# Patient Record
Sex: FEMALE | Race: WHITE | URBAN - METROPOLITAN AREA
[De-identification: names, ages, dates, MRNs, and addresses within clinical notes are randomized per-mention and may not be internally consistent; named-entity substitution may affect disease eponyms.]

---

## 2018-05-11 ENCOUNTER — EMERGENCY (EMERGENCY)
Facility: HOSPITAL | Age: 83
LOS: 1 days | Discharge: HOME | End: 2018-05-11
Admitting: EMERGENCY MEDICINE
Payer: MEDICARE

## 2018-05-11 PROCEDURE — 90471 IMMUNIZATION ADMIN: CPT

## 2018-05-11 PROCEDURE — 99282 EMERGENCY DEPT VISIT SF MDM: CPT

## 2018-05-11 PROCEDURE — 99283 EMERGENCY DEPT VISIT LOW MDM: CPT | Mod: 25

## 2018-10-23 ENCOUNTER — APPOINTMENT (OUTPATIENT)
Dept: RADIOLOGY | Facility: HOSPITAL | Age: 83
End: 2018-10-23

## 2018-10-23 ENCOUNTER — OUTPATIENT (OUTPATIENT)
Dept: OUTPATIENT SERVICES | Facility: HOSPITAL | Age: 83
LOS: 1 days | End: 2018-10-23
Payer: MEDICARE

## 2018-10-23 DIAGNOSIS — Z00.8 ENCOUNTER FOR OTHER GENERAL EXAMINATION: ICD-10-CM

## 2018-10-23 PROCEDURE — 71046 X-RAY EXAM CHEST 2 VIEWS: CPT | Mod: 26

## 2018-10-23 PROCEDURE — 71046 X-RAY EXAM CHEST 2 VIEWS: CPT

## 2019-10-19 ENCOUNTER — EMERGENCY (EMERGENCY)
Facility: HOSPITAL | Age: 84
LOS: 0 days | Discharge: HOME | End: 2019-10-19
Attending: EMERGENCY MEDICINE
Payer: MEDICARE

## 2019-10-19 VITALS
HEART RATE: 71 BPM | DIASTOLIC BLOOD PRESSURE: 62 MMHG | OXYGEN SATURATION: 99 % | SYSTOLIC BLOOD PRESSURE: 140 MMHG | TEMPERATURE: 98 F | RESPIRATION RATE: 17 BRPM

## 2019-10-19 VITALS — HEIGHT: 62 IN | WEIGHT: 134.92 LBS

## 2019-10-19 DIAGNOSIS — Z98.49 CATARACT EXTRACTION STATUS, UNSPECIFIED EYE: ICD-10-CM

## 2019-10-19 DIAGNOSIS — M06.9 RHEUMATOID ARTHRITIS, UNSPECIFIED: ICD-10-CM

## 2019-10-19 DIAGNOSIS — R22.2 LOCALIZED SWELLING, MASS AND LUMP, TRUNK: ICD-10-CM

## 2019-10-19 DIAGNOSIS — Z98.890 OTHER SPECIFIED POSTPROCEDURAL STATES: ICD-10-CM

## 2019-10-19 DIAGNOSIS — M54.9 DORSALGIA, UNSPECIFIED: ICD-10-CM

## 2019-10-19 DIAGNOSIS — E10.9 TYPE 1 DIABETES MELLITUS WITHOUT COMPLICATIONS: ICD-10-CM

## 2019-10-19 DIAGNOSIS — F32.9 MAJOR DEPRESSIVE DISORDER, SINGLE EPISODE, UNSPECIFIED: ICD-10-CM

## 2019-10-19 DIAGNOSIS — R63.4 ABNORMAL WEIGHT LOSS: ICD-10-CM

## 2019-10-19 DIAGNOSIS — I10 ESSENTIAL (PRIMARY) HYPERTENSION: ICD-10-CM

## 2019-10-19 DIAGNOSIS — D64.9 ANEMIA, UNSPECIFIED: ICD-10-CM

## 2019-10-19 LAB
ALBUMIN SERPL ELPH-MCNC: 3.3 G/DL — SIGNIFICANT CHANGE UP (ref 3.3–5)
ALP SERPL-CCNC: 93 U/L — SIGNIFICANT CHANGE UP (ref 40–120)
ALT FLD-CCNC: 18 U/L — SIGNIFICANT CHANGE UP (ref 12–78)
ANION GAP SERPL CALC-SCNC: 6 MMOL/L — SIGNIFICANT CHANGE UP (ref 5–17)
APTT BLD: 30.8 SEC — SIGNIFICANT CHANGE UP (ref 27.5–36.3)
AST SERPL-CCNC: 25 U/L — SIGNIFICANT CHANGE UP (ref 15–37)
BASOPHILS # BLD AUTO: 0.04 K/UL — SIGNIFICANT CHANGE UP (ref 0–0.2)
BASOPHILS NFR BLD AUTO: 0.6 % — SIGNIFICANT CHANGE UP (ref 0–2)
BILIRUB SERPL-MCNC: 0.4 MG/DL — SIGNIFICANT CHANGE UP (ref 0.2–1.2)
BUN SERPL-MCNC: 19 MG/DL — SIGNIFICANT CHANGE UP (ref 7–23)
CALCIUM SERPL-MCNC: 9.3 MG/DL — SIGNIFICANT CHANGE UP (ref 8.5–10.1)
CHLORIDE SERPL-SCNC: 108 MMOL/L — SIGNIFICANT CHANGE UP (ref 96–108)
CO2 SERPL-SCNC: 26 MMOL/L — SIGNIFICANT CHANGE UP (ref 22–31)
CREAT SERPL-MCNC: 1.04 MG/DL — SIGNIFICANT CHANGE UP (ref 0.5–1.3)
EOSINOPHIL # BLD AUTO: 0.25 K/UL — SIGNIFICANT CHANGE UP (ref 0–0.5)
EOSINOPHIL NFR BLD AUTO: 3.6 % — SIGNIFICANT CHANGE UP (ref 0–6)
GLUCOSE SERPL-MCNC: 172 MG/DL — HIGH (ref 70–99)
HCT VFR BLD CALC: 38.2 % — SIGNIFICANT CHANGE UP (ref 34.5–45)
HGB BLD-MCNC: 12.4 G/DL — SIGNIFICANT CHANGE UP (ref 11.5–15.5)
IMM GRANULOCYTES NFR BLD AUTO: 0.1 % — SIGNIFICANT CHANGE UP (ref 0–1.5)
INR BLD: 1.02 RATIO — SIGNIFICANT CHANGE UP (ref 0.88–1.16)
LYMPHOCYTES # BLD AUTO: 2.2 K/UL — SIGNIFICANT CHANGE UP (ref 1–3.3)
LYMPHOCYTES # BLD AUTO: 31.6 % — SIGNIFICANT CHANGE UP (ref 13–44)
MCHC RBC-ENTMCNC: 31.7 PG — SIGNIFICANT CHANGE UP (ref 27–34)
MCHC RBC-ENTMCNC: 32.5 GM/DL — SIGNIFICANT CHANGE UP (ref 32–36)
MCV RBC AUTO: 97.7 FL — SIGNIFICANT CHANGE UP (ref 80–100)
MONOCYTES # BLD AUTO: 0.91 K/UL — HIGH (ref 0–0.9)
MONOCYTES NFR BLD AUTO: 13.1 % — SIGNIFICANT CHANGE UP (ref 2–14)
NEUTROPHILS # BLD AUTO: 3.55 K/UL — SIGNIFICANT CHANGE UP (ref 1.8–7.4)
NEUTROPHILS NFR BLD AUTO: 51 % — SIGNIFICANT CHANGE UP (ref 43–77)
PLATELET # BLD AUTO: 165 K/UL — SIGNIFICANT CHANGE UP (ref 150–400)
POTASSIUM SERPL-MCNC: 4.2 MMOL/L — SIGNIFICANT CHANGE UP (ref 3.5–5.3)
POTASSIUM SERPL-SCNC: 4.2 MMOL/L — SIGNIFICANT CHANGE UP (ref 3.5–5.3)
PROT SERPL-MCNC: 7.4 GM/DL — SIGNIFICANT CHANGE UP (ref 6–8.3)
PROTHROM AB SERPL-ACNC: 11.3 SEC — SIGNIFICANT CHANGE UP (ref 10–12.9)
RBC # BLD: 3.91 M/UL — SIGNIFICANT CHANGE UP (ref 3.8–5.2)
RBC # FLD: 15.6 % — HIGH (ref 10.3–14.5)
SODIUM SERPL-SCNC: 140 MMOL/L — SIGNIFICANT CHANGE UP (ref 135–145)
WBC # BLD: 6.96 K/UL — SIGNIFICANT CHANGE UP (ref 3.8–10.5)
WBC # FLD AUTO: 6.96 K/UL — SIGNIFICANT CHANGE UP (ref 3.8–10.5)

## 2019-10-19 PROCEDURE — 74177 CT ABD & PELVIS W/CONTRAST: CPT | Mod: 26

## 2019-10-19 PROCEDURE — 74177 CT ABD & PELVIS W/CONTRAST: CPT

## 2019-10-19 PROCEDURE — 85730 THROMBOPLASTIN TIME PARTIAL: CPT

## 2019-10-19 PROCEDURE — 36415 COLL VENOUS BLD VENIPUNCTURE: CPT

## 2019-10-19 PROCEDURE — 80053 COMPREHEN METABOLIC PANEL: CPT

## 2019-10-19 PROCEDURE — 86900 BLOOD TYPING SEROLOGIC ABO: CPT

## 2019-10-19 PROCEDURE — 99284 EMERGENCY DEPT VISIT MOD MDM: CPT

## 2019-10-19 PROCEDURE — 71260 CT THORAX DX C+: CPT

## 2019-10-19 PROCEDURE — 85610 PROTHROMBIN TIME: CPT

## 2019-10-19 PROCEDURE — 71260 CT THORAX DX C+: CPT | Mod: 26

## 2019-10-19 PROCEDURE — 86901 BLOOD TYPING SEROLOGIC RH(D): CPT

## 2019-10-19 PROCEDURE — 86850 RBC ANTIBODY SCREEN: CPT

## 2019-10-19 PROCEDURE — 99284 EMERGENCY DEPT VISIT MOD MDM: CPT | Mod: 25

## 2019-10-19 PROCEDURE — 85025 COMPLETE CBC W/AUTO DIFF WBC: CPT

## 2019-10-19 RX ORDER — SODIUM CHLORIDE 9 MG/ML
1000 INJECTION INTRAMUSCULAR; INTRAVENOUS; SUBCUTANEOUS ONCE
Refills: 0 | Status: COMPLETED | OUTPATIENT
Start: 2019-10-19 | End: 2019-10-19

## 2019-10-19 RX ADMIN — SODIUM CHLORIDE 2000 MILLILITER(S): 9 INJECTION INTRAMUSCULAR; INTRAVENOUS; SUBCUTANEOUS at 12:35

## 2019-10-19 NOTE — ED ADULT TRIAGE NOTE - CHIEF COMPLAINT QUOTE
pt ambulatory to ED states she has been having increasing swelling and pain to lower back/buttock since May worsening in size. denies fever chills or discharge.

## 2019-10-19 NOTE — ED STATDOCS - MUSCULOSKELETAL, MLM
range of motion is not limited and there is no muscle tenderness. +3cm firm subcutaneous mass on right lower back +2cm firm subcutaneous mass on right flank area

## 2019-10-19 NOTE — ED STATDOCS - PATIENT PORTAL LINK FT
You can access the FollowMyHealth Patient Portal offered by [facility] by registering at the following website: [email or web address]. By joining [employer]’s FollowMyHealth portal, you will also be able to view your health information using other applications (apps) compatible with our system.

## 2019-10-19 NOTE — ED STATDOCS - PROGRESS NOTE DETAILS
Patient is an 85 y/o female with PMHx of anemia, RA, depression, DM type I, HTN who presented to ED c/o lower back pain and swelling. back pain has been ongoing x 5 months with approx 10lbs weight loss. Denies fever, chills, any other pain at this time. Will get labs, CT. ~ASHLI Watkins CT results and labs reviewed with patient and Dr. Ahumada in detail. Will dc home, outpatient biopsy of back lesion with Dr. Huizar. ~ASHLI Watkins Patient re-examined and re-evaluated. Patient feels much better at this time. ED evaluation, Diagnosis and management discussed with the patient and family in detail. Workup results discussed with ED attending YENNI Bethea to dc home.  Close surgery follow up encouraged.  Strict ED return instructions discussed in detail and patient given the opportunity to ask any questions about their discharge diagnosis and instructions. Patient verbalized understanding. ~ ASHLI Watkins

## 2019-10-19 NOTE — ED STATDOCS - SKIN, MLM
skin normal color for race, warm, dry and intact. +3cm firm subcutaneous mass on right lower back +2cm firm subcutaneous mass on right flank area

## 2019-10-19 NOTE — ED STATDOCS - CARE PROVIDER_API CALL
Jah Huizar (DO)  Surgery; Surgical Critical Care  [address], [address] [place] [address]  Phone: [phone]  Fax: [phone]  Follow Up Time:

## 2019-10-19 NOTE — ED STATDOCS - CLINICAL SUMMARY MEDICAL DECISION MAKING FREE TEXT BOX
Labs unremarkable.  CT as above.  Okay for d/c home with PCP, surgery follow up for possible biopsy.  Discussed with patient and family.

## 2019-10-19 NOTE — ED STATDOCS - PHYSICAL EXAMINATION
GEN: AOX3, NAD. HEENT: Throat clear. Airway is patent. EYES: PERRLA. EOMI. Head: NC/AT. NECK: Supple, No JVD. FROM. C-spine non-tender. CV:S1S2, RRR, LUNGS: CTA b/l, no w/r/r. CHEST: Equal chest expansion and rise. No deformity. ABD: Soft, NT/ND, no rebound, no guarding. No CVAT. BACK: +3cm cystic mildly tender mass is noted in lower back area. No erythema. No drainage. EXT: No e/c/c. 2+ distal pulses. SKIN: No rashes. NEURO: No focal deficits. CN II-XII intact. FROM. 5/5 motor and sensory. ASHLI Watkins

## 2019-10-19 NOTE — ED STATDOCS - OBJECTIVE STATEMENT
83 y/o female with PMHx of anemia, RA, depression, DM type I, HTN, s/p right ankle fx presents to the ED c/o back pain x5 months. +~10 lbs weight loss in the past 2 months. Seen by PMBALTAZAR Noble in June 2019 for lump on her back which he said was most likely a lipoma. Pt states the bump has not increased in size but reports she has pain around the bump when walking. Denies fever, chills, any other pain at this time. Was in [place] visiting family for the past 2 months. PMD: Uri Noble.

## 2020-11-13 ENCOUNTER — TRANSCRIPTION ENCOUNTER (OUTPATIENT)
Age: 85
End: 2020-11-13

## 2020-11-13 ENCOUNTER — INPATIENT (INPATIENT)
Facility: HOSPITAL | Age: 85
LOS: 3 days | Discharge: NURSING, REHAB OR LONG-TERM CARE | DRG: 481 | End: 2020-11-17
Attending: INTERNAL MEDICINE | Admitting: HOSPITALIST
Payer: MEDICARE

## 2020-11-13 VITALS
WEIGHT: 145.06 LBS | TEMPERATURE: 98 F | SYSTOLIC BLOOD PRESSURE: 169 MMHG | OXYGEN SATURATION: 99 % | HEIGHT: 62 IN | RESPIRATION RATE: 16 BRPM | HEART RATE: 77 BPM | DIASTOLIC BLOOD PRESSURE: 85 MMHG

## 2020-11-13 DIAGNOSIS — S72.001A FRACTURE OF UNSPECIFIED PART OF NECK OF RIGHT FEMUR, INITIAL ENCOUNTER FOR CLOSED FRACTURE: ICD-10-CM

## 2020-11-13 LAB
ALBUMIN SERPL ELPH-MCNC: 3.1 G/DL — LOW (ref 3.3–5)
ALP SERPL-CCNC: 80 U/L — SIGNIFICANT CHANGE UP (ref 40–120)
ALT FLD-CCNC: 23 U/L — SIGNIFICANT CHANGE UP (ref 10–45)
ANION GAP SERPL CALC-SCNC: 9 MMOL/L — SIGNIFICANT CHANGE UP (ref 5–17)
AST SERPL-CCNC: 24 U/L — SIGNIFICANT CHANGE UP (ref 10–40)
BASOPHILS # BLD AUTO: 0.04 K/UL — SIGNIFICANT CHANGE UP (ref 0–0.2)
BASOPHILS NFR BLD AUTO: 0.3 % — SIGNIFICANT CHANGE UP (ref 0–2)
BILIRUB SERPL-MCNC: 0.3 MG/DL — SIGNIFICANT CHANGE UP (ref 0.2–1.2)
BLD GP AB SCN SERPL QL: SIGNIFICANT CHANGE UP
BUN SERPL-MCNC: 24 MG/DL — HIGH (ref 7–23)
CALCIUM SERPL-MCNC: 9.6 MG/DL — SIGNIFICANT CHANGE UP (ref 8.4–10.5)
CHLORIDE SERPL-SCNC: 106 MMOL/L — SIGNIFICANT CHANGE UP (ref 96–108)
CO2 SERPL-SCNC: 25 MMOL/L — SIGNIFICANT CHANGE UP (ref 22–31)
CREAT SERPL-MCNC: 0.93 MG/DL — SIGNIFICANT CHANGE UP (ref 0.5–1.3)
EOSINOPHIL # BLD AUTO: 0.24 K/UL — SIGNIFICANT CHANGE UP (ref 0–0.5)
EOSINOPHIL NFR BLD AUTO: 1.9 % — SIGNIFICANT CHANGE UP (ref 0–6)
GLUCOSE BLDC GLUCOMTR-MCNC: 138 MG/DL — HIGH (ref 70–99)
GLUCOSE BLDC GLUCOMTR-MCNC: 166 MG/DL — HIGH (ref 70–99)
GLUCOSE SERPL-MCNC: 104 MG/DL — HIGH (ref 70–99)
HCT VFR BLD CALC: 34.5 % — SIGNIFICANT CHANGE UP (ref 34.5–45)
HGB BLD-MCNC: 11.3 G/DL — LOW (ref 11.5–15.5)
IMM GRANULOCYTES NFR BLD AUTO: 0.6 % — SIGNIFICANT CHANGE UP (ref 0–1.5)
LYMPHOCYTES # BLD AUTO: 1.74 K/UL — SIGNIFICANT CHANGE UP (ref 1–3.3)
LYMPHOCYTES # BLD AUTO: 13.9 % — SIGNIFICANT CHANGE UP (ref 13–44)
MCHC RBC-ENTMCNC: 32.8 GM/DL — SIGNIFICANT CHANGE UP (ref 32–36)
MCHC RBC-ENTMCNC: 33.5 PG — SIGNIFICANT CHANGE UP (ref 27–34)
MCV RBC AUTO: 102.4 FL — HIGH (ref 80–100)
MONOCYTES # BLD AUTO: 0.9 K/UL — SIGNIFICANT CHANGE UP (ref 0–0.9)
MONOCYTES NFR BLD AUTO: 7.2 % — SIGNIFICANT CHANGE UP (ref 2–14)
NEUTROPHILS # BLD AUTO: 9.53 K/UL — HIGH (ref 1.8–7.4)
NEUTROPHILS NFR BLD AUTO: 76.1 % — SIGNIFICANT CHANGE UP (ref 43–77)
NRBC # BLD: 0 /100 WBCS — SIGNIFICANT CHANGE UP (ref 0–0)
PLATELET # BLD AUTO: 207 K/UL — SIGNIFICANT CHANGE UP (ref 150–400)
POTASSIUM SERPL-MCNC: 4.4 MMOL/L — SIGNIFICANT CHANGE UP (ref 3.5–5.3)
POTASSIUM SERPL-SCNC: 4.4 MMOL/L — SIGNIFICANT CHANGE UP (ref 3.5–5.3)
PROT SERPL-MCNC: 7.2 G/DL — SIGNIFICANT CHANGE UP (ref 6–8.3)
RAPID RVP RESULT: DETECTED
RBC # BLD: 3.37 M/UL — LOW (ref 3.8–5.2)
RBC # FLD: 15.2 % — HIGH (ref 10.3–14.5)
SARS-COV-2 RNA SPEC QL NAA+PROBE: SIGNIFICANT CHANGE UP
SODIUM SERPL-SCNC: 140 MMOL/L — SIGNIFICANT CHANGE UP (ref 135–145)
WBC # BLD: 12.53 K/UL — HIGH (ref 3.8–10.5)
WBC # FLD AUTO: 12.53 K/UL — HIGH (ref 3.8–10.5)

## 2020-11-13 PROCEDURE — 93010 ELECTROCARDIOGRAM REPORT: CPT

## 2020-11-13 PROCEDURE — 99285 EMERGENCY DEPT VISIT HI MDM: CPT | Mod: CS

## 2020-11-13 PROCEDURE — 73562 X-RAY EXAM OF KNEE 3: CPT | Mod: 26,RT

## 2020-11-13 PROCEDURE — 71045 X-RAY EXAM CHEST 1 VIEW: CPT | Mod: 26

## 2020-11-13 PROCEDURE — 73502 X-RAY EXAM HIP UNI 2-3 VIEWS: CPT | Mod: 26,RT

## 2020-11-13 PROCEDURE — 99223 1ST HOSP IP/OBS HIGH 75: CPT

## 2020-11-13 RX ORDER — METHOTREXATE 2.5 MG/1
0 TABLET ORAL
Qty: 0 | Refills: 0 | DISCHARGE

## 2020-11-13 RX ORDER — MORPHINE SULFATE 50 MG/1
4 CAPSULE, EXTENDED RELEASE ORAL EVERY 6 HOURS
Refills: 0 | Status: DISCONTINUED | OUTPATIENT
Start: 2020-11-13 | End: 2020-11-14

## 2020-11-13 RX ORDER — INSULIN GLARGINE 100 [IU]/ML
0 INJECTION, SOLUTION SUBCUTANEOUS
Qty: 0 | Refills: 0 | DISCHARGE

## 2020-11-13 RX ORDER — AMLODIPINE BESYLATE 2.5 MG/1
5 TABLET ORAL DAILY
Refills: 0 | Status: DISCONTINUED | OUTPATIENT
Start: 2020-11-13 | End: 2020-11-14

## 2020-11-13 RX ORDER — ACETAMINOPHEN 500 MG
650 TABLET ORAL ONCE
Refills: 0 | Status: COMPLETED | OUTPATIENT
Start: 2020-11-13 | End: 2020-11-13

## 2020-11-13 RX ORDER — SODIUM CHLORIDE 9 MG/ML
1000 INJECTION, SOLUTION INTRAVENOUS
Refills: 0 | Status: DISCONTINUED | OUTPATIENT
Start: 2020-11-13 | End: 2020-11-14

## 2020-11-13 RX ORDER — HEPARIN SODIUM 5000 [USP'U]/ML
5000 INJECTION INTRAVENOUS; SUBCUTANEOUS THREE TIMES A DAY
Refills: 0 | Status: COMPLETED | OUTPATIENT
Start: 2020-11-13 | End: 2020-11-13

## 2020-11-13 RX ORDER — FOLIC ACID 0.8 MG
1 TABLET ORAL DAILY
Refills: 0 | Status: DISCONTINUED | OUTPATIENT
Start: 2020-11-13 | End: 2020-11-14

## 2020-11-13 RX ORDER — HEPARIN SODIUM 5000 [USP'U]/ML
5000 INJECTION INTRAVENOUS; SUBCUTANEOUS THREE TIMES A DAY
Refills: 0 | Status: DISCONTINUED | OUTPATIENT
Start: 2020-11-13 | End: 2020-11-13

## 2020-11-13 RX ORDER — BENAZEPRIL HYDROCHLORIDE 40 MG/1
0 TABLET ORAL
Qty: 0 | Refills: 0 | DISCHARGE

## 2020-11-13 RX ORDER — AMLODIPINE BESYLATE 2.5 MG/1
0 TABLET ORAL
Qty: 0 | Refills: 0 | DISCHARGE

## 2020-11-13 RX ORDER — INSULIN LISPRO 100/ML
VIAL (ML) SUBCUTANEOUS
Refills: 0 | Status: DISCONTINUED | OUTPATIENT
Start: 2020-11-13 | End: 2020-11-14

## 2020-11-13 RX ORDER — DEXTROSE 50 % IN WATER 50 %
25 SYRINGE (ML) INTRAVENOUS ONCE
Refills: 0 | Status: DISCONTINUED | OUTPATIENT
Start: 2020-11-13 | End: 2020-11-14

## 2020-11-13 RX ORDER — FOLIC ACID 0.8 MG
0 TABLET ORAL
Qty: 0 | Refills: 0 | DISCHARGE

## 2020-11-13 RX ORDER — DEXTROSE 50 % IN WATER 50 %
12.5 SYRINGE (ML) INTRAVENOUS ONCE
Refills: 0 | Status: DISCONTINUED | OUTPATIENT
Start: 2020-11-13 | End: 2020-11-14

## 2020-11-13 RX ORDER — LISINOPRIL 2.5 MG/1
20 TABLET ORAL DAILY
Refills: 0 | Status: DISCONTINUED | OUTPATIENT
Start: 2020-11-13 | End: 2020-11-14

## 2020-11-13 RX ORDER — NICOTINE POLACRILEX 2 MG
1 GUM BUCCAL DAILY
Refills: 0 | Status: DISCONTINUED | OUTPATIENT
Start: 2020-11-13 | End: 2020-11-14

## 2020-11-13 RX ORDER — CHLORHEXIDINE GLUCONATE 213 G/1000ML
1 SOLUTION TOPICAL ONCE
Refills: 0 | Status: DISCONTINUED | OUTPATIENT
Start: 2020-11-13 | End: 2020-11-14

## 2020-11-13 RX ORDER — INFLUENZA VIRUS VACCINE 15; 15; 15; 15 UG/.5ML; UG/.5ML; UG/.5ML; UG/.5ML
0.5 SUSPENSION INTRAMUSCULAR ONCE
Refills: 0 | Status: DISCONTINUED | OUTPATIENT
Start: 2020-11-13 | End: 2020-11-17

## 2020-11-13 RX ORDER — OXYCODONE AND ACETAMINOPHEN 5; 325 MG/1; MG/1
1 TABLET ORAL EVERY 4 HOURS
Refills: 0 | Status: DISCONTINUED | OUTPATIENT
Start: 2020-11-13 | End: 2020-11-14

## 2020-11-13 RX ORDER — GLUCAGON INJECTION, SOLUTION 0.5 MG/.1ML
1 INJECTION, SOLUTION SUBCUTANEOUS ONCE
Refills: 0 | Status: DISCONTINUED | OUTPATIENT
Start: 2020-11-13 | End: 2020-11-14

## 2020-11-13 RX ORDER — DEXTROSE 50 % IN WATER 50 %
15 SYRINGE (ML) INTRAVENOUS ONCE
Refills: 0 | Status: DISCONTINUED | OUTPATIENT
Start: 2020-11-13 | End: 2020-11-14

## 2020-11-13 RX ORDER — INSULIN GLARGINE 100 [IU]/ML
10 INJECTION, SOLUTION SUBCUTANEOUS AT BEDTIME
Refills: 0 | Status: DISCONTINUED | OUTPATIENT
Start: 2020-11-13 | End: 2020-11-14

## 2020-11-13 RX ORDER — METFORMIN HYDROCHLORIDE 850 MG/1
0 TABLET ORAL
Qty: 0 | Refills: 0 | DISCHARGE

## 2020-11-13 RX ORDER — CIPROFLOXACIN LACTATE 400MG/40ML
0 VIAL (ML) INTRAVENOUS
Qty: 0 | Refills: 0 | DISCHARGE

## 2020-11-13 RX ORDER — SODIUM CHLORIDE 9 MG/ML
1000 INJECTION INTRAMUSCULAR; INTRAVENOUS; SUBCUTANEOUS
Refills: 0 | Status: DISCONTINUED | OUTPATIENT
Start: 2020-11-13 | End: 2020-11-14

## 2020-11-13 RX ORDER — ACETAMINOPHEN 500 MG
650 TABLET ORAL EVERY 4 HOURS
Refills: 0 | Status: DISCONTINUED | OUTPATIENT
Start: 2020-11-13 | End: 2020-11-14

## 2020-11-13 RX ADMIN — Medication 650 MILLIGRAM(S): at 18:18

## 2020-11-13 RX ADMIN — Medication 650 MILLIGRAM(S): at 19:32

## 2020-11-13 RX ADMIN — HEPARIN SODIUM 5000 UNIT(S): 5000 INJECTION INTRAVENOUS; SUBCUTANEOUS at 22:50

## 2020-11-13 RX ADMIN — MORPHINE SULFATE 4 MILLIGRAM(S): 50 CAPSULE, EXTENDED RELEASE ORAL at 23:09

## 2020-11-13 RX ADMIN — MORPHINE SULFATE 4 MILLIGRAM(S): 50 CAPSULE, EXTENDED RELEASE ORAL at 22:39

## 2020-11-13 NOTE — CONSULT NOTE ADULT - SUBJECTIVE AND OBJECTIVE BOX
HPI:  86yo female w. PMH HTN, DMII, RA, Anemia, Depression presents to ED w. c/o Right Hip pain s/p stepping wrong on her foot and her hand slipped off of the banister causing her to fall, landed on right knee. Denies Head strike/LOC. No Head,, Neck or back pain. Denies cp/sob/palpitations. Found to have Right hip intertrochanteric fracture, Ortho consulted in ED, admit w. plan for surgical intervention.    (13 Nov 2020 18:32)    Called by Dr Burns ortho to see patient .  Patient was sitting on stretcher in the ER.  She appears comfortable but does complain of 10/10 pain with   movement .   Patient gives hx of fall as described in HPI .    Vital Signs Last 24 Hrs  T(C): 36.6 (13 Nov 2020 16:41), Max: 36.6 (13 Nov 2020 16:41)  T(F): 97.8 (13 Nov 2020 16:41), Max: 97.8 (13 Nov 2020 16:41)  HR: 82 (13 Nov 2020 19:58) (77 - 82)  BP: 167/68 (13 Nov 2020 19:58) (167/68 - 169/85)  BP(mean): 101 (13 Nov 2020 19:58) (101 - 101)  RR: 16 (13 Nov 2020 19:58) (16 - 16)  SpO2: 98% (13 Nov 2020 19:58) (98% - 99%)    PAST MEDICAL & SURGICAL HISTORY:  Type 2 diabetes mellitus  Depression  rheumatoid  Anemia  Hypertension  S/P cataract surgery  fracture ankle right  plate . screws    Home Medications:  benazepril 20 mg oral tablet: 1 tab(s) orally once a day (13 Nov 2020 18:34)  folic acid 1 mg oral tablet: 1 tab(s) orally once a day (13 Nov 2020 18:34)  Lantus 100 units/mL subcutaneous solution: 25 -30 unit(s) subcutaneous once a day (at bedtime) (13 Nov 2020 18:32)  metFORMIN 850 mg oral tablet: 2 tab(s) orally once a day (in the evening) (13 Nov 2020 18:33)  Norvasc 5 mg oral tablet: 1 tab(s) orally once a day (13 Nov 2020 18:35)  Paxil 10 mg oral tablet: 1 tab(s) orally once a day (13 Nov 2020 18:32)    PE:  Alert and oriented X3   Lungs:  CTA   Cor:   RR   Abd:   soft, protuberant +BS   Ext:   Right leg externally rotated, thigh soft, no ecchymosis noted at hip area, small abrasion at knee from fall covered with telfa and tegaderm.  Foot warm, + pulses , good capillary refill                           11.3   12.53 )-----------( 207      ( 13 Nov 2020 17:30 )             34.5   11-13    140  |  106  |  24<H>  ----------------------------<  104<H>  4.4   |  25  |  0.93    Ca    9.6      13 Nov 2020 17:30    TPro  7.2  /  Alb  3.1<L>  /  TBili  0.3  /  DBili  x   /  AST  24  /  ALT  23  /  AlkPhos  80  11-13

## 2020-11-13 NOTE — H&P ADULT - NSHPSOCIALHISTORY_GEN_ALL_CORE
ETOH: Denies any use.  Tobacco: 20 Cigarettes per day-->recently down to 10 (in s/o developed L heel ulcer)   Illicit Drug Use: Denies ETOH: Denies any use.  Tobacco: 20 Cigarettes per day-->recently down to 10 (in s/o developed L heel ulcer)   Illicit Drug Use: Denies    Family history: Mother and father both , history of HTN

## 2020-11-13 NOTE — ED PROVIDER NOTE - SKIN WOUND TYPE
right knee/ABRASION(S) right knee skin tear; left heel nickel sized ulceration (no sign of infection).

## 2020-11-13 NOTE — ED PROVIDER NOTE - ATTENDING CONTRIBUTION TO CARE
Barbie with ASHLI Montalvo. 84 yo female, PMH RA, anemia, diabetes, htn, comes to the ED co right hip pain sp slip and fall.  As per patient she was walking down the steps, her right hand slipped on the banister while on the last step causing her to fall landing on her right knee. Denies hitting her head or LOC. Denies any c pain, sob, neck or back pain, abd pain, or any other complaints.  Right hip. groin pain with minimal abduction  LE.   Check labs, xray knee, hip/pelvis, declines pain control,  re-eval  Right hip intertrochanteric fracture; discussed with ortho dr schilling will take to OR tomorrow morning.    I performed a face to face bedside interview with patient regarding history of present illness, review of symptoms and past medical history. I completed an independent physical exam.  I have discussed the patient's plan of care with Physician Assistant (PA). I agree with note as stated above, having amended the EMR as needed to reflect my findings.   This includes History of Present Illness, HIV, Past Medical/Surgical/Family/Social History, Allergies and Home Medications, Review of Systems, Physical Exam, and any Progress Notes during the time I functioned as the attending physician for this patient.

## 2020-11-13 NOTE — H&P ADULT - NSICDXPASTMEDICALHX_GEN_ALL_CORE_FT
PAST MEDICAL HISTORY:  Anemia     Arthritis, rheumatoid     Depression     Hypertension     Type 2 diabetes mellitus

## 2020-11-13 NOTE — ED PROVIDER NOTE - CLINICAL SUMMARY MEDICAL DECISION MAKING FREE TEXT BOX
86 yo female, pmh RA, anemia, diabetes, htn, comes to the ED co right hip pain sp slip and fall.  As per patient she was walking down the steps, her right hand slipped on the banister while on the last step causing her to fall landing on her right knee. Denies hitting her head or LOC. Denies any c pain, sob, neck or back pain, abd pain, or any other complaints.  Right hip. groin pain with minimal abduction  LE.   Check labs, xray knee, hip/pelvis, declines pain control,  re-eval 84 yo female, pmh RA, anemia, diabetes, htn, comes to the ED co right hip pain sp slip and fall.  As per patient she was walking down the steps, her right hand slipped on the banister while on the last step causing her to fall landing on her right knee. Denies hitting her head or LOC. Denies any c pain, sob, neck or back pain, abd pain, or any other complaints.  Right hip. groin pain with minimal abduction  LE.   Check labs, xray knee, hip/pelvis, declines pain control,  re-eval    Right hip intertrochanteric fracture, sw ortho dr schilling will take to OR tomorrow morning. 84 yo female, PMH RA, anemia, diabetes, htn, comes to the ED co right hip pain sp slip and fall.  As per patient she was walking down the steps, her right hand slipped on the banister while on the last step causing her to fall landing on her right knee. Denies hitting her head or LOC. Denies any c pain, sob, neck or back pain, abd pain, or any other complaints.  Right hip. groin pain with minimal abduction  LE.   Check labs, xray knee, hip/pelvis, declines pain control,  re-eval  Right hip intertrochanteric fracture; discussed with ortho dr schilling will take to OR tomorrow morning.

## 2020-11-13 NOTE — H&P ADULT - HISTORY OF PRESENT ILLNESS
84yo female w. PMH HTN, DMII, RA, Anemia, Depression presents to ED w. c/o Right Hip pain s/p stepping wrong on her foot and her hand slipped off of the banister causing her to fall, landed on right knee. Denies Head strike/LOC. No Head,, Neck or back pain. Denies cp/sob/palpitations. Found to have Right hip intertrochanteric fracture, Ortho consulted in ED, admit w. plan for surgical intervention.

## 2020-11-13 NOTE — ED PROVIDER NOTE - OBJECTIVE STATEMENT
84 yo female, pmh RA, anemia, diabetes, htn, comes to the ED co right hip pain sp slip and fall.  As per patient she was walking down the steps, her right hand slipped on the banister while on the last step causing her to fall landing on her right knee. Denies hitting her head or LOC. Denies any c pain, sob, neck or back pain, abd pain, or any other complaints. 86 yo female, PMH RA, anemia, diabetes, htn, comes to the ED co right hip pain sp slip and fall.  As per patient she was walking down the steps, her right hand slipped on the banister while on the last step causing her to fall landing on her right knee. Denies hitting her head or LOC. Denies any c pain, sob, neck or back pain, abd pain, or any other complaints.

## 2020-11-13 NOTE — CONSULT NOTE ADULT - ASSESSMENT
85 year old female presents with Right Hip intertrochanteric fracture hx of RA , anemia, DM11, htn, depression .  Patient is scheduled for ORIF right hip IM nail in am.      Plan:  Type and screen            hold hepain p midnight            ice pack to right hip            pain management            Procedure discussed with patient - Dr Burns to see patient in am            OR scheduled for 8 AM

## 2020-11-13 NOTE — ED ADULT NURSE REASSESSMENT NOTE - NS ED NURSE REASSESS COMMENT FT1
Report received from Nilda OCONNELL. Patient alert and resting comfortably. Will continue to monitor and treat per orders.

## 2020-11-13 NOTE — H&P ADULT - NSHPLABSRESULTS_GEN_ALL_CORE
11.13.20 XR Hip/Pelvis- Reported by Ortho + R Hip fx (Pending radiology read)  11.13.20 CXR & R Knee- Radiology read pending 11.13.20 XR Hip/Pelvis- Reported by Ortho + R Hip fx (Pending radiology read)  11.13.20 CXR & R Knee- Radiology read pending        .                            11.3   12.53 )-----------( 207      ( 13 Nov 2020 17:30 )             34.5       11-13    140  |  106  |  24  ----------------------------<  104  4.4   |  25  |  0.93    Ca    9.6      13 Nov 2020 17:30    TPro  7.2  /  Alb  3.1  /  TBili  0.3  /  DBili  x   /  AST  24  /  ALT  23  /  AlkPhos  80  11-13

## 2020-11-13 NOTE — H&P ADULT - NSHPREVIEWOFSYSTEMS_GEN_ALL_CORE
REVIEW OF SYSTEMS:  CONSTITUTIONAL: No fever, weight loss, or fatigue  RESPIRATORY: No cough, wheezing, chills or hemoptysis; No shortness of breath  CARDIOVASCULAR: No chest pain, palpitations, dizziness, or leg swelling  GASTROINTESTINAL: No abdominal pain, No nausea, vomiting, or hematemesis; No diarrhea or constipation  GENITOURINARY: No dysuria, frequency, hematuria, or incontinence  NEUROLOGICAL: No headaches, memory loss, loss of strength, numbness, or tremors  SKIN: No itching, burning, rashes. +L Heel Ulcer. RLE skin tear.   MUSCULOSKELETAL: See HPI.       All other ROS reviewed and negative except as otherwise stated

## 2020-11-13 NOTE — H&P ADULT - NSHPPHYSICALEXAM_GEN_ALL_CORE
PHYSICAL EXAM:  GENERAL: NAD, well-groomed, well-developed  HEAD:  Atraumatic, Normocephalic  EYES: conjunctiva and sclera clear  ENMT: Moist mucous membranes  NECK: Supple  CHEST/LUNG: Clear to auscultation bilaterally anteriorly   HEART: Regular rate and rhythm; S1/S2  ABDOMEN: Soft, Nontender, Nondistended; Bowel sounds present  VASCULAR: Normal pulses  EXTREMITIES:  2+ Peripheral Pulses, No clubbing, cyanosis, or edema. RLE +external rotation, no shortening, SILT, wwp, +DP pulse. Skin tear, no active bleeding.   SKIN: RLE Skin tear. L Heel Ulcer- wound appear clean, some serous drainage (no purulence), no surrounding infection/cellulitis   PSYCH: Normal mood and affect  NERVOUS SYSTEM:  A/O x3, Good concentration; No focal deficits.

## 2020-11-13 NOTE — H&P ADULT - ASSESSMENT
86yo female w. PMH HTN, DMII, RA, Anemia, Depression presents to ED w. c/o Right Hip pain s/p stepping wrong on her foot and her hand slipped off of the banister causing her to fall, landed on right knee. Denies Head strike/LOC. No Head,, Neck or back pain. Denies cp/sob/palpitations. Found to have Right hip intertrochanteric fracture, Ortho consulted in ED, admit w. plan for surgical intervention.     *Right hip intertrochanteric fracture  ED Consulted Ortho  NPO p MN- Pending OR twm, surgical intervention  Bedrest  Pain Management- Note pt. endorsed sensitive to Narcotics-causes AMS easily- limit as possible (Has tolerated Oxy 5mg in past)  Note pt. endorses pcn allergy- Itchy/Hives, also cannot tolerate Cephalosporins (Per Daughter, believes tried Keflex in past w. reaction)    *HTN  Cont home Amlodipine, Benazepril    *DMII  Takes home Lantus 25-30 Units QHS, will dose 10 Units in s/o NPO  Hold home Metformin 850mg 2tabs Qhs  FS/Low dose ISS  f/up HgbA1C in AM    *Depression  Cont home Paxil    *RA  Home Methatrexate 2.5mg x 6tabs QWk Wed (Held since weekly dosing)    *L Heel Ulcer  Continue local wound care    Dispo: as above, pending tentative OR tomorrow.   RADHA Velez discussed case & plan w. . 86yo female w. PMH HTN, DMII, RA, Anemia, Depression presents to ED w. c/o Right Hip pain s/p stepping wrong on her foot and her hand slipped off of the banister causing her to fall, landed on right knee. Denies Head strike/LOC. No Head,, Neck or back pain. Denies cp/sob/palpitations. Found to have Right hip intertrochanteric fracture, Ortho consulted in ED, admit w. plan for surgical intervention.     *Right hip intertrochanteric fracture  ED Consulted Ortho  NPO p MN- Pending OR twm, surgical intervention  Bedrest  Pain Management- Note pt. endorsed sensitive to Narcotics-causes AMS easily- limit as possible (Has tolerated Oxy 5mg in past)  Note pt. endorses pcn allergy- Itchy/Hives, also cannot tolerate Cephalosporins (Per Daughter, believes tried Keflex in past w. reaction)    *HTN  Cont home Amlodipine, Benazepril    *DMII  Takes home Lantus 25-30 Units QHS, will dose 10 Units in s/o NPO  Hold home Metformin 850mg 2tabs Qhs  FS/Low dose ISS  f/up HgbA1C in AM    *Depression  Cont home Paxil    *RA  Home Methatrexate 2.5mg x 6tabs QWk Wed (Held since weekly dosing)    *L Heel Ulcer  Continue local wound care    *DVT ppx  Venodyes  Hold AC Pre-op    Dispo: as above, pending tentative OR tomorrow.   RADHA Velez discussed case & plan w. . 84yo female w. PMH HTN, DMII, RA, Anemia, Depression presents to ED w. c/o Right Hip pain s/p stepping wrong on her foot and her hand slipped off of the banister causing her to fall, landed on right knee. Denies Head strike/LOC. No Head,, Neck or back pain. Denies cp/sob/palpitations. Found to have Right hip intertrochanteric fracture, Ortho consulted in ED, admit w. plan for surgical intervention.     *Acute Right hip intertrochanteric fracture  ED Consulted Ortho  NPO p MN- Pending OR twm, surgical intervention  Bedrest  Pain Management- Note pt. endorsed sensitive to Narcotics-causes AMS easily- limit as possible (Has tolerated Oxy 5mg in past)  Note pt. endorses pcn allergy- Itchy/Hives, also cannot tolerate Cephalosporins (Per Daughter, believes tried Keflex in past w. reaction)    *HTN  Cont home Amlodipine, Benazepril    *DMII  Takes home Lantus 25-30 Units QHS, will dose 10 Units in s/o NPO  Hold home Metformin 850mg 2tabs Qhs  FS/Low dose ISS  f/up HgbA1C in AM    *Depression  Cont home Paxil    *RA  Home Methatrexate 2.5mg x 6tabs QWk Wed (Held since weekly dosing)    *Left Heel Ulcer  Continue local wound care    *DVT ppx  Venodyes  Hold AC Pre-op    IMPROVE VTE Individual Risk Assessment          RISK                                                          Points  [  ] Previous VTE                                                3  [  ] Thrombophilia                                             2  [  ] Lower limb paralysis                                   2        (unable to hold up >15 seconds)    [  ] Current Cancer                                             2         (within 6 months)  [  ] Immobilization > 24 hrs                              1  [  ] ICU/CCU stay > 24 hours                             1  [ x ] Age > 60                                                        1    IMPROVE VTE Score: 1        Dispo: as above, pending tentative OR tomorrow.   RADHA Velez discussed case & plan w. .

## 2020-11-13 NOTE — H&P ADULT - ATTENDING COMMENTS
85F with acute right hip pain after falling and landing on her right knee, persistent, worse with movement of leg, improved with pain medication, now "dull" in character.    CXR reviewed by me: no acute disease  Right hip xray with notable fracture - reviewed by me    Acute right hip fracture: OR tomorrow, Dr. Burns aware, planned for 830AM surgery.     #Preoperative Assessment    Revised Cardiac Risk Assessment   [  ]History of ischemic heart disease   [  ]History of congestive heart failure   [  ]History of cerebrovascular disease (stroke or transient ischemic attack)   [  ]History of diabetes requiring preoperative insulin use   [  ]Chronic kidney disease (creatinine > 2 mg/dL)   [  ]Undergoing suprainguinal vascular, intraperitoneal, or intrathoracic surgery     0 predictors = 0.4%, 1 predictor = 1.0%, 2 predictors = 2.4%, > 3 predictors = 5.4%    * Overall this patient has a   0.4 % risk of cardiac death, nonfatal myocardial infarction, and nonfatal cardiac arrest perioperatively.     Patient does not have any known history of severe valvular disease and is not in decompensated CHF. No recent MI. Baseline functional capacity is > 4 METS. Patient is currently hemodynamically stable. Patient is medically optimized at this time despite the known inherent risks of surgery.     Case d/w patient's daughter at bedside. 85F with acute right hip pain after falling and landing on her right knee, persistent, worse with movement of leg, improved with pain medication, now "dull" in character.    CXR reviewed by me: no acute disease  Right hip xray with notable fracture - reviewed by me  EKG: NSR 88. 1st degree AVB, no acute ST changes, reviewed by me personally    Acute right hip fracture: OR tomorrow, Dr. Burns aware, planned for 830AM surgery.     #Preoperative Assessment    Revised Cardiac Risk Assessment   [  ]History of ischemic heart disease   [  ]History of congestive heart failure   [  ]History of cerebrovascular disease (stroke or transient ischemic attack)   [  ]History of diabetes requiring preoperative insulin use   [  ]Chronic kidney disease (creatinine > 2 mg/dL)   [  ]Undergoing suprainguinal vascular, intraperitoneal, or intrathoracic surgery     0 predictors = 0.4%, 1 predictor = 1.0%, 2 predictors = 2.4%, > 3 predictors = 5.4%    * Overall this patient has a   0.4 % risk of cardiac death, nonfatal myocardial infarction, and nonfatal cardiac arrest perioperatively.     Patient does not have any known history of severe valvular disease and is not in decompensated CHF. No recent MI. Baseline functional capacity is > 4 METS. Patient is currently hemodynamically stable. Patient is medically optimized at this time despite the known inherent risks of surgery.     Case d/w patient's daughter at bedside.

## 2020-11-13 NOTE — ED PROVIDER NOTE - LOWER EXTREMITY EXAM, RIGHT
pain in right groin/ hip with minimal abduction LE. pain in right groin/ hip with minimal abduction or flexion LE.

## 2020-11-14 LAB
A1C WITH ESTIMATED AVERAGE GLUCOSE RESULT: 6.2 % — HIGH (ref 4–5.6)
ALBUMIN SERPL ELPH-MCNC: 2.8 G/DL — LOW (ref 3.3–5)
ALP SERPL-CCNC: 73 U/L — SIGNIFICANT CHANGE UP (ref 40–120)
ALT FLD-CCNC: 20 U/L — SIGNIFICANT CHANGE UP (ref 10–45)
ANION GAP SERPL CALC-SCNC: 8 MMOL/L — SIGNIFICANT CHANGE UP (ref 5–17)
APTT BLD: 33.8 SEC — SIGNIFICANT CHANGE UP (ref 27.5–35.5)
AST SERPL-CCNC: 20 U/L — SIGNIFICANT CHANGE UP (ref 10–40)
BILIRUB SERPL-MCNC: 0.3 MG/DL — SIGNIFICANT CHANGE UP (ref 0.2–1.2)
BUN SERPL-MCNC: 20 MG/DL — SIGNIFICANT CHANGE UP (ref 7–23)
CALCIUM SERPL-MCNC: 9.1 MG/DL — SIGNIFICANT CHANGE UP (ref 8.4–10.5)
CHLORIDE SERPL-SCNC: 106 MMOL/L — SIGNIFICANT CHANGE UP (ref 96–108)
CO2 SERPL-SCNC: 26 MMOL/L — SIGNIFICANT CHANGE UP (ref 22–31)
CREAT SERPL-MCNC: 1.02 MG/DL — SIGNIFICANT CHANGE UP (ref 0.5–1.3)
ESTIMATED AVERAGE GLUCOSE: 131 MG/DL — HIGH (ref 68–114)
GLUCOSE BLDC GLUCOMTR-MCNC: 121 MG/DL — HIGH (ref 70–99)
GLUCOSE BLDC GLUCOMTR-MCNC: 154 MG/DL — HIGH (ref 70–99)
GLUCOSE BLDC GLUCOMTR-MCNC: 283 MG/DL — HIGH (ref 70–99)
GLUCOSE BLDC GLUCOMTR-MCNC: 350 MG/DL — HIGH (ref 70–99)
GLUCOSE SERPL-MCNC: 129 MG/DL — HIGH (ref 70–99)
HCT VFR BLD CALC: 32.2 % — LOW (ref 34.5–45)
HGB BLD-MCNC: 10.5 G/DL — LOW (ref 11.5–15.5)
INR BLD: 1.11 RATIO — SIGNIFICANT CHANGE UP (ref 0.88–1.16)
MCHC RBC-ENTMCNC: 32.6 GM/DL — SIGNIFICANT CHANGE UP (ref 32–36)
MCHC RBC-ENTMCNC: 33.1 PG — SIGNIFICANT CHANGE UP (ref 27–34)
MCV RBC AUTO: 101.6 FL — HIGH (ref 80–100)
NRBC # BLD: 0 /100 WBCS — SIGNIFICANT CHANGE UP (ref 0–0)
PLATELET # BLD AUTO: 192 K/UL — SIGNIFICANT CHANGE UP (ref 150–400)
POTASSIUM SERPL-MCNC: 4.8 MMOL/L — SIGNIFICANT CHANGE UP (ref 3.5–5.3)
POTASSIUM SERPL-SCNC: 4.8 MMOL/L — SIGNIFICANT CHANGE UP (ref 3.5–5.3)
PROT SERPL-MCNC: 6.5 G/DL — SIGNIFICANT CHANGE UP (ref 6–8.3)
PROTHROM AB SERPL-ACNC: 13.4 SEC — SIGNIFICANT CHANGE UP (ref 10.6–13.6)
RBC # BLD: 3.17 M/UL — LOW (ref 3.8–5.2)
RBC # FLD: 15.2 % — HIGH (ref 10.3–14.5)
RV+EV RNA SPEC QL NAA+PROBE: DETECTED
SARS-COV-2 IGG SERPL QL IA: NEGATIVE — SIGNIFICANT CHANGE UP
SARS-COV-2 IGM SERPL IA-ACNC: 0.09 INDEX — SIGNIFICANT CHANGE UP
SODIUM SERPL-SCNC: 140 MMOL/L — SIGNIFICANT CHANGE UP (ref 135–145)
WBC # BLD: 7.65 K/UL — SIGNIFICANT CHANGE UP (ref 3.8–10.5)
WBC # FLD AUTO: 7.65 K/UL — SIGNIFICANT CHANGE UP (ref 3.8–10.5)

## 2020-11-14 PROCEDURE — 93010 ELECTROCARDIOGRAM REPORT: CPT

## 2020-11-14 PROCEDURE — 27245 TREAT THIGH FRACTURE: CPT | Mod: RT

## 2020-11-14 PROCEDURE — 27245 TREAT THIGH FRACTURE: CPT | Mod: AS,RT

## 2020-11-14 RX ORDER — DEXTROSE 50 % IN WATER 50 %
12.5 SYRINGE (ML) INTRAVENOUS ONCE
Refills: 0 | Status: DISCONTINUED | OUTPATIENT
Start: 2020-11-14 | End: 2020-11-17

## 2020-11-14 RX ORDER — SODIUM CHLORIDE 9 MG/ML
1000 INJECTION, SOLUTION INTRAVENOUS
Refills: 0 | Status: DISCONTINUED | OUTPATIENT
Start: 2020-11-14 | End: 2020-11-17

## 2020-11-14 RX ORDER — INSULIN LISPRO 100/ML
VIAL (ML) SUBCUTANEOUS
Refills: 0 | Status: DISCONTINUED | OUTPATIENT
Start: 2020-11-14 | End: 2020-11-17

## 2020-11-14 RX ORDER — INSULIN GLARGINE 100 [IU]/ML
25 INJECTION, SOLUTION SUBCUTANEOUS AT BEDTIME
Refills: 0 | Status: DISCONTINUED | OUTPATIENT
Start: 2020-11-14 | End: 2020-11-17

## 2020-11-14 RX ORDER — ACETAMINOPHEN 500 MG
975 TABLET ORAL EVERY 8 HOURS
Refills: 0 | Status: DISCONTINUED | OUTPATIENT
Start: 2020-11-15 | End: 2020-11-17

## 2020-11-14 RX ORDER — DEXTROSE 50 % IN WATER 50 %
25 SYRINGE (ML) INTRAVENOUS ONCE
Refills: 0 | Status: DISCONTINUED | OUTPATIENT
Start: 2020-11-14 | End: 2020-11-17

## 2020-11-14 RX ORDER — LISINOPRIL 2.5 MG/1
20 TABLET ORAL DAILY
Refills: 0 | Status: DISCONTINUED | OUTPATIENT
Start: 2020-11-15 | End: 2020-11-17

## 2020-11-14 RX ORDER — NICOTINE POLACRILEX 2 MG
1 GUM BUCCAL DAILY
Refills: 0 | Status: DISCONTINUED | OUTPATIENT
Start: 2020-11-14 | End: 2020-11-17

## 2020-11-14 RX ORDER — POLYETHYLENE GLYCOL 3350 17 G/17G
17 POWDER, FOR SOLUTION ORAL DAILY
Refills: 0 | Status: DISCONTINUED | OUTPATIENT
Start: 2020-11-14 | End: 2020-11-17

## 2020-11-14 RX ORDER — HYDROMORPHONE HYDROCHLORIDE 2 MG/ML
0.25 INJECTION INTRAMUSCULAR; INTRAVENOUS; SUBCUTANEOUS
Refills: 0 | Status: DISCONTINUED | OUTPATIENT
Start: 2020-11-14 | End: 2020-11-14

## 2020-11-14 RX ORDER — TRAMADOL HYDROCHLORIDE 50 MG/1
50 TABLET ORAL EVERY 4 HOURS
Refills: 0 | Status: DISCONTINUED | OUTPATIENT
Start: 2020-11-14 | End: 2020-11-17

## 2020-11-14 RX ORDER — AMLODIPINE BESYLATE 2.5 MG/1
5 TABLET ORAL DAILY
Refills: 0 | Status: DISCONTINUED | OUTPATIENT
Start: 2020-11-15 | End: 2020-11-17

## 2020-11-14 RX ORDER — ACETAMINOPHEN 500 MG
1000 TABLET ORAL ONCE
Refills: 0 | Status: COMPLETED | OUTPATIENT
Start: 2020-11-14 | End: 2020-11-14

## 2020-11-14 RX ORDER — HYDROMORPHONE HYDROCHLORIDE 2 MG/ML
0.5 INJECTION INTRAMUSCULAR; INTRAVENOUS; SUBCUTANEOUS
Refills: 0 | Status: DISCONTINUED | OUTPATIENT
Start: 2020-11-14 | End: 2020-11-14

## 2020-11-14 RX ORDER — SODIUM CHLORIDE 9 MG/ML
1000 INJECTION, SOLUTION INTRAVENOUS
Refills: 0 | Status: DISCONTINUED | OUTPATIENT
Start: 2020-11-14 | End: 2020-11-16

## 2020-11-14 RX ORDER — ENOXAPARIN SODIUM 100 MG/ML
40 INJECTION SUBCUTANEOUS DAILY
Refills: 0 | Status: DISCONTINUED | OUTPATIENT
Start: 2020-11-15 | End: 2020-11-17

## 2020-11-14 RX ORDER — SENNA PLUS 8.6 MG/1
1 TABLET ORAL AT BEDTIME
Refills: 0 | Status: DISCONTINUED | OUTPATIENT
Start: 2020-11-15 | End: 2020-11-17

## 2020-11-14 RX ORDER — MORPHINE SULFATE 50 MG/1
4 CAPSULE, EXTENDED RELEASE ORAL
Refills: 0 | Status: DISCONTINUED | OUTPATIENT
Start: 2020-11-14 | End: 2020-11-17

## 2020-11-14 RX ORDER — SODIUM CHLORIDE 9 MG/ML
1000 INJECTION, SOLUTION INTRAVENOUS
Refills: 0 | Status: DISCONTINUED | OUTPATIENT
Start: 2020-11-14 | End: 2020-11-14

## 2020-11-14 RX ORDER — ONDANSETRON 8 MG/1
4 TABLET, FILM COATED ORAL ONCE
Refills: 0 | Status: DISCONTINUED | OUTPATIENT
Start: 2020-11-14 | End: 2020-11-14

## 2020-11-14 RX ORDER — DEXTROSE 50 % IN WATER 50 %
15 SYRINGE (ML) INTRAVENOUS ONCE
Refills: 0 | Status: DISCONTINUED | OUTPATIENT
Start: 2020-11-14 | End: 2020-11-17

## 2020-11-14 RX ORDER — GLUCAGON INJECTION, SOLUTION 0.5 MG/.1ML
1 INJECTION, SOLUTION SUBCUTANEOUS ONCE
Refills: 0 | Status: DISCONTINUED | OUTPATIENT
Start: 2020-11-14 | End: 2020-11-17

## 2020-11-14 RX ORDER — FOLIC ACID 0.8 MG
1 TABLET ORAL DAILY
Refills: 0 | Status: DISCONTINUED | OUTPATIENT
Start: 2020-11-14 | End: 2020-11-17

## 2020-11-14 RX ORDER — ONDANSETRON 8 MG/1
4 TABLET, FILM COATED ORAL EVERY 6 HOURS
Refills: 0 | Status: DISCONTINUED | OUTPATIENT
Start: 2020-11-14 | End: 2020-11-17

## 2020-11-14 RX ADMIN — Medication 1 PATCH: at 20:50

## 2020-11-14 RX ADMIN — HYDROMORPHONE HYDROCHLORIDE 0.25 MILLIGRAM(S): 2 INJECTION INTRAMUSCULAR; INTRAVENOUS; SUBCUTANEOUS at 11:52

## 2020-11-14 RX ADMIN — Medication 1 PATCH: at 14:08

## 2020-11-14 RX ADMIN — Medication 4: at 17:25

## 2020-11-14 RX ADMIN — Medication 10 MILLIGRAM(S): at 14:09

## 2020-11-14 RX ADMIN — INSULIN GLARGINE 25 UNIT(S): 100 INJECTION, SOLUTION SUBCUTANEOUS at 21:09

## 2020-11-14 RX ADMIN — Medication 1 MILLIGRAM(S): at 14:08

## 2020-11-14 RX ADMIN — HYDROMORPHONE HYDROCHLORIDE 0.25 MILLIGRAM(S): 2 INJECTION INTRAMUSCULAR; INTRAVENOUS; SUBCUTANEOUS at 11:42

## 2020-11-14 RX ADMIN — SODIUM CHLORIDE 60 MILLILITER(S): 9 INJECTION INTRAMUSCULAR; INTRAVENOUS; SUBCUTANEOUS at 00:00

## 2020-11-14 RX ADMIN — Medication 100 MILLIGRAM(S): at 17:17

## 2020-11-14 RX ADMIN — Medication 1000 MILLIGRAM(S): at 17:32

## 2020-11-14 RX ADMIN — AMLODIPINE BESYLATE 5 MILLIGRAM(S): 2.5 TABLET ORAL at 06:12

## 2020-11-14 RX ADMIN — Medication 400 MILLIGRAM(S): at 17:17

## 2020-11-14 RX ADMIN — LISINOPRIL 20 MILLIGRAM(S): 2.5 TABLET ORAL at 06:11

## 2020-11-14 NOTE — PRE-ANESTHESIA EVALUATION ADULT - NSANTHADDINFOFT_GEN_ALL_CORE
Discussed GA with LMA in detail with patient and all questions sought and answered. Pt. agrees to all plans and wishes to proceed with surgical care.    Medical evaluation/optimization and clearance noted and appreciated.

## 2020-11-14 NOTE — CHART NOTE - NSCHARTNOTEFT_GEN_A_CORE
84yo female w. PMH HTN, DMII, RA, Anemia, Depression presents to ED w. c/o Right Hip pain.  She was found to have Right hip intertrochanteric fracture, Ortho consulted in ED, admit w. plan for surgical intervention.     *Acute Right hip intertrochanteric fracture  -pt went to OR today for ORIF  -Ortho f/u  -pain mgmt    *HTN  Cont home Amlodipine, Benazepril    *DMII  home Lantus 25-30 Units qhs, hold home Metformin 850mg 2tabs Qhs    *Depression  Cont home Paxil    *RA  Home Methatrexate 2.5mg x 6tabs QWk Wed (Held since weekly dosing)    *Left Heel Ulcer  Continue local wound care    Spoke with Dr. Noble, PMD, continue current plan of care.  Pt. may need to go to [facility].  Anticipate D/C in next 48 hours. 86yo female w. PMH HTN, DMII, RA, Anemia, Depression presents to ED w. c/o Right Hip pain.  She was found to have Right hip intertrochanteric fracture, Ortho consulted in ED, admit w. plan for surgical intervention.     *Acute Right hip intertrochanteric fracture  -pt went to OR today for ORIF  -Ortho f/u  -pain mgmt    *+RVP/Rhinovirus  -supportive care    *HTN  Cont home Amlodipine, Benazepril    *DMII  home Lantus 25-30 Units qhs, hold home Metformin 850mg 2tabs Qhs    *Depression  Cont home Paxil    *RA  Home Methatrexate 2.5mg x 6tabs QWk Wed (Held since weekly dosing)    *Left Heel Ulcer  Continue local wound care    Spoke with Dr. Noble, PMD, continue current plan of care.  Pt. may need to go to [facility].  Anticipate D/C in next 48 hours.

## 2020-11-14 NOTE — PRE-ANESTHESIA EVALUATION ADULT - NSANTHOSAYNRD_GEN_A_CORE
No. DAY screening performed.  STOP BANG Legend: 0-2 = LOW Risk; 3-4 = INTERMEDIATE Risk; 5-8 = HIGH Risk

## 2020-11-14 NOTE — BRIEF OPERATIVE NOTE - NSICDXBRIEFPROCEDURE_GEN_ALL_CORE_FT
PROCEDURES:  Open reduction and internal fixation (ORIF) of hip with intramedullary filiberto 14-Nov-2020 11:22:26 [facility] Herlinad Luevano

## 2020-11-15 DIAGNOSIS — S72.001A FRACTURE OF UNSPECIFIED PART OF NECK OF RIGHT FEMUR, INITIAL ENCOUNTER FOR CLOSED FRACTURE: ICD-10-CM

## 2020-11-15 LAB
A1C WITH ESTIMATED AVERAGE GLUCOSE RESULT: 6.2 % — HIGH (ref 4–5.6)
ANION GAP SERPL CALC-SCNC: 8 MMOL/L — SIGNIFICANT CHANGE UP (ref 5–17)
BUN SERPL-MCNC: 24 MG/DL — HIGH (ref 7–23)
CALCIUM SERPL-MCNC: 8.9 MG/DL — SIGNIFICANT CHANGE UP (ref 8.4–10.5)
CHLORIDE SERPL-SCNC: 107 MMOL/L — SIGNIFICANT CHANGE UP (ref 96–108)
CO2 SERPL-SCNC: 25 MMOL/L — SIGNIFICANT CHANGE UP (ref 22–31)
CREAT SERPL-MCNC: 0.99 MG/DL — SIGNIFICANT CHANGE UP (ref 0.5–1.3)
ESTIMATED AVERAGE GLUCOSE: 131 MG/DL — HIGH (ref 68–114)
GLUCOSE BLDC GLUCOMTR-MCNC: 114 MG/DL — HIGH (ref 70–99)
GLUCOSE BLDC GLUCOMTR-MCNC: 141 MG/DL — HIGH (ref 70–99)
GLUCOSE BLDC GLUCOMTR-MCNC: 146 MG/DL — HIGH (ref 70–99)
GLUCOSE BLDC GLUCOMTR-MCNC: 213 MG/DL — HIGH (ref 70–99)
GLUCOSE SERPL-MCNC: 131 MG/DL — HIGH (ref 70–99)
HCT VFR BLD CALC: 26.5 % — LOW (ref 34.5–45)
HGB BLD-MCNC: 8.4 G/DL — LOW (ref 11.5–15.5)
MCHC RBC-ENTMCNC: 31.7 GM/DL — LOW (ref 32–36)
MCHC RBC-ENTMCNC: 32.1 PG — SIGNIFICANT CHANGE UP (ref 27–34)
MCV RBC AUTO: 101.1 FL — HIGH (ref 80–100)
NRBC # BLD: 0 /100 WBCS — SIGNIFICANT CHANGE UP (ref 0–0)
PLATELET # BLD AUTO: 175 K/UL — SIGNIFICANT CHANGE UP (ref 150–400)
POTASSIUM SERPL-MCNC: 4.6 MMOL/L — SIGNIFICANT CHANGE UP (ref 3.5–5.3)
POTASSIUM SERPL-SCNC: 4.6 MMOL/L — SIGNIFICANT CHANGE UP (ref 3.5–5.3)
RBC # BLD: 2.62 M/UL — LOW (ref 3.8–5.2)
RBC # FLD: 15.2 % — HIGH (ref 10.3–14.5)
SODIUM SERPL-SCNC: 140 MMOL/L — SIGNIFICANT CHANGE UP (ref 135–145)
WBC # BLD: 11.9 K/UL — HIGH (ref 3.8–10.5)
WBC # FLD AUTO: 11.9 K/UL — HIGH (ref 3.8–10.5)

## 2020-11-15 RX ADMIN — LISINOPRIL 20 MILLIGRAM(S): 2.5 TABLET ORAL at 06:15

## 2020-11-15 RX ADMIN — Medication 1 PATCH: at 13:47

## 2020-11-15 RX ADMIN — SODIUM CHLORIDE 100 MILLILITER(S): 9 INJECTION, SOLUTION INTRAVENOUS at 03:00

## 2020-11-15 RX ADMIN — Medication 975 MILLIGRAM(S): at 06:15

## 2020-11-15 RX ADMIN — INSULIN GLARGINE 25 UNIT(S): 100 INJECTION, SOLUTION SUBCUTANEOUS at 21:25

## 2020-11-15 RX ADMIN — Medication 1 PATCH: at 13:48

## 2020-11-15 RX ADMIN — Medication 975 MILLIGRAM(S): at 22:20

## 2020-11-15 RX ADMIN — SODIUM CHLORIDE 100 MILLILITER(S): 9 INJECTION, SOLUTION INTRAVENOUS at 21:22

## 2020-11-15 RX ADMIN — Medication 975 MILLIGRAM(S): at 06:50

## 2020-11-15 RX ADMIN — Medication 10 MILLIGRAM(S): at 11:20

## 2020-11-15 RX ADMIN — Medication 975 MILLIGRAM(S): at 14:50

## 2020-11-15 RX ADMIN — SENNA PLUS 1 TABLET(S): 8.6 TABLET ORAL at 21:22

## 2020-11-15 RX ADMIN — Medication 1 PATCH: at 19:21

## 2020-11-15 RX ADMIN — Medication 975 MILLIGRAM(S): at 21:22

## 2020-11-15 RX ADMIN — AMLODIPINE BESYLATE 5 MILLIGRAM(S): 2.5 TABLET ORAL at 06:15

## 2020-11-15 RX ADMIN — Medication 1 MILLIGRAM(S): at 11:19

## 2020-11-15 RX ADMIN — Medication 975 MILLIGRAM(S): at 13:54

## 2020-11-15 RX ADMIN — Medication 100 MILLIGRAM(S): at 00:31

## 2020-11-15 RX ADMIN — Medication 1 PATCH: at 08:00

## 2020-11-15 RX ADMIN — ENOXAPARIN SODIUM 40 MILLIGRAM(S): 100 INJECTION SUBCUTANEOUS at 13:48

## 2020-11-15 NOTE — PHYSICAL THERAPY INITIAL EVALUATION ADULT - PERTINENT HX OF CURRENT PROBLEM, REHAB EVAL
patient to ED with right hip pain s/p fall at home, found to have right intertrochanteric fracture s/p ORIF 11/14

## 2020-11-15 NOTE — CHART NOTE - NSCHARTNOTEFT_GEN_A_CORE
86yo female w. PMH HTN, DMII, RA, Anemia, Depression presents to ED w. c/o Right Hip pain.  She was found to have Right hip intertrochanteric fracture, Ortho consulted in ED, admit w. plan for surgical intervention.     *Acute Right hip intertrochanteric fracture  -s/p ORIF; POD #1  -Ortho f/u  -pain mgmt  -PT eval:  rec MICHELLE    *+RVP/Rhinovirus  -supportive care    *HTN  Cont home Amlodipine, Benazepril    *DMII  home Lantus 25-30 Units qhs, hold home Metformin     *Depression  Cont home Paxil    *RA  Home Methatrexate 2.5mg x 6tabs qweekly on Wed     *Left Heel Ulcer  Continue local wound care    Spoke with Dr. Noble, PMD, continue current plan of care.  Anticipate D/C in next 24>48 hours.

## 2020-11-15 NOTE — PHYSICAL THERAPY INITIAL EVALUATION ADULT - ADDITIONAL COMMENTS
pt reports she lives in a private home with her daughter and her family, 1 step to enter with rail, normally pt resides on 2nd level (full flight with rail) but plans on staying on the main level in the future, she reports independence with mobility and ADLs with occasional use of RW when going out-she owns a RW and SAC

## 2020-11-15 NOTE — PROGRESS NOTE ADULT - PROBLEM SELECTOR PLAN 1
HGB downtrending, no signs of active bleeding, will continue to trend  PT WBAT  OOB  Lovenox  Pain controlled

## 2020-11-16 ENCOUNTER — TRANSCRIPTION ENCOUNTER (OUTPATIENT)
Age: 85
End: 2020-11-16

## 2020-11-16 LAB
GLUCOSE BLDC GLUCOMTR-MCNC: 106 MG/DL — HIGH (ref 70–99)
GLUCOSE BLDC GLUCOMTR-MCNC: 140 MG/DL — HIGH (ref 70–99)
GLUCOSE BLDC GLUCOMTR-MCNC: 151 MG/DL — HIGH (ref 70–99)
GLUCOSE BLDC GLUCOMTR-MCNC: 168 MG/DL — HIGH (ref 70–99)
HCT VFR BLD CALC: 25.6 % — LOW (ref 34.5–45)
HGB BLD-MCNC: 8.4 G/DL — LOW (ref 11.5–15.5)
MCHC RBC-ENTMCNC: 32.8 GM/DL — SIGNIFICANT CHANGE UP (ref 32–36)
MCHC RBC-ENTMCNC: 33.3 PG — SIGNIFICANT CHANGE UP (ref 27–34)
MCV RBC AUTO: 101.6 FL — HIGH (ref 80–100)
NRBC # BLD: 0 /100 WBCS — SIGNIFICANT CHANGE UP (ref 0–0)
PLATELET # BLD AUTO: 179 K/UL — SIGNIFICANT CHANGE UP (ref 150–400)
RBC # BLD: 2.52 M/UL — LOW (ref 3.8–5.2)
RBC # FLD: 15.2 % — HIGH (ref 10.3–14.5)
WBC # BLD: 9.36 K/UL — SIGNIFICANT CHANGE UP (ref 3.8–10.5)
WBC # FLD AUTO: 9.36 K/UL — SIGNIFICANT CHANGE UP (ref 3.8–10.5)

## 2020-11-16 PROCEDURE — 99222 1ST HOSP IP/OBS MODERATE 55: CPT

## 2020-11-16 PROCEDURE — 73630 X-RAY EXAM OF FOOT: CPT | Mod: 26,LT

## 2020-11-16 PROCEDURE — 73610 X-RAY EXAM OF ANKLE: CPT | Mod: 26,LT

## 2020-11-16 PROCEDURE — 93010 ELECTROCARDIOGRAM REPORT: CPT

## 2020-11-16 RX ORDER — AMLODIPINE BESYLATE 2.5 MG/1
1 TABLET ORAL
Qty: 0 | Refills: 0 | DISCHARGE

## 2020-11-16 RX ORDER — ENOXAPARIN SODIUM 100 MG/ML
40 INJECTION SUBCUTANEOUS
Qty: 0 | Refills: 0 | DISCHARGE
Start: 2020-11-16

## 2020-11-16 RX ORDER — POLYETHYLENE GLYCOL 3350 17 G/17G
17 POWDER, FOR SOLUTION ORAL
Qty: 0 | Refills: 0 | DISCHARGE
Start: 2020-11-16

## 2020-11-16 RX ORDER — AMLODIPINE BESYLATE 2.5 MG/1
1 TABLET ORAL
Qty: 0 | Refills: 0 | DISCHARGE
Start: 2020-11-16

## 2020-11-16 RX ORDER — SENNA PLUS 8.6 MG/1
1 TABLET ORAL
Qty: 0 | Refills: 0 | DISCHARGE
Start: 2020-11-16

## 2020-11-16 RX ORDER — ACETAMINOPHEN 500 MG
3 TABLET ORAL
Qty: 0 | Refills: 0 | DISCHARGE
Start: 2020-11-16

## 2020-11-16 RX ORDER — NICOTINE POLACRILEX 2 MG
1 GUM BUCCAL
Qty: 0 | Refills: 0 | DISCHARGE
Start: 2020-11-16

## 2020-11-16 RX ORDER — TRAMADOL HYDROCHLORIDE 50 MG/1
1 TABLET ORAL
Qty: 0 | Refills: 0 | DISCHARGE
Start: 2020-11-16

## 2020-11-16 RX ADMIN — Medication 1 PATCH: at 08:52

## 2020-11-16 RX ADMIN — Medication 975 MILLIGRAM(S): at 13:25

## 2020-11-16 RX ADMIN — Medication 975 MILLIGRAM(S): at 22:16

## 2020-11-16 RX ADMIN — LISINOPRIL 20 MILLIGRAM(S): 2.5 TABLET ORAL at 05:59

## 2020-11-16 RX ADMIN — TRAMADOL HYDROCHLORIDE 50 MILLIGRAM(S): 50 TABLET ORAL at 11:30

## 2020-11-16 RX ADMIN — INSULIN GLARGINE 25 UNIT(S): 100 INJECTION, SOLUTION SUBCUTANEOUS at 22:15

## 2020-11-16 RX ADMIN — TRAMADOL HYDROCHLORIDE 50 MILLIGRAM(S): 50 TABLET ORAL at 06:55

## 2020-11-16 RX ADMIN — Medication 1 MILLIGRAM(S): at 12:19

## 2020-11-16 RX ADMIN — SENNA PLUS 1 TABLET(S): 8.6 TABLET ORAL at 22:17

## 2020-11-16 RX ADMIN — TRAMADOL HYDROCHLORIDE 50 MILLIGRAM(S): 50 TABLET ORAL at 10:44

## 2020-11-16 RX ADMIN — Medication 975 MILLIGRAM(S): at 05:55

## 2020-11-16 RX ADMIN — Medication 975 MILLIGRAM(S): at 14:00

## 2020-11-16 RX ADMIN — TRAMADOL HYDROCHLORIDE 50 MILLIGRAM(S): 50 TABLET ORAL at 06:03

## 2020-11-16 RX ADMIN — ENOXAPARIN SODIUM 40 MILLIGRAM(S): 100 INJECTION SUBCUTANEOUS at 11:25

## 2020-11-16 RX ADMIN — Medication 975 MILLIGRAM(S): at 06:55

## 2020-11-16 RX ADMIN — Medication 1 PATCH: at 20:13

## 2020-11-16 RX ADMIN — Medication 10 MILLIGRAM(S): at 11:25

## 2020-11-16 RX ADMIN — Medication 975 MILLIGRAM(S): at 23:10

## 2020-11-16 RX ADMIN — Medication 1: at 16:42

## 2020-11-16 RX ADMIN — Medication 1 PATCH: at 11:25

## 2020-11-16 RX ADMIN — AMLODIPINE BESYLATE 5 MILLIGRAM(S): 2.5 TABLET ORAL at 05:59

## 2020-11-16 RX ADMIN — Medication 1 PATCH: at 12:19

## 2020-11-16 RX ADMIN — Medication 1: at 12:18

## 2020-11-16 NOTE — CONSULT NOTE ADULT - ASSESSMENT
A:   Left heel ulceration    P:  Patient evaluated, chart reviewed.   -Xrays reviewed  -DSD applied to left foot.   -Ulceration is stable at this time.   -Recommend local daily wound care with Mupirocin and dsd.   -Recommend offloading left heel   -Patient to follow up with her current podiatrist upon discharge  -Patient is podiatrically stable  -Thank you for consultation.

## 2020-11-16 NOTE — CHART NOTE - NSCHARTNOTEFT_GEN_A_CORE
84yo female w. PMH HTN, DMII, RA, Anemia, Depression presents to ED w. c/o Right Hip pain.  She was found to have Right hip intertrochanteric fracture, Ortho consulted in ED, admit w. plan for surgical intervention.     #Acute Right hip intertrochanteric fracture  - s/p ORIF on 11/14; POD #2  - Ortho f/u  - pain mgmt  - PT eval:  rec MICHELLE vs acute rehab  - OT and PM&R eval    #+RVP/Rhinovirus  - supportive care    #HTN  - Cont home amlodipine, lisinopril    #DMII  - home Lantus 25-30 Units qhs, hold home Metformin     #Depression  - Cont home Paxil    #RA  - Plan for home dose methotrexate 2.5mg x 6tabs qweekly on Wed     #Left Heel Ulcer  - Continue local wound care- will obtain podiatry consult as per Dr. Noble     #DVT ppx  - lovenox

## 2020-11-16 NOTE — DIETITIAN INITIAL EVALUATION ADULT. - OTHER INFO
86yo female w. PMH HTN, DMII, RA, Anemia, Depression presents to ED w. c/o Right Hip pain s/p stepping wrong on her foot and her hand slipped off of the banister causing her to fall, landed on right knee. Patient s/p ORIF  of hip with intramedullary filiberto. patient reports tolerating diet consuming 75% of meal, does not self monitor blood sugars hx of DM x 20 years m not receptive to diet education. Last Bm (11/13) Gradual weight loss noted as per patient , ? time  frame .

## 2020-11-16 NOTE — DISCHARGE NOTE PROVIDER - NSDCCPCAREPLAN_GEN_ALL_CORE_FT
PRINCIPAL DISCHARGE DIAGNOSIS  Diagnosis: Hip fracture, right  Assessment and Plan of Treatment: - You came to the hospital because you fell and broke your rght hip. You had surgery on 11/11/2020.   - You were accepted to acute rehab program.  - Follow up with medical team at acute rehab and participate in all recommended therapies.

## 2020-11-16 NOTE — OCCUPATIONAL THERAPY INITIAL EVALUATION ADULT - TRANSFER TRAINING, PT EVAL
Patient will complete toilet transfer with modified independence and adaptive equipment in two weeks.

## 2020-11-16 NOTE — DISCHARGE NOTE PROVIDER - CARE PROVIDER_API CALL
Uri Noble  INTERNAL MEDICINE  [address] [facility], [address]  [place], [place] [id number]  Phone: [phone]  Fax: [phone]  Follow Up Time:     Nelson Burns  ORTHOPAEDIC SPORTS MEDICINE  [address], [address]  [place] [address]  Phone: [phone]  Fax: [phone]  Follow Up Time:

## 2020-11-16 NOTE — CONSULT NOTE ADULT - SUBJECTIVE AND OBJECTIVE BOX
CC: PMR consult for hip fracture      HPI:  86yo female w. PMH HTN, DMII, RA, Anemia, Depression presents to ED w. c/o Right Hip pain s/p stepping wrong on her foot and her hand slipped off of the banister causing her to fall, landed on right knee. Denies LOC. Found to have Right hip intertrochanteric fracture.  Seen by ortho and underwent right hip ORIF on 11/14/20 by Dr. Lamb  Tolerated procedure. WBAT  On lovenox for DVT prophylaxis  PMR consult requested for further recommendations      REVIEW OF SYSTEMS  Constitutional - No fever, No weight loss, No fatigue  HEENT - No eye pain,   Respiratory - No cough,   Cardiovascular - No chest pain, No palpitations  Gastrointestinal - No abdominal pain,   Genitourinary - No dysuria,   Neurological - No headaches,   Skin - No itching, No rashes, No lesions   Endocrine - + DM-2  Musculoskeletal - No joint pain,   Psychiatric - No depression, No anxiety    PAST MEDICAL & SURGICAL HISTORY  Type 2 diabetes mellitus  Depression  Arthritis, rheumatoid  Anemia  Hypertension  S/P cataract surgery  fracture ankle right        SOCIAL HISTORY  Smoking - Denied  EtOH - Denied     pt reports she lives in a private home with her daughter and her family, 1 step to enter with rail, normally pt resides on 2nd level (full flight with rail) but plans on staying on the main level in the future, she reports independence with mobility and ADLs with occasional use of RW when going out-she owns a RW and SAC      FAMILY HISTORY   nc     VITALS  T(C): 36.7 (11-16-20 @ 05:53), Max: 36.7 (11-16-20 @ 05:53)  HR: 76 (11-16-20 @ 05:53) (73 - 82)  BP: 157/68 (11-16-20 @ 00:02) (138/52 - 157/68)  RR: 18 (11-16-20 @ 05:53) (16 - 18)  SpO2: 98% (11-16-20 @ 05:53) (98% - 98%)  Wt(kg): --      Gen: NAD  Heart: S1S2  Lungs: Clear  Abd: soft  Ext:       Function:    Transfer Skill: Bed to Chair   · Level of [place]	moderate assist (50% patients effort); maximum assist (25% patients effort)  · Physical Assist/Nonphysical Assist	1 person assist; verbal cues  · Weight-Bearing Restrictions	weight-bearing as tolerated  · Assistive Device	rolling walker    Transfer: Sit to Stand:     · Level of [place]	moderate assist (50% patients effort); maximum assist (25% patients effort)  · Physical Assist/Nonphysical Assist	1 person assist; verbal cues            MEDICATIONS  (STANDING):  acetaminophen   Tablet .. 975 milliGRAM(s) Oral every 8 hours  amLODIPine   Tablet 5 milliGRAM(s) Oral daily  dextrose 40% Gel 15 Gram(s) Oral once  dextrose 5%. 1000 milliLiter(s) (50 mL/Hr) IV Continuous <Continuous>  dextrose 5%. 1000 milliLiter(s) (100 mL/Hr) IV Continuous <Continuous>  dextrose 50% Injectable 25 Gram(s) IV Push once  dextrose 50% Injectable 12.5 Gram(s) IV Push once  dextrose 50% Injectable 25 Gram(s) IV Push once  enoxaparin Injectable 40 milliGRAM(s) SubCutaneous daily  folic acid 1 milliGRAM(s) Oral daily  glucagon  Injectable 1 milliGRAM(s) IntraMuscular once  influenza   Vaccine 0.5 milliLiter(s) IntraMuscular once  insulin glargine Injectable (LANTUS) 25 Unit(s) SubCutaneous at bedtime  insulin lispro (ADMELOG) corrective regimen sliding scale   SubCutaneous three times a day before meals  lisinopril 20 milliGRAM(s) Oral daily  nicotine  14 mG/24 Hr(s) Transdermal Patch - Peds 1 Patch Transdermal daily  PARoxetine 10 milliGRAM(s) Oral daily  senna 1 Tablet(s) Oral at bedtime    MEDICATIONS  (PRN):  morphine  - Injectable 4 milliGRAM(s) IV Push every 3 hours PRN Severe Pain (7 - 10)  ondansetron Injectable 4 milliGRAM(s) IV Push every 6 hours PRN Nausea and/or Vomiting  polyethylene glycol 3350 17 Gram(s) Oral daily PRN Constipation  traMADol 50 milliGRAM(s) Oral every 4 hours PRN Moderate Pain (4 - 6)                            8.4    9.36  )-----------( 179      ( 16 Nov 2020 07:15 )             25.6     11-15    140  |  107  |  24<H>  ----------------------------<  131<H>  4.6   |  25  |  0.99    Ca    8.9      15 Nov 2020 07:00      < from: Xray Hip w/ Pelvis 2 or 3 Views, Right (11.13.20 @ 17:25) >  EXAM:  KNEE AP LAT&OBL-RIGHT    EXAM:  XR HIP WITH PELV 2-3V RT    EXAM:  XR CHEST PORTABLE URGENT 1V      PROCEDURE DATE:  11/13/2020        INTERPRETATION:  Clinical Information: Trauma with multifocal pain    Technique: One view chest. 1 view pelvis. 2 views right hip. 3 views right knee.    Comparison: None    Findings/  Impression:    Chest: Heart unremarkable. Lungs are clear    Musculoskeletal: There is an acute mildly displaced intertrochanteric right hip fracture. No dislocation. Thereis atherosclerosis.    < end of copied text >        A/P:      85 year old female with history as history as stated above admitted with fall, and found to have right hip IT fracture  ORIF    CC: PMR consult for hip fracture      HPI:  84yo female w. PMH HTN, DMII, RA, Anemia, Depression presents to ED w. c/o Right Hip pain s/p stepping wrong on her foot and her hand slipped off of the banister causing her to fall, landed on right knee. Denies LOC. Found to have Right hip intertrochanteric fracture.  Seen by ortho and underwent right hip ORIF on 11/14/20 by Dr. Burns  Tolerated procedure. WBAT  On lovenox for DVT prophylaxis  PMR consult requested for further recommendations      REVIEW OF SYSTEMS  Constitutional - No fever, No weight loss, No fatigue  HEENT - No eye pain,   Respiratory - No cough,   Cardiovascular - No chest pain, No palpitations  Gastrointestinal - No abdominal pain,   Genitourinary - No dysuria,   Neurological - No headaches,   Skin - No itching, No rashes, No lesions   Endocrine - + DM-2  Musculoskeletal - No joint pain,   Psychiatric - No depression, No anxiety    PAST MEDICAL & SURGICAL HISTORY  Type 2 diabetes mellitus  Depression  Arthritis, rheumatoid  Anemia  Hypertension  S/P cataract surgery  fracture ankle right  ? Episode of palpitations - 10 years for which she was evaluated in ED and tole to stop drinking coffee  Right foot drop - ? etiology - does not want to wear AFO   ? Spinal stenosis or spondylolisthesis- had epidural injection for back pain in August 2020        SOCIAL HISTORY  Smoking - + 5-6 cigarettes/day - has attempted to quit in past   EtOH - Denied   per PT note  pt reports she lives in a private home with her daughter and her family, 1 step to enter with rail, normally pt resides on 2nd level (full flight with rail) but plans on staying on the main level in the future, she reports independence with mobility and ADLs with occasional use of RW when going out-she owns a RW and SAC  Patient reports 3-4 steps without rails    FAMILY HISTORY   nc       O/E    Vital Signs Last 24 Hrs  T(C): 36.7 (16 Nov 2020 05:53), Max: 36.7 (16 Nov 2020 05:53)  T(F): 98.1 (16 Nov 2020 05:53), Max: 98.1 (16 Nov 2020 05:53)  HR: 76 (16 Nov 2020 05:53) (73 - 76)  BP: 157/68 (16 Nov 2020 00:02) (157/68 - 157/68)  BP(mean): --  RR: 18 (16 Nov 2020 05:53) (17 - 18)  SpO2: 98% (16 Nov 2020 05:53) (98% - 98%)      Gen: NAD  Heart: S1S2  Lungs: Clear  Abd: soft  Ext: no edema or calf tenderness  Motor UE 5/5  LLE proximal 4/5 ADF trace, PF 3/5  RLE HF, KE 2/5, ADF trace, PF 3/5  Sensory intact to light touch   Skin: right hip incision with aquacell dressing         Function:    Transfer Skill: Bed to Chair   · Level of [place]	moderate assist (50% patients effort); maximum assist (25% patients effort)  · Physical Assist/Nonphysical Assist	1 person assist; verbal cues  · Weight-Bearing Restrictions	weight-bearing as tolerated  · Assistive Device	rolling walker    Transfer: Sit to Stand:     · Level of [place]	moderate assist (50% patients effort); maximum assist (25% patients effort)  · Physical Assist/Nonphysical Assist	1 person assist; verbal cues            MEDICATIONS  (STANDING):  acetaminophen   Tablet .. 975 milliGRAM(s) Oral every 8 hours  amLODIPine   Tablet 5 milliGRAM(s) Oral daily  dextrose 40% Gel 15 Gram(s) Oral once  dextrose 5%. 1000 milliLiter(s) (50 mL/Hr) IV Continuous <Continuous>  dextrose 5%. 1000 milliLiter(s) (100 mL/Hr) IV Continuous <Continuous>  dextrose 50% Injectable 25 Gram(s) IV Push once  dextrose 50% Injectable 12.5 Gram(s) IV Push once  dextrose 50% Injectable 25 Gram(s) IV Push once  enoxaparin Injectable 40 milliGRAM(s) SubCutaneous daily  folic acid 1 milliGRAM(s) Oral daily  glucagon  Injectable 1 milliGRAM(s) IntraMuscular once  influenza   Vaccine 0.5 milliLiter(s) IntraMuscular once  insulin glargine Injectable (LANTUS) 25 Unit(s) SubCutaneous at bedtime  insulin lispro (ADMELOG) corrective regimen sliding scale   SubCutaneous three times a day before meals  lisinopril 20 milliGRAM(s) Oral daily  nicotine  14 mG/24 Hr(s) Transdermal Patch - Peds 1 Patch Transdermal daily  PARoxetine 10 milliGRAM(s) Oral daily  senna 1 Tablet(s) Oral at bedtime    MEDICATIONS  (PRN):  morphine  - Injectable 4 milliGRAM(s) IV Push every 3 hours PRN Severe Pain (7 - 10)  ondansetron Injectable 4 milliGRAM(s) IV Push every 6 hours PRN Nausea and/or Vomiting  polyethylene glycol 3350 17 Gram(s) Oral daily PRN Constipation  traMADol 50 milliGRAM(s) Oral every 4 hours PRN Moderate Pain (4 - 6)                            8.4    9.36  )-----------( 179      ( 16 Nov 2020 07:15 )             25.6     11-15    140  |  107  |  24<H>  ----------------------------<  131<H>  4.6   |  25  |  0.99    Ca    8.9      15 Nov 2020 07:00      < from: Xray Hip w/ Pelvis 2 or 3 Views, Right (11.13.20 @ 17:25) >  EXAM:  KNEE AP LAT&OBL-RIGHT    EXAM:  XR HIP WITH PELV 2-3V RT    EXAM:  XR CHEST PORTABLE URGENT 1V      PROCEDURE DATE:  11/13/2020        INTERPRETATION:  Clinical Information: Trauma with multifocal pain    Technique: One view chest. 1 view pelvis. 2 views right hip. 3 views right knee.    Comparison: None    Findings/  Impression:    Chest: Heart unremarkable. Lungs are clear    Musculoskeletal: There is an acute mildly displaced intertrochanteric right hip fracture. No dislocation. Thereis atherosclerosis.    < end of copied text >        A/P:      85 year old female with history as history as stated above admitted with fall, and found to have right hip IT fracture  ORIF with IM filiberto 11/14  WBAT  Bilateral foot drop     Recommend acute inpatient rehab : PT/OT 3 hrs/day 5 days/week to improve independence with ADLs , transfers and gait  Patient needs physician oversight for management of medical comorbidities, 24 hr nursing  May be transferred to rehab when medically stable and bed available  d/w Primary team-   Patient noted to be tachycardic during my exam- denies symptoms- d/w team   Thank you

## 2020-11-16 NOTE — DISCHARGE NOTE PROVIDER - NSDCMRMEDTOKEN_GEN_ALL_CORE_FT
acetaminophen 325 mg oral tablet: 3 tab(s) orally every 8 hours  amLODIPine 5 mg oral tablet: 1 tab(s) orally once a day  benazepril 20 mg oral tablet: 1 tab(s) orally once a day  enoxaparin: 40 milligram(s) subcutaneous once a day  folic acid 1 mg oral tablet: 1 tab(s) orally once a day  Lantus 100 units/mL subcutaneous solution: 25 -30 unit(s) subcutaneous once a day (at bedtime)  metFORMIN 850 mg oral tablet: 2 tab(s) orally once a day (in the evening)  nicotine 14 mg/24 hr transdermal film, extended release: 1 patch transdermal once a day  Paxil 10 mg oral tablet: 1 tab(s) orally once a day  polyethylene glycol 3350 oral powder for reconstitution: 17 gram(s) orally once a day, As needed, Constipation  senna oral tablet: 1 tab(s) orally once a day (at bedtime)  traMADol 50 mg oral tablet: 1 tab(s) orally every 4 hours, As needed, Moderate Pain (4 - 6)

## 2020-11-16 NOTE — OCCUPATIONAL THERAPY INITIAL EVALUATION ADULT - PLANNED THERAPY INTERVENTIONS, OT EVAL
IADL retraining/bed mobility training/prosthetic fitting/training/ROM/strengthening/transfer training/ADL retraining/parent/caregiver training... ADL retraining/bed mobility training/transfer training

## 2020-11-16 NOTE — DISCHARGE NOTE PROVIDER - HOSPITAL COURSE
Hospital Course  85y Female with PMH of  HTN, DMII, RA, Anemia, Depression presents to ED c/o right hip pain s/p stepping wrong on her foot and her hand slipped off of the banister causing her to fall, landed on right knee. Denies LOC. Found to have right hip intertrochanteric fracture. Underwent right hip ORIF on 11/14/20 by Dr. Burns. Tolerated procedure. WBAT On lovenox for DVT prophylaxis. Accepted to acute inpatient rehab     Discharging Provider:   Contact Info: [phone] - Please call with any questions or concerns.    Outpatient Provider: Dr. Noble       Hospital Course  85y Female with PMH of  HTN, DMII, RA, Anemia, Depression presents to ED c/o right hip pain s/p stepping wrong on her foot and her hand slipped off of the banister causing her to fall, landed on right knee. Denies LOC. Found to have right hip intertrochanteric fracture. Underwent right hip ORIF on 11/14/20 by Dr. Burns. Tolerated procedure. WBAT On lovenox for DVT prophylaxis. Accepted to acute inpatient rehab     Discharging Provider: Tomasz Parrish   Contact Info: [phone] - Please call with any questions or concerns.    Outpatient Provider: Dr. Noble

## 2020-11-16 NOTE — CONSULT NOTE ADULT - SUBJECTIVE AND OBJECTIVE BOX
86y/o DM female seen at bedside for evaluation of left heel ulceration. Patient is s/p Right hip surgery. Patient states the ulceration might be present for approximately a week. She states her daughter who is a physician has been applying Mupirocin and gave her clindamycin and doxycycline. She states the ulceration has improved significantly. She believes the ulceration started from heel fissure. Patient states she has a podiatrist but has not seen him since February.     CBC Full  -  ( 16 Nov 2020 07:15 )  WBC Count : 9.36 K/uL  RBC Count : 2.52 M/uL  Hemoglobin : 8.4 g/dL  Hematocrit : 25.6 %  Platelet Count - Automated : 179 K/uL  Mean Cell Volume : 101.6 fl  Mean Cell Hemoglobin : 33.3 pg  Mean Cell Hemoglobin Concentration : 32.8 gm/dL  Auto Neutrophil # : x  Auto Lymphocyte # : x  Auto Monocyte # : x  Auto Eosinophil # : x  Auto Basophil # : x  Auto Neutrophil % : x  Auto Lymphocyte % : x  Auto Monocyte % : x  Auto Eosinophil % : x  Auto Basophil % : x    11-15    140  |  107  |  24<H>  ----------------------------<  131<H>  4.6   |  25  |  0.99    Ca    8.9      15 Nov 2020 07:00      O:   Ulceration noted on plantar posterior heel. Ulceration measures approximately 0.3x0.3cm. No erythema. No edema. No drainage. No malodor noted. Ulceration is superficial

## 2020-11-16 NOTE — OCCUPATIONAL THERAPY INITIAL EVALUATION ADULT - ADDITIONAL COMMENTS
Patient lives in private house with daughter. 3 stairs outside of home, no rail. Bedroom on 2nd floor, 1st floor setup possible.  1st floor bathroom set up possible, tub shower with curtain.  2 falls in past year

## 2020-11-16 NOTE — OCCUPATIONAL THERAPY INITIAL EVALUATION ADULT - MANUAL MUSCLE TESTING RESULTS, REHAB EVAL
grossly assessed due to/UB WFL, lower body not assessed due to diagnosis grossly assessed due to/UE 3+/5, LE not assessed due to diagnosis

## 2020-11-16 NOTE — OCCUPATIONAL THERAPY INITIAL EVALUATION ADULT - ADL RETRAINING, OT EVAL
Patient will demonstrate lower extremity dressing with modified independence with adaptive equipment in two weeks.

## 2020-11-16 NOTE — OCCUPATIONAL THERAPY INITIAL EVALUATION ADULT - PERTINENT HX OF CURRENT PROBLEM, REHAB EVAL
Patient fell on 11/13/2020 onto right hip and knee Patient s/p mechanical fall 11/13/2020 onto right hip and knee, workup positive for right IT fracture s/p ORIF 11/14

## 2020-11-16 NOTE — OCCUPATIONAL THERAPY INITIAL EVALUATION ADULT - PHYSICAL ASSIST/NONPHYSICAL ASSIST:DRESS LOWER BODY, OT EVAL
1 person assist/nonverbal cues (demo/gestures)/verbal cues/set-up required 1 person assist/nonverbal cues (demo/gestures)/verbal cues

## 2020-11-17 ENCOUNTER — INPATIENT (INPATIENT)
Facility: HOSPITAL | Age: 85
LOS: 10 days | Discharge: HOME HEALTH | DRG: 950 | End: 2020-11-28
Attending: SPECIALIST | Admitting: SPECIALIST
Payer: MEDICARE

## 2020-11-17 VITALS
WEIGHT: 160.06 LBS | SYSTOLIC BLOOD PRESSURE: 131 MMHG | HEART RATE: 96 BPM | TEMPERATURE: 98 F | HEIGHT: 63 IN | RESPIRATION RATE: 16 BRPM | DIASTOLIC BLOOD PRESSURE: 60 MMHG | OXYGEN SATURATION: 97 %

## 2020-11-17 VITALS — HEART RATE: 71 BPM | SYSTOLIC BLOOD PRESSURE: 140 MMHG | DIASTOLIC BLOOD PRESSURE: 71 MMHG | OXYGEN SATURATION: 98 %

## 2020-11-17 DIAGNOSIS — S72.146A NONDISPLACED INTERTROCHANTERIC FRACTURE OF UNSPECIFIED FEMUR, INITIAL ENCOUNTER FOR CLOSED FRACTURE: ICD-10-CM

## 2020-11-17 LAB
ANION GAP SERPL CALC-SCNC: 8 MMOL/L — SIGNIFICANT CHANGE UP (ref 5–17)
BUN SERPL-MCNC: 24 MG/DL — HIGH (ref 7–23)
CALCIUM SERPL-MCNC: 8.9 MG/DL — SIGNIFICANT CHANGE UP (ref 8.4–10.5)
CHLORIDE SERPL-SCNC: 106 MMOL/L — SIGNIFICANT CHANGE UP (ref 96–108)
CO2 SERPL-SCNC: 26 MMOL/L — SIGNIFICANT CHANGE UP (ref 22–31)
CREAT SERPL-MCNC: 0.88 MG/DL — SIGNIFICANT CHANGE UP (ref 0.5–1.3)
GLUCOSE BLDC GLUCOMTR-MCNC: 122 MG/DL — HIGH (ref 70–99)
GLUCOSE BLDC GLUCOMTR-MCNC: 123 MG/DL — HIGH (ref 70–99)
GLUCOSE BLDC GLUCOMTR-MCNC: 130 MG/DL — HIGH (ref 70–99)
GLUCOSE BLDC GLUCOMTR-MCNC: 167 MG/DL — HIGH (ref 70–99)
GLUCOSE SERPL-MCNC: 121 MG/DL — HIGH (ref 70–99)
HCT VFR BLD CALC: 25.5 % — LOW (ref 34.5–45)
HGB BLD-MCNC: 8.5 G/DL — LOW (ref 11.5–15.5)
MCHC RBC-ENTMCNC: 33.3 GM/DL — SIGNIFICANT CHANGE UP (ref 32–36)
MCHC RBC-ENTMCNC: 33.9 PG — SIGNIFICANT CHANGE UP (ref 27–34)
MCV RBC AUTO: 101.6 FL — HIGH (ref 80–100)
NRBC # BLD: 0 /100 WBCS — SIGNIFICANT CHANGE UP (ref 0–0)
PLATELET # BLD AUTO: 186 K/UL — SIGNIFICANT CHANGE UP (ref 150–400)
POTASSIUM SERPL-MCNC: 4 MMOL/L — SIGNIFICANT CHANGE UP (ref 3.5–5.3)
POTASSIUM SERPL-SCNC: 4 MMOL/L — SIGNIFICANT CHANGE UP (ref 3.5–5.3)
RBC # BLD: 2.51 M/UL — LOW (ref 3.8–5.2)
RBC # FLD: 15.1 % — HIGH (ref 10.3–14.5)
SODIUM SERPL-SCNC: 140 MMOL/L — SIGNIFICANT CHANGE UP (ref 135–145)
WBC # BLD: 8.97 K/UL — SIGNIFICANT CHANGE UP (ref 3.8–10.5)
WBC # FLD AUTO: 8.97 K/UL — SIGNIFICANT CHANGE UP (ref 3.8–10.5)

## 2020-11-17 PROCEDURE — 97162 PT EVAL MOD COMPLEX 30 MIN: CPT

## 2020-11-17 PROCEDURE — C1776: CPT

## 2020-11-17 PROCEDURE — 86769 SARS-COV-2 COVID-19 ANTIBODY: CPT

## 2020-11-17 PROCEDURE — C1713: CPT

## 2020-11-17 PROCEDURE — 80048 BASIC METABOLIC PNL TOTAL CA: CPT

## 2020-11-17 PROCEDURE — 93005 ELECTROCARDIOGRAM TRACING: CPT

## 2020-11-17 PROCEDURE — C1889: CPT

## 2020-11-17 PROCEDURE — 36000 PLACE NEEDLE IN VEIN: CPT

## 2020-11-17 PROCEDURE — 76000 FLUOROSCOPY <1 HR PHYS/QHP: CPT

## 2020-11-17 PROCEDURE — 97165 OT EVAL LOW COMPLEX 30 MIN: CPT

## 2020-11-17 PROCEDURE — 86900 BLOOD TYPING SEROLOGIC ABO: CPT

## 2020-11-17 PROCEDURE — 73610 X-RAY EXAM OF ANKLE: CPT

## 2020-11-17 PROCEDURE — 86901 BLOOD TYPING SEROLOGIC RH(D): CPT

## 2020-11-17 PROCEDURE — 36415 COLL VENOUS BLD VENIPUNCTURE: CPT

## 2020-11-17 PROCEDURE — 97116 GAIT TRAINING THERAPY: CPT

## 2020-11-17 PROCEDURE — 99285 EMERGENCY DEPT VISIT HI MDM: CPT | Mod: 25

## 2020-11-17 PROCEDURE — 0225U NFCT DS DNA&RNA 21 SARSCOV2: CPT

## 2020-11-17 PROCEDURE — 97530 THERAPEUTIC ACTIVITIES: CPT

## 2020-11-17 PROCEDURE — 85610 PROTHROMBIN TIME: CPT

## 2020-11-17 PROCEDURE — 85027 COMPLETE CBC AUTOMATED: CPT

## 2020-11-17 PROCEDURE — 97110 THERAPEUTIC EXERCISES: CPT

## 2020-11-17 PROCEDURE — 97535 SELF CARE MNGMENT TRAINING: CPT

## 2020-11-17 PROCEDURE — 73502 X-RAY EXAM HIP UNI 2-3 VIEWS: CPT

## 2020-11-17 PROCEDURE — 83036 HEMOGLOBIN GLYCOSYLATED A1C: CPT

## 2020-11-17 PROCEDURE — 80053 COMPREHEN METABOLIC PANEL: CPT

## 2020-11-17 PROCEDURE — 85730 THROMBOPLASTIN TIME PARTIAL: CPT

## 2020-11-17 PROCEDURE — 86850 RBC ANTIBODY SCREEN: CPT

## 2020-11-17 PROCEDURE — 82962 GLUCOSE BLOOD TEST: CPT

## 2020-11-17 PROCEDURE — 85025 COMPLETE CBC W/AUTO DIFF WBC: CPT

## 2020-11-17 PROCEDURE — 73630 X-RAY EXAM OF FOOT: CPT

## 2020-11-17 PROCEDURE — 87635 SARS-COV-2 COVID-19 AMP PRB: CPT

## 2020-11-17 PROCEDURE — 73562 X-RAY EXAM OF KNEE 3: CPT

## 2020-11-17 PROCEDURE — 71045 X-RAY EXAM CHEST 1 VIEW: CPT

## 2020-11-17 PROCEDURE — 99222 1ST HOSP IP/OBS MODERATE 55: CPT | Mod: GC,AI

## 2020-11-17 RX ORDER — MUPIROCIN 20 MG/G
1 OINTMENT TOPICAL ONCE
Refills: 0 | Status: DISCONTINUED | OUTPATIENT
Start: 2020-11-17 | End: 2020-11-28

## 2020-11-17 RX ORDER — INSULIN GLARGINE 100 [IU]/ML
25 INJECTION, SOLUTION SUBCUTANEOUS AT BEDTIME
Refills: 0 | Status: DISCONTINUED | OUTPATIENT
Start: 2020-11-17 | End: 2020-11-28

## 2020-11-17 RX ORDER — INSULIN LISPRO 100/ML
VIAL (ML) SUBCUTANEOUS
Refills: 0 | Status: DISCONTINUED | OUTPATIENT
Start: 2020-11-17 | End: 2020-11-23

## 2020-11-17 RX ORDER — SODIUM CHLORIDE 9 MG/ML
1000 INJECTION, SOLUTION INTRAVENOUS
Refills: 0 | Status: DISCONTINUED | OUTPATIENT
Start: 2020-11-17 | End: 2020-11-28

## 2020-11-17 RX ORDER — FOLIC ACID 0.8 MG
1 TABLET ORAL DAILY
Refills: 0 | Status: DISCONTINUED | OUTPATIENT
Start: 2020-11-18 | End: 2020-11-28

## 2020-11-17 RX ORDER — NICOTINE POLACRILEX 2 MG
1 GUM BUCCAL DAILY
Refills: 0 | Status: DISCONTINUED | OUTPATIENT
Start: 2020-11-18 | End: 2020-11-28

## 2020-11-17 RX ORDER — POLYETHYLENE GLYCOL 3350 17 G/17G
17 POWDER, FOR SOLUTION ORAL
Refills: 0 | Status: DISCONTINUED | OUTPATIENT
Start: 2020-11-17 | End: 2020-11-18

## 2020-11-17 RX ORDER — MUPIROCIN 20 MG/G
1 OINTMENT TOPICAL
Qty: 0 | Refills: 0 | DISCHARGE
Start: 2020-11-17

## 2020-11-17 RX ORDER — AMLODIPINE BESYLATE 2.5 MG/1
5 TABLET ORAL DAILY
Refills: 0 | Status: DISCONTINUED | OUTPATIENT
Start: 2020-11-18 | End: 2020-11-28

## 2020-11-17 RX ORDER — ACETAMINOPHEN 500 MG
975 TABLET ORAL EVERY 8 HOURS
Refills: 0 | Status: DISCONTINUED | OUTPATIENT
Start: 2020-11-17 | End: 2020-11-23

## 2020-11-17 RX ORDER — MUPIROCIN 20 MG/G
1 OINTMENT TOPICAL ONCE
Refills: 0 | Status: DISCONTINUED | OUTPATIENT
Start: 2020-11-17 | End: 2020-11-17

## 2020-11-17 RX ORDER — DEXTROSE 50 % IN WATER 50 %
12.5 SYRINGE (ML) INTRAVENOUS ONCE
Refills: 0 | Status: DISCONTINUED | OUTPATIENT
Start: 2020-11-17 | End: 2020-11-28

## 2020-11-17 RX ORDER — DEXTROSE 50 % IN WATER 50 %
15 SYRINGE (ML) INTRAVENOUS ONCE
Refills: 0 | Status: DISCONTINUED | OUTPATIENT
Start: 2020-11-17 | End: 2020-11-28

## 2020-11-17 RX ORDER — SENNA PLUS 8.6 MG/1
2 TABLET ORAL AT BEDTIME
Refills: 0 | Status: DISCONTINUED | OUTPATIENT
Start: 2020-11-17 | End: 2020-11-18

## 2020-11-17 RX ORDER — ENOXAPARIN SODIUM 100 MG/ML
40 INJECTION SUBCUTANEOUS DAILY
Refills: 0 | Status: DISCONTINUED | OUTPATIENT
Start: 2020-11-18 | End: 2020-11-28

## 2020-11-17 RX ORDER — GLUCAGON INJECTION, SOLUTION 0.5 MG/.1ML
1 INJECTION, SOLUTION SUBCUTANEOUS ONCE
Refills: 0 | Status: DISCONTINUED | OUTPATIENT
Start: 2020-11-17 | End: 2020-11-28

## 2020-11-17 RX ORDER — DEXTROSE 50 % IN WATER 50 %
25 SYRINGE (ML) INTRAVENOUS ONCE
Refills: 0 | Status: DISCONTINUED | OUTPATIENT
Start: 2020-11-17 | End: 2020-11-28

## 2020-11-17 RX ORDER — TRAMADOL HYDROCHLORIDE 50 MG/1
50 TABLET ORAL EVERY 4 HOURS
Refills: 0 | Status: DISCONTINUED | OUTPATIENT
Start: 2020-11-17 | End: 2020-11-24

## 2020-11-17 RX ORDER — LISINOPRIL 2.5 MG/1
20 TABLET ORAL DAILY
Refills: 0 | Status: DISCONTINUED | OUTPATIENT
Start: 2020-11-18 | End: 2020-11-28

## 2020-11-17 RX ADMIN — Medication 975 MILLIGRAM(S): at 14:30

## 2020-11-17 RX ADMIN — AMLODIPINE BESYLATE 5 MILLIGRAM(S): 2.5 TABLET ORAL at 06:44

## 2020-11-17 RX ADMIN — Medication 1 PATCH: at 11:27

## 2020-11-17 RX ADMIN — Medication 975 MILLIGRAM(S): at 13:31

## 2020-11-17 RX ADMIN — TRAMADOL HYDROCHLORIDE 50 MILLIGRAM(S): 50 TABLET ORAL at 00:19

## 2020-11-17 RX ADMIN — ENOXAPARIN SODIUM 40 MILLIGRAM(S): 100 INJECTION SUBCUTANEOUS at 11:24

## 2020-11-17 RX ADMIN — Medication 10 MILLIGRAM(S): at 11:24

## 2020-11-17 RX ADMIN — Medication 975 MILLIGRAM(S): at 21:59

## 2020-11-17 RX ADMIN — Medication 975 MILLIGRAM(S): at 06:43

## 2020-11-17 RX ADMIN — Medication 1 PATCH: at 11:24

## 2020-11-17 RX ADMIN — Medication 975 MILLIGRAM(S): at 08:01

## 2020-11-17 RX ADMIN — Medication 1 MILLIGRAM(S): at 11:24

## 2020-11-17 RX ADMIN — Medication 1 PATCH: at 06:45

## 2020-11-17 RX ADMIN — INSULIN GLARGINE 25 UNIT(S): 100 INJECTION, SOLUTION SUBCUTANEOUS at 21:31

## 2020-11-17 RX ADMIN — Medication 1: at 17:22

## 2020-11-17 RX ADMIN — LISINOPRIL 20 MILLIGRAM(S): 2.5 TABLET ORAL at 06:43

## 2020-11-17 RX ADMIN — Medication 975 MILLIGRAM(S): at 21:31

## 2020-11-17 RX ADMIN — TRAMADOL HYDROCHLORIDE 50 MILLIGRAM(S): 50 TABLET ORAL at 01:00

## 2020-11-17 NOTE — H&P ADULT - REASON FOR ADMISSION
Impairments: Right-sided intertrochanteric fracture s/p ORIF with IM filiberto placement  Impairment Codes: 08.11 Unilateral Hip Fracture

## 2020-11-17 NOTE — H&P ADULT - NSHPREVIEWOFSYSTEMS_GEN_ALL_CORE
REVIEW OF SYSTEMS  Constitutional: No fever, No Chills, No fatigue  HEENT: No eye pain, No visual disturbances, No difficulty hearing, No Dysphagia   Pulm: No cough,  No shortness of breath  Cardio: No chest pain, No palpitations  GI:  No abdominal pain, No nausea, No vomiting, No diarrhea, No constipation, No incontinence, Last Bowel Movement on   : No dysuria, No frequency, No hematuria, No incontinence  Neuro: No headaches, No memory loss, No loss of strength, No numbness, No tremors  Skin: No itching, No rashes, No lesions   Endo: No temperature intolerance  MSK: No joint pain, No joint swelling, No muscle pain, No Neck or back pain  Psych:  No depression, No anxiety REVIEW OF SYSTEMS  Constitutional: No fever, No Chills, No fatigue  HEENT: No eye pain, No visual disturbances, No difficulty hearing, No Dysphagia   Pulm: No cough,  No shortness of breath  Cardio: No chest pain, No palpitations  GI:  No abdominal pain, No nausea, No vomiting, No diarrhea, No constipation, No incontinence ? recent BM . Reports good appetite  : No dysuria,   Neuro: No headaches, No memory loss, No loss of strength, No numbness, No tremors  Skin: No itching  Endo: No temperature intolerance  MSK: No joint pain, No joint swelling, No muscle pain, No Neck or back pain  Psych:  No depression, No anxiety

## 2020-11-17 NOTE — H&P ADULT - HISTORY OF PRESENT ILLNESS
JULIEN CASTELLANO is a 84yo F with PMHx of HTN, T2DM, RA, anemia, and depression, who presented to [facility] on 11/13/2020 with right-sided hip pain s/p fall from bannister and landing on right knee. Patient was seen and examined in the ED; found to have right-sided intertrochanteric fracture on xray. Orthopaedic surgery was consulted and patient underwent ORIF with IM filiberto on 11/14. Pain well controlled during admission, but course was c/b podiatry consult for left heel ulceration, which was present previously. Podiatry recommended local daily wound care with Mupirocin and DSD; outpatient follow up with her own podiatrist following discharge. Xray left foot and ankle performed to r/o osteomyelitis.    Patient was evaluated by PM&R and therapy for functional deficits and gait/ ADL impairments and recommended acute rehabilitation. Patient was medically optimized for discharge to [facility] on 11/17/2020.   JULIEN CASTELLANO is a 84yo F with PMHx of HTN, T2DM, RA, anemia, and depression, who presented to [facility] on 11/13/2020 with right-sided hip pain s/p fall from bannister and landing on right knee. Patient was seen and examined in the ED; found to have right-sided intertrochanteric fracture on xray. Orthopaedic surgery was consulted and patient underwent ORIF with IM filiberto on 11/14. Pain well controlled during admission, but course was c/b podiatry consult for left heel ulceration, which was present previously. Podiatry recommended local daily wound care with Mupirocin and DSD; outpatient follow up with her own podiatrist following discharge. Xray left foot and ankle performed to r/o osteomyelitis.    Patient was evaluated by PM&R and therapy for functional deficits and gait/ ADL impairments and recommended acute rehabilitation. Patient was medically optimized for discharge to [facility] on 11/17/2020.    Patient was seen and examined on admission. Patient awake and alert; denied fever, chills, nausea, vomiting, or abdominal pain. Endorsed decreased appetite over past year with ~30lbs weight loss. Also endorsed TTP over left heel interfering with sleep. Feels nervous about starting therapy, but otherwise, no other complaints.

## 2020-11-17 NOTE — H&P ADULT - NSHPSOCIALHISTORY_GEN_ALL_CORE
SOCIAL HISTORY  Smoking - had smoked 20 cigarettes/day, now down to 10  EtOH - denied  Lives in private home with daughter with 1STE. Typically resides on second level, so would navigate 1 flight of stairs. Occasional use of RW; has both RW and SAC at home.       FUNCTIONAL HISTORY  Previous Functional Status:  Independent in ambulation, ADL's, transfers prior to hospitalization    Current Functional Status:  Bed mobility:   Transfers: moderate assist with bed to chair, sit to stand, and stand to sit.  Gait / ambulation: maximum assist with RW  ADL's: maximum assist with lower body dressing, supervision with upper body dressing, moderate assist with toileting

## 2020-11-17 NOTE — PROGRESS NOTE ADULT - SUBJECTIVE AND OBJECTIVE BOX
JULIEN CASTELLANO  85y  Female      patient is feeling better and denies pain      REVIEW OF SYSTEMS:    Cardiac HTN  Pulmonary no cough/SOB  GI no n/v/d/pain   no dysuria/hematuria  Neuro no syncope/headache/numbness  Skin left heel wound  Musculoskeletal - right hip fracture    FAMILY HISTORY:    T(C): 36.9 (11-17-20 @ 15:05), Max: 36.9 (11-17-20 @ 00:00)  HR: 96 (11-17-20 @ 15:05) (61 - 96)  BP: 131/60 (11-17-20 @ 15:05) (131/60 - 152/58)  RR: 16 (11-17-20 @ 15:05) (16 - 18)  SpO2: 97% (11-17-20 @ 15:05) (92% - 98%)  Wt(kg): --Vital Signs Last 24 Hrs  T(C): 36.9 (17 Nov 2020 15:05), Max: 36.9 (17 Nov 2020 00:00)  T(F): 98.5 (17 Nov 2020 15:05), Max: 98.5 (17 Nov 2020 00:00)  HR: 96 (17 Nov 2020 15:05) (61 - 96)  BP: 131/60 (17 Nov 2020 15:05) (131/60 - 152/58)  BP(mean): --  RR: 16 (17 Nov 2020 15:05) (16 - 18)  SpO2: 97% (17 Nov 2020 15:05) (92% - 98%)  penicillins (Urticaria; Pruritus)      PHYSICAL EXAM:    General NAD  Neck no JVD/adenopathy/thyromegaly  Heart RRR  Lungs clear  Abdomen non tender non distended normal bowel sounds no HSM/CVAT  Extremities s/p right hip fracture s/p surgery  left heel wound stage 1  Neurologic alert and oriented x 3 no acute focal changes  Skin stage 1 left heel wound no erythema      Consultant(s) Notes Reviewed:  [x ] YES  [ ] NO  Care Discussed with Consultants/Other Providers [ x] YES  [ ] NO    LABS:  CBC Full  -  ( 17 Nov 2020 07:13 )  WBC Count : 8.97 K/uL  RBC Count : 2.51 M/uL  Hemoglobin : 8.5 g/dL  Hematocrit : 25.5 %  Platelet Count - Automated : 186 K/uL  Mean Cell Volume : 101.6 fl  Mean Cell Hemoglobin : 33.9 pg  Mean Cell Hemoglobin Concentration : 33.3 gm/dL  Auto Neutrophil # : x  Auto Lymphocyte # : x  Auto Monocyte # : x  Auto Eosinophil # : x  Auto Basophil # : x  Auto Neutrophil % : x  Auto Lymphocyte % : x  Auto Monocyte % : x  Auto Eosinophil % : x  Auto Basophil % : x                          8.5    8.97  )-----------( 186      ( 17 Nov 2020 07:13 )             25.5     11-17    140  |  106  |  24<H>  ----------------------------<  121<H>  4.0   |  26  |  0.88    Ca    8.9      17 Nov 2020 07:13            CAPILLARY BLOOD GLUCOSE      POCT Blood Glucose.: 167 mg/dL (17 Nov 2020 16:44)          RADIOLOGY & ADDITIONAL TESTS:    Imaging Personally Reviewed:  [ ] YES  [ ] NO  acetaminophen   Tablet .. 975 milliGRAM(s) Oral every 8 hours  amLODIPine   Tablet 5 milliGRAM(s) Oral daily  dextrose 40% Gel 15 Gram(s) Oral once  dextrose 5%. 1000 milliLiter(s) IV Continuous <Continuous>  dextrose 5%. 1000 milliLiter(s) IV Continuous <Continuous>  dextrose 50% Injectable 25 Gram(s) IV Push once  dextrose 50% Injectable 12.5 Gram(s) IV Push once  dextrose 50% Injectable 25 Gram(s) IV Push once  enoxaparin Injectable 40 milliGRAM(s) SubCutaneous daily  folic acid 1 milliGRAM(s) Oral daily  glucagon  Injectable 1 milliGRAM(s) IntraMuscular once  insulin glargine Injectable (LANTUS) 25 Unit(s) SubCutaneous at bedtime  insulin lispro (ADMELOG) corrective regimen sliding scale   SubCutaneous Before meals and at bedtime  lisinopril 20 milliGRAM(s) Oral daily  mupirocin 2% Ointment 1 Application(s) Topical once  nicotine -  14 mG/24Hr(s) Patch 1 Patch Transdermal daily  PARoxetine 10 milliGRAM(s) Oral daily  polyethylene glycol 3350 17 Gram(s) Oral two times a day PRN  senna 2 Tablet(s) Oral at bedtime PRN  traMADol 50 milliGRAM(s) Oral every 4 hours PRN

## 2020-11-17 NOTE — PROGRESS NOTE ADULT - ASSESSMENT
Right Hip intertrochanteric fracture - patient s/p right hip surgery. continue rehab/PT as per PMR  Type 2 DM - patient is clinically stable. recommend continue insulin regimen and blood glucose monitoring  HTN - BP is stable. will continue present medications  Anemia - H/H is stable. patient is hemodynamically stable  Stage 1Left heel wound - recommend podiatrist consult. patient is clinically stable

## 2020-11-17 NOTE — H&P ADULT - ASSESSMENT
JULIEN CASTELLANO is a 84yo F with PMHx of HTN, T2DM, RA, anemia, and depression, who presented to Health system on 11/13/2020 with right-sided hip pain s/p fall, found to have intertrochanteric fracture of right hip, now s/p ORIF and IM nailing. Admitted for multidisciplinary rehab program.    #Comprehensive Multidisciplinary Rehab Program:  - Gait, ADL, Functional impairments  - CMS Impairment group:   - PT/OT/ SLP 3 hours a day 5 days a week  - NP consult for support and assessment     ***    #Mood / Cognition:  - Neuropsych evaluation     #Sleep:  - Maintain quiet hours and low stim environment  - Monitor sleep logs  - Melatonin PRN    #Pain:  - Tylenol PRN  - avoid sedating meds that may affect cognitive recovery    #GI/Bowel:  - Senna 2 tabs daily  - GI ppx:     #/Bladder:  - Monitor PVR if no void in 8h; SC for >400 cc  - Toileting schedule q4h    #Diet / Dysphagia:    - Diet:   - ongoing SLP assessment  - Nutrition to follow    #Skin/ Pressure Injury Prevention:  - assessment on admission   - Incisions:  - Turn Q2hrs in bed while awake, OOB to Chair, PT/OT/SLP     DVT prophylaxis:  -   - SCDs  - Last doppler on ___    #Precautions/ Restrictions  - Falls, Cardiac, Seizures, Spine, Hip  - Ortho:      Weight bearing status:     ROM restrictions:   - Lungs: Aspiration, Incentive Spirometer    - COVID PCR:     --------------------------------------------  Outpatient Follow up:    --------------------------------------------      MEDICAL PROGNOSIS: GOOD            REHAB POTENTIAL: GOOD             ESTIMATED DISPOSITION: HOME WITH HOME CARE            ELOS: 10-14 Days   EXPECTED THERAPY:     P.T. 1hr/day       O.T. 1hr/day      S.L.P. 1hr/day     P&O Unnecessary     EXP FREQUENCY: 5 days per 7 day period     PRESCREEN COMPARISION:   I have reviewed the prescreen information and I have found no relevant changes between the preadmission screening and my post admission evaluation     RATIONALE FOR INPATIENT ADMISSION - Patient demonstrates the following: (check all that apply)  [X] Medically appropriate for rehabilitation admission  [X] Has attainable rehab goals with an appropriate initial discharge plan  [X] Has rehabilitation potential (expected to make a significant improvement within a reasonable period of time)   [X] Requires close medical management by a rehab physician, rehab nursing care, Hospitalist and comprehensive interdisciplinary team (including PT, OT, & or SLP, Prosthetics and Orthotics) JULIEN CASTELLANO is a 84yo F with PMHx of HTN, T2DM, RA, anemia, and depression, who presented to [facility] on 11/13/2020 with right-sided hip pain s/p fall, found to have intertrochanteric fracture of right hip, now s/p ORIF and IM nailing. Admitted for multidisciplinary rehab program.    #Comprehensive Multidisciplinary Rehab Program:  - Gait, ADL, Functional impairments  - CMS Impairment group:   - PT/OT/ SLP 3 hours a day 5 days a week  - NP consult for support and assessment     #Right intertrochanteric hip fracture s/p ORIF and IM nailing  - pain regimen, as below  - WBAT    #T2DM  - c/w FS and ISS  - hgb A1C on admission    #HTN  -     #Mood / Cognition:  - Neuropsych evaluation     #Sleep:  - Maintain quiet hours and low stim environment  - Monitor sleep logs  - Melatonin PRN    #Pain:  - Tylenol PRN  - avoid sedating meds that may affect cognitive recovery    #GI/Bowel:  - Senna 2 tabs daily  - GI ppx:     #/Bladder:  - Monitor PVR if no void in 8h; SC for >400 cc  - Toileting schedule q4h    #Diet / Dysphagia:    - Diet:   - ongoing SLP assessment  - Nutrition to follow    #Skin/ Pressure Injury Prevention:  - assessment on admission   - Incisions:  - Turn Q2hrs in bed while awake, OOB to Chair, PT/OT/SLP     DVT prophylaxis:  -   - SCDs  - Last doppler on ___    #Precautions/ Restrictions  - Falls, Cardiac, Seizures, Spine, Hip  - Ortho:      Weight bearing status:     ROM restrictions:   - Lungs: Aspiration, Incentive Spirometer    - COVID PCR:     --------------------------------------------  Outpatient Follow up:    --------------------------------------------      MEDICAL PROGNOSIS: GOOD            REHAB POTENTIAL: GOOD             ESTIMATED DISPOSITION: HOME WITH HOME CARE            ELOS: 10-14 Days   EXPECTED THERAPY:     P.T. 1hr/day       O.T. 1hr/day      S.L.P. 1hr/day     P&O Unnecessary     EXP FREQUENCY: 5 days per 7 day period     PRESCREEN COMPARISION:   I have reviewed the prescreen information and I have found no relevant changes between the preadmission screening and my post admission evaluation     RATIONALE FOR INPATIENT ADMISSION - Patient demonstrates the following: (check all that apply)  [X] Medically appropriate for rehabilitation admission  [X] Has attainable rehab goals with an appropriate initial discharge plan  [X] Has rehabilitation potential (expected to make a significant improvement within a reasonable period of time)   [X] Requires close medical management by a rehab physician, rehab nursing care, Hospitalist and comprehensive interdisciplinary team (including PT, OT, & or SLP, Prosthetics and Orthotics) JULIEN CASTELLANO is a 86yo F with PMHx of HTN, T2DM, RA, anemia, and depression, who presented to [facility] on 11/13/2020 with right-sided hip pain s/p fall, found to have intertrochanteric fracture of right hip, now s/p ORIF and IM nailing. Admitted for multidisciplinary rehab program.    #Comprehensive Multidisciplinary Rehab Program:  - Gait, ADL, Functional impairments  - CMS Impairment group:   - PT/OT/ SLP 3 hours a day 5 days a week  - NP consult for support and assessment     #Right intertrochanteric hip fracture s/p ORIF and IM nailing  - pain regimen, as below  - Lovenox for AC  - WBAT    #T2DM  - c/w FS and ISS  - hgb A1C on admission    #HTN  - c/w amlodipine 5mg and lisinopril 20mg daily    #Depression  - c/w Paroxetine 10mg daily    #Tobacco use  - c/w Nicotine patch    #Sleep:  - Maintain quiet hours and low stim environment  - Melatonin PRN    #Pain:  - Tylenol PRN for mild pain and Tramadol 50mg q4h PRN for moderate pain  - avoid sedating meds that may affect cognitive recovery    #GI/Bowel:  - Senna 2 tabs daily and Miralax BID PRN  - GI ppx: none    #/Bladder:  - Monitor PVR if no void in 8h; SC for >400 cc  - Toileting schedule q4h    #Diet / Dysphagia:    - Diet: regular, consistent carb  - Nutrition to follow    #Skin/ Pressure Injury Prevention:  - assessment on admission   - Incisions: right hip surgical incision  - Turn Q2hrs in bed while awake, OOB to Chair, PT/OT/SLP     DVT prophylaxis:  - Lovenox 40mg daily  - SCDs    #Precautions/ Restrictions  - Falls  - Ortho:      Weight bearing status: WBAT     ROM restrictions: None  - Lungs: Aspiration, Incentive Spirometer    - COVID PCR: neg on 11/13    --------------------------------------------  Outpatient Follow up:  Dr. Burns (Orthopaedic Surgery)  --------------------------------------------      MEDICAL PROGNOSIS: GOOD            REHAB POTENTIAL: GOOD             ESTIMATED DISPOSITION: HOME WITH HOME CARE            ELOS: 10-14 Days   EXPECTED THERAPY:     P.T. 1hr/day       O.T. 1hr/day      S.L.P. 1hr/day     P&O Unnecessary     EXP FREQUENCY: 5 days per 7 day period     PRESCREEN COMPARISION:   I have reviewed the prescreen information and I have found no relevant changes between the preadmission screening and my post admission evaluation     RATIONALE FOR INPATIENT ADMISSION - Patient demonstrates the following: (check all that apply)  [X] Medically appropriate for rehabilitation admission  [X] Has attainable rehab goals with an appropriate initial discharge plan  [X] Has rehabilitation potential (expected to make a significant improvement within a reasonable period of time)   [X] Requires close medical management by a rehab physician, rehab nursing care, Hospitalist and comprehensive interdisciplinary team (including PT, OT, & or SLP, Prosthetics and Orthotics) JULIEN CASTELLANO is a 86yo F with PMHx of HTN, T2DM, RA, anemia, and depression, who presented to [facility] on 11/13/2020 with right-sided hip pain s/p fall, found to have intertrochanteric fracture of right hip, now s/p ORIF and IM nailing. Admitted for multidisciplinary rehab program.    #Comprehensive Multidisciplinary Rehab Program:  - Gait, ADL, Functional impairments   - PT/OT/ SLP 3 hours a day 5 days a week    #Right intertrochanteric hip fracture s/p ORIF and IM nailing  - pain regimen, as below  - Lovenox for AC  - WBAT    #T2DM  - c/w Lantus 25mg qhs  - c/w FS and ISS  - hgb A1C on admission    #HTN  - c/w amlodipine 5mg and lisinopril 20mg daily    #Depression  - c/w Paroxetine 10mg daily    #Tobacco use  - c/w Nicotine patch    #Sleep:  - Maintain quiet hours and low stim environment  - Melatonin PRN    #Pain:  - Standing Tylenol 975mg q8h and Tramadol 50mg q4h PRN for moderate pain  - avoid sedating meds that may affect cognitive recovery    #GI/Bowel:  - Senna 2 tabs daily and Miralax BID PRN  - GI ppx: none    #/Bladder:  - Monitor PVR if no void in 8h; SC for >400 cc  - Toileting schedule q4h    #Diet / Dysphagia:    - Diet: regular, consistent carb  - Nutrition to follow    #Skin/ Pressure Injury Prevention:  - assessment on admission   - Incisions: right hip surgical incision  - Turn Q2hrs in bed while awake, OOB to Chair, PT/OT/SLP     DVT prophylaxis:  - Lovenox 40mg daily  - SCDs    #Precautions/ Restrictions  - Falls  - Ortho:      Weight bearing status: WBAT     ROM restrictions: None  - Lungs: Aspiration, Incentive Spirometer    - COVID PCR: neg on 11/13    --------------------------------------------  Outpatient Follow up:  Dr. Nelson Burns (Orthopaedic Surgery)  --------------------------------------------      MEDICAL PROGNOSIS: GOOD            REHAB POTENTIAL: GOOD             ESTIMATED DISPOSITION: HOME WITH HOME CARE            ELOS: 10-14 Days   EXPECTED THERAPY:     P.T. 2hr/day       O.T. 1hr/day      P&O Unnecessary     EXP FREQUENCY: 5 days per 7 day period     PRESCREEN COMPARISION:   I have reviewed the prescreen information and I have found no relevant changes between the preadmission screening and my post admission evaluation     RATIONALE FOR INPATIENT ADMISSION - Patient demonstrates the following: (check all that apply)  [X] Medically appropriate for rehabilitation admission  [X] Has attainable rehab goals with an appropriate initial discharge plan  [X] Has rehabilitation potential (expected to make a significant improvement within a reasonable period of time)   [X] Requires close medical management by a rehab physician, rehab nursing care, Hospitalist and comprehensive interdisciplinary team (including PT, OT, & or SLP, Prosthetics and Orthotics) JULIEN CASTELLANO is a 86yo F with PMHx of HTN, T2DM, RA, anemia, and depression, who presented to [facility] on 11/13/2020 with right-sided hip pain s/p fall, found to have intertrochanteric fracture of right hip, now s/p ORIF and IM nailing. Admitted for multidisciplinary rehab program.    #Comprehensive Multidisciplinary Rehab Program:  - Gait, ADL, Functional impairments   - PT/OT: 3 hours a day 5 days a week    #Right intertrochanteric hip fracture s/p ORIF and IM nailing  - pain regimen, as below  - Lovenox for AC  - WBAT    #T2DM  - c/w Lantus 25mg qhs  - c/w FS and ISS  - hgb A1C on admission    #HTN  - c/w amlodipine 5mg and lisinopril 20mg daily    #Depression  - c/w Paroxetine 10mg daily    #Tobacco use  - c/w Nicotine patch    #Sleep: Melatonin PRN    #Pain:  - Standing Tylenol 975mg q8h and Tramadol 50mg q4h PRN for moderate pain    #GI/Bowel:  - Senna 2 tabs daily and Miralax BID PRN  - GI ppx: none    #/Bladder:  - Monitor PVR if no void in 8h; SC for >400 cc  - Toileting schedule q4h    #Diet:- Diet: regular, consistent carb  - Nutrition to follow    #Skin/ Pressure Injury Prevention:  - assessment on admission   - Incisions: right hip surgical incision  Left heel area of scab( patient reports prior ? cellulitis and was on abx), Off load heel  - Turn Q2hrs in bed while awake, OOB to Chair, PT/OT    DVT prophylaxis:  - Lovenox 40mg daily  - SCDs    #Precautions/ Restrictions  - Falls  - Ortho:      Weight bearing status: WBAT     ROM restrictions: None  - COVID PCR: neg on 11/13    --------------------------------------------  Outpatient Follow up:  Dr. Nelson Burns (Orthopaedic Surgery)  --------------------------------------------      MEDICAL PROGNOSIS: GOOD            REHAB POTENTIAL: GOOD             ESTIMATED DISPOSITION: HOME WITH HOME CARE            ELOS: 14-16 Days   EXPECTED THERAPY:     P.T. 2hr/day       O.T. 1hr/day      P&O consider claudia as patient with prior foot drop, but did not want braces    EXP FREQUENCY: 5 days per 7 day period     PRESCREEN COMPARISION:   I have reviewed the prescreen information and I have found no relevant changes between the preadmission screening and my post admission evaluation     RATIONALE FOR INPATIENT ADMISSION - Patient demonstrates the following: (check all that apply)  [X] Medically appropriate for rehabilitation admission  [X] Has attainable rehab goals with an appropriate initial discharge plan  [X] Has rehabilitation potential (expected to make a significant improvement within a reasonable period of time)   [X] Requires close medical management by a rehab physician, rehab nursing care, Hospitalist and comprehensive interdisciplinary team (including PT, OT, & or SLP, Prosthetics and Orthotics)

## 2020-11-17 NOTE — H&P ADULT - ATTENDING COMMENTS
85 year old female with history as stated above, admitted to rehab after recent ORIF ( IM filiberto) for left hip intertrochanteric fracture. Date of surgery 11/14.   WBAT   on Lovenox for DVT prophylaxis  Patient with h/o prior right foot drop ? reason -? due to DM . Patient states that she does not know the reason  - Both ankle with impaired Ankle dorsiflexion, ( R> Left).    Begin full rehab program    Hospitalist evaluation for management of medical comorbidities.     Patient on weekly methotrexate for RA- will d/w hospitalist  Monitor blood sugars.    I saw and examined the patient. I have reviewed the resident's note and agree with findings and plan and edited as needed. 85 year old female with history as stated above, admitted to rehab after recent ORIF ( IM filiberto) for left hip intertrochanteric fracture. Date of surgery 11/14.   WBAT   on Lovenox for DVT prophylaxis  Patient with h/o prior right foot drop ? reason -? due to DM . Patient states that she does not know the reason  - Both ankle with impaired Ankle dorsiflexion, ( R> Left).  Left heel - small unstageable pressure ulcer    Begin full rehab program    Hospitalist evaluation for management of medical comorbidities.     Patient on weekly methotrexate for RA- will d/w hospitalist  Monitor blood sugars.    I saw and examined the patient. I have reviewed the resident's note and agree with findings and plan and edited as needed.

## 2020-11-17 NOTE — PATIENT PROFILE ADULT - NSTOBACCOCESSATIONEDU5_GEN_A_NUR
[facility] [facility] shift report    Date: 06/23/2020  [employer] Specialist:  Mckay Farooq    Pump parameters:  RPM: 2700  Flow:  2.6  Sweep:  1  FiO2:  21     Oxygenator status:  Clots: pre & post oxy  Fibrin: none     Pressure trends:  P1: 99  P2: 71  Delta P: 28     Volume status:  Chugging noted - yes  MAP:  60-90's  MD notified (name):  Noy      Anticoagulation:  ACT/aPTT/Xa parameters: 0.2-0.25  ACT/aPTT/Xa trends this shift: 0.23, 0.28       Cannula size / status / placement:  Arterial:   Venous 1: 23FR / RIJV / 6.5 cm  Venous 2: 27FR / RFV / 12 cm  Dual lumen: no       Additional Comments:    Gave 1 unit of PRBC and Cyro.              Withdrawal symptoms include negative mood, urges to smoke, and difficulty concentrating

## 2020-11-17 NOTE — PROGRESS NOTE ADULT - ASSESSMENT
A/P POD #2    -awaiting tx to rehab  -ortho stable for rehab  -continue current management  -DVT prophylaxis  -pain controlled  -WBAT PT/OT  -multiple medical problems managed by medical team
Right Hip fracture - s/p surgery patient is clinically stable. will continue post op management as per surgeon  Left heel ulcer - will get podiatry consult  HTN - BP is stable. will continue present medications  Type 2 DM - patient is clinically stable. will continue insulin and blood glucose checks  Anemia - H/H is stable. patien tis hemodynamically stable
Right Hip fracture - s/p surgery. will continue post op management as per orthopedist. will continue pain management and PT  Type 2 DM - patient is clinically stable. will continue insulin and blood glucose monitoring  HTN - BP is stable. will continue lisinopril and norvasc  Anemia - patient is hemodynamically stable. will repeat H/H
Right hip intertrochanteric fracture - s/p surgery. patient is resting comfortably. pain meds as per surgeon  HTN - BP is stable at present. will continue present management  Type 2 DM - patient is clinically stable. will continue present management and blood glucose monitoring  Anemia - patient is hemodynamically stable. will follow CBC
s/p Open reduction and internal fixation (ORIF) of hip with intramedullary filiberto 14-Nov-2020 POD#1, pt see with surgeon Dr. Burns. she states she feels much better and pain has improved, denies cp/sob/n/v
stable post op   anemia of acute blood loss  COPD  Arthritis   DM2    Plan:  check labs in am            Lovenox for DVT prophylaxis            WBAT right leg            Acute rehab today
85F w/ right hip Fx after a slip while holding banister and landing on her right knee. Admitted to surgical unit overnight with plans for surgical interventions tomorrow morning.     PLAN:    -NPO after midnight for OR tomororw, hold heparin as well  -pain control tonight  -IVFs until cleared for PO diet after OR tomorrow  -Hx of nicotine use, has order for nicotine patch  -glycemic control with Lantus and ISS  -Plan for IM nail in AM per note review.   
85F w/ right hip Fx after a slip while holding banister and landing on her right knee. Went to OR today, POD #0 from ORIF and IM filiberto of right hip    Events last 24 hours:   -S/P ORIF and Im filiberto today    PLAN:    -will need to finish post op clindamycin  -pain control  -serial lower ext. assessments, pulse checks  -DVt prophylaxis lovenox  -glycemioc control with ISS and Lantus  -nicotine patch given smoking hx  -AM labs

## 2020-11-17 NOTE — PROGRESS NOTE ADULT - SUBJECTIVE AND OBJECTIVE BOX
JULIEN CASTELLANO  85y  Female      patient states she is feeling better and pain has decreased. patient is without cardiac pulmonary GI  neuro complaints      REVIEW OF SYSTEMS:    Cardiac HTN  Pulmonary no cough/SOB  GI no n/v/d/pain   no dysuria/hematuria  Neuro no syncope/headache/numbness  Musculoskeletal -right hip fracture  Endo DM    FAMILY HISTORY:    T(C): 36.7 (11-15-20 @ 11:21), Max: 36.9 (11-14-20 @ 17:05)  HR: 84 (11-15-20 @ 11:21) (68 - 88)  BP: 144/47 (11-15-20 @ 11:21) (133/53 - 153/52)  RR: 18 (11-15-20 @ 11:21) (12 - 18)  SpO2: 98% (11-15-20 @ 11:21) (94% - 99%)  Wt(kg): --Vital Signs Last 24 Hrs  T(C): 36.7 (15 Nov 2020 11:21), Max: 36.9 (14 Nov 2020 17:05)  T(F): 98 (15 Nov 2020 11:21), Max: 98.4 (14 Nov 2020 17:05)  HR: 84 (15 Nov 2020 11:21) (68 - 88)  BP: 144/47 (15 Nov 2020 11:21) (133/53 - 153/52)  BP(mean): 72 (15 Nov 2020 11:21) (72 - 72)  RR: 18 (15 Nov 2020 11:21) (12 - 18)  SpO2: 98% (15 Nov 2020 11:21) (94% - 99%)  penicillins (Urticaria; Pruritus)      PHYSICAL EXAM:    General resting comfortably  Neck non tender  thyroid stable  Heart RRR  Lungs clear  Abdomen non tender non distended normal bowel sounds no HSM/CVAT  Extremities s/p right hip surgery with tenderness  Neurologic no acute focal changes alert and oriented x 3  Skin normal turgor      Consultant(s) Notes Reviewed:  [x ] YES  [ ] NO  Care Discussed with Consultants/Other Providers [ x] YES  [ ] NO    LABS:  CBC Full  -  ( 15 Nov 2020 07:00 )  WBC Count : 11.90 K/uL  RBC Count : 2.62 M/uL  Hemoglobin : 8.4 g/dL  Hematocrit : 26.5 %  Platelet Count - Automated : 175 K/uL  Mean Cell Volume : 101.1 fl  Mean Cell Hemoglobin : 32.1 pg  Mean Cell Hemoglobin Concentration : 31.7 gm/dL  Auto Neutrophil # : x  Auto Lymphocyte # : x  Auto Monocyte # : x  Auto Eosinophil # : x  Auto Basophil # : x  Auto Neutrophil % : x  Auto Lymphocyte % : x  Auto Monocyte % : x  Auto Eosinophil % : x  Auto Basophil % : x                          8.4    11.90 )-----------( 175      ( 15 Nov 2020 07:00 )             26.5     11-15    140  |  107  |  24<H>  ----------------------------<  131<H>  4.6   |  25  |  0.99    Ca    8.9      15 Nov 2020 07:00    TPro  6.5  /  Alb  2.8<L>  /  TBili  0.3  /  DBili  x   /  AST  20  /  ALT  20  /  AlkPhos  73  11-14    LIVER FUNCTIONS - ( 14 Nov 2020 06:35 )  Alb: 2.8 g/dL / Pro: 6.5 g/dL / ALK PHOS: 73 U/L / ALT: 20 U/L / AST: 20 U/L / GGT: x           PT/INR - ( 14 Nov 2020 06:35 )   PT: 13.4 sec;   INR: 1.11 ratio         PTT - ( 14 Nov 2020 06:35 )  PTT:33.8 sec    CAPILLARY BLOOD GLUCOSE      POCT Blood Glucose.: 146 mg/dL (15 Nov 2020 11:24)              acetaminophen   Tablet .. 975 milliGRAM(s) Oral every 8 hours  amLODIPine   Tablet 5 milliGRAM(s) Oral daily  dextrose 40% Gel 15 Gram(s) Oral once  dextrose 5%. 1000 milliLiter(s) IV Continuous <Continuous>  dextrose 5%. 1000 milliLiter(s) IV Continuous <Continuous>  dextrose 50% Injectable 25 Gram(s) IV Push once  dextrose 50% Injectable 12.5 Gram(s) IV Push once  dextrose 50% Injectable 25 Gram(s) IV Push once  enoxaparin Injectable 40 milliGRAM(s) SubCutaneous daily  folic acid 1 milliGRAM(s) Oral daily  glucagon  Injectable 1 milliGRAM(s) IntraMuscular once  influenza   Vaccine 0.5 milliLiter(s) IntraMuscular once  insulin glargine Injectable (LANTUS) 25 Unit(s) SubCutaneous at bedtime  insulin lispro (ADMELOG) corrective regimen sliding scale   SubCutaneous three times a day before meals  lactated ringers. 1000 milliLiter(s) IV Continuous <Continuous>  lisinopril 20 milliGRAM(s) Oral daily  morphine  - Injectable 4 milliGRAM(s) IV Push every 3 hours PRN  nicotine  14 mG/24 Hr(s) Transdermal Patch - Peds 1 Patch Transdermal daily  ondansetron Injectable 4 milliGRAM(s) IV Push every 6 hours PRN  PARoxetine 10 milliGRAM(s) Oral daily  polyethylene glycol 3350 17 Gram(s) Oral daily PRN  senna 1 Tablet(s) Oral at bedtime  traMADol 50 milliGRAM(s) Oral every 4 hours PRN      HEALTH ISSUES - PROBLEM Dx:  Closed fracture of right hip, initial encounter  Closed fracture of right hip, initial encounter          
  JULIEN CASTELLANO  85y  Female      patient states she is feeling better and states pain at right hip has improved  patient does complain of a left heel ulcer      REVIEW OF SYSTEMS:    Cardiac HTN  Pulmonary no cough/SOB  GI no n/v/d/pain   no dysuria/hematuria  Neuro no syncope/headache/numbness  Endo DM  Heme anemia    FAMILY HISTORY:    T(C): 36.7 (11-16-20 @ 05:53), Max: 36.7 (11-15-20 @ 11:21)  HR: 76 (11-16-20 @ 05:53) (73 - 84)  BP: 157/68 (11-16-20 @ 00:02) (138/52 - 157/68)  RR: 18 (11-16-20 @ 05:53) (16 - 18)  SpO2: 98% (11-16-20 @ 05:53) (96% - 98%)  Wt(kg): --Vital Signs Last 24 Hrs  T(C): 36.7 (16 Nov 2020 05:53), Max: 36.7 (15 Nov 2020 11:21)  T(F): 98.1 (16 Nov 2020 05:53), Max: 98.1 (16 Nov 2020 05:53)  HR: 76 (16 Nov 2020 05:53) (73 - 84)  BP: 157/68 (16 Nov 2020 00:02) (138/52 - 157/68)  BP(mean): 72 (15 Nov 2020 11:21) (72 - 72)  RR: 18 (16 Nov 2020 05:53) (16 - 18)  SpO2: 98% (16 Nov 2020 05:53) (96% - 98%)  penicillins (Urticaria; Pruritus)      PHYSICAL EXAM:    General NAD  Neck no JVD/adenopathy/thyroidmegaly  Heart RRR  Lungs clear  Abdomen non tender non distended normal bowel sounds no HSM/CVAT  Extremities left superifical heel ulcer s/p right hip fracture/surgery  Neurologic no acute focal changes alert and oriented x 3  Skin left heel ulcer      Consultant(s) Notes Reviewed:  [x ] YES  [ ] NO  Care Discussed with Consultants/Other Providers [ x] YES  [ ] NO    LABS:  CBC Full  -  ( 16 Nov 2020 07:15 )  WBC Count : 9.36 K/uL  RBC Count : 2.52 M/uL  Hemoglobin : 8.4 g/dL  Hematocrit : 25.6 %  Platelet Count - Automated : 179 K/uL  Mean Cell Volume : 101.6 fl  Mean Cell Hemoglobin : 33.3 pg  Mean Cell Hemoglobin Concentration : 32.8 gm/dL  Auto Neutrophil # : x  Auto Lymphocyte # : x  Auto Monocyte # : x  Auto Eosinophil # : x  Auto Basophil # : x  Auto Neutrophil % : x  Auto Lymphocyte % : x  Auto Monocyte % : x  Auto Eosinophil % : x  Auto Basophil % : x                          8.4    9.36  )-----------( 179      ( 16 Nov 2020 07:15 )             25.6     11-15    140  |  107  |  24<H>  ----------------------------<  131<H>  4.6   |  25  |  0.99    Ca    8.9      15 Nov 2020 07:00            CAPILLARY BLOOD GLUCOSE      POCT Blood Glucose.: 106 mg/dL (16 Nov 2020 08:27)          acetaminophen   Tablet .. 975 milliGRAM(s) Oral every 8 hours  amLODIPine   Tablet 5 milliGRAM(s) Oral daily  dextrose 40% Gel 15 Gram(s) Oral once  dextrose 5%. 1000 milliLiter(s) IV Continuous <Continuous>  dextrose 5%. 1000 milliLiter(s) IV Continuous <Continuous>  dextrose 50% Injectable 25 Gram(s) IV Push once  dextrose 50% Injectable 12.5 Gram(s) IV Push once  dextrose 50% Injectable 25 Gram(s) IV Push once  enoxaparin Injectable 40 milliGRAM(s) SubCutaneous daily  folic acid 1 milliGRAM(s) Oral daily  glucagon  Injectable 1 milliGRAM(s) IntraMuscular once  influenza   Vaccine 0.5 milliLiter(s) IntraMuscular once  insulin glargine Injectable (LANTUS) 25 Unit(s) SubCutaneous at bedtime  insulin lispro (ADMELOG) corrective regimen sliding scale   SubCutaneous three times a day before meals  lisinopril 20 milliGRAM(s) Oral daily  morphine  - Injectable 4 milliGRAM(s) IV Push every 3 hours PRN  nicotine  14 mG/24 Hr(s) Transdermal Patch - Peds 1 Patch Transdermal daily  ondansetron Injectable 4 milliGRAM(s) IV Push every 6 hours PRN  PARoxetine 10 milliGRAM(s) Oral daily  polyethylene glycol 3350 17 Gram(s) Oral daily PRN  senna 1 Tablet(s) Oral at bedtime  traMADol 50 milliGRAM(s) Oral every 4 hours PRN                
  JULIEN CASTELLANO  85y  Female    patient s/p right hip surgery for fracture. patient is resting comfortably and currently denies pain      REVIEW OF SYSTEMS:    Cardiac HTN  Pulmonary no cough/SOB  GI no n/v/d/pain   no dysuria/hematuria  Neuro no syncope/numbness/headache  Musculoskeletal right hip fracture/DDD/RA  Endo DM  Heme anemia    FAMILY HISTORY:    T(C): 36 (11-14-20 @ 10:43), Max: 36.7 (11-14-20 @ 00:02)  HR: 66 (11-14-20 @ 11:13) (66 - 98)  BP: 154/58 (11-14-20 @ 11:13) (109/50 - 178/72)  RR: 10 (11-14-20 @ 11:13) (10 - 16)  SpO2: 97% (11-14-20 @ 11:13) (94% - 99%)  Wt(kg): --Vital Signs Last 24 Hrs  T(C): 36 (14 Nov 2020 10:43), Max: 36.7 (14 Nov 2020 00:02)  T(F): 96.8 (14 Nov 2020 10:43), Max: 98 (14 Nov 2020 00:02)  HR: 66 (14 Nov 2020 11:13) (66 - 98)  BP: 154/58 (14 Nov 2020 11:13) (109/50 - 178/72)  BP(mean): 83 (14 Nov 2020 11:13) (81 - 101)  RR: 10 (14 Nov 2020 11:13) (10 - 16)  SpO2: 97% (14 Nov 2020 11:13) (94% - 99%)  penicillins (Urticaria; Pruritus)      PHYSICAL EXAM:    General resting comfortably  Neck non tender no JVD  Heart RRR  Lungs clear  Abdomen non tender non distended normal bowel sounds no HSM/CVAT  Extremities s/p right hip surgery no edema +pulses  Neurologic alert and oriented x 3 no acute focal changes  Skin normal turgor      Consultant(s) Notes Reviewed:  [x ] YES  [ ] NO  Care Discussed with Consultants/Other Providers [ x] YES  [ ] NO    LABS:  CBC Full  -  ( 14 Nov 2020 06:35 )  WBC Count : 7.65 K/uL  RBC Count : 3.17 M/uL  Hemoglobin : 10.5 g/dL  Hematocrit : 32.2 %  Platelet Count - Automated : 192 K/uL  Mean Cell Volume : 101.6 fl  Mean Cell Hemoglobin : 33.1 pg  Mean Cell Hemoglobin Concentration : 32.6 gm/dL  Auto Neutrophil # : x  Auto Lymphocyte # : x  Auto Monocyte # : x  Auto Eosinophil # : x  Auto Basophil # : x  Auto Neutrophil % : x  Auto Lymphocyte % : x  Auto Monocyte % : x  Auto Eosinophil % : x  Auto Basophil % : x                          10.5   7.65  )-----------( 192      ( 14 Nov 2020 06:35 )             32.2     11-14    140  |  106  |  20  ----------------------------<  129<H>  4.8   |  26  |  1.02    Ca    9.1      14 Nov 2020 06:35    TPro  6.5  /  Alb  2.8<L>  /  TBili  0.3  /  DBili  x   /  AST  20  /  ALT  20  /  AlkPhos  73  11-14    LIVER FUNCTIONS - ( 14 Nov 2020 06:35 )  Alb: 2.8 g/dL / Pro: 6.5 g/dL / ALK PHOS: 73 U/L / ALT: 20 U/L / AST: 20 U/L / GGT: x           PT/INR - ( 14 Nov 2020 06:35 )   PT: 13.4 sec;   INR: 1.11 ratio         PTT - ( 14 Nov 2020 06:35 )  PTT:33.8 sec    CAPILLARY BLOOD GLUCOSE      POCT Blood Glucose.: 154 mg/dL (14 Nov 2020 10:45)          RADIOLOGY & ADDITIONAL TESTS:      < from: Xray Hip w/ Pelvis 2 or 3 Views, Right (11.13.20 @ 17:25) >    EXAM:  KNEE AP LAT&OBL-RIGHT    EXAM:  XR HIP WITH PELV 2-3V RT    EXAM:  XR CHEST PORTABLE URGENT 1V      PROCEDURE DATE:  11/13/2020        INTERPRETATION:  Clinical Information: Trauma with multifocal pain    Technique: One view chest. 1 view pelvis. 2 views right hip. 3 views right knee.    Comparison: None    Findings/  Impression:    Chest: Heart unremarkable. Lungs are clear    Musculoskeletal: There is an acute mildly displaced intertrochanteric right hip fracture. No dislocation. Thereis atherosclerosis.      INDU SHER   This document has been electronically signed. Nov 14 2020  8:15AM    < end of copied text >      `< from: Xray Chest 1 View- PORTABLE-Urgent (Xray Chest 1 View- PORTABLE-Urgent .) (11.13.20 @ 17:19) >    EXAM:  KNEE AP LAT&OBL-RIGHT    EXAM:  XR HIP WITH PELV 2-3V RT    EXAM:  XR CHEST PORTABLE URGENT 1V      PROCEDURE DATE:  11/13/2020        INTERPRETATION:  Clinical Information: Trauma with multifocal pain    Technique: One view chest. 1 view pelvis. 2 views right hip. 3 views right knee.    Comparison: None    Findings/  Impression:    Chest: Heart unremarkable. Lungs are clear    Musculoskeletal: There is an acute mildly displaced intertrochanteric right hip fracture. No dislocation. Thereis atherosclerosis.        INDU SHER   This document has been electronically signed. Nov 14 2020  8:15AM    < end of copied text >      `
POD #2 ORIF R hip    Pt sitting in chair. no new complaints no SOB no CP no calf tenderness +Voiding tolerating diet awaiting transfer to rehab here at [facility]    Vital Signs Last 24 Hrs  T(C): 36.7 (16 Nov 2020 05:53), Max: 36.7 (16 Nov 2020 05:53)  T(F): 98.1 (16 Nov 2020 05:53), Max: 98.1 (16 Nov 2020 05:53)  HR: 76 (16 Nov 2020 05:53) (73 - 76)  BP: 157/68 (16 Nov 2020 00:02) (157/68 - 157/68)  BP(mean): --  RR: 18 (16 Nov 2020 05:53) (17 - 18)  SpO2: 98% (16 Nov 2020 05:53) (98% - 98%)                          8.4    9.36  )-----------( 179      ( 16 Nov 2020 07:15 )             25.6   11-15    140  |  107  |  24<H>  ----------------------------<  131<H>  4.6   |  25  |  0.99    Ca    8.9      15 Nov 2020 07:00    I&O's Detail    15 Nov 2020 07:01  -  16 Nov 2020 07:00  --------------------------------------------------------  IN:    Lactated Ringers: 2300 mL    Oral Fluid: 1120 mL  Total IN: 3420 mL    OUT:    Voided (mL): 3550 mL  Total OUT: 3550 mL    Total NET: -130 mL      16 Nov 2020 07:01  -  16 Nov 2020 16:17  --------------------------------------------------------  IN:    Oral Fluid: 360 mL  Total IN: 360 mL    OUT:    Voided (mL): 1000 mL  Total OUT: 1000 mL    Total NET: -640 mL      
S/P right hip ORIF   POD #3    Patient was seen by me this am .    She is still c/o incisional discomfort.   She denies CP or SOB .  She is ambulating with PT .   She will be discharged to acute rehab this afternoon.      Vital Signs Last 24 Hrs  T(C): 36.9 (17 Nov 2020 15:05), Max: 36.9 (17 Nov 2020 00:00)  T(F): 98.5 (17 Nov 2020 15:05), Max: 98.5 (17 Nov 2020 00:00)  HR: 96 (17 Nov 2020 15:05) (61 - 96)  BP: 131/60 (17 Nov 2020 15:05) (131/60 - 152/58)  BP(mean): --  RR: 16 (17 Nov 2020 15:05) (16 - 18)  SpO2: 97% (17 Nov 2020 15:05) (92% - 98%)    PAST MEDICAL & SURGICAL HISTORY:  Type 2 diabetes mellitus  Depression  Arthritis, rheumatoid  Anemia  Hypertension  S/P cataract surgery  fracture ankle right  plate . screws    PE:  Alert and oriented X3  Lungs:  CTA   Cor :  S1S2  Abd:   soft , non tender +BS -BM  Ext:   Right leg dressings Aquacel dry and intact.  Thigh supple, foot warm, Good capillary refill   can not illicit EHL, c/o dropped foot not new since ankle fx .      labs:                        8.5    8.97  )-----------( 186      ( 17 Nov 2020 07:13 )             25.5   11-17    140  |  106  |  24<H>  ----------------------------<  121<H>  4.0   |  26  |  0.88    Ca    8.9      17 Nov 2020 07:13        
s/p Open reduction and internal fixation (ORIF) of hip with intramedullary filiberto 14-Nov-2020 POD#1, pt see with surgeon Dr. Burns. she states she feels much better and pain has improved, denies cp/sob/n/v    PAST MEDICAL & SURGICAL HISTORY:  Type 2 diabetes mellitus    Depression    Arthritis, rheumatoid    Anemia    Hypertension    S/P cataract surgery    fracture ankle right  plate . screws    MEDICATIONS  (STANDING):  acetaminophen   Tablet .. 975 milliGRAM(s) Oral every 8 hours  amLODIPine   Tablet 5 milliGRAM(s) Oral daily  dextrose 40% Gel 15 Gram(s) Oral once  dextrose 5%. 1000 milliLiter(s) (50 mL/Hr) IV Continuous <Continuous>  dextrose 5%. 1000 milliLiter(s) (100 mL/Hr) IV Continuous <Continuous>  dextrose 50% Injectable 25 Gram(s) IV Push once  dextrose 50% Injectable 12.5 Gram(s) IV Push once  dextrose 50% Injectable 25 Gram(s) IV Push once  enoxaparin Injectable 40 milliGRAM(s) SubCutaneous daily  folic acid 1 milliGRAM(s) Oral daily  glucagon  Injectable 1 milliGRAM(s) IntraMuscular once  influenza   Vaccine 0.5 milliLiter(s) IntraMuscular once  insulin glargine Injectable (LANTUS) 25 Unit(s) SubCutaneous at bedtime  insulin lispro (ADMELOG) corrective regimen sliding scale   SubCutaneous three times a day before meals  lactated ringers. 1000 milliLiter(s) (100 mL/Hr) IV Continuous <Continuous>  lisinopril 20 milliGRAM(s) Oral daily  nicotine  14 mG/24 Hr(s) Transdermal Patch - Peds 1 Patch Transdermal daily  PARoxetine 10 milliGRAM(s) Oral daily  senna 1 Tablet(s) Oral at bedtime    MEDICATIONS  (PRN):  morphine  - Injectable 4 milliGRAM(s) IV Push every 3 hours PRN Severe Pain (7 - 10)  ondansetron Injectable 4 milliGRAM(s) IV Push every 6 hours PRN Nausea and/or Vomiting  polyethylene glycol 3350 17 Gram(s) Oral daily PRN Constipation  traMADol 50 milliGRAM(s) Oral every 4 hours PRN Moderate Pain (4 - 6)    PE: A&o x3 nad  abd: soft, nt, nd, ng  right hip mild tender, mild swelling, no hematoma noted, dressing c/d/i   voiding
Patient is a 85y old  Female who presents with a chief complaint of Right Hip Fracture (13 Nov 2020 22:46)      BRIEF HOSPITAL COURSE:     85F w/ right hip Fx after a slip while holding banister and landing on her right knee. Admitted to surgical unit overnight with plans for surgical interventions tomorrow morning.         PAST MEDICAL & SURGICAL HISTORY:  Type 2 diabetes mellitus    Depression    Arthritis, rheumatoid    Anemia    Hypertension    S/P cataract surgery    fracture ankle right  plate . screws        Review of Systems:  CONSTITUTIONAL: No fever, chills, or fatigue  EYES: No eye pain, visual disturbances, or discharge  ENMT:  No difficulty hearing, tinnitus, vertigo; No sinus or throat pain  NECK: No pain or stiffness  RESPIRATORY: No cough, wheezing, chills or hemoptysis; No shortness of breath  CARDIOVASCULAR: No chest pain, palpitations, dizziness, or leg swelling  GASTROINTESTINAL: No abdominal or epigastric pain. No nausea, vomiting, or hematemesis; No diarrhea or constipation. No melena or hematochezia.  GENITOURINARY: No dysuria, frequency, hematuria, or incontinence  NEUROLOGICAL: No headaches, memory loss, loss of strength, numbness, or tremors  SKIN: No itching, burning, rashes, or lesions   MUSCULOSKELETAL: (+) right hip/leg pain  PSYCHIATRIC: No depression, anxiety, mood swings, or difficulty sleeping      Medications:    amLODIPine   Tablet 5 milliGRAM(s) Oral daily  lisinopril 20 milliGRAM(s) Oral daily      acetaminophen   Tablet .. 650 milliGRAM(s) Oral every 4 hours PRN  morphine  - Injectable 4 milliGRAM(s) IV Push every 6 hours PRN  oxycodone    5 mG/acetaminophen 325 mG 1 Tablet(s) Oral every 4 hours PRN  PARoxetine 10 milliGRAM(s) Oral daily      heparin   Injectable 5000 Unit(s) SubCutaneous three times a day        dextrose 40% Gel 15 Gram(s) Oral once  dextrose 50% Injectable 25 Gram(s) IV Push once  dextrose 50% Injectable 12.5 Gram(s) IV Push once  dextrose 50% Injectable 25 Gram(s) IV Push once  glucagon  Injectable 1 milliGRAM(s) IntraMuscular once  insulin glargine Injectable (LANTUS) 10 Unit(s) SubCutaneous at bedtime  insulin lispro (ADMELOG) corrective regimen sliding scale   SubCutaneous three times a day before meals    dextrose 5%. 1000 milliLiter(s) IV Continuous <Continuous>  dextrose 5%. 1000 milliLiter(s) IV Continuous <Continuous>  folic acid 1 milliGRAM(s) Oral daily  lactated ringers. 1000 milliLiter(s) IV Continuous <Continuous>  sodium chloride 0.9%. 1000 milliLiter(s) IV Continuous <Continuous>      chlorhexidine 4% Liquid 1 Application(s) Topical once    nicotine  14 mG/24 Hr(s) Transdermal Patch - Peds 1 Patch Transdermal daily          ICU Vital Signs Last 24 Hrs  T(C): 36.6 (13 Nov 2020 16:41), Max: 36.6 (13 Nov 2020 16:41)  T(F): 97.8 (13 Nov 2020 16:41), Max: 97.8 (13 Nov 2020 16:41)  HR: 82 (13 Nov 2020 19:58) (77 - 82)  BP: 167/68 (13 Nov 2020 19:58) (167/68 - 169/85)  BP(mean): 101 (13 Nov 2020 19:58) (101 - 101)  RR: 16 (13 Nov 2020 19:58) (16 - 16)  SpO2: 98% (13 Nov 2020 19:58) (98% - 99%)          I&O's Detail        LABS:                        11.3   12.53 )-----------( 207      ( 13 Nov 2020 17:30 )             34.5     11-13    140  |  106  |  24<H>  ----------------------------<  104<H>  4.4   |  25  |  0.93    Ca    9.6      13 Nov 2020 17:30    TPro  7.2  /  Alb  3.1<L>  /  TBili  0.3  /  DBili  x   /  AST  24  /  ALT  23  /  AlkPhos  80  11-13          CAPILLARY BLOOD GLUCOSE      POCT Blood Glucose.: 166 mg/dL (13 Nov 2020 20:20)        CULTURES:  Rapid RVP Result: Detected (11-13-20 @ 21:37)      Physical Examination:    General: No acute distress.  Alert, oriented, interactive, nonfocal    HEENT: Pupils equal, reactive to light.  Symmetric.    PULM: Clear to auscultation bilaterally, no significant sputum production    CVS: Regular rate and rhythm, no murmurs, rubs, or gallops    ABD: Soft, nondistended, nontender, normoactive bowel sounds, no masses    EXT: right leg/foot externally rotated. Tender to palp.     SKIN: Warm and well perfused, no rashes noted.          
Patient is a 85y old  Female who presents with a chief complaint of Right Hip Fracture (14 Nov 2020 20:03)      BRIEF HOSPITAL COURSE:     85F w/ right hip Fx after a slip while holding banister and landing on her right knee. Went to OR today, POD #0 from ORIF and IM filiberto of right hip    Events last 24 hours:   -S/P ORIF and Im filiberto today    PAST MEDICAL & SURGICAL HISTORY:  Type 2 diabetes mellitus    Depression    Arthritis, rheumatoid    Anemia    Hypertension    S/P cataract surgery    fracture ankle right  plate . screws        Review of Systems:  CONSTITUTIONAL: No fever, chills, or fatigue  EYES: No eye pain, visual disturbances, or discharge  ENMT:  No difficulty hearing, tinnitus, vertigo; No sinus or throat pain  NECK: No pain or stiffness  RESPIRATORY: No cough, wheezing, chills or hemoptysis; No shortness of breath  CARDIOVASCULAR: No chest pain, palpitations, dizziness, or leg swelling  GASTROINTESTINAL: No abdominal or epigastric pain. No nausea, vomiting, or hematemesis; No diarrhea or constipation. No melena or hematochezia.  GENITOURINARY: No dysuria, frequency, hematuria, or incontinence  NEUROLOGICAL: No headaches, memory loss, loss of strength, numbness, or tremors  SKIN: No itching, burning, rashes, or lesions   MUSCULOSKELETAL: No joint pain or swelling; No muscle, back, or extremity pain  PSYCHIATRIC: No depression, anxiety, mood swings, or difficulty sleeping      Medications:  clindamycin IVPB 900 milliGRAM(s) IV Intermittent every 8 hours    amLODIPine   Tablet 5 milliGRAM(s) Oral daily  lisinopril 20 milliGRAM(s) Oral daily      morphine  - Injectable 4 milliGRAM(s) IV Push every 3 hours PRN  ondansetron Injectable 4 milliGRAM(s) IV Push every 6 hours PRN  PARoxetine 10 milliGRAM(s) Oral daily  traMADol 50 milliGRAM(s) Oral every 4 hours PRN        polyethylene glycol 3350 17 Gram(s) Oral daily PRN      dextrose 40% Gel 15 Gram(s) Oral once  dextrose 50% Injectable 25 Gram(s) IV Push once  dextrose 50% Injectable 12.5 Gram(s) IV Push once  dextrose 50% Injectable 25 Gram(s) IV Push once  glucagon  Injectable 1 milliGRAM(s) IntraMuscular once  insulin glargine Injectable (LANTUS) 25 Unit(s) SubCutaneous at bedtime  insulin lispro (ADMELOG) corrective regimen sliding scale   SubCutaneous three times a day before meals    dextrose 5%. 1000 milliLiter(s) IV Continuous <Continuous>  dextrose 5%. 1000 milliLiter(s) IV Continuous <Continuous>  folic acid 1 milliGRAM(s) Oral daily  lactated ringers. 1000 milliLiter(s) IV Continuous <Continuous>    influenza   Vaccine 0.5 milliLiter(s) IntraMuscular once      nicotine  14 mG/24 Hr(s) Transdermal Patch - Peds 1 Patch Transdermal daily          ICU Vital Signs Last 24 Hrs  T(C): 36.9 (14 Nov 2020 17:05), Max: 36.9 (14 Nov 2020 17:05)  T(F): 98.4 (14 Nov 2020 17:05), Max: 98.4 (14 Nov 2020 17:05)  HR: 88 (14 Nov 2020 17:05) (66 - 98)  BP: 151/52 (14 Nov 2020 17:05) (109/50 - 178/72)  BP(mean): 77 (14 Nov 2020 11:50) (77 - 96)  RR: 14 (14 Nov 2020 17:05) (10 - 16)  SpO2: 97% (14 Nov 2020 17:05) (94% - 98%)          I&O's Detail    13 Nov 2020 07:01  -  14 Nov 2020 07:00  --------------------------------------------------------  IN:  Total IN: 0 mL    OUT:    Voided (mL): 1200 mL  Total OUT: 1200 mL    Total NET: -1200 mL      14 Nov 2020 07:01  -  14 Nov 2020 20:27  --------------------------------------------------------  IN:    IV PiggyBack: 100 mL    IV PiggyBack: 50 mL    Lactated Ringers: 75 mL    Lactated Ringers: 300 mL    Oral Fluid: 600 mL  Total IN: 1125 mL    OUT:    Voided (mL): 50 mL  Total OUT: 50 mL    Total NET: 1075 mL            LABS:                        10.5   7.65  )-----------( 192      ( 14 Nov 2020 06:35 )             32.2     11-14    140  |  106  |  20  ----------------------------<  129<H>  4.8   |  26  |  1.02    Ca    9.1      14 Nov 2020 06:35    TPro  6.5  /  Alb  2.8<L>  /  TBili  0.3  /  DBili  x   /  AST  20  /  ALT  20  /  AlkPhos  73  11-14          CAPILLARY BLOOD GLUCOSE      POCT Blood Glucose.: 350 mg/dL (14 Nov 2020 17:22)    PT/INR - ( 14 Nov 2020 06:35 )   PT: 13.4 sec;   INR: 1.11 ratio         PTT - ( 14 Nov 2020 06:35 )  PTT:33.8 sec    CULTURES:  Rapid RVP Result: Detected (11-13-20 @ 21:37)      Physical Examination:    General: No acute distress.  Alert, oriented, interactive, nonfocal    HEENT: Pupils equal, reactive to light.  Symmetric.    PULM: Clear to auscultation bilaterally, no significant sputum production    CVS: Regular rate and rhythm, no murmurs, rubs, or gallops    ABD: Soft, nondistended, nontender, normoactive bowel sounds, no masses    EXT: distal pulses palpable    SKIN: Warm and well perfused, no rashes noted.

## 2020-11-17 NOTE — H&P ADULT - NSHPLABSRESULTS_GEN_ALL_CORE
< from: Xray Knee 3 Views, Right (11.13.20 @ 17:15) >  Musculoskeletal: There is an acute mildly displaced intertrochanteric right hip fracture. No dislocation. There is atherosclerosis.  < end of copied text > 11-17    140  |  106  |  24<H>  ----------------------------<  121<H>  4.0   |  26  |  0.88    Ca    8.9      17 Nov 2020 07:13                             8.5    8.97  )-----------( 186      ( 17 Nov 2020 07:13 )             25.5                CAPILLARY BLOOD GLUCOSE  POCT Blood Glucose.: 130 mg/dL (17 Nov 2020 12:29)  POCT Blood Glucose.: 122 mg/dL (17 Nov 2020 08:04)  POCT Blood Glucose.: 140 mg/dL (16 Nov 2020 22:11)  POCT Blood Glucose.: 168 mg/dL (16 Nov 2020 16:22)      < from: Xray Knee 3 Views, Right (11.13.20 @ 17:15) >  Musculoskeletal: There is an acute mildly displaced intertrochanteric right hip fracture. No dislocation. There is atherosclerosis.  < end of copied text >

## 2020-11-17 NOTE — CHART NOTE - NSCHARTNOTEFT_GEN_A_CORE
84yo female w. PMH HTN, DMII, RA, Anemia, Depression presents to ED w. c/o Right Hip pain.  She was found to have Right hip intertrochanteric fracture, now s/p ORIF 11/14    Pt states feels like she is improving, denies overnight events or current complaints including chest pain, shortness of breath, dizziness, nausea, vomiting, diarrhea, fever or chills. Accepted to Acute rehab.     Surgical site CDI.    Plan to send ASAP pending bed availability.

## 2020-11-17 NOTE — H&P ADULT - NSHPPHYSICALEXAM_GEN_ALL_CORE
Constitutional - NAD, Comfortable  HEENT - NCAT, EOMI  Neck - Supple, No limited ROM  Chest - good chest expansion, good respiratory effort, CTAB   Cardio - warm and well perfused, RRR, no murmur  Abdomen -  Soft, NTND  Extremities - No peripheral edema, No calf tenderness   Neurologic Exam:                    Cognitive -             Orientation: Awake, Alert, AAO to self, place, date, year, situation            Attention:  Days of week, recall 3 objects without cuing            Memory: Recent/ remote memory intact            Thought: process, content appropriate     Speech - Fluent, Comprehensible, No dysarthria, No aphasia      Cranial Nerves - No facial asymmetry, Tongue midline, EOMI, Shoulder shrug intact     Motor -                      LEFT    UE - ShAB 5/5, EF 5/5, EE 5/5, WE 5/5,  WNL                    RIGHT UE - ShAB 5/5, EF 5/5, EE 5/5, WE 5/5,  WNL                    LEFT    LE - HF 5/5, KE 5/5, DF 5/5, PF 5/5                    RIGHT LE - HF 5/5, KE 5/5, DF 5/5, PF 5/5        Sensory - Intact to LT bilateral     Reflexes - DTR _ / 4 , neg Saab's b/l, neg Babinski's b/l     Coordination - FTN / HTS intact     OculoVestibular -  No nystagmus  Psychiatric - Mood stable, Affect WNL  Skin on admission: Constitutional - NAD, Comfortable  HEENT - NCAT, EOMI  Neck - Supple, No limited ROM  Chest - good chest expansion, good respiratory effort, CTAB   Cardio - warm and well perfused, RRR, no murmur  Abdomen -  Soft, NTND  Extremities - No peripheral edema, No calf tenderness   Neurologic Exam:                    Cognitive -             Orientation: Awake, Alert, AAO to self, place, date, year, situation     Speech - Fluent, Comprehensible, No dysarthria, No aphasia      Cranial Nerves - No facial asymmetry, Tongue midline, EOMI, Shoulder shrug intact     Motor -                      LEFT    UE - ShAB 5/5, EF 5/5, EE 5/5, WE 5/5,  WNL                    RIGHT UE - ShAB 5/5, EF 5/5, EE 5/5, WE 5/5,  WNL                    LEFT    LE - HF 5/5, KE 5/5, DF 1/5, PF 3/5                    RIGHT LE - HF 5/5, KE 2/5, DF 1/5, PF 3/5        Sensory - Intact to LT bilateral  Psychiatric - Mood stable, Affect WNL  Skin on admission: right hip incision with aquacell dressing Constitutional - NAD, Comfortable  HEENT - NCAT, EOMI  Neck - Supple, No limited ROM  Chest - good chest expansion, good respiratory effort, CTAB   Cardio - warm and well perfused, RRR, no murmur  Abdomen -  Soft, NTND  Extremities - No peripheral edema, No calf tenderness   Neurologic Exam:                    Cognitive -             Orientation: Awake, Alert, AAO to self, place, date, year, situation     Speech - Fluent, Comprehensible, No dysarthria, No aphasia      Cranial Nerves - No facial asymmetry, Tongue midline, EOMI, Shoulder shrug intact     Motor -                      LEFT    UE - ShAB 5/5, EF 5/5, EE 5/5, WE 5/5,  WNL                    RIGHT UE - ShAB 5/5, EF 5/5, EE 5/5, WE 5/5,  WNL                    LEFT    LE - HF 4/5, KE 5/5, DF 1/5, PF 3/5                    RIGHT LE - HF 4/5, KE 2/5, DF 1/5, PF 3/5        Sensory - Intact to LT bilateral  Psychiatric - Mood stable, Affect WNL  Skin on admission: right hip incision with aquacell dressing. Scab over left lateral ankle, 1x1cm in size. Scab with surrounding erythema and TTP on left heel, 0.5x0.5cm

## 2020-11-18 LAB
ALBUMIN SERPL ELPH-MCNC: 2.6 G/DL — LOW (ref 3.3–5)
ALP SERPL-CCNC: 64 U/L — SIGNIFICANT CHANGE UP (ref 40–120)
ALT FLD-CCNC: 24 U/L — SIGNIFICANT CHANGE UP (ref 10–45)
ANION GAP SERPL CALC-SCNC: 10 MMOL/L — SIGNIFICANT CHANGE UP (ref 5–17)
AST SERPL-CCNC: 22 U/L — SIGNIFICANT CHANGE UP (ref 10–40)
BASOPHILS # BLD AUTO: 0.03 K/UL — SIGNIFICANT CHANGE UP (ref 0–0.2)
BASOPHILS NFR BLD AUTO: 0.4 % — SIGNIFICANT CHANGE UP (ref 0–2)
BILIRUB SERPL-MCNC: 0.4 MG/DL — SIGNIFICANT CHANGE UP (ref 0.2–1.2)
BUN SERPL-MCNC: 23 MG/DL — SIGNIFICANT CHANGE UP (ref 7–23)
CALCIUM SERPL-MCNC: 9 MG/DL — SIGNIFICANT CHANGE UP (ref 8.4–10.5)
CHLORIDE SERPL-SCNC: 109 MMOL/L — HIGH (ref 96–108)
CO2 SERPL-SCNC: 25 MMOL/L — SIGNIFICANT CHANGE UP (ref 22–31)
CREAT SERPL-MCNC: 0.95 MG/DL — SIGNIFICANT CHANGE UP (ref 0.5–1.3)
EOSINOPHIL # BLD AUTO: 0.46 K/UL — SIGNIFICANT CHANGE UP (ref 0–0.5)
EOSINOPHIL NFR BLD AUTO: 6.1 % — HIGH (ref 0–6)
GLUCOSE BLDC GLUCOMTR-MCNC: 116 MG/DL — HIGH (ref 70–99)
GLUCOSE BLDC GLUCOMTR-MCNC: 120 MG/DL — HIGH (ref 70–99)
GLUCOSE BLDC GLUCOMTR-MCNC: 135 MG/DL — HIGH (ref 70–99)
GLUCOSE BLDC GLUCOMTR-MCNC: 91 MG/DL — SIGNIFICANT CHANGE UP (ref 70–99)
GLUCOSE SERPL-MCNC: 68 MG/DL — LOW (ref 70–99)
HCT VFR BLD CALC: 26.8 % — LOW (ref 34.5–45)
HGB BLD-MCNC: 8.9 G/DL — LOW (ref 11.5–15.5)
IMM GRANULOCYTES NFR BLD AUTO: 0.7 % — SIGNIFICANT CHANGE UP (ref 0–1.5)
LYMPHOCYTES # BLD AUTO: 1.48 K/UL — SIGNIFICANT CHANGE UP (ref 1–3.3)
LYMPHOCYTES # BLD AUTO: 19.5 % — SIGNIFICANT CHANGE UP (ref 13–44)
MCHC RBC-ENTMCNC: 33.2 GM/DL — SIGNIFICANT CHANGE UP (ref 32–36)
MCHC RBC-ENTMCNC: 33.7 PG — SIGNIFICANT CHANGE UP (ref 27–34)
MCV RBC AUTO: 101.5 FL — HIGH (ref 80–100)
MONOCYTES # BLD AUTO: 1.15 K/UL — HIGH (ref 0–0.9)
MONOCYTES NFR BLD AUTO: 15.1 % — HIGH (ref 2–14)
NEUTROPHILS # BLD AUTO: 4.43 K/UL — SIGNIFICANT CHANGE UP (ref 1.8–7.4)
NEUTROPHILS NFR BLD AUTO: 58.2 % — SIGNIFICANT CHANGE UP (ref 43–77)
NRBC # BLD: 0 /100 WBCS — SIGNIFICANT CHANGE UP (ref 0–0)
PLATELET # BLD AUTO: 213 K/UL — SIGNIFICANT CHANGE UP (ref 150–400)
POTASSIUM SERPL-MCNC: 3.9 MMOL/L — SIGNIFICANT CHANGE UP (ref 3.5–5.3)
POTASSIUM SERPL-SCNC: 3.9 MMOL/L — SIGNIFICANT CHANGE UP (ref 3.5–5.3)
PROT SERPL-MCNC: 6.3 G/DL — SIGNIFICANT CHANGE UP (ref 6–8.3)
RBC # BLD: 2.64 M/UL — LOW (ref 3.8–5.2)
RBC # FLD: 15.5 % — HIGH (ref 10.3–14.5)
SARS-COV-2 RNA SPEC QL NAA+PROBE: SIGNIFICANT CHANGE UP
SODIUM SERPL-SCNC: 144 MMOL/L — SIGNIFICANT CHANGE UP (ref 135–145)
WBC # BLD: 7.6 K/UL — SIGNIFICANT CHANGE UP (ref 3.8–10.5)
WBC # FLD AUTO: 7.6 K/UL — SIGNIFICANT CHANGE UP (ref 3.8–10.5)

## 2020-11-18 PROCEDURE — 99232 SBSQ HOSP IP/OBS MODERATE 35: CPT

## 2020-11-18 RX ORDER — POLYETHYLENE GLYCOL 3350 17 G/17G
17 POWDER, FOR SOLUTION ORAL DAILY
Refills: 0 | Status: DISCONTINUED | OUTPATIENT
Start: 2020-11-18 | End: 2020-11-28

## 2020-11-18 RX ORDER — SODIUM CHLORIDE 0.65 %
1 AEROSOL, SPRAY (ML) NASAL
Refills: 0 | Status: DISCONTINUED | OUTPATIENT
Start: 2020-11-18 | End: 2020-11-28

## 2020-11-18 RX ORDER — SENNA PLUS 8.6 MG/1
2 TABLET ORAL AT BEDTIME
Refills: 0 | Status: DISCONTINUED | OUTPATIENT
Start: 2020-11-18 | End: 2020-11-28

## 2020-11-18 RX ADMIN — INSULIN GLARGINE 25 UNIT(S): 100 INJECTION, SOLUTION SUBCUTANEOUS at 22:14

## 2020-11-18 RX ADMIN — TRAMADOL HYDROCHLORIDE 50 MILLIGRAM(S): 50 TABLET ORAL at 14:52

## 2020-11-18 RX ADMIN — POLYETHYLENE GLYCOL 3350 17 GRAM(S): 17 POWDER, FOR SOLUTION ORAL at 12:19

## 2020-11-18 RX ADMIN — Medication 975 MILLIGRAM(S): at 22:46

## 2020-11-18 RX ADMIN — LISINOPRIL 20 MILLIGRAM(S): 2.5 TABLET ORAL at 05:41

## 2020-11-18 RX ADMIN — AMLODIPINE BESYLATE 5 MILLIGRAM(S): 2.5 TABLET ORAL at 05:41

## 2020-11-18 RX ADMIN — Medication 10 MILLIGRAM(S): at 12:17

## 2020-11-18 RX ADMIN — Medication 975 MILLIGRAM(S): at 22:14

## 2020-11-18 RX ADMIN — Medication 1 PATCH: at 12:17

## 2020-11-18 RX ADMIN — Medication 1 MILLIGRAM(S): at 12:17

## 2020-11-18 RX ADMIN — Medication 975 MILLIGRAM(S): at 05:41

## 2020-11-18 RX ADMIN — Medication 1 PATCH: at 20:00

## 2020-11-18 RX ADMIN — Medication 975 MILLIGRAM(S): at 13:06

## 2020-11-18 RX ADMIN — SENNA PLUS 2 TABLET(S): 8.6 TABLET ORAL at 22:14

## 2020-11-18 RX ADMIN — Medication 975 MILLIGRAM(S): at 06:22

## 2020-11-18 RX ADMIN — TRAMADOL HYDROCHLORIDE 50 MILLIGRAM(S): 50 TABLET ORAL at 15:30

## 2020-11-18 RX ADMIN — Medication 975 MILLIGRAM(S): at 13:36

## 2020-11-18 RX ADMIN — ENOXAPARIN SODIUM 40 MILLIGRAM(S): 100 INJECTION SUBCUTANEOUS at 12:18

## 2020-11-18 NOTE — CHART NOTE - NSCHARTNOTEFT_GEN_A_CORE
REHABILITATION DIAGNOSIS/IMPAIRMENT GROUP CODE:  Right hip fracture    COMORBIDITIES/COMPLICATING CONDITIONS IMPACTING REHABILITATION:  HEALTH ISSUES - PROBLEM Dx:  ORIF with IM nail  Bilateral foot drop right > Left       PAST MEDICAL & SURGICAL HISTORY:  Type 2 diabetes mellitus  Depression  Arthritis, rheumatoid  Anemia  Hypertension  S/P cataract surgery  fracture ankle right  plate . screws        Based upon consideration of the patient's impairments, functional status, complicating conditions and any other contributing factors and after information garnered from the assessments of all therapy disciplines involved in treating the patient and other pertinent clinicians:    INTERDISCIPLINARY REHABILITATION INTERVENTIONS:    [ x  ] Transfer Training  [ x  ] Bed Mobility  [x   ] Therapeutic Exercise  [  x ] Balance/Coordination Exercises  [ x  ] Locomotion retraining  [ x  ] Stairs  [  x ] Functional Transfer Training  [  x ] Bowel/Bladder program  [ x  ] Pain Management  [ x  ] Skin/Wound Care  [   ] Visual/Perceptual Training  [   ] Therapeutic Recreation Activities  [ x  ] Neuromuscular Re-education  [  x ] Activities of Daily Living  [   ] Speech Exercise  [   ] Swallowing Exercises  [   ] Vital Stim  [   ] Dietary Supplements  [   ] Calorie Count  [   ] Cognitive Exercises  [   ] Cognitive/Linguistic Treatment  [   ] Behavior Program  [   ] Neuropsych Therapy  [ x  ] Patient/Family Counseling  [   ] Family Training  [   ] Community Re-entry  [   ] Orthotic Evaluation  [   ] Prosthetic Eval/Training    MEDICAL PROGNOSIS:  3 hrs/day     REHAB POTENTIAL:  5 days/week     EXPECTED DAILY THERAPY:         PT: 2hrs/day       OT:1 hr/day        ST: NA       S&O:    EXPECTED INTENSITY OF PROGRAM:  3 hrs/day    EXPECTED FREQUENCY OF PROGRAM:  5 days/week    ESTIMATED LOS:  14-16days    ESTIMATED DISPOSITION:  home    INTERDISCIPLINARY FUNCTIONAL OUTCOMES/GOALS:         Gait/Mobility:modified independent        Transfers:modified independent        ADLs:modified independent        Functional Transfers:modified independent        Medication Management:modified independent        Communication:na       Cognitive:na       Dysphagia:na       Bladder:modified independent        Bowel:modified independent

## 2020-11-18 NOTE — DIETITIAN INITIAL EVALUATION ADULT. - DIET TYPE
supplement (specify)/consistent carbohydrate (evening snack)/Education Provided on Proper Nutrition, Consistent Carbohydrate DASH-TLC Diet & Need for Supplement/DASH/TLC (sodium and cholesterol restricted diet)

## 2020-11-18 NOTE — DIETITIAN INITIAL EVALUATION ADULT. - OTHER INFO
Initial Nutrition Assessment   85yr Old Female   Dx: Hip Fx  Diet - Consistent Carbohydrate, DASH-TLC Diet w/ Thin Liquids   (Recommend Initiate Phillip 1 Packet BID)  Denies Food Allergy/Intolerance  Tolerates Diet Well - No Chewing/Swallowing Complications But Occasionally Avoids Hard Food (Per Patient)  Consumed 70% of 1st Meal (as Per Documentation)  Surgical Incision on Right Hip & Unstageable Pressure Ulcer on Left Heel (as Per Nursing Flow Sheets)  No Edema Noted (as Per Nursing Flow Sheets)  No Recent Nausea/Vomiting & Some Recent Diarrhea/Constipation (as Per Patient)

## 2020-11-18 NOTE — DIETITIAN INITIAL EVALUATION ADULT. - PERTINENT LABORATORY DATA
11/17   Na-140  K-4.0  BUN-24H  Creat-0.88  Gluc- 121  Hemoglobin A1c - 6.2H(on 11/15)  POCT (over Last 2 Days) - Ranging from 106-168

## 2020-11-18 NOTE — DIETITIAN INITIAL EVALUATION ADULT. - NSPROEDALEARNPREF_GEN_A_NUR
verbal instruction/Education Provided on Need for Supplementation, Proper Nutrition & Consistent Carbohydrate DASH-TLC Diet

## 2020-11-18 NOTE — DIETITIAN INITIAL EVALUATION ADULT. - EDUCATION DIETARY MODIFICATIONS
(2) meets goals/outcomes/Education Provided on Need for Supplementation, Proper Nutrition & Consistent Carbohydrate DASH-TLC Diet/verbalization

## 2020-11-18 NOTE — PROGRESS NOTE ADULT - SUBJECTIVE AND OBJECTIVE BOX
JULIEN CASTELLANO is a 84yo F with PMHx of HTN, T2DM, RA, anemia, and depression, who presented to [facility] on 11/13/2020 with right-sided hip pain s/p fall from bannister and landing on right knee. Patient was seen and examined in the ED; found to have right-sided intertrochanteric fracture on xray. Orthopaedic surgery was consulted and patient underwent ORIF with IM filiberto on 11/14. Pain well controlled during admission, but course was c/b podiatry consult for left heel ulceration, which was present previously. Podiatry recommended local daily wound care with Mupirocin and DSD; outpatient follow up with her own podiatrist following discharge. Xray left foot and ankle performed to r/o osteomyelitis.    Patient was evaluated by PM&R and therapy for functional deficits and gait/ ADL impairments and recommended acute rehabilitation. Patient was medically optimized for discharge to [facility] on 11/17/2020.  Patient was seen and examined on admission. Patient awake and alert; denied fever, chills, nausea, vomiting, or abdominal pain. Endorsed decreased appetite over past year with ~30lbs weight loss. Also endorsed TTP over left heel interfering with sleep. Feels nervous about starting therapy, but otherwise, no other complaints.  (17 Nov 2020 11:33)      TODAY'S SUBJECTIVE & REVIEW OF SYMPTOMS:  States that she slept well and that her heel did not bother her  No BM since 4 days  Seen during therapy     [X] Constitutional WNL        [X] Cardio WNL              [X] Resp WNL  [X] GI WNL                            [X]  WNL                    [X] Heme WNL  [    PHYSICAL EXAM    Vital Signs Last 24 Hrs  T(C): 36.5 (18 Nov 2020 08:30), Max: 36.9 (17 Nov 2020 15:05)  T(F): 97.7 (18 Nov 2020 08:30), Max: 98.5 (17 Nov 2020 15:05)  HR: 90 (18 Nov 2020 08:30) (71 - 96)  BP: 160/65 (18 Nov 2020 08:30) (131/60 - 160/65)  BP(mean): --  RR: 16 (18 Nov 2020 08:30) (16 - 16)  SpO2: 98% (18 Nov 2020 08:30) (95% - 98%)    Constitutional - NAD, Comfortable  Chest - CTAB  Cardiovascular -S1S2  Abdomen - BS+, Soft  Extremities - No C/C/E, No calf tenderness   Motor UE 4/5, LE Left 4/5 except ADF 2/5, Right HF and KE limited ADF trace, PF 3/5     Psychiatric - Mood stable, Affect WNL  Skin: right hip with dressing +, swelling +, left heel with unstageable ulcer            MEDICATIONS  (STANDING):  acetaminophen   Tablet .. 975 milliGRAM(s) Oral every 8 hours  amLODIPine   Tablet 5 milliGRAM(s) Oral daily  dextrose 40% Gel 15 Gram(s) Oral once  dextrose 5%. 1000 milliLiter(s) (50 mL/Hr) IV Continuous <Continuous>  dextrose 5%. 1000 milliLiter(s) (100 mL/Hr) IV Continuous <Continuous>  dextrose 50% Injectable 25 Gram(s) IV Push once  dextrose 50% Injectable 12.5 Gram(s) IV Push once  dextrose 50% Injectable 25 Gram(s) IV Push once  enoxaparin Injectable 40 milliGRAM(s) SubCutaneous daily  folic acid 1 milliGRAM(s) Oral daily  glucagon  Injectable 1 milliGRAM(s) IntraMuscular once  insulin glargine Injectable (LANTUS) 25 Unit(s) SubCutaneous at bedtime  insulin lispro (ADMELOG) corrective regimen sliding scale   SubCutaneous Before meals and at bedtime  lisinopril 20 milliGRAM(s) Oral daily  mupirocin 2% Ointment 1 Application(s) Topical once  nicotine -  14 mG/24Hr(s) Patch 1 Patch Transdermal daily  PARoxetine 10 milliGRAM(s) Oral daily    MEDICATIONS  (PRN):  polyethylene glycol 3350 17 Gram(s) Oral two times a day PRN Constipation  senna 2 Tablet(s) Oral at bedtime PRN Constipation  traMADol 50 milliGRAM(s) Oral every 4 hours PRN Moderate Pain (4 - 6)          RECENT LABS/IMAGING                        8.9    7.60  )-----------( 213      ( 18 Nov 2020 05:30 )             26.8     11-18    144  |  109<H>  |  23  ----------------------------<  68<L>  3.9   |  25  |  0.95    Ca    9.0      18 Nov 2020 05:30    TPro  6.3  /  Alb  2.6<L>  /  TBili  0.4  /  DBili  x   /  AST  22  /  ALT  24  /  AlkPhos  64  11-18    CAPILLARY BLOOD GLUCOSE      POCT Blood Glucose.: 91 mg/dL (18 Nov 2020 08:01)  POCT Blood Glucose.: 123 mg/dL (17 Nov 2020 21:30)  POCT Blood Glucose.: 167 mg/dL (17 Nov 2020 16:44)  POCT Blood Glucose.: 130 mg/dL (17 Nov 2020 12:29)            ASSESSMENT/PLAN      84yo F with PMHx of HTN, T2DM, RA, anemia, and depression, who presented to [facility] on 11/13/2020 with right-sided hip pain s/p fall, found to have intertrochanteric fracture of right hip, now s/p ORIF and IM nailing. Admitted for multidisciplinary rehab program.    - PT/OT: 3 hours a day 5 days a week    #Right intertrochanteric hip fracture s/p ORIF and IM nailing  - pain regimen, as below  - Lovenox for AC  - WBAT  - PT/OT: total of 3 hours a day 5 days a week    #T2DM  - c/w Lantus 25mg qhs  - c/w FS and ISS  - hgb A1C on admission    #HTN:- c/w amlodipine 5mg and lisinopril 20mg daily    #Depression:- c/w Paroxetine 10mg daily    #Tobacco use:- c/w Nicotine patch    #Sleep: Melatonin PRN    #Pain:- Standing Tylenol 975mg q8h and Tramadol 50mg q4h PRN for moderate pain    #GI/Bowel: Patient with constipation change  Senna 2 tabs daily and Miralax once daily   - GI ppx: none    #/Bladder:- Toileting schedule prn    #Diet:- Diet: regular, consistent carb  - Nutrition to follow    #Skin/ Pressure Injury Prevention:  - - Incisions: right hip surgical incision  Left heel area of scab( patient reports prior ? cellulitis and was on abx), Off load heel  - Turn Q2hrs in bed while awake, OOB to Chair, PT/OT    DVT prophylaxis:  - Lovenox 40mg daily  - SCDs    #Precautions/ Restrictions  - Falls  - Ortho:      Weight bearing status: WBAT  - COVID PCR: neg on 11/13         JULIEN CASTELLANO is a 84yo F with PMHx of HTN, T2DM, RA, anemia, and depression, who presented to [facility] on 11/13/2020 with right-sided hip pain s/p fall from bannister and landing on right knee. Patient was seen and examined in the ED; found to have right-sided intertrochanteric fracture on xray. Orthopaedic surgery was consulted and patient underwent ORIF with IM filiberto on 11/14. Pain well controlled during admission, but course was c/b podiatry consult for left heel ulceration, which was present previously. Podiatry recommended local daily wound care with Mupirocin and DSD; outpatient follow up with her own podiatrist following discharge. Xray left foot and ankle performed to r/o osteomyelitis.    Patient was evaluated by PM&R and therapy for functional deficits and gait/ ADL impairments and recommended acute rehabilitation. Patient was medically optimized for discharge to [facility] on 11/17/2020.  Patient was seen and examined on admission. Patient awake and alert; denied fever, chills, nausea, vomiting, or abdominal pain. Endorsed decreased appetite over past year with ~30lbs weight loss. Also endorsed TTP over left heel interfering with sleep. Feels nervous about starting therapy, but otherwise, no other complaints.  (17 Nov 2020 11:33)      TODAY'S SUBJECTIVE & REVIEW OF SYMPTOMS:  States that she slept well and that her heel did not bother her  No BM since 4 days  Seen during therapy     [X] Constitutional WNL        [X] Cardio WNL              [X] Resp WNL  [X] GI WNL                            [X]  WNL                    [X] Heme WNL  [    PHYSICAL EXAM    Vital Signs Last 24 Hrs  T(C): 36.5 (18 Nov 2020 08:30), Max: 36.9 (17 Nov 2020 15:05)  T(F): 97.7 (18 Nov 2020 08:30), Max: 98.5 (17 Nov 2020 15:05)  HR: 90 (18 Nov 2020 08:30) (71 - 96)  BP: 160/65 (18 Nov 2020 08:30) (131/60 - 160/65)  BP(mean): --  RR: 16 (18 Nov 2020 08:30) (16 - 16)  SpO2: 98% (18 Nov 2020 08:30) (95% - 98%)    Constitutional - NAD, Comfortable  Chest - CTAB  Cardiovascular -S1S2  Abdomen - BS+, Soft  Extremities - No C/C/E, No calf tenderness   Motor UE 4/5, LE Left 4/5 except ADF 2/5, Right HF and KE limited ADF trace, PF 3/5     Psychiatric - Mood stable, Affect WNL  Skin: right hip with dressing +, swelling +, left heel with unstageable ulcer            MEDICATIONS  (STANDING):  acetaminophen   Tablet .. 975 milliGRAM(s) Oral every 8 hours  amLODIPine   Tablet 5 milliGRAM(s) Oral daily  dextrose 40% Gel 15 Gram(s) Oral once  dextrose 5%. 1000 milliLiter(s) (50 mL/Hr) IV Continuous <Continuous>  dextrose 5%. 1000 milliLiter(s) (100 mL/Hr) IV Continuous <Continuous>  dextrose 50% Injectable 25 Gram(s) IV Push once  dextrose 50% Injectable 12.5 Gram(s) IV Push once  dextrose 50% Injectable 25 Gram(s) IV Push once  enoxaparin Injectable 40 milliGRAM(s) SubCutaneous daily  folic acid 1 milliGRAM(s) Oral daily  glucagon  Injectable 1 milliGRAM(s) IntraMuscular once  insulin glargine Injectable (LANTUS) 25 Unit(s) SubCutaneous at bedtime  insulin lispro (ADMELOG) corrective regimen sliding scale   SubCutaneous Before meals and at bedtime  lisinopril 20 milliGRAM(s) Oral daily  mupirocin 2% Ointment 1 Application(s) Topical once  nicotine -  14 mG/24Hr(s) Patch 1 Patch Transdermal daily  PARoxetine 10 milliGRAM(s) Oral daily    MEDICATIONS  (PRN):  polyethylene glycol 3350 17 Gram(s) Oral two times a day PRN Constipation  senna 2 Tablet(s) Oral at bedtime PRN Constipation  traMADol 50 milliGRAM(s) Oral every 4 hours PRN Moderate Pain (4 - 6)          RECENT LABS/IMAGING                        8.9    7.60  )-----------( 213      ( 18 Nov 2020 05:30 )             26.8     11-18    144  |  109<H>  |  23  ----------------------------<  68<L>  3.9   |  25  |  0.95    Ca    9.0      18 Nov 2020 05:30    TPro  6.3  /  Alb  2.6<L>  /  TBili  0.4  /  DBili  x   /  AST  22  /  ALT  24  /  AlkPhos  64  11-18    CAPILLARY BLOOD GLUCOSE      POCT Blood Glucose.: 91 mg/dL (18 Nov 2020 08:01)  POCT Blood Glucose.: 123 mg/dL (17 Nov 2020 21:30)  POCT Blood Glucose.: 167 mg/dL (17 Nov 2020 16:44)  POCT Blood Glucose.: 130 mg/dL (17 Nov 2020 12:29)

## 2020-11-18 NOTE — PROGRESS NOTE ADULT - SUBJECTIVE AND OBJECTIVE BOX
JULIEN CASTELLANO  85y  Female      patient states she has slight right hip pain      REVIEW OF SYSTEMS:    Cardiac HTN  Pulmonary no cough/SOB  GI no n/v//d/pain   no dysuria/hematuria  Neuro no syncope/headache/numbness  Musculoskeletal right hip fracture/DJD/DDD  Endo DM      FAMILY HISTORY:    T(C): 36.5 (11-18-20 @ 08:30), Max: 36.9 (11-17-20 @ 15:05)  HR: 90 (11-18-20 @ 08:30) (71 - 96)  BP: 160/65 (11-18-20 @ 08:30) (131/60 - 160/65)  RR: 16 (11-18-20 @ 08:30) (16 - 16)  SpO2: 98% (11-18-20 @ 08:30) (95% - 98%)  Wt(kg): --Vital Signs Last 24 Hrs  T(C): 36.5 (18 Nov 2020 08:30), Max: 36.9 (17 Nov 2020 15:05)  T(F): 97.7 (18 Nov 2020 08:30), Max: 98.5 (17 Nov 2020 15:05)  HR: 90 (18 Nov 2020 08:30) (71 - 96)  BP: 160/65 (18 Nov 2020 08:30) (131/60 - 160/65)  BP(mean): --  RR: 16 (18 Nov 2020 08:30) (16 - 16)  SpO2: 98% (18 Nov 2020 08:30) (95% - 98%)  penicillins (Urticaria; Pruritus)      PHYSICAL EXAM:    General right hip pain  Neck no JVD/adenopathy/thyromegaly  Heart RRR  Lungs  clear  Abdomen non tender non distended normal bowel sounds no HSM/CVAT  Extremities s/p right hip fracture s/p surgery no edema +pulses  Neurologic alert and oriented x 3 no acute focal changes  Skin stage one wound left heel unchanged      Consultant(s) Notes Reviewed:  [x ] YES  [ ] NO  Care Discussed with Consultants/Other Providers [ x] YES  [ ] NO    LABS:  CBC Full  -  ( 18 Nov 2020 05:30 )  WBC Count : 7.60 K/uL  RBC Count : 2.64 M/uL  Hemoglobin : 8.9 g/dL  Hematocrit : 26.8 %  Platelet Count - Automated : 213 K/uL  Mean Cell Volume : 101.5 fl  Mean Cell Hemoglobin : 33.7 pg  Mean Cell Hemoglobin Concentration : 33.2 gm/dL  Auto Neutrophil # : 4.43 K/uL  Auto Lymphocyte # : 1.48 K/uL  Auto Monocyte # : 1.15 K/uL  Auto Eosinophil # : 0.46 K/uL  Auto Basophil # : 0.03 K/uL  Auto Neutrophil % : 58.2 %  Auto Lymphocyte % : 19.5 %  Auto Monocyte % : 15.1 %  Auto Eosinophil % : 6.1 %  Auto Basophil % : 0.4 %                          8.9    7.60  )-----------( 213      ( 18 Nov 2020 05:30 )             26.8     11-18    144  |  109<H>  |  23  ----------------------------<  68<L>  3.9   |  25  |  0.95    Ca    9.0      18 Nov 2020 05:30    TPro  6.3  /  Alb  2.6<L>  /  TBili  0.4  /  DBili  x   /  AST  22  /  ALT  24  /  AlkPhos  64  11-18    LIVER FUNCTIONS - ( 18 Nov 2020 05:30 )  Alb: 2.6 g/dL / Pro: 6.3 g/dL / ALK PHOS: 64 U/L / ALT: 24 U/L / AST: 22 U/L / GGT: x               CAPILLARY BLOOD GLUCOSE      POCT Blood Glucose.: 91 mg/dL (18 Nov 2020 08:01)        acetaminophen   Tablet .. 975 milliGRAM(s) Oral every 8 hours  amLODIPine   Tablet 5 milliGRAM(s) Oral daily  dextrose 40% Gel 15 Gram(s) Oral once  dextrose 5%. 1000 milliLiter(s) IV Continuous <Continuous>  dextrose 5%. 1000 milliLiter(s) IV Continuous <Continuous>  dextrose 50% Injectable 25 Gram(s) IV Push once  dextrose 50% Injectable 12.5 Gram(s) IV Push once  dextrose 50% Injectable 25 Gram(s) IV Push once  enoxaparin Injectable 40 milliGRAM(s) SubCutaneous daily  folic acid 1 milliGRAM(s) Oral daily  glucagon  Injectable 1 milliGRAM(s) IntraMuscular once  insulin glargine Injectable (LANTUS) 25 Unit(s) SubCutaneous at bedtime  insulin lispro (ADMELOG) corrective regimen sliding scale   SubCutaneous Before meals and at bedtime  lisinopril 20 milliGRAM(s) Oral daily  mupirocin 2% Ointment 1 Application(s) Topical once  nicotine -  14 mG/24Hr(s) Patch 1 Patch Transdermal daily  PARoxetine 10 milliGRAM(s) Oral daily  polyethylene glycol 3350 17 Gram(s) Oral two times a day PRN  senna 2 Tablet(s) Oral at bedtime PRN  traMADol 50 milliGRAM(s) Oral every 4 hours PRN

## 2020-11-18 NOTE — PROGRESS NOTE ADULT - ASSESSMENT
Right hip fracture - continue  meds for pain management. continue rehab as per PMR  Type 2 DM - continue blood glucose monitoring and insulin regimen. patient is clinically stable  HTN - bp is stable. will continue norvasc and lisinopril  Anemia - H/H is stable/ patient is hemodynamically stable  Stage 1 left heel wound - continue local wound care. recommend podiatry follow up

## 2020-11-18 NOTE — PROGRESS NOTE ADULT - REASON FOR ADMISSION
Impairments: Right-sided intertrochanteric fracture s/p ORIF with IM filiberto placement  Impairment Codes: 08.11 Unilateral Hip Fracture Right-sided hip intertrochanteric fracture

## 2020-11-18 NOTE — DIETITIAN INITIAL EVALUATION ADULT. - CALCULATED FROM (CAL/KG)
Add Intralesional Injection: No Consent was obtained from the patient. The risks, benefits and alternatives to therapy were discussed in detail. Specifically, the risks of infection, scarring, bleeding, prolonged wound healing, nerve injury, incomplete removal, allergy to anesthesia and recurrence were addressed. Alternatives to ED&C, such as: surgical removal and XRT were also discussed.  Prior to the procedure, the treatment site was clearly identified and confirmed by the patient. All components of Universal Protocol/PAUSE Rule completed. Additional Information: (Optional): The wound was cleaned, and a pressure dressing was applied.  The patient received detailed post-op instructions. Bill As A Line Item Or As Units: Line Item Post-Care Instructions: I reviewed with the patient in detail post-care instructions. Patient is to keep the area dry for 48 hours, and not to engage in any swimming until the area is healed. Should the patient develop any fevers, chills, bleeding, severe pain patient will contact the office immediately. Anesthesia Volume In Cc: 1 Biopsy Photograph Reviewed: Yes Size Of Lesion After Curettage: 1.1 Number Of Curettages: 3 Detail Level: Detailed Concentration (Mg/Ml Or Millions Of Plaque Forming Units/Cc): 0.01 Size Of Lesion In Cm: 0.9 Cautery Type: electrodesiccation Anesthesia Type: 1% lidocaine with epinephrine What Was Performed First?: Curettage 3839

## 2020-11-18 NOTE — DIETITIAN INITIAL EVALUATION ADULT. - ADD RECOMMEND
1) Monitor Weights, Intake, Tolerance, Skin, POCT & Labwork   2) Phillip 1 Packet BID 3) Education Provided on Need for Supplementation, Proper Nutrition & Consistent Carbohydrate DASH-TLC Diet 4) Continue Nutrition Plan of Care

## 2020-11-18 NOTE — DIETITIAN INITIAL EVALUATION ADULT. - ORAL INTAKE PTA/DIET HISTORY
On Consistent Carbohydrate DASH-TLC Diet w/ Thin Liquids   Recommend Initiate Phillip 1 Packet BID (Provides 180kcal & 28grams of Protein)   Patient Does Follow Low Sodium Diet @Home, Consumes 1-2Meals a Day  & Does Take Vitamin/Supplements @Home (Folic Acid)

## 2020-11-19 LAB
ANION GAP SERPL CALC-SCNC: 7 MMOL/L — SIGNIFICANT CHANGE UP (ref 5–17)
BUN SERPL-MCNC: 34 MG/DL — HIGH (ref 7–23)
CALCIUM SERPL-MCNC: 9 MG/DL — SIGNIFICANT CHANGE UP (ref 8.4–10.5)
CHLORIDE SERPL-SCNC: 108 MMOL/L — SIGNIFICANT CHANGE UP (ref 96–108)
CO2 SERPL-SCNC: 26 MMOL/L — SIGNIFICANT CHANGE UP (ref 22–31)
CREAT SERPL-MCNC: 0.99 MG/DL — SIGNIFICANT CHANGE UP (ref 0.5–1.3)
GLUCOSE BLDC GLUCOMTR-MCNC: 109 MG/DL — HIGH (ref 70–99)
GLUCOSE BLDC GLUCOMTR-MCNC: 124 MG/DL — HIGH (ref 70–99)
GLUCOSE BLDC GLUCOMTR-MCNC: 130 MG/DL — HIGH (ref 70–99)
GLUCOSE BLDC GLUCOMTR-MCNC: 131 MG/DL — HIGH (ref 70–99)
GLUCOSE SERPL-MCNC: 100 MG/DL — HIGH (ref 70–99)
HCT VFR BLD CALC: 27.9 % — LOW (ref 34.5–45)
HGB BLD-MCNC: 8.9 G/DL — LOW (ref 11.5–15.5)
MCHC RBC-ENTMCNC: 31.9 GM/DL — LOW (ref 32–36)
MCHC RBC-ENTMCNC: 32.5 PG — SIGNIFICANT CHANGE UP (ref 27–34)
MCV RBC AUTO: 101.8 FL — HIGH (ref 80–100)
NRBC # BLD: 0 /100 WBCS — SIGNIFICANT CHANGE UP (ref 0–0)
PLATELET # BLD AUTO: 236 K/UL — SIGNIFICANT CHANGE UP (ref 150–400)
POTASSIUM SERPL-MCNC: 4.1 MMOL/L — SIGNIFICANT CHANGE UP (ref 3.5–5.3)
POTASSIUM SERPL-SCNC: 4.1 MMOL/L — SIGNIFICANT CHANGE UP (ref 3.5–5.3)
RBC # BLD: 2.74 M/UL — LOW (ref 3.8–5.2)
RBC # FLD: 15.4 % — HIGH (ref 10.3–14.5)
SODIUM SERPL-SCNC: 141 MMOL/L — SIGNIFICANT CHANGE UP (ref 135–145)
WBC # BLD: 8.24 K/UL — SIGNIFICANT CHANGE UP (ref 3.8–10.5)
WBC # FLD AUTO: 8.24 K/UL — SIGNIFICANT CHANGE UP (ref 3.8–10.5)

## 2020-11-19 PROCEDURE — 99232 SBSQ HOSP IP/OBS MODERATE 35: CPT | Mod: GC

## 2020-11-19 RX ADMIN — Medication 975 MILLIGRAM(S): at 15:30

## 2020-11-19 RX ADMIN — Medication 1 MILLIGRAM(S): at 12:55

## 2020-11-19 RX ADMIN — Medication 1 PATCH: at 12:53

## 2020-11-19 RX ADMIN — TRAMADOL HYDROCHLORIDE 50 MILLIGRAM(S): 50 TABLET ORAL at 19:57

## 2020-11-19 RX ADMIN — Medication 975 MILLIGRAM(S): at 21:45

## 2020-11-19 RX ADMIN — TRAMADOL HYDROCHLORIDE 50 MILLIGRAM(S): 50 TABLET ORAL at 20:30

## 2020-11-19 RX ADMIN — Medication 1 PATCH: at 07:48

## 2020-11-19 RX ADMIN — LISINOPRIL 20 MILLIGRAM(S): 2.5 TABLET ORAL at 06:11

## 2020-11-19 RX ADMIN — Medication 1 PATCH: at 12:58

## 2020-11-19 RX ADMIN — Medication 975 MILLIGRAM(S): at 06:11

## 2020-11-19 RX ADMIN — Medication 975 MILLIGRAM(S): at 14:58

## 2020-11-19 RX ADMIN — Medication 975 MILLIGRAM(S): at 06:43

## 2020-11-19 RX ADMIN — AMLODIPINE BESYLATE 5 MILLIGRAM(S): 2.5 TABLET ORAL at 06:11

## 2020-11-19 RX ADMIN — TRAMADOL HYDROCHLORIDE 50 MILLIGRAM(S): 50 TABLET ORAL at 13:30

## 2020-11-19 RX ADMIN — ENOXAPARIN SODIUM 40 MILLIGRAM(S): 100 INJECTION SUBCUTANEOUS at 12:57

## 2020-11-19 RX ADMIN — TRAMADOL HYDROCHLORIDE 50 MILLIGRAM(S): 50 TABLET ORAL at 12:51

## 2020-11-19 RX ADMIN — Medication 975 MILLIGRAM(S): at 22:50

## 2020-11-19 RX ADMIN — SENNA PLUS 2 TABLET(S): 8.6 TABLET ORAL at 21:44

## 2020-11-19 RX ADMIN — Medication 10 MILLIGRAM(S): at 12:53

## 2020-11-19 RX ADMIN — INSULIN GLARGINE 25 UNIT(S): 100 INJECTION, SOLUTION SUBCUTANEOUS at 21:46

## 2020-11-19 RX ADMIN — Medication 1 PATCH: at 21:49

## 2020-11-19 NOTE — PROGRESS NOTE ADULT - SUBJECTIVE AND OBJECTIVE BOX
JULIEN CASTELLANO  85y  Female    patient complains of right hip pain. she is without cardiac pulmonary GI  neuro complaints    REVIEW OF SYSTEMS:    Cardiac HTN  Pulmonary no cough/SOB/wheeze  GI no n/v/constipation   no dysuria/hematuria  Neuro no syncope/headache/numbness  Heme anemia  Endo DM    FAMILY HISTORY:    T(C): 36.3 (11-19-20 @ 08:21), Max: 37.1 (11-18-20 @ 20:33)  HR: 92 (11-19-20 @ 08:21) (89 - 92)  BP: 148/68 (11-19-20 @ 08:21) (148/68 - 154/64)  RR: 16 (11-19-20 @ 08:21) (16 - 16)  SpO2: 93% (11-19-20 @ 08:21) (93% - 95%)  Wt(kg): --Vital Signs Last 24 Hrs  T(C): 36.3 (19 Nov 2020 08:21), Max: 37.1 (18 Nov 2020 20:33)  T(F): 97.3 (19 Nov 2020 08:21), Max: 98.8 (18 Nov 2020 20:33)  HR: 92 (19 Nov 2020 08:21) (89 - 92)  BP: 148/68 (19 Nov 2020 08:21) (148/68 - 154/64)  BP(mean): --  RR: 16 (19 Nov 2020 08:21) (16 - 16)  SpO2: 93% (19 Nov 2020 08:21) (93% - 95%)  penicillins (Urticaria; Pruritus)      PHYSICAL EXAM:    General right hip pain  Neck no JVD/adenopathy  Heart RRR  Lungs clear  Abdomen non tender non distended normal bowel sounds no HSM/CVAT  Extremities s/p right hip surgery no edema +pulses   Neurologic no acute focal changes   Skin normal turgor left heel wound stable      Consultant(s) Notes Reviewed:  [x ] YES  [ ] NO  Care Discussed with Consultants/Other Providers [ x] YES  [ ] NO    LABS:  CBC Full  -  ( 19 Nov 2020 06:15 )  WBC Count : 8.24 K/uL  RBC Count : 2.74 M/uL  Hemoglobin : 8.9 g/dL  Hematocrit : 27.9 %  Platelet Count - Automated : 236 K/uL  Mean Cell Volume : 101.8 fl  Mean Cell Hemoglobin : 32.5 pg  Mean Cell Hemoglobin Concentration : 31.9 gm/dL  Auto Neutrophil # : x  Auto Lymphocyte # : x  Auto Monocyte # : x  Auto Eosinophil # : x  Auto Basophil # : x  Auto Neutrophil % : x  Auto Lymphocyte % : x  Auto Monocyte % : x  Auto Eosinophil % : x  Auto Basophil % : x                          8.9    8.24  )-----------( 236      ( 19 Nov 2020 06:15 )             27.9     11-19    141  |  108  |  34<H>  ----------------------------<  100<H>  4.1   |  26  |  0.99    Ca    9.0      19 Nov 2020 06:15    TPro  6.3  /  Alb  2.6<L>  /  TBili  0.4  /  DBili  x   /  AST  22  /  ALT  24  /  AlkPhos  64  11-18    LIVER FUNCTIONS - ( 18 Nov 2020 05:30 )  Alb: 2.6 g/dL / Pro: 6.3 g/dL / ALK PHOS: 64 U/L / ALT: 24 U/L / AST: 22 U/L / GGT: x               CAPILLARY BLOOD GLUCOSE      POCT Blood Glucose.: 131 mg/dL (19 Nov 2020 11:24)          acetaminophen   Tablet .. 975 milliGRAM(s) Oral every 8 hours  amLODIPine   Tablet 5 milliGRAM(s) Oral daily  dextrose 40% Gel 15 Gram(s) Oral once  dextrose 5%. 1000 milliLiter(s) IV Continuous <Continuous>  dextrose 5%. 1000 milliLiter(s) IV Continuous <Continuous>  dextrose 50% Injectable 25 Gram(s) IV Push once  dextrose 50% Injectable 12.5 Gram(s) IV Push once  dextrose 50% Injectable 25 Gram(s) IV Push once  enoxaparin Injectable 40 milliGRAM(s) SubCutaneous daily  folic acid 1 milliGRAM(s) Oral daily  glucagon  Injectable 1 milliGRAM(s) IntraMuscular once  insulin glargine Injectable (LANTUS) 25 Unit(s) SubCutaneous at bedtime  insulin lispro (ADMELOG) corrective regimen sliding scale   SubCutaneous Before meals and at bedtime  lisinopril 20 milliGRAM(s) Oral daily  mupirocin 2% Ointment 1 Application(s) Topical once  nicotine -  14 mG/24Hr(s) Patch 1 Patch Transdermal daily  PARoxetine 10 milliGRAM(s) Oral daily  polyethylene glycol 3350 17 Gram(s) Oral daily  senna 2 Tablet(s) Oral at bedtime  sodium chloride 0.65% Nasal 1 Spray(s) Both Nostrils four times a day PRN  traMADol 50 milliGRAM(s) Oral every 4 hours PRN

## 2020-11-19 NOTE — PROGRESS NOTE ADULT - ASSESSMENT
JULIEN CASTELLANO is a 86yo F with PMHx of HTN, T2DM, RA, anemia, and depression, who presented to [facility] on 11/13/2020 with right-sided hip pain s/p fall, found to have intertrochanteric fracture of right hip, now s/p ORIF and IM nailing. Admitted for multidisciplinary rehab program.      #Comprehensive Multidisciplinary Rehab Program:  - Gait, ADL, Functional impairments   - PT/OT: 3 hours a day 5 days a week    #Right intertrochanteric hip fracture s/p ORIF and IM nailing  - pain regimen, as below  - Lovenox for AC  - WBAT    #T2DM  - c/w Lantus 25mg qhs  - c/w FS and ISS  - hgb A1C on admission    #HTN  - c/w amlodipine 5mg and lisinopril 20mg daily    #Depression  - c/w Paroxetine 10mg daily    #Tobacco use  - c/w Nicotine patch    #Sleep: Melatonin PRN    #Pain:  - Standing Tylenol 975mg q8h and Tramadol 50mg q4h PRN for moderate pain    #GI/Bowel:  - Senna 2 tabs daily and Miralax BID PRN  - GI ppx: none    #/Bladder:  - Monitor PVR if no void in 8h; SC for >400 cc  - Toileting schedule q4h    #Diet:- Diet: regular, consistent carb  - Nutrition to follow    #Skin/ Pressure Injury Prevention:  - assessment on admission   - Incisions: right hip surgical incision  Left heel area of scab( patient reports prior ? cellulitis and was on abx), Off load heel  - Turn Q2hrs in bed while awake, OOB to Chair, PT/OT    DVT prophylaxis:  - Lovenox 40mg daily  - SCDs    #Precautions/ Restrictions  - Falls  - Ortho:      Weight bearing status: WBAT     ROM restrictions: None  - COVID PCR: neg on 11/13 JULIEN CASTELLANO is a 86yo F with PMHx of HTN, T2DM, RA, anemia, and depression, who presented to [facility] on 11/13/2020 with right-sided hip pain s/p fall, found to have intertrochanteric fracture of right hip, now s/p ORIF and IM nailing. Admitted for multidisciplinary rehab program.      #Comprehensive Multidisciplinary Rehab Program:  - Gait, ADL, Functional impairments   - PT/OT: 3 hours a day 5 days a week    #Right intertrochanteric hip fracture s/p ORIF and IM nailing  - pain regimen, as below  - Lovenox for AC  - WBAT    #T2DM  - c/w Lantus 25mg qhs  - c/w FS and ISS  - hgb A1C on admission    #HTN  - c/w amlodipine 5mg and lisinopril 20mg daily    #Depression  - c/w Paroxetine 10mg daily    #Tobacco use  - c/w Nicotine patch    #Sleep: Melatonin PRN    #Pain:  - Standing Tylenol 975mg q8h and Tramadol 50mg q4h PRN for moderate pain    #GI/Bowel:  - Senna 2 tabs daily and Miralax BID PRN  - GI ppx: none    #/Bladder:  - Monitor PVR if no void in 8h; SC for >400 cc  - Toileting schedule q4h    #Diet:- Diet: regular, consistent carb  - Nutrition to follow    #Skin/ Pressure Injury Prevention:  - assessment on admission   - Incisions: right hip surgical incision  Left heel area of scab( patient reports prior ? cellulitis and was on abx), Off load heel  - Turn Q2hrs in bed while awake, OOB to Chair, PT/OT    DVT prophylaxis:  - Lovenox 40mg daily  - SCDs    #Precautions/ Restrictions  - Falls  - Ortho:      Weight bearing status: WBAT     ROM restrictions: None  - COVID PCR: neg on 11/13    Interdisciplinary Team Rounds: 11/19  - able to ambulate 20' min assist with RW. Currently max assist with toileting and min-mod assist with stand-pivots  - goals are for Marta for most ADLs, except transfers supervision. Marta goal for ambulation  - TDD 11/28

## 2020-11-19 NOTE — PROGRESS NOTE ADULT - SUBJECTIVE AND OBJECTIVE BOX
DAILY PROGRESS NOTE:  HPI:  JULIEN CASTELLANO is a 84yo F with PMHx of HTN, T2DM, RA, anemia, and depression, who presented to [facility] on 2020 with right-sided hip pain s/p fall from bannister and landing on right knee. Patient was seen and examined in the ED; found to have right-sided intertrochanteric fracture on xray. Orthopaedic surgery was consulted and patient underwent ORIF with IM filiberto on . Pain well controlled during admission, but course was c/b podiatry consult for left heel ulceration, which was present previously. Podiatry recommended local daily wound care with Mupirocin and DSD; outpatient follow up with her own podiatrist following discharge. Xray left foot and ankle performed to r/o osteomyelitis.    Patient was evaluated by PM&R and therapy for functional deficits and gait/ ADL impairments and recommended acute rehabilitation. Patient was medically optimized for discharge to [facility] on 2020.    Patient was seen and examined on admission. Patient awake and alert; denied fever, chills, nausea, vomiting, or abdominal pain. Endorsed decreased appetite over past year with ~30lbs weight loss. Also endorsed TTP over left heel interfering with sleep. Feels nervous about starting therapy, but otherwise, no other complaints.  (2020 11:33)      Subjective:  Patient was seen and examined at the bedside this AM. No acute overnight events.       Physical Exam:  Vital Signs Last 24 Hrs  T(C): 37.1 (2020 20:33), Max: 37.1 (2020 20:33)  T(F): 98.8 (2020 20:33), Max: 98.8 (2020 20:33)  HR: 89 (2020 20:33) (89 - 90)  BP: 154/64 (2020 20:33) (154/64 - 160/65)  RR: 16 (2020 20:33) (16 - 16)  SpO2: 95% (2020 20:33) (95% - 98%)    20 @ 07:01  -  20 @ 07:00  --------------------------------------------------------  IN: 1 mL / OUT: 2 mL / NET: -1 mL      Constitutional - NAD, Comfortable  Chest - CTAB  Cardiovascular -S1S2  Abdomen - BS+, Soft  Extremities - No C/C/E, No calf tenderness   Motor UE 4/5, LE Left 4/5 except ADF 2/5, Right HF and KE limited ADF trace, PF 3/5     Psychiatric - Mood stable, Affect WNL  Skin: right hip with dressing +, swelling +, left heel with unstageable ulcer      Recent Labs/Imagin.9    8.24  )-----------( 236      ( 2020 06:15 )             27.9         141  |  108  |  34<H>  ----------------------------<  100<H>  4.1   |  26  |  0.99    Ca    9.0      2020 06:15  TPro  6.3  /  Alb  2.6<L>  /  TBili  0.4  /  DBili  x   /  AST  22  /  ALT  24  /  AlkPhos  64      POCT Blood Glucose.: 124 mg/dL (20 @ 07:32)  POCT Blood Glucose.: 135 mg/dL (20 @ 20:49)  POCT Blood Glucose.: 116 mg/dL (20 @ 17:12)  POCT Blood Glucose.: 120 mg/dL (20 @ 11:58)      Medications:  acetaminophen   Tablet .. 975 milliGRAM(s) Oral every 8 hours  amLODIPine   Tablet 5 milliGRAM(s) Oral daily  dextrose 40% Gel 15 Gram(s) Oral once  dextrose 5%. 1000 milliLiter(s) IV Continuous <Continuous>  dextrose 5%. 1000 milliLiter(s) IV Continuous <Continuous>  dextrose 50% Injectable 25 Gram(s) IV Push once  dextrose 50% Injectable 12.5 Gram(s) IV Push once  dextrose 50% Injectable 25 Gram(s) IV Push once  enoxaparin Injectable 40 milliGRAM(s) SubCutaneous daily  folic acid 1 milliGRAM(s) Oral daily  glucagon  Injectable 1 milliGRAM(s) IntraMuscular once  insulin glargine Injectable (LANTUS) 25 Unit(s) SubCutaneous at bedtime  insulin lispro (ADMELOG) corrective regimen sliding scale   SubCutaneous Before meals and at bedtime  lisinopril 20 milliGRAM(s) Oral daily  mupirocin 2% Ointment 1 Application(s) Topical once  nicotine -  14 mG/24Hr(s) Patch 1 Patch Transdermal daily  PARoxetine 10 milliGRAM(s) Oral daily  polyethylene glycol 3350 17 Gram(s) Oral daily  senna 2 Tablet(s) Oral at bedtime  sodium chloride 0.65% Nasal 1 Spray(s) Both Nostrils four times a day PRN  traMADol 50 milliGRAM(s) Oral every 4 hours PRN DAILY PROGRESS NOTE:  HPI:  JULIEN CASTELLANO is a 86yo F with PMHx of HTN, T2DM, RA, anemia, and depression, who presented to [facility] on 2020 with right-sided hip pain s/p fall from bannister and landing on right knee. Patient was seen and examined in the ED; found to have right-sided intertrochanteric fracture on xray. Orthopaedic surgery was consulted and patient underwent ORIF with IM filiberto on . Pain well controlled during admission, but course was c/b podiatry consult for left heel ulceration, which was present previously. Podiatry recommended local daily wound care with Mupirocin and DSD; outpatient follow up with her own podiatrist following discharge. Xray left foot and ankle performed to r/o osteomyelitis.    Patient was evaluated by PM&R and therapy for functional deficits and gait/ ADL impairments and recommended acute rehabilitation. Patient was medically optimized for discharge to [facility] on 2020.    Patient was seen and examined on admission. Patient awake and alert; denied fever, chills, nausea, vomiting, or abdominal pain. Endorsed decreased appetite over past year with ~30lbs weight loss. Also endorsed TTP over left heel interfering with sleep. Feels nervous about starting therapy, but otherwise, no other complaints.  (2020 11:33)      Subjective:  Patient was seen and examined during PT this AM. No acute overnight events. Patient had BM last night. Feels more sore and pain in right hip this morning, as compared to yesterday; states she took a tramadol last night. Endorsed polyuria overnight. Left heel  to palpation. Otherwise, no other complaints. Denied fever, chills, SOB, or abdominal pain.       Physical Exam:  Vital Signs Last 24 Hrs  T(C): 37.1 (2020 20:33), Max: 37.1 (2020 20:33)  T(F): 98.8 (2020 20:33), Max: 98.8 (2020 20:33)  HR: 89 (2020 20:33) (89 - 90)  BP: 154/64 (2020 20:33) (154/64 - 160/65)  RR: 16 (2020 20:33) (16 - 16)  SpO2: 95% (2020 20:33) (95% - 98%)    20 @ 07:01  -  20 @ 07:00  --------------------------------------------------------  IN: 1 mL / OUT: 2 mL / NET: -1 mL      Constitutional - NAD, Comfortable  Chest - CTAB  Cardiovascular -S1S2  Abdomen - BS+, Soft  Extremities - No C/C/E, No calf tenderness   Motor UE 4/5, LE Left 4/5 except ADF 2/5, Right HF and KE limited ADF trace, PF 3/5     Psychiatric - Mood stable, Affect WNL  Skin: right hip with dressing +, swelling +, left heel with unstageable ulcer      Recent Labs/Imagin.9    8.24  )-----------( 236      ( 2020 06:15 )             27.9         141  |  108  |  34<H>  ----------------------------<  100<H>  4.1   |  26  |  0.99    Ca    9.0      2020 06:15  TPro  6.3  /  Alb  2.6<L>  /  TBili  0.4  /  DBili  x   /  AST  22  /  ALT  24  /  AlkPhos  64      POCT Blood Glucose.: 124 mg/dL (20 @ 07:32)  POCT Blood Glucose.: 135 mg/dL (20 @ 20:49)  POCT Blood Glucose.: 116 mg/dL (20 @ 17:12)  POCT Blood Glucose.: 120 mg/dL (20 @ 11:58)      Medications:  acetaminophen   Tablet .. 975 milliGRAM(s) Oral every 8 hours  amLODIPine   Tablet 5 milliGRAM(s) Oral daily  dextrose 40% Gel 15 Gram(s) Oral once  dextrose 5%. 1000 milliLiter(s) IV Continuous <Continuous>  dextrose 5%. 1000 milliLiter(s) IV Continuous <Continuous>  dextrose 50% Injectable 25 Gram(s) IV Push once  dextrose 50% Injectable 12.5 Gram(s) IV Push once  dextrose 50% Injectable 25 Gram(s) IV Push once  enoxaparin Injectable 40 milliGRAM(s) SubCutaneous daily  folic acid 1 milliGRAM(s) Oral daily  glucagon  Injectable 1 milliGRAM(s) IntraMuscular once  insulin glargine Injectable (LANTUS) 25 Unit(s) SubCutaneous at bedtime  insulin lispro (ADMELOG) corrective regimen sliding scale   SubCutaneous Before meals and at bedtime  lisinopril 20 milliGRAM(s) Oral daily  mupirocin 2% Ointment 1 Application(s) Topical once  nicotine -  14 mG/24Hr(s) Patch 1 Patch Transdermal daily  PARoxetine 10 milliGRAM(s) Oral daily  polyethylene glycol 3350 17 Gram(s) Oral daily  senna 2 Tablet(s) Oral at bedtime  sodium chloride 0.65% Nasal 1 Spray(s) Both Nostrils four times a day PRN  traMADol 50 milliGRAM(s) Oral every 4 hours PRN

## 2020-11-19 NOTE — PROGRESS NOTE ADULT - ASSESSMENT
HTN - BP is stable. will continue norvasc and lisinopril  Type 2 DM - patient is clinically stable. will continue insulin and monitoring of blood glucose  Right hip fracture - s/p surgery. continue rehab as per PMR  Anemia- patient is hemodynamically stable. H/H is stable  Left heel stage 1 wound  - continue local wound care. pateint is clinically stable. recommend podiatry followup

## 2020-11-19 NOTE — PROGRESS NOTE ADULT - ATTENDING COMMENTS
Chart reviewed. Patient seen at bedside + BM   Feels more sore this morning and would like to take it slow.   Denies any other acute issues  Patient seen in PT gym. Ambulating with moderate assistance- - mainly due to foot drop. ( Patient used to hike her hip to clear ground)     Team conference today: Most goals are at a modified independnet level - TDD 11/28/2020

## 2020-11-20 LAB
GLUCOSE BLDC GLUCOMTR-MCNC: 118 MG/DL — HIGH (ref 70–99)
GLUCOSE BLDC GLUCOMTR-MCNC: 153 MG/DL — HIGH (ref 70–99)

## 2020-11-20 PROCEDURE — 99232 SBSQ HOSP IP/OBS MODERATE 35: CPT | Mod: GC

## 2020-11-20 RX ADMIN — Medication 1 PATCH: at 05:50

## 2020-11-20 RX ADMIN — Medication 1 MILLIGRAM(S): at 11:56

## 2020-11-20 RX ADMIN — SENNA PLUS 2 TABLET(S): 8.6 TABLET ORAL at 22:01

## 2020-11-20 RX ADMIN — Medication 1 PATCH: at 11:56

## 2020-11-20 RX ADMIN — Medication 10 MILLIGRAM(S): at 11:58

## 2020-11-20 RX ADMIN — ENOXAPARIN SODIUM 40 MILLIGRAM(S): 100 INJECTION SUBCUTANEOUS at 11:54

## 2020-11-20 RX ADMIN — INSULIN GLARGINE 25 UNIT(S): 100 INJECTION, SOLUTION SUBCUTANEOUS at 22:01

## 2020-11-20 RX ADMIN — Medication 1 PATCH: at 15:26

## 2020-11-20 RX ADMIN — Medication 1: at 22:08

## 2020-11-20 RX ADMIN — Medication 975 MILLIGRAM(S): at 22:01

## 2020-11-20 RX ADMIN — Medication 1 PATCH: at 22:09

## 2020-11-20 RX ADMIN — LISINOPRIL 20 MILLIGRAM(S): 2.5 TABLET ORAL at 05:49

## 2020-11-20 RX ADMIN — Medication 975 MILLIGRAM(S): at 15:25

## 2020-11-20 RX ADMIN — Medication 975 MILLIGRAM(S): at 06:22

## 2020-11-20 RX ADMIN — Medication 975 MILLIGRAM(S): at 22:46

## 2020-11-20 RX ADMIN — Medication 975 MILLIGRAM(S): at 16:00

## 2020-11-20 RX ADMIN — Medication 975 MILLIGRAM(S): at 05:47

## 2020-11-20 RX ADMIN — AMLODIPINE BESYLATE 5 MILLIGRAM(S): 2.5 TABLET ORAL at 05:49

## 2020-11-20 NOTE — PROGRESS NOTE ADULT - SUBJECTIVE AND OBJECTIVE BOX
JULIEN CASTELLANO  85y  Female      patient states pain in right hip has improved        REVIEW OF SYSTEMS:    Cardiac HTN  Pulmonary no cough/SOB  GI no n/v/d/pain   no dysuria/hematuria  Neuro no syncope/headache/dizziness/numbness  Musculoskeletal right hip fracture  Endo DM    FAMILY HISTORY:    T(C): 36.7 (11-20-20 @ 07:35), Max: 36.7 (11-20-20 @ 07:35)  HR: 80 (11-20-20 @ 07:35) (80 - 86)  BP: 136/67 (11-20-20 @ 07:35) (136/67 - 154/66)  RR: 17 (11-20-20 @ 07:35) (16 - 17)  SpO2: 93% (11-20-20 @ 07:35) (93% - 94%)  Wt(kg): --Vital Signs Last 24 Hrs  T(C): 36.7 (20 Nov 2020 07:35), Max: 36.7 (20 Nov 2020 07:35)  T(F): 98 (20 Nov 2020 07:35), Max: 98 (20 Nov 2020 07:35)  HR: 80 (20 Nov 2020 07:35) (80 - 86)  BP: 136/67 (20 Nov 2020 07:35) (136/67 - 154/66)  BP(mean): --  RR: 17 (20 Nov 2020 07:35) (16 - 17)  SpO2: 93% (20 Nov 2020 07:35) (93% - 94%)  penicillins (Urticaria; Pruritus)      PHYSICAL EXAM:    General NAD  Neck no JVD/adenopathy/thyromegaly  Heart RRR  Lungs clear  Abdomen non tender non distended normal bowel sounds no HSM/CVAT  Extremities s/p right hip fracture s/p surgery  Neurologic no acute focal changes alert and oriented x 3  Skin stage 1 left heel wound stable no erythema      Consultant(s) Notes Reviewed:  [x ] YES  [ ] NO  Care Discussed with Consultants/Other Providers [ x] YES  [ ] NO    LABS:  CBC Full  -  ( 19 Nov 2020 06:15 )  WBC Count : 8.24 K/uL  RBC Count : 2.74 M/uL  Hemoglobin : 8.9 g/dL  Hematocrit : 27.9 %  Platelet Count - Automated : 236 K/uL  Mean Cell Volume : 101.8 fl  Mean Cell Hemoglobin : 32.5 pg  Mean Cell Hemoglobin Concentration : 31.9 gm/dL  Auto Neutrophil # : x  Auto Lymphocyte # : x  Auto Monocyte # : x  Auto Eosinophil # : x  Auto Basophil # : x  Auto Neutrophil % : x  Auto Lymphocyte % : x  Auto Monocyte % : x  Auto Eosinophil % : x  Auto Basophil % : x                          8.9    8.24  )-----------( 236      ( 19 Nov 2020 06:15 )             27.9     11-19    141  |  108  |  34<H>  ----------------------------<  100<H>  4.1   |  26  |  0.99    Ca    9.0      19 Nov 2020 06:15            CAPILLARY BLOOD GLUCOSE      POCT Blood Glucose.: 118 mg/dL (20 Nov 2020 07:32)        acetaminophen   Tablet .. 975 milliGRAM(s) Oral every 8 hours  amLODIPine   Tablet 5 milliGRAM(s) Oral daily  dextrose 40% Gel 15 Gram(s) Oral once  dextrose 5%. 1000 milliLiter(s) IV Continuous <Continuous>  dextrose 5%. 1000 milliLiter(s) IV Continuous <Continuous>  dextrose 50% Injectable 25 Gram(s) IV Push once  dextrose 50% Injectable 12.5 Gram(s) IV Push once  dextrose 50% Injectable 25 Gram(s) IV Push once  enoxaparin Injectable 40 milliGRAM(s) SubCutaneous daily  folic acid 1 milliGRAM(s) Oral daily  glucagon  Injectable 1 milliGRAM(s) IntraMuscular once  insulin glargine Injectable (LANTUS) 25 Unit(s) SubCutaneous at bedtime  insulin lispro (ADMELOG) corrective regimen sliding scale   SubCutaneous Before meals and at bedtime  lisinopril 20 milliGRAM(s) Oral daily  mupirocin 2% Ointment 1 Application(s) Topical once  nicotine -  14 mG/24Hr(s) Patch 1 Patch Transdermal daily  PARoxetine 10 milliGRAM(s) Oral daily  polyethylene glycol 3350 17 Gram(s) Oral daily  senna 2 Tablet(s) Oral at bedtime  sodium chloride 0.65% Nasal 1 Spray(s) Both Nostrils four times a day PRN  traMADol 50 milliGRAM(s) Oral every 4 hours PRN

## 2020-11-20 NOTE — PROGRESS NOTE ADULT - ASSESSMENT
Right hip fracture - s/p surgery. will continue present pain management/rehab  Type 2 DM - patient is clinically stable. will continue blood glucose monitoring and insulin  HTN - BP is stable. will continue medications  Left heel stage 1 wound - stable. will continue local wound care

## 2020-11-20 NOTE — PROGRESS NOTE ADULT - ASSESSMENT
JULIEN CASTELLANO is a 86yo F with PMHx of HTN, T2DM, RA, anemia, and depression, who presented to [facility] on 11/13/2020 with right-sided hip pain s/p fall, found to have intertrochanteric fracture of right hip, now s/p ORIF and IM nailing. Admitted for multidisciplinary rehab program.      #Comprehensive Multidisciplinary Rehab Program:  - Gait, ADL, Functional impairments   - PT/OT: 3 hours a day 5 days a week    #Right intertrochanteric hip fracture s/p ORIF and IM nailing  - pain regimen, as below  - Lovenox for AC  - WBAT  - will place new steristrips onto incision today    #T2DM  - c/w Lantus 25mg qhs  - c/w FS and ISS  - hgb A1C on admission    #HTN  - c/w amlodipine 5mg and lisinopril 20mg daily    #Depression  - c/w Paroxetine 10mg daily    #Tobacco use  - c/w Nicotine patch    #Sleep: Melatonin PRN    #Pain:  - Standing Tylenol 975mg q8h and Tramadol 50mg q4h PRN for moderate pain    #GI/Bowel:  - Senna 2 tabs daily and Miralax BID PRN  - GI ppx: none    #/Bladder:  - Monitor PVR if no void in 8h; SC for >400 cc  - Toileting schedule q4h    #Diet:- Diet: regular, consistent carb  - Nutrition to follow    #Skin/ Pressure Injury Prevention:  - assessment on admission   - Incisions: right hip surgical incision  Left heel area of scab( patient reports prior ? cellulitis and was on abx), Off load heel  - Turn Q2hrs in bed while awake, OOB to Chair, PT/OT    DVT prophylaxis:  - Lovenox 40mg daily  - SCDs    #Precautions/ Restrictions  - Falls  - Ortho:      Weight bearing status: WBAT     ROM restrictions: None  - COVID PCR: neg on 11/13    Interdisciplinary Team Rounds: 11/19  - able to ambulate 20' min assist with RW. Currently max assist with toileting and min-mod assist with stand-pivots  - goals are for Marta for most ADLs, except transfers supervision. Marta goal for ambulation  - TDD 11/28

## 2020-11-20 NOTE — PROGRESS NOTE ADULT - SUBJECTIVE AND OBJECTIVE BOX
DAILY PROGRESS NOTE:  HPI:  JULIEN CASTELLANO is a 84yo F with PMHx of HTN, T2DM, RA, anemia, and depression, who presented to  on 2020 with right-sided hip pain s/p fall from bannister and landing on right knee. Patient was seen and examined in the ED; found to have right-sided intertrochanteric fracture on xray. Orthopaedic surgery was consulted and patient underwent ORIF with IM filiberto on . Pain well controlled during admission, but course was c/b podiatry consult for left heel ulceration, which was present previously. Podiatry recommended local daily wound care with Mupirocin and DSD; outpatient follow up with her own podiatrist following discharge. Xray left foot and ankle performed to r/o osteomyelitis.    Patient was evaluated by PM&R and therapy for functional deficits and gait/ ADL impairments and recommended acute rehabilitation. Patient was medically optimized for discharge to [facility] on 2020.    Patient was seen and examined on admission. Patient awake and alert; denied fever, chills, nausea, vomiting, or abdominal pain. Endorsed decreased appetite over past year with ~30lbs weight loss. Also endorsed TTP over left heel interfering with sleep. Feels nervous about starting therapy, but otherwise, no other complaints.  (2020 11:33)      Subjective:  Patient was seen and examined at the bedside this AM. No acute overnight events. C/o fatigue this morning; says she feels "groggy" after taking Tramadol 50mg, but needed it 3 times yesterday. Otherwise, in good spirits, doing well. Denied fever, coughing, SOB, or abdominal pain.      Physical Exam:  Vital Signs Last 24 Hrs  T(C): 36.7 (2020 07:35), Max: 36.7 (2020 07:35)  T(F): 98 (2020 07:35), Max: 98 (2020 07:35)  HR: 80 (2020 07:35) (80 - 86)  BP: 136/67 (2020 07:35) (136/67 - 154/66)  RR: 17 (2020 07:35) (16 - 17)  SpO2: 93% (2020 07:35) (93% - 94%)      Constitutional - NAD, Comfortable  Chest - CTAB  Cardiovascular -S1S2  Abdomen - BS+, Soft  Extremities - No C/C/E, No calf tenderness   Motor UE 4/5, LE Left 4/5 except ADF 2/5, Right HF and KE limited ADF trace, PF 3/5     Psychiatric - Mood stable, Affect WNL  Skin: swelling +, left heel with unstageable ulcer. Examined right hip incision/dressing this morning; some blood oozing from incision site. Appears some of steristrips have migrated.       Recent Labs/Imagin.9    8.24  )-----------( 236      ( 2020 06:15 )             27.9         141  |  108  |  34<H>  ----------------------------<  100<H>  4.1   |  26  |  0.99    Ca    9.0      2020 06:15    POCT Blood Glucose.: 118 mg/dL (20 @ 07:32)  POCT Blood Glucose.: 130 mg/dL (20 @ 21:42)  POCT Blood Glucose.: 109 mg/dL (20 @ 16:56)      Medications:  acetaminophen   Tablet .. 975 milliGRAM(s) Oral every 8 hours  amLODIPine   Tablet 5 milliGRAM(s) Oral daily  dextrose 40% Gel 15 Gram(s) Oral once  dextrose 5%. 1000 milliLiter(s) IV Continuous <Continuous>  dextrose 5%. 1000 milliLiter(s) IV Continuous <Continuous>  dextrose 50% Injectable 25 Gram(s) IV Push once  dextrose 50% Injectable 12.5 Gram(s) IV Push once  dextrose 50% Injectable 25 Gram(s) IV Push once  enoxaparin Injectable 40 milliGRAM(s) SubCutaneous daily  folic acid 1 milliGRAM(s) Oral daily  glucagon  Injectable 1 milliGRAM(s) IntraMuscular once  insulin glargine Injectable (LANTUS) 25 Unit(s) SubCutaneous at bedtime  insulin lispro (ADMELOG) corrective regimen sliding scale   SubCutaneous Before meals and at bedtime  lisinopril 20 milliGRAM(s) Oral daily  mupirocin 2% Ointment 1 Application(s) Topical once  nicotine -  14 mG/24Hr(s) Patch 1 Patch Transdermal daily  PARoxetine 10 milliGRAM(s) Oral daily  polyethylene glycol 3350 17 Gram(s) Oral daily  senna 2 Tablet(s) Oral at bedtime  sodium chloride 0.65% Nasal 1 Spray(s) Both Nostrils four times a day PRN  traMADol 50 milliGRAM(s) Oral every 4 hours PRN DAILY PROGRESS NOTE:  HPI:  JULIEN CASTELLANO is a 84yo F with PMHx of HTN, T2DM, RA, anemia, and depression, who presented to [facility] on 2020 with right-sided hip pain s/p fall from bannister and landing on right knee. Patient was seen and examined in the ED; found to have right-sided intertrochanteric fracture on xray. Orthopaedic surgery was consulted and patient underwent ORIF with IM filiberto on . Pain well controlled during admission, but course was c/b podiatry consult for left heel ulceration, which was present previously. Podiatry recommended local daily wound care with Mupirocin and DSD; outpatient follow up with her own podiatrist following discharge. Xray left foot and ankle performed to r/o osteomyelitis.    Patient was evaluated by PM&R and therapy for functional deficits and gait/ ADL impairments and recommended acute rehabilitation. Patient was medically optimized for discharge to [facility] on 2020.    Patient was seen and examined on admission. Patient awake and alert; denied fever, chills, nausea, vomiting, or abdominal pain. Endorsed decreased appetite over past year with ~30lbs weight loss. Also endorsed TTP over left heel interfering with sleep. Feels nervous about starting therapy, but otherwise, no other complaints.  (2020 11:33)      Subjective:  Patient was seen and examined at the bedside this AM. No acute overnight events. C/o fatigue this morning; says she feels "groggy" after taking Tramadol 50mg, but needed it 3 times yesterday. Otherwise, in good spirits, doing well. Denied fever, coughing, SOB, or abdominal pain.      Physical Exam:  Vital Signs Last 24 Hrs  T(C): 36.7 (2020 07:35), Max: 36.7 (2020 07:35)  T(F): 98 (2020 07:35), Max: 98 (2020 07:35)  HR: 80 (2020 07:35) (80 - 86)  BP: 136/67 (2020 07:35) (136/67 - 154/66)  RR: 17 (2020 07:35) (16 - 17)  SpO2: 93% (2020 07:35) (93% - 94%)      Constitutional - NAD, Comfortable  Chest - CTAB  Cardiovascular -S1S2  Abdomen - BS+, Soft  Extremities - No C/C/E, No calf tenderness   Motor UE 4/5, LE Left 4/5 except ADF 2/5, Right HF and KE limited ADF trace, PF 3/5     Psychiatric - Mood stable, Affect WNL  Skin: swelling +, left heel with unstageable ulcer. Examined right hip incision/dressing this morning; some blood oozing from incision site. Appears some of steristrips have migrated.   Medial right thigh ecchymosis    Recent Labs/Imagin.9    8.24  )-----------( 236      ( 2020 06:15 )             27.9         141  |  108  |  34<H>  ----------------------------<  100<H>  4.1   |  26  |  0.99    Ca    9.0      2020 06:15    POCT Blood Glucose.: 118 mg/dL (20 @ 07:32)  POCT Blood Glucose.: 130 mg/dL (20 @ 21:42)  POCT Blood Glucose.: 109 mg/dL (20 @ 16:56)      Medications:  acetaminophen   Tablet .. 975 milliGRAM(s) Oral every 8 hours  amLODIPine   Tablet 5 milliGRAM(s) Oral daily  dextrose 40% Gel 15 Gram(s) Oral once  dextrose 5%. 1000 milliLiter(s) IV Continuous <Continuous>  dextrose 5%. 1000 milliLiter(s) IV Continuous <Continuous>  dextrose 50% Injectable 25 Gram(s) IV Push once  dextrose 50% Injectable 12.5 Gram(s) IV Push once  dextrose 50% Injectable 25 Gram(s) IV Push once  enoxaparin Injectable 40 milliGRAM(s) SubCutaneous daily  folic acid 1 milliGRAM(s) Oral daily  glucagon  Injectable 1 milliGRAM(s) IntraMuscular once  insulin glargine Injectable (LANTUS) 25 Unit(s) SubCutaneous at bedtime  insulin lispro (ADMELOG) corrective regimen sliding scale   SubCutaneous Before meals and at bedtime  lisinopril 20 milliGRAM(s) Oral daily  mupirocin 2% Ointment 1 Application(s) Topical once  nicotine -  14 mG/24Hr(s) Patch 1 Patch Transdermal daily  PARoxetine 10 milliGRAM(s) Oral daily  polyethylene glycol 3350 17 Gram(s) Oral daily  senna 2 Tablet(s) Oral at bedtime  sodium chloride 0.65% Nasal 1 Spray(s) Both Nostrils four times a day PRN  traMADol 50 milliGRAM(s) Oral every 4 hours PRN

## 2020-11-20 NOTE — PROGRESS NOTE ADULT - ATTENDING COMMENTS
Chart reviewed. Patient seen at bedside  Reports feeling groggy after taking  tramadol 50mg three times yesterday  Somewhat tearful today-  No other issues  Right hip - aquacell dressing - curling up in certain regions- drop of fresh blood upper- gauze applied     Continue rehab program    Tentative dc date discussed with patient     Labs on Monday    2. Medicine fu  noted. Premeal standing Insulin has been dc. Continue Lantus   Accuchecks changed to qam and bedtime Chart reviewed. Patient seen at bedside  Reports feeling groggy after taking  tramadol 50mg three times yesterday  Somewhat tearful today-  No other issues  Right hip - aquacell dressing - curling up in certain regions- drop of fresh blood upper- gauze applied     Continue rehab program    Tentative dc date discussed with patient 11/28    Labs on Monday    2. Medicine fu  noted. Premeal standing Insulin has been dc. Continue Lantus   Accuchecks changed to qam and bedtime    Addendum:    Right hip aquacell dressing removed and new applied. Incisions- clean- areas of ecchymosis around incision and medial thigh

## 2020-11-21 LAB
GLUCOSE BLDC GLUCOMTR-MCNC: 118 MG/DL — HIGH (ref 70–99)
GLUCOSE BLDC GLUCOMTR-MCNC: 144 MG/DL — HIGH (ref 70–99)

## 2020-11-21 PROCEDURE — 99232 SBSQ HOSP IP/OBS MODERATE 35: CPT

## 2020-11-21 RX ORDER — INFLUENZA VIRUS VACCINE 15; 15; 15; 15 UG/.5ML; UG/.5ML; UG/.5ML; UG/.5ML
0.5 SUSPENSION INTRAMUSCULAR ONCE
Refills: 0 | Status: COMPLETED | OUTPATIENT
Start: 2020-11-21 | End: 2020-11-21

## 2020-11-21 RX ADMIN — TRAMADOL HYDROCHLORIDE 50 MILLIGRAM(S): 50 TABLET ORAL at 16:30

## 2020-11-21 RX ADMIN — Medication 1 PATCH: at 18:16

## 2020-11-21 RX ADMIN — AMLODIPINE BESYLATE 5 MILLIGRAM(S): 2.5 TABLET ORAL at 05:34

## 2020-11-21 RX ADMIN — Medication 975 MILLIGRAM(S): at 06:57

## 2020-11-21 RX ADMIN — INFLUENZA VIRUS VACCINE 0.5 MILLILITER(S): 15; 15; 15; 15 SUSPENSION INTRAMUSCULAR at 15:48

## 2020-11-21 RX ADMIN — INSULIN GLARGINE 25 UNIT(S): 100 INJECTION, SOLUTION SUBCUTANEOUS at 21:46

## 2020-11-21 RX ADMIN — TRAMADOL HYDROCHLORIDE 50 MILLIGRAM(S): 50 TABLET ORAL at 15:47

## 2020-11-21 RX ADMIN — Medication 975 MILLIGRAM(S): at 22:14

## 2020-11-21 RX ADMIN — ENOXAPARIN SODIUM 40 MILLIGRAM(S): 100 INJECTION SUBCUTANEOUS at 12:20

## 2020-11-21 RX ADMIN — Medication 975 MILLIGRAM(S): at 05:34

## 2020-11-21 RX ADMIN — Medication 10 MILLIGRAM(S): at 12:19

## 2020-11-21 RX ADMIN — Medication 975 MILLIGRAM(S): at 13:07

## 2020-11-21 RX ADMIN — Medication 1 PATCH: at 12:00

## 2020-11-21 RX ADMIN — Medication 1 MILLIGRAM(S): at 12:20

## 2020-11-21 RX ADMIN — SENNA PLUS 2 TABLET(S): 8.6 TABLET ORAL at 21:46

## 2020-11-21 RX ADMIN — Medication 1 PATCH: at 05:36

## 2020-11-21 RX ADMIN — Medication 975 MILLIGRAM(S): at 21:46

## 2020-11-21 RX ADMIN — Medication 1 PATCH: at 12:24

## 2020-11-21 RX ADMIN — LISINOPRIL 20 MILLIGRAM(S): 2.5 TABLET ORAL at 05:34

## 2020-11-21 NOTE — PROGRESS NOTE ADULT - SUBJECTIVE AND OBJECTIVE BOX
Pt. seen and examined at bedside.  No overnight events.  Not walking much.        REVIEW OF SYSTEMS  Constitutional - No fever,  No fatigue  Neurological - No headaches, No loss of strength  Musculoskeletal - + RIGHT HIP joint pain, No joint swelling, + RIGHT HIP muscle pain    VITALS  T(C): 36.3 (11-21-20 @ 07:38), Max: 36.6 (11-20-20 @ 19:54)  HR: 83 (11-21-20 @ 07:38) (83 - 89)  BP: 123/56 (11-21-20 @ 07:38) (123/56 - 138/60)  RR: 16 (11-21-20 @ 07:38) (16 - 16)  SpO2: 98% (11-21-20 @ 07:38) (97% - 98%)  Wt(kg): --       MEDICATIONS   acetaminophen   Tablet .. 975 milliGRAM(s) every 8 hours  amLODIPine   Tablet 5 milliGRAM(s) daily  dextrose 40% Gel 15 Gram(s) once  dextrose 5%. 1000 milliLiter(s) <Continuous>  dextrose 5%. 1000 milliLiter(s) <Continuous>  dextrose 50% Injectable 25 Gram(s) once  dextrose 50% Injectable 12.5 Gram(s) once  dextrose 50% Injectable 25 Gram(s) once  enoxaparin Injectable 40 milliGRAM(s) daily  folic acid 1 milliGRAM(s) daily  glucagon  Injectable 1 milliGRAM(s) once  influenza   Vaccine 0.5 milliLiter(s) once  insulin glargine Injectable (LANTUS) 25 Unit(s) at bedtime  insulin lispro (ADMELOG) corrective regimen sliding scale   Before meals and at bedtime  lisinopril 20 milliGRAM(s) daily  mupirocin 2% Ointment 1 Application(s) once  nicotine -  14 mG/24Hr(s) Patch 1 Patch daily  PARoxetine 10 milliGRAM(s) daily  polyethylene glycol 3350 17 Gram(s) daily  senna 2 Tablet(s) at bedtime  sodium chloride 0.65% Nasal 1 Spray(s) four times a day PRN  traMADol 50 milliGRAM(s) every 4 hours PRN      RECENT LABS/IMAGING                      POCT Blood Glucose.: 118 mg/dL (11-21-20 @ 07:32)  POCT Blood Glucose.: 153 mg/dL (11-20-20 @ 21:23)    ---------  PHYSICAL EXAM  Constitutional - NAD, Comfortable  Pulm - Breathing comfortably, No wheezing  Abd - Soft, NTND  Extremities - No edema, No calf tenderness  RIGHT HIP INCISION: C/D/I  Neurologic Exam -                    Cognitive - Awake, Alert     Communication - Fluent     Motor - RIGHT FOOT DF WEAKNESS     Sensory - Intact to LT  Psychiatric - Mood WNL, Affect WNL    ASSESSMENT/PLAN  85y Female with functional deficits S/P RIGHT IT HIP FX ORIF.  Continue current medical management  Pain - Tylenol PRN; TRAMADOL PRN.  DVT PPX - enoxaparin Injectable 40 milliGRAM(s) daily  Active issues - NONE  Continue 3hrs a day of comprehensive rehab program.

## 2020-11-21 NOTE — PROGRESS NOTE ADULT - SUBJECTIVE AND OBJECTIVE BOX
JULIEN CASTELLANO  85y  Female    patient state she is feeling better and states right hip ain is improving        REVIEW OF SYSTEMS:    Cardiac HTN  Pulmonary no cough/SOB  GI no n/v/d/pain   no dysuria/hematuria  Neuro no syncope/headache/numbness  Heme anemia  Endo DM  Musculoskelatal right hip fracture/DDD  Skin left heel wound    FAMILY HISTORY:    T(C): 36.3 (11-21-20 @ 07:38), Max: 36.6 (11-20-20 @ 19:54)  HR: 83 (11-21-20 @ 07:38) (83 - 89)  BP: 123/56 (11-21-20 @ 07:38) (123/56 - 138/60)  RR: 16 (11-21-20 @ 07:38) (16 - 16)  SpO2: 98% (11-21-20 @ 07:38) (97% - 98%)  Wt(kg): --Vital Signs Last 24 Hrs  T(C): 36.3 (21 Nov 2020 07:38), Max: 36.6 (20 Nov 2020 19:54)  T(F): 97.4 (21 Nov 2020 07:38), Max: 97.9 (20 Nov 2020 19:54)  HR: 83 (21 Nov 2020 07:38) (83 - 89)  BP: 123/56 (21 Nov 2020 07:38) (123/56 - 138/60)  BP(mean): --  RR: 16 (21 Nov 2020 07:38) (16 - 16)  SpO2: 98% (21 Nov 2020 07:38) (97% - 98%)  penicillins (Urticaria; Pruritus)      PHYSICAL EXAM:    General NAD  Neck no thyromegaly/JVD  Heart RRR  Lungs clear  Abdomen non tender non distended normal bowel sounds no HSM/CVAT  Extremities s/p right hip fracture s/p surgery +pulses  Neurologic alert and oriented x 3 no acute focal changes  Skin normal turgor stable stage 1 left heel wound          CAPILLARY BLOOD GLUCOSE      POCT Blood Glucose.: 118 mg/dL (21 Nov 2020 07:32)            acetaminophen   Tablet .. 975 milliGRAM(s) Oral every 8 hours  amLODIPine   Tablet 5 milliGRAM(s) Oral daily  dextrose 40% Gel 15 Gram(s) Oral once  dextrose 5%. 1000 milliLiter(s) IV Continuous <Continuous>  dextrose 5%. 1000 milliLiter(s) IV Continuous <Continuous>  dextrose 50% Injectable 25 Gram(s) IV Push once  dextrose 50% Injectable 12.5 Gram(s) IV Push once  dextrose 50% Injectable 25 Gram(s) IV Push once  enoxaparin Injectable 40 milliGRAM(s) SubCutaneous daily  folic acid 1 milliGRAM(s) Oral daily  glucagon  Injectable 1 milliGRAM(s) IntraMuscular once  influenza   Vaccine 0.5 milliLiter(s) IntraMuscular once  insulin glargine Injectable (LANTUS) 25 Unit(s) SubCutaneous at bedtime  insulin lispro (ADMELOG) corrective regimen sliding scale   SubCutaneous Before meals and at bedtime  lisinopril 20 milliGRAM(s) Oral daily  mupirocin 2% Ointment 1 Application(s) Topical once  nicotine -  14 mG/24Hr(s) Patch 1 Patch Transdermal daily  PARoxetine 10 milliGRAM(s) Oral daily  polyethylene glycol 3350 17 Gram(s) Oral daily  senna 2 Tablet(s) Oral at bedtime  sodium chloride 0.65% Nasal 1 Spray(s) Both Nostrils four times a day PRN  traMADol 50 milliGRAM(s) Oral every 4 hours PRN

## 2020-11-21 NOTE — PROGRESS NOTE ADULT - ASSESSMENT
Type 2 DM - patient is clinically stable. will continue insulin and blood glucose monitoring  Anemia _ H/H is stable  HTN BP is stable. will continue present medications  Right Hip fracture - patient is improving s/p surgery. will continue pain management and rehab  Left heel stage 1 wound - stable. will continue local wound care. recommend follow up with podiatrist

## 2020-11-22 LAB
GLUCOSE BLDC GLUCOMTR-MCNC: 118 MG/DL — HIGH (ref 70–99)
GLUCOSE BLDC GLUCOMTR-MCNC: 162 MG/DL — HIGH (ref 70–99)

## 2020-11-22 PROCEDURE — 99232 SBSQ HOSP IP/OBS MODERATE 35: CPT

## 2020-11-22 RX ADMIN — Medication 975 MILLIGRAM(S): at 22:00

## 2020-11-22 RX ADMIN — INSULIN GLARGINE 25 UNIT(S): 100 INJECTION, SOLUTION SUBCUTANEOUS at 21:13

## 2020-11-22 RX ADMIN — Medication 10 MILLIGRAM(S): at 11:52

## 2020-11-22 RX ADMIN — ENOXAPARIN SODIUM 40 MILLIGRAM(S): 100 INJECTION SUBCUTANEOUS at 11:52

## 2020-11-22 RX ADMIN — Medication 975 MILLIGRAM(S): at 14:11

## 2020-11-22 RX ADMIN — Medication 1 PATCH: at 11:56

## 2020-11-22 RX ADMIN — TRAMADOL HYDROCHLORIDE 50 MILLIGRAM(S): 50 TABLET ORAL at 17:47

## 2020-11-22 RX ADMIN — Medication 975 MILLIGRAM(S): at 14:45

## 2020-11-22 RX ADMIN — LISINOPRIL 20 MILLIGRAM(S): 2.5 TABLET ORAL at 05:29

## 2020-11-22 RX ADMIN — Medication 1 PATCH: at 18:16

## 2020-11-22 RX ADMIN — AMLODIPINE BESYLATE 5 MILLIGRAM(S): 2.5 TABLET ORAL at 05:29

## 2020-11-22 RX ADMIN — Medication 1: at 21:13

## 2020-11-22 RX ADMIN — Medication 975 MILLIGRAM(S): at 06:00

## 2020-11-22 RX ADMIN — Medication 1 MILLIGRAM(S): at 11:52

## 2020-11-22 RX ADMIN — Medication 1 PATCH: at 11:51

## 2020-11-22 RX ADMIN — Medication 975 MILLIGRAM(S): at 21:12

## 2020-11-22 RX ADMIN — Medication 975 MILLIGRAM(S): at 05:28

## 2020-11-22 NOTE — PROGRESS NOTE ADULT - SUBJECTIVE AND OBJECTIVE BOX
Pt. seen and examined at bedside.  No overnight events.        REVIEW OF SYSTEMS  Constitutional - No fever,  No fatigue  Neurological - No headaches, No loss of strength  Musculoskeletal - + RIGH HIP joint pain, + RIGHT HIP muscle pain    VITALS  T(C): 36.4 (11-22-20 @ 07:14), Max: 36.8 (11-22-20 @ 05:26)  HR: 83 (11-22-20 @ 07:14) (69 - 88)  BP: 131/57 (11-22-20 @ 07:14) (131/57 - 153/65)  RR: 16 (11-22-20 @ 07:14) (16 - 16)  SpO2: 98% (11-22-20 @ 07:14) (98% - 98%)  Wt(kg): --       MEDICATIONS   acetaminophen   Tablet .. 975 milliGRAM(s) every 8 hours  amLODIPine   Tablet 5 milliGRAM(s) daily  dextrose 40% Gel 15 Gram(s) once  dextrose 5%. 1000 milliLiter(s) <Continuous>  dextrose 5%. 1000 milliLiter(s) <Continuous>  dextrose 50% Injectable 25 Gram(s) once  dextrose 50% Injectable 12.5 Gram(s) once  dextrose 50% Injectable 25 Gram(s) once  enoxaparin Injectable 40 milliGRAM(s) daily  folic acid 1 milliGRAM(s) daily  glucagon  Injectable 1 milliGRAM(s) once  insulin glargine Injectable (LANTUS) 25 Unit(s) at bedtime  insulin lispro (ADMELOG) corrective regimen sliding scale   Before meals and at bedtime  lisinopril 20 milliGRAM(s) daily  mupirocin 2% Ointment 1 Application(s) once  nicotine -  14 mG/24Hr(s) Patch 1 Patch daily  PARoxetine 10 milliGRAM(s) daily  polyethylene glycol 3350 17 Gram(s) daily  senna 2 Tablet(s) at bedtime  sodium chloride 0.65% Nasal 1 Spray(s) four times a day PRN  traMADol 50 milliGRAM(s) every 4 hours PRN      RECENT LABS/IMAGING                      POCT Blood Glucose.: 118 mg/dL (11-22-20 @ 07:18)  POCT Blood Glucose.: 144 mg/dL (11-21-20 @ 21:44)    ---------  PHYSICAL EXAM  Constitutional - NAD, Comfortable  Pulm - Breathing comfortably, No wheezing  Abd - Soft, NTND  Extremities - No edema, No calf tenderness  RIGHT HIP INCISION C/D/I  Neurologic Exam -                    Cognitive - Awake, Alert     Communication - Fluent     Motor - RIGHT FOOT DF WEAKNESS     Sensory - Intact to LT  Psychiatric - Mood WNL, Affect WNL    ASSESSMENT/PLAN  85y Female with functional deficits s/p RIGHT HIP IT FX ORIF.  Continue current medical management  Pain - Tylenol PRN; TRAMADOL  DVT PPX - enoxaparin Injectable 40 milliGRAM(s) daily  Active issues - NONE  Continue 3hrs a day of comprehensive rehab program.

## 2020-11-22 NOTE — PROGRESS NOTE ADULT - SUBJECTIVE AND OBJECTIVE BOX
JULIEN CASTELLANO  85y  Female    patient states she is feeling better and is without pain. patient states she is ambulating better but having difficulty in climbing stairs      REVIEW OF SYSTEMS:    Cardiac HTN  Pulmonary no cough/SOB  GI no n/v/d/pain   no dysuria/hematuria  Neuro no syncope/dizziness/headache  Endo DM  Heme anemia    FAMILY HISTORY:    T(C): 36.4 (11-22-20 @ 07:14), Max: 36.8 (11-22-20 @ 05:26)  HR: 83 (11-22-20 @ 07:14) (69 - 88)  BP: 131/57 (11-22-20 @ 07:14) (131/57 - 153/65)  RR: 16 (11-22-20 @ 07:14) (16 - 16)  SpO2: 98% (11-22-20 @ 07:14) (98% - 98%)  Wt(kg): --Vital Signs Last 24 Hrs  T(C): 36.4 (22 Nov 2020 07:14), Max: 36.8 (22 Nov 2020 05:26)  T(F): 97.6 (22 Nov 2020 07:14), Max: 98.3 (22 Nov 2020 05:26)  HR: 83 (22 Nov 2020 07:14) (69 - 88)  BP: 131/57 (22 Nov 2020 07:14) (131/57 - 153/65)  BP(mean): --  RR: 16 (22 Nov 2020 07:14) (16 - 16)  SpO2: 98% (22 Nov 2020 07:14) (98% - 98%)  penicillins (Urticaria; Pruritus)      PHYSICAL EXAM:    General NAD  Neck no JVD/adenopathy/thyromegaly  Heart RRR  Lungs clear  Abdomen non tender non distended normal bowel sounds no HSM/CVAT  Extremities s/p right hip surgery  Neurologic no acute focal changes alert and oriented x 3  Skin normal turgor stable level tiara; stage one wound no discharge no erythema      Consultant(s) Notes Reviewed:  [x ] YES  [ ] NO  Care Discussed with Consultants/Other Providers [ x] YES  [ ] NO    LABS:      CAPILLARY BLOOD GLUCOSE      POCT Blood Glucose.: 118 mg/dL (22 Nov 2020 07:18)    acetaminophen   Tablet .. 975 milliGRAM(s) Oral every 8 hours  amLODIPine   Tablet 5 milliGRAM(s) Oral daily  dextrose 40% Gel 15 Gram(s) Oral once  dextrose 5%. 1000 milliLiter(s) IV Continuous <Continuous>  dextrose 5%. 1000 milliLiter(s) IV Continuous <Continuous>  dextrose 50% Injectable 25 Gram(s) IV Push once  dextrose 50% Injectable 12.5 Gram(s) IV Push once  dextrose 50% Injectable 25 Gram(s) IV Push once  enoxaparin Injectable 40 milliGRAM(s) SubCutaneous daily  folic acid 1 milliGRAM(s) Oral daily  glucagon  Injectable 1 milliGRAM(s) IntraMuscular once  insulin glargine Injectable (LANTUS) 25 Unit(s) SubCutaneous at bedtime  insulin lispro (ADMELOG) corrective regimen sliding scale   SubCutaneous Before meals and at bedtime  lisinopril 20 milliGRAM(s) Oral daily  mupirocin 2% Ointment 1 Application(s) Topical once  nicotine -  14 mG/24Hr(s) Patch 1 Patch Transdermal daily  PARoxetine 10 milliGRAM(s) Oral daily  polyethylene glycol 3350 17 Gram(s) Oral daily  senna 2 Tablet(s) Oral at bedtime  sodium chloride 0.65% Nasal 1 Spray(s) Both Nostrils four times a day PRN  traMADol 50 milliGRAM(s) Oral every 4 hours PRN

## 2020-11-22 NOTE — PROGRESS NOTE ADULT - ASSESSMENT
Type 2 DM - patient is clinically stable. will continue present management  HTN - BP is stable. will continue lisinopril and norvasc  Right fracture - s/p surgery. continue pain medications and rehab  Anemia - patient is hemodynamically   Left heel stage 1 wound - stable. continue local wound care

## 2020-11-23 LAB
ANION GAP SERPL CALC-SCNC: 10 MMOL/L — SIGNIFICANT CHANGE UP (ref 5–17)
BUN SERPL-MCNC: 50 MG/DL — HIGH (ref 7–23)
CALCIUM SERPL-MCNC: 9.1 MG/DL — SIGNIFICANT CHANGE UP (ref 8.4–10.5)
CHLORIDE SERPL-SCNC: 108 MMOL/L — SIGNIFICANT CHANGE UP (ref 96–108)
CO2 SERPL-SCNC: 23 MMOL/L — SIGNIFICANT CHANGE UP (ref 22–31)
CREAT SERPL-MCNC: 0.89 MG/DL — SIGNIFICANT CHANGE UP (ref 0.5–1.3)
GLUCOSE BLDC GLUCOMTR-MCNC: 111 MG/DL — HIGH (ref 70–99)
GLUCOSE BLDC GLUCOMTR-MCNC: 139 MG/DL — HIGH (ref 70–99)
GLUCOSE SERPL-MCNC: 103 MG/DL — HIGH (ref 70–99)
HCT VFR BLD CALC: 27.2 % — LOW (ref 34.5–45)
HGB BLD-MCNC: 8.6 G/DL — LOW (ref 11.5–15.5)
MCHC RBC-ENTMCNC: 31.6 GM/DL — LOW (ref 32–36)
MCHC RBC-ENTMCNC: 32.2 PG — SIGNIFICANT CHANGE UP (ref 27–34)
MCV RBC AUTO: 101.9 FL — HIGH (ref 80–100)
NRBC # BLD: 0 /100 WBCS — SIGNIFICANT CHANGE UP (ref 0–0)
PLATELET # BLD AUTO: 319 K/UL — SIGNIFICANT CHANGE UP (ref 150–400)
POTASSIUM SERPL-MCNC: 4 MMOL/L — SIGNIFICANT CHANGE UP (ref 3.5–5.3)
POTASSIUM SERPL-SCNC: 4 MMOL/L — SIGNIFICANT CHANGE UP (ref 3.5–5.3)
RBC # BLD: 2.67 M/UL — LOW (ref 3.8–5.2)
RBC # FLD: 15.8 % — HIGH (ref 10.3–14.5)
SODIUM SERPL-SCNC: 141 MMOL/L — SIGNIFICANT CHANGE UP (ref 135–145)
WBC # BLD: 8.13 K/UL — SIGNIFICANT CHANGE UP (ref 3.8–10.5)
WBC # FLD AUTO: 8.13 K/UL — SIGNIFICANT CHANGE UP (ref 3.8–10.5)

## 2020-11-23 PROCEDURE — 99232 SBSQ HOSP IP/OBS MODERATE 35: CPT | Mod: GC

## 2020-11-23 RX ORDER — INSULIN LISPRO 100/ML
VIAL (ML) SUBCUTANEOUS
Refills: 0 | Status: DISCONTINUED | OUTPATIENT
Start: 2020-11-23 | End: 2020-11-28

## 2020-11-23 RX ORDER — GABAPENTIN 400 MG/1
100 CAPSULE ORAL AT BEDTIME
Refills: 0 | Status: DISCONTINUED | OUTPATIENT
Start: 2020-11-23 | End: 2020-11-28

## 2020-11-23 RX ORDER — ACETAMINOPHEN 500 MG
975 TABLET ORAL EVERY 8 HOURS
Refills: 0 | Status: DISCONTINUED | OUTPATIENT
Start: 2020-11-23 | End: 2020-11-28

## 2020-11-23 RX ADMIN — INSULIN GLARGINE 25 UNIT(S): 100 INJECTION, SOLUTION SUBCUTANEOUS at 21:58

## 2020-11-23 RX ADMIN — SENNA PLUS 2 TABLET(S): 8.6 TABLET ORAL at 21:58

## 2020-11-23 RX ADMIN — ENOXAPARIN SODIUM 40 MILLIGRAM(S): 100 INJECTION SUBCUTANEOUS at 11:19

## 2020-11-23 RX ADMIN — Medication 975 MILLIGRAM(S): at 05:09

## 2020-11-23 RX ADMIN — Medication 1 PATCH: at 11:23

## 2020-11-23 RX ADMIN — LISINOPRIL 20 MILLIGRAM(S): 2.5 TABLET ORAL at 05:09

## 2020-11-23 RX ADMIN — Medication 1 MILLIGRAM(S): at 11:19

## 2020-11-23 RX ADMIN — Medication 10 MILLIGRAM(S): at 11:19

## 2020-11-23 RX ADMIN — Medication 1 PATCH: at 20:01

## 2020-11-23 RX ADMIN — GABAPENTIN 100 MILLIGRAM(S): 400 CAPSULE ORAL at 21:58

## 2020-11-23 RX ADMIN — AMLODIPINE BESYLATE 5 MILLIGRAM(S): 2.5 TABLET ORAL at 05:09

## 2020-11-23 RX ADMIN — Medication 1 PATCH: at 11:19

## 2020-11-23 NOTE — PROGRESS NOTE ADULT - ATTENDING COMMENTS
Chart reviewed. Patient seen at bedside  No new issues over weekend. Reports burning pain of both  feet at night that interferes with sleep.   Has soreness right hip.   Left heel scab has fallen off and now area open with yellow slough on base. D/W wound care nurse- Start medihoney    2. Labs with elevated BUN. Stool guaiac ordered. Hct stable    3. Continue rehab program.   Hospitalist chantel noted

## 2020-11-23 NOTE — PROGRESS NOTE ADULT - ASSESSMENT
JULIEN CASTELLANO is a 86yo F with PMHx of HTN, T2DM, RA, anemia, and depression, who presented to [facility] on 11/13/2020 with right-sided hip pain s/p fall, found to have intertrochanteric fracture of right hip, now s/p ORIF and IM nailing. Admitted for multidisciplinary rehab program.      #Comprehensive Multidisciplinary Rehab Program:  - Gait, ADL, Functional impairments   - PT/OT: 3 hours a day 5 days a week    #Right intertrochanteric hip fracture s/p ORIF and IM nailing  - pain regimen, as below  - Lovenox for AC  - WBAT    #T2DM  - c/w Lantus 25mg qhs  - c/w FS and ISS  - hgb A1C on admission    #Rising BUN  - SCr WNL  - stools light brown, as per patient  - check FOBT and continue to monitor  - encourage PO hydration    #HTN  - c/w amlodipine 5mg and lisinopril 20mg daily    #Depression  - c/w Paroxetine 10mg daily    #Tobacco use  - c/w Nicotine patch    #Sleep: Melatonin PRN    #Pain:  - Tylenol 975mg q8h PRN and Tramadol 50mg q4h PRN for moderate pain  - start gabapentin 100mg qhs    #GI/Bowel:  - Senna 2 tabs daily and Miralax BID PRN  - GI ppx: none    #/Bladder:  - Monitor PVR if no void in 8h; SC for >400 cc  - Toileting schedule q4h    #Diet:- Diet: regular, consistent carb and DASH/TLC  - Nutrition to follow    #Skin/ Pressure Injury Prevention:  - assessment on admission   - Incisions: right hip surgical incision  Left heel area of scab( patient reports prior ? cellulitis and was on abx), Off load heel  - wound care nursing f/u  - Turn Q2hrs in bed while awake, OOB to Chair, PT/OT    DVT prophylaxis:  - Lovenox 40mg daily  - SCDs    #Precautions/ Restrictions  - Falls  - Ortho:      Weight bearing status: WBAT     ROM restrictions: None  - COVID PCR: neg on 11/13    Interdisciplinary Team Rounds: 11/19  - able to ambulate 20' min assist with RW. Currently max assist with toileting and min-mod assist with stand-pivots  - goals are for Marta for most ADLs, except transfers supervision. Marta goal for ambulation  - TDD 11/28

## 2020-11-23 NOTE — PROGRESS NOTE ADULT - SUBJECTIVE AND OBJECTIVE BOX
JULIEN ESTES  85y  Female      patient states she is feeling better and ambulating better. she denies pain      REVIEW OF SYSTEMS:    Cardiac HTN  Pulmonary no cough/SOB/wheeze  GI no n/v/d/pain/constipation   no dysuria/hematuria  Neuro no dizziness/headache/numbness  Heme anemia  Endo DM  Musculoskeletal right hip fracture  Skin left hel wound    FAMILY HISTORY:    T(C): 36.7 (11-23-20 @ 08:56), Max: 36.7 (11-23-20 @ 08:56)  HR: 76 (11-23-20 @ 08:56) (69 - 80)  BP: 136/56 (11-23-20 @ 08:56) (130/59 - 144/55)  RR: 15 (11-23-20 @ 08:56) (15 - 16)  SpO2: 98% (11-23-20 @ 08:56) (94% - 98%)  Wt(kg): --Vital Signs Last 24 Hrs  T(C): 36.7 (23 Nov 2020 08:56), Max: 36.7 (23 Nov 2020 08:56)  T(F): 98.1 (23 Nov 2020 08:56), Max: 98.1 (23 Nov 2020 08:56)  HR: 76 (23 Nov 2020 08:56) (69 - 80)  BP: 136/56 (23 Nov 2020 08:56) (130/59 - 144/55)  BP(mean): --  RR: 15 (23 Nov 2020 08:56) (15 - 16)  SpO2: 98% (23 Nov 2020 08:56) (94% - 98%)  penicillins (Urticaria; Pruritus)      PHYSICAL EXAM:    General NAD  Neck no JVD/adenopathy  Heart RRR  Lungs clear  Abdomen non tender non distended normal bowel sounds no HSM/CVAT  Extremities s/p right hip surgery s/p right hip fracture no edema +pulses  Neurologic alert and oriented x 3 no acute focal changes  Skin stable stage 1 left heel wound without discharge/erythema      Consultant(s) Notes Reviewed:  [x ] YES  [ ] NO  Care Discussed with Consultants/Other Providers [ x] YES  [ ] NO    LABS:  CBC Full  -  ( 23 Nov 2020 06:20 )  WBC Count : 8.13 K/uL  RBC Count : 2.67 M/uL  Hemoglobin : 8.6 g/dL  Hematocrit : 27.2 %  Platelet Count - Automated : 319 K/uL  Mean Cell Volume : 101.9 fl  Mean Cell Hemoglobin : 32.2 pg  Mean Cell Hemoglobin Concentration : 31.6 gm/dL  Auto Neutrophil # : x  Auto Lymphocyte # : x  Auto Monocyte # : x  Auto Eosinophil # : x  Auto Basophil # : x  Auto Neutrophil % : x  Auto Lymphocyte % : x  Auto Monocyte % : x  Auto Eosinophil % : x  Auto Basophil % : x                          8.6    8.13  )-----------( 319      ( 23 Nov 2020 06:20 )             27.2     11-23    141  |  108  |  50<H>  ----------------------------<  103<H>  4.0   |  23  |  0.89    Ca    9.1      23 Nov 2020 06:20            CAPILLARY BLOOD GLUCOSE      POCT Blood Glucose.: 111 mg/dL (23 Nov 2020 08:05)          acetaminophen   Tablet .. 975 milliGRAM(s) Oral every 8 hours PRN  amLODIPine   Tablet 5 milliGRAM(s) Oral daily  dextrose 40% Gel 15 Gram(s) Oral once  dextrose 5%. 1000 milliLiter(s) IV Continuous <Continuous>  dextrose 5%. 1000 milliLiter(s) IV Continuous <Continuous>  dextrose 50% Injectable 25 Gram(s) IV Push once  dextrose 50% Injectable 12.5 Gram(s) IV Push once  dextrose 50% Injectable 25 Gram(s) IV Push once  enoxaparin Injectable 40 milliGRAM(s) SubCutaneous daily  folic acid 1 milliGRAM(s) Oral daily  gabapentin 100 milliGRAM(s) Oral at bedtime  glucagon  Injectable 1 milliGRAM(s) IntraMuscular once  insulin glargine Injectable (LANTUS) 25 Unit(s) SubCutaneous at bedtime  insulin lispro (ADMELOG) corrective regimen sliding scale   SubCutaneous with breakfast  lisinopril 20 milliGRAM(s) Oral daily  mupirocin 2% Ointment 1 Application(s) Topical once  nicotine -  14 mG/24Hr(s) Patch 1 Patch Transdermal daily  PARoxetine 10 milliGRAM(s) Oral daily  polyethylene glycol 3350 17 Gram(s) Oral daily  senna 2 Tablet(s) Oral at bedtime  sodium chloride 0.65% Nasal 1 Spray(s) Both Nostrils four times a day PRN  traMADol 50 milliGRAM(s) Oral every 4 hours PRN

## 2020-11-23 NOTE — PROGRESS NOTE ADULT - SUBJECTIVE AND OBJECTIVE BOX
DAILY PROGRESS NOTE:  HPI:  JULIEN CASTELLANO is a 86yo F with PMHx of HTN, T2DM, RA, anemia, and depression, who presented to [facility] on 2020 with right-sided hip pain s/p fall from bannister and landing on right knee. Patient was seen and examined in the ED; found to have right-sided intertrochanteric fracture on xray. Orthopaedic surgery was consulted and patient underwent ORIF with IM filiberto on . Pain well controlled during admission, but course was c/b podiatry consult for left heel ulceration, which was present previously. Podiatry recommended local daily wound care with Mupirocin and DSD; outpatient follow up with her own podiatrist following discharge. Xray left foot and ankle performed to r/o osteomyelitis.    Patient was evaluated by PM&R and therapy for functional deficits and gait/ ADL impairments and recommended acute rehabilitation. Patient was medically optimized for discharge to [facility] on 2020.    Patient was seen and examined on admission. Patient awake and alert; denied fever, chills, nausea, vomiting, or abdominal pain. Endorsed decreased appetite over past year with ~30lbs weight loss. Also endorsed TTP over left heel interfering with sleep. Feels nervous about starting therapy, but otherwise, no other complaints.  (2020 11:33)      Subjective:  Patient was seen and examined at the bedside this AM. No acute overnight events. Endorsed soreness in right hip. In addition, also endorses intermittent burning pain in b/l LEs; typically would resolve when home with ambulation. Reports no other complaints. Denied fever, SOB, or abdominal pain. BMs are regular and normal in color. Encouraged PO hydration.       Physical Exam:  Vital Signs Last 24 Hrs  T(C): 36.7 (2020 08:56), Max: 36.7 (2020 08:56)  T(F): 98.1 (2020 08:56), Max: 98.1 (2020 08:56)  HR: 76 (2020 08:56) (69 - 80)  BP: 136/56 (2020 08:56) (130/59 - 144/55)  RR: 15 (2020 08:56) (15 - 16)  SpO2: 98% (2020 08:56) (94% - 98%)      Constitutional - NAD, Comfortable  Chest - CTAB  Cardiovascular -S1S2  Abdomen - BS+, Soft  Extremities - No C/C/E, No calf tenderness   Motor UE 4/5, LE Left 4/5 except ADF 2/5, Right HF and KE limited ADF trace, PF 3/5     Psychiatric - Mood stable, Affect WNL  Skin: swelling +. Left heel with unstageable ulcer; scab has fallen off and area now appears larger than prior. Still TTP  Medial right thigh ecchymosis      Recent Labs/Imagin.6    8.13  )-----------( 319      ( 2020 06:20 )             27.2         141  |  108  |  50<H>  ----------------------------<  103<H>  4.0   |  23  |  0.89    Ca    9.1      2020 06:20    POCT Blood Glucose.: 111 mg/dL (20 @ 08:05)  POCT Blood Glucose.: 162 mg/dL (20 @ 21:08)      Medications:  acetaminophen   Tablet .. 975 milliGRAM(s) Oral every 8 hours  amLODIPine   Tablet 5 milliGRAM(s) Oral daily  dextrose 40% Gel 15 Gram(s) Oral once  dextrose 5%. 1000 milliLiter(s) IV Continuous <Continuous>  dextrose 5%. 1000 milliLiter(s) IV Continuous <Continuous>  dextrose 50% Injectable 25 Gram(s) IV Push once  dextrose 50% Injectable 12.5 Gram(s) IV Push once  dextrose 50% Injectable 25 Gram(s) IV Push once  enoxaparin Injectable 40 milliGRAM(s) SubCutaneous daily  folic acid 1 milliGRAM(s) Oral daily  glucagon  Injectable 1 milliGRAM(s) IntraMuscular once  insulin glargine Injectable (LANTUS) 25 Unit(s) SubCutaneous at bedtime  insulin lispro (ADMELOG) corrective regimen sliding scale   SubCutaneous Before meals and at bedtime  lisinopril 20 milliGRAM(s) Oral daily  mupirocin 2% Ointment 1 Application(s) Topical once  nicotine -  14 mG/24Hr(s) Patch 1 Patch Transdermal daily  PARoxetine 10 milliGRAM(s) Oral daily  polyethylene glycol 3350 17 Gram(s) Oral daily  senna 2 Tablet(s) Oral at bedtime  sodium chloride 0.65% Nasal 1 Spray(s) Both Nostrils four times a day PRN  traMADol 50 milliGRAM(s) Oral every 4 hours PRN

## 2020-11-23 NOTE — PROGRESS NOTE ADULT - ASSESSMENT
HTN - BP is stable. will continue present medications  Right Hip fracture - patient is clinically improving. will continue PT/rehab as per PMR  Type 2 DM - patient clinically stable. will continue present management and blood glucose montoring  Anemia - H/H is stable. patient is hemodynamically stable

## 2020-11-24 LAB
GLUCOSE BLDC GLUCOMTR-MCNC: 107 MG/DL — HIGH (ref 70–99)
GLUCOSE BLDC GLUCOMTR-MCNC: 123 MG/DL — HIGH (ref 70–99)
SARS-COV-2 RNA SPEC QL NAA+PROBE: SIGNIFICANT CHANGE UP

## 2020-11-24 PROCEDURE — 99232 SBSQ HOSP IP/OBS MODERATE 35: CPT | Mod: GC

## 2020-11-24 RX ADMIN — Medication 1 PATCH: at 11:25

## 2020-11-24 RX ADMIN — Medication 1 PATCH: at 19:36

## 2020-11-24 RX ADMIN — GABAPENTIN 100 MILLIGRAM(S): 400 CAPSULE ORAL at 22:04

## 2020-11-24 RX ADMIN — AMLODIPINE BESYLATE 5 MILLIGRAM(S): 2.5 TABLET ORAL at 06:38

## 2020-11-24 RX ADMIN — Medication 975 MILLIGRAM(S): at 22:07

## 2020-11-24 RX ADMIN — Medication 1 MILLIGRAM(S): at 11:24

## 2020-11-24 RX ADMIN — TRAMADOL HYDROCHLORIDE 50 MILLIGRAM(S): 50 TABLET ORAL at 09:27

## 2020-11-24 RX ADMIN — Medication 975 MILLIGRAM(S): at 07:36

## 2020-11-24 RX ADMIN — Medication 1 PATCH: at 11:22

## 2020-11-24 RX ADMIN — Medication 1 SPRAY(S): at 11:23

## 2020-11-24 RX ADMIN — SENNA PLUS 2 TABLET(S): 8.6 TABLET ORAL at 22:04

## 2020-11-24 RX ADMIN — Medication 975 MILLIGRAM(S): at 11:27

## 2020-11-24 RX ADMIN — Medication 10 MILLIGRAM(S): at 11:22

## 2020-11-24 RX ADMIN — LISINOPRIL 20 MILLIGRAM(S): 2.5 TABLET ORAL at 06:38

## 2020-11-24 RX ADMIN — TRAMADOL HYDROCHLORIDE 50 MILLIGRAM(S): 50 TABLET ORAL at 08:44

## 2020-11-24 RX ADMIN — Medication 975 MILLIGRAM(S): at 12:00

## 2020-11-24 RX ADMIN — Medication 975 MILLIGRAM(S): at 23:20

## 2020-11-24 RX ADMIN — Medication 1 PATCH: at 06:52

## 2020-11-24 RX ADMIN — INSULIN GLARGINE 25 UNIT(S): 100 INJECTION, SOLUTION SUBCUTANEOUS at 22:52

## 2020-11-24 RX ADMIN — ENOXAPARIN SODIUM 40 MILLIGRAM(S): 100 INJECTION SUBCUTANEOUS at 11:24

## 2020-11-24 RX ADMIN — TRAMADOL HYDROCHLORIDE 50 MILLIGRAM(S): 50 TABLET ORAL at 07:36

## 2020-11-24 NOTE — CHART NOTE - NSCHARTNOTEFT_GEN_A_CORE
Nutrition Follow Up Note  Hospital Course   (Per Electronic Medical Record)    Source:  Patient [X]  Medical Record [X]      Diet:   Consistent Carbohydrate, DASH-TLC Diet w/ Thin Liquids  Tolerates Diet Consistency Well  No Chewing/Swallowing Difficulties  No Recent Nausea, Vomiting, Diarrhea or Constipation (as Per Patient)  Consumes 70-85% of Meals (as Per Documentation)  on Phillip 1 Packet BID (Provides 180kcal & 28grams of Protein)  Patient Takes Nutrition Supplement   Obtained Food Preferences from Patient     Enteral/Parenteral Nutrition: Not Applicable    Current Weight: 160lb on 11/17  Obtain New Weight  Obtain Weights Weekly     Pertinent Medications: MEDICATIONS  (STANDING):  amLODIPine   Tablet 5 milliGRAM(s) Oral daily  dextrose 40% Gel 15 Gram(s) Oral once  dextrose 5%. 1000 milliLiter(s) (50 mL/Hr) IV Continuous <Continuous>  dextrose 5%. 1000 milliLiter(s) (100 mL/Hr) IV Continuous <Continuous>  dextrose 50% Injectable 25 Gram(s) IV Push once  dextrose 50% Injectable 12.5 Gram(s) IV Push once  dextrose 50% Injectable 25 Gram(s) IV Push once  enoxaparin Injectable 40 milliGRAM(s) SubCutaneous daily  folic acid 1 milliGRAM(s) Oral daily  gabapentin 100 milliGRAM(s) Oral at bedtime  glucagon  Injectable 1 milliGRAM(s) IntraMuscular once  insulin glargine Injectable (LANTUS) 25 Unit(s) SubCutaneous at bedtime  insulin lispro (ADMELOG) corrective regimen sliding scale   SubCutaneous with breakfast  lisinopril 20 milliGRAM(s) Oral daily  mupirocin 2% Ointment 1 Application(s) Topical once  nicotine -  14 mG/24Hr(s) Patch 1 Patch Transdermal daily  PARoxetine 10 milliGRAM(s) Oral daily  polyethylene glycol 3350 17 Gram(s) Oral daily  senna 2 Tablet(s) Oral at bedtime    MEDICATIONS  (PRN):  acetaminophen   Tablet .. 975 milliGRAM(s) Oral every 8 hours PRN Mild Pain (1 - 3)  sodium chloride 0.65% Nasal 1 Spray(s) Both Nostrils four times a day PRN Nasal Congestion  traMADol 50 milliGRAM(s) Oral every 4 hours PRN Moderate Pain (4 - 6)    Pertinent Labs:  11-23 Na141 mmol/L Glu 103 mg/dL<H> K+ 4.0 mmol/L Cr  0.89 mg/dL BUN 50 mg/dL<H> 11-18 Alb 2.6 g/dL<L>    Skin: Unstageable Pressure Ulcer on Left Heel    Edema: +1 Edema Noted to Right Hip   (as Per Documentation)     Last Bowel Movement: on 11/23    Estimated Needs:   [X] No Change Since Previous Assessment    Previous Nutrition Diagnosis:   Increased Nutrient Needs - Calories & Protein     Nutrition Diagnosis is [X] Ongoing - Continues on Nutrition Supplement & Patient Takes Nutrition Supplement Well    New Nutrition Diagnosis: [X] Not Applicable      Interventions:   1. Recommend Continue Nutrition Plan of Care     Monitoring & Evaluation:   [X] Weights   [X] PO Intake   [X] Skin Integrity   [X] Follow Up (Per Protocol)  [X] Tolerance to Diet Prescription   [X] Other: Labs & POCT    Registered Dietitian/Nutritionist Remains Available.  Js Stewart RDN    Pager # [phone]  Phone# [phone]

## 2020-11-24 NOTE — PROGRESS NOTE ADULT - ATTENDING COMMENTS
Chart reviewed. Patient seen at bedside  In good spirits this am, but felt tired by noon   Denies any new pain  Slept better with low dose gabapentin for neuropathic pain    2. Blood sugars good    3. Continue rehab program TDD 11/28  Discussed DVT prophyalxis with ortho Dr. Steinvurzel- Lovenox or ASA based on ambulation Chart reviewed. Patient seen at bedside  In good spirits this am, but felt tired by noon   Denies any new pain  Slept better with low dose gabapentin for neuropathic pain    2. Blood sugars good  Awaiting stool guaiac-  BMP in am     3. Continue rehab program TDD 11/28  Discussed DVT prophyalxis with ortho Dr. Burns- Lovenox or ASA based on ambulation

## 2020-11-24 NOTE — PROGRESS NOTE ADULT - SUBJECTIVE AND OBJECTIVE BOX
DAILY PROGRESS NOTE:  HPI:  JULIEN CASTELLANO is a 84yo F with PMHx of HTN, T2DM, RA, anemia, and depression, who presented to [facility] on 2020 with right-sided hip pain s/p fall from bannister and landing on right knee. Patient was seen and examined in the ED; found to have right-sided intertrochanteric fracture on xray. Orthopaedic surgery was consulted and patient underwent ORIF with IM filiberto on . Pain well controlled during admission, but course was c/b podiatry consult for left heel ulceration, which was present previously. Podiatry recommended local daily wound care with Mupirocin and DSD; outpatient follow up with her own podiatrist following discharge. Xray left foot and ankle performed to r/o osteomyelitis.    Patient was evaluated by PM&R and therapy for functional deficits and gait/ ADL impairments and recommended acute rehabilitation. Patient was medically optimized for discharge to [facility] on 2020.    Patient was seen and examined on admission. Patient awake and alert; denied fever, chills, nausea, vomiting, or abdominal pain. Endorsed decreased appetite over past year with ~30lbs weight loss. Also endorsed TTP over left heel interfering with sleep. Feels nervous about starting therapy, but otherwise, no other complaints.  (2020 11:33)      Subjective:  Patient was seen and examined at the bedside this AM. No acute overnight events.       Physical Exam:  Vital Signs Last 24 Hrs  T(C): 36.7 (2020 21:39), Max: 36.7 (2020 08:56)  T(F): 98 (2020 21:39), Max: 98.1 (2020 08:56)  HR: 89 (2020 06:47) (76 - 89)  BP: 120/68 (2020 06:47) (120/68 - 145/65)  RR: 15 (2020 21:39) (15 - 15)  SpO2: 94% (2020 21:39) (94% - 98%)      Constitutional - NAD, Comfortable  Chest - CTAB  Cardiovascular -S1S2  Abdomen - BS+, Soft  Extremities - No C/C/E, No calf tenderness   Motor UE 4/5, LE Left 4/5 except ADF 2/5, Right HF and KE limited ADF trace, PF 3/5     Psychiatric - Mood stable, Affect WNL  Skin: swelling +. Left heel with unstageable ulcer; scab has fallen off and area now appears larger than prior. Still TTP  Medial right thigh ecchymosis      Recent Labs/Imagin.6    8.13  )-----------( 319      ( 2020 06:20 )             27.2         141  |  108  |  50<H>  ----------------------------<  103<H>  4.0   |  23  |  0.89    Ca    9.1      2020 06:20    POCT Blood Glucose.: 107 mg/dL (20 @ 07:29)  POCT Blood Glucose.: 139 mg/dL (20 @ 21:52)  POCT Blood Glucose.: 111 mg/dL (20 @ 08:05)      Medications:  acetaminophen   Tablet .. 975 milliGRAM(s) Oral every 8 hours PRN  amLODIPine   Tablet 5 milliGRAM(s) Oral daily  dextrose 40% Gel 15 Gram(s) Oral once  dextrose 5%. 1000 milliLiter(s) IV Continuous <Continuous>  dextrose 5%. 1000 milliLiter(s) IV Continuous <Continuous>  dextrose 50% Injectable 25 Gram(s) IV Push once  dextrose 50% Injectable 12.5 Gram(s) IV Push once  dextrose 50% Injectable 25 Gram(s) IV Push once  enoxaparin Injectable 40 milliGRAM(s) SubCutaneous daily  folic acid 1 milliGRAM(s) Oral daily  gabapentin 100 milliGRAM(s) Oral at bedtime  glucagon  Injectable 1 milliGRAM(s) IntraMuscular once  insulin glargine Injectable (LANTUS) 25 Unit(s) SubCutaneous at bedtime  insulin lispro (ADMELOG) corrective regimen sliding scale   SubCutaneous with breakfast  lisinopril 20 milliGRAM(s) Oral daily  mupirocin 2% Ointment 1 Application(s) Topical once  nicotine -  14 mG/24Hr(s) Patch 1 Patch Transdermal daily  PARoxetine 10 milliGRAM(s) Oral daily  polyethylene glycol 3350 17 Gram(s) Oral daily  senna 2 Tablet(s) Oral at bedtime  sodium chloride 0.65% Nasal 1 Spray(s) Both Nostrils four times a day PRN  traMADol 50 milliGRAM(s) Oral every 4 hours PRN DAILY PROGRESS NOTE:  HPI:  JULIEN CASTELLANO is a 84yo F with PMHx of HTN, T2DM, RA, anemia, and depression, who presented to [facility] on 2020 with right-sided hip pain s/p fall from bannister and landing on right knee. Patient was seen and examined in the ED; found to have right-sided intertrochanteric fracture on xray. Orthopaedic surgery was consulted and patient underwent ORIF with IM filiberto on . Pain well controlled during admission, but course was c/b podiatry consult for left heel ulceration, which was present previously. Podiatry recommended local daily wound care with Mupirocin and DSD; outpatient follow up with her own podiatrist following discharge. Xray left foot and ankle performed to r/o osteomyelitis.    Patient was evaluated by PM&R and therapy for functional deficits and gait/ ADL impairments and recommended acute rehabilitation. Patient was medically optimized for discharge to [facility] on 2020.    Patient was seen and examined on admission. Patient awake and alert; denied fever, chills, nausea, vomiting, or abdominal pain. Endorsed decreased appetite over past year with ~30lbs weight loss. Also endorsed TTP over left heel interfering with sleep. Feels nervous about starting therapy, but otherwise, no other complaints.  (2020 11:33)      Subjective:  Patient was seen and examined at the bedside this AM. No acute overnight events. Burning in bilateral feet improved on Gabapentin 100mg qhs. Has some soreness in right hip area, but otherwise no other complaints. Having regular BMs; was reminded to inform nursing staff if she has BM today for FOBT sample. No coughing, SOB, abdominal pain.       Physical Exam:  Vital Signs Last 24 Hrs  T(C): 36.7 (2020 21:39), Max: 36.7 (2020 08:56)  T(F): 98 (2020 21:39), Max: 98.1 (2020 08:56)  HR: 89 (2020 06:47) (76 - 89)  BP: 120/68 (2020 06:47) (120/68 - 145/65)  RR: 15 (2020 21:39) (15 - 15)  SpO2: 94% (2020 21:39) (94% - 98%)      Constitutional - NAD, Comfortable  Chest - CTAB  Cardiovascular -S1S2  Abdomen - BS+, Soft  Extremities - No C/C/E, No calf tenderness   Motor UE 4/5, LE Left 4/5 except ADF 2/5, Right HF and KE limited ADF trace, PF 3/5     Psychiatric - Mood stable, Affect WNL  Skin: swelling +. Left heel with unstageable ulcer; scab has fallen off and area now appears larger than prior. Still TTP  Medial right thigh ecchymosis      Recent Labs/Imagin.6    8.13  )-----------( 319      ( 2020 06:20 )             27.2         141  |  108  |  50<H>  ----------------------------<  103<H>  4.0   |  23  |  0.89    Ca    9.1      2020 06:20    POCT Blood Glucose.: 107 mg/dL (20 @ 07:29)  POCT Blood Glucose.: 139 mg/dL (20 @ 21:52)  POCT Blood Glucose.: 111 mg/dL (20 @ 08:05)      Medications:  acetaminophen   Tablet .. 975 milliGRAM(s) Oral every 8 hours PRN  amLODIPine   Tablet 5 milliGRAM(s) Oral daily  dextrose 40% Gel 15 Gram(s) Oral once  dextrose 5%. 1000 milliLiter(s) IV Continuous <Continuous>  dextrose 5%. 1000 milliLiter(s) IV Continuous <Continuous>  dextrose 50% Injectable 25 Gram(s) IV Push once  dextrose 50% Injectable 12.5 Gram(s) IV Push once  dextrose 50% Injectable 25 Gram(s) IV Push once  enoxaparin Injectable 40 milliGRAM(s) SubCutaneous daily  folic acid 1 milliGRAM(s) Oral daily  gabapentin 100 milliGRAM(s) Oral at bedtime  glucagon  Injectable 1 milliGRAM(s) IntraMuscular once  insulin glargine Injectable (LANTUS) 25 Unit(s) SubCutaneous at bedtime  insulin lispro (ADMELOG) corrective regimen sliding scale   SubCutaneous with breakfast  lisinopril 20 milliGRAM(s) Oral daily  mupirocin 2% Ointment 1 Application(s) Topical once  nicotine -  14 mG/24Hr(s) Patch 1 Patch Transdermal daily  PARoxetine 10 milliGRAM(s) Oral daily  polyethylene glycol 3350 17 Gram(s) Oral daily  senna 2 Tablet(s) Oral at bedtime  sodium chloride 0.65% Nasal 1 Spray(s) Both Nostrils four times a day PRN  traMADol 50 milliGRAM(s) Oral every 4 hours PRN

## 2020-11-24 NOTE — PROGRESS NOTE ADULT - SUBJECTIVE AND OBJECTIVE BOX
JULIEN CASTELLANO  85y  Female    patient states she is currently feeling betyter. she complained of fatigue earlier today. she denies pain/SOB    REVIEW OF SYSTEMS:    Cardiac HTN  Pulmonary no cough/SOB  GI no n/v/d/pain/constipation   no dysuria/heamturia  Neuro neuropathy  musculoskeletal - right hip fracture/DDD/DJD  Skin - heel wound  heme anemia  Endo DM    FAMILY HISTORY:    T(C): 36.4 (11-24-20 @ 08:38), Max: 36.7 (11-23-20 @ 21:39)  HR: 95 (11-24-20 @ 08:38) (81 - 95)  BP: 131/63 (11-24-20 @ 08:38) (120/68 - 145/65)  RR: 16 (11-24-20 @ 08:38) (15 - 16)  SpO2: 99% (11-24-20 @ 08:38) (94% - 99%)  Wt(kg): --Vital Signs Last 24 Hrs  T(C): 36.4 (24 Nov 2020 08:38), Max: 36.7 (23 Nov 2020 21:39)  T(F): 97.5 (24 Nov 2020 08:38), Max: 98 (23 Nov 2020 21:39)  HR: 95 (24 Nov 2020 08:38) (81 - 95)  BP: 131/63 (24 Nov 2020 08:38) (120/68 - 145/65)  BP(mean): --  RR: 16 (24 Nov 2020 08:38) (15 - 16)  SpO2: 99% (24 Nov 2020 08:38) (94% - 99%)  penicillins (Urticaria; Pruritus)      PHYSICAL EXAM:    General NAD  Neck no JVD/adenopathy  Heart RRR  Lungs clear  Abdomen non tender non distended normal bowel sounds no HSM/CVAT  Extremities s/p right hip fracture/surgery no edema +pulses  Neurologic no acute focal changes alert and oriented x 3  Skin left heel stage 1 wound stable and without discharge/erythema      Consultant(s) Notes Reviewed:  [x ] YES  [ ] NO  Care Discussed with Consultants/Other Providers [ x] YES  [ ] NO    LABS:  CBC Full  -  ( 23 Nov 2020 06:20 )  WBC Count : 8.13 K/uL  RBC Count : 2.67 M/uL  Hemoglobin : 8.6 g/dL  Hematocrit : 27.2 %  Platelet Count - Automated : 319 K/uL  Mean Cell Volume : 101.9 fl  Mean Cell Hemoglobin : 32.2 pg  Mean Cell Hemoglobin Concentration : 31.6 gm/dL  Auto Neutrophil # : x  Auto Lymphocyte # : x  Auto Monocyte # : x  Auto Eosinophil # : x  Auto Basophil # : x  Auto Neutrophil % : x  Auto Lymphocyte % : x  Auto Monocyte % : x  Auto Eosinophil % : x  Auto Basophil % : x                          8.6    8.13  )-----------( 319      ( 23 Nov 2020 06:20 )             27.2     11-23    141  |  108  |  50<H>  ----------------------------<  103<H>  4.0   |  23  |  0.89    Ca    9.1      23 Nov 2020 06:20            CAPILLARY BLOOD GLUCOSE      POCT Blood Glucose.: 107 mg/dL (24 Nov 2020 07:29)    A1C with Estimated Average Glucose (11.15.20 @ 07:00)   A1C with Estimated Average Glucose Result: 6.2: Method: Immunoassay   Reference Range 4.0-5.6%       acetaminophen   Tablet .. 975 milliGRAM(s) Oral every 8 hours PRN  amLODIPine   Tablet 5 milliGRAM(s) Oral daily  dextrose 40% Gel 15 Gram(s) Oral once  dextrose 5%. 1000 milliLiter(s) IV Continuous <Continuous>  dextrose 5%. 1000 milliLiter(s) IV Continuous <Continuous>  dextrose 50% Injectable 25 Gram(s) IV Push once  dextrose 50% Injectable 12.5 Gram(s) IV Push once  dextrose 50% Injectable 25 Gram(s) IV Push once  enoxaparin Injectable 40 milliGRAM(s) SubCutaneous daily  folic acid 1 milliGRAM(s) Oral daily  gabapentin 100 milliGRAM(s) Oral at bedtime  glucagon  Injectable 1 milliGRAM(s) IntraMuscular once  insulin glargine Injectable (LANTUS) 25 Unit(s) SubCutaneous at bedtime  insulin lispro (ADMELOG) corrective regimen sliding scale   SubCutaneous with breakfast  lisinopril 20 milliGRAM(s) Oral daily  mupirocin 2% Ointment 1 Application(s) Topical once  nicotine -  14 mG/24Hr(s) Patch 1 Patch Transdermal daily  PARoxetine 10 milliGRAM(s) Oral daily  polyethylene glycol 3350 17 Gram(s) Oral daily  senna 2 Tablet(s) Oral at bedtime  sodium chloride 0.65% Nasal 1 Spray(s) Both Nostrils four times a day PRN  traMADol 50 milliGRAM(s) Oral every 4 hours PRN      HEALTH ISSUES - PROBLEM Dx:

## 2020-11-24 NOTE — PROGRESS NOTE ADULT - NSTELEHEALTH_GEN_ALL_CORE
Requested Prescriptions     Pending Prescriptions Disp Refills    ergocalciferol (ERGOCALCIFEROL) 1,250 mcg (50,000 unit) capsule 12 Cap 1
No

## 2020-11-24 NOTE — PROGRESS NOTE ADULT - ASSESSMENT
Left heel stage 1 wound - improving. daughter states wound is much improved. will continue local wound care. recommend out patient podiatry follow up. patient and daughter aware and agree  Right Hip fracture - patient is clinically improving. will continue rehab as per PMR  Type 2 DM - patient is clinically stable. will continue insulin regimen and follow ing blood glucose  HTN - BP is stable. will continue present medications  Anemia - patient is hemodynamically stable. H/H is stable

## 2020-11-24 NOTE — PROGRESS NOTE ADULT - ASSESSMENT
JULIEN CASTELLANO is a 86yo F with PMHx of HTN, T2DM, RA, anemia, and depression, who presented to [facility] on 11/13/2020 with right-sided hip pain s/p fall, found to have intertrochanteric fracture of right hip, now s/p ORIF and IM nailing. Admitted for multidisciplinary rehab program.      #Comprehensive Multidisciplinary Rehab Program:  - Gait, ADL, Functional impairments   - PT/OT: 3 hours a day 5 days a week    #Right intertrochanteric hip fracture s/p ORIF and IM nailing  - pain regimen, as below  - Lovenox for AC  - WBAT    #T2DM  - c/w Lantus 25mg qhs  - c/w FS and ISS  - hgb A1C on admission    #Rising BUN  - SCr WNL  - stools light brown, as per patient  - check FOBT and continue to monitor  - encourage PO hydration    #HTN  - c/w amlodipine 5mg and lisinopril 20mg daily    #Depression  - c/w Paroxetine 10mg daily    #Tobacco use  - c/w Nicotine patch    #Sleep: Melatonin PRN    #Pain:  - Tylenol 975mg q8h PRN and Tramadol 50mg q4h PRN for moderate pain  - c/w gabapentin 100mg qhs    #GI/Bowel:  - Senna 2 tabs daily and Miralax BID PRN  - GI ppx: none    #/Bladder:  - Monitor PVR if no void in 8h; SC for >400 cc  - Toileting schedule q4h    #Diet:- Diet: regular, consistent carb and DASH/TLC  - Nutrition to follow    #Skin/ Pressure Injury Prevention:  - assessment on admission   - Incisions: right hip surgical incision  Left heel area of scab( patient reports prior ? cellulitis and was on abx), Off load heel  - wound care nursing f/u; patient started on medihoney for heel wound  - Turn Q2hrs in bed while awake, OOB to Chair, PT/OT    DVT prophylaxis:  - Lovenox 40mg daily  - SCDs    #Precautions/ Restrictions  - Falls  - Ortho:      Weight bearing status: WBAT     ROM restrictions: None  - COVID PCR: neg on 11/13    Interdisciplinary Team Rounds: 11/19  - able to ambulate 20' min assist with RW. Currently max assist with toileting and min-mod assist with stand-pivots  - goals are for Marta for most ADLs, except transfers supervision. Marta goal for ambulation  - TDD 11/28 JULIEN CASTELLANO is a 86yo F with PMHx of HTN, T2DM, RA, anemia, and depression, who presented to [facility] on 11/13/2020 with right-sided hip pain s/p fall, found to have intertrochanteric fracture of right hip, now s/p ORIF and IM nailing. Admitted for multidisciplinary rehab program.      #Comprehensive Multidisciplinary Rehab Program:  - Gait, ADL, Functional impairments   - PT/OT: 3 hours a day 5 days a week    #Right intertrochanteric hip fracture s/p ORIF and IM nailing  - pain regimen, as below  - Lovenox for AC; per ortho will require total 6 weeks. Decision regarding Lovenox vs ASA to be made closer to discharge.  - WBAT    #T2DM  - c/w Lantus 25mg qhs  - c/w FS and ISS  - hgb A1C on admission    #Rising BUN  - SCr WNL  - stools light brown, as per patient  - check FOBT and continue to monitor  - encourage PO hydration    #HTN  - c/w amlodipine 5mg and lisinopril 20mg daily    #Depression  - c/w Paroxetine 10mg daily    #Tobacco use  - c/w Nicotine patch    #Sleep: Melatonin PRN    #Pain:  - Tylenol 975mg q8h PRN and Tramadol 50mg q4h PRN for moderate pain  - c/w gabapentin 100mg qhs    #GI/Bowel:  - Senna 2 tabs daily and Miralax BID PRN  - GI ppx: none    #/Bladder:  - Monitor PVR if no void in 8h; SC for >400 cc  - Toileting schedule q4h    #Diet:- Diet: regular, consistent carb and DASH/TLC  - Nutrition to follow    #Skin/ Pressure Injury Prevention:  - assessment on admission   - Incisions: right hip surgical incision  Left heel area of scab( patient reports prior ? cellulitis and was on abx), Off load heel  - wound care nursing f/u; patient started on medihoney for heel wound  - Turn Q2hrs in bed while awake, OOB to Chair, PT/OT    DVT prophylaxis:  - Lovenox 40mg daily  - SCDs    #Precautions/ Restrictions  - Falls  - Ortho:      Weight bearing status: WBAT     ROM restrictions: None  - COVID PCR: neg on 11/13    Interdisciplinary Team Rounds: 11/19  - able to ambulate 20' min assist with RW. Currently max assist with toileting and min-mod assist with stand-pivots  - goals are for Marta for most ADLs, except transfers supervision. Marta goal for ambulation  - TDD 11/28

## 2020-11-25 LAB
ANION GAP SERPL CALC-SCNC: 9 MMOL/L — SIGNIFICANT CHANGE UP (ref 5–17)
BUN SERPL-MCNC: 51 MG/DL — HIGH (ref 7–23)
CALCIUM SERPL-MCNC: 9.3 MG/DL — SIGNIFICANT CHANGE UP (ref 8.4–10.5)
CHLORIDE SERPL-SCNC: 111 MMOL/L — HIGH (ref 96–108)
CO2 SERPL-SCNC: 24 MMOL/L — SIGNIFICANT CHANGE UP (ref 22–31)
CREAT SERPL-MCNC: 1.06 MG/DL — SIGNIFICANT CHANGE UP (ref 0.5–1.3)
GLUCOSE BLDC GLUCOMTR-MCNC: 120 MG/DL — HIGH (ref 70–99)
GLUCOSE BLDC GLUCOMTR-MCNC: 121 MG/DL — HIGH (ref 70–99)
GLUCOSE SERPL-MCNC: 103 MG/DL — HIGH (ref 70–99)
OB PNL STL: NEGATIVE — SIGNIFICANT CHANGE UP
POTASSIUM SERPL-MCNC: 4.4 MMOL/L — SIGNIFICANT CHANGE UP (ref 3.5–5.3)
POTASSIUM SERPL-SCNC: 4.4 MMOL/L — SIGNIFICANT CHANGE UP (ref 3.5–5.3)
SODIUM SERPL-SCNC: 144 MMOL/L — SIGNIFICANT CHANGE UP (ref 135–145)

## 2020-11-25 PROCEDURE — 99232 SBSQ HOSP IP/OBS MODERATE 35: CPT

## 2020-11-25 RX ADMIN — Medication 975 MILLIGRAM(S): at 09:34

## 2020-11-25 RX ADMIN — Medication 1 PATCH: at 17:14

## 2020-11-25 RX ADMIN — INSULIN GLARGINE 25 UNIT(S): 100 INJECTION, SOLUTION SUBCUTANEOUS at 21:53

## 2020-11-25 RX ADMIN — GABAPENTIN 100 MILLIGRAM(S): 400 CAPSULE ORAL at 21:53

## 2020-11-25 RX ADMIN — Medication 1 PATCH: at 12:16

## 2020-11-25 RX ADMIN — Medication 1 PATCH: at 12:19

## 2020-11-25 RX ADMIN — Medication 1 MILLIGRAM(S): at 12:16

## 2020-11-25 RX ADMIN — Medication 1 PATCH: at 08:18

## 2020-11-25 RX ADMIN — AMLODIPINE BESYLATE 5 MILLIGRAM(S): 2.5 TABLET ORAL at 06:31

## 2020-11-25 RX ADMIN — ENOXAPARIN SODIUM 40 MILLIGRAM(S): 100 INJECTION SUBCUTANEOUS at 12:16

## 2020-11-25 RX ADMIN — LISINOPRIL 20 MILLIGRAM(S): 2.5 TABLET ORAL at 06:31

## 2020-11-25 RX ADMIN — Medication 975 MILLIGRAM(S): at 08:54

## 2020-11-25 RX ADMIN — Medication 10 MILLIGRAM(S): at 12:16

## 2020-11-25 NOTE — PROGRESS NOTE ADULT - SUBJECTIVE AND OBJECTIVE BOX
JULIEN ESTES  85y  Female      patient states she is feeling better today and was able to climb stairs. patient denies pain      REVIEW OF SYSTEMS:    Cardiac HTN  Pulmonary no cough/SOB/wheeze  GI no pain/constipation/n/v/d   no dysuria/hematuria  Neuro neuropathy  Endo DM  Heme anemia  Musculoskeletal right hip fracture  Skin heel wound    FAMILY HISTORY:    T(C): 36.3 (11-25-20 @ 07:50), Max: 36.8 (11-24-20 @ 19:53)  HR: 103 (11-25-20 @ 07:50) (82 - 103)  BP: 127/59 (11-25-20 @ 07:50) (126/60 - 160/63)  RR: 16 (11-25-20 @ 07:50) (16 - 16)  SpO2: 100% (11-25-20 @ 07:50) (98% - 100%)  Wt(kg): --Vital Signs Last 24 Hrs  T(C): 36.3 (25 Nov 2020 07:50), Max: 36.8 (24 Nov 2020 19:53)  T(F): 97.3 (25 Nov 2020 07:50), Max: 98.3 (24 Nov 2020 19:53)  HR: 103 (25 Nov 2020 07:50) (82 - 103)  BP: 127/59 (25 Nov 2020 07:50) (126/60 - 160/63)  BP(mean): --  RR: 16 (25 Nov 2020 07:50) (16 - 16)  SpO2: 100% (25 Nov 2020 07:50) (98% - 100%)  penicillins (Urticaria; Pruritus)      PHYSICAL EXAM:    General NAD  Neck no JVD/adenopathy  Heart RRR  Lungs clear  Abdomen non tender non distended normal bowel sounds no HSM/CVAT  Extremities s/p right hip fracture no edema +pulses   Neurologic alert and oriented x 3 no acute focal changes  Skin left heel stage 1 wound stable      Consultant(s) Notes Reviewed:  [x ] YES  [ ] NO  Care Discussed with Consultants/Other Providers [ x] YES  [ ] NO    LABS:      11-25    144  |  111<H>  |  51<H>  ----------------------------<  103<H>  4.4   |  24  |  1.06    Ca    9.3      25 Nov 2020 05:25            CAPILLARY BLOOD GLUCOSE      POCT Blood Glucose.: 121 mg/dL (25 Nov 2020 07:41)    acetaminophen   Tablet .. 975 milliGRAM(s) Oral every 8 hours PRN  amLODIPine   Tablet 5 milliGRAM(s) Oral daily  dextrose 40% Gel 15 Gram(s) Oral once  dextrose 5%. 1000 milliLiter(s) IV Continuous <Continuous>  dextrose 5%. 1000 milliLiter(s) IV Continuous <Continuous>  dextrose 50% Injectable 25 Gram(s) IV Push once  dextrose 50% Injectable 12.5 Gram(s) IV Push once  dextrose 50% Injectable 25 Gram(s) IV Push once  enoxaparin Injectable 40 milliGRAM(s) SubCutaneous daily  folic acid 1 milliGRAM(s) Oral daily  gabapentin 100 milliGRAM(s) Oral at bedtime  glucagon  Injectable 1 milliGRAM(s) IntraMuscular once  insulin glargine Injectable (LANTUS) 25 Unit(s) SubCutaneous at bedtime  insulin lispro (ADMELOG) corrective regimen sliding scale   SubCutaneous with breakfast  lisinopril 20 milliGRAM(s) Oral daily  mupirocin 2% Ointment 1 Application(s) Topical once  nicotine -  14 mG/24Hr(s) Patch 1 Patch Transdermal daily  PARoxetine 10 milliGRAM(s) Oral daily  polyethylene glycol 3350 17 Gram(s) Oral daily  senna 2 Tablet(s) Oral at bedtime  sodium chloride 0.65% Nasal 1 Spray(s) Both Nostrils four times a day PRN

## 2020-11-25 NOTE — PROGRESS NOTE ADULT - SUBJECTIVE AND OBJECTIVE BOX
DAILY PROGRESS NOTE:  HPI:  JULIEN CASTELLANO is a 86yo F with PMHx of HTN, T2DM, RA, anemia, and depression, who presented to [facility] on 2020 with right-sided hip pain s/p fall from bannister and landing on right knee. Patient was seen and examined in the ED; found to have right-sided intertrochanteric fracture on xray. Orthopaedic surgery was consulted and patient underwent ORIF with IM filiberto on . Pain well controlled during admission, but course was c/b podiatry consult for left heel ulceration, which was present previously. Podiatry recommended local daily wound care with Mupirocin and DSD; outpatient follow up with her own podiatrist following discharge. Xray left foot and ankle performed to r/o osteomyelitis.    Patient was evaluated by PM&R and therapy for functional deficits and gait/ ADL impairments and recommended acute rehabilitation. Patient was medically optimized for discharge to [facility] on 2020.    Patient was seen and examined on admission. Patient awake and alert; denied fever, chills, nausea, vomiting, or abdominal pain. Endorsed decreased appetite over past year with ~30lbs weight loss. Also endorsed TTP over left heel interfering with sleep. Feels nervous about starting therapy, but otherwise, no other complaints.  (2020 11:33)      Subjective:  Patient was seen and examined at the bedside this AM. No acute overnight events.   Pain well controlled, But mood appears depressed      No BM for 2 day  Denies cough/dizziness/abdominal pain or nausea        Physical Exam:  Vital Signs Last 24 Hrs  T(C): 36.3 (2020 07:50), Max: 36.8 (2020 19:53)  T(F): 97.3 (2020 07:50), Max: 98.3 (2020 19:53)  HR: 103 (2020 07:50) (82 - 103)  BP: 127/59 (2020 07:50) (126/60 - 160/63)  BP(mean): --  RR: 16 (2020 07:50) (16 - 16)  SpO2: 100% (2020 07:50) (98% - 100%)        Constitutional - NAD, Comfortable  Chest - CTAB  Cardiovascular -S1S2  Abdomen - BS+, Soft  Extremities - No C/C/E, No calf tenderness   Motor UE 4/5, LE Left 4/5 except ADF 2/5, Right HF and KE limited ADF trace, PF 3/5     Psychiatric - Mood stable, Affect WNL  Skin: swelling +. Left heel with unstageable ulcer; scab has fallen off and area now appears larger than prior. Still TTP  Medial right thigh ecchymosis      Recent Labs/Imagin.6    8.13  )-----------( 319      ( 2020 06:20 )             27.2     11-    141  |  108  |  50<H>  ----------------------------<  103<H>  4.0   |  23  |  0.89    Ca    9.1      2020 06:20    CAPILLARY BLOOD GLUCOSE      POCT Blood Glucose.: 121 mg/dL (2020 07:41)  POCT Blood Glucose.: 123 mg/dL (2020 21:46)      MEDICATIONS  (STANDING):  amLODIPine   Tablet 5 milliGRAM(s) Oral daily  dextrose 40% Gel 15 Gram(s) Oral once  dextrose 5%. 1000 milliLiter(s) (50 mL/Hr) IV Continuous <Continuous>  dextrose 5%. 1000 milliLiter(s) (100 mL/Hr) IV Continuous <Continuous>  dextrose 50% Injectable 25 Gram(s) IV Push once  dextrose 50% Injectable 12.5 Gram(s) IV Push once  dextrose 50% Injectable 25 Gram(s) IV Push once  enoxaparin Injectable 40 milliGRAM(s) SubCutaneous daily  folic acid 1 milliGRAM(s) Oral daily  gabapentin 100 milliGRAM(s) Oral at bedtime  glucagon  Injectable 1 milliGRAM(s) IntraMuscular once  insulin glargine Injectable (LANTUS) 25 Unit(s) SubCutaneous at bedtime  insulin lispro (ADMELOG) corrective regimen sliding scale   SubCutaneous with breakfast  lisinopril 20 milliGRAM(s) Oral daily  mupirocin 2% Ointment 1 Application(s) Topical once  nicotine -  14 mG/24Hr(s) Patch 1 Patch Transdermal daily  PARoxetine 10 milliGRAM(s) Oral daily  polyethylene glycol 3350 17 Gram(s) Oral daily  senna 2 Tablet(s) Oral at bedtime    MEDICATIONS  (PRN):  acetaminophen   Tablet .. 975 milliGRAM(s) Oral every 8 hours PRN Mild Pain (1 - 3)  sodium chloride 0.65% Nasal 1 Spray(s) Both Nostrils four times a day PRN Nasal Congestion

## 2020-11-25 NOTE — PROGRESS NOTE ADULT - ASSESSMENT
HTN - BP is stable. will continue medications  Right hip fracture - patient is improving. will continue rehab  Type 2 DM - patient is clinically stable. will continue insulin and blood glucose monitoring  Anemia - patient is clinically stable.  Stage 1 heel wound  - wound is stable. will continue local wound care

## 2020-11-25 NOTE — PROGRESS NOTE ADULT - ASSESSMENT
JULIEN CASTELLANO is a 84yo F with PMHx of HTN, T2DM, RA, anemia, and depression, who presented to [facility] on 11/13/2020 with right-sided hip pain s/p fall, found to have intertrochanteric fracture of right hip, now s/p ORIF and IM nailing. Admitted for multidisciplinary rehab program.      #Comprehensive Multidisciplinary Rehab Program:  - Gait, ADL, Functional impairments   - PT/OT: 3 hours a day 5 days a week    #Right intertrochanteric hip fracture s/p ORIF and IM nailing  - pain regimen, as below  - Lovenox for AC; per ortho will require total 6 weeks. Decision regarding Lovenox vs ASA to be made closer to discharge.  - WBAT    #T2DM  - c/w Lantus 25mg qhs  - c/w FS and ISS  - hgb A1C on admission    #Rising BUN  - SCr WNL  - stools light brown, as per patient  - check FOBT and continue to monitor- pending sample collection   - encourage PO hydration    #HTN:- c/w amlodipine 5mg and lisinopril 20mg daily    #Depression: c/w Paroxetine 10mg daily    #Tobacco use:- c/w Nicotine patch    #Sleep: Melatonin PRN    #Pain:- Tylenol 975mg q8h PRN and Tramadol 50mg q4h PRN for moderate pain  - c/w gabapentin 100mg qhs    #GI/Bowel:  - Senna 2 tabs daily and Miralax BID PRN  - GI ppx: none    #/Bladder:toileting schedule q4h    #Diet:- Diet: regular, consistent carb and DASH/TLC  - Nutrition to follow    #Skin/ Pressure Injury Prevention:  - assessment on admission   - Incisions: right hip surgical incision  Left heel area of scab( patient reports prior ? cellulitis and was on abx), Off load heel  - wound care nursing f/u; patient started on medihoney for heel wound  - Turn Q2hrs in bed while awake, OOB to Chair, PT/OT    DVT prophylaxis:  - Lovenox 40mg daily  - SCDs    #Precautions/ Restrictions  - Falls  - Ortho: WBAT     ROM restrictions: None  - COVID PCR: neg on 11/13    Interdisciplinary Team Rounds: 11/25  - Progressing well in therapy  Notes reviewed  - TDD 11/28

## 2020-11-26 LAB
ANION GAP SERPL CALC-SCNC: 9 MMOL/L — SIGNIFICANT CHANGE UP (ref 5–17)
BUN SERPL-MCNC: 57 MG/DL — HIGH (ref 7–23)
CALCIUM SERPL-MCNC: 9.2 MG/DL — SIGNIFICANT CHANGE UP (ref 8.4–10.5)
CHLORIDE SERPL-SCNC: 106 MMOL/L — SIGNIFICANT CHANGE UP (ref 96–108)
CO2 SERPL-SCNC: 25 MMOL/L — SIGNIFICANT CHANGE UP (ref 22–31)
CREAT SERPL-MCNC: 1.07 MG/DL — SIGNIFICANT CHANGE UP (ref 0.5–1.3)
GLUCOSE BLDC GLUCOMTR-MCNC: 119 MG/DL — HIGH (ref 70–99)
GLUCOSE BLDC GLUCOMTR-MCNC: 128 MG/DL — HIGH (ref 70–99)
GLUCOSE SERPL-MCNC: 102 MG/DL — HIGH (ref 70–99)
HCT VFR BLD CALC: 29.6 % — LOW (ref 34.5–45)
HGB BLD-MCNC: 9.2 G/DL — LOW (ref 11.5–15.5)
MCHC RBC-ENTMCNC: 31.1 GM/DL — LOW (ref 32–36)
MCHC RBC-ENTMCNC: 32.2 PG — SIGNIFICANT CHANGE UP (ref 27–34)
MCV RBC AUTO: 103.5 FL — HIGH (ref 80–100)
NRBC # BLD: 0 /100 WBCS — SIGNIFICANT CHANGE UP (ref 0–0)
PLATELET # BLD AUTO: 321 K/UL — SIGNIFICANT CHANGE UP (ref 150–400)
POTASSIUM SERPL-MCNC: 4.4 MMOL/L — SIGNIFICANT CHANGE UP (ref 3.5–5.3)
POTASSIUM SERPL-SCNC: 4.4 MMOL/L — SIGNIFICANT CHANGE UP (ref 3.5–5.3)
RBC # BLD: 2.86 M/UL — LOW (ref 3.8–5.2)
RBC # FLD: 15.8 % — HIGH (ref 10.3–14.5)
SODIUM SERPL-SCNC: 140 MMOL/L — SIGNIFICANT CHANGE UP (ref 135–145)
WBC # BLD: 8.73 K/UL — SIGNIFICANT CHANGE UP (ref 3.8–10.5)
WBC # FLD AUTO: 8.73 K/UL — SIGNIFICANT CHANGE UP (ref 3.8–10.5)

## 2020-11-26 PROCEDURE — 99232 SBSQ HOSP IP/OBS MODERATE 35: CPT

## 2020-11-26 RX ORDER — TRAMADOL HYDROCHLORIDE 50 MG/1
50 TABLET ORAL EVERY 4 HOURS
Refills: 0 | Status: DISCONTINUED | OUTPATIENT
Start: 2020-11-26 | End: 2020-11-28

## 2020-11-26 RX ADMIN — ENOXAPARIN SODIUM 40 MILLIGRAM(S): 100 INJECTION SUBCUTANEOUS at 12:41

## 2020-11-26 RX ADMIN — AMLODIPINE BESYLATE 5 MILLIGRAM(S): 2.5 TABLET ORAL at 05:49

## 2020-11-26 RX ADMIN — Medication 1 PATCH: at 07:04

## 2020-11-26 RX ADMIN — Medication 1 PATCH: at 12:44

## 2020-11-26 RX ADMIN — LISINOPRIL 20 MILLIGRAM(S): 2.5 TABLET ORAL at 05:49

## 2020-11-26 RX ADMIN — Medication 975 MILLIGRAM(S): at 12:41

## 2020-11-26 RX ADMIN — Medication 975 MILLIGRAM(S): at 13:30

## 2020-11-26 RX ADMIN — INSULIN GLARGINE 25 UNIT(S): 100 INJECTION, SOLUTION SUBCUTANEOUS at 21:09

## 2020-11-26 RX ADMIN — Medication 10 MILLIGRAM(S): at 12:41

## 2020-11-26 RX ADMIN — Medication 1 MILLIGRAM(S): at 12:41

## 2020-11-26 RX ADMIN — GABAPENTIN 100 MILLIGRAM(S): 400 CAPSULE ORAL at 21:08

## 2020-11-26 RX ADMIN — Medication 1 PATCH: at 12:41

## 2020-11-26 RX ADMIN — SENNA PLUS 2 TABLET(S): 8.6 TABLET ORAL at 21:08

## 2020-11-26 NOTE — PROGRESS NOTE ADULT - SUBJECTIVE AND OBJECTIVE BOX
JULIEN CASTELLANO  85y  Female      patient states she is feeling stronger and is without pain      REVIEW OF SYSTEMS:    Cardiac HTN  Pulmonary no cough/SOB  GI no n/v/d/constipation/pain   no hematuria/dysuria  Neuro neuropathy  Heme anemia  Musculoskeletal right hip fracture/DDD  Heme anemia  Skin heel wound    FAMILY HISTORY:    T(C): 36.3 (11-26-20 @ 08:04), Max: 36.6 (11-25-20 @ 22:19)  HR: 96 (11-26-20 @ 08:04) (87 - 96)  BP: 146/63 (11-26-20 @ 08:04) (126/65 - 146/63)  RR: 16 (11-26-20 @ 08:04) (16 - 16)  SpO2: 97% (11-26-20 @ 08:04) (97% - 98%)  Wt(kg): --Vital Signs Last 24 Hrs  T(C): 36.3 (26 Nov 2020 08:04), Max: 36.6 (25 Nov 2020 22:19)  T(F): 97.4 (26 Nov 2020 08:04), Max: 97.8 (25 Nov 2020 22:19)  HR: 96 (26 Nov 2020 08:04) (87 - 96)  BP: 146/63 (26 Nov 2020 08:04) (126/65 - 146/63)  BP(mean): --  RR: 16 (26 Nov 2020 08:04) (16 - 16)  SpO2: 97% (26 Nov 2020 08:04) (97% - 98%)  penicillins (Urticaria; Pruritus)      PHYSICAL EXAM:    General NAD  Neck no JVD/adenopathy  Heart RRR  Lungs clear  Abdomen non tender non distended normal bowel sounds no HSM/CVAT  Extremities s/p right hip fracture no edema +pulses  Neurologic no acute focal changes alert and oriented x 3  Skin stable stage 1 left heel wound           LABS:  CBC Full  -  ( 26 Nov 2020 06:48 )  WBC Count : 8.73 K/uL  RBC Count : 2.86 M/uL  Hemoglobin : 9.2 g/dL  Hematocrit : 29.6 %  Platelet Count - Automated : 321 K/uL  Mean Cell Volume : 103.5 fl  Mean Cell Hemoglobin : 32.2 pg  Mean Cell Hemoglobin Concentration : 31.1 gm/dL  Auto Neutrophil # : x  Auto Lymphocyte # : x  Auto Monocyte # : x  Auto Eosinophil # : x  Auto Basophil # : x  Auto Neutrophil % : x  Auto Lymphocyte % : x  Auto Monocyte % : x  Auto Eosinophil % : x  Auto Basophil % : x                          9.2    8.73  )-----------( 321      ( 26 Nov 2020 06:48 )             29.6     11-26    140  |  106  |  57<H>  ----------------------------<  102<H>  4.4   |  25  |  1.07    Ca    9.2      26 Nov 2020 06:48            CAPILLARY BLOOD GLUCOSE      POCT Blood Glucose.: 119 mg/dL (26 Nov 2020 07:43      acetaminophen   Tablet .. 975 milliGRAM(s) Oral every 8 hours PRN  amLODIPine   Tablet 5 milliGRAM(s) Oral daily  dextrose 40% Gel 15 Gram(s) Oral once  dextrose 5%. 1000 milliLiter(s) IV Continuous <Continuous>  dextrose 5%. 1000 milliLiter(s) IV Continuous <Continuous>  dextrose 50% Injectable 25 Gram(s) IV Push once  dextrose 50% Injectable 12.5 Gram(s) IV Push once  dextrose 50% Injectable 25 Gram(s) IV Push once  enoxaparin Injectable 40 milliGRAM(s) SubCutaneous daily  folic acid 1 milliGRAM(s) Oral daily  gabapentin 100 milliGRAM(s) Oral at bedtime  glucagon  Injectable 1 milliGRAM(s) IntraMuscular once  insulin glargine Injectable (LANTUS) 25 Unit(s) SubCutaneous at bedtime  insulin lispro (ADMELOG) corrective regimen sliding scale   SubCutaneous with breakfast  lisinopril 20 milliGRAM(s) Oral daily  mupirocin 2% Ointment 1 Application(s) Topical once  nicotine -  14 mG/24Hr(s) Patch 1 Patch Transdermal daily  PARoxetine 10 milliGRAM(s) Oral daily  polyethylene glycol 3350 17 Gram(s) Oral daily  senna 2 Tablet(s) Oral at bedtime  sodium chloride 0.65% Nasal 1 Spray(s) Both Nostrils four times a day PRN      HEALTH ISSUES - PROBLEM Dx:

## 2020-11-26 NOTE — PROGRESS NOTE ADULT - ASSESSMENT
Anemia - patient is hemodynamically stable. H/H is stable  Type 2 DM - patient is clinically stable. will continue blood glucose monitoring and present medications  Stage 1 left heel wound - patient is stabe. will continue local wound care  HTN - BP is stable. will continue present medications  Right hip fracture - patient is clinically stable. will continue rehab as per PMR

## 2020-11-26 NOTE — PROGRESS NOTE ADULT - SUBJECTIVE AND OBJECTIVE BOX
DAILY PROGRESS NOTE:  HPI:  JULIEN CASTELLANO is a 84yo F with PMHx of HTN, T2DM, RA, anemia, and depression, who presented to [facility] on 2020 with right-sided hip pain s/p fall from bannister and landing on right knee. Patient was seen and examined in the ED; found to have right-sided intertrochanteric fracture on xray. Orthopaedic surgery was consulted and patient underwent ORIF with IM filiberto on . Pain well controlled during admission, but course was c/b podiatry consult for left heel ulceration, which was present previously. Podiatry recommended local daily wound care with Mupirocin and DSD; outpatient follow up with her own podiatrist following discharge. Xray left foot and ankle performed to r/o osteomyelitis.    Patient was evaluated by PM&R and therapy for functional deficits and gait/ ADL impairments and recommended acute rehabilitation. Patient was medically optimized for discharge to [facility] on 2020.    Patient was seen and examined on admission. Patient awake and alert; denied fever, chills, nausea, vomiting, or abdominal pain. Endorsed decreased appetite over past year with ~30lbs weight loss. Also endorsed TTP over left heel interfering with sleep. Feels nervous about starting therapy, but otherwise, no other complaints.  (2020 11:33)      Subjective:  Patient was seen and examined at the bedside this AM. No acute overnight events.   States that she feels well  Denies any new issues   Right hip soreness +    + BM  Denies cough/dizziness/abdominal pain or nausea        Physical Exam:  Vital Signs Last 24 Hrs    Vital Signs Last 24 Hrs  T(C): 36.3 (2020 08:04), Max: 36.6 (2020 22:19)  T(F): 97.4 (2020 08:04), Max: 97.8 (2020 22:19)  HR: 96 (2020 08:04) (87 - 96)  BP: 146/63 (2020 08:04) (126/65 - 146/63)  BP(mean): --  RR: 16 (2020 08:04) (16 - 16)  SpO2: 97% (2020 08:04) (97% - 98%)      Constitutional - NAD, Comfortable, Appears pale  Chest - CTAB  Cardiovascular -S1S2  Abdomen - BS+, Soft  Extremities - No C/C/E, No calf tenderness   Motor UE 4/5, LE Left 4/5 except ADF 2/5, Right HF and KE limited ADF trace, PF 3/5     Psychiatric - Mood stable, Affect WNL  Skin: swelling +. Left heel with unstageable ulcer; scab has fallen off and area now appears larger than prior. Still TTP  Medial right thigh ecchymosis    Function: CS/min assist for transfers      MEDICATIONS  (STANDING):  amLODIPine   Tablet 5 milliGRAM(s) Oral daily  dextrose 40% Gel 15 Gram(s) Oral once  dextrose 5%. 1000 milliLiter(s) (50 mL/Hr) IV Continuous <Continuous>  dextrose 5%. 1000 milliLiter(s) (100 mL/Hr) IV Continuous <Continuous>  dextrose 50% Injectable 25 Gram(s) IV Push once  dextrose 50% Injectable 12.5 Gram(s) IV Push once  dextrose 50% Injectable 25 Gram(s) IV Push once  enoxaparin Injectable 40 milliGRAM(s) SubCutaneous daily  folic acid 1 milliGRAM(s) Oral daily  gabapentin 100 milliGRAM(s) Oral at bedtime  glucagon  Injectable 1 milliGRAM(s) IntraMuscular once  insulin glargine Injectable (LANTUS) 25 Unit(s) SubCutaneous at bedtime  insulin lispro (ADMELOG) corrective regimen sliding scale   SubCutaneous with breakfast  lisinopril 20 milliGRAM(s) Oral daily  mupirocin 2% Ointment 1 Application(s) Topical once  nicotine -  14 mG/24Hr(s) Patch 1 Patch Transdermal daily  PARoxetine 10 milliGRAM(s) Oral daily  polyethylene glycol 3350 17 Gram(s) Oral daily  senna 2 Tablet(s) Oral at bedtime    MEDICATIONS  (PRN):  acetaminophen   Tablet .. 975 milliGRAM(s) Oral every 8 hours PRN Mild Pain (1 - 3)  sodium chloride 0.65% Nasal 1 Spray(s) Both Nostrils four times a day PRN Nasal Congestion  traMADol 50 milliGRAM(s) Oral every 4 hours PRN Moderate Pain (4 - 6)            Recent Labs/Imagin.2    8.73  )-----------( 321      ( 2020 06:48 )             29.6     -    140  |  106  |  57<H>  ----------------------------<  102<H>  4.4   |  25  |  1.07    Ca    9.2      2020 06:48       CAPILLARY BLOOD GLUCOSE      POCT Blood Glucose.: 119 mg/dL (2020 07:43)  POCT Blood Glucose.: 120 mg/dL (2020 21:51)

## 2020-11-26 NOTE — PROGRESS NOTE ADULT - ASSESSMENT
JULIEN CASTELLANO is a 84yo F with PMHx of HTN, T2DM, RA, anemia, and depression, who presented to  on 11/13/2020 with right-sided hip pain s/p fall, found to have intertrochanteric fracture of right hip, now s/p ORIF and IM nailing. Admitted for multidisciplinary rehab program.      #Comprehensive Multidisciplinary Rehab Program:  - PT/OT: 3 hours a day 5 days a week   Lovenox for AC; per ortho will require total 6 weeks. Decision regarding Lovenox vs ASA to be made closer to discharge.      T2DM  - c/w Lantus 25mg qhs  - c/w FS and ISS    Rising BUN  - SCr WNL  - stools light brown, as per patient  -awaiting stool occult  - encourage PO hydration    HTN:- c/w amlodipine 5mg and lisinopril 20mg daily    Depression: c/w Paroxetine 10mg daily    Tobacco use:- c/w Nicotine patch    Sleep: Melatonin PRN    Pain:- Tylenol 975mg q8h PRN and Tramadol 50mg q4h PRN for moderate pain  - c/w gabapentin 100mg qhs    GI/Bowel:- Senna 2 tabs daily and Miralax BID PRN  - GI ppx: none    /Bladder:toileting schedule q4h    Diet:- Diet: regular, consistent carb and DASH/TLC  - Nutrition to follow    Skin/ Pressure Injury Prevention:- Incisions: right hip surgical incision  Left heel area of scab offf load heel- improving overall  - wound care nursing f/u; patient started on medihoney for heel wound  - Turn Q2hrs in bed while awake, OOB to Chair, PT/OT    DVT prophylaxis:  - Lovenox 40mg daily  - SCDs    #Precautions/ Restrictions  - Falls  - Ortho: WBAT     ROM restrictions: None  - COVID PCR: neg on 11/13    Interdisciplinary Team Rounds: 11/25  - Progressing well in therapy  Family training in am   - TDD 11/28

## 2020-11-27 ENCOUNTER — TRANSCRIPTION ENCOUNTER (OUTPATIENT)
Age: 85
End: 2020-11-27

## 2020-11-27 LAB
ANION GAP SERPL CALC-SCNC: 9 MMOL/L — SIGNIFICANT CHANGE UP (ref 5–17)
BUN SERPL-MCNC: 56 MG/DL — HIGH (ref 7–23)
CALCIUM SERPL-MCNC: 9.5 MG/DL — SIGNIFICANT CHANGE UP (ref 8.4–10.5)
CHLORIDE SERPL-SCNC: 105 MMOL/L — SIGNIFICANT CHANGE UP (ref 96–108)
CO2 SERPL-SCNC: 25 MMOL/L — SIGNIFICANT CHANGE UP (ref 22–31)
CREAT SERPL-MCNC: 1.18 MG/DL — SIGNIFICANT CHANGE UP (ref 0.5–1.3)
GLUCOSE BLDC GLUCOMTR-MCNC: 105 MG/DL — HIGH (ref 70–99)
GLUCOSE BLDC GLUCOMTR-MCNC: 184 MG/DL — HIGH (ref 70–99)
GLUCOSE SERPL-MCNC: 220 MG/DL — HIGH (ref 70–99)
POTASSIUM SERPL-MCNC: 4.7 MMOL/L — SIGNIFICANT CHANGE UP (ref 3.5–5.3)
POTASSIUM SERPL-SCNC: 4.7 MMOL/L — SIGNIFICANT CHANGE UP (ref 3.5–5.3)
SODIUM SERPL-SCNC: 139 MMOL/L — SIGNIFICANT CHANGE UP (ref 135–145)

## 2020-11-27 PROCEDURE — 99232 SBSQ HOSP IP/OBS MODERATE 35: CPT

## 2020-11-27 RX ORDER — TRAMADOL HYDROCHLORIDE 50 MG/1
1 TABLET ORAL
Qty: 15 | Refills: 0
Start: 2020-11-27 | End: 2020-12-01

## 2020-11-27 RX ORDER — ENOXAPARIN SODIUM 100 MG/ML
40 INJECTION SUBCUTANEOUS
Qty: 1120 | Refills: 0
Start: 2020-11-27 | End: 2020-12-24

## 2020-11-27 RX ORDER — AMLODIPINE BESYLATE 2.5 MG/1
1 TABLET ORAL
Qty: 0 | Refills: 0 | DISCHARGE
Start: 2020-11-27 | End: 2020-12-26

## 2020-11-27 RX ORDER — ENOXAPARIN SODIUM 100 MG/ML
40 INJECTION SUBCUTANEOUS
Qty: 0 | Refills: 0 | DISCHARGE
Start: 2020-11-27 | End: 2020-12-24

## 2020-11-27 RX ORDER — TRAMADOL HYDROCHLORIDE 50 MG/1
1 TABLET ORAL
Qty: 18 | Refills: 0
Start: 2020-11-27 | End: 2020-11-29

## 2020-11-27 RX ORDER — GABAPENTIN 400 MG/1
1 CAPSULE ORAL
Qty: 30 | Refills: 0
Start: 2020-11-27 | End: 2020-12-26

## 2020-11-27 RX ORDER — AMLODIPINE BESYLATE 2.5 MG/1
1 TABLET ORAL
Qty: 30 | Refills: 0
Start: 2020-11-27 | End: 2020-12-26

## 2020-11-27 RX ORDER — ACETAMINOPHEN 500 MG
3 TABLET ORAL
Qty: 0 | Refills: 0 | DISCHARGE
Start: 2020-11-27

## 2020-11-27 RX ORDER — ISOPROPYL ALCOHOL, BENZOCAINE .7; .06 ML/ML; ML/ML
1 SWAB TOPICAL
Qty: 100 | Refills: 1
Start: 2020-11-27 | End: 2021-01-15

## 2020-11-27 RX ADMIN — Medication 1 PATCH: at 11:20

## 2020-11-27 RX ADMIN — Medication 1 PATCH: at 07:40

## 2020-11-27 RX ADMIN — Medication 975 MILLIGRAM(S): at 09:00

## 2020-11-27 RX ADMIN — Medication 1 PATCH: at 20:16

## 2020-11-27 RX ADMIN — AMLODIPINE BESYLATE 5 MILLIGRAM(S): 2.5 TABLET ORAL at 05:38

## 2020-11-27 RX ADMIN — SENNA PLUS 2 TABLET(S): 8.6 TABLET ORAL at 21:28

## 2020-11-27 RX ADMIN — Medication 1 MILLIGRAM(S): at 11:21

## 2020-11-27 RX ADMIN — GABAPENTIN 100 MILLIGRAM(S): 400 CAPSULE ORAL at 21:32

## 2020-11-27 RX ADMIN — LISINOPRIL 20 MILLIGRAM(S): 2.5 TABLET ORAL at 05:38

## 2020-11-27 RX ADMIN — Medication 10 MILLIGRAM(S): at 11:21

## 2020-11-27 RX ADMIN — Medication 1 PATCH: at 11:22

## 2020-11-27 RX ADMIN — INSULIN GLARGINE 25 UNIT(S): 100 INJECTION, SOLUTION SUBCUTANEOUS at 21:30

## 2020-11-27 RX ADMIN — Medication 975 MILLIGRAM(S): at 08:23

## 2020-11-27 RX ADMIN — Medication 1 PATCH: at 00:02

## 2020-11-27 RX ADMIN — ENOXAPARIN SODIUM 40 MILLIGRAM(S): 100 INJECTION SUBCUTANEOUS at 11:21

## 2020-11-27 NOTE — PROGRESS NOTE ADULT - ASSESSMENT
JULIEN CASTELLANO is a 86yo F with PMHx of HTN, T2DM, RA, anemia, and depression, who presented to [facility] on 11/13/2020 with right-sided hip pain s/p fall, found to have intertrochanteric fracture of right hip, now s/p ORIF and IM nailing. Admitted for multidisciplinary rehab program.      #Comprehensive Multidisciplinary Rehab Program:  - PT/OT: 3 hours a day 5 days a week   Lovenox for AC; per ortho will require total 6 weeks. Most likely continue Lovenox for now as patient reports stomach issues with ASA. Will d/c daughter who is a physician      T2DM:- c/w Lantus 25mg qhs  - c/w FS and ISS    Rising BUN- Appears pre-renal   - SCr WNL  - stools light brown, as per patient  Stool occult negative  - encourage PO hydration    HTN:- c/w amlodipine 5mg and lisinopril 20mg daily    Depression: c/w Paroxetine 10mg daily    Tobacco use:- c/w Nicotine patch    Sleep: Melatonin PRN    Pain:- Tylenol 975mg q8h PRN and Tramadol 50mg q4h PRN for moderate pain  - c/w gabapentin 100mg qhs    GI/Bowel:- Senna 2 tabs daily and Miralax BID PRN  - GI ppx: none    /Bladder:toileting schedule q4h    Diet:- Diet: regular, consistent carb and DASH/TLC  - Nutrition to follow    Skin/ Pressure Injury Prevention:- Incisions: right hip surgical incision  Left heel area of scab offf load heel- improving overall  - wound care nursing f/u; patient started on medihoney for heel wound  - Turn Q2hrs in bed while awake, OOB to Chair, PT/OT    DVT prophylaxis:  - Lovenox 40mg daily  - SCDs    #Precautions/ Restrictions  - Falls  - Ortho: WBAT     ROM restrictions: None  - COVID PCR: neg on 11/13    Interdisciplinary Team Rounds: 11/25  - Progressing well in therapy  Family training today  - TDD 11/28

## 2020-11-27 NOTE — PROGRESS NOTE ADULT - SUBJECTIVE AND OBJECTIVE BOX
DAILY PROGRESS NOTE:  HPI:  JULIEN CASTELLANO is a 86yo F with PMHx of HTN, T2DM, RA, anemia, and depression, who presented to [facility] on 11/13/2020 with right-sided hip pain s/p fall from bannister and landing on right knee. Patient was seen and examined in the ED; found to have right-sided intertrochanteric fracture on xray. Orthopaedic surgery was consulted and patient underwent ORIF with IM filiberto on 11/14. Pain well controlled during admission, but course was c/b podiatry consult for left heel ulceration, which was present previously. Podiatry recommended local daily wound care with Mupirocin and DSD; outpatient follow up with her own podiatrist following discharge. Xray left foot and ankle performed to r/o osteomyelitis.    Patient was evaluated by PM&R and therapy for functional deficits and gait/ ADL impairments and recommended acute rehabilitation. Patient was medically optimized for discharge to [facility] on 11/17/2020.    Patient was seen and examined on admission. Patient awake and alert; denied fever, chills, nausea, vomiting, or abdominal pain. Endorsed decreased appetite over past year with ~30lbs weight loss. Also endorsed TTP over left heel interfering with sleep. Feels nervous about starting therapy, but otherwise, no other complaints.  (17 Nov 2020 11:33)      Subjective:  Patient was seen and examined at the bedside this AM. No acute overnight events.   States that she feels well    + BM11/25  Denies cough/dizziness/abdominal pain or nausea        Physical Exam:  Vital Signs Last 24 Hrs  T(C): 36.4 (27 Nov 2020 08:37), Max: 36.7 (26 Nov 2020 19:53)  T(F): 97.5 (27 Nov 2020 08:37), Max: 98.1 (26 Nov 2020 19:53)  HR: 100 (27 Nov 2020 08:37) (85 - 100)  BP: 123/62 (27 Nov 2020 08:37) (110/67 - 130/67)  BP(mean): --  RR: 17 (27 Nov 2020 08:37) (16 - 17)  SpO2: 97% (27 Nov 2020 08:37) (96% - 97%)        Constitutional - NAD, Comfortable, Appears pale  Chest - CTAB  Cardiovascular -S1S2  Abdomen - BS+, Soft  Extremities - No C/C/E, No calf tenderness   Motor UE 4/5, LE Left 4/5 except ADF 2/5, Right HF and KE limited ADF trace, PF 3/5     Psychiatric - Mood stable, Affect WNL  Skin: swelling +. Left heel with unstageable ulcer; scab has fallen off and area now appears larger than prior. Still TTP  Medial right thigh ecchymosis    Function: CS/min assist for transfers    MEDICATIONS  (STANDING):  amLODIPine   Tablet 5 milliGRAM(s) Oral daily  dextrose 40% Gel 15 Gram(s) Oral once  dextrose 5%. 1000 milliLiter(s) (50 mL/Hr) IV Continuous <Continuous>  dextrose 5%. 1000 milliLiter(s) (100 mL/Hr) IV Continuous <Continuous>  dextrose 50% Injectable 25 Gram(s) IV Push once  dextrose 50% Injectable 12.5 Gram(s) IV Push once  dextrose 50% Injectable 25 Gram(s) IV Push once  enoxaparin Injectable 40 milliGRAM(s) SubCutaneous daily  folic acid 1 milliGRAM(s) Oral daily  gabapentin 100 milliGRAM(s) Oral at bedtime  glucagon  Injectable 1 milliGRAM(s) IntraMuscular once  insulin glargine Injectable (LANTUS) 25 Unit(s) SubCutaneous at bedtime  insulin lispro (ADMELOG) corrective regimen sliding scale   SubCutaneous with breakfast  lisinopril 20 milliGRAM(s) Oral daily  mupirocin 2% Ointment 1 Application(s) Topical once  nicotine -  14 mG/24Hr(s) Patch 1 Patch Transdermal daily  PARoxetine 10 milliGRAM(s) Oral daily  polyethylene glycol 3350 17 Gram(s) Oral daily  senna 2 Tablet(s) Oral at bedtime    MEDICATIONS  (PRN):  acetaminophen   Tablet .. 975 milliGRAM(s) Oral every 8 hours PRN Mild Pain (1 - 3)  sodium chloride 0.65% Nasal 1 Spray(s) Both Nostrils four times a day PRN Nasal Congestion  traMADol 50 milliGRAM(s) Oral every 4 hours PRN Moderate Pain (4 - 6)                    Recent Labs/Imaging:    Occult Blood, Feces (11.23.20 @ 08:10)    Occult Blood, Feces: Negative    11-27    139  |  105  |  56<H>  ----------------------------<  220<H>  4.7   |  25  |  1.18    Ca    9.5      27 Nov 2020 09:10                                         9.2    8.73  )-----------( 321      ( 26 Nov 2020 06:48 )             29.6     11-26    140  |  106  |  57<H>  ----------------------------<  102<H>  4.4   |  25  |  1.07    Ca    9.2      26 Nov 2020 06:48       CAPILLARY BLOOD GLUCOSE      POCT Blood Glucose.: 105 mg/dL (27 Nov 2020 07:42)  POCT Blood Glucose.: 128 mg/dL (26 Nov 2020 21:08)

## 2020-11-27 NOTE — DISCHARGE NOTE NURSING/CASE MANAGEMENT/SOCIAL WORK - NSDCPEEMAIL_GEN_ALL_CORE
[facility] for Tobacco Control email [email or web address]

## 2020-11-27 NOTE — DISCHARGE NOTE PROVIDER - NSDCCPCAREPLAN_GEN_ALL_CORE_FT
PRINCIPAL DISCHARGE DIAGNOSIS  Diagnosis: Hip fracture, right  Assessment and Plan of Treatment: You were admitted to [facility] after undergoing surgery for a right hip fracture. You will be sent home with Lovenox; please continue to administer this medication, as prescribed. Following discharge, please have close follow up with your PMD and Dr. Burns, the orthopaedic surgeon.

## 2020-11-27 NOTE — DISCHARGE NOTE PROVIDER - NSDCMRMEDTOKEN_GEN_ALL_CORE_FT
acetaminophen 325 mg oral tablet: 3 tab(s) orally every 8 hours  amLODIPine 5 mg oral tablet: 1 tab(s) orally once a day  benazepril 20 mg oral tablet: 1 tab(s) orally once a day  enoxaparin: 40 milligram(s) subcutaneous once a day  folic acid 1 mg oral tablet: 1 tab(s) orally once a day  Lantus 100 units/mL subcutaneous solution: 25 -30 unit(s) subcutaneous once a day (at bedtime)  metFORMIN 850 mg oral tablet: 2 tab(s) orally once a day (in the evening)  mupirocin 2% topical ointment: 1 application topically once  nicotine 14 mg/24 hr transdermal film, extended release: 1 patch transdermal once a day  Paxil 10 mg oral tablet: 1 tab(s) orally once a day  polyethylene glycol 3350 oral powder for reconstitution: 17 gram(s) orally once a day, As needed, Constipation  senna oral tablet: 1 tab(s) orally once a day (at bedtime)  traMADol 50 mg oral tablet: 1 tab(s) orally every 4 hours, As needed, Moderate Pain (4 - 6)   acetaminophen 325 mg oral tablet: 3 tab(s) orally every 8 hours, As needed, Mild Pain (1 - 3)  amLODIPine 5 mg oral tablet: 1 tab(s) orally once a day  benazepril 20 mg oral tablet: 1 tab(s) orally once a day  folic acid 1 mg oral tablet: 1 tab(s) orally once a day  Lantus 100 units/mL subcutaneous solution: 25 -30 unit(s) subcutaneous once a day (at bedtime)  metFORMIN 850 mg oral tablet: 2 tab(s) orally once a day (in the evening)  Paxil 10 mg oral tablet: 1 tab(s) orally once a day  polyethylene glycol 3350 oral powder for reconstitution: 17 gram(s) orally once a day, As needed, Constipation  senna oral tablet: 1 tab(s) orally once a day (at bedtime)   acetaminophen 325 mg oral tablet: 3 tab(s) orally every 8 hours, As needed, Mild Pain (1 - 3)  alcohol swabs : Apply topically to affected area 4 times a day   amLODIPine 5 mg oral tablet: 1 tab(s) orally once a day  benazepril 20 mg oral tablet: 1 tab(s) orally once a day  enoxaparin 40 mg/0.4 mL injectable solution: 40 milligram(s) subcutaneously once a day   folic acid 1 mg oral tablet: 1 tab(s) orally once a day  glucometer (per patient&#x27;s insurance): Test blood sugars four times a day. Dispense #1 glucometer.  lancets: 1 application subcutaneously 4 times a day   Lantus 100 units/mL subcutaneous solution: 25 -30 unit(s) subcutaneous once a day (at bedtime)  metFORMIN 850 mg oral tablet: 2 tab(s) orally once a day (in the evening)  Neurontin 100 mg oral capsule: 1 cap(s) orally once a day (at bedtime)  Paxil 10 mg oral tablet: 1 tab(s) orally once a day  polyethylene glycol 3350 oral powder for reconstitution: 17 gram(s) orally once a day, As needed, Constipation  senna oral tablet: 1 tab(s) orally once a day (at bedtime)  traMADol 50 mg oral tablet: 1 tab(s) orally every 8 hours, As Needed -Moderate Pain (4 - 6) MDD:3 times/day

## 2020-11-27 NOTE — DISCHARGE NOTE PROVIDER - CARE PROVIDER_API CALL
Nelson Burns  ORTHOPAEDIC SPORTS MEDICINE  [address], [address]  [place] [address]  Phone: [phone]  Fax: [phone]  Follow Up Time:     Uri Noble  INTERNAL MEDICINE  [address], [address]  [place], [place] [id number]  Phone: [phone]  Fax: [phone]  Follow Up Time:

## 2020-11-27 NOTE — DISCHARGE NOTE PROVIDER - HOSPITAL COURSE
JULIEN CASTELLANO is a 86yo F with PMHx of HTN, T2DM, RA, anemia, and depression, who presented to [facility] on 11/13/2020 with right-sided hip pain s/p fall from bannister and landing on right knee. Patient was seen and examined in the ED; found to have right-sided intertrochanteric fracture on xray. Orthopaedic surgery was consulted and patient underwent ORIF with IM filiberto on 11/14. Pain well controlled during admission, but course was c/b podiatry consult for left heel ulceration, which was present previously. Podiatry recommended local daily wound care with Mupirocin and DSD; outpatient follow up with her own podiatrist following discharge. Xray left foot and ankle performed to r/o osteomyelitis.    Patient was evaluated by PM&R and therapy for functional deficits and gait/ ADL impairments and recommended acute rehabilitation. Patient was medically optimized for discharge to [facility] on 11/17/2020.    Patient was seen and examined on admission. Patient awake and alert; denied fever, chills, nausea, vomiting, or abdominal pain. Endorsed decreased appetite over past year with ~30lbs weight loss. Also endorsed TTP over left heel interfering with sleep. Feels nervous about starting therapy, but otherwise, no other complaints.     Rehab Course was unremarkable. Family training on 11/26. All other medical co-morbidities were stable. Patient tolerated course of inpatient PT/OT/SLP rehab with significant functional improvements and met rehab goals prior to discharge. Patient was medically cleared on 11/28/2020 for discharged to home.    Patient will follow up with the following:   JULIEN CASTELLANO is a 84yo F with PMHx of HTN, T2DM, RA, anemia, and depression, who presented to [facility] on 11/13/2020 with right-sided hip pain s/p fall from bannister and landing on right knee. Patient was seen and examined in the ED; found to have right-sided intertrochanteric fracture on xray. Orthopaedic surgery was consulted and patient underwent ORIF with IM filiberto on 11/14. Pain well controlled during admission, but course was c/b podiatry consult for left heel ulceration, which was present previously. Podiatry recommended local daily wound care with Mupirocin and DSD; outpatient follow up with her own podiatrist following discharge. Xray left foot and ankle performed to r/o osteomyelitis.    Patient was evaluated by PM&R and therapy for functional deficits and gait/ ADL impairments and recommended acute rehabilitation. Patient was medically optimized for discharge to [facility] on 11/17/2020.    Patient was seen and examined on admission. Patient awake and alert; denied fever, chills, nausea, vomiting, or abdominal pain. Endorsed decreased appetite over past year with ~30lbs weight loss. Also endorsed TTP over left heel interfering with sleep. Feels nervous about starting therapy, but otherwise, no other complaints.     Rehab Course was unremarkable. Family training on 11/26. All other medical co-morbidities were stable. Patient tolerated course of inpatient PT/OT/SLP rehab with significant functional improvements and met rehab goals prior to discharge. Patient was medically cleared on 11/28/2020 for discharged to home. Patient to be sent home on Lovenox to complete 6-weeks of DVT prophylaxis.     Patient will follow up with the following:   JULIEN CASTELLANO is a 86yo F with PMHx of HTN, T2DM, RA, anemia, and depression, who presented to [facility] on 11/13/2020 with right-sided hip pain s/p fall from bannister and landing on right knee. Patient was seen and examined in the ED; found to have right-sided intertrochanteric fracture on xray. Orthopaedic surgery was consulted and patient underwent ORIF with IM filiberto on 11/14. Pain well controlled during admission, but course was c/b podiatry consult for left heel ulceration, which was present previously. Podiatry recommended local daily wound care with Mupirocin and DSD; outpatient follow up with her own podiatrist following discharge. Xray left foot and ankle performed to r/o osteomyelitis.    Patient was evaluated by PM&R and therapy for functional deficits and gait/ ADL impairments and recommended acute rehabilitation. Patient was medically optimized for discharge to [facility] on 11/17/2020.    Patient was seen and examined on admission. Patient awake and alert; denied fever, chills, nausea, vomiting, or abdominal pain. Endorsed decreased appetite over past year with ~30lbs weight loss. Also endorsed TTP over left heel interfering with sleep. Feels nervous about starting therapy, but otherwise, no other complaints.     Rehab Course was unremarkable. Family training on 11/26. All other medical co-morbidities were stable. Patient tolerated course of inpatient PT/OT/SLP rehab with significant functional improvements and met rehab goals prior to discharge. Patient was medically cleared on 11/28/2020 for discharged to home. Patient to be sent home on Lovenox to complete 6-weeks of DVT prophylaxis.     Patient will follow up with the following:  PMD  Dr. Nelson Burns

## 2020-11-27 NOTE — DISCHARGE NOTE NURSING/CASE MANAGEMENT/SOCIAL WORK - PATIENT PORTAL LINK FT
You can access the FollowMyHealth Patient Portal offered by [facility] by registering at the following website: [email or web address]. By joining [employer]’s FollowMyHealth portal, you will also be able to view your health information using other applications (apps) compatible with our system.

## 2020-11-27 NOTE — PROGRESS NOTE ADULT - ASSESSMENT
Type 2 DM - patient is clinically stable. will continue medicine regimen and blood glucose monitoring  Right hip fracture - patient is clinically stable. will continue rehab  Anemia - patient is hemodynamically stable. H.H is stable  Stage 1 left heel wound - wound is stable. will continue local wound care. recommend out patient podiatry follow up. patient and family are aware  HTN - BP is stable. will continue present medications

## 2020-11-27 NOTE — DISCHARGE NOTE PROVIDER - NSDCFUADDINST_GEN_ALL_CORE_FT
Please keep your recommended appointments  Please continue dressing of left heel wound as recommended with medihoney daily after cleaning with normal saline and cover with dry dressing and cling .  Call your PCP if any increased redness or drainage noted.     You will continue LOvenox 40mg subcutaneously once daily as prophylaxis to prevent a blood clot for atleast another 4 weeks.. Please do not OTC Aspirin. advil, motrin or similar pain meds unless approved by your PCP.

## 2020-11-27 NOTE — DISCHARGE NOTE PROVIDER - NSDCACTIVITY_GEN_ALL_CORE
Do not drive or operate machinery/Do not make important decisions/Stairs allowed/Showering allowed/Walking - Outdoors allowed/Walking - Indoors allowed/No heavy lifting/straining

## 2020-11-27 NOTE — PROGRESS NOTE ADULT - SUBJECTIVE AND OBJECTIVE BOX
JULIEN CASTELLANO  85y  Female      patient states she is feeling well and is without pain      REVIEW OF SYSTEMS:    Cardiac HTN  Pulmonary no SOB no cough no wheeze  GI no nausea/vomiting no abdominal pain   no dysuria no hematuria  Neuro neuropathy  Endo DM  Heme anemia  skin heel wound  Musculoskeletal hip fracture/DDD    FAMILY HISTORY:    T(C): 36.4 (11-27-20 @ 08:37), Max: 36.7 (11-26-20 @ 19:53)  HR: 100 (11-27-20 @ 08:37) (85 - 100)  BP: 123/62 (11-27-20 @ 08:37) (110/67 - 130/67)  RR: 17 (11-27-20 @ 08:37) (16 - 17)  SpO2: 97% (11-27-20 @ 08:37) (96% - 97%)  Wt(kg): --Vital Signs Last 24 Hrs  T(C): 36.4 (27 Nov 2020 08:37), Max: 36.7 (26 Nov 2020 19:53)  T(F): 97.5 (27 Nov 2020 08:37), Max: 98.1 (26 Nov 2020 19:53)  HR: 100 (27 Nov 2020 08:37) (85 - 100)  BP: 123/62 (27 Nov 2020 08:37) (110/67 - 130/67)  BP(mean): --  RR: 17 (27 Nov 2020 08:37) (16 - 17)  SpO2: 97% (27 Nov 2020 08:37) (96% - 97%)  penicillins (Urticaria; Pruritus)      PHYSICAL EXAM:    General NAD  Neck no JVD no adenopathy thyroid stable  Heart RRR  Lungs clear  Abdomen non tender non distended normal bowel sounds no HSM/CVAT  Extremities no edema +pulses s/p right hip fracture/surgery  Neurologic no acute focal changes alert and oriented x 3  Skin left heel stage 1 wound stable      Consultant(s) Notes Reviewed:  [x ] YES  [ ] NO  Care Discussed with Consultants/Other Providers [ x] YES  [ ] NO    LABS:  CBC Full  -  ( 26 Nov 2020 06:48 )  WBC Count : 8.73 K/uL  RBC Count : 2.86 M/uL  Hemoglobin : 9.2 g/dL  Hematocrit : 29.6 %  Platelet Count - Automated : 321 K/uL  Mean Cell Volume : 103.5 fl  Mean Cell Hemoglobin : 32.2 pg  Mean Cell Hemoglobin Concentration : 31.1 gm/dL  Auto Neutrophil # : x  Auto Lymphocyte # : x  Auto Monocyte # : x  Auto Eosinophil # : x  Auto Basophil # : x  Auto Neutrophil % : x  Auto Lymphocyte % : x  Auto Monocyte % : x  Auto Eosinophil % : x  Auto Basophil % : x                          9.2    8.73  )-----------( 321      ( 26 Nov 2020 06:48 )             29.6     11-27    139  |  105  |  56<H>  ----------------------------<  220<H>  4.7   |  25  |  1.18    Ca    9.5      27 Nov 2020 09:10            CAPILLARY BLOOD GLUCOSE      POCT Blood Glucose.: 105 mg/dL (27 Nov 2020 07:42)            acetaminophen   Tablet .. 975 milliGRAM(s) Oral every 8 hours PRN  amLODIPine   Tablet 5 milliGRAM(s) Oral daily  dextrose 40% Gel 15 Gram(s) Oral once  dextrose 5%. 1000 milliLiter(s) IV Continuous <Continuous>  dextrose 5%. 1000 milliLiter(s) IV Continuous <Continuous>  dextrose 50% Injectable 25 Gram(s) IV Push once  dextrose 50% Injectable 12.5 Gram(s) IV Push once  dextrose 50% Injectable 25 Gram(s) IV Push once  enoxaparin Injectable 40 milliGRAM(s) SubCutaneous daily  folic acid 1 milliGRAM(s) Oral daily  gabapentin 100 milliGRAM(s) Oral at bedtime  glucagon  Injectable 1 milliGRAM(s) IntraMuscular once  insulin glargine Injectable (LANTUS) 25 Unit(s) SubCutaneous at bedtime  insulin lispro (ADMELOG) corrective regimen sliding scale   SubCutaneous with breakfast  lisinopril 20 milliGRAM(s) Oral daily  mupirocin 2% Ointment 1 Application(s) Topical once  nicotine -  14 mG/24Hr(s) Patch 1 Patch Transdermal daily  PARoxetine 10 milliGRAM(s) Oral daily  polyethylene glycol 3350 17 Gram(s) Oral daily  senna 2 Tablet(s) Oral at bedtime  sodium chloride 0.65% Nasal 1 Spray(s) Both Nostrils four times a day PRN  traMADol 50 milliGRAM(s) Oral every 4 hours PRN

## 2020-11-27 NOTE — DISCHARGE NOTE NURSING/CASE MANAGEMENT/SOCIAL WORK - NSDCPEWEB_GEN_ALL_CORE
[employer] for Tobacco Control website --- [email or web address] --- [email or web address]

## 2020-11-28 VITALS
SYSTOLIC BLOOD PRESSURE: 118 MMHG | OXYGEN SATURATION: 97 % | HEART RATE: 87 BPM | DIASTOLIC BLOOD PRESSURE: 62 MMHG | RESPIRATION RATE: 16 BRPM | TEMPERATURE: 98 F

## 2020-11-28 LAB — GLUCOSE BLDC GLUCOMTR-MCNC: 103 MG/DL — HIGH (ref 70–99)

## 2020-11-28 PROCEDURE — 99238 HOSP IP/OBS DSCHRG MGMT 30/<: CPT

## 2020-11-28 RX ADMIN — Medication 10 MILLIGRAM(S): at 11:01

## 2020-11-28 RX ADMIN — AMLODIPINE BESYLATE 5 MILLIGRAM(S): 2.5 TABLET ORAL at 05:33

## 2020-11-28 RX ADMIN — ENOXAPARIN SODIUM 40 MILLIGRAM(S): 100 INJECTION SUBCUTANEOUS at 11:01

## 2020-11-28 RX ADMIN — Medication 1 PATCH: at 11:01

## 2020-11-28 RX ADMIN — Medication 1 PATCH: at 09:40

## 2020-11-28 RX ADMIN — LISINOPRIL 20 MILLIGRAM(S): 2.5 TABLET ORAL at 05:33

## 2020-11-28 RX ADMIN — Medication 1 MILLIGRAM(S): at 11:01

## 2020-11-28 RX ADMIN — Medication 1 PATCH: at 11:03

## 2020-11-28 NOTE — PROGRESS NOTE ADULT - SUBJECTIVE AND OBJECTIVE BOX
JULIEN CASTELLANO  85y  Female      patient is without cardiac pulmonary GI  neuro complaints. patient denies pain    REVIEW OF SYSTEMS:    Cardiac HTN  Pulmonary no cough/SOB  GI no n/v/d/pain   no dysuria/hematuria  Neuro neuropathy  Musculoskeletal right hip fracture  Heme anemia  Endo DM    FAMILY HISTORY:    T(C): 36.5 (11-28-20 @ 08:09), Max: 37 (11-27-20 @ 20:08)  HR: 87 (11-28-20 @ 08:09) (87 - 102)  BP: 118/62 (11-28-20 @ 08:09) (118/62 - 156/58)  RR: 16 (11-28-20 @ 08:09) (16 - 16)  SpO2: 97% (11-28-20 @ 08:09) (96% - 97%)  Wt(kg): --Vital Signs Last 24 Hrs  T(C): 36.5 (28 Nov 2020 08:09), Max: 37 (27 Nov 2020 20:08)  T(F): 97.7 (28 Nov 2020 08:09), Max: 98.6 (27 Nov 2020 20:08)  HR: 87 (28 Nov 2020 08:09) (87 - 102)  BP: 118/62 (28 Nov 2020 08:09) (118/62 - 156/58)  BP(mean): --  RR: 16 (28 Nov 2020 08:09) (16 - 16)  SpO2: 97% (28 Nov 2020 08:09) (96% - 97%)  penicillins (Urticaria; Pruritus)      PHYSICAL EXAM:    General NAD  Neck no JVD/adenopathy  Heart RRR  Lungs clear  Abdomen non tender non distended normal bowel sounds no HSM/CVAT  Extremities s/p right hip surgert/fracture no edema +pulses  Neurologic alert and oriented x 3 no acute focal changes  Skin left heel stage 1 wound stable no erythema no discharge       Consultant(s) Notes Reviewed:  [x ] YES  [ ] NO  Care Discussed with Consultants/Other Providers [ x] YES  [ ] NO    LABS:      11-27    139  |  105  |  56<H>  ----------------------------<  220<H>  4.7   |  25  |  1.18    Ca    9.5      27 Nov 2020 09:10            CAPILLARY BLOOD GLUCOSE      POCT Blood Glucose.: 103 mg/dL (28 Nov 2020 07:22)            acetaminophen   Tablet .. 975 milliGRAM(s) Oral every 8 hours PRN  amLODIPine   Tablet 5 milliGRAM(s) Oral daily  dextrose 40% Gel 15 Gram(s) Oral once  dextrose 5%. 1000 milliLiter(s) IV Continuous <Continuous>  dextrose 5%. 1000 milliLiter(s) IV Continuous <Continuous>  dextrose 50% Injectable 25 Gram(s) IV Push once  dextrose 50% Injectable 12.5 Gram(s) IV Push once  dextrose 50% Injectable 25 Gram(s) IV Push once  enoxaparin Injectable 40 milliGRAM(s) SubCutaneous daily  folic acid 1 milliGRAM(s) Oral daily  gabapentin 100 milliGRAM(s) Oral at bedtime  glucagon  Injectable 1 milliGRAM(s) IntraMuscular once  insulin glargine Injectable (LANTUS) 25 Unit(s) SubCutaneous at bedtime  insulin lispro (ADMELOG) corrective regimen sliding scale   SubCutaneous with breakfast  lisinopril 20 milliGRAM(s) Oral daily  mupirocin 2% Ointment 1 Application(s) Topical once  nicotine -  14 mG/24Hr(s) Patch 1 Patch Transdermal daily  PARoxetine 10 milliGRAM(s) Oral daily  polyethylene glycol 3350 17 Gram(s) Oral daily  senna 2 Tablet(s) Oral at bedtime  sodium chloride 0.65% Nasal 1 Spray(s) Both Nostrils four times a day PRN  traMADol 50 milliGRAM(s) Oral every 4 hours PRN      HEALTH ISSUES - PROBLEM Dx:

## 2020-11-28 NOTE — PROGRESS NOTE ADULT - ASSESSMENT
Type 2 DM - patient is clinically stable. will continue meds and blood glucose monitoring  Stage 1 left heel wound - wound is stable. continue local wound care. recommend out patient podiatry folow up. patient and daughter aware  Right hip fracture - patient is clinically stable. recommend continue PT as per PMR/ortho  HTN - BP is stable. continue medications  Anemia - H/H is stable. patient is hemodynamically stable

## 2020-11-28 NOTE — PROGRESS NOTE ADULT - SUBJECTIVE AND OBJECTIVE BOX
DAILY PROGRESS NOTE:  HPI:  JULIEN CASTELLANO is a 86yo F with PMHx of HTN, T2DM, RA, anemia, and depression, who presented to [facility] on 11/13/2020 with right-sided hip pain s/p fall from bannister and landing on right knee. Patient was seen and examined in the ED; found to have right-sided intertrochanteric fracture on xray. Orthopaedic surgery was consulted and patient underwent ORIF with IM filiberto on 11/14. Pain well controlled during admission, but course was c/b podiatry consult for left heel ulceration, which was present previously. Podiatry recommended local daily wound care with Mupirocin and DSD; outpatient follow up with her own podiatrist following discharge. Xray left foot and ankle performed to r/o osteomyelitis.    Patient was evaluated by PM&R and therapy for functional deficits and gait/ ADL impairments and recommended acute rehabilitation. Patient was medically optimized for discharge to [facility] on 11/17/2020.    Patient was seen and examined on admission. Patient awake and alert; denied fever, chills, nausea, vomiting, or abdominal pain. Endorsed decreased appetite over past year with ~30lbs weight loss. Also endorsed TTP over left heel interfering with sleep. Feels nervous about starting therapy, but otherwise, no other complaints.  (17 Nov 2020 11:33)      Subjective:  Patient was seen and examined at the bedside this AM. No acute overnight events.   States that she feels well    + BM11/26      Physical Exam:  Vital Signs Last 24 Hrs  T(C): 36.5 (28 Nov 2020 08:09), Max: 37 (27 Nov 2020 20:08)  T(F): 97.7 (28 Nov 2020 08:09), Max: 98.6 (27 Nov 2020 20:08)  HR: 87 (28 Nov 2020 08:09) (87 - 102)  BP: 118/62 (28 Nov 2020 08:09) (118/62 - 156/58)  BP(mean): --  RR: 16 (28 Nov 2020 08:09) (16 - 16)  SpO2: 97% (28 Nov 2020 08:09) (96% - 97%)    Constitutional - NAD, Comfortable  Chest - CTAB  Cardiovascular -S1S2  Abdomen - BS+, Soft  Extremities - No C/C/E, No calf tenderness   Motor UE 4/5, LE Left 4/5 except ADF 2/5, Right HF and KE limited ADF trace, PF 3/5     Psychiatric - Mood stable, Affect WNL  Skin: swelling +. Left heel with unstageable ulcer; - much smaller and erythema improved  Medial right thigh ecchymosis- improved    Function: supervision with ADLs, ambulation with walker       MEDICATIONS  (STANDING):  amLODIPine   Tablet 5 milliGRAM(s) Oral daily  dextrose 40% Gel 15 Gram(s) Oral once  dextrose 5%. 1000 milliLiter(s) (50 mL/Hr) IV Continuous <Continuous>  dextrose 5%. 1000 milliLiter(s) (100 mL/Hr) IV Continuous <Continuous>  dextrose 50% Injectable 25 Gram(s) IV Push once  dextrose 50% Injectable 12.5 Gram(s) IV Push once  dextrose 50% Injectable 25 Gram(s) IV Push once  enoxaparin Injectable 40 milliGRAM(s) SubCutaneous daily  folic acid 1 milliGRAM(s) Oral daily  gabapentin 100 milliGRAM(s) Oral at bedtime  glucagon  Injectable 1 milliGRAM(s) IntraMuscular once  insulin glargine Injectable (LANTUS) 25 Unit(s) SubCutaneous at bedtime  insulin lispro (ADMELOG) corrective regimen sliding scale   SubCutaneous with breakfast  lisinopril 20 milliGRAM(s) Oral daily  mupirocin 2% Ointment 1 Application(s) Topical once  nicotine -  14 mG/24Hr(s) Patch 1 Patch Transdermal daily  PARoxetine 10 milliGRAM(s) Oral daily  polyethylene glycol 3350 17 Gram(s) Oral daily  senna 2 Tablet(s) Oral at bedtime    MEDICATIONS  (PRN):  acetaminophen   Tablet .. 975 milliGRAM(s) Oral every 8 hours PRN Mild Pain (1 - 3)  sodium chloride 0.65% Nasal 1 Spray(s) Both Nostrils four times a day PRN Nasal Congestion  traMADol 50 milliGRAM(s) Oral every 4 hours PRN Moderate Pain (4 - 6)                  Recent Labs/Imaging:    Occult Blood, Feces (11.23.20 @ 08:10)    Occult Blood, Feces: Negative    11-27    139  |  105  |  56<H>  ----------------------------<  220<H>  4.7   |  25  |  1.18    Ca    9.5      27 Nov 2020 09:10                                         9.2    8.73  )-----------( 321      ( 26 Nov 2020 06:48 )             29.6     11-26    140  |  106  |  57<H>  ----------------------------<  102<H>  4.4   |  25  |  1.07    Ca    9.2      26 Nov 2020 06:48       CAPILLARY BLOOD GLUCOSE      POCT Blood Glucose.: 103 mg/dL (28 Nov 2020 07:22)  POCT Blood Glucose.: 184 mg/dL (27 Nov 2020 21:30)

## 2020-11-28 NOTE — PROGRESS NOTE ADULT - ASSESSMENT
JULIEN CASTELLANO is a 86yo F with PMHx of HTN, T2DM, RA, anemia, and depression, who presented to [facility] on 11/13/2020 with right-sided hip pain s/p fall, found to have intertrochanteric fracture of right hip, now s/p ORIF and IM nailing. Admitted for multidisciplinary rehab program.      #Comprehensive Multidisciplinary Rehab Program:  - PT/OT: 3 hours a day 5 days a week   Lovenox for AC; per ortho will require total 6 weeks. Lovenox at discharge      T2DM:- c/w Lantus 25mg qhs  - c/w FS and ISS    Rising BUN- Appears pre-renal   - SCr WNL  - stools light brown, as per patient  Stool occult negative  - encourage PO hydration    HTN:- c/w amlodipine 5mg and lisinopril 20mg daily    Depression: c/w Paroxetine 10mg daily    Tobacco use:- c/w Nicotine patch    Sleep: Melatonin PRN    Pain:- Tylenol 975mg q8h PRN and Tramadol 50mg q4h PRN for moderate pain  - c/w gabapentin 100mg qhs    GI/Bowel:- Senna 2 tabs daily and Miralax BID PRN  - GI ppx: none    /Bladder:toileting schedule q4h    Diet:- Diet: regular, consistent carb and DASH/TLC  - Nutrition to follow    Skin/ Pressure Injury Prevention:- Incisions: right hip surgical incision  Left heel area of scab offf load heel- improving overall  - wound care nursing f/u; patient started on medihoney for heel wound  - Turn Q2hrs in bed while awake, OOB to Chair, PT/OT    DVT prophylaxis:  - Lovenox 40mg daily  - SCDs    D/C home today with home care.  fu with PCP, ortho in 1-2 weeks  Dressing removed this am- incision has healed well  All meds and fu reviewed with daughter on 11/27  Smoking cessation discussed with patient

## 2020-11-28 NOTE — PROGRESS NOTE ADULT - NSHPATTENDINGPLANDISCUSS_GEN_ALL_CORE
PATIENT + RESIDENT
PATIENT + RESIDENT
RN/patient
patient
patient/PT
patient/RN
patient/daughter
daughter at bedside on 11/23
daughter including followup and medications on 11/27
daughter at bedside on 11/23

## 2020-12-10 DIAGNOSIS — S72.009D FRACTURE OF UNSPECIFIED PART OF NECK OF UNSPECIFIED FEMUR, SUBSEQUENT ENCOUNTER FOR CLOSED FRACTURE WITH ROUTINE HEALING: ICD-10-CM

## 2020-12-10 RX ORDER — ENOXAPARIN SODIUM 100 MG/ML
40 INJECTION SUBCUTANEOUS
Qty: 14 | Refills: 0 | Status: ACTIVE | COMMUNITY
Start: 2020-12-10 | End: 1900-01-01

## 2020-12-18 PROCEDURE — 85027 COMPLETE CBC AUTOMATED: CPT

## 2020-12-18 PROCEDURE — 97535 SELF CARE MNGMENT TRAINING: CPT

## 2020-12-18 PROCEDURE — 36415 COLL VENOUS BLD VENIPUNCTURE: CPT

## 2020-12-18 PROCEDURE — 97110 THERAPEUTIC EXERCISES: CPT

## 2020-12-18 PROCEDURE — U0003: CPT

## 2020-12-18 PROCEDURE — 97112 NEUROMUSCULAR REEDUCATION: CPT

## 2020-12-18 PROCEDURE — 80053 COMPREHEN METABOLIC PANEL: CPT

## 2020-12-18 PROCEDURE — 82272 OCCULT BLD FECES 1-3 TESTS: CPT

## 2020-12-18 PROCEDURE — 85025 COMPLETE CBC W/AUTO DIFF WBC: CPT

## 2020-12-18 PROCEDURE — 80048 BASIC METABOLIC PNL TOTAL CA: CPT

## 2020-12-18 PROCEDURE — 90686 IIV4 VACC NO PRSV 0.5 ML IM: CPT

## 2020-12-18 PROCEDURE — 97116 GAIT TRAINING THERAPY: CPT

## 2020-12-18 PROCEDURE — 97163 PT EVAL HIGH COMPLEX 45 MIN: CPT

## 2020-12-18 PROCEDURE — 97530 THERAPEUTIC ACTIVITIES: CPT

## 2020-12-18 PROCEDURE — 97167 OT EVAL HIGH COMPLEX 60 MIN: CPT

## 2020-12-18 PROCEDURE — 82962 GLUCOSE BLOOD TEST: CPT

## 2020-12-23 ENCOUNTER — OUTPATIENT (OUTPATIENT)
Dept: OUTPATIENT SERVICES | Facility: HOSPITAL | Age: 85
LOS: 1 days | End: 2020-12-23
Payer: MEDICARE

## 2020-12-23 ENCOUNTER — APPOINTMENT (OUTPATIENT)
Dept: CT IMAGING | Facility: CLINIC | Age: 85
End: 2020-12-23
Payer: MEDICARE

## 2020-12-23 DIAGNOSIS — Z00.8 ENCOUNTER FOR OTHER GENERAL EXAMINATION: ICD-10-CM

## 2020-12-23 PROCEDURE — 75635 CT ANGIO ABDOMINAL ARTERIES: CPT

## 2020-12-23 PROCEDURE — 75635 CT ANGIO ABDOMINAL ARTERIES: CPT | Mod: 26

## 2021-01-04 ENCOUNTER — OUTPATIENT (OUTPATIENT)
Dept: OUTPATIENT SERVICES | Facility: HOSPITAL | Age: 86
LOS: 1 days | End: 2021-01-04
Payer: MEDICARE

## 2021-01-04 DIAGNOSIS — Z20.828 CONTACT WITH AND (SUSPECTED) EXPOSURE TO OTHER VIRAL COMMUNICABLE DISEASES: ICD-10-CM

## 2021-01-04 LAB — SARS-COV-2 RNA SPEC QL NAA+PROBE: SIGNIFICANT CHANGE UP

## 2021-01-04 PROCEDURE — U0005: CPT

## 2021-01-04 PROCEDURE — U0003: CPT

## 2021-01-04 PROCEDURE — C9803: CPT

## 2021-01-05 ENCOUNTER — OUTPATIENT (OUTPATIENT)
Dept: INPATIENT UNIT | Facility: HOSPITAL | Age: 86
LOS: 1 days | Discharge: HOME HEALTH | End: 2021-01-05
Payer: MEDICARE

## 2021-01-05 VITALS
DIASTOLIC BLOOD PRESSURE: 70 MMHG | WEIGHT: 145.06 LBS | SYSTOLIC BLOOD PRESSURE: 141 MMHG | OXYGEN SATURATION: 100 % | RESPIRATION RATE: 16 BRPM | TEMPERATURE: 98 F | HEART RATE: 100 BPM | HEIGHT: 63 IN

## 2021-01-05 DIAGNOSIS — Z88.0 ALLERGY STATUS TO PENICILLIN: ICD-10-CM

## 2021-01-05 DIAGNOSIS — Z87.891 PERSONAL HISTORY OF NICOTINE DEPENDENCE: ICD-10-CM

## 2021-01-05 DIAGNOSIS — I71.4 ABDOMINAL AORTIC ANEURYSM, WITHOUT RUPTURE: ICD-10-CM

## 2021-01-05 DIAGNOSIS — E11.40 TYPE 2 DIABETES MELLITUS WITH DIABETIC NEUROPATHY, UNSPECIFIED: ICD-10-CM

## 2021-01-05 DIAGNOSIS — I70.0 ATHEROSCLEROSIS OF AORTA: ICD-10-CM

## 2021-01-05 DIAGNOSIS — Z85.828 PERSONAL HISTORY OF OTHER MALIGNANT NEOPLASM OF SKIN: ICD-10-CM

## 2021-01-05 DIAGNOSIS — I10 ESSENTIAL (PRIMARY) HYPERTENSION: ICD-10-CM

## 2021-01-05 DIAGNOSIS — M06.9 RHEUMATOID ARTHRITIS, UNSPECIFIED: ICD-10-CM

## 2021-01-05 DIAGNOSIS — M48.00 SPINAL STENOSIS, SITE UNSPECIFIED: ICD-10-CM

## 2021-01-05 DIAGNOSIS — S72.002B: Chronic | ICD-10-CM

## 2021-01-05 DIAGNOSIS — Z20.828 CONTACT WITH AND (SUSPECTED) EXPOSURE TO OTHER VIRAL COMMUNICABLE DISEASES: ICD-10-CM

## 2021-01-05 DIAGNOSIS — Z88.1 ALLERGY STATUS TO OTHER ANTIBIOTIC AGENTS STATUS: ICD-10-CM

## 2021-01-05 DIAGNOSIS — E11.51 TYPE 2 DIABETES MELLITUS WITH DIABETIC PERIPHERAL ANGIOPATHY WITHOUT GANGRENE: ICD-10-CM

## 2021-01-05 DIAGNOSIS — Z79.4 LONG TERM (CURRENT) USE OF INSULIN: ICD-10-CM

## 2021-01-05 DIAGNOSIS — J43.9 EMPHYSEMA, UNSPECIFIED: ICD-10-CM

## 2021-01-05 DIAGNOSIS — I70.223 ATHEROSCLEROSIS OF NATIVE ARTERIES OF EXTREMITIES WITH REST PAIN, BILATERAL LEGS: ICD-10-CM

## 2021-01-05 DIAGNOSIS — Z79.899 OTHER LONG TERM (CURRENT) DRUG THERAPY: ICD-10-CM

## 2021-01-05 DIAGNOSIS — H91.90 UNSPECIFIED HEARING LOSS, UNSPECIFIED EAR: ICD-10-CM

## 2021-01-05 LAB
ANION GAP SERPL CALC-SCNC: 5 MMOL/L — SIGNIFICANT CHANGE UP (ref 5–17)
APPEARANCE UR: CLEAR — SIGNIFICANT CHANGE UP
APTT BLD: 32.5 SEC — SIGNIFICANT CHANGE UP (ref 27.5–35.5)
BILIRUB UR-MCNC: NEGATIVE — SIGNIFICANT CHANGE UP
BUN SERPL-MCNC: 36 MG/DL — HIGH (ref 7–23)
CALCIUM SERPL-MCNC: 9.6 MG/DL — SIGNIFICANT CHANGE UP (ref 8.5–10.1)
CHLORIDE SERPL-SCNC: 108 MMOL/L — SIGNIFICANT CHANGE UP (ref 96–108)
CO2 SERPL-SCNC: 26 MMOL/L — SIGNIFICANT CHANGE UP (ref 22–31)
COLOR SPEC: YELLOW — SIGNIFICANT CHANGE UP
CREAT SERPL-MCNC: 0.88 MG/DL — SIGNIFICANT CHANGE UP (ref 0.5–1.3)
DIFF PNL FLD: ABNORMAL
GLUCOSE SERPL-MCNC: 153 MG/DL — HIGH (ref 70–99)
GLUCOSE UR QL: NEGATIVE MG/DL — SIGNIFICANT CHANGE UP
HCT VFR BLD CALC: 35.6 % — SIGNIFICANT CHANGE UP (ref 34.5–45)
HGB BLD-MCNC: 10.9 G/DL — LOW (ref 11.5–15.5)
INR BLD: 1.02 RATIO — SIGNIFICANT CHANGE UP (ref 0.88–1.16)
KETONES UR-MCNC: NEGATIVE — SIGNIFICANT CHANGE UP
LEUKOCYTE ESTERASE UR-ACNC: ABNORMAL
MCHC RBC-ENTMCNC: 30.6 GM/DL — LOW (ref 32–36)
MCHC RBC-ENTMCNC: 31.2 PG — SIGNIFICANT CHANGE UP (ref 27–34)
MCV RBC AUTO: 102 FL — HIGH (ref 80–100)
NITRITE UR-MCNC: POSITIVE
PH UR: 5 — SIGNIFICANT CHANGE UP (ref 5–8)
PLATELET # BLD AUTO: 199 K/UL — SIGNIFICANT CHANGE UP (ref 150–400)
POTASSIUM SERPL-MCNC: 4.1 MMOL/L — SIGNIFICANT CHANGE UP (ref 3.5–5.3)
POTASSIUM SERPL-SCNC: 4.1 MMOL/L — SIGNIFICANT CHANGE UP (ref 3.5–5.3)
PROT UR-MCNC: 30 MG/DL
PROTHROM AB SERPL-ACNC: 11.8 SEC — SIGNIFICANT CHANGE UP (ref 10.6–13.6)
RBC # BLD: 3.49 M/UL — LOW (ref 3.8–5.2)
RBC # FLD: 13.7 % — SIGNIFICANT CHANGE UP (ref 10.3–14.5)
SODIUM SERPL-SCNC: 139 MMOL/L — SIGNIFICANT CHANGE UP (ref 135–145)
SP GR SPEC: 1.01 — SIGNIFICANT CHANGE UP (ref 1.01–1.02)
UROBILINOGEN FLD QL: NEGATIVE MG/DL — SIGNIFICANT CHANGE UP
WBC # BLD: 9.69 K/UL — SIGNIFICANT CHANGE UP (ref 3.8–10.5)
WBC # FLD AUTO: 9.69 K/UL — SIGNIFICANT CHANGE UP (ref 3.8–10.5)

## 2021-01-05 PROCEDURE — 81001 URINALYSIS AUTO W/SCOPE: CPT

## 2021-01-05 PROCEDURE — C1876: CPT

## 2021-01-05 PROCEDURE — 36415 COLL VENOUS BLD VENIPUNCTURE: CPT

## 2021-01-05 PROCEDURE — 85027 COMPLETE CBC AUTOMATED: CPT

## 2021-01-05 PROCEDURE — C1894: CPT

## 2021-01-05 PROCEDURE — 93010 ELECTROCARDIOGRAM REPORT: CPT

## 2021-01-05 PROCEDURE — 80048 BASIC METABOLIC PNL TOTAL CA: CPT

## 2021-01-05 PROCEDURE — 86900 BLOOD TYPING SEROLOGIC ABO: CPT

## 2021-01-05 PROCEDURE — C1725: CPT

## 2021-01-05 PROCEDURE — 93005 ELECTROCARDIOGRAM TRACING: CPT

## 2021-01-05 PROCEDURE — 76000 FLUOROSCOPY <1 HR PHYS/QHP: CPT

## 2021-01-05 PROCEDURE — 85730 THROMBOPLASTIN TIME PARTIAL: CPT

## 2021-01-05 PROCEDURE — 71045 X-RAY EXAM CHEST 1 VIEW: CPT | Mod: 26

## 2021-01-05 PROCEDURE — C1769: CPT

## 2021-01-05 PROCEDURE — 86850 RBC ANTIBODY SCREEN: CPT

## 2021-01-05 PROCEDURE — C1887: CPT

## 2021-01-05 PROCEDURE — 82962 GLUCOSE BLOOD TEST: CPT

## 2021-01-05 PROCEDURE — 71045 X-RAY EXAM CHEST 1 VIEW: CPT

## 2021-01-05 PROCEDURE — 86901 BLOOD TYPING SEROLOGIC RH(D): CPT

## 2021-01-05 PROCEDURE — 85610 PROTHROMBIN TIME: CPT

## 2021-01-05 RX ORDER — ENOXAPARIN SODIUM 100 MG/ML
40 INJECTION SUBCUTANEOUS DAILY
Refills: 0 | Status: DISCONTINUED | OUTPATIENT
Start: 2021-01-06 | End: 2021-01-06

## 2021-01-05 RX ORDER — METOPROLOL TARTRATE 50 MG
5 TABLET ORAL ONCE
Refills: 0 | Status: COMPLETED | OUTPATIENT
Start: 2021-01-05 | End: 2021-01-05

## 2021-01-05 RX ORDER — DEXTROSE 50 % IN WATER 50 %
25 SYRINGE (ML) INTRAVENOUS ONCE
Refills: 0 | Status: DISCONTINUED | OUTPATIENT
Start: 2021-01-05 | End: 2021-01-06

## 2021-01-05 RX ORDER — INSULIN LISPRO 100/ML
VIAL (ML) SUBCUTANEOUS AT BEDTIME
Refills: 0 | Status: DISCONTINUED | OUTPATIENT
Start: 2021-01-05 | End: 2021-01-06

## 2021-01-05 RX ORDER — PROCHLORPERAZINE MALEATE 5 MG
2.5 TABLET ORAL ONCE
Refills: 0 | Status: DISCONTINUED | OUTPATIENT
Start: 2021-01-05 | End: 2021-01-05

## 2021-01-05 RX ORDER — INSULIN GLARGINE 100 [IU]/ML
25 INJECTION, SOLUTION SUBCUTANEOUS
Qty: 0 | Refills: 0 | DISCHARGE

## 2021-01-05 RX ORDER — TRAMADOL HYDROCHLORIDE 50 MG/1
50 TABLET ORAL EVERY 8 HOURS
Refills: 0 | Status: DISCONTINUED | OUTPATIENT
Start: 2021-01-05 | End: 2021-01-06

## 2021-01-05 RX ORDER — FOLIC ACID 0.8 MG
1 TABLET ORAL DAILY
Refills: 0 | Status: DISCONTINUED | OUTPATIENT
Start: 2021-01-05 | End: 2021-01-06

## 2021-01-05 RX ORDER — TRAMADOL HYDROCHLORIDE 50 MG/1
50 TABLET ORAL EVERY 8 HOURS
Refills: 0 | Status: DISCONTINUED | OUTPATIENT
Start: 2021-01-05 | End: 2021-01-05

## 2021-01-05 RX ORDER — AMLODIPINE BESYLATE 2.5 MG/1
5 TABLET ORAL DAILY
Refills: 0 | Status: DISCONTINUED | OUTPATIENT
Start: 2021-01-05 | End: 2021-01-06

## 2021-01-05 RX ORDER — METFORMIN HYDROCHLORIDE 850 MG/1
1 TABLET ORAL
Qty: 0 | Refills: 0 | DISCHARGE

## 2021-01-05 RX ORDER — SODIUM CHLORIDE 9 MG/ML
1000 INJECTION, SOLUTION INTRAVENOUS
Refills: 0 | Status: DISCONTINUED | OUTPATIENT
Start: 2021-01-05 | End: 2021-01-06

## 2021-01-05 RX ORDER — FENTANYL CITRATE 50 UG/ML
50 INJECTION INTRAVENOUS
Refills: 0 | Status: DISCONTINUED | OUTPATIENT
Start: 2021-01-05 | End: 2021-01-05

## 2021-01-05 RX ORDER — DEXTROSE 50 % IN WATER 50 %
12.5 SYRINGE (ML) INTRAVENOUS ONCE
Refills: 0 | Status: DISCONTINUED | OUTPATIENT
Start: 2021-01-05 | End: 2021-01-06

## 2021-01-05 RX ORDER — MEPERIDINE HYDROCHLORIDE 50 MG/ML
12.5 INJECTION INTRAMUSCULAR; INTRAVENOUS; SUBCUTANEOUS
Refills: 0 | Status: DISCONTINUED | OUTPATIENT
Start: 2021-01-05 | End: 2021-01-05

## 2021-01-05 RX ORDER — DEXTROSE 50 % IN WATER 50 %
15 SYRINGE (ML) INTRAVENOUS ONCE
Refills: 0 | Status: DISCONTINUED | OUTPATIENT
Start: 2021-01-05 | End: 2021-01-06

## 2021-01-05 RX ORDER — ONDANSETRON 8 MG/1
4 TABLET, FILM COATED ORAL ONCE
Refills: 0 | Status: DISCONTINUED | OUTPATIENT
Start: 2021-01-05 | End: 2021-01-05

## 2021-01-05 RX ORDER — CLOPIDOGREL BISULFATE 75 MG/1
300 TABLET, FILM COATED ORAL ONCE
Refills: 0 | Status: DISCONTINUED | OUTPATIENT
Start: 2021-01-05 | End: 2021-01-05

## 2021-01-05 RX ORDER — GABAPENTIN 400 MG/1
300 CAPSULE ORAL THREE TIMES A DAY
Refills: 0 | Status: DISCONTINUED | OUTPATIENT
Start: 2021-01-05 | End: 2021-01-06

## 2021-01-05 RX ORDER — CLOPIDOGREL BISULFATE 75 MG/1
300 TABLET, FILM COATED ORAL ONCE
Refills: 0 | Status: COMPLETED | OUTPATIENT
Start: 2021-01-05 | End: 2021-01-05

## 2021-01-05 RX ORDER — LISINOPRIL 2.5 MG/1
20 TABLET ORAL DAILY
Refills: 0 | Status: DISCONTINUED | OUTPATIENT
Start: 2021-01-05 | End: 2021-01-06

## 2021-01-05 RX ORDER — SODIUM CHLORIDE 9 MG/ML
1000 INJECTION INTRAMUSCULAR; INTRAVENOUS; SUBCUTANEOUS
Refills: 0 | Status: DISCONTINUED | OUTPATIENT
Start: 2021-01-05 | End: 2021-01-06

## 2021-01-05 RX ORDER — OXYCODONE HYDROCHLORIDE 5 MG/1
5 TABLET ORAL ONCE
Refills: 0 | Status: DISCONTINUED | OUTPATIENT
Start: 2021-01-05 | End: 2021-01-05

## 2021-01-05 RX ORDER — ACETAMINOPHEN 500 MG
1000 TABLET ORAL ONCE
Refills: 0 | Status: COMPLETED | OUTPATIENT
Start: 2021-01-05 | End: 2021-01-05

## 2021-01-05 RX ORDER — INSULIN LISPRO 100/ML
VIAL (ML) SUBCUTANEOUS
Refills: 0 | Status: DISCONTINUED | OUTPATIENT
Start: 2021-01-05 | End: 2021-01-06

## 2021-01-05 RX ORDER — GLUCAGON INJECTION, SOLUTION 0.5 MG/.1ML
1 INJECTION, SOLUTION SUBCUTANEOUS ONCE
Refills: 0 | Status: DISCONTINUED | OUTPATIENT
Start: 2021-01-05 | End: 2021-01-06

## 2021-01-05 RX ORDER — SODIUM CHLORIDE 9 MG/ML
1000 INJECTION INTRAMUSCULAR; INTRAVENOUS; SUBCUTANEOUS
Refills: 0 | Status: DISCONTINUED | OUTPATIENT
Start: 2021-01-05 | End: 2021-01-05

## 2021-01-05 RX ADMIN — GABAPENTIN 300 MILLIGRAM(S): 400 CAPSULE ORAL at 23:37

## 2021-01-05 RX ADMIN — SODIUM CHLORIDE 100 MILLILITER(S): 9 INJECTION INTRAMUSCULAR; INTRAVENOUS; SUBCUTANEOUS at 17:12

## 2021-01-05 RX ADMIN — CLOPIDOGREL BISULFATE 300 MILLIGRAM(S): 75 TABLET, FILM COATED ORAL at 17:08

## 2021-01-05 RX ADMIN — Medication 1000 MILLIGRAM(S): at 17:25

## 2021-01-05 RX ADMIN — Medication 400 MILLIGRAM(S): at 17:24

## 2021-01-05 RX ADMIN — Medication 5 MILLIGRAM(S): at 22:00

## 2021-01-05 RX ADMIN — TRAMADOL HYDROCHLORIDE 50 MILLIGRAM(S): 50 TABLET ORAL at 23:54

## 2021-01-05 NOTE — ASU PATIENT PROFILE, ADULT - PSH
fracture ankle right  plate . screws  S/P cataract surgery     fracture ankle right  plate . screws   2000  Fractured hip, left, open type I or II, initial encounter  11/12/2020  S/P cataract surgery  2011

## 2021-01-05 NOTE — BRIEF OPERATIVE NOTE - NSICDXBRIEFPREOP_GEN_ALL_CORE_FT
PRE-OP DIAGNOSIS:  Peripheral vascular disease 05-Jan-2021 16:36:17 intermittent rest pain Soren Ventura

## 2021-01-05 NOTE — BRIEF OPERATIVE NOTE - OPERATION/FINDINGS
significant stenosis of mid infrarenal aorta; high grade stenosis of R common iliac artery; occluded L CFA

## 2021-01-05 NOTE — BRIEF OPERATIVE NOTE - NSICDXBRIEFPROCEDURE_GEN_ALL_CORE_FT
PROCEDURES:  Abdominal aortic angiogram 05-Jan-2021 16:28:25 abdominopelvic angiogram; angioplasty of aorta (10 mm) balloon; stented angioplasty of R common iliac artery with 8 mm x 5.9 cm stent;  pressure measurements Soren Ventura

## 2021-01-06 ENCOUNTER — TRANSCRIPTION ENCOUNTER (OUTPATIENT)
Age: 86
End: 2021-01-06

## 2021-01-06 VITALS
SYSTOLIC BLOOD PRESSURE: 114 MMHG | DIASTOLIC BLOOD PRESSURE: 43 MMHG | HEART RATE: 114 BPM | RESPIRATION RATE: 16 BRPM | OXYGEN SATURATION: 96 %

## 2021-01-06 RX ORDER — LISINOPRIL 2.5 MG/1
1 TABLET ORAL
Qty: 0 | Refills: 0 | DISCHARGE
Start: 2021-01-06

## 2021-01-06 RX ORDER — INSULIN LISPRO 100/ML
0 VIAL (ML) SUBCUTANEOUS
Qty: 0 | Refills: 0 | DISCHARGE
Start: 2021-01-06

## 2021-01-06 RX ADMIN — AMLODIPINE BESYLATE 5 MILLIGRAM(S): 2.5 TABLET ORAL at 07:32

## 2021-01-06 RX ADMIN — ENOXAPARIN SODIUM 40 MILLIGRAM(S): 100 INJECTION SUBCUTANEOUS at 07:32

## 2021-01-06 RX ADMIN — GABAPENTIN 300 MILLIGRAM(S): 400 CAPSULE ORAL at 05:59

## 2021-01-06 RX ADMIN — LISINOPRIL 20 MILLIGRAM(S): 2.5 TABLET ORAL at 07:32

## 2021-01-06 RX ADMIN — TRAMADOL HYDROCHLORIDE 50 MILLIGRAM(S): 50 TABLET ORAL at 00:35

## 2021-01-06 RX ADMIN — TRAMADOL HYDROCHLORIDE 50 MILLIGRAM(S): 50 TABLET ORAL at 07:58

## 2021-01-06 NOTE — ASU DISCHARGE PLAN (ADULT/PEDIATRIC) - CARE PROVIDER_API CALL
Soren Ventura)  Vascular Surgery  [address], [place] [address]  Phone: [phone]  Fax: [phone]  Follow Up Time:

## 2021-01-06 NOTE — PROGRESS NOTE ADULT - SUBJECTIVE AND OBJECTIVE BOX
85 year old female POD1 s/p abdominopelvic angiogram with balloon angioplasty of aorta, right common iliac artery stent placement, presents for groin check. Patient seen and examined at bedside. No complaints. Right groin soft, NT, no ecchymosis, no evidence of hematoma. Dressing c/d/i.
Patient seen and examined at bedside. Right groin soft, NT, no ecchymosis, no evidence of hematoma noted. Dressing c/d/i. 
stable overnight    improved discomfort bilaterally (R foot without pain; L foot better)    PE  no puncture site hematoma  R foot slightly hyperemic; L foot with intact neurosensory and motor but not hyperemic    A/P  status post aortic angioplasty and R common iliac artery stent placement  stable   will discharge today and follow up as out patient    MEDICATIONS  (STANDING):  amLODIPine   Tablet 5 milliGRAM(s) Oral daily  dextrose 40% Gel 15 Gram(s) Oral once  dextrose 5%. 1000 milliLiter(s) (50 mL/Hr) IV Continuous <Continuous>  dextrose 5%. 1000 milliLiter(s) (100 mL/Hr) IV Continuous <Continuous>  dextrose 50% Injectable 25 Gram(s) IV Push once  dextrose 50% Injectable 12.5 Gram(s) IV Push once  dextrose 50% Injectable 25 Gram(s) IV Push once  enoxaparin Injectable 40 milliGRAM(s) SubCutaneous daily  folic acid 1 milliGRAM(s) Oral daily  gabapentin 300 milliGRAM(s) Oral three times a day  glucagon  Injectable 1 milliGRAM(s) IntraMuscular once  insulin lispro (ADMELOG) corrective regimen sliding scale   SubCutaneous three times a day before meals  insulin lispro (ADMELOG) corrective regimen sliding scale   SubCutaneous at bedtime  lisinopril 20 milliGRAM(s) Oral daily  PARoxetine 10 milliGRAM(s) Oral daily  sodium chloride 0.9%. 1000 milliLiter(s) (100 mL/Hr) IV Continuous <Continuous>    MEDICATIONS  (PRN):  traMADol 50 milliGRAM(s) Oral every 8 hours PRN Moderate Pain (4 - 6)      Allergies    cephalosporins (Other)  penicillins (Urticaria; Pruritus)    Intolerances        Flatus: [ ] YES [ ] NO             Bowel Movement: [ ] YES [ ] NO  Pain (0-10):            Pain Control Adequate: [ ] YES [ ] NO  Nausea: [ ] YES [ ] NO            Vomiting: [ ] YES [ ] NO  Diarrhea: [ ] YES [ ] NO         Constipation: [ ] YES [ ] NO     Chest Pain: [ ] YES [ ] NO    SOB:  [ ] YES [ ] NO    Vital Signs Last 24 Hrs  T(C): 36.6 (2021 05:00), Max: 36.8 (2021 22:00)  T(F): 97.9 (2021 05:00), Max: 98.2 (2021 22:00)  HR: 95 (2021 06:00) (70 - 100)  BP: 154/60 (2021 06:00) (123/77 - 172/58)  BP(mean): 86 (2021 06:00) (73 - 96)  RR: 19 (2021 06:00) (9 - 20)  SpO2: 99% (2021 06:00) (91% - 100%)    I&O's Summary    2021 07:01  -  2021 07:00  --------------------------------------------------------  IN: 1160 mL / OUT: 1400 mL / NET: -240 mL        Physical Exam:  General: NAD, resting comfortably  Pulmonary: normal resp effort, CTA-B  Cardiovascular: NSR  Abdominal: soft, NT/ND  Extremities: WWP, normal strength  Neuro: A/O x 3, CNs II-XII grossly intact, normal motor/sensation, no focal deficits  Pulses:   Right:                                                                          Left:  FEM [ ]2+ [ ]1+ [ ]doppler                                             FEM [ ]2+ [ ]1+ [ ]doppler    POP [ ]2+ [ ]1+ [ ]doppler                                             POP [ ]2+ [ ]1+ [ ]doppler    DP [ ]2+ [ ]1+ [ ]doppler                                                DP [ ]2+ [ ]1+ [ ]doppler  PT[ ]2+ [ ]1+ [ ]doppler                                                  PT [ ]2+ [ ]1+ [ ]doppler    LABS:                        10.9   9.69  )-----------( 199      ( 2021 11:02 )             35.6     -05    139  |  108  |  36<H>  ----------------------------<  153<H>  4.1   |  26  |  0.88    Ca    9.6      2021 11:02      PT/INR - ( 2021 11:02 )   PT: 11.8 sec;   INR: 1.02 ratio         PTT - ( 2021 11:02 )  PTT:32.5 sec  Urinalysis Basic - ( 2021 10:55 )    Color: Yellow / Appearance: Clear / S.015 / pH: x  Gluc: x / Ketone: Negative  / Bili: Negative / Urobili: Negative mg/dL   Blood: x / Protein: 30 mg/dL / Nitrite: Positive   Leuk Esterase: Small / RBC: 0-2 /HPF / WBC 26-50   Sq Epi: x / Non Sq Epi: Few / Bacteria: Moderate        CAPILLARY BLOOD GLUCOSE      POCT Blood Glucose.: 124 mg/dL (2021 07:21)  POCT Blood Glucose.: 174 mg/dL (2021 23:36)  POCT Blood Glucose.: 101 mg/dL (2021 17:44)      RADIOLOGY & ADDITIONAL TESTS:

## 2021-01-06 NOTE — DISCHARGE NOTE NURSING/CASE MANAGEMENT/SOCIAL WORK - NSDCPEWEB_GEN_ALL_CORE
[facility] for Tobacco Control website --- [email or web address] --- [email or web address]

## 2021-01-06 NOTE — DISCHARGE NOTE NURSING/CASE MANAGEMENT/SOCIAL WORK - NSDCPEEMAIL_GEN_ALL_CORE
[facility] for Tobacco Control email [email or web address]

## 2021-01-06 NOTE — DISCHARGE NOTE NURSING/CASE MANAGEMENT/SOCIAL WORK - PATIENT PORTAL LINK FT
You can access the FollowMyHealth Patient Portal offered by [facility] by registering at the following website: [email or web address]. By joining [employer]’s FollowMyHealth portal, you will also be able to view your health information using other applications (apps) compatible with our system.

## 2021-01-06 NOTE — ASU DISCHARGE PLAN (ADULT/PEDIATRIC) - CALL YOUR DOCTOR IF YOU HAVE ANY OF THE FOLLOWING:
Bleeding that does not stop/Swelling that gets worse/Pain not relieved by Medications/Fever greater than (need to indicate Fahrenheit or Celsius)/Numbness, tingling, color or temperature change to extremity

## 2021-01-28 ENCOUNTER — OUTPATIENT (OUTPATIENT)
Dept: OUTPATIENT SERVICES | Facility: HOSPITAL | Age: 86
LOS: 1 days | End: 2021-01-28
Payer: MEDICARE

## 2021-01-28 DIAGNOSIS — S72.002B: Chronic | ICD-10-CM

## 2021-01-28 DIAGNOSIS — I70.212 ATHEROSCLEROSIS OF NATIVE ARTERIES OF EXTREMITIES WITH INTERMITTENT CLAUDICATION, LEFT LEG: ICD-10-CM

## 2021-01-28 DIAGNOSIS — S72.009A FRACTURE OF UNSPECIFIED PART OF NECK OF UNSPECIFIED FEMUR, INITIAL ENCOUNTER FOR CLOSED FRACTURE: Chronic | ICD-10-CM

## 2021-01-28 DIAGNOSIS — Z01.818 ENCOUNTER FOR OTHER PREPROCEDURAL EXAMINATION: ICD-10-CM

## 2021-01-28 LAB
A1C WITH ESTIMATED AVERAGE GLUCOSE RESULT: 7.2 % — HIGH (ref 4–5.6)
ANION GAP SERPL CALC-SCNC: 4 MMOL/L — LOW (ref 5–17)
APPEARANCE UR: CLEAR — SIGNIFICANT CHANGE UP
APTT BLD: 33.6 SEC — SIGNIFICANT CHANGE UP (ref 27.5–35.5)
BASOPHILS # BLD AUTO: 0.03 K/UL — SIGNIFICANT CHANGE UP (ref 0–0.2)
BASOPHILS NFR BLD AUTO: 0.4 % — SIGNIFICANT CHANGE UP (ref 0–2)
BILIRUB UR-MCNC: NEGATIVE — SIGNIFICANT CHANGE UP
BUN SERPL-MCNC: 21 MG/DL — SIGNIFICANT CHANGE UP (ref 7–23)
CALCIUM SERPL-MCNC: 9.5 MG/DL — SIGNIFICANT CHANGE UP (ref 8.5–10.1)
CHLORIDE SERPL-SCNC: 110 MMOL/L — HIGH (ref 96–108)
CO2 SERPL-SCNC: 26 MMOL/L — SIGNIFICANT CHANGE UP (ref 22–31)
COLOR SPEC: YELLOW — SIGNIFICANT CHANGE UP
CREAT SERPL-MCNC: 0.97 MG/DL — SIGNIFICANT CHANGE UP (ref 0.5–1.3)
DIFF PNL FLD: NEGATIVE — SIGNIFICANT CHANGE UP
EOSINOPHIL # BLD AUTO: 0.37 K/UL — SIGNIFICANT CHANGE UP (ref 0–0.5)
EOSINOPHIL NFR BLD AUTO: 4.4 % — SIGNIFICANT CHANGE UP (ref 0–6)
ESTIMATED AVERAGE GLUCOSE: 160 MG/DL — HIGH (ref 68–114)
GLUCOSE SERPL-MCNC: 125 MG/DL — HIGH (ref 70–99)
GLUCOSE UR QL: NEGATIVE MG/DL — SIGNIFICANT CHANGE UP
HCT VFR BLD CALC: 36.2 % — SIGNIFICANT CHANGE UP (ref 34.5–45)
HGB BLD-MCNC: 11.1 G/DL — LOW (ref 11.5–15.5)
IMM GRANULOCYTES NFR BLD AUTO: 0.2 % — SIGNIFICANT CHANGE UP (ref 0–1.5)
INR BLD: 1.05 RATIO — SIGNIFICANT CHANGE UP (ref 0.88–1.16)
KETONES UR-MCNC: ABNORMAL
LEUKOCYTE ESTERASE UR-ACNC: ABNORMAL
LYMPHOCYTES # BLD AUTO: 1.89 K/UL — SIGNIFICANT CHANGE UP (ref 1–3.3)
LYMPHOCYTES # BLD AUTO: 22.5 % — SIGNIFICANT CHANGE UP (ref 13–44)
MCHC RBC-ENTMCNC: 30.7 GM/DL — LOW (ref 32–36)
MCHC RBC-ENTMCNC: 30.7 PG — SIGNIFICANT CHANGE UP (ref 27–34)
MCV RBC AUTO: 100.3 FL — HIGH (ref 80–100)
MONOCYTES # BLD AUTO: 0.93 K/UL — HIGH (ref 0–0.9)
MONOCYTES NFR BLD AUTO: 11.1 % — SIGNIFICANT CHANGE UP (ref 2–14)
NEUTROPHILS # BLD AUTO: 5.16 K/UL — SIGNIFICANT CHANGE UP (ref 1.8–7.4)
NEUTROPHILS NFR BLD AUTO: 61.4 % — SIGNIFICANT CHANGE UP (ref 43–77)
NITRITE UR-MCNC: NEGATIVE — SIGNIFICANT CHANGE UP
PH UR: 6 — SIGNIFICANT CHANGE UP (ref 5–8)
PLATELET # BLD AUTO: 247 K/UL — SIGNIFICANT CHANGE UP (ref 150–400)
POTASSIUM SERPL-MCNC: 4.9 MMOL/L — SIGNIFICANT CHANGE UP (ref 3.5–5.3)
POTASSIUM SERPL-SCNC: 4.9 MMOL/L — SIGNIFICANT CHANGE UP (ref 3.5–5.3)
PROT UR-MCNC: 30 MG/DL
PROTHROM AB SERPL-ACNC: 12.1 SEC — SIGNIFICANT CHANGE UP (ref 10.6–13.6)
RBC # BLD: 3.61 M/UL — LOW (ref 3.8–5.2)
RBC # FLD: 13.4 % — SIGNIFICANT CHANGE UP (ref 10.3–14.5)
SODIUM SERPL-SCNC: 140 MMOL/L — SIGNIFICANT CHANGE UP (ref 135–145)
SP GR SPEC: 1.01 — SIGNIFICANT CHANGE UP (ref 1.01–1.02)
UROBILINOGEN FLD QL: NEGATIVE MG/DL — SIGNIFICANT CHANGE UP
WBC # BLD: 8.4 K/UL — SIGNIFICANT CHANGE UP (ref 3.8–10.5)
WBC # FLD AUTO: 8.4 K/UL — SIGNIFICANT CHANGE UP (ref 3.8–10.5)

## 2021-01-28 PROCEDURE — 86850 RBC ANTIBODY SCREEN: CPT

## 2021-01-28 PROCEDURE — 86901 BLOOD TYPING SEROLOGIC RH(D): CPT

## 2021-01-28 PROCEDURE — 83036 HEMOGLOBIN GLYCOSYLATED A1C: CPT

## 2021-01-28 PROCEDURE — 80048 BASIC METABOLIC PNL TOTAL CA: CPT

## 2021-01-28 PROCEDURE — 86900 BLOOD TYPING SEROLOGIC ABO: CPT

## 2021-01-28 PROCEDURE — 81001 URINALYSIS AUTO W/SCOPE: CPT

## 2021-01-28 PROCEDURE — 36415 COLL VENOUS BLD VENIPUNCTURE: CPT

## 2021-01-28 PROCEDURE — 85730 THROMBOPLASTIN TIME PARTIAL: CPT

## 2021-01-28 PROCEDURE — 85610 PROTHROMBIN TIME: CPT

## 2021-01-28 PROCEDURE — 85025 COMPLETE CBC W/AUTO DIFF WBC: CPT

## 2021-01-28 RX ORDER — BENAZEPRIL HYDROCHLORIDE 40 MG/1
1 TABLET ORAL
Qty: 0 | Refills: 0 | DISCHARGE

## 2021-01-28 RX ORDER — FOLIC ACID 0.8 MG
1 TABLET ORAL
Qty: 0 | Refills: 0 | DISCHARGE

## 2021-01-28 NOTE — CHART NOTE - NSCHARTNOTEFT_GEN_A_CORE
Vital Signs: Height 5' 3", Weight 146 pounds (66.4 Kg), B/P 161/71, HR 96, Resp 18, Temp 98.0 F, O2 sat 98% on room air    Patient instructed on     1. To follow instructions as per ASU for NPO status   2. On the use of EZ sponges  3. Aware that she needs medical clearance (will be done tomorrow 2021 with Dr. Valadez)  4. Instructed to call [phone] to confirm COVID 19 appointment at [facility]  5. Instructed to ask if she needs to adjust her evening dose of Lantus the evening prior to surgery     CAPRINI SCORE    AGE RELATED RISK FACTORS                                                       MOBILITY RELATED FACTORS  [ ] Age 41-60 years                                            (1 Point)                  [ ] Bed rest                                                        (1 Point)  [ ] Age: 61-74 years                                           (2 Points)                [ ] Plaster cast                                                   (2 Points)  X  Age= 75 years                                              (3 Points)                 [ ] Bed bound for more than 72 hours                   (2 PointDISEASE RELATED RISK FACTORS                                               GENDER SPECIFIC FACTORS  [ ] Edema in the lower extremities                       (1 Point)                  [ ] Pregnancy                                                     (1 Point)    [ ] Varicose veins                                               (1 Point)                  [ ] Post-partum < 6 weeks                                   (1 Point)             [ ] BMI > 25 Kg/m2                                            (1 Point)                  [ ] Hormonal therapy  or oral contraception            (1 Point)                 [ ] Sepsis (in the previous month)                        (1 Point)                  [ ] History of pregnancy complications  [ ] Pneumonia or serious lung disease                                               [ ] Unexplained or recurrent                       (1 Point)           (in the previous month)                               (1 Point)  [ ] Abnormal pulmonary function test                     (1 Point)                 SURGERY RELATED RISK FACTORS  [ ] Acute myocardial infarction                              (1 Point)                 [ ]  Section                                            (1 Point)  [ ] Congestive heart failure (in the previous month)  (1 Point)                 [ ] Minor surgery                                                 (1 Point)   [ ] Inflammatory bowel disease                             (1 Point)                 [ ] Arthroscopic surgery                                        (2 Points)  [ ] Central venous access                                    (2 Points)                X General surgery lasting more than 45 minutes   (2 Points)       [ ] Stroke (in the previous month)                          (5 Points)               [ ] Elective arthroplasty                                        (5 Points)            [ ] malignancy                                                             (2 points)                                                                                                                                 HEMATOLOGY RELATED FACTORS                                                 TRAUMA RELATED RISK FACTORS  [ ] Prior episodes of VTE                                     (3 Points)                 [ ] Fracture of the hip, pelvis, or leg                       (5 Points)  [ ] Positive family history for VTE                         (3 Points)                 [ ] Acute spinal cord injury (in the previous month)  (5 Points)  [ ] Prothrombin 37805 A                                      (3 Points)                 [ ] Paralysis  (less than 1 month)                          (5 Points)  [ ] Factor V Leiden                                             (3 Points)                 [ ] Multiple Trauma within 1 month                         (5 Points)  [ ] Lupus anticoagulants                                     (3 Points)                                                           [ ] Anticardiolipin antibodies                                (3 Points)                                                       [ ] High homocysteine in the blood                      (3 Points)                                             [ ] Other congenital or acquired thrombophilia       (3 Points)                                                [ ] Heparin induced thrombocytopenia                  (3 Points)                                          Total Score: 5    The Caprini score indicates this patient is at risk for a VTE event (score 3-5).  Most surgical patients in this group would benefit from pharmacologic prophylaxis.  The surgical team will determine the balance between VTE risk and bleeding risk

## 2021-01-29 ENCOUNTER — APPOINTMENT (OUTPATIENT)
Dept: DISASTER EMERGENCY | Facility: CLINIC | Age: 86
End: 2021-01-29

## 2021-01-29 DIAGNOSIS — I70.212 ATHEROSCLEROSIS OF NATIVE ARTERIES OF EXTREMITIES WITH INTERMITTENT CLAUDICATION, LEFT LEG: ICD-10-CM

## 2021-01-29 DIAGNOSIS — Z01.818 ENCOUNTER FOR OTHER PREPROCEDURAL EXAMINATION: ICD-10-CM

## 2021-02-01 PROBLEM — I73.9 PERIPHERAL VASCULAR DISEASE, UNSPECIFIED: Chronic | Status: ACTIVE | Noted: 2021-01-28

## 2021-02-04 ENCOUNTER — OUTPATIENT (OUTPATIENT)
Dept: OUTPATIENT SERVICES | Facility: HOSPITAL | Age: 86
LOS: 1 days | End: 2021-02-04
Payer: MEDICARE

## 2021-02-04 DIAGNOSIS — S72.009A FRACTURE OF UNSPECIFIED PART OF NECK OF UNSPECIFIED FEMUR, INITIAL ENCOUNTER FOR CLOSED FRACTURE: Chronic | ICD-10-CM

## 2021-02-04 DIAGNOSIS — Z20.828 CONTACT WITH AND (SUSPECTED) EXPOSURE TO OTHER VIRAL COMMUNICABLE DISEASES: ICD-10-CM

## 2021-02-04 LAB — SARS-COV-2 RNA SPEC QL NAA+PROBE: SIGNIFICANT CHANGE UP

## 2021-02-04 PROCEDURE — C9803: CPT

## 2021-02-04 PROCEDURE — U0003: CPT

## 2021-02-04 PROCEDURE — U0005: CPT

## 2021-02-04 RX ORDER — GABAPENTIN 400 MG/1
1 CAPSULE ORAL
Qty: 0 | Refills: 0 | DISCHARGE

## 2021-02-04 RX ORDER — METHOTREXATE 2.5 MG/1
6 TABLET ORAL
Qty: 0 | Refills: 0 | DISCHARGE

## 2021-02-04 RX ORDER — INSULIN GLARGINE 100 [IU]/ML
25 INJECTION, SOLUTION SUBCUTANEOUS
Qty: 0 | Refills: 0 | DISCHARGE

## 2021-02-04 NOTE — H&P ADULT - NSICDXPASTMEDICALHX_GEN_ALL_CORE_FT
PAST MEDICAL HISTORY:  Anemia     Arthritis, rheumatoid     Depression     Hypertension     PVD (peripheral vascular disease) with claudication     Type 2 diabetes mellitus

## 2021-02-04 NOTE — H&P ADULT - PROBLEM SELECTOR PLAN 1
Referred for cardiac cath and possible PCI  Risks and benefits of procedure explained  Informed consent signed

## 2021-02-04 NOTE — H&P ADULT - ASSESSMENT
85 year old with past medical HX of HTN, HLD, DM  with severe peripheral vascular disease C/O SOB on exertion presented to cardiologist for preop endarterectomy. PET MPI revealed large severe anterior wall defect.    Referred for cardiac cath and possible PCI  COVID-19 PCR: Calin (04 Jan 2021 12:15)     85 year old with past medical HX of HTN, HLD, DM  with severe peripheral vascular disease C/O SOB on exertion presented to cardiologist for preop endarterectomy. PET MPI revealed large severe anterior wall defect.    Referred for cardiac cath and possible PCI    ASA class: II   Cr: 0.97  GFR: 54  Bleeding risk: 5.8%

## 2021-02-04 NOTE — H&P ADULT - NSHPREVIEWOFSYSTEMS_GEN_ALL_CORE
General: Pt denies recent weight loss/fever/chills  Neurological: denies numbness or  sensation loss  Cardiovascular: denies chest pain/palpitations/leg edema  Respiratory and Thorax: denies SOB/cough/wheezing  Gastrointestinal: denies abdominal pain/diarrhea/ nausea/ vomiting/ constipation/bloody stool  Genitourinary: denies urinary frequency/urgency/ dysuria/ hematuria   Musculoskeletal: + swelling/ restricted range of motion on Lt leg   Hematologic: denies abnormal bleeding

## 2021-02-04 NOTE — H&P ADULT - NSHPLABSRESULTS_GEN_ALL_CORE
1/29/2021 TTE: EF 55-60% EKG (1/27/21): NSR at 96 bpm,   Echo (1/29/21): EF 55-60%, mild to mod AR  Stress test:  severe anterior wall defect, carlitos-infarct ischemia

## 2021-02-04 NOTE — H&P ADULT - NSICDXPASTSURGICALHX_GEN_ALL_CORE_FT
PAST SURGICAL HISTORY:  fracture ankle right plate . screws   2000    Hip fracture requiring operative repair Right hip 11/14/2020    S/P cataract surgery 2011

## 2021-02-04 NOTE — H&P ADULT - HISTORY OF PRESENT ILLNESS
85 year old with past medical HX of HTN, HLD, DM  with severe peripheral vascular disease C/O SOB on exertion presented to cardiologist for preop endarterectomy. PET MPI revealed large severe anterior wall defect.    Referred for cardiac cath and possible PCI  COVID-19 PCR: Calin (04 Jan 2021 12:15)   85 year old with past medical HX of HTN, HLD, DM2  with severe peripheral vascular disease C/O SOB on exertion presented to cardiologist for preop endarterectomy on Lt common femoral artery. PET MPI revealed large severe anterior wall defect.    Referred for cardiac cath and possible PCI  COVID-19 PCR: Calin (04 Jan 2021 12:15)

## 2021-02-04 NOTE — H&P ADULT - NSHPPHYSICALEXAM_GEN_ALL_CORE
Vital Signs :   BP:  145/47      HR:  99    RR:  16     O2 sat;  99% with RA       Constitutional: well developed, well nourished, no deformities and no acute distress  Neurological: Alert & Oriented x 3, ONEILL, no focal deficits  HEENT: NC/AT, PERRLA, EOMI,  Neck supple.  Respiratory: + rale B/L at inspiration     Cardiovascular: (+) S1 & S2, RRR, No murmur/ rub/ gallop     Gastrointestinal: soft, Nontender, nondistended, (+) BS    Genitourinary: non distended bladder, voiding freely    Extremities/ skin : erythematous on B/L LE (Lt> Rt), small necrotic ulcer (~1cm x1cm) on Lt heel

## 2021-02-05 ENCOUNTER — OUTPATIENT (OUTPATIENT)
Dept: OUTPATIENT SERVICES | Facility: HOSPITAL | Age: 86
LOS: 1 days | Discharge: HOME | End: 2021-02-05
Payer: MEDICARE

## 2021-02-05 VITALS
HEART RATE: 96 BPM | DIASTOLIC BLOOD PRESSURE: 50 MMHG | OXYGEN SATURATION: 98 % | RESPIRATION RATE: 18 BRPM | SYSTOLIC BLOOD PRESSURE: 132 MMHG

## 2021-02-05 VITALS — HEIGHT: 63 IN | WEIGHT: 145.95 LBS

## 2021-02-05 DIAGNOSIS — Z20.828 CONTACT WITH AND (SUSPECTED) EXPOSURE TO OTHER VIRAL COMMUNICABLE DISEASES: ICD-10-CM

## 2021-02-05 DIAGNOSIS — R94.39 ABNORMAL RESULT OF OTHER CARDIOVASCULAR FUNCTION STUDY: ICD-10-CM

## 2021-02-05 DIAGNOSIS — S72.009A FRACTURE OF UNSPECIFIED PART OF NECK OF UNSPECIFIED FEMUR, INITIAL ENCOUNTER FOR CLOSED FRACTURE: Chronic | ICD-10-CM

## 2021-02-05 PROCEDURE — 93454 CORONARY ARTERY ANGIO S&I: CPT

## 2021-02-05 PROCEDURE — C1887: CPT

## 2021-02-05 PROCEDURE — C1894: CPT

## 2021-02-05 PROCEDURE — C1769: CPT

## 2021-02-05 PROCEDURE — 99152 MOD SED SAME PHYS/QHP 5/>YRS: CPT

## 2021-02-05 NOTE — PACU DISCHARGE NOTE - COMMENTS
Pt. verb. agreement and understanding to and teach back to written and verbal discharge instructions and MD follow up and medication list.   Pt. discharged to home via private auto accompanied by dtr; escorted to auto via w/c by transport tech.

## 2021-02-05 NOTE — CHART NOTE - NSCHARTNOTEFT_GEN_A_CORE
Nurse Practitioner Progress note:     HPI:  85 year old with past medical HX of HTN, HLD, DM2  with severe peripheral vascular disease C/O SOB on exertion presented to cardiologist for preop endarterectomy on Lt common femoral artery. PET MPI revealed large severe anterior wall defect.    Referred for cardiac cath and possible PCI  COVID-19 PCR: [facility] (04 Jan 2021 12:15)            T(C): 37.1 (02-05-21 @ 09:10), Max: 37.1 (02-05-21 @ 09:10)  HR: 87 (02-05-21 @ 10:40) (87 - 103)  BP: 124/45 (02-05-21 @ 10:40) (124/45 - 145/57)  RR: 16 (02-05-21 @ 10:40) (16 - 18)  SpO2: 100% (02-05-21 @ 10:40) (100% - 100%)  Wt(kg): --    PHYSICAL EXAM:  Neurologic: Non-focal, AxOx3.  No neuro deficits  Vascular: Peripheral pulses palpable 2+ bilaterally  Procedure Site: Rt. femoral sheath pulled manual pressure applied x20 minutes site benign soft no bleeding no hematoma +1PP    PROCEDURE RESULTS:  ca< from: Cardiac Cath Lab - Adult (02.05.21 @ 10:25) >   Impression   Diagnostic Conclusions   Non-Obstructive CAD.        ASSESSMENT/PLAN: 	  85 year old with past medical HX of HTN, HLD, DM2  with severe peripheral vascular disease C/O SOB on exertion presented to cardiologist for preop endarterectomy on Lt common femoral artery. PET MPI revealed large severe anterior wall defect. S/P LHC     -VS, labs, diet, activity as per post cath orders  -IV hydration  -Encourage PO fluids  -Aggressive medical management of coronary artery disease and its underlying risk factors.  -Continue current medications  -Cleared for planned surgery.  -Pt. to be discharged home today  -Plan of care D/W pt. and MD  -Post cath instructions reviewed with pt., pt. verbalizes and understands instructions  -Follow-up with attending
5y10m girl presenting after getting sand in right eye yesterday. Per parents, patient was very irritated and rubbed it extensively. Her eye became red as well as the surrounding area. Since then, the redness has completely subsided, but patient continues to complain of eye pain. Denies vision loss, double vision, discharge.

## 2021-02-07 DIAGNOSIS — Z88.0 ALLERGY STATUS TO PENICILLIN: ICD-10-CM

## 2021-02-07 DIAGNOSIS — E78.5 HYPERLIPIDEMIA, UNSPECIFIED: ICD-10-CM

## 2021-02-07 DIAGNOSIS — Z79.4 LONG TERM (CURRENT) USE OF INSULIN: ICD-10-CM

## 2021-02-07 DIAGNOSIS — I10 ESSENTIAL (PRIMARY) HYPERTENSION: ICD-10-CM

## 2021-02-07 DIAGNOSIS — E11.51 TYPE 2 DIABETES MELLITUS WITH DIABETIC PERIPHERAL ANGIOPATHY WITHOUT GANGRENE: ICD-10-CM

## 2021-02-07 DIAGNOSIS — R94.39 ABNORMAL RESULT OF OTHER CARDIOVASCULAR FUNCTION STUDY: ICD-10-CM

## 2021-02-07 DIAGNOSIS — I20.8 OTHER FORMS OF ANGINA PECTORIS: ICD-10-CM

## 2021-02-07 DIAGNOSIS — F32.9 MAJOR DEPRESSIVE DISORDER, SINGLE EPISODE, UNSPECIFIED: ICD-10-CM

## 2021-02-07 DIAGNOSIS — I25.118 ATHEROSCLEROTIC HEART DISEASE OF NATIVE CORONARY ARTERY WITH OTHER FORMS OF ANGINA PECTORIS: ICD-10-CM

## 2021-02-07 DIAGNOSIS — Z79.891 LONG TERM (CURRENT) USE OF OPIATE ANALGESIC: ICD-10-CM

## 2021-02-07 DIAGNOSIS — M06.9 RHEUMATOID ARTHRITIS, UNSPECIFIED: ICD-10-CM

## 2021-02-07 DIAGNOSIS — R06.02 SHORTNESS OF BREATH: ICD-10-CM

## 2021-02-07 DIAGNOSIS — D64.9 ANEMIA, UNSPECIFIED: ICD-10-CM

## 2021-02-14 ENCOUNTER — INPATIENT (INPATIENT)
Facility: HOSPITAL | Age: 86
LOS: 4 days | Discharge: NURSING, REHAB OR LONG-TERM CARE | DRG: 253 | End: 2021-02-19
Attending: INTERNAL MEDICINE | Admitting: HOSPITALIST
Payer: MEDICARE

## 2021-02-14 VITALS — WEIGHT: 145.95 LBS | HEIGHT: 63 IN

## 2021-02-14 DIAGNOSIS — L03.116 CELLULITIS OF LEFT LOWER LIMB: ICD-10-CM

## 2021-02-14 DIAGNOSIS — S72.009A FRACTURE OF UNSPECIFIED PART OF NECK OF UNSPECIFIED FEMUR, INITIAL ENCOUNTER FOR CLOSED FRACTURE: Chronic | ICD-10-CM

## 2021-02-14 LAB
ALBUMIN SERPL ELPH-MCNC: 2.9 G/DL — LOW (ref 3.3–5)
ALP SERPL-CCNC: 122 U/L — HIGH (ref 40–120)
ALT FLD-CCNC: 13 U/L — SIGNIFICANT CHANGE UP (ref 12–78)
ANION GAP SERPL CALC-SCNC: 6 MMOL/L — SIGNIFICANT CHANGE UP (ref 5–17)
APTT BLD: 33.2 SEC — SIGNIFICANT CHANGE UP (ref 27.5–35.5)
AST SERPL-CCNC: 9 U/L — LOW (ref 15–37)
BASOPHILS # BLD AUTO: 0.06 K/UL — SIGNIFICANT CHANGE UP (ref 0–0.2)
BASOPHILS NFR BLD AUTO: 0.5 % — SIGNIFICANT CHANGE UP (ref 0–2)
BILIRUB SERPL-MCNC: 0.2 MG/DL — SIGNIFICANT CHANGE UP (ref 0.2–1.2)
BUN SERPL-MCNC: 35 MG/DL — HIGH (ref 7–23)
CALCIUM SERPL-MCNC: 9.2 MG/DL — SIGNIFICANT CHANGE UP (ref 8.5–10.1)
CHLORIDE SERPL-SCNC: 107 MMOL/L — SIGNIFICANT CHANGE UP (ref 96–108)
CO2 SERPL-SCNC: 24 MMOL/L — SIGNIFICANT CHANGE UP (ref 22–31)
CREAT SERPL-MCNC: 1.21 MG/DL — SIGNIFICANT CHANGE UP (ref 0.5–1.3)
EOSINOPHIL # BLD AUTO: 0.6 K/UL — HIGH (ref 0–0.5)
EOSINOPHIL NFR BLD AUTO: 5.2 % — SIGNIFICANT CHANGE UP (ref 0–6)
GLUCOSE SERPL-MCNC: 186 MG/DL — HIGH (ref 70–99)
HCT VFR BLD CALC: 32.6 % — LOW (ref 34.5–45)
HGB BLD-MCNC: 10.3 G/DL — LOW (ref 11.5–15.5)
IMM GRANULOCYTES NFR BLD AUTO: 0.3 % — SIGNIFICANT CHANGE UP (ref 0–1.5)
INR BLD: 1.05 RATIO — SIGNIFICANT CHANGE UP (ref 0.88–1.16)
LACTATE SERPL-SCNC: 1.1 MMOL/L — SIGNIFICANT CHANGE UP (ref 0.7–2)
LYMPHOCYTES # BLD AUTO: 17.7 % — SIGNIFICANT CHANGE UP (ref 13–44)
LYMPHOCYTES # BLD AUTO: 2.05 K/UL — SIGNIFICANT CHANGE UP (ref 1–3.3)
MCHC RBC-ENTMCNC: 30.8 PG — SIGNIFICANT CHANGE UP (ref 27–34)
MCHC RBC-ENTMCNC: 31.6 GM/DL — LOW (ref 32–36)
MCV RBC AUTO: 97.6 FL — SIGNIFICANT CHANGE UP (ref 80–100)
MONOCYTES # BLD AUTO: 0.84 K/UL — SIGNIFICANT CHANGE UP (ref 0–0.9)
MONOCYTES NFR BLD AUTO: 7.3 % — SIGNIFICANT CHANGE UP (ref 2–14)
NEUTROPHILS # BLD AUTO: 7.98 K/UL — HIGH (ref 1.8–7.4)
NEUTROPHILS NFR BLD AUTO: 69 % — SIGNIFICANT CHANGE UP (ref 43–77)
PLATELET # BLD AUTO: 281 K/UL — SIGNIFICANT CHANGE UP (ref 150–400)
POTASSIUM SERPL-MCNC: 4.4 MMOL/L — SIGNIFICANT CHANGE UP (ref 3.5–5.3)
POTASSIUM SERPL-SCNC: 4.4 MMOL/L — SIGNIFICANT CHANGE UP (ref 3.5–5.3)
PROT SERPL-MCNC: 7.5 GM/DL — SIGNIFICANT CHANGE UP (ref 6–8.3)
PROTHROM AB SERPL-ACNC: 12.2 SEC — SIGNIFICANT CHANGE UP (ref 10.6–13.6)
RBC # BLD: 3.34 M/UL — LOW (ref 3.8–5.2)
RBC # FLD: 13.2 % — SIGNIFICANT CHANGE UP (ref 10.3–14.5)
SODIUM SERPL-SCNC: 137 MMOL/L — SIGNIFICANT CHANGE UP (ref 135–145)
WBC # BLD: 11.57 K/UL — HIGH (ref 3.8–10.5)
WBC # FLD AUTO: 11.57 K/UL — HIGH (ref 3.8–10.5)

## 2021-02-14 PROCEDURE — 97162 PT EVAL MOD COMPLEX 30 MIN: CPT | Mod: GP

## 2021-02-14 PROCEDURE — 86769 SARS-COV-2 COVID-19 ANTIBODY: CPT

## 2021-02-14 PROCEDURE — 97530 THERAPEUTIC ACTIVITIES: CPT | Mod: GP

## 2021-02-14 PROCEDURE — P9016: CPT

## 2021-02-14 PROCEDURE — 85652 RBC SED RATE AUTOMATED: CPT

## 2021-02-14 PROCEDURE — 76000 FLUOROSCOPY <1 HR PHYS/QHP: CPT

## 2021-02-14 PROCEDURE — 93925 LOWER EXTREMITY STUDY: CPT

## 2021-02-14 PROCEDURE — 82962 GLUCOSE BLOOD TEST: CPT

## 2021-02-14 PROCEDURE — 80048 BASIC METABOLIC PNL TOTAL CA: CPT

## 2021-02-14 PROCEDURE — 36415 COLL VENOUS BLD VENIPUNCTURE: CPT

## 2021-02-14 PROCEDURE — C1894: CPT

## 2021-02-14 PROCEDURE — 73721 MRI JNT OF LWR EXTRE W/O DYE: CPT | Mod: LT

## 2021-02-14 PROCEDURE — 84550 ASSAY OF BLOOD/URIC ACID: CPT

## 2021-02-14 PROCEDURE — 86923 COMPATIBILITY TEST ELECTRIC: CPT

## 2021-02-14 PROCEDURE — C1725: CPT

## 2021-02-14 PROCEDURE — 86140 C-REACTIVE PROTEIN: CPT

## 2021-02-14 PROCEDURE — C1889: CPT

## 2021-02-14 PROCEDURE — 36430 TRANSFUSION BLD/BLD COMPNT: CPT

## 2021-02-14 PROCEDURE — 71275 CT ANGIOGRAPHY CHEST: CPT

## 2021-02-14 PROCEDURE — 85027 COMPLETE CBC AUTOMATED: CPT

## 2021-02-14 PROCEDURE — 73630 X-RAY EXAM OF FOOT: CPT | Mod: 26,LT

## 2021-02-14 PROCEDURE — 88311 DECALCIFY TISSUE: CPT

## 2021-02-14 PROCEDURE — 88304 TISSUE EXAM BY PATHOLOGIST: CPT

## 2021-02-14 PROCEDURE — C1769: CPT

## 2021-02-14 PROCEDURE — 93970 EXTREMITY STUDY: CPT

## 2021-02-14 PROCEDURE — C1887: CPT

## 2021-02-14 PROCEDURE — 88305 TISSUE EXAM BY PATHOLOGIST: CPT

## 2021-02-14 PROCEDURE — 97116 GAIT TRAINING THERAPY: CPT | Mod: GP

## 2021-02-14 PROCEDURE — U0005: CPT

## 2021-02-14 PROCEDURE — U0003: CPT

## 2021-02-14 PROCEDURE — 73630 X-RAY EXAM OF FOOT: CPT | Mod: LT

## 2021-02-14 PROCEDURE — 99223 1ST HOSP IP/OBS HIGH 75: CPT | Mod: AI

## 2021-02-14 PROCEDURE — 93010 ELECTROCARDIOGRAM REPORT: CPT

## 2021-02-14 PROCEDURE — 83735 ASSAY OF MAGNESIUM: CPT

## 2021-02-14 PROCEDURE — 84100 ASSAY OF PHOSPHORUS: CPT

## 2021-02-14 PROCEDURE — 85730 THROMBOPLASTIN TIME PARTIAL: CPT

## 2021-02-14 RX ORDER — MORPHINE SULFATE 50 MG/1
2 CAPSULE, EXTENDED RELEASE ORAL EVERY 4 HOURS
Refills: 0 | Status: DISCONTINUED | OUTPATIENT
Start: 2021-02-14 | End: 2021-02-19

## 2021-02-14 RX ORDER — SODIUM CHLORIDE 9 MG/ML
1000 INJECTION INTRAMUSCULAR; INTRAVENOUS; SUBCUTANEOUS ONCE
Refills: 0 | Status: COMPLETED | OUTPATIENT
Start: 2021-02-14 | End: 2021-02-14

## 2021-02-14 RX ORDER — MORPHINE SULFATE 50 MG/1
4 CAPSULE, EXTENDED RELEASE ORAL ONCE
Refills: 0 | Status: DISCONTINUED | OUTPATIENT
Start: 2021-02-14 | End: 2021-02-14

## 2021-02-14 RX ORDER — MEROPENEM 1 G/30ML
500 INJECTION INTRAVENOUS ONCE
Refills: 0 | Status: COMPLETED | OUTPATIENT
Start: 2021-02-14 | End: 2021-02-14

## 2021-02-14 RX ORDER — VANCOMYCIN HCL 1 G
1000 VIAL (EA) INTRAVENOUS ONCE
Refills: 0 | Status: COMPLETED | OUTPATIENT
Start: 2021-02-14 | End: 2021-02-14

## 2021-02-14 RX ADMIN — MORPHINE SULFATE 2 MILLIGRAM(S): 50 CAPSULE, EXTENDED RELEASE ORAL at 21:40

## 2021-02-14 RX ADMIN — MORPHINE SULFATE 4 MILLIGRAM(S): 50 CAPSULE, EXTENDED RELEASE ORAL at 20:54

## 2021-02-14 RX ADMIN — MORPHINE SULFATE 4 MILLIGRAM(S): 50 CAPSULE, EXTENDED RELEASE ORAL at 22:43

## 2021-02-14 RX ADMIN — MEROPENEM 100 MILLIGRAM(S): 1 INJECTION INTRAVENOUS at 22:30

## 2021-02-14 RX ADMIN — Medication 1000 MILLIGRAM(S): at 22:00

## 2021-02-14 RX ADMIN — SODIUM CHLORIDE 1000 MILLILITER(S): 9 INJECTION INTRAMUSCULAR; INTRAVENOUS; SUBCUTANEOUS at 20:54

## 2021-02-14 RX ADMIN — Medication 250 MILLIGRAM(S): at 20:55

## 2021-02-14 NOTE — ED PROVIDER NOTE - NS ED ROS FT
Constitutional: nad, well appearing  HEENT:  no nasal congestion, eye drainage or ear pain.    CVS:  no cp  Resp:  No sob, no cough  GI:  no abdominal pain, no nausea or vomiting  :  no dysuria  MSK: no joint pain or limited ROM + LLE: pain/swelling/discharge  Skin: no rash  Neuro: no change in mental status or level of consciousness  Heme/lymph: no bleeding

## 2021-02-14 NOTE — ED PROVIDER NOTE - OBJECTIVE STATEMENT
85 year old female with a PMHx of Chronic nonhealing ulcer on LLE x November 2019, DM, HTN presents to the ED c/o LLE pain, redness, discharge x past week. Per daughter, Dr. Ahumada emergency attending in [facility], pt has a femoral enterectomy with Dr. Menchaca scheduled. States that in November 2019, pt fell and broke her hip, since then has had ulcer on her LLE. Daughter states that pt has c/o increased pain to her left leg in the past 2-3 weeks. In the past week, pt's leg has gotten more swollen and red with weeping discharge. Pain is 12/10.

## 2021-02-14 NOTE — H&P ADULT - HISTORY OF PRESENT ILLNESS
86 yo F with a PMH DM, HTN, PVD, non-obstructive CAD, depression, anemia, and a chronic LLE ulcer who presents with LLE pain and swelling.    In the ED, she was given Morphine 4 mg IV 2 and  mg IV x1, Vancomycin 1 g IV x1, Meropenem 500 mg IV x1, and NS 1L x1. 86 yo F with a PMH DM2 (on insulin), HTN, PVD, non-obstructive CAD, RA, depression, anemia, and a chronic LLE ulcer who presents with LLE pain and swelling. She has had the symptoms for the past 2 months since she fell and broke her right hip in November 2020. Since then, she has had bilateral leg swelling but particular pain and swelling on her left leg. Her pain is on the lower leg around her ankle, and it is acute and severe. She has had a left heel ulcer since shortly before her fall. She has had some ulcers (some of which were from dog scratches) on her left leg. She has been able to walk on it off-and-on and initially her symptoms/pain would improve with walking. She would take 5 mg Oxycodone that would usually last a whole day. However, her symptoms have drastically worsened during the past 2-3 days, and she needed to take 30 mg Oxycodone to improve her symptoms, so then her daughter recommended she come to the ED> She has had marked difficulty walking on it the last couple days. Her right leg feels ok. She had a stent placed in her right leg a few weeks ago (although had minimal RLE symptoms prior to the stent), and is scheduled for a left femoral endarterectomy on Tuesday 2/16/21. She denies any hip pain. ROS is otherwise negative, including nausea, vomiting, fevers, abdominal pain, chest pain, or SOB. She has a mild chronic "smoker's cough."  She had a cardiac cath on 2/5 for clearance and was found to have non-obstructive CAD. She was then cleared for her upcoming surgery.    In the ED, she was given Morphine 4 mg IV 2 and  mg IV x1, Vancomycin 1 g IV x1, Meropenem 500 mg IV x1, and NS 1L x1.

## 2021-02-14 NOTE — ED PROVIDER NOTE - PMH
Anemia    Arthritis, rheumatoid    Depression    Hypertension    PVD (peripheral vascular disease) with claudication    Type 2 diabetes mellitus

## 2021-02-14 NOTE — ED PROVIDER NOTE - PSH
fracture ankle right  plate . screws   2000  Hip fracture requiring operative repair  Right hip 11/14/2020  S/P cataract surgery  2011

## 2021-02-14 NOTE — H&P ADULT - NSICDXFAMILYHX_GEN_ALL_CORE_FT
FAMILY HISTORY:  Family history of atherosclerosis, mother  FH: colon cancer, father  FH: heart attack, father

## 2021-02-14 NOTE — H&P ADULT - NSTOBACCOSCREENHP_GEN_A_CS
Reviewed patient's urine culture - culture positive for Ecoli. Patient was discharged on cephalexin, and culture is sensitive to prescribed medication. Antibiotic prescribed at discharge is appropriate - no changes made to antibiotic regimen. Zarina Leon. No

## 2021-02-14 NOTE — ED ADULT TRIAGE NOTE - CHIEF COMPLAINT QUOTE
two ulcers on left leg since Nov 2019. Increased swelling and redness and discharge from wound. hx DM. Denies fever/chills.

## 2021-02-14 NOTE — ED PROVIDER NOTE - CARE PLAN
Principal Discharge DX:	Cellulitis of left lower extremity  Secondary Diagnosis:	Peripheral artery disease

## 2021-02-14 NOTE — H&P ADULT - NSHPPHYSICALEXAM_GEN_ALL_CORE
Vital Signs Last 24 Hrs  T(C): 37.2 (14 Feb 2021 20:13), Max: 37.2 (14 Feb 2021 20:13)  T(F): 98.9 (14 Feb 2021 20:13), Max: 98.9 (14 Feb 2021 20:13)  HR: 113 (14 Feb 2021 20:13) (113 - 113)  BP: 177/70 (14 Feb 2021 20:13) (177/70 - 177/70)  BP(mean): 100 (14 Feb 2021 20:13) (100 - 100)  RR: 20 (14 Feb 2021 20:13) (20 - 20)  SpO2: 99% (14 Feb 2021 20:13) (99% - 99%) Vital Signs Last 24 Hrs  T(C): 37.2 (14 Feb 2021 20:13), Max: 37.2 (14 Feb 2021 20:13)  T(F): 98.9 (14 Feb 2021 20:13), Max: 98.9 (14 Feb 2021 20:13)  HR: 113 (14 Feb 2021 20:13) (113 - 113)  BP: 177/70 (14 Feb 2021 20:13) (177/70 - 177/70)  BP(mean): 100 (14 Feb 2021 20:13) (100 - 100)  RR: 20 (14 Feb 2021 20:13) (20 - 20)  SpO2: 99% (14 Feb 2021 20:13) (99% - 99%)    GENERAL: No acute distress  HEENT: PERRL, EOMI, MM dry, no oropharyngeal lesions  NECK: supple, no stiffness, no JVD, no thyromegaly  PULM: respirations non-labored, occasional bilateral lower lobe rales. No rhonchi or wheezes  CV: regular rate and rhythm, no murmurs, gallops, or rubs  GI: abdomen soft, nontender, nondistended, no masses felt, normal bowel sounds  MSK: no joint swelling, erythema, or warmth.  LYMPH: no anterior cervical, posterior cervical, supraclavicular, or inguinal lymphadenopathy  NEURO: A&Ox3, no tremors, decreased sensation in left dorsal and plantar-medial region  SKIN: redness in LLE 2/3 of the way up the leg, with some TTP but no warmth. Left heel ulcer, dry, without drainage. Other left lower leg ulcer that patient states are from dog scratches. 1+ bilateral LE edema, slightly increased in left ankle Vital Signs Last 24 Hrs  T(C): 37.2 (14 Feb 2021 20:13), Max: 37.2 (14 Feb 2021 20:13)  T(F): 98.9 (14 Feb 2021 20:13), Max: 98.9 (14 Feb 2021 20:13)  HR: 113 (14 Feb 2021 20:13) (113 - 113)  BP: 177/70 (14 Feb 2021 20:13) (177/70 - 177/70)  BP(mean): 100 (14 Feb 2021 20:13) (100 - 100)  RR: 20 (14 Feb 2021 20:13) (20 - 20)  SpO2: 99% (14 Feb 2021 20:13) (99% - 99%)    GENERAL: No acute distress  HEENT: PERRL, EOMI, MM dry, no oropharyngeal lesions  NECK: supple, no stiffness, no JVD, no thyromegaly  PULM: respirations non-labored, occasional bilateral lower lobe rales. No rhonchi or wheezes  CV: regular rate and rhythm, no murmurs, gallops, or rubs  GI: abdomen soft, nontender, nondistended, no masses felt, normal bowel sounds  MSK: no joint swelling, erythema, or warmth.  LYMPH: no anterior cervical, posterior cervical, supraclavicular, or inguinal lymphadenopathy  NEURO: A&Ox3, no tremors, decreased sensation in left dorsal and plantar-medial region  SKIN: redness in LLE 2/3 of the way up the leg, with some TTP but no warmth. Left heel ulcer, dry, without drainage. Other left lower leg ulcer that patient states are from dog scratches. 1+ bilateral LE edema, slightly increased in left ankle. DPP 1+ RLE and 0.5+ LLE

## 2021-02-14 NOTE — ED PROVIDER NOTE - PHYSICAL EXAMINATION
Constitutional: NAD, well appearing  HEENT: no rhinorrhea, PERRL, no oropharyngeal erythema or exudates, midline uvula.  TMs clear.  CVS:  RRR, no m/r/g  Resp:  CTAB  GI: soft, ntnd  MSK:  no restriction to rom, full ROM to all extremities  Neuro:  A&Ox3, 5/5 strength to all extremities,  SILT to all extremities  Skin: + slight erythema to LLE, pain with dp pulses, diffusely tender distal to proximal third of the shin, neurologically intact, has ulceration to left lateral lower extremity as well as the left heel  psych: clear thought content  Heme/lymph:  No LAD

## 2021-02-14 NOTE — ED PROVIDER NOTE - CLINICAL SUMMARY MEDICAL DECISION MAKING FREE TEXT BOX
I suspect pt's symptoms are more related to peripheral arterial disease that is extensive and progressive, possible some infectious component, will discuss with Dr. Menchaca, pt will likely require admission for level of pain I suspect pt's symptoms are more related to peripheral arterial disease that is extensive and progressive, possible some infectious component, will discuss with Dr. Ventura, pt will likely require admission for level of pain and for treatment of cellulitis.

## 2021-02-14 NOTE — H&P ADULT - NSHPSOCIALHISTORY_GEN_ALL_CORE
Former smoker, 1 pack per day, then decreased to 1/4-1/2 pack per day, stopped 3 months ago.  Lives with her daughter Nirali.

## 2021-02-14 NOTE — H&P ADULT - NSHPLABSRESULTS_GEN_ALL_CORE
Labs personally reviewed and interpreted. Notable for leukocytosis (WBC 11.57), left shift, or lymphopenia. Hb 10.3, plt 281, INR 1.05, lactate normal at 1.1, Na 137, K 4.4, Cl 107, HCO3 24, BUN/creatinine 35/1.21 (baseline creatinine 0.88), , calcium 9.2 with albumin 2.9, alk phos minimally elevated at 122, and AST/ALT normal.    XR left foot personally reviewed and interpreted. No obvious fracture or osteopathic changes seen, but awaiting formal radiology read.    EKG ____________________________ Labs personally reviewed and interpreted. Notable for leukocytosis (WBC 11.57), left shift, or lymphopenia. Hb 10.3, plt 281, INR 1.05, lactate normal at 1.1, Na 137, K 4.4, Cl 107, HCO3 24, BUN/creatinine 35/1.21 (baseline creatinine 0.88), , calcium 9.2 with albumin 2.9, alk phos minimally elevated at 122, and AST/ALT normal.    XR left foot personally reviewed and interpreted. No obvious fracture or osteopathic changes seen, but awaiting formal radiology read.    EKG personally reviewed. Sinus tachycardia, normal axis. No pathological Q waves or ST changes. Rate 104, , QTc 460.

## 2021-02-14 NOTE — H&P ADULT - NSHPSOURCEINFOTX_GEN_ALL_CORE
Daughter, Nirali Briceno (physician) Daughter, Nirali Briceno (physician) - [phone] Daughter, Nirali Briceno ([facility] physician) - [phone]

## 2021-02-14 NOTE — H&P ADULT - NSICDXPASTMEDICALHX_GEN_ALL_CORE_FT
PAST MEDICAL HISTORY:  Anemia     Arthritis, rheumatoid     CAD (coronary artery disease) non-obstructive    Depression     Hypertension     PVD (peripheral vascular disease) with claudication     Type 2 diabetes mellitus      PAST MEDICAL HISTORY:  Anemia     Arthritis, rheumatoid     CAD (coronary artery disease) non-obstructive    Depression     Hypertension     PVD (peripheral vascular disease) with claudication     Rheumatoid arthritis     Type 2 diabetes mellitus on insulin

## 2021-02-14 NOTE — ED ADULT NURSE NOTE - OBJECTIVE STATEMENT
Pt presents to ER c/o LLE pain, swelling, redness and weeping edema. Hx of chronic ulcer on LLE. Onset of symptoms began 2-3 weeks PTA and have been progressively getting worse. Pt took prescribed narcotics PTA with minimal relief. Denies fever/chills. AO x 3 oriented to baseline, normal breathing pattern with no difficulty.

## 2021-02-14 NOTE — ED ADULT NURSE NOTE - NSIMPLEMENTINTERV_GEN_ALL_ED
Implemented All Fall Risk Interventions:  [place] to call system. Call bell, personal items and telephone within reach. Instruct patient to call for assistance. Room bathroom lighting operational. Non-slip footwear when patient is off stretcher. Physically safe environment: no spills, clutter or unnecessary equipment. Stretcher in lowest position, wheels locked, appropriate side rails in place. Provide visual cue, wrist band, yellow gown, etc. Monitor gait and stability. Monitor for mental status changes and reorient to person, place, and time. Review medications for side effects contributing to fall risk. Reinforce activity limits and safety measures with patient and family.

## 2021-02-15 DIAGNOSIS — R09.89 OTHER SPECIFIED SYMPTOMS AND SIGNS INVOLVING THE CIRCULATORY AND RESPIRATORY SYSTEMS: ICD-10-CM

## 2021-02-15 LAB
ANION GAP SERPL CALC-SCNC: 9 MMOL/L — SIGNIFICANT CHANGE UP (ref 5–17)
APTT BLD: 75 SEC — HIGH (ref 27.5–35.5)
BUN SERPL-MCNC: 32 MG/DL — HIGH (ref 7–23)
CALCIUM SERPL-MCNC: 9 MG/DL — SIGNIFICANT CHANGE UP (ref 8.5–10.1)
CHLORIDE SERPL-SCNC: 109 MMOL/L — HIGH (ref 96–108)
CO2 SERPL-SCNC: 23 MMOL/L — SIGNIFICANT CHANGE UP (ref 22–31)
CREAT SERPL-MCNC: 0.99 MG/DL — SIGNIFICANT CHANGE UP (ref 0.5–1.3)
GLUCOSE SERPL-MCNC: 170 MG/DL — HIGH (ref 70–99)
HCT VFR BLD CALC: 29.7 % — LOW (ref 34.5–45)
HCT VFR BLD CALC: 30 % — LOW (ref 34.5–45)
HGB BLD-MCNC: 9.4 G/DL — LOW (ref 11.5–15.5)
HGB BLD-MCNC: 9.5 G/DL — LOW (ref 11.5–15.5)
MAGNESIUM SERPL-MCNC: 2 MG/DL — SIGNIFICANT CHANGE UP (ref 1.6–2.6)
MCHC RBC-ENTMCNC: 30.7 PG — SIGNIFICANT CHANGE UP (ref 27–34)
MCHC RBC-ENTMCNC: 30.8 PG — SIGNIFICANT CHANGE UP (ref 27–34)
MCHC RBC-ENTMCNC: 31.6 GM/DL — LOW (ref 32–36)
MCHC RBC-ENTMCNC: 31.7 GM/DL — LOW (ref 32–36)
MCV RBC AUTO: 97.1 FL — SIGNIFICANT CHANGE UP (ref 80–100)
MCV RBC AUTO: 97.4 FL — SIGNIFICANT CHANGE UP (ref 80–100)
PHOSPHATE SERPL-MCNC: 4.1 MG/DL — SIGNIFICANT CHANGE UP (ref 2.5–4.5)
PLATELET # BLD AUTO: 238 K/UL — SIGNIFICANT CHANGE UP (ref 150–400)
PLATELET # BLD AUTO: 260 K/UL — SIGNIFICANT CHANGE UP (ref 150–400)
POTASSIUM SERPL-MCNC: 4.3 MMOL/L — SIGNIFICANT CHANGE UP (ref 3.5–5.3)
POTASSIUM SERPL-SCNC: 4.3 MMOL/L — SIGNIFICANT CHANGE UP (ref 3.5–5.3)
RBC # BLD: 3.06 M/UL — LOW (ref 3.8–5.2)
RBC # BLD: 3.08 M/UL — LOW (ref 3.8–5.2)
RBC # FLD: 13.3 % — SIGNIFICANT CHANGE UP (ref 10.3–14.5)
RBC # FLD: 13.4 % — SIGNIFICANT CHANGE UP (ref 10.3–14.5)
SARS-COV-2 IGG SERPL QL IA: NEGATIVE — SIGNIFICANT CHANGE UP
SARS-COV-2 IGM SERPL IA-ACNC: 0.06 INDEX — SIGNIFICANT CHANGE UP
SARS-COV-2 RNA SPEC QL NAA+PROBE: SIGNIFICANT CHANGE UP
SODIUM SERPL-SCNC: 141 MMOL/L — SIGNIFICANT CHANGE UP (ref 135–145)
WBC # BLD: 10.16 K/UL — SIGNIFICANT CHANGE UP (ref 3.8–10.5)
WBC # BLD: 10.33 K/UL — SIGNIFICANT CHANGE UP (ref 3.8–10.5)
WBC # FLD AUTO: 10.16 K/UL — SIGNIFICANT CHANGE UP (ref 3.8–10.5)
WBC # FLD AUTO: 10.33 K/UL — SIGNIFICANT CHANGE UP (ref 3.8–10.5)

## 2021-02-15 PROCEDURE — 71275 CT ANGIOGRAPHY CHEST: CPT | Mod: 26

## 2021-02-15 PROCEDURE — 93925 LOWER EXTREMITY STUDY: CPT | Mod: 26

## 2021-02-15 PROCEDURE — 99232 SBSQ HOSP IP/OBS MODERATE 35: CPT

## 2021-02-15 PROCEDURE — 93970 EXTREMITY STUDY: CPT | Mod: 26

## 2021-02-15 RX ORDER — ENOXAPARIN SODIUM 100 MG/ML
40 INJECTION SUBCUTANEOUS DAILY
Refills: 0 | Status: DISCONTINUED | OUTPATIENT
Start: 2021-02-15 | End: 2021-02-15

## 2021-02-15 RX ORDER — AMLODIPINE BESYLATE 2.5 MG/1
5 TABLET ORAL DAILY
Refills: 0 | Status: DISCONTINUED | OUTPATIENT
Start: 2021-02-15 | End: 2021-02-19

## 2021-02-15 RX ORDER — INSULIN GLARGINE 100 [IU]/ML
25 INJECTION, SOLUTION SUBCUTANEOUS ONCE
Refills: 0 | Status: COMPLETED | OUTPATIENT
Start: 2021-02-15 | End: 2021-02-15

## 2021-02-15 RX ORDER — HEPARIN SODIUM 5000 [USP'U]/ML
2500 INJECTION INTRAVENOUS; SUBCUTANEOUS EVERY 6 HOURS
Refills: 0 | Status: DISCONTINUED | OUTPATIENT
Start: 2021-02-15 | End: 2021-02-15

## 2021-02-15 RX ORDER — DEXTROSE 50 % IN WATER 50 %
25 SYRINGE (ML) INTRAVENOUS ONCE
Refills: 0 | Status: DISCONTINUED | OUTPATIENT
Start: 2021-02-15 | End: 2021-02-19

## 2021-02-15 RX ORDER — HEPARIN SODIUM 5000 [USP'U]/ML
5500 INJECTION INTRAVENOUS; SUBCUTANEOUS EVERY 6 HOURS
Refills: 0 | Status: DISCONTINUED | OUTPATIENT
Start: 2021-02-15 | End: 2021-02-16

## 2021-02-15 RX ORDER — AZTREONAM 2 G
1000 VIAL (EA) INJECTION EVERY 8 HOURS
Refills: 0 | Status: DISCONTINUED | OUTPATIENT
Start: 2021-02-15 | End: 2021-02-15

## 2021-02-15 RX ORDER — MORPHINE SULFATE 50 MG/1
2 CAPSULE, EXTENDED RELEASE ORAL ONCE
Refills: 0 | Status: DISCONTINUED | OUTPATIENT
Start: 2021-02-15 | End: 2021-02-15

## 2021-02-15 RX ORDER — HEPARIN SODIUM 5000 [USP'U]/ML
2500 INJECTION INTRAVENOUS; SUBCUTANEOUS EVERY 6 HOURS
Refills: 0 | Status: DISCONTINUED | OUTPATIENT
Start: 2021-02-15 | End: 2021-02-16

## 2021-02-15 RX ORDER — VANCOMYCIN HCL 1 G
1000 VIAL (EA) INTRAVENOUS EVERY 24 HOURS
Refills: 0 | Status: DISCONTINUED | OUTPATIENT
Start: 2021-02-15 | End: 2021-02-18

## 2021-02-15 RX ORDER — ACETAMINOPHEN 500 MG
650 TABLET ORAL EVERY 6 HOURS
Refills: 0 | Status: DISCONTINUED | OUTPATIENT
Start: 2021-02-15 | End: 2021-02-19

## 2021-02-15 RX ORDER — SODIUM CHLORIDE 9 MG/ML
1000 INJECTION INTRAMUSCULAR; INTRAVENOUS; SUBCUTANEOUS
Refills: 0 | Status: DISCONTINUED | OUTPATIENT
Start: 2021-02-15 | End: 2021-02-16

## 2021-02-15 RX ORDER — INSULIN GLARGINE 100 [IU]/ML
24 INJECTION, SOLUTION SUBCUTANEOUS
Qty: 0 | Refills: 0 | DISCHARGE

## 2021-02-15 RX ORDER — LISINOPRIL 2.5 MG/1
20 TABLET ORAL DAILY
Refills: 0 | Status: DISCONTINUED | OUTPATIENT
Start: 2021-02-15 | End: 2021-02-19

## 2021-02-15 RX ORDER — SODIUM CHLORIDE 9 MG/ML
1000 INJECTION, SOLUTION INTRAVENOUS
Refills: 0 | Status: DISCONTINUED | OUTPATIENT
Start: 2021-02-15 | End: 2021-02-19

## 2021-02-15 RX ORDER — INSULIN LISPRO 100/ML
VIAL (ML) SUBCUTANEOUS
Refills: 0 | Status: DISCONTINUED | OUTPATIENT
Start: 2021-02-15 | End: 2021-02-19

## 2021-02-15 RX ORDER — INSULIN GLARGINE 100 [IU]/ML
25 INJECTION, SOLUTION SUBCUTANEOUS AT BEDTIME
Refills: 0 | Status: DISCONTINUED | OUTPATIENT
Start: 2021-02-15 | End: 2021-02-19

## 2021-02-15 RX ORDER — INSULIN LISPRO 100/ML
VIAL (ML) SUBCUTANEOUS AT BEDTIME
Refills: 0 | Status: DISCONTINUED | OUTPATIENT
Start: 2021-02-15 | End: 2021-02-19

## 2021-02-15 RX ORDER — HEPARIN SODIUM 5000 [USP'U]/ML
5500 INJECTION INTRAVENOUS; SUBCUTANEOUS EVERY 6 HOURS
Refills: 0 | Status: DISCONTINUED | OUTPATIENT
Start: 2021-02-15 | End: 2021-02-15

## 2021-02-15 RX ORDER — GLUCAGON INJECTION, SOLUTION 0.5 MG/.1ML
1 INJECTION, SOLUTION SUBCUTANEOUS ONCE
Refills: 0 | Status: DISCONTINUED | OUTPATIENT
Start: 2021-02-15 | End: 2021-02-19

## 2021-02-15 RX ORDER — DEXTROSE 50 % IN WATER 50 %
12.5 SYRINGE (ML) INTRAVENOUS ONCE
Refills: 0 | Status: DISCONTINUED | OUTPATIENT
Start: 2021-02-15 | End: 2021-02-19

## 2021-02-15 RX ORDER — MEROPENEM 1 G/30ML
1000 INJECTION INTRAVENOUS EVERY 12 HOURS
Refills: 0 | Status: DISCONTINUED | OUTPATIENT
Start: 2021-02-15 | End: 2021-02-18

## 2021-02-15 RX ORDER — GABAPENTIN 400 MG/1
1 CAPSULE ORAL
Qty: 0 | Refills: 0 | DISCHARGE

## 2021-02-15 RX ORDER — DEXTROSE 50 % IN WATER 50 %
15 SYRINGE (ML) INTRAVENOUS ONCE
Refills: 0 | Status: DISCONTINUED | OUTPATIENT
Start: 2021-02-15 | End: 2021-02-19

## 2021-02-15 RX ORDER — HEPARIN SODIUM 5000 [USP'U]/ML
INJECTION INTRAVENOUS; SUBCUTANEOUS
Qty: 25000 | Refills: 0 | Status: DISCONTINUED | OUTPATIENT
Start: 2021-02-15 | End: 2021-02-16

## 2021-02-15 RX ORDER — TRAMADOL HYDROCHLORIDE 50 MG/1
0 TABLET ORAL
Qty: 0 | Refills: 0 | DISCHARGE

## 2021-02-15 RX ORDER — TRAMADOL HYDROCHLORIDE 50 MG/1
50 TABLET ORAL EVERY 6 HOURS
Refills: 0 | Status: DISCONTINUED | OUTPATIENT
Start: 2021-02-15 | End: 2021-02-19

## 2021-02-15 RX ORDER — FOLIC ACID 0.8 MG
1 TABLET ORAL AT BEDTIME
Refills: 0 | Status: DISCONTINUED | OUTPATIENT
Start: 2021-02-15 | End: 2021-02-19

## 2021-02-15 RX ORDER — HEPARIN SODIUM 5000 [USP'U]/ML
INJECTION INTRAVENOUS; SUBCUTANEOUS
Qty: 25000 | Refills: 0 | Status: DISCONTINUED | OUTPATIENT
Start: 2021-02-15 | End: 2021-02-15

## 2021-02-15 RX ORDER — GABAPENTIN 400 MG/1
300 CAPSULE ORAL THREE TIMES A DAY
Refills: 0 | Status: DISCONTINUED | OUTPATIENT
Start: 2021-02-15 | End: 2021-02-19

## 2021-02-15 RX ADMIN — TRAMADOL HYDROCHLORIDE 50 MILLIGRAM(S): 50 TABLET ORAL at 15:28

## 2021-02-15 RX ADMIN — INSULIN GLARGINE 25 UNIT(S): 100 INJECTION, SOLUTION SUBCUTANEOUS at 22:56

## 2021-02-15 RX ADMIN — Medication 50 MILLIGRAM(S): at 06:22

## 2021-02-15 RX ADMIN — GABAPENTIN 300 MILLIGRAM(S): 400 CAPSULE ORAL at 22:54

## 2021-02-15 RX ADMIN — Medication 1 MILLIGRAM(S): at 22:54

## 2021-02-15 RX ADMIN — Medication 50 MILLIGRAM(S): at 15:28

## 2021-02-15 RX ADMIN — MORPHINE SULFATE 2 MILLIGRAM(S): 50 CAPSULE, EXTENDED RELEASE ORAL at 10:39

## 2021-02-15 RX ADMIN — SODIUM CHLORIDE 75 MILLILITER(S): 9 INJECTION INTRAMUSCULAR; INTRAVENOUS; SUBCUTANEOUS at 10:39

## 2021-02-15 RX ADMIN — Medication 250 MILLIGRAM(S): at 23:35

## 2021-02-15 RX ADMIN — LISINOPRIL 20 MILLIGRAM(S): 2.5 TABLET ORAL at 07:32

## 2021-02-15 RX ADMIN — MORPHINE SULFATE 2 MILLIGRAM(S): 50 CAPSULE, EXTENDED RELEASE ORAL at 18:11

## 2021-02-15 RX ADMIN — MORPHINE SULFATE 2 MILLIGRAM(S): 50 CAPSULE, EXTENDED RELEASE ORAL at 22:54

## 2021-02-15 RX ADMIN — GABAPENTIN 300 MILLIGRAM(S): 400 CAPSULE ORAL at 01:00

## 2021-02-15 RX ADMIN — GABAPENTIN 300 MILLIGRAM(S): 400 CAPSULE ORAL at 15:28

## 2021-02-15 RX ADMIN — TRAMADOL HYDROCHLORIDE 50 MILLIGRAM(S): 50 TABLET ORAL at 07:35

## 2021-02-15 RX ADMIN — Medication 1: at 07:34

## 2021-02-15 RX ADMIN — INSULIN GLARGINE 25 UNIT(S): 100 INJECTION, SOLUTION SUBCUTANEOUS at 01:00

## 2021-02-15 RX ADMIN — AMLODIPINE BESYLATE 5 MILLIGRAM(S): 2.5 TABLET ORAL at 07:31

## 2021-02-15 RX ADMIN — Medication 20 MILLIGRAM(S): at 07:34

## 2021-02-15 RX ADMIN — HEPARIN SODIUM 1200 UNIT(S)/HR: 5000 INJECTION INTRAVENOUS; SUBCUTANEOUS at 07:26

## 2021-02-15 RX ADMIN — MEROPENEM 100 MILLIGRAM(S): 1 INJECTION INTRAVENOUS at 22:53

## 2021-02-15 RX ADMIN — SODIUM CHLORIDE 75 MILLILITER(S): 9 INJECTION INTRAMUSCULAR; INTRAVENOUS; SUBCUTANEOUS at 23:36

## 2021-02-15 RX ADMIN — GABAPENTIN 300 MILLIGRAM(S): 400 CAPSULE ORAL at 07:31

## 2021-02-15 NOTE — INPATIENT CERTIFICATION FOR MEDICARE PATIENTS - THE SEVERITY OF SIGNS/SYMPTOMS. (SEE ED/ADMIT DOCUMENTS)
Problem: Fall Risk (Adult)  Goal: Absence of Fall  Outcome: Ongoing (interventions implemented as appropriate)      Problem: Skin Injury Risk (Adult)  Goal: Skin Health and Integrity  Outcome: Ongoing (interventions implemented as appropriate)      Problem: Patient Care Overview  Goal: Plan of Care Review   12/01/18 1945   Coping/Psychosocial   Plan of Care Reviewed With patient;family   Plan of Care Review   Progress declining   OTHER   Outcome Summary Pt. transferred from critical care to palliative care. No complaints of pain or nausea. Family advised about comfort care. Will continue to monitor.   Coping/Psychosocial   Patient Agreement with Plan of Care agrees       Problem: Patient Care Overview  Goal: Individualization and Mutuality  Outcome: Ongoing (interventions implemented as appropriate)    Goal: Discharge Needs Assessment  Outcome: Ongoing (interventions implemented as appropriate)         1. The severity of signs/symptoms.(See ED/admit documents)

## 2021-02-15 NOTE — ED ADULT NURSE REASSESSMENT NOTE - NS ED NURSE REASSESS COMMENT FT1
Pt alert and oriented-vitals taken and stable. Pt resting comfortably, complains of discomfort to left leg-medicated as ordered with pain medication. Left leg swollen, red and hot with ulcers noted-pt prefers leg hanging off side of bed for blood flow and comfort. Heparin initiated-no bolus to be given per Dr. Hanks. Pure wick in place for comfort. Blood sugar checked and breakfast ordered. Plan of care reviewed with patient-all needs addressed and safety maintained. Call bell in reach.

## 2021-02-15 NOTE — PROGRESS NOTE ADULT - SUBJECTIVE AND OBJECTIVE BOX
HPI:  84 yo F with a PMH DM2 (on insulin), HTN, PVD, non-obstructive CAD, RA, depression, anemia, and a chronic LLE ulcer who presents with LLE pain and swelling. She has had the symptoms for the past 2 months since she fell and broke her right hip in November 2020. Since then, she has had bilateral leg swelling but particular pain and swelling on her left leg. Her pain is on the lower leg around her ankle, and it is acute and severe. She has had a left heel ulcer since shortly before her fall. She has had some ulcers (some of which were from dog scratches) on her left leg. She has been able to walk on it off-and-on and initially her symptoms/pain would improve with walking. She would take 5 mg Oxycodone that would usually last a whole day. However, her symptoms have drastically worsened during the past 2-3 days, and she needed to take 30 mg Oxycodone to improve her symptoms, so then her daughter recommended she come to the ED> She has had marked difficulty walking on it the last couple days. Her right leg feels ok. She had a stent placed in her right leg a few weeks ago (although had minimal RLE symptoms prior to the stent), and is scheduled for a left femoral endarterectomy on Tuesday 2/16/21. She denies any hip pain. ROS is otherwise negative, including nausea, vomiting, fevers, abdominal pain, chest pain, or SOB. She has a mild chronic "smoker's cough."  She had a cardiac cath on 2/5 for clearance and was found to have non-obstructive CAD. She was then cleared for her upcoming surgery.    In the ED, she was given Morphine 4 mg IV 2 and  mg IV x1, Vancomycin 1 g IV x1, Meropenem 500 mg IV x1, and NS 1L x1. (14 Feb 2021 23:16)    2/15: pt seen and examined. chart reviewed. complains of 7/10 pain to left foot. No CP or SOB. plan for OR tomorrow for severe PAD.     ROS: negative unless noted above in HPI    Vital Signs Last 24 Hrs  T(C): 36.6 (15 Feb 2021 13:00), Max: 37.2 (14 Feb 2021 20:13)  T(F): 97.9 (15 Feb 2021 13:00), Max: 98.9 (14 Feb 2021 20:13)  HR: 100 (15 Feb 2021 13:00) (90 - 120)  BP: 138/55 (15 Feb 2021 13:00) (108/60 - 177/70)  BP(mean): 100 (14 Feb 2021 20:13) (100 - 100)  RR: 18 (15 Feb 2021 13:00) (15 - 20)  SpO2: 97% (15 Feb 2021 13:00) (95% - 99%)    PHYSICAL EXAM:      Constitutional: NAD, well-groomed, well-developed  HEENT: PERRLA, EOMI, Normal Hearing  Neck: No LAD, No JVD  Back: Normal spine flexure, No CVA tenderness  Cardiovascular: S1 and S2, RRR, no M/G/R  Respiratory: CTAB  Gastrointestinal: BS+, soft, NT/ND  Extremities: No peripheral edema  Vascular: 2+ peripheral pulses  Neurological: A/O x 3, no focal deficits  Psychiatric: Normal mood, normal affect  Musculoskeletal: 5/5 strength b/l upper and lower extremities  Skin: No rashes, redness in LLE 2/3 of the way up the leg, with some TTP but no warmth. Left heel ulcer, dry, without drainage. Other left lower leg ulcer that patient states are from dog scratches. 1+ bilateral LE edema, slightly increased in left ankle. DPP 1+ RLE and 0.5+ LLE    Labs                              9.5    10.16 )-----------( 260      ( 15 Feb 2021 07:02 )             30.0     15 Feb 2021 07:02    141    |  109    |  32     ----------------------------<  170    4.3     |  23     |  0.99     Ca    9.0        15 Feb 2021 07:02  Phos  4.1       15 Feb 2021 07:02  Mg     2.0       15 Feb 2021 07:02    TPro  7.5    /  Alb  2.9    /  TBili  0.2    /  DBili  x      /  AST  9      /  ALT  13     /  AlkPhos  122    14 Feb 2021 20:15    PT/INR - ( 14 Feb 2021 20:15 )   PT: 12.2 sec;   INR: 1.05 ratio         PTT - ( 14 Feb 2021 20:15 )  PTT:33.2 sec  CAPILLARY BLOOD GLUCOSE      POCT Blood Glucose.: 115 mg/dL (15 Feb 2021 12:06)  POCT Blood Glucose.: 168 mg/dL (15 Feb 2021 07:28)  POCT Blood Glucose.: 161 mg/dL (15 Feb 2021 00:46)    LIVER FUNCTIONS - ( 14 Feb 2021 20:15 )  Alb: 2.9 g/dL / Pro: 7.5 gm/dL / ALK PHOS: 122 U/L / ALT: 13 U/L / AST: 9 U/L / GGT: x             < from: US Duplex Arterial Lower Ext Compl, Bilateral (02.15.21 @ 04:18) >  IMPRESSION:  1. Complete occlusion of the proximal, mid, and distal left femoral artery. Left popliteal, left posterior tibial, left peroneal and left anterior tibial arteries are patent but demonstrate markedly decreased barely discernible peak systolic velocity with monophasic waveform and substantial pulse elongation indicating poor inflow.  2. No occlusion on the right but evidence of peripheral vascular disease and poor flow as above.    < end of copied text >  `< from: US Duplex Venous Lower Ext Complete, Bilateral (02.15.21 @ 04:15) >  MPRESSION:    Right gastrocnemius vein thrombosis.    < end of copied text >

## 2021-02-15 NOTE — PROGRESS NOTE ADULT - SUBJECTIVE AND OBJECTIVE BOX
admitted for worsening pain L leg    Pt scheduled for L femoral endarterectomy tomorrow    PE  L foot with preserved sensation to light touch, motor preserved but slightly compromised, ruborous; non infected ulcer calcaneal area    A/P  ischemic L foot     for endarterectomy tomorrow, possible angiogram, possible angioplasty L common iliac artery  MEDICATIONS  (STANDING):  amLODIPine   Tablet 5 milliGRAM(s) Oral daily  aztreonam  IVPB 1000 milliGRAM(s) IV Intermittent every 8 hours  dextrose 40% Gel 15 Gram(s) Oral once  dextrose 5%. 1000 milliLiter(s) (100 mL/Hr) IV Continuous <Continuous>  dextrose 50% Injectable 25 Gram(s) IV Push once  dextrose 50% Injectable 12.5 Gram(s) IV Push once  dextrose 50% Injectable 25 Gram(s) IV Push once  folic acid 1 milliGRAM(s) Oral at bedtime  gabapentin 300 milliGRAM(s) Oral three times a day  glucagon  Injectable 1 milliGRAM(s) IntraMuscular once  heparin  Infusion.  Unit(s)/Hr (12 mL/Hr) IV Continuous <Continuous>  insulin glargine Injectable (LANTUS) 25 Unit(s) SubCutaneous at bedtime  insulin lispro (ADMELOG) corrective regimen sliding scale   SubCutaneous three times a day before meals  insulin lispro (ADMELOG) corrective regimen sliding scale   SubCutaneous at bedtime  lisinopril 20 milliGRAM(s) Oral daily  PARoxetine 20 milliGRAM(s) Oral daily  vancomycin  IVPB 1000 milliGRAM(s) IV Intermittent every 24 hours    MEDICATIONS  (PRN):  acetaminophen   Tablet .. 650 milliGRAM(s) Oral every 6 hours PRN Mild Pain (1 - 3)  heparin   Injectable 5500 Unit(s) IV Push every 6 hours PRN For aPTT less than 40  heparin   Injectable 2500 Unit(s) IV Push every 6 hours PRN For aPTT between 40 - 57  morphine  - Injectable 2 milliGRAM(s) IV Push every 4 hours PRN Severe Pain (7 - 10)  traMADol 50 milliGRAM(s) Oral every 6 hours PRN Moderate Pain (4 - 6)      Allergies    cephalosporins (Other)  penicillins (Urticaria; Pruritus)    Intolerances        Flatus: [ ] YES [ ] NO             Bowel Movement: [ ] YES [ ] NO  Pain (0-10):            Pain Control Adequate: [ ] YES [ ] NO  Nausea: [ ] YES [ ] NO            Vomiting: [ ] YES [ ] NO  Diarrhea: [ ] YES [ ] NO         Constipation: [ ] YES [ ] NO     Chest Pain: [ ] YES [ ] NO    SOB:  [ ] YES [ ] NO    Vital Signs Last 24 Hrs  T(C): 36.7 (15 Feb 2021 07:05), Max: 37.2 (14 Feb 2021 20:13)  T(F): 98 (15 Feb 2021 07:05), Max: 98.9 (14 Feb 2021 20:13)  HR: 111 (15 Feb 2021 07:05) (90 - 120)  BP: 108/60 (15 Feb 2021 07:05) (108/60 - 177/70)  BP(mean): 100 (14 Feb 2021 20:13) (100 - 100)  RR: 18 (15 Feb 2021 07:05) (15 - 20)  SpO2: 95% (15 Feb 2021 07:05) (95% - 99%)    I&O's Summary      Physical Exam:  General: NAD, resting comfortably  Pulmonary: normal resp effort, CTA-B  Cardiovascular: NSR  Abdominal: soft, NT/ND  Extremities: WWP, normal strength  Neuro: A/O x 3, CNs II-XII grossly intact, normal motor/sensation, no focal deficits  Pulses:   Right:                                                                          Left:  FEM [ ]2+ [ ]1+ [ ]doppler                                             FEM [ ]2+ [ ]1+ [ ]doppler    POP [ ]2+ [ ]1+ [ ]doppler                                             POP [ ]2+ [ ]1+ [ ]doppler    DP [ ]2+ [ ]1+ [ ]doppler                                                DP [ ]2+ [ ]1+ [ ]doppler  PT[ ]2+ [ ]1+ [ ]doppler                                                  PT [ ]2+ [ ]1+ [ ]doppler    LABS:                        9.5    10.16 )-----------( 260      ( 15 Feb 2021 07:02 )             30.0     02-14    137  |  107  |  35<H>  ----------------------------<  186<H>  4.4   |  24  |  1.21    Ca    9.2      14 Feb 2021 20:15    TPro  7.5  /  Alb  2.9<L>  /  TBili  0.2  /  DBili  x   /  AST  9<L>  /  ALT  13  /  AlkPhos  122<H>  02-14    PT/INR - ( 14 Feb 2021 20:15 )   PT: 12.2 sec;   INR: 1.05 ratio         PTT - ( 14 Feb 2021 20:15 )  PTT:33.2 sec    LIVER FUNCTIONS - ( 14 Feb 2021 20:15 )  Alb: 2.9 g/dL / Pro: 7.5 gm/dL / ALK PHOS: 122 U/L / ALT: 13 U/L / AST: 9 U/L / GGT: x           CAPILLARY BLOOD GLUCOSE      POCT Blood Glucose.: 161 mg/dL (15 Feb 2021 00:46)      RADIOLOGY & ADDITIONAL TESTS:

## 2021-02-15 NOTE — PHARMACOTHERAPY INTERVENTION NOTE - COMMENTS
Spoke with Dr. Roberts.  Patient's CrCl is 45. Recommended and changed meropenem 1000mg every 8 hours to every 12 hours.

## 2021-02-15 NOTE — CONSULT NOTE ADULT - ASSESSMENT
86 yo F with a PMH DM2 (on insulin), HTN, PVD, non-obstructive CAD, RA, depression, anemia, and a chronic LLE ulcer admitted on 2/14 for evaluation of left foot pain and ulcer that is on the heel. The left lower extremity has been giving her problems since November 2020, for which she has been on and off oral antibiotics and pain meds. She also broke her right hip in November 2020. She has multiple skin breakdowns on her right lower leg that she attributes to her dogs scratching her skin. Denies fever, chills. Had right lower extremity stent a few weeks ago and anticipates left lower extremity vascular evaluation.   1. Patient admitted with cellulitic left foot, however, there may be component of ischemia to the foot, also noted with ulcer over calcaneus, concerning for possible underlying osteomyelitis of the calcaneus  - follow up cultures   - serial cbc and monitor temperature   - reviewed prior medical records to evaluate for resistant or atypical pathogens   - iv hydration and supportive care   - to have vascular evaluation  - will optimize antibiotics to meropenem  - will add vancomycin to treat resistant bacteria   - check vancomycin trough prior to fourth dose   - elevate legs   - may need further imaging to evaluate for osteomyelitis, such as MRI  2. other issues: per medicine

## 2021-02-15 NOTE — CONSULT NOTE ADULT - SUBJECTIVE AND OBJECTIVE BOX
Patient is a 85y old  Female who presents with a chief complaint of LLE cellulitis (15 Feb 2021 13:49)    HPI:  84 yo F with a PMH DM2 (on insulin), HTN, PVD, non-obstructive CAD, RA, depression, anemia, and a chronic LLE ulcer admitted on 2/14 for evaluation of left foot pain and ulcer that is on the heel. The left lower extremity has been giving her problems since November 2020, for which she has been on and off oral antibiotics and pain meds. She also broke her right hip in November 2020. She has multiple skin breakdowns on her right lower leg that she attributes to her dogs scratching her skin. Denies fever, chills. Had right lower extremity stent a few weeks ago and anticipates left lower extremity vascular evaluation.         PMH: as above  PSH: as above  Meds: per reconciliation sheet, noted below  MEDICATIONS  (STANDING):  amLODIPine   Tablet 5 milliGRAM(s) Oral daily  dextrose 40% Gel 15 Gram(s) Oral once  dextrose 5%. 1000 milliLiter(s) (100 mL/Hr) IV Continuous <Continuous>  dextrose 50% Injectable 25 Gram(s) IV Push once  dextrose 50% Injectable 12.5 Gram(s) IV Push once  dextrose 50% Injectable 25 Gram(s) IV Push once  folic acid 1 milliGRAM(s) Oral at bedtime  gabapentin 300 milliGRAM(s) Oral three times a day  glucagon  Injectable 1 milliGRAM(s) IntraMuscular once  heparin  Infusion.  Unit(s)/Hr (12 mL/Hr) IV Continuous <Continuous>  insulin glargine Injectable (LANTUS) 25 Unit(s) SubCutaneous at bedtime  insulin lispro (ADMELOG) corrective regimen sliding scale   SubCutaneous three times a day before meals  insulin lispro (ADMELOG) corrective regimen sliding scale   SubCutaneous at bedtime  lisinopril 20 milliGRAM(s) Oral daily  meropenem  IVPB 1000 milliGRAM(s) IV Intermittent every 8 hours  PARoxetine 20 milliGRAM(s) Oral daily  sodium chloride 0.9%. 1000 milliLiter(s) (75 mL/Hr) IV Continuous <Continuous>  vancomycin  IVPB 1000 milliGRAM(s) IV Intermittent every 24 hours    MEDICATIONS  (PRN):  acetaminophen   Tablet .. 650 milliGRAM(s) Oral every 6 hours PRN Mild Pain (1 - 3)  heparin   Injectable 5500 Unit(s) IV Push every 6 hours PRN For aPTT less than 40  heparin   Injectable 2500 Unit(s) IV Push every 6 hours PRN For aPTT between 40 - 57  morphine  - Injectable 2 milliGRAM(s) IV Push every 4 hours PRN Severe Pain (7 - 10)  traMADol 50 milliGRAM(s) Oral every 6 hours PRN Moderate Pain (4 - 6)    Allergies    cephalosporins (Other)  penicillins (Urticaria; Pruritus)----45 years ago    Intolerances      Social: no smoking, no alcohol, no illegal drugs; no recent travel, no exposure to TB  FAMILY HISTORY:  Family history of atherosclerosis  mother    FH: heart attack  father    FH: colon cancer  father         ROS: the patient denies fever, no chills, no HA, no dizziness, no sore throat, no blurry vision, no CP, no palpitations, no SOB, no cough, no abdominal pain, no diarrhea, no N/V, no dysuria, no claudication, no rash, no joint aches, no rectal pain or bleeding, no night sweats  All other systems reviewed and are negative    Vital Signs Last 24 Hrs  T(C): 36.7 (15 Feb 2021 15:42), Max: 37.2 (14 Feb 2021 20:13)  T(F): 98.1 (15 Feb 2021 15:42), Max: 98.9 (14 Feb 2021 20:13)  HR: 105 (15 Feb 2021 15:42) (90 - 120)  BP: 116/86 (15 Feb 2021 15:42) (108/60 - 177/70)  BP(mean): 100 (14 Feb 2021 20:13) (100 - 100)  RR: 18 (15 Feb 2021 15:42) (15 - 20)  SpO2: 96% (15 Feb 2021 15:42) (95% - 99%)  Daily Height in cm: 160.02 (14 Feb 2021 20:00)    Daily     PE:    Constitutional: frail looking  HEENT: NC/AT, EOMI, PERRLA, conjunctivae clear; ears and nose atraumatic; pharynx clear  Neck: supple; thyroid not palpable  Back: no tenderness  Respiratory: respiratory effort normal; clear to auscultation  Cardiovascular: S1S2 regular, no murmurs  Abdomen: soft, not tender, not distended, positive BS; no liver or spleen organomegaly  Genitourinary: no suprapubic tenderness  Musculoskeletal: no muscle tenderness, left lower extremity with purple high to foot, cooler than right foot, faint pulse, dry ulcer on heel with surrounding erythema, multiple skin breakdowns with eschars  Neurological/ Psychiatric: AxOx3, judgement and insight normal;  moving all extremities  Skin: no rashes; no palpable lesions    Labs: all available labs reviewed                        9.4    10.33 )-----------( 238      ( 15 Feb 2021 13:58 )             29.7     02-15    141  |  109<H>  |  32<H>  ----------------------------<  170<H>  4.3   |  23  |  0.99    Ca    9.0      15 Feb 2021 07:02  Phos  4.1     02-15  Mg     2.0     02-15    TPro  7.5  /  Alb  2.9<L>  /  TBili  0.2  /  DBili  x   /  AST  9<L>  /  ALT  13  /  AlkPhos  122<H>  02-14     LIVER FUNCTIONS - ( 14 Feb 2021 20:15 )  Alb: 2.9 g/dL / Pro: 7.5 gm/dL / ALK PHOS: 122 U/L / ALT: 13 U/L / AST: 9 U/L / GGT: x                 Radiology: all available radiological tests reviewed    Advanced directives addressed: full resuscitation

## 2021-02-15 NOTE — PROGRESS NOTE ADULT - ASSESSMENT
86 yo F with a PMH DM2 (on insulin), HTN, PVD, non-obstructive CAD, RA, depression, anemia, and a chronic LLE ulcer who presents with LLE pain and swelling.    1) Redness and swelling of (left) lower leg possible cellulitis vs Peripheral vascular disease  - C/w Vancomycin, add Aztreonam renally dosed  - Vascular and ID consults  - Pain control PRN  - PT eval  - plan for OR in AM for femoral endarterectomy  - pt medically optimized at this time for OR    2) Type 2 diabetes mellitus, with long-term current use of insulin  - C/w home Lantus 25 units QHS  - Check FS with low dose QAC + HS  - Most recent A1C 7.2    3) Depression  - C/w Paxil 20 mg QD    4) RA  - C/w 15 mg QD on Wednesdays  - C/w FA 1 mg QD for supportive therapy    5) HTN  - C/w ACEi equivalent dose of Benazepril 20 mg QD and Amlodipine 5 mg QD    6) CAD  - Non-obstructive  - Had been on aspirin but was changed to Lovenox after fall, has not restarted  - Will eventually need to restart, likely after surgery    7) Prophylactic measure  - DVT PPX: IMPROVE score of 1, will give Lovenox 40 mg SQ QD  - Diet: consistent carb/DASH    dispo: remain inpatient, OR tomorrow 86 yo F with a PMH DM2 (on insulin), HTN, PVD, non-obstructive CAD, RA, depression, anemia, and a chronic LLE ulcer who presents with LLE pain and swelling.    1) Redness and swelling of (left) lower leg possible cellulitis vs Peripheral vascular disease  - C/w Vancomycin, add Aztreonam renally dosed  - Vascular and ID consults  - Pain control PRN  - PT eval  - plan for OR in AM for femoral endarterectomy  - pt medically optimized at this time for OR    2) right LE DVT  - heparin drip, will hold tonight for OR in AM  - CTA negative for PE    3) Type 2 diabetes mellitus, with long-term current use of insulin  - C/w home Lantus 25 units QHS  - Check FS with low dose QAC + HS  - Most recent A1C 7.2    4) Depression  - C/w Paxil 20 mg QD    5) RA  - C/w 15 mg QD on Wednesdays  - C/w FA 1 mg QD for supportive therapy    6) HTN  - C/w ACEi equivalent dose of Benazepril 20 mg QD and Amlodipine 5 mg QD    7) CAD  - Non-obstructive  - Had been on aspirin but was changed to Lovenox after fall, has not restarted  - Will eventually need to restart, likely after surgery    8) Prophylactic measure  - DVT PPX: IMPROVE score of 1, will give Lovenox 40 mg SQ QD  - Diet: consistent carb/DASH    dispo: remain inpatient, OR tomorrow

## 2021-02-16 ENCOUNTER — RESULT REVIEW (OUTPATIENT)
Age: 86
End: 2021-02-16

## 2021-02-16 LAB
ANION GAP SERPL CALC-SCNC: 5 MMOL/L — SIGNIFICANT CHANGE UP (ref 5–17)
ANION GAP SERPL CALC-SCNC: 6 MMOL/L — SIGNIFICANT CHANGE UP (ref 5–17)
BUN SERPL-MCNC: 23 MG/DL — SIGNIFICANT CHANGE UP (ref 7–23)
BUN SERPL-MCNC: 24 MG/DL — HIGH (ref 7–23)
CALCIUM SERPL-MCNC: 8.5 MG/DL — SIGNIFICANT CHANGE UP (ref 8.5–10.1)
CALCIUM SERPL-MCNC: 9.3 MG/DL — SIGNIFICANT CHANGE UP (ref 8.5–10.1)
CHLORIDE SERPL-SCNC: 113 MMOL/L — HIGH (ref 96–108)
CHLORIDE SERPL-SCNC: 114 MMOL/L — HIGH (ref 96–108)
CO2 SERPL-SCNC: 23 MMOL/L — SIGNIFICANT CHANGE UP (ref 22–31)
CO2 SERPL-SCNC: 25 MMOL/L — SIGNIFICANT CHANGE UP (ref 22–31)
CREAT SERPL-MCNC: 0.75 MG/DL — SIGNIFICANT CHANGE UP (ref 0.5–1.3)
CREAT SERPL-MCNC: 0.8 MG/DL — SIGNIFICANT CHANGE UP (ref 0.5–1.3)
GLUCOSE SERPL-MCNC: 122 MG/DL — HIGH (ref 70–99)
GLUCOSE SERPL-MCNC: 78 MG/DL — SIGNIFICANT CHANGE UP (ref 70–99)
HCT VFR BLD CALC: 28.8 % — LOW (ref 34.5–45)
HCT VFR BLD CALC: 32.1 % — LOW (ref 34.5–45)
HGB BLD-MCNC: 10 G/DL — LOW (ref 11.5–15.5)
HGB BLD-MCNC: 9 G/DL — LOW (ref 11.5–15.5)
MCHC RBC-ENTMCNC: 30.5 PG — SIGNIFICANT CHANGE UP (ref 27–34)
MCHC RBC-ENTMCNC: 30.6 PG — SIGNIFICANT CHANGE UP (ref 27–34)
MCHC RBC-ENTMCNC: 31.2 GM/DL — LOW (ref 32–36)
MCHC RBC-ENTMCNC: 31.3 GM/DL — LOW (ref 32–36)
MCV RBC AUTO: 97.9 FL — SIGNIFICANT CHANGE UP (ref 80–100)
MCV RBC AUTO: 98 FL — SIGNIFICANT CHANGE UP (ref 80–100)
PLATELET # BLD AUTO: 229 K/UL — SIGNIFICANT CHANGE UP (ref 150–400)
PLATELET # BLD AUTO: 238 K/UL — SIGNIFICANT CHANGE UP (ref 150–400)
POTASSIUM SERPL-MCNC: 4.5 MMOL/L — SIGNIFICANT CHANGE UP (ref 3.5–5.3)
POTASSIUM SERPL-MCNC: 4.6 MMOL/L — SIGNIFICANT CHANGE UP (ref 3.5–5.3)
POTASSIUM SERPL-SCNC: 4.5 MMOL/L — SIGNIFICANT CHANGE UP (ref 3.5–5.3)
POTASSIUM SERPL-SCNC: 4.6 MMOL/L — SIGNIFICANT CHANGE UP (ref 3.5–5.3)
RBC # BLD: 2.94 M/UL — LOW (ref 3.8–5.2)
RBC # BLD: 3.28 M/UL — LOW (ref 3.8–5.2)
RBC # FLD: 13.4 % — SIGNIFICANT CHANGE UP (ref 10.3–14.5)
RBC # FLD: 13.7 % — SIGNIFICANT CHANGE UP (ref 10.3–14.5)
SODIUM SERPL-SCNC: 141 MMOL/L — SIGNIFICANT CHANGE UP (ref 135–145)
SODIUM SERPL-SCNC: 145 MMOL/L — SIGNIFICANT CHANGE UP (ref 135–145)
WBC # BLD: 9.69 K/UL — SIGNIFICANT CHANGE UP (ref 3.8–10.5)
WBC # BLD: 9.73 K/UL — SIGNIFICANT CHANGE UP (ref 3.8–10.5)
WBC # FLD AUTO: 9.69 K/UL — SIGNIFICANT CHANGE UP (ref 3.8–10.5)
WBC # FLD AUTO: 9.73 K/UL — SIGNIFICANT CHANGE UP (ref 3.8–10.5)

## 2021-02-16 PROCEDURE — 35371 RECHANNELING OF ARTERY: CPT | Mod: AS,LT

## 2021-02-16 PROCEDURE — 88311 DECALCIFY TISSUE: CPT | Mod: 26

## 2021-02-16 PROCEDURE — 75625 CONTRAST EXAM ABDOMINL AORTA: CPT | Mod: 26,AS,59

## 2021-02-16 PROCEDURE — 88304 TISSUE EXAM BY PATHOLOGIST: CPT | Mod: 26

## 2021-02-16 PROCEDURE — 88305 TISSUE EXAM BY PATHOLOGIST: CPT | Mod: 26

## 2021-02-16 PROCEDURE — 75710 ARTERY X-RAYS ARM/LEG: CPT | Mod: 26,AS,59,LT

## 2021-02-16 PROCEDURE — 99232 SBSQ HOSP IP/OBS MODERATE 35: CPT

## 2021-02-16 RX ORDER — OXYCODONE HYDROCHLORIDE 5 MG/1
5 TABLET ORAL ONCE
Refills: 0 | Status: DISCONTINUED | OUTPATIENT
Start: 2021-02-16 | End: 2021-02-16

## 2021-02-16 RX ORDER — SODIUM CHLORIDE 9 MG/ML
1000 INJECTION, SOLUTION INTRAVENOUS
Refills: 0 | Status: DISCONTINUED | OUTPATIENT
Start: 2021-02-16 | End: 2021-02-16

## 2021-02-16 RX ORDER — ONDANSETRON 8 MG/1
4 TABLET, FILM COATED ORAL ONCE
Refills: 0 | Status: DISCONTINUED | OUTPATIENT
Start: 2021-02-16 | End: 2021-02-16

## 2021-02-16 RX ORDER — FENTANYL CITRATE 50 UG/ML
50 INJECTION INTRAVENOUS
Refills: 0 | Status: DISCONTINUED | OUTPATIENT
Start: 2021-02-16 | End: 2021-02-16

## 2021-02-16 RX ORDER — SODIUM CHLORIDE 9 MG/ML
1000 INJECTION INTRAMUSCULAR; INTRAVENOUS; SUBCUTANEOUS
Refills: 0 | Status: DISCONTINUED | OUTPATIENT
Start: 2021-02-16 | End: 2021-02-17

## 2021-02-16 RX ADMIN — Medication 1 MILLIGRAM(S): at 23:11

## 2021-02-16 RX ADMIN — GABAPENTIN 300 MILLIGRAM(S): 400 CAPSULE ORAL at 23:11

## 2021-02-16 RX ADMIN — Medication 0.5 MILLIGRAM(S): at 16:55

## 2021-02-16 RX ADMIN — MORPHINE SULFATE 2 MILLIGRAM(S): 50 CAPSULE, EXTENDED RELEASE ORAL at 05:53

## 2021-02-16 RX ADMIN — Medication 250 MILLIGRAM(S): at 23:10

## 2021-02-16 RX ADMIN — MEROPENEM 100 MILLIGRAM(S): 1 INJECTION INTRAVENOUS at 19:43

## 2021-02-16 RX ADMIN — MEROPENEM 100 MILLIGRAM(S): 1 INJECTION INTRAVENOUS at 05:54

## 2021-02-16 RX ADMIN — INSULIN GLARGINE 25 UNIT(S): 100 INJECTION, SOLUTION SUBCUTANEOUS at 23:12

## 2021-02-16 RX ADMIN — SODIUM CHLORIDE 60 MILLILITER(S): 9 INJECTION INTRAMUSCULAR; INTRAVENOUS; SUBCUTANEOUS at 19:51

## 2021-02-16 RX ADMIN — GABAPENTIN 300 MILLIGRAM(S): 400 CAPSULE ORAL at 05:50

## 2021-02-16 RX ADMIN — FENTANYL CITRATE 50 MICROGRAM(S): 50 INJECTION INTRAVENOUS at 19:45

## 2021-02-16 RX ADMIN — FENTANYL CITRATE 50 MICROGRAM(S): 50 INJECTION INTRAVENOUS at 19:15

## 2021-02-16 RX ADMIN — OXYCODONE HYDROCHLORIDE 5 MILLIGRAM(S): 5 TABLET ORAL at 19:00

## 2021-02-16 NOTE — PROGRESS NOTE ADULT - SUBJECTIVE AND OBJECTIVE BOX
HPI:  86 yo F with a PMH DM2 (on insulin), HTN, PVD, non-obstructive CAD, RA, depression, anemia, and a chronic LLE ulcer who presents with LLE pain and swelling. She has had the symptoms for the past 2 months since she fell and broke her right hip in November 2020. Since then, she has had bilateral leg swelling but particular pain and swelling on her left leg. Her pain is on the lower leg around her ankle, and it is acute and severe. She has had a left heel ulcer since shortly before her fall. She has had some ulcers (some of which were from dog scratches) on her left leg. She has been able to walk on it off-and-on and initially her symptoms/pain would improve with walking. She would take 5 mg Oxycodone that would usually last a whole day. However, her symptoms have drastically worsened during the past 2-3 days, and she needed to take 30 mg Oxycodone to improve her symptoms, so then her daughter recommended she come to the ED> She has had marked difficulty walking on it the last couple days. Her right leg feels ok. She had a stent placed in her right leg a few weeks ago (although had minimal RLE symptoms prior to the stent), and is scheduled for a left femoral endarterectomy on Tuesday 2/16/21. She denies any hip pain. ROS is otherwise negative, including nausea, vomiting, fevers, abdominal pain, chest pain, or SOB. She has a mild chronic "smoker's cough."  She had a cardiac cath on 2/5 for clearance and was found to have non-obstructive CAD. She was then cleared for her upcoming surgery.    2/15: pt seen and examined. chart reviewed. complains of 7/10 pain to left foot. No CP or SOB. plan for OR tomorrow for severe PAD.   2/16 - pt seen in pacu, per RN was agitated and got ativan; she is arousable, moving all four extremities, ROS limited     PHYSICAL EXAM:  Constitutional: lying flat, NAD sedated, easily arousable   HEENT: PERRLA, EOMI  Cardiovascular: S1 and S2, RRR, no M/G/R  Respiratory: CTAB  Gastrointestinal: BS+, soft, NT/ND  Vascular: 2+ peripheral pulses  Neurological: A/O x 3, no focal deficits  Psychiatric: Normal mood, normal affect  Musculoskeletal: 5/5 strength b/l upper and lower extremities  Skin: No rashes, redness in LLE 2/3 of the way up the leg, with some TTP but no warmth. Left heel ulcer, dry, without drainage. Other left lower leg ulcer that patient states are from dog scratches. 1+ bilateral LE edema, slightly increased in left ankle.  LLE post-surgical         < from: US Duplex Arterial Lower Ext Compl, Bilateral (02.15.21 @ 04:18) >  IMPRESSION:  1. Complete occlusion of the proximal, mid, and distal left femoral artery. Left popliteal, left posterior tibial, left peroneal and left anterior tibial arteries are patent but demonstrate markedly decreased barely discernible peak systolic velocity with monophasic waveform and substantial pulse elongation indicating poor inflow.  2. No occlusion on the right but evidence of peripheral vascular disease and poor flow as above.  < from: US Duplex Venous Lower Ext Complete, Bilateral (02.15.21 @ 04:15) >  Right gastrocnemius vein thrombosis.      LABS: All Labs Reviewed:                        10.0   9.73  )-----------( 229      ( 16 Feb 2021 15:40 )             32.1     02-16    141  |  113<H>  |  23  ----------------------------<  122<H>  4.6   |  23  |  0.75    TPro  7.5  /  Alb  2.9<L>  /  TBili  0.2  /  DBili  x   /  AST  9<L>  /  ALT  13  /  AlkPhos  122<H>  02-14  PT/INR - ( 14 Feb 2021 20:15 )   PT: 12.2 sec;   INR: 1.05 ratio    PTT - ( 15 Feb 2021 13:58 )  PTT:75.0 sec        acetaminophen   Tablet .. 650 milliGRAM(s) Oral every 6 hours PRN  amLODIPine   Tablet 5 milliGRAM(s) Oral daily  dextrose 40% Gel 15 Gram(s) Oral once  dextrose 5%. 1000 milliLiter(s) IV Continuous <Continuous>  dextrose 50% Injectable 25 Gram(s) IV Push once  dextrose 50% Injectable 12.5 Gram(s) IV Push once  dextrose 50% Injectable 25 Gram(s) IV Push once  fentaNYL    Injectable 50 MICROGram(s) IV Push every 10 minutes PRN  folic acid 1 milliGRAM(s) Oral at bedtime  gabapentin 300 milliGRAM(s) Oral three times a day  glucagon  Injectable 1 milliGRAM(s) IntraMuscular once  insulin glargine Injectable (LANTUS) 25 Unit(s) SubCutaneous at bedtime  insulin lispro (ADMELOG) corrective regimen sliding scale   SubCutaneous three times a day before meals  insulin lispro (ADMELOG) corrective regimen sliding scale   SubCutaneous at bedtime  lisinopril 20 milliGRAM(s) Oral daily  LORazepam   Injectable 0.5 milliGRAM(s) IV Push every 15 minutes PRN  meropenem  IVPB 1000 milliGRAM(s) IV Intermittent every 12 hours  morphine  - Injectable 2 milliGRAM(s) IV Push every 4 hours PRN  ondansetron Injectable 4 milliGRAM(s) IV Push once PRN  PARoxetine 20 milliGRAM(s) Oral daily  sodium chloride 0.9%. 1000 milliLiter(s) IV Continuous <Continuous>  traMADol 50 milliGRAM(s) Oral every 6 hours PRN  vancomycin  IVPB 1000 milliGRAM(s) IV Intermittent every 24 hours

## 2021-02-16 NOTE — PROGRESS NOTE ADULT - SUBJECTIVE AND OBJECTIVE BOX
Date of service: 02-16-21 @ 09:31      Patient notes left foot pain persists, afebrile      ROS unable to obtain secondary to patient medical condition     MEDICATIONS  (STANDING):  amLODIPine   Tablet 5 milliGRAM(s) Oral daily  dextrose 40% Gel 15 Gram(s) Oral once  dextrose 5%. 1000 milliLiter(s) (100 mL/Hr) IV Continuous <Continuous>  dextrose 50% Injectable 25 Gram(s) IV Push once  dextrose 50% Injectable 12.5 Gram(s) IV Push once  dextrose 50% Injectable 25 Gram(s) IV Push once  folic acid 1 milliGRAM(s) Oral at bedtime  gabapentin 300 milliGRAM(s) Oral three times a day  glucagon  Injectable 1 milliGRAM(s) IntraMuscular once  heparin  Infusion.  Unit(s)/Hr (12 mL/Hr) IV Continuous <Continuous>  insulin glargine Injectable (LANTUS) 25 Unit(s) SubCutaneous at bedtime  insulin lispro (ADMELOG) corrective regimen sliding scale   SubCutaneous three times a day before meals  insulin lispro (ADMELOG) corrective regimen sliding scale   SubCutaneous at bedtime  lisinopril 20 milliGRAM(s) Oral daily  meropenem  IVPB 1000 milliGRAM(s) IV Intermittent every 12 hours  PARoxetine 20 milliGRAM(s) Oral daily  sodium chloride 0.9%. 1000 milliLiter(s) (75 mL/Hr) IV Continuous <Continuous>  vancomycin  IVPB 1000 milliGRAM(s) IV Intermittent every 24 hours    MEDICATIONS  (PRN):  acetaminophen   Tablet .. 650 milliGRAM(s) Oral every 6 hours PRN Mild Pain (1 - 3)  heparin   Injectable 5500 Unit(s) IV Push every 6 hours PRN For aPTT less than 40  heparin   Injectable 2500 Unit(s) IV Push every 6 hours PRN For aPTT between 40 - 57  morphine  - Injectable 2 milliGRAM(s) IV Push every 4 hours PRN Severe Pain (7 - 10)  traMADol 50 milliGRAM(s) Oral every 6 hours PRN Moderate Pain (4 - 6)      Vital Signs Last 24 Hrs  T(C): 36.9 (16 Feb 2021 09:23), Max: 36.9 (16 Feb 2021 09:23)  T(F): 98.4 (16 Feb 2021 09:23), Max: 98.4 (16 Feb 2021 09:23)  HR: 100 (16 Feb 2021 09:23) (100 - 105)  BP: 121/50 (16 Feb 2021 09:23) (116/86 - 138/55)  BP(mean): --  RR: 18 (16 Feb 2021 09:23) (18 - 18)  SpO2: 93% (16 Feb 2021 09:23) (93% - 97%)        Physical Exam:          Constitutional: frail looking  HEENT: NC/AT, EOMI, PERRLA, conjunctivae clear; ears and nose atraumatic; pharynx clear  Neck: supple; thyroid not palpable  Back: no tenderness  Respiratory: respiratory effort normal; clear to auscultation  Cardiovascular: S1S2 regular, no murmurs  Abdomen: soft, not tender, not distended, positive BS; no liver or spleen organomegaly  Genitourinary: no suprapubic tenderness  Musculoskeletal: no muscle tenderness, left lower extremity with purple high to foot, cooler than right foot, faint pulse, dry ulcer on heel with surrounding erythema, multiple skin breakdowns with eschars  Neurological/ Psychiatric: AxOx3, judgement and insight normal;  moving all extremities  Skin: no rashes; no palpable lesions    Labs: all available labs reviewed                       Labs:                        9.0    9.69  )-----------( 238      ( 16 Feb 2021 07:15 )             28.8     02-16    145  |  114<H>  |  24<H>  ----------------------------<  78  4.5   |  25  |  0.80    Ca    9.3      16 Feb 2021 07:15  Phos  4.1     02-15  Mg     2.0     02-15    TPro  7.5  /  Alb  2.9<L>  /  TBili  0.2  /  DBili  x   /  AST  9<L>  /  ALT  13  /  AlkPhos  122<H>  02-14           Cultures:       Culture - Blood (collected 02-14-21 @ 20:15)  Source: .Blood Blood-Peripheral  Preliminary Report (02-16-21 @ 01:02):    No growth to date.    Culture - Blood (collected 02-14-21 @ 20:15)  Source: .Blood Blood-Peripheral  Preliminary Report (02-16-21 @ 01:02):    No growth to date.              Radiology: all available radiological tests reviewed    Advanced directives addressed: full resuscitation

## 2021-02-16 NOTE — PHYSICAL THERAPY INITIAL EVALUATION ADULT - GENERAL OBSERVATIONS, REHAB EVAL
long sitting in bed awake,alert,Ox4 IV meropenum infusing RUE + IVF,she is NPO for OR later this morning

## 2021-02-16 NOTE — PHYSICAL THERAPY INITIAL EVALUATION ADULT - LIVES WITH, PROFILE
resides with daughter Dr Nirali Ahumada in ,has 1 step inside home but recently had ramp installed so able to negotiate in WC/children

## 2021-02-16 NOTE — PROGRESS NOTE ADULT - ASSESSMENT
84 yo F with a PMH DM2 (on insulin), HTN, PVD, non-obstructive CAD, RA, depression, anemia, and a chronic LLE ulcer admitted on 2/14 for evaluation of left foot pain and ulcer that is on the heel. The left lower extremity has been giving her problems since November 2020, for which she has been on and off oral antibiotics and pain meds. She also broke her right hip in November 2020. She has multiple skin breakdowns on her right lower leg that she attributes to her dogs scratching her skin. Denies fever, chills. Had right lower extremity stent a few weeks ago and anticipates left lower extremity vascular evaluation.   1. Patient admitted with cellulitic left foot, however, there may be component of ischemia to the foot, also noted with ulcer over calcaneus, concerning for possible underlying osteomyelitis of the calcaneus  - follow up cultures   - serial cbc and monitor temperature   - reviewed prior medical records to evaluate for resistant or atypical pathogens   - iv hydration and supportive care   - to have vascular evaluation  - day #2 vancomycin and meropenem  - tolerating antibiotics without rashes or side effects   - check vancomycin trough prior to fourth dose   - elevate legs   - may need further imaging to evaluate for osteomyelitis, such as MRI  2. other issues: per medicine

## 2021-02-16 NOTE — PROGRESS NOTE ADULT - ASSESSMENT
86 yo F with a PMH DM2 (on insulin), HTN, PVD, non-obstructive CAD, RA, depression, anemia, and a chronic LLE ulcer who presents with LLE pain and swelling.    1) Redness and swelling of (left) lower leg possible cellulitis vs Peripheral vascular disease  Ulcer noted over left calcaneus  - C/w Vancomycin, add Aztreonam renally dosed  - 2/16 - s/p left femoral endarterectomy by Dr Ventura       2) right LE DVT  - CTA negative for PE  2/16 - pt is post-surgical and off AC - resume therapeutic dose when ok with Vasc Sx    3) Type 2 diabetes mellitus, with long-term current use of insulin  - C/w home Lantus 25 units QHS  - Check FS with low dose QAC + HS  - Most recent A1C 7.2    4) Depression  - C/w Paxil 20 mg QD    5) RA  - C/w 15 mg QD on Wednesdays  - C/w FA 1 mg QD for supportive therapy    6) HTN  - C/w ACEi equivalent dose of Benazepril 20 mg QD and Amlodipine 5 mg QD    7) CAD  - Non-obstructive  - Had been on aspirin but was changed to Lovenox after fall, has not restarted  - Will eventually need to restart, likely after surgery    VTE Px - per Surgery     DISPO - from PACU to SDU   discussed with Agusto Roberts and Lorenzo    84 yo F with a PMH DM2 (on insulin), HTN, PVD, non-obstructive CAD, RA, depression, anemia, and a chronic LLE ulcer who presents with LLE pain and swelling.    1) Redness and swelling of (left) lower leg possible cellulitis vs Peripheral vascular disease  Ulcer noted over left calcaneus  - C/w Vancomycin, Meropenem   - 2/16 - s/p left femoral endarterectomy by Dr Ventura     2) right LE DVT  - CTA negative for PE  2/16 - pt is post-surgical and off AC - resume therapeutic dose when ok with Vasc Sx    3) Type 2 diabetes mellitus, with long-term current use of insulin  - C/w home Lantus 25 units QHS  - Check FS with low dose QAC + HS  - Most recent A1C 7.2    4) Depression  - C/w Paxil 20 mg QD    5) RA  - C/w 15 mg QD on Wednesdays  - C/w FA 1 mg QD for supportive therapy    6) HTN  - C/w ACEi equivalent dose of Benazepril 20 mg QD and Amlodipine 5 mg QD    7) CAD  - Non-obstructive  - Had been on aspirin but was changed to Lovenox after fall, has not restarted  - Will eventually need to restart, likely after surgery    VTE Px - per Surgery     DISPO - from PACU to SDU   discussed with Agusto Roberts and Lorenzo

## 2021-02-16 NOTE — PHYSICAL THERAPY INITIAL EVALUATION ADULT - RANGE OF MOTION EXAMINATION, REHAB EVAL
good AROM RLE s/p ORIF R hip fx Nov 2020; diminished DF L ankle/mild asaptive shortening L heelcord likely from decreased WB tolerance recently and h/o drop foot; IP joint L great toe with FLEX deformity ,unable to actively extend L IP joint hallux/deficits as listed below

## 2021-02-16 NOTE — PHYSICAL THERAPY INITIAL EVALUATION ADULT - ACTIVE RANGE OF MOTION EXAMINATION, REHAB EVAL
Pt with h/o RA and arthritic changes noted B hands ; R ankle DF to neutral/bilateral upper extremity Active ROM was WFL (within functional limits)/bilateral  lower extremity Active ROM was WFL (within functional limits)/deficits as listed below

## 2021-02-16 NOTE — PHYSICAL THERAPY INITIAL EVALUATION ADULT - PATIENT/FAMILY/SIGNIFICANT OTHER GOALS STATEMENT, PT EVAL
pt c/o pain when WBing on LLE immediately "like it's gonna explode " ,pt waiting to be taken to OR for L femoral endarterectomy

## 2021-02-16 NOTE — PHYSICAL THERAPY INITIAL EVALUATION ADULT - DIAGNOSIS, PT EVAL
PVD ,?ischemic pain L leg/ankle/foot ,DM2,neuropathy B feet ,non-healing ulcers L leg ,h/o drop foot B ,gait impairment

## 2021-02-17 LAB
ANION GAP SERPL CALC-SCNC: 3 MMOL/L — LOW (ref 5–17)
BUN SERPL-MCNC: 28 MG/DL — HIGH (ref 7–23)
CALCIUM SERPL-MCNC: 8.4 MG/DL — LOW (ref 8.5–10.1)
CHLORIDE SERPL-SCNC: 114 MMOL/L — HIGH (ref 96–108)
CO2 SERPL-SCNC: 23 MMOL/L — SIGNIFICANT CHANGE UP (ref 22–31)
CREAT SERPL-MCNC: 0.88 MG/DL — SIGNIFICANT CHANGE UP (ref 0.5–1.3)
CRP SERPL-MCNC: 6.68 MG/DL — HIGH (ref 0–0.4)
ERYTHROCYTE [SEDIMENTATION RATE] IN BLOOD: 89 MM/HR — HIGH (ref 0–20)
GLUCOSE SERPL-MCNC: 97 MG/DL — SIGNIFICANT CHANGE UP (ref 70–99)
HCT VFR BLD CALC: 33 % — LOW (ref 34.5–45)
HGB BLD-MCNC: 10 G/DL — LOW (ref 11.5–15.5)
MAGNESIUM SERPL-MCNC: 2 MG/DL — SIGNIFICANT CHANGE UP (ref 1.6–2.6)
MCHC RBC-ENTMCNC: 30.3 GM/DL — LOW (ref 32–36)
MCHC RBC-ENTMCNC: 30.3 PG — SIGNIFICANT CHANGE UP (ref 27–34)
MCV RBC AUTO: 100 FL — SIGNIFICANT CHANGE UP (ref 80–100)
PHOSPHATE SERPL-MCNC: 3.7 MG/DL — SIGNIFICANT CHANGE UP (ref 2.5–4.5)
PLATELET # BLD AUTO: 200 K/UL — SIGNIFICANT CHANGE UP (ref 150–400)
POTASSIUM SERPL-MCNC: 4.5 MMOL/L — SIGNIFICANT CHANGE UP (ref 3.5–5.3)
POTASSIUM SERPL-SCNC: 4.5 MMOL/L — SIGNIFICANT CHANGE UP (ref 3.5–5.3)
RBC # BLD: 3.3 M/UL — LOW (ref 3.8–5.2)
RBC # FLD: 14 % — SIGNIFICANT CHANGE UP (ref 10.3–14.5)
SODIUM SERPL-SCNC: 140 MMOL/L — SIGNIFICANT CHANGE UP (ref 135–145)
URATE SERPL-MCNC: 5.6 MG/DL — SIGNIFICANT CHANGE UP (ref 2.5–7)
WBC # BLD: 8.59 K/UL — SIGNIFICANT CHANGE UP (ref 3.8–10.5)
WBC # FLD AUTO: 8.59 K/UL — SIGNIFICANT CHANGE UP (ref 3.8–10.5)

## 2021-02-17 PROCEDURE — 73721 MRI JNT OF LWR EXTRE W/O DYE: CPT | Mod: 26,LT

## 2021-02-17 PROCEDURE — 99233 SBSQ HOSP IP/OBS HIGH 50: CPT

## 2021-02-17 RX ORDER — HYDROMORPHONE HYDROCHLORIDE 2 MG/ML
1 INJECTION INTRAMUSCULAR; INTRAVENOUS; SUBCUTANEOUS EVERY 4 HOURS
Refills: 0 | Status: DISCONTINUED | OUTPATIENT
Start: 2021-02-17 | End: 2021-02-19

## 2021-02-17 RX ORDER — ACETAMINOPHEN 500 MG
1000 TABLET ORAL ONCE
Refills: 0 | Status: COMPLETED | OUTPATIENT
Start: 2021-02-17 | End: 2021-02-17

## 2021-02-17 RX ORDER — HYDROMORPHONE HYDROCHLORIDE 2 MG/ML
1 INJECTION INTRAMUSCULAR; INTRAVENOUS; SUBCUTANEOUS ONCE
Refills: 0 | Status: DISCONTINUED | OUTPATIENT
Start: 2021-02-17 | End: 2021-02-17

## 2021-02-17 RX ADMIN — Medication 20 MILLIGRAM(S): at 07:41

## 2021-02-17 RX ADMIN — MORPHINE SULFATE 2 MILLIGRAM(S): 50 CAPSULE, EXTENDED RELEASE ORAL at 05:55

## 2021-02-17 RX ADMIN — Medication 400 MILLIGRAM(S): at 14:54

## 2021-02-17 RX ADMIN — Medication 1 MILLIGRAM(S): at 21:40

## 2021-02-17 RX ADMIN — HYDROMORPHONE HYDROCHLORIDE 1 MILLIGRAM(S): 2 INJECTION INTRAMUSCULAR; INTRAVENOUS; SUBCUTANEOUS at 18:26

## 2021-02-17 RX ADMIN — TRAMADOL HYDROCHLORIDE 50 MILLIGRAM(S): 50 TABLET ORAL at 18:53

## 2021-02-17 RX ADMIN — HYDROMORPHONE HYDROCHLORIDE 1 MILLIGRAM(S): 2 INJECTION INTRAMUSCULAR; INTRAVENOUS; SUBCUTANEOUS at 21:59

## 2021-02-17 RX ADMIN — INSULIN GLARGINE 25 UNIT(S): 100 INJECTION, SOLUTION SUBCUTANEOUS at 21:40

## 2021-02-17 RX ADMIN — LISINOPRIL 20 MILLIGRAM(S): 2.5 TABLET ORAL at 07:40

## 2021-02-17 RX ADMIN — Medication 1: at 17:14

## 2021-02-17 RX ADMIN — MEROPENEM 100 MILLIGRAM(S): 1 INJECTION INTRAVENOUS at 05:47

## 2021-02-17 RX ADMIN — Medication 2: at 12:21

## 2021-02-17 RX ADMIN — HYDROMORPHONE HYDROCHLORIDE 1 MILLIGRAM(S): 2 INJECTION INTRAMUSCULAR; INTRAVENOUS; SUBCUTANEOUS at 08:40

## 2021-02-17 RX ADMIN — MEROPENEM 100 MILLIGRAM(S): 1 INJECTION INTRAVENOUS at 17:14

## 2021-02-17 RX ADMIN — GABAPENTIN 300 MILLIGRAM(S): 400 CAPSULE ORAL at 14:54

## 2021-02-17 RX ADMIN — GABAPENTIN 300 MILLIGRAM(S): 400 CAPSULE ORAL at 21:40

## 2021-02-17 RX ADMIN — TRAMADOL HYDROCHLORIDE 50 MILLIGRAM(S): 50 TABLET ORAL at 07:39

## 2021-02-17 RX ADMIN — GABAPENTIN 300 MILLIGRAM(S): 400 CAPSULE ORAL at 05:48

## 2021-02-17 RX ADMIN — AMLODIPINE BESYLATE 5 MILLIGRAM(S): 2.5 TABLET ORAL at 07:40

## 2021-02-17 RX ADMIN — Medication 250 MILLIGRAM(S): at 21:39

## 2021-02-17 NOTE — PROGRESS NOTE ADULT - SUBJECTIVE AND OBJECTIVE BOX
Date of service: 02-17-21 @ 14:00      Patient lying in bed; had her left lower extremity vascular procedure done  Has less pain, her foot is feeling warmer    ROS unable to obtain secondary to patient medical condition   MEDICATIONS  (STANDING):  amLODIPine   Tablet 5 milliGRAM(s) Oral daily  dextrose 40% Gel 15 Gram(s) Oral once  dextrose 5%. 1000 milliLiter(s) (100 mL/Hr) IV Continuous <Continuous>  dextrose 50% Injectable 25 Gram(s) IV Push once  dextrose 50% Injectable 12.5 Gram(s) IV Push once  dextrose 50% Injectable 25 Gram(s) IV Push once  folic acid 1 milliGRAM(s) Oral at bedtime  gabapentin 300 milliGRAM(s) Oral three times a day  glucagon  Injectable 1 milliGRAM(s) IntraMuscular once  insulin glargine Injectable (LANTUS) 25 Unit(s) SubCutaneous at bedtime  insulin lispro (ADMELOG) corrective regimen sliding scale   SubCutaneous three times a day before meals  insulin lispro (ADMELOG) corrective regimen sliding scale   SubCutaneous at bedtime  lisinopril 20 milliGRAM(s) Oral daily  meropenem  IVPB 1000 milliGRAM(s) IV Intermittent every 12 hours  PARoxetine 20 milliGRAM(s) Oral daily  vancomycin  IVPB 1000 milliGRAM(s) IV Intermittent every 24 hours    MEDICATIONS  (PRN):  acetaminophen   Tablet .. 650 milliGRAM(s) Oral every 6 hours PRN Mild Pain (1 - 3)  acetaminophen  IVPB .. 1000 milliGRAM(s) IV Intermittent once PRN Mild Pain (1 - 3)  LORazepam   Injectable 0.5 milliGRAM(s) IV Push every 15 minutes PRN Anxiety  morphine  - Injectable 2 milliGRAM(s) IV Push every 4 hours PRN Severe Pain (7 - 10)  traMADol 50 milliGRAM(s) Oral every 6 hours PRN Moderate Pain (4 - 6)      Vital Signs Last 24 Hrs  T(C): 37.2 (17 Feb 2021 09:30), Max: 37.2 (17 Feb 2021 09:30)  T(F): 98.9 (17 Feb 2021 09:30), Max: 98.9 (17 Feb 2021 09:30)  HR: 89 (17 Feb 2021 12:00) (80 - 99)  BP: 123/50 (17 Feb 2021 12:00) (106/30 - 145/61)  BP(mean): 69 (17 Feb 2021 12:00) (50 - 82)  RR: 15 (17 Feb 2021 12:00) (12 - 22)  SpO2: 96% (17 Feb 2021 03:00) (92% - 100%)        Physical Exam:        Constitutional: frail looking  HEENT: NC/AT, EOMI, PERRLA, conjunctivae clear; ears and nose atraumatic; pharynx clear  Neck: supple; thyroid not palpable  Back: no tenderness  Respiratory: respiratory effort normal; clear to auscultation  Cardiovascular: S1S2 regular, no murmurs  Abdomen: soft, not tender, not distended, positive BS; no liver or spleen organomegaly  Genitourinary: no suprapubic tenderness  Musculoskeletal: no muscle tenderness, left lower extremity with purple high to foot, right foot with warmer skin;  faint pulse, dry ulcer on heel with surrounding erythema, multiple skin breakdowns with eschars  Neurological/ Psychiatric: AxOx3, judgement and insight normal;  moving all extremities  Skin: no rashes; no palpable lesions    Labs: all available labs reviewed                        Labs:                        10.0   8.59  )-----------( 200      ( 17 Feb 2021 06:38 )             33.0     02-17    140  |  114<H>  |  28<H>  ----------------------------<  97  4.5   |  23  |  0.88    Ca    8.4<L>      17 Feb 2021 06:38  Phos  3.7     02-17  Mg     2.0     02-17             Cultures:       Culture - Blood (collected 02-14-21 @ 20:15)  Source: .Blood Blood-Peripheral  Preliminary Report (02-16-21 @ 01:02):    No growth to date.    Culture - Blood (collected 02-14-21 @ 20:15)  Source: .Blood Blood-Peripheral  Preliminary Report (02-16-21 @ 01:02):    No growth to date.                < from: MR Ankle No Cont, Left (02.17.21 @ 11:42) >    EXAM:  MR ANKLE LT                            PROCEDURE DATE:  02/17/2021          INTERPRETATION:  LEFT ANKLE MRI    CLINICAL INFORMATION: Left heel ulceration. Evaluate for osteomyelitis.  TECHNIQUE: Multiplanar, multisequence MRI was obtained ofthe LEFT ankle.  COMPARISON: Left ankle radiographs 16 November 2020.      FINDINGS:    PERIPHERAL/ SUB-CUTANEOUS SOFT TISSUES: Soft tissue ulceration along the posterior aspect of the heel measuring approximately 19 x 15 mm. The ulceration extends to the level of the subcutaneous fat. There is no associated subcutaneous fluid collection or abscess. Mild edema is seen in the subcutaneous fat circumferentially surrounding the lower leg and along the dorsum of the foot.  MARROW: No increased STIR signal or loss of T1 hyperintense signal to suggest acute osteomyelitis. No fracture or osteonecrosis.  MUSCLES AND TENDONS: Tendons are intact. Mild edema and atrophy of the intrinsic musculature of the foot.  LIGAMENTS: The study is not well suited to the evaluation of the ligaments of the ankle.  CARTILAGE and SUBCHONDRAL BONE: Chondral loss with subchondral cystic change and marginal osteophyte formation throughout the midfoot consistent with mild Charcot arthropathy.  SYNOVIUM/JOINT FLUID: No large joint effusion.  PLANTAR FASCIA: Increased thickness of the plantar fascia aponeurosis with large plantar calcaneal heel spur consistent with chronic plantar fasciitis.  NEUROVASCULAR STRUCTURES: Normal in course and caliber.  SINUS TARSI: Fat of the sinus Tarsi is maintained.      IMPRESSION:  1.  Soft tissue ulceration along the posterior aspect of the heel measuring 19 x 15 mm. No associated abscess.  2.  No acute osteomyelitis.  3.  Mild Charcot arthropathy of the midfoot.    < end of copied text >  Radiology: all available radiological tests reviewed    Advanced directives addressed: full resuscitation

## 2021-02-17 NOTE — PROGRESS NOTE ADULT - ASSESSMENT
84 yo F with a PMH DM2 (on insulin), HTN, PVD, non-obstructive CAD, RA, depression, anemia, and a chronic LLE ulcer who presents with LLE pain and swelling.    1) Redness and swelling of (left) lower leg possible cellulitis vs Peripheral vascular disease  Ulcer noted over left calcaneus  - C/w Vancomycin, Meropenem   - 2/16 - s/p left femoral endarterectomy by Dr Ventura     2) right LE DVT  - CTA negative for PE  2/16 - pt is post-surgical and off AC - resume therapeutic dose when ok with Vasc Sx    3) Type 2 diabetes mellitus, with long-term current use of insulin  - C/w home Lantus 25 units QHS  - Check FS with low dose QAC + HS  - Most recent A1C 7.2    4) Depression  - C/w Paxil 20 mg QD    5) RA  - C/w 15 mg QD on Wednesdays  - C/w FA 1 mg QD for supportive therapy    6) HTN  - C/w ACEi equivalent dose of Benazepril 20 mg QD and Amlodipine 5 mg QD    7) CAD  - Non-obstructive  - Had been on aspirin but was changed to Lovenox after fall, has not restarted  - Will eventually need to restart, likely after surgery    VTE Px - per Surgery     DISPO - from PACU to SDU   discussed with Agusto Roberts and Lorenzo    84 yo F with a PMH DM2 (on insulin), HTN, PVD, non-obstructive CAD, RA, depression, anemia, and a chronic LLE ulcer who presents with LLE pain and swelling.    1) Redness and swelling of (left) lower leg possible cellulitis vs Peripheral vascular disease  Ulcer noted over left calcaneus  - C/w Vancomycin, Meropenem   - 2/16 - s/p left femoral endarterectomy by Dr Ventura MRI neg osteo    2) right LE DVT  - CTA negative for PE  2/16 - pt is post-surgical and off AC - resume therapeutic dose when ok with Vasc Sx; remains off AC for now per vascular    3) Type 2 diabetes mellitus, with long-term current use of insulin  - C/w home Lantus 25 units QHS  - Check FS with low dose QAC + HS  - Most recent A1C 7.2      4) RA  - C/w 15 mg QD on Wednesdays  - C/w FA 1 mg QD for supportive therapy

## 2021-02-17 NOTE — PROGRESS NOTE ADULT - ASSESSMENT
WBC 8.59 Xray L foot no gas or bone destruction. Left heel suspicious for osteomyelitis. Will obtain ESR/CRP Uric Ac MRI Left ankle. Will F/U THX

## 2021-02-17 NOTE — PROGRESS NOTE ADULT - SUBJECTIVE AND OBJECTIVE BOX
HPI:  84 yo F with a PMH DM2 (on insulin), HTN, PVD, non-obstructive CAD, RA, depression, anemia, and a chronic LLE ulcer who presents with LLE pain and swelling. She has had the symptoms for the past 2 months since she fell and broke her right hip in November 2020. Since then, she has had bilateral leg swelling but particular pain and swelling on her left leg. Her pain is on the lower leg around her ankle, and it is acute and severe. She has had a left heel ulcer since shortly before her fall. She has had some ulcers (some of which were from dog scratches) on her left leg. She has been able to walk on it off-and-on and initially her symptoms/pain would improve with walking. She would take 5 mg Oxycodone that would usually last a whole day. However, her symptoms have drastically worsened during the past 2-3 days, and she needed to take 30 mg Oxycodone to improve her symptoms, so then her daughter recommended she come to the ED> She has had marked difficulty walking on it the last couple days. Her right leg feels ok. She had a stent placed in her right leg a few weeks ago (although had minimal RLE symptoms prior to the stent), and is scheduled for a left femoral endarterectomy on Tuesday 2/16/21. She denies any hip pain. ROS is otherwise negative, including nausea, vomiting, fevers, abdominal pain, chest pain, or SOB. She has a mild chronic "smoker's cough."  She had a cardiac cath on 2/5 for clearance and was found to have non-obstructive CAD. She was then cleared for her upcoming surgery.      PHYSICAL EXAM:  Constitutional: lying flat, NAD sedated, easily arousable   HEENT: PERRLA, EOMI  Cardiovascular: S1 and S2, RRR, no M/G/R  Respiratory: CTAB  Gastrointestinal: BS+, soft, NT/ND  Vascular: 2+ peripheral pulses  Neurological: A/O x 3, no focal deficits  Psychiatric: Normal mood, normal affect  Musculoskeletal: 5/5 strength b/l upper and lower extremities  Skin: No rashes, redness in LLE 2/3 of the way up the leg, with some TTP but no warmth. Left heel ulcer, dry, without drainage. Other left lower leg ulcer that patient states are from dog scratches. 1+ bilateral LE edema, slightly increased in left ankle.  LLE post-surgical         < from: US Duplex Arterial Lower Ext Compl, Bilateral (02.15.21 @ 04:18) >  IMPRESSION:  1. Complete occlusion of the proximal, mid, and distal left femoral artery. Left popliteal, left posterior tibial, left peroneal and left anterior tibial arteries are patent but demonstrate markedly decreased barely discernible peak systolic velocity with monophasic waveform and substantial pulse elongation indicating poor inflow.  2. No occlusion on the right but evidence of peripheral vascular disease and poor flow as above.  < from: US Duplex Venous Lower Ext Complete, Bilateral (02.15.21 @ 04:15) >  Right gastrocnemius vein thrombosis.      LABS: All Labs Reviewed:                        10.0   9.73  )-----------( 229      ( 16 Feb 2021 15:40 )             32.1     02-16    141  |  113<H>  |  23  ----------------------------<  122<H>  4.6   |  23  |  0.75    TPro  7.5  /  Alb  2.9<L>  /  TBili  0.2  /  DBili  x   /  AST  9<L>  /  ALT  13  /  AlkPhos  122<H>  02-14  PT/INR - ( 14 Feb 2021 20:15 )   PT: 12.2 sec;   INR: 1.05 ratio    PTT - ( 15 Feb 2021 13:58 )  PTT:75.0 sec        acetaminophen   Tablet .. 650 milliGRAM(s) Oral every 6 hours PRN  amLODIPine   Tablet 5 milliGRAM(s) Oral daily  dextrose 40% Gel 15 Gram(s) Oral once  dextrose 5%. 1000 milliLiter(s) IV Continuous <Continuous>  dextrose 50% Injectable 25 Gram(s) IV Push once  dextrose 50% Injectable 12.5 Gram(s) IV Push once  dextrose 50% Injectable 25 Gram(s) IV Push once  fentaNYL    Injectable 50 MICROGram(s) IV Push every 10 minutes PRN  folic acid 1 milliGRAM(s) Oral at bedtime  gabapentin 300 milliGRAM(s) Oral three times a day  glucagon  Injectable 1 milliGRAM(s) IntraMuscular once  insulin glargine Injectable (LANTUS) 25 Unit(s) SubCutaneous at bedtime  insulin lispro (ADMELOG) corrective regimen sliding scale   SubCutaneous three times a day before meals  insulin lispro (ADMELOG) corrective regimen sliding scale   SubCutaneous at bedtime  lisinopril 20 milliGRAM(s) Oral daily  LORazepam   Injectable 0.5 milliGRAM(s) IV Push every 15 minutes PRN  meropenem  IVPB 1000 milliGRAM(s) IV Intermittent every 12 hours  morphine  - Injectable 2 milliGRAM(s) IV Push every 4 hours PRN  ondansetron Injectable 4 milliGRAM(s) IV Push once PRN  PARoxetine 20 milliGRAM(s) Oral daily  sodium chloride 0.9%. 1000 milliLiter(s) IV Continuous <Continuous>  traMADol 50 milliGRAM(s) Oral every 6 hours PRN  vancomycin  IVPB 1000 milliGRAM(s) IV Intermittent every 24 hours               86 yo F with a PMH DM2 (on insulin), HTN, PVD, non-obstructive CAD, RA, depression, anemia, and a chronic LLE ulcer who presents with LLE pain and swelling. She has had the symptoms for the past 2 months since she fell and broke her right hip in November 2020. Since then, she has had bilateral leg swelling but particular pain and swelling on her left leg. Her pain is on the lower leg around her ankle, and it is acute and severe. She has had a left heel ulcer since shortly before her fall. She has had some ulcers (some of which were from dog scratches) on her left leg. She has been able to walk on it off-and-on and initially her symptoms/pain would improve with walking. She would take 5 mg Oxycodone that would usually last a whole day. However, her symptoms have drastically worsened during the past 2-3 days, and she needed to take 30 mg Oxycodone to improve her symptoms, so then her daughter recommended she come to the ED> She has had marked difficulty walking on it the last couple days. Her right leg feels ok. She had a stent placed in her right leg a few weeks ago (although had minimal RLE symptoms prior to the stent), and is scheduled for a left femoral endarterectomy on Tuesday 2/16/21. She denies any hip pain. ROS is otherwise negative, including nausea, vomiting, fevers, abdominal pain, chest pain, or SOB. She has a mild chronic "smoker's cough."  She had a cardiac cath on 2/5 for clearance and was found to have non-obstructive CAD. She was then cleared for her upcoming surgery.      PHYSICAL EXAM:  Constitutional: lying flat, NAD sedated, easily arousable   HEENT: PERRLA, EOMI  Cardiovascular: S1 and S2, RRR, no M/G/R  Respiratory: CTAB  Gastrointestinal: BS+, soft, NT/ND  Vascular: 2+ peripheral pulses  Neurological: A/O x 3, no focal deficits  Psychiatric: Normal mood, normal affect  Musculoskeletal: 5/5 strength b/l upper and lower extremities  Skin: No rashes, redness in LLE 2/3 of the way up the leg, with some TTP but no warmth. Left heel ulcer, dry, without drainage. Other left lower leg ulcer that patient states are from dog scratches. 1+ bilateral LE edema, slightly increased in left ankle.  LLE post-surgical         < from: US Duplex Arterial Lower Ext Compl, Bilateral (02.15.21 @ 04:18) >  IMPRESSION:  1. Complete occlusion of the proximal, mid, and distal left femoral artery. Left popliteal, left posterior tibial, left peroneal and left anterior tibial arteries are patent but demonstrate markedly decreased barely discernible peak systolic velocity with monophasic waveform and substantial pulse elongation indicating poor inflow.  2. No occlusion on the right but evidence of peripheral vascular disease and poor flow as above.  < from: US Duplex Venous Lower Ext Complete, Bilateral (02.15.21 @ 04:15) >  Right gastrocnemius vein thrombosis.      LABS: All Labs Reviewed:                        10.0   9.73  )-----------( 229      ( 16 Feb 2021 15:40 )             32.1     02-16    141  |  113<H>  |  23  ----------------------------<  122<H>  4.6   |  23  |  0.75    TPro  7.5  /  Alb  2.9<L>  /  TBili  0.2  /  DBili  x   /  AST  9<L>  /  ALT  13  /  AlkPhos  122<H>  02-14  PT/INR - ( 14 Feb 2021 20:15 )   PT: 12.2 sec;   INR: 1.05 ratio    PTT - ( 15 Feb 2021 13:58 )  PTT:75.0 sec        acetaminophen   Tablet .. 650 milliGRAM(s) Oral every 6 hours PRN  amLODIPine   Tablet 5 milliGRAM(s) Oral daily  dextrose 40% Gel 15 Gram(s) Oral once  dextrose 5%. 1000 milliLiter(s) IV Continuous <Continuous>  dextrose 50% Injectable 25 Gram(s) IV Push once  dextrose 50% Injectable 12.5 Gram(s) IV Push once  dextrose 50% Injectable 25 Gram(s) IV Push once  fentaNYL    Injectable 50 MICROGram(s) IV Push every 10 minutes PRN  folic acid 1 milliGRAM(s) Oral at bedtime  gabapentin 300 milliGRAM(s) Oral three times a day  glucagon  Injectable 1 milliGRAM(s) IntraMuscular once  insulin glargine Injectable (LANTUS) 25 Unit(s) SubCutaneous at bedtime  insulin lispro (ADMELOG) corrective regimen sliding scale   SubCutaneous three times a day before meals  insulin lispro (ADMELOG) corrective regimen sliding scale   SubCutaneous at bedtime  lisinopril 20 milliGRAM(s) Oral daily  LORazepam   Injectable 0.5 milliGRAM(s) IV Push every 15 minutes PRN  meropenem  IVPB 1000 milliGRAM(s) IV Intermittent every 12 hours  morphine  - Injectable 2 milliGRAM(s) IV Push every 4 hours PRN  ondansetron Injectable 4 milliGRAM(s) IV Push once PRN  PARoxetine 20 milliGRAM(s) Oral daily  sodium chloride 0.9%. 1000 milliLiter(s) IV Continuous <Continuous>  traMADol 50 milliGRAM(s) Oral every 6 hours PRN  vancomycin  IVPB 1000 milliGRAM(s) IV Intermittent every 24 hours               84 yo F with a PMH DM2 (on insulin), HTN, PVD, non-obstructive CAD, RA, depression, anemia, and a chronic LLE ulcer who presents with LLE pain and swelling. She has had the symptoms for the past 2 months since she fell and broke her right hip in November 2020. Since then, she has had bilateral leg swelling but particular pain and swelling on her left leg. Her pain is on the lower leg around her ankle, and it is acute and severe. She has had a left heel ulcer since shortly before her fall. She has had some ulcers (some of which were from dog scratches) on her left leg. She has been able to walk on it off-and-on and initially her symptoms/pain would improve with walking. She would take 5 mg Oxycodone that would usually last a whole day. However, her symptoms have drastically worsened during the past 2-3 days, and she needed to take 30 mg Oxycodone to improve her symptoms, so then her daughter recommended she come to the ED> She has had marked difficulty walking on it the last couple days. Her right leg feels ok. She had a stent placed in her right leg a few weeks ago (although had minimal RLE symptoms prior to the stent), and is scheduled for a left femoral endarterectomy on Tuesday 2/16/21. She denies any hip pain. ROS is otherwise negative, including nausea, vomiting, fevers, abdominal pain, chest pain, or SOB. She has a mild chronic "smoker's cough."  She had a cardiac cath on 2/5 for clearance and was found to have non-obstructive CAD. She was then cleared for her upcoming surgery.      PHYSICAL EXAM:  Constitutional: lying flat, NAD sedated, easily arousable   HEENT: PERRLA, EOMI  Cardiovascular: S1 and S2, RRR, no M/G/R  Respiratory: CTAB  Gastrointestinal: BS+, soft, NT/ND  Vascular: 2+ peripheral pulses  Neurological: A/O x 3, no focal deficits  Psychiatric: Normal mood, normal affect  Musculoskeletal: 5/5 strength b/l upper and lower extremities  Skin: No rashes, redness in LLE 2/3 of the way up the leg, with some TTP but no warmth. Left heel ulcer, dry, without drainage. Other left lower leg ulcer that patient states are from dog scratches. 1+ bilateral LE edema, slightly increased in left ankle.  LLE post-surgical           MRI no osteo

## 2021-02-17 NOTE — PROGRESS NOTE ADULT - SUBJECTIVE AND OBJECTIVE BOX
PODIATRIC SX SICU H&PE: Patient is a 85y old  Female who presents with a chief complaint of LLE cellulitis (16 Feb 2021 17:00)      HPI:  86 yo F with a PMH DM2 (on insulin), HTN, PVD, non-obstructive CAD, RA, depression, anemia, and a chronic LLE ulcer who presents with LLE pain and swelling. She has had the symptoms for the past 2 months since she fell and broke her right hip in November 2020. Since then, she has had bilateral leg swelling but particular pain and swelling on her left leg. Her pain is on the lower leg around her ankle, and it is acute and severe. She has had a left heel ulcer since shortly before her fall. She has had some ulcers (some of which were from dog scratches) on her left leg. She has been able to walk on it off-and-on and initially her symptoms/pain would improve with walking. She would take 5 mg Oxycodone that would usually last a whole day. However, her symptoms have drastically worsened during the past 2-3 days, and she needed to take 30 mg Oxycodone to improve her symptoms, so then her daughter recommended she come to the ED> She has had marked difficulty walking on it the last couple days. Her right leg feels ok. She had a stent placed in her right leg a few weeks ago (although had minimal RLE symptoms prior to the stent), and is scheduled for a left femoral endarterectomy on Tuesday 2/16/21. She denies any hip pain. ROS is otherwise negative, including nausea, vomiting, fevers, abdominal pain, chest pain, or SOB. She has a mild chronic "smoker's cough." Former tobacco abuser.  She had a cardiac cath on 2/5 for clearance and was found to have non-obstructive CAD. She was then cleared for her upcoming surgery.    In the ED, she was given Morphine 4 mg IV 2 and  mg IV x1, Vancomycin 1 g IV x1, Meropenem 500 mg IV x1, and NS 1L x1. (14 Feb 2021 23:16)      Allergies    cephalosporins (Other)  penicillins (Urticaria; Pruritus)    Intolerances        MEDICATIONS  (STANDING):  amLODIPine   Tablet 5 milliGRAM(s) Oral daily  dextrose 40% Gel 15 Gram(s) Oral once  dextrose 5%. 1000 milliLiter(s) (100 mL/Hr) IV Continuous <Continuous>  dextrose 50% Injectable 25 Gram(s) IV Push once  dextrose 50% Injectable 12.5 Gram(s) IV Push once  dextrose 50% Injectable 25 Gram(s) IV Push once  folic acid 1 milliGRAM(s) Oral at bedtime  gabapentin 300 milliGRAM(s) Oral three times a day  glucagon  Injectable 1 milliGRAM(s) IntraMuscular once  insulin glargine Injectable (LANTUS) 25 Unit(s) SubCutaneous at bedtime  insulin lispro (ADMELOG) corrective regimen sliding scale   SubCutaneous three times a day before meals  insulin lispro (ADMELOG) corrective regimen sliding scale   SubCutaneous at bedtime  lisinopril 20 milliGRAM(s) Oral daily  meropenem  IVPB 1000 milliGRAM(s) IV Intermittent every 12 hours  PARoxetine 20 milliGRAM(s) Oral daily  sodium chloride 0.9%. 1000 milliLiter(s) (60 mL/Hr) IV Continuous <Continuous>  vancomycin  IVPB 1000 milliGRAM(s) IV Intermittent every 24 hours    MEDICATIONS  (PRN):  acetaminophen   Tablet .. 650 milliGRAM(s) Oral every 6 hours PRN Mild Pain (1 - 3)  LORazepam   Injectable 0.5 milliGRAM(s) IV Push every 15 minutes PRN Anxiety  morphine  - Injectable 2 milliGRAM(s) IV Push every 4 hours PRN Severe Pain (7 - 10)  traMADol 50 milliGRAM(s) Oral every 6 hours PRN Moderate Pain (4 - 6)      PHYSICAL EXAM:Alert afebrile no major complaints. Nonpalpable DP/PT Pulses hair absent trace edema temp gradient reverses. No cyanosis DTRs/STRs muted Left Heel with central Stage III ulcer / necrotic base inferior to the Os Calcaneous. Lateral linear fissure to heel and dorsum of L Hallux as well. Heel is surrounded by a ring of erythema. No pus/odor.      Constitutional: SEE HPI  Eyes:Clear Moist    ENMT:no tinnitus    Neck:No DJV    Breasts:defer    Back:No LBP    Respiratory: + Cough    Cardiovascular:No SOB    Gastrointestinal:No nausea    Genitourinary: neg    Rectal:defer    Extremities:weakness    Vascular:PVD    Neurological:Muted sensorium    Skin:  Foot ulcers  Lymph Nodes:no    Musculoskeletal:weakness    Psychiatric:no        RADIOLOGY< from: Xray Foot AP + Lateral + Oblique, Left (02.14.21 @ 21:15) >  EXAM:  XR FOOT COMP MIN 3 VIEWS LT                            PROCEDURE DATE:  02/14/2021          INTERPRETATION:  Left foot    HISTORY: Severe pain    COMPARISON: 11/16/2020     Three views of the left foot show no evidence of fracture nor destructive change. The joint spaces are maintained. Periosteal thickening along some of the metatarsal bones is again noted.    IMPRESSION: No acute bony pathology.    Note - This does not exclude fractures in perfect alignment and apposition as presence may be indicated at one to three weeks following callus formation.        Thank you for this referral.            ADDIS AC MD; Attending Interventional Radiologist  This document has been electronically signed. Feb 15 2021 12:19PM    < end of copied text >      CBC Full  -  ( 17 Feb 2021 06:38 )  WBC Count : 8.59 K/uL  RBC Count : 3.30 M/uL  Hemoglobin : 10.0 g/dL  Hematocrit : 33.0 %  Platelet Count - Automated : 200 K/uL  Mean Cell Volume : 100.0 fl  Mean Cell Hemoglobin : 30.3 pg  Mean Cell Hemoglobin Concentration : 30.3 gm/dL  Auto Neutrophil # : x  Auto Lymphocyte # : x  Auto Monocyte # : x  Auto Eosinophil # : x  Auto Basophil # : x  Auto Neutrophil % : x  Auto Lymphocyte % : x  Auto Monocyte % : x  Auto Eosinophil % : x  Auto Basophil % : x      02-16    141  |  113<H>  |  23  ----------------------------<  122<H>  4.6   |  23  |  0.75    Ca    8.5      16 Feb 2021 15:40

## 2021-02-17 NOTE — PROGRESS NOTE ADULT - ASSESSMENT
86 yo F with a PMH DM2 (on insulin), HTN, PVD, non-obstructive CAD, RA, depression, anemia, and a chronic LLE ulcer admitted on 2/14 for evaluation of left foot pain and ulcer that is on the heel. The left lower extremity has been giving her problems since November 2020, for which she has been on and off oral antibiotics and pain meds. She also broke her right hip in November 2020. She has multiple skin breakdowns on her right lower leg that she attributes to her dogs scratching her skin. Denies fever, chills. Had right lower extremity stent a few weeks ago and anticipates left lower extremity vascular evaluation.   1. Patient admitted with cellulitic left foot, however, there may be component of ischemia to the foot, also noted with ulcer over calcaneus, concerning for possible underlying osteomyelitis of the calcaneus  - follow up cultures   - serial cbc and monitor temperature   - reviewed prior medical records to evaluate for resistant or atypical pathogens   - iv hydration and supportive care   - day #3 vancomycin and meropenem  - tolerating antibiotics without rashes or side effects   - check vancomycin trough prior to fourth dose   - elevate legs   - may need further imaging to evaluate for osteomyelitis, such as MRI------ no acute osteomyelitis  - had vascular intervention with good outcome  2. other issues: per medicine

## 2021-02-17 NOTE — PROGRESS NOTE ADULT - SUBJECTIVE AND OBJECTIVE BOX
Pt seen earlier today (~ 7 am)    urine output acceptable    She notes calcaneal area pain in the vicinity of the ulcer but denies the ischemic foot pain experienced pre procedurally    PE  no hematoma L groin  L foot warm and re perfused appearing; no cellulitis or lymphangitis  L heel ulcer non infected appearing    A/P  improved perfusion L foot  OOB   remove Padilla  continue antibiotics  hold AC  consult to       MEDICATIONS  (STANDING):  amLODIPine   Tablet 5 milliGRAM(s) Oral daily  dextrose 40% Gel 15 Gram(s) Oral once  dextrose 5%. 1000 milliLiter(s) (100 mL/Hr) IV Continuous <Continuous>  dextrose 50% Injectable 25 Gram(s) IV Push once  dextrose 50% Injectable 12.5 Gram(s) IV Push once  dextrose 50% Injectable 25 Gram(s) IV Push once  folic acid 1 milliGRAM(s) Oral at bedtime  gabapentin 300 milliGRAM(s) Oral three times a day  glucagon  Injectable 1 milliGRAM(s) IntraMuscular once  insulin glargine Injectable (LANTUS) 25 Unit(s) SubCutaneous at bedtime  insulin lispro (ADMELOG) corrective regimen sliding scale   SubCutaneous three times a day before meals  insulin lispro (ADMELOG) corrective regimen sliding scale   SubCutaneous at bedtime  lisinopril 20 milliGRAM(s) Oral daily  meropenem  IVPB 1000 milliGRAM(s) IV Intermittent every 12 hours  PARoxetine 20 milliGRAM(s) Oral daily  vancomycin  IVPB 1000 milliGRAM(s) IV Intermittent every 24 hours    MEDICATIONS  (PRN):  acetaminophen   Tablet .. 650 milliGRAM(s) Oral every 6 hours PRN Mild Pain (1 - 3)  acetaminophen  IVPB .. 1000 milliGRAM(s) IV Intermittent once PRN Mild Pain (1 - 3)  LORazepam   Injectable 0.5 milliGRAM(s) IV Push every 15 minutes PRN Anxiety  morphine  - Injectable 2 milliGRAM(s) IV Push every 4 hours PRN Severe Pain (7 - 10)  traMADol 50 milliGRAM(s) Oral every 6 hours PRN Moderate Pain (4 - 6)      Allergies    cephalosporins (Other)  penicillins (Urticaria; Pruritus)    Intolerances        Flatus: [ ] YES [ ] NO             Bowel Movement: [ ] YES [ ] NO  Pain (0-10):            Pain Control Adequate: [ ] YES [ ] NO  Nausea: [ ] YES [ ] NO            Vomiting: [ ] YES [ ] NO  Diarrhea: [ ] YES [ ] NO         Constipation: [ ] YES [ ] NO     Chest Pain: [ ] YES [ ] NO    SOB:  [ ] YES [ ] NO    Vital Signs Last 24 Hrs  T(C): 37.2 (17 Feb 2021 09:30), Max: 37.2 (17 Feb 2021 09:30)  T(F): 98.9 (17 Feb 2021 09:30), Max: 98.9 (17 Feb 2021 09:30)  HR: 89 (17 Feb 2021 12:00) (80 - 99)  BP: 123/50 (17 Feb 2021 12:00) (106/30 - 145/61)  BP(mean): 69 (17 Feb 2021 12:00) (50 - 82)  RR: 15 (17 Feb 2021 12:00) (12 - 22)  SpO2: 96% (17 Feb 2021 03:00) (92% - 100%)    I&O's Summary    16 Feb 2021 07:01  -  17 Feb 2021 07:00  --------------------------------------------------------  IN: 1410 mL / OUT: 1620 mL / NET: -210 mL        Physical Exam:  General: NAD, resting comfortably  Pulmonary: normal resp effort, CTA-B  Cardiovascular: NSR  Abdominal: soft, NT/ND  Extremities: WWP, normal strength  Neuro: A/O x 3, CNs II-XII grossly intact, normal motor/sensation, no focal deficits  Pulses:   Right:                                                                          Left:  FEM [ ]2+ [ ]1+ [ ]doppler                                             FEM [ ]2+ [ ]1+ [ ]doppler    POP [ ]2+ [ ]1+ [ ]doppler                                             POP [ ]2+ [ ]1+ [ ]doppler    DP [ ]2+ [ ]1+ [ ]doppler                                                DP [ ]2+ [ ]1+ [ ]doppler  PT[ ]2+ [ ]1+ [ ]doppler                                                  PT [ ]2+ [ ]1+ [ ]doppler    LABS:                        10.0   8.59  )-----------( 200      ( 17 Feb 2021 06:38 )             33.0     02-17    140  |  114<H>  |  28<H>  ----------------------------<  97  4.5   |  23  |  0.88    Ca    8.4<L>      17 Feb 2021 06:38  Phos  3.7     02-17  Mg     2.0     02-17            CAPILLARY BLOOD GLUCOSE      POCT Blood Glucose.: 201 mg/dL (17 Feb 2021 12:19)  POCT Blood Glucose.: 102 mg/dL (17 Feb 2021 07:52)  POCT Blood Glucose.: 215 mg/dL (16 Feb 2021 23:09)  POCT Blood Glucose.: 120 mg/dL (16 Feb 2021 16:55)      RADIOLOGY & ADDITIONAL TESTS:

## 2021-02-18 LAB
HCT VFR BLD CALC: 32 % — LOW (ref 34.5–45)
HGB BLD-MCNC: 9.8 G/DL — LOW (ref 11.5–15.5)
MCHC RBC-ENTMCNC: 30.5 PG — SIGNIFICANT CHANGE UP (ref 27–34)
MCHC RBC-ENTMCNC: 30.6 GM/DL — LOW (ref 32–36)
MCV RBC AUTO: 99.7 FL — SIGNIFICANT CHANGE UP (ref 80–100)
PLATELET # BLD AUTO: 237 K/UL — SIGNIFICANT CHANGE UP (ref 150–400)
RBC # BLD: 3.21 M/UL — LOW (ref 3.8–5.2)
RBC # FLD: 14 % — SIGNIFICANT CHANGE UP (ref 10.3–14.5)
WBC # BLD: 12.07 K/UL — HIGH (ref 3.8–10.5)
WBC # FLD AUTO: 12.07 K/UL — HIGH (ref 3.8–10.5)

## 2021-02-18 PROCEDURE — 99233 SBSQ HOSP IP/OBS HIGH 50: CPT

## 2021-02-18 RX ORDER — HEPARIN SODIUM 5000 [USP'U]/ML
5000 INJECTION INTRAVENOUS; SUBCUTANEOUS EVERY 8 HOURS
Refills: 0 | Status: DISCONTINUED | OUTPATIENT
Start: 2021-02-18 | End: 2021-02-19

## 2021-02-18 RX ADMIN — TRAMADOL HYDROCHLORIDE 50 MILLIGRAM(S): 50 TABLET ORAL at 03:39

## 2021-02-18 RX ADMIN — Medication 1 MILLIGRAM(S): at 21:21

## 2021-02-18 RX ADMIN — GABAPENTIN 300 MILLIGRAM(S): 400 CAPSULE ORAL at 04:31

## 2021-02-18 RX ADMIN — MEROPENEM 100 MILLIGRAM(S): 1 INJECTION INTRAVENOUS at 04:31

## 2021-02-18 RX ADMIN — INSULIN GLARGINE 25 UNIT(S): 100 INJECTION, SOLUTION SUBCUTANEOUS at 21:22

## 2021-02-18 RX ADMIN — HYDROMORPHONE HYDROCHLORIDE 1 MILLIGRAM(S): 2 INJECTION INTRAMUSCULAR; INTRAVENOUS; SUBCUTANEOUS at 10:55

## 2021-02-18 RX ADMIN — HEPARIN SODIUM 5000 UNIT(S): 5000 INJECTION INTRAVENOUS; SUBCUTANEOUS at 21:22

## 2021-02-18 RX ADMIN — Medication 1: at 14:27

## 2021-02-18 RX ADMIN — HYDROMORPHONE HYDROCHLORIDE 1 MILLIGRAM(S): 2 INJECTION INTRAMUSCULAR; INTRAVENOUS; SUBCUTANEOUS at 17:14

## 2021-02-18 RX ADMIN — Medication 20 MILLIGRAM(S): at 09:59

## 2021-02-18 RX ADMIN — HEPARIN SODIUM 5000 UNIT(S): 5000 INJECTION INTRAVENOUS; SUBCUTANEOUS at 14:17

## 2021-02-18 RX ADMIN — HYDROMORPHONE HYDROCHLORIDE 1 MILLIGRAM(S): 2 INJECTION INTRAMUSCULAR; INTRAVENOUS; SUBCUTANEOUS at 04:28

## 2021-02-18 RX ADMIN — GABAPENTIN 300 MILLIGRAM(S): 400 CAPSULE ORAL at 21:21

## 2021-02-18 RX ADMIN — LISINOPRIL 20 MILLIGRAM(S): 2.5 TABLET ORAL at 14:28

## 2021-02-18 RX ADMIN — Medication 100 MILLIGRAM(S): at 21:21

## 2021-02-18 RX ADMIN — HYDROMORPHONE HYDROCHLORIDE 1 MILLIGRAM(S): 2 INJECTION INTRAMUSCULAR; INTRAVENOUS; SUBCUTANEOUS at 21:21

## 2021-02-18 RX ADMIN — GABAPENTIN 300 MILLIGRAM(S): 400 CAPSULE ORAL at 14:17

## 2021-02-18 RX ADMIN — Medication 2: at 17:19

## 2021-02-18 RX ADMIN — AMLODIPINE BESYLATE 5 MILLIGRAM(S): 2.5 TABLET ORAL at 09:58

## 2021-02-18 NOTE — PROGRESS NOTE ADULT - ASSESSMENT
Pt with LLE over the side of bed. Left heel ulcer w/o change.  MRI L ankle NO abscess/No Osteomyelitis. Advise keep clean & dry off load / one pillow if pt can tolerate. THX

## 2021-02-18 NOTE — PROGRESS NOTE ADULT - SUBJECTIVE AND OBJECTIVE BOX
Patient is a 85y old  Female who presents with a chief complaint of LLE cellulitis (17 Feb 2021 14:33)      HPI:  84 yo F with a PMH DM2 (on insulin), HTN, PVD, non-obstructive CAD, RA, depression, anemia, and a chronic LLE ulcer who presents with LLE pain and swelling. She has had the symptoms for the past 2 months since she fell and broke her right hip in November 2020. Since then, she has had bilateral leg swelling but particular pain and swelling on her left leg. Her pain is on the lower leg around her ankle, and it is acute and severe. She has had a left heel ulcer since shortly before her fall. She has had some ulcers (some of which were from dog scratches) on her left leg. She has been able to walk on it off-and-on and initially her symptoms/pain would improve with walking. She would take 5 mg Oxycodone that would usually last a whole day. However, her symptoms have drastically worsened during the past 2-3 days, and she needed to take 30 mg Oxycodone to improve her symptoms, so then her daughter recommended she come to the ED> She has had marked difficulty walking on it the last couple days. Her right leg feels ok. She had a stent placed in her right leg a few weeks ago (although had minimal RLE symptoms prior to the stent), and is scheduled for a left femoral endarterectomy on Tuesday 2/16/21. She denies any hip pain. ROS is otherwise negative, including nausea, vomiting, fevers, abdominal pain, chest pain, or SOB. She has a mild chronic "smoker's cough."  She had a cardiac cath on 2/5 for clearance and was found to have non-obstructive CAD. She was then cleared for her upcoming surgery.    In the ED, she was given Morphine 4 mg IV 2 and  mg IV x1, Vancomycin 1 g IV x1, Meropenem 500 mg IV x1, and NS 1L x1. (14 Feb 2021 23:16)      Allergies    cephalosporins (Other)  penicillins (Urticaria; Pruritus)    Intolerances        MEDICATIONS  (STANDING):  amLODIPine   Tablet 5 milliGRAM(s) Oral daily  dextrose 40% Gel 15 Gram(s) Oral once  dextrose 5%. 1000 milliLiter(s) (100 mL/Hr) IV Continuous <Continuous>  dextrose 50% Injectable 25 Gram(s) IV Push once  dextrose 50% Injectable 12.5 Gram(s) IV Push once  dextrose 50% Injectable 25 Gram(s) IV Push once  folic acid 1 milliGRAM(s) Oral at bedtime  gabapentin 300 milliGRAM(s) Oral three times a day  glucagon  Injectable 1 milliGRAM(s) IntraMuscular once  insulin glargine Injectable (LANTUS) 25 Unit(s) SubCutaneous at bedtime  insulin lispro (ADMELOG) corrective regimen sliding scale   SubCutaneous three times a day before meals  insulin lispro (ADMELOG) corrective regimen sliding scale   SubCutaneous at bedtime  lisinopril 20 milliGRAM(s) Oral daily  meropenem  IVPB 1000 milliGRAM(s) IV Intermittent every 12 hours  PARoxetine 20 milliGRAM(s) Oral daily  vancomycin  IVPB 1000 milliGRAM(s) IV Intermittent every 24 hours    MEDICATIONS  (PRN):  acetaminophen   Tablet .. 650 milliGRAM(s) Oral every 6 hours PRN Mild Pain (1 - 3)  HYDROmorphone  Injectable 1 milliGRAM(s) IV Push every 4 hours PRN Moderate Pain (4 - 6)  LORazepam   Injectable 0.5 milliGRAM(s) IV Push every 15 minutes PRN Anxiety  morphine  - Injectable 2 milliGRAM(s) IV Push every 4 hours PRN Severe Pain (7 - 10)  traMADol 50 milliGRAM(s) Oral every 6 hours PRN Moderate Pain (4 - 6)        RADIOLOGY    CBC Full  -  ( 18 Feb 2021 05:36 )  WBC Count : 12.07 K/uL  RBC Count : 3.21 M/uL  Hemoglobin : 9.8 g/dL  Hematocrit : 32.0 %  Platelet Count - Automated : 237 K/uL  Mean Cell Volume : 99.7 fl  Mean Cell Hemoglobin : 30.5 pg  Mean Cell Hemoglobin Concentration : 30.6 gm/dL  Auto Neutrophil # : x  Auto Lymphocyte # : x  Auto Monocyte # : x  Auto Eosinophil # : x  Auto Basophil # : x  Auto Neutrophil % : x  Auto Lymphocyte % : x  Auto Monocyte % : x  Auto Eosinophil % : x  Auto Basophil % : x      02-17    140  |  114<H>  |  28<H>  ----------------------------<  97  4.5   |  23  |  0.88    Ca    8.4<L>      17 Feb 2021 06:38  Phos  3.7     02-17  Mg     2.0     02-17        Sedimentation Rate, Erythrocyte: 89 mm/hr (02-17 @ 12:48)    < from: MR Ankle No Cont, Left (02.17.21 @ 11:42) >    EXAM:  MR ANKLE LT                            PROCEDURE DATE:  02/17/2021          INTERPRETATION:  LEFT ANKLE MRI    CLINICAL INFORMATION: Left heel ulceration. Evaluate for osteomyelitis.  TECHNIQUE: Multiplanar, multisequence MRI was obtained ofthe LEFT ankle.  COMPARISON: Left ankle radiographs 16 November 2020.      FINDINGS:    PERIPHERAL/ SUB-CUTANEOUS SOFT TISSUES: Soft tissue ulceration along the posterior aspect of the heel measuring approximately 19 x 15 mm. The ulceration extends to the level of the subcutaneous fat. There is no associated subcutaneous fluid collection or abscess. Mild edema is seen in the subcutaneous fat circumferentially surrounding the lower leg and along the dorsum of the foot.  MARROW: No increased STIR signal or loss of T1 hyperintense signal to suggest acute osteomyelitis. No fracture or osteonecrosis.  MUSCLES AND TENDONS: Tendons are intact. Mild edema and atrophy of the intrinsic musculature of the foot.  LIGAMENTS: The study is not well suited to the evaluation of the ligaments of the ankle.  CARTILAGE and SUBCHONDRAL BONE: Chondral loss with subchondral cystic change and marginal osteophyte formation throughout the midfoot consistent with mild Charcot arthropathy.  SYNOVIUM/JOINT FLUID: No large joint effusion.  PLANTAR FASCIA: Increased thickness of the plantar fascia aponeurosis with large plantar calcaneal heel spur consistent with chronic plantar fasciitis.  NEUROVASCULAR STRUCTURES: Normal in course and caliber.  SINUS TARSI: Fat of the sinus Tarsi is maintained.      IMPRESSION:  1.  Soft tissue ulceration along the posterior aspect of the heel measuring 19 x 15 mm. No associated abscess.  2.  No acute osteomyelitis.  3.  Mild Charcot arthropathy of the midfoot.            RASHIDA BROCK MD; Attending Radiologist  This document has been electronically signed. Feb 17 2021 12:11PM    < end of copied text >

## 2021-02-18 NOTE — PROGRESS NOTE ADULT - ASSESSMENT
86 yo F with a PMH DM2 (on insulin), HTN, PVD, non-obstructive CAD, RA, depression, anemia, and a chronic LLE ulcer admitted on 2/14 for evaluation of left foot pain and ulcer that is on the heel. The left lower extremity has been giving her problems since November 2020, for which she has been on and off oral antibiotics and pain meds. She also broke her right hip in November 2020. She has multiple skin breakdowns on her right lower leg that she attributes to her dogs scratching her skin. Denies fever, chills. Had right lower extremity stent a few weeks ago and anticipates left lower extremity vascular evaluation.   1. Patient admitted with cellulitic left foot, however, there may be component of ischemia to the foot, also noted with ulcer over calcaneus, concerning for possible underlying osteomyelitis of the calcaneus  - follow up cultures   - serial cbc and monitor temperature   - reviewed prior medical records to evaluate for resistant or atypical pathogens   - iv hydration and supportive care   - day #4 vancomycin and meropenem  - okay from id standpoint to change to doxycycline 100 mg po q 12 hours for 7 more days  - elevate legs   - may need further imaging to evaluate for osteomyelitis, such as MRI------ no acute osteomyelitis  - had vascular intervention with good outcome  2. other issues: per medicine

## 2021-02-18 NOTE — PROGRESS NOTE ADULT - SUBJECTIVE AND OBJECTIVE BOX
86 yo F with a PMH DM2 (on insulin), HTN, PVD, non-obstructive CAD, RA, depression, anemia, and a chronic LLE ulcer who presents with LLE pain and swelling. She has had the symptoms for the past 2 months since she fell and broke her right hip in November 2020. Since then, she has had bilateral leg swelling but particular pain and swelling on her left leg. Her pain is on the lower leg around her ankle, and it is acute and severe. She has had a left heel ulcer since shortly before her fall. She has had some ulcers (some of which were from dog scratches) on her left leg. She has been able to walk on it off-and-on and initially her symptoms/pain would improve with walking. She would take 5 mg Oxycodone that would usually last a whole day. However, her symptoms have drastically worsened during the past 2-3 days, and she needed to take 30 mg Oxycodone to improve her symptoms, so then her daughter recommended she come to the ED> She has had marked difficulty walking on it the last couple days. Her right leg feels ok. She had a stent placed in her right leg a few weeks ago (although had minimal RLE symptoms prior to the stent), and is scheduled for a left femoral endarterectomy on Tuesday 2/16/21. She denies any hip pain. ROS is otherwise negative, including nausea, vomiting, fevers, abdominal pain, chest pain, or SOB. She has a mild chronic "smoker's cough."  She had a cardiac cath on 2/5 for clearance and was found to have non-obstructive CAD. She was then cleared for her upcoming surgery.  No osteo per MRI      PHYSICAL EXAM:    HEENT: PERRLA, EOMI  Cardiovascular: S1 and S2, RRR, no M/G/R  Respiratory: CTAB  Gastrointestinal: BS+, soft, NT/ND  Vascular: 2+ peripheral pulses  Neurological: A/O x 3, no focal deficits  Psychiatric: Normal mood, normal affect  Musculoskeletal: 5/5 strength b/l upper and lower extremities  Skin: No rashes, redness in LLE 2/3 of the way up the leg, with some TTP but no warmth. Left heel ulcer, dry, without drainage. Other left lower leg ulcer that patient states are from dog scratches. 1+ bilateral LE edema, slightly increased in left ankle.  LLE post-surgical       MRI no osteo                            9.8    12.07 )-----------( 237      ( 18 Feb 2021 05:36 )             32.0   02-17    140  |  114<H>  |  28<H>  ----------------------------<  97  4.5   |  23  |  0.88    Ca    8.4<L>      17 Feb 2021 06:38  Phos  3.7     02-17  Mg     2.0     02-17

## 2021-02-18 NOTE — PROGRESS NOTE ADULT - SUBJECTIVE AND OBJECTIVE BOX
Date of service: 02-18-21 @ 12:55      Patient sitting in chair; afebrile, had ambulated earlier      ROS unable to obtain secondary to patient medical condition     MEDICATIONS  (STANDING):  amLODIPine   Tablet 5 milliGRAM(s) Oral daily  dextrose 40% Gel 15 Gram(s) Oral once  dextrose 5%. 1000 milliLiter(s) (100 mL/Hr) IV Continuous <Continuous>  dextrose 50% Injectable 25 Gram(s) IV Push once  dextrose 50% Injectable 12.5 Gram(s) IV Push once  dextrose 50% Injectable 25 Gram(s) IV Push once  doxycycline hyclate Capsule 100 milliGRAM(s) Oral every 12 hours  folic acid 1 milliGRAM(s) Oral at bedtime  gabapentin 300 milliGRAM(s) Oral three times a day  glucagon  Injectable 1 milliGRAM(s) IntraMuscular once  heparin   Injectable 5000 Unit(s) SubCutaneous every 8 hours  insulin glargine Injectable (LANTUS) 25 Unit(s) SubCutaneous at bedtime  insulin lispro (ADMELOG) corrective regimen sliding scale   SubCutaneous three times a day before meals  insulin lispro (ADMELOG) corrective regimen sliding scale   SubCutaneous at bedtime  lisinopril 20 milliGRAM(s) Oral daily  PARoxetine 20 milliGRAM(s) Oral daily    MEDICATIONS  (PRN):  acetaminophen   Tablet .. 650 milliGRAM(s) Oral every 6 hours PRN Mild Pain (1 - 3)  HYDROmorphone  Injectable 1 milliGRAM(s) IV Push every 4 hours PRN Moderate Pain (4 - 6)  LORazepam   Injectable 0.5 milliGRAM(s) IV Push every 15 minutes PRN Anxiety  morphine  - Injectable 2 milliGRAM(s) IV Push every 4 hours PRN Severe Pain (7 - 10)  traMADol 50 milliGRAM(s) Oral every 6 hours PRN Moderate Pain (4 - 6)      Vital Signs Last 24 Hrs  T(C): 36.6 (18 Feb 2021 08:55), Max: 37.1 (17 Feb 2021 21:39)  T(F): 97.8 (18 Feb 2021 08:55), Max: 98.7 (17 Feb 2021 21:39)  HR: 93 (18 Feb 2021 11:00) (82 - 106)  BP: 110/49 (18 Feb 2021 11:00) (105/33 - 143/45)  BP(mean): 65 (18 Feb 2021 11:00) (50 - 72)  RR: 16 (18 Feb 2021 11:00) (9 - 18)  SpO2: 96% (18 Feb 2021 04:00) (96% - 96%)        Physical Exam:        Constitutional: frail looking  HEENT: NC/AT, EOMI, PERRLA, conjunctivae clear; ears and nose atraumatic; pharynx clear  Neck: supple; thyroid not palpable  Back: no tenderness  Respiratory: respiratory effort normal; clear to auscultation  Cardiovascular: S1S2 regular, no murmurs  Abdomen: soft, not tender, not distended, positive BS; no liver or spleen organomegaly  Genitourinary: no suprapubic tenderness  Musculoskeletal: no muscle tenderness, left lower extremity with purple high to foot, right foot with warmer skin;  faint pulse, dry ulcer on heel with surrounding erythema, multiple skin breakdowns with eschars  Neurological/ Psychiatric: AxOx3, judgement and insight normal;  moving all extremities  Skin: no rashes; no palpable lesions    Labs: all available labs reviewed                      Labs:                        9.8    12.07 )-----------( 237      ( 18 Feb 2021 05:36 )             32.0     02-17    140  |  114<H>  |  28<H>  ----------------------------<  97  4.5   |  23  |  0.88    Ca    8.4<L>      17 Feb 2021 06:38  Phos  3.7     02-17  Mg     2.0     02-17             Cultures:       Culture - Blood (collected 02-14-21 @ 20:15)  Source: .Blood Blood-Peripheral  Preliminary Report (02-16-21 @ 01:02):    No growth to date.    Culture - Blood (collected 02-14-21 @ 20:15)  Source: .Blood Blood-Peripheral  Preliminary Report (02-16-21 @ 01:02):    No growth to date.      C-Reactive Protein, Serum: 6.68 mg/dL (02-17-21 @ 12:48)              < from: MR Ankle No Cont, Left (02.17.21 @ 11:42) >    EXAM:  MR ANKLE LT                            PROCEDURE DATE:  02/17/2021          INTERPRETATION:  LEFT ANKLE MRI    CLINICAL INFORMATION: Left heel ulceration. Evaluate for osteomyelitis.  TECHNIQUE: Multiplanar, multisequence MRI was obtained ofthe LEFT ankle.  COMPARISON: Left ankle radiographs 16 November 2020.      FINDINGS:    PERIPHERAL/ SUB-CUTANEOUS SOFT TISSUES: Soft tissue ulceration along the posterior aspect of the heel measuring approximately 19 x 15 mm. The ulceration extends to the level of the subcutaneous fat. There is no associated subcutaneous fluid collection or abscess. Mild edema is seen in the subcutaneous fat circumferentially surrounding the lower leg and along the dorsum of the foot.  MARROW: No increased STIR signal or loss of T1 hyperintense signal to suggest acute osteomyelitis. No fracture or osteonecrosis.  MUSCLES AND TENDONS: Tendons are intact. Mild edema and atrophy of the intrinsic musculature of the foot.  LIGAMENTS: The study is not well suited to the evaluation of the ligaments of the ankle.  CARTILAGE and SUBCHONDRAL BONE: Chondral loss with subchondral cystic change and marginal osteophyte formation throughout the midfoot consistent with mild Charcot arthropathy.  SYNOVIUM/JOINT FLUID: No large joint effusion.  PLANTAR FASCIA: Increased thickness of the plantar fascia aponeurosis with large plantar calcaneal heel spur consistent with chronic plantar fasciitis.  NEUROVASCULAR STRUCTURES: Normal in course and caliber.  SINUS TARSI: Fat of the sinus Tarsi is maintained.      IMPRESSION:  1.  Soft tissue ulceration along the posterior aspect of the heel measuring 19 x 15 mm. No associated abscess.  2.  No acute osteomyelitis.  3.  Mild Charcot arthropathy of the midfoot.    < end of copied text >  Radiology: all available radiological tests reviewed    Advanced directives addressed: full resuscitation

## 2021-02-18 NOTE — PROGRESS NOTE ADULT - SUBJECTIVE AND OBJECTIVE BOX
POD #2    Afebrile, VSS    MRI demonstrated no evidence of osteo    complains of heel area pain but not associated with the rest of the foot as she did preoperatively    PE  L groin incision without hematoma, dressing stained but not saturated  L foot adequately perfused appearing; heel dressed    A/P  stable  improved pain    will obtain non invasive studies as out patient    if poor perfusion distally noted, will plan for percutaneous revascularization from contralateral  (R) side  MEDICATIONS  (STANDING):  amLODIPine   Tablet 5 milliGRAM(s) Oral daily  dextrose 40% Gel 15 Gram(s) Oral once  dextrose 5%. 1000 milliLiter(s) (100 mL/Hr) IV Continuous <Continuous>  dextrose 50% Injectable 25 Gram(s) IV Push once  dextrose 50% Injectable 12.5 Gram(s) IV Push once  dextrose 50% Injectable 25 Gram(s) IV Push once  folic acid 1 milliGRAM(s) Oral at bedtime  gabapentin 300 milliGRAM(s) Oral three times a day  glucagon  Injectable 1 milliGRAM(s) IntraMuscular once  insulin glargine Injectable (LANTUS) 25 Unit(s) SubCutaneous at bedtime  insulin lispro (ADMELOG) corrective regimen sliding scale   SubCutaneous three times a day before meals  insulin lispro (ADMELOG) corrective regimen sliding scale   SubCutaneous at bedtime  lisinopril 20 milliGRAM(s) Oral daily  meropenem  IVPB 1000 milliGRAM(s) IV Intermittent every 12 hours  PARoxetine 20 milliGRAM(s) Oral daily  vancomycin  IVPB 1000 milliGRAM(s) IV Intermittent every 24 hours    MEDICATIONS  (PRN):  acetaminophen   Tablet .. 650 milliGRAM(s) Oral every 6 hours PRN Mild Pain (1 - 3)  HYDROmorphone  Injectable 1 milliGRAM(s) IV Push every 4 hours PRN Moderate Pain (4 - 6)  LORazepam   Injectable 0.5 milliGRAM(s) IV Push every 15 minutes PRN Anxiety  morphine  - Injectable 2 milliGRAM(s) IV Push every 4 hours PRN Severe Pain (7 - 10)  traMADol 50 milliGRAM(s) Oral every 6 hours PRN Moderate Pain (4 - 6)      Allergies    cephalosporins (Other)  penicillins (Urticaria; Pruritus)    Intolerances        Flatus: [ ] YES [ ] NO             Bowel Movement: [ ] YES [ ] NO  Pain (0-10):            Pain Control Adequate: [ ] YES [ ] NO  Nausea: [ ] YES [ ] NO            Vomiting: [ ] YES [ ] NO  Diarrhea: [ ] YES [ ] NO         Constipation: [ ] YES [ ] NO     Chest Pain: [ ] YES [ ] NO    SOB:  [ ] YES [ ] NO    Vital Signs Last 24 Hrs  T(C): 37.1 (18 Feb 2021 05:00), Max: 37.2 (17 Feb 2021 09:30)  T(F): 98.7 (18 Feb 2021 05:00), Max: 98.9 (17 Feb 2021 09:30)  HR: 91 (18 Feb 2021 06:00) (82 - 99)  BP: 125/43 (18 Feb 2021 06:00) (105/33 - 143/45)  BP(mean): 63 (18 Feb 2021 06:00) (50 - 70)  RR: 12 (18 Feb 2021 06:00) (9 - 17)  SpO2: 96% (18 Feb 2021 04:00) (96% - 96%)    I&O's Summary    17 Feb 2021 07:01  -  18 Feb 2021 07:00  --------------------------------------------------------  IN: 0 mL / OUT: 350 mL / NET: -350 mL        Physical Exam:  General: NAD, resting comfortably  Pulmonary: normal resp effort, CTA-B  Cardiovascular: NSR  Abdominal: soft, NT/ND  Extremities: WWP, normal strength  Neuro: A/O x 3, CNs II-XII grossly intact, normal motor/sensation, no focal deficits  Pulses:   Right:                                                                          Left:  FEM [ ]2+ [ ]1+ [ ]doppler                                             FEM [ ]2+ [ ]1+ [ ]doppler    POP [ ]2+ [ ]1+ [ ]doppler                                             POP [ ]2+ [ ]1+ [ ]doppler    DP [ ]2+ [ ]1+ [ ]doppler                                                DP [ ]2+ [ ]1+ [ ]doppler  PT[ ]2+ [ ]1+ [ ]doppler                                                  PT [ ]2+ [ ]1+ [ ]doppler    LABS:                        9.8    12.07 )-----------( 237      ( 18 Feb 2021 05:36 )             32.0     02-17    140  |  114<H>  |  28<H>  ----------------------------<  97  4.5   |  23  |  0.88    Ca    8.4<L>      17 Feb 2021 06:38  Phos  3.7     02-17  Mg     2.0     02-17            CAPILLARY BLOOD GLUCOSE      POCT Blood Glucose.: 148 mg/dL (18 Feb 2021 07:51)  POCT Blood Glucose.: 192 mg/dL (17 Feb 2021 21:35)  POCT Blood Glucose.: 181 mg/dL (17 Feb 2021 17:09)  POCT Blood Glucose.: 201 mg/dL (17 Feb 2021 12:19)      RADIOLOGY & ADDITIONAL TESTS:

## 2021-02-18 NOTE — PROGRESS NOTE ADULT - ASSESSMENT
84 yo F with a PMH DM2 (on insulin), HTN, PVD, non-obstructive CAD, RA, depression, anemia, and a chronic LLE ulcer who presents with LLE pain and swelling.    1) Redness and swelling of (left) lower leg cellulitis vs Peripheral vascular disease  Ulcer noted over left calcaneus  - C/w Vancomycin, Meropenem - change to PO Doxy   - 2/16 - s/p left femoral endarterectomy by Dr Ventura MRI neg osteo    2) right LE DVT  - CTA negative for PE  2/16 - pt is post-surgical and off AC - resume therapeutic dose when ok with Vasc Sx; remains off AC for now per vascular- confirmed with Dr Ventura    3) Type 2 diabetes mellitus, with long-term current use of insulin  - C/w home Lantus 25 units QHS  - Check FS with low dose QAC + HS  - Most recent A1C 7.2      4) RA  - C/w 15 mg QD on Wednesdays  - C/w FA 1 mg QD for supportive therapy      Poss vascular  reintervention vs dc home   86 yo F with a PMH DM2 (on insulin), HTN, PVD, non-obstructive CAD, RA, depression, anemia, and a chronic LLE ulcer who presents with LLE pain and swelling.    1) Redness and swelling of (left) lower leg cellulitis vs Peripheral vascular disease  Ulcer noted over left calcaneus  - C/w Vancomycin, Meropenem - change to PO Doxy   - 2/16 - s/p left femoral endarterectomy by Dr Ventura MRI neg osteo  - leukocytosis noted repeat in am    2) right LE DVT  - CTA negative for PE  2/16 - pt is post-surgical and off AC - resume therapeutic dose when ok with Vasc Sx; remains off AC for now per vascular- confirmed with Dr Ventura    3) Type 2 diabetes mellitus, with long-term current use of insulin  - C/w home Lantus 25 units QHS  - Check FS with low dose QAC + HS  - Most recent A1C 7.2      4) RA  - C/w 15 mg QD on Wednesdays  - C/w FA 1 mg QD for supportive therapy  elevated ESR sec to RA      Poss vascular  reintervention vs dc home

## 2021-02-19 ENCOUNTER — TRANSCRIPTION ENCOUNTER (OUTPATIENT)
Age: 86
End: 2021-02-19

## 2021-02-19 VITALS
OXYGEN SATURATION: 94 % | RESPIRATION RATE: 16 BRPM | DIASTOLIC BLOOD PRESSURE: 42 MMHG | HEART RATE: 81 BPM | SYSTOLIC BLOOD PRESSURE: 129 MMHG

## 2021-02-19 LAB
HCT VFR BLD CALC: 30.5 % — LOW (ref 34.5–45)
HGB BLD-MCNC: 9.4 G/DL — LOW (ref 11.5–15.5)
MCHC RBC-ENTMCNC: 30.2 PG — SIGNIFICANT CHANGE UP (ref 27–34)
MCHC RBC-ENTMCNC: 30.8 GM/DL — LOW (ref 32–36)
MCV RBC AUTO: 98.1 FL — SIGNIFICANT CHANGE UP (ref 80–100)
PLATELET # BLD AUTO: 270 K/UL — SIGNIFICANT CHANGE UP (ref 150–400)
RBC # BLD: 3.11 M/UL — LOW (ref 3.8–5.2)
RBC # FLD: 14.1 % — SIGNIFICANT CHANGE UP (ref 10.3–14.5)
SARS-COV-2 RNA SPEC QL NAA+PROBE: SIGNIFICANT CHANGE UP
WBC # BLD: 9.64 K/UL — SIGNIFICANT CHANGE UP (ref 3.8–10.5)
WBC # FLD AUTO: 9.64 K/UL — SIGNIFICANT CHANGE UP (ref 3.8–10.5)

## 2021-02-19 PROCEDURE — 99239 HOSP IP/OBS DSCHRG MGMT >30: CPT

## 2021-02-19 PROCEDURE — 93970 EXTREMITY STUDY: CPT | Mod: 26

## 2021-02-19 RX ORDER — APIXABAN 2.5 MG/1
10 TABLET, FILM COATED ORAL
Refills: 0 | Status: DISCONTINUED | OUTPATIENT
Start: 2021-02-19 | End: 2021-02-19

## 2021-02-19 RX ORDER — AMLODIPINE BESYLATE 2.5 MG/1
1 TABLET ORAL
Qty: 0 | Refills: 0 | DISCHARGE

## 2021-02-19 RX ORDER — TRAMADOL HYDROCHLORIDE 50 MG/1
1 TABLET ORAL
Qty: 0 | Refills: 0 | DISCHARGE

## 2021-02-19 RX ORDER — POLYETHYLENE GLYCOL 3350 17 G/17G
17 POWDER, FOR SOLUTION ORAL ONCE
Refills: 0 | Status: COMPLETED | OUTPATIENT
Start: 2021-02-19 | End: 2021-02-19

## 2021-02-19 RX ORDER — APIXABAN 2.5 MG/1
1 TABLET, FILM COATED ORAL
Qty: 0 | Refills: 0 | DISCHARGE
Start: 2021-02-19

## 2021-02-19 RX ORDER — APIXABAN 2.5 MG/1
2 TABLET, FILM COATED ORAL
Qty: 0 | Refills: 0 | DISCHARGE
Start: 2021-02-19

## 2021-02-19 RX ORDER — OXYCODONE AND ACETAMINOPHEN 5; 325 MG/1; MG/1
2 TABLET ORAL EVERY 6 HOURS
Refills: 0 | Status: DISCONTINUED | OUTPATIENT
Start: 2021-02-19 | End: 2021-02-19

## 2021-02-19 RX ORDER — SENNA PLUS 8.6 MG/1
2 TABLET ORAL AT BEDTIME
Refills: 0 | Status: DISCONTINUED | OUTPATIENT
Start: 2021-02-19 | End: 2021-02-19

## 2021-02-19 RX ORDER — METOPROLOL TARTRATE 50 MG
25 TABLET ORAL
Refills: 0 | Status: DISCONTINUED | OUTPATIENT
Start: 2021-02-19 | End: 2021-02-19

## 2021-02-19 RX ORDER — METOPROLOL TARTRATE 50 MG
1 TABLET ORAL
Qty: 0 | Refills: 0 | DISCHARGE
Start: 2021-02-19

## 2021-02-19 RX ADMIN — GABAPENTIN 300 MILLIGRAM(S): 400 CAPSULE ORAL at 05:59

## 2021-02-19 RX ADMIN — Medication 100 MILLIGRAM(S): at 22:37

## 2021-02-19 RX ADMIN — INSULIN GLARGINE 25 UNIT(S): 100 INJECTION, SOLUTION SUBCUTANEOUS at 22:44

## 2021-02-19 RX ADMIN — Medication 1: at 13:40

## 2021-02-19 RX ADMIN — LISINOPRIL 20 MILLIGRAM(S): 2.5 TABLET ORAL at 10:11

## 2021-02-19 RX ADMIN — POLYETHYLENE GLYCOL 3350 17 GRAM(S): 17 POWDER, FOR SOLUTION ORAL at 10:11

## 2021-02-19 RX ADMIN — APIXABAN 10 MILLIGRAM(S): 2.5 TABLET, FILM COATED ORAL at 22:37

## 2021-02-19 RX ADMIN — Medication 25 MILLIGRAM(S): at 22:38

## 2021-02-19 RX ADMIN — OXYCODONE AND ACETAMINOPHEN 2 TABLET(S): 5; 325 TABLET ORAL at 18:52

## 2021-02-19 RX ADMIN — Medication 20 MILLIGRAM(S): at 10:11

## 2021-02-19 RX ADMIN — Medication 100 MILLIGRAM(S): at 10:11

## 2021-02-19 RX ADMIN — HYDROMORPHONE HYDROCHLORIDE 1 MILLIGRAM(S): 2 INJECTION INTRAMUSCULAR; INTRAVENOUS; SUBCUTANEOUS at 03:39

## 2021-02-19 RX ADMIN — OXYCODONE AND ACETAMINOPHEN 2 TABLET(S): 5; 325 TABLET ORAL at 12:52

## 2021-02-19 RX ADMIN — Medication 1 MILLIGRAM(S): at 22:44

## 2021-02-19 RX ADMIN — Medication 25 MILLIGRAM(S): at 10:11

## 2021-02-19 RX ADMIN — Medication 2: at 17:46

## 2021-02-19 RX ADMIN — GABAPENTIN 300 MILLIGRAM(S): 400 CAPSULE ORAL at 22:38

## 2021-02-19 RX ADMIN — HYDROMORPHONE HYDROCHLORIDE 1 MILLIGRAM(S): 2 INJECTION INTRAMUSCULAR; INTRAVENOUS; SUBCUTANEOUS at 07:39

## 2021-02-19 RX ADMIN — GABAPENTIN 300 MILLIGRAM(S): 400 CAPSULE ORAL at 13:40

## 2021-02-19 RX ADMIN — HEPARIN SODIUM 5000 UNIT(S): 5000 INJECTION INTRAVENOUS; SUBCUTANEOUS at 05:59

## 2021-02-19 NOTE — PROGRESS NOTE ADULT - SUBJECTIVE AND OBJECTIVE BOX
afebrile, VSS; slightly tachycardic    WBC normal  HCT 30    not complaining of ischemic rest pain but does note pain around ankle and distal posterior leg    PE  L groin without hematoma  L foot adequately perfused appearing; heel bandaged (Dr. Martines)    A/P  status post L common femoral endarterectomy for ischemic rest pain and calcaneal ulcer  improved rest pain  she ambulated yesterday but was unsteady, continue PT  will discontinue dilaudid, start percocet  stool softeners  repeat venous duplex  MEDICATIONS  (STANDING):  amLODIPine   Tablet 5 milliGRAM(s) Oral daily  dextrose 40% Gel 15 Gram(s) Oral once  dextrose 5%. 1000 milliLiter(s) (100 mL/Hr) IV Continuous <Continuous>  dextrose 50% Injectable 25 Gram(s) IV Push once  dextrose 50% Injectable 12.5 Gram(s) IV Push once  dextrose 50% Injectable 25 Gram(s) IV Push once  doxycycline hyclate Capsule 100 milliGRAM(s) Oral every 12 hours  folic acid 1 milliGRAM(s) Oral at bedtime  gabapentin 300 milliGRAM(s) Oral three times a day  glucagon  Injectable 1 milliGRAM(s) IntraMuscular once  heparin   Injectable 5000 Unit(s) SubCutaneous every 8 hours  insulin glargine Injectable (LANTUS) 25 Unit(s) SubCutaneous at bedtime  insulin lispro (ADMELOG) corrective regimen sliding scale   SubCutaneous three times a day before meals  insulin lispro (ADMELOG) corrective regimen sliding scale   SubCutaneous at bedtime  lisinopril 20 milliGRAM(s) Oral daily  PARoxetine 20 milliGRAM(s) Oral daily    MEDICATIONS  (PRN):  acetaminophen   Tablet .. 650 milliGRAM(s) Oral every 6 hours PRN Mild Pain (1 - 3)  HYDROmorphone  Injectable 1 milliGRAM(s) IV Push every 4 hours PRN Moderate Pain (4 - 6)  LORazepam   Injectable 0.5 milliGRAM(s) IV Push every 15 minutes PRN Anxiety  morphine  - Injectable 2 milliGRAM(s) IV Push every 4 hours PRN Severe Pain (7 - 10)  traMADol 50 milliGRAM(s) Oral every 6 hours PRN Moderate Pain (4 - 6)      Allergies    cephalosporins (Other)  penicillins (Urticaria; Pruritus)    Intolerances        Flatus: [ ] YES [ ] NO             Bowel Movement: [ ] YES [ ] NO  Pain (0-10):            Pain Control Adequate: [ ] YES [ ] NO  Nausea: [ ] YES [ ] NO            Vomiting: [ ] YES [ ] NO  Diarrhea: [ ] YES [ ] NO         Constipation: [ ] YES [ ] NO     Chest Pain: [ ] YES [ ] NO    SOB:  [ ] YES [ ] NO    Vital Signs Last 24 Hrs  T(C): 37.3 (19 Feb 2021 05:00), Max: 37.3 (19 Feb 2021 05:00)  T(F): 99.2 (19 Feb 2021 05:00), Max: 99.2 (19 Feb 2021 05:00)  HR: 105 (19 Feb 2021 06:00) (93 - 113)  BP: 117/53 (19 Feb 2021 06:00) (100/28 - 139/66)  BP(mean): 67 (19 Feb 2021 06:00) (39 - 84)  RR: 16 (19 Feb 2021 06:00) (11 - 18)  SpO2: 97% (19 Feb 2021 04:00) (93% - 98%)    I&O's Summary    18 Feb 2021 07:01  -  19 Feb 2021 07:00  --------------------------------------------------------  IN: 0 mL / OUT: 550 mL / NET: -550 mL        Physical Exam:  General: NAD, resting comfortably  Pulmonary: normal resp effort, CTA-B  Cardiovascular: NSR  Abdominal: soft, NT/ND  Extremities: WWP, normal strength  Neuro: A/O x 3, CNs II-XII grossly intact, normal motor/sensation, no focal deficits  Pulses:   Right:                                                                          Left:  FEM [ ]2+ [ ]1+ [ ]doppler                                             FEM [ ]2+ [ ]1+ [ ]doppler    POP [ ]2+ [ ]1+ [ ]doppler                                             POP [ ]2+ [ ]1+ [ ]doppler    DP [ ]2+ [ ]1+ [ ]doppler                                                DP [ ]2+ [ ]1+ [ ]doppler  PT[ ]2+ [ ]1+ [ ]doppler                                                  PT [ ]2+ [ ]1+ [ ]doppler    LABS:                        9.4    9.64  )-----------( 270      ( 19 Feb 2021 06:19 )             30.5                 CAPILLARY BLOOD GLUCOSE      POCT Blood Glucose.: 218 mg/dL (18 Feb 2021 21:20)  POCT Blood Glucose.: 249 mg/dL (18 Feb 2021 17:16)  POCT Blood Glucose.: 162 mg/dL (18 Feb 2021 14:24)  POCT Blood Glucose.: 148 mg/dL (18 Feb 2021 07:51)      RADIOLOGY & ADDITIONAL TESTS:

## 2021-02-19 NOTE — DISCHARGE NOTE PROVIDER - NSDCCPCAREPLAN_GEN_ALL_CORE_FT
PRINCIPAL DISCHARGE DIAGNOSIS  Diagnosis: Cellulitis of left lower extremity  Assessment and Plan of Treatment: doxy x10 days; eliquis for dvt treatment started today load 10 mg nid      SECONDARY DISCHARGE DIAGNOSES  Diagnosis: Peripheral artery disease  Assessment and Plan of Treatment:

## 2021-02-19 NOTE — DISCHARGE NOTE PROVIDER - HOSPITAL COURSE
86 yo F with a PMH DM2 (on insulin), HTN, PVD, non-obstructive CAD, RA, depression, anemia, and a chronic LLE ulcer who presents with LLE pain and swelling. She has had the symptoms for the past 2 months since she fell and broke her right hip in November 2020. Since then, she has had bilateral leg swelling but particular pain and swelling on her left leg. Her pain is on the lower leg around her ankle, and it is acute and severe. She has had a left heel ulcer since shortly before her fall. She has had some ulcers (some of which were from dog scratches) on her left leg. She has been able to walk on it off-and-on and initially her symptoms/pain would improve with walking. She would take 5 mg Oxycodone that would usually last a whole day. However, her symptoms have drastically worsened during the past 2-3 days, and she needed to take 30 mg Oxycodone to improve her symptoms, so then her daughter recommended she come to the ED> She has had marked difficulty walking on it the last couple days. Her right leg feels ok. She had a stent placed in her right leg a few weeks ago (although had minimal RLE symptoms prior to the stent), and is scheduled for a left femoral endarterectomy on Tuesday 2/16/21. She denies any hip pain. ROS is otherwise negative, including nausea, vomiting, fevers, abdominal pain, chest pain, or SOB. She has a mild chronic "smoker's cough."  She had a cardiac cath on 2/5 for clearance and was found to have non-obstructive CAD. She was then cleared for her upcoming surgery.  No osteo per MRI      PHYSICAL EXAM:    HEENT: PERRLA, EOMI  Cardiovascular: S1 and S2, RRR, no M/G/R  Respiratory: CTAB  Gastrointestinal: BS+, soft, NT/ND  Vascular: 2+ peripheral pulses  Neurological: A/O x 3, no focal deficits  Psychiatric: Normal mood, normal affect  Musculoskeletal: 5/5 strength b/l upper and lower extremities  Skin: No rashes, redness in LLE 2/3 of the way up the leg, with some TTP but no warmth. Left heel ulcer, dry, without drainage. Other left lower leg ulcer that patient states are from dog scratches. 1+ bilateral LE edema, slightly increased in left ankle.  LLE post-surgical       MRI no osteo                           Assessment and Plan:   · Assessment	  86 yo F with a PMH DM2 (on insulin), HTN, PVD, non-obstructive CAD, RA, depression, anemia, and a chronic LLE ulcer who presents with LLE pain and swelling.    1) Redness and swelling of (left) lower leg cellulitis vs Peripheral vascular disease  Ulcer noted over left calcaneus  - C/w Vancomycin, Meropenem - change to PO Doxy x10 days  - 2/16 - s/p left femoral endarterectomy by Dr Ventura MRI neg osteo      2) right LE DVT  10 mg po bid ( started 2/19) load for 7 days and maintenance after that with     3) Type 2 diabetes mellitus, with long-term current use of insulin  - C/w home Lantus 25 units QHS  - Check FS with low dose QAC + HS  - Most recent A1C 7.2      4) RA  - C/w 15 mg QD on Wednesdays  - C/w FA 1 mg QD for supportive therapy  elevated ESR sec to RA        pt needs to see Dr Ventura in 1 week

## 2021-02-19 NOTE — DISCHARGE NOTE PROVIDER - NSDCMRMEDTOKEN_GEN_ALL_CORE_FT
benazepril 20 mg oral tablet: 1 tab(s) orally once a day (at bedtime)  doxycycline monohydrate 100 mg oral capsule: 1 cap(s) orally every 12 hours  Eliquis 5 mg oral tablet: 2 tab(s) orally 2 times a day  folic acid 1 mg oral tablet: 1 tab(s) orally once a day (at bedtime)  gabapentin 300 mg oral capsule: 1 cap(s) orally (up to) 3 times a day  Lantus 100 units/mL subcutaneous solution: 25 unit(s) subcutaneous once a day (at bedtime)  methotrexate 2.5 mg oral tablet: 6 tab(s) orally once a week on Wednesdays  metoprolol tartrate 25 mg oral tablet: 1 tab(s) orally 2 times a day  oxycodone-acetaminophen 5 mg-325 mg oral tablet: 2 tab(s) orally every 6 hours, As needed, Moderate Pain (4 - 6)  Paxil 20 mg oral tablet: 1 tab(s) orally once a day

## 2021-02-19 NOTE — DISCHARGE NOTE NURSING/CASE MANAGEMENT/SOCIAL WORK - PATIENT PORTAL LINK FT
You can access the FollowMyHealth Patient Portal offered by [facility] by registering at the following website: [email or web address]. By joining [facility]’s FollowMyHealth portal, you will also be able to view your health information using other applications (apps) compatible with our system.

## 2021-02-19 NOTE — PROGRESS NOTE ADULT - ASSESSMENT
Pt resting with LLE in neutral position.. No cyanosis. L calf with superficial abrasion & local cellulitis discussed / Dr Ventura today. Left heel w/o change. Off load will F/U THX

## 2021-02-19 NOTE — PROGRESS NOTE ADULT - PROVIDER SPECIALTY LIST ADULT
Infectious Disease
Hospitalist
Podiatry
Vascular Surgery
Hospitalist
Infectious Disease
Infectious Disease
Podiatry
Podiatry

## 2021-02-19 NOTE — DISCHARGE NOTE NURSING/CASE MANAGEMENT/SOCIAL WORK - NSFLUVACAGEDISCH_IMM_ALL_CORE
Adult
I will SWITCH the dose or number of times a day I take the medications listed below when I get home from the hospital:  None

## 2021-02-19 NOTE — DISCHARGE NOTE PROVIDER - CARE PROVIDER_API CALL
Soren Ventura)  Vascular Surgery  [address], [place] [address]  Phone: [phone]  Fax: [phone]  Follow Up Time: 1 week

## 2021-02-19 NOTE — PROGRESS NOTE ADULT - SUBJECTIVE AND OBJECTIVE BOX
Patient is a 85y old  Female who presents with a chief complaint of LLE cellulitis (19 Feb 2021 07:41)      HPI:  86 yo F with a PMH DM2 (on insulin), HTN, PVD, non-obstructive CAD, RA, depression, anemia, and a chronic LLE ulcer who presents with LLE pain and swelling. She has had the symptoms for the past 2 months since she fell and broke her right hip in November 2020. Since then, she has had bilateral leg swelling but particular pain and swelling on her left leg. Her pain is on the lower leg around her ankle, and it is acute and severe. She has had a left heel ulcer since shortly before her fall. She has had some ulcers (some of which were from dog scratches) on her left leg. She has been able to walk on it off-and-on and initially her symptoms/pain would improve with walking. She would take 5 mg Oxycodone that would usually last a whole day. However, her symptoms have drastically worsened during the past 2-3 days, and she needed to take 30 mg Oxycodone to improve her symptoms, so then her daughter recommended she come to the ED> She has had marked difficulty walking on it the last couple days. Her right leg feels ok. She had a stent placed in her right leg a few weeks ago (although had minimal RLE symptoms prior to the stent), and is scheduled for a left femoral endarterectomy on Tuesday 2/16/21. She denies any hip pain. ROS is otherwise negative, including nausea, vomiting, fevers, abdominal pain, chest pain, or SOB. She has a mild chronic "smoker's cough."  She had a cardiac cath on 2/5 for clearance and was found to have non-obstructive CAD. She was then cleared for her upcoming surgery.    In the ED, she was given Morphine 4 mg IV 2 and  mg IV x1, Vancomycin 1 g IV x1, Meropenem 500 mg IV x1, and NS 1L x1. (14 Feb 2021 23:16)      Allergies    cephalosporins (Other)  penicillins (Urticaria; Pruritus)    Intolerances        MEDICATIONS  (STANDING):  dextrose 40% Gel 15 Gram(s) Oral once  dextrose 5%. 1000 milliLiter(s) (100 mL/Hr) IV Continuous <Continuous>  dextrose 50% Injectable 25 Gram(s) IV Push once  dextrose 50% Injectable 12.5 Gram(s) IV Push once  dextrose 50% Injectable 25 Gram(s) IV Push once  doxycycline hyclate Capsule 100 milliGRAM(s) Oral every 12 hours  folic acid 1 milliGRAM(s) Oral at bedtime  gabapentin 300 milliGRAM(s) Oral three times a day  glucagon  Injectable 1 milliGRAM(s) IntraMuscular once  heparin   Injectable 5000 Unit(s) SubCutaneous every 8 hours  insulin glargine Injectable (LANTUS) 25 Unit(s) SubCutaneous at bedtime  insulin lispro (ADMELOG) corrective regimen sliding scale   SubCutaneous three times a day before meals  insulin lispro (ADMELOG) corrective regimen sliding scale   SubCutaneous at bedtime  lisinopril 20 milliGRAM(s) Oral daily  metoprolol tartrate 25 milliGRAM(s) Oral two times a day  PARoxetine 20 milliGRAM(s) Oral daily  senna 2 Tablet(s) Oral at bedtime    MEDICATIONS  (PRN):  acetaminophen   Tablet .. 650 milliGRAM(s) Oral every 6 hours PRN Mild Pain (1 - 3)  LORazepam   Injectable 0.5 milliGRAM(s) IV Push every 15 minutes PRN Anxiety  morphine  - Injectable 2 milliGRAM(s) IV Push every 4 hours PRN Severe Pain (7 - 10)  oxycodone    5 mG/acetaminophen 325 mG 2 Tablet(s) Oral every 6 hours PRN Moderate Pain (4 - 6)  polyethylene glycol 3350 17 Gram(s) Oral once PRN Constipation        RADIOLOGY    CBC Full  -  ( 19 Feb 2021 06:19 )  WBC Count : 9.64 K/uL  RBC Count : 3.11 M/uL  Hemoglobin : 9.4 g/dL  Hematocrit : 30.5 %  Platelet Count - Automated : 270 K/uL  Mean Cell Volume : 98.1 fl  Mean Cell Hemoglobin : 30.2 pg  Mean Cell Hemoglobin Concentration : 30.8 gm/dL  Auto Neutrophil # : x  Auto Lymphocyte # : x  Auto Monocyte # : x  Auto Eosinophil # : x  Auto Basophil # : x  Auto Neutrophil % : x  Auto Lymphocyte % : x  Auto Monocyte % : x  Auto Eosinophil % : x  Auto Basophil % : x

## 2021-02-19 NOTE — PROGRESS NOTE ADULT - REASON FOR ADMISSION
LLE cellulitis

## 2021-02-20 LAB
CULTURE RESULTS: SIGNIFICANT CHANGE UP
CULTURE RESULTS: SIGNIFICANT CHANGE UP
SPECIMEN SOURCE: SIGNIFICANT CHANGE UP
SPECIMEN SOURCE: SIGNIFICANT CHANGE UP

## 2021-02-26 DIAGNOSIS — M06.9 RHEUMATOID ARTHRITIS, UNSPECIFIED: ICD-10-CM

## 2021-02-26 DIAGNOSIS — I25.10 ATHEROSCLEROTIC HEART DISEASE OF NATIVE CORONARY ARTERY WITHOUT ANGINA PECTORIS: ICD-10-CM

## 2021-02-26 DIAGNOSIS — I70.244 ATHEROSCLEROSIS OF NATIVE ARTERIES OF LEFT LEG WITH ULCERATION OF HEEL AND MIDFOOT: ICD-10-CM

## 2021-02-26 DIAGNOSIS — S80.812A ABRASION, LEFT LOWER LEG, INITIAL ENCOUNTER: ICD-10-CM
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
## 2021-02-26 DIAGNOSIS — L03.116 CELLULITIS OF LEFT LOWER LIMB: ICD-10-CM

## 2021-02-26 DIAGNOSIS — Z79.4 LONG TERM (CURRENT) USE OF INSULIN: ICD-10-CM

## 2021-02-26 DIAGNOSIS — L97.429 NON-PRESSURE CHRONIC ULCER OF LEFT HEEL AND MIDFOOT WITH UNSPECIFIED SEVERITY: ICD-10-CM

## 2021-02-26 DIAGNOSIS — E11.51 TYPE 2 DIABETES MELLITUS WITH DIABETIC PERIPHERAL ANGIOPATHY WITHOUT GANGRENE: ICD-10-CM

## 2021-02-26 DIAGNOSIS — F32.9 MAJOR DEPRESSIVE DISORDER, SINGLE EPISODE, UNSPECIFIED: ICD-10-CM

## 2021-02-26 DIAGNOSIS — I82.461 ACUTE EMBOLISM AND THROMBOSIS OF RIGHT CALF MUSCULAR VEIN: ICD-10-CM

## 2021-02-26 DIAGNOSIS — Z88.0 ALLERGY STATUS TO PENICILLIN: ICD-10-CM
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
## 2021-02-26 DIAGNOSIS — D72.829 ELEVATED WHITE BLOOD CELL COUNT, UNSPECIFIED: ICD-10-CM

## 2021-02-26 DIAGNOSIS — I10 ESSENTIAL (PRIMARY) HYPERTENSION: ICD-10-CM

## 2021-02-26 DIAGNOSIS — I70.92 CHRONIC TOTAL OCCLUSION OF ARTERY OF THE EXTREMITIES: ICD-10-CM

## 2021-02-26 DIAGNOSIS — I70.243 ATHEROSCLEROSIS OF NATIVE ARTERIES OF LEFT LEG WITH ULCERATION OF ANKLE: ICD-10-CM

## 2021-02-26 DIAGNOSIS — L97.329 NON-PRESSURE CHRONIC ULCER OF LEFT ANKLE WITH UNSPECIFIED SEVERITY: ICD-10-CM

## 2021-02-26 DIAGNOSIS — D64.9 ANEMIA, UNSPECIFIED: ICD-10-CM

## 2021-03-22 ENCOUNTER — INPATIENT (INPATIENT)
Facility: HOSPITAL | Age: 86
LOS: 7 days | Discharge: NURSING, REHAB OR LONG-TERM CARE | DRG: 252 | End: 2021-03-30
Attending: THORACIC SURGERY (CARDIOTHORACIC VASCULAR SURGERY) | Admitting: THORACIC SURGERY (CARDIOTHORACIC VASCULAR SURGERY)
Payer: MEDICARE

## 2021-03-22 VITALS
OXYGEN SATURATION: 99 % | RESPIRATION RATE: 19 BRPM | SYSTOLIC BLOOD PRESSURE: 154 MMHG | HEART RATE: 74 BPM | DIASTOLIC BLOOD PRESSURE: 53 MMHG | HEIGHT: 63 IN | TEMPERATURE: 98 F

## 2021-03-22 DIAGNOSIS — S72.009A FRACTURE OF UNSPECIFIED PART OF NECK OF UNSPECIFIED FEMUR, INITIAL ENCOUNTER FOR CLOSED FRACTURE: Chronic | ICD-10-CM

## 2021-03-22 DIAGNOSIS — I73.9 PERIPHERAL VASCULAR DISEASE, UNSPECIFIED: ICD-10-CM

## 2021-03-22 PROBLEM — E11.9 TYPE 2 DIABETES MELLITUS WITHOUT COMPLICATIONS: Chronic | Status: ACTIVE | Noted: 2020-11-13

## 2021-03-22 PROBLEM — I25.10 ATHEROSCLEROTIC HEART DISEASE OF NATIVE CORONARY ARTERY WITHOUT ANGINA PECTORIS: Chronic | Status: ACTIVE | Noted: 2021-02-14

## 2021-03-22 PROBLEM — M06.9 RHEUMATOID ARTHRITIS, UNSPECIFIED: Chronic | Status: ACTIVE | Noted: 2021-02-15

## 2021-03-22 LAB
ALBUMIN SERPL ELPH-MCNC: 2.9 G/DL — LOW (ref 3.3–5)
ALP SERPL-CCNC: 88 U/L — SIGNIFICANT CHANGE UP (ref 40–120)
ALT FLD-CCNC: 12 U/L — SIGNIFICANT CHANGE UP (ref 12–78)
ANION GAP SERPL CALC-SCNC: 5 MMOL/L — SIGNIFICANT CHANGE UP (ref 5–17)
APPEARANCE UR: CLEAR — SIGNIFICANT CHANGE UP
APTT BLD: 35.2 SEC — SIGNIFICANT CHANGE UP (ref 27.5–35.5)
AST SERPL-CCNC: 11 U/L — LOW (ref 15–37)
BASOPHILS # BLD AUTO: 0.02 K/UL — SIGNIFICANT CHANGE UP (ref 0–0.2)
BASOPHILS NFR BLD AUTO: 0.2 % — SIGNIFICANT CHANGE UP (ref 0–2)
BILIRUB SERPL-MCNC: 0.4 MG/DL — SIGNIFICANT CHANGE UP (ref 0.2–1.2)
BILIRUB UR-MCNC: NEGATIVE — SIGNIFICANT CHANGE UP
BUN SERPL-MCNC: 37 MG/DL — HIGH (ref 7–23)
CALCIUM SERPL-MCNC: 9.1 MG/DL — SIGNIFICANT CHANGE UP (ref 8.5–10.1)
CHLORIDE SERPL-SCNC: 110 MMOL/L — HIGH (ref 96–108)
CO2 SERPL-SCNC: 24 MMOL/L — SIGNIFICANT CHANGE UP (ref 22–31)
COLOR SPEC: YELLOW — SIGNIFICANT CHANGE UP
CREAT SERPL-MCNC: 1.04 MG/DL — SIGNIFICANT CHANGE UP (ref 0.5–1.3)
DIFF PNL FLD: ABNORMAL
EOSINOPHIL # BLD AUTO: 0.18 K/UL — SIGNIFICANT CHANGE UP (ref 0–0.5)
EOSINOPHIL NFR BLD AUTO: 1.7 % — SIGNIFICANT CHANGE UP (ref 0–6)
GLUCOSE SERPL-MCNC: 125 MG/DL — HIGH (ref 70–99)
GLUCOSE UR QL: NEGATIVE MG/DL — SIGNIFICANT CHANGE UP
HCT VFR BLD CALC: 26.3 % — LOW (ref 34.5–45)
HGB BLD-MCNC: 8 G/DL — LOW (ref 11.5–15.5)
IMM GRANULOCYTES NFR BLD AUTO: 0.3 % — SIGNIFICANT CHANGE UP (ref 0–1.5)
INR BLD: 1.16 RATIO — SIGNIFICANT CHANGE UP (ref 0.88–1.16)
KETONES UR-MCNC: NEGATIVE — SIGNIFICANT CHANGE UP
LEUKOCYTE ESTERASE UR-ACNC: NEGATIVE — SIGNIFICANT CHANGE UP
LYMPHOCYTES # BLD AUTO: 1.53 K/UL — SIGNIFICANT CHANGE UP (ref 1–3.3)
LYMPHOCYTES # BLD AUTO: 14.7 % — SIGNIFICANT CHANGE UP (ref 13–44)
MCHC RBC-ENTMCNC: 29.3 PG — SIGNIFICANT CHANGE UP (ref 27–34)
MCHC RBC-ENTMCNC: 30.4 GM/DL — LOW (ref 32–36)
MCV RBC AUTO: 96.3 FL — SIGNIFICANT CHANGE UP (ref 80–100)
MONOCYTES # BLD AUTO: 0.44 K/UL — SIGNIFICANT CHANGE UP (ref 0–0.9)
MONOCYTES NFR BLD AUTO: 4.2 % — SIGNIFICANT CHANGE UP (ref 2–14)
NEUTROPHILS # BLD AUTO: 8.2 K/UL — HIGH (ref 1.8–7.4)
NEUTROPHILS NFR BLD AUTO: 78.9 % — HIGH (ref 43–77)
NITRITE UR-MCNC: NEGATIVE — SIGNIFICANT CHANGE UP
PH UR: 5 — SIGNIFICANT CHANGE UP (ref 5–8)
PLATELET # BLD AUTO: 209 K/UL — SIGNIFICANT CHANGE UP (ref 150–400)
POTASSIUM SERPL-MCNC: 4.3 MMOL/L — SIGNIFICANT CHANGE UP (ref 3.5–5.3)
POTASSIUM SERPL-SCNC: 4.3 MMOL/L — SIGNIFICANT CHANGE UP (ref 3.5–5.3)
PROT SERPL-MCNC: 7.1 GM/DL — SIGNIFICANT CHANGE UP (ref 6–8.3)
PROT UR-MCNC: 100 MG/DL
PROTHROM AB SERPL-ACNC: 13.4 SEC — SIGNIFICANT CHANGE UP (ref 10.6–13.6)
RBC # BLD: 2.73 M/UL — LOW (ref 3.8–5.2)
RBC # FLD: 15.8 % — HIGH (ref 10.3–14.5)
SARS-COV-2 RNA SPEC QL NAA+PROBE: SIGNIFICANT CHANGE UP
SODIUM SERPL-SCNC: 139 MMOL/L — SIGNIFICANT CHANGE UP (ref 135–145)
SP GR SPEC: 1.01 — SIGNIFICANT CHANGE UP (ref 1.01–1.02)
TROPONIN I SERPL-MCNC: <0.015 NG/ML — SIGNIFICANT CHANGE UP (ref 0.01–0.04)
UROBILINOGEN FLD QL: NEGATIVE MG/DL — SIGNIFICANT CHANGE UP
WBC # BLD: 10.4 K/UL — SIGNIFICANT CHANGE UP (ref 3.8–10.5)
WBC # FLD AUTO: 10.4 K/UL — SIGNIFICANT CHANGE UP (ref 3.8–10.5)

## 2021-03-22 PROCEDURE — 87635 SARS-COV-2 COVID-19 AMP PRB: CPT

## 2021-03-22 PROCEDURE — 36415 COLL VENOUS BLD VENIPUNCTURE: CPT

## 2021-03-22 PROCEDURE — 97530 THERAPEUTIC ACTIVITIES: CPT | Mod: GP

## 2021-03-22 PROCEDURE — 93010 ELECTROCARDIOGRAM REPORT: CPT

## 2021-03-22 PROCEDURE — 83540 ASSAY OF IRON: CPT

## 2021-03-22 PROCEDURE — 82962 GLUCOSE BLOOD TEST: CPT

## 2021-03-22 PROCEDURE — 82746 ASSAY OF FOLIC ACID SERUM: CPT

## 2021-03-22 PROCEDURE — 87493 C DIFF AMPLIFIED PROBE: CPT

## 2021-03-22 PROCEDURE — 97163 PT EVAL HIGH COMPLEX 45 MIN: CPT | Mod: GP

## 2021-03-22 PROCEDURE — 86769 SARS-COV-2 COVID-19 ANTIBODY: CPT

## 2021-03-22 PROCEDURE — 0031A: CPT

## 2021-03-22 PROCEDURE — 81001 URINALYSIS AUTO W/SCOPE: CPT

## 2021-03-22 PROCEDURE — 82272 OCCULT BLD FECES 1-3 TESTS: CPT

## 2021-03-22 PROCEDURE — 97116 GAIT TRAINING THERAPY: CPT | Mod: GP

## 2021-03-22 PROCEDURE — 85027 COMPLETE CBC AUTOMATED: CPT

## 2021-03-22 PROCEDURE — 71045 X-RAY EXAM CHEST 1 VIEW: CPT | Mod: 26

## 2021-03-22 PROCEDURE — 36430 TRANSFUSION BLD/BLD COMPNT: CPT

## 2021-03-22 PROCEDURE — C1889: CPT

## 2021-03-22 PROCEDURE — 87507 IADNA-DNA/RNA PROBE TQ 12-25: CPT

## 2021-03-22 PROCEDURE — P9016: CPT

## 2021-03-22 PROCEDURE — 85025 COMPLETE CBC W/AUTO DIFF WBC: CPT

## 2021-03-22 PROCEDURE — 80048 BASIC METABOLIC PNL TOTAL CA: CPT

## 2021-03-22 PROCEDURE — 83550 IRON BINDING TEST: CPT

## 2021-03-22 PROCEDURE — 82607 VITAMIN B-12: CPT

## 2021-03-22 PROCEDURE — 86923 COMPATIBILITY TEST ELECTRIC: CPT

## 2021-03-22 PROCEDURE — 82728 ASSAY OF FERRITIN: CPT

## 2021-03-22 PROCEDURE — C1768: CPT

## 2021-03-22 PROCEDURE — 99285 EMERGENCY DEPT VISIT HI MDM: CPT | Mod: CS

## 2021-03-22 RX ORDER — FOLIC ACID 0.8 MG
1 TABLET ORAL DAILY
Refills: 0 | Status: DISCONTINUED | OUTPATIENT
Start: 2021-03-22 | End: 2021-03-24

## 2021-03-22 RX ORDER — INSULIN LISPRO 100/ML
VIAL (ML) SUBCUTANEOUS AT BEDTIME
Refills: 0 | Status: DISCONTINUED | OUTPATIENT
Start: 2021-03-22 | End: 2021-03-23

## 2021-03-22 RX ORDER — DEXTROSE 50 % IN WATER 50 %
25 SYRINGE (ML) INTRAVENOUS ONCE
Refills: 0 | Status: DISCONTINUED | OUTPATIENT
Start: 2021-03-22 | End: 2021-03-24

## 2021-03-22 RX ORDER — HYDROMORPHONE HYDROCHLORIDE 2 MG/ML
0.5 INJECTION INTRAMUSCULAR; INTRAVENOUS; SUBCUTANEOUS EVERY 4 HOURS
Refills: 0 | Status: DISCONTINUED | OUTPATIENT
Start: 2021-03-22 | End: 2021-03-24

## 2021-03-22 RX ORDER — AMLODIPINE BESYLATE 2.5 MG/1
5 TABLET ORAL DAILY
Refills: 0 | Status: DISCONTINUED | OUTPATIENT
Start: 2021-03-22 | End: 2021-03-24

## 2021-03-22 RX ORDER — GLUCAGON INJECTION, SOLUTION 0.5 MG/.1ML
1 INJECTION, SOLUTION SUBCUTANEOUS ONCE
Refills: 0 | Status: DISCONTINUED | OUTPATIENT
Start: 2021-03-22 | End: 2021-03-24

## 2021-03-22 RX ORDER — HYDROMORPHONE HYDROCHLORIDE 2 MG/ML
1 INJECTION INTRAMUSCULAR; INTRAVENOUS; SUBCUTANEOUS EVERY 4 HOURS
Refills: 0 | Status: DISCONTINUED | OUTPATIENT
Start: 2021-03-22 | End: 2021-03-24

## 2021-03-22 RX ORDER — SODIUM CHLORIDE 9 MG/ML
1000 INJECTION, SOLUTION INTRAVENOUS
Refills: 0 | Status: DISCONTINUED | OUTPATIENT
Start: 2021-03-22 | End: 2021-03-24

## 2021-03-22 RX ORDER — METOPROLOL TARTRATE 50 MG
25 TABLET ORAL
Refills: 0 | Status: DISCONTINUED | OUTPATIENT
Start: 2021-03-22 | End: 2021-03-24

## 2021-03-22 RX ORDER — GABAPENTIN 400 MG/1
300 CAPSULE ORAL THREE TIMES A DAY
Refills: 0 | Status: DISCONTINUED | OUTPATIENT
Start: 2021-03-22 | End: 2021-03-24

## 2021-03-22 RX ORDER — LISINOPRIL 2.5 MG/1
20 TABLET ORAL DAILY
Refills: 0 | Status: DISCONTINUED | OUTPATIENT
Start: 2021-03-22 | End: 2021-03-24

## 2021-03-22 RX ORDER — SODIUM CHLORIDE 9 MG/ML
1000 INJECTION INTRAMUSCULAR; INTRAVENOUS; SUBCUTANEOUS
Refills: 0 | Status: DISCONTINUED | OUTPATIENT
Start: 2021-03-23 | End: 2021-03-23

## 2021-03-22 RX ORDER — DEXTROSE 50 % IN WATER 50 %
15 SYRINGE (ML) INTRAVENOUS ONCE
Refills: 0 | Status: DISCONTINUED | OUTPATIENT
Start: 2021-03-22 | End: 2021-03-24

## 2021-03-22 RX ORDER — MORPHINE SULFATE 50 MG/1
4 CAPSULE, EXTENDED RELEASE ORAL ONCE
Refills: 0 | Status: DISCONTINUED | OUTPATIENT
Start: 2021-03-22 | End: 2021-03-22

## 2021-03-22 RX ORDER — INSULIN LISPRO 100/ML
VIAL (ML) SUBCUTANEOUS
Refills: 0 | Status: DISCONTINUED | OUTPATIENT
Start: 2021-03-22 | End: 2021-03-23

## 2021-03-22 RX ORDER — DEXTROSE 50 % IN WATER 50 %
12.5 SYRINGE (ML) INTRAVENOUS ONCE
Refills: 0 | Status: DISCONTINUED | OUTPATIENT
Start: 2021-03-22 | End: 2021-03-24

## 2021-03-22 RX ADMIN — HYDROMORPHONE HYDROCHLORIDE 0.5 MILLIGRAM(S): 2 INJECTION INTRAMUSCULAR; INTRAVENOUS; SUBCUTANEOUS at 19:48

## 2021-03-22 RX ADMIN — HYDROMORPHONE HYDROCHLORIDE 1 MILLIGRAM(S): 2 INJECTION INTRAMUSCULAR; INTRAVENOUS; SUBCUTANEOUS at 19:46

## 2021-03-22 RX ADMIN — GABAPENTIN 300 MILLIGRAM(S): 400 CAPSULE ORAL at 22:57

## 2021-03-22 RX ADMIN — Medication 2: at 17:48

## 2021-03-22 RX ADMIN — HYDROMORPHONE HYDROCHLORIDE 0.5 MILLIGRAM(S): 2 INJECTION INTRAMUSCULAR; INTRAVENOUS; SUBCUTANEOUS at 21:50

## 2021-03-22 RX ADMIN — HYDROMORPHONE HYDROCHLORIDE 0.5 MILLIGRAM(S): 2 INJECTION INTRAMUSCULAR; INTRAVENOUS; SUBCUTANEOUS at 22:39

## 2021-03-22 RX ADMIN — Medication 20 MILLIGRAM(S): at 22:57

## 2021-03-22 RX ADMIN — Medication 25 MILLIGRAM(S): at 22:57

## 2021-03-22 RX ADMIN — MORPHINE SULFATE 4 MILLIGRAM(S): 50 CAPSULE, EXTENDED RELEASE ORAL at 14:03

## 2021-03-22 RX ADMIN — Medication 1 MILLIGRAM(S): at 22:57

## 2021-03-22 RX ADMIN — HYDROMORPHONE HYDROCHLORIDE 0.5 MILLIGRAM(S): 2 INJECTION INTRAMUSCULAR; INTRAVENOUS; SUBCUTANEOUS at 17:47

## 2021-03-22 NOTE — H&P ADULT - NSHPPHYSICALEXAM_GEN_ALL_CORE
· CONSTITUTIONAL: well appearing and in no apparent distress.  · EYES: clear bilaterally.  Pupils equal, round, and reactive to light.  · ENMT: Nasal mucosa clear.  Mouth with normal mucosa  Throat has no vesicles, no oropharyngeal exudates and uvula is midline.  · CARDIAC: normal rate, regular rhythm. Systolic murmur.  · RESPIRATORY: breath sounds clear and equal bilaterally.  · GASTROINTESTINAL: abdomen soft, non-tender, and non-distended. Bowel sounds present.  · MUSCULOSKELETAL: range of motion is not limited and there is no muscle tenderness.  · NEUROLOGICAL: sensation is normal and strength is normal.  · SKIN: skin warm, dry. 4 ulcers on left leg. Rash posterior leg. Cap refill 2+.

## 2021-03-22 NOTE — H&P ADULT - NSHPREVIEWOFSYSTEMS_GEN_ALL_CORE
· Constitutional [-]: no fever  · MUSCULOSKELETAL: - - -  · Musculoskeletal [+]: LLE pain  · ROS STATEMENT: all other ROS negative except as per HPI

## 2021-03-22 NOTE — ED ADULT NURSE NOTE - OBJECTIVE STATEMENT
Pt comes to the ED complaining of continued left lower extremity. Pt recently saw Dr. Ventura for the pain and was sent to the ED for a fem-pop bypass.

## 2021-03-22 NOTE — ED STATDOCS - CLINICAL SUMMARY MEDICAL DECISION MAKING FREE TEXT BOX
Pt with vascular insufficiency. Will need fem pop bypass. Will check labs and admit to vascular surgery. Pt may require blood transfusion.

## 2021-03-22 NOTE — H&P ADULT - ASSESSMENT
86 Y/O F with PVD LLE Claudication  - Admit to Vascular Sx  -NPO@MN, IVF  -Need Fem-Pop Bypass Surgery  - Pre-op Labs  -ISS, FSG Q4h  - Hospitalist C/S medical management and Clearance   -Hgb 8.0 : Transfuse 2U PRBC now  -Above Plan D/W Dr. Ventura

## 2021-03-22 NOTE — PROGRESS NOTE ADULT - SUBJECTIVE AND OBJECTIVE BOX
history as per surgical PA.  Pt status post L common femoral endarterectomy and bovine pericardial patch on Feb 16 but persistent L foot pain.  She underwent an attempt at percutaneous revascularization of an occluded L SFA last Thursday but this was unsuccessful.  She continues to note ischemic pain of the L foot and will undergo a L femoral to popliteal bypass Wed.  She will be transfused two units PRBC prior to the procedure.     MEDICATIONS  (STANDING):  amLODIPine   Tablet 5 milliGRAM(s) Oral daily  dextrose 40% Gel 15 Gram(s) Oral once  dextrose 5%. 1000 milliLiter(s) (50 mL/Hr) IV Continuous <Continuous>  dextrose 5%. 1000 milliLiter(s) (100 mL/Hr) IV Continuous <Continuous>  dextrose 50% Injectable 25 Gram(s) IV Push once  dextrose 50% Injectable 12.5 Gram(s) IV Push once  dextrose 50% Injectable 25 Gram(s) IV Push once  folic acid 1 milliGRAM(s) Oral daily  gabapentin 300 milliGRAM(s) Oral three times a day  glucagon  Injectable 1 milliGRAM(s) IntraMuscular once  insulin lispro (ADMELOG) corrective regimen sliding scale   SubCutaneous three times a day before meals  insulin lispro (ADMELOG) corrective regimen sliding scale   SubCutaneous at bedtime  lisinopril 20 milliGRAM(s) Oral daily  metoprolol tartrate 25 milliGRAM(s) Oral two times a day  PARoxetine 20 milliGRAM(s) Oral daily    MEDICATIONS  (PRN):  artificial  tears Solution 1 Drop(s) Both EYES two times a day PRN Dry Eyes  HYDROmorphone  Injectable 0.5 milliGRAM(s) IV Push every 4 hours PRN Moderate Pain (4 - 6)  HYDROmorphone  Injectable 1 milliGRAM(s) IV Push every 4 hours PRN Severe Pain (7 - 10)      Allergies    cephalosporins (Other)  penicillins (Urticaria; Pruritus)    Intolerances        Flatus: [ ] YES [ ] NO             Bowel Movement: [ ] YES [ ] NO  Pain (0-10):            Pain Control Adequate: [ ] YES [ ] NO  Nausea: [ ] YES [ ] NO            Vomiting: [ ] YES [ ] NO  Diarrhea: [ ] YES [ ] NO         Constipation: [ ] YES [ ] NO     Chest Pain: [ ] YES [ ] NO    SOB:  [ ] YES [ ] NO    Vital Signs Last 24 Hrs  T(C): 36.4 (22 Mar 2021 15:46), Max: 36.7 (22 Mar 2021 13:02)  T(F): 97.6 (22 Mar 2021 15:46), Max: 98.1 (22 Mar 2021 13:02)  HR: 71 (22 Mar 2021 15:46) (71 - 74)  BP: 117/32 (22 Mar 2021 15:46) (117/32 - 154/53)  BP(mean): 54 (22 Mar 2021 15:46) (54 - 80)  RR: 15 (22 Mar 2021 15:46) (15 - 19)  SpO2: 100% (22 Mar 2021 15:46) (99% - 100%)    I&O's Summary      Physical Exam:  General: NAD, resting comfortably  Pulmonary: normal resp effort, CTA-B  Cardiovascular: NSR  Abdominal: soft, NT/ND  Extremities: WWP, normal strength  Neuro: A/O x 3, CNs II-XII grossly intact, normal motor/sensation, no focal deficits  Pulses:   Right:                                                                          Left:  FEM [ ]2+ [ ]1+ [ ]doppler                                             FEM [ ]2+ [ ]1+ [ ]doppler    POP [ ]2+ [ ]1+ [ ]doppler                                             POP [ ]2+ [ ]1+ [ ]doppler    DP [ ]2+ [ ]1+ [ ]doppler                                                DP [ ]2+ [ ]1+ [ ]doppler  PT[ ]2+ [ ]1+ [ ]doppler                                                  PT [ ]2+ [ ]1+ [ ]doppler    LABS:                        8.0    10.40 )-----------( 209      ( 22 Mar 2021 13:45 )             26.3     03-22    139  |  110<H>  |  37<H>  ----------------------------<  125<H>  4.3   |  24  |  1.04    Ca    9.1      22 Mar 2021 13:45    TPro  7.1  /  Alb  2.9<L>  /  TBili  0.4  /  DBili  x   /  AST  11<L>  /  ALT  12  /  AlkPhos  88  03-22    PT/INR - ( 22 Mar 2021 13:45 )   PT: 13.4 sec;   INR: 1.16 ratio         PTT - ( 22 Mar 2021 13:45 )  PTT:35.2 sec    LIVER FUNCTIONS - ( 22 Mar 2021 13:45 )  Alb: 2.9 g/dL / Pro: 7.1 gm/dL / ALK PHOS: 88 U/L / ALT: 12 U/L / AST: 11 U/L / GGT: x           CAPILLARY BLOOD GLUCOSE          RADIOLOGY & ADDITIONAL TESTS:   history as per surgical PA.  Pt status post L common femoral endarterectomy and bovine pericardial patch on Feb 16 but persistent L foot pain.  She underwent an attempt at percutaneous revascularization of an occluded L SFA last Thursday but this was unsuccessful.  She continues to note ischemic pain of the L foot but has retained neurosenory to light touch and motor function (splay toes), and will undergo a L femoral to popliteal bypass Wed.  She will be transfused two units PRBC prior to the procedure.     MEDICATIONS  (STANDING):  amLODIPine   Tablet 5 milliGRAM(s) Oral daily  dextrose 40% Gel 15 Gram(s) Oral once  dextrose 5%. 1000 milliLiter(s) (50 mL/Hr) IV Continuous <Continuous>  dextrose 5%. 1000 milliLiter(s) (100 mL/Hr) IV Continuous <Continuous>  dextrose 50% Injectable 25 Gram(s) IV Push once  dextrose 50% Injectable 12.5 Gram(s) IV Push once  dextrose 50% Injectable 25 Gram(s) IV Push once  folic acid 1 milliGRAM(s) Oral daily  gabapentin 300 milliGRAM(s) Oral three times a day  glucagon  Injectable 1 milliGRAM(s) IntraMuscular once  insulin lispro (ADMELOG) corrective regimen sliding scale   SubCutaneous three times a day before meals  insulin lispro (ADMELOG) corrective regimen sliding scale   SubCutaneous at bedtime  lisinopril 20 milliGRAM(s) Oral daily  metoprolol tartrate 25 milliGRAM(s) Oral two times a day  PARoxetine 20 milliGRAM(s) Oral daily    MEDICATIONS  (PRN):  artificial  tears Solution 1 Drop(s) Both EYES two times a day PRN Dry Eyes  HYDROmorphone  Injectable 0.5 milliGRAM(s) IV Push every 4 hours PRN Moderate Pain (4 - 6)  HYDROmorphone  Injectable 1 milliGRAM(s) IV Push every 4 hours PRN Severe Pain (7 - 10)      Allergies    cephalosporins (Other)  penicillins (Urticaria; Pruritus)    Intolerances        Flatus: [ ] YES [ ] NO             Bowel Movement: [ ] YES [ ] NO  Pain (0-10):            Pain Control Adequate: [ ] YES [ ] NO  Nausea: [ ] YES [ ] NO            Vomiting: [ ] YES [ ] NO  Diarrhea: [ ] YES [ ] NO         Constipation: [ ] YES [ ] NO     Chest Pain: [ ] YES [ ] NO    SOB:  [ ] YES [ ] NO    Vital Signs Last 24 Hrs  T(C): 36.4 (22 Mar 2021 15:46), Max: 36.7 (22 Mar 2021 13:02)  T(F): 97.6 (22 Mar 2021 15:46), Max: 98.1 (22 Mar 2021 13:02)  HR: 71 (22 Mar 2021 15:46) (71 - 74)  BP: 117/32 (22 Mar 2021 15:46) (117/32 - 154/53)  BP(mean): 54 (22 Mar 2021 15:46) (54 - 80)  RR: 15 (22 Mar 2021 15:46) (15 - 19)  SpO2: 100% (22 Mar 2021 15:46) (99% - 100%)    I&O's Summary      Physical Exam:  General: NAD, resting comfortably  Pulmonary: normal resp effort, CTA-B  Cardiovascular: NSR  Abdominal: soft, NT/ND  Extremities: WWP, normal strength  Neuro: A/O x 3, CNs II-XII grossly intact, normal motor/sensation, no focal deficits  Pulses:   Right:                                                                          Left:  FEM [ ]2+ [ ]1+ [ ]doppler                                             FEM [ ]2+ [ ]1+ [ ]doppler    POP [ ]2+ [ ]1+ [ ]doppler                                             POP [ ]2+ [ ]1+ [ ]doppler    DP [ ]2+ [ ]1+ [ ]doppler                                                DP [ ]2+ [ ]1+ [ ]doppler  PT[ ]2+ [ ]1+ [ ]doppler                                                  PT [ ]2+ [ ]1+ [ ]doppler    LABS:                        8.0    10.40 )-----------( 209      ( 22 Mar 2021 13:45 )             26.3     03-22    139  |  110<H>  |  37<H>  ----------------------------<  125<H>  4.3   |  24  |  1.04    Ca    9.1      22 Mar 2021 13:45    TPro  7.1  /  Alb  2.9<L>  /  TBili  0.4  /  DBili  x   /  AST  11<L>  /  ALT  12  /  AlkPhos  88  03-22    PT/INR - ( 22 Mar 2021 13:45 )   PT: 13.4 sec;   INR: 1.16 ratio         PTT - ( 22 Mar 2021 13:45 )  PTT:35.2 sec    LIVER FUNCTIONS - ( 22 Mar 2021 13:45 )  Alb: 2.9 g/dL / Pro: 7.1 gm/dL / ALK PHOS: 88 U/L / ALT: 12 U/L / AST: 11 U/L / GGT: x           CAPILLARY BLOOD GLUCOSE          RADIOLOGY & ADDITIONAL TESTS:

## 2021-03-22 NOTE — H&P ADULT - HISTORY OF PRESENT ILLNESS
84 y/o female with a PMHx of anemia, arthritis, CAD, DM2, depression, HTN, PVD, rheumatoid arthritis, h/o left femoral endarterectomy in 01/2021 presents to the ED for LLE pain. Per daughter, pt's LLE intermittently goes white and feels cold. Pt scheduled for a fem pop bypass with Dr. Truong on 3/25. Pt unable to tolerate pain and came to ED for early intervention. Pt is on Eliquis, last dose yesterday morning. No other complaints at this time. Last meal this morning.

## 2021-03-22 NOTE — H&P ADULT - NSICDXPASTMEDICALHX_GEN_ALL_CORE_FT
PAST MEDICAL HISTORY:  Anemia     Arthritis, rheumatoid     CAD (coronary artery disease) non-obstructive    Depression     Hypertension     PVD (peripheral vascular disease) with claudication     Rheumatoid arthritis     Type 2 diabetes mellitus on insulin

## 2021-03-22 NOTE — ED STATDOCS - OBJECTIVE STATEMENT
86 y/o female with a PMHx of anemia, arthritis, CAD, DM2, depression, HTN, PVD, rheumatoid arthritis, h/o left femoral endarterectomy in 01/2021 presents to the ED for LLE pain. Per daughter, pt's LLE intermittently goes white and feels cold. Pt scheduled for a fem pop bypass with Dr. Truong on 3/25. On Eliquis, last dose yesterday morning. No other complaints at this time.

## 2021-03-22 NOTE — ED STATDOCS - PMH
Anemia    Arthritis, rheumatoid    CAD (coronary artery disease)  non-obstructive  Depression    Hypertension    PVD (peripheral vascular disease) with claudication    Rheumatoid arthritis    Type 2 diabetes mellitus  on insulin

## 2021-03-22 NOTE — ED STATDOCS - PROGRESS NOTE DETAILS
Patient seen and evaluated in intake with ED Attending,  ED attending note and orders reviewed, will continue with patient follow up and care -Sandra Chino PA-C spoke to Dr. Jones and PA and will admit. pt aware and agrees with plan. -Sandra Chino PA-C  pt offers no complaints currently. -Sandra Chino PA-C

## 2021-03-23 LAB
ANION GAP SERPL CALC-SCNC: 7 MMOL/L — SIGNIFICANT CHANGE UP (ref 5–17)
BASOPHILS # BLD AUTO: 0.02 K/UL — SIGNIFICANT CHANGE UP (ref 0–0.2)
BASOPHILS NFR BLD AUTO: 0.3 % — SIGNIFICANT CHANGE UP (ref 0–2)
BUN SERPL-MCNC: 30 MG/DL — HIGH (ref 7–23)
CALCIUM SERPL-MCNC: 8.9 MG/DL — SIGNIFICANT CHANGE UP (ref 8.5–10.1)
CHLORIDE SERPL-SCNC: 112 MMOL/L — HIGH (ref 96–108)
CO2 SERPL-SCNC: 22 MMOL/L — SIGNIFICANT CHANGE UP (ref 22–31)
COVID-19 SPIKE DOMAIN AB INTERP: POSITIVE
COVID-19 SPIKE DOMAIN ANTIBODY RESULT: 15.2 U/ML — HIGH
CREAT SERPL-MCNC: 0.89 MG/DL — SIGNIFICANT CHANGE UP (ref 0.5–1.3)
EOSINOPHIL # BLD AUTO: 0.38 K/UL — SIGNIFICANT CHANGE UP (ref 0–0.5)
EOSINOPHIL NFR BLD AUTO: 4.9 % — SIGNIFICANT CHANGE UP (ref 0–6)
FERRITIN SERPL-MCNC: 117 NG/ML — SIGNIFICANT CHANGE UP (ref 15–150)
FOLATE SERPL-MCNC: >20 NG/ML — SIGNIFICANT CHANGE UP
GLUCOSE SERPL-MCNC: 124 MG/DL — HIGH (ref 70–99)
HCT VFR BLD CALC: 31.1 % — LOW (ref 34.5–45)
HGB BLD-MCNC: 9.9 G/DL — LOW (ref 11.5–15.5)
IMM GRANULOCYTES NFR BLD AUTO: 0.3 % — SIGNIFICANT CHANGE UP (ref 0–1.5)
IRON SATN MFR SERPL: 28 % — SIGNIFICANT CHANGE UP (ref 14–50)
IRON SATN MFR SERPL: 54 UG/DL — SIGNIFICANT CHANGE UP (ref 30–160)
LYMPHOCYTES # BLD AUTO: 1.82 K/UL — SIGNIFICANT CHANGE UP (ref 1–3.3)
LYMPHOCYTES # BLD AUTO: 23.4 % — SIGNIFICANT CHANGE UP (ref 13–44)
MCHC RBC-ENTMCNC: 29.7 PG — SIGNIFICANT CHANGE UP (ref 27–34)
MCHC RBC-ENTMCNC: 31.8 GM/DL — LOW (ref 32–36)
MCV RBC AUTO: 93.4 FL — SIGNIFICANT CHANGE UP (ref 80–100)
MONOCYTES # BLD AUTO: 0.6 K/UL — SIGNIFICANT CHANGE UP (ref 0–0.9)
MONOCYTES NFR BLD AUTO: 7.7 % — SIGNIFICANT CHANGE UP (ref 2–14)
NEUTROPHILS # BLD AUTO: 4.94 K/UL — SIGNIFICANT CHANGE UP (ref 1.8–7.4)
NEUTROPHILS NFR BLD AUTO: 63.4 % — SIGNIFICANT CHANGE UP (ref 43–77)
PLATELET # BLD AUTO: 161 K/UL — SIGNIFICANT CHANGE UP (ref 150–400)
POTASSIUM SERPL-MCNC: 4.4 MMOL/L — SIGNIFICANT CHANGE UP (ref 3.5–5.3)
POTASSIUM SERPL-SCNC: 4.4 MMOL/L — SIGNIFICANT CHANGE UP (ref 3.5–5.3)
RBC # BLD: 3.33 M/UL — LOW (ref 3.8–5.2)
RBC # FLD: 15.9 % — HIGH (ref 10.3–14.5)
SARS-COV-2 IGG+IGM SERPL QL IA: 15.2 U/ML — HIGH
SARS-COV-2 IGG+IGM SERPL QL IA: POSITIVE
SODIUM SERPL-SCNC: 141 MMOL/L — SIGNIFICANT CHANGE UP (ref 135–145)
TIBC SERPL-MCNC: 197 UG/DL — LOW (ref 220–430)
UIBC SERPL-MCNC: 143 UG/DL — SIGNIFICANT CHANGE UP (ref 110–370)
VIT B12 SERPL-MCNC: 327 PG/ML — SIGNIFICANT CHANGE UP (ref 232–1245)
WBC # BLD: 7.78 K/UL — SIGNIFICANT CHANGE UP (ref 3.8–10.5)
WBC # FLD AUTO: 7.78 K/UL — SIGNIFICANT CHANGE UP (ref 3.8–10.5)

## 2021-03-23 PROCEDURE — 99221 1ST HOSP IP/OBS SF/LOW 40: CPT

## 2021-03-23 PROCEDURE — 12345: CPT | Mod: NC

## 2021-03-23 RX ORDER — DEXTROSE 50 % IN WATER 50 %
15 SYRINGE (ML) INTRAVENOUS ONCE
Refills: 0 | Status: DISCONTINUED | OUTPATIENT
Start: 2021-03-23 | End: 2021-03-24

## 2021-03-23 RX ORDER — DEXTROSE 50 % IN WATER 50 %
25 SYRINGE (ML) INTRAVENOUS ONCE
Refills: 0 | Status: DISCONTINUED | OUTPATIENT
Start: 2021-03-23 | End: 2021-03-24

## 2021-03-23 RX ORDER — SODIUM CHLORIDE 9 MG/ML
1000 INJECTION INTRAMUSCULAR; INTRAVENOUS; SUBCUTANEOUS
Refills: 0 | Status: DISCONTINUED | OUTPATIENT
Start: 2021-03-23 | End: 2021-03-24

## 2021-03-23 RX ORDER — SODIUM CHLORIDE 9 MG/ML
1000 INJECTION, SOLUTION INTRAVENOUS
Refills: 0 | Status: DISCONTINUED | OUTPATIENT
Start: 2021-03-23 | End: 2021-03-24

## 2021-03-23 RX ORDER — GLUCAGON INJECTION, SOLUTION 0.5 MG/.1ML
1 INJECTION, SOLUTION SUBCUTANEOUS ONCE
Refills: 0 | Status: DISCONTINUED | OUTPATIENT
Start: 2021-03-23 | End: 2021-03-24

## 2021-03-23 RX ORDER — INSULIN LISPRO 100/ML
VIAL (ML) SUBCUTANEOUS EVERY 6 HOURS
Refills: 0 | Status: DISCONTINUED | OUTPATIENT
Start: 2021-03-23 | End: 2021-03-24

## 2021-03-23 RX ORDER — DEXTROSE 50 % IN WATER 50 %
12.5 SYRINGE (ML) INTRAVENOUS ONCE
Refills: 0 | Status: DISCONTINUED | OUTPATIENT
Start: 2021-03-23 | End: 2021-03-24

## 2021-03-23 RX ADMIN — HYDROMORPHONE HYDROCHLORIDE 1 MILLIGRAM(S): 2 INJECTION INTRAMUSCULAR; INTRAVENOUS; SUBCUTANEOUS at 19:08

## 2021-03-23 RX ADMIN — GABAPENTIN 300 MILLIGRAM(S): 400 CAPSULE ORAL at 05:41

## 2021-03-23 RX ADMIN — SODIUM CHLORIDE 75 MILLILITER(S): 9 INJECTION INTRAMUSCULAR; INTRAVENOUS; SUBCUTANEOUS at 10:34

## 2021-03-23 RX ADMIN — Medication 1 MILLIGRAM(S): at 09:42

## 2021-03-23 RX ADMIN — SODIUM CHLORIDE 60 MILLILITER(S): 9 INJECTION INTRAMUSCULAR; INTRAVENOUS; SUBCUTANEOUS at 23:00

## 2021-03-23 RX ADMIN — Medication 25 MILLIGRAM(S): at 09:42

## 2021-03-23 RX ADMIN — HYDROMORPHONE HYDROCHLORIDE 1 MILLIGRAM(S): 2 INJECTION INTRAMUSCULAR; INTRAVENOUS; SUBCUTANEOUS at 04:39

## 2021-03-23 RX ADMIN — GABAPENTIN 300 MILLIGRAM(S): 400 CAPSULE ORAL at 21:52

## 2021-03-23 RX ADMIN — AMLODIPINE BESYLATE 5 MILLIGRAM(S): 2.5 TABLET ORAL at 09:42

## 2021-03-23 RX ADMIN — Medication 1: at 11:31

## 2021-03-23 RX ADMIN — HYDROMORPHONE HYDROCHLORIDE 0.5 MILLIGRAM(S): 2 INJECTION INTRAMUSCULAR; INTRAVENOUS; SUBCUTANEOUS at 22:34

## 2021-03-23 RX ADMIN — Medication 1: at 17:14

## 2021-03-23 RX ADMIN — GABAPENTIN 300 MILLIGRAM(S): 400 CAPSULE ORAL at 13:04

## 2021-03-23 RX ADMIN — Medication 20 MILLIGRAM(S): at 21:52

## 2021-03-23 RX ADMIN — HYDROMORPHONE HYDROCHLORIDE 0.5 MILLIGRAM(S): 2 INJECTION INTRAMUSCULAR; INTRAVENOUS; SUBCUTANEOUS at 22:19

## 2021-03-23 RX ADMIN — Medication 25 MILLIGRAM(S): at 21:52

## 2021-03-23 RX ADMIN — HYDROMORPHONE HYDROCHLORIDE 1 MILLIGRAM(S): 2 INJECTION INTRAMUSCULAR; INTRAVENOUS; SUBCUTANEOUS at 13:37

## 2021-03-23 RX ADMIN — HYDROMORPHONE HYDROCHLORIDE 1 MILLIGRAM(S): 2 INJECTION INTRAMUSCULAR; INTRAVENOUS; SUBCUTANEOUS at 03:30

## 2021-03-23 RX ADMIN — HYDROMORPHONE HYDROCHLORIDE 1 MILLIGRAM(S): 2 INJECTION INTRAMUSCULAR; INTRAVENOUS; SUBCUTANEOUS at 09:45

## 2021-03-23 RX ADMIN — LISINOPRIL 20 MILLIGRAM(S): 2.5 TABLET ORAL at 09:41

## 2021-03-23 RX ADMIN — HYDROMORPHONE HYDROCHLORIDE 1 MILLIGRAM(S): 2 INJECTION INTRAMUSCULAR; INTRAVENOUS; SUBCUTANEOUS at 09:40

## 2021-03-23 RX ADMIN — HYDROMORPHONE HYDROCHLORIDE 1 MILLIGRAM(S): 2 INJECTION INTRAMUSCULAR; INTRAVENOUS; SUBCUTANEOUS at 13:50

## 2021-03-23 NOTE — PROGRESS NOTE ADULT - SUBJECTIVE AND OBJECTIVE BOX
afebrile, VSS    comfortable    transfused 2 units yesterday    for surgery for tomorrow    MEDICATIONS  (STANDING):  amLODIPine   Tablet 5 milliGRAM(s) Oral daily  dextrose 40% Gel 15 Gram(s) Oral once  dextrose 40% Gel 15 Gram(s) Oral once  dextrose 5%. 1000 milliLiter(s) (50 mL/Hr) IV Continuous <Continuous>  dextrose 5%. 1000 milliLiter(s) (100 mL/Hr) IV Continuous <Continuous>  dextrose 5%. 1000 milliLiter(s) (50 mL/Hr) IV Continuous <Continuous>  dextrose 5%. 1000 milliLiter(s) (100 mL/Hr) IV Continuous <Continuous>  dextrose 50% Injectable 25 Gram(s) IV Push once  dextrose 50% Injectable 12.5 Gram(s) IV Push once  dextrose 50% Injectable 25 Gram(s) IV Push once  dextrose 50% Injectable 25 Gram(s) IV Push once  dextrose 50% Injectable 12.5 Gram(s) IV Push once  dextrose 50% Injectable 25 Gram(s) IV Push once  folic acid 1 milliGRAM(s) Oral daily  gabapentin 300 milliGRAM(s) Oral three times a day  glucagon  Injectable 1 milliGRAM(s) IntraMuscular once  glucagon  Injectable 1 milliGRAM(s) IntraMuscular once  insulin lispro (ADMELOG) corrective regimen sliding scale   SubCutaneous every 6 hours  lisinopril 20 milliGRAM(s) Oral daily  metoprolol tartrate 25 milliGRAM(s) Oral two times a day  PARoxetine 20 milliGRAM(s) Oral daily  sodium chloride 0.9%. 1000 milliLiter(s) (75 mL/Hr) IV Continuous <Continuous>    MEDICATIONS  (PRN):  artificial  tears Solution 1 Drop(s) Both EYES two times a day PRN Dry Eyes  HYDROmorphone  Injectable 0.5 milliGRAM(s) IV Push every 4 hours PRN Moderate Pain (4 - 6)  HYDROmorphone  Injectable 1 milliGRAM(s) IV Push every 4 hours PRN Severe Pain (7 - 10)      Allergies    cephalosporins (Other)  penicillins (Urticaria; Pruritus)    Intolerances        Flatus: [ ] YES [ ] NO             Bowel Movement: [ ] YES [ ] NO  Pain (0-10):            Pain Control Adequate: [ ] YES [ ] NO  Nausea: [ ] YES [ ] NO            Vomiting: [ ] YES [ ] NO  Diarrhea: [ ] YES [ ] NO         Constipation: [ ] YES [ ] NO     Chest Pain: [ ] YES [ ] NO    SOB:  [ ] YES [ ] NO    Vital Signs Last 24 Hrs  T(C): 36.8 (23 Mar 2021 01:20), Max: 37.2 (22 Mar 2021 20:07)  T(F): 98.3 (23 Mar 2021 01:20), Max: 98.9 (22 Mar 2021 20:07)  HR: 69 (23 Mar 2021 03:33) (66 - 96)  BP: 147/46 (23 Mar 2021 03:33) (117/32 - 154/53)  BP(mean): 77 (22 Mar 2021 20:07) (54 - 80)  RR: 18 (23 Mar 2021 01:20) (15 - 19)  SpO2: 97% (23 Mar 2021 01:20) (97% - 100%)    I&O's Summary    22 Mar 2021 07:01  -  23 Mar 2021 07:00  --------------------------------------------------------  IN: 0 mL / OUT: 100 mL / NET: -100 mL        Physical Exam:  General: NAD, resting comfortably  Pulmonary: normal resp effort, CTA-B  Cardiovascular: NSR  Abdominal: soft, NT/ND  Extremities: WWP, normal strength  Neuro: A/O x 3, CNs II-XII grossly intact, normal motor/sensation, no focal deficits  Pulses:   Right:                                                                          Left:  FEM [ ]2+ [ ]1+ [ ]doppler                                             FEM [ ]2+ [ ]1+ [ ]doppler    POP [ ]2+ [ ]1+ [ ]doppler                                             POP [ ]2+ [ ]1+ [ ]doppler    DP [ ]2+ [ ]1+ [ ]doppler                                                DP [ ]2+ [ ]1+ [ ]doppler  PT[ ]2+ [ ]1+ [ ]doppler                                                  PT [ ]2+ [ ]1+ [ ]doppler    LABS:                        8.0    10.40 )-----------( 209      ( 22 Mar 2021 13:45 )             26.3     03-22    139  |  110<H>  |  37<H>  ----------------------------<  125<H>  4.3   |  24  |  1.04    Ca    9.1      22 Mar 2021 13:45    TPro  7.1  /  Alb  2.9<L>  /  TBili  0.4  /  DBili  x   /  AST  11<L>  /  ALT  12  /  AlkPhos  88  03-22    PT/INR - ( 22 Mar 2021 13:45 )   PT: 13.4 sec;   INR: 1.16 ratio         PTT - ( 22 Mar 2021 13:45 )  PTT:35.2 sec  Urinalysis Basic - ( 22 Mar 2021 20:15 )    Color: Yellow / Appearance: Clear / S.015 / pH: x  Gluc: x / Ketone: Negative  / Bili: Negative / Urobili: Negative mg/dL   Blood: x / Protein: 100 mg/dL / Nitrite: Negative   Leuk Esterase: Negative / RBC: 0-2 /HPF / WBC Negative   Sq Epi: x / Non Sq Epi: Negative / Bacteria: Occasional      LIVER FUNCTIONS - ( 22 Mar 2021 13:45 )  Alb: 2.9 g/dL / Pro: 7.1 gm/dL / ALK PHOS: 88 U/L / ALT: 12 U/L / AST: 11 U/L / GGT: x           CAPILLARY BLOOD GLUCOSE      POCT Blood Glucose.: 137 mg/dL (23 Mar 2021 07:26)  POCT Blood Glucose.: 173 mg/dL (22 Mar 2021 22:59)  POCT Blood Glucose.: 173 mg/dL (22 Mar 2021 17:27)      RADIOLOGY & ADDITIONAL TESTS:

## 2021-03-23 NOTE — CONSULT NOTE ADULT - ASSESSMENT
86 y/o F w/ PMH of anemia, arthritis, CAD, DM2, depression, HTN, PVD, RA, p/w LLE pain.    *PVD w/ LLE claudication - awaiting Fem-pop bypass surgery  -Given significant cardiac disease / atherosclerosis and high risk surgery, will consult cardio for pre-op risk stratification prior to surgery  -NPO after MN  -Anti-coagulation held temporarily for OR     *Normocytic anemia  -Will need to monitor H/H closely during perioperative period and transfuse PRN   -H/H trending down  -Will need to consider inpatient GI consult for further evaluation  -Iron panel/ ferritin / B12 / Folate / FOBT    *Lower extremity ulcers  -Consider wound consult    *DM2  -Will hold lantus until patient resumes diet again   -Humalog ISS Q6H while NPO    *H/o depression / HTN / RA  -C/w home meds and f/u outpatient for further management if conditions remain stable during hospitalization     *DVT ppx  -SCDs      86 y/o F w/ PMH of anemia, arthritis, CAD, DM2, depression, HTN, PVD, RA, p/w LLE pain.    *PVD w/ LLE claudication - awaiting Fem-pop bypass surgery  -Given significant cardiac disease / atherosclerosis and high risk surgery, will consult cardio for pre-op risk stratification prior to surgery  -Anti-coagulation held temporarily for OR     *Normocytic anemia  -Will need to monitor H/H closely during perioperative period and transfuse PRN   -H/H trending down  -Will need to consider GI consult for further evaluation  -Iron panel/ ferritin / B12 / Folate / FOBT    *Lower extremity ulcers  -Consider wound consult    *DM2  -Will hold lantus until patient resumes diet again   -Humalog ISS Q6H while NPO    *H/o depression / HTN / RA  -C/w home meds and f/u outpatient for further management if conditions remain stable during hospitalization     *DVT ppx  -SCDs

## 2021-03-23 NOTE — CONSULT NOTE ADULT - SUBJECTIVE AND OBJECTIVE BOX
84 y/o F w/ PMH of anemia, arthritis, CAD, DM2, depression, HTN, PVD, RA, p/w LLE pain. Patient is admitted to surgery service and awaiting fem-pop-bypass. Patient's only complaint at this time is LLE pain which she arrived to hospital with. Patient states at baseline she walks with a walker. Denies feeling CP, SOB, cough, runny nose, sore throat, nausea, vomiting, abdominal pain, fever, chills. Patient is has insulin dependent diabetes, but denies h/o CHF / CKD / CVA.     PSH: L femoral endarterectomy, ankle surgery, hip surgery, cataract surgery     Family Hx: Father - colon cancer, MI     Social Hx: Denies x 3

## 2021-03-23 NOTE — PROGRESS NOTE ADULT - ASSESSMENT
84 y/o F w/ PMH of anemia, arthritis, CAD, DM2, depression, HTN, PVD, RA, p/w LLE pain.    *PVD w/ LLE claudication  with lower ext ulcer - awaiting Fem-pop bypass surgery  -Given significant cardiac disease / atherosclerosis and high risk surgery, will consult cardio for pre-op risk stratification prior to surgery  CXR no PNA or chf , EKG   -Anti-coagulation held temporarily for OR     *Normocytic anemia  s/p transfusion 2 units 3/22, now hb 9, monitor   check FOBT , if further drop in hb will consult GI    -Iron panel/ ferritin / B12 / Folate / FOBT pending        *DM2  -Will hold lantus until patient resumes diet again   -Humalog ISS Q6H while NPO    *H/o depression / HTN / RA  -C/w home meds and f/u outpatient for further management if conditions remain stable during hospitalization     *DVT ppx  per primary team  86 y/o F w/ PMH of anemia, arthritis, CAD, DM2, depression, HTN, PVD, RA, p/w LLE pain.    *PVD w/ LLE claudication  with lower ext ulcer - awaiting Fem-pop bypass surgery  -As per patient had extensive cardiac w/u as outpatient 3 to 4 weeks ago  by dr Dang with echo, cardiac cath which were negative   CXR no PNA or chf , EKG reviewed  no acute medical contraindications for OR tmrw , monitor hb closely peripoperatively  and transfuse for hb <8  -Anti-coagulation held temporarily for OR     *Normocytic anemia  s/p transfusion 2 units 3/22, now hb 9, monitor   check FOBT , if further drop in hb will consult GI  Iron panel/ ferritin / B12 / Folate / FOBT pending        *DM2  -Will hold lantus until patient resumes diet again   -Humalog ISS Q6H while NPO    *H/o depression / HTN / RA  -C/w home meds and f/u outpatient for further management if conditions remain stable during hospitalization     *DVT ppx  per primary team  86 y/o F w/ PMH of anemia, arthritis, CAD, DM2, depression, HTN, PVD, RA, p/w LLE pain.    *PVD w/ LLE claudication  with lower ext ulcer - awaiting Fem-pop bypass surgery  -As per patient had extensive cardiac w/u as outpatient 3 to 4 weeks ago  by dr Dang with echo, cardiac cath which were negative   CXR no PNA or chf , EKG  pt may proceed for OR tmrw with moderate to high risk  no acute medical contraindications for OR tmrw , monitor hb closely peripoperatively  and transfuse for hb <8  -Anti-coagulation held temporarily for OR     *Normocytic anemia  s/p transfusion 2 units 3/22, now hb 9, monitor   check FOBT , if further drop in hb will consult GI  Iron panel/ ferritin / B12 / Folate / FOBT pending        *DM2  -Will hold lantus until patient resumes diet again   -Humalog ISS Q6H while NPO    *H/o depression / HTN / RA  -C/w home meds and f/u outpatient for further management if conditions remain stable during hospitalization     *DVT ppx  per primary team  86 y/o F w/ PMH of anemia, arthritis, CAD, DM2, depression, HTN, PVD, RA, p/w LLE pain.    *PVD w/ LLE claudication  with lower ext ulcer - awaiting Fem-pop bypass surgery  -As per patient had extensive cardiac w/u as outpatient 3 to 4 weeks ago  by dr Dang with echo, cardiac cath which were negative   CXR no PNA or chf ,pending  EKG  pt may proceed for OR tmrw with moderate to high risk ( high risk based on RCRI criteria hx insulin dependent DM , ? CAD, PVD)  no acute medical contraindications for OR tmrw , monitor hb closely peripoperatively  and transfuse for hb <8  -Anti-coagulation held temporarily for OR     *Normocytic anemia  s/p transfusion 2 units 3/22, now hb 9, monitor   check FOBT , if further drop in hb will consult GI  Iron panel/ ferritin / B12 / Folate / FOBT pending        *DM2  -Will hold lantus until patient resumes diet again   -Humalog ISS Q6H while NPO    *H/o depression / HTN / RA  -C/w home meds and f/u outpatient for further management if conditions remain stable during hospitalization     *DVT ppx  per primary team

## 2021-03-23 NOTE — PROGRESS NOTE ADULT - SUBJECTIVE AND OBJECTIVE BOX
86 y/o F w/ PMH of anemia, arthritis, CAD, DM2, depression, HTN, PVD, RA, p/w LLE pain. Patient is admitted to surgery service and awaiting fem-pop-bypass. Patient's only complaint at this time is LLE pain which she arrived to hospital with. Patient states at baseline she walks with a walker. Denies feeling CP, SOB, cough, runny nose, sore throat, nausea, vomiting, abdominal pain, fever, chills. Patient is has insulin dependent diabetes, but denies h/o CHF / CKD / CVA.   3/23 denies CP, SOB, abd pain, no fever, no cough    PHYSICAL EXAM:    Daily Height in cm: 160.02 (22 Mar 2021 13:02)    Daily Weight in k.2 (23 Mar 2021 06:46)    ICU Vital Signs Last 24 Hrs  T(C): 36.6 (23 Mar 2021 08:04), Max: 37.2 (22 Mar 2021 20:07)  T(F): 97.8 (23 Mar 2021 08:04), Max: 98.9 (22 Mar 2021 20:07)  HR: 67 (23 Mar 2021 08:04) (66 - 96)  BP: 158/49 (23 Mar 2021 08:04) (117/32 - 158/49)  BP(mean): 77 (22 Mar 2021 20:07) (54 - 80)  ABP: --  ABP(mean): --  RR: 18 (23 Mar 2021 08:04) (15 - 19)  SpO2: 97% (23 Mar 2021 08:04) (97% - 100%)      Constitutional: Well appearing  HEENT: Atraumatic, LEIGHA, Normal, No congestion  Respiratory: Breath Sounds normal, no rhonchi/wheeze  Cardiovascular: N S1S2;   Gastrointestinal: Abdomen soft, non tender, Bowel Ssounds present  Extremities: LE ulcers  Neurological: AAO x 3, no gross focal motor deficits                            9.9    7.78  )-----------( 161      ( 23 Mar 2021 07:21 )             31.1       CBC Full  -  ( 23 Mar 2021 07:21 )  WBC Count : 7.78 K/uL  RBC Count : 3.33 M/uL  Hemoglobin : 9.9 g/dL  Hematocrit : 31.1 %  Platelet Count - Automated : 161 K/uL  Mean Cell Volume : 93.4 fl  Mean Cell Hemoglobin : 29.7 pg  Mean Cell Hemoglobin Concentration : 31.8 gm/dL  Auto Neutrophil # : 4.94 K/uL  Auto Lymphocyte # : 1.82 K/uL  Auto Monocyte # : 0.60 K/uL  Auto Eosinophil # : 0.38 K/uL  Auto Basophil # : 0.02 K/uL  Auto Neutrophil % : 63.4 %  Auto Lymphocyte % : 23.4 %  Auto Monocyte % : 7.7 %  Auto Eosinophil % : 4.9 %  Auto Basophil % : 0.3 %          141  |  112<H>  |  30<H>  ----------------------------<  124<H>  4.4   |  22  |  0.89    Ca    8.9      23 Mar 2021 07:21    TPro  7.1  /  Alb  2.9<L>  /  TBili  0.4  /  DBili  x   /  AST  11<L>  /  ALT  12  /  AlkPhos  88  03-      LIVER FUNCTIONS - ( 22 Mar 2021 13:45 )  Alb: 2.9 g/dL / Pro: 7.1 gm/dL / ALK PHOS: 88 U/L / ALT: 12 U/L / AST: 11 U/L / GGT: x             PT/INR - ( 22 Mar 2021 13:45 )   PT: 13.4 sec;   INR: 1.16 ratio         PTT - ( 22 Mar 2021 13:45 )  PTT:35.2 sec    CARDIAC MARKERS ( 22 Mar 2021 13:45 )  <0.015 ng/mL / x     / x     / x     / x            Urinalysis Basic - ( 22 Mar 2021 20:15 )    Color: Yellow / Appearance: Clear / S.015 / pH: x  Gluc: x / Ketone: Negative  / Bili: Negative / Urobili: Negative mg/dL   Blood: x / Protein: 100 mg/dL / Nitrite: Negative   Leuk Esterase: Negative / RBC: 0-2 /HPF / WBC Negative   Sq Epi: x / Non Sq Epi: Negative / Bacteria: Occasional            MEDICATIONS  (STANDING):  amLODIPine   Tablet 5 milliGRAM(s) Oral daily  dextrose 40% Gel 15 Gram(s) Oral once  dextrose 40% Gel 15 Gram(s) Oral once  dextrose 5%. 1000 milliLiter(s) (50 mL/Hr) IV Continuous <Continuous>  dextrose 5%. 1000 milliLiter(s) (100 mL/Hr) IV Continuous <Continuous>  dextrose 5%. 1000 milliLiter(s) (50 mL/Hr) IV Continuous <Continuous>  dextrose 5%. 1000 milliLiter(s) (100 mL/Hr) IV Continuous <Continuous>  dextrose 50% Injectable 25 Gram(s) IV Push once  dextrose 50% Injectable 12.5 Gram(s) IV Push once  dextrose 50% Injectable 25 Gram(s) IV Push once  dextrose 50% Injectable 25 Gram(s) IV Push once  dextrose 50% Injectable 12.5 Gram(s) IV Push once  dextrose 50% Injectable 25 Gram(s) IV Push once  folic acid 1 milliGRAM(s) Oral daily  gabapentin 300 milliGRAM(s) Oral three times a day  glucagon  Injectable 1 milliGRAM(s) IntraMuscular once  glucagon  Injectable 1 milliGRAM(s) IntraMuscular once  insulin lispro (ADMELOG) corrective regimen sliding scale   SubCutaneous every 6 hours  lisinopril 20 milliGRAM(s) Oral daily  metoprolol tartrate 25 milliGRAM(s) Oral two times a day  PARoxetine 20 milliGRAM(s) Oral daily  sodium chloride 0.9%. 1000 milliLiter(s) (75 mL/Hr) IV Continuous <Continuous>  sodium chloride 0.9%. 1000 milliLiter(s) (60 mL/Hr) IV Continuous <Continuous>         84 y/o F w/ PMH of anemia, arthritis, CAD, DM2, depression, HTN, PVD, RA, p/w LLE pain. Patient is admitted to surgery service and awaiting fem-pop-bypass. Patient's only complaint at this time is LLE pain which she arrived to hospital with. Patient states at baseline she walks with a walker. Denies feeling CP, SOB, cough, runny nose, sore throat, nausea, vomiting, abdominal pain, fever, chills. Patient is has insulin dependent diabetes, but denies h/o CHF / CKD / CVA.   3/23 denies CP, SOB, abd pain, no fever, no cough, no dizziness    PHYSICAL EXAM:    Daily Height in cm: 160.02 (22 Mar 2021 13:02)    Daily Weight in k.2 (23 Mar 2021 06:46)    ICU Vital Signs Last 24 Hrs  T(C): 36.6 (23 Mar 2021 08:04), Max: 37.2 (22 Mar 2021 20:07)  T(F): 97.8 (23 Mar 2021 08:04), Max: 98.9 (22 Mar 2021 20:07)  HR: 67 (23 Mar 2021 08:04) (66 - 96)  BP: 158/49 (23 Mar 2021 08:04) (117/32 - 158/49)  BP(mean): 77 (22 Mar 2021 20:07) (54 - 80)  ABP: --  ABP(mean): --  RR: 18 (23 Mar 2021 08:04) (15 - 19)  SpO2: 97% (23 Mar 2021 08:04) (97% - 100%)      Constitutional: Well appearing  HEENT: Atraumatic, LEIGHA, Normal, No congestion  Respiratory: Breath Sounds normal, no rhonchi/wheeze  Cardiovascular: N S1S2;   Gastrointestinal: Abdomen soft, non tender, Bowel Ssounds present  Extremities: LE ulcers  Neurological: AAO x 3, no gross focal motor deficits                            9.9    7.78  )-----------( 161      ( 23 Mar 2021 07:21 )             31.1       CBC Full  -  ( 23 Mar 2021 07:21 )  WBC Count : 7.78 K/uL  RBC Count : 3.33 M/uL  Hemoglobin : 9.9 g/dL  Hematocrit : 31.1 %  Platelet Count - Automated : 161 K/uL  Mean Cell Volume : 93.4 fl  Mean Cell Hemoglobin : 29.7 pg  Mean Cell Hemoglobin Concentration : 31.8 gm/dL  Auto Neutrophil # : 4.94 K/uL  Auto Lymphocyte # : 1.82 K/uL  Auto Monocyte # : 0.60 K/uL  Auto Eosinophil # : 0.38 K/uL  Auto Basophil # : 0.02 K/uL  Auto Neutrophil % : 63.4 %  Auto Lymphocyte % : 23.4 %  Auto Monocyte % : 7.7 %  Auto Eosinophil % : 4.9 %  Auto Basophil % : 0.3 %          141  |  112<H>  |  30<H>  ----------------------------<  124<H>  4.4   |  22  |  0.89    Ca    8.9      23 Mar 2021 07:21    TPro  7.1  /  Alb  2.9<L>  /  TBili  0.4  /  DBili  x   /  AST  11<L>  /  ALT  12  /  AlkPhos  88  03-22      LIVER FUNCTIONS - ( 22 Mar 2021 13:45 )  Alb: 2.9 g/dL / Pro: 7.1 gm/dL / ALK PHOS: 88 U/L / ALT: 12 U/L / AST: 11 U/L / GGT: x             PT/INR - ( 22 Mar 2021 13:45 )   PT: 13.4 sec;   INR: 1.16 ratio         PTT - ( 22 Mar 2021 13:45 )  PTT:35.2 sec    CARDIAC MARKERS ( 22 Mar 2021 13:45 )  <0.015 ng/mL / x     / x     / x     / x            Urinalysis Basic - ( 22 Mar 2021 20:15 )    Color: Yellow / Appearance: Clear / S.015 / pH: x  Gluc: x / Ketone: Negative  / Bili: Negative / Urobili: Negative mg/dL   Blood: x / Protein: 100 mg/dL / Nitrite: Negative   Leuk Esterase: Negative / RBC: 0-2 /HPF / WBC Negative   Sq Epi: x / Non Sq Epi: Negative / Bacteria: Occasional            MEDICATIONS  (STANDING):  amLODIPine   Tablet 5 milliGRAM(s) Oral daily  dextrose 40% Gel 15 Gram(s) Oral once  dextrose 40% Gel 15 Gram(s) Oral once  dextrose 5%. 1000 milliLiter(s) (50 mL/Hr) IV Continuous <Continuous>  dextrose 5%. 1000 milliLiter(s) (100 mL/Hr) IV Continuous <Continuous>  dextrose 5%. 1000 milliLiter(s) (50 mL/Hr) IV Continuous <Continuous>  dextrose 5%. 1000 milliLiter(s) (100 mL/Hr) IV Continuous <Continuous>  dextrose 50% Injectable 25 Gram(s) IV Push once  dextrose 50% Injectable 12.5 Gram(s) IV Push once  dextrose 50% Injectable 25 Gram(s) IV Push once  dextrose 50% Injectable 25 Gram(s) IV Push once  dextrose 50% Injectable 12.5 Gram(s) IV Push once  dextrose 50% Injectable 25 Gram(s) IV Push once  folic acid 1 milliGRAM(s) Oral daily  gabapentin 300 milliGRAM(s) Oral three times a day  glucagon  Injectable 1 milliGRAM(s) IntraMuscular once  glucagon  Injectable 1 milliGRAM(s) IntraMuscular once  insulin lispro (ADMELOG) corrective regimen sliding scale   SubCutaneous every 6 hours  lisinopril 20 milliGRAM(s) Oral daily  metoprolol tartrate 25 milliGRAM(s) Oral two times a day  PARoxetine 20 milliGRAM(s) Oral daily  sodium chloride 0.9%. 1000 milliLiter(s) (75 mL/Hr) IV Continuous <Continuous>  sodium chloride 0.9%. 1000 milliLiter(s) (60 mL/Hr) IV Continuous <Continuous>

## 2021-03-24 LAB
ANION GAP SERPL CALC-SCNC: 7 MMOL/L — SIGNIFICANT CHANGE UP (ref 5–17)
BUN SERPL-MCNC: 24 MG/DL — HIGH (ref 7–23)
CALCIUM SERPL-MCNC: 8.5 MG/DL — SIGNIFICANT CHANGE UP (ref 8.5–10.1)
CHLORIDE SERPL-SCNC: 112 MMOL/L — HIGH (ref 96–108)
CO2 SERPL-SCNC: 22 MMOL/L — SIGNIFICANT CHANGE UP (ref 22–31)
CREAT SERPL-MCNC: 0.78 MG/DL — SIGNIFICANT CHANGE UP (ref 0.5–1.3)
GLUCOSE SERPL-MCNC: 175 MG/DL — HIGH (ref 70–99)
HCT VFR BLD CALC: 31.2 % — LOW (ref 34.5–45)
HGB BLD-MCNC: 9.7 G/DL — LOW (ref 11.5–15.5)
MCHC RBC-ENTMCNC: 29.9 PG — SIGNIFICANT CHANGE UP (ref 27–34)
MCHC RBC-ENTMCNC: 31.1 GM/DL — LOW (ref 32–36)
MCV RBC AUTO: 96.3 FL — SIGNIFICANT CHANGE UP (ref 80–100)
PLATELET # BLD AUTO: 139 K/UL — LOW (ref 150–400)
POTASSIUM SERPL-MCNC: 4.3 MMOL/L — SIGNIFICANT CHANGE UP (ref 3.5–5.3)
POTASSIUM SERPL-SCNC: 4.3 MMOL/L — SIGNIFICANT CHANGE UP (ref 3.5–5.3)
RBC # BLD: 3.24 M/UL — LOW (ref 3.8–5.2)
RBC # FLD: 16.2 % — HIGH (ref 10.3–14.5)
SODIUM SERPL-SCNC: 141 MMOL/L — SIGNIFICANT CHANGE UP (ref 135–145)
WBC # BLD: 6.64 K/UL — SIGNIFICANT CHANGE UP (ref 3.8–10.5)
WBC # FLD AUTO: 6.64 K/UL — SIGNIFICANT CHANGE UP (ref 3.8–10.5)

## 2021-03-24 PROCEDURE — 35700 REOPERATION BYPASS GRAFT: CPT | Mod: AS,LT

## 2021-03-24 PROCEDURE — 35656 BPG FEMORAL-POPLITEAL: CPT | Mod: AS,LT

## 2021-03-24 PROCEDURE — 99232 SBSQ HOSP IP/OBS MODERATE 35: CPT

## 2021-03-24 RX ORDER — METOPROLOL TARTRATE 50 MG
25 TABLET ORAL
Refills: 0 | Status: DISCONTINUED | OUTPATIENT
Start: 2021-03-24 | End: 2021-03-30

## 2021-03-24 RX ORDER — LISINOPRIL 2.5 MG/1
20 TABLET ORAL DAILY
Refills: 0 | Status: DISCONTINUED | OUTPATIENT
Start: 2021-03-24 | End: 2021-03-30

## 2021-03-24 RX ORDER — ONDANSETRON 8 MG/1
4 TABLET, FILM COATED ORAL ONCE
Refills: 0 | Status: DISCONTINUED | OUTPATIENT
Start: 2021-03-24 | End: 2021-03-24

## 2021-03-24 RX ORDER — SODIUM CHLORIDE 9 MG/ML
1000 INJECTION, SOLUTION INTRAVENOUS
Refills: 0 | Status: DISCONTINUED | OUTPATIENT
Start: 2021-03-24 | End: 2021-03-30

## 2021-03-24 RX ORDER — FENTANYL CITRATE 50 UG/ML
50 INJECTION INTRAVENOUS
Refills: 0 | Status: DISCONTINUED | OUTPATIENT
Start: 2021-03-24 | End: 2021-03-24

## 2021-03-24 RX ORDER — DEXTROSE 50 % IN WATER 50 %
12.5 SYRINGE (ML) INTRAVENOUS ONCE
Refills: 0 | Status: DISCONTINUED | OUTPATIENT
Start: 2021-03-24 | End: 2021-03-30

## 2021-03-24 RX ORDER — DEXTROSE 50 % IN WATER 50 %
25 SYRINGE (ML) INTRAVENOUS ONCE
Refills: 0 | Status: DISCONTINUED | OUTPATIENT
Start: 2021-03-24 | End: 2021-03-30

## 2021-03-24 RX ORDER — HEPARIN SODIUM 5000 [USP'U]/ML
5000 INJECTION INTRAVENOUS; SUBCUTANEOUS EVERY 12 HOURS
Refills: 0 | Status: DISCONTINUED | OUTPATIENT
Start: 2021-03-25 | End: 2021-03-29

## 2021-03-24 RX ORDER — INSULIN LISPRO 100/ML
VIAL (ML) SUBCUTANEOUS AT BEDTIME
Refills: 0 | Status: DISCONTINUED | OUTPATIENT
Start: 2021-03-24 | End: 2021-03-30

## 2021-03-24 RX ORDER — GLUCAGON INJECTION, SOLUTION 0.5 MG/.1ML
1 INJECTION, SOLUTION SUBCUTANEOUS ONCE
Refills: 0 | Status: DISCONTINUED | OUTPATIENT
Start: 2021-03-24 | End: 2021-03-30

## 2021-03-24 RX ORDER — SODIUM CHLORIDE 9 MG/ML
1000 INJECTION, SOLUTION INTRAVENOUS
Refills: 0 | Status: DISCONTINUED | OUTPATIENT
Start: 2021-03-24 | End: 2021-03-24

## 2021-03-24 RX ORDER — HYDROMORPHONE HYDROCHLORIDE 2 MG/ML
0.5 INJECTION INTRAMUSCULAR; INTRAVENOUS; SUBCUTANEOUS EVERY 4 HOURS
Refills: 0 | Status: DISCONTINUED | OUTPATIENT
Start: 2021-03-24 | End: 2021-03-30

## 2021-03-24 RX ORDER — MEPERIDINE HYDROCHLORIDE 50 MG/ML
12.5 INJECTION INTRAMUSCULAR; INTRAVENOUS; SUBCUTANEOUS
Refills: 0 | Status: DISCONTINUED | OUTPATIENT
Start: 2021-03-24 | End: 2021-03-24

## 2021-03-24 RX ORDER — DEXTROSE 50 % IN WATER 50 %
15 SYRINGE (ML) INTRAVENOUS ONCE
Refills: 0 | Status: DISCONTINUED | OUTPATIENT
Start: 2021-03-24 | End: 2021-03-30

## 2021-03-24 RX ORDER — INSULIN LISPRO 100/ML
VIAL (ML) SUBCUTANEOUS EVERY 6 HOURS
Refills: 0 | Status: DISCONTINUED | OUTPATIENT
Start: 2021-03-24 | End: 2021-03-24

## 2021-03-24 RX ORDER — AMLODIPINE BESYLATE 2.5 MG/1
5 TABLET ORAL DAILY
Refills: 0 | Status: DISCONTINUED | OUTPATIENT
Start: 2021-03-24 | End: 2021-03-30

## 2021-03-24 RX ORDER — SODIUM CHLORIDE 9 MG/ML
1000 INJECTION INTRAMUSCULAR; INTRAVENOUS; SUBCUTANEOUS
Refills: 0 | Status: DISCONTINUED | OUTPATIENT
Start: 2021-03-24 | End: 2021-03-25

## 2021-03-24 RX ORDER — HYDROMORPHONE HYDROCHLORIDE 2 MG/ML
0.5 INJECTION INTRAMUSCULAR; INTRAVENOUS; SUBCUTANEOUS
Refills: 0 | Status: DISCONTINUED | OUTPATIENT
Start: 2021-03-24 | End: 2021-03-24

## 2021-03-24 RX ORDER — FOLIC ACID 0.8 MG
1 TABLET ORAL DAILY
Refills: 0 | Status: DISCONTINUED | OUTPATIENT
Start: 2021-03-24 | End: 2021-03-30

## 2021-03-24 RX ORDER — HYDROMORPHONE HYDROCHLORIDE 2 MG/ML
1 INJECTION INTRAMUSCULAR; INTRAVENOUS; SUBCUTANEOUS EVERY 4 HOURS
Refills: 0 | Status: DISCONTINUED | OUTPATIENT
Start: 2021-03-24 | End: 2021-03-30

## 2021-03-24 RX ORDER — DEXTROSE 50 % IN WATER 50 %
25 SYRINGE (ML) INTRAVENOUS ONCE
Refills: 0 | Status: DISCONTINUED | OUTPATIENT
Start: 2021-03-24 | End: 2021-03-24

## 2021-03-24 RX ORDER — GABAPENTIN 400 MG/1
300 CAPSULE ORAL THREE TIMES A DAY
Refills: 0 | Status: DISCONTINUED | OUTPATIENT
Start: 2021-03-24 | End: 2021-03-30

## 2021-03-24 RX ORDER — INSULIN LISPRO 100/ML
VIAL (ML) SUBCUTANEOUS
Refills: 0 | Status: DISCONTINUED | OUTPATIENT
Start: 2021-03-24 | End: 2021-03-30

## 2021-03-24 RX ADMIN — HYDROMORPHONE HYDROCHLORIDE 1 MILLIGRAM(S): 2 INJECTION INTRAMUSCULAR; INTRAVENOUS; SUBCUTANEOUS at 00:35

## 2021-03-24 RX ADMIN — HYDROMORPHONE HYDROCHLORIDE 1 MILLIGRAM(S): 2 INJECTION INTRAMUSCULAR; INTRAVENOUS; SUBCUTANEOUS at 00:20

## 2021-03-24 RX ADMIN — Medication 100 MILLIGRAM(S): at 21:10

## 2021-03-24 RX ADMIN — GABAPENTIN 300 MILLIGRAM(S): 400 CAPSULE ORAL at 05:58

## 2021-03-24 RX ADMIN — GABAPENTIN 300 MILLIGRAM(S): 400 CAPSULE ORAL at 21:58

## 2021-03-24 RX ADMIN — Medication 25 MILLIGRAM(S): at 21:58

## 2021-03-24 NOTE — PROGRESS NOTE ADULT - SUBJECTIVE AND OBJECTIVE BOX
86 y/o F w/ PMH of anemia, arthritis, CAD, DM2, depression, HTN, PVD, RA, p/w LLE pain. Patient is admitted to surgery service and awaiting fem-pop-bypass. Patient's only complaint at this time is LLE pain which she arrived to hospital with. Patient states at baseline she walks with a walker. Denies feeling CP, SOB, cough, runny nose, sore throat, nausea, vomiting, abdominal pain, fever, chills. Patient is has insulin dependent diabetes, but denies h/o CHF / CKD / CVA.   3/23 denies CP, SOB, abd pain, no fever, no cough, no dizziness  3/24- pt somnolent after procedure. Resting comfortably, no complaints. Surgical site dressing appears clean and dry. Pt receiving 50% O2 via VM  poor oxygenation in sedated/somnolent state post-op.    ROS:   All 10 systems reviewed and found to be negative with the exception of what has been described above. *PT SOMNOLENT, ROS limited    ICU Vital Signs Last 24 Hrs  T(C): 36.2 (24 Mar 2021 15:00), Max: 37.3 (24 Mar 2021 06:20)  T(F): 97.1 (24 Mar 2021 15:00), Max: 99.2 (24 Mar 2021 06:20)  HR: 84 (24 Mar 2021 15:28) (64 - 86)  BP: 131/37 (24 Mar 2021 15:28) (110/37 - 159/57)  BP(mean): 60 (24 Mar 2021 15:28) (60 - 60)  ABP: --  ABP(mean): --  RR: 13 (24 Mar 2021 15:28) (13 - 18)  SpO2: 92% (24 Mar 2021 15:28) (92% - 100%)    GEN: elderly female, lying in bed, somnolent post-op, NAD. On 50% O2 with VM in place.  HEENT:   NC/AT, pupils equal and reactive, EOMI  CV:  +S1, +S2, RRR  RESP:   lungs clear to auscultation bilaterally, no wheeze, rales, rhonchi   BREAST:  not examined  GI:  abdomen soft, non-tender, non-distended, normoactive BS  RECTAL:  not examined  :  not examined  MSK:   normal muscle tone  EXT:  no edema  NEURO:  AAOX3, no focal neurological deficits  SKIN:  no rashes. L fem surgical site dressing noted, clean & dry.    labs reviewed 3/24-                          9.7    6.64  )-----------( 139      ( 24 Mar 2021 13:43 )             31.2     -    141  |  112<H>  |  24<H>  ----------------------------<  175<H>  4.3   |  22  |  0.78    Ca    8.5      24 Mar 2021 13:43    Urinalysis Basic - ( 22 Mar 2021 20:15 )  Color: Yellow / Appearance: Clear / S.015 / pH: x  Gluc: x / Ketone: Negative  / Bili: Negative / Urobili: Negative mg/dL   Blood: x / Protein: 100 mg/dL / Nitrite: Negative   Leuk Esterase: Negative / RBC: 0-2 /HPF / WBC Negative   Sq Epi: x / Non Sq Epi: Negative / Bacteria: Occasional    Brief Operative Note:  General:   General:  · Primary Surgeon	Lorenzo  · Assistant(s)	ASHLI Cardenas  · Type of Anesthesia	General  · Anesthesiologist	Mei Manrique (RYDERA)  · Elective procedure	No    Pre-Op Diagnosis, Post-Op Diagnosis and Procedure:   Pre-Op, Post-Op and Procedure Selector:  ·  PRE-OP DIAGNOSIS:  PAD (peripheral artery disease) 24-Mar-2021 13:00:34 ulcer Soren Quintero.  ·  POST-OP DIAGNOSIS:  PAD (peripheral artery disease) 24-Mar-2021 13:01:11 ulcer Soren Quintero.  ·  PROCEDURES:  Femoral-popliteal bypass graft with non-vein 24-Mar-2021 13:00:04  Soren Ventura.      Operative Findings:  · Operative Findings	inflamed groin area without evidence of infection    Evidence of Infection or Abscess:  · Evidence of infection or abscess identified at the start or during the surgical procedure:	No    Specimens/Blood Loss/IV/Output/Protocol/VTE:   Specimens/Blood Loss/IV/Output/Protocol/VTE:  · Specimens	none  · Estimated Blood Loss	150 milliLiter(s)  · IV Infusions - Crystalloids (mL)	1300  · Urine Output (mL)	340 mL  · Antibiotic Protocol	Followed protocol      Other Details/Condition Post op/Disposition:  · Condition Post op	stable  · Disposition	SDU      Electronic Signatures:  Soren Ventura)  (Signed 24-Mar-2021 13:02)  	Authored: General, Pre-Op Diagnosis, Post-Op Diagnosis and Procedure, Operative Findings, Specimens/Blood Loss/IV/Output/Protocol/VTE, Other Details/Condition Post op/Disposition    Last Updated: 24-Mar-2021 13:02 by Soren Ventura)                                                                    86 y/o F w/ PMH of anemia, arthritis, CAD, DM2, depression, HTN, PVD, RA, p/w LLE pain. Patient is admitted to surgery service and awaiting fem-pop-bypass. Patient's only complaint at this time is LLE pain which she arrived to hospital with. Patient states at baseline she walks with a walker. Denies feeling CP, SOB, cough, runny nose, sore throat, nausea, vomiting, abdominal pain, fever, chills. Patient is has insulin dependent diabetes, but denies h/o CHF / CKD / CVA.   3/23 denies CP, SOB, abd pain, no fever, no cough, no dizziness  3/24- pt somnolent after procedure. Resting comfortably, no complaints. Surgical site dressing appears clean and dry. Pt receiving 50% O2 via VM  poor oxygenation in sedated/somnolent state post-op.    ROS:   All 10 systems reviewed and found to be negative with the exception of what has been described above. *PT SOMNOLENT, ROS limited    ICU Vital Signs Last 24 Hrs  T(C): 36.2 (24 Mar 2021 15:00), Max: 37.3 (24 Mar 2021 06:20)  T(F): 97.1 (24 Mar 2021 15:00), Max: 99.2 (24 Mar 2021 06:20)  HR: 84 (24 Mar 2021 15:28) (64 - 86)  BP: 131/37 (24 Mar 2021 15:28) (110/37 - 159/57)  BP(mean): 60 (24 Mar 2021 15:28) (60 - 60)  ABP: --  ABP(mean): --  RR: 13 (24 Mar 2021 15:28) (13 - 18)  SpO2: 92% (24 Mar 2021 15:28) (92% - 100%)    GEN: elderly female, lying in bed, somnolent post-op, NAD. On 50% O2 with VM in place.  HEENT:   NC/AT, pupils equal and reactive, EOMI  CV:  +S1, +S2, RRR  RESP:   lungs clear to auscultation bilaterally, no wheeze, rales, rhonchi   BREAST:  not examined  GI:  abdomen soft, non-tender, non-distended, normoactive BS  RECTAL:  not examined  :  not examined  MSK:   normal muscle tone  EXT:  no edema  NEURO:  AAOX3, no focal neurological deficits  SKIN:  no rashes. L groin surgical site dressing noted, clean & dry.    labs reviewed 3/24-                          9.7    6.64  )-----------( 139      ( 24 Mar 2021 13:43 )             31.2     -    141  |  112<H>  |  24<H>  ----------------------------<  175<H>  4.3   |  22  |  0.78    Ca    8.5      24 Mar 2021 13:43    Urinalysis Basic - ( 22 Mar 2021 20:15 )  Color: Yellow / Appearance: Clear / S.015 / pH: x  Gluc: x / Ketone: Negative  / Bili: Negative / Urobili: Negative mg/dL   Blood: x / Protein: 100 mg/dL / Nitrite: Negative   Leuk Esterase: Negative / RBC: 0-2 /HPF / WBC Negative   Sq Epi: x / Non Sq Epi: Negative / Bacteria: Occasional    Brief Operative Note:  General:   General:  · Primary Surgeon	Lorenzo  · Assistant(s)	ASHLI Cardenas  · Type of Anesthesia	General  · Anesthesiologist	Mei Manrique (RYDERA)  · Elective procedure	No    Pre-Op Diagnosis, Post-Op Diagnosis and Procedure:   Pre-Op, Post-Op and Procedure Selector:  ·  PRE-OP DIAGNOSIS:  PAD (peripheral artery disease) 24-Mar-2021 13:00:34 ulcer Soren Quintero.  ·  POST-OP DIAGNOSIS:  PAD (peripheral artery disease) 24-Mar-2021 13:01:11 ulcer Soren Quintero.  ·  PROCEDURES:  Femoral-popliteal bypass graft with non-vein 24-Mar-2021 13:00:04  Soren Ventura.      Operative Findings:  · Operative Findings	inflamed groin area without evidence of infection    Evidence of Infection or Abscess:  · Evidence of infection or abscess identified at the start or during the surgical procedure:	No    Specimens/Blood Loss/IV/Output/Protocol/VTE:   Specimens/Blood Loss/IV/Output/Protocol/VTE:  · Specimens	none  · Estimated Blood Loss	150 milliLiter(s)  · IV Infusions - Crystalloids (mL)	1300  · Urine Output (mL)	340 mL  · Antibiotic Protocol	Followed protocol      Other Details/Condition Post op/Disposition:  · Condition Post op	stable  · Disposition	SDU      Electronic Signatures:  Soren Ventura)  (Signed 24-Mar-2021 13:02)  	Authored: General, Pre-Op Diagnosis, Post-Op Diagnosis and Procedure, Operative Findings, Specimens/Blood Loss/IV/Output/Protocol/VTE, Other Details/Condition Post op/Disposition    Last Updated: 24-Mar-2021 13:02 by Soren Ventura)                                                                    84 y/o F w/ PMH of anemia, arthritis, CAD, DM2, depression, HTN, PVD, RA, p/w LLE pain. Patient is admitted to surgery service and awaiting fem-pop-bypass. Patient's only complaint at this time is LLE pain which she arrived to hospital with. Patient states at baseline she walks with a walker. Denies feeling CP, SOB, cough, runny nose, sore throat, nausea, vomiting, abdominal pain, fever, chills. Patient is has insulin dependent diabetes, but denies h/o CHF / CKD / CVA.   3/23 denies CP, SOB, abd pain, no fever, no cough, no dizziness  3/24- pt somnolent after procedure. Resting comfortably, no complaints. Surgical site dressing appears clean and dry. Pt receiving 50% O2 via VM  poor oxygenation in sedated/somnolent state post-op.    ROS:   All 10 systems reviewed and found to be negative with the exception of what has been described above. *PT SOMNOLENT, ROS limited    ICU Vital Signs Last 24 Hrs  T(C): 36.2 (24 Mar 2021 15:00), Max: 37.3 (24 Mar 2021 06:20)  T(F): 97.1 (24 Mar 2021 15:00), Max: 99.2 (24 Mar 2021 06:20)  HR: 84 (24 Mar 2021 15:28) (64 - 86)  BP: 131/37 (24 Mar 2021 15:28) (110/37 - 159/57)  BP(mean): 60 (24 Mar 2021 15:28) (60 - 60)  ABP: --  ABP(mean): --  RR: 13 (24 Mar 2021 15:28) (13 - 18)  SpO2: 92% (24 Mar 2021 15:28) (92% - 100%)    GEN: elderly female, lying in bed, somnolent post-op, NAD. On 50% O2 with VM in place.  HEENT:   NC/AT, pupils equal and reactive, EOMI  CV:  +S1, +S2, RRR  RESP:   lungs clear to auscultation bilaterally, no wheeze, rales, rhonchi   BREAST:  not examined  GI:  abdomen soft, non-tender, non-distended, normoactive BS  RECTAL:  not examined  :  not examined  MSK:   normal muscle tone  EXT:  no edema  NEURO:  AAOX3, no focal neurological deficits  SKIN:  no rashes. L groin surgical site dressing noted, clean & dry.    labs reviewed 3/24-                          9.7    6.64  )-----------( 139      ( 24 Mar 2021 13:43 )             31.2     -    141  |  112<H>  |  24<H>  ----------------------------<  175<H>  4.3   |  22  |  0.78    Ca    8.5      24 Mar 2021 13:43    Urinalysis Basic - ( 22 Mar 2021 20:15 )  Color: Yellow / Appearance: Clear / S.015 / pH: x  Gluc: x / Ketone: Negative  / Bili: Negative / Urobili: Negative mg/dL   Blood: x / Protein: 100 mg/dL / Nitrite: Negative   Leuk Esterase: Negative / RBC: 0-2 /HPF / WBC Negative   Sq Epi: x / Non Sq Epi: Negative / Bacteria: Occasional    Brief Operative Note:  General:   General:  · Primary Surgeon	Lorenzo  · Assistant(s)	ASHLI Cardenas  · Type of Anesthesia	General  · Anesthesiologist	Mei Manrique (RYDERA)  · Elective procedure	No    Pre-Op Diagnosis, Post-Op Diagnosis and Procedure:   Pre-Op, Post-Op and Procedure Selector:  ·  PRE-OP DIAGNOSIS:  PAD (peripheral artery disease) 24-Mar-2021 13:00:34 ulcer Soren Quintero.  ·  POST-OP DIAGNOSIS:  PAD (peripheral artery disease) 24-Mar-2021 13:01:11 ulcer Soren Quintero.  ·  PROCEDURES:  Femoral-popliteal bypass graft with non-vein 24-Mar-2021 13:00:04  Soren Ventura.      Operative Findings:  · Operative Findings	inflamed groin area without evidence of infection    Evidence of Infection or Abscess:  · Evidence of infection or abscess identified at the start or during the surgical procedure:	No    Specimens/Blood Loss/IV/Output/Protocol/VTE:   Specimens/Blood Loss/IV/Output/Protocol/VTE:  · Specimens	none  · Estimated Blood Loss	150 milliLiter(s)  · IV Infusions - Crystalloids (mL)	1300  · Urine Output (mL)	340 mL  · Antibiotic Protocol	Followed protocol      Other Details/Condition Post op/Disposition:  · Condition Post op	stable  · Disposition	SDU      Electronic Signatures:  Soren Ventura)  (Signed 24-Mar-2021 13:02)  	Authored: General, Pre-Op Diagnosis, Post-Op Diagnosis and Procedure, Operative Findings, Specimens/Blood Loss/IV/Output/Protocol/VTE, Other Details/Condition Post op/Disposition    Last Updated: 24-Mar-2021 13:02 by Soren Ventura)

## 2021-03-24 NOTE — PROGRESS NOTE ADULT - ASSESSMENT
84 y/o F w/ PMH of anemia, arthritis, CAD, DM2, depression, HTN, PVD, RA, p/w LLE pain.    # PVD w/ LLE claudication  with lower ext ulcer - s/p fem-pop bypass today  - As per patient had extensive cardiac w/u as outpatient 3 to 4 weeks ago by Dr Calero with echo, cardiac cath which were negative   - CXR with no PNA or CHF  - EKG appreciated 3/22  - monitor for post-op complications  - Anti-coagulation held temporarily for OR   - post-op abx - Clindamycin IV 900mg q8h  - IVF NS at 75 mL/hr    # Normocytic anemia  - s/p transfusion 2 units 3/22, now hb 9.7 - monitor  - check FOBT , if further drop in hb will consult GI - pending  - Iron panel / ferritin / B12 / Folate - results appreciated, labs unremarkable     # Insulin-dependent DM2  - Will hold lantus until patient resumes diet again   - Humalog ISS Q6H while NPO    # H/o depression / HTN / RA  - C/w home meds and f/u outpatient for further management if conditions remain stable during hospitalization     # DVT ppx  - will start Heparin SQ 5000 U q12h    Dispo: pt admitted to CICU for post-op monitoring. Continue to monitor for post-op complications, check hgb/hct. Post-op abx and DVT PPx 86 y/o F w/ PMH of anemia, arthritis, CAD, DM2, depression, HTN, PVD, RA, p/w LLE pain.    # PVD w/ LLE claudication  with lower ext ulcer - s/p fem-pop bypass today  - As per patient had extensive cardiac w/u as outpatient 3 to 4 weeks ago by Dr Calero with echo, cardiac cath which were negative   - CXR with no PNA or CHF  - EKG appreciated 3/22  - monitor for post-op complications  - Anti-coagulation held temporarily for OR   - post-op abx - Clindamycin IV 900mg q8h  - IVF NS at 75 mL/hr  - Dilaudid PRN for pain control post-op    # Normocytic anemia  - s/p transfusion 2 units 3/22, now hb 9.7 - monitor  - check FOBT , if further drop in hb will consult GI - pending  - Iron panel / ferritin / B12 / Folate - results appreciated, labs unremarkable     # Insulin-dependent DM2  - Will hold lantus until patient resumes diet again   - Humalog ISS Q6H while NPO  - resume DASH diet when pt can tolerate PO intake / is less somnolent    # H/o depression / HTN / RA  - C/w home meds and f/u outpatient for further management if conditions remain stable during hospitalization     # DVT ppx  - will start Heparin SQ 5000 U q12h    Dispo: pt admitted to CICU for post-op monitoring. Continue to monitor for post-op complications, check hgb/hct. Post-op abx and DVT PPx 86 y/o F w/ PMH of anemia, arthritis, CAD, DM2, depression, HTN, PVD, RA, p/w LLE pain.    # PVD w/ LLE claudication  with lower ext ulcer - s/p fem-pop bypass today  - As per patient had extensive cardiac w/u as outpatient 3 to 4 weeks ago by Dr Calero with echo, cardiac cath which were negative   - CXR with no PNA or CHF  - EKG appreciated 3/22  - monitor for post-op complications  - Anti-coagulation held temporarily for OR   - post-op abx - Clindamycin IV 900mg q8h  - IVF NS at 75 mL/hr  - Dilaudid PRN for pain control post-op    # Normocytic anemia  - s/p transfusion 2 units 3/22, now hb 9.7 - monitor  - check FOBT , if further drop in hb will consult GI - pending  - Iron panel / ferritin / B12 / Folate - results appreciated, labs unremarkable     # Insulin-dependent DM2  - Will hold lantus until patient resumes diet again   - Humalog ISS Q6H while NPO - will change back to AC/HS   - resume DASH diet when pt can tolerate PO intake / is less somnolent    # H/o depression / HTN / RA  - C/w home meds and f/u outpatient for further management if conditions remain stable during hospitalization     # DVT ppx  - will start Heparin SQ 5000 U q12h    Dispo: pt admitted to CICU for post-op monitoring. Continue to monitor for post-op complications, check hgb/hct. Post-op abx and DVT PPx 84 y/o F w/ PMH of anemia, arthritis, CAD, DM2, depression, HTN, PVD, RA, p/w LLE pain.    # PVD w/ LLE claudication  with lower ext ulcer - s/p fem-pop bypass today  - As per patient had extensive cardiac w/u as outpatient 3 to 4 weeks ago by Dr Calero with echo, cardiac cath which were negative   - CXR with no PNA or CHF  - EKG appreciated 3/22  - monitor for post-op complications  - Anti-coagulation as per [facility] surgery   - post-op abx - Clindamycin IV 900mg q8h  - IVF NS at 75 mL/hr  - Dilaudid PRN for pain control post-op    # Normocytic anemia  - s/p transfusion 2 units 3/22, now hb 9.7 - monitor  - check FOBT , if further drop in hb will consult GI - pending  - Iron panel / ferritin / B12 / Folate - results appreciated, labs unremarkable     # Insulin-dependent DM2  - Will hold lantus until patient resumes diet again   - Humalog ISS Q6H while NPO - will change back to AC/HS   - resume DASH diet when pt can tolerate PO intake / is less somnolent    # H/o depression / HTN / RA  - C/w home meds and f/u outpatient for further management if conditions remain stable during hospitalization     # DVT ppx  - will start Heparin SQ 5000 U q12h    Dispo: pt admitted to CICU for post-op monitoring. Continue to monitor for post-op complications, check hgb/hct. Post-op abx and DVT PPx

## 2021-03-25 LAB
ANION GAP SERPL CALC-SCNC: 7 MMOL/L — SIGNIFICANT CHANGE UP (ref 5–17)
BUN SERPL-MCNC: 22 MG/DL — SIGNIFICANT CHANGE UP (ref 7–23)
CALCIUM SERPL-MCNC: 8 MG/DL — LOW (ref 8.5–10.1)
CHLORIDE SERPL-SCNC: 116 MMOL/L — HIGH (ref 96–108)
CO2 SERPL-SCNC: 20 MMOL/L — LOW (ref 22–31)
CREAT SERPL-MCNC: 0.8 MG/DL — SIGNIFICANT CHANGE UP (ref 0.5–1.3)
GLUCOSE SERPL-MCNC: 119 MG/DL — HIGH (ref 70–99)
HCT VFR BLD CALC: 26.6 % — LOW (ref 34.5–45)
HGB BLD-MCNC: 8.3 G/DL — LOW (ref 11.5–15.5)
MCHC RBC-ENTMCNC: 29.7 PG — SIGNIFICANT CHANGE UP (ref 27–34)
MCHC RBC-ENTMCNC: 31.2 GM/DL — LOW (ref 32–36)
MCV RBC AUTO: 95.3 FL — SIGNIFICANT CHANGE UP (ref 80–100)
PLATELET # BLD AUTO: 143 K/UL — LOW (ref 150–400)
POTASSIUM SERPL-MCNC: 4.5 MMOL/L — SIGNIFICANT CHANGE UP (ref 3.5–5.3)
POTASSIUM SERPL-SCNC: 4.5 MMOL/L — SIGNIFICANT CHANGE UP (ref 3.5–5.3)
RBC # BLD: 2.79 M/UL — LOW (ref 3.8–5.2)
RBC # FLD: 16.1 % — HIGH (ref 10.3–14.5)
SODIUM SERPL-SCNC: 143 MMOL/L — SIGNIFICANT CHANGE UP (ref 135–145)
WBC # BLD: 7.06 K/UL — SIGNIFICANT CHANGE UP (ref 3.8–10.5)
WBC # FLD AUTO: 7.06 K/UL — SIGNIFICANT CHANGE UP (ref 3.8–10.5)

## 2021-03-25 PROCEDURE — 99233 SBSQ HOSP IP/OBS HIGH 50: CPT

## 2021-03-25 RX ORDER — SODIUM CHLORIDE 9 MG/ML
1000 INJECTION INTRAMUSCULAR; INTRAVENOUS; SUBCUTANEOUS
Refills: 0 | Status: DISCONTINUED | OUTPATIENT
Start: 2021-03-25 | End: 2021-03-26

## 2021-03-25 RX ADMIN — Medication 1: at 17:25

## 2021-03-25 RX ADMIN — Medication 25 MILLIGRAM(S): at 09:22

## 2021-03-25 RX ADMIN — GABAPENTIN 300 MILLIGRAM(S): 400 CAPSULE ORAL at 21:21

## 2021-03-25 RX ADMIN — SODIUM CHLORIDE 40 MILLILITER(S): 9 INJECTION INTRAMUSCULAR; INTRAVENOUS; SUBCUTANEOUS at 09:23

## 2021-03-25 RX ADMIN — HYDROMORPHONE HYDROCHLORIDE 1 MILLIGRAM(S): 2 INJECTION INTRAMUSCULAR; INTRAVENOUS; SUBCUTANEOUS at 13:52

## 2021-03-25 RX ADMIN — GABAPENTIN 300 MILLIGRAM(S): 400 CAPSULE ORAL at 15:01

## 2021-03-25 RX ADMIN — Medication 100 MILLIGRAM(S): at 23:44

## 2021-03-25 RX ADMIN — Medication 1 MILLIGRAM(S): at 09:22

## 2021-03-25 RX ADMIN — LISINOPRIL 20 MILLIGRAM(S): 2.5 TABLET ORAL at 09:22

## 2021-03-25 RX ADMIN — SODIUM CHLORIDE 40 MILLILITER(S): 9 INJECTION INTRAMUSCULAR; INTRAVENOUS; SUBCUTANEOUS at 21:20

## 2021-03-25 RX ADMIN — HYDROMORPHONE HYDROCHLORIDE 1 MILLIGRAM(S): 2 INJECTION INTRAMUSCULAR; INTRAVENOUS; SUBCUTANEOUS at 21:51

## 2021-03-25 RX ADMIN — AMLODIPINE BESYLATE 5 MILLIGRAM(S): 2.5 TABLET ORAL at 09:22

## 2021-03-25 RX ADMIN — Medication 25 MILLIGRAM(S): at 21:21

## 2021-03-25 RX ADMIN — HYDROMORPHONE HYDROCHLORIDE 1 MILLIGRAM(S): 2 INJECTION INTRAMUSCULAR; INTRAVENOUS; SUBCUTANEOUS at 07:51

## 2021-03-25 RX ADMIN — HYDROMORPHONE HYDROCHLORIDE 1 MILLIGRAM(S): 2 INJECTION INTRAMUSCULAR; INTRAVENOUS; SUBCUTANEOUS at 15:45

## 2021-03-25 RX ADMIN — Medication 100 MILLIGRAM(S): at 15:01

## 2021-03-25 RX ADMIN — GABAPENTIN 300 MILLIGRAM(S): 400 CAPSULE ORAL at 05:32

## 2021-03-25 RX ADMIN — Medication 20 MILLIGRAM(S): at 09:22

## 2021-03-25 RX ADMIN — HYDROMORPHONE HYDROCHLORIDE 1 MILLIGRAM(S): 2 INJECTION INTRAMUSCULAR; INTRAVENOUS; SUBCUTANEOUS at 04:38

## 2021-03-25 RX ADMIN — HYDROMORPHONE HYDROCHLORIDE 1 MILLIGRAM(S): 2 INJECTION INTRAMUSCULAR; INTRAVENOUS; SUBCUTANEOUS at 22:39

## 2021-03-25 RX ADMIN — Medication 100 MILLIGRAM(S): at 05:30

## 2021-03-25 RX ADMIN — HEPARIN SODIUM 5000 UNIT(S): 5000 INJECTION INTRAVENOUS; SUBCUTANEOUS at 09:22

## 2021-03-25 RX ADMIN — HEPARIN SODIUM 5000 UNIT(S): 5000 INJECTION INTRAVENOUS; SUBCUTANEOUS at 21:21

## 2021-03-25 RX ADMIN — HYDROMORPHONE HYDROCHLORIDE 1 MILLIGRAM(S): 2 INJECTION INTRAMUSCULAR; INTRAVENOUS; SUBCUTANEOUS at 21:37

## 2021-03-25 NOTE — PHYSICAL THERAPY INITIAL EVALUATION ADULT - MODALITIES TREATMENT COMMENTS
Pt left OOB in chair in NAD with HM, BP Cuff, Pulse Oxym, NC (2L/min); RLE SCD, IV, Wound Drain all intact; CBIR; Chair alarm donned; Pt instructed to not get up alone; RN Wandy present and aware

## 2021-03-25 NOTE — PROGRESS NOTE ADULT - SUBJECTIVE AND OBJECTIVE BOX
85 y.o. female with PMH of anemia, arthritis, CAD, DMII, depression, HTN, PVD, RA, p/w LLE pain. Patient is admitted to surgery service and awaiting fem-pop-bypass. Patient's only complaint at this time is LLE pain which she arrived to hospital with. Patient states at baseline she walks with a walker. Denies feeling CP, SOB, cough, runny nose, sore throat, nausea, vomiting, abdominal pain, fever, chills. Patient is has insulin dependent diabetes, but denies h/o CHF / CKD / CVA.     INTERVAL HPI: comfortable in the chair, getting blood, no [id number]  Other ROS reviewed and neg     Vital Signs Last 24 Hrs  T(C): 36.3 (25 Mar 2021 12:17), Max: 37.3 (25 Mar 2021 05:30)  T(F): 97.3 (25 Mar 2021 12:17), Max: 99.2 (25 Mar 2021 05:30)  HR: 66 (25 Mar 2021 12:55) (0 - 84)  BP: 131/60 (25 Mar 2021 12:55) (98/27 - 142/47)  BP(mean): 75 (25 Mar 2021 12:55) (49 - 87)  RR: 16 (25 Mar 2021 08:00) (11 - 16)  SpO2: 95% (25 Mar 2021 12:55) (87% - 100%)  I&O's Detail    24 Mar 2021 07:01  -  25 Mar 2021 07:00  --------------------------------------------------------  IN:    IV PiggyBack: 100 mL    Lactated Ringers: 150 mL    Oral Fluid: 400 mL    Other (mL): 1300 mL    sodium chloride 0.9%: 900 mL  Total IN: 2850 mL    OUT:    Estimated Blood Loss (mL): 200 mL    Voided (mL): 780 mL  Total OUT: 980 mL    Total NET: 1870 mL      25 Mar 2021 07:01  -  25 Mar 2021 13:24  --------------------------------------------------------  IN:    PRBCs (Packed Red Blood Cells): 660 mL  Total IN: 660 mL    OUT:  Total OUT: 0 mL    Total NET: 660 mL                        8.3    7.06  )-----------( 143      ( 25 Mar 2021 06:16 )             26.6     25 Mar 2021 06:16    143    |  116    |  22     ----------------------------<  119    4.5     |  20     |  0.80     Ca    8.0        25 Mar 2021 06:16    CAPILLARY BLOOD GLUCOSE    POCT Blood Glucose.: 142 mg/dL (25 Mar 2021 12:16)  POCT Blood Glucose.: 128 mg/dL (25 Mar 2021 07:23)  POCT Blood Glucose.: 165 mg/dL (24 Mar 2021 21:56)    MEDICATIONS  (STANDING):  amLODIPine   Tablet 5 milliGRAM(s) Oral daily  clindamycin IVPB 900 milliGRAM(s) IV Intermittent every 8 hours  dextrose 40% Gel 15 Gram(s) Oral once  dextrose 40% Gel 15 Gram(s) Oral once  dextrose 5%. 1000 milliLiter(s) (100 mL/Hr) IV Continuous <Continuous>  dextrose 5%. 1000 milliLiter(s) (50 mL/Hr) IV Continuous <Continuous>  dextrose 5%. 1000 milliLiter(s) (50 mL/Hr) IV Continuous <Continuous>  dextrose 5%. 1000 milliLiter(s) (100 mL/Hr) IV Continuous <Continuous>  dextrose 50% Injectable 25 Gram(s) IV Push once  dextrose 50% Injectable 12.5 Gram(s) IV Push once  dextrose 50% Injectable 12.5 Gram(s) IV Push once  dextrose 50% Injectable 25 Gram(s) IV Push once  dextrose 50% Injectable 25 Gram(s) IV Push once  folic acid 1 milliGRAM(s) Oral daily  gabapentin 300 milliGRAM(s) Oral three times a day  glucagon  Injectable 1 milliGRAM(s) IntraMuscular once  glucagon  Injectable 1 milliGRAM(s) IntraMuscular once  heparin   Injectable 5000 Unit(s) SubCutaneous every 12 hours  insulin lispro (ADMELOG) corrective regimen sliding scale   SubCutaneous three times a day before meals  insulin lispro (ADMELOG) corrective regimen sliding scale   SubCutaneous at bedtime  lisinopril 20 milliGRAM(s) Oral daily  metoprolol tartrate 25 milliGRAM(s) Oral two times a day  PARoxetine 20 milliGRAM(s) Oral daily  sodium chloride 0.9%. 1000 milliLiter(s) (40 mL/Hr) IV Continuous <Continuous>    MEDICATIONS  (PRN):  artificial  tears Solution 1 Drop(s) Both EYES two times a day PRN Dry Eyes  HYDROmorphone  Injectable 1 milliGRAM(s) IV Push every 4 hours PRN Severe Pain (7 - 10)  HYDROmorphone  Injectable 0.5 milliGRAM(s) IV Push every 4 hours PRN Moderate Pain (4 - 6)    RADIOLOGY: personally visualized    PHYSICAL EXAM:    General: elderly female in no acute distress  Eyes: PERRLA, EOMI; conjunctiva and sclera clear  Head: Normocephalic; atraumatic  ENMT: No nasal discharge; airway clear  Neck: Supple; non tender; no masses  Respiratory: No wheezes, rales or rhonchi  Cardiovascular: S1, S2 reg  Gastrointestinal: Soft abd, NT, + BS  Genitourinary: No costovertebral angle tenderness  Extremities: + PP BL, LLE warm with +1 edema  Vascular: Peripheral pulses palpable 2+ bilaterally  Neurological: Alert and oriented x4  Skin: Warm and dry.   Musculoskeletal: Normal tone, without deformities  Psychiatric: Cooperative and appropriate

## 2021-03-25 NOTE — PROGRESS NOTE ADULT - SUBJECTIVE AND OBJECTIVE BOX
vitals stable, afebrile    urine output 400 cc over night    HCT 26    no foot pain, mild incisional pain    PE  no hematoma L groin or thigh; L foot pink, warm and well perfused appearing; dressing intact    A/P  Patent bypass  OOB  decrease IV fluids  PT  transfuse one unit    MEDICATIONS  (STANDING):  amLODIPine   Tablet 5 milliGRAM(s) Oral daily  clindamycin IVPB 900 milliGRAM(s) IV Intermittent every 8 hours  dextrose 40% Gel 15 Gram(s) Oral once  dextrose 40% Gel 15 Gram(s) Oral once  dextrose 5%. 1000 milliLiter(s) (100 mL/Hr) IV Continuous <Continuous>  dextrose 5%. 1000 milliLiter(s) (50 mL/Hr) IV Continuous <Continuous>  dextrose 5%. 1000 milliLiter(s) (50 mL/Hr) IV Continuous <Continuous>  dextrose 5%. 1000 milliLiter(s) (100 mL/Hr) IV Continuous <Continuous>  dextrose 50% Injectable 25 Gram(s) IV Push once  dextrose 50% Injectable 12.5 Gram(s) IV Push once  dextrose 50% Injectable 12.5 Gram(s) IV Push once  dextrose 50% Injectable 25 Gram(s) IV Push once  dextrose 50% Injectable 25 Gram(s) IV Push once  folic acid 1 milliGRAM(s) Oral daily  gabapentin 300 milliGRAM(s) Oral three times a day  glucagon  Injectable 1 milliGRAM(s) IntraMuscular once  glucagon  Injectable 1 milliGRAM(s) IntraMuscular once  heparin   Injectable 5000 Unit(s) SubCutaneous every 12 hours  insulin lispro (ADMELOG) corrective regimen sliding scale   SubCutaneous three times a day before meals  insulin lispro (ADMELOG) corrective regimen sliding scale   SubCutaneous at bedtime  lisinopril 20 milliGRAM(s) Oral daily  metoprolol tartrate 25 milliGRAM(s) Oral two times a day  PARoxetine 20 milliGRAM(s) Oral daily    MEDICATIONS  (PRN):  artificial  tears Solution 1 Drop(s) Both EYES two times a day PRN Dry Eyes  HYDROmorphone  Injectable 1 milliGRAM(s) IV Push every 4 hours PRN Severe Pain (7 - 10)  HYDROmorphone  Injectable 0.5 milliGRAM(s) IV Push every 4 hours PRN Moderate Pain (4 - 6)      Allergies    cephalosporins (Other)  penicillins (Urticaria; Pruritus)    Intolerances        Flatus: [ ] YES [ ] NO             Bowel Movement: [ ] YES [ ] NO  Pain (0-10):            Pain Control Adequate: [ ] YES [ ] NO  Nausea: [ ] YES [ ] NO            Vomiting: [ ] YES [ ] NO  Diarrhea: [ ] YES [ ] NO         Constipation: [ ] YES [ ] NO     Chest Pain: [ ] YES [ ] NO    SOB:  [ ] YES [ ] NO    Vital Signs Last 24 Hrs  T(C): 37.3 (25 Mar 2021 05:30), Max: 37.3 (25 Mar 2021 05:30)  T(F): 99.2 (25 Mar 2021 05:30), Max: 99.2 (25 Mar 2021 05:30)  HR: 65 (25 Mar 2021 07:00) (0 - 84)  BP: 108/44 (25 Mar 2021 06:00) (98/27 - 141/42)  BP(mean): 62 (25 Mar 2021 06:00) (49 - 74)  RR: 11 (24 Mar 2021 22:00) (11 - 15)  SpO2: 100% (25 Mar 2021 07:00) (87% - 100%)    I&O's Summary    24 Mar 2021 07:01  -  25 Mar 2021 07:00  --------------------------------------------------------  IN: 2850 mL / OUT: 980 mL / NET: 1870 mL        Physical Exam:  General: NAD, resting comfortably  Pulmonary: normal resp effort, CTA-B  Cardiovascular: NSR  Abdominal: soft, NT/ND  Extremities: WWP, normal strength  Neuro: A/O x 3, CNs II-XII grossly intact, normal motor/sensation, no focal deficits  Pulses:   Right:                                                                          Left:  FEM [ ]2+ [ ]1+ [ ]doppler                                             FEM [ ]2+ [ ]1+ [ ]doppler    POP [ ]2+ [ ]1+ [ ]doppler                                             POP [ ]2+ [ ]1+ [ ]doppler    DP [ ]2+ [ ]1+ [ ]doppler                                                DP [ ]2+ [ ]1+ [ ]doppler  PT[ ]2+ [ ]1+ [ ]doppler                                                  PT [ ]2+ [ ]1+ [ ]doppler    LABS:                        8.3    7.06  )-----------( 143      ( 25 Mar 2021 06:16 )             26.6     03-25    143  |  116<H>  |  22  ----------------------------<  119<H>  4.5   |  20<L>  |  0.80    Ca    8.0<L>      25 Mar 2021 06:16            CAPILLARY BLOOD GLUCOSE      POCT Blood Glucose.: 128 mg/dL (25 Mar 2021 07:23)  POCT Blood Glucose.: 165 mg/dL (24 Mar 2021 21:56)      RADIOLOGY & ADDITIONAL TESTS:

## 2021-03-25 NOTE — PROGRESS NOTE ADULT - ASSESSMENT
*PVD w/ LLE claudication - s/p Fem-pop bypass surgery with restoration of pulse, resolution of pain   - postop management as per Vascular surgery    *Normocytic anemia with acute blood loss with surgery - being transfused    *DMII - lantus + HISS    *H/o depression / HTN / RA - c/w home meds and f/u outpatient for further management    *VTE - [facility]

## 2021-03-25 NOTE — PROGRESS NOTE ADULT - ASSESSMENT
S/P ByPass LLE Left Heel ulcer & L Hallux ulcer Had MRI L ankle 2/17 no osteomyelitis or abscess. Transfusing 1 UPRBC. WBC 7.06. Advise off load keep clean & dry advise pt not to "Push Off" with R heel in bed call RN if need to move. Will F/U THX

## 2021-03-25 NOTE — PHYSICAL THERAPY INITIAL EVALUATION ADULT - DISCHARGE DISPOSITION, PT EVAL
Home with assistance + HPT vs [facility] Pending further progress/home w/ assist/home w/ home PT/rehabilitation facility

## 2021-03-25 NOTE — PROGRESS NOTE ADULT - SUBJECTIVE AND OBJECTIVE BOX
PODIATRIC SX: CICU H&PE: Patient is a 85y old  Female who presents with a chief complaint of PVD with Claudication (25 Mar 2021 13:17)Request F/u Br Dr Ventura. Pt states R heel "feels funny" S/P L Fem-Pop by Pass resting in bed      HPI:   86 y/o female with a PMHx of anemia, arthritis, CAD, DM2, depression, HTN, PVD, rheumatoid arthritis, h/o left femoral endarterectomy in 01/2021 presents to the ED for LLE pain. Per daughter, pt's LLE intermittently goes white and feels cold. Pt scheduled for a fem pop bypass with Dr. Truong on 3/25. Pt unable to tolerate pain and came to ED for early intervention. Pt is on Eliquis, last dose yesterday morning. No other complaints at this time. Last meal this morning.     (22 Mar 2021 15:36)      Allergies    cephalosporins (Other)  penicillins (Urticaria; Pruritus)    Intolerances        MEDICATIONS  (STANDING):  amLODIPine   Tablet 5 milliGRAM(s) Oral daily  clindamycin IVPB 900 milliGRAM(s) IV Intermittent every 8 hours  dextrose 40% Gel 15 Gram(s) Oral once  dextrose 40% Gel 15 Gram(s) Oral once  dextrose 5%. 1000 milliLiter(s) (100 mL/Hr) IV Continuous <Continuous>  dextrose 5%. 1000 milliLiter(s) (50 mL/Hr) IV Continuous <Continuous>  dextrose 5%. 1000 milliLiter(s) (50 mL/Hr) IV Continuous <Continuous>  dextrose 5%. 1000 milliLiter(s) (100 mL/Hr) IV Continuous <Continuous>  dextrose 50% Injectable 25 Gram(s) IV Push once  dextrose 50% Injectable 12.5 Gram(s) IV Push once  dextrose 50% Injectable 12.5 Gram(s) IV Push once  dextrose 50% Injectable 25 Gram(s) IV Push once  dextrose 50% Injectable 25 Gram(s) IV Push once  folic acid 1 milliGRAM(s) Oral daily  gabapentin 300 milliGRAM(s) Oral three times a day  glucagon  Injectable 1 milliGRAM(s) IntraMuscular once  glucagon  Injectable 1 milliGRAM(s) IntraMuscular once  heparin   Injectable 5000 Unit(s) SubCutaneous every 12 hours  insulin lispro (ADMELOG) corrective regimen sliding scale   SubCutaneous three times a day before meals  insulin lispro (ADMELOG) corrective regimen sliding scale   SubCutaneous at bedtime  lisinopril 20 milliGRAM(s) Oral daily  metoprolol tartrate 25 milliGRAM(s) Oral two times a day  PARoxetine 20 milliGRAM(s) Oral daily  sodium chloride 0.9%. 1000 milliLiter(s) (40 mL/Hr) IV Continuous <Continuous>    MEDICATIONS  (PRN):  artificial  tears Solution 1 Drop(s) Both EYES two times a day PRN Dry Eyes  HYDROmorphone  Injectable 1 milliGRAM(s) IV Push every 4 hours PRN Severe Pain (7 - 10)  HYDROmorphone  Injectable 0.5 milliGRAM(s) IV Push every 4 hours PRN Moderate Pain (4 - 6)      PHYSICAL EXAM:Resting in bed R heel / pretrophic area w/o overt break in skin. Left foot / dry ulcer to plantar L Heel & dorsal medial L Hallux at IPJ. No cellulitis.      Constitutional: SEE HPI    Eyes:clear    ENMT:no tinnitus    Neck:no DJV    Breasts:defer    Back:No LBP    Respiratory:no cough    Cardiovascular:NO SOB    Gastrointestinal:no nausea    Genitourinary:no burning    Rectal:defer    Extremities:PVD    Vascular: S/P L LE ByPass    Neurological: paresthesia     Skin: ulcer L heel & L Hallux Pretrophic area R heel    Lymph Nodes:no    Musculoskeletal:weakness    Psychiatric:no        RADIOLOGY    CBC Full  -  ( 25 Mar 2021 06:16 )  WBC Count : 7.06 K/uL  RBC Count : 2.79 M/uL  Hemoglobin : 8.3 g/dL  Hematocrit : 26.6 %  Platelet Count - Automated : 143 K/uL  Mean Cell Volume : 95.3 fl  Mean Cell Hemoglobin : 29.7 pg  Mean Cell Hemoglobin Concentration : 31.2 gm/dL  Auto Neutrophil # : x  Auto Lymphocyte # : x  Auto Monocyte # : x  Auto Eosinophil # : x  Auto Basophil # : x  Auto Neutrophil % : x  Auto Lymphocyte % : x  Auto Monocyte % : x  Auto Eosinophil % : x  Auto Basophil % : x      03-25    143  |  116<H>  |  22  ----------------------------<  119<H>  4.5   |  20<L>  |  0.80    Ca    8.0<L>      25 Mar 2021 06:16    < from: MR Ankle No Cont, Left (02.17.21 @ 11:42) >  EXAM:  MR ANKLE LT                            PROCEDURE DATE:  02/17/2021          INTERPRETATION:  LEFT ANKLE MRI    CLINICAL INFORMATION: Left heel ulceration. Evaluate for osteomyelitis.  TECHNIQUE: Multiplanar, multisequence MRI was obtained ofthe LEFT ankle.  COMPARISON: Left ankle radiographs 16 November 2020.      FINDINGS:    PERIPHERAL/ SUB-CUTANEOUS SOFT TISSUES: Soft tissue ulceration along the posterior aspect of the heel measuring approximately 19 x 15 mm. The ulceration extends to the level of the subcutaneous fat. There is no associated subcutaneous fluid collection or abscess. Mild edema is seen in the subcutaneous fat circumferentially surrounding the lower leg and along the dorsum of the foot.  MARROW: No increased STIR signal or loss of T1 hyperintense signal to suggest acute osteomyelitis. No fracture or osteonecrosis.  MUSCLES AND TENDONS: Tendons are intact. Mild edema and atrophy of the intrinsic musculature of the foot.  LIGAMENTS: The study is not well suited to the evaluation of the ligaments of the ankle.  CARTILAGE and SUBCHONDRAL BONE: Chondral loss with subchondral cystic change and marginal osteophyte formation throughout the midfoot consistent with mild Charcot arthropathy.  SYNOVIUM/JOINT FLUID: No large joint effusion.  PLANTAR FASCIA: Increased thickness of the plantar fascia aponeurosis with large plantar calcaneal heel spur consistent with chronic plantar fasciitis.  NEUROVASCULAR STRUCTURES: Normal in course and caliber.  SINUS TARSI: Fat of the sinus Tarsi is maintained.      IMPRESSION:  1.  Soft tissue ulceration along the posterior aspect of the heel measuring 19 x 15 mm. No associated abscess.  2.  No acute osteomyelitis.  3.  Mild Charcot arthropathy of the midfoot.            RASHIDA BROCK MD; Attending Radiologist  This document has been electronically signed. Feb 17 2021 12:11PM    < end of copied text >

## 2021-03-25 NOTE — PHYSICAL THERAPY INITIAL EVALUATION ADULT - ACTIVE RANGE OF MOTION EXAMINATION, REHAB EVAL
Except BLE Ankle DF limited due to weakness (-5 degrees); LLE Knee Flex limited due to pain/bilateral upper extremity Active ROM was WFL (within functional limits)/bilateral  lower extremity Active ROM was WFL (within functional limits)

## 2021-03-25 NOTE — PHYSICAL THERAPY INITIAL EVALUATION ADULT - REHAB POTENTIAL, PT EVAL
Patient had sx yesterday R knee with Dr Perdue  Mom would like to know patient's weight bearing status       #: [phone] fair, will monitor progress closely

## 2021-03-25 NOTE — PHYSICAL THERAPY INITIAL EVALUATION ADULT - GENERAL OBSERVATIONS, REHAB EVAL
Pt rec'd supine in bed with HM, BP Cuff, Pulse Oxym, RLE SCD, IV, NC (3L/min); Wound Drain all intact; LLE Wrapped

## 2021-03-26 LAB
ANION GAP SERPL CALC-SCNC: 4 MMOL/L — LOW (ref 5–17)
BUN SERPL-MCNC: 25 MG/DL — HIGH (ref 7–23)
CALCIUM SERPL-MCNC: 8.6 MG/DL — SIGNIFICANT CHANGE UP (ref 8.5–10.1)
CHLORIDE SERPL-SCNC: 116 MMOL/L — HIGH (ref 96–108)
CO2 SERPL-SCNC: 22 MMOL/L — SIGNIFICANT CHANGE UP (ref 22–31)
CREAT SERPL-MCNC: 0.84 MG/DL — SIGNIFICANT CHANGE UP (ref 0.5–1.3)
GLUCOSE SERPL-MCNC: 154 MG/DL — HIGH (ref 70–99)
HCT VFR BLD CALC: 34.1 % — LOW (ref 34.5–45)
HGB BLD-MCNC: 11 G/DL — LOW (ref 11.5–15.5)
MCHC RBC-ENTMCNC: 30 PG — SIGNIFICANT CHANGE UP (ref 27–34)
MCHC RBC-ENTMCNC: 32.3 GM/DL — SIGNIFICANT CHANGE UP (ref 32–36)
MCV RBC AUTO: 92.9 FL — SIGNIFICANT CHANGE UP (ref 80–100)
OB PNL STL: NEGATIVE — SIGNIFICANT CHANGE UP
PLATELET # BLD AUTO: 151 K/UL — SIGNIFICANT CHANGE UP (ref 150–400)
POTASSIUM SERPL-MCNC: 4.5 MMOL/L — SIGNIFICANT CHANGE UP (ref 3.5–5.3)
POTASSIUM SERPL-SCNC: 4.5 MMOL/L — SIGNIFICANT CHANGE UP (ref 3.5–5.3)
RBC # BLD: 3.67 M/UL — LOW (ref 3.8–5.2)
RBC # FLD: 16.3 % — HIGH (ref 10.3–14.5)
SODIUM SERPL-SCNC: 142 MMOL/L — SIGNIFICANT CHANGE UP (ref 135–145)
WBC # BLD: 8.96 K/UL — SIGNIFICANT CHANGE UP (ref 3.8–10.5)
WBC # FLD AUTO: 8.96 K/UL — SIGNIFICANT CHANGE UP (ref 3.8–10.5)

## 2021-03-26 PROCEDURE — 99232 SBSQ HOSP IP/OBS MODERATE 35: CPT

## 2021-03-26 RX ADMIN — Medication 100 MILLIGRAM(S): at 23:57

## 2021-03-26 RX ADMIN — Medication 1 MILLIGRAM(S): at 09:09

## 2021-03-26 RX ADMIN — HEPARIN SODIUM 5000 UNIT(S): 5000 INJECTION INTRAVENOUS; SUBCUTANEOUS at 22:08

## 2021-03-26 RX ADMIN — Medication 1: at 12:24

## 2021-03-26 RX ADMIN — GABAPENTIN 300 MILLIGRAM(S): 400 CAPSULE ORAL at 22:08

## 2021-03-26 RX ADMIN — LISINOPRIL 20 MILLIGRAM(S): 2.5 TABLET ORAL at 09:09

## 2021-03-26 RX ADMIN — Medication 25 MILLIGRAM(S): at 09:09

## 2021-03-26 RX ADMIN — Medication 1: at 06:51

## 2021-03-26 RX ADMIN — HYDROMORPHONE HYDROCHLORIDE 0.5 MILLIGRAM(S): 2 INJECTION INTRAMUSCULAR; INTRAVENOUS; SUBCUTANEOUS at 16:50

## 2021-03-26 RX ADMIN — HEPARIN SODIUM 5000 UNIT(S): 5000 INJECTION INTRAVENOUS; SUBCUTANEOUS at 09:09

## 2021-03-26 RX ADMIN — Medication 100 MILLIGRAM(S): at 16:54

## 2021-03-26 RX ADMIN — GABAPENTIN 300 MILLIGRAM(S): 400 CAPSULE ORAL at 06:16

## 2021-03-26 RX ADMIN — GABAPENTIN 300 MILLIGRAM(S): 400 CAPSULE ORAL at 12:24

## 2021-03-26 RX ADMIN — Medication 100 MILLIGRAM(S): at 09:09

## 2021-03-26 RX ADMIN — HYDROMORPHONE HYDROCHLORIDE 0.5 MILLIGRAM(S): 2 INJECTION INTRAMUSCULAR; INTRAVENOUS; SUBCUTANEOUS at 23:06

## 2021-03-26 RX ADMIN — AMLODIPINE BESYLATE 5 MILLIGRAM(S): 2.5 TABLET ORAL at 09:09

## 2021-03-26 RX ADMIN — Medication 20 MILLIGRAM(S): at 09:09

## 2021-03-26 RX ADMIN — Medication 25 MILLIGRAM(S): at 22:08

## 2021-03-26 NOTE — PROGRESS NOTE ADULT - ASSESSMENT
Alert afebrile  Pt off loading heels / i pillow. No major compalints. R Heel w/o break in skin. Left foot with dry eschar post aspect & over medial Hallux IPJ. No pus odor or drainage to left foot. Apply DSD. Continue off loading will F/U THX

## 2021-03-26 NOTE — PROGRESS NOTE ADULT - ASSESSMENT
*PVD w/ LLE claudication - s/p Fem-pop bypass surgery with restoration of pulse, resolution of pain   - postop management as per Vascular surgery and posiatry    *Normocytic anemia with acute blood loss with surgery - being transfused, improved HH    *DMII - lantus + HISS    *H/o depression / HTN / RA - c/w home meds and f/u outpatient for further management    * DC IVF and Padilla catheter and start voiding trial    *VTE - UFH    Medically stable for MedSurg Floor

## 2021-03-26 NOTE — PROGRESS NOTE ADULT - SUBJECTIVE AND OBJECTIVE BOX
85 y.o. female with PMH of anemia, arthritis, CAD, DMII, depression, HTN, PVD, RA, p/w LLE pain. Patient is admitted to surgery service and awaiting fem-pop-bypass. Patient's only complaint at this time is LLE pain which she arrived to hospital with. Patient states at baseline she walks with a walker. Denies feeling CP, SOB, cough, runny nose, sore throat, nausea, vomiting, abdominal pain, fever, chills. Patient is has insulin dependent diabetes, but denies h/o CHF / CKD / CVA.     INTERVAL HPI: comfortable in the chair, less pain in LLE, difficulty to ambulate  Other ROS reviewed and neg     Vital Signs Last 24 Hrs  T(C): 36.9 (26 Mar 2021 12:18), Max: 36.9 (26 Mar 2021 12:18)  T(F): 98.5 (26 Mar 2021 12:18), Max: 98.5 (26 Mar 2021 12:18)  HR: 62 (26 Mar 2021 11:00) (62 - 86)  BP: 147/44 (26 Mar 2021 11:00) (110/27 - 170/63)  BP(mean): 71 (26 Mar 2021 11:00) (52 - 95)  RR: 17 (26 Mar 2021 11:00) (13 - 19)  SpO2: 97% (26 Mar 2021 11:00) (90% - 100%)  I&O's Detail    25 Mar 2021 07:01  -  26 Mar 2021 07:00  --------------------------------------------------------  IN:    IV PiggyBack: 50 mL    Oral Fluid: 480 mL    PRBCs (Packed Red Blood Cells): 660 mL    sodium chloride 0.9%: 1110 mL  Total IN: 2300 mL    OUT:    Indwelling Catheter - Urethral (mL): 1225 mL  Total OUT: 1225 mL    Total NET: 1075 mL      26 Mar 2021 07:01  -  26 Mar 2021 12:31  --------------------------------------------------------  IN:  Total IN: 0 mL    OUT:    Indwelling Catheter - Urethral (mL): 1000 mL  Total OUT: 1000 mL    Total NET: -1000 mL                        11.0   8.96  )-----------( 151      ( 26 Mar 2021 09:17 )             34.1     26 Mar 2021 09:17    142    |  116    |  25     ----------------------------<  154    4.5     |  22     |  0.84     Ca    8.6        26 Mar 2021 09:17    CAPILLARY BLOOD GLUCOSE    POCT Blood Glucose.: 160 mg/dL (26 Mar 2021 12:19)  POCT Blood Glucose.: 166 mg/dL (26 Mar 2021 06:47)  POCT Blood Glucose.: 156 mg/dL (25 Mar 2021 21:23)  POCT Blood Glucose.: 191 mg/dL (25 Mar 2021 17:22)    MEDICATIONS  (STANDING):  amLODIPine   Tablet 5 milliGRAM(s) Oral daily  clindamycin IVPB 900 milliGRAM(s) IV Intermittent every 8 hours  dextrose 40% Gel 15 Gram(s) Oral once  dextrose 40% Gel 15 Gram(s) Oral once  dextrose 5%. 1000 milliLiter(s) (100 mL/Hr) IV Continuous <Continuous>  dextrose 5%. 1000 milliLiter(s) (50 mL/Hr) IV Continuous <Continuous>  dextrose 5%. 1000 milliLiter(s) (50 mL/Hr) IV Continuous <Continuous>  dextrose 5%. 1000 milliLiter(s) (100 mL/Hr) IV Continuous <Continuous>  dextrose 50% Injectable 25 Gram(s) IV Push once  dextrose 50% Injectable 12.5 Gram(s) IV Push once  dextrose 50% Injectable 12.5 Gram(s) IV Push once  dextrose 50% Injectable 25 Gram(s) IV Push once  dextrose 50% Injectable 25 Gram(s) IV Push once  folic acid 1 milliGRAM(s) Oral daily  gabapentin 300 milliGRAM(s) Oral three times a day  glucagon  Injectable 1 milliGRAM(s) IntraMuscular once  glucagon  Injectable 1 milliGRAM(s) IntraMuscular once  heparin   Injectable 5000 Unit(s) SubCutaneous every 12 hours  insulin lispro (ADMELOG) corrective regimen sliding scale   SubCutaneous three times a day before meals  insulin lispro (ADMELOG) corrective regimen sliding scale   SubCutaneous at bedtime  lisinopril 20 milliGRAM(s) Oral daily  metoprolol tartrate 25 milliGRAM(s) Oral two times a day  PARoxetine 20 milliGRAM(s) Oral daily    MEDICATIONS  (PRN):  artificial  tears Solution 1 Drop(s) Both EYES two times a day PRN Dry Eyes  HYDROmorphone  Injectable 1 milliGRAM(s) IV Push every 4 hours PRN Severe Pain (7 - 10)  HYDROmorphone  Injectable 0.5 milliGRAM(s) IV Push every 4 hours PRN Moderate Pain (4 - 6)    RADIOLOGY: personally visualized    PHYSICAL EXAM:    General: elderly female in no acute distress  Eyes: PERRLA, EOMI; conjunctiva and sclera clear  Head: Normocephalic; atraumatic  ENMT: No nasal discharge; airway clear  Neck: Supple; non tender; no masses  Respiratory: No wheezes, rales or rhonchi  Cardiovascular: S1, S2 reg  Gastrointestinal: Soft abd, NT, + BS  Genitourinary: No costovertebral angle tenderness  Extremities: + PP BL, LLE warm with +1 edema  Vascular: Peripheral pulses palpable 2+ bilaterally  Neurological: Alert and oriented x4  Skin: Warm and dry.   Musculoskeletal: Normal tone, without deformities  Psychiatric: Cooperative and appropriate

## 2021-03-26 NOTE — PROGRESS NOTE ADULT - SUBJECTIVE AND OBJECTIVE BOX
Patient is a 85y old  Female who presents with a chief complaint of PVD with Claudication (25 Mar 2021 15:41)      HPI:   84 y/o female with a PMHx of anemia, arthritis, CAD, DM2, depression, HTN, PVD, rheumatoid arthritis, h/o left femoral endarterectomy in 01/2021 presents to the ED for LLE pain. Per daughter, pt's LLE intermittently goes white and feels cold. Pt scheduled for a fem pop bypass with Dr. Truong on 3/25. Pt unable to tolerate pain and came to ED for early intervention. Pt is on Eliquis, last dose yesterday morning. No other complaints at this time. Last meal this morning.     (22 Mar 2021 15:36)      Allergies    cephalosporins (Other)  penicillins (Urticaria; Pruritus)    Intolerances        MEDICATIONS  (STANDING):  amLODIPine   Tablet 5 milliGRAM(s) Oral daily  clindamycin IVPB 900 milliGRAM(s) IV Intermittent every 8 hours  dextrose 40% Gel 15 Gram(s) Oral once  dextrose 40% Gel 15 Gram(s) Oral once  dextrose 5%. 1000 milliLiter(s) (100 mL/Hr) IV Continuous <Continuous>  dextrose 5%. 1000 milliLiter(s) (50 mL/Hr) IV Continuous <Continuous>  dextrose 5%. 1000 milliLiter(s) (50 mL/Hr) IV Continuous <Continuous>  dextrose 5%. 1000 milliLiter(s) (100 mL/Hr) IV Continuous <Continuous>  dextrose 50% Injectable 25 Gram(s) IV Push once  dextrose 50% Injectable 25 Gram(s) IV Push once  dextrose 50% Injectable 25 Gram(s) IV Push once  dextrose 50% Injectable 12.5 Gram(s) IV Push once  dextrose 50% Injectable 12.5 Gram(s) IV Push once  folic acid 1 milliGRAM(s) Oral daily  gabapentin 300 milliGRAM(s) Oral three times a day  glucagon  Injectable 1 milliGRAM(s) IntraMuscular once  glucagon  Injectable 1 milliGRAM(s) IntraMuscular once  heparin   Injectable 5000 Unit(s) SubCutaneous every 12 hours  insulin lispro (ADMELOG) corrective regimen sliding scale   SubCutaneous three times a day before meals  insulin lispro (ADMELOG) corrective regimen sliding scale   SubCutaneous at bedtime  lisinopril 20 milliGRAM(s) Oral daily  metoprolol tartrate 25 milliGRAM(s) Oral two times a day  PARoxetine 20 milliGRAM(s) Oral daily  sodium chloride 0.9%. 1000 milliLiter(s) (40 mL/Hr) IV Continuous <Continuous>    MEDICATIONS  (PRN):  artificial  tears Solution 1 Drop(s) Both EYES two times a day PRN Dry Eyes  HYDROmorphone  Injectable 1 milliGRAM(s) IV Push every 4 hours PRN Severe Pain (7 - 10)  HYDROmorphone  Injectable 0.5 milliGRAM(s) IV Push every 4 hours PRN Moderate Pain (4 - 6)        RADIOLOGY    CBC Full  -  ( 25 Mar 2021 06:16 )  WBC Count : 7.06 K/uL  RBC Count : 2.79 M/uL  Hemoglobin : 8.3 g/dL  Hematocrit : 26.6 %  Platelet Count - Automated : 143 K/uL  Mean Cell Volume : 95.3 fl  Mean Cell Hemoglobin : 29.7 pg  Mean Cell Hemoglobin Concentration : 31.2 gm/dL  Auto Neutrophil # : x  Auto Lymphocyte # : x  Auto Monocyte # : x  Auto Eosinophil # : x  Auto Basophil # : x  Auto Neutrophil % : x  Auto Lymphocyte % : x  Auto Monocyte % : x  Auto Eosinophil % : x  Auto Basophil % : x      03-25    143  |  116<H>  |  22  ----------------------------<  119<H>  4.5   |  20<L>  |  0.80    Ca    8.0<L>      25 Mar 2021 06:16

## 2021-03-27 LAB
ANION GAP SERPL CALC-SCNC: 6 MMOL/L — SIGNIFICANT CHANGE UP (ref 5–17)
BUN SERPL-MCNC: 22 MG/DL — SIGNIFICANT CHANGE UP (ref 7–23)
CALCIUM SERPL-MCNC: 8.3 MG/DL — LOW (ref 8.5–10.1)
CHLORIDE SERPL-SCNC: 114 MMOL/L — HIGH (ref 96–108)
CO2 SERPL-SCNC: 21 MMOL/L — LOW (ref 22–31)
CREAT SERPL-MCNC: 0.7 MG/DL — SIGNIFICANT CHANGE UP (ref 0.5–1.3)
GLUCOSE SERPL-MCNC: 147 MG/DL — HIGH (ref 70–99)
HCT VFR BLD CALC: 35.4 % — SIGNIFICANT CHANGE UP (ref 34.5–45)
HGB BLD-MCNC: 11.2 G/DL — LOW (ref 11.5–15.5)
MCHC RBC-ENTMCNC: 29.6 PG — SIGNIFICANT CHANGE UP (ref 27–34)
MCHC RBC-ENTMCNC: 31.6 GM/DL — LOW (ref 32–36)
MCV RBC AUTO: 93.7 FL — SIGNIFICANT CHANGE UP (ref 80–100)
PLATELET # BLD AUTO: 146 K/UL — LOW (ref 150–400)
POTASSIUM SERPL-MCNC: 4.3 MMOL/L — SIGNIFICANT CHANGE UP (ref 3.5–5.3)
POTASSIUM SERPL-SCNC: 4.3 MMOL/L — SIGNIFICANT CHANGE UP (ref 3.5–5.3)
RBC # BLD: 3.78 M/UL — LOW (ref 3.8–5.2)
RBC # FLD: 16.2 % — HIGH (ref 10.3–14.5)
SODIUM SERPL-SCNC: 141 MMOL/L — SIGNIFICANT CHANGE UP (ref 135–145)
WBC # BLD: 7.95 K/UL — SIGNIFICANT CHANGE UP (ref 3.8–10.5)
WBC # FLD AUTO: 7.95 K/UL — SIGNIFICANT CHANGE UP (ref 3.8–10.5)

## 2021-03-27 PROCEDURE — 99232 SBSQ HOSP IP/OBS MODERATE 35: CPT

## 2021-03-27 RX ADMIN — Medication 100 MILLIGRAM(S): at 12:24

## 2021-03-27 RX ADMIN — Medication 100 MILLIGRAM(S): at 17:28

## 2021-03-27 RX ADMIN — Medication 100 MILLIGRAM(S): at 23:25

## 2021-03-27 RX ADMIN — Medication 25 MILLIGRAM(S): at 21:09

## 2021-03-27 RX ADMIN — AMLODIPINE BESYLATE 5 MILLIGRAM(S): 2.5 TABLET ORAL at 11:13

## 2021-03-27 RX ADMIN — GABAPENTIN 300 MILLIGRAM(S): 400 CAPSULE ORAL at 06:46

## 2021-03-27 RX ADMIN — HEPARIN SODIUM 5000 UNIT(S): 5000 INJECTION INTRAVENOUS; SUBCUTANEOUS at 21:09

## 2021-03-27 RX ADMIN — LISINOPRIL 20 MILLIGRAM(S): 2.5 TABLET ORAL at 11:13

## 2021-03-27 RX ADMIN — Medication 25 MILLIGRAM(S): at 11:13

## 2021-03-27 RX ADMIN — HEPARIN SODIUM 5000 UNIT(S): 5000 INJECTION INTRAVENOUS; SUBCUTANEOUS at 11:13

## 2021-03-27 RX ADMIN — Medication 1 MILLIGRAM(S): at 11:13

## 2021-03-27 RX ADMIN — GABAPENTIN 300 MILLIGRAM(S): 400 CAPSULE ORAL at 13:10

## 2021-03-27 RX ADMIN — Medication 20 MILLIGRAM(S): at 11:13

## 2021-03-27 RX ADMIN — GABAPENTIN 300 MILLIGRAM(S): 400 CAPSULE ORAL at 21:09

## 2021-03-27 NOTE — PROGRESS NOTE ADULT - SUBJECTIVE AND OBJECTIVE BOX
afebrile, VSS    no rest pain L leg, occasional heel pain and incisional pain    dressings intact    L foot warm    A/P  patent bypass    will check L groin tomorrow    L heel wound care as per Dr. Martines    MEDICATIONS  (STANDING):  amLODIPine   Tablet 5 milliGRAM(s) Oral daily  clindamycin IVPB 900 milliGRAM(s) IV Intermittent every 8 hours  dextrose 40% Gel 15 Gram(s) Oral once  dextrose 40% Gel 15 Gram(s) Oral once  dextrose 5%. 1000 milliLiter(s) (100 mL/Hr) IV Continuous <Continuous>  dextrose 5%. 1000 milliLiter(s) (50 mL/Hr) IV Continuous <Continuous>  dextrose 5%. 1000 milliLiter(s) (50 mL/Hr) IV Continuous <Continuous>  dextrose 5%. 1000 milliLiter(s) (100 mL/Hr) IV Continuous <Continuous>  dextrose 50% Injectable 25 Gram(s) IV Push once  dextrose 50% Injectable 12.5 Gram(s) IV Push once  dextrose 50% Injectable 12.5 Gram(s) IV Push once  dextrose 50% Injectable 25 Gram(s) IV Push once  dextrose 50% Injectable 25 Gram(s) IV Push once  folic acid 1 milliGRAM(s) Oral daily  gabapentin 300 milliGRAM(s) Oral three times a day  glucagon  Injectable 1 milliGRAM(s) IntraMuscular once  glucagon  Injectable 1 milliGRAM(s) IntraMuscular once  heparin   Injectable 5000 Unit(s) SubCutaneous every 12 hours  insulin lispro (ADMELOG) corrective regimen sliding scale   SubCutaneous three times a day before meals  insulin lispro (ADMELOG) corrective regimen sliding scale   SubCutaneous at bedtime  lisinopril 20 milliGRAM(s) Oral daily  metoprolol tartrate 25 milliGRAM(s) Oral two times a day  PARoxetine 20 milliGRAM(s) Oral daily    MEDICATIONS  (PRN):  artificial  tears Solution 1 Drop(s) Both EYES two times a day PRN Dry Eyes  HYDROmorphone  Injectable 1 milliGRAM(s) IV Push every 4 hours PRN Severe Pain (7 - 10)  HYDROmorphone  Injectable 0.5 milliGRAM(s) IV Push every 4 hours PRN Moderate Pain (4 - 6)      Allergies    cephalosporins (Other)  penicillins (Urticaria; Pruritus)    Intolerances        Flatus: [ ] YES [ ] NO             Bowel Movement: [ ] YES [ ] NO  Pain (0-10):            Pain Control Adequate: [ ] YES [ ] NO  Nausea: [ ] YES [ ] NO            Vomiting: [ ] YES [ ] NO  Diarrhea: [ ] YES [ ] NO         Constipation: [ ] YES [ ] NO     Chest Pain: [ ] YES [ ] NO    SOB:  [ ] YES [ ] NO    Vital Signs Last 24 Hrs  T(C): 36.8 (26 Mar 2021 22:02), Max: 36.9 (26 Mar 2021 12:18)  T(F): 98.3 (26 Mar 2021 22:02), Max: 98.5 (26 Mar 2021 12:18)  HR: 69 (26 Mar 2021 22:02) (62 - 86)  BP: 153/51 (26 Mar 2021 22:02) (137/40 - 170/63)  BP(mean): 73 (26 Mar 2021 14:00) (71 - 95)  RR: 18 (26 Mar 2021 22:02) (15 - 19)  SpO2: 97% (26 Mar 2021 22:02) (95% - 100%)    I&O's Summary    26 Mar 2021 07:01  -  27 Mar 2021 07:00  --------------------------------------------------------  IN: 216 mL / OUT: 1500 mL / NET: -1284 mL        Physical Exam:  General: NAD, resting comfortably  Pulmonary: normal resp effort, CTA-B  Cardiovascular: NSR  Abdominal: soft, NT/ND  Extremities: WWP, normal strength  Neuro: A/O x 3, CNs II-XII grossly intact, normal motor/sensation, no focal deficits  Pulses:   Right:                                                                          Left:  FEM [ ]2+ [ ]1+ [ ]doppler                                             FEM [ ]2+ [ ]1+ [ ]doppler    POP [ ]2+ [ ]1+ [ ]doppler                                             POP [ ]2+ [ ]1+ [ ]doppler    DP [ ]2+ [ ]1+ [ ]doppler                                                DP [ ]2+ [ ]1+ [ ]doppler  PT[ ]2+ [ ]1+ [ ]doppler                                                  PT [ ]2+ [ ]1+ [ ]doppler    LABS:                        11.2   7.95  )-----------( 146      ( 27 Mar 2021 07:10 )             35.4     03-26    142  |  116<H>  |  25<H>  ----------------------------<  154<H>  4.5   |  22  |  0.84    Ca    8.6      26 Mar 2021 09:17            CAPILLARY BLOOD GLUCOSE      POCT Blood Glucose.: 179 mg/dL (26 Mar 2021 21:16)  POCT Blood Glucose.: 129 mg/dL (26 Mar 2021 16:43)  POCT Blood Glucose.: 160 mg/dL (26 Mar 2021 12:19)      RADIOLOGY & ADDITIONAL TESTS:

## 2021-03-27 NOTE — PROGRESS NOTE ADULT - SUBJECTIVE AND OBJECTIVE BOX
Patient is a 85y old  Female who presents with a chief complaint of PVD with Claudication (26 Mar 2021 12:30)      HPI:   84 y/o female with a PMHx of anemia, arthritis, CAD, DM2, depression, HTN, PVD, rheumatoid arthritis, h/o left femoral endarterectomy in 01/2021 presents to the ED for LLE pain. Per daughter, pt's LLE intermittently goes white and feels cold. Pt scheduled for a fem pop bypass with Dr. Truong on 3/25. Pt unable to tolerate pain and came to ED for early intervention. Pt is on Eliquis, last dose yesterday morning. No other complaints at this time. Last meal this morning.     (22 Mar 2021 15:36)      Allergies    cephalosporins (Other)  penicillins (Urticaria; Pruritus)    Intolerances        MEDICATIONS  (STANDING):  amLODIPine   Tablet 5 milliGRAM(s) Oral daily  clindamycin IVPB 900 milliGRAM(s) IV Intermittent every 8 hours  dextrose 40% Gel 15 Gram(s) Oral once  dextrose 40% Gel 15 Gram(s) Oral once  dextrose 5%. 1000 milliLiter(s) (100 mL/Hr) IV Continuous <Continuous>  dextrose 5%. 1000 milliLiter(s) (50 mL/Hr) IV Continuous <Continuous>  dextrose 5%. 1000 milliLiter(s) (50 mL/Hr) IV Continuous <Continuous>  dextrose 5%. 1000 milliLiter(s) (100 mL/Hr) IV Continuous <Continuous>  dextrose 50% Injectable 25 Gram(s) IV Push once  dextrose 50% Injectable 12.5 Gram(s) IV Push once  dextrose 50% Injectable 12.5 Gram(s) IV Push once  dextrose 50% Injectable 25 Gram(s) IV Push once  dextrose 50% Injectable 25 Gram(s) IV Push once  folic acid 1 milliGRAM(s) Oral daily  gabapentin 300 milliGRAM(s) Oral three times a day  glucagon  Injectable 1 milliGRAM(s) IntraMuscular once  glucagon  Injectable 1 milliGRAM(s) IntraMuscular once  heparin   Injectable 5000 Unit(s) SubCutaneous every 12 hours  insulin lispro (ADMELOG) corrective regimen sliding scale   SubCutaneous three times a day before meals  insulin lispro (ADMELOG) corrective regimen sliding scale   SubCutaneous at bedtime  lisinopril 20 milliGRAM(s) Oral daily  metoprolol tartrate 25 milliGRAM(s) Oral two times a day  PARoxetine 20 milliGRAM(s) Oral daily    MEDICATIONS  (PRN):  artificial  tears Solution 1 Drop(s) Both EYES two times a day PRN Dry Eyes  HYDROmorphone  Injectable 1 milliGRAM(s) IV Push every 4 hours PRN Severe Pain (7 - 10)  HYDROmorphone  Injectable 0.5 milliGRAM(s) IV Push every 4 hours PRN Moderate Pain (4 - 6)        RADIOLOGY    CBC Full  -  ( 26 Mar 2021 09:17 )  WBC Count : 8.96 K/uL  RBC Count : 3.67 M/uL  Hemoglobin : 11.0 g/dL  Hematocrit : 34.1 %  Platelet Count - Automated : 151 K/uL  Mean Cell Volume : 92.9 fl  Mean Cell Hemoglobin : 30.0 pg  Mean Cell Hemoglobin Concentration : 32.3 gm/dL  Auto Neutrophil # : x  Auto Lymphocyte # : x  Auto Monocyte # : x  Auto Eosinophil # : x  Auto Basophil # : x  Auto Neutrophil % : x  Auto Lymphocyte % : x  Auto Monocyte % : x  Auto Eosinophil % : x  Auto Basophil % : x      03-26    142  |  116<H>  |  25<H>  ----------------------------<  154<H>  4.5   |  22  |  0.84    Ca    8.6      26 Mar 2021 09:17

## 2021-03-27 NOTE — PROGRESS NOTE ADULT - SUBJECTIVE AND OBJECTIVE BOX
85 y.o. female with PMH of anemia, arthritis, CAD, DMII, depression, HTN, PVD, RA, p/w LLE pain. Patient is admitted to surgery service and awaiting fem-pop-bypass. Patient's only complaint at this time is LLE pain which she arrived to hospital with. Patient states at baseline she walks with a walker. Denies feeling CP, SOB, cough, runny nose, sore throat, nausea, vomiting, abdominal pain, fever, chills. Patient is has insulin dependent diabetes, but denies h/o CHF / CKD / CVA.     3/26- comfortable in the chair, less pain in LLE, difficulty to ambulate  3/27- pt seen and evaluated at bedside. No new events/complaints. Pain in LLE well-controlled. POD4 from L fem-pop bypass. Pain well-controlled. Surgical site dressing noted at L groin with proximal drainage. Clean & dry.     ROS:   All 10 systems reviewed and found to be negative with the exception of what has been described above.    ICU Vital Signs Last 24 Hrs  T(C): 36.9 (27 Mar 2021 09:03), Max: 36.9 (27 Mar 2021 09:03)  T(F): 98.5 (27 Mar 2021 09:03), Max: 98.5 (27 Mar 2021 09:03)  HR: 84 (27 Mar 2021 09:03) (66 - 84)  BP: 154/45 (27 Mar 2021 09:03) (137/40 - 155/43)  BP(mean): 73 (26 Mar 2021 14:00) (72 - 73)  ABP: --  ABP(mean): --  RR: 17 (27 Mar 2021 09:03) (17 - 19)  SpO2: 95% (27 Mar 2021 09:03) (95% - 97%)    GEN: elderly female, lying in bed, NAD  HEENT:   NC/AT, pupils equal and reactive, EOMI  CV:  +S1, +S2, RRR  RESP:   fine bibasilar rales noted; no wheeze, rhonchi   BREAST:  not examined  GI:  abdomen soft, non-tender, non-distended, normoactive BS  RECTAL:  not examined  :  not examined  MSK:   normal muscle tone  EXT:  LLE warm with +1 edema. Pulses 2+ B/L.  NEURO:  AAOX3, no focal neurological deficits  SKIN:  no rashes. Surgical site dressing noted over L groin with proximal drainage, clean & dry.     labs reviewed 3/27-                        11.2   7.95  )-----------( 146      ( 27 Mar 2021 07:10 )             35.4     03-27    141  |  114<H>  |  22  ----------------------------<  147<H>  4.3   |  21<L>  |  0.70    Ca    8.3<L>      27 Mar 2021 07:10    CAPILLARY BLOOD GLUCOSE  POCT Blood Glucose.: 140 mg/dL (27 Mar 2021 12:22)  POCT Blood Glucose.: 147 mg/dL (27 Mar 2021 08:15)  POCT Blood Glucose.: 179 mg/dL (26 Mar 2021 21:16)  POCT Blood Glucose.: 129 mg/dL (26 Mar 2021 16:43)    I&O's Summary    26 Mar 2021 07:01  -  27 Mar 2021 07:00  --------------------------------------------------------  IN:    sodium chloride 0.9%: 216 mL  Total IN: 216 mL    OUT:    Indwelling Catheter - Urethral (mL): 1500 mL  Total OUT: 1500 mL    Total NET: -1284 mL    Radiology: results appreciated                                              RADIOLOGY:

## 2021-03-27 NOTE — PROGRESS NOTE ADULT - ASSESSMENT
Alert afebrile resting w/o major complaint. Left LE Dressing with proximal drainage. L heel & Hallux stable / dry eschar. R heel no sing of ulcer or infection. Off load / 1 pillow. Apply DSD. THX

## 2021-03-27 NOTE — PROGRESS NOTE ADULT - ASSESSMENT
85 y.o. female with PMH of anemia, arthritis, CAD, DMII, depression, HTN, PVD, RA, p/w LLE pain. POD4 from LLE fem-pop bypass.    # PVD w/ LLE claudication - s/p Fem-pop bypass surgery 3/24 with restoration of pulse, resolution of pain  - postop management as per Vascular surgery and podiatry  - added incentive spirometry   - post-op abx Clindamycin #4    # Normocytic anemia with acute blood loss with surgery - resolving, Hb 11.2 today  - cont to monitor H/H  - transfuse if Hb <8 because pt is post-op     # DMII   - lantus + HISS    # H/o depression / HTN / RA   - c/w home meds and f/u outpatient for further management    # D/C'd IVF and Padilla catheter and started voiding trial - as of 3/26    # DVT PPX  - cont Heparin SQ    Dispo: cont observation / post-op care, d/c as per Dr. Ventura and podiatry team    case dw daughter Nirali (# in patient info) 3/27. 85 y.o. female with PMH of anemia, arthritis, CAD, DMII, depression, HTN, PVD, RA, p/w LLE pain. POD4 from LLE fem-pop bypass.    # PVD w/ LLE claudication - s/p Fem-pop bypass surgery 3/24 with restoration of pulse, resolution of pain  - postop management as per Vascular surgery and podiatry  - added incentive spirometry   - post-op abx Clindamycin #4    # Normocytic anemia with acute blood loss with surgery - resolving, Hb 11.2 today  - cont to monitor H/H  - transfuse if Hb <8 because pt is post-op     # DMII   - lantus + HISS    # H/o depression / HTN / RA   - c/w home meds and f/u outpatient for further management    # D/C'd IVF and Padilla catheter and started voiding trial - as of 3/26    # DVT PPX  - cont Heparin SQ    Dispo: cont observation / post-op care, d/c as per Dr. Ventura and podiatry team    case dw daughter Dr. Nirali Ahumada ( ED pphsyician) (# in patient info) 3/27.

## 2021-03-28 PROCEDURE — 99232 SBSQ HOSP IP/OBS MODERATE 35: CPT

## 2021-03-28 RX ADMIN — Medication 100 MILLIGRAM(S): at 08:58

## 2021-03-28 RX ADMIN — Medication 2: at 11:56

## 2021-03-28 RX ADMIN — Medication 20 MILLIGRAM(S): at 08:59

## 2021-03-28 RX ADMIN — AMLODIPINE BESYLATE 5 MILLIGRAM(S): 2.5 TABLET ORAL at 08:59

## 2021-03-28 RX ADMIN — Medication 25 MILLIGRAM(S): at 21:57

## 2021-03-28 RX ADMIN — Medication 25 MILLIGRAM(S): at 08:59

## 2021-03-28 RX ADMIN — HEPARIN SODIUM 5000 UNIT(S): 5000 INJECTION INTRAVENOUS; SUBCUTANEOUS at 08:59

## 2021-03-28 RX ADMIN — GABAPENTIN 300 MILLIGRAM(S): 400 CAPSULE ORAL at 13:04

## 2021-03-28 RX ADMIN — GABAPENTIN 300 MILLIGRAM(S): 400 CAPSULE ORAL at 05:33

## 2021-03-28 RX ADMIN — GABAPENTIN 300 MILLIGRAM(S): 400 CAPSULE ORAL at 21:57

## 2021-03-28 RX ADMIN — Medication 1 MILLIGRAM(S): at 08:59

## 2021-03-28 RX ADMIN — HEPARIN SODIUM 5000 UNIT(S): 5000 INJECTION INTRAVENOUS; SUBCUTANEOUS at 21:57

## 2021-03-28 RX ADMIN — LISINOPRIL 20 MILLIGRAM(S): 2.5 TABLET ORAL at 08:59

## 2021-03-28 RX ADMIN — Medication 1: at 08:26

## 2021-03-28 NOTE — PROGRESS NOTE ADULT - ASSESSMENT
Alert afebrile. "Loose stools" Left foot with stable heel & Hallux ulcer / dry eschar. R heel W/O issue.  On Cleocin consider PCR/C. diff eval by ID. DSD Left LE off load / 1 pillow while in bed THX

## 2021-03-28 NOTE — PROGRESS NOTE ADULT - SUBJECTIVE AND OBJECTIVE BOX
85 y.o. female with PMH of anemia, arthritis, CAD, DMII, depression, HTN, PVD, RA, p/w LLE pain. Patient is admitted to surgery service and awaiting fem-pop-bypass. Patient's only complaint at this time is LLE pain which she arrived to hospital with. Patient states at baseline she walks with a walker. Denies feeling CP, SOB, cough, runny nose, sore throat, nausea, vomiting, abdominal pain, fever, chills. Patient is has insulin dependent diabetes, but denies h/o CHF / CKD / CVA.     3/26- comfortable in the chair, less pain in LLE, difficulty to ambulate  3/27- pt seen and evaluated at bedside. No new events/complaints. Pain in LLE well-controlled. POD4 from L fem-pop bypass. Pain well-controlled. Surgical site dressing noted at L groin with proximal drainage. Clean & dry.   3/28 3 bowel movement loose diarrhea nonbloody, decreased appetite, no abd pain , LLE paih well controlled , POD #5    ROS:   All 10 systems reviewed and found to be negative with the exception of what has been described above.  GEN: elderly female, lying in bed, NAD  HEENT:   NC/AT, pupils equal and reactive, EOMI  CV:  +S1, +S2, RRR  RESP:   fine bibasilar rales noted; no wheeze, rhonchi   BREAST:  not examined  GI:  abdomen soft, non-tender, non-distended, normoactive BS  RECTAL:  not examined  :  not examined  MSK:   normal muscle tone  EXT:  LLE warm with +1 edema. Pulses 2+ B/L.  NEURO:  AAOX3, no focal neurological deficits  SKIN:  no rashes. Surgical site dressing noted over L groin with proximal drainage, clean & dry.       PHYSICAL EXAM:    Daily     Daily     ICU Vital Signs Last 24 Hrs  T(C): 36.9 (28 Mar 2021 07:29), Max: 36.9 (27 Mar 2021 18:16)  T(F): 98.4 (28 Mar 2021 07:29), Max: 98.5 (27 Mar 2021 18:16)  HR: 88 (28 Mar 2021 07:29) (63 - 88)  BP: 159/64 (28 Mar 2021 07:29) (134/69 - 159/64)  BP(mean): --  ABP: --  ABP(mean): --  RR: 18 (28 Mar 2021 07:29) (18 - 18)  SpO2: 96% (28 Mar 2021 07:29) (96% - 99%)                              11.2   7.95  )-----------( 146      ( 27 Mar 2021 07:10 )             35.4       CBC Full  -  ( 27 Mar 2021 07:10 )  WBC Count : 7.95 K/uL  RBC Count : 3.78 M/uL  Hemoglobin : 11.2 g/dL  Hematocrit : 35.4 %  Platelet Count - Automated : 146 K/uL  Mean Cell Volume : 93.7 fl  Mean Cell Hemoglobin : 29.6 pg  Mean Cell Hemoglobin Concentration : 31.6 gm/dL  Auto Neutrophil # : x  Auto Lymphocyte # : x  Auto Monocyte # : x  Auto Eosinophil # : x  Auto Basophil # : x  Auto Neutrophil % : x  Auto Lymphocyte % : x  Auto Monocyte % : x  Auto Eosinophil % : x  Auto Basophil % : x      03-27    141  |  114<H>  |  22  ----------------------------<  147<H>  4.3   |  21<L>  |  0.70    Ca    8.3<L>      27 Mar 2021 07:10                              MEDICATIONS  (STANDING):  amLODIPine   Tablet 5 milliGRAM(s) Oral daily  dextrose 40% Gel 15 Gram(s) Oral once  dextrose 40% Gel 15 Gram(s) Oral once  dextrose 5%. 1000 milliLiter(s) (100 mL/Hr) IV Continuous <Continuous>  dextrose 5%. 1000 milliLiter(s) (50 mL/Hr) IV Continuous <Continuous>  dextrose 5%. 1000 milliLiter(s) (50 mL/Hr) IV Continuous <Continuous>  dextrose 5%. 1000 milliLiter(s) (100 mL/Hr) IV Continuous <Continuous>  dextrose 50% Injectable 25 Gram(s) IV Push once  dextrose 50% Injectable 12.5 Gram(s) IV Push once  dextrose 50% Injectable 12.5 Gram(s) IV Push once  dextrose 50% Injectable 25 Gram(s) IV Push once  dextrose 50% Injectable 25 Gram(s) IV Push once  folic acid 1 milliGRAM(s) Oral daily  gabapentin 300 milliGRAM(s) Oral three times a day  glucagon  Injectable 1 milliGRAM(s) IntraMuscular once  glucagon  Injectable 1 milliGRAM(s) IntraMuscular once  heparin   Injectable 5000 Unit(s) SubCutaneous every 12 hours  insulin lispro (ADMELOG) corrective regimen sliding scale   SubCutaneous three times a day before meals  insulin lispro (ADMELOG) corrective regimen sliding scale   SubCutaneous at bedtime  lisinopril 20 milliGRAM(s) Oral daily  metoprolol tartrate 25 milliGRAM(s) Oral two times a day  PARoxetine 20 milliGRAM(s) Oral daily

## 2021-03-28 NOTE — PROGRESS NOTE ADULT - SUBJECTIVE AND OBJECTIVE BOX
afebrile, VSS    no rest pain L foot    incisions inspected; no evidence of infection  L foot bandaged but toes warm, pink, and well perfused appearing    A/P  patent graft  plan for discharge within next several days    MEDICATIONS  (STANDING):  amLODIPine   Tablet 5 milliGRAM(s) Oral daily  clindamycin IVPB 900 milliGRAM(s) IV Intermittent every 8 hours  dextrose 40% Gel 15 Gram(s) Oral once  dextrose 40% Gel 15 Gram(s) Oral once  dextrose 5%. 1000 milliLiter(s) (100 mL/Hr) IV Continuous <Continuous>  dextrose 5%. 1000 milliLiter(s) (50 mL/Hr) IV Continuous <Continuous>  dextrose 5%. 1000 milliLiter(s) (50 mL/Hr) IV Continuous <Continuous>  dextrose 5%. 1000 milliLiter(s) (100 mL/Hr) IV Continuous <Continuous>  dextrose 50% Injectable 25 Gram(s) IV Push once  dextrose 50% Injectable 12.5 Gram(s) IV Push once  dextrose 50% Injectable 12.5 Gram(s) IV Push once  dextrose 50% Injectable 25 Gram(s) IV Push once  dextrose 50% Injectable 25 Gram(s) IV Push once  folic acid 1 milliGRAM(s) Oral daily  gabapentin 300 milliGRAM(s) Oral three times a day  glucagon  Injectable 1 milliGRAM(s) IntraMuscular once  glucagon  Injectable 1 milliGRAM(s) IntraMuscular once  heparin   Injectable 5000 Unit(s) SubCutaneous every 12 hours  insulin lispro (ADMELOG) corrective regimen sliding scale   SubCutaneous three times a day before meals  insulin lispro (ADMELOG) corrective regimen sliding scale   SubCutaneous at bedtime  lisinopril 20 milliGRAM(s) Oral daily  metoprolol tartrate 25 milliGRAM(s) Oral two times a day  PARoxetine 20 milliGRAM(s) Oral daily    MEDICATIONS  (PRN):  artificial  tears Solution 1 Drop(s) Both EYES two times a day PRN Dry Eyes  HYDROmorphone  Injectable 1 milliGRAM(s) IV Push every 4 hours PRN Severe Pain (7 - 10)  HYDROmorphone  Injectable 0.5 milliGRAM(s) IV Push every 4 hours PRN Moderate Pain (4 - 6)      Allergies    cephalosporins (Other)  penicillins (Urticaria; Pruritus)    Intolerances        Flatus: [ ] YES [ ] NO             Bowel Movement: [ ] YES [ ] NO  Pain (0-10):            Pain Control Adequate: [ ] YES [ ] NO  Nausea: [ ] YES [ ] NO            Vomiting: [ ] YES [ ] NO  Diarrhea: [ ] YES [ ] NO         Constipation: [ ] YES [ ] NO     Chest Pain: [ ] YES [ ] NO    SOB:  [ ] YES [ ] NO    Vital Signs Last 24 Hrs  T(C): 36.9 (28 Mar 2021 07:29), Max: 36.9 (27 Mar 2021 18:16)  T(F): 98.4 (28 Mar 2021 07:29), Max: 98.5 (27 Mar 2021 18:16)  HR: 88 (28 Mar 2021 07:29) (63 - 88)  BP: 159/64 (28 Mar 2021 07:29) (134/69 - 159/64)  BP(mean): --  RR: 18 (28 Mar 2021 07:29) (18 - 18)  SpO2: 96% (28 Mar 2021 07:29) (96% - 99%)    I&O's Summary    27 Mar 2021 07:01  -  28 Mar 2021 07:00  --------------------------------------------------------  IN: 0 mL / OUT: 400 mL / NET: -400 mL        Physical Exam:  General: NAD, resting comfortably  Pulmonary: normal resp effort, CTA-B  Cardiovascular: NSR  Abdominal: soft, NT/ND  Extremities: WWP, normal strength  Neuro: A/O x 3, CNs II-XII grossly intact, normal motor/sensation, no focal deficits  Pulses:   Right:                                                                          Left:  FEM [ ]2+ [ ]1+ [ ]doppler                                             FEM [ ]2+ [ ]1+ [ ]doppler    POP [ ]2+ [ ]1+ [ ]doppler                                             POP [ ]2+ [ ]1+ [ ]doppler    DP [ ]2+ [ ]1+ [ ]doppler                                                DP [ ]2+ [ ]1+ [ ]doppler  PT[ ]2+ [ ]1+ [ ]doppler                                                  PT [ ]2+ [ ]1+ [ ]doppler    LABS:                        11.2   7.95  )-----------( 146      ( 27 Mar 2021 07:10 )             35.4     03-27    141  |  114<H>  |  22  ----------------------------<  147<H>  4.3   |  21<L>  |  0.70    Ca    8.3<L>      27 Mar 2021 07:10            CAPILLARY BLOOD GLUCOSE      POCT Blood Glucose.: 153 mg/dL (28 Mar 2021 08:24)  POCT Blood Glucose.: 172 mg/dL (27 Mar 2021 20:57)  POCT Blood Glucose.: 127 mg/dL (27 Mar 2021 17:26)  POCT Blood Glucose.: 140 mg/dL (27 Mar 2021 12:22)      RADIOLOGY & ADDITIONAL TESTS:

## 2021-03-28 NOTE — PROGRESS NOTE ADULT - ASSESSMENT
85 y.o. female with PMH of anemia, arthritis, CAD, DMII, depression, HTN, PVD, RA, p/w LLE pain. POD4 from LLE fem-pop bypass.    # PVD w/ LLE claudication - s/p Fem-pop bypass surgery 3/24 with restoration of pulse, resolution of pain  - postop management as per Vascular surgery and podiatry  - added incentive spirometry   no evidence of cellulitis or infection  LLE an at inscision site per podiatry and vascular- vascular and podiatry dced clinda    # Diarrhea  check c diff, GI PCR    # Normocytic anemia with acute blood loss with surgery - resolving, Hb 11.2 today  - cont to monitor H/H  - transfuse if Hb <8 because pt is post-op     # DMII   - lantus + HISS    # H/o depression / HTN / RA   - c/w home meds and f/u outpatient for further management    # D/C'd IVF and Padilla catheter and started voiding trial - as of 3/26    # DVT PPX  - cont Heparin SQ    Dispo: cont observation / post-op care, d/c as per Dr. Ventura and podiatry team    case dw daughter Dr. Nirali Ahumada ( ED pphsyician) (# in patient info) 3/27.

## 2021-03-28 NOTE — PROGRESS NOTE ADULT - SUBJECTIVE AND OBJECTIVE BOX
Patient is a 85y old  Female who presents with a chief complaint of PVD with Claudication (27 Mar 2021 12:50)      HPI:   84 y/o female with a PMHx of anemia, arthritis, CAD, DM2, depression, HTN, PVD, rheumatoid arthritis, h/o left femoral endarterectomy in 01/2021 presents to the ED for LLE pain. Per daughter, pt's LLE intermittently goes white and feels cold. Pt scheduled for a fem pop bypass with Dr. Truong on 3/25. Pt unable to tolerate pain and came to ED for early intervention. Pt is on Eliquis, last dose yesterday morning. No other complaints at this time. Last meal this morning.     (22 Mar 2021 15:36)      Allergies    cephalosporins (Other)  penicillins (Urticaria; Pruritus)    Intolerances        MEDICATIONS  (STANDING):  amLODIPine   Tablet 5 milliGRAM(s) Oral daily  clindamycin IVPB 900 milliGRAM(s) IV Intermittent every 8 hours  dextrose 40% Gel 15 Gram(s) Oral once  dextrose 40% Gel 15 Gram(s) Oral once  dextrose 5%. 1000 milliLiter(s) (100 mL/Hr) IV Continuous <Continuous>  dextrose 5%. 1000 milliLiter(s) (50 mL/Hr) IV Continuous <Continuous>  dextrose 5%. 1000 milliLiter(s) (50 mL/Hr) IV Continuous <Continuous>  dextrose 5%. 1000 milliLiter(s) (100 mL/Hr) IV Continuous <Continuous>  dextrose 50% Injectable 25 Gram(s) IV Push once  dextrose 50% Injectable 12.5 Gram(s) IV Push once  dextrose 50% Injectable 12.5 Gram(s) IV Push once  dextrose 50% Injectable 25 Gram(s) IV Push once  dextrose 50% Injectable 25 Gram(s) IV Push once  folic acid 1 milliGRAM(s) Oral daily  gabapentin 300 milliGRAM(s) Oral three times a day  glucagon  Injectable 1 milliGRAM(s) IntraMuscular once  glucagon  Injectable 1 milliGRAM(s) IntraMuscular once  heparin   Injectable 5000 Unit(s) SubCutaneous every 12 hours  insulin lispro (ADMELOG) corrective regimen sliding scale   SubCutaneous three times a day before meals  insulin lispro (ADMELOG) corrective regimen sliding scale   SubCutaneous at bedtime  lisinopril 20 milliGRAM(s) Oral daily  metoprolol tartrate 25 milliGRAM(s) Oral two times a day  PARoxetine 20 milliGRAM(s) Oral daily    MEDICATIONS  (PRN):  artificial  tears Solution 1 Drop(s) Both EYES two times a day PRN Dry Eyes  HYDROmorphone  Injectable 1 milliGRAM(s) IV Push every 4 hours PRN Severe Pain (7 - 10)  HYDROmorphone  Injectable 0.5 milliGRAM(s) IV Push every 4 hours PRN Moderate Pain (4 - 6)        RADIOLOGY    CBC Full  -  ( 27 Mar 2021 07:10 )  WBC Count : 7.95 K/uL  RBC Count : 3.78 M/uL  Hemoglobin : 11.2 g/dL  Hematocrit : 35.4 %  Platelet Count - Automated : 146 K/uL  Mean Cell Volume : 93.7 fl  Mean Cell Hemoglobin : 29.6 pg  Mean Cell Hemoglobin Concentration : 31.6 gm/dL  Auto Neutrophil # : x  Auto Lymphocyte # : x  Auto Monocyte # : x  Auto Eosinophil # : x  Auto Basophil # : x  Auto Neutrophil % : x  Auto Lymphocyte % : x  Auto Monocyte % : x  Auto Eosinophil % : x  Auto Basophil % : x      03-27    141  |  114<H>  |  22  ----------------------------<  147<H>  4.3   |  21<L>  |  0.70    Ca    8.3<L>      27 Mar 2021 07:10

## 2021-03-29 ENCOUNTER — TRANSCRIPTION ENCOUNTER (OUTPATIENT)
Age: 86
End: 2021-03-29

## 2021-03-29 LAB
ANION GAP SERPL CALC-SCNC: 7 MMOL/L — SIGNIFICANT CHANGE UP (ref 5–17)
BASOPHILS # BLD AUTO: 0.03 K/UL — SIGNIFICANT CHANGE UP (ref 0–0.2)
BASOPHILS NFR BLD AUTO: 0.4 % — SIGNIFICANT CHANGE UP (ref 0–2)
BUN SERPL-MCNC: 17 MG/DL — SIGNIFICANT CHANGE UP (ref 7–23)
C DIFF BY PCR RESULT: SIGNIFICANT CHANGE UP
C DIFF TOX GENS STL QL NAA+PROBE: SIGNIFICANT CHANGE UP
CALCIUM SERPL-MCNC: 8.4 MG/DL — LOW (ref 8.5–10.1)
CHLORIDE SERPL-SCNC: 115 MMOL/L — HIGH (ref 96–108)
CO2 SERPL-SCNC: 23 MMOL/L — SIGNIFICANT CHANGE UP (ref 22–31)
CREAT SERPL-MCNC: 0.64 MG/DL — SIGNIFICANT CHANGE UP (ref 0.5–1.3)
CULTURE RESULTS: SIGNIFICANT CHANGE UP
EOSINOPHIL # BLD AUTO: 0.35 K/UL — SIGNIFICANT CHANGE UP (ref 0–0.5)
EOSINOPHIL NFR BLD AUTO: 4.6 % — SIGNIFICANT CHANGE UP (ref 0–6)
GLUCOSE SERPL-MCNC: 159 MG/DL — HIGH (ref 70–99)
HCT VFR BLD CALC: 37.2 % — SIGNIFICANT CHANGE UP (ref 34.5–45)
HGB BLD-MCNC: 12.2 G/DL — SIGNIFICANT CHANGE UP (ref 11.5–15.5)
IMM GRANULOCYTES NFR BLD AUTO: 0.3 % — SIGNIFICANT CHANGE UP (ref 0–1.5)
LYMPHOCYTES # BLD AUTO: 1.37 K/UL — SIGNIFICANT CHANGE UP (ref 1–3.3)
LYMPHOCYTES # BLD AUTO: 17.8 % — SIGNIFICANT CHANGE UP (ref 13–44)
MCHC RBC-ENTMCNC: 30.2 PG — SIGNIFICANT CHANGE UP (ref 27–34)
MCHC RBC-ENTMCNC: 32.8 GM/DL — SIGNIFICANT CHANGE UP (ref 32–36)
MCV RBC AUTO: 92.1 FL — SIGNIFICANT CHANGE UP (ref 80–100)
MONOCYTES # BLD AUTO: 1.12 K/UL — HIGH (ref 0–0.9)
MONOCYTES NFR BLD AUTO: 14.6 % — HIGH (ref 2–14)
NEUTROPHILS # BLD AUTO: 4.79 K/UL — SIGNIFICANT CHANGE UP (ref 1.8–7.4)
NEUTROPHILS NFR BLD AUTO: 62.3 % — SIGNIFICANT CHANGE UP (ref 43–77)
PLATELET # BLD AUTO: 181 K/UL — SIGNIFICANT CHANGE UP (ref 150–400)
POTASSIUM SERPL-MCNC: 3.6 MMOL/L — SIGNIFICANT CHANGE UP (ref 3.5–5.3)
POTASSIUM SERPL-SCNC: 3.6 MMOL/L — SIGNIFICANT CHANGE UP (ref 3.5–5.3)
RBC # BLD: 4.04 M/UL — SIGNIFICANT CHANGE UP (ref 3.8–5.2)
RBC # FLD: 15.8 % — HIGH (ref 10.3–14.5)
SARS-COV-2 RNA SPEC QL NAA+PROBE: SIGNIFICANT CHANGE UP
SODIUM SERPL-SCNC: 145 MMOL/L — SIGNIFICANT CHANGE UP (ref 135–145)
SPECIMEN SOURCE: SIGNIFICANT CHANGE UP
WBC # BLD: 7.68 K/UL — SIGNIFICANT CHANGE UP (ref 3.8–10.5)
WBC # FLD AUTO: 7.68 K/UL — SIGNIFICANT CHANGE UP (ref 3.8–10.5)

## 2021-03-29 PROCEDURE — 99232 SBSQ HOSP IP/OBS MODERATE 35: CPT

## 2021-03-29 RX ORDER — APIXABAN 2.5 MG/1
5 TABLET, FILM COATED ORAL
Refills: 0 | Status: DISCONTINUED | OUTPATIENT
Start: 2021-03-29 | End: 2021-03-30

## 2021-03-29 RX ORDER — APIXABAN 2.5 MG/1
2.5 TABLET, FILM COATED ORAL
Refills: 0 | Status: DISCONTINUED | OUTPATIENT
Start: 2021-03-29 | End: 2021-03-29

## 2021-03-29 RX ADMIN — Medication 1 MILLIGRAM(S): at 09:03

## 2021-03-29 RX ADMIN — AMLODIPINE BESYLATE 5 MILLIGRAM(S): 2.5 TABLET ORAL at 09:05

## 2021-03-29 RX ADMIN — GABAPENTIN 300 MILLIGRAM(S): 400 CAPSULE ORAL at 21:12

## 2021-03-29 RX ADMIN — APIXABAN 5 MILLIGRAM(S): 2.5 TABLET, FILM COATED ORAL at 21:13

## 2021-03-29 RX ADMIN — Medication 25 MILLIGRAM(S): at 21:12

## 2021-03-29 RX ADMIN — HEPARIN SODIUM 5000 UNIT(S): 5000 INJECTION INTRAVENOUS; SUBCUTANEOUS at 09:03

## 2021-03-29 RX ADMIN — Medication 1: at 09:00

## 2021-03-29 RX ADMIN — Medication 20 MILLIGRAM(S): at 09:01

## 2021-03-29 RX ADMIN — LISINOPRIL 20 MILLIGRAM(S): 2.5 TABLET ORAL at 09:03

## 2021-03-29 RX ADMIN — GABAPENTIN 300 MILLIGRAM(S): 400 CAPSULE ORAL at 14:40

## 2021-03-29 RX ADMIN — Medication 25 MILLIGRAM(S): at 09:03

## 2021-03-29 RX ADMIN — GABAPENTIN 300 MILLIGRAM(S): 400 CAPSULE ORAL at 05:46

## 2021-03-29 NOTE — DISCHARGE NOTE PROVIDER - CARE PROVIDER_API CALL
Soren Ventura)  Vascular Surgery  [address], [place] [address]  Phone: [phone]  Fax: [phone]  Follow Up Time:

## 2021-03-29 NOTE — PROGRESS NOTE ADULT - SUBJECTIVE AND OBJECTIVE BOX
85 y.o. female with PMH of anemia, arthritis, CAD, DMII, depression, HTN, PVD, RA, p/w LLE pain. Patient is admitted to surgery service and awaiting fem-pop-bypass. Patient's only complaint at this time is LLE pain which she arrived to hospital with. Patient states at baseline she walks with a walker. Denies feeling CP, SOB, cough, runny nose, sore throat, nausea, vomiting, abdominal pain, fever, chills. Patient is has insulin dependent diabetes, but denies h/o CHF / CKD / CVA.     3/26- comfortable in the chair, less pain in LLE, difficulty to ambulate  3/27- pt seen and evaluated at bedside. No new events/complaints. Pain in LLE well-controlled. POD4 from L fem-pop bypass. Pain well-controlled. Surgical site dressing noted at L groin with proximal drainage. Clean & dry.   3/28 3 bowel movement loose diarrhea nonbloody, decreased appetite, no abd pain , LLE paih well controlled , POD #5  3/29 diarrhea resolved, last BM last night , has improved appetite , no abd pain     ROS:   All 10 systems reviewed and found to be negative with the exception of what has been described above.  GEN: elderly female, lying in bed, NAD  HEENT:   NC/AT, pupils equal and reactive, EOMI  CV:  +S1, +S2, RRR  RESP:   fine bibasilar rales noted; no wheeze, rhonchi   BREAST:  not examined  GI:  abdomen soft, non-tender, non-distended, normoactive BS  RECTAL:  not examined  :  not examined  MSK:   normal muscle tone  EXT:  LLE warm with +1 edema. Pulses 2+ B/L.  NEURO:  AAOX3, no focal neurological deficits  SKIN:  . Surgical site dressing noted over L groin, clean & dry.       PHYSICAL EXAM:    Daily     Daily     ICU Vital Signs Last 24 Hrs  T(C): 36.7 (29 Mar 2021 08:15), Max: 36.9 (28 Mar 2021 22:00)  T(F): 98 (29 Mar 2021 08:15), Max: 98.4 (28 Mar 2021 22:00)  HR: 92 (29 Mar 2021 08:15) (65 - 98)  BP: 162/65 (29 Mar 2021 08:15) (138/48 - 162/65)  BP(mean): --  ABP: --  ABP(mean): --  RR: 18 (29 Mar 2021 08:15) (18 - 18)  SpO2: 94% (29 Mar 2021 08:15) (94% - 95%)                                12.2   7.68  )-----------( 181      ( 29 Mar 2021 07:14 )             37.2       CBC Full  -  ( 29 Mar 2021 07:14 )  WBC Count : 7.68 K/uL  RBC Count : 4.04 M/uL  Hemoglobin : 12.2 g/dL  Hematocrit : 37.2 %  Platelet Count - Automated : 181 K/uL  Mean Cell Volume : 92.1 fl  Mean Cell Hemoglobin : 30.2 pg  Mean Cell Hemoglobin Concentration : 32.8 gm/dL  Auto Neutrophil # : 4.79 K/uL  Auto Lymphocyte # : 1.37 K/uL  Auto Monocyte # : 1.12 K/uL  Auto Eosinophil # : 0.35 K/uL  Auto Basophil # : 0.03 K/uL  Auto Neutrophil % : 62.3 %  Auto Lymphocyte % : 17.8 %  Auto Monocyte % : 14.6 %  Auto Eosinophil % : 4.6 %  Auto Basophil % : 0.4 %      03-29    145  |  115<H>  |  17  ----------------------------<  159<H>  3.6   |  23  |  0.64    Ca    8.4<L>      29 Mar 2021 07:14                              MEDICATIONS  (STANDING):  amLODIPine   Tablet 5 milliGRAM(s) Oral daily  apixaban 5 milliGRAM(s) Oral two times a day  dextrose 40% Gel 15 Gram(s) Oral once  dextrose 40% Gel 15 Gram(s) Oral once  dextrose 5%. 1000 milliLiter(s) (100 mL/Hr) IV Continuous <Continuous>  dextrose 5%. 1000 milliLiter(s) (50 mL/Hr) IV Continuous <Continuous>  dextrose 5%. 1000 milliLiter(s) (50 mL/Hr) IV Continuous <Continuous>  dextrose 5%. 1000 milliLiter(s) (100 mL/Hr) IV Continuous <Continuous>  dextrose 50% Injectable 25 Gram(s) IV Push once  dextrose 50% Injectable 12.5 Gram(s) IV Push once  dextrose 50% Injectable 12.5 Gram(s) IV Push once  dextrose 50% Injectable 25 Gram(s) IV Push once  dextrose 50% Injectable 25 Gram(s) IV Push once  folic acid 1 milliGRAM(s) Oral daily  gabapentin 300 milliGRAM(s) Oral three times a day  glucagon  Injectable 1 milliGRAM(s) IntraMuscular once  glucagon  Injectable 1 milliGRAM(s) IntraMuscular once  insulin lispro (ADMELOG) corrective regimen sliding scale   SubCutaneous three times a day before meals  insulin lispro (ADMELOG) corrective regimen sliding scale   SubCutaneous at bedtime  lisinopril 20 milliGRAM(s) Oral daily  metoprolol tartrate 25 milliGRAM(s) Oral two times a day  PARoxetine 20 milliGRAM(s) Oral daily

## 2021-03-29 NOTE — DISCHARGE NOTE PROVIDER - NSDCCPTREATMENT_GEN_ALL_CORE_FT
PRINCIPAL PROCEDURE  Procedure: Femoral-popliteal bypass graft with non-vein  Findings and Treatment:

## 2021-03-29 NOTE — PROGRESS NOTE ADULT - SUBJECTIVE AND OBJECTIVE BOX
Patient is a 85y old  Female who presents with a chief complaint of PVD with Claudication (28 Mar 2021 12:10)      HPI:   84 y/o female with a PMHx of anemia, arthritis, CAD, DM2, depression, HTN, PVD, rheumatoid arthritis, h/o left femoral endarterectomy in 01/2021 presents to the ED for LLE pain. Per daughter, pt's LLE intermittently goes white and feels cold. Pt scheduled for a fem pop bypass with Dr. Truong on 3/25. Pt unable to tolerate pain and came to ED for early intervention. Pt is on Eliquis, last dose yesterday morning. No other complaints at this time. Last meal this morning.     (22 Mar 2021 15:36)      Allergies    cephalosporins (Other)  penicillins (Urticaria; Pruritus)    Intolerances        MEDICATIONS  (STANDING):  amLODIPine   Tablet 5 milliGRAM(s) Oral daily  dextrose 40% Gel 15 Gram(s) Oral once  dextrose 40% Gel 15 Gram(s) Oral once  dextrose 5%. 1000 milliLiter(s) (100 mL/Hr) IV Continuous <Continuous>  dextrose 5%. 1000 milliLiter(s) (50 mL/Hr) IV Continuous <Continuous>  dextrose 5%. 1000 milliLiter(s) (50 mL/Hr) IV Continuous <Continuous>  dextrose 5%. 1000 milliLiter(s) (100 mL/Hr) IV Continuous <Continuous>  dextrose 50% Injectable 25 Gram(s) IV Push once  dextrose 50% Injectable 12.5 Gram(s) IV Push once  dextrose 50% Injectable 12.5 Gram(s) IV Push once  dextrose 50% Injectable 25 Gram(s) IV Push once  dextrose 50% Injectable 25 Gram(s) IV Push once  folic acid 1 milliGRAM(s) Oral daily  gabapentin 300 milliGRAM(s) Oral three times a day  glucagon  Injectable 1 milliGRAM(s) IntraMuscular once  glucagon  Injectable 1 milliGRAM(s) IntraMuscular once  heparin   Injectable 5000 Unit(s) SubCutaneous every 12 hours  insulin lispro (ADMELOG) corrective regimen sliding scale   SubCutaneous three times a day before meals  insulin lispro (ADMELOG) corrective regimen sliding scale   SubCutaneous at bedtime  lisinopril 20 milliGRAM(s) Oral daily  metoprolol tartrate 25 milliGRAM(s) Oral two times a day  PARoxetine 20 milliGRAM(s) Oral daily    MEDICATIONS  (PRN):  artificial  tears Solution 1 Drop(s) Both EYES two times a day PRN Dry Eyes  HYDROmorphone  Injectable 1 milliGRAM(s) IV Push every 4 hours PRN Severe Pain (7 - 10)  HYDROmorphone  Injectable 0.5 milliGRAM(s) IV Push every 4 hours PRN Moderate Pain (4 - 6)        RADIOLOGY    CBC Full  -  ( 27 Mar 2021 07:10 )  WBC Count : 7.95 K/uL  RBC Count : 3.78 M/uL  Hemoglobin : 11.2 g/dL  Hematocrit : 35.4 %  Platelet Count - Automated : 146 K/uL  Mean Cell Volume : 93.7 fl  Mean Cell Hemoglobin : 29.6 pg  Mean Cell Hemoglobin Concentration : 31.6 gm/dL  Auto Neutrophil # : x  Auto Lymphocyte # : x  Auto Monocyte # : x  Auto Eosinophil # : x  Auto Basophil # : x  Auto Neutrophil % : x  Auto Lymphocyte % : x  Auto Monocyte % : x  Auto Eosinophil % : x  Auto Basophil % : x      03-27    141  |  114<H>  |  22  ----------------------------<  147<H>  4.3   |  21<L>  |  0.70    Ca    8.3<L>      27 Mar 2021 07:10

## 2021-03-29 NOTE — PROGRESS NOTE ADULT - ASSESSMENT
85 y.o. female with PMH of anemia, arthritis, CAD, DMII, depression, HTN, PVD, RA, p/w LLE pain. POD4 from LLE fem-pop bypass.    # PVD w/ LLE claudication - s/p Fem-pop bypass surgery 3/24 with restoration of pulse, resolution of pain  - postop management as per Vascular surgery and podiatry  - added incentive spirometry   no evidence of cellulitis or evidence of  infection at inscision site   L foot warm; L groin dressing dry and intact      # Diarrhea resolved   medically stable for discharge if c diff negative    GI PCR neg    # Normocytic anemia withs/p  acute blood loss with surgery -    now hb stable       # DMII   resume home med    # H/o depression / HTN / RA   - c/w home meds and f/u outpatient for further management    # D/C'd IVF and Padilla catheter and passed  voiding trial - as of 3/26    # hx of acute DVT feb 2021  resume eliquis 5 BID - vascular ok to resume eliquis    medically stable for discharge if c diff neg  discharge planning as per vascular surgery  [facility] vs home with home care  I discussed with patient's daughter 3/29    case dw daughter Dr. Nirali Ahumada ( ED pphsyician) (# in patient info) 3/27.

## 2021-03-29 NOTE — DISCHARGE NOTE PROVIDER - NSDCCPCAREPLAN_GEN_ALL_CORE_FT
PRINCIPAL DISCHARGE DIAGNOSIS  Diagnosis: PVD (peripheral vascular disease) with claudication  Assessment and Plan of Treatment:

## 2021-03-29 NOTE — DISCHARGE NOTE PROVIDER - NSDCMRMEDTOKEN_GEN_ALL_CORE_FT
amLODIPine 5 mg oral tablet: 1 tab(s) orally once a day  benazepril 20 mg oral tablet: 1 tab(s) orally once a day (at bedtime)  Eliquis 5 mg oral tablet: 1 tab(s) orally 2 times a day  folic acid 1 mg oral tablet: 1 tab(s) orally once a day (at bedtime)  gabapentin 300 mg oral capsule: 1 cap(s) orally 3 times a day  Lantus 100 units/mL subcutaneous solution: 25 unit(s) subcutaneous once a day (at bedtime)  levoFLOXacin 500 mg oral tablet: 1 tab(s) orally every 24 hours  ***Course Completed***  methotrexate 2.5 mg oral tablet: 6 tab(s) orally once a week on Wednesdays  metoprolol tartrate 25 mg oral tablet: 1 tab(s) orally 2 times a day  oxycodone-acetaminophen 5 mg-325 mg oral tablet: 1 tab(s) orally every 6 hours, As Needed -Moderate Pain (4 - 6) MDD:4 tabs  Paxil 20 mg oral tablet: 1 tab(s) orally once a day  Refresh ophthalmic solution: 1 drop(s) in each eye 2 times a day, As Needed  Saline Nasal Mist 0.65% nasal solution: use as directed as needed   amLODIPine 5 mg oral tablet: 1 tab(s) orally once a day  benazepril 20 mg oral tablet: 1 tab(s) orally once a day (at bedtime)  Eliquis 5 mg oral tablet: 1 tab(s) orally 2 times a day  folic acid 1 mg oral tablet: 1 tab(s) orally once a day (at bedtime)  gabapentin 300 mg oral capsule: 1 cap(s) orally 3 times a day  Lantus 100 units/mL subcutaneous solution: 10 unit(s) subcutaneous once a day (at bedtime)  levoFLOXacin 500 mg oral tablet: 1 tab(s) orally every 24 hours  ***Course Completed***  methotrexate 2.5 mg oral tablet: 6 tab(s) orally once a week on Wednesdays  metoprolol tartrate 25 mg oral tablet: 1 tab(s) orally 2 times a day  oxycodone-acetaminophen 5 mg-325 mg oral tablet: 1 tab(s) orally every 6 hours, As Needed -Moderate Pain (4 - 6) MDD:4 tabs  Paxil 20 mg oral tablet: 1 tab(s) orally once a day  Refresh ophthalmic solution: 1 drop(s) in each eye 2 times a day, As Needed  Saline Nasal Mist 0.65% nasal solution: use as directed as needed

## 2021-03-29 NOTE — DISCHARGE NOTE PROVIDER - HOSPITAL COURSE
85 y.o. female with PMH of anemia, arthritis, CAD, DMII, depression, HTN, PVD, RA, p/w LLE pain. POD4 from LLE fem-pop bypass.    # PVD w/ LLE claudication - s/p Fem-pop bypass surgery 3/24 with restoration of pulse, resolution of pain  - added incentive spirometry   no evidence of cellulitis or evidence of  infection at inscision site   L foot warm; L groin dressing dry and intact    # Diarrhea resolved   medically stable for discharge if c diff negative    GI PCR neg    # Normocytic anemia with s/p  acute blood loss with surgery -    now hb stable     # DMII   resume home med    # H/o depression / HTN / RA   - c/w home meds and f/u outpatient for further management    # D/C'd IVF and Padilla catheter and passed  voiding trial - as of 3/26    # hx of acute DVT feb 2021  resume eliquis 5 BID

## 2021-03-29 NOTE — PROGRESS NOTE ADULT - SUBJECTIVE AND OBJECTIVE BOX
afebrile, VSS    notes loose stools but denies abdominal pain    PE  L foot warm; L groin dressing dry and intact    A/P  stable   rule out C diff    ready for discharge from vascular standpoint, once C diff status clarified  MEDICATIONS  (STANDING):  amLODIPine   Tablet 5 milliGRAM(s) Oral daily  dextrose 40% Gel 15 Gram(s) Oral once  dextrose 40% Gel 15 Gram(s) Oral once  dextrose 5%. 1000 milliLiter(s) (100 mL/Hr) IV Continuous <Continuous>  dextrose 5%. 1000 milliLiter(s) (50 mL/Hr) IV Continuous <Continuous>  dextrose 5%. 1000 milliLiter(s) (50 mL/Hr) IV Continuous <Continuous>  dextrose 5%. 1000 milliLiter(s) (100 mL/Hr) IV Continuous <Continuous>  dextrose 50% Injectable 25 Gram(s) IV Push once  dextrose 50% Injectable 12.5 Gram(s) IV Push once  dextrose 50% Injectable 12.5 Gram(s) IV Push once  dextrose 50% Injectable 25 Gram(s) IV Push once  dextrose 50% Injectable 25 Gram(s) IV Push once  folic acid 1 milliGRAM(s) Oral daily  gabapentin 300 milliGRAM(s) Oral three times a day  glucagon  Injectable 1 milliGRAM(s) IntraMuscular once  glucagon  Injectable 1 milliGRAM(s) IntraMuscular once  heparin   Injectable 5000 Unit(s) SubCutaneous every 12 hours  insulin lispro (ADMELOG) corrective regimen sliding scale   SubCutaneous three times a day before meals  insulin lispro (ADMELOG) corrective regimen sliding scale   SubCutaneous at bedtime  lisinopril 20 milliGRAM(s) Oral daily  metoprolol tartrate 25 milliGRAM(s) Oral two times a day  PARoxetine 20 milliGRAM(s) Oral daily    MEDICATIONS  (PRN):  artificial  tears Solution 1 Drop(s) Both EYES two times a day PRN Dry Eyes  HYDROmorphone  Injectable 1 milliGRAM(s) IV Push every 4 hours PRN Severe Pain (7 - 10)  HYDROmorphone  Injectable 0.5 milliGRAM(s) IV Push every 4 hours PRN Moderate Pain (4 - 6)      Allergies    cephalosporins (Other)  penicillins (Urticaria; Pruritus)    Intolerances        Flatus: [ ] YES [ ] NO             Bowel Movement: [ ] YES [ ] NO  Pain (0-10):            Pain Control Adequate: [ ] YES [ ] NO  Nausea: [ ] YES [ ] NO            Vomiting: [ ] YES [ ] NO  Diarrhea: [ ] YES [ ] NO         Constipation: [ ] YES [ ] NO     Chest Pain: [ ] YES [ ] NO    SOB:  [ ] YES [ ] NO    Vital Signs Last 24 Hrs  T(C): 36.9 (28 Mar 2021 22:00), Max: 36.9 (28 Mar 2021 07:29)  T(F): 98.4 (28 Mar 2021 22:00), Max: 98.4 (28 Mar 2021 07:29)  HR: 98 (28 Mar 2021 22:00) (65 - 98)  BP: 138/48 (28 Mar 2021 22:00) (138/48 - 159/64)  BP(mean): --  RR: 18 (28 Mar 2021 22:00) (18 - 18)  SpO2: 95% (28 Mar 2021 22:00) (95% - 96%)    I&O's Summary      Physical Exam:  General: NAD, resting comfortably  Pulmonary: normal resp effort, CTA-B  Cardiovascular: NSR  Abdominal: soft, NT/ND  Extremities: WWP, normal strength  Neuro: A/O x 3, CNs II-XII grossly intact, normal motor/sensation, no focal deficits  Pulses:   Right:                                                                          Left:  FEM [ ]2+ [ ]1+ [ ]doppler                                             FEM [ ]2+ [ ]1+ [ ]doppler    POP [ ]2+ [ ]1+ [ ]doppler                                             POP [ ]2+ [ ]1+ [ ]doppler    DP [ ]2+ [ ]1+ [ ]doppler                                                DP [ ]2+ [ ]1+ [ ]doppler  PT[ ]2+ [ ]1+ [ ]doppler                                                  PT [ ]2+ [ ]1+ [ ]doppler    LABS:                CAPILLARY BLOOD GLUCOSE      POCT Blood Glucose.: 181 mg/dL (28 Mar 2021 21:55)  POCT Blood Glucose.: 117 mg/dL (28 Mar 2021 17:27)  POCT Blood Glucose.: 210 mg/dL (28 Mar 2021 11:31)  POCT Blood Glucose.: 153 mg/dL (28 Mar 2021 08:24)      RADIOLOGY & ADDITIONAL TESTS:

## 2021-03-30 ENCOUNTER — TRANSCRIPTION ENCOUNTER (OUTPATIENT)
Age: 86
End: 2021-03-30

## 2021-03-30 ENCOUNTER — INPATIENT (INPATIENT)
Facility: HOSPITAL | Age: 86
LOS: 9 days | Discharge: HOME HEALTH | DRG: 949 | End: 2021-04-09
Attending: PHYSICAL MEDICINE & REHABILITATION | Admitting: PHYSICAL MEDICINE & REHABILITATION
Payer: MEDICARE

## 2021-03-30 VITALS
TEMPERATURE: 98 F | HEART RATE: 98 BPM | OXYGEN SATURATION: 96 % | RESPIRATION RATE: 19 BRPM | DIASTOLIC BLOOD PRESSURE: 61 MMHG | SYSTOLIC BLOOD PRESSURE: 148 MMHG

## 2021-03-30 VITALS
TEMPERATURE: 98 F | SYSTOLIC BLOOD PRESSURE: 162 MMHG | HEIGHT: 63 IN | RESPIRATION RATE: 16 BRPM | HEART RATE: 82 BPM | DIASTOLIC BLOOD PRESSURE: 76 MMHG | WEIGHT: 164.69 LBS | OXYGEN SATURATION: 95 %

## 2021-03-30 DIAGNOSIS — E11.40 TYPE 2 DIABETES MELLITUS WITH DIABETIC NEUROPATHY, UNSPECIFIED: ICD-10-CM

## 2021-03-30 DIAGNOSIS — M19.90 UNSPECIFIED OSTEOARTHRITIS, UNSPECIFIED SITE: ICD-10-CM

## 2021-03-30 DIAGNOSIS — I83.022 VARICOSE VEINS OF LEFT LOWER EXTREMITY WITH ULCER OF CALF: ICD-10-CM
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
## 2021-03-30 DIAGNOSIS — M21.371 FOOT DROP, RIGHT FOOT: ICD-10-CM

## 2021-03-30 DIAGNOSIS — I25.10 ATHEROSCLEROTIC HEART DISEASE OF NATIVE CORONARY ARTERY WITHOUT ANGINA PECTORIS: ICD-10-CM

## 2021-03-30 DIAGNOSIS — Z87.891 PERSONAL HISTORY OF NICOTINE DEPENDENCE: ICD-10-CM

## 2021-03-30 DIAGNOSIS — Z51.89 ENCOUNTER FOR OTHER SPECIFIED AFTERCARE: ICD-10-CM

## 2021-03-30 DIAGNOSIS — D64.9 ANEMIA, UNSPECIFIED: ICD-10-CM

## 2021-03-30 DIAGNOSIS — F32.9 MAJOR DEPRESSIVE DISORDER, SINGLE EPISODE, UNSPECIFIED: ICD-10-CM

## 2021-03-30 DIAGNOSIS — S72.009A FRACTURE OF UNSPECIFIED PART OF NECK OF UNSPECIFIED FEMUR, INITIAL ENCOUNTER FOR CLOSED FRACTURE: Chronic | ICD-10-CM

## 2021-03-30 DIAGNOSIS — I12.9 HYPERTENSIVE CHRONIC KIDNEY DISEASE WITH STAGE 1 THROUGH STAGE 4 CHRONIC KIDNEY DISEASE, OR UNSPECIFIED CHRONIC KIDNEY DISEASE: ICD-10-CM

## 2021-03-30 DIAGNOSIS — L97.229 NON-PRESSURE CHRONIC ULCER OF LEFT CALF WITH UNSPECIFIED SEVERITY: ICD-10-CM

## 2021-03-30 DIAGNOSIS — M06.9 RHEUMATOID ARTHRITIS, UNSPECIFIED: ICD-10-CM

## 2021-03-30 DIAGNOSIS — E11.51 TYPE 2 DIABETES MELLITUS WITH DIABETIC PERIPHERAL ANGIOPATHY WITHOUT GANGRENE: ICD-10-CM

## 2021-03-30 DIAGNOSIS — R53.81 OTHER MALAISE: ICD-10-CM

## 2021-03-30 DIAGNOSIS — I73.9 PERIPHERAL VASCULAR DISEASE, UNSPECIFIED: ICD-10-CM

## 2021-03-30 DIAGNOSIS — L89.620 PRESSURE ULCER OF LEFT HEEL, UNSTAGEABLE: ICD-10-CM

## 2021-03-30 DIAGNOSIS — S90.822A BLISTER (NONTHERMAL), LEFT FOOT, INITIAL ENCOUNTER: ICD-10-CM

## 2021-03-30 DIAGNOSIS — N17.9 ACUTE KIDNEY FAILURE, UNSPECIFIED: ICD-10-CM

## 2021-03-30 DIAGNOSIS — K21.9 GASTRO-ESOPHAGEAL REFLUX DISEASE WITHOUT ESOPHAGITIS: ICD-10-CM

## 2021-03-30 DIAGNOSIS — Z48.812 ENCOUNTER FOR SURGICAL AFTERCARE FOLLOWING SURGERY ON THE CIRCULATORY SYSTEM: ICD-10-CM

## 2021-03-30 DIAGNOSIS — L29.9 PRURITUS, UNSPECIFIED: ICD-10-CM

## 2021-03-30 DIAGNOSIS — Z86.718 PERSONAL HISTORY OF OTHER VENOUS THROMBOSIS AND EMBOLISM: ICD-10-CM

## 2021-03-30 DIAGNOSIS — E11.22 TYPE 2 DIABETES MELLITUS WITH DIABETIC CHRONIC KIDNEY DISEASE: ICD-10-CM

## 2021-03-30 DIAGNOSIS — N18.2 CHRONIC KIDNEY DISEASE, STAGE 2 (MILD): ICD-10-CM
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
## 2021-03-30 DIAGNOSIS — M21.372 FOOT DROP, LEFT FOOT: ICD-10-CM

## 2021-03-30 LAB
GLUCOSE BLDC GLUCOMTR-MCNC: 134 MG/DL — HIGH (ref 70–99)
GLUCOSE BLDC GLUCOMTR-MCNC: 171 MG/DL — HIGH (ref 70–99)

## 2021-03-30 PROCEDURE — 99232 SBSQ HOSP IP/OBS MODERATE 35: CPT

## 2021-03-30 PROCEDURE — 99223 1ST HOSP IP/OBS HIGH 75: CPT | Mod: GC

## 2021-03-30 RX ORDER — GABAPENTIN 400 MG/1
300 CAPSULE ORAL THREE TIMES A DAY
Refills: 0 | Status: DISCONTINUED | OUTPATIENT
Start: 2021-03-30 | End: 2021-04-09

## 2021-03-30 RX ORDER — INSULIN LISPRO 100/ML
VIAL (ML) SUBCUTANEOUS AT BEDTIME
Refills: 0 | Status: DISCONTINUED | OUTPATIENT
Start: 2021-03-30 | End: 2021-04-09

## 2021-03-30 RX ORDER — SODIUM CHLORIDE 9 MG/ML
1000 INJECTION, SOLUTION INTRAVENOUS
Refills: 0 | Status: DISCONTINUED | OUTPATIENT
Start: 2021-03-30 | End: 2021-04-09

## 2021-03-30 RX ORDER — DEXTROSE 50 % IN WATER 50 %
25 SYRINGE (ML) INTRAVENOUS ONCE
Refills: 0 | Status: DISCONTINUED | OUTPATIENT
Start: 2021-03-30 | End: 2021-04-09

## 2021-03-30 RX ORDER — METOPROLOL TARTRATE 50 MG
25 TABLET ORAL
Refills: 0 | Status: DISCONTINUED | OUTPATIENT
Start: 2021-03-30 | End: 2021-04-09

## 2021-03-30 RX ORDER — APIXABAN 2.5 MG/1
5 TABLET, FILM COATED ORAL
Refills: 0 | Status: DISCONTINUED | OUTPATIENT
Start: 2021-03-30 | End: 2021-04-09

## 2021-03-30 RX ORDER — FOLIC ACID 0.8 MG
1 TABLET ORAL DAILY
Refills: 0 | Status: DISCONTINUED | OUTPATIENT
Start: 2021-03-30 | End: 2021-04-09

## 2021-03-30 RX ORDER — INSULIN GLARGINE 100 [IU]/ML
25 INJECTION, SOLUTION SUBCUTANEOUS
Qty: 0 | Refills: 0 | DISCHARGE

## 2021-03-30 RX ORDER — OXYCODONE AND ACETAMINOPHEN 5; 325 MG/1; MG/1
1 TABLET ORAL EVERY 6 HOURS
Refills: 0 | Status: DISCONTINUED | OUTPATIENT
Start: 2021-03-30 | End: 2021-04-05

## 2021-03-30 RX ORDER — JNJ-78436735 50000000000 [PFU]/.5ML
0.5 SUSPENSION INTRAMUSCULAR ONCE
Refills: 0 | Status: COMPLETED | OUTPATIENT
Start: 2021-03-30 | End: 2021-03-30

## 2021-03-30 RX ORDER — INSULIN LISPRO 100/ML
VIAL (ML) SUBCUTANEOUS
Refills: 0 | Status: DISCONTINUED | OUTPATIENT
Start: 2021-03-30 | End: 2021-04-09

## 2021-03-30 RX ORDER — DEXTROSE 50 % IN WATER 50 %
12.5 SYRINGE (ML) INTRAVENOUS ONCE
Refills: 0 | Status: DISCONTINUED | OUTPATIENT
Start: 2021-03-30 | End: 2021-04-09

## 2021-03-30 RX ORDER — LISINOPRIL 2.5 MG/1
20 TABLET ORAL DAILY
Refills: 0 | Status: DISCONTINUED | OUTPATIENT
Start: 2021-03-30 | End: 2021-04-08

## 2021-03-30 RX ORDER — GLUCAGON INJECTION, SOLUTION 0.5 MG/.1ML
1 INJECTION, SOLUTION SUBCUTANEOUS ONCE
Refills: 0 | Status: DISCONTINUED | OUTPATIENT
Start: 2021-03-30 | End: 2021-04-09

## 2021-03-30 RX ORDER — DEXTROSE 50 % IN WATER 50 %
15 SYRINGE (ML) INTRAVENOUS ONCE
Refills: 0 | Status: DISCONTINUED | OUTPATIENT
Start: 2021-03-30 | End: 2021-04-09

## 2021-03-30 RX ORDER — AMLODIPINE BESYLATE 2.5 MG/1
5 TABLET ORAL DAILY
Refills: 0 | Status: DISCONTINUED | OUTPATIENT
Start: 2021-03-30 | End: 2021-04-08

## 2021-03-30 RX ORDER — FAMOTIDINE 10 MG/ML
20 INJECTION INTRAVENOUS
Refills: 0 | Status: DISCONTINUED | OUTPATIENT
Start: 2021-03-30 | End: 2021-04-09

## 2021-03-30 RX ADMIN — GABAPENTIN 300 MILLIGRAM(S): 400 CAPSULE ORAL at 21:41

## 2021-03-30 RX ADMIN — APIXABAN 5 MILLIGRAM(S): 2.5 TABLET, FILM COATED ORAL at 09:47

## 2021-03-30 RX ADMIN — Medication 1 MILLIGRAM(S): at 09:47

## 2021-03-30 RX ADMIN — GABAPENTIN 300 MILLIGRAM(S): 400 CAPSULE ORAL at 05:42

## 2021-03-30 RX ADMIN — Medication 25 MILLIGRAM(S): at 17:23

## 2021-03-30 RX ADMIN — JNJ-78436735 0.5 MILLILITER(S): 50000000000 SUSPENSION INTRAMUSCULAR at 14:39

## 2021-03-30 RX ADMIN — Medication 25 MILLIGRAM(S): at 09:47

## 2021-03-30 RX ADMIN — APIXABAN 5 MILLIGRAM(S): 2.5 TABLET, FILM COATED ORAL at 17:23

## 2021-03-30 RX ADMIN — Medication 20 MILLIGRAM(S): at 09:47

## 2021-03-30 RX ADMIN — LISINOPRIL 20 MILLIGRAM(S): 2.5 TABLET ORAL at 09:47

## 2021-03-30 RX ADMIN — GABAPENTIN 300 MILLIGRAM(S): 400 CAPSULE ORAL at 12:56

## 2021-03-30 RX ADMIN — AMLODIPINE BESYLATE 5 MILLIGRAM(S): 2.5 TABLET ORAL at 09:47

## 2021-03-30 NOTE — H&P ADULT - NSHPREVIEWOFSYSTEMS_GEN_ALL_CORE
REVIEW OF SYSTEMS  Constitutional: No fever, No Chills, No fatigue  HEENT: No eye pain, No visual disturbances, No difficulty hearing  Pulm: No cough,  No shortness of breath  Cardio: No chest pain, No palpitations  GI:  No abdominal pain, No nausea, No vomiting, No diarrhea, No constipation  : No dysuria, No frequency, No hematuria  Neuro: No headaches, No memory loss, No loss of strength, No numbness, No tremors  Skin: No itching, No rashes, No lesions   Endo: No temperature intolerance  MSK: No joint pain, No joint swelling, No muscle pain, No Neck or back pain  Psych:  No depression, No anxiety REVIEW OF SYSTEMS  Constitutional: No fever, No Chills, No fatigue  HEENT: hx hang cataracts, No visual disturbances, No difficulty hearing  Pulm: No cough,  No shortness of breath  Cardio: No chest pain, No palpitations  GI:  No abdominal pain, No nausea, No vomiting, +diarrhea in hospital, No constipation  : No dysuria, No frequency, No hematuria, occ incontinence  Neuro: No headaches, No memory loss, Hx hang foot drop, No numbness, No tremors  Skin: No itching, No rashes, hx Mohs Nose for CA, actinic keratosis at present  Endo: No temperature intolerance  MSK: joint pain hands secondary to RA, No joint swelling, No muscle pain, No Neck or back pain  Psych:  Hx depression, No anxiety

## 2021-03-30 NOTE — H&P ADULT - NSHPLABSRESULTS_GEN_ALL_CORE
12.2   7.68  )-----------( 181      ( 29 Mar 2021 07:14 )             37.2   03-29    145  |  115<H>  |  17  ----------------------------<  159<H>  3.6   |  23  |  0.64    Ca    8.4<L>      29 Mar 2021 07:14    C Diff by PCR Result: NotDetec (03.28.21 @ 19:28)      GI PCR Panel, Stool (03.28.21 @ 19:28)    Specimen Source: .Stool Feces    Culture Results:   GI PCR Results: NOT detected  *******Please Note:*******  GI panel PCR evaluates for:  Campylobacter, Plesiomonas shigelloides, Salmonella,  Vibrio, Yersinia enterocolitica, Enteroaggregative  Escherichia coli (EAEC), Enteropathogenic E.coli (EPEC),  Enterotoxigenic E. coli (ETEC) lt/st, Shiga-like  toxin-producing E. coli (STEC) stx1/stx2,  Shigella/ Enteroinvasive E. coli (EIEC), Cryptosporidium,  Cyclospora cayetanensis, Entamoeba histolytica,  Giardia lamblia, Adenovirus F 40/41, Astrovirus,  Norovirus GI/GII, Rotavirus A, Sapovirus

## 2021-03-30 NOTE — H&P ADULT - NSHPSOCIALHISTORY_GEN_ALL_CORE
No Hx smoking, no ETOH No drug use    Lives with daughter on first floor house no steps inside- 2 [address]  Ambulated with RW with assist of family PTA    PT 3/29  supervision Bed Mobs  transfers sit to stand Min A  15' RW CG    Max A LB ADL

## 2021-03-30 NOTE — DISCHARGE NOTE NURSING/CASE MANAGEMENT/SOCIAL WORK - MODE OF TRANSPORTATION
Ambulette
ATTG: : wrist and forearm pain concern for fracture. although had imaging, not accessible to us here. will recheck xray, pain medication and ortho follow up closely as outpt.

## 2021-03-30 NOTE — DISCHARGE NOTE NURSING/CASE MANAGEMENT/SOCIAL WORK - NSDCVIVACCINE_GEN_ALL_CORE_FT
Severe acute respiratory syndrome coronavirus 2 (SARS-CoV-2) (Coronavirus disease [COVID-19]) vaccine , 2021/3/30 14:39 , Mraina Quick (RN)  Influenza , 2020/11/21 15:48 , Anay Maher (RN)

## 2021-03-30 NOTE — PROGRESS NOTE ADULT - REASON FOR ADMISSION
PVD with Claudication

## 2021-03-30 NOTE — H&P ADULT - ASSESSMENT
Assessment/Plan:  JULIEN CASTELLANO is a 85y with a history of *** who presented to *** on *** with complaint of *** and found to have ***. Hospital course complicated by ***. Now admitted to [facility] after for initiation of a multidisciplinary rehab program consisting focused on functional mobility, transfers and ADLs (activities of daily living).    Impairments: ***  Impairment Codes: ***    Comprehensive Multidisciplinary Rehab Program:  - Start comprehensive rehab program, 3 hours a day, 5 days a week.  - PT 1hr/day: Focused on improving strength, endurance, coordination, balance, functional mobility, and transfers  - OT 1hr/day: Focused on improving strength, fine motor skills, coordination, posture and ADLs.    - Speech Therapy 1hr/day: to diagnose and treat deficits in swallowing, cognition and communication.   - P&O as needed  - Activity Limitations: Decreased social, vocational and leisure activities, decreased self care and ADLs, decreased mobility, decreased ability to manage household and finances.     Participation Restrictions/Precautions:  - Weight bearing status: ****  - ROM restrictions: ***  - Precautions:    [ ] Falls   [ ] Spinal   [ ] Hip (Anterior or Posterior)       [ ] Seizure  [ ] Cardiac   [ ] Sternal    Rehab Diagnosis/Management:  Mood/Cognition:  - Neuropsychology consult  - [ ] Enhanced Supervision  [ ] Constant Observation    Sleep:   - Maintain quiet hours and low stim environment.  - Melatonin PRN to maximize participation in therapy during the day.   - Monitor sleep logs    Pain Management:  - Tylenol PRN  - Avoid sedating medications that may interfere with cognitive recovery    GI/Bowel:  - At risk for constipation due to neurologic diagnosis, immobility and/or medication use  - Senna QHS, Miralax PRN Daily  - GI ppx:    /Bladder:   - At risk for incontinence and retention due to neurologic diagnosis and limited mobility  - Currently patient voids:    [ ] independent     [ ] external collection device (condom cath)    [ ] Indwelling hurst catheter     [ ] Intermittent catheterization  - Continue catheter/bladder nursing protocol with bladder scans Q**** hours with straight cath for >***cc.  - Encourage timed voids every 4 hours while awake for independence and to promote continence during therapy.    Skin/Pressure Injury:   - At risk for pressure injury due to neurologic diagnosis and relative immobility.  - Skin assessment on admission admission: ***  - Monitor Incisions: ***  - Turn every 2 hours while in bed, air mattress  - Soft heel protectors  - Skin barrier cream as needed  - Nursing to monitor skin Qshift    Diet/Dysphagia:  - Diet Consistency/Modifications: ***  - Supplements: ***  - Dysphagia:   [ ] Aspiration Precautions   [ ] SLP consult for swallow function evaluation and treatment  - Nutrition consult for ***    DVT ppx:  - ***  - SCDs  - Last Doppler on ***  -------------  Comorbid Medical Management:    ---------------  Code Status/Emergency Contact:    Outpatient Follow-up (Specialty/Name of physician):    --------------  Goals: Safe discharge to ***  Estimated Length of Stay: 10-14 days  Rehab Potential: Good  Medical Prognosis: Good  Estimated Disposition: Home with Home Care  ---------------    PRESCREEN COMPARISON:  I have reviewed the prescreen information and I have found no relevant changes between the preadmission screening and my post admission evaluation.    RATIONALE FOR INPATIENT ADMISSION: Patient demonstrates the following:  [X] Medically appropriate for rehabilitation admission  [X] Has attainable rehab goals with an appropriate initial discharge plan  [X]Has rehabilitation potential (expected to make a significant improvement within a reasonable period of time)  [X] Requires close medical management by a rehab physician, rehab nursing care, Hospitalist and comprehensive interdisciplinary team (including PT, OT and/or SLP, Prosthetics and Orthotics)       Assessment/Plan:  JULIEN CASTELLANO is a 85y with a history of CAD, DM2, HTN, RA, Recent Right Hip fx Orif 11/20, Right calf DVT 2/21 who presented to  on 3/22 with complaint of severe Left leg pain and found to have PAD SP Left fem pop Bypass 3/24. Hospital course complicated by diarrhea- now resolved. Now admitted to [facility] after for initiation of a multidisciplinary rehab program consisting focused on functional mobility, transfers and ADLs (activities of daily living).        Comprehensive Multidisciplinary Rehab Program:  - Start comprehensive rehab program, 3 hours a day, 5 days a week.  - PT 2hr/day: Focused on improving strength, endurance, coordination, balance, functional mobility, and transfers  - OT 1hr/day: Focused on improving strength, fine motor skills, coordination, posture and ADLs.    - Speech Therapy NA  - P&O as needed  - Activity Limitations: Decreased social, vocational and leisure activities, decreased self care and ADLs, decreased mobility, decreased ability to manage household and finances.     Participation Restrictions/Precautions:  - Weight bearing status: WBAT  - ROM restrictions: none  - Precautions:    [x ] Falls   [ ] Spinal   [ ] Hip (Anterior or Posterior)       [ ] Seizure  [ ] Cardiac   [ ] Sternal    Rehab Diagnosis/Management:      Sleep:   - Maintain quiet hours and low stim environment.      Pain Management:  - percoset PRN Mod pain  - Avoid sedating medications that may interfere with cognitive recovery    GI/Bowel:  - At risk for constipation due to neurologic diagnosis, immobility and/or medication use  - recent diarrhea secondary to antibiotics- no bowel meds ordered  - GI ppx:pepcid    /Bladder:   - At risk for incontinence and retention due to neurologic diagnosis and limited mobility  - Currently patient voids:    [x ] independent     [ ] external collection device (condom cath)    [ ] Indwelling hurst catheter     [ ] Intermittent catheterization  - Baseline bladder scans -straight cath for >350cc.  - Encourage timed voids every 4 hours while awake for independence and to promote continence during therapy.    Skin/Pressure Injury:   - At risk for pressure injury due to neurologic diagnosis and relative immobility.  - Skin assessment on admission - Left heel , calf great toe eschar   - Monitor Incisions: Left groin with sutures, Left medial thigh intact  - Turn every 2 hours while in bed, air mattress  - Soft heel protectors  - Skin barrier cream as needed  Betadine left heel and 1st toe , xeroform to Left calf ,DSD Carly daily  - Nursing to monitor skin Qshift    Diet/Dysphagia:  - Diet Consistency/Modifications:reg consistency  - Supplements: NA  - *    DVT ppx: on Eliquis    - Last Doppler on- NA  -------------  Comorbid Medical Management:    Diarrhea  C diff neg  GI PCR neg  resolved    DM 2  took lantus 25 units HS PTA  Now On SSI only   Consider adding lantus Hs- Hospitalist to address    HTN  continue lisinopril,amlodipine, metoprolol    depression   cont paxil    RA   takes methotrexate 15mg weekly on Wednesday  has held it for two weeks  will discuss with hospitalist restarting dose in AM    Hx DVT  rrestart eliquis 5mg 2x/day  ---------------  Code Status/Emergency Contact:    Outpatient Follow-up (Specialty/Name of physician):    --------------  Goals: Safe discharge to home  Estimated Length of Stay: 10-14 days  Rehab Potential: Good  Medical Prognosis: Good  Estimated Disposition: Home with Home Care  ---------------    PRESCREEN COMPARISON:  I have reviewed the prescreen information and I have found no relevant changes between the preadmission screening and my post admission evaluation.    RATIONALE FOR INPATIENT ADMISSION: Patient demonstrates the following:  [X] Medically appropriate for rehabilitation admission  [X] Has attainable rehab goals with an appropriate initial discharge plan  [X]Has rehabilitation potential (expected to make a significant improvement within a reasonable period of time)  [X] Requires close medical management by a rehab physician, rehab nursing care, Hospitalist and comprehensive interdisciplinary team (including PT, OT and/or SLP, Prosthetics and Orthotics)       Assessment/Plan:  JULIEN CASTELLANO is a 85y with a history of CAD, DM2, HTN, RA, Recent Right Hip fx Orif 11/20, Right calf DVT 2/21 who presented to  on 3/22 with complaint of severe Left leg pain and found to have PAD SP Left fem pop Bypass 3/24. Hospital course complicated by diarrhea- now resolved. Now admitted to [facility] after for initiation of a multidisciplinary rehab program consisting focused on functional mobility, transfers and ADLs (activities of daily living).        Comprehensive Multidisciplinary Rehab Program:  - Start comprehensive rehab program, 3 hours a day, 5 days a week.  - PT 2hr/day: Focused on improving strength, endurance, coordination, balance, functional mobility, and transfers  - OT 1hr/day: Focused on improving strength, fine motor skills, coordination, posture and ADLs.    - Speech Therapy NA  - P&O as needed  - Activity Limitations: Decreased social, vocational and leisure activities, decreased self care and ADLs, decreased mobility, decreased ability to manage household and finances.     Participation Restrictions/Precautions:  - Weight bearing status: WBAT  - ROM restrictions: none  - Precautions:    [x ] Falls   [ ] Spinal   [ ] Hip (Anterior or Posterior)       [ ] Seizure  [ ] Cardiac   [ ] Sternal    Rehab Diagnosis/Management:      Sleep:   - Maintain quiet hours and low stim environment.      Pain Management:  - percoset PRN Mod pain  - Avoid sedating medications that may interfere with cognitive recovery    GI/Bowel:  - At risk for constipation due to neurologic diagnosis, immobility and/or medication use  - recent diarrhea secondary to antibiotics- no bowel meds ordered  - GI ppx:pepcid    /Bladder:   - At risk for incontinence and retention due to neurologic diagnosis and limited mobility  - Currently patient voids:    [x ] independent     [ ] external collection device (condom cath)    [ ] Indwelling hurst catheter     [ ] Intermittent catheterization  - Baseline bladder scans -straight cath for >350cc.  - Encourage timed voids every 4 hours while awake for independence and to promote continence during therapy.    Skin/Pressure Injury:   - At risk for pressure injury due to neurologic diagnosis and relative immobility.  - Skin assessment on admission - Left heel , calf great toe eschar   - Monitor Incisions: Left groin with sutures, Left medial thigh intact  - Turn every 2 hours while in bed, air mattress  - Soft heel protectors  - Skin barrier cream as needed  Betadine left heel and 1st toe , xeroform to Left calf ,DSD Carly daily  - Nursing to monitor skin Qshift    Diet/Dysphagia:  - Diet Consistency/Modifications:reg consistency  - Supplements: NA  - *    DVT ppx: on Eliquis    - Last Doppler on- NA  -------------  Comorbid Medical Management:    Diarrhea  C diff neg  GI PCR neg  resolved    DM 2  took lantus 25 units HS PTA  Now On SSI only   Consider adding lantus Hs- Hospitalist to address    HTN  continue lisinopril,amlodipine, metoprolol    depression   cont paxil    RA   takes methotrexate 15mg weekly on Wednesday  has held it for two weeks  will discuss with hospitalist restarting dose in AM    Hx DVT  restart eliquis 5mg 2x/day      Neuropathy  continue gabapentin    ---------------  Code Status/Emergency Contact:    Outpatient Follow-up (Specialty/Name of physician):    --------------  Goals: Safe discharge to home  Estimated Length of Stay: 10-14 days  Rehab Potential: Good  Medical Prognosis: Good  Estimated Disposition: Home with Home Care  ---------------    PRESCREEN COMPARISON:  I have reviewed the prescreen information and I have found no relevant changes between the preadmission screening and my post admission evaluation.    RATIONALE FOR INPATIENT ADMISSION: Patient demonstrates the following:  [X] Medically appropriate for rehabilitation admission  [X] Has attainable rehab goals with an appropriate initial discharge plan  [X]Has rehabilitation potential (expected to make a significant improvement within a reasonable period of time)  [X] Requires close medical management by a rehab physician, rehab nursing care, Hospitalist and comprehensive interdisciplinary team (including PT, OT and/or SLP, Prosthetics and Orthotics)       Assessment/Plan:  JULIEN CASTELLANO is a 85y with a history of CAD, DM2, HTN, RA, Recent Right Hip fx Orif 11/20, Right calf DVT 2/21 who presented to  on 3/22 with complaint of severe Left leg pain and found to have PAD SP Left fem pop Bypass 3/24. Hospital course complicated by diarrhea- now resolved. Now admitted to [facility] after for initiation of a multidisciplinary rehab program consisting focused on functional mobility, transfers and ADLs (activities of daily living).        Comprehensive Multidisciplinary Rehab Program:  - Start comprehensive rehab program, 3 hours a day, 5 days a week.  - PT 2hr/day: Focused on improving strength, endurance, coordination, balance, functional mobility, and transfers  - OT 1hr/day: Focused on improving strength, fine motor skills, coordination, posture and ADLs.    - Speech Therapy NA  - P&O as needed  - Activity Limitations: Decreased social, vocational and leisure activities, decreased self care and ADLs, decreased mobility, decreased ability to manage household and finances.     Participation Restrictions/Precautions:  - Weight bearing status: WBAT  - ROM restrictions: none  - Precautions:    [x ] Falls   [ ] Spinal   [ ] Hip (Anterior or Posterior)       [ ] Seizure  [ ] Cardiac   [ ] Sternal    Rehab Diagnosis/Management:      Sleep:   - Maintain quiet hours and low stim environment.      Pain Management:  - percoset PRN Mod pain  - Avoid sedating medications that may interfere with cognitive recovery    GI/Bowel:  - At risk for constipation due to neurologic diagnosis, immobility and/or medication use  - recent diarrhea secondary to antibiotics- no bowel meds ordered  - GI ppx:pepcid    /Bladder:   - At risk for incontinence and retention due to neurologic diagnosis and limited mobility  - Currently patient voids:    [x ] independent     [ ] external collection device (condom cath)    [ ] Indwelling hurst catheter     [ ] Intermittent catheterization  - Baseline bladder scans -straight cath for >350cc.  - Encourage timed voids every 4 hours while awake for independence and to promote continence during therapy.    Skin/Pressure Injury:   - At risk for pressure injury due to neurologic diagnosis and relative immobility.  - Skin assessment on admission - Left heel pressure ulcer with eschar , calf &great toe eschar   - Monitor Incisions: Left groin with sutures, Left medial thigh intact  - Turn every 2 hours while in bed, air mattress  - Soft heel protectors  - Skin barrier cream as needed  Betadine left heel and 1st toe , xeroform to Left calf ,DSD Carly daily  - Nursing to monitor skin Qshift    Diet/Dysphagia:  - Diet Consistency/Modifications:reg consistency  - Supplements: NA  - *    DVT ppx: on Eliquis    - Last Doppler on- NA  -------------  Comorbid Medical Management:    Diarrhea  C diff neg  GI PCR neg  resolved    DM 2  took lantus 25 units HS PTA  Now On SSI only   Consider adding lantus Hs- Hospitalist to address    HTN  continue lisinopril,amlodipine, metoprolol    depression   cont paxil    RA   takes methotrexate 15mg weekly on Wednesday  has held it for two weeks  ? restart in AM    Hx DVT  restart eliquis 5mg 2x/day      Neuropathy  continue gabapentin      ---------------  Code Status/Emergency Contact:    Outpatient Follow-up (Specialty/Name of physician):    --------------  Goals: Safe discharge to home  Estimated Length of Stay: 10-14 days  Rehab Potential: Good  Medical Prognosis: Good  Estimated Disposition: Home with Home Care  ---------------    PRESCREEN COMPARISON:  I have reviewed the prescreen information and I have found no relevant changes between the preadmission screening and my post admission evaluation.    RATIONALE FOR INPATIENT ADMISSION: Patient demonstrates the following:  [X] Medically appropriate for rehabilitation admission  [X] Has attainable rehab goals with an appropriate initial discharge plan  [X]Has rehabilitation potential (expected to make a significant improvement within a reasonable period of time)  [X] Requires close medical management by a rehab physician, rehab nursing care, Hospitalist and comprehensive interdisciplinary team (including PT, OT and/or SLP, Prosthetics and Orthotics)

## 2021-03-30 NOTE — PATIENT PROFILE ADULT - BILL PAYMENT
Problem: PHYSICAL THERAPY ADULT  Goal: Performs mobility at highest level of function for planned discharge setting  See evaluation for individualized goals  Description  Treatment/Interventions: Functional transfer training, LE strengthening/ROM, Endurance training, Patient/family training, Equipment eval/education, Bed mobility, Spoke to nursing, Spoke to case management(w/c training)  Equipment Recommended: Wheelchair       See flowsheet documentation for full assessment, interventions and recommendations  Outcome: Progressing  Note:   Prognosis: Fair  Problem List: Decreased strength, Decreased range of motion, Decreased endurance, Impaired balance, Decreased mobility, Decreased coordination, Impaired tone, Impaired sensation, Impaired judgement  Assessment: Pt  is a 73 yo F who presents with sepsis  Pt  Agreeable to PT session, Pt  Identified with full name and birthdate  Pt  Demonstrated increased functional independence and increased activity tolerance as evidenced above  Pt  Required decreased assistance with bed mobility and was able to continue to tolerate WC mobility at community distances  Pt  Is progressing towards goals, as expected and will benefit from continued skilled therapy to increase functional independence and aid patient in return to PLOF with decreased burden of care  D/C recommendation is rehab when medically appropriate  Recommend continued trials with SB transfers next session to increase independence with mobility OOB  Barriers to Discharge: Decreased caregiver support     Recommendation: Post acute IP rehab     PT - OK to Discharge: Yes    See flowsheet documentation for full assessment  no

## 2021-03-30 NOTE — PROGRESS NOTE ADULT - PROVIDER SPECIALTY LIST ADULT
Vascular Surgery
Hospitalist
Podiatry
Vascular Surgery
Podiatry
Vascular Surgery
Podiatry

## 2021-03-30 NOTE — H&P ADULT - HISTORY OF PRESENT ILLNESS
85 y.o. female with PMH of anemia, arthritis, CAD, DMII, depression, HTN, PVD, RA, p/w LLE pain. Patient is admitted to surgery service on 3/22 at [facility] with severe Left LE pain and underwent Left fem-pop-bypass on 3/24. Patient's post op course complicated by pain as well as diarrhea- stool negative for Cdiff and diarrhea has subdsded.     Patient deemed medically stable- Seen By PT,OT and screened by physiatry and deemed a good candidate for intensive rehab- [facility] today to restore functional independence and safe DC to home.

## 2021-03-30 NOTE — PROGRESS NOTE ADULT - ASSESSMENT
85 y.o. female with PMH of anemia, arthritis, CAD, DMII, depression, HTN, PVD, RA, p/w LLE pain. POD4 from LLE fem-pop bypass.    # PVD w/ LLE claudication - s/p Fem-pop bypass surgery 3/24 with restoration of pulse, resolution of pain  - postop management as per Vascular surgery and podiatry  - added incentive spirometry   no evidence of cellulitis or evidence of  infection at inscision site   L foot warm; L groin dressing dry and intact      # Diarrhea resolved    c diff negative    GI PCR neg    # Normocytic anemia withs/p  acute blood loss with surgery -    now hb stable       # DMII   decreased home dose lantus from 25 u to 10 u hs to avoid hypoglycemia  as blood glucose has been 150's -180's while only on insulin sliding scale   made the change to dose in discharge med rec as well    # H/o depression / HTN / RA   - c/w home meds and f/u outpatient for further management    # D/C'd IVF and Padilla catheter and passed  voiding trial - as of 3/26    # hx of acute DVT feb 2021  resume eliquis 5 BID - vascular ok to resume eliquis    medically stable for discharge   discharge planning as per vascular surgery  [facility] regb vs home with home care  I discussed with patient's daughter 3/29    case dw daughter Dr. Nirali Ahumada ( ED pphsyician) (# in patient info) 3/29.     85 y.o. female with PMH of anemia, arthritis, CAD, DMII, depression, HTN, PVD, RA, p/w LLE pain. POD4 from LLE fem-pop bypass.    # PVD w/ LLE claudication - s/p Fem-pop bypass surgery 3/24 with restoration of pulse, resolution of pain  - postop management as per Vascular surgery and podiatry  - added incentive spirometry   no evidence of cellulitis or evidence of  infection at inscision site   L foot warm; L groin dressing dry and intact      # Diarrhea resolved    c diff negative    GI PCR neg    # Normocytic anemia withs/p  acute blood loss with surgery -    now hb stable       # DMII   decreased home dose lantus from 25 u to 10 u hs to avoid hypoglycemia  as blood glucose has been 150's -180's while only on insulin sliding scale   made the change to dose in discharge med rec as well    # H/o depression / HTN / RA   - c/w home meds and f/u outpatient for further management    # D/C'd IVF and Padilla catheter and passed  voiding trial - as of 3/26    # hx of acute DVT feb 2021  resume eliquis 5 BID - vascular ok to resume eliquis    medically stable for discharge   discharge planning as per vascular surgery  [facility] regb vs home with home care  I discussed with patient's daughter 3/29    case dw daughter Dr. Nirali Ahumada ( ED pphsyician) (# in patient info) 3/29.  pt consented to vaccine ,adminstered covid vaccine 3/30     85 y.o. female with PMH of anemia, arthritis, CAD, DMII, depression, HTN, PVD, RA, p/w LLE pain. POD4 from LLE fem-pop bypass.    # PVD w/ LLE claudication - s/p Fem-pop bypass surgery 3/24 with restoration of pulse, resolution of pain  - postop management as per Vascular surgery and podiatry  - added incentive spirometry   no evidence of cellulitis or evidence of  infection at inscision site   L foot warm; L groin dressing dry and intact      # Diarrhea resolved    c diff negative    GI PCR neg    # Normocytic anemia withs/p  acute blood loss with surgery -    now hb stable       # DMII   decreased home dose lantus from 25 u to 10 u hs to avoid hypoglycemia  as blood glucose has been 150's -180's while only on insulin sliding scale   made the change to dose in discharge med rec as well    # H/o depression / HTN / RA   - c/w home meds and f/u outpatient for further management    # D/C'd IVF and Padilla catheter and passed  voiding trial - as of 3/26    # hx of acute DVT feb 2021  resume eliquis 5 BID - vascular ok to resume eliquis    medically stable for discharge   discharge planning as per vascular surgery  [facility] regb vs home with home care  I discussed with patient's daughter 3/29    case dw daughter Dr. Nirali Ahumada ( ED pphsyician) (# in patient info) 3/29.  pt consented to vaccine ,adminstered covid vaccine 3/30 [employer]

## 2021-03-30 NOTE — DISCHARGE NOTE NURSING/CASE MANAGEMENT/SOCIAL WORK - PATIENT PORTAL LINK FT
You can access the FollowMyHealth Patient Portal offered by [facility] by registering at the following website: [email or web address]. By joining [employer]’s FollowMyHealth portal, you will also be able to view your health information using other applications (apps) compatible with our system.

## 2021-03-30 NOTE — PROGRESS NOTE ADULT - SUBJECTIVE AND OBJECTIVE BOX
85 y.o. female with PMH of anemia, arthritis, CAD, DMII, depression, HTN, PVD, RA, p/w LLE pain. Patient is admitted to surgery service and awaiting fem-pop-bypass. Patient's only complaint at this time is LLE pain which she arrived to hospital with. Patient states at baseline she walks with a walker. Denies feeling CP, SOB, cough, runny nose, sore throat, nausea, vomiting, abdominal pain, fever, chills. Patient is has insulin dependent diabetes, but denies h/o CHF / CKD / CVA.     3/26- comfortable in the chair, less pain in LLE, difficulty to ambulate  3/27- pt seen and evaluated at bedside. No new events/complaints. Pain in LLE well-controlled. POD4 from L fem-pop bypass. Pain well-controlled. Surgical site dressing noted at L groin with proximal drainage. Clean & dry.   3/28 3 bowel movement loose diarrhea nonbloody, decreased appetite, no abd pain , LLE paih well controlled , POD #5  3/30 diarrhea resolved, has improved appetite , no abd pain , c diff neg    ROS:   All 10 systems reviewed and found to be negative with the exception of what has been described above.  GEN: elderly female, lying in bed, NAD  HEENT:   NC/AT, pupils equal and reactive, EOMI  CV:  +S1, +S2, RRR  RESP:   fine bibasilar rales noted; no wheeze, rhonchi   BREAST:  not examined  GI:  abdomen soft, non-tender, non-distended, normoactive BS  RECTAL:  not examined  :  not examined  MSK:   normal muscle tone  EXT:  LLE warm with +1 edema. Pulses 2+ B/L.  NEURO:  AAOX3, no focal neurological deficits  SKIN:  . Surgical site dressing noted over L groin, clean & dry.       PHYSICAL EXAM:    Daily     Daily     ICU Vital Signs Last 24 Hrs  T(C): 36.5 (30 Mar 2021 07:45), Max: 36.5 (30 Mar 2021 07:45)  T(F): 97.7 (30 Mar 2021 07:45), Max: 97.7 (30 Mar 2021 07:45)  HR: 98 (30 Mar 2021 07:45) (84 - 98)  BP: 148/61 (30 Mar 2021 07:45) (148/61 - 154/54)  BP(mean): --  ABP: --  ABP(mean): --  RR: 19 (30 Mar 2021 07:45) (18 - 19)  SpO2: 96% (30 Mar 2021 07:45) (96% - 98%)                              12.2   7.68  )-----------( 181      ( 29 Mar 2021 07:14 )             37.2       CBC Full  -  ( 29 Mar 2021 07:14 )  WBC Count : 7.68 K/uL  RBC Count : 4.04 M/uL  Hemoglobin : 12.2 g/dL  Hematocrit : 37.2 %  Platelet Count - Automated : 181 K/uL  Mean Cell Volume : 92.1 fl  Mean Cell Hemoglobin : 30.2 pg  Mean Cell Hemoglobin Concentration : 32.8 gm/dL  Auto Neutrophil # : 4.79 K/uL  Auto Lymphocyte # : 1.37 K/uL  Auto Monocyte # : 1.12 K/uL  Auto Eosinophil # : 0.35 K/uL  Auto Basophil # : 0.03 K/uL  Auto Neutrophil % : 62.3 %  Auto Lymphocyte % : 17.8 %  Auto Monocyte % : 14.6 %  Auto Eosinophil % : 4.6 %  Auto Basophil % : 0.4 %      03-29    145  |  115<H>  |  17  ----------------------------<  159<H>  3.6   |  23  |  0.64    Ca    8.4<L>      29 Mar 2021 07:14                              MEDICATIONS  (STANDING):  amLODIPine   Tablet 5 milliGRAM(s) Oral daily  apixaban 5 milliGRAM(s) Oral two times a day  coronavirus (EUA) Vaccine ([employer]) 0.5 milliLiter(s) IntraMuscular once  dextrose 40% Gel 15 Gram(s) Oral once  dextrose 40% Gel 15 Gram(s) Oral once  dextrose 5%. 1000 milliLiter(s) (100 mL/Hr) IV Continuous <Continuous>  dextrose 5%. 1000 milliLiter(s) (50 mL/Hr) IV Continuous <Continuous>  dextrose 5%. 1000 milliLiter(s) (50 mL/Hr) IV Continuous <Continuous>  dextrose 5%. 1000 milliLiter(s) (100 mL/Hr) IV Continuous <Continuous>  dextrose 50% Injectable 25 Gram(s) IV Push once  dextrose 50% Injectable 12.5 Gram(s) IV Push once  dextrose 50% Injectable 12.5 Gram(s) IV Push once  dextrose 50% Injectable 25 Gram(s) IV Push once  dextrose 50% Injectable 25 Gram(s) IV Push once  folic acid 1 milliGRAM(s) Oral daily  gabapentin 300 milliGRAM(s) Oral three times a day  glucagon  Injectable 1 milliGRAM(s) IntraMuscular once  glucagon  Injectable 1 milliGRAM(s) IntraMuscular once  insulin lispro (ADMELOG) corrective regimen sliding scale   SubCutaneous three times a day before meals  insulin lispro (ADMELOG) corrective regimen sliding scale   SubCutaneous at bedtime  lisinopril 20 milliGRAM(s) Oral daily  metoprolol tartrate 25 milliGRAM(s) Oral two times a day  PARoxetine 20 milliGRAM(s) Oral daily

## 2021-03-30 NOTE — PATIENT PROFILE ADULT - FALL HARM RISK
Right hip replacement  ulcers/wounds to left leg/age(85 years old or older)/bones(Osteoporosis,prev fx,steroid use,metastatic bone ca)/coagulation(Bleeding disorder R/T clinical cond/anti-coags)/surgery

## 2021-03-30 NOTE — H&P ADULT - NSHPPHYSICALEXAM_GEN_ALL_CORE
PHYSICAL EXAMINATION   VItals: T(C): 36.5 (03-30-21 @ 07:45), Max: 36.5 (03-30-21 @ 07:45)  HR: 98 (03-30-21 @ 07:45) (84 - 98)  BP: 148/61 (03-30-21 @ 07:45) (148/61 - 154/54)  RR: 19 (03-30-21 @ 07:45) (18 - 19)  SpO2: 96% (03-30-21 @ 07:45) (96% - 98%)    General: NAD, Resting Comfortable,                                  HEENT: NC/AT, EOM I, PERRLA, Normal Conjunctivae  Cardio: RRR, Normal S1-S2, No M/G/R                              Pulm: No Respiratory Distress,  Lungs CTAB                        Abdomen: ND/NT, Soft, BS+                                                MSK: No joint swelling, Full ROM                                         Ext: No C/C/E, Pulses 2+ throughout, No calf tenderness    Skin:  all skin intact                                                                 Wounds: none  Decubitus Ulcers: None Present     Neurological Examination    Cognitive: AAO x 3                                                                         Attention: Intact   Judgment: Good evidence of judgement                               Memory: Recall 3 objects immediate and 3 min later      Mood/Affect: wnl                                                                           Communication:  Fluent,  No dysarthria   Swallow: intact  CN II - XII  intact  Coordination: FTN/HTS intact                                                                              Sensory: Intact to light touch, PP and Vibration                                                                                             Tone: normal Throughout   Balance: impaired    Motor    LEFT    UE: SF [5/5], EF [5/5], EE [5/5], WE [5/5],  [wnl]  RIGHT UE: SF [5/5], EF [5/5], EE [5/5], WE [5/5],  [wnl]  LEFT    LE:  HF [5/5], KE [5/5], DF [5/5], EHL [5/5],  PF [5/5]  RIGHT LE:  HF [5/5], KE [5/5], DF [5/5], EHL [5/5],  PF [5/5]      Reflex:  2 + thoroughout, Saab/Babinski negative PHYSICAL EXAMINATION   VItals: T(C): 36.5 (03-30-21 @ 07:45), Max: 36.5 (03-30-21 @ 07:45)  HR: 98 (03-30-21 @ 07:45) (84 - 98)  BP: 148/61 (03-30-21 @ 07:45) (148/61 - 154/54)  RR: 19 (03-30-21 @ 07:45) (18 - 19)  SpO2: 96% (03-30-21 @ 07:45) (96% - 98%)    General: NAD, Resting Comfortable,                                  HEENT: NC/AT, EOM I, PERRLA, Normal Conjunctivae  Cardio: RRR, Normal S1-S2, No M/G/R                              Pulm: No Respiratory Distress,  Lungs CTAB                        Abdomen: ND/NT, Soft, BS+                                                MSK: No joint swelling, Full ROM    RA deformities Left >right hand                                     Ext: No C/C/E- dp Pulses 2+ , No calf tenderness    Skin:  Left groin incision with sutures, no drainage, left medial thigh incision intact-sub cut closure                                                            Wounds: Left posterior calf with venous ulcer /eschar with surrounding excoriations- left heel with 3x3m eschar/DTI, Left dorsal first toe with scab?eschar  Decubitus Ulcers: None Present     Neurological Examination    Cognitive: AAO x 3                                                                         Attention: Intact   Judgment: Good evidence of judgement                               Memory: Recall 3 objects immediate and 3 min later      Mood/Affect: wnl                                                                           Communication:  Fluent,  No dysarthria   Swallow: intact  CN II - XII  intact  Coordination: FTN/HTS intact                                                                              Sensory: Intact to light touch,                                                                                             Tone: normal Throughout   Balance: impaired    Motor    LEFT    UE: SF [5/5], EF [5/5], EE [5/5], WE [5/5],  [wnl]  RIGHT UE: SF [5/5], EF [5/5], EE [5/5], WE [5/5],  [wnl]  LEFT    LE:  HF [3+/5], KE [4/5], DF [1-2/5], EHL [0/5],  PF [3/5]  RIGHT LE:  HF [4/5], KE [5/5], DF [1/5], EHL [0/5],  PF [3/5]      Reflex:  2 + thoroughout, Saab/Babinski negative PHYSICAL EXAMINATION   VItals: T(C): 36.5 (03-30-21 @ 07:45), Max: 36.5 (03-30-21 @ 07:45)  HR: 98 (03-30-21 @ 07:45) (84 - 98)  BP: 148/61 (03-30-21 @ 07:45) (148/61 - 154/54)  RR: 19 (03-30-21 @ 07:45) (18 - 19)  SpO2: 96% (03-30-21 @ 07:45) (96% - 98%)    General: NAD, Resting Comfortable,                                  HEENT: NC/AT, EOM I, PERRLA, Normal Conjunctivae  Cardio: RRR, Normal S1-S2, No M/G/R                              Pulm: No Respiratory Distress,  Lungs CTAB                        Abdomen: ND/NT, Soft, BS+                                                MSK: No joint swelling, Full ROM    RA deformities Left >right hand                                     Ext: No C/C/E- dp Pulses 2+ , No calf tenderness    Skin:  Left groin incision with sutures, no drainage, left medial thigh incision intact-sub cut closure                                                            Wounds: Left posterior calf with venous ulcer /eschar with surrounding excoriations- left heel with pressure ulcer unstageable with eschar, Left dorsal first toe with scab?eschar  Decubitus Ulcers: None Present     Neurological Examination    Cognitive: AAO x 3                                                                         Attention: Intact   Judgment: Good evidence of judgement                               Memory: Recall 3 objects immediate and 3 min later      Mood/Affect: wnl                                                                           Communication:  Fluent,  No dysarthria   Swallow: intact  CN II - XII  intact  Coordination: FTN/HTS intact                                                                              Sensory: Intact to light touch,                                                                                             Tone: normal Throughout   Balance: impaired    Motor    LEFT    UE: SF [5/5], EF [5/5], EE [5/5], WE [5/5],  [wnl]  RIGHT UE: SF [5/5], EF [5/5], EE [5/5], WE [5/5],  [wnl]  LEFT    LE:  HF [3+/5], KE [4/5], DF [1-2/5], EHL [0/5],  PF [3/5]  RIGHT LE:  HF [4/5], KE [5/5], DF [1/5], EHL [0/5],  PF [3/5]      Reflex:  2 + thoroughout, Saab/Babinski negative
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
## 2021-03-31 LAB
A1C WITH ESTIMATED AVERAGE GLUCOSE RESULT: 6.9 % — HIGH (ref 4–5.6)
ALBUMIN SERPL ELPH-MCNC: 2.1 G/DL — LOW (ref 3.3–5)
ALP SERPL-CCNC: 77 U/L — SIGNIFICANT CHANGE UP (ref 40–120)
ALT FLD-CCNC: 13 U/L — SIGNIFICANT CHANGE UP (ref 10–45)
ANION GAP SERPL CALC-SCNC: 10 MMOL/L — SIGNIFICANT CHANGE UP (ref 5–17)
AST SERPL-CCNC: 15 U/L — SIGNIFICANT CHANGE UP (ref 10–40)
BASOPHILS # BLD AUTO: 0.03 K/UL — SIGNIFICANT CHANGE UP (ref 0–0.2)
BASOPHILS NFR BLD AUTO: 0.4 % — SIGNIFICANT CHANGE UP (ref 0–2)
BILIRUB SERPL-MCNC: 0.4 MG/DL — SIGNIFICANT CHANGE UP (ref 0.2–1.2)
BUN SERPL-MCNC: 24 MG/DL — HIGH (ref 7–23)
CALCIUM SERPL-MCNC: 8.4 MG/DL — SIGNIFICANT CHANGE UP (ref 8.4–10.5)
CHLORIDE SERPL-SCNC: 110 MMOL/L — HIGH (ref 96–108)
CO2 SERPL-SCNC: 25 MMOL/L — SIGNIFICANT CHANGE UP (ref 22–31)
COVID-19 SPIKE DOMAIN AB INTERP: POSITIVE
COVID-19 SPIKE DOMAIN ANTIBODY RESULT: 4.78 U/ML — HIGH
CREAT SERPL-MCNC: 0.82 MG/DL — SIGNIFICANT CHANGE UP (ref 0.5–1.3)
EOSINOPHIL # BLD AUTO: 0.48 K/UL — SIGNIFICANT CHANGE UP (ref 0–0.5)
EOSINOPHIL NFR BLD AUTO: 6.1 % — HIGH (ref 0–6)
ESTIMATED AVERAGE GLUCOSE: 151 MG/DL — HIGH (ref 68–114)
GLUCOSE BLDC GLUCOMTR-MCNC: 143 MG/DL — HIGH (ref 70–99)
GLUCOSE BLDC GLUCOMTR-MCNC: 160 MG/DL — HIGH (ref 70–99)
GLUCOSE BLDC GLUCOMTR-MCNC: 164 MG/DL — HIGH (ref 70–99)
GLUCOSE BLDC GLUCOMTR-MCNC: 191 MG/DL — HIGH (ref 70–99)
GLUCOSE SERPL-MCNC: 173 MG/DL — HIGH (ref 70–99)
HCT VFR BLD CALC: 37.8 % — SIGNIFICANT CHANGE UP (ref 34.5–45)
HGB BLD-MCNC: 12.3 G/DL — SIGNIFICANT CHANGE UP (ref 11.5–15.5)
IMM GRANULOCYTES NFR BLD AUTO: 0.3 % — SIGNIFICANT CHANGE UP (ref 0–1.5)
LYMPHOCYTES # BLD AUTO: 1.39 K/UL — SIGNIFICANT CHANGE UP (ref 1–3.3)
LYMPHOCYTES # BLD AUTO: 17.7 % — SIGNIFICANT CHANGE UP (ref 13–44)
MCHC RBC-ENTMCNC: 30.1 PG — SIGNIFICANT CHANGE UP (ref 27–34)
MCHC RBC-ENTMCNC: 32.5 GM/DL — SIGNIFICANT CHANGE UP (ref 32–36)
MCV RBC AUTO: 92.6 FL — SIGNIFICANT CHANGE UP (ref 80–100)
MONOCYTES # BLD AUTO: 1.23 K/UL — HIGH (ref 0–0.9)
MONOCYTES NFR BLD AUTO: 15.7 % — HIGH (ref 2–14)
NEUTROPHILS # BLD AUTO: 4.69 K/UL — SIGNIFICANT CHANGE UP (ref 1.8–7.4)
NEUTROPHILS NFR BLD AUTO: 59.8 % — SIGNIFICANT CHANGE UP (ref 43–77)
NRBC # BLD: 0 /100 WBCS — SIGNIFICANT CHANGE UP (ref 0–0)
PLATELET # BLD AUTO: 189 K/UL — SIGNIFICANT CHANGE UP (ref 150–400)
POTASSIUM SERPL-MCNC: 3.5 MMOL/L — SIGNIFICANT CHANGE UP (ref 3.5–5.3)
POTASSIUM SERPL-SCNC: 3.5 MMOL/L — SIGNIFICANT CHANGE UP (ref 3.5–5.3)
PROT SERPL-MCNC: 6 G/DL — SIGNIFICANT CHANGE UP (ref 6–8.3)
RBC # BLD: 4.08 M/UL — SIGNIFICANT CHANGE UP (ref 3.8–5.2)
RBC # FLD: 15.6 % — HIGH (ref 10.3–14.5)
SARS-COV-2 IGG+IGM SERPL QL IA: 4.78 U/ML — HIGH
SARS-COV-2 IGG+IGM SERPL QL IA: POSITIVE
SARS-COV-2 RNA SPEC QL NAA+PROBE: SIGNIFICANT CHANGE UP
SODIUM SERPL-SCNC: 145 MMOL/L — SIGNIFICANT CHANGE UP (ref 135–145)
WBC # BLD: 7.84 K/UL — SIGNIFICANT CHANGE UP (ref 3.8–10.5)
WBC # FLD AUTO: 7.84 K/UL — SIGNIFICANT CHANGE UP (ref 3.8–10.5)

## 2021-03-31 PROCEDURE — 99223 1ST HOSP IP/OBS HIGH 75: CPT

## 2021-03-31 PROCEDURE — 99232 SBSQ HOSP IP/OBS MODERATE 35: CPT | Mod: GC

## 2021-03-31 RX ORDER — DIPHENHYDRAMINE HCL 50 MG
25 CAPSULE ORAL ONCE
Refills: 0 | Status: COMPLETED | OUTPATIENT
Start: 2021-03-31 | End: 2021-03-31

## 2021-03-31 RX ADMIN — GABAPENTIN 300 MILLIGRAM(S): 400 CAPSULE ORAL at 06:07

## 2021-03-31 RX ADMIN — APIXABAN 5 MILLIGRAM(S): 2.5 TABLET, FILM COATED ORAL at 06:05

## 2021-03-31 RX ADMIN — Medication 25 MILLIGRAM(S): at 06:05

## 2021-03-31 RX ADMIN — Medication 20 MILLIGRAM(S): at 11:30

## 2021-03-31 RX ADMIN — Medication 1 MILLIGRAM(S): at 11:30

## 2021-03-31 RX ADMIN — OXYCODONE AND ACETAMINOPHEN 1 TABLET(S): 5; 325 TABLET ORAL at 15:11

## 2021-03-31 RX ADMIN — GABAPENTIN 300 MILLIGRAM(S): 400 CAPSULE ORAL at 21:22

## 2021-03-31 RX ADMIN — Medication 1: at 11:33

## 2021-03-31 RX ADMIN — Medication 25 MILLIGRAM(S): at 17:03

## 2021-03-31 RX ADMIN — GABAPENTIN 300 MILLIGRAM(S): 400 CAPSULE ORAL at 14:11

## 2021-03-31 RX ADMIN — AMLODIPINE BESYLATE 5 MILLIGRAM(S): 2.5 TABLET ORAL at 06:05

## 2021-03-31 RX ADMIN — Medication 25 MILLIGRAM(S): at 21:22

## 2021-03-31 RX ADMIN — Medication 1: at 07:53

## 2021-03-31 RX ADMIN — LISINOPRIL 20 MILLIGRAM(S): 2.5 TABLET ORAL at 06:05

## 2021-03-31 RX ADMIN — APIXABAN 5 MILLIGRAM(S): 2.5 TABLET, FILM COATED ORAL at 17:03

## 2021-03-31 RX ADMIN — OXYCODONE AND ACETAMINOPHEN 1 TABLET(S): 5; 325 TABLET ORAL at 14:11

## 2021-03-31 NOTE — PROGRESS NOTE ADULT - ASSESSMENT
JULIEN CASTELLANO is a 85y with a history of CAD, DM2, HTN, RA, Recent Right Hip fx Orif 11/20, Right calf DVT 2/21 who presented to  on 3/22 with complaint of severe Left leg pain and found to have PAD SP Left fem pop Bypass 3/24. Hospital course complicated by diarrhea- now resolved. Now admitted to [facility] after for initiation of a multidisciplinary rehab program consisting focused on functional mobility, transfers and ADLs (activities of daily living).    Comprehensive Multidisciplinary Rehab Program:  - Start comprehensive rehab program, 3 hours a day, 5 days a week.  - PT 2hr/day: Focused on improving strength, endurance, coordination, balance, functional mobility, and transfers  - OT 1hr/day: Focused on improving strength, fine motor skills, coordination, posture and ADLs.    - Speech Therapy NA  - P&O as needed  - Activity Limitations: Decreased social, vocational and leisure activities, decreased self care and ADLs, decreased mobility, decreased ability to manage household and finances.     Participation Restrictions/Precautions:  - Weight bearing status: WBAT  - ROM restrictions: none  - Precautions:    [x ] Falls   [ ] Spinal   [ ] Hip (Anterior or Posterior)       [ ] Seizure  [ ] Cardiac   [ ] Sternal    Rehab Diagnosis/Management:    Sleep:   - Maintain quiet hours and low stim environment.    Pain Management:  - percocet PRN Mod pain, gabapentin 300 TID  - Avoid sedating medications that may interfere with cognitive recovery    GI/Bowel:  - At risk for constipation due to neurologic diagnosis, immobility and/or medication use  - recent diarrhea secondary to antibiotics- no bowel meds ordered  - GI ppx: pepcid    /Bladder:   - At risk for incontinence and retention due to neurologic diagnosis and limited mobility  - Currently patient voids:    [x ] independent     [ ] external collection device (condom cath)    [ ] Indwelling hurst catheter     [ ] Intermittent catheterization  - Baseline bladder scans -straight cath for >350cc.  - Encourage timed voids every 4 hours while awake for independence and to promote continence during therapy.    Skin/Pressure Injury:   - At risk for pressure injury due to neurologic diagnosis and relative immobility.  - Skin assessment on admission - Left heel , calf great toe eschar   - Monitor Incisions: Left groin with sutures, Left medial thigh intact  - Turn every 2 hours while in bed, air mattress  - Soft heel protectors  - Skin barrier cream as needed  Betadine left heel and 1st toe , xeroform to Left calf ,DSD Carly daily  - Nursing to monitor skin Qshift    Diet/Dysphagia:  - Diet Consistency/Modifications: reg consistency  - Supplements: NA    DVT ppx:   - Eliquis    - Last Doppler on- NA  -------------  Comorbid Medical Management:    Diarrhea  C diff neg  GI PCR neg  resolved    DM 2  took lantus 25 units HS PTA  Now On SSI only   Consider adding lantus Hs- Hospitalist to address    HTN  continue lisinopril, amlodipine, metoprolol    Depression   cont paxil    RA  takes methotrexate 15mg weekly on Wednesday - on hold    Hx DVT  restart eliquis 5mg 2x/day   JULIEN CASTELLANO is a 85y with a history of CAD, DM2, HTN, RA, Recent Right Hip fx Orif 11/20, Right calf DVT 2/21 who presented to  on 3/22 with complaint of severe Left leg pain and found to have PAD SP Left fem pop Bypass 3/24. Hospital course complicated by diarrhea- now resolved. Now admitted to [facility] after for initiation of a multidisciplinary rehab program consisting focused on functional mobility, transfers and ADLs (activities of daily living).    Comprehensive Multidisciplinary Rehab Program:  - Start comprehensive rehab program, 3 hours a day, 5 days a week.  - PT 2hr/day: Focused on improving strength, endurance, coordination, balance, functional mobility, and transfers  - OT 1hr/day: Focused on improving strength, fine motor skills, coordination, posture and ADLs.    - Speech Therapy NA  - P&O as needed  - Activity Limitations: Decreased social, vocational and leisure activities, decreased self care and ADLs, decreased mobility, decreased ability to manage household and finances.     Participation Restrictions/Precautions:  - Weight bearing status: WBAT  - ROM restrictions: none  - Precautions:    [x ] Falls   [ ] Spinal   [ ] Hip (Anterior or Posterior)       [ ] Seizure  [ ] Cardiac   [ ] Sternal    Rehab Diagnosis/Management:    Sleep:   - Maintain quiet hours and low stim environment.    Pain Management:  - percocet PRN Mod pain, gabapentin 300 TID  - Avoid sedating medications that may interfere with cognitive recovery    GI/Bowel:  - At risk for constipation due to neurologic diagnosis, immobility and/or medication use  - recent diarrhea secondary to antibiotics- no bowel meds ordered  - GI ppx: pepcid    /Bladder:   - At risk for incontinence and retention due to neurologic diagnosis and limited mobility  - Currently patient voids:    [x ] independent     [ ] external collection device (condom cath)    [ ] Indwelling hurst catheter     [ ] Intermittent catheterization  - Baseline bladder scans -straight cath for >350cc.  - Encourage timed voids every 4 hours while awake for independence and to promote continence during therapy.    Skin/Pressure Injury:   - At risk for pressure injury due to neurologic diagnosis and relative immobility.  - Skin assessment on admission - Left heel , calf great toe eschar   - Monitor Incisions: Left groin with sutures, Left medial thigh intact  - Turn every 2 hours while in bed, air mattress  - Soft heel protectors  - Skin barrier cream as needed  Betadine left heel and 1st toe , xeroform to Left calf ,DSD Carly daily  - Nursing to monitor skin Qshift    Diet/Dysphagia:  - Diet Consistency/Modifications: reg consistency  - Supplements: NA    DVT ppx:   - Eliquis    - Last Doppler on- NA  -------------  Comorbid Medical Management:    Diarrhea  C diff neg  GI PCR neg  resolved    DM 2  took lantus 25 units HS PTA  Now On SSI only   Consider adding lantus Hs- Hospitalist to address    HTN  continue lisinopril, amlodipine, metoprolol    Depression   cont paxil    RA  takes methotrexate 15mg weekly on Wednesday - on hold  can restart on 4/7 as per rheum    Hx DVT  restart eliquis 5mg 2x/day   JULIEN CASTELLANO is a 85y with a history of CAD, DM2, HTN, RA, Recent Right Hip fx Orif 11/20, Right calf DVT 2/21 who presented to  on 3/22 with complaint of severe Left leg pain and found to have PAD SP Left fem pop Bypass 3/24. Hospital course complicated by diarrhea- now resolved. Now admitted to [facility] after for initiation of a multidisciplinary rehab program consisting focused on functional mobility, transfers and ADLs (activities of daily living).    Comprehensive Multidisciplinary Rehab Program:  - Start comprehensive rehab program, 3 hours a day, 5 days a week.  - PT 2hr/day: Focused on improving strength, endurance, coordination, balance, functional mobility, and transfers  - OT 1hr/day: Focused on improving strength, fine motor skills, coordination, posture and ADLs.    - Speech Therapy NA  - P&O as needed  - Activity Limitations: Decreased social, vocational and leisure activities, decreased self care and ADLs, decreased mobility, decreased ability to manage household and finances.     Participation Restrictions/Precautions:  - Weight bearing status: WBAT  - ROM restrictions: none  - Precautions:    [x ] Falls   [ ] Spinal   [ ] Hip (Anterior or Posterior)       [ ] Seizure  [ ] Cardiac   [ ] Sternal    Rehab Diagnosis/Management:    Sleep:   - Maintain quiet hours and low stim environment.    Pain Management:  - percocet PRN Mod pain, gabapentin 300 TID  - Avoid sedating medications that may interfere with cognitive recovery    GI/Bowel:  - At risk for constipation due to neurologic diagnosis, immobility and/or medication use  - recent diarrhea secondary to antibiotics- no bowel meds ordered  - GI ppx: pepcid    /Bladder:   - At risk for incontinence and retention due to neurologic diagnosis and limited mobility  - Currently patient voids:    [x ] independent     [ ] external collection device (condom cath)    [ ] Indwelling hurst catheter     [ ] Intermittent catheterization  - Baseline bladder scans -straight cath for >350cc.  - Encourage timed voids every 4 hours while awake for independence and to promote continence during therapy.    Skin/Pressure Injury:   - At risk for pressure injury due to neurologic diagnosis and relative immobility.  - Skin assessment on admission - Left heel pressure ulcer, calf great toe eschar   - Monitor Incisions: Left groin with sutures, Left medial thigh intact  - Turn every 2 hours while in bed, air mattress  - Soft heel protectors  - Skin barrier cream as needed  Betadine left heel and 1st toe , xeroform to Left calf ,DSD Carly daily  - Nursing to monitor skin Qshift    Diet/Dysphagia:  - Diet Consistency/Modifications: reg consistency  - Supplements: NA    DVT ppx:   - Eliquis    - Last Doppler on- NA  -------------  Comorbid Medical Management:    Diarrhea  C diff neg  GI PCR neg  resolved    DM 2  took lantus 25 units HS PTA  Now On SSI only   Consider adding lantus Hs- Hospitalist to address    HTN  continue lisinopril, amlodipine, metoprolol    Depression   cont paxil    RA  takes methotrexate 15mg weekly on Wednesday - on hold  can restart on 4/7 as per rheum    Hx DVT  restart eliquis 5mg 2x/day

## 2021-03-31 NOTE — DIETITIAN INITIAL EVALUATION ADULT. - OTHER INFO
Initial Nutrition Assessment   85yr Old Female   Dx: Malaise  Diet - DASH-TLC Diet w/ Thin Liquids   ( Recommend Change Diet to Consistent Carbohydrate Low Sodium Diet & Recommend Initiate Phillip 1 Packet BID)  Denies Food Allergy/Intolerance  Tolerates Diet Well - No Chewing/Swallowing Complications (Per Patient)  Surgical Incision on Left Groin and Left Thigh & Stage 1 Pressure Ulcer on Right Buttock & Unstageable Pressure Ulcer on Left Heel (as Per Nursing Flow Sheets)  No Edema Noted (as Per Nursing Flow Sheets)  No Recent Nausea/Vomiting/Diarrhea & Some Recent Constipation (as Per Patient)

## 2021-03-31 NOTE — PROGRESS NOTE ADULT - SUBJECTIVE AND OBJECTIVE BOX
Patient is a 85y old  Female who presents with a chief complaint of 16 debility (31 Mar 2021 07:12)      HPI:  85 y.o. female with PMH of anemia, arthritis, CAD, DMII, depression, HTN, PVD, RA, p/w LLE pain. Patient is admitted to surgery service on 3/22 at [facility] with severe Left LE pain and underwent Left fem-pop-bypass on 3/24. Patient's post op course complicated by pain as well as diarrhea- stool negative for Cdiff and diarrhea has subdsded.     Patient deemed medically stable- Seen By PT,OT and screened by physiatry and deemed a good candidate for intensive rehab- admitted To [facility] today to restore functional independence and safe DC to home.   (30 Mar 2021 16:04)    ROS:   Feeling good - denies pain  ambulated to bathroom with walker this morning with nursing staff  no CP, SOB, cough  no Abd pain, nausea, urinary or bowel symptoms   inquired about RA medication - will hold for now      PAST MEDICAL & SURGICAL HISTORY:  Hypertension    Anemia    Arthritis, rheumatoid    Depression    Type 2 diabetes mellitus  on insulin    PVD (peripheral vascular disease) with claudication    CAD (coronary artery disease)  non-obstructive    Rheumatoid arthritis    fracture ankle right  plate . screws   2000    S/P cataract surgery  2011    Hip fracture requiring operative repair  Right hip 11/14/2020        Allergies    cephalosporins (Other)  penicillins (Urticaria; Pruritus)    Intolerances      PHYSICAL EXAM  85y  Vital Signs Last 24 Hrs  T(C): 36.6 (31 Mar 2021 08:06), Max: 36.8 (30 Mar 2021 20:19)  T(F): 97.8 (31 Mar 2021 08:06), Max: 98.2 (30 Mar 2021 20:19)  HR: 64 (31 Mar 2021 08:06) (64 - 82)  BP: 162/73 (31 Mar 2021 08:06) (132/67 - 162/76)  BP(mean): --  RR: 15 (31 Mar 2021 08:06) (15 - 16)  SpO2: 97% (31 Mar 2021 08:06) (95% - 97%)  Daily Height in cm: 160.02 (30 Mar 2021 16:15)    Daily     Physical Exam: Vital Signs Last 24 Hrs  T(C): 37.2 (14 Feb 2021 20:13), Max: 37.2 (14 Feb 2021 20:13)  T(F): 98.9 (14 Feb 2021 20:13), Max: 98.9 (14 Feb 2021 20:13)  HR: 113 (14 Feb 2021 20:13) (113 - 113)  BP: 177/70 (14 Feb 2021 20:13) (177/70 - 177/70)  BP(mean): 100 (14 Feb 2021 20:13) (100 - 100)  RR: 20 (14 Feb 2021 20:13) (20 - 20)  SpO2: 99% (14 Feb 2021 20:13) (99% - 99%)    GENERAL: No acute distress  HEENT: PERRL, EOMI, MM dry, no oropharyngeal lesions  NECK: supple, no stiffness, no JVD, no thyromegaly  PULM: respirations non-labored, occasional bilateral lower lobe rales. No rhonchi or wheezes  CV: regular rate and rhythm, no murmurs, gallops, or rubs  GI: abdomen soft, nontender, nondistended, no masses felt, normal bowel sounds  MSK: no joint swelling, erythema, or warmth.  LYMPH: no anterior cervical, posterior cervical, supraclavicular, or inguinal lymphadenopathy  NEURO: A&Ox3, no tremors, decreased sensation in left dorsal and plantar-medial region  SKIN: redness in LLE 2/3 of the way up the leg, with some TTP but no warmth. Left heel ulcer, dry, without drainage. Other left lower leg ulcer that patient states are from dog scratches. 1+ bilateral LE edema, slightly increased in left ankle. DPP 1+ RLE and 0.5+ LLE      RECENT LABS:                          12.3   7.84  )-----------( 189      ( 31 Mar 2021 05:00 )             37.8     03-31    145  |  110<H>  |  24<H>  ----------------------------<  173<H>  3.5   |  25  |  0.82    Ca    8.4      31 Mar 2021 05:00    TPro  6.0  /  Alb  2.1<L>  /  TBili  0.4  /  DBili  x   /  AST  15  /  ALT  13  /  AlkPhos  77  03-31    LIVER FUNCTIONS - ( 31 Mar 2021 05:00 )  Alb: 2.1 g/dL / Pro: 6.0 g/dL / ALK PHOS: 77 U/L / ALT: 13 U/L / AST: 15 U/L / GGT: x                 GI PCR Panel, Stool (collected 03-28-21 @ 19:28)  Source: .Stool Feces  Final Report (03-29-21 @ 11:05):    GI PCR Results: NOT detected    *******Please Note:*******    GI panel PCR evaluates for:    Campylobacter, Plesiomonas shigelloides, Salmonella,    Vibrio, Yersinia enterocolitica, Enteroaggregative    Escherichia coli (EAEC), Enteropathogenic E.coli (EPEC),    Enterotoxigenic E. coli (ETEC) lt/st, Shiga-like    toxin-producing E. coli (STEC) stx1/stx2,    Shigella/ Enteroinvasive E. coli (EIEC), Cryptosporidium,    Cyclospora cayetanensis, Entamoeba histolytica,    Giardia lamblia, Adenovirus F 40/41, Astrovirus,    Norovirus GI/GII, Rotavirus A, Sapovirus        CAPILLARY BLOOD GLUCOSE      POCT Blood Glucose.: 164 mg/dL (31 Mar 2021 11:32)  POCT Blood Glucose.: 160 mg/dL (31 Mar 2021 07:20)  POCT Blood Glucose.: 171 mg/dL (30 Mar 2021 21:40)  POCT Blood Glucose.: 134 mg/dL (30 Mar 2021 16:33)  POCT Blood Glucose.: 133 mg/dL (30 Mar 2021 12:44)             Patient is a 85y old  Female who presents with a chief complaint of 16 debility (31 Mar 2021 07:12)      HPI:  85 y.o. female with PMH of anemia, arthritis, CAD, DMII, depression, HTN, PVD, RA, p/w LLE pain. Patient is admitted to surgery service on 3/22 at [facility] with severe Left LE pain and underwent Left fem-pop-bypass on 3/24. Patient's post op course complicated by pain as well as diarrhea- stool negative for Cdiff and diarrhea has subdsded.     Patient deemed medically stable- Seen By PT,OT and screened by physiatry and deemed a good candidate for intensive rehab- admitted To [facility] today to restore functional independence and safe DC to home.   (30 Mar 2021 16:04)    ROS:   Feeling good - denies pain  ambulated to bathroom with walker this morning with nursing staff  no CP, SOB, cough  no Abd pain, nausea, urinary or bowel symptoms   inquired about RA medication (methotrexate) - will hold for now      PAST MEDICAL & SURGICAL HISTORY:  Hypertension    Anemia    Arthritis, rheumatoid    Depression    Type 2 diabetes mellitus  on insulin    PVD (peripheral vascular disease) with claudication    CAD (coronary artery disease)  non-obstructive    Rheumatoid arthritis    fracture ankle right  plate . screws   2000    S/P cataract surgery  2011    Hip fracture requiring operative repair  Right hip 11/14/2020        Allergies    cephalosporins (Other)  penicillins (Urticaria; Pruritus)    Intolerances      PHYSICAL EXAM  85y  Vital Signs Last 24 Hrs  T(C): 36.6 (31 Mar 2021 08:06), Max: 36.8 (30 Mar 2021 20:19)  T(F): 97.8 (31 Mar 2021 08:06), Max: 98.2 (30 Mar 2021 20:19)  HR: 64 (31 Mar 2021 08:06) (64 - 82)  BP: 162/73 (31 Mar 2021 08:06) (132/67 - 162/76)  BP(mean): --  RR: 15 (31 Mar 2021 08:06) (15 - 16)  SpO2: 97% (31 Mar 2021 08:06) (95% - 97%)  Daily Height in cm: 160.02 (30 Mar 2021 16:15)    Daily     GENERAL: No acute distress  HEENT: PERRL, EOMI, MM dry, no oropharyngeal lesions  NECK: supple, no stiffness, no JVD, no thyromegaly  PULM: respirations non-labored, occasional bilateral lower lobe rales. No rhonchi or wheezes  CV: regular rate and rhythm, no murmurs, gallops, or rubs  GI: abdomen soft, nontender, nondistended, no masses felt, normal bowel sounds  MSK: no joint swelling, erythema, or warmth.  LYMPH: no anterior cervical, posterior cervical, supraclavicular, or inguinal lymphadenopathy  NEURO: A&Ox3, no tremors, decreased sensation in left dorsal and plantar-medial region  SKIN: redness in LLE 2/3 of the way up the leg, with some TTP but no warmth. Left heel ulcer, dry, without drainage. Other left lower leg ulcer that patient states are from dog scratches. 1+ bilateral LE edema, slightly increased in left ankle. DPP 1+ RLE and 0.5+ LLE      RECENT LABS:                          12.3   7.84  )-----------( 189      ( 31 Mar 2021 05:00 )             37.8     03-31    145  |  110<H>  |  24<H>  ----------------------------<  173<H>  3.5   |  25  |  0.82    Ca    8.4      31 Mar 2021 05:00    TPro  6.0  /  Alb  2.1<L>  /  TBili  0.4  /  DBili  x   /  AST  15  /  ALT  13  /  AlkPhos  77  03-31    LIVER FUNCTIONS - ( 31 Mar 2021 05:00 )  Alb: 2.1 g/dL / Pro: 6.0 g/dL / ALK PHOS: 77 U/L / ALT: 13 U/L / AST: 15 U/L / GGT: x                 GI PCR Panel, Stool (collected 03-28-21 @ 19:28)  Source: .Stool Feces  Final Report (03-29-21 @ 11:05):    GI PCR Results: NOT detected    *******Please Note:*******    GI panel PCR evaluates for:    Campylobacter, Plesiomonas shigelloides, Salmonella,    Vibrio, Yersinia enterocolitica, Enteroaggregative    Escherichia coli (EAEC), Enteropathogenic E.coli (EPEC),    Enterotoxigenic E. coli (ETEC) lt/st, Shiga-like    toxin-producing E. coli (STEC) stx1/stx2,    Shigella/ Enteroinvasive E. coli (EIEC), Cryptosporidium,    Cyclospora cayetanensis, Entamoeba histolytica,    Giardia lamblia, Adenovirus F 40/41, Astrovirus,    Norovirus GI/GII, Rotavirus A, Sapovirus        CAPILLARY BLOOD GLUCOSE      POCT Blood Glucose.: 164 mg/dL (31 Mar 2021 11:32)  POCT Blood Glucose.: 160 mg/dL (31 Mar 2021 07:20)  POCT Blood Glucose.: 171 mg/dL (30 Mar 2021 21:40)  POCT Blood Glucose.: 134 mg/dL (30 Mar 2021 16:33)  POCT Blood Glucose.: 133 mg/dL (30 Mar 2021 12:44)    MEDICATIONS  (STANDING):  amLODIPine   Tablet 5 milliGRAM(s) Oral daily  apixaban 5 milliGRAM(s) Oral two times a day  dextrose 40% Gel 15 Gram(s) Oral once  dextrose 5%. 1000 milliLiter(s) (50 mL/Hr) IV Continuous <Continuous>  dextrose 5%. 1000 milliLiter(s) (100 mL/Hr) IV Continuous <Continuous>  dextrose 50% Injectable 25 Gram(s) IV Push once  dextrose 50% Injectable 12.5 Gram(s) IV Push once  dextrose 50% Injectable 25 Gram(s) IV Push once  folic acid 1 milliGRAM(s) Oral daily  gabapentin 300 milliGRAM(s) Oral three times a day  glucagon  Injectable 1 milliGRAM(s) IntraMuscular once  insulin lispro (ADMELOG) corrective regimen sliding scale   SubCutaneous three times a day before meals  insulin lispro (ADMELOG) corrective regimen sliding scale   SubCutaneous at bedtime  lisinopril 20 milliGRAM(s) Oral daily  metoprolol tartrate 25 milliGRAM(s) Oral two times a day  PARoxetine 20 milliGRAM(s) Oral daily    MEDICATIONS  (PRN):  artificial  tears Solution 1 Drop(s) Both EYES two times a day PRN Dry Eyes  famotidine    Tablet 20 milliGRAM(s) Oral two times a day PRN Dyspepsia  oxycodone    5 mG/acetaminophen 325 mG 1 Tablet(s) Oral every 6 hours PRN Moderate Pain (4 - 6)           Patient is a 85y old  Female who presents with a chief complaint of 16 debility (31 Mar 2021 07:12)      HPI:  85 y.o. female with PMH of anemia, arthritis, CAD, DMII, depression, HTN, PVD, RA, p/w LLE pain. Patient is admitted to surgery service on 3/22 at [facility] with severe Left LE pain and underwent Left fem-pop-bypass on 3/24. Patient's post op course complicated by pain as well as diarrhea- stool negative for Cdiff and diarrhea has subdsded.     Patient deemed medically stable- Seen By PT,OT and screened by physiatry and deemed a good candidate for intensive rehab- admitted To [facility] today to restore functional independence and safe DC to home.   (30 Mar 2021 16:04)    ROS:   Feeling good - denies pain  ambulated to bathroom with walker this morning with nursing staff  no CP, SOB, cough  no Abd pain, nausea, urinary or bowel symptoms   inquired about RA medication (methotrexate) - will hold for now  large BM no diarrhea, voiding well initial PVR 223cc        PAST MEDICAL & SURGICAL HISTORY:  Hypertension    Anemia    Arthritis, rheumatoid    Depression    Type 2 diabetes mellitus  on insulin    PVD (peripheral vascular disease) with claudication    CAD (coronary artery disease)  non-obstructive    Rheumatoid arthritis    fracture ankle right  plate . screws   2000    S/P cataract surgery  2011    Hip fracture requiring operative repair  Right hip 11/14/2020        Allergies    cephalosporins (Other)  penicillins (Urticaria; Pruritus)    Intolerances      PHYSICAL EXAM  85y  Vital Signs Last 24 Hrs  T(C): 36.6 (31 Mar 2021 08:06), Max: 36.8 (30 Mar 2021 20:19)  T(F): 97.8 (31 Mar 2021 08:06), Max: 98.2 (30 Mar 2021 20:19)  HR: 64 (31 Mar 2021 08:06) (64 - 82)  BP: 162/73 (31 Mar 2021 08:06) (132/67 - 162/76)  BP(mean): --  RR: 15 (31 Mar 2021 08:06) (15 - 16)  SpO2: 97% (31 Mar 2021 08:06) (95% - 97%)  Daily Height in cm: 160.02 (30 Mar 2021 16:15)    Daily       General: NAD, Resting Comfortable,                                  HEENT: NC/AT, EOM I, PERRLA, Normal Conjunctivae  Cardio: RRR, Normal S1-S2, No M/G/R                              Pulm: No Respiratory Distress,  Lungs CTAB                        Abdomen: ND/NT, Soft, BS+                                                MSK: No joint swelling, Full ROM    RA deformities Left >right hand                                     Ext: No C/C/E- dp Pulses 2+ , No calf tenderness    Skin:  Left groin incision with sutures, no drainage, left medial thigh incision intact-sub cut closure                                                            Wounds: Left posterior calf with venous ulcer /eschar with surrounding excoriations- left heel with 3x3m eschar/DTI, Left dorsal first toe with scab?eschar  Decubitus Ulcers: None Present     Neurological Examination    Cognitive: AAO x 3                                                                         Attention: Intact   Judgment: Good evidence of judgement                               Memory: Recall 3 objects immediate and 3 min later      Mood/Affect: wnl                                                                           Communication:  Fluent,  No dysarthria   Swallow: intact  CN II - XII  intact  Coordination: FTN/HTS intact                                                                              Sensory: Intact to light touch,                                                                                             Tone: normal Throughout   Balance: impaired    Motor    LEFT    UE: SF [5/5], EF [5/5], EE [5/5], WE [5/5],  [wnl]  RIGHT UE: SF [5/5], EF [5/5], EE [5/5], WE [5/5],  [wnl]  LEFT    LE:  HF [3+/5], KE [4/5], DF [1-2/5], EHL [0/5],  PF [3/5]  RIGHT LE:  HF [4/5], KE [5/5], DF [1/5], EHL [0/5],  PF [3/5]      Reflex:  2 + thoroughout, Saab/Babinski negative    RECENT LABS:                          12.3   7.84  )-----------( 189      ( 31 Mar 2021 05:00 )             37.8     03-31    145  |  110<H>  |  24<H>  ----------------------------<  173<H>  3.5   |  25  |  0.82    Ca    8.4      31 Mar 2021 05:00    TPro  6.0  /  Alb  2.1<L>  /  TBili  0.4  /  DBili  x   /  AST  15  /  ALT  13  /  AlkPhos  77  03-31    LIVER FUNCTIONS - ( 31 Mar 2021 05:00 )  Alb: 2.1 g/dL / Pro: 6.0 g/dL / ALK PHOS: 77 U/L / ALT: 13 U/L / AST: 15 U/L / GGT: x                 GI PCR Panel, Stool (collected 03-28-21 @ 19:28)  Source: .Stool Feces  Final Report (03-29-21 @ 11:05):    GI PCR Results: NOT detected    *******Please Note:*******    GI panel PCR evaluates for:    Campylobacter, Plesiomonas shigelloides, Salmonella,    Vibrio, Yersinia enterocolitica, Enteroaggregative    Escherichia coli (EAEC), Enteropathogenic E.coli (EPEC),    Enterotoxigenic E. coli (ETEC) lt/st, Shiga-like    toxin-producing E. coli (STEC) stx1/stx2,    Shigella/ Enteroinvasive E. coli (EIEC), Cryptosporidium,    Cyclospora cayetanensis, Entamoeba histolytica,    Giardia lamblia, Adenovirus F 40/41, Astrovirus,    Norovirus GI/GII, Rotavirus A, Sapovirus        CAPILLARY BLOOD GLUCOSE      POCT Blood Glucose.: 164 mg/dL (31 Mar 2021 11:32)  POCT Blood Glucose.: 160 mg/dL (31 Mar 2021 07:20)  POCT Blood Glucose.: 171 mg/dL (30 Mar 2021 21:40)  POCT Blood Glucose.: 134 mg/dL (30 Mar 2021 16:33)  POCT Blood Glucose.: 133 mg/dL (30 Mar 2021 12:44)    MEDICATIONS  (STANDING):  amLODIPine   Tablet 5 milliGRAM(s) Oral daily  apixaban 5 milliGRAM(s) Oral two times a day  dextrose 40% Gel 15 Gram(s) Oral once  dextrose 5%. 1000 milliLiter(s) (50 mL/Hr) IV Continuous <Continuous>  dextrose 5%. 1000 milliLiter(s) (100 mL/Hr) IV Continuous <Continuous>  dextrose 50% Injectable 25 Gram(s) IV Push once  dextrose 50% Injectable 12.5 Gram(s) IV Push once  dextrose 50% Injectable 25 Gram(s) IV Push once  folic acid 1 milliGRAM(s) Oral daily  gabapentin 300 milliGRAM(s) Oral three times a day  glucagon  Injectable 1 milliGRAM(s) IntraMuscular once  insulin lispro (ADMELOG) corrective regimen sliding scale   SubCutaneous three times a day before meals  insulin lispro (ADMELOG) corrective regimen sliding scale   SubCutaneous at bedtime  lisinopril 20 milliGRAM(s) Oral daily  metoprolol tartrate 25 milliGRAM(s) Oral two times a day  PARoxetine 20 milliGRAM(s) Oral daily    MEDICATIONS  (PRN):  artificial  tears Solution 1 Drop(s) Both EYES two times a day PRN Dry Eyes  famotidine    Tablet 20 milliGRAM(s) Oral two times a day PRN Dyspepsia  oxycodone    5 mG/acetaminophen 325 mG 1 Tablet(s) Oral every 6 hours PRN Moderate Pain (4 - 6)           Patient is a 85y old  Female who presents with a chief complaint of 16 debility (31 Mar 2021 07:12)      HPI:  85 y.o. female with PMH of anemia, arthritis, CAD, DMII, depression, HTN, PVD, RA, p/w LLE pain. Patient is admitted to surgery service on 3/22 at [facility] with severe Left LE pain and underwent Left fem-pop-bypass on 3/24. Patient's post op course complicated by pain as well as diarrhea- stool negative for Cdiff and diarrhea has subdsded.     Patient deemed medically stable- Seen By PT,OT and screened by physiatry and deemed a good candidate for intensive rehab- admitted To [facility] today to restore functional independence and safe DC to home.   (30 Mar 2021 16:04)    ROS:   Feeling good - denies pain  ambulated to bathroom with walker this morning with nursing staff  no CP, SOB, cough  no Abd pain, nausea, urinary or bowel symptoms   inquired about RA medication (methotrexate) - will hold for now  large BM no diarrhea, voiding well initial PVR 223cc        PAST MEDICAL & SURGICAL HISTORY:  Hypertension    Anemia    Arthritis, rheumatoid    Depression    Type 2 diabetes mellitus  on insulin    PVD (peripheral vascular disease) with claudication    CAD (coronary artery disease)  non-obstructive    Rheumatoid arthritis    fracture ankle right  plate . screws   2000    S/P cataract surgery  2011    Hip fracture requiring operative repair  Right hip 11/14/2020        Allergies    cephalosporins (Other)  penicillins (Urticaria; Pruritus)    Intolerances      PHYSICAL EXAM  85y  Vital Signs Last 24 Hrs  T(C): 36.6 (31 Mar 2021 08:06), Max: 36.8 (30 Mar 2021 20:19)  T(F): 97.8 (31 Mar 2021 08:06), Max: 98.2 (30 Mar 2021 20:19)  HR: 64 (31 Mar 2021 08:06) (64 - 82)  BP: 162/73 (31 Mar 2021 08:06) (132/67 - 162/76)  BP(mean): --  RR: 15 (31 Mar 2021 08:06) (15 - 16)  SpO2: 97% (31 Mar 2021 08:06) (95% - 97%)  Daily Height in cm: 160.02 (30 Mar 2021 16:15)    Daily       General: NAD, Resting Comfortable,                                  HEENT: NC/AT, EOM I, PERRLA, Normal Conjunctivae  Cardio: RRR, Normal S1-S2, No M/G/R                              Pulm: No Respiratory Distress,  Lungs CTAB                        Abdomen: ND/NT, Soft, BS+                                                MSK: No joint swelling, Full ROM    RA deformities Left >right hand                                     Ext: No C/C/E- dp Pulses 2+ , No calf tenderness    Skin:  Left groin incision with sutures, no drainage, left medial thigh incision intact-sub cut closure                                                            Wounds: Left posterior calf with venous ulcer /eschar with surrounding excoriations- left heel pressure ulcer unstageable with eschar, Left dorsal first toe with scab?eschar  Decubitus Ulcers: None Present     Neurological Examination    Cognitive: AAO x 3                                                                         Attention: Intact   Judgment: Good evidence of judgement                               Memory: Recall 3 objects immediate and 3 min later      Mood/Affect: wnl                                                                           Communication:  Fluent,  No dysarthria   Swallow: intact  CN II - XII  intact  Coordination: FTN/HTS intact                                                                              Sensory: Intact to light touch,                                                                                             Tone: normal Throughout   Balance: impaired    Motor    LEFT    UE: SF [5/5], EF [5/5], EE [5/5], WE [5/5],  [wnl]  RIGHT UE: SF [5/5], EF [5/5], EE [5/5], WE [5/5],  [wnl]  LEFT    LE:  HF [3+/5], KE [4/5], DF [1-2/5], EHL [0/5],  PF [3/5]  RIGHT LE:  HF [4/5], KE [5/5], DF [1/5], EHL [0/5],  PF [3/5]      Reflex:  2 + thoroughout, Saab/Babinski negative    RECENT LABS:                          12.3   7.84  )-----------( 189      ( 31 Mar 2021 05:00 )             37.8     03-31    145  |  110<H>  |  24<H>  ----------------------------<  173<H>  3.5   |  25  |  0.82    Ca    8.4      31 Mar 2021 05:00    TPro  6.0  /  Alb  2.1<L>  /  TBili  0.4  /  DBili  x   /  AST  15  /  ALT  13  /  AlkPhos  77  03-31    LIVER FUNCTIONS - ( 31 Mar 2021 05:00 )  Alb: 2.1 g/dL / Pro: 6.0 g/dL / ALK PHOS: 77 U/L / ALT: 13 U/L / AST: 15 U/L / GGT: x                 GI PCR Panel, Stool (collected 03-28-21 @ 19:28)  Source: .Stool Feces  Final Report (03-29-21 @ 11:05):    GI PCR Results: NOT detected    *******Please Note:*******    GI panel PCR evaluates for:    Campylobacter, Plesiomonas shigelloides, Salmonella,    Vibrio, Yersinia enterocolitica, Enteroaggregative    Escherichia coli (EAEC), Enteropathogenic E.coli (EPEC),    Enterotoxigenic E. coli (ETEC) lt/st, Shiga-like    toxin-producing E. coli (STEC) stx1/stx2,    Shigella/ Enteroinvasive E. coli (EIEC), Cryptosporidium,    Cyclospora cayetanensis, Entamoeba histolytica,    Giardia lamblia, Adenovirus F 40/41, Astrovirus,    Norovirus GI/GII, Rotavirus A, Sapovirus        CAPILLARY BLOOD GLUCOSE      POCT Blood Glucose.: 164 mg/dL (31 Mar 2021 11:32)  POCT Blood Glucose.: 160 mg/dL (31 Mar 2021 07:20)  POCT Blood Glucose.: 171 mg/dL (30 Mar 2021 21:40)  POCT Blood Glucose.: 134 mg/dL (30 Mar 2021 16:33)  POCT Blood Glucose.: 133 mg/dL (30 Mar 2021 12:44)    MEDICATIONS  (STANDING):  amLODIPine   Tablet 5 milliGRAM(s) Oral daily  apixaban 5 milliGRAM(s) Oral two times a day  dextrose 40% Gel 15 Gram(s) Oral once  dextrose 5%. 1000 milliLiter(s) (50 mL/Hr) IV Continuous <Continuous>  dextrose 5%. 1000 milliLiter(s) (100 mL/Hr) IV Continuous <Continuous>  dextrose 50% Injectable 25 Gram(s) IV Push once  dextrose 50% Injectable 12.5 Gram(s) IV Push once  dextrose 50% Injectable 25 Gram(s) IV Push once  folic acid 1 milliGRAM(s) Oral daily  gabapentin 300 milliGRAM(s) Oral three times a day  glucagon  Injectable 1 milliGRAM(s) IntraMuscular once  insulin lispro (ADMELOG) corrective regimen sliding scale   SubCutaneous three times a day before meals  insulin lispro (ADMELOG) corrective regimen sliding scale   SubCutaneous at bedtime  lisinopril 20 milliGRAM(s) Oral daily  metoprolol tartrate 25 milliGRAM(s) Oral two times a day  PARoxetine 20 milliGRAM(s) Oral daily    MEDICATIONS  (PRN):  artificial  tears Solution 1 Drop(s) Both EYES two times a day PRN Dry Eyes  famotidine    Tablet 20 milliGRAM(s) Oral two times a day PRN Dyspepsia  oxycodone    5 mG/acetaminophen 325 mG 1 Tablet(s) Oral every 6 hours PRN Moderate Pain (4 - 6)

## 2021-03-31 NOTE — DIETITIAN INITIAL EVALUATION ADULT. - PERTINENT LABORATORY DATA
3/29   Na-145  K-3.6  BUN-17  Creat-0.64  Gluc-159H   Hemoglobin A1c - 7.2H(on 1/28)   POCT (over Last 3 Days) - Ranging from 133-210

## 2021-03-31 NOTE — PROGRESS NOTE ADULT - ATTENDING COMMENTS
Rehab Attending- Patient seen and examined with RADHA Berry- Case discussed, above note reviewed by me with modifications made      Doing well  minimal pain Left LE  ambulating in Room with RW  +BM formed, Voiding  labs reviewed- OK  To continue intensive Rehab program

## 2021-03-31 NOTE — DIETITIAN INITIAL EVALUATION ADULT. - ORAL INTAKE PTA/DIET HISTORY
Patient Does Not Follow Diet @Home, Consumes 2 Meals a Day w/ Snacks inbetween & Does Take  Protein Supplement @Home (Phillip)     on DASH-TLC Diet w/ Thin Liquids   Recommend Change Diet to Consistent Carbohydrate Low Sodium Diet - Nutrition Education Provided on Consistent Carbohydrate Diet  Recommend Initiate Phillip 1 Packet BID (Provides 180kcal & 28grams of Protein) - Education Provided on Need for Supplementation

## 2021-03-31 NOTE — DIETITIAN INITIAL EVALUATION ADULT. - PATIENT PROFILE REVIEWED
Problem: Respiratory Impairment - Respiratory Therapy 253  Intervention: Inhaled medication delivery  Intervention Status  Done  Intervention: Inhaled gas administration  Intervention Status  Done  Intervention: Respiratory support devices  Intervention Status  Done         yes

## 2021-03-31 NOTE — DIETITIAN INITIAL EVALUATION ADULT. - ADD RECOMMEND
1) Monitor Weights, Intake, Tolerance, Skin & Labwork   2) Education Provided on Need for Supplementation & Consistent Carbohydrate Diet 3) Phillip 1 Packet BID 4) Continue Nutrition Plan of Care

## 2021-03-31 NOTE — CONSULT NOTE ADULT - SUBJECTIVE AND OBJECTIVE BOX
Patient is a 85y old  Female who presents with a chief complaint of 16 debility (31 Mar 2021 07:12)    HPI:  85 y.o. female with PMH of anemia, arthritis, CAD, DMII, depression, HTN, PVD, RA, p/w LLE pain. Patient is admitted to surgery service on 3/22 at [facility] with severe Left LE pain and underwent Left fem-pop-bypass on 3/24. Patient's post op course complicated by pain as well as diarrhea- stool negative for Cdiff and diarrhea has subdsded.     Patient deemed medically stable- Seen By PT,OT and screened by physiatry and deemed a good candidate for intensive rehab- admitted To [facility] today to restore functional independence and safe DC to home.   (30 Mar 2021 16:04)    PAST MEDICAL & SURGICAL HISTORY:  Hypertension    Anemia    Arthritis, rheumatoid    Depression    Type 2 diabetes mellitus  on insulin    PVD (peripheral vascular disease) with claudication    CAD (coronary artery disease)  non-obstructive    Rheumatoid arthritis    fracture ankle right  plate . screws   2000    S/P cataract surgery  2011    Hip fracture requiring operative repair  Right hip 11/14/2020      SOCIAL HISTORY:  FAMILY HISTORY:    ALLERGIES:  cephalosporins (Other)  penicillins (Urticaria; Pruritus)    MEDICATIONS  (STANDING):  amLODIPine   Tablet 5 milliGRAM(s) Oral daily  apixaban 5 milliGRAM(s) Oral two times a day  dextrose 40% Gel 15 Gram(s) Oral once  dextrose 5%. 1000 milliLiter(s) (50 mL/Hr) IV Continuous <Continuous>  dextrose 5%. 1000 milliLiter(s) (100 mL/Hr) IV Continuous <Continuous>  dextrose 50% Injectable 25 Gram(s) IV Push once  dextrose 50% Injectable 12.5 Gram(s) IV Push once  dextrose 50% Injectable 25 Gram(s) IV Push once  folic acid 1 milliGRAM(s) Oral daily  gabapentin 300 milliGRAM(s) Oral three times a day  glucagon  Injectable 1 milliGRAM(s) IntraMuscular once  insulin lispro (ADMELOG) corrective regimen sliding scale   SubCutaneous three times a day before meals  insulin lispro (ADMELOG) corrective regimen sliding scale   SubCutaneous at bedtime  lisinopril 20 milliGRAM(s) Oral daily  metoprolol tartrate 25 milliGRAM(s) Oral two times a day  PARoxetine 20 milliGRAM(s) Oral daily    MEDICATIONS  (PRN):  artificial  tears Solution 1 Drop(s) Both EYES two times a day PRN Dry Eyes  famotidine    Tablet 20 milliGRAM(s) Oral two times a day PRN Dyspepsia  oxycodone    5 mG/acetaminophen 325 mG 1 Tablet(s) Oral every 6 hours PRN Moderate Pain (4 - 6)    Review of Systems: Refer to HPI for pertinent positives and negatives. All other ROS reviewed and negative except as otherwise stated above.    Vital Signs Last 24 Hrs  T(F): 97.8 (31 Mar 2021 08:06), Max: 98.2 (30 Mar 2021 20:19)  HR: 64 (31 Mar 2021 08:06) (64 - 82)  BP: 162/73 (31 Mar 2021 08:06) (132/67 - 162/76)  RR: 15 (31 Mar 2021 08:06) (15 - 16)  SpO2: 97% (31 Mar 2021 08:06) (95% - 97%)  I&O's Summary    PHYSICAL EXAM:  GENERAL: elderly lady, no acute distress, well-groomed, well-developed  HEAD:  Atraumatic, Normocephalic  EYES: EOMI, PERRL, conjunctiva and sclera clear  ENMT: Moist mucous membranes, Good dentition  NECK: Supple, No JVD  CHEST/LUNG: Clear to auscultation bilaterally, non-labored breathing, good air entry  HEART: RRR; S1/S2, No murmur  ABDOMEN: Soft, Nontender, Nondistended; Bowel sounds present  VASCULAR: Normal pulses, Normal capillary refill, warmth and perfusion in the LLE  EXTREMITIES: No cyanosis, No edema  LYMPH: No lymphadenopathy noted  SKIN: multiple ulcers in the LLE - 1st hallux,   PSYCH: Normal mood and affect  NERVOUS SYSTEM:  A/O x3, Good concentration; CN 2-12 intact, No focal deficits, moves all extremities    LABS:                        12.3   7.84  )-----------( 189      ( 31 Mar 2021 05:00 )             37.8     03-31    145  |  110  |  24  ----------------------------<  173  3.5   |  25  |  0.82    Ca    8.4      31 Mar 2021 05:00    TPro  6.0  /  Alb  2.1  /  TBili  0.4  /  DBili  x   /  AST  15  /  ALT  13  /  AlkPhos  77  03-31    eGFR if Non African American: 66 mL/min/1.73M2 (03-31-21 @ 05:00)  eGFR if : 76 mL/min/1.73M2 (03-31-21 @ 05:00)        POCT Blood Glucose.: 160 mg/dL (31 Mar 2021 07:20)  POCT Blood Glucose.: 171 mg/dL (30 Mar 2021 21:40)  POCT Blood Glucose.: 134 mg/dL (30 Mar 2021 16:33)  POCT Blood Glucose.: 133 mg/dL (30 Mar 2021 12:44)          GI PCR Panel, Stool (collected 28 Mar 2021 19:28)  Source: .Stool Feces  Final Report (29 Mar 2021 11:05):    GI PCR Results: NOT detected    *******Please Note:*******    GI panel PCR evaluates for:    Campylobacter, Plesiomonas shigelloides, Salmonella,    Vibrio, Yersinia enterocolitica, Enteroaggregative    Escherichia coli (EAEC), Enteropathogenic E.coli (EPEC),    Enterotoxigenic E. coli (ETEC) lt/st, Shiga-like    toxin-producing E. coli (STEC) stx1/stx2,    Shigella/ Enteroinvasive E. coli (EIEC), Cryptosporidium,    Cyclospora cayetanensis, Entamoeba histolytica,    Giardia lamblia, Adenovirus F 40/41, Astrovirus,    Norovirus GI/GII, Rotavirus A, Sapovirus      RADIOLOGY & ADDITIONAL TESTS:    Care Discussed with Consultants/Other Providers: Patient is a 85y old  Female who presents with a chief complaint of 16 debility (31 Mar 2021 07:12)    HPI:  85 y.o. female with PMH of anemia, arthritis, CAD, DMII, depression, HTN, PVD, RA, p/w LLE pain. Patient is admitted to surgery service on 3/22 at [facility] with severe Left LE pain and underwent Left fem-pop-bypass on 3/24. Patient's post op course complicated by pain as well as diarrhea- stool negative for Cdiff and diarrhea has subdsded.     Patient deemed medically stable- Seen By PT,OT and screened by physiatry and deemed a good candidate for intensive rehab- admitted To [facility] today to restore functional independence and safe DC to home.   (30 Mar 2021 16:04)    Subj: patient seen at bedside. no acute complaints. She feels well overall. Denies fevers, chills, nausea/vomiting, abdominal pain, shortness of breath, chest pain, palpitations.     PAST MEDICAL & SURGICAL HISTORY:  Hypertension    Anemia    Arthritis, rheumatoid    Depression    Type 2 diabetes mellitus  on insulin    PVD (peripheral vascular disease) with claudication    CAD (coronary artery disease)  non-obstructive    Rheumatoid arthritis    fracture ankle right  plate . screws   2000    S/P cataract surgery  2011    Hip fracture requiring operative repair  Right hip 11/14/2020      SOCIAL HISTORY: patient endorses significant tobacco use hx - smoked 1/2 ppd since 15 yo, quit Nov 2020. She denies alcohol and drug use.  FAMILY HISTORY: HTN, diverticulitis, MI in father, paternal grandmother with DM2    ALLERGIES:  cephalosporins (Other)  penicillins (Urticaria; Pruritus)    MEDICATIONS  (STANDING):  amLODIPine   Tablet 5 milliGRAM(s) Oral daily  apixaban 5 milliGRAM(s) Oral two times a day  dextrose 40% Gel 15 Gram(s) Oral once  dextrose 5%. 1000 milliLiter(s) (50 mL/Hr) IV Continuous <Continuous>  dextrose 5%. 1000 milliLiter(s) (100 mL/Hr) IV Continuous <Continuous>  dextrose 50% Injectable 25 Gram(s) IV Push once  dextrose 50% Injectable 12.5 Gram(s) IV Push once  dextrose 50% Injectable 25 Gram(s) IV Push once  folic acid 1 milliGRAM(s) Oral daily  gabapentin 300 milliGRAM(s) Oral three times a day  glucagon  Injectable 1 milliGRAM(s) IntraMuscular once  insulin lispro (ADMELOG) corrective regimen sliding scale   SubCutaneous three times a day before meals  insulin lispro (ADMELOG) corrective regimen sliding scale   SubCutaneous at bedtime  lisinopril 20 milliGRAM(s) Oral daily  metoprolol tartrate 25 milliGRAM(s) Oral two times a day  PARoxetine 20 milliGRAM(s) Oral daily    MEDICATIONS  (PRN):  artificial  tears Solution 1 Drop(s) Both EYES two times a day PRN Dry Eyes  famotidine    Tablet 20 milliGRAM(s) Oral two times a day PRN Dyspepsia  oxycodone    5 mG/acetaminophen 325 mG 1 Tablet(s) Oral every 6 hours PRN Moderate Pain (4 - 6)    Review of Systems: Refer to HPI for pertinent positives and negatives. All other ROS reviewed and negative except as otherwise stated above.    Vital Signs Last 24 Hrs  T(F): 97.8 (31 Mar 2021 08:06), Max: 98.2 (30 Mar 2021 20:19)  HR: 64 (31 Mar 2021 08:06) (64 - 82)  BP: 162/73 (31 Mar 2021 08:06) (132/67 - 162/76)  RR: 15 (31 Mar 2021 08:06) (15 - 16)  SpO2: 97% (31 Mar 2021 08:06) (95% - 97%)  I&O's Summary    PHYSICAL EXAM:  GENERAL: elderly lady, no acute distress, well-groomed, well-developed  HEAD:  Atraumatic, Normocephalic  EYES: EOMI, PERRL, conjunctiva and sclera clear  ENMT: Moist mucous membranes, Good dentition  NECK: Supple, No JVD  CHEST/LUNG: Clear to auscultation bilaterally, non-labored breathing, good air entry  HEART: RRR; S1/S2, No murmur  ABDOMEN: Soft, Nontender, Nondistended; Bowel sounds present  VASCULAR: Normal pulses, Normal capillary refill, warmth and perfusion in the LLE  EXTREMITIES: No cyanosis, No edema  LYMPH: No lymphadenopathy noted  SKIN: multiple ulcers in the LLE - dorsal aspect first hallux, denuded skin on calf with ulcer, and ulcer on heel. ulcers appear dry.   PSYCH: Normal mood and affect  NERVOUS SYSTEM:  A/O x3, Good concentration; CN 2-12 intact, No focal deficits, moves all extremities    LABS:                        12.3   7.84  )-----------( 189      ( 31 Mar 2021 05:00 )             37.8     03-31    145  |  110  |  24  ----------------------------<  173  3.5   |  25  |  0.82    Ca    8.4      31 Mar 2021 05:00    TPro  6.0  /  Alb  2.1  /  TBili  0.4  /  DBili  x   /  AST  15  /  ALT  13  /  AlkPhos  77  03-31    eGFR if Non African American: 66 mL/min/1.73M2 (03-31-21 @ 05:00)  eGFR if : 76 mL/min/1.73M2 (03-31-21 @ 05:00)        POCT Blood Glucose.: 160 mg/dL (31 Mar 2021 07:20)  POCT Blood Glucose.: 171 mg/dL (30 Mar 2021 21:40)  POCT Blood Glucose.: 134 mg/dL (30 Mar 2021 16:33)  POCT Blood Glucose.: 133 mg/dL (30 Mar 2021 12:44)          GI PCR Panel, Stool (collected 28 Mar 2021 19:28)  Source: .Stool Feces  Final Report (29 Mar 2021 11:05):    GI PCR Results: NOT detected    *******Please Note:*******    GI panel PCR evaluates for:    Campylobacter, Plesiomonas shigelloides, Salmonella,    Vibrio, Yersinia enterocolitica, Enteroaggregative    Escherichia coli (EAEC), Enteropathogenic E.coli (EPEC),    Enterotoxigenic E. coli (ETEC) lt/st, Shiga-like    toxin-producing E. coli (STEC) stx1/stx2,    Shigella/ Enteroinvasive E. coli (EIEC), Cryptosporidium,    Cyclospora cayetanensis, Entamoeba histolytica,    Giardia lamblia, Adenovirus F 40/41, Astrovirus,    Norovirus GI/GII, Rotavirus A, Sapovirus      RADIOLOGY & ADDITIONAL TESTS:    < from: Xray Chest 1 View- PORTABLE-Urgent (Xray Chest 1 View- PORTABLE-Urgent .) (03.22.21 @ 14:17) >  IMPRESSION:        The lungs are clear.  No pleural abnormality is seen.    Cardiomediastinal silhouette is intact.    < end of copied text >      < from: US Duplex Venous Lower Ext Complete, Bilateral (02.19.21 @ 09:28) >  IMPRESSION:  New acute occlusive thrombus in the LEFT gastrocnemius vein.  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  Acute deep venous thrombosis: below the knee.    Findings were discussed with Physician: PHYLICIA CABRERA [id number] 2/19/2021 9:57 AM by Dr. Ganga Levi with read back confirmation.    < end of copied text >      < from: MR Ankle No Cont, Left (02.17.21 @ 11:42) >  IMPRESSION:  1.  Soft tissue ulceration along the posterior aspect of the heel measuring 19 x 15 mm. No associated abscess.  2.  No acute osteomyelitis.  3.  Mild Charcot arthropathy of the midfoot.  < end of copied text >      Care Discussed with Consultants/Other Providers:  PMR, Dr. Feldman.

## 2021-04-01 LAB
GLUCOSE BLDC GLUCOMTR-MCNC: 141 MG/DL — HIGH (ref 70–99)
GLUCOSE BLDC GLUCOMTR-MCNC: 159 MG/DL — HIGH (ref 70–99)
GLUCOSE BLDC GLUCOMTR-MCNC: 227 MG/DL — HIGH (ref 70–99)

## 2021-04-01 PROCEDURE — 99232 SBSQ HOSP IP/OBS MODERATE 35: CPT

## 2021-04-01 PROCEDURE — 99232 SBSQ HOSP IP/OBS MODERATE 35: CPT | Mod: GC

## 2021-04-01 RX ORDER — DIPHENHYDRAMINE HCL 50 MG
25 CAPSULE ORAL EVERY 8 HOURS
Refills: 0 | Status: DISCONTINUED | OUTPATIENT
Start: 2021-04-01 | End: 2021-04-09

## 2021-04-01 RX ORDER — ACETAMINOPHEN 500 MG
650 TABLET ORAL EVERY 8 HOURS
Refills: 0 | Status: DISCONTINUED | OUTPATIENT
Start: 2021-04-01 | End: 2021-04-09

## 2021-04-01 RX ADMIN — Medication 1 MILLIGRAM(S): at 12:18

## 2021-04-01 RX ADMIN — Medication 650 MILLIGRAM(S): at 17:53

## 2021-04-01 RX ADMIN — APIXABAN 5 MILLIGRAM(S): 2.5 TABLET, FILM COATED ORAL at 17:21

## 2021-04-01 RX ADMIN — Medication 20 MILLIGRAM(S): at 12:18

## 2021-04-01 RX ADMIN — Medication 1: at 08:02

## 2021-04-01 RX ADMIN — GABAPENTIN 300 MILLIGRAM(S): 400 CAPSULE ORAL at 21:50

## 2021-04-01 RX ADMIN — Medication 25 MILLIGRAM(S): at 05:48

## 2021-04-01 RX ADMIN — GABAPENTIN 300 MILLIGRAM(S): 400 CAPSULE ORAL at 13:02

## 2021-04-01 RX ADMIN — AMLODIPINE BESYLATE 5 MILLIGRAM(S): 2.5 TABLET ORAL at 05:48

## 2021-04-01 RX ADMIN — Medication 1: at 12:18

## 2021-04-01 RX ADMIN — GABAPENTIN 300 MILLIGRAM(S): 400 CAPSULE ORAL at 05:48

## 2021-04-01 RX ADMIN — Medication 25 MILLIGRAM(S): at 17:21

## 2021-04-01 RX ADMIN — Medication 650 MILLIGRAM(S): at 17:22

## 2021-04-01 RX ADMIN — LISINOPRIL 20 MILLIGRAM(S): 2.5 TABLET ORAL at 05:48

## 2021-04-01 RX ADMIN — APIXABAN 5 MILLIGRAM(S): 2.5 TABLET, FILM COATED ORAL at 05:48

## 2021-04-01 NOTE — PROGRESS NOTE ADULT - SUBJECTIVE AND OBJECTIVE BOX
Patient is a 85y old  Female who presents with a chief complaint of 16 debility (01 Apr 2021 10:51)    Patient seen and examined at bedside. No overnight events reported.     ALLERGIES:  cephalosporins (Other)  penicillins (Urticaria; Pruritus)    MEDICATIONS  (STANDING):  amLODIPine   Tablet 5 milliGRAM(s) Oral daily  apixaban 5 milliGRAM(s) Oral two times a day  dextrose 40% Gel 15 Gram(s) Oral once  dextrose 5%. 1000 milliLiter(s) (50 mL/Hr) IV Continuous <Continuous>  dextrose 5%. 1000 milliLiter(s) (100 mL/Hr) IV Continuous <Continuous>  dextrose 50% Injectable 25 Gram(s) IV Push once  dextrose 50% Injectable 12.5 Gram(s) IV Push once  dextrose 50% Injectable 25 Gram(s) IV Push once  folic acid 1 milliGRAM(s) Oral daily  gabapentin 300 milliGRAM(s) Oral three times a day  glucagon  Injectable 1 milliGRAM(s) IntraMuscular once  insulin lispro (ADMELOG) corrective regimen sliding scale   SubCutaneous three times a day before meals  insulin lispro (ADMELOG) corrective regimen sliding scale   SubCutaneous at bedtime  lisinopril 20 milliGRAM(s) Oral daily  metoprolol tartrate 25 milliGRAM(s) Oral two times a day  PARoxetine 20 milliGRAM(s) Oral daily    MEDICATIONS  (PRN):  acetaminophen   Tablet .. 650 milliGRAM(s) Oral every 8 hours PRN Temp greater or equal to 38C (100.4F), Mild Pain (1 - 3)  artificial  tears Solution 1 Drop(s) Both EYES two times a day PRN Dry Eyes  diphenhydrAMINE 25 milliGRAM(s) Oral every 8 hours PRN Rash and/or Itching  famotidine    Tablet 20 milliGRAM(s) Oral two times a day PRN Dyspepsia  oxycodone    5 mG/acetaminophen 325 mG 1 Tablet(s) Oral every 6 hours PRN Moderate Pain (4 - 6)    Vital Signs Last 24 Hrs  T(F): 98.3 (01 Apr 2021 05:47), Max: 98.3 (01 Apr 2021 05:47)  HR: 65 (01 Apr 2021 07:45) (65 - 89)  BP: 123/65 (01 Apr 2021 07:45) (123/65 - 140/54)  RR: 15 (01 Apr 2021 07:45) (15 - 16)  SpO2: 94% (01 Apr 2021 07:45) (94% - 96%)  I&O's Summary    PHYSICAL EXAM:  General: NAD, A/O x 3  ENT: DRY mucous membranes, no thrush  Neck: Supple, No JVD  Lungs: Bibasilar crackles, good air entry, non-labored breathing  Cardio: RRR, S1/S2, No murmur  Abdomen: Soft, Nontender, Nondistended; Bowel sounds present  Extremities: No calf tenderness, + pitting edema    LABS:                        12.3   7.84  )-----------( 189      ( 31 Mar 2021 05:00 )             37.8     03-31    145  |  110  |  24  ----------------------------<  173  3.5   |  25  |  0.82    Ca    8.4      31 Mar 2021 05:00    TPro  6.0  /  Alb  2.1  /  TBili  0.4  /  DBili  x   /  AST  15  /  ALT  13  /  AlkPhos  77  03-31        eGFR if Non African American: 66 mL/min/1.73M2 (03-31-21 @ 05:00)  eGFR if : 76 mL/min/1.73M2 (03-31-21 @ 05:00)    POCT Blood Glucose.: 159 mg/dL (01 Apr 2021 11:51)  POCT Blood Glucose.: 159 mg/dL (01 Apr 2021 07:58)  POCT Blood Glucose.: 191 mg/dL (31 Mar 2021 21:21)  POCT Blood Glucose.: 143 mg/dL (31 Mar 2021 16:14)    GI PCR Panel, Stool (collected 28 Mar 2021 19:28)  Source: .Stool Feces  Final Report (29 Mar 2021 11:05):    GI PCR Results: NOT detected    *******Please Note:*******    GI panel PCR evaluates for:    Campylobacter, Plesiomonas shigelloides, Salmonella,    Vibrio, Yersinia enterocolitica, Enteroaggregative    Escherichia coli (EAEC), Enteropathogenic E.coli (EPEC),    Enterotoxigenic E. coli (ETEC) lt/st, Shiga-like    toxin-producing E. coli (STEC) stx1/stx2,    Shigella/ Enteroinvasive E. coli (EIEC), Cryptosporidium,    Cyclospora cayetanensis, Entamoeba histolytica,    Giardia lamblia, Adenovirus F 40/41, Astrovirus,    Norovirus GI/GII, Rotavirus A, Sapovirus

## 2021-04-01 NOTE — PROGRESS NOTE ADULT - ATTENDING COMMENTS
Rehab Attending- Patient seen and examined with RADHA Berry- Case discussed, above note reviewed by me with modifications made    Doing well  labs reviewed- OK  no diarrhea- formed BM Yesterday  Some swelling/ blistering Left LE- continue wound care   to continue intensive rehab program

## 2021-04-01 NOTE — PROGRESS NOTE ADULT - ASSESSMENT
85F with anemia, arthritis, CAD, DMII, depression, HTN, PVD, RA; she was admitted to [facility] and had Left Lower Extremity fem-pop bypass on 3/24/2021, now at rehab     #Debility  #Weakness after Left Lower Extremity fem-pop bypass  - PT/OT  - Pain control (neuropathic and somatic pain control)   - Local Wound care    #Left gastrocnemius vein DVT: c/w Eliquis    #Essential HTN: d/w norvasc, lopressor, lisinopril    #DM2: hgba1c 6.9% (3/31/2021), stable, ISS    #RA: resume weekly MTX starting week of 4/6 (if wounds are healed), folic acid supplement while on MTX     #GERD: pepcid     #Depression: c/w Paroxetine     #DVT ppx: eliquis

## 2021-04-01 NOTE — PROGRESS NOTE ADULT - SUBJECTIVE AND OBJECTIVE BOX
Patient is a 85y old  Female who presents with a chief complaint of 16 debility (31 Mar 2021 07:12)      HPI:  85 y.o. female with PMH of anemia, arthritis, CAD, DMII, depression, HTN, PVD, RA, p/w LLE pain. Patient is admitted to surgery service on 3/22 at [facility] with severe Left LE pain and underwent Left fem-pop-bypass on 3/24. Patient's post op course complicated by pain as well as diarrhea- stool negative for Cdiff and diarrhea has subdsded.     Patient deemed medically stable- Seen By PT,OT and screened by physiatry and deemed a good candidate for intensive rehab- admitted To [facility] today to restore functional independence and safe DC to home.   (30 Mar 2021 16:04)    ROS:   c/o pruritus to left shin/periwounds and vaginal itch - benedrylx1 which helped  Denies pain  no CP, SOB, cough  no Abd pain, nausea, urinary or bowel symptoms - Large BM 3/31  inquired about RA medication (methotrexate) - may resume 4/7      PAST MEDICAL & SURGICAL HISTORY:  Hypertension    Anemia    Arthritis, rheumatoid    Depression    Type 2 diabetes mellitus  on insulin    PVD (peripheral vascular disease) with claudication    CAD (coronary artery disease)  non-obstructive    Rheumatoid arthritis    fracture ankle right  plate . screws   2000    S/P cataract surgery  2011    Hip fracture requiring operative repair  Right hip 11/14/2020        Allergies    cephalosporins (Other)  penicillins (Urticaria; Pruritus)    Intolerances      PHYSICAL EXAM  85y  Vital Signs Last 24 Hrs  T(C): 36.8 (01 Apr 2021 05:47), Max: 36.8 (31 Mar 2021 21:20)  T(F): 98.3 (01 Apr 2021 05:47), Max: 98.3 (01 Apr 2021 05:47)  HR: 65 (01 Apr 2021 07:45) (65 - 89)  BP: 123/65 (01 Apr 2021 07:45) (123/65 - 140/54)  BP(mean): --  RR: 15 (01 Apr 2021 07:45) (15 - 16)  SpO2: 94% (01 Apr 2021 07:45) (94% - 96%)    General: NAD, Resting Comfortable,                                  HEENT: NC/AT, EOM I, PERRLA, Normal Conjunctivae  Cardio: RRR, Normal S1-S2, No M/G/R                              Pulm: No Respiratory Distress,  Lungs CTAB                        Abdomen: ND/NT, Soft, BS+                                                MSK: No joint swelling, Full ROM    RA deformities Left >right hand                                     Ext: No C/C/E- dp Pulses 2+ , No calf tenderness    Skin:  Left groin incision with sutures, no drainage, left medial thigh incision intact-sub cut closure                                                            Wounds: Left posterior calf with venous ulcer /eschar with surrounding excoriations- left heel with 3x3m eschar/DTI, Left dorsal first toe with scab?eschar, intact blister to left doral foot    Neurological Examination    Cognitive: AAO x 3                                                                         Attention: Intact   Judgment: Good evidence of judgement                                Mood/Affect: wnl                                                                           Communication:  Fluent,  No dysarthria   Swallow: intact                                                                       Sensory: Intact to light touch,                                                                                             Tone: normal Throughout   Balance: impaired    Motor    LEFT    UE: SF [5/5], EF [5/5], EE [5/5], WE [5/5],  [wnl]  RIGHT UE: SF [5/5], EF [5/5], EE [5/5], WE [5/5],  [wnl]  LEFT    LE:  HF [3+/5], KE [4/5], DF [1-2/5], EHL [0/5],  PF [3/5]  RIGHT LE:  HF [4/5], KE [5/5], DF [1/5], EHL [0/5],  PF [3/5]      Reflex:  2 + thoroughout, Saab/Babinski negative    RECENT LABS:      LAB                        12.3   7.84  )-----------( 189      ( 31 Mar 2021 05:00 )             37.8     03-31    145  |  110<H>  |  24<H>  ----------------------------<  173<H>  3.5   |  25  |  0.82    Ca    8.4      31 Mar 2021 05:00    TPro  6.0  /  Alb  2.1<L>  /  TBili  0.4  /  DBili  x   /  AST  15  /  ALT  13  /  AlkPhos  77  03-31    LIVER FUNCTIONS - ( 31 Mar 2021 05:00 )  Alb: 2.1 g/dL / Pro: 6.0 g/dL / ALK PHOS: 77 U/L / ALT: 13 U/L / AST: 15 U/L / GGT: x             GI PCR Panel, Stool (collected 03-28-21 @ 19:28)  Source: .Stool Feces  Final Report (03-29-21 @ 11:05):    GI PCR Results: NOT detected    *******Please Note:*******    GI panel PCR evaluates for:    Campylobacter, Plesiomonas shigelloides, Salmonella,    Vibrio, Yersinia enterocolitica, Enteroaggregative    Escherichia coli (EAEC), Enteropathogenic E.coli (EPEC),    Enterotoxigenic E. coli (ETEC) lt/st, Shiga-like    toxin-producing E. coli (STEC) stx1/stx2,    Shigella/ Enteroinvasive E. coli (EIEC), Cryptosporidium,    Cyclospora cayetanensis, Entamoeba histolytica,    Giardia lamblia, Adenovirus F 40/41, Astrovirus,    Norovirus GI/GII, Rotavirus A, Sapovirus    CAPILLARY BLOOD GLUCOSE      POCT Blood Glucose.: 159 mg/dL (01 Apr 2021 07:58)  POCT Blood Glucose.: 191 mg/dL (31 Mar 2021 21:21)  POCT Blood Glucose.: 143 mg/dL (31 Mar 2021 16:14)  POCT Blood Glucose.: 164 mg/dL (31 Mar 2021 11:32)    MEDICATIONS  (STANDING):  amLODIPine   Tablet 5 milliGRAM(s) Oral daily  apixaban 5 milliGRAM(s) Oral two times a day  dextrose 40% Gel 15 Gram(s) Oral once  dextrose 5%. 1000 milliLiter(s) (50 mL/Hr) IV Continuous <Continuous>  dextrose 5%. 1000 milliLiter(s) (100 mL/Hr) IV Continuous <Continuous>  dextrose 50% Injectable 25 Gram(s) IV Push once  dextrose 50% Injectable 12.5 Gram(s) IV Push once  dextrose 50% Injectable 25 Gram(s) IV Push once  folic acid 1 milliGRAM(s) Oral daily  gabapentin 300 milliGRAM(s) Oral three times a day  glucagon  Injectable 1 milliGRAM(s) IntraMuscular once  insulin lispro (ADMELOG) corrective regimen sliding scale   SubCutaneous three times a day before meals  insulin lispro (ADMELOG) corrective regimen sliding scale   SubCutaneous at bedtime  lisinopril 20 milliGRAM(s) Oral daily  metoprolol tartrate 25 milliGRAM(s) Oral two times a day  PARoxetine 20 milliGRAM(s) Oral daily    MEDICATIONS  (PRN):  acetaminophen   Tablet .. 650 milliGRAM(s) Oral every 8 hours PRN Temp greater or equal to 38C (100.4F), Mild Pain (1 - 3)  artificial  tears Solution 1 Drop(s) Both EYES two times a day PRN Dry Eyes  famotidine    Tablet 20 milliGRAM(s) Oral two times a day PRN Dyspepsia  oxycodone    5 mG/acetaminophen 325 mG 1 Tablet(s) Oral every 6 hours PRN Moderate Pain (4 - 6)       Patient is a 85y old  Female who presents with a chief complaint of 16 debility (31 Mar 2021 07:12)      HPI:  85 y.o. female with PMH of anemia, arthritis, CAD, DMII, depression, HTN, PVD, RA, p/w LLE pain. Patient is admitted to surgery service on 3/22 at [facility] with severe Left LE pain and underwent Left fem-pop-bypass on 3/24. Patient's post op course complicated by pain as well as diarrhea- stool negative for Cdiff and diarrhea has subdsded.     Patient deemed medically stable- Seen By PT,OT and screened by physiatry and deemed a good candidate for intensive rehab- admitted To [facility] today to restore functional independence and safe DC to home.   (30 Mar 2021 16:04)    ROS:   c/o pruritus to left shin/periwounds and vaginal itch - benedrylx1 which helped  developed some blistering of Left Lower extremity  Denies pain  no CP, SOB, cough  no Abd pain, nausea, urinary or bowel symptoms - Large BM 3/31  inquired about RA medication (methotrexate) - may resume 4/7      PAST MEDICAL & SURGICAL HISTORY:  Hypertension    Anemia    Arthritis, rheumatoid    Depression    Type 2 diabetes mellitus  on insulin    PVD (peripheral vascular disease) with claudication    CAD (coronary artery disease)  non-obstructive    Rheumatoid arthritis    fracture ankle right  plate . screws   2000    S/P cataract surgery  2011    Hip fracture requiring operative repair  Right hip 11/14/2020        Allergies    cephalosporins (Other)  penicillins (Urticaria; Pruritus)    Intolerances      PHYSICAL EXAM  85y  Vital Signs Last 24 Hrs  T(C): 36.8 (01 Apr 2021 05:47), Max: 36.8 (31 Mar 2021 21:20)  T(F): 98.3 (01 Apr 2021 05:47), Max: 98.3 (01 Apr 2021 05:47)  HR: 65 (01 Apr 2021 07:45) (65 - 89)  BP: 123/65 (01 Apr 2021 07:45) (123/65 - 140/54)  BP(mean): --  RR: 15 (01 Apr 2021 07:45) (15 - 16)  SpO2: 94% (01 Apr 2021 07:45) (94% - 96%)    General: NAD, Resting Comfortable,                                  HEENT: NC/AT, EOM I, PERRLA, Normal Conjunctivae  Cardio: RRR, Normal S1-S2, No M/G/R                              Pulm: No Respiratory Distress,  Lungs CTAB                        Abdomen: ND/NT, Soft, BS+                                                MSK: No joint swelling, Full ROM    RA deformities Left >right hand                                     Ext: No C/C/E- dp Pulses 2+ , No calf tenderness    Skin:  Left groin incision with sutures, no drainage, left medial thigh incision intact-sub cut closure                                                            Wounds: Left posterior calf with venous ulcer /eschar with surrounding excoriations- left heel with 3x3m eschar/DTI, Left dorsal first toe with scab?eschar, intact blister to left doral foot    Neurological Examination    Cognitive: AAO x 3                                                                         Attention: Intact   Judgment: Good evidence of judgement                                Mood/Affect: wnl                                                                           Communication:  Fluent,  No dysarthria   Swallow: intact                                                                       Sensory: Intact to light touch,                                                                                             Tone: normal Throughout   Balance: impaired    Motor    LEFT    UE: SF [5/5], EF [5/5], EE [5/5], WE [5/5],  [wnl]  RIGHT UE: SF [5/5], EF [5/5], EE [5/5], WE [5/5],  [wnl]  LEFT    LE:  HF [3+/5], KE [4/5], DF [1-2/5], EHL [0/5],  PF [3/5]  RIGHT LE:  HF [4/5], KE [5/5], DF [1/5], EHL [0/5],  PF [3/5]      Reflex:  2 + thoroughout, Saab/Babinski negative    RECENT LABS:      LAB                        12.3   7.84  )-----------( 189      ( 31 Mar 2021 05:00 )             37.8     03-31    145  |  110<H>  |  24<H>  ----------------------------<  173<H>  3.5   |  25  |  0.82    Ca    8.4      31 Mar 2021 05:00    TPro  6.0  /  Alb  2.1<L>  /  TBili  0.4  /  DBili  x   /  AST  15  /  ALT  13  /  AlkPhos  77  03-31    LIVER FUNCTIONS - ( 31 Mar 2021 05:00 )  Alb: 2.1 g/dL / Pro: 6.0 g/dL / ALK PHOS: 77 U/L / ALT: 13 U/L / AST: 15 U/L / GGT: x             GI PCR Panel, Stool (collected 03-28-21 @ 19:28)  Source: .Stool Feces  Final Report (03-29-21 @ 11:05):    GI PCR Results: NOT detected    *******Please Note:*******    GI panel PCR evaluates for:    Campylobacter, Plesiomonas shigelloides, Salmonella,    Vibrio, Yersinia enterocolitica, Enteroaggregative    Escherichia coli (EAEC), Enteropathogenic E.coli (EPEC),    Enterotoxigenic E. coli (ETEC) lt/st, Shiga-like    toxin-producing E. coli (STEC) stx1/stx2,    Shigella/ Enteroinvasive E. coli (EIEC), Cryptosporidium,    Cyclospora cayetanensis, Entamoeba histolytica,    Giardia lamblia, Adenovirus F 40/41, Astrovirus,    Norovirus GI/GII, Rotavirus A, Sapovirus    CAPILLARY BLOOD GLUCOSE      POCT Blood Glucose.: 159 mg/dL (01 Apr 2021 07:58)  POCT Blood Glucose.: 191 mg/dL (31 Mar 2021 21:21)  POCT Blood Glucose.: 143 mg/dL (31 Mar 2021 16:14)  POCT Blood Glucose.: 164 mg/dL (31 Mar 2021 11:32)    MEDICATIONS  (STANDING):  amLODIPine   Tablet 5 milliGRAM(s) Oral daily  apixaban 5 milliGRAM(s) Oral two times a day  dextrose 40% Gel 15 Gram(s) Oral once  dextrose 5%. 1000 milliLiter(s) (50 mL/Hr) IV Continuous <Continuous>  dextrose 5%. 1000 milliLiter(s) (100 mL/Hr) IV Continuous <Continuous>  dextrose 50% Injectable 25 Gram(s) IV Push once  dextrose 50% Injectable 12.5 Gram(s) IV Push once  dextrose 50% Injectable 25 Gram(s) IV Push once  folic acid 1 milliGRAM(s) Oral daily  gabapentin 300 milliGRAM(s) Oral three times a day  glucagon  Injectable 1 milliGRAM(s) IntraMuscular once  insulin lispro (ADMELOG) corrective regimen sliding scale   SubCutaneous three times a day before meals  insulin lispro (ADMELOG) corrective regimen sliding scale   SubCutaneous at bedtime  lisinopril 20 milliGRAM(s) Oral daily  metoprolol tartrate 25 milliGRAM(s) Oral two times a day  PARoxetine 20 milliGRAM(s) Oral daily    MEDICATIONS  (PRN):  acetaminophen   Tablet .. 650 milliGRAM(s) Oral every 8 hours PRN Temp greater or equal to 38C (100.4F), Mild Pain (1 - 3)  artificial  tears Solution 1 Drop(s) Both EYES two times a day PRN Dry Eyes  famotidine    Tablet 20 milliGRAM(s) Oral two times a day PRN Dyspepsia  oxycodone    5 mG/acetaminophen 325 mG 1 Tablet(s) Oral every 6 hours PRN Moderate Pain (4 - 6)       Patient is a 85y old  Female who presents with a chief complaint of 16 debility (31 Mar 2021 07:12)      HPI:  85 y.o. female with PMH of anemia, arthritis, CAD, DMII, depression, HTN, PVD, RA, p/w LLE pain. Patient is admitted to surgery service on 3/22 at [facility] with severe Left LE pain and underwent Left fem-pop-bypass on 3/24. Patient's post op course complicated by pain as well as diarrhea- stool negative for Cdiff and diarrhea has subdsded.     Patient deemed medically stable- Seen By PT,OT and screened by physiatry and deemed a good candidate for intensive rehab- admitted To [facility] today to restore functional independence and safe DC to home.   (30 Mar 2021 16:04)    ROS:   c/o pruritus to left shin/periwounds and vaginal itch - benedrylx1 which helped  developed some blistering of Left Lower extremity  Denies pain  no CP, SOB, cough  no Abd pain, nausea, urinary or bowel symptoms - Large BM 3/31  inquired about RA medication (methotrexate) - may resume 4/7      PAST MEDICAL & SURGICAL HISTORY:  Hypertension    Anemia    Arthritis, rheumatoid    Depression    Type 2 diabetes mellitus  on insulin    PVD (peripheral vascular disease) with claudication    CAD (coronary artery disease)  non-obstructive    Rheumatoid arthritis    fracture ankle right  plate . screws   2000    S/P cataract surgery  2011    Hip fracture requiring operative repair  Right hip 11/14/2020        Allergies    cephalosporins (Other)  penicillins (Urticaria; Pruritus)    Intolerances      PHYSICAL EXAM  85y  Vital Signs Last 24 Hrs  T(C): 36.8 (01 Apr 2021 05:47), Max: 36.8 (31 Mar 2021 21:20)  T(F): 98.3 (01 Apr 2021 05:47), Max: 98.3 (01 Apr 2021 05:47)  HR: 65 (01 Apr 2021 07:45) (65 - 89)  BP: 123/65 (01 Apr 2021 07:45) (123/65 - 140/54)  BP(mean): --  RR: 15 (01 Apr 2021 07:45) (15 - 16)  SpO2: 94% (01 Apr 2021 07:45) (94% - 96%)    General: NAD, Resting Comfortable,                                  HEENT: NC/AT, EOM I, PERRLA, Normal Conjunctivae  Cardio: RRR, Normal S1-S2, No M/G/R                              Pulm: No Respiratory Distress,  Lungs CTAB                        Abdomen: ND/NT, Soft, BS+                                                MSK: No joint swelling, Full ROM    RA deformities Left >right hand                                     Ext: No C/C/E- dp Pulses 2+ , No calf tenderness    Skin:  Left groin incision with sutures, no drainage, left medial thigh incision intact-sub cut closure                                                            Wounds: Left posterior calf with venous ulcer /eschar with surrounding excoriations- left heel pressure ulcer unstageable with eschar, Left dorsal first toe with scab?eschar, intact blister to left dorsal foot    Neurological Examination    Cognitive: AAO x 3                                                                         Attention: Intact   Judgment: Good evidence of judgement                                Mood/Affect: wnl                                                                           Communication:  Fluent,  No dysarthria   Swallow: intact                                                                       Sensory: Intact to light touch,                                                                                             Tone: normal Throughout   Balance: impaired    Motor    LEFT    UE: SF [5/5], EF [5/5], EE [5/5], WE [5/5],  [wnl]  RIGHT UE: SF [5/5], EF [5/5], EE [5/5], WE [5/5],  [wnl]  LEFT    LE:  HF [3+/5], KE [4/5], DF [1-2/5], EHL [0/5],  PF [3/5]  RIGHT LE:  HF [4/5], KE [5/5], DF [1/5], EHL [0/5],  PF [3/5]      Reflex:  2 + thoroughout, Saab/Babinski negative    RECENT LABS:      LAB                        12.3   7.84  )-----------( 189      ( 31 Mar 2021 05:00 )             37.8     03-31    145  |  110<H>  |  24<H>  ----------------------------<  173<H>  3.5   |  25  |  0.82    Ca    8.4      31 Mar 2021 05:00    TPro  6.0  /  Alb  2.1<L>  /  TBili  0.4  /  DBili  x   /  AST  15  /  ALT  13  /  AlkPhos  77  03-31    LIVER FUNCTIONS - ( 31 Mar 2021 05:00 )  Alb: 2.1 g/dL / Pro: 6.0 g/dL / ALK PHOS: 77 U/L / ALT: 13 U/L / AST: 15 U/L / GGT: x             GI PCR Panel, Stool (collected 03-28-21 @ 19:28)  Source: .Stool Feces  Final Report (03-29-21 @ 11:05):    GI PCR Results: NOT detected    *******Please Note:*******    GI panel PCR evaluates for:    Campylobacter, Plesiomonas shigelloides, Salmonella,    Vibrio, Yersinia enterocolitica, Enteroaggregative    Escherichia coli (EAEC), Enteropathogenic E.coli (EPEC),    Enterotoxigenic E. coli (ETEC) lt/st, Shiga-like    toxin-producing E. coli (STEC) stx1/stx2,    Shigella/ Enteroinvasive E. coli (EIEC), Cryptosporidium,    Cyclospora cayetanensis, Entamoeba histolytica,    Giardia lamblia, Adenovirus F 40/41, Astrovirus,    Norovirus GI/GII, Rotavirus A, Sapovirus    CAPILLARY BLOOD GLUCOSE      POCT Blood Glucose.: 159 mg/dL (01 Apr 2021 07:58)  POCT Blood Glucose.: 191 mg/dL (31 Mar 2021 21:21)  POCT Blood Glucose.: 143 mg/dL (31 Mar 2021 16:14)  POCT Blood Glucose.: 164 mg/dL (31 Mar 2021 11:32)    MEDICATIONS  (STANDING):  amLODIPine   Tablet 5 milliGRAM(s) Oral daily  apixaban 5 milliGRAM(s) Oral two times a day  dextrose 40% Gel 15 Gram(s) Oral once  dextrose 5%. 1000 milliLiter(s) (50 mL/Hr) IV Continuous <Continuous>  dextrose 5%. 1000 milliLiter(s) (100 mL/Hr) IV Continuous <Continuous>  dextrose 50% Injectable 25 Gram(s) IV Push once  dextrose 50% Injectable 12.5 Gram(s) IV Push once  dextrose 50% Injectable 25 Gram(s) IV Push once  folic acid 1 milliGRAM(s) Oral daily  gabapentin 300 milliGRAM(s) Oral three times a day  glucagon  Injectable 1 milliGRAM(s) IntraMuscular once  insulin lispro (ADMELOG) corrective regimen sliding scale   SubCutaneous three times a day before meals  insulin lispro (ADMELOG) corrective regimen sliding scale   SubCutaneous at bedtime  lisinopril 20 milliGRAM(s) Oral daily  metoprolol tartrate 25 milliGRAM(s) Oral two times a day  PARoxetine 20 milliGRAM(s) Oral daily    MEDICATIONS  (PRN):  acetaminophen   Tablet .. 650 milliGRAM(s) Oral every 8 hours PRN Temp greater or equal to 38C (100.4F), Mild Pain (1 - 3)  artificial  tears Solution 1 Drop(s) Both EYES two times a day PRN Dry Eyes  famotidine    Tablet 20 milliGRAM(s) Oral two times a day PRN Dyspepsia  oxycodone    5 mG/acetaminophen 325 mG 1 Tablet(s) Oral every 6 hours PRN Moderate Pain (4 - 6)

## 2021-04-01 NOTE — PROGRESS NOTE ADULT - ASSESSMENT
JULIEN CASTELLANO is a 85y with a history of CAD, DM2, HTN, RA, Recent Right Hip fx Orif 11/20, Right calf DVT 2/21 who presented to  on 3/22 with complaint of severe Left leg pain and found to have PAD SP Left fem pop Bypass 3/24. Hospital course complicated by diarrhea- now resolved. Now admitted to [facility] after for initiation of a multidisciplinary rehab program consisting focused on functional mobility, transfers and ADLs (activities of daily living).    Comprehensive Multidisciplinary Rehab Program:  - Start comprehensive rehab program, 3 hours a day, 5 days a week.  - PT 2hr/day: Focused on improving strength, endurance, coordination, balance, functional mobility, and transfers  - OT 1hr/day: Focused on improving strength, fine motor skills, coordination, posture and ADLs.    - Speech Therapy NA  - P&O as needed  - Activity Limitations: Decreased social, vocational and leisure activities, decreased self care and ADLs, decreased mobility, decreased ability to manage household and finances.     Participation Restrictions/Precautions:  - Weight bearing status: WBAT  - ROM restrictions: none  - Precautions:    [x ] Falls   [ ] Spinal   [ ] Hip (Anterior or Posterior)       [ ] Seizure  [ ] Cardiac   [ ] Sternal    Rehab Diagnosis/Management:    Sleep:   - Maintain quiet hours and low stim environment.    Pain Management:  - percocet PRN Mod pain, gabapentin 300 TID, Tylenol 650 Q8 PRN  - Avoid sedating medications that may interfere with cognitive recovery    GI/Bowel:  - At risk for constipation due to neurologic diagnosis, immobility and/or medication use  - recent diarrhea secondary to antibiotics- no bowel meds ordered  - GI ppx: pepcid    /Bladder:   - At risk for incontinence and retention due to neurologic diagnosis and limited mobility  - Currently patient voids:    [x ] independent     [ ] external collection device (condom cath)    [ ] Indwelling hurst catheter     [ ] Intermittent catheterization  - Baseline bladder scans -straight cath for >350cc.  - Encourage timed voids every 4 hours while awake for independence and to promote continence during therapy.    Skin/Pressure Injury:   - At risk for pressure injury due to neurologic diagnosis and relative immobility.  - Skin assessment on admission - Left heel , calf great toe eschar   - Monitor Incisions: Left groin with sutures, Left medial thigh intact  - Turn every 2 hours while in bed, air mattress  - Soft heel protectors  - Skin barrier cream as needed  Betadine left heel and 1st toe , xeroform to Left calf ,DSD Carly daily  - Nursing to monitor skin Qshift    #Pruritus  - benedryl PRN  - keep blister to Left dorsum foot intact to reduce risk of infection    Diet/Dysphagia:  - Diet Consistency/Modifications: reg consistency  - Supplements: NA    DVT ppx:   - Eliquis    - Last Doppler on- NA  -------------  Comorbid Medical Management:    Diarrhea  C diff neg  GI PCR neg  resolved    DM 2  took lantus 25 units HS PTA  Now On SSI only   Consider adding lantus Hs- Hospitalist to address    HTN  continue lisinopril, amlodipine, metoprolol    Depression   cont paxil    RA  takes methotrexate 15mg weekly on Wednesday - on hold  can restart on 4/7 as per rheum    Hx DVT  restart eliquis 5mg 2x/day   JULIEN CASTELLANO is a 85y with a history of CAD, DM2, HTN, RA, Recent Right Hip fx Orif 11/20, Right calf DVT 2/21 who presented to  on 3/22 with complaint of severe Left leg pain and found to have PAD SP Left fem pop Bypass 3/24. Hospital course complicated by diarrhea- now resolved. Now admitted to [facility] after for initiation of a multidisciplinary rehab program consisting focused on functional mobility, transfers and ADLs (activities of daily living).    Comprehensive Multidisciplinary Rehab Program:  - Start comprehensive rehab program, 3 hours a day, 5 days a week.  - PT 2hr/day: Focused on improving strength, endurance, coordination, balance, functional mobility, and transfers  - OT 1hr/day: Focused on improving strength, fine motor skills, coordination, posture and ADLs.    - Speech Therapy NA  - P&O as needed  - Activity Limitations: Decreased social, vocational and leisure activities, decreased self care and ADLs, decreased mobility, decreased ability to manage household and finances.     Participation Restrictions/Precautions:  - Weight bearing status: WBAT  - ROM restrictions: none  - Precautions:    [x ] Falls   [ ] Spinal   [ ] Hip (Anterior or Posterior)       [ ] Seizure  [ ] Cardiac   [ ] Sternal    Rehab Diagnosis/Management:    Sleep:   - Maintain quiet hours and low stim environment.    Pain Management:  - percocet PRN Mod pain, gabapentin 300 TID, Tylenol 650 Q8 PRN  - Avoid sedating medications that may interfere with cognitive recovery    GI/Bowel:  - At risk for constipation due to neurologic diagnosis, immobility and/or medication use  - recent diarrhea secondary to antibiotics- no bowel meds ordered  - GI ppx: pepcid    /Bladder:   - At risk for incontinence and retention due to neurologic diagnosis and limited mobility  - Currently patient voids:    [x ] independent     [ ] external collection device (condom cath)    [ ] Indwelling hurst catheter     [ ] Intermittent catheterization  - Baseline bladder scans -straight cath for >350cc.  - Encourage timed voids every 4 hours while awake for independence and to promote continence during therapy.    Skin/Pressure Injury:   - At risk for pressure injury due to neurologic diagnosis and relative immobility.  - Skin assessment on admission - Left heel pressure ulcer , calf great toe eschar   - Monitor Incisions: Left groin with sutures, Left medial thigh intact  - Turn every 2 hours while in bed, air mattress  - Soft heel protectors  - Skin barrier cream as needed  Betadine left heel and 1st toe , xeroform to Left calf ,DSD Carly daily  - Nursing to monitor skin Qshift    #Pruritus  - benedryl PRN  - keep blister to Left dorsum foot intact to reduce risk of infection    Diet/Dysphagia:  - Diet Consistency/Modifications: reg consistency  - Supplements: NA    DVT ppx:   - Eliquis    - Last Doppler on- NA  -------------  Comorbid Medical Management:    Diarrhea  C diff neg  GI PCR neg  resolved    DM 2  took lantus 25 units HS PTA  Now On SSI only   Consider adding lantus Hs- Hospitalist to address    HTN  continue lisinopril, amlodipine, metoprolol    Depression   cont paxil    RA  takes methotrexate 15mg weekly on Wednesday - on hold  can restart on 4/7 as per rheum    Hx DVT  restart eliquis 5mg 2x/day

## 2021-04-02 LAB
GLUCOSE BLDC GLUCOMTR-MCNC: 159 MG/DL — HIGH (ref 70–99)
GLUCOSE BLDC GLUCOMTR-MCNC: 165 MG/DL — HIGH (ref 70–99)
GLUCOSE BLDC GLUCOMTR-MCNC: 166 MG/DL — HIGH (ref 70–99)
GLUCOSE BLDC GLUCOMTR-MCNC: 177 MG/DL — HIGH (ref 70–99)

## 2021-04-02 PROCEDURE — 99232 SBSQ HOSP IP/OBS MODERATE 35: CPT | Mod: GC

## 2021-04-02 PROCEDURE — 99232 SBSQ HOSP IP/OBS MODERATE 35: CPT

## 2021-04-02 RX ORDER — PETROLATUM,WHITE
1 JELLY (GRAM) TOPICAL
Refills: 0 | Status: DISCONTINUED | OUTPATIENT
Start: 2021-04-02 | End: 2021-04-09

## 2021-04-02 RX ADMIN — GABAPENTIN 300 MILLIGRAM(S): 400 CAPSULE ORAL at 22:04

## 2021-04-02 RX ADMIN — AMLODIPINE BESYLATE 5 MILLIGRAM(S): 2.5 TABLET ORAL at 05:23

## 2021-04-02 RX ADMIN — Medication 1: at 08:06

## 2021-04-02 RX ADMIN — APIXABAN 5 MILLIGRAM(S): 2.5 TABLET, FILM COATED ORAL at 05:23

## 2021-04-02 RX ADMIN — Medication 650 MILLIGRAM(S): at 16:13

## 2021-04-02 RX ADMIN — GABAPENTIN 300 MILLIGRAM(S): 400 CAPSULE ORAL at 05:23

## 2021-04-02 RX ADMIN — Medication 20 MILLIGRAM(S): at 12:02

## 2021-04-02 RX ADMIN — APIXABAN 5 MILLIGRAM(S): 2.5 TABLET, FILM COATED ORAL at 17:23

## 2021-04-02 RX ADMIN — Medication 1: at 12:03

## 2021-04-02 RX ADMIN — LISINOPRIL 20 MILLIGRAM(S): 2.5 TABLET ORAL at 05:23

## 2021-04-02 RX ADMIN — Medication 1: at 17:23

## 2021-04-02 RX ADMIN — GABAPENTIN 300 MILLIGRAM(S): 400 CAPSULE ORAL at 12:56

## 2021-04-02 RX ADMIN — Medication 650 MILLIGRAM(S): at 17:00

## 2021-04-02 RX ADMIN — Medication 1 APPLICATION(S): at 22:08

## 2021-04-02 RX ADMIN — Medication 25 MILLIGRAM(S): at 17:23

## 2021-04-02 RX ADMIN — Medication 1 MILLIGRAM(S): at 12:02

## 2021-04-02 RX ADMIN — Medication 25 MILLIGRAM(S): at 05:23

## 2021-04-02 NOTE — PROGRESS NOTE ADULT - ASSESSMENT
85F with anemia, arthritis, CAD, DMII, depression, HTN, PVD, RA; she was admitted to [facility] and had Left Lower Extremity fem-pop bypass on 3/24/2021, now at rehab     #Debility  #Weakness after Left Lower Extremity fem-pop bypass  - PT/OT  - Pain control (neuropathic and somatic pain control)   - Local Wound care    #Left gastrocnemius vein DVT: c/w Eliquis    #Essential HTN: d/w norvasc, lopressor, lisinopril (one isolated SBP in 160s, otherwise stable) - continue to monitor    #DM2: hgba1c 6.9% (3/31/2021), stable, ISS    #RA: resume weekly MTX starting week of 4/6 (if wounds are healed), folic acid supplement while on MTX     #GERD: pepcid     #Depression: c/w Paroxetine     #DVT ppx: eliquis

## 2021-04-02 NOTE — PROGRESS NOTE ADULT - SUBJECTIVE AND OBJECTIVE BOX
Patient is a 85y old  Female who presents with a chief complaint of 13 other disabling impairments- SP Left fem-pop bypass (02 Apr 2021 13:16)    Patient seen and examined at bedside. No overnight events reported.     ALLERGIES:  cephalosporins (Other)  penicillins (Urticaria; Pruritus)    MEDICATIONS  (STANDING):  amLODIPine   Tablet 5 milliGRAM(s) Oral daily  apixaban 5 milliGRAM(s) Oral two times a day  AQUAPHOR (petrolatum Ointment) 1 Application(s) Topical two times a day  dextrose 40% Gel 15 Gram(s) Oral once  dextrose 5%. 1000 milliLiter(s) (50 mL/Hr) IV Continuous <Continuous>  dextrose 5%. 1000 milliLiter(s) (100 mL/Hr) IV Continuous <Continuous>  dextrose 50% Injectable 25 Gram(s) IV Push once  dextrose 50% Injectable 12.5 Gram(s) IV Push once  dextrose 50% Injectable 25 Gram(s) IV Push once  folic acid 1 milliGRAM(s) Oral daily  gabapentin 300 milliGRAM(s) Oral three times a day  glucagon  Injectable 1 milliGRAM(s) IntraMuscular once  insulin lispro (ADMELOG) corrective regimen sliding scale   SubCutaneous three times a day before meals  insulin lispro (ADMELOG) corrective regimen sliding scale   SubCutaneous at bedtime  lisinopril 20 milliGRAM(s) Oral daily  metoprolol tartrate 25 milliGRAM(s) Oral two times a day  PARoxetine 20 milliGRAM(s) Oral daily    MEDICATIONS  (PRN):  acetaminophen   Tablet .. 650 milliGRAM(s) Oral every 8 hours PRN Temp greater or equal to 38C (100.4F), Mild Pain (1 - 3)  artificial  tears Solution 1 Drop(s) Both EYES two times a day PRN Dry Eyes  diphenhydrAMINE 25 milliGRAM(s) Oral every 8 hours PRN Rash and/or Itching  famotidine    Tablet 20 milliGRAM(s) Oral two times a day PRN Dyspepsia  oxycodone    5 mG/acetaminophen 325 mG 1 Tablet(s) Oral every 6 hours PRN Moderate Pain (4 - 6)    Vital Signs Last 24 Hrs  T(F): 97.9 (02 Apr 2021 07:30), Max: 98.2 (02 Apr 2021 05:49)  HR: 61 (02 Apr 2021 07:30) (61 - 80)  BP: 138/65 (02 Apr 2021 07:30) (136/66 - 167/70)  RR: 15 (02 Apr 2021 07:30) (15 - 15)  SpO2: 97% (02 Apr 2021 07:30) (93% - 97%)  I&O's Summary    PHYSICAL EXAM:  General: NAD, A/O x 3  ENT: DRY mucous membranes, no thrush  Neck: Supple, No JVD  Lungs: clear lungs, good air entry, non-labored breathing  Cardio: RRR, S1/S2, No murmur  Abdomen: Soft, Nontender, Nondistended; Bowel sounds present  Extremities: No calf tenderness, + pitting edema      LABS:                        12.3   7.84  )-----------( 189      ( 31 Mar 2021 05:00 )             37.8     03-31    145  |  110  |  24  ----------------------------<  173  3.5   |  25  |  0.82    Ca    8.4      31 Mar 2021 05:00    TPro  6.0  /  Alb  2.1  /  TBili  0.4  /  DBili  x   /  AST  15  /  ALT  13  /  AlkPhos  77  03-31        eGFR if Non African American: 66 mL/min/1.73M2 (03-31-21 @ 05:00)  eGFR if : 76 mL/min/1.73M2 (03-31-21 @ 05:00)    POCT Blood Glucose.: 159 mg/dL (02 Apr 2021 12:00)  POCT Blood Glucose.: 166 mg/dL (02 Apr 2021 07:30)  POCT Blood Glucose.: 227 mg/dL (01 Apr 2021 21:49)  POCT Blood Glucose.: 141 mg/dL (01 Apr 2021 16:49)    GI PCR Panel, Stool (collected 28 Mar 2021 19:28)  Source: .Stool Feces  Final Report (29 Mar 2021 11:05):    GI PCR Results: NOT detected    *******Please Note:*******    GI panel PCR evaluates for:    Campylobacter, Plesiomonas shigelloides, Salmonella,    Vibrio, Yersinia enterocolitica, Enteroaggregative    Escherichia coli (EAEC), Enteropathogenic E.coli (EPEC),    Enterotoxigenic E. coli (ETEC) lt/st, Shiga-like    toxin-producing E. coli (STEC) stx1/stx2,    Shigella/ Enteroinvasive E. coli (EIEC), Cryptosporidium,    Cyclospora cayetanensis, Entamoeba histolytica,    Giardia lamblia, Adenovirus F 40/41, Astrovirus,    Norovirus GI/GII, Rotavirus A, Sapovirus

## 2021-04-02 NOTE — PROGRESS NOTE ADULT - ASSESSMENT
JULIEN CASTELLANO is a 85y with a history of CAD, DM2, HTN, RA, Recent Right Hip fx Orif 11/20, Right calf DVT 2/21 who presented to  on 3/22 with complaint of severe Left leg pain and found to have PAD SP Left fem pop Bypass 3/24. Hospital course complicated by diarrhea- now resolved. Now admitted to [facility] after for initiation of a multidisciplinary rehab program consisting focused on functional mobility, transfers and ADLs (activities of daily living).    Comprehensive Multidisciplinary Rehab Program:  - comprehensive rehab program, 3 hours a day, 5 days a week.  - PT 2hr/day: Focused on improving strength, endurance, coordination, balance, functional mobility, and transfers  - OT 1hr/day: Focused on improving strength, fine motor skills, coordination, posture and ADLs.    - Speech Therapy NA  - P&O as needed  - Activity Limitations: Decreased social, vocational and leisure activities, decreased self care and ADLs, decreased mobility, decreased ability to manage household and finances.   WIll attempt to contact vascular next week regarding DC sutures in groin    Participation Restrictions/Precautions:  - Weight bearing status: WBAT  - ROM restrictions: none  - Precautions:    [x ] Falls   [ ] Spinal   [ ] Hip (Anterior or Posterior)       [ ] Seizure  [ ] Cardiac   [ ] Sternal    Rehab Diagnosis/Management:    Sleep:   - Maintain quiet hours and low stim environment.    Pain Management:  - percocet PRN Mod pain, gabapentin 300 TID, Tylenol 650 Q8 PRN  - Avoid sedating medications that may interfere with cognitive recovery    GI/Bowel:  - At risk for constipation due to neurologic diagnosis, immobility and/or medication use  - recent diarrhea secondary to antibiotics- no bowel meds ordered  - GI ppx: pepcid    /Bladder:   - At risk for incontinence and retention due to neurologic diagnosis and limited mobility  - Currently patient voids:    [x ] independent     [ ] external collection device (condom cath)    [ ] Indwelling hurst catheter     [ ] Intermittent catheterization  - Baseline bladder scans -straight cath for >350cc.  - Encourage timed voids every 4 hours while awake for independence and to promote continence during therapy.    Skin/Pressure Injury:   - At risk for pressure injury due to neurologic diagnosis and relative immobility.  - Skin assessment on admission - Left heel pressure ulcer , calf great toe eschar   - Monitor Incisions: Left groin with sutures, Left medial thigh intact  - Turn every 2 hours while in bed, air mattress  - Soft heel protectors  - Skin barrier cream as needed  Betadine left heel and 1st toe , xeroform to Left calf ,DSD Carly daily  - Nursing to monitor skin Qshift    #Pruritus  - benedryl PRN  - keep blister to Left dorsum foot intact to reduce risk of infection  use aquaphor to moisturize    Diet/Dysphagia:  - Diet Consistency/Modifications: reg consistency  - Supplements: NA    DVT ppx:   - Eliquis    - Last Doppler on- NA  -------------  Comorbid Medical Management:    Diarrhea  C diff neg  GI PCR neg  resolved    DM 2  took lantus 25 units HS PTA  Now On SSI only   Consider adding lantus Hs- Hospitalist to address  still only on SSI    HTN  continue lisinopril, amlodipine, metoprolol    Depression   cont paxil    RA  takes methotrexate 15mg weekly on Wednesday - on hold  can restart on 4/7 as per rheum    Hx DVT  restart eliquis 5mg 2x/day    Neuropathy/ foot drop   had plastic PLSO at Home not Comfortable wearing them  will contact orthotist- ? single kl[place]ak attached To orthopedic shoes hang  delivered as OPD once Status of skin improves

## 2021-04-02 NOTE — PROGRESS NOTE ADULT - SUBJECTIVE AND OBJECTIVE BOX
Patient is a 85y old  Female who presents with a chief complaint of 16 debility (31 Mar 2021 07:12)      HPI:  85 y.o. female with PMH of anemia, arthritis, CAD, DMII, depression, HTN, PVD, RA, p/w LLE pain. Patient is admitted to surgery service on 3/22 at [facility] with severe Left LE pain and underwent Left fem-pop-bypass on 3/24. Patient's post op course complicated by pain as well as diarrhea- stool negative for Cdiff and diarrhea has subdsded.     Patient deemed medically stable- Seen By PT,OT and screened by physiatry and deemed a good candidate for intensive rehab- admitted To [facility] today to restore functional independence and safe DC to home.   (30 Mar 2021 16:04)    ROS:   less itch LLE  blister foot intact  Denies pain left LE  no CP, SOB, cough  no Abd pain, nausea, urinary or bowel symptoms - Last BM 4/1  inquired about RA medication (methotrexate) - may resume 4/7      PAST MEDICAL & SURGICAL HISTORY:  Hypertension    Anemia    Arthritis, rheumatoid    Depression    Type 2 diabetes mellitus  on insulin    PVD (peripheral vascular disease) with claudication    CAD (coronary artery disease)  non-obstructive    Rheumatoid arthritis    fracture ankle right  plate . screws   2000    S/P cataract surgery  2011    Hip fracture requiring operative repair  Right hip 11/14/2020        Allergies    cephalosporins (Other)  penicillins (Urticaria; Pruritus)    Intolerances    MEDICATIONS  (STANDING):  amLODIPine   Tablet 5 milliGRAM(s) Oral daily  apixaban 5 milliGRAM(s) Oral two times a day  dextrose 40% Gel 15 Gram(s) Oral once  dextrose 5%. 1000 milliLiter(s) (50 mL/Hr) IV Continuous <Continuous>  dextrose 5%. 1000 milliLiter(s) (100 mL/Hr) IV Continuous <Continuous>  dextrose 50% Injectable 25 Gram(s) IV Push once  dextrose 50% Injectable 12.5 Gram(s) IV Push once  dextrose 50% Injectable 25 Gram(s) IV Push once  folic acid 1 milliGRAM(s) Oral daily  gabapentin 300 milliGRAM(s) Oral three times a day  glucagon  Injectable 1 milliGRAM(s) IntraMuscular once  insulin lispro (ADMELOG) corrective regimen sliding scale   SubCutaneous three times a day before meals  insulin lispro (ADMELOG) corrective regimen sliding scale   SubCutaneous at bedtime  lisinopril 20 milliGRAM(s) Oral daily  metoprolol tartrate 25 milliGRAM(s) Oral two times a day  PARoxetine 20 milliGRAM(s) Oral daily    MEDICATIONS  (PRN):  acetaminophen   Tablet .. 650 milliGRAM(s) Oral every 8 hours PRN Temp greater or equal to 38C (100.4F), Mild Pain (1 - 3)  artificial  tears Solution 1 Drop(s) Both EYES two times a day PRN Dry Eyes  diphenhydrAMINE 25 milliGRAM(s) Oral every 8 hours PRN Rash and/or Itching  famotidine    Tablet 20 milliGRAM(s) Oral two times a day PRN Dyspepsia  oxycodone    5 mG/acetaminophen 325 mG 1 Tablet(s) Oral every 6 hours PRN Moderate Pain (4 - 6)                  CAPILLARY BLOOD GLUCOSE      POCT Blood Glucose.: 159 mg/dL (02 Apr 2021 12:00)  POCT Blood Glucose.: 166 mg/dL (02 Apr 2021 07:30)  POCT Blood Glucose.: 227 mg/dL (01 Apr 2021 21:49)  POCT Blood Glucose.: 141 mg/dL (01 Apr 2021 16:49)      Vital Signs Last 24 Hrs  T(C): 36.6 (02 Apr 2021 07:30), Max: 36.8 (02 Apr 2021 05:49)  T(F): 97.9 (02 Apr 2021 07:30), Max: 98.2 (02 Apr 2021 05:49)  HR: 61 (02 Apr 2021 07:30) (61 - 80)  BP: 138/65 (02 Apr 2021 07:30) (136/66 - 167/70)  BP(mean): --  RR: 15 (02 Apr 2021 07:30) (15 - 15)  SpO2: 97% (02 Apr 2021 07:30) (93% - 97%)      General: NAD, Resting Comfortable,                                  HEENT: NC/AT, EOM I, PERRLA, Normal Conjunctivae  Cardio: RRR, Normal S1-S2, No M/G/R                              Pulm: No Respiratory Distress,  Lungs CTAB                        Abdomen: ND/NT, Soft, BS+                                                MSK: No joint swelling, Full ROM    RA deformities Left >right hand                                     Ext: No C/C/E- dp Pulses 2+ , No calf tenderness    Skin:  Left groin incision with sutures, no drainage, left medial thigh incision intact-sub cut closure                                                            Wounds: Left posterior calf with venous ulcer /eschar with surrounding excoriations- left heel pressure ulcer unstageable with eschar, Left dorsal first toe with scab?eschar, intact blister to left dorsal foot    Neurological Examination    Cognitive: AAO x 3                                                                         Attention: Intact   Judgment: Good evidence of judgement                                Mood/Affect: wnl                                                                           Communication:  Fluent,  No dysarthria   Swallow: intact                                                                       Sensory: Intact to light touch,                                                                                             Tone: normal Throughout   Balance: impaired    Motor    LEFT    UE: SF [5/5], EF [5/5], EE [5/5], WE [5/5],  [wnl]  RIGHT UE: SF [5/5], EF [5/5], EE [5/5], WE [5/5],  [wnl]  LEFT    LE:  HF [3+/5], KE [4/5], DF [1-2/5], EHL [0/5],  PF [3/5]  RIGHT LE:  HF [4/5], KE [5/5], DF [1/5], EHL [0/5],  PF [3/5]      Reflex:  2 + thoroughout, Saab/Babinski negative    Rehab eval in progress

## 2021-04-03 LAB
GLUCOSE BLDC GLUCOMTR-MCNC: 137 MG/DL — HIGH (ref 70–99)
GLUCOSE BLDC GLUCOMTR-MCNC: 198 MG/DL — HIGH (ref 70–99)
GLUCOSE BLDC GLUCOMTR-MCNC: 216 MG/DL — HIGH (ref 70–99)
GLUCOSE BLDC GLUCOMTR-MCNC: 235 MG/DL — HIGH (ref 70–99)

## 2021-04-03 PROCEDURE — 99232 SBSQ HOSP IP/OBS MODERATE 35: CPT

## 2021-04-03 RX ADMIN — Medication 650 MILLIGRAM(S): at 20:09

## 2021-04-03 RX ADMIN — Medication 2: at 12:02

## 2021-04-03 RX ADMIN — LISINOPRIL 20 MILLIGRAM(S): 2.5 TABLET ORAL at 05:37

## 2021-04-03 RX ADMIN — Medication 25 MILLIGRAM(S): at 17:23

## 2021-04-03 RX ADMIN — Medication 1 APPLICATION(S): at 17:23

## 2021-04-03 RX ADMIN — APIXABAN 5 MILLIGRAM(S): 2.5 TABLET, FILM COATED ORAL at 17:23

## 2021-04-03 RX ADMIN — Medication 650 MILLIGRAM(S): at 11:59

## 2021-04-03 RX ADMIN — APIXABAN 5 MILLIGRAM(S): 2.5 TABLET, FILM COATED ORAL at 05:37

## 2021-04-03 RX ADMIN — GABAPENTIN 300 MILLIGRAM(S): 400 CAPSULE ORAL at 21:52

## 2021-04-03 RX ADMIN — GABAPENTIN 300 MILLIGRAM(S): 400 CAPSULE ORAL at 14:09

## 2021-04-03 RX ADMIN — Medication 20 MILLIGRAM(S): at 11:16

## 2021-04-03 RX ADMIN — Medication 1 MILLIGRAM(S): at 11:15

## 2021-04-03 RX ADMIN — Medication 650 MILLIGRAM(S): at 10:59

## 2021-04-03 RX ADMIN — Medication 1: at 07:56

## 2021-04-03 RX ADMIN — Medication 1 APPLICATION(S): at 05:38

## 2021-04-03 RX ADMIN — Medication 650 MILLIGRAM(S): at 21:19

## 2021-04-03 RX ADMIN — GABAPENTIN 300 MILLIGRAM(S): 400 CAPSULE ORAL at 05:37

## 2021-04-03 RX ADMIN — AMLODIPINE BESYLATE 5 MILLIGRAM(S): 2.5 TABLET ORAL at 05:37

## 2021-04-03 RX ADMIN — Medication 25 MILLIGRAM(S): at 05:37

## 2021-04-03 NOTE — PROGRESS NOTE ADULT - SUBJECTIVE AND OBJECTIVE BOX
HPI:  85 y.o. female with PMH of anemia, arthritis, CAD, DMII, depression, HTN, PVD, RA, p/w LLE pain. Patient is admitted to surgery service on 3/22 at [facility] with severe Left LE pain and underwent Left fem-pop-bypass on 3/24. Patient's post op course complicated by pain as well as diarrhea- stool negative for Cdiff and diarrhea has subdsded.     Patient deemed medically stable- Seen By PT,OT and screened by physiatry and deemed a good candidate for intensive rehab- admitted To [facility] today to restore functional independence and safe DC to home.   (30 Mar 2021 16:04)      Subjective    No new complaints        PAST MEDICAL & SURGICAL HISTORY:  Hypertension    Anemia    Arthritis, rheumatoid    Depression    Type 2 diabetes mellitus  on insulin    PVD (peripheral vascular disease) with claudication    CAD (coronary artery disease)  non-obstructive    Rheumatoid arthritis    fracture ankle right  plate . screws   2000    S/P cataract surgery  2011    Hip fracture requiring operative repair  Right hip 11/14/2020        MedsMEDICATIONS  (STANDING):  amLODIPine   Tablet 5 milliGRAM(s) Oral daily  apixaban 5 milliGRAM(s) Oral two times a day  AQUAPHOR (petrolatum Ointment) 1 Application(s) Topical two times a day  dextrose 40% Gel 15 Gram(s) Oral once  dextrose 5%. 1000 milliLiter(s) (50 mL/Hr) IV Continuous <Continuous>  dextrose 5%. 1000 milliLiter(s) (100 mL/Hr) IV Continuous <Continuous>  dextrose 50% Injectable 25 Gram(s) IV Push once  dextrose 50% Injectable 12.5 Gram(s) IV Push once  dextrose 50% Injectable 25 Gram(s) IV Push once  folic acid 1 milliGRAM(s) Oral daily  gabapentin 300 milliGRAM(s) Oral three times a day  glucagon  Injectable 1 milliGRAM(s) IntraMuscular once  insulin lispro (ADMELOG) corrective regimen sliding scale   SubCutaneous three times a day before meals  insulin lispro (ADMELOG) corrective regimen sliding scale   SubCutaneous at bedtime  lisinopril 20 milliGRAM(s) Oral daily  metoprolol tartrate 25 milliGRAM(s) Oral two times a day  PARoxetine 20 milliGRAM(s) Oral daily    MEDICATIONS  (PRN):  acetaminophen   Tablet .. 650 milliGRAM(s) Oral every 8 hours PRN Temp greater or equal to 38C (100.4F), Mild Pain (1 - 3)  artificial  tears Solution 1 Drop(s) Both EYES two times a day PRN Dry Eyes  diphenhydrAMINE 25 milliGRAM(s) Oral every 8 hours PRN Rash and/or Itching  famotidine    Tablet 20 milliGRAM(s) Oral two times a day PRN Dyspepsia  oxycodone    5 mG/acetaminophen 325 mG 1 Tablet(s) Oral every 6 hours PRN Moderate Pain (4 - 6)      Vital Signs Last 24 Hrs  T(C): 36.7 (03 Apr 2021 08:11), Max: 36.7 (02 Apr 2021 20:07)  T(F): 98 (03 Apr 2021 08:11), Max: 98 (02 Apr 2021 20:07)  HR: 62 (03 Apr 2021 08:11) (62 - 84)  BP: 151/65 (03 Apr 2021 08:11) (143/63 - 166/72)  BP(mean): --  RR: 14 (03 Apr 2021 08:11) (14 - 16)  SpO2: 94% (03 Apr 2021 08:11) (94% - 94%)  I&O's Summary      PHYSICAL EXAM:  GENERAL: NAD  NECK: Supple  NERVOUS SYSTEM:  awake and alert  HEART: S1s2 NL , RRR  CHEST/LUNG: Clear to percussion bilaterally  ABDOMEN: Soft, Nontender, Nondistended; Bowel sounds present  EXTREMITIES:  LLE edema          CAPILLARY BLOOD GLUCOSE      POCT Blood Glucose.: 198 mg/dL (03 Apr 2021 07:31)  POCT Blood Glucose.: 177 mg/dL (02 Apr 2021 22:04)  POCT Blood Glucose.: 165 mg/dL (02 Apr 2021 17:02)  POCT Blood Glucose.: 159 mg/dL (02 Apr 2021 12:00)      Imaging Personally Reviewed:  [ ] YES  [ ] NO        Care Discussed with Consultants/Other Providers [ x] YES  [ ] NO

## 2021-04-03 NOTE — PROGRESS NOTE ADULT - SUBJECTIVE AND OBJECTIVE BOX
No overnight events.  notes some hip discomfort, numbness    REVIEW OF SYSTEMS  Constitutional - No fever,  No fatigue  Neurological - No headaches, No loss of strength  Musculoskeletal - No joint pain, No joint swelling, No muscle pain    VITALS  T(C): 36.7 (04-03-21 @ 08:11), Max: 36.7 (04-02-21 @ 20:07)  HR: 62 (04-03-21 @ 08:11) (62 - 84)  BP: 151/65 (04-03-21 @ 08:11) (143/63 - 166/72)  RR: 14 (04-03-21 @ 08:11) (14 - 16)  SpO2: 94% (04-03-21 @ 08:11) (94% - 94%)  Wt(kg): --       MEDICATIONS   acetaminophen   Tablet .. 650 milliGRAM(s) every 8 hours PRN  amLODIPine   Tablet 5 milliGRAM(s) daily  apixaban 5 milliGRAM(s) two times a day  AQUAPHOR (petrolatum Ointment) 1 Application(s) two times a day  artificial  tears Solution 1 Drop(s) two times a day PRN  dextrose 40% Gel 15 Gram(s) once  dextrose 5%. 1000 milliLiter(s) <Continuous>  dextrose 5%. 1000 milliLiter(s) <Continuous>  dextrose 50% Injectable 25 Gram(s) once  dextrose 50% Injectable 12.5 Gram(s) once  dextrose 50% Injectable 25 Gram(s) once  diphenhydrAMINE 25 milliGRAM(s) every 8 hours PRN  famotidine    Tablet 20 milliGRAM(s) two times a day PRN  folic acid 1 milliGRAM(s) daily  gabapentin 300 milliGRAM(s) three times a day  glucagon  Injectable 1 milliGRAM(s) once  insulin lispro (ADMELOG) corrective regimen sliding scale   three times a day before meals  insulin lispro (ADMELOG) corrective regimen sliding scale   at bedtime  lisinopril 20 milliGRAM(s) daily  metoprolol tartrate 25 milliGRAM(s) two times a day  oxycodone    5 mG/acetaminophen 325 mG 1 Tablet(s) every 6 hours PRN  PARoxetine 20 milliGRAM(s) daily      RECENT LABS/IMAGING                      POCT Blood Glucose.: 198 mg/dL (04-03-21 @ 07:31)  POCT Blood Glucose.: 177 mg/dL (04-02-21 @ 22:04)  POCT Blood Glucose.: 165 mg/dL (04-02-21 @ 17:02)  POCT Blood Glucose.: 159 mg/dL (04-02-21 @ 12:00)    ---------  PHYSICAL EXAM  Constitutional - NAD, Comfortable, in chair   Pulm - Breathing comfortably, No wheezing  Abd - Soft, NTND  Extremities - b/l hands, ulnar deviation, RA joint changes   Neurologic Exam -                    Cognitive - Awake, Alert     Communication - Fluent     Motor - b/l LE weakness, foot drop      Sensory - Intact to LT  Psychiatric - Mood WNL, Affect WNL    ASSESSMENT/PLAN  85 year old woman h/oCAD, DM2, HTN, RA, Recent Right Hip fx Orif 11/20, Right calf DVT 2/21 with functional deficits after left fem pop bypass due to PAD  WBAT   Continue current medical management  Pain - Tylenol PRN, percocet, gabapentin   DVT PPX - eliquis   Continue 3hrs a day of comprehensive rehab program.

## 2021-04-03 NOTE — PROGRESS NOTE ADULT - ASSESSMENT
Deconditioning  PT/OT per rehab    s/p L fem pop bypass  Pain control per primary team    DVT  Eliquis    HTN  Norvasc, lisnopril, Metoprolol  Avoid tight BP control in this elderly pt    DM2, a1c 6.9  Lispro    RA  Resume MTX once wounds are well healed

## 2021-04-04 LAB
GLUCOSE BLDC GLUCOMTR-MCNC: 152 MG/DL — HIGH (ref 70–99)
GLUCOSE BLDC GLUCOMTR-MCNC: 181 MG/DL — HIGH (ref 70–99)
GLUCOSE BLDC GLUCOMTR-MCNC: 188 MG/DL — HIGH (ref 70–99)
GLUCOSE BLDC GLUCOMTR-MCNC: 259 MG/DL — HIGH (ref 70–99)

## 2021-04-04 PROCEDURE — 99232 SBSQ HOSP IP/OBS MODERATE 35: CPT

## 2021-04-04 RX ADMIN — Medication 1: at 17:31

## 2021-04-04 RX ADMIN — GABAPENTIN 300 MILLIGRAM(S): 400 CAPSULE ORAL at 21:07

## 2021-04-04 RX ADMIN — Medication 1: at 07:38

## 2021-04-04 RX ADMIN — Medication 1 APPLICATION(S): at 18:19

## 2021-04-04 RX ADMIN — Medication 20 MILLIGRAM(S): at 11:22

## 2021-04-04 RX ADMIN — GABAPENTIN 300 MILLIGRAM(S): 400 CAPSULE ORAL at 13:12

## 2021-04-04 RX ADMIN — APIXABAN 5 MILLIGRAM(S): 2.5 TABLET, FILM COATED ORAL at 17:31

## 2021-04-04 RX ADMIN — Medication 25 MILLIGRAM(S): at 05:48

## 2021-04-04 RX ADMIN — APIXABAN 5 MILLIGRAM(S): 2.5 TABLET, FILM COATED ORAL at 05:48

## 2021-04-04 RX ADMIN — GABAPENTIN 300 MILLIGRAM(S): 400 CAPSULE ORAL at 05:49

## 2021-04-04 RX ADMIN — Medication 1 APPLICATION(S): at 05:49

## 2021-04-04 RX ADMIN — Medication 3: at 11:53

## 2021-04-04 RX ADMIN — AMLODIPINE BESYLATE 5 MILLIGRAM(S): 2.5 TABLET ORAL at 05:48

## 2021-04-04 RX ADMIN — LISINOPRIL 20 MILLIGRAM(S): 2.5 TABLET ORAL at 05:48

## 2021-04-04 RX ADMIN — Medication 25 MILLIGRAM(S): at 17:31

## 2021-04-04 RX ADMIN — Medication 1 MILLIGRAM(S): at 11:22

## 2021-04-04 NOTE — PROGRESS NOTE ADULT - ASSESSMENT
Deconditioning  PT/OT per rehab    s/p L fem pop bypass  Pain control per primary team    DVT  Eliquis    HTN  Norvasc, lisnopril, Metoprolol  Avoid tight BP control in this elderly pt    DM2, a1c 6.9  Lispro    RA  Resume MTX once wounds are well-healed

## 2021-04-04 NOTE — PROGRESS NOTE ADULT - SUBJECTIVE AND OBJECTIVE BOX
HPI:  85 y.o. female with PMH of anemia, arthritis, CAD, DMII, depression, HTN, PVD, RA, p/w LLE pain. Patient is admitted to surgery service on 3/22 at [facility] with severe Left LE pain and underwent Left fem-pop-bypass on 3/24. Patient's post op course complicated by pain as well as diarrhea- stool negative for Cdiff and diarrhea has subdsded.     Patient deemed medically stable- Seen By PT,OT and screened by physiatry and deemed a good candidate for intensive rehab- admitted To [facility] today to restore functional independence and safe DC to home.   (30 Mar 2021 16:04)      Subjective    No new complaints      PAST MEDICAL & SURGICAL HISTORY:  Hypertension    Anemia    Arthritis, rheumatoid    Depression    Type 2 diabetes mellitus  on insulin    PVD (peripheral vascular disease) with claudication    CAD (coronary artery disease)  non-obstructive    Rheumatoid arthritis    fracture ankle right  plate . screws   2000    S/P cataract surgery  2011    Hip fracture requiring operative repair  Right hip 11/14/2020        MedsMEDICATIONS  (STANDING):  amLODIPine   Tablet 5 milliGRAM(s) Oral daily  apixaban 5 milliGRAM(s) Oral two times a day  AQUAPHOR (petrolatum Ointment) 1 Application(s) Topical two times a day  dextrose 40% Gel 15 Gram(s) Oral once  dextrose 5%. 1000 milliLiter(s) (50 mL/Hr) IV Continuous <Continuous>  dextrose 5%. 1000 milliLiter(s) (100 mL/Hr) IV Continuous <Continuous>  dextrose 50% Injectable 25 Gram(s) IV Push once  dextrose 50% Injectable 25 Gram(s) IV Push once  dextrose 50% Injectable 12.5 Gram(s) IV Push once  folic acid 1 milliGRAM(s) Oral daily  gabapentin 300 milliGRAM(s) Oral three times a day  glucagon  Injectable 1 milliGRAM(s) IntraMuscular once  insulin lispro (ADMELOG) corrective regimen sliding scale   SubCutaneous three times a day before meals  insulin lispro (ADMELOG) corrective regimen sliding scale   SubCutaneous at bedtime  lisinopril 20 milliGRAM(s) Oral daily  metoprolol tartrate 25 milliGRAM(s) Oral two times a day  PARoxetine 20 milliGRAM(s) Oral daily    MEDICATIONS  (PRN):  acetaminophen   Tablet .. 650 milliGRAM(s) Oral every 8 hours PRN Temp greater or equal to 38C (100.4F), Mild Pain (1 - 3)  artificial  tears Solution 1 Drop(s) Both EYES two times a day PRN Dry Eyes  diphenhydrAMINE 25 milliGRAM(s) Oral every 8 hours PRN Rash and/or Itching  famotidine    Tablet 20 milliGRAM(s) Oral two times a day PRN Dyspepsia  oxycodone    5 mG/acetaminophen 325 mG 1 Tablet(s) Oral every 6 hours PRN Moderate Pain (4 - 6)      Vital Signs Last 24 Hrs  T(C): 36.2 (04 Apr 2021 08:17), Max: 36.4 (04 Apr 2021 05:43)  T(F): 97.2 (04 Apr 2021 08:17), Max: 97.6 (04 Apr 2021 05:43)  HR: 63 (04 Apr 2021 08:17) (63 - 74)  BP: 149/54 (04 Apr 2021 08:17) (146/74 - 149/54)  BP(mean): --  RR: 15 (04 Apr 2021 08:17) (15 - 16)  SpO2: 96% (04 Apr 2021 08:17) (95% - 96%)  I&O's Summary      PHYSICAL EXAM:  GENERAL: NAD  NECK: Supple  NERVOUS SYSTEM:  awake and alert  HEART: S1s2 NL , RRR  CHEST/LUNG: Clear to percussion bilaterally  ABDOMEN: Soft, Nontender, Nondistended; Bowel sounds present  EXTREMITIES:  No edema      LABS:              RVP:          Tox:           CAPILLARY BLOOD GLUCOSE      POCT Blood Glucose.: 259 mg/dL (04 Apr 2021 11:20)  POCT Blood Glucose.: 188 mg/dL (04 Apr 2021 07:37)  POCT Blood Glucose.: 235 mg/dL (03 Apr 2021 21:50)  POCT Blood Glucose.: 137 mg/dL (03 Apr 2021 16:27)      Imaging Personally Reviewed:  [ ] YES  [ ] NO        Care Discussed with Consultants/Other Providers [ x] YES  [ ] NO

## 2021-04-04 NOTE — PROGRESS NOTE ADULT - SUBJECTIVE AND OBJECTIVE BOX
No overnight events.  feels comfortable today     REVIEW OF SYSTEMS  Constitutional - No fever,  No fatigue  Neurological - No headaches, No loss of strength  Musculoskeletal - No joint pain, No joint swelling, No muscle pain    VITALS  T(C): 36.2 (04-04-21 @ 08:17), Max: 36.4 (04-04-21 @ 05:43)  HR: 63 (04-04-21 @ 08:17) (63 - 74)  BP: 149/54 (04-04-21 @ 08:17) (146/74 - 149/54)  RR: 15 (04-04-21 @ 08:17) (15 - 16)  SpO2: 96% (04-04-21 @ 08:17) (95% - 96%)  Wt(kg): --       MEDICATIONS   acetaminophen   Tablet .. 650 milliGRAM(s) every 8 hours PRN  amLODIPine   Tablet 5 milliGRAM(s) daily  apixaban 5 milliGRAM(s) two times a day  AQUAPHOR (petrolatum Ointment) 1 Application(s) two times a day  artificial  tears Solution 1 Drop(s) two times a day PRN  dextrose 40% Gel 15 Gram(s) once  dextrose 5%. 1000 milliLiter(s) <Continuous>  dextrose 5%. 1000 milliLiter(s) <Continuous>  dextrose 50% Injectable 25 Gram(s) once  dextrose 50% Injectable 12.5 Gram(s) once  dextrose 50% Injectable 25 Gram(s) once  diphenhydrAMINE 25 milliGRAM(s) every 8 hours PRN  famotidine    Tablet 20 milliGRAM(s) two times a day PRN  folic acid 1 milliGRAM(s) daily  gabapentin 300 milliGRAM(s) three times a day  glucagon  Injectable 1 milliGRAM(s) once  insulin lispro (ADMELOG) corrective regimen sliding scale   three times a day before meals  insulin lispro (ADMELOG) corrective regimen sliding scale   at bedtime  lisinopril 20 milliGRAM(s) daily  metoprolol tartrate 25 milliGRAM(s) two times a day  oxycodone    5 mG/acetaminophen 325 mG 1 Tablet(s) every 6 hours PRN  PARoxetine 20 milliGRAM(s) daily      RECENT LABS/IMAGING                      POCT Blood Glucose.: 188 mg/dL (04-04-21 @ 07:37)  POCT Blood Glucose.: 235 mg/dL (04-03-21 @ 21:50)  POCT Blood Glucose.: 137 mg/dL (04-03-21 @ 16:27)  POCT Blood Glucose.: 216 mg/dL (04-03-21 @ 11:46)    ---------      PHYSICAL EXAM  Constitutional - NAD, Comfortable, in bed   Pulm - Breathing comfortably, No wheezing  Abd - Soft, NTND  Extremities - b/l hands, ulnar deviation, RA joint changes   Neurologic Exam -                    Cognitive - Awake, Alert     Communication - Fluent     Motor - b/l LE weakness, foot drop      Sensory - Intact to LT  Psychiatric - Mood WNL, Affect WNL    ASSESSMENT/PLAN  85 year old woman h/oCAD, DM2, HTN, RA, Recent Right Hip fx Orif 11/20, Right calf DVT 2/21 with functional deficits after left fem pop bypass due to PAD  WBAT   Continue current medical management  Pain - Tylenol PRN, percocet, gabapentin   DVT PPX - eliquis   Continue 3hrs a day of comprehensive rehab program.

## 2021-04-05 LAB
ALBUMIN SERPL ELPH-MCNC: 2.4 G/DL — LOW (ref 3.3–5)
ALP SERPL-CCNC: 85 U/L — SIGNIFICANT CHANGE UP (ref 40–120)
ALT FLD-CCNC: 12 U/L — SIGNIFICANT CHANGE UP (ref 10–45)
ANION GAP SERPL CALC-SCNC: 9 MMOL/L — SIGNIFICANT CHANGE UP (ref 5–17)
AST SERPL-CCNC: 11 U/L — SIGNIFICANT CHANGE UP (ref 10–40)
BILIRUB SERPL-MCNC: 0.3 MG/DL — SIGNIFICANT CHANGE UP (ref 0.2–1.2)
BUN SERPL-MCNC: 48 MG/DL — HIGH (ref 7–23)
CALCIUM SERPL-MCNC: 8.8 MG/DL — SIGNIFICANT CHANGE UP (ref 8.4–10.5)
CHLORIDE SERPL-SCNC: 108 MMOL/L — SIGNIFICANT CHANGE UP (ref 96–108)
CO2 SERPL-SCNC: 25 MMOL/L — SIGNIFICANT CHANGE UP (ref 22–31)
CREAT SERPL-MCNC: 1.08 MG/DL — SIGNIFICANT CHANGE UP (ref 0.5–1.3)
GLUCOSE BLDC GLUCOMTR-MCNC: 149 MG/DL — HIGH (ref 70–99)
GLUCOSE BLDC GLUCOMTR-MCNC: 183 MG/DL — HIGH (ref 70–99)
GLUCOSE BLDC GLUCOMTR-MCNC: 200 MG/DL — HIGH (ref 70–99)
GLUCOSE BLDC GLUCOMTR-MCNC: 234 MG/DL — HIGH (ref 70–99)
GLUCOSE SERPL-MCNC: 208 MG/DL — HIGH (ref 70–99)
HCT VFR BLD CALC: 36 % — SIGNIFICANT CHANGE UP (ref 34.5–45)
HGB BLD-MCNC: 11.5 G/DL — SIGNIFICANT CHANGE UP (ref 11.5–15.5)
MCHC RBC-ENTMCNC: 29.8 PG — SIGNIFICANT CHANGE UP (ref 27–34)
MCHC RBC-ENTMCNC: 31.9 GM/DL — LOW (ref 32–36)
MCV RBC AUTO: 93.3 FL — SIGNIFICANT CHANGE UP (ref 80–100)
NRBC # BLD: 0 /100 WBCS — SIGNIFICANT CHANGE UP (ref 0–0)
PLATELET # BLD AUTO: 230 K/UL — SIGNIFICANT CHANGE UP (ref 150–400)
POTASSIUM SERPL-MCNC: 4.9 MMOL/L — SIGNIFICANT CHANGE UP (ref 3.5–5.3)
POTASSIUM SERPL-SCNC: 4.9 MMOL/L — SIGNIFICANT CHANGE UP (ref 3.5–5.3)
PROT SERPL-MCNC: 6.4 G/DL — SIGNIFICANT CHANGE UP (ref 6–8.3)
RBC # BLD: 3.86 M/UL — SIGNIFICANT CHANGE UP (ref 3.8–5.2)
RBC # FLD: 16 % — HIGH (ref 10.3–14.5)
SODIUM SERPL-SCNC: 142 MMOL/L — SIGNIFICANT CHANGE UP (ref 135–145)
WBC # BLD: 7.18 K/UL — SIGNIFICANT CHANGE UP (ref 3.8–10.5)
WBC # FLD AUTO: 7.18 K/UL — SIGNIFICANT CHANGE UP (ref 3.8–10.5)

## 2021-04-05 PROCEDURE — 99232 SBSQ HOSP IP/OBS MODERATE 35: CPT

## 2021-04-05 PROCEDURE — 99232 SBSQ HOSP IP/OBS MODERATE 35: CPT | Mod: GC

## 2021-04-05 RX ORDER — OXYCODONE AND ACETAMINOPHEN 5; 325 MG/1; MG/1
1 TABLET ORAL EVERY 6 HOURS
Refills: 0 | Status: DISCONTINUED | OUTPATIENT
Start: 2021-04-05 | End: 2021-04-09

## 2021-04-05 RX ADMIN — APIXABAN 5 MILLIGRAM(S): 2.5 TABLET, FILM COATED ORAL at 05:21

## 2021-04-05 RX ADMIN — Medication 25 MILLIGRAM(S): at 21:37

## 2021-04-05 RX ADMIN — Medication 650 MILLIGRAM(S): at 08:38

## 2021-04-05 RX ADMIN — GABAPENTIN 300 MILLIGRAM(S): 400 CAPSULE ORAL at 15:00

## 2021-04-05 RX ADMIN — APIXABAN 5 MILLIGRAM(S): 2.5 TABLET, FILM COATED ORAL at 17:27

## 2021-04-05 RX ADMIN — Medication 25 MILLIGRAM(S): at 17:27

## 2021-04-05 RX ADMIN — GABAPENTIN 300 MILLIGRAM(S): 400 CAPSULE ORAL at 21:37

## 2021-04-05 RX ADMIN — Medication 20 MILLIGRAM(S): at 12:01

## 2021-04-05 RX ADMIN — Medication 25 MILLIGRAM(S): at 05:21

## 2021-04-05 RX ADMIN — AMLODIPINE BESYLATE 5 MILLIGRAM(S): 2.5 TABLET ORAL at 05:21

## 2021-04-05 RX ADMIN — Medication 650 MILLIGRAM(S): at 09:38

## 2021-04-05 RX ADMIN — Medication 1 APPLICATION(S): at 17:28

## 2021-04-05 RX ADMIN — Medication 1 MILLIGRAM(S): at 12:01

## 2021-04-05 RX ADMIN — Medication 1 APPLICATION(S): at 05:22

## 2021-04-05 RX ADMIN — LISINOPRIL 20 MILLIGRAM(S): 2.5 TABLET ORAL at 05:21

## 2021-04-05 RX ADMIN — Medication 1: at 12:01

## 2021-04-05 RX ADMIN — GABAPENTIN 300 MILLIGRAM(S): 400 CAPSULE ORAL at 05:21

## 2021-04-05 NOTE — CDI QUERY NOTE - NSCDIOTHERTXTBX_GEN_ALL_CORE_HH
Pt. admitted with LLE claudication    Required  2 units PRBC due to anemia on 3/24/21    Femoral-popliteal bypass graft with non-vein 24-Mar-2021    Vascular Surgeon documentation on 3/24/2021  Pt receiving 50% O2 via VM 2/2 poor oxygenation in sedated/somnolent state post-op.    RR: 13 (24 Mar 2021 15:28) (13 - 18)  SpO2: 92% (24 Mar 2021 15:28) (92% - 100%)    Post-op  3/24 2-6 liter nasal o2  3/24  Venti mask  3/25 3 liter nasal o2    Please clarify if the above is clinically significant based on  the above clinical criteria?  A) Post op Acute Hypoxic  Respiratory Failure  B) No clinical indications for Respiratory Failure  C) Unable to determine  D) Other ( Please specify condition)

## 2021-04-05 NOTE — PROGRESS NOTE ADULT - ASSESSMENT
JULIEN CASTELLANO is a 85y with a history of CAD, DM2, HTN, RA, Recent Right Hip fx Orif 11/20, Right calf DVT 2/21 who presented to  on 3/22 with complaint of severe Left leg pain and found to have PAD SP Left fem pop Bypass 3/24. Hospital course complicated by diarrhea- now resolved. Now admitted to [facility] after for initiation of a multidisciplinary rehab program consisting focused on functional mobility, transfers and ADLs (activities of daily living).    Comprehensive Multidisciplinary Rehab Program:  - comprehensive rehab program, 3 hours a day, 5 days a week.  - PT 2hr/day: Focused on improving strength, endurance, coordination, balance, functional mobility, and transfers  - OT 1hr/day: Focused on improving strength, fine motor skills, coordination, posture and ADLs.    - Speech Therapy NA  - P&O as needed  - Activity Limitations: Decreased social, vocational and leisure activities, decreased self care and ADLs, decreased mobility, decreased ability to manage household and finances.   WIll attempt to contact vascular next week regarding DC sutures in groin    Participation Restrictions/Precautions:  - Weight bearing status: WBAT  - ROM restrictions: none  - Precautions:    [x ] Falls   [ ] Spinal   [ ] Hip (Anterior or Posterior)       [ ] Seizure  [ ] Cardiac   [ ] Sternal    Rehab Diagnosis/Management:    Sleep:   - Maintain quiet hours and low stim environment.    Pain Management:  - percocet PRN Mod pain, gabapentin 300 TID, Tylenol 650 Q8 PRN  - Avoid sedating medications that may interfere with cognitive recovery    GI/Bowel:  - At risk for constipation due to neurologic diagnosis, immobility and/or medication use  - recent diarrhea secondary to antibiotics- no bowel meds ordered  - GI ppx: pepcid    /Bladder:   - At risk for incontinence and retention due to neurologic diagnosis and limited mobility  - Currently patient voids:    [x ] independent     [ ] external collection device (condom cath)    [ ] Indwelling hurst catheter     [ ] Intermittent catheterization  - Baseline bladder scans -straight cath for >350cc.  - Encourage timed voids every 4 hours while awake for independence and to promote continence during therapy.    Skin/Pressure Injury:   - At risk for pressure injury due to neurologic diagnosis and relative immobility.  - Skin assessment on admission - Left heel pressure ulcer , calf great toe eschar   - Monitor Incisions: Left groin with sutures, Left medial thigh intact  - Turn every 2 hours while in bed, air mattress  - Soft heel protectors  - Skin barrier cream as needed  Betadine left heel and 1st toe , xeroform to Left calf ,DSD Carly daily  - Nursing to monitor skin Qshift    #Pruritus  - benedryl PRN  - keep blister to Left dorsum foot intact to reduce risk of infection  use aquaphor to moisturize    Diet/Dysphagia:  - Diet Consistency/Modifications: reg consistency  - Supplements: NA    DVT ppx:   - Eliquis    - Last Doppler on- NA  -------------  Comorbid Medical Management:    Diarrhea  C diff neg  GI PCR neg  resolved    DM 2  took lantus 25 units HS PTA  Now On SSI only   Consider adding lantus Hs- Hospitalist to address  still only on SSI    HTN  continue lisinopril, amlodipine, metoprolol    Depression   cont paxil    RA  takes methotrexate 15mg weekly on Wednesday - on hold  can restart on 4/7 as per rheum    Hx DVT  restart eliquis 5mg 2x/day    ORLY  encourage PO fluids  Follow BMP    Neuropathy/ foot drop   had plastic PLSO at Home not Comfortable wearing them  will contact orthotist- ? single klensak attached To orthopedic shoes hang  delivered as OPD once Status of skin improves

## 2021-04-05 NOTE — CHART NOTE - NSCHARTNOTEFT_GEN_A_CORE
Pt. admitted with LLE claudication    Pt required  2 units PRBC due to anemia of hgb 8.0 on admission 3/22/21    s/p Femoral-popliteal bypass graft with non-vein (24-Mar-2021)    Pt also required 50% O2 via VM 2/2 poor oxygenation in sedated/somnolent state post-op.  Post op Acute Hypoxic  Respiratory Failure resolved in next day.     The above addendum note documented upon review of CDI query.    F/U Dr. Ventura if need more clarifications.

## 2021-04-05 NOTE — PROGRESS NOTE ADULT - SUBJECTIVE AND OBJECTIVE BOX
Patient is a 85y old  Female who presents with a chief complaint of 13 other disabling impairments- SP Left fem-pop bypass (04 Apr 2021 13:34)      Patient seen and examined at bedside.    ALLERGIES:  cephalosporins (Other)  penicillins (Urticaria; Pruritus)    MEDICATIONS  (STANDING):  amLODIPine   Tablet 5 milliGRAM(s) Oral daily  apixaban 5 milliGRAM(s) Oral two times a day  AQUAPHOR (petrolatum Ointment) 1 Application(s) Topical two times a day  dextrose 40% Gel 15 Gram(s) Oral once  dextrose 5%. 1000 milliLiter(s) (50 mL/Hr) IV Continuous <Continuous>  dextrose 5%. 1000 milliLiter(s) (100 mL/Hr) IV Continuous <Continuous>  dextrose 50% Injectable 25 Gram(s) IV Push once  dextrose 50% Injectable 12.5 Gram(s) IV Push once  dextrose 50% Injectable 25 Gram(s) IV Push once  folic acid 1 milliGRAM(s) Oral daily  gabapentin 300 milliGRAM(s) Oral three times a day  glucagon  Injectable 1 milliGRAM(s) IntraMuscular once  insulin lispro (ADMELOG) corrective regimen sliding scale   SubCutaneous three times a day before meals  insulin lispro (ADMELOG) corrective regimen sliding scale   SubCutaneous at bedtime  lisinopril 20 milliGRAM(s) Oral daily  metoprolol tartrate 25 milliGRAM(s) Oral two times a day  PARoxetine 20 milliGRAM(s) Oral daily    MEDICATIONS  (PRN):  acetaminophen   Tablet .. 650 milliGRAM(s) Oral every 8 hours PRN Temp greater or equal to 38C (100.4F), Mild Pain (1 - 3)  artificial  tears Solution 1 Drop(s) Both EYES two times a day PRN Dry Eyes  diphenhydrAMINE 25 milliGRAM(s) Oral every 8 hours PRN Rash and/or Itching  famotidine    Tablet 20 milliGRAM(s) Oral two times a day PRN Dyspepsia  oxycodone    5 mG/acetaminophen 325 mG 1 Tablet(s) Oral every 6 hours PRN Moderate Pain (4 - 6)    Vital Signs Last 24 Hrs  T(F): 97.5 (05 Apr 2021 08:37), Max: 97.5 (05 Apr 2021 08:37)  HR: 59 (05 Apr 2021 08:37) (59 - 83)  BP: 138/55 (05 Apr 2021 08:37) (138/55 - 168/64)  RR: 14 (05 Apr 2021 08:37) (14 - 16)  SpO2: 95% (05 Apr 2021 08:37) (95% - 96%)  I&O's Summary      PHYSICAL EXAM:  General: NAD, A/O x 3  ENT: MMM  Neck: Supple, No JVD  Lungs: Clear to auscultation bilaterally  Cardio: RRR, S1/S2, No murmurs  Abdomen: Soft, Nontender, Nondistended; Bowel sounds present  Extremities: No calf tenderness, No pitting edema    LABS:                        11.5   7.18  )-----------( 230      ( 05 Apr 2021 05:30 )             36.0       04-05    142  |  108  |  48  ----------------------------<  208  4.9   |  25  |  1.08    Ca    8.8      05 Apr 2021 05:30    TPro  6.4  /  Alb  2.4  /  TBili  0.3  /  DBili  x   /  AST  11  /  ALT  12  /  AlkPhos  85  04-05     eGFR if Non African American: 47 mL/min/1.73M2 (04-05-21 @ 05:30)  eGFR if African American: 55 mL/min/1.73M2 (04-05-21 @ 05:30)                           POCT Blood Glucose.: 183 mg/dL (05 Apr 2021 11:18)  POCT Blood Glucose.: 200 mg/dL (05 Apr 2021 07:18)  POCT Blood Glucose.: 181 mg/dL (04 Apr 2021 21:07)  POCT Blood Glucose.: 152 mg/dL (04 Apr 2021 16:39)            RADIOLOGY & ADDITIONAL TESTS:    Care Discussed with Consultants/Other Providers:

## 2021-04-05 NOTE — PROGRESS NOTE ADULT - SUBJECTIVE AND OBJECTIVE BOX
Patient is a 85y old  Female who presents with a chief complaint of 16 debility (31 Mar 2021 07:12)      HPI:  85 y.o. female with PMH of anemia, arthritis, CAD, DMII, depression, HTN, PVD, RA, p/w LLE pain. Patient is admitted to surgery service on 3/22 at [facility] with severe Left LE pain and underwent Left fem-pop-bypass on 3/24. Patient's post op course complicated by pain as well as diarrhea- stool negative for Cdiff and diarrhea has subdsded.     Patient deemed medically stable- Seen By PT,OT and screened by physiatry and deemed a good candidate for intensive rehab- admitted To [facility] today to restore functional independence and safe DC to home.   (30 Mar 2021 16:04)    ROS:   less itch LLE  blister foot intact  Denies pain left LE  no CP, SOB, cough  no Abd pain, nausea, urinary or bowel symptoms - Last BM 4/4  inquired about RA medication (methotrexate) - may resume 4/7      PAST MEDICAL & SURGICAL HISTORY:  Hypertension    Anemia    Arthritis, rheumatoid    Depression    Type 2 diabetes mellitus  on insulin    PVD (peripheral vascular disease) with claudication    CAD (coronary artery disease)  non-obstructive    Rheumatoid arthritis    fracture ankle right  plate . screws   2000    S/P cataract surgery  2011    Hip fracture requiring operative repair  Right hip 11/14/2020        Allergies    cephalosporins (Other)  penicillins (Urticaria; Pruritus)    Intolerances      MEDICATIONS  (STANDING):  amLODIPine   Tablet 5 milliGRAM(s) Oral daily  apixaban 5 milliGRAM(s) Oral two times a day  AQUAPHOR (petrolatum Ointment) 1 Application(s) Topical two times a day  dextrose 40% Gel 15 Gram(s) Oral once  dextrose 5%. 1000 milliLiter(s) (50 mL/Hr) IV Continuous <Continuous>  dextrose 5%. 1000 milliLiter(s) (100 mL/Hr) IV Continuous <Continuous>  dextrose 50% Injectable 25 Gram(s) IV Push once  dextrose 50% Injectable 12.5 Gram(s) IV Push once  dextrose 50% Injectable 25 Gram(s) IV Push once  folic acid 1 milliGRAM(s) Oral daily  gabapentin 300 milliGRAM(s) Oral three times a day  glucagon  Injectable 1 milliGRAM(s) IntraMuscular once  insulin lispro (ADMELOG) corrective regimen sliding scale   SubCutaneous three times a day before meals  insulin lispro (ADMELOG) corrective regimen sliding scale   SubCutaneous at bedtime  lisinopril 20 milliGRAM(s) Oral daily  metoprolol tartrate 25 milliGRAM(s) Oral two times a day  PARoxetine 20 milliGRAM(s) Oral daily    MEDICATIONS  (PRN):  acetaminophen   Tablet .. 650 milliGRAM(s) Oral every 8 hours PRN Temp greater or equal to 38C (100.4F), Mild Pain (1 - 3)  artificial  tears Solution 1 Drop(s) Both EYES two times a day PRN Dry Eyes  diphenhydrAMINE 25 milliGRAM(s) Oral every 8 hours PRN Rash and/or Itching  famotidine    Tablet 20 milliGRAM(s) Oral two times a day PRN Dyspepsia  oxycodone    5 mG/acetaminophen 325 mG 1 Tablet(s) Oral every 6 hours PRN Moderate Pain (4 - 6)                            11.5   7.18  )-----------( 230      ( 05 Apr 2021 05:30 )             36.0     04-05    142  |  108  |  48<H>  ----------------------------<  208<H>  4.9   |  25  |  1.08    Ca    8.8      05 Apr 2021 05:30    TPro  6.4  /  Alb  2.4<L>  /  TBili  0.3  /  DBili  x   /  AST  11  /  ALT  12  /  AlkPhos  85  04-05        CAPILLARY BLOOD GLUCOSE      POCT Blood Glucose.: 183 mg/dL (05 Apr 2021 11:18)  POCT Blood Glucose.: 200 mg/dL (05 Apr 2021 07:18)  POCT Blood Glucose.: 181 mg/dL (04 Apr 2021 21:07)  POCT Blood Glucose.: 152 mg/dL (04 Apr 2021 16:39)      Vital Signs Last 24 Hrs  T(C): 36.4 (05 Apr 2021 08:37), Max: 36.4 (05 Apr 2021 08:37)  T(F): 97.5 (05 Apr 2021 08:37), Max: 97.5 (05 Apr 2021 08:37)  HR: 59 (05 Apr 2021 08:37) (59 - 83)  BP: 138/55 (05 Apr 2021 08:37) (138/55 - 168/64)  BP(mean): --  RR: 14 (05 Apr 2021 08:37) (14 - 16)  SpO2: 95% (05 Apr 2021 08:37) (95% - 96%)              General: NAD, Resting Comfortable,                                  HEENT: NC/AT, EOM I, PERRLA, Normal Conjunctivae  Cardio: RRR, Normal S1-S2, No M/G/R                              Pulm: No Respiratory Distress,  Lungs CTAB                        Abdomen: ND/NT, Soft, BS+                                                MSK: No joint swelling, Full ROM    RA deformities Left >right hand                                     Ext: No C/C/E- dp Pulses 2+ , No calf tenderness    Skin:  Left groin incision with sutures, no drainage, left medial thigh incision intact-sub cut closure                                                            Wounds: Left posterior calf with venous ulcer /eschar with surrounding excoriations- left heel pressure ulcer unstageable with eschar, Left dorsal first toe with scab?eschar, intact blister to left dorsal foot    Neurological Examination    Cognitive: AAO x 3                                                                         Attention: Intact   Judgment: Good evidence of judgement                                Mood/Affect: wnl                                                                           Communication:  Fluent,  No dysarthria   Swallow: intact                                                                       Sensory: Intact to light touch,                                                                                             Tone: normal Throughout   Balance: impaired    Motor    LEFT    UE: SF [5/5], EF [5/5], EE [5/5], WE [5/5],  [wnl]  RIGHT UE: SF [5/5], EF [5/5], EE [5/5], WE [5/5],  [wnl]  LEFT    LE:  HF [3+/5], KE [4/5], DF [1-2/5], EHL [0/5],  PF [3/5]  RIGHT LE:  HF [4/5], KE [5/5], DF [1/5], EHL [0/5],  PF [3/5]      Reflex:  2 + thoroughout, Saab/Babinski negative    IDT meeting 4/5    OT: mod I with eating and grooming   min A bathing   min A LBD, commode transfers and toileting   supervision UBD     PT: CS transfers   ambulating 100ft with CS and RW  navigating 8 steps with 2 HR and CS  Navigating 2 steps without HR and CG assist     Tentative dc home on 4/9.

## 2021-04-05 NOTE — CHART NOTE - NSCHARTNOTEFT_GEN_A_CORE
IDT meeting 4/5    OT: mod I with eating and grooming   min A bathing   min A LBD, commode transfers and toileting   supervision UBD     PT: CS transfers   ambulating 100ft with CS and RW  navigating 8 steps with 2 HR and CS  Navigating 2 steps without HR and CG assist     Tentative dc home on 4/9
Nutrition Follow Up Note  Hospital Course   (Per Electronic Medical Record)    Source:  Patient [X]  Medical Record [X]      Diet:   Consistent Carbohydrate Low Sodium Diet w/ Thin Liquids  Tolerates Diet Consistency Well  No Chewing/Swallowing Difficulties  No Recent Nausea, Vomiting, Diarrhea or Constipation (as Per Patient)  States Good PO Intake/Appetite   Education Provided on Proper Nutrition  on Phillip 1 Packet BID (Provides 180kcal & 28grams of Protein)   Patient Takes Nutrition Supplement Well    Enteral/Parenteral Nutrition: Not Applicable    Current Weight: 155.4lb on 4/4  Obtain Weights Weekly  Obtain New Weight to Confirm     Pertinent Medications: MEDICATIONS  (STANDING):  amLODIPine   Tablet 5 milliGRAM(s) Oral daily  apixaban 5 milliGRAM(s) Oral two times a day  AQUAPHOR (petrolatum Ointment) 1 Application(s) Topical two times a day  dextrose 40% Gel 15 Gram(s) Oral once  dextrose 5%. 1000 milliLiter(s) (50 mL/Hr) IV Continuous <Continuous>  dextrose 5%. 1000 milliLiter(s) (100 mL/Hr) IV Continuous <Continuous>  dextrose 50% Injectable 25 Gram(s) IV Push once  dextrose 50% Injectable 12.5 Gram(s) IV Push once  dextrose 50% Injectable 25 Gram(s) IV Push once  folic acid 1 milliGRAM(s) Oral daily  gabapentin 300 milliGRAM(s) Oral three times a day  glucagon  Injectable 1 milliGRAM(s) IntraMuscular once  insulin lispro (ADMELOG) corrective regimen sliding scale   SubCutaneous three times a day before meals  insulin lispro (ADMELOG) corrective regimen sliding scale   SubCutaneous at bedtime  lisinopril 20 milliGRAM(s) Oral daily  metoprolol tartrate 25 milliGRAM(s) Oral two times a day  PARoxetine 20 milliGRAM(s) Oral daily    MEDICATIONS  (PRN):  acetaminophen   Tablet .. 650 milliGRAM(s) Oral every 8 hours PRN Temp greater or equal to 38C (100.4F), Mild Pain (1 - 3)  artificial  tears Solution 1 Drop(s) Both EYES two times a day PRN Dry Eyes  diphenhydrAMINE 25 milliGRAM(s) Oral every 8 hours PRN Rash and/or Itching  famotidine    Tablet 20 milliGRAM(s) Oral two times a day PRN Dyspepsia  oxycodone    5 mG/acetaminophen 325 mG 1 Tablet(s) Oral every 6 hours PRN Moderate Pain (4 - 6)    Pertinent Labs:  04-05 Na142 mmol/L Glu 208 mg/dL<H> K+ 4.9 mmol/L Cr  1.08 mg/dL BUN 48 mg/dL<H> 04-05 Alb 2.4 g/dL<L>    POCT (over Last 2 Days) - Ranging from 137-259    Skin: Multiple Pressure Ulcers   Multiple Surgical Incisions  (as Per Nursing Flow Sheet)    Edema: +1 Edema Noted to Left Lower Extremity   (as Per Documentation)     Last Bowel Movement: on 4/3    Estimated Needs:   [X] No Change Since Previous Assessment    Previous Nutrition Diagnosis:   Increased Nutrient Needs - Calories & Protein     Nutrition Diagnosis is [X] Ongoing - Continues on Nutrition Supplement & Patient Takes Nutrition Supplement; Education Provided on Proper Nutrition     New Nutrition Diagnosis: [X] Not Applicable    Interventions:   1. Education Provided on Proper Nutrition   2. Recommend Continue Nutrition Plan of Care     Monitoring & Evaluation:   [X] Weights   [X] PO Intake   [X] Skin Integrity   [X] Follow Up (Per Protocol)  [X] Tolerance to Diet Prescription   [X] Other: Labs & POCT    Registered Dietitian/Nutritionist Remains Available.  Js Stewart RDN    Pager # [phone]  Phone# [phone]
[facility] Plan of Care    REHABILITATION DIAGNOSIS:  Other malaise          COMORBIDITIES/COMPLICATING CONDITIONS IMPACTING REHABILITATION:  HEALTH ISSUES - PROBLEM Dx:  neuropathy  recent ORIF Right hip  PVD      PAST MEDICAL & SURGICAL HISTORY:  Hypertension    Anemia    Arthritis, rheumatoid    Depression    Type 2 diabetes mellitus  on insulin    PVD (peripheral vascular disease) with claudication    CAD (coronary artery disease)  non-obstructive    Rheumatoid arthritis    fracture ankle right  plate . screws   2000    S/P cataract surgery  2011    Hip fracture requiring operative repair  Right hip 11/14/2020        Based upon consideration of the patient's impairments, functional status, complicating conditions and any other contributing factors and after information garnered from the assessments of all therapy disciplines involved in treating the patient and other pertinent clinicians:    INTERDISCIPLINARY REHABILITATION INTERVENTIONS:    [ X  ] Transfer Training  [ X  ] Bed Mobility  [ X  ] Therapeutic Exercise  [ X ] Balance/Coordination Exercises  [ X ] Locomotion retraining  [ X  ] Stairs  [  X ] Functional Transfer Training  [   ] Bowel/Bladder program  [ x  ] Pain Management  [   x] Skin/Wound Care  [   ] Visual/Perceptual Training  [   ] Therapeutic Recreation Activities  [   ] Neuromuscular Re-education  [ X  ] Activities of Daily Living  [   ] Speech Exercise  [   ] Swallowing Exercises  [   ] Vital Stim  [   ] Dietary Supplements  [   ] Calorie Count  [   ] Cognitive Exercises  [   ] Congnitive/Linguistic Treatment  [   ] Behavior Program  [   ] Neuropsych Therapy  [ X  ] Patient/Family Counseling  [ X ] Family Training  [ X  ] Community Re-entry  [   ] Orthotic Evaluation  [   ] Prosthetic Eval/Training    MEDICAL PROGNOSIS:  good    REHAB POTENTIAL:  good  EXPECTED DAILY THERAPY:         PT:2hr       OT:1hr       ST:       P&O:    EXPECTED INTENSITY OF PROGRAM:  3 hrs / Day    EXPECTED FREQUENCY OF PROGRAM: 5 Days/ Week    ESTIMATED LOS:  [  ] 5-7 Days  [  ] 7-10 Days  [x  ] 10- 14 Days  [  ] 14- 18 Days  [  ] 18- 21 Days    ESTIMATED DISPOSITION:  [  ] Home   [  ] Home with Outpatient Therapies  [ x ] Home with Home Therapies  [  ] Assisted Living  [  ] Nursing Home  [  ] Long Term Acute Care    INTERDISCIPLINARY FUNCTIONAL OUTCOMES/GOALS:         Gait/Mobility:6       Transfers:6       ADLs:6       Functional Transfers:6       Medication Management:7       Communication:NA       Cognitive:Na       Dysphagia:NA       Bladder7       Bowel:7     Functional Independent Measures:   7 = Independent  6 = Modified Independent  5 = Supervision  4 = Minimal Assist/ Contact Guard  3 = Moderate Assistance  2 = Maximum Assistance  1 = Total Assistance  0 = Unable to assess

## 2021-04-06 LAB
GLUCOSE BLDC GLUCOMTR-MCNC: 174 MG/DL — HIGH (ref 70–99)
GLUCOSE BLDC GLUCOMTR-MCNC: 184 MG/DL — HIGH (ref 70–99)
GLUCOSE BLDC GLUCOMTR-MCNC: 211 MG/DL — HIGH (ref 70–99)
GLUCOSE BLDC GLUCOMTR-MCNC: 219 MG/DL — HIGH (ref 70–99)

## 2021-04-06 PROCEDURE — 99232 SBSQ HOSP IP/OBS MODERATE 35: CPT | Mod: GC

## 2021-04-06 PROCEDURE — 99232 SBSQ HOSP IP/OBS MODERATE 35: CPT

## 2021-04-06 RX ORDER — INSULIN GLARGINE 100 [IU]/ML
10 INJECTION, SOLUTION SUBCUTANEOUS AT BEDTIME
Refills: 0 | Status: DISCONTINUED | OUTPATIENT
Start: 2021-04-06 | End: 2021-04-08

## 2021-04-06 RX ORDER — METHOTREXATE 2.5 MG/1
15 TABLET ORAL
Refills: 0 | Status: DISCONTINUED | OUTPATIENT
Start: 2021-04-07 | End: 2021-04-09

## 2021-04-06 RX ADMIN — Medication 650 MILLIGRAM(S): at 09:35

## 2021-04-06 RX ADMIN — LISINOPRIL 20 MILLIGRAM(S): 2.5 TABLET ORAL at 05:56

## 2021-04-06 RX ADMIN — INSULIN GLARGINE 10 UNIT(S): 100 INJECTION, SOLUTION SUBCUTANEOUS at 21:17

## 2021-04-06 RX ADMIN — Medication 25 MILLIGRAM(S): at 17:26

## 2021-04-06 RX ADMIN — Medication 1 APPLICATION(S): at 20:20

## 2021-04-06 RX ADMIN — Medication 1 MILLIGRAM(S): at 11:20

## 2021-04-06 RX ADMIN — Medication 25 MILLIGRAM(S): at 05:56

## 2021-04-06 RX ADMIN — GABAPENTIN 300 MILLIGRAM(S): 400 CAPSULE ORAL at 13:55

## 2021-04-06 RX ADMIN — Medication 2: at 17:03

## 2021-04-06 RX ADMIN — Medication 650 MILLIGRAM(S): at 21:15

## 2021-04-06 RX ADMIN — APIXABAN 5 MILLIGRAM(S): 2.5 TABLET, FILM COATED ORAL at 05:56

## 2021-04-06 RX ADMIN — Medication 20 MILLIGRAM(S): at 11:20

## 2021-04-06 RX ADMIN — Medication 1: at 11:13

## 2021-04-06 RX ADMIN — Medication 1 APPLICATION(S): at 05:57

## 2021-04-06 RX ADMIN — AMLODIPINE BESYLATE 5 MILLIGRAM(S): 2.5 TABLET ORAL at 05:56

## 2021-04-06 RX ADMIN — APIXABAN 5 MILLIGRAM(S): 2.5 TABLET, FILM COATED ORAL at 17:26

## 2021-04-06 RX ADMIN — Medication 1: at 07:59

## 2021-04-06 RX ADMIN — Medication 650 MILLIGRAM(S): at 08:52

## 2021-04-06 RX ADMIN — GABAPENTIN 300 MILLIGRAM(S): 400 CAPSULE ORAL at 05:56

## 2021-04-06 RX ADMIN — GABAPENTIN 300 MILLIGRAM(S): 400 CAPSULE ORAL at 21:17

## 2021-04-06 NOTE — PROGRESS NOTE ADULT - ASSESSMENT
JULIEN CASTELLANO is a 85y with a history of CAD, DM2, HTN, RA, Recent Right Hip fx Orif 11/20, Right calf DVT 2/21 who presented to  on 3/22 with complaint of severe Left leg pain and found to have PAD SP Left fem pop Bypass 3/24. Hospital course complicated by diarrhea- now resolved. Now admitted to [facility] after for initiation of a multidisciplinary rehab program consisting focused on functional mobility, transfers and ADLs (activities of daily living).    Comprehensive Multidisciplinary Rehab Program:  - comprehensive rehab program, 3 hours a day, 5 days a week.  - PT 2hr/day: Focused on improving strength, endurance, coordination, balance, functional mobility, and transfers  - OT 1hr/day: Focused on improving strength, fine motor skills, coordination, posture and ADLs.    - Speech Therapy NA  - P&O as needed  - Activity Limitations: Decreased social, vocational and leisure activities, decreased self care and ADLs, decreased mobility, decreased ability to manage household and finances.   Vascular surgeon would like to remove sutures next week in office.    Participation Restrictions/Precautions:  - Weight bearing status: WBAT  - ROM restrictions: none  - Precautions:    [x ] Falls   [ ] Spinal   [ ] Hip (Anterior or Posterior)       [ ] Seizure  [ ] Cardiac   [ ] Sternal    Rehab Diagnosis/Management:    Sleep:   - Maintain quiet hours and low stim environment.    Pain Management:  - percocet PRN Mod pain, gabapentin 300 TID, Tylenol 650 Q8 PRN  - Avoid sedating medications that may interfere with cognitive recovery    GI/Bowel:  - At risk for constipation due to neurologic diagnosis, immobility and/or medication use  - recent diarrhea secondary to antibiotics- no bowel meds ordered  - GI ppx: pepcid    /Bladder:   - At risk for incontinence and retention due to neurologic diagnosis and limited mobility  - Currently patient voids:    [x ] independent     [ ] external collection device (condom cath)    [ ] Indwelling hurst catheter     [ ] Intermittent catheterization  - Baseline bladder scans -straight cath for >350cc.  - Encourage timed voids every 4 hours while awake for independence and to promote continence during therapy.    Skin/Pressure Injury:   - At risk for pressure injury due to neurologic diagnosis and relative immobility.  - Skin assessment on admission - Left heel pressure ulcer , calf great toe eschar   - Monitor Incisions: Left groin with sutures, Left medial thigh intact  - Turn every 2 hours while in bed, air mattress  - Soft heel protectors  - Skin barrier cream as needed  Betadine left heel and 1st toe , xeroform to Left calf ,DSD Carly daily  - Nursing to monitor skin Qshift    #Pruritus  - benedryl PRN  - keep blister to Left dorsum foot intact to reduce risk of infection  use aquaphor to moisturize    Diet/Dysphagia:  - Diet Consistency/Modifications: reg consistency  - Supplements: NA    DVT ppx:   - Eliquis    - Last Doppler on- NA  -------------  Comorbid Medical Management:    Diarrhea  C diff neg  GI PCR neg  resolved    DM 2  took lantus 25 units HS PTA  Now On SSI only   Consider adding lantus Hs- Hospitalist to address  Lantus 10 units qHS intitated by hospitalist     HTN  continue lisinopril, amlodipine, metoprolol    Depression   cont paxil    RA  takes methotrexate 15mg weekly on Wednesday - on hold  order placed to restart on 4/7 as per rheum    Hx DVT  restart eliquis 5mg 2x/day    ROLY  push PO fluids  Follow BMP    Neuropathy/ foot drop   had plastic PLSO at Home not Comfortable wearing them  will contact orthotist- ? single kl[place] attached To orthopedic shoes hang  delivered as OPD once Status of skin improves

## 2021-04-06 NOTE — PROGRESS NOTE ADULT - ATTENDING COMMENTS
Rehab Attending- Patient seen and examined with NP Nima- Case discussed, above note reviewed by me with modifications made    Doing well  contacted Dr Ventura vascular- wants to wait till next week to see in office to Dc sutures  Sutures beginning to get imbedded  Left LE inspected yesterday- no change- foot warm  to be seen by orthotist on thursday  lantus HS started by hospitalist  need to encourage PO fluids  to continue intensive rehab program

## 2021-04-06 NOTE — PROGRESS NOTE ADULT - SUBJECTIVE AND OBJECTIVE BOX
Patient is a 85y old  Female who presents with a chief complaint of 13 other disabling impairments- SP Left fem-pop bypass (05 Apr 2021 13:57)      Patient seen and examined at bedside.    ALLERGIES:  cephalosporins (Other)  penicillins (Urticaria; Pruritus)    MEDICATIONS  (STANDING):  amLODIPine   Tablet 5 milliGRAM(s) Oral daily  apixaban 5 milliGRAM(s) Oral two times a day  AQUAPHOR (petrolatum Ointment) 1 Application(s) Topical two times a day  dextrose 40% Gel 15 Gram(s) Oral once  dextrose 5%. 1000 milliLiter(s) (50 mL/Hr) IV Continuous <Continuous>  dextrose 5%. 1000 milliLiter(s) (100 mL/Hr) IV Continuous <Continuous>  dextrose 50% Injectable 25 Gram(s) IV Push once  dextrose 50% Injectable 12.5 Gram(s) IV Push once  dextrose 50% Injectable 25 Gram(s) IV Push once  folic acid 1 milliGRAM(s) Oral daily  gabapentin 300 milliGRAM(s) Oral three times a day  glucagon  Injectable 1 milliGRAM(s) IntraMuscular once  insulin lispro (ADMELOG) corrective regimen sliding scale   SubCutaneous three times a day before meals  insulin lispro (ADMELOG) corrective regimen sliding scale   SubCutaneous at bedtime  lisinopril 20 milliGRAM(s) Oral daily  metoprolol tartrate 25 milliGRAM(s) Oral two times a day  PARoxetine 20 milliGRAM(s) Oral daily    MEDICATIONS  (PRN):  acetaminophen   Tablet .. 650 milliGRAM(s) Oral every 8 hours PRN Temp greater or equal to 38C (100.4F), Mild Pain (1 - 3)  artificial  tears Solution 1 Drop(s) Both EYES two times a day PRN Dry Eyes  diphenhydrAMINE 25 milliGRAM(s) Oral every 8 hours PRN Rash and/or Itching  famotidine    Tablet 20 milliGRAM(s) Oral two times a day PRN Dyspepsia  oxycodone    5 mG/acetaminophen 325 mG 1 Tablet(s) Oral every 6 hours PRN Moderate Pain (4 - 6)    Vital Signs Last 24 Hrs  T(F): 97.6 (06 Apr 2021 08:59), Max: 98 (05 Apr 2021 21:24)  HR: 60 (06 Apr 2021 08:59) (60 - 77)  BP: 142/60 (06 Apr 2021 08:59) (132/62 - 146/62)  RR: 16 (06 Apr 2021 08:59) (16 - 16)  SpO2: 96% (06 Apr 2021 08:59) (95% - 96%)  I&O's Summary      PHYSICAL EXAM:  General: NAD, A/O x 3  ENT: MMM  Neck: Supple, No JVD  Lungs: Clear to auscultation bilaterally  Cardio: RRR, S1/S2, No murmurs  Abdomen: Soft, Nontender, Nondistended; Bowel sounds present  Extremities: No calf tenderness, No pitting edema    LABS:                        11.5   7.18  )-----------( 230      ( 05 Apr 2021 05:30 )             36.0       04-05    142  |  108  |  48  ----------------------------<  208  4.9   |  25  |  1.08    Ca    8.8      05 Apr 2021 05:30    TPro  6.4  /  Alb  2.4  /  TBili  0.3  /  DBili  x   /  AST  11  /  ALT  12  /  AlkPhos  85  04-05     eGFR if Non African American: 47 mL/min/1.73M2 (04-05-21 @ 05:30)  eGFR if African American: 55 mL/min/1.73M2 (04-05-21 @ 05:30)                           POCT Blood Glucose.: 184 mg/dL (06 Apr 2021 07:58)  POCT Blood Glucose.: 234 mg/dL (05 Apr 2021 21:31)  POCT Blood Glucose.: 149 mg/dL (05 Apr 2021 16:55)  POCT Blood Glucose.: 183 mg/dL (05 Apr 2021 11:18)            RADIOLOGY & ADDITIONAL TESTS:    Care Discussed with Consultants/Other Providers:

## 2021-04-06 NOTE — PROGRESS NOTE ADULT - ASSESSMENT
Deconditioning  PT/OT per rehab    s/p L fem pop bypass  Pain control per primary team    DVT  Eliquis    HTN  Norvasc, lisnopril, Metoprolol  Avoid tight BP control in this elderly pt    DM2, a1c 6.9  Lispro    RA  Resume MTX once wounds are well-healed   Deconditioning  PT/OT per rehab    s/p L fem pop bypass  Pain control per primary team    DVT  Eliquis    HTN  Norvasc, lisnopril, Metoprolol  Avoid tight BP control in this elderly pt    DM2, a1c 6.9  Lispro ISS  Lantus 10 units qhs initiated    RA  Resume MTX once wounds are well-healed

## 2021-04-06 NOTE — PROGRESS NOTE ADULT - SUBJECTIVE AND OBJECTIVE BOX
Patient is a 85y old  Female who presents with a chief complaint of 16 debility (31 Mar 2021 07:12)      HPI:  85 y.o. female with PMH of anemia, arthritis, CAD, DMII, depression, HTN, PVD, RA, p/w LLE pain. Patient is admitted to surgery service on 3/22 at [facility] with severe Left LE pain and underwent Left fem-pop-bypass on 3/24. Patient's post op course complicated by pain as well as diarrhea- stool negative for Cdiff and diarrhea has subdsded.     Patient deemed medically stable- Seen By PT,OT and screened by physiatry and deemed a good candidate for intensive rehab- admitted To [facility] today to restore functional independence and safe DC to home.   (30 Mar 2021 16:04)    ROS:   Denies pain left LE  no CP, SOB, cough  no Abd pain, nausea, urinary or bowel symptoms - Last BM 4/5  inquired about RA medication (methotrexate) - may resume 4/7-order placed       PAST MEDICAL & SURGICAL HISTORY:  Hypertension    Anemia    Arthritis, rheumatoid    Depression    Type 2 diabetes mellitus  on insulin    PVD (peripheral vascular disease) with claudication    CAD (coronary artery disease)  non-obstructive    Rheumatoid arthritis    fracture ankle right  plate . screws   2000    S/P cataract surgery  2011    Hip fracture requiring operative repair  Right hip 11/14/2020        Allergies    cephalosporins (Other)  penicillins (Urticaria; Pruritus)    Intolerances      MEDICATIONS  (STANDING):  amLODIPine   Tablet 5 milliGRAM(s) Oral daily  apixaban 5 milliGRAM(s) Oral two times a day  AQUAPHOR (petrolatum Ointment) 1 Application(s) Topical two times a day  dextrose 40% Gel 15 Gram(s) Oral once  dextrose 5%. 1000 milliLiter(s) (50 mL/Hr) IV Continuous <Continuous>  dextrose 5%. 1000 milliLiter(s) (100 mL/Hr) IV Continuous <Continuous>  dextrose 50% Injectable 25 Gram(s) IV Push once  dextrose 50% Injectable 12.5 Gram(s) IV Push once  dextrose 50% Injectable 25 Gram(s) IV Push once  folic acid 1 milliGRAM(s) Oral daily  gabapentin 300 milliGRAM(s) Oral three times a day  glucagon  Injectable 1 milliGRAM(s) IntraMuscular once  insulin glargine Injectable (LANTUS) 10 Unit(s) SubCutaneous at bedtime  insulin lispro (ADMELOG) corrective regimen sliding scale   SubCutaneous three times a day before meals  insulin lispro (ADMELOG) corrective regimen sliding scale   SubCutaneous at bedtime  lisinopril 20 milliGRAM(s) Oral daily  metoprolol tartrate 25 milliGRAM(s) Oral two times a day  PARoxetine 20 milliGRAM(s) Oral daily    MEDICATIONS  (PRN):  acetaminophen   Tablet .. 650 milliGRAM(s) Oral every 8 hours PRN Temp greater or equal to 38C (100.4F), Mild Pain (1 - 3)  artificial  tears Solution 1 Drop(s) Both EYES two times a day PRN Dry Eyes  diphenhydrAMINE 25 milliGRAM(s) Oral every 8 hours PRN Rash and/or Itching  famotidine    Tablet 20 milliGRAM(s) Oral two times a day PRN Dyspepsia  oxycodone    5 mG/acetaminophen 325 mG 1 Tablet(s) Oral every 6 hours PRN Moderate Pain (4 - 6)                          11.5   7.18  )-----------( 230      ( 05 Apr 2021 05:30 )             36.0     04-05    142  |  108  |  48<H>  ----------------------------<  208<H>  4.9   |  25  |  1.08    Ca    8.8      05 Apr 2021 05:30    TPro  6.4  /  Alb  2.4<L>  /  TBili  0.3  /  DBili  x   /  AST  11  /  ALT  12  /  AlkPhos  85  04-05        CAPILLARY BLOOD GLUCOSE  POCT Blood Glucose.: 174 mg/dL (06 Apr 2021 11:11)  POCT Blood Glucose.: 184 mg/dL (06 Apr 2021 07:58)  POCT Blood Glucose.: 234 mg/dL (05 Apr 2021 21:31)  POCT Blood Glucose.: 149 mg/dL (05 Apr 2021 16:55)      Vital Signs Last 24 Hrs  T(C): 36.4 (06 Apr 2021 08:59), Max: 36.7 (05 Apr 2021 21:24)  T(F): 97.6 (06 Apr 2021 08:59), Max: 98 (05 Apr 2021 21:24)  HR: 60 (06 Apr 2021 08:59) (60 - 77)  BP: 142/60 (06 Apr 2021 08:59) (132/62 - 146/62)  RR: 16 (06 Apr 2021 08:59) (16 - 16)  SpO2: 96% (06 Apr 2021 08:59) (95% - 96%)        Physical Exam   General: NAD, Resting Comfortable,                                  HEENT: NC/AT, EOM I, PERRLA, Normal Conjunctivae  Cardio: RRR, Normal S1-S2, No M/G/R                              Pulm: No Respiratory Distress,  Lungs CTAB                        Abdomen: ND/NT, Soft, BS+                                                MSK: No joint swelling, Full ROM    RA deformities Left >right hand                                     Ext: No C/C/E- dp Pulses 2+ , No calf tenderness    Skin:  Left groin incision with sutures, no drainage, left medial thigh incision intact-sub cut closure                                                            Wounds: Left posterior calf with full thickness ulcer, full thickness ulcer to left shin, left heel wound with eschar covering entirety of wound base, Left dorsal first toe with eschar, intact blister to left dorsal foot    Neurological Examination    Cognitive: AAO x 3                                                                         Attention: Intact   Judgment: Good evidence of judgement                                Mood/Affect: wnl                                                                           Communication:  Fluent,  No dysarthria   Swallow: intact                                                                       Sensory: Intact to light touch,                                                                                             Tone: normal Throughout   Balance: impaired    Motor    LEFT    UE: SF [5/5], EF [5/5], EE [5/5], WE [5/5],  [wnl]  RIGHT UE: SF [5/5], EF [5/5], EE [5/5], WE [5/5],  [wnl]  LEFT    LE:  HF [3+/5], KE [4/5], DF [1-2/5], EHL [0/5],  PF [3/5]  RIGHT LE:  HF [4/5], KE [5/5], DF [1/5], EHL [0/5],  PF [3/5]      Reflex:  2 + thoroughout, Saab/Babinski negative    IDT meeting 4/5    OT: mod I with eating and grooming   min A bathing   min A LBD, commode transfers and toileting   supervision UBD     PT: CS transfers   ambulating 100ft with CS and RW  navigating 8 steps with 2 HR and CS  Navigating 2 steps without HR and CG assist     Tentative dc home on 4/9.       Patient is a 85y old  Female who presents with a chief complaint of 16 debility (31 Mar 2021 07:12)      HPI:  85 y.o. female with PMH of anemia, arthritis, CAD, DMII, depression, HTN, PVD, RA, p/w LLE pain. Patient is admitted to surgery service on 3/22 at [facility] with severe Left LE pain and underwent Left fem-pop-bypass on 3/24. Patient's post op course complicated by pain as well as diarrhea- stool negative for Cdiff and diarrhea has subdsded.     Patient deemed medically stable- Seen By PT,OT and screened by physiatry and deemed a good candidate for intensive rehab- admitted To [facility] today to restore functional independence and safe DC to home.   (30 Mar 2021 16:04)    ROS:   Denies pain left LE  no CP, SOB, cough  no Abd pain, nausea, urinary or bowel symptoms - Last BM 4/5  inquired about RA medication (methotrexate) - may resume 4/7-order placed       PAST MEDICAL & SURGICAL HISTORY:  Hypertension    Anemia    Arthritis, rheumatoid    Depression    Type 2 diabetes mellitus  on insulin    PVD (peripheral vascular disease) with claudication    CAD (coronary artery disease)  non-obstructive    Rheumatoid arthritis    fracture ankle right  plate . screws   2000    S/P cataract surgery  2011    Hip fracture requiring operative repair  Right hip 11/14/2020        Allergies    cephalosporins (Other)  penicillins (Urticaria; Pruritus)    Intolerances      MEDICATIONS  (STANDING):  amLODIPine   Tablet 5 milliGRAM(s) Oral daily  apixaban 5 milliGRAM(s) Oral two times a day  AQUAPHOR (petrolatum Ointment) 1 Application(s) Topical two times a day  dextrose 40% Gel 15 Gram(s) Oral once  dextrose 5%. 1000 milliLiter(s) (50 mL/Hr) IV Continuous <Continuous>  dextrose 5%. 1000 milliLiter(s) (100 mL/Hr) IV Continuous <Continuous>  dextrose 50% Injectable 25 Gram(s) IV Push once  dextrose 50% Injectable 12.5 Gram(s) IV Push once  dextrose 50% Injectable 25 Gram(s) IV Push once  folic acid 1 milliGRAM(s) Oral daily  gabapentin 300 milliGRAM(s) Oral three times a day  glucagon  Injectable 1 milliGRAM(s) IntraMuscular once  insulin glargine Injectable (LANTUS) 10 Unit(s) SubCutaneous at bedtime  insulin lispro (ADMELOG) corrective regimen sliding scale   SubCutaneous three times a day before meals  insulin lispro (ADMELOG) corrective regimen sliding scale   SubCutaneous at bedtime  lisinopril 20 milliGRAM(s) Oral daily  metoprolol tartrate 25 milliGRAM(s) Oral two times a day  PARoxetine 20 milliGRAM(s) Oral daily    MEDICATIONS  (PRN):  acetaminophen   Tablet .. 650 milliGRAM(s) Oral every 8 hours PRN Temp greater or equal to 38C (100.4F), Mild Pain (1 - 3)  artificial  tears Solution 1 Drop(s) Both EYES two times a day PRN Dry Eyes  diphenhydrAMINE 25 milliGRAM(s) Oral every 8 hours PRN Rash and/or Itching  famotidine    Tablet 20 milliGRAM(s) Oral two times a day PRN Dyspepsia  oxycodone    5 mG/acetaminophen 325 mG 1 Tablet(s) Oral every 6 hours PRN Moderate Pain (4 - 6)                          11.5   7.18  )-----------( 230      ( 05 Apr 2021 05:30 )             36.0     04-05    142  |  108  |  48<H>  ----------------------------<  208<H>  4.9   |  25  |  1.08    Ca    8.8      05 Apr 2021 05:30    TPro  6.4  /  Alb  2.4<L>  /  TBili  0.3  /  DBili  x   /  AST  11  /  ALT  12  /  AlkPhos  85  04-05        CAPILLARY BLOOD GLUCOSE  POCT Blood Glucose.: 174 mg/dL (06 Apr 2021 11:11)  POCT Blood Glucose.: 184 mg/dL (06 Apr 2021 07:58)  POCT Blood Glucose.: 234 mg/dL (05 Apr 2021 21:31)  POCT Blood Glucose.: 149 mg/dL (05 Apr 2021 16:55)      Vital Signs Last 24 Hrs  T(C): 36.4 (06 Apr 2021 08:59), Max: 36.7 (05 Apr 2021 21:24)  T(F): 97.6 (06 Apr 2021 08:59), Max: 98 (05 Apr 2021 21:24)  HR: 60 (06 Apr 2021 08:59) (60 - 77)  BP: 142/60 (06 Apr 2021 08:59) (132/62 - 146/62)  RR: 16 (06 Apr 2021 08:59) (16 - 16)  SpO2: 96% (06 Apr 2021 08:59) (95% - 96%)        Physical Exam   General: NAD, Resting Comfortable,                                  HEENT: NC/AT, EOM I, PERRLA, Normal Conjunctivae  Cardio: RRR, Normal S1-S2, No M/G/R                              Pulm: No Respiratory Distress,  Lungs CTAB                        Abdomen: ND/NT, Soft, BS+                                                MSK: No joint swelling, Full ROM    RA deformities Left >right hand                                     Ext: No C/C/E- dp Pulses 2+ , No calf tenderness    Skin:  Left groin incision with sutures, no drainage,scar hpertrophic- left medial thigh incision intact-sub cut closure                                                            Wounds: Left posterior calf with full thickness ulcer, full thickness ulcer to left shin, left heel wound with eschar covering entirety of wound base, Left dorsal first toe with eschar, intact blister to left dorsal foot    Neurological Examination    Cognitive: AAO x 3                                                                         Attention: Intact   Judgment: Good evidence of judgement                                Mood/Affect: wnl                                                                           Communication:  Fluent,  No dysarthria   Swallow: intact                                                                       Sensory: Intact to light touch,                                                                                             Tone: normal Throughout   Balance: impaired    Motor    LEFT    UE: SF [5/5], EF [5/5], EE [5/5], WE [5/5],  [wnl]  RIGHT UE: SF [5/5], EF [5/5], EE [5/5], WE [5/5],  [wnl]  LEFT    LE:  HF [3+/5], KE [4/5], DF [1-2/5], EHL [0/5],  PF [3/5]  RIGHT LE:  HF [4/5], KE [5/5], DF [1/5], EHL [0/5],  PF [3/5]      Reflex:  2 + thoroughout, Saab/Babinski negative    IDT meeting 4/5    OT: mod I with eating and grooming   min A bathing   min A LBD, commode transfers and toileting   supervision UBD     PT: CS transfers   ambulating 100ft with CS and RW  navigating 8 steps with 2 HR and CS  Navigating 2 steps without HR and CG assist     Tentative dc home on 4/9.

## 2021-04-07 DIAGNOSIS — E11.51 TYPE 2 DIABETES MELLITUS WITH DIABETIC PERIPHERAL ANGIOPATHY WITHOUT GANGRENE: ICD-10-CM

## 2021-04-07 DIAGNOSIS — Z79.01 LONG TERM (CURRENT) USE OF ANTICOAGULANTS: ICD-10-CM

## 2021-04-07 DIAGNOSIS — I10 ESSENTIAL (PRIMARY) HYPERTENSION: ICD-10-CM

## 2021-04-07 DIAGNOSIS — Z88.0 ALLERGY STATUS TO PENICILLIN: ICD-10-CM

## 2021-04-07 DIAGNOSIS — Z79.4 LONG TERM (CURRENT) USE OF INSULIN: ICD-10-CM

## 2021-04-07 DIAGNOSIS — L97.919 NON-PRESSURE CHRONIC ULCER OF UNSPECIFIED PART OF RIGHT LOWER LEG WITH UNSPECIFIED SEVERITY: ICD-10-CM

## 2021-04-07 DIAGNOSIS — M06.9 RHEUMATOID ARTHRITIS, UNSPECIFIED: ICD-10-CM

## 2021-04-07 DIAGNOSIS — Z23 ENCOUNTER FOR IMMUNIZATION: ICD-10-CM

## 2021-04-07 DIAGNOSIS — Z86.718 PERSONAL HISTORY OF OTHER VENOUS THROMBOSIS AND EMBOLISM: ICD-10-CM

## 2021-04-07 DIAGNOSIS — D62 ACUTE POSTHEMORRHAGIC ANEMIA: ICD-10-CM

## 2021-04-07 DIAGNOSIS — J95.821 ACUTE POSTPROCEDURAL RESPIRATORY FAILURE: ICD-10-CM

## 2021-04-07 DIAGNOSIS — F32.9 MAJOR DEPRESSIVE DISORDER, SINGLE EPISODE, UNSPECIFIED: ICD-10-CM

## 2021-04-07 DIAGNOSIS — Z88.8 ALLERGY STATUS TO OTHER DRUGS, MEDICAMENTS AND BIOLOGICAL SUBSTANCES STATUS: ICD-10-CM

## 2021-04-07 DIAGNOSIS — M19.90 UNSPECIFIED OSTEOARTHRITIS, UNSPECIFIED SITE: ICD-10-CM

## 2021-04-07 DIAGNOSIS — L97.929 NON-PRESSURE CHRONIC ULCER OF UNSPECIFIED PART OF LEFT LOWER LEG WITH UNSPECIFIED SEVERITY: ICD-10-CM

## 2021-04-07 DIAGNOSIS — I25.10 ATHEROSCLEROTIC HEART DISEASE OF NATIVE CORONARY ARTERY WITHOUT ANGINA PECTORIS: ICD-10-CM

## 2021-04-07 DIAGNOSIS — R19.7 DIARRHEA, UNSPECIFIED: ICD-10-CM

## 2021-04-07 LAB
GLUCOSE BLDC GLUCOMTR-MCNC: 123 MG/DL — HIGH (ref 70–99)
GLUCOSE BLDC GLUCOMTR-MCNC: 152 MG/DL — HIGH (ref 70–99)
GLUCOSE BLDC GLUCOMTR-MCNC: 177 MG/DL — HIGH (ref 70–99)
GLUCOSE BLDC GLUCOMTR-MCNC: 204 MG/DL — HIGH (ref 70–99)
SARS-COV-2 RNA SPEC QL NAA+PROBE: SIGNIFICANT CHANGE UP

## 2021-04-07 PROCEDURE — 99232 SBSQ HOSP IP/OBS MODERATE 35: CPT

## 2021-04-07 PROCEDURE — 99232 SBSQ HOSP IP/OBS MODERATE 35: CPT | Mod: GC

## 2021-04-07 RX ADMIN — Medication 25 MILLIGRAM(S): at 05:50

## 2021-04-07 RX ADMIN — APIXABAN 5 MILLIGRAM(S): 2.5 TABLET, FILM COATED ORAL at 17:20

## 2021-04-07 RX ADMIN — Medication 1 APPLICATION(S): at 05:53

## 2021-04-07 RX ADMIN — GABAPENTIN 300 MILLIGRAM(S): 400 CAPSULE ORAL at 15:18

## 2021-04-07 RX ADMIN — Medication 1: at 17:20

## 2021-04-07 RX ADMIN — Medication 20 MILLIGRAM(S): at 11:58

## 2021-04-07 RX ADMIN — Medication 650 MILLIGRAM(S): at 13:10

## 2021-04-07 RX ADMIN — APIXABAN 5 MILLIGRAM(S): 2.5 TABLET, FILM COATED ORAL at 05:50

## 2021-04-07 RX ADMIN — Medication 1 MILLIGRAM(S): at 11:58

## 2021-04-07 RX ADMIN — Medication 650 MILLIGRAM(S): at 12:38

## 2021-04-07 RX ADMIN — LISINOPRIL 20 MILLIGRAM(S): 2.5 TABLET ORAL at 05:50

## 2021-04-07 RX ADMIN — Medication 1: at 07:59

## 2021-04-07 RX ADMIN — METHOTREXATE 15 MILLIGRAM(S): 2.5 TABLET ORAL at 08:01

## 2021-04-07 RX ADMIN — Medication 1 APPLICATION(S): at 17:23

## 2021-04-07 RX ADMIN — INSULIN GLARGINE 10 UNIT(S): 100 INJECTION, SOLUTION SUBCUTANEOUS at 22:25

## 2021-04-07 RX ADMIN — GABAPENTIN 300 MILLIGRAM(S): 400 CAPSULE ORAL at 05:52

## 2021-04-07 RX ADMIN — GABAPENTIN 300 MILLIGRAM(S): 400 CAPSULE ORAL at 22:25

## 2021-04-07 RX ADMIN — AMLODIPINE BESYLATE 5 MILLIGRAM(S): 2.5 TABLET ORAL at 05:50

## 2021-04-07 RX ADMIN — Medication 25 MILLIGRAM(S): at 17:24

## 2021-04-07 NOTE — PROGRESS NOTE ADULT - ATTENDING COMMENTS
Rehab Attending- Patient seen and examined with NP Nima- Case discussed, above note reviewed by me with modifications made    Doing well  contacted Dr Ventura vascular- wants to wait till next week to see in office to Dc sutures  Sutures beginning to get imbedded  Left LE inspected yesterday- no change- foot warm  to be seen by orthotist on thursday  lantus HS started by hospitalist  need to encourage PO fluids  to continue intensive rehab program Rehab Attending- Patient seen and examined with NP Nima- Case discussed, above note reviewed by me with modifications made    Doing well  contacted Dr Ventura vascular- wants to wait till next week to see in office to Dc sutures  to be seen by orthotist tomorrow for 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

## 2021-04-07 NOTE — PROGRESS NOTE ADULT - ASSESSMENT
Deconditioning  PT/OT per rehab    s/p L fem pop bypass  Pain control per primary team    DVT  Eliquis    HTN  Norvasc, lisnopril, Metoprolol  Avoid tight BP control in this elderly pt    DM2, a1c 6.9  Lispro ISS  Lantus 10 units qhs    RA  Resume MTX once wounds are well-healed

## 2021-04-07 NOTE — PROGRESS NOTE ADULT - SUBJECTIVE AND OBJECTIVE BOX
Patient is a 85y old  Female who presents with a chief complaint of 16 debility (31 Mar 2021 07:12)      HPI:  85 y.o. female with PMH of anemia, arthritis, CAD, DMII, depression, HTN, PVD, RA, p/w LLE pain. Patient is admitted to surgery service on 3/22 at [facility] with severe Left LE pain and underwent Left fem-pop-bypass on 3/24. Patient's post op course complicated by pain as well as diarrhea- stool negative for Cdiff and diarrhea has subdsded.     Patient deemed medically stable- Seen By PT,OT and screened by physiatry and deemed a good candidate for intensive rehab- admitted To [facility] today to restore functional independence and safe DC to home.   (30 Mar 2021 16:04)    ROS:   Denies pain   no CP, SOB, cough  no Abd pain, nausea, urinary or bowel symptoms - Last BM 4/6  inquired about RA medication (methotrexate) - resume today      PAST MEDICAL & SURGICAL HISTORY:  Hypertension    Anemia    Arthritis, rheumatoid    Depression    Type 2 diabetes mellitus  on insulin    PVD (peripheral vascular disease) with claudication    CAD (coronary artery disease)  non-obstructive    Rheumatoid arthritis    fracture ankle right  plate . screws   2000    S/P cataract surgery  2011    Hip fracture requiring operative repair  Right hip 11/14/2020        Allergies    cephalosporins (Other)  penicillins (Urticaria; Pruritus)    Intolerances      MEDICATIONS  (STANDING):  amLODIPine   Tablet 5 milliGRAM(s) Oral daily  apixaban 5 milliGRAM(s) Oral two times a day  AQUAPHOR (petrolatum Ointment) 1 Application(s) Topical two times a day  dextrose 40% Gel 15 Gram(s) Oral once  dextrose 5%. 1000 milliLiter(s) (50 mL/Hr) IV Continuous <Continuous>  dextrose 5%. 1000 milliLiter(s) (100 mL/Hr) IV Continuous <Continuous>  dextrose 50% Injectable 25 Gram(s) IV Push once  dextrose 50% Injectable 12.5 Gram(s) IV Push once  dextrose 50% Injectable 25 Gram(s) IV Push once  folic acid 1 milliGRAM(s) Oral daily  gabapentin 300 milliGRAM(s) Oral three times a day  glucagon  Injectable 1 milliGRAM(s) IntraMuscular once  insulin glargine Injectable (LANTUS) 10 Unit(s) SubCutaneous at bedtime  insulin lispro (ADMELOG) corrective regimen sliding scale   SubCutaneous three times a day before meals  insulin lispro (ADMELOG) corrective regimen sliding scale   SubCutaneous at bedtime  lisinopril 20 milliGRAM(s) Oral daily  methotrexate 15 milliGRAM(s) Oral <User Schedule>  metoprolol tartrate 25 milliGRAM(s) Oral two times a day  PARoxetine 20 milliGRAM(s) Oral daily    MEDICATIONS  (PRN):  acetaminophen   Tablet .. 650 milliGRAM(s) Oral every 8 hours PRN Temp greater or equal to 38C (100.4F), Mild Pain (1 - 3)  artificial  tears Solution 1 Drop(s) Both EYES two times a day PRN Dry Eyes  diphenhydrAMINE 25 milliGRAM(s) Oral every 8 hours PRN Rash and/or Itching  famotidine    Tablet 20 milliGRAM(s) Oral two times a day PRN Dyspepsia  oxycodone    5 mG/acetaminophen 325 mG 1 Tablet(s) Oral every 6 hours PRN Moderate Pain (4 - 6)                            11.5   7.18  )-----------( 230      ( 05 Apr 2021 05:30 )             36.0     04-05    142  |  108  |  48<H>  ----------------------------<  208<H>  4.9   |  25  |  1.08    Ca    8.8      05 Apr 2021 05:30    TPro  6.4  /  Alb  2.4<L>  /  TBili  0.3  /  DBili  x   /  AST  11  /  ALT  12  /  AlkPhos  85  04-05      CAPILLARY BLOOD GLUCOSE  POCT Blood Glucose.: 123 mg/dL (07 Apr 2021 11:56)  POCT Blood Glucose.: 177 mg/dL (07 Apr 2021 07:26)  POCT Blood Glucose.: 211 mg/dL (06 Apr 2021 20:21)  POCT Blood Glucose.: 219 mg/dL (06 Apr 2021 17:01)    Vital Signs Last 24 Hrs  T(C): 36.2 (07 Apr 2021 07:34), Max: 36.8 (06 Apr 2021 20:17)  T(F): 97.2 (07 Apr 2021 07:34), Max: 98.2 (06 Apr 2021 20:17)  HR: 58 (07 Apr 2021 07:34) (58 - 77)  BP: 125/56 (07 Apr 2021 07:34) (112/61 - 155/57)  RR: 16 (07 Apr 2021 07:34) (16 - 17)  SpO2: 96% (07 Apr 2021 07:34) (95% - 96%)        Physical Exam   General: NAD, Resting Comfortable,                                  HEENT: NC/AT, EOM I, PERRLA, Normal Conjunctivae  Cardio: RRR, Normal S1-S2, No M/G/R                              Pulm: No Respiratory Distress,  Lungs CTAB                        Abdomen: ND/NT, Soft, BS+                                                MSK: No joint swelling, Full ROM    RA deformities Left >right hand                                     Ext: No C/C/E- dp Pulses 2+ , No calf tenderness    Skin:  Left groin incision with dressing intact                                                           Wounds: Left posterior calf dressing intact     Neurological Examination    Cognitive: AAO x 3                                                                         Attention: Intact   Judgment: Good evidence of judgement                                Mood/Affect: wnl                                                                           Communication:  Fluent,  No dysarthria                                                                    Sensory: Intact to light touch,                                                                                             Tone: normal Throughout   Balance: impaired    Motor    LEFT    UE: SF [5/5], EF [5/5], EE [5/5], WE [5/5],  [wnl]  RIGHT UE: SF [5/5], EF [5/5], EE [5/5], WE [5/5],  [wnl]  LEFT    LE:  HF [3+/5], KE [4/5], DF [1-2/5], EHL [0/5],  PF [3/5]  RIGHT LE:  HF [4/5], KE [5/5], DF [1/5], EHL [0/5],  PF [3/5]      Reflex:  2 + throughout, Saab/Babinski negative    IDT meeting 4/5    OT: mod I with eating and grooming   min A bathing   min A LBD, commode transfers and toileting   supervision UBD     PT: CS transfers   ambulating 100ft with CS and RW  navigating 8 steps with 2 HR and CS  Navigating 2 steps without HR and CG assist     Tentative dc home on 4/9.

## 2021-04-07 NOTE — PROGRESS NOTE ADULT - SUBJECTIVE AND OBJECTIVE BOX
Patient is a 85y old  Female who presents with a chief complaint of 13 other disabling impairments- SP Left fem-pop bypass (06 Apr 2021 13:14)      Patient seen and examined at bedside.    ALLERGIES:  cephalosporins (Other)  penicillins (Urticaria; Pruritus)    MEDICATIONS  (STANDING):  amLODIPine   Tablet 5 milliGRAM(s) Oral daily  apixaban 5 milliGRAM(s) Oral two times a day  AQUAPHOR (petrolatum Ointment) 1 Application(s) Topical two times a day  dextrose 40% Gel 15 Gram(s) Oral once  dextrose 5%. 1000 milliLiter(s) (50 mL/Hr) IV Continuous <Continuous>  dextrose 5%. 1000 milliLiter(s) (100 mL/Hr) IV Continuous <Continuous>  dextrose 50% Injectable 25 Gram(s) IV Push once  dextrose 50% Injectable 12.5 Gram(s) IV Push once  dextrose 50% Injectable 25 Gram(s) IV Push once  folic acid 1 milliGRAM(s) Oral daily  gabapentin 300 milliGRAM(s) Oral three times a day  glucagon  Injectable 1 milliGRAM(s) IntraMuscular once  insulin glargine Injectable (LANTUS) 10 Unit(s) SubCutaneous at bedtime  insulin lispro (ADMELOG) corrective regimen sliding scale   SubCutaneous three times a day before meals  insulin lispro (ADMELOG) corrective regimen sliding scale   SubCutaneous at bedtime  lisinopril 20 milliGRAM(s) Oral daily  methotrexate 15 milliGRAM(s) Oral <User Schedule>  metoprolol tartrate 25 milliGRAM(s) Oral two times a day  PARoxetine 20 milliGRAM(s) Oral daily    MEDICATIONS  (PRN):  acetaminophen   Tablet .. 650 milliGRAM(s) Oral every 8 hours PRN Temp greater or equal to 38C (100.4F), Mild Pain (1 - 3)  artificial  tears Solution 1 Drop(s) Both EYES two times a day PRN Dry Eyes  diphenhydrAMINE 25 milliGRAM(s) Oral every 8 hours PRN Rash and/or Itching  famotidine    Tablet 20 milliGRAM(s) Oral two times a day PRN Dyspepsia  oxycodone    5 mG/acetaminophen 325 mG 1 Tablet(s) Oral every 6 hours PRN Moderate Pain (4 - 6)    Vital Signs Last 24 Hrs  T(F): 97.2 (07 Apr 2021 07:34), Max: 98.2 (06 Apr 2021 20:17)  HR: 58 (07 Apr 2021 07:34) (58 - 77)  BP: 125/56 (07 Apr 2021 07:34) (112/61 - 155/57)  RR: 16 (07 Apr 2021 07:34) (16 - 17)  SpO2: 96% (07 Apr 2021 07:34) (95% - 96%)  I&O's Summary      PHYSICAL EXAM:  General: NAD, A/O x 3  ENT: MMM  Neck: Supple, No JVD  Lungs: Clear to auscultation bilaterally  Cardio: RRR, S1/S2, No murmurs  Abdomen: Soft, Nontender, Nondistended; Bowel sounds present  Extremities: No calf tenderness, No pitting edema    LABS:                        11.5   7.18  )-----------( 230      ( 05 Apr 2021 05:30 )             36.0       04-05    142  |  108  |  48  ----------------------------<  208  4.9   |  25  |  1.08    Ca    8.8      05 Apr 2021 05:30    TPro  6.4  /  Alb  2.4  /  TBili  0.3  /  DBili  x   /  AST  11  /  ALT  12  /  AlkPhos  85  04-05     eGFR if Non African American: 47 mL/min/1.73M2 (04-05-21 @ 05:30)  eGFR if African American: 55 mL/min/1.73M2 (04-05-21 @ 05:30)                           POCT Blood Glucose.: 177 mg/dL (07 Apr 2021 07:26)  POCT Blood Glucose.: 211 mg/dL (06 Apr 2021 20:21)  POCT Blood Glucose.: 219 mg/dL (06 Apr 2021 17:01)  POCT Blood Glucose.: 174 mg/dL (06 Apr 2021 11:11)            RADIOLOGY & ADDITIONAL TESTS:    Care Discussed with Consultants/Other Providers:

## 2021-04-07 NOTE — PROGRESS NOTE ADULT - ASSESSMENT
JULIEN CASTELLANO is a 85y with a history of CAD, DM2, HTN, RA, Recent Right Hip fx Orif 11/20, Right calf DVT 2/21 who presented to  on 3/22 with complaint of severe Left leg pain and found to have PAD SP Left fem pop Bypass 3/24. Hospital course complicated by diarrhea- now resolved. Now admitted to [facility] after for initiation of a multidisciplinary rehab program consisting focused on functional mobility, transfers and ADLs (activities of daily living).    Comprehensive Multidisciplinary Rehab Program:  - comprehensive rehab program, 3 hours a day, 5 days a week.  - PT 2hr/day: Focused on improving strength, endurance, coordination, balance, functional mobility, and transfers  - OT 1hr/day: Focused on improving strength, fine motor skills, coordination, posture and ADLs.    - Speech Therapy NA  - P&O as needed  - Activity Limitations: Decreased social, vocational and leisure activities, decreased self care and ADLs, decreased mobility, decreased ability to manage household and finances.   Vascular surgeon would like to remove sutures next week in office.    Participation Restrictions/Precautions:  - Weight bearing status: WBAT  - ROM restrictions: none  - Precautions:    [x ] Falls   [ ] Spinal   [ ] Hip (Anterior or Posterior)       [ ] Seizure  [ ] Cardiac   [ ] Sternal    Rehab Diagnosis/Management:    Sleep:   - Maintain quiet hours and low stim environment.    Pain Management:  - percocet PRN Mod pain, gabapentin 300 TID, Tylenol 650 Q8 PRN  - Avoid sedating medications that may interfere with cognitive recovery    GI/Bowel:  - At risk for constipation due to neurologic diagnosis, immobility and/or medication use  - recent diarrhea secondary to antibiotics- no bowel meds ordered  - GI ppx: pepcid    /Bladder:   - At risk for incontinence and retention due to neurologic diagnosis and limited mobility  - Currently patient voids:    [x ] independent     [ ] external collection device (condom cath)    [ ] Indwelling hurst catheter     [ ] Intermittent catheterization  - Baseline bladder scans -straight cath for >350cc.  - Encourage timed voids every 4 hours while awake for independence and to promote continence during therapy.    Skin/Pressure Injury:   - At risk for pressure injury due to neurologic diagnosis and relative immobility.  - Skin assessment on admission - Left heel pressure ulcer , calf great toe eschar   - Monitor Incisions: Left groin with sutures, Left medial thigh intact  - Turn every 2 hours while in bed, air mattress  - Soft heel protectors  - Skin barrier cream as needed  Betadine left heel and 1st toe , xeroform to Left calf ,DSD Carly daily  - Nursing to monitor skin Qshift    #Pruritus  - benedryl PRN  - keep blister to Left dorsum foot intact to reduce risk of infection  use aquaphor to moisturize    Diet/Dysphagia:  - Diet Consistency/Modifications: reg consistency  - Supplements: NA    DVT ppx:   - Eliquis    - Last Doppler on- NA  -------------  Comorbid Medical Management:    Diarrhea  C diff neg  GI PCR neg  resolved    DM 2  took lantus 25 units HS PTA  Now On SSI only   Consider adding lantus Hs- Hospitalist to address  Lantus 10 units qHS intitated by hospitalist on 4/6-fasting glucose 177, monitor another day    HTN  continue lisinopril, amlodipine, metoprolol    Depression   cont paxil    RA  takes methotrexate 15mg weekly on Wednesday - on hold  order placed to restart today as per rheum    Hx DVT  restart eliquis 5mg 2x/day    ORLY  push PO fluids  Follow BMP-labs tomorrow AM    Neuropathy/ foot drop   had plastic PLSO at Home not Comfortable wearing them  will contact orthotist- ? single klensak attached To orthopedic shoes hang  delivered as OPD once Status of skin improves

## 2021-04-08 ENCOUNTER — TRANSCRIPTION ENCOUNTER (OUTPATIENT)
Age: 86
End: 2021-04-08

## 2021-04-08 LAB
ALBUMIN SERPL ELPH-MCNC: 2.4 G/DL — LOW (ref 3.3–5)
ALP SERPL-CCNC: 96 U/L — SIGNIFICANT CHANGE UP (ref 40–120)
ALT FLD-CCNC: 11 U/L — SIGNIFICANT CHANGE UP (ref 10–45)
ANION GAP SERPL CALC-SCNC: 8 MMOL/L — SIGNIFICANT CHANGE UP (ref 5–17)
AST SERPL-CCNC: 16 U/L — SIGNIFICANT CHANGE UP (ref 10–40)
BILIRUB SERPL-MCNC: 0.3 MG/DL — SIGNIFICANT CHANGE UP (ref 0.2–1.2)
BUN SERPL-MCNC: 59 MG/DL — HIGH (ref 7–23)
CALCIUM SERPL-MCNC: 9 MG/DL — SIGNIFICANT CHANGE UP (ref 8.4–10.5)
CHLORIDE SERPL-SCNC: 108 MMOL/L — SIGNIFICANT CHANGE UP (ref 96–108)
CO2 SERPL-SCNC: 27 MMOL/L — SIGNIFICANT CHANGE UP (ref 22–31)
CREAT SERPL-MCNC: 1.22 MG/DL — SIGNIFICANT CHANGE UP (ref 0.5–1.3)
GLUCOSE BLDC GLUCOMTR-MCNC: 120 MG/DL — HIGH (ref 70–99)
GLUCOSE BLDC GLUCOMTR-MCNC: 176 MG/DL — HIGH (ref 70–99)
GLUCOSE BLDC GLUCOMTR-MCNC: 185 MG/DL — HIGH (ref 70–99)
GLUCOSE BLDC GLUCOMTR-MCNC: 201 MG/DL — HIGH (ref 70–99)
GLUCOSE SERPL-MCNC: 180 MG/DL — HIGH (ref 70–99)
HCT VFR BLD CALC: 35.2 % — SIGNIFICANT CHANGE UP (ref 34.5–45)
HGB BLD-MCNC: 11.5 G/DL — SIGNIFICANT CHANGE UP (ref 11.5–15.5)
MCHC RBC-ENTMCNC: 30.3 PG — SIGNIFICANT CHANGE UP (ref 27–34)
MCHC RBC-ENTMCNC: 32.7 GM/DL — SIGNIFICANT CHANGE UP (ref 32–36)
MCV RBC AUTO: 92.9 FL — SIGNIFICANT CHANGE UP (ref 80–100)
NRBC # BLD: 0 /100 WBCS — SIGNIFICANT CHANGE UP (ref 0–0)
PLATELET # BLD AUTO: 220 K/UL — SIGNIFICANT CHANGE UP (ref 150–400)
POTASSIUM SERPL-MCNC: 4.6 MMOL/L — SIGNIFICANT CHANGE UP (ref 3.5–5.3)
POTASSIUM SERPL-SCNC: 4.6 MMOL/L — SIGNIFICANT CHANGE UP (ref 3.5–5.3)
PROT SERPL-MCNC: 6.4 G/DL — SIGNIFICANT CHANGE UP (ref 6–8.3)
RBC # BLD: 3.79 M/UL — LOW (ref 3.8–5.2)
RBC # FLD: 15.7 % — HIGH (ref 10.3–14.5)
SODIUM SERPL-SCNC: 143 MMOL/L — SIGNIFICANT CHANGE UP (ref 135–145)
WBC # BLD: 7.11 K/UL — SIGNIFICANT CHANGE UP (ref 3.8–10.5)
WBC # FLD AUTO: 7.11 K/UL — SIGNIFICANT CHANGE UP (ref 3.8–10.5)

## 2021-04-08 PROCEDURE — 99232 SBSQ HOSP IP/OBS MODERATE 35: CPT | Mod: GC

## 2021-04-08 PROCEDURE — 99232 SBSQ HOSP IP/OBS MODERATE 35: CPT

## 2021-04-08 RX ORDER — SODIUM CHLORIDE 9 MG/ML
2000 INJECTION, SOLUTION INTRAVENOUS
Refills: 0 | Status: DISCONTINUED | OUTPATIENT
Start: 2021-04-08 | End: 2021-04-09

## 2021-04-08 RX ORDER — LISINOPRIL 2.5 MG/1
10 TABLET ORAL DAILY
Refills: 0 | Status: DISCONTINUED | OUTPATIENT
Start: 2021-04-08 | End: 2021-04-09

## 2021-04-08 RX ORDER — AMLODIPINE BESYLATE 2.5 MG/1
10 TABLET ORAL DAILY
Refills: 0 | Status: DISCONTINUED | OUTPATIENT
Start: 2021-04-08 | End: 2021-04-09

## 2021-04-08 RX ORDER — ACETAMINOPHEN 500 MG
2 TABLET ORAL
Qty: 0 | Refills: 0 | DISCHARGE
Start: 2021-04-08

## 2021-04-08 RX ORDER — ISOPROPYL ALCOHOL, BENZOCAINE .7; .06 ML/ML; ML/ML
1 SWAB TOPICAL
Qty: 75 | Refills: 1
Start: 2021-04-08 | End: 2021-05-27

## 2021-04-08 RX ORDER — INSULIN GLARGINE 100 [IU]/ML
15 INJECTION, SOLUTION SUBCUTANEOUS AT BEDTIME
Refills: 0 | Status: DISCONTINUED | OUTPATIENT
Start: 2021-04-08 | End: 2021-04-09

## 2021-04-08 RX ORDER — SODIUM CHLORIDE 9 MG/ML
1000 INJECTION, SOLUTION INTRAVENOUS
Refills: 0 | Status: DISCONTINUED | OUTPATIENT
Start: 2021-04-08 | End: 2021-04-08

## 2021-04-08 RX ADMIN — Medication 1: at 12:01

## 2021-04-08 RX ADMIN — GABAPENTIN 300 MILLIGRAM(S): 400 CAPSULE ORAL at 14:41

## 2021-04-08 RX ADMIN — AMLODIPINE BESYLATE 5 MILLIGRAM(S): 2.5 TABLET ORAL at 06:31

## 2021-04-08 RX ADMIN — Medication 650 MILLIGRAM(S): at 08:35

## 2021-04-08 RX ADMIN — APIXABAN 5 MILLIGRAM(S): 2.5 TABLET, FILM COATED ORAL at 06:31

## 2021-04-08 RX ADMIN — SODIUM CHLORIDE 100 MILLILITER(S): 9 INJECTION, SOLUTION INTRAVENOUS at 15:04

## 2021-04-08 RX ADMIN — APIXABAN 5 MILLIGRAM(S): 2.5 TABLET, FILM COATED ORAL at 17:19

## 2021-04-08 RX ADMIN — Medication 1 APPLICATION(S): at 22:07

## 2021-04-08 RX ADMIN — Medication 25 MILLIGRAM(S): at 17:19

## 2021-04-08 RX ADMIN — GABAPENTIN 300 MILLIGRAM(S): 400 CAPSULE ORAL at 21:34

## 2021-04-08 RX ADMIN — Medication 1 APPLICATION(S): at 06:32

## 2021-04-08 RX ADMIN — Medication 25 MILLIGRAM(S): at 06:31

## 2021-04-08 RX ADMIN — INSULIN GLARGINE 15 UNIT(S): 100 INJECTION, SOLUTION SUBCUTANEOUS at 22:06

## 2021-04-08 RX ADMIN — Medication 20 MILLIGRAM(S): at 12:01

## 2021-04-08 RX ADMIN — GABAPENTIN 300 MILLIGRAM(S): 400 CAPSULE ORAL at 06:31

## 2021-04-08 RX ADMIN — Medication 650 MILLIGRAM(S): at 09:05

## 2021-04-08 RX ADMIN — LISINOPRIL 20 MILLIGRAM(S): 2.5 TABLET ORAL at 06:31

## 2021-04-08 RX ADMIN — Medication 1 MILLIGRAM(S): at 12:01

## 2021-04-08 RX ADMIN — Medication 1: at 07:59

## 2021-04-08 NOTE — PROGRESS NOTE ADULT - SUBJECTIVE AND OBJECTIVE BOX
Patient is a 85y old  Female who presents with a chief complaint of 13 other disabling impairments- SP Left fem-pop bypass (07 Apr 2021 12:10)      Patient seen and examined at bedside.    ALLERGIES:  cephalosporins (Other)  penicillins (Urticaria; Pruritus)    MEDICATIONS  (STANDING):  amLODIPine   Tablet 5 milliGRAM(s) Oral daily  apixaban 5 milliGRAM(s) Oral two times a day  AQUAPHOR (petrolatum Ointment) 1 Application(s) Topical two times a day  dextrose 40% Gel 15 Gram(s) Oral once  dextrose 5%. 1000 milliLiter(s) (50 mL/Hr) IV Continuous <Continuous>  dextrose 5%. 1000 milliLiter(s) (100 mL/Hr) IV Continuous <Continuous>  dextrose 50% Injectable 25 Gram(s) IV Push once  dextrose 50% Injectable 12.5 Gram(s) IV Push once  dextrose 50% Injectable 25 Gram(s) IV Push once  folic acid 1 milliGRAM(s) Oral daily  gabapentin 300 milliGRAM(s) Oral three times a day  glucagon  Injectable 1 milliGRAM(s) IntraMuscular once  insulin glargine Injectable (LANTUS) 10 Unit(s) SubCutaneous at bedtime  insulin lispro (ADMELOG) corrective regimen sliding scale   SubCutaneous three times a day before meals  insulin lispro (ADMELOG) corrective regimen sliding scale   SubCutaneous at bedtime  lisinopril 20 milliGRAM(s) Oral daily  methotrexate 15 milliGRAM(s) Oral <User Schedule>  metoprolol tartrate 25 milliGRAM(s) Oral two times a day  PARoxetine 20 milliGRAM(s) Oral daily    MEDICATIONS  (PRN):  acetaminophen   Tablet .. 650 milliGRAM(s) Oral every 8 hours PRN Temp greater or equal to 38C (100.4F), Mild Pain (1 - 3)  artificial  tears Solution 1 Drop(s) Both EYES two times a day PRN Dry Eyes  diphenhydrAMINE 25 milliGRAM(s) Oral every 8 hours PRN Rash and/or Itching  famotidine    Tablet 20 milliGRAM(s) Oral two times a day PRN Dyspepsia  oxycodone    5 mG/acetaminophen 325 mG 1 Tablet(s) Oral every 6 hours PRN Moderate Pain (4 - 6)    Vital Signs Last 24 Hrs  T(F): 98.1 (08 Apr 2021 08:29), Max: 98.1 (08 Apr 2021 08:29)  HR: 59 (08 Apr 2021 08:08) (59 - 89)  BP: 145/62 (08 Apr 2021 08:08) (124/63 - 146/57)  RR: 16 (08 Apr 2021 08:08) (15 - 16)  SpO2: 97% (08 Apr 2021 08:08) (95% - 97%)  I&O's Summary      PHYSICAL EXAM:  General: NAD, A/O x 3  ENT: MMM  Neck: Supple, No JVD  Lungs: Clear to auscultation bilaterally  Cardio: RRR, S1/S2, No murmurs  Abdomen: Soft, Nontender, Nondistended; Bowel sounds present  Extremities: No calf tenderness, No pitting edema    LABS:                        11.5   7.11  )-----------( 220      ( 08 Apr 2021 06:36 )             35.2       04-08    143  |  108  |  59  ----------------------------<  180  4.6   |  27  |  1.22    Ca    9.0      08 Apr 2021 06:36    TPro  6.4  /  Alb  2.4  /  TBili  0.3  /  DBili  x   /  AST  16  /  ALT  11  /  AlkPhos  96  04-08     eGFR if Non African American: 41 mL/min/1.73M2 (04-08-21 @ 06:36)  eGFR if : 47 mL/min/1.73M2 (04-08-21 @ 06:36)                           POCT Blood Glucose.: 176 mg/dL (08 Apr 2021 07:57)  POCT Blood Glucose.: 204 mg/dL (07 Apr 2021 22:18)  POCT Blood Glucose.: 152 mg/dL (07 Apr 2021 16:51)  POCT Blood Glucose.: 123 mg/dL (07 Apr 2021 11:56)            RADIOLOGY & ADDITIONAL TESTS:    Care Discussed with Consultants/Other Providers:

## 2021-04-08 NOTE — PROGRESS NOTE ADULT - ASSESSMENT
JULIEN CASTELLANO is a 85y with a history of CAD, DM2, HTN, RA, Recent Right Hip fx Orif 11/20, Right calf DVT 2/21 who presented to  on 3/22 with complaint of severe Left leg pain and found to have PAD SP Left fem pop Bypass 3/24. Hospital course complicated by diarrhea- now resolved. Now admitted to [facility] after for initiation of a multidisciplinary rehab program consisting focused on functional mobility, transfers and ADLs (activities of daily living).    Comprehensive Multidisciplinary Rehab Program:  - comprehensive rehab program, 3 hours a day, 5 days a week.  - PT 2hr/day: Focused on improving strength, endurance, coordination, balance, functional mobility, and transfers  - OT 1hr/day: Focused on improving strength, fine motor skills, coordination, posture and ADLs.    - Speech Therapy NA  - P&O as needed  - Activity Limitations: Decreased social, vocational and leisure activities, decreased self care and ADLs, decreased mobility, decreased ability to manage household and finances.   Vascular surgeon would like to remove sutures next week in office.    Participation Restrictions/Precautions:  - Weight bearing status: WBAT  - ROM restrictions: none  - Precautions:    [x ] Falls   [ ] Spinal   [ ] Hip (Anterior or Posterior)       [ ] Seizure  [ ] Cardiac   [ ] Sternal    Rehab Diagnosis/Management:    Sleep:   - Maintain quiet hours and low stim environment.    Pain Management:  - percocet PRN Mod pain, gabapentin 300 TID, Tylenol 650 Q8 PRN  - Avoid sedating medications that may interfere with cognitive recovery    GI/Bowel:  - At risk for constipation due to neurologic diagnosis, immobility and/or medication use  - recent diarrhea secondary to antibiotics- no bowel meds ordered  - GI ppx: pepcid    /Bladder:   - At risk for incontinence and retention due to neurologic diagnosis and limited mobility  - Currently patient voids:    [x ] independent     [ ] external collection device (condom cath)    [ ] Indwelling hurst catheter     [ ] Intermittent catheterization  - Baseline bladder scans -straight cath for >350cc.  - Encourage timed voids every 4 hours while awake for independence and to promote continence during therapy.    Skin/Pressure Injury:   - At risk for pressure injury due to neurologic diagnosis and relative immobility.  - Skin assessment on admission - Left heel pressure ulcer , calf great toe eschar   - Monitor Incisions: Left groin with sutures, Left medial thigh intact  - Turn every 2 hours while in bed, air mattress  - Soft heel protectors  - Skin barrier cream as needed  Betadine left heel and 1st toe , xeroform to Left calf ,DSD Carly daily  - Nursing to monitor skin Qshift    #Pruritus  - benedryl PRN  - keep blister to Left dorsum foot intact to reduce risk of infection  use aquaphor to moisturize    Diet/Dysphagia:  - Diet Consistency/Modifications: reg consistency  - Supplements: NA    DVT ppx:   - Eliquis    - Last Doppler on- NA  -------------  Comorbid Medical Management:    Diarrhea  C diff neg  GI PCR neg  resolved    DM 2  took lantus 25 units HS PTA  Now On SSI only   Consider adding lantus Hs- Hospitalist to address  Lantus 10 units qHS intitated by hospitalist on 4/6-fasting glucose 177 and 176 last 2 mornings, increase to 15 units (home regimen 25 units qHS)     HTN  continue lisinopril, amlodipine, metoprolol    Depression   cont paxil    RA  takes methotrexate 15mg weekly on Wednesday-had been on hold   order placed to restart 4/7 as per rheum    Hx DVT  restarted eliquis 5mg 2x/day    ORLY  push PO fluids  Cr/BUN continue to rise-IVF ordered, awaiting call back from hospitalist to discuss   Labs in AM    Neuropathy/ foot drop   had plastic PLSO at Home not Comfortable wearing them  will contact orthotist- ? single klensak attached To orthopedic shoes hang  delivered as OPD once Status of skin improves     JULIEN CASTELLANO is a 85y with a history of CAD, DM2, HTN, RA, Recent Right Hip fx Orif 11/20, Right calf DVT 2/21 who presented to  on 3/22 with complaint of severe Left leg pain and found to have PAD SP Left fem pop Bypass 3/24. Hospital course complicated by diarrhea- now resolved. Now admitted to [facility] after for initiation of a multidisciplinary rehab program consisting focused on functional mobility, transfers and ADLs (activities of daily living).    Comprehensive Multidisciplinary Rehab Program:  - comprehensive rehab program, 3 hours a day, 5 days a week.  - PT 2hr/day: Focused on improving strength, endurance, coordination, balance, functional mobility, and transfers  - OT 1hr/day: Focused on improving strength, fine motor skills, coordination, posture and ADLs.    - Speech Therapy NA  - P&O as needed  - Activity Limitations: Decreased social, vocational and leisure activities, decreased self care and ADLs, decreased mobility, decreased ability to manage household and finances.   Vascular surgeon would like to remove sutures next week in office.    Participation Restrictions/Precautions:  - Weight bearing status: WBAT  - ROM restrictions: none  - Precautions:    [x ] Falls   [ ] Spinal   [ ] Hip (Anterior or Posterior)       [ ] Seizure  [ ] Cardiac   [ ] Sternal    Rehab Diagnosis/Management:    Sleep:   - Maintain quiet hours and low stim environment.    Pain Management:  - percocet PRN Mod pain, gabapentin 300 TID, Tylenol 650 Q8 PRN  - Avoid sedating medications that may interfere with cognitive recovery    GI/Bowel:  - At risk for constipation due to neurologic diagnosis, immobility and/or medication use  - recent diarrhea secondary to antibiotics- no bowel meds ordered  - GI ppx: pepcid    /Bladder:   - At risk for incontinence and retention due to neurologic diagnosis and limited mobility  - Currently patient voids:    [x ] independent     [ ] external collection device (condom cath)    [ ] Indwelling hurst catheter     [ ] Intermittent catheterization  - Baseline bladder scans -straight cath for >350cc.  - Encourage timed voids every 4 hours while awake for independence and to promote continence during therapy.    Skin/Pressure Injury:   - At risk for pressure injury due to neurologic diagnosis and relative immobility.  - Skin assessment on admission - Left heel pressure ulcer , calf great toe eschar   - Monitor Incisions: Left groin with sutures, Left medial thigh intact  - Turn every 2 hours while in bed, air mattress  - Soft heel protectors  - Skin barrier cream as needed  Betadine left heel and 1st toe , xeroform to Left calf ,DSD Carly daily  - Nursing to monitor skin Qshift    #Pruritus  - benedryl PRN  - keep blister to Left dorsum foot intact to reduce risk of infection  use aquaphor to moisturize    Diet/Dysphagia:  - Diet Consistency/Modifications: reg consistency  - Supplements: NA    DVT ppx:   - Eliquis    - Last Doppler on- NA  -------------  Comorbid Medical Management:    Diarrhea  C diff neg  GI PCR neg  resolved    DM 2  took lantus 25 units HS PTA  Now On SSI only   Consider adding lantus Hs- Hospitalist to address  Lantus 10 units qHS intitated by hospitalist on 4/6-fasting glucose 177 and 176 last 2 mornings, increase to 15 units (home regimen 25 units qHS)     HTN  continue lisinopril, amlodipine, metoprolol    Depression   cont paxil    RA  takes methotrexate 15mg weekly on Wednesday-had been on hold   order placed to restart 4/7 as per rheum    Hx DVT  restarted eliquis 5mg 2x/day    ORLY  push PO fluids  Cr/BUN continue to rise-IVF ordered, discussed with hospitalist -decrease lisinopril, increase norvasc   Labs in AM    Neuropathy/ foot drop   had plastic PLSO at Home not Comfortable wearing them  will contact orthotist- ? single klensak attached To orthopedic shoes hang  delivered as OPD once Status of skin improves

## 2021-04-08 NOTE — DISCHARGE NOTE PROVIDER - CARE PROVIDER_API CALL
Jacob Feldman)  PhysicalRehab Medicine  [address] [place] [address]  Phone: [phone]  Fax: [phone]  Follow Up Time:     Soren Ventura)  Vascular Surgery  [address] [facility], [place] [address]  Phone: [phone]  Fax: [phone]  Follow Up Time:

## 2021-04-08 NOTE — DISCHARGE NOTE PROVIDER - NSDCMRMEDTOKEN_GEN_ALL_CORE_FT
acetaminophen 325 mg oral tablet: 2 tab(s) orally every 8 hours, As needed, for pain   amLODIPine 5 mg oral tablet: 1 tab(s) orally once a day  benazepril 20 mg oral tablet: 1 tab(s) orally once a day (at bedtime)  Eliquis 5 mg oral tablet: 1 tab(s) orally 2 times a day  folic acid 1 mg oral tablet: 1 tab(s) orally once a day (at bedtime)  gabapentin 300 mg oral capsule: 1 cap(s) orally 3 times a day  Lantus 100 units/mL subcutaneous solution: 10 unit(s) subcutaneous once a day (at bedtime)  levoFLOXacin 500 mg oral tablet: 1 tab(s) orally every 24 hours  ***Course Completed***  methotrexate 2.5 mg oral tablet: 6 tab(s) orally once a week on Wednesdays  metoprolol tartrate 25 mg oral tablet: 1 tab(s) orally 2 times a day  oxycodone-acetaminophen 5 mg-325 mg oral tablet: 1 tab(s) orally every 6 hours, As Needed -Moderate Pain (4 - 6) MDD:4 tabs  Paxil 20 mg oral tablet: 1 tab(s) orally once a day  Refresh ophthalmic solution: 1 drop(s) in each eye 2 times a day, As Needed  Saline Nasal Mist 0.65% nasal solution: use as directed as needed   acetaminophen 325 mg oral tablet: 2 tab(s) orally every 8 hours, As needed, for pain   amLODIPine 10 mg oral tablet: 1 tab(s) orally once a day  Eliquis 5 mg oral tablet: 1 tab(s) orally 2 times a day  folic acid 1 mg oral tablet: 1 tab(s) orally once a day (at bedtime)  gabapentin 300 mg oral capsule: 1 cap(s) orally 3 times a day  Lantus 100 units/mL subcutaneous solution: 20 unit(s) subcutaneous once a day (at bedtime)  lisinopril 10 mg oral tablet: 1 tab(s) orally once a day  methotrexate 2.5 mg oral tablet: 6 tab(s) orally once a week on Wednesdays  metoprolol tartrate 25 mg oral tablet: 1 tab(s) orally 2 times a day  Paxil 20 mg oral tablet: 1 tab(s) orally once a day

## 2021-04-08 NOTE — DISCHARGE NOTE PROVIDER - NSDCCPCAREPLAN_GEN_ALL_CORE_FT
PRINCIPAL DISCHARGE DIAGNOSIS  Diagnosis: PAD (peripheral artery disease)  Assessment and Plan of Treatment: s/p left femoral popliteal byspass.   Follow up with your vascular surgeon next week to have sutures removed.   Continue to betadine incision daily and cover with dry sterile dressing.   Continue local care of wounds to left lower leg.      SECONDARY DISCHARGE DIAGNOSES  Diagnosis: DVT, lower extremity  Assessment and Plan of Treatment: Continue eliquis as prescribed    Diagnosis: Hypertension  Assessment and Plan of Treatment: Contninue blood pressure medications as prescribed    Diagnosis: ORLY (acute kidney injury)  Assessment and Plan of Treatment: s/p IV fluids.   Lisinopril was lowered to 10mg daily.   Follow up with your PCP early next week for follow up blood work.    Diagnosis: Type 2 diabetes mellitus  Assessment and Plan of Treatment: Continue a diabetic diet.   Continue lantus 15 units at bedtime.   Follow up with PCP for further insulin adjustments.    Diagnosis: Rheumatoid arthritis  Assessment and Plan of Treatment: Continue methotrexate weekly on Wednesdays as per rheumatologist    Diagnosis: Diabetic foot ulcers  Assessment and Plan of Treatment: To the left great toe (dorsal aspect) and the left heel.  DFU in the setting of severe PAD.   Continue daily care with betadine.   Follow up with your podiatrist after discharge to home.    Diagnosis: Foot drop, bilateral  Assessment and Plan of Treatment: Once wounds are healed, call the office of Bobby Barajas to arranage a consultation to discuss bilat braces.   Office number [phone]. Follow up with Dr Feldman to obtain prescription for braces.     PRINCIPAL DISCHARGE DIAGNOSIS  Diagnosis: PAD (peripheral artery disease)  Assessment and Plan of Treatment: s/p left femoral popliteal byspass.   Follow up with your vascular surgeon next week to have sutures removed.   Continue to betadine incision daily and cover with dry sterile dressing.   Continue local care of wounds to left lower leg.      SECONDARY DISCHARGE DIAGNOSES  Diagnosis: DVT, lower extremity  Assessment and Plan of Treatment: Continue eliquis as prescribed    Diagnosis: Hypertension  Assessment and Plan of Treatment: Contninue blood pressure medications as prescribed    Diagnosis: ORLY (acute kidney injury)  Assessment and Plan of Treatment: s/p IV fluids.   Lisinopril was lowered to 10mg daily.   Follow up with your PCP early next week for follow up blood work.    Diagnosis: Type 2 diabetes mellitus  Assessment and Plan of Treatment: Continue a diabetic diet.   Continue lantus 20 units at bedtime.   Follow up with PCP for further insulin adjustments.    Diagnosis: Rheumatoid arthritis  Assessment and Plan of Treatment: Continue methotrexate weekly on Wednesdays as per rheumatologist    Diagnosis: Diabetic foot ulcers  Assessment and Plan of Treatment: To the left great toe (dorsal aspect) and the left heel.  DFU in the setting of severe PAD.   Continue daily care with betadine.   Follow up with your podiatrist after discharge to home.    Diagnosis: Foot drop, bilateral  Assessment and Plan of Treatment: Once wounds are healed, call the office of Bobby Barajas to arranage a consultation to discuss bilat braces.   Office number [phone]. Follow up with Dr Feldman to obtain prescription for braces.

## 2021-04-08 NOTE — PROGRESS NOTE ADULT - ATTENDING COMMENTS
Rehab Attending- Patient seen and examined with NP Nima- Case discussed, above note reviewed by me with modifications made    Doing well  contacted Dr Ventura vascular- wants to wait till next week to see in office to Dc sutures  to be seen by orthotist tomorrow for 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 Rehab Attending- Patient seen and examined with NP Nima- Case discussed, above note reviewed by me with modifications made    Doing well  contacted Dr Ventura vascular- wants to wait till next week to see in office to Dc sutures  orthotist to see for Bracing secondary to hang foot drop  lantus HS started by hospitalist-increased to 15units  BUN/Cr 59/1.2- IV fluid tonight- Push Po - decrease lisinopril- Check BMP In AM  Anticipate Dc in AM  to continue intensive rehab program Rehab Attending- Patient seen and examined with NP Nima- Case discussed, above note reviewed by me with modifications made    Doing well  contacted Dr Ventura vascular- wants to wait till next week to see in office to DC sutures  orthotist to see for Bracing secondary to hang foot drop  lantus HS started by hospitalist-increased to 15units  BUN/Cr 59/1.2- IV fluid tonight- Push Po - decrease lisinopril- Check BMP In AM  Anticipate Dc in AM  to continue intensive rehab program    Spoke with daughter Nirali and updated her on Her mom's Status

## 2021-04-08 NOTE — DISCHARGE NOTE PROVIDER - HOSPITAL COURSE
85 y.o. female with PMH of anemia, arthritis, CAD, DMII, depression, HTN, PVD, RA, p/w LLE pain. Patient is admitted to surgery service on 3/22 at [facility] with severe Left LE pain and underwent Left fem-pop-bypass on 3/24. Patient's post op course complicated by pain as well as diarrhea- stool negative for Cdiff and diarrhea has subdsded.     Patient deemed medically stable- Seen By PT,OT and screened by physiatry and deemed a good candidate for intensive rehab- admitted To [facility] today to restore functional independence and safe DC to home.    During rehab stay, patient made great functional gains.  Wounds to left LE showing signs of healing with daily dressing changes.   Patient had trend up in BUN and CR, with BUN in the 50s on 4/8. IVF started. Lisinopril decreased by half.   Labs on 4/9 revealed xxxxxxxxxxxxxx 85 y.o. female with PMH of anemia, arthritis, CAD, DMII, depression, HTN, PVD, RA, p/w LLE pain. Patient is admitted to surgery service on 3/22 at [facility] with severe Left LE pain and underwent Left fem-pop-bypass on 3/24. Patient's post op course complicated by pain as well as diarrhea- stool negative for Cdiff and diarrhea has subdsded.     Patient deemed medically stable- Seen By PT,OT and screened by physiatry and deemed a good candidate for intensive rehab- admitted To [facility] today to restore functional independence and safe DC to home.    During rehab stay, patient made great functional gains.  Wounds to left LE showing signs of healing with daily dressing changes.   Patient had trend up in BUN and CR, with BUN in the 50s on 4/8. IVF started. Lisinopril decreased by half.   Labs on 4/9 revealed improved BUN and Cr. Patient encouraged to continue to push PO fluids. Patient verbalizes understanding.   Patient also educated on changes in BP medications.     Patient is medically stable for dc home today with home care services.     04-09    140  |  108  |  48<H>  ----------------------------<  137<H>  4.8   |  26  |  1.08    Ca    8.5      09 Apr 2021 06:30    TPro  6.4  /  Alb  2.4<L>  /  TBili  0.3  /  DBili  x   /  AST  16  /  ALT  11  /  AlkPhos  96  04-08

## 2021-04-08 NOTE — DISCHARGE NOTE PROVIDER - NSDCFUADDAPPT_GEN_ALL_CORE_FT
Follow up with your PCP next week.     Follow up with Dr Ventura next week for suture removal.    You will receive a call to follow up with Dr Feldman. This appointment will be made 4 to 6 weeks from now.     If you have any concerns or questions, feel free to contact Carmen Herring NP at [phone] or [phone].

## 2021-04-08 NOTE — DISCHARGE NOTE PROVIDER - NSDCFUADDINST_GEN_ALL_CORE_FT
Wound care for left leg: Cleanse leg wounds with normal, pat dry with sterile guaze, apply cavilon to periwound of posterior leg wound, apply aquaphor to dry skin, apply xeroform to anterior and posterior leg lesions, cover with ABD pads and wrap with kerlex. Dressing changes to be done daily.     Left foot (DFU) care: paint left heel and dorsum first toe with betadine -may cover with telfa (DO NOT USE FOAM DRESSING )     Left groin care: cleanse daily with NS, pat dry and paint with betadine. Then cover with dry sterile dressing.

## 2021-04-08 NOTE — PROGRESS NOTE ADULT - SUBJECTIVE AND OBJECTIVE BOX
Patient is a 85y old  Female who presents with a chief complaint of 16 debility (31 Mar 2021 07:12)      HPI:  85 y.o. female with PMH of anemia, arthritis, CAD, DMII, depression, HTN, PVD, RA, p/w LLE pain. Patient is admitted to surgery service on 3/22 at [facility] with severe Left LE pain and underwent Left fem-pop-bypass on 3/24. Patient's post op course complicated by pain as well as diarrhea- stool negative for Cdiff and diarrhea has subdsded.     Patient deemed medically stable- Seen By PT,OT and screened by physiatry and deemed a good candidate for intensive rehab- admitted To [facility] today to restore functional independence and safe DC to home.   (30 Mar 2021 16:04)    ROS:   Denies pain   no CP, SOB, cough  no Abd pain, nausea, urinary or bowel symptoms - Last BM 4/6  inquired about RA medication (methotrexate) - resume today      PAST MEDICAL & SURGICAL HISTORY:  Hypertension  Anemia  Arthritis, rheumatoid  Depression  Type 2 diabetes mellitus  on insulin  PVD (peripheral vascular disease) with claudication  CAD (coronary artery disease)  non-obstructive  Rheumatoid arthritis  fracture ankle right  plate . screws   2000  S/P cataract surgery  2011  Hip fracture requiring operative repair  Right hip 11/14/2020    MEDICATIONS  (STANDING):  amLODIPine   Tablet 5 milliGRAM(s) Oral daily  apixaban 5 milliGRAM(s) Oral two times a day  AQUAPHOR (petrolatum Ointment) 1 Application(s) Topical two times a day  dextrose 40% Gel 15 Gram(s) Oral once  dextrose 5%. 1000 milliLiter(s) (50 mL/Hr) IV Continuous <Continuous>  dextrose 5%. 1000 milliLiter(s) (100 mL/Hr) IV Continuous <Continuous>  dextrose 50% Injectable 25 Gram(s) IV Push once  dextrose 50% Injectable 12.5 Gram(s) IV Push once  dextrose 50% Injectable 25 Gram(s) IV Push once  folic acid 1 milliGRAM(s) Oral daily  gabapentin 300 milliGRAM(s) Oral three times a day  glucagon  Injectable 1 milliGRAM(s) IntraMuscular once  insulin glargine Injectable (LANTUS) 10 Unit(s) SubCutaneous at bedtime  insulin lispro (ADMELOG) corrective regimen sliding scale   SubCutaneous three times a day before meals  insulin lispro (ADMELOG) corrective regimen sliding scale   SubCutaneous at bedtime  lisinopril 20 milliGRAM(s) Oral daily  methotrexate 15 milliGRAM(s) Oral <User Schedule>  metoprolol tartrate 25 milliGRAM(s) Oral two times a day  PARoxetine 20 milliGRAM(s) Oral daily  sodium chloride 0.45%. 1000 milliLiter(s) (70 mL/Hr) IV Continuous <Continuous>    MEDICATIONS  (PRN):  acetaminophen   Tablet .. 650 milliGRAM(s) Oral every 8 hours PRN Temp greater or equal to 38C (100.4F), Mild Pain (1 - 3)  artificial  tears Solution 1 Drop(s) Both EYES two times a day PRN Dry Eyes  diphenhydrAMINE 25 milliGRAM(s) Oral every 8 hours PRN Rash and/or Itching  famotidine    Tablet 20 milliGRAM(s) Oral two times a day PRN Dyspepsia  oxycodone    5 mG/acetaminophen 325 mG 1 Tablet(s) Oral every 6 hours PRN Moderate Pain (4 - 6)        04-08    143  |  108  |  59<H>  ----------------------------<  180<H>  4.6   |  27  |  1.22    Ca    9.0      08 Apr 2021 06:36    TPro  6.4  /  Alb  2.4<L>  /  TBili  0.3  /  DBili  x   /  AST  16  /  ALT  11  /  AlkPhos  96  04-08 04-08    143  |  108  |  59<H>  ----------------------------<  180<H>  4.6   |  27  |  1.22    Ca    9.0      08 Apr 2021 06:36    TPro  6.4  /  Alb  2.4<L>  /  TBili  0.3  /  DBili  x   /  AST  16  /  ALT  11  /  AlkPhos  96  04-08        CAPILLARY BLOOD GLUCOSE  POCT Blood Glucose.: 185 mg/dL (08 Apr 2021 11:41)  POCT Blood Glucose.: 176 mg/dL (08 Apr 2021 07:57)  POCT Blood Glucose.: 204 mg/dL (07 Apr 2021 22:18)  POCT Blood Glucose.: 152 mg/dL (07 Apr 2021 16:51)        Vital Signs Last 24 Hrs  T(C): 36.7 (08 Apr 2021 08:29), Max: 36.7 (08 Apr 2021 08:29)  T(F): 98.1 (08 Apr 2021 08:29), Max: 98.1 (08 Apr 2021 08:29)  HR: 59 (08 Apr 2021 08:08) (59 - 89)  BP: 145/62 (08 Apr 2021 08:08) (124/63 - 146/57)  RR: 16 (08 Apr 2021 08:08) (15 - 16)  SpO2: 97% (08 Apr 2021 08:08) (95% - 97%)      Physical Exam   General: NAD, Resting Comfortable,                                  HEENT: NC/AT, EOM I, PERRLA, Normal Conjunctivae  Cardio: RRR, Normal S1-S2, No M/G/R                              Pulm: No Respiratory Distress,  Lungs CTAB                        Abdomen: ND/NT, Soft, BS+                                                MSK: No joint swelling, Full ROM    RA deformities Left >right hand                                     Ext: No C/C/E- dp Pulses 2+ , No calf tenderness    Skin:  Left groin incision with dressing intact                                                           Wounds: Left posterior calf dressing intact     Neurological Examination    Cognitive: AAO x 3                                                                         Attention: Intact   Judgment: Good evidence of judgement                                Mood/Affect: wnl                                                                           Communication:  Fluent,  No dysarthria                                                                    Sensory: Intact to light touch,                                                                                             Tone: normal Throughout   Balance: impaired    Motor    LEFT    UE: SF [5/5], EF [5/5], EE [5/5], WE [5/5],  [wnl]  RIGHT UE: SF [5/5], EF [5/5], EE [5/5], WE [5/5],  [wnl]  LEFT    LE:  HF [3+/5], KE [4/5], DF [1-2/5], EHL [0/5],  PF [3/5]  RIGHT LE:  HF [4/5], KE [5/5], DF [1/5], EHL [0/5],  PF [3/5]      Reflex:  2 + throughout, Saab/Babinski negative    IDT meeting 4/5    OT: mod I with eating and grooming   min A bathing   min A LBD, commode transfers and toileting   supervision UBD     PT: CS transfers   ambulating 100ft with CS and RW  navigating 8 steps with 2 HR and CS  Navigating 2 steps without HR and CG assist     Tentative dc home on 4/9.       Patient is a 85y old  Female who presents with a chief complaint of 16 debility (31 Mar 2021 07:12)      HPI:  85 y.o. female with PMH of anemia, arthritis, CAD, DMII, depression, HTN, PVD, RA, p/w LLE pain. Patient is admitted to surgery service on 3/22 at [facility] with severe Left LE pain and underwent Left fem-pop-bypass on 3/24. Patient's post op course complicated by pain as well as diarrhea- stool negative for Cdiff and diarrhea has subdsded.     Patient deemed medically stable- Seen By PT,OT and screened by physiatry and deemed a good candidate for intensive rehab- admitted To [facility] today to restore functional independence and safe DC to home.   (30 Mar 2021 16:04)    ROS:   Denies pain   no CP, SOB, cough  no Abd pain, nausea, urinary or bowel symptoms - Last BM 4/6  inquired about RA medication (methotrexate) - resumed yesterday      PAST MEDICAL & SURGICAL HISTORY:  Hypertension  Anemia  Arthritis, rheumatoid  Depression  Type 2 diabetes mellitus  on insulin  PVD (peripheral vascular disease) with claudication  CAD (coronary artery disease)  non-obstructive  Rheumatoid arthritis  fracture ankle right  plate . screws   2000  S/P cataract surgery  2011  Hip fracture requiring operative repair  Right hip 11/14/2020    MEDICATIONS  (STANDING):  amLODIPine   Tablet 5 milliGRAM(s) Oral daily  apixaban 5 milliGRAM(s) Oral two times a day  AQUAPHOR (petrolatum Ointment) 1 Application(s) Topical two times a day  dextrose 40% Gel 15 Gram(s) Oral once  dextrose 5%. 1000 milliLiter(s) (50 mL/Hr) IV Continuous <Continuous>  dextrose 5%. 1000 milliLiter(s) (100 mL/Hr) IV Continuous <Continuous>  dextrose 50% Injectable 25 Gram(s) IV Push once  dextrose 50% Injectable 12.5 Gram(s) IV Push once  dextrose 50% Injectable 25 Gram(s) IV Push once  folic acid 1 milliGRAM(s) Oral daily  gabapentin 300 milliGRAM(s) Oral three times a day  glucagon  Injectable 1 milliGRAM(s) IntraMuscular once  insulin glargine Injectable (LANTUS) 10 Unit(s) SubCutaneous at bedtime  insulin lispro (ADMELOG) corrective regimen sliding scale   SubCutaneous three times a day before meals  insulin lispro (ADMELOG) corrective regimen sliding scale   SubCutaneous at bedtime  lisinopril 20 milliGRAM(s) Oral daily  methotrexate 15 milliGRAM(s) Oral <User Schedule>  metoprolol tartrate 25 milliGRAM(s) Oral two times a day  PARoxetine 20 milliGRAM(s) Oral daily  sodium chloride 0.45%. 1000 milliLiter(s) (70 mL/Hr) IV Continuous <Continuous>    MEDICATIONS  (PRN):  acetaminophen   Tablet .. 650 milliGRAM(s) Oral every 8 hours PRN Temp greater or equal to 38C (100.4F), Mild Pain (1 - 3)  artificial  tears Solution 1 Drop(s) Both EYES two times a day PRN Dry Eyes  diphenhydrAMINE 25 milliGRAM(s) Oral every 8 hours PRN Rash and/or Itching  famotidine    Tablet 20 milliGRAM(s) Oral two times a day PRN Dyspepsia  oxycodone    5 mG/acetaminophen 325 mG 1 Tablet(s) Oral every 6 hours PRN Moderate Pain (4 - 6)        04-08    143  |  108  |  59<H>  ----------------------------<  180<H>  4.6   |  27  |  1.22    Ca    9.0      08 Apr 2021 06:36    TPro  6.4  /  Alb  2.4<L>  /  TBili  0.3  /  DBili  x   /  AST  16  /  ALT  11  /  AlkPhos  96  04-08 04-08    143  |  108  |  59<H>  ----------------------------<  180<H>  4.6   |  27  |  1.22    Ca    9.0      08 Apr 2021 06:36    TPro  6.4  /  Alb  2.4<L>  /  TBili  0.3  /  DBili  x   /  AST  16  /  ALT  11  /  AlkPhos  96  04-08        CAPILLARY BLOOD GLUCOSE  POCT Blood Glucose.: 185 mg/dL (08 Apr 2021 11:41)  POCT Blood Glucose.: 176 mg/dL (08 Apr 2021 07:57)  POCT Blood Glucose.: 204 mg/dL (07 Apr 2021 22:18)  POCT Blood Glucose.: 152 mg/dL (07 Apr 2021 16:51)        Vital Signs Last 24 Hrs  T(C): 36.7 (08 Apr 2021 08:29), Max: 36.7 (08 Apr 2021 08:29)  T(F): 98.1 (08 Apr 2021 08:29), Max: 98.1 (08 Apr 2021 08:29)  HR: 59 (08 Apr 2021 08:08) (59 - 89)  BP: 145/62 (08 Apr 2021 08:08) (124/63 - 146/57)  RR: 16 (08 Apr 2021 08:08) (15 - 16)  SpO2: 97% (08 Apr 2021 08:08) (95% - 97%)      Physical Exam   General: NAD, Resting Comfortable,                                  HEENT: NC/AT, EOM I, PERRLA, Normal Conjunctivae  Cardio: RRR, Normal S1-S2, No M/G/R                              Pulm: No Respiratory Distress,  Lungs CTAB                        Abdomen: ND/NT, Soft, BS+                                                MSK: No joint swelling, Full ROM    RA deformities Left >right hand                                     Ext: No C/C/E- dp Pulses 2+ , No calf tenderness    Skin:  Left groin incision with dressing intact                                                           Wounds: Left posterior calf dressing intact     Neurological Examination    Cognitive: AAO x 3                                                                         Attention: Intact   Judgment: Good evidence of judgement                                Mood/Affect: wnl                                                                           Communication:  Fluent,  No dysarthria                                                                    Sensory: Intact to light touch,                                                                                             Tone: normal Throughout   Balance: impaired    Motor    LEFT    UE: SF [5/5], EF [5/5], EE [5/5], WE [5/5],  [wnl]  RIGHT UE: SF [5/5], EF [5/5], EE [5/5], WE [5/5],  [wnl]  LEFT    LE:  HF [3+/5], KE [4/5], DF [1-2/5], EHL [0/5],  PF [3/5]  RIGHT LE:  HF [4/5], KE [5/5], DF [1/5], EHL [0/5],  PF [3/5]      Reflex:  2 + throughout, Saab/Babinski negative    IDT meeting 4/5    OT: mod I with eating and grooming   min A bathing   min A LBD, commode transfers and toileting   supervision UBD     PT: CS transfers   ambulating 100ft with CS and RW  navigating 8 steps with 2 HR and CS  Navigating 2 steps without HR and CG assist     Tentative dc home on 4/9.

## 2021-04-09 ENCOUNTER — TRANSCRIPTION ENCOUNTER (OUTPATIENT)
Age: 86
End: 2021-04-09

## 2021-04-09 VITALS
DIASTOLIC BLOOD PRESSURE: 66 MMHG | TEMPERATURE: 97 F | SYSTOLIC BLOOD PRESSURE: 151 MMHG | OXYGEN SATURATION: 96 % | RESPIRATION RATE: 16 BRPM | HEART RATE: 64 BPM

## 2021-04-09 LAB
ANION GAP SERPL CALC-SCNC: 6 MMOL/L — SIGNIFICANT CHANGE UP (ref 5–17)
BUN SERPL-MCNC: 48 MG/DL — HIGH (ref 7–23)
CALCIUM SERPL-MCNC: 8.5 MG/DL — SIGNIFICANT CHANGE UP (ref 8.4–10.5)
CHLORIDE SERPL-SCNC: 108 MMOL/L — SIGNIFICANT CHANGE UP (ref 96–108)
CO2 SERPL-SCNC: 26 MMOL/L — SIGNIFICANT CHANGE UP (ref 22–31)
CREAT SERPL-MCNC: 1.08 MG/DL — SIGNIFICANT CHANGE UP (ref 0.5–1.3)
GLUCOSE BLDC GLUCOMTR-MCNC: 145 MG/DL — HIGH (ref 70–99)
GLUCOSE SERPL-MCNC: 137 MG/DL — HIGH (ref 70–99)
POTASSIUM SERPL-MCNC: 4.8 MMOL/L — SIGNIFICANT CHANGE UP (ref 3.5–5.3)
POTASSIUM SERPL-SCNC: 4.8 MMOL/L — SIGNIFICANT CHANGE UP (ref 3.5–5.3)
SARS-COV-2 RNA SPEC QL NAA+PROBE: SIGNIFICANT CHANGE UP
SODIUM SERPL-SCNC: 140 MMOL/L — SIGNIFICANT CHANGE UP (ref 135–145)

## 2021-04-09 PROCEDURE — 99232 SBSQ HOSP IP/OBS MODERATE 35: CPT | Mod: GC

## 2021-04-09 PROCEDURE — 99232 SBSQ HOSP IP/OBS MODERATE 35: CPT

## 2021-04-09 RX ORDER — SODIUM CHLORIDE 0.65 %
2 AEROSOL, SPRAY (ML) NASAL
Qty: 0 | Refills: 0 | DISCHARGE

## 2021-04-09 RX ORDER — INSULIN GLARGINE 100 [IU]/ML
10 INJECTION, SOLUTION SUBCUTANEOUS
Qty: 0 | Refills: 0 | DISCHARGE

## 2021-04-09 RX ORDER — AMLODIPINE BESYLATE 2.5 MG/1
1 TABLET ORAL
Qty: 0 | Refills: 0 | DISCHARGE

## 2021-04-09 RX ORDER — BENAZEPRIL HYDROCHLORIDE 40 MG/1
1 TABLET ORAL
Qty: 0 | Refills: 0 | DISCHARGE

## 2021-04-09 RX ORDER — AMLODIPINE BESYLATE 2.5 MG/1
1 TABLET ORAL
Qty: 30 | Refills: 0
Start: 2021-04-09 | End: 2021-05-08

## 2021-04-09 RX ORDER — LISINOPRIL 2.5 MG/1
1 TABLET ORAL
Qty: 30 | Refills: 0
Start: 2021-04-09 | End: 2021-05-08

## 2021-04-09 RX ADMIN — Medication 25 MILLIGRAM(S): at 06:20

## 2021-04-09 RX ADMIN — LISINOPRIL 10 MILLIGRAM(S): 2.5 TABLET ORAL at 06:20

## 2021-04-09 RX ADMIN — SODIUM CHLORIDE 100 MILLILITER(S): 9 INJECTION, SOLUTION INTRAVENOUS at 01:33

## 2021-04-09 RX ADMIN — APIXABAN 5 MILLIGRAM(S): 2.5 TABLET, FILM COATED ORAL at 06:20

## 2021-04-09 RX ADMIN — GABAPENTIN 300 MILLIGRAM(S): 400 CAPSULE ORAL at 06:21

## 2021-04-09 RX ADMIN — AMLODIPINE BESYLATE 10 MILLIGRAM(S): 2.5 TABLET ORAL at 06:20

## 2021-04-09 RX ADMIN — Medication 1 APPLICATION(S): at 06:20

## 2021-04-09 RX ADMIN — Medication 20 MILLIGRAM(S): at 11:35

## 2021-04-09 NOTE — DISCHARGE NOTE NURSING/CASE MANAGEMENT/SOCIAL WORK - NSDCVIVACCINE_GEN_ALL_CORE_FT
Severe acute respiratory syndrome coronavirus 2 (SARS-CoV-2) (Coronavirus disease [COVID-19]) vaccine , 2021/3/30 14:39 , Marina Quick (RN)  Influenza , 2020/11/21 15:48 , Anay Maher (RN)

## 2021-04-09 NOTE — DISCHARGE NOTE NURSING/CASE MANAGEMENT/SOCIAL WORK - PATIENT PORTAL LINK FT
You can access the FollowMyHealth Patient Portal offered by [facility] by registering at the following website: [email or web address]. By joining [employer] [facility]’s FollowMyHealth portal, you will also be able to view your health information using other applications (apps) compatible with our system.

## 2021-04-09 NOTE — PROGRESS NOTE ADULT - NSICDXPILOT_GEN_ALL_CORE
[place]

## 2021-04-09 NOTE — PROGRESS NOTE ADULT - SUBJECTIVE AND OBJECTIVE BOX
Patient is a 85y old  Female who presents with a chief complaint of 13 other disabling impairments- SP Left fem-pop bypass (08 Apr 2021 15:21)      Patient seen and examined at bedside.    ALLERGIES:  cephalosporins (Other)  penicillins (Urticaria; Pruritus)    MEDICATIONS  (STANDING):  amLODIPine   Tablet 10 milliGRAM(s) Oral daily  apixaban 5 milliGRAM(s) Oral two times a day  AQUAPHOR (petrolatum Ointment) 1 Application(s) Topical two times a day  dextrose 40% Gel 15 Gram(s) Oral once  dextrose 5%. 1000 milliLiter(s) (50 mL/Hr) IV Continuous <Continuous>  dextrose 5%. 1000 milliLiter(s) (100 mL/Hr) IV Continuous <Continuous>  dextrose 50% Injectable 25 Gram(s) IV Push once  dextrose 50% Injectable 12.5 Gram(s) IV Push once  dextrose 50% Injectable 25 Gram(s) IV Push once  folic acid 1 milliGRAM(s) Oral daily  gabapentin 300 milliGRAM(s) Oral three times a day  glucagon  Injectable 1 milliGRAM(s) IntraMuscular once  insulin glargine Injectable (LANTUS) 15 Unit(s) SubCutaneous at bedtime  insulin lispro (ADMELOG) corrective regimen sliding scale   SubCutaneous three times a day before meals  insulin lispro (ADMELOG) corrective regimen sliding scale   SubCutaneous at bedtime  lisinopril 10 milliGRAM(s) Oral daily  methotrexate 15 milliGRAM(s) Oral <User Schedule>  metoprolol tartrate 25 milliGRAM(s) Oral two times a day  PARoxetine 20 milliGRAM(s) Oral daily  sodium chloride 0.45%. 2000 milliLiter(s) (100 mL/Hr) IV Continuous <Continuous>    MEDICATIONS  (PRN):  acetaminophen   Tablet .. 650 milliGRAM(s) Oral every 8 hours PRN Temp greater or equal to 38C (100.4F), Mild Pain (1 - 3)  artificial  tears Solution 1 Drop(s) Both EYES two times a day PRN Dry Eyes  diphenhydrAMINE 25 milliGRAM(s) Oral every 8 hours PRN Rash and/or Itching  famotidine    Tablet 20 milliGRAM(s) Oral two times a day PRN Dyspepsia  oxycodone    5 mG/acetaminophen 325 mG 1 Tablet(s) Oral every 6 hours PRN Moderate Pain (4 - 6)    Vital Signs Last 24 Hrs  T(F): 97.3 (09 Apr 2021 08:22), Max: 98.2 (08 Apr 2021 20:02)  HR: 64 (09 Apr 2021 08:22) (64 - 84)  BP: 151/66 (09 Apr 2021 08:22) (136/63 - 152/61)  RR: 16 (09 Apr 2021 08:22) (15 - 16)  SpO2: 96% (09 Apr 2021 08:22) (94% - 96%)  I&O's Summary    08 Apr 2021 07:01  -  09 Apr 2021 07:00  --------------------------------------------------------  IN: 1300 mL / OUT: 0 mL / NET: 1300 mL        PHYSICAL EXAM:  General: NAD, A/O x 3  ENT: MMM  Neck: Supple, No JVD  Lungs: Clear to auscultation bilaterally  Cardio: RRR, S1/S2, No murmurs  Abdomen: Soft, Nontender, Nondistended; Bowel sounds present  Extremities: No calf tenderness, No pitting edema    LABS:                        11.5   7.11  )-----------( 220      ( 08 Apr 2021 06:36 )             35.2       04-09    140  |  108  |  48  ----------------------------<  137  4.8   |  26  |  1.08    Ca    8.5      09 Apr 2021 06:30    TPro  6.4  /  Alb  2.4  /  TBili  0.3  /  DBili  x   /  AST  16  /  ALT  11  /  AlkPhos  96  04-08     eGFR if Non African American: 47 mL/min/1.73M2 (04-09-21 @ 06:30)  eGFR if African American: 54 mL/min/1.73M2 (04-09-21 @ 06:30)                           POCT Blood Glucose.: 145 mg/dL (09 Apr 2021 07:46)  POCT Blood Glucose.: 201 mg/dL (08 Apr 2021 21:32)  POCT Blood Glucose.: 120 mg/dL (08 Apr 2021 16:45)            RADIOLOGY & ADDITIONAL TESTS:    Care Discussed with Consultants/Other Providers:

## 2021-04-09 NOTE — DISCHARGE NOTE NURSING/CASE MANAGEMENT/SOCIAL WORK - NSDCFUADDAPPT_GEN_ALL_CORE_FT
Follow up with your PCP next week.     Follow up with Dr Ventura next week for suture removal.    You will receive a call to follow up with Dr Feldman. This appointment will be made 4 to 6 weeks from now.     If you have any concerns or questions, feel free to contact Carmen Herring NP at [phone] or [phone].

## 2021-04-09 NOTE — PROGRESS NOTE ADULT - PROVIDER SPECIALTY LIST ADULT
Hospitalist
Physiatry
Rehab Medicine
Rehab Medicine
Hospitalist
Physiatry
Hospitalist
Hospitalist
Physiatry
Physiatry

## 2021-04-09 NOTE — PROGRESS NOTE ADULT - SUBJECTIVE AND OBJECTIVE BOX
Patient is a 85y old  Female who presents with a chief complaint of 16 debility (31 Mar 2021 07:12)    This is a 85 y.o. female with PMH of anemia, arthritis, CAD, DMII, depression, HTN, PVD, RA, p/w LLE pain. Patient is admitted to surgery service on 3/22 at [facility] with severe Left LE pain and underwent Left fem-pop-bypass on 3/24. Patient's post op course complicated by pain as well as diarrhea- stool negative for Cdiff and diarrhea has subdsded.     Patient deemed medically stable- Seen By PT,OT and screened by physiatry and deemed a good candidate for intensive rehab- admitted To [facility] today to restore functional independence and safe DC to home.   (30 Mar 2021 16:04)    ROS:   Denies pain   no CP, SOB, cough  no Abd pain, nausea, urinary or bowel symptoms - Last BM 4/8  inquired about RA medication (methotrexate) - resumed yesterday      PAST MEDICAL & SURGICAL HISTORY:  Hypertension  Anemia  Arthritis, rheumatoid  Depression  Type 2 diabetes mellitus  on insulin  PVD (peripheral vascular disease) with claudication  CAD (coronary artery disease)  non-obstructive  Rheumatoid arthritis  fracture ankle right  plate . screws   2000  S/P cataract surgery  2011  Hip fracture requiring operative repair  Right hip 11/14/2020    MEDICATIONS  (STANDING):  amLODIPine   Tablet 10 milliGRAM(s) Oral daily  apixaban 5 milliGRAM(s) Oral two times a day  AQUAPHOR (petrolatum Ointment) 1 Application(s) Topical two times a day  dextrose 40% Gel 15 Gram(s) Oral once  dextrose 5%. 1000 milliLiter(s) (50 mL/Hr) IV Continuous <Continuous>  dextrose 5%. 1000 milliLiter(s) (100 mL/Hr) IV Continuous <Continuous>  dextrose 50% Injectable 25 Gram(s) IV Push once  dextrose 50% Injectable 12.5 Gram(s) IV Push once  dextrose 50% Injectable 25 Gram(s) IV Push once  folic acid 1 milliGRAM(s) Oral daily  gabapentin 300 milliGRAM(s) Oral three times a day  glucagon  Injectable 1 milliGRAM(s) IntraMuscular once  insulin glargine Injectable (LANTUS) 15 Unit(s) SubCutaneous at bedtime  insulin lispro (ADMELOG) corrective regimen sliding scale   SubCutaneous three times a day before meals  insulin lispro (ADMELOG) corrective regimen sliding scale   SubCutaneous at bedtime  lisinopril 10 milliGRAM(s) Oral daily  methotrexate 15 milliGRAM(s) Oral <User Schedule>  metoprolol tartrate 25 milliGRAM(s) Oral two times a day  PARoxetine 20 milliGRAM(s) Oral daily  sodium chloride 0.45%. 2000 milliLiter(s) (100 mL/Hr) IV Continuous <Continuous>    MEDICATIONS  (PRN):  acetaminophen   Tablet .. 650 milliGRAM(s) Oral every 8 hours PRN Temp greater or equal to 38C (100.4F), Mild Pain (1 - 3)  artificial  tears Solution 1 Drop(s) Both EYES two times a day PRN Dry Eyes  diphenhydrAMINE 25 milliGRAM(s) Oral every 8 hours PRN Rash and/or Itching  famotidine    Tablet 20 milliGRAM(s) Oral two times a day PRN Dyspepsia  oxycodone    5 mG/acetaminophen 325 mG 1 Tablet(s) Oral every 6 hours PRN Moderate Pain (4 - 6)        04-09    140  |  108  |  48<H>  ----------------------------<  137<H>  4.8   |  26  |  1.08    Ca    8.5      09 Apr 2021 06:30    TPro  6.4  /  Alb  2.4<L>  /  TBili  0.3  /  DBili  x   /  AST  16  /  ALT  11  /  AlkPhos  96  04-08 04-08    143  |  108  |  59<H>  ----------------------------<  180<H>  4.6   |  27  |  1.22    Ca    9.0      08 Apr 2021 06:36    TPro  6.4  /  Alb  2.4<L>  /  TBili  0.3  /  DBili  x   /  AST  16  /  ALT  11  /  AlkPhos  96  04-08        CAPILLARY BLOOD GLUCOSE  POCT Blood Glucose.: 145 mg/dL (09 Apr 2021 07:46)  POCT Blood Glucose.: 201 mg/dL (08 Apr 2021 21:32)  POCT Blood Glucose.: 120 mg/dL (08 Apr 2021 16:45)      Vital Signs Last 24 Hrs  T(C): 36.3 (09 Apr 2021 08:22), Max: 36.8 (08 Apr 2021 20:02)  T(F): 97.3 (09 Apr 2021 08:22), Max: 98.2 (08 Apr 2021 20:02)  HR: 64 (09 Apr 2021 08:22) (64 - 84)  BP: 151/66 (09 Apr 2021 08:22) (136/63 - 152/61)  RR: 16 (09 Apr 2021 08:22) (15 - 16)  SpO2: 96% (09 Apr 2021 08:22) (94% - 96%)    Physical Exam   General: NAD, Resting Comfortable,                                  HEENT: NC/AT, EOM I, PERRLA, Normal Conjunctivae  Cardio: RRR, Normal S1-S2, No M/G/R                              Pulm: No Respiratory Distress,  Lungs CTAB                        Abdomen: ND/NT, Soft, BS+                                                MSK: No joint swelling, Full ROM    RA deformities Left >right hand                                     Ext: No C/C/E- dp Pulses 2+ , No calf tenderness    Skin:  Left groin incision with dressing intact                                                           Wounds: Left posterior calf dressing intact     Neurological Examination    Cognitive: AAO x 3                                                                         Attention: Intact   Judgment: Good evidence of judgement                                Mood/Affect: wnl                                                                           Communication:  Fluent,  No dysarthria                                                                    Sensory: Intact to light touch,                                                                                             Tone: normal Throughout   Balance: impaired    Motor    LEFT    UE: SF [5/5], EF [5/5], EE [5/5], WE [5/5],  [wnl]  RIGHT UE: SF [5/5], EF [5/5], EE [5/5], WE [5/5],  [wnl]  LEFT    LE:  HF [3+/5], KE [4/5], DF [1-2/5], EHL [0/5],  PF [3/5]  RIGHT LE:  HF [4/5], KE [5/5], DF [1/5], EHL [0/5],  PF [3/5]      Reflex:  2 + throughout, Saab/Babinski negative    IDT meeting 4/5    OT: mod I with eating and grooming   min A bathing   min A LBD, commode transfers and toileting   supervision UBD     PT: CS transfers   ambulating 100ft with CS and RW  navigating 8 steps with 2 HR and CS  Navigating 2 steps without HR and CG assist     Tentative dc home on 4/9.       Patient is a 85y old  Female who presents with a chief complaint of 16 debility (31 Mar 2021 07:12)    This is a 85 y.o. female with PMH of anemia, arthritis, CAD, DMII, depression, HTN, PVD, RA, p/w LLE pain. Patient is admitted to surgery service on 3/22 at [facility] with severe Left LE pain and underwent Left fem-pop-bypass on 3/24. Patient's post op course complicated by pain as well as diarrhea- stool negative for Cdiff and diarrhea has subdsded.     Patient deemed medically stable- Seen By PT,OT and screened by physiatry and deemed a good candidate for intensive rehab- admitted To [facility] today to restore functional independence and safe DC to home.   (30 Mar 2021 16:04)    ROS:   Denies pain   no CP, SOB, cough  no Abd pain, nausea, urinary or bowel symptoms - Last BM 4/8  inquired about RA medication (methotrexate) - resumed       PAST MEDICAL & SURGICAL HISTORY:  Hypertension  Anemia  Arthritis, rheumatoid  Depression  Type 2 diabetes mellitus  on insulin  PVD (peripheral vascular disease) with claudication  CAD (coronary artery disease)  non-obstructive  Rheumatoid arthritis  fracture ankle right  plate . screws   2000  S/P cataract surgery  2011  Hip fracture requiring operative repair  Right hip 11/14/2020    MEDICATIONS  (STANDING):  amLODIPine   Tablet 10 milliGRAM(s) Oral daily  apixaban 5 milliGRAM(s) Oral two times a day  AQUAPHOR (petrolatum Ointment) 1 Application(s) Topical two times a day  dextrose 40% Gel 15 Gram(s) Oral once  dextrose 5%. 1000 milliLiter(s) (50 mL/Hr) IV Continuous <Continuous>  dextrose 5%. 1000 milliLiter(s) (100 mL/Hr) IV Continuous <Continuous>  dextrose 50% Injectable 25 Gram(s) IV Push once  dextrose 50% Injectable 12.5 Gram(s) IV Push once  dextrose 50% Injectable 25 Gram(s) IV Push once  folic acid 1 milliGRAM(s) Oral daily  gabapentin 300 milliGRAM(s) Oral three times a day  glucagon  Injectable 1 milliGRAM(s) IntraMuscular once  insulin glargine Injectable (LANTUS) 15 Unit(s) SubCutaneous at bedtime  insulin lispro (ADMELOG) corrective regimen sliding scale   SubCutaneous three times a day before meals  insulin lispro (ADMELOG) corrective regimen sliding scale   SubCutaneous at bedtime  lisinopril 10 milliGRAM(s) Oral daily  methotrexate 15 milliGRAM(s) Oral <User Schedule>  metoprolol tartrate 25 milliGRAM(s) Oral two times a day  PARoxetine 20 milliGRAM(s) Oral daily  sodium chloride 0.45%. 2000 milliLiter(s) (100 mL/Hr) IV Continuous <Continuous>    MEDICATIONS  (PRN):  acetaminophen   Tablet .. 650 milliGRAM(s) Oral every 8 hours PRN Temp greater or equal to 38C (100.4F), Mild Pain (1 - 3)  artificial  tears Solution 1 Drop(s) Both EYES two times a day PRN Dry Eyes  diphenhydrAMINE 25 milliGRAM(s) Oral every 8 hours PRN Rash and/or Itching  famotidine    Tablet 20 milliGRAM(s) Oral two times a day PRN Dyspepsia  oxycodone    5 mG/acetaminophen 325 mG 1 Tablet(s) Oral every 6 hours PRN Moderate Pain (4 - 6)        04-09    140  |  108  |  48<H>  ----------------------------<  137<H>  4.8   |  26  |  1.08    Ca    8.5      09 Apr 2021 06:30    TPro  6.4  /  Alb  2.4<L>  /  TBili  0.3  /  DBili  x   /  AST  16  /  ALT  11  /  AlkPhos  96  04-08 04-08    143  |  108  |  59<H>  ----------------------------<  180<H>  4.6   |  27  |  1.22    Ca    9.0      08 Apr 2021 06:36    TPro  6.4  /  Alb  2.4<L>  /  TBili  0.3  /  DBili  x   /  AST  16  /  ALT  11  /  AlkPhos  96  04-08        CAPILLARY BLOOD GLUCOSE  POCT Blood Glucose.: 145 mg/dL (09 Apr 2021 07:46)  POCT Blood Glucose.: 201 mg/dL (08 Apr 2021 21:32)  POCT Blood Glucose.: 120 mg/dL (08 Apr 2021 16:45)      Vital Signs Last 24 Hrs  T(C): 36.3 (09 Apr 2021 08:22), Max: 36.8 (08 Apr 2021 20:02)  T(F): 97.3 (09 Apr 2021 08:22), Max: 98.2 (08 Apr 2021 20:02)  HR: 64 (09 Apr 2021 08:22) (64 - 84)  BP: 151/66 (09 Apr 2021 08:22) (136/63 - 152/61)  RR: 16 (09 Apr 2021 08:22) (15 - 16)  SpO2: 96% (09 Apr 2021 08:22) (94% - 96%)    Physical Exam   General: NAD, Resting Comfortable,                                  HEENT: NC/AT, EOM I, PERRLA, Normal Conjunctivae  Cardio: RRR, Normal S1-S2, No M/G/R                              Pulm: No Respiratory Distress,  Lungs CTAB                        Abdomen: ND/NT, Soft, BS+                                                MSK: No joint swelling, Full ROM    RA deformities Left >right hand                                     Ext: No C/C/E- dp Pulses 2+ , No calf tenderness    Skin:  Left groin incision with dressing intact  - inspected today- sutures intact                                                         Wounds: Left posterior calf dressing intact     Neurological Examination    Cognitive: AAO x 3                                                                         Attention: Intact   Judgment: Good evidence of judgement                                Mood/Affect: wnl                                                                           Communication:  Fluent,  No dysarthria                                                                    Sensory: Intact to light touch,                                                                                             Tone: normal Throughout   Balance: impaired    Motor    LEFT    UE: SF [5/5], EF [5/5], EE [5/5], WE [5/5],  [wnl]  RIGHT UE: SF [5/5], EF [5/5], EE [5/5], WE [5/5],  [wnl]  LEFT    LE:  HF [3+/5], KE [4/5], DF [1-2/5], EHL [0/5],  PF [3/5]  RIGHT LE:  HF [4/5], KE [5/5], DF [1/5], EHL [0/5],  PF [3/5]      Reflex:  2 + throughout, Saab/Babinski negative    IDT meeting 4/5    OT: mod I with eating and grooming   min A bathing   min A LBD, commode transfers and toileting   supervision UBD     PT: CS transfers   ambulating 100ft with CS and RW  navigating 8 steps with 2 HR and CS  Navigating 2 steps without HR and CG assist     Tentative dc home on 4/9.

## 2021-04-09 NOTE — PROGRESS NOTE ADULT - ATTENDING COMMENTS
Rehab Attending- Patient seen and examined with NP Nima- Case discussed, above note reviewed by me with modifications made    Doing well  contacted Dr Ventura vascular- wants to wait till next week to see in office to DC sutures  orthotist to see for Bracing secondary to hang foot drop  lantus HS started by hospitalist-increased to 15units  BUN/Cr 59/1.2- IV fluid tonight- Push Po - decrease lisinopril- Check BMP In AM  Anticipate Dc in AM  to continue intensive rehab program    Spoke with daughter Nirali and updated her on Her mom's Status Rehab Attending- Patient seen and examined with RADHA Herring- Case discussed, above note reviewed by me with modifications made    Doing well  stable for Dc to Home  seen By Orthotist yesterday-will contact once wounds Heal For Bracing as OPD  orthotist to see for Bracing secondary to hang foot drop  BUN Better with IV fluids and PO- 48 today- encourage fluids  DC home on 20 units lantus HS  DC to Home with Vn services  FU Dr Lorenzo serrato next week  FU pmd 1 week  FU PMR Dr Feldman 4-6 weeks- Orthotic prescription If wounds LLE healed

## 2021-04-09 NOTE — DISCHARGE NOTE NURSING/CASE MANAGEMENT/SOCIAL WORK - NSSCTYPOFSERV_GEN_ALL_CORE
PT/OT. Note: If you do not hear from home care agency within 24 hours after you discharge, please contact them at number above.

## 2021-04-09 NOTE — PROGRESS NOTE ADULT - ASSESSMENT
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

## 2021-04-09 NOTE — PROGRESS NOTE ADULT - ASSESSMENT
JULIEN CASTELLANO is a 85y with a history of CAD, DM2, HTN, RA, Recent Right Hip fx Orif 11/20, Right calf DVT 2/21 who presented to  on 3/22 with complaint of severe Left leg pain and found to have PAD SP Left fem pop Bypass 3/24. Hospital course complicated by diarrhea- now resolved. Now admitted to [facility] after for initiation of a multidisciplinary rehab program consisting focused on functional mobility, transfers and ADLs (activities of daily living).    Comprehensive Multidisciplinary Rehab Program:  -Completed comprehensive rehab program, 3 hours a day, 5 days a week.  - PT 2hr/day: Focused on improving strength, endurance, coordination, balance, functional mobility, and transfers  - OT 1hr/day: Focused on improving strength, fine motor skills, coordination, posture and ADLs.    - Speech Therapy NA  - P&O as needed  - Activity Limitations: Decreased social, vocational and leisure activities, decreased self care and ADLs, decreased mobility, decreased ability to manage household and finances.   Vascular surgeon would like to remove sutures next week in office.    Participation Restrictions/Precautions:  - Weight bearing status: WBAT  - ROM restrictions: none  - Precautions:    [x ] Falls   [ ] Spinal   [ ] Hip (Anterior or Posterior)       [ ] Seizure  [ ] Cardiac   [ ] Sternal    Rehab Diagnosis/Management:    Sleep:   - Maintain quiet hours and low stim environment.    Pain Management:  - percocet PRN Mod pain, gabapentin 300 TID, Tylenol 650 Q8 PRN  - Avoid sedating medications that may interfere with cognitive recovery    GI/Bowel:  - At risk for constipation due to neurologic diagnosis, immobility and/or medication use  - recent diarrhea secondary to antibiotics- no bowel meds ordered  - GI ppx: pepcid    /Bladder:   - At risk for incontinence and retention due to neurologic diagnosis and limited mobility  - Currently patient voids:    [x ] independent     [ ] external collection device (condom cath)    [ ] Indwelling hurst catheter     [ ] Intermittent catheterization  - Baseline bladder scans -straight cath for >350cc.  - Encourage timed voids every 4 hours while awake for independence and to promote continence during therapy.    Skin/Pressure Injury:   - At risk for pressure injury due to neurologic diagnosis and relative immobility.  - Skin assessment on admission - Left heel pressure ulcer , calf great toe eschar   - Monitor Incisions: Left groin with sutures, Left medial thigh intact  - Turn every 2 hours while in bed, air mattress  - Soft heel protectors  - Skin barrier cream as needed  Betadine left heel and 1st toe , xeroform to Left calf ,DSD Carly daily  - Nursing to monitor skin Qshift    #Pruritus  - benedryl PRN  - keep blister to Left dorsum foot intact to reduce risk of infection  use aquaphor to moisturize    Diet/Dysphagia:  - Diet Consistency/Modifications: reg consistency  - Supplements: NA    DVT ppx:   - Eliquis    - Last Doppler on- NA  -------------  Comorbid Medical Management:    Diarrhea  C diff neg  GI PCR neg  resolved    DM 2  took lantus 20 units HS PTA  Now On SSI only   Consider adding lantus Hs- Hospitalist to address  Lantus 15 units qHS --Fasting  this morning- patient instructed to resume her home dose of 20 units lantus qHS-patient agrees and will monitor her blood sugars     HTN  continue lisinopril, amlodipine, metoprolol  Patient educated on change in medications (decrease in lisinopril-to help recover kidney function and increase in norvasc)    Depression   cont paxil    RA  takes methotrexate 15mg weekly on Wednesday-had been on hold   order placed to restart 4/7 as per rheum    Hx DVT  restarted eliquis 5mg 2x/day    ORLY  Encouraged to continue to push PO fluids at home   IVF overnight, lisinopril dose cut in half   BUN and CR improved on this mornings lab work     Neuropathy/ foot drop   had plastic PLSO at Home not Comfortable wearing them  will contact orthotist- ? single [place] attached To orthopedic shoes hang  delivered as OPD once Status of skin improves      Patient is medically stable for dc to home today with daughter and home health services

## 2021-05-03 ENCOUNTER — INPATIENT (INPATIENT)
Facility: HOSPITAL | Age: 86
LOS: 1 days | Discharge: ANOTHER HOSPITAL | DRG: 862 | End: 2021-05-05
Attending: INTERNAL MEDICINE | Admitting: INTERNAL MEDICINE
Payer: MEDICARE

## 2021-05-03 VITALS
SYSTOLIC BLOOD PRESSURE: 131 MMHG | HEIGHT: 63 IN | DIASTOLIC BLOOD PRESSURE: 54 MMHG | HEART RATE: 107 BPM | RESPIRATION RATE: 18 BRPM | OXYGEN SATURATION: 88 % | TEMPERATURE: 98 F | WEIGHT: 149.91 LBS

## 2021-05-03 DIAGNOSIS — S72.009A FRACTURE OF UNSPECIFIED PART OF NECK OF UNSPECIFIED FEMUR, INITIAL ENCOUNTER FOR CLOSED FRACTURE: Chronic | ICD-10-CM

## 2021-05-03 DIAGNOSIS — I50.9 HEART FAILURE, UNSPECIFIED: ICD-10-CM

## 2021-05-03 LAB
ALBUMIN SERPL ELPH-MCNC: 2.3 G/DL — LOW (ref 3.3–5)
ALP SERPL-CCNC: 91 U/L — SIGNIFICANT CHANGE UP (ref 40–120)
ALT FLD-CCNC: 8 U/L — LOW (ref 10–45)
ANION GAP SERPL CALC-SCNC: 11 MMOL/L — SIGNIFICANT CHANGE UP (ref 5–17)
AST SERPL-CCNC: 11 U/L — SIGNIFICANT CHANGE UP (ref 10–40)
BASOPHILS # BLD AUTO: 0.02 K/UL — SIGNIFICANT CHANGE UP (ref 0–0.2)
BASOPHILS NFR BLD AUTO: 0.1 % — SIGNIFICANT CHANGE UP (ref 0–2)
BILIRUB SERPL-MCNC: 0.5 MG/DL — SIGNIFICANT CHANGE UP (ref 0.2–1.2)
BUN SERPL-MCNC: 51 MG/DL — HIGH (ref 7–23)
CALCIUM SERPL-MCNC: 8.7 MG/DL — SIGNIFICANT CHANGE UP (ref 8.4–10.5)
CHLORIDE SERPL-SCNC: 104 MMOL/L — SIGNIFICANT CHANGE UP (ref 96–108)
CO2 SERPL-SCNC: 20 MMOL/L — LOW (ref 22–31)
CREAT SERPL-MCNC: 1.29 MG/DL — SIGNIFICANT CHANGE UP (ref 0.5–1.3)
EOSINOPHIL # BLD AUTO: 0.12 K/UL — SIGNIFICANT CHANGE UP (ref 0–0.5)
EOSINOPHIL NFR BLD AUTO: 0.8 % — SIGNIFICANT CHANGE UP (ref 0–6)
GLUCOSE SERPL-MCNC: 301 MG/DL — HIGH (ref 70–99)
HCT VFR BLD CALC: 30.2 % — LOW (ref 34.5–45)
HGB BLD-MCNC: 9.7 G/DL — LOW (ref 11.5–15.5)
IMM GRANULOCYTES NFR BLD AUTO: 1.2 % — SIGNIFICANT CHANGE UP (ref 0–1.5)
LACTATE SERPL-SCNC: 2.1 MMOL/L — HIGH (ref 0.7–2)
LYMPHOCYTES # BLD AUTO: 0.98 K/UL — LOW (ref 1–3.3)
LYMPHOCYTES # BLD AUTO: 6.3 % — LOW (ref 13–44)
MCHC RBC-ENTMCNC: 30.6 PG — SIGNIFICANT CHANGE UP (ref 27–34)
MCHC RBC-ENTMCNC: 32.1 GM/DL — SIGNIFICANT CHANGE UP (ref 32–36)
MCV RBC AUTO: 95.3 FL — SIGNIFICANT CHANGE UP (ref 80–100)
MONOCYTES # BLD AUTO: 1.38 K/UL — HIGH (ref 0–0.9)
MONOCYTES NFR BLD AUTO: 8.8 % — SIGNIFICANT CHANGE UP (ref 2–14)
NEUTROPHILS # BLD AUTO: 12.97 K/UL — HIGH (ref 1.8–7.4)
NEUTROPHILS NFR BLD AUTO: 82.8 % — HIGH (ref 43–77)
NRBC # BLD: 0 /100 WBCS — SIGNIFICANT CHANGE UP (ref 0–0)
NT-PROBNP SERPL-SCNC: HIGH PG/ML (ref 0–300)
PLATELET # BLD AUTO: 190 K/UL — SIGNIFICANT CHANGE UP (ref 150–400)
POTASSIUM SERPL-MCNC: 4.2 MMOL/L — SIGNIFICANT CHANGE UP (ref 3.5–5.3)
POTASSIUM SERPL-SCNC: 4.2 MMOL/L — SIGNIFICANT CHANGE UP (ref 3.5–5.3)
PROT SERPL-MCNC: 6.7 G/DL — SIGNIFICANT CHANGE UP (ref 6–8.3)
RBC # BLD: 3.17 M/UL — LOW (ref 3.8–5.2)
RBC # FLD: 17.5 % — HIGH (ref 10.3–14.5)
SODIUM SERPL-SCNC: 135 MMOL/L — SIGNIFICANT CHANGE UP (ref 135–145)
TROPONIN I SERPL-MCNC: 0.17 NG/ML — HIGH (ref 0.02–0.06)
WBC # BLD: 15.65 K/UL — HIGH (ref 3.8–10.5)
WBC # FLD AUTO: 15.65 K/UL — HIGH (ref 3.8–10.5)

## 2021-05-03 PROCEDURE — 99285 EMERGENCY DEPT VISIT HI MDM: CPT | Mod: CS

## 2021-05-03 PROCEDURE — 93010 ELECTROCARDIOGRAM REPORT: CPT

## 2021-05-03 PROCEDURE — 71250 CT THORAX DX C-: CPT | Mod: 26,MA

## 2021-05-03 PROCEDURE — 99223 1ST HOSP IP/OBS HIGH 75: CPT

## 2021-05-03 PROCEDURE — 71045 X-RAY EXAM CHEST 1 VIEW: CPT | Mod: 26

## 2021-05-03 RX ORDER — FUROSEMIDE 40 MG
20 TABLET ORAL ONCE
Refills: 0 | Status: COMPLETED | OUTPATIENT
Start: 2021-05-03 | End: 2021-05-03

## 2021-05-03 RX ORDER — SODIUM CHLORIDE 9 MG/ML
1000 INJECTION INTRAMUSCULAR; INTRAVENOUS; SUBCUTANEOUS ONCE
Refills: 0 | Status: DISCONTINUED | OUTPATIENT
Start: 2021-05-03 | End: 2021-05-03

## 2021-05-03 RX ORDER — ERTAPENEM SODIUM 1 G/1
1 INJECTION, POWDER, LYOPHILIZED, FOR SOLUTION INTRAMUSCULAR; INTRAVENOUS ONCE
Refills: 0 | Status: DISCONTINUED | OUTPATIENT
Start: 2021-05-03 | End: 2021-05-03

## 2021-05-03 RX ORDER — SODIUM CHLORIDE 9 MG/ML
1000 INJECTION INTRAMUSCULAR; INTRAVENOUS; SUBCUTANEOUS ONCE
Refills: 0 | Status: COMPLETED | OUTPATIENT
Start: 2021-05-03 | End: 2021-05-03

## 2021-05-03 RX ORDER — ERTAPENEM SODIUM 1 G/1
1000 INJECTION, POWDER, LYOPHILIZED, FOR SOLUTION INTRAMUSCULAR; INTRAVENOUS ONCE
Refills: 0 | Status: COMPLETED | OUTPATIENT
Start: 2021-05-03 | End: 2021-05-03

## 2021-05-03 RX ADMIN — SODIUM CHLORIDE 1000 MILLILITER(S): 9 INJECTION INTRAMUSCULAR; INTRAVENOUS; SUBCUTANEOUS at 22:17

## 2021-05-03 RX ADMIN — Medication 20 MILLIGRAM(S): at 23:45

## 2021-05-03 RX ADMIN — ERTAPENEM SODIUM 1000 MILLIGRAM(S): 1 INJECTION, POWDER, LYOPHILIZED, FOR SOLUTION INTRAMUSCULAR; INTRAVENOUS at 23:59

## 2021-05-03 RX ADMIN — SODIUM CHLORIDE 1000 MILLILITER(S): 9 INJECTION INTRAMUSCULAR; INTRAVENOUS; SUBCUTANEOUS at 23:30

## 2021-05-03 RX ADMIN — ERTAPENEM SODIUM 120 MILLIGRAM(S): 1 INJECTION, POWDER, LYOPHILIZED, FOR SOLUTION INTRAMUSCULAR; INTRAVENOUS at 23:29

## 2021-05-03 NOTE — H&P ADULT - ASSESSMENT
86 y.o. female with COPD, arthritis, CAD, DMII, depression, HTN, PVD, PAD, s/p left fem pop bypass on 03/24/21, +lower ext DVT on eliquis, +chronic left heel ulcer, RA, anemia, presents to the ED with worsening dyspnea, wheezing that started tonight.  Pt received 2 Duoneb tx at home, with only slight improvement, so EMS called .  Pt hypoxic to 87% on room air, was placed on 100%NRBM.  In the ED, pt was placed on 2 LPM via NC, received a dose of Lasix 20 mg IV x 1.    CT Chest with fibrosis, COPD, basilar atelectasis, bilat effusions.  Trop noted to be 0.17.  BMP >92456  Pt states she has occ cough, with greenish phlegm, denies chest pain, fever, abd pain, diarrhea.  She still has chronic left heel ulcer, cleans it Betadine.  Pt states she had cardiac cath in Feb 2021, showed non occlusive CAD.  LAD: Diffuse irregularity.  There is moderate disease in distal LAD   LCx: Diffuse irregularity.Large vessel   RCA: Diffuse irregularity.  Small non-dominant 86 y.o. female with COPD, arthritis, CAD, DMII, depression, HTN, PVD, PAD, s/p left fem pop bypass on 03/24/21, +lower ext DVT on eliquis, +chronic left heel ulcer, RA, anemia, presents to the ED with worsening dyspnea, wheezing that started tonight.  Pt received 2 Duoneb tx at home, with only slight improvement, so EMS called .  Pt hypoxic to 87% on room air, was placed on 100%NRBM.  In the ED, pt was placed on 2 LPM via NC, received a dose of Lasix 20 mg IV x 1.    CT Chest reports small bilateral pleural effusions and pulmonary edema.  Emphysema, w/ additional honeycombing/fibrosis.  Mildly asymmetrical 1.5 cm nodular breast tissue on the left. Correlation with follow-up mammography/ultrasound is recommended.  2.9 cm slightly hyperdense right renal lesion, most likely hemorrhagic/proteinaceous cyst.    Trop noted to be 0.17.  BMP >25455    Acute diastolic CHF, elevated trop - demand ischemia  COPD  Chronic left heel ulcer - necrotic 86 y.o. female with COPD, arthritis, CAD, DMII, depression, HTN, PVD, PAD, s/p left fem pop bypass on 03/24/21, +lower ext DVT on eliquis, +chronic left heel ulcer, RA, anemia, presents to the ED with worsening dyspnea, wheezing that started tonight.  Pt received 2 Duoneb tx at home, with only slight improvement, so EMS called .  Pt hypoxic to 87% on room air, was placed on 100%NRBM.  In the ED, pt was placed on 2 LPM via NC, received a dose of Lasix 20 mg IV x 1.    CT Chest reports small bilateral pleural effusions and pulmonary edema.  Emphysema, w/ additional honeycombing/fibrosis.  Mildly asymmetrical 1.5 cm nodular breast tissue on the left. Correlation with follow-up mammography/ultrasound is recommended.  2.9 cm slightly hyperdense right renal lesion, most likely hemorrhagic/proteinaceous cyst.    Trop noted to be 0.17.  BMP >62315    Acute diastolic CHF, elevated trop - demand ischemia  COPD  Chronic left heel ulcer - necrotic, cellulitis    Admit to telemetry  Trend trops, echo ordered, ffup labs  Oxygen supp 2 LPM via NC to keep O2 sat >92%  Diurese with IV lasix  Bronchodilators prn  Start ASA 81 mg/day  Cont other meds: Eliquis, Amlodipine, Lisinopril, Paxil, Gabapentin, MTX, folic acid  Empiric Vanco, Aztreonam pending cultures  Cardio Consult - Dr Peace  Wound care - left heel  renal sono to assess renal lesion  Mammogram, breast ultrasound as outpt  PCP-Dr Huang notified.      IMPROVE VTE Individual Risk Assessment  RISK                                                                Points  [ x ] Previous VTE                                                  3  [  ] Thrombophilia                                               2  [  ] Lower limb paralysis                                      2        (unable to hold up >15 seconds)    [  ] Current Cancer                                              2         (within 6 months)  [  ] Immobilization > 24 hrs                                1  [  ] ICU/CCU stay > 24 hours                              1  [  x] Age > 60                                                      1  IMPROVE VTE Score ____4_____  IMPROVE Score 0-1: Low Risk, No VTE prophylaxis required for most patients, encourage ambulation.   IMPROVE Score 2-3: At risk, pharmacologic VTE prophylaxis is indicated for most patients (in the absence of a contraindication)  IMPROVE Score > or = 4: High Risk, pharmacologic VTE prophylaxis is indicated for most patients (in the absence of a contraindication)  *Pt is already on Eliquis

## 2021-05-03 NOTE — H&P ADULT - TIME BILLING
Medical management of acute medical problem, with close ffup, as pt can easily deteriorate.  thorough review of imaging and labs, with close ffup,  Discussion with PCP, consultant.

## 2021-05-03 NOTE — H&P ADULT - NSHPPHYSICALEXAM_GEN_ALL_CORE
Vital Signs (24 Hrs):  T(C): 37.3 (05-03-21 @ 23:21), Max: 37.3 (05-03-21 @ 23:21)  HR: 84 (05-03-21 @ 23:21) (84 - 107)  BP: 119/53 (05-03-21 @ 23:21) (119/53 - 131/54)  RR: 18 (05-03-21 @ 23:21) (18 - 18)  SpO2: 97% (05-03-21 @ 23:21) (88% - 97%)  Daily Height in cm: 160.02 (03 May 2021 21:51)

## 2021-05-03 NOTE — ED ADULT NURSE NOTE - OBJECTIVE STATEMENT
Pt comes in complaining of SOB, difficulty breathing at home. Pts daughter gave 2 neb treatments at home. pt has one episode of nausea/ vomiting which she states was more phlegm like. Pt also states she had a headache. fem-pop bypass recently, site is red, swollen and hot. no chest pain, dizziness, diarrhea noted.

## 2021-05-03 NOTE — ED ADULT NURSE NOTE - NSIMPLEMENTINTERV_GEN_ALL_ED
Implemented All Fall with Harm Risk Interventions:  [place] to call system. Call bell, personal items and telephone within reach. Instruct patient to call for assistance. Room bathroom lighting operational. Non-slip footwear when patient is off stretcher. Physically safe environment: no spills, clutter or unnecessary equipment. Stretcher in lowest position, wheels locked, appropriate side rails in place. Provide visual cue, wrist band, yellow gown, etc. Monitor gait and stability. Monitor for mental status changes and reorient to person, place, and time. Review medications for side effects contributing to fall risk. Reinforce activity limits and safety measures with patient and family. Provide visual clues: red socks.

## 2021-05-03 NOTE — ED PROVIDER NOTE - OBJECTIVE STATEMENT
pt's daughter reports that pt had sudden onset of respiratory distress while at home, was in extremis, unable to move air, unable to speak, had diffuse wheezing which improved slightly with duo nebs on scene as per EMS, oxygen saturation was low 80s on RA, requiring NRB for improvement.  pt s/p fem-pop bypass in the LLE and on abx for wound cellulitis.  pt now breathing more comfortably.

## 2021-05-03 NOTE — ED ADULT NURSE NOTE - CHIEF COMPLAINT QUOTE
Patient BIBA from home after with sudden onset difficulty breathing and weakness. Patient given 2 albuterol nebulizers at home by daughter. Patient had 1 episode of vomiting. Patient recently hospitalized for fem-pop bypass. Patient 88% on room air in triage. EMS reports redness and warmth to surgical site.    ISAR POSITIVE.

## 2021-05-03 NOTE — ED PROVIDER NOTE - PHYSICAL EXAMINATION
Gen:  alert, awake, no acute distress  HEENT:  atraumatic head, airway clear, pupils equal and round  CV:  rrr, nl S1, S2, no m/r/g  Pulm:  lungs crackles RLL>LLL  Abd: s/nt/nd, +BS  Ext:  moving all extremities  Neuro:  grossly intact, no focal deficits  Skin:  clear, dry, intact  Psych: AOx3, normal affect, no apparent risk to self or others

## 2021-05-03 NOTE — H&P ADULT - HISTORY OF PRESENT ILLNESS
Cardiac Arteries and Lesion Findings    LMCA: Normal.    LAD: Diffuse irregularity.There is moderate disease in distal LAD    LCx: Diffuse irregularity.Large vessel    RCA: Diffuse irregularity.Small non-dominant     Impression     Diagnostic Conclusions     Non-Obstructive CAD.     Recommendations     Aggressive medical management of coronary artery disease and its   underlying risk factors.   Cleared for planned surgery. 86 y.o. female with PMH of anemia, arthritis, CAD, DMII, depression, HTN, PVD,  RA, p/w LLE pain. Patient is admitted to surgery service on 3/22 at [facility] with severe Left LE pain and underwent Left fem-pop-bypass on 3/24.      Cardiac Arteries and Lesion Findings    LMCA: Normal.    LAD: Diffuse irregularity.There is moderate disease in distal LAD    LCx: Diffuse irregularity.Large vessel    RCA: Diffuse irregularity.Small non-dominant     Impression     Diagnostic Conclusions     Non-Obstructive CAD.     Recommendations     Aggressive medical management of coronary artery disease and its   underlying risk factors.   Cleared for planned surgery. 86 y.o. female with COPD, arthritis, CAD, DMII, depression, HTN, PVD, PAD, s/p left fem pop bypass on 03/24/21, +lower ext DVT on eliquis, +chronic left heel ulcer, RA, anemia, presents to the ED with worsening dyspnea, wheezing that started tonight.  Pt received 2 Duoneb tx at home, with only slight improvement, so EMS called .  Pt hypoxic to 87% on room air, was placed on 100%NRBM.  In the ED, pt was placed on 2 LPM via NC, received a dose of Lasix 20 mg IV x 1.    CT Chest with fibrosis, COPD, basilar atelectasis, bilat effusions.  Trop noted to be 0.17.  BMP >68073  Pt states she has occ cough, with greenish phlegm, denies chest pain, fever, abd pain, diarrhea.  She still has chronic left heel ulcer, cleans it Betadine.  Pt states she had cardiac cath in Feb 2021, showed non occlusive CAD.  LAD: Diffuse irregularity.  There is moderate disease in distal LAD   LCx: Diffuse irregularity.Large vessel   RCA: Diffuse irregularity.  Small non-dominant       86 y.o. female with COPD, arthritis, CAD, DMII, depression, HTN, PVD, PAD, s/p left fem pop bypass on 03/24/21, +lower ext DVT on eliquis, +chronic left heel ulcer, RA, anemia, presents to the ED with worsening dyspnea, wheezing that started tonight.  Pt received 2 Duoneb tx at home, with only slight improvement, so EMS called .  Pt hypoxic to 87% on room air, was placed on 100%NRBM.  In the ED, pt was placed on 2 LPM via NC, received a dose of Lasix 20 mg IV x 1.    CT Chest reports   Trop noted to be 0.17.  BMP >87573  Pt states she has occ cough, with greenish phlegm, denies chest pain, fever, abd pain, diarrhea.  She still has chronic left heel ulcer, cleans it Betadine.  Pt states she had cardiac cath in Feb 2021, showed non occlusive CAD.  LAD: Diffuse irregularity.  There is moderate disease in distal LAD   LCx: Diffuse irregularity.Large vessel   RCA: Diffuse irregularity.  Small non-dominant       86 y.o. female with COPD, arthritis, CAD, DMII, depression, HTN, PVD, PAD, s/p left fem pop bypass on 03/24/21, +lower ext DVT on eliquis, +chronic left heel ulcer, RA, anemia, presents to the ED with worsening dyspnea, wheezing that started tonight.  Pt received 2 Duoneb tx at home, with only slight improvement, so EMS called .  Pt hypoxic to 87% on room air, was placed on 100%NRBM.  In the ED, pt was placed on 2 LPM via NC, received a dose of Lasix 20 mg IV x 1.    CT Chest reports small bilateral pleural effusions and pulmonary edema.  Emphysema, w/ additional honeycombing/fibrosis.  Mildly asymmetrical 1.5 cm nodular breast tissue on the left. Correlation with follow-up mammography/ultrasound is recommended.  2.9 cm slightly hyperdense right renal lesion, most likely hemorrhagic/proteinaceous cyst.   Trop noted to be 0.17.  BMP >76487  Pt states she has occ cough, with greenish phlegm, denies chest pain, fever, abd pain, diarrhea.  She still has chronic left heel ulcer, cleans it Betadine.  Pt states she had cardiac cath in Feb 2021, showed non occlusive CAD.  LAD: Diffuse irregularity.  There is moderate disease in distal LAD   LCx: Diffuse irregularity.Large vessel   RCA: Diffuse irregularity.  Small non-dominant       86 y.o. female with COPD, arthritis, CAD, DMII, depression, HTN, PVD, PAD, s/p left fem pop bypass on 03/24/21, +lower ext DVT on eliquis, +chronic left heel ulcer, RA, anemia, presents to the ED with worsening dyspnea, wheezing that started tonight.  Pt received 2 Duoneb tx at home, with only slight improvement, so EMS called .  Pt hypoxic to 87% on room air, was placed on 100%NRBM.  In the ED, pt was placed on 2 LPM via NC, received a dose of Lasix 20 mg IV x 1.    CT Chest reports small bilateral pleural effusions and pulmonary edema.  Emphysema, w/ additional honeycombing/fibrosis.  Mildly asymmetrical 1.5 cm nodular breast tissue on the left. Correlation with follow-up mammography/ultrasound is recommended.  2.9 cm slightly hyperdense right renal lesion, most likely hemorrhagic/proteinaceous cyst.   Trop noted to be 0.17.  BMP >67295  Pt states she has occ cough, with greenish phlegm, denies chest pain, fever, abd pain, diarrhea.  She still has chronic left heel ulcer, cleans it Betadine.  Pt states she had cardiac cath in Feb 2021 preop, showed non occlusive CAD.  LAD: Diffuse irregularity.  There is moderate disease in distal LAD   LCx: Diffuse irregularity.Large vessel   RCA: Diffuse irregularity.  Small non-dominant

## 2021-05-04 LAB
ANION GAP SERPL CALC-SCNC: 12 MMOL/L — SIGNIFICANT CHANGE UP (ref 5–17)
APPEARANCE UR: CLEAR — SIGNIFICANT CHANGE UP
BACTERIA # UR AUTO: ABNORMAL /HPF
BASOPHILS # BLD AUTO: 0.02 K/UL — SIGNIFICANT CHANGE UP (ref 0–0.2)
BASOPHILS NFR BLD AUTO: 0.1 % — SIGNIFICANT CHANGE UP (ref 0–2)
BILIRUB UR-MCNC: NEGATIVE — SIGNIFICANT CHANGE UP
BUN SERPL-MCNC: 42 MG/DL — HIGH (ref 7–23)
CALCIUM SERPL-MCNC: 8.7 MG/DL — SIGNIFICANT CHANGE UP (ref 8.4–10.5)
CHLORIDE SERPL-SCNC: 105 MMOL/L — SIGNIFICANT CHANGE UP (ref 96–108)
CO2 SERPL-SCNC: 19 MMOL/L — LOW (ref 22–31)
COLOR SPEC: YELLOW — SIGNIFICANT CHANGE UP
COVID-19 SPIKE DOMAIN AB INTERP: POSITIVE
COVID-19 SPIKE DOMAIN ANTIBODY RESULT: 7.09 U/ML — HIGH
CREAT SERPL-MCNC: 1.22 MG/DL — SIGNIFICANT CHANGE UP (ref 0.5–1.3)
DIFF PNL FLD: NEGATIVE — SIGNIFICANT CHANGE UP
EOSINOPHIL # BLD AUTO: 0.01 K/UL — SIGNIFICANT CHANGE UP (ref 0–0.5)
EOSINOPHIL NFR BLD AUTO: 0.1 % — SIGNIFICANT CHANGE UP (ref 0–6)
EPI CELLS # UR: SIGNIFICANT CHANGE UP
GLUCOSE BLDC GLUCOMTR-MCNC: 130 MG/DL — HIGH (ref 70–99)
GLUCOSE BLDC GLUCOMTR-MCNC: 163 MG/DL — HIGH (ref 70–99)
GLUCOSE BLDC GLUCOMTR-MCNC: 164 MG/DL — HIGH (ref 70–99)
GLUCOSE BLDC GLUCOMTR-MCNC: 192 MG/DL — HIGH (ref 70–99)
GLUCOSE BLDC GLUCOMTR-MCNC: 203 MG/DL — HIGH (ref 70–99)
GLUCOSE SERPL-MCNC: 231 MG/DL — HIGH (ref 70–99)
GLUCOSE UR QL: NEGATIVE — SIGNIFICANT CHANGE UP
HCT VFR BLD CALC: 27.4 % — LOW (ref 34.5–45)
HGB BLD-MCNC: 8.8 G/DL — LOW (ref 11.5–15.5)
IMM GRANULOCYTES NFR BLD AUTO: 0.8 % — SIGNIFICANT CHANGE UP (ref 0–1.5)
KETONES UR-MCNC: NEGATIVE — SIGNIFICANT CHANGE UP
LACTATE SERPL-SCNC: 1.6 MMOL/L — SIGNIFICANT CHANGE UP (ref 0.7–2)
LEUKOCYTE ESTERASE UR-ACNC: NEGATIVE — SIGNIFICANT CHANGE UP
LYMPHOCYTES # BLD AUTO: 0.77 K/UL — LOW (ref 1–3.3)
LYMPHOCYTES # BLD AUTO: 5.6 % — LOW (ref 13–44)
MAGNESIUM SERPL-MCNC: 1.7 MG/DL — SIGNIFICANT CHANGE UP (ref 1.6–2.6)
MCHC RBC-ENTMCNC: 30.1 PG — SIGNIFICANT CHANGE UP (ref 27–34)
MCHC RBC-ENTMCNC: 32.1 GM/DL — SIGNIFICANT CHANGE UP (ref 32–36)
MCV RBC AUTO: 93.8 FL — SIGNIFICANT CHANGE UP (ref 80–100)
MONOCYTES # BLD AUTO: 1.49 K/UL — HIGH (ref 0–0.9)
MONOCYTES NFR BLD AUTO: 10.9 % — SIGNIFICANT CHANGE UP (ref 2–14)
NEUTROPHILS # BLD AUTO: 11.27 K/UL — HIGH (ref 1.8–7.4)
NEUTROPHILS NFR BLD AUTO: 82.5 % — HIGH (ref 43–77)
NITRITE UR-MCNC: NEGATIVE — SIGNIFICANT CHANGE UP
NRBC # BLD: 0 /100 WBCS — SIGNIFICANT CHANGE UP (ref 0–0)
PH UR: 5 — SIGNIFICANT CHANGE UP (ref 5–8)
PLATELET # BLD AUTO: 169 K/UL — SIGNIFICANT CHANGE UP (ref 150–400)
POTASSIUM SERPL-MCNC: 4 MMOL/L — SIGNIFICANT CHANGE UP (ref 3.5–5.3)
POTASSIUM SERPL-SCNC: 4 MMOL/L — SIGNIFICANT CHANGE UP (ref 3.5–5.3)
PROT UR-MCNC: 100
RBC # BLD: 2.92 M/UL — LOW (ref 3.8–5.2)
RBC # FLD: 17.3 % — HIGH (ref 10.3–14.5)
RBC CASTS # UR COMP ASSIST: SIGNIFICANT CHANGE UP /HPF (ref 0–4)
SARS-COV-2 IGG+IGM SERPL QL IA: 7.09 U/ML — HIGH
SARS-COV-2 IGG+IGM SERPL QL IA: POSITIVE
SARS-COV-2 RNA SPEC QL NAA+PROBE: SIGNIFICANT CHANGE UP
SODIUM SERPL-SCNC: 136 MMOL/L — SIGNIFICANT CHANGE UP (ref 135–145)
SP GR SPEC: 1.01 — SIGNIFICANT CHANGE UP (ref 1.01–1.02)
TROPONIN I SERPL-MCNC: 0.28 NG/ML — HIGH (ref 0.02–0.06)
TROPONIN I SERPL-MCNC: 0.35 NG/ML — HIGH (ref 0.02–0.06)
TROPONIN I SERPL-MCNC: 0.41 NG/ML — HIGH (ref 0.02–0.06)
UROBILINOGEN FLD QL: NEGATIVE — SIGNIFICANT CHANGE UP
WBC # BLD: 13.67 K/UL — HIGH (ref 3.8–10.5)
WBC # FLD AUTO: 13.67 K/UL — HIGH (ref 3.8–10.5)
WBC UR QL: SIGNIFICANT CHANGE UP /HPF (ref 0–5)

## 2021-05-04 PROCEDURE — 99222 1ST HOSP IP/OBS MODERATE 55: CPT

## 2021-05-04 PROCEDURE — 76775 US EXAM ABDO BACK WALL LIM: CPT | Mod: 26

## 2021-05-04 PROCEDURE — 93306 TTE W/DOPPLER COMPLETE: CPT | Mod: 26

## 2021-05-04 PROCEDURE — 76882 US LMTD JT/FCL EVL NVASC XTR: CPT | Mod: 26,LT

## 2021-05-04 RX ORDER — INSULIN GLARGINE 100 [IU]/ML
20 INJECTION, SOLUTION SUBCUTANEOUS AT BEDTIME
Refills: 0 | Status: DISCONTINUED | OUTPATIENT
Start: 2021-05-04 | End: 2021-05-05

## 2021-05-04 RX ORDER — FUROSEMIDE 40 MG
20 TABLET ORAL DAILY
Refills: 0 | Status: DISCONTINUED | OUTPATIENT
Start: 2021-05-04 | End: 2021-05-05

## 2021-05-04 RX ORDER — LISINOPRIL 2.5 MG/1
10 TABLET ORAL DAILY
Refills: 0 | Status: DISCONTINUED | OUTPATIENT
Start: 2021-05-04 | End: 2021-05-05

## 2021-05-04 RX ORDER — TIOTROPIUM BROMIDE 18 UG/1
1 CAPSULE ORAL; RESPIRATORY (INHALATION) DAILY
Refills: 0 | Status: DISCONTINUED | OUTPATIENT
Start: 2021-05-04 | End: 2021-05-05

## 2021-05-04 RX ORDER — AZTREONAM 2 G
1000 VIAL (EA) INJECTION EVERY 12 HOURS
Refills: 0 | Status: DISCONTINUED | OUTPATIENT
Start: 2021-05-04 | End: 2021-05-04

## 2021-05-04 RX ORDER — METHOTREXATE 2.5 MG/1
15 TABLET ORAL
Refills: 0 | Status: DISCONTINUED | OUTPATIENT
Start: 2021-05-05 | End: 2021-05-05

## 2021-05-04 RX ORDER — METOPROLOL TARTRATE 50 MG
25 TABLET ORAL
Refills: 0 | Status: DISCONTINUED | OUTPATIENT
Start: 2021-05-04 | End: 2021-05-05

## 2021-05-04 RX ORDER — FOLIC ACID 0.8 MG
1 TABLET ORAL DAILY
Refills: 0 | Status: DISCONTINUED | OUTPATIENT
Start: 2021-05-04 | End: 2021-05-05

## 2021-05-04 RX ORDER — SODIUM CHLORIDE 9 MG/ML
1000 INJECTION, SOLUTION INTRAVENOUS
Refills: 0 | Status: DISCONTINUED | OUTPATIENT
Start: 2021-05-04 | End: 2021-05-05

## 2021-05-04 RX ORDER — ASPIRIN/CALCIUM CARB/MAGNESIUM 324 MG
81 TABLET ORAL DAILY
Refills: 0 | Status: DISCONTINUED | OUTPATIENT
Start: 2021-05-04 | End: 2021-05-05

## 2021-05-04 RX ORDER — DEXTROSE 50 % IN WATER 50 %
25 SYRINGE (ML) INTRAVENOUS ONCE
Refills: 0 | Status: DISCONTINUED | OUTPATIENT
Start: 2021-05-04 | End: 2021-05-05

## 2021-05-04 RX ORDER — GABAPENTIN 400 MG/1
300 CAPSULE ORAL THREE TIMES A DAY
Refills: 0 | Status: DISCONTINUED | OUTPATIENT
Start: 2021-05-04 | End: 2021-05-05

## 2021-05-04 RX ORDER — MEROPENEM 1 G/30ML
1000 INJECTION INTRAVENOUS EVERY 12 HOURS
Refills: 0 | Status: DISCONTINUED | OUTPATIENT
Start: 2021-05-04 | End: 2021-05-05

## 2021-05-04 RX ORDER — AMLODIPINE BESYLATE 2.5 MG/1
10 TABLET ORAL DAILY
Refills: 0 | Status: DISCONTINUED | OUTPATIENT
Start: 2021-05-04 | End: 2021-05-05

## 2021-05-04 RX ORDER — GLUCAGON INJECTION, SOLUTION 0.5 MG/.1ML
1 INJECTION, SOLUTION SUBCUTANEOUS ONCE
Refills: 0 | Status: DISCONTINUED | OUTPATIENT
Start: 2021-05-04 | End: 2021-05-05

## 2021-05-04 RX ORDER — DEXTROSE 50 % IN WATER 50 %
12.5 SYRINGE (ML) INTRAVENOUS ONCE
Refills: 0 | Status: DISCONTINUED | OUTPATIENT
Start: 2021-05-04 | End: 2021-05-05

## 2021-05-04 RX ORDER — DEXTROSE 50 % IN WATER 50 %
15 SYRINGE (ML) INTRAVENOUS ONCE
Refills: 0 | Status: DISCONTINUED | OUTPATIENT
Start: 2021-05-04 | End: 2021-05-05

## 2021-05-04 RX ORDER — APIXABAN 2.5 MG/1
5 TABLET, FILM COATED ORAL EVERY 12 HOURS
Refills: 0 | Status: DISCONTINUED | OUTPATIENT
Start: 2021-05-05 | End: 2021-05-05

## 2021-05-04 RX ORDER — ALBUTEROL 90 UG/1
1 AEROSOL, METERED ORAL EVERY 6 HOURS
Refills: 0 | Status: DISCONTINUED | OUTPATIENT
Start: 2021-05-04 | End: 2021-05-05

## 2021-05-04 RX ORDER — INSULIN LISPRO 100/ML
VIAL (ML) SUBCUTANEOUS
Refills: 0 | Status: DISCONTINUED | OUTPATIENT
Start: 2021-05-04 | End: 2021-05-05

## 2021-05-04 RX ORDER — IPRATROPIUM/ALBUTEROL SULFATE 18-103MCG
3 AEROSOL WITH ADAPTER (GRAM) INHALATION EVERY 4 HOURS
Refills: 0 | Status: DISCONTINUED | OUTPATIENT
Start: 2021-05-04 | End: 2021-05-05

## 2021-05-04 RX ORDER — VANCOMYCIN HCL 1 G
1000 VIAL (EA) INTRAVENOUS ONCE
Refills: 0 | Status: COMPLETED | OUTPATIENT
Start: 2021-05-04 | End: 2021-05-04

## 2021-05-04 RX ORDER — ACETAMINOPHEN 500 MG
650 TABLET ORAL EVERY 6 HOURS
Refills: 0 | Status: DISCONTINUED | OUTPATIENT
Start: 2021-05-04 | End: 2021-05-05

## 2021-05-04 RX ORDER — VANCOMYCIN HCL 1 G
1000 VIAL (EA) INTRAVENOUS
Refills: 0 | Status: DISCONTINUED | OUTPATIENT
Start: 2021-05-05 | End: 2021-05-05

## 2021-05-04 RX ADMIN — Medication 650 MILLIGRAM(S): at 22:00

## 2021-05-04 RX ADMIN — Medication 650 MILLIGRAM(S): at 05:00

## 2021-05-04 RX ADMIN — Medication 1 MILLIGRAM(S): at 13:46

## 2021-05-04 RX ADMIN — Medication 4: at 08:31

## 2021-05-04 RX ADMIN — GABAPENTIN 300 MILLIGRAM(S): 400 CAPSULE ORAL at 13:46

## 2021-05-04 RX ADMIN — Medication 25 MILLIGRAM(S): at 19:55

## 2021-05-04 RX ADMIN — GABAPENTIN 300 MILLIGRAM(S): 400 CAPSULE ORAL at 21:05

## 2021-05-04 RX ADMIN — Medication 650 MILLIGRAM(S): at 04:14

## 2021-05-04 RX ADMIN — Medication 25 MILLIGRAM(S): at 05:33

## 2021-05-04 RX ADMIN — Medication 20 MILLIGRAM(S): at 13:46

## 2021-05-04 RX ADMIN — Medication 2: at 11:58

## 2021-05-04 RX ADMIN — Medication 250 MILLIGRAM(S): at 05:37

## 2021-05-04 RX ADMIN — INSULIN GLARGINE 20 UNIT(S): 100 INJECTION, SOLUTION SUBCUTANEOUS at 21:07

## 2021-05-04 RX ADMIN — Medication 20 MILLIGRAM(S): at 05:31

## 2021-05-04 RX ADMIN — LISINOPRIL 10 MILLIGRAM(S): 2.5 TABLET ORAL at 05:33

## 2021-05-04 RX ADMIN — AMLODIPINE BESYLATE 10 MILLIGRAM(S): 2.5 TABLET ORAL at 05:33

## 2021-05-04 RX ADMIN — Medication 650 MILLIGRAM(S): at 21:05

## 2021-05-04 RX ADMIN — MEROPENEM 100 MILLIGRAM(S): 1 INJECTION INTRAVENOUS at 18:48

## 2021-05-04 RX ADMIN — GABAPENTIN 300 MILLIGRAM(S): 400 CAPSULE ORAL at 05:35

## 2021-05-04 RX ADMIN — Medication 81 MILLIGRAM(S): at 11:58

## 2021-05-04 NOTE — ED ADULT NURSE REASSESSMENT NOTE - NS ED NURSE REASSESS COMMENT FT1
pt laying comfortably in bed at this time. Urine collected, Repeat trop completed, BG tested. Pt covid results negative. Waiting to give report to bring pt up to 2surg. Will cont to assess.

## 2021-05-04 NOTE — CONSULT NOTE ADULT - ASSESSMENT
Chart reviewed  Full consult to follow 85 yo F hx COPD, arthritis, CAD, DM, HTN, PVD, s/p left fem pop bypass on 03/24/21, +lower ext DVT on eliquis, +chronic left heel ulcer, RA, anemia, presented with acute onset of worsening dyspnea, wheezing, was hypoxic to 87% on room air.   CT Chest: small bilateral pleural effusions and pulmonary edema.  Emphysema, w/ additional honeycombing/fibrosis.  Mildly asymmetrical 1.5 cm nodular breast tissue on the left.  2.9 cm slightly hyperdense right renal lesion, most likely hemorrhagic/proteinaceous cyst.   BNP >97923.   Pt states cough,  occasional greenish phlegm, denies chest pain, fever, abd pain, diarrhea.    She still has chronic left heel ulcer.   Old records reviewed: She was admitted on 2/14 at [facility] for evaluation of left foot pain and ulcer.   The left lower extremity has been giving her problems since November 2020, for which she has been on and off oral antibiotics and pain meds.   MRI------ no acute osteomyelitis   She completed 4 days of vancomycin and meropenem followed by doxycycline 100 mg po q 12 hours for 7 more days when she was at [facility] about 2 mos ago  She denies any diarrhea  She now received Vanc and Aztreonam.   She was febrile to 103 with 15K leukocytosis.   She has allergies to PCN, Ceph, but tolerated carbapenems in a past    PLAN:  Will cont Vanc for now, pending culture results  Will also start Meropenem pending additional culture data  May consider additional imaging studies of L leg to r/o DVT, prior clots noted on previous studies  Consider vascular eval as well

## 2021-05-04 NOTE — CONSULT NOTE ADULT - SUBJECTIVE AND OBJECTIVE BOX
PULMONARY CONSULT  Location of Patient :  [id number] ( [facility])  Attending requesting Consult:Uri Noble  Chief Complaint :  SOB  Reason For consult : SOB      Initial HPI on admission:  HPI:  86 Year old male with h/o COPD - Ex smoker not on home o2, CAD, DM2, PVD/PAD s/p left fem pop bypass 3/2021, h/o LLE DVT on eliquis, Depresion, HTN, Chronic Left Heel ulcer,RA, Anemia who p/w ED with worsening dyspnea, wheezing. Patient states SOB Sudden onset x 3 nights ago, not resolving, SOB continues and got worse yesterday and came to ED>   Initially patient t hypoxic to 87% on room air, was placed on 100%NRB and tapered to pt was placed on 2 LPM via NC, received a dose of Lasix 20 mg IV x 1.    CT Chest reports small bilateral pleural effusions and pulmonary edema.  Emphysema, w/ additional honeycombing/fibrosis.  Mildly asymmetrical 1.5 cm nodular breast tissue on the left. Correlation with follow-up mammography/ultrasound is recommended.  2.9 cm slightly hyperdense right renal lesion, most likely hemorrhagic/proteinaceous cyst.   Trop noted to be 0.17.  BMP >29810  Pt states she has occ cough, with greenish phlegm, denies chest pain, fever, abd pain, diarrhea.  She still has chronic left heel ulcer, cleans it Betadine.  Pt states she had cardiac cath in 2021 preop, showed non occlusive CAD.  LAD: Diffuse irregularity.  There is moderate disease in distal LAD   LCx: Diffuse irregularity.Large vessel   RCA: Diffuse irregularity.  Small non-dominant       (03 May 2021 23:51)      BRIEF HOSPITAL COURSE: ***    PAST MEDICAL & SURGICAL HISTORY:  Hypertension    Anemia    Arthritis, rheumatoid    Depression    Type 2 diabetes mellitus  on insulin    PVD (peripheral vascular disease) with claudication    CAD (coronary artery disease)  non-obstructive    Rheumatoid arthritis    fracture ankle right  plate . screws       S/P cataract surgery      Hip fracture requiring operative repair  Right hip 2020      Allergies    cephalosporins (Other)  penicillins (Urticaria; Pruritus)    Intolerances      FAMILY HISTORY:  FH: colon cancer  father    FH: heart attack  father    Family history of atherosclerosis  mother      Social history: Social History:  former smoker - quit a year ago (1/2 - 1PPD x >40 years)  denie etoh (03 May 2021 23:51)       Smoking:     Drinking:     Drug use:    Review of Systems: as stated above    CONSTITUTIONAL: No fever, No chills, No fatigue  EYES: No eye pain, No visual disturbances, No discharge  ENMT:  No difficulty hearing, No tinnitus, No vertigo; No sinus or throat pain  NECK: No pain, No stiffness  RESPIRATORY: No Cough, No SOB, No Secretions  CARDIOVASCULAR: No chest pain, No palpitations, No dizziness, or No leg swelling  GASTROINTESTINAL: No abdominal or epigastric pain. No nausea, No vomiting, No hematemesis; No diarrhea, No constipation. No melena, No hematochezia.  GENITOURINARY: No dysuria, No frequency, No hematuria, No incontinence  NEUROLOGICAL: No headaches, No memory loss, No loss of strength, No numbness, No tremors  SKIN: No itching, No burning, No rashes, No lesions   MUSCULOSKELETAL: No joint pain or swelling; No muscle, back, No extremity pain  PSYCHIATRIC: No depression, No anxiety, No mood swings, No difficulty sleeping      Medications:  MEDICATIONS  (STANDING):  amLODIPine   Tablet 10 milliGRAM(s) Oral daily  aspirin  chewable 81 milliGRAM(s) Oral daily  aztreonam  IVPB 1000 milliGRAM(s) IV Intermittent every 12 hours  dextrose 40% Gel 15 Gram(s) Oral once  dextrose 5%. 1000 milliLiter(s) (50 mL/Hr) IV Continuous <Continuous>  dextrose 5%. 1000 milliLiter(s) (100 mL/Hr) IV Continuous <Continuous>  dextrose 50% Injectable 25 Gram(s) IV Push once  dextrose 50% Injectable 12.5 Gram(s) IV Push once  dextrose 50% Injectable 25 Gram(s) IV Push once  folic acid 1 milliGRAM(s) Oral daily  furosemide   Injectable 20 milliGRAM(s) IV Push daily  gabapentin 300 milliGRAM(s) Oral three times a day  glucagon  Injectable 1 milliGRAM(s) IntraMuscular once  insulin glargine Injectable (LANTUS) 20 Unit(s) SubCutaneous at bedtime  insulin lispro (ADMELOG) corrective regimen sliding scale   SubCutaneous three times a day before meals  lisinopril 10 milliGRAM(s) Oral daily  metoprolol tartrate 25 milliGRAM(s) Oral two times a day  PARoxetine 20 milliGRAM(s) Oral daily    MEDICATIONS  (PRN):  acetaminophen   Tablet .. 650 milliGRAM(s) Oral every 6 hours PRN Temp greater or equal to 38C (100.4F), Mild Pain (1 - 3)  albuterol/ipratropium for Nebulization 3 milliLiter(s) Nebulizer every 4 hours PRN Shortness of Breath and/or Wheezing      Home Medications:  Last Order Reconciliation Date: 21 @ 01:00 (Admission Reconciliation)  acetaminophen 325 mg oral tablet: 2 tab(s) orally every 8 hours, As needed, for pain  (21 @ 15:26)  amLODIPine 10 mg oral tablet: 1 tab(s) orally once a day (21 @ 10:37)  Eliquis 5 mg oral tablet: 1 tab(s) orally 2 times a day (21 @ 16:50)  folic acid 1 mg oral tablet: 1 tab(s) orally once a day (at bedtime) (21 @ 15:10)  gabapentin 300 mg oral capsule: 1 cap(s) orally 3 times a day (21 @ 15:10)  Lantus 100 units/mL subcutaneous solution: 20 unit(s) subcutaneous once a day (at bedtime) (21 @ 10:31)  lisinopril 10 mg oral tablet: 1 tab(s) orally once a day (21 @ 10:37)  methotrexate 2.5 mg oral tablet: 6 tab(s) orally once a week on  (21 @ 15:10)  metoprolol tartrate 25 mg oral tablet: 1 tab(s) orally 2 times a day (21 @ 15:10)  Paxil 20 mg oral tablet: 1 tab(s) orally once a day (21 @ 15:10)      LABS:                        8.8    13.67 )-----------( 169      ( 04 May 2021 05:20 )             27.4     -    136  |  105  |  42<H>  ----------------------------<  231<H>  4.0   |  19<L>  |  1.22    Ca    8.7      04 May 2021 05:20  Mg     1.7         TPro  6.7  /  Alb  2.3<L>  /  TBili  0.5  /  DBili  x   /  AST  11  /  ALT  8<L>  /  AlkPhos  91  05-03      CARDIAC MARKERS ( 04 May 2021 08:20 )  .413 ng/mL / x     / x     / x     / x      CARDIAC MARKERS ( 04 May 2021 01:50 )  .354 ng/mL / x     / x     / x     / x      CARDIAC MARKERS ( 03 May 2021 22:10 )  .172 ng/mL / x     / x     / x     / x              Urinalysis Basic - ( 04 May 2021 01:50 )    Color: Yellow / Appearance: Clear / S.015 / pH: x  Gluc: x / Ketone: Negative  / Bili: Negative / Urobili: Negative   Blood: x / Protein: 100 / Nitrite: Negative   Leuk Esterase: Negative / RBC: 0-4 /HPF / WBC 0-2 /HPF   Sq Epi: x / Non Sq Epi: Neg.-Few / Bacteria: Few /HPF        Serum Pro-Brain Natriuretic Peptide: 87589 pg/mL (21 @ 22:10)        CULTURES: (if applicable)          CAPILLARY BLOOD GLUCOSE      POCT Blood Glucose.: 163 mg/dL (04 May 2021 11:57)      RADIOLOGY  CXR:      CT:    ECHO:      VITALS:  T(C): 36.8 (21 @ 09:43), Max: 39.6 (21 @ 04:00)  T(F): 98.3 (21 @ 09:43), Max: 103.2 (21 @ 04:00)  HR: 72 (21 @ 09:43) (72 - 109)  BP: 120/60 (21 @ 09:43) (119/53 - 151/54)  BP(mean): --  ABP: --  ABP(mean): --  RR: 20 (21 @ 09:43) (15 - 20)  SpO2: 100% (21 @ 09:43) (88% - 100%)  CVP(mm Hg): --  CVP(cm H2O): --    Ins and Outs       Height (cm): 160 (21 @ 04:00)  Weight (kg): 73.5 (21 @ 04:00)  BMI (kg/m2): 28.7 (21 @ 04:00)        I&O's Detail      Physical Examination:  GENERAL:               Alert, Oriented, No acute distress.    HEENT:                    Pupils equal, reactive to light.  Symmetric. No JVD, Moist MM  PULM:                     Bilateral air entry, Clear to auscultation bilaterally, no significant sputum production, No Rales, No Rhonchi, No Wheezing  CVS:                         S1, S2,  No Murmur  ABD:                        Soft, nondistended, nontender, normoactive bowel sounds,   EXT:                         No edema, nontender, No Cyanosis or Clubbing   Vascular:                Warm Extremities, Normal Capillary refill, Normal Distal Pulses  SKIN:                       Warm and well perfused, no rashes noted.   NEURO:                  Alert, oriented, interactive, nonfocal, follows commands  PSYC:                      Calm, + Insight.     PULMONARY CONSULT  Location of Patient :  [facility] ( [facility])  Attending requesting Consult:Uri Noble  Chief Complaint :  SOB  Reason For consult : SOB      Initial HPI on admission:  HPI:  86 Year old male with h/o COPD - Ex smoker not on home o2, CAD, DM2, PVD/PAD s/p left fem pop bypass 3/2021, h/o LLE DVT on eliquis, Depresion, HTN, Chronic Left Heel ulcer,RA, Anemia who p/w ED with worsening dyspnea, wheezing. Patient states SOB Sudden onset x 3 nights ago, not resolving, SOB continues and got worse yesterday and came to ED. states SOB worse on exertion and laying flat, also noted to have LLE Swelling.   Initially patient t hypoxic to 87% on room air, was placed on 100%NRB and tapered to pt was placed on 2 LPM via NC, received a dose of Lasix 20 mg IV x 1.      CT Chest reports small bilateral pleural effusions and pulmonary edema.  Emphysema, w/ additional honeycombing/fibrosis.  Mildly asymmetrical 1.5 cm nodular breast tissue on the left. Correlation with follow-up mammography/ultrasound is recommended.  2.9 cm slightly hyperdense right renal lesion, most likely hemorrhagic/proteinaceous cyst.   Trop noted to be 0.17.  BMP >05810    As per Records:  Pt states she has occ cough, with greenish phlegm, denies chest pain, fever, abd pain, diarrhea.  She still has chronic left heel ulcer, cleans it Betadine.  Pt states she had cardiac cath in 2021 preop, showed non occlusive CAD.  LAD: Diffuse irregularity.  There is moderate disease in distal LAD   LCx: Diffuse irregularity.Large vessel   RCA: Diffuse irregularity.  Small non-dominant      BRIEF HOSPITAL COURSE:   patient feeling better  denies wheezing  no cp, sob, palp, n/v at [facility]  dyspnea improved with diuresis     PAST MEDICAL & SURGICAL HISTORY:  Hypertension  Anemia  Arthritis, rheumatoid  Depression  Type 2 diabetes mellitus on insulin  PVD (peripheral vascular disease) with claudication  CAD (coronary artery disease) non-obstructive  Rheumatoid arthritis  fracture ankle right plate . screws     S/P cataract surgery   Hip fracture requiring operative repair Right hip 2020      Allergies    cephalosporins (Other)  penicillins (Urticaria; Pruritus)    Intolerances      FAMILY HISTORY:  FH: colon cancer  father    FH: heart attack  father    Family history of atherosclerosis  mother      Social history: Social History:  former smoker - quit a year ago (1/2 - 1PPD x ~ 60 years)  denie etoh (03 May 2021 23:51)       Review of Systems: as stated above    CONSTITUTIONAL: No fever, No chills, +fatigue  EYES: No eye pain, No visual disturbances, No discharge  ENMT:  No difficulty hearing, No tinnitus, No vertigo; No sinus or throat pain  NECK: No pain, No stiffness  RESPIRATORY: No Cough, +SOB, No Secretions  CARDIOVASCULAR: No chest pain, No palpitations, No dizziness, or +leg swelling  GASTROINTESTINAL: No abdominal or epigastric pain. No nausea, No vomiting, No hematemesis; No diarrhea, No constipation. No melena, No hematochezia.  GENITOURINARY: No dysuria, No frequency, No hematuria, No incontinence  NEUROLOGICAL: No headaches, No memory loss, No loss of strength, No numbness, No tremors  SKIN: No itching, No burning, No rashes, No lesions   MUSCULOSKELETAL: No joint pain or swelling; No muscle, back, No extremity pain  PSYCHIATRIC: No depression, No anxiety, No mood swings, No difficulty sleeping      Medications:  MEDICATIONS  (STANDING):  amLODIPine   Tablet 10 milliGRAM(s) Oral daily  aspirin  chewable 81 milliGRAM(s) Oral daily  aztreonam  IVPB 1000 milliGRAM(s) IV Intermittent every 12 hours  dextrose 40% Gel 15 Gram(s) Oral once  dextrose 5%. 1000 milliLiter(s) (50 mL/Hr) IV Continuous <Continuous>  dextrose 5%. 1000 milliLiter(s) (100 mL/Hr) IV Continuous <Continuous>  dextrose 50% Injectable 25 Gram(s) IV Push once  dextrose 50% Injectable 12.5 Gram(s) IV Push once  dextrose 50% Injectable 25 Gram(s) IV Push once  folic acid 1 milliGRAM(s) Oral daily  furosemide   Injectable 20 milliGRAM(s) IV Push daily  gabapentin 300 milliGRAM(s) Oral three times a day  glucagon  Injectable 1 milliGRAM(s) IntraMuscular once  insulin glargine Injectable (LANTUS) 20 Unit(s) SubCutaneous at bedtime  insulin lispro (ADMELOG) corrective regimen sliding scale   SubCutaneous three times a day before meals  lisinopril 10 milliGRAM(s) Oral daily  metoprolol tartrate 25 milliGRAM(s) Oral two times a day  PARoxetine 20 milliGRAM(s) Oral daily    MEDICATIONS  (PRN):  acetaminophen   Tablet .. 650 milliGRAM(s) Oral every 6 hours PRN Temp greater or equal to 38C (100.4F), Mild Pain (1 - 3)  albuterol/ipratropium for Nebulization 3 milliLiter(s) Nebulizer every 4 hours PRN Shortness of Breath and/or Wheezing      Home Medications:  Last Order Reconciliation Date: 21 @ 01:00 (Admission Reconciliation)  acetaminophen 325 mg oral tablet: 2 tab(s) orally every 8 hours, As needed, for pain  (21 @ 15:26)  amLODIPine 10 mg oral tablet: 1 tab(s) orally once a day (21 @ 10:37)  Eliquis 5 mg oral tablet: 1 tab(s) orally 2 times a day (21 @ 16:50)  folic acid 1 mg oral tablet: 1 tab(s) orally once a day (at bedtime) (21 @ 15:10)  gabapentin 300 mg oral capsule: 1 cap(s) orally 3 times a day (21 @ 15:10)  Lantus 100 units/mL subcutaneous solution: 20 unit(s) subcutaneous once a day (at bedtime) (21 @ 10:31)  lisinopril 10 mg oral tablet: 1 tab(s) orally once a day (21 @ 10:37)  methotrexate 2.5 mg oral tablet: 6 tab(s) orally once a week on  (21 @ 15:10)  metoprolol tartrate 25 mg oral tablet: 1 tab(s) orally 2 times a day (21 @ 15:10)  Paxil 20 mg oral tablet: 1 tab(s) orally once a day (21 @ 15:10)      LABS:                        8.8    13.67 )-----------( 169      ( 04 May 2021 05:20 )             27.4         136  |  105  |  42<H>  ----------------------------<  231<H>  4.0   |  19<L>  |  1.22    Ca    8.7      04 May 2021 05:20  Mg     1.7     -    TPro  6.7  /  Alb  2.3<L>  /  TBili  0.5  /  DBili  x   /  AST  11  /  ALT  8<L>  /  AlkPhos  91  05-03      CARDIAC MARKERS ( 04 May 2021 08:20 )  .413 ng/mL / x     / x     / x     / x      CARDIAC MARKERS ( 04 May 2021 01:50 )  .354 ng/mL / x     / x     / x     / x      CARDIAC MARKERS ( 03 May 2021 22:10 )  .172 ng/mL / x     / x     / x     / x              Urinalysis Basic - ( 04 May 2021 01:50 )    Color: Yellow / Appearance: Clear / S.015 / pH: x  Gluc: x / Ketone: Negative  / Bili: Negative / Urobili: Negative   Blood: x / Protein: 100 / Nitrite: Negative   Leuk Esterase: Negative / RBC: 0-4 /HPF / WBC 0-2 /HPF   Sq Epi: x / Non Sq Epi: Neg.-Few / Bacteria: Few /HPF           RADIOLOGY  < from: CT Chest No Cont (21 @ 23:04) >  EXAM:  CT CHEST   PROCEDURE DATE:  2021    INTERPRETATION:  CLINICAL INFORMATION: Shortness of breath, COPD.  COMPARISON: 2/15/2021    FINDINGS:    LUNGS AND AIRWAYS: Patent central airways.  Interlobular septal thickening and mild groundglass opacities most prominently at the bases, compatible pulmonary edema. Paraseptal emphysema and possibly additional areas of honeycombing/fibrosis. Mild dependent atelectasis.  PLEURA: Small bilateral pleural effusions.  MEDIASTINUM AND LUCILA: Stable nonspecific 1.3 cm right paratracheal node.  VESSELS: Aortic and coronary artery atherosclerosis.  HEART: Heart size is normal. No pericardial effusion.  CHEST WALL AND LOWER NECK: Mild asymmetrical 1.5 cm nodular breast tissue on the left (2, 55).  VISUALIZED UPPER ABDOMEN: Slightly hyperdense 2.9 cm right renal lesion. Right renal cyst. Nonobstructing left renal calculi.  BONES: Degenerative changes.    IMPRESSION:    1. Small bilateral pleural effusions and pulmonary edema.  2. Emphysema and question additional honeycombing/fibrosis.  3. Mildly asymmetrical 1.5 cm nodular breast tissue on the left. Correlation with follow-up mammography/ultrasound is recommended.  4. 2.9 cm slightly hyperdense right renal lesion, most likely hemorrhagic/proteinaceous cyst. Follow-up nonemergent ultrasound is recommended.  < end of copied text >    < from: CT Angio Chest PE Protocol w/ IV Cont (02.15.21 @ 08:06) >  EXAM:  CT ANGIO CHEST PE PROTOCOL IC                       PROCEDURE DATE:  02/15/2021    INTERPRETATION:  CLINICAL INFORMATION: Deep vein thrombosis with tachycardia  COMPARISON: Chest CT 10/19/2019    PROCEDURE:  CT Angiography of the Chest.  90 ml of Omnipaque 350 was injected intravenously. 10 ml were discarded.  Sagittal and coronal reformats were performed as well as 3D (MIP) reconstructions.    FINDINGS:    PULMONARY ARTERIES: No pulmonary embolism to the segmental branches. Limited visualization of subsegmental branches secondary to suboptimal bolus timing.    LUNGS AND LARGE AIRWAYS: The central airways are patent. The lungs are clear. Peripheral areas of fibrosis.  PLEURA: No pleural abnormality.  VESSELS: Aortic atherosclerosis without aneurysm.  HEART: Normal heart size. No pericardial effusion. Coronary artery calcifications are present.  MEDIASTINUM AND LUCILA: No adenopathy.  CHEST WALL AND LOWER NECK: No masses.  VISUALIZED UPPER ABDOMEN: Limited visualization is unremarkable.  BONES: No aggressive lesion.    IMPRESSION:  *  No pulmonary embolism to the segmental branches. Limited visualization of subsegmental branches secondary to suboptimal bolus timing.  *  No acute chest pathology.    < end of copied text >  < from: CT Chest w/ IV Cont (10.19.19 @ 13:28) >  EXAM:  CT ABDOMEN AND PELVIS IC                      EXAM:  CT CHEST IC                        PROCEDURE DATE:  10/19/2019    INTERPRETATION:  CLINICAL INFORMATION: Weight loss. Subcutaneous back lesion.  COMPARISON: None.    PROCEDURE:   CT of the Chest, Abdomen and Pelvis was performed with intravenous contrast. Intravenous contrast: 90 ml Omnipaque 350. 10 ml discarded.Oral contrast: None.Sagittal and coronal reformats were performed.  FINDINGS:  CHEST:   LUNGS AND LARGE AIRWAYS: Patent central airways. There is minimal  emphysematous changes and mild peripheral fibrosis. 7 x 5 mm nodule in the left upper lobe which was not appreciated on the old study.  PLEURA: No pleural effusion.  VESSELS: Within normal limits.  HEART: Heart size is normal. No pericardial effusion.  MEDIASTINUM AND LUCILA: No lymphadenopathy.  CHEST WALL AND LOWER NECK: 8 mm low-attenuation nodule in the right lobe   of the thyroid gland.    ABDOMEN AND PELVIS:    LIVER: Within normal limits.  BILE DUCTS: Normal caliber.  GALLBLADDER: Within normal limits.  SPLEEN: Within normal limits.  PANCREAS: Within normal limits.  ADRENALS: Within normal limits.  KIDNEYS/URETERS: Bilateral renal cysts. 2.6 x 1.9 cm low-attenuation lesion in the mid to upper pole of the right kidney laterally which is  measuring greater than simple fluid in density. In a 1.3 cm low-attenuation lesion in the lower pole left kidney which is also measuring slightly greater than simple fluid in density. No hydronephrosis. Similar yet on the    BLADDER: Within normal limits.  REPRODUCTIVE ORGANS: Contains calcifications. This may be due to leiomyomas. There is limited evaluation of the uterus on CT scan.  BOWEL: No bowel obstruction. Appendix within normal limits. Colonic diverticuli without gross inflammation.    PERITONEUM: No ascites.  VESSELS: Vascular calcifications. There is dense calcification in the   infrarenal abdominal aorta, common iliac arteries and right common   femoralartery. There  RETROPERITONEUM/LYMPH NODES: No lymphadenopathy.    ABDOMINAL WALL: Soft tissue lesion in the subcutaneous tissue of the back to the right of midline inseparable from the skin measuring 5.8 x 1.8 cm in the axial plane. A similar-appearing subcutaneous lesion is seen   slightly more superiorly in the right lateral abdominal wall which does not extend to the skin measuring 5.0 x 1.9 cm. There   BONES: Degenerative changes of bone are present. Posterior disc protrusions in the lumbar spine.  IMPRESSION:     Two Soft tissue lesions in the subcutaneous tissue in the back to the right of midline and right lateral abdominal wall as above. There is no history of trauma. Reportedly these were recently noticed. Neoplasm must  be excluded. Recommend further evaluation.    5 x 7 mm left upper lobe pulmonary nodule. Follow-up with low-dose   noncontrast chest CT scan in 6 months.    Bilateral lesions in the kidneys measuring greater than simple fluid in density as above.These may represent hyperdense cysts focal solid lesions cannot entirely be excluded. This may be further evaluated with pre and postcontrast CT scan of the abdomen. Alternatively, if MRI is performed to evaluate the subcutaneous lesions, thesemay be further evaluated on MRI.  < end of copied text >    < from: TTE Echo Complete w/o Contrast w/ Doppler (21 @ 08:16) >    Summary:   1. Left ventricular ejection fraction, by visual estimation, is 35 to 40%.   2. Moderately toseverely decreased global left ventricular systolic function.   3. Multiple left ventricular regional wall motion abnormalities exist. See wall motion findings.   4. Mildly increased LV wall thickness.   5. Normal left ventricular internal cavity size.   6. Spectral Doppler shows impaired relaxation pattern of left ventricular myocardial filling (Grade I diastolic dysfunction).   7. There is mild concentric left ventricular hypertrophy.   8. Mild mitral annular calcification.   9. Mild mitral valve regurgitation.  10. Thickening and calcification of the anterior and posterior mitral valve leaflets.  11. Mild tricuspid regurgitation.  12. Mild aortic regurgitation.  13. Sclerotic aortic valve with normal opening.  14. Estimated pulmonary artery systolic pressure is 36.6 mmHg assuming a right atrial pressure of 10 mmHg, which is consistent with borderline pulmonary hypertension.      < end of copied text >      VITALS:  T(C): 36.8 (21 @ 09:43), Max: 39.6 (21 @ 04:00)  T(F): 98.3 (21 @ 09:43), Max: 103.2 (21 @ 04:00)  HR: 72 (21 09:43) (72 - 109)  BP: 120/60 (21 @ 09:43) (119/53 - 151/54)  BP(mean): --  ABP: --  ABP(mean): --  RR: 20 (21 @ 09:43) (15 - 20)  SpO2: 100% (21 @ 09:43) (88% - 100%)  CVP(mm Hg): --  CVP(cm H2O): --    Ins and Outs       Height (cm): 160 (21 @ 04:00)  Weight (kg): 73.5 (21 @ 04:00)  BMI (kg/m2): 28.7 (21 @ 04:00)        I&O's Detail      Physical Examination:  GENERAL:               Alert, Oriented, No acute distress.    HEENT:                    No JVD, Moist MM, temporal waisting, cachectic   PULM:                     Bilateral air entry,   no significant sputum production, + Rales, No Rhonchi, No Wheezing  CVS:                         S1, S2,  +Murmur  ABD:                        Soft, nondistended, nontender, normoactive bowel sounds,   EXT:                         LLE edema, nontender, No Cyanosis or Clubbing LLE, medial warmth swelling   SKIN:                       Warm and well perfused, no rashes noted.   NEURO:                  Alert, oriented, interactive, nonfocal, follows commands  PSYC:                      Calm, + Insight.

## 2021-05-04 NOTE — PROGRESS NOTE ADULT - SUBJECTIVE AND OBJECTIVE BOX
JULIEN CASTELLANO  86y  Female    patient is currently resting comfortably and denies SOB. she does complain of occasional cough      REVIEW OF SYSTEMS:    Cardiac CHF/CAD/HTN/PAD  Pulmonary COPD/pneumonia/hypoxia  GI no n/v/d/pain   no dysuria/hematuria  Neuro no numbness/headache/dizziness  Rheum RA  Skin left heel wound  Endo DM    FAMILY HISTORY:  FH: colon cancer  father    FH: heart attack  father    Family history of atherosclerosis  mother      T(C): 36.8 (21 @ 09:43), Max: 39.6 (21 @ 04:00)  HR: 72 (21 @ 09:43) (72 - 109)  BP: 120/60 (21 @ 09:43) (119/53 - 151/54)  RR: 20 (21 @ 09:43) (15 - 20)  SpO2: 100% (21 @ 09:43) (88% - 100%)  Wt(kg): --Vital Signs Last 24 Hrs  T(C): 36.8 (04 May 2021 09:43), Max: 39.6 (04 May 2021 04:00)  T(F): 98.3 (04 May 2021 09:43), Max: 103.2 (04 May 2021 04:00)  HR: 72 (04 May 2021 09:43) (72 - 109)  BP: 120/60 (04 May 2021 09:43) (119/53 - 151/54)  BP(mean): --  RR: 20 (04 May 2021 09:43) (15 - 20)  SpO2: 100% (04 May 2021 09:43) (88% - 100%)  cephalosporins (Other)  penicillins (Urticaria; Pruritus)      PHYSICAL EXAM:    General currently resting comfortably and without SOB/respiratory distress  Neck no JVD  Heart RRR  Lungs bibasilar rales and occasional expiratory wheeze +bilateral rhonchi  Abdomen non tender non distended normal bowel sounds no HSM/CVAT  Extremities left heel wound with eschar  Neurologic no acute focal changes alert and oriented x 3  Skin left heel wound          LABS:  CBC Full  -  ( 04 May 2021 05:20 )  WBC Count : 13.67 K/uL  RBC Count : 2.92 M/uL  Hemoglobin : 8.8 g/dL  Hematocrit : 27.4 %  Platelet Count - Automated : 169 K/uL  Mean Cell Volume : 93.8 fl  Mean Cell Hemoglobin : 30.1 pg  Mean Cell Hemoglobin Concentration : 32.1 gm/dL  Auto Neutrophil # : 11.27 K/uL  Auto Lymphocyte # : 0.77 K/uL  Auto Monocyte # : 1.49 K/uL  Auto Eosinophil # : 0.01 K/uL  Auto Basophil # : 0.02 K/uL  Auto Neutrophil % : 82.5 %  Auto Lymphocyte % : 5.6 %  Auto Monocyte % : 10.9 %  Auto Eosinophil % : 0.1 %  Auto Basophil % : 0.1 %                          8.8    13.67 )-----------( 169      ( 04 May 2021 05:20 )             27.4     05-04    136  |  105  |  42<H>  ----------------------------<  231<H>  4.0   |  19<L>  |  1.22    Ca    8.7      04 May 2021 05:20  Mg     1.7     -    TPro  6.7  /  Alb  2.3<L>  /  TBili  0.5  /  DBili  x   /  AST  11  /  ALT  8<L>  /  AlkPhos  91  05-03    LIVER FUNCTIONS - ( 03 May 2021 22:10 )  Alb: 2.3 g/dL / Pro: 6.7 g/dL / ALK PHOS: 91 U/L / ALT: 8 U/L / AST: 11 U/L / GGT: x             Urinalysis Basic - ( 04 May 2021 01:50 )    Color: Yellow / Appearance: Clear / S.015 / pH: x  Gluc: x / Ketone: Negative  / Bili: Negative / Urobili: Negative   Blood: x / Protein: 100 / Nitrite: Negative   Leuk Esterase: Negative / RBC: 0-4 /HPF / WBC 0-2 /HPF   Sq Epi: x / Non Sq Epi: Neg.-Few / Bacteria: Few /HPF      CAPILLARY BLOOD GLUCOSE      POCT Blood Glucose.: 203 mg/dL (04 May 2021 08:28)      Troponin I, Serum (21 @ 08:20)   Troponin I, Serum: .413    Troponin I, Serum (21 @ 01:50)   Troponin I, Serum: .354      Troponin I, Serum (21 @ 22:10)   Troponin I, Serum: .172          RADIOLOGY & ADDITIONAL TESTS:    < from: Xray Chest 1 View AP/PA (21 @ 22:36) >  EXAM:  XR CHEST AP OR PA 1V      PROCEDURE DATE:  2021        INTERPRETATION:  Views:1  Comparison: 08/21/15  History: Shortness of breath    There is mild scattered interstitial infiltrate consistent with pneumonia or pneumonitis without segmental consolidation, layering effusion or cavitation. The heart is normal in size.    IMPRESSION: As above      MARIA ESTHER CADET MD; Attending Radiologist  This document has been electronically signed. May  4 2021  9:22AM    < end of copied text >          < from: CT Chest No Cont (21 @ 23:04) >    EXAM:  CT CHEST      PROCEDURE DATE:  2021        INTERPRETATION:  CLINICAL INFORMATION: Shortness of breath, COPD.    COMPARISON: 2/15/2021    CONTRAST/COMPLICATIONS:  IV Contrast: None  Oral Contrast: None  Complications: None    PROCEDURE:  CT of the Chest was performed.  Sagittal and coronal reformats were performed.    FINDINGS:    LUNGS AND AIRWAYS: Patent central airways.  Interlobular septal thickening and mild groundglass opacities most prominently at the bases, compatible pulmonary edema. Paraseptal emphysema and possibly additional areas of honeycombing/fibrosis. Mild dependent atelectasis.  PLEURA: Small bilateral pleural effusions.  MEDIASTINUM AND LUCILA: Stable nonspecific 1.3 cm right paratracheal node.  VESSELS: Aortic and coronary artery atherosclerosis.  HEART: Heart size is normal. No pericardial effusion.  CHEST WALL AND LOWER NECK: Mild asymmetrical 1.5 cm nodular breast tissue on the left (2, 55).  VISUALIZED UPPER ABDOMEN: Slightly hyperdense 2.9 cm right renal lesion. Right renal cyst. Nonobstructing left renal calculi.  BONES: Degenerative changes.    IMPRESSION:    1. Small bilateral pleural effusions and pulmonary edema.  2. Emphysema and question additional honeycombing/fibrosis.  3. Mildly asymmetrical 1.5 cm nodular breast tissue on the left. Correlation with follow-up mammography/ultrasound is recommended.  4. 2.9 cm slightly hyperdense right renal lesion, most likely hemorrhagic/proteinaceous cyst. Follow-up nonemergent ultrasound is recommended.        ROSARIO REYES MD; Attending Radiologist  This document has been electronically signed. May  3 2021 11:52PM    < end of copied text >            < from: 12 Lead ECG (21 @ 22:20) >  Ventricular Rate 93 BPM    Atrial Rate 93 BPM    P-R Interval 202 ms    QRS Duration 92 ms    Q-T Interval 412 ms    QTC Calculation(Bazett) 512 ms    P Axis 42 degrees    R Axis -16 degrees    T Axis 138 degrees    Diagnosis Line Normal sinus rhythm  Possible Left atrial enlargement  T wave abnormality, consider lateral ischemia  anteroseptal pattern  Prolonged QT  Abnormal ECG  When compared with ECG of 2020 18:09,  ST now depressed in Lateral leads  T wave inversion now evident in Anterolateral leads  QT has lengthened    Confirmed by ALYSE SHIN MD () on 2021 9:56:16 AM    < end of copied text >      acetaminophen   Tablet .. 650 milliGRAM(s) Oral every 6 hours PRN  albuterol/ipratropium for Nebulization 3 milliLiter(s) Nebulizer every 4 hours PRN  amLODIPine   Tablet 10 milliGRAM(s) Oral daily  aspirin  chewable 81 milliGRAM(s) Oral daily  aztreonam  IVPB 1000 milliGRAM(s) IV Intermittent every 12 hours  dextrose 40% Gel 15 Gram(s) Oral once  dextrose 5%. 1000 milliLiter(s) IV Continuous <Continuous>  dextrose 5%. 1000 milliLiter(s) IV Continuous <Continuous>  dextrose 50% Injectable 25 Gram(s) IV Push once  dextrose 50% Injectable 12.5 Gram(s) IV Push once  dextrose 50% Injectable 25 Gram(s) IV Push once  folic acid 1 milliGRAM(s) Oral daily  furosemide   Injectable 20 milliGRAM(s) IV Push daily  gabapentin 300 milliGRAM(s) Oral three times a day  glucagon  Injectable 1 milliGRAM(s) IntraMuscular once  insulin glargine Injectable (LANTUS) 20 Unit(s) SubCutaneous at bedtime  insulin lispro (ADMELOG) corrective regimen sliding scale   SubCutaneous three times a day before meals  lisinopril 10 milliGRAM(s) Oral daily  metoprolol tartrate 25 milliGRAM(s) Oral two times a day  PARoxetine 20 milliGRAM(s) Oral daily

## 2021-05-04 NOTE — CHART NOTE - NSCHARTNOTEFT_GEN_A_CORE
Pt seen and examined.   As per nurse, when pt was moved, there was a very small wound on medial thigh - popped and pus started oozing, beigish, greenish drainage expelled from site. Wound dressed. [facility] ASH[facility].  Pt is on Vanco, Meropenem.  Will have Surg eval pt tomorrow.

## 2021-05-04 NOTE — CONSULT NOTE ADULT - SUBJECTIVE AND OBJECTIVE BOX
HPI:   Patient is a 86y female with    REVIEW OF SYSTEMS:  All other review of systems negative (Comprehensive ROS)    PAST MEDICAL & SURGICAL HISTORY:  Hypertension    Anemia    Arthritis, rheumatoid    Depression    Type 2 diabetes mellitus  on insulin    PVD (peripheral vascular disease) with claudication    CAD (coronary artery disease)  non-obstructive    Rheumatoid arthritis    fracture ankle right  plate . screws       S/P cataract surgery  2011    Hip fracture requiring operative repair  Right hip 2020        Allergies    cephalosporins (Other)  penicillins (Urticaria; Pruritus)    Intolerances        Antimicrobials Day #    aztreonam  IVPB 1000 milliGRAM(s) IV Intermittent every 12 hours    Other Medications:  acetaminophen   Tablet .. 650 milliGRAM(s) Oral every 6 hours PRN  albuterol/ipratropium for Nebulization 3 milliLiter(s) Nebulizer every 4 hours PRN  amLODIPine   Tablet 10 milliGRAM(s) Oral daily  aspirin  chewable 81 milliGRAM(s) Oral daily  dextrose 40% Gel 15 Gram(s) Oral once  dextrose 5%. 1000 milliLiter(s) IV Continuous <Continuous>  dextrose 5%. 1000 milliLiter(s) IV Continuous <Continuous>  dextrose 50% Injectable 25 Gram(s) IV Push once  dextrose 50% Injectable 12.5 Gram(s) IV Push once  dextrose 50% Injectable 25 Gram(s) IV Push once  folic acid 1 milliGRAM(s) Oral daily  furosemide   Injectable 20 milliGRAM(s) IV Push daily  gabapentin 300 milliGRAM(s) Oral three times a day  glucagon  Injectable 1 milliGRAM(s) IntraMuscular once  insulin glargine Injectable (LANTUS) 20 Unit(s) SubCutaneous at bedtime  insulin lispro (ADMELOG) corrective regimen sliding scale   SubCutaneous three times a day before meals  lisinopril 10 milliGRAM(s) Oral daily  metoprolol tartrate 25 milliGRAM(s) Oral two times a day  PARoxetine 20 milliGRAM(s) Oral daily      FAMILY HISTORY:  FH: colon cancer  father    FH: heart attack  father    Family history of atherosclerosis  mother        SOCIAL HISTORY:  Smoking:     ETOH:     Drug Use:     Single     T(F): 98.3 (21 @ 09:43), Max: 103.2 (21 @ 04:00)  HR: 72 (21 @ 09:43)  BP: 120/60 (21 @ 09:43)  RR: 20 (21 @ 09:43)  SpO2: 100% (21 @ 09:43)  Wt(kg): --    PHYSICAL EXAM:  General: alert, no acute distress  Eyes:  anicteric, no conjunctival injection, no discharge  Oropharynx: no lesions or injection 	  Neck: supple, without adenopathy  Lungs: clear to auscultation  Heart: regular rate and rhythm; no murmur, rubs or gallops  Abdomen: soft, nondistended, nontender, without mass or organomegaly  Skin: no lesions  Extremities: no clubbing, cyanosis, or edema  Neurologic: alert, oriented, moves all extremities    LAB RESULTS:                        8.8    13.67 )-----------( 169      ( 04 May 2021 05:20 )             27.4     -    136  |  105  |  42<H>  ----------------------------<  231<H>  4.0   |  19<L>  |  1.22    Ca    8.7      04 May 2021 05:20  Mg     1.7     -    TPro  6.7  /  Alb  2.3<L>  /  TBili  0.5  /  DBili  x   /  AST  11  /  ALT  8<L>  /  AlkPhos  91  -    LIVER FUNCTIONS - ( 03 May 2021 22:10 )  Alb: 2.3 g/dL / Pro: 6.7 g/dL / ALK PHOS: 91 U/L / ALT: 8 U/L / AST: 11 U/L / GGT: x           Urinalysis Basic - ( 04 May 2021 01:50 )    Color: Yellow / Appearance: Clear / S.015 / pH: x  Gluc: x / Ketone: Negative  / Bili: Negative / Urobili: Negative   Blood: x / Protein: 100 / Nitrite: Negative   Leuk Esterase: Negative / RBC: 0-4 /HPF / WBC 0-2 /HPF   Sq Epi: x / Non Sq Epi: Neg.-Few / Bacteria: Few /HPF        MICROBIOLOGY REVIEWED:    RADIOLOGY REVIEWED:  < from: CT Chest No Cont (21 @ 23:04) >    1. Small bilateral pleural effusions and pulmonary edema.  2. Emphysema and question additional honeycombing/fibrosis.  3. Mildly asymmetrical 1.5 cm nodular breast tissue on the left. Correlation with follow-up mammography/ultrasound is recommended.  4. 2.9 cm slightly hyperdense right renal lesion, most likely hemorrhagic/proteinaceous cyst. Follow-up nonemergent ultrasound is recommended.    < end of copied text >   HPI:   Patient is a 86y female with hx COPD, arthritis, CAD, DMII, depression, HTN, PVD, PAD, s/p left fem pop bypass on 21, +lower ext DVT on eliquis, +chronic left heel ulcer, RA, anemia, presented with acute onset of worsening dyspnea, wheezing, was hypoxic to 87% on room air. CT Chest reports small bilateral pleural effusions and pulmonary edema.  Emphysema, w/ additional honeycombing/fibrosis.  Mildly asymmetrical 1.5 cm nodular breast tissue on the left.  2.9 cm slightly hyperdense right renal lesion, most likely hemorrhagic/proteinaceous cyst.   BNP >94193. Pt states cough,  occasional greenish phlegm, denies chest pain, fever, abd pain, diarrhea.  She still has chronic left heel ulcer. She was admitted on  at [facility] for evaluation of left foot pain and ulcer. The left lower extremity has been giving her problems since 2020, for which she has been on and off oral antibiotics and pain meds. MRI------ no acute osteomyelitis, She completed 4 days of vancomycin and meropenem followed by doxycycline 100 mg po q 12 hours for 7 more days. She now received Vanc and Aztreonam. She was febrile to 103 with 15K leukocytosis. She has allergies to PCN, Ceph, but tolerated carbapenems in a past  REVIEW OF SYSTEMS:  All other review of systems negative (Comprehensive ROS)    PAST MEDICAL & SURGICAL HISTORY:  Hypertension    Anemia    Arthritis, rheumatoid    Depression    Type 2 diabetes mellitus  on insulin    PVD (peripheral vascular disease) with claudication    CAD (coronary artery disease)  non-obstructive    Rheumatoid arthritis    fracture ankle right  plate . screws       S/P cataract surgery      Hip fracture requiring operative repair  Right hip 2020        Allergies    cephalosporins (Other)  penicillins (Urticaria; Pruritus)    Intolerances        Antimicrobials Day #    aztreonam  IVPB 1000 milliGRAM(s) IV Intermittent every 12 hours    Other Medications:  acetaminophen   Tablet .. 650 milliGRAM(s) Oral every 6 hours PRN  albuterol/ipratropium for Nebulization 3 milliLiter(s) Nebulizer every 4 hours PRN  amLODIPine   Tablet 10 milliGRAM(s) Oral daily  aspirin  chewable 81 milliGRAM(s) Oral daily  dextrose 40% Gel 15 Gram(s) Oral once  dextrose 5%. 1000 milliLiter(s) IV Continuous <Continuous>  dextrose 5%. 1000 milliLiter(s) IV Continuous <Continuous>  dextrose 50% Injectable 25 Gram(s) IV Push once  dextrose 50% Injectable 12.5 Gram(s) IV Push once  dextrose 50% Injectable 25 Gram(s) IV Push once  folic acid 1 milliGRAM(s) Oral daily  furosemide   Injectable 20 milliGRAM(s) IV Push daily  gabapentin 300 milliGRAM(s) Oral three times a day  glucagon  Injectable 1 milliGRAM(s) IntraMuscular once  insulin glargine Injectable (LANTUS) 20 Unit(s) SubCutaneous at bedtime  insulin lispro (ADMELOG) corrective regimen sliding scale   SubCutaneous three times a day before meals  lisinopril 10 milliGRAM(s) Oral daily  metoprolol tartrate 25 milliGRAM(s) Oral two times a day  PARoxetine 20 milliGRAM(s) Oral daily      FAMILY HISTORY:  FH: colon cancer  father    FH: heart attack  father    Family history of atherosclerosis  mother        SOCIAL HISTORY:  Smoking:     ETOH:     Drug Use:     Single     T(F): 98.3 (21 @ 09:43), Max: 103.2 (21 @ 04:00)  HR: 72 (21 @ 09:43)  BP: 120/60 (21 @ 09:43)  RR: 20 (21 @ 09:43)  SpO2: 100% (21 @ 09:43)  Wt(kg): --    PHYSICAL EXAM:  General: alert, chronically ill, no apparent distress  Eyes:  anicteric, no conjunctival injection, no discharge  Oropharynx: no lesions or injection 	  Neck: supple, without adenopathy  Lungs: clear to auscultation  Heart: regular rate and rhythm; no murmur, rubs or gallops  Abdomen: soft, nondistended, nontender, without mass or organomegaly  Skin: no lesions  Extremities: L leg with chronic ulcer with eschar, Left >R swelling with warmth and erythema  Neurologic: alert, oriented, moves all extremities    LAB RESULTS:                        8.8    13.67 )-----------( 169      ( 04 May 2021 05:20 )             27.4     05-04    136  |  105  |  42<H>  ----------------------------<  231<H>  4.0   |  19<L>  |  1.22    Ca    8.7      04 May 2021 05:20  Mg     1.7     -    TPro  6.7  /  Alb  2.3<L>  /  TBili  0.5  /  DBili  x   /  AST  11  /  ALT  8<L>  /  AlkPhos  91      LIVER FUNCTIONS - ( 03 May 2021 22:10 )  Alb: 2.3 g/dL / Pro: 6.7 g/dL / ALK PHOS: 91 U/L / ALT: 8 U/L / AST: 11 U/L / GGT: x           Urinalysis Basic - ( 04 May 2021 01:50 )    Color: Yellow / Appearance: Clear / S.015 / pH: x  Gluc: x / Ketone: Negative  / Bili: Negative / Urobili: Negative   Blood: x / Protein: 100 / Nitrite: Negative   Leuk Esterase: Negative / RBC: 0-4 /HPF / WBC 0-2 /HPF   Sq Epi: x / Non Sq Epi: Neg.-Few / Bacteria: Few /HPF        MICROBIOLOGY REVIEWED:    RADIOLOGY REVIEWED:  < from: CT Chest No Cont (21 @ 23:04) >    1. Small bilateral pleural effusions and pulmonary edema.  2. Emphysema and question additional honeycombing/fibrosis.  3. Mildly asymmetrical 1.5 cm nodular breast tissue on the left. Correlation with follow-up mammography/ultrasound is recommended.  4. 2.9 cm slightly hyperdense right renal lesion, most likely hemorrhagic/proteinaceous cyst. Follow-up nonemergent ultrasound is recommended.    < end of copied text >

## 2021-05-04 NOTE — PHARMACOTHERAPY INTERVENTION NOTE - COMMENTS
Methotrexate 2.5 mg once a week was ordered but patient takes 15 mg a week, not 2.5.  I notified Dr. Maciel and the order was changed to 15 mg (6 x 2.5 mg) weekly.

## 2021-05-04 NOTE — CHART NOTE - NSCHARTNOTEFT_GEN_A_CORE
Reviewed prior progress notes, labs and imaging.    To be discussed with Dr Noble    Primary Diagnosis: Dyspnea    85 yo female with COPD, arthritis, CAD, DMII, depression, HTN, PVD, PAD, s/p left fem pop bypass on 03/24/21, +lower ext DVT on eliquis, +chronic left heel ulcer, RA, anemia, presented to the ED with worsening dyspnea, wheezing that started that night. Pt received 2 Duoneb treatments at home, with only slight improvement, so EMS called. At home patient was hypoxic to 87% on room air, was placed on 100% NRB mask. In the ED, pt was placed on 2 LPM via NC, received a dose of Lasix 20 mg IV x 1. CT Chest reports small bilateral pleural effusions and pulmonary edema.  Emphysema, w/ additional honeycombing/fibrosis.  Mildly asymmetrical 1.5 cm nodular breast tissue on the left. Correlation with follow-up mammography/ultrasound is recommended.  2.9 cm slightly hyperdense right renal lesion, most likely hemorrhagic/proteinaceous cyst. Initial trop noted to be 0.17.  BNP >81871    #Acute diastolic CHF   #Elevated trop - ?demand ischemia  - Patient presented with dyspnea, wheezing. CT showed bilateral pleural effusions and pulmonary edema. BNP >30,000   - Troponin elevated 0.172 --> 0.354. Continue to trend. Pt denies chest pain at this time.  - Continue tele monitoring  - Symptoms improved following 20 mg lasix IVP  - Continue lasix 20mg IVP daily for now  - Continue supplemental O2 as needed, currently patient is on nasal cannula 3L/m with SpO2 96%  - f/u TTE  - Started aspirin 81mg daily  - Cardiology consult pending.     #Fever 2/2 ?CAP vs. Chronic left heel ulcer  - Overnight patient was febrile to 103.2  - COVID PCR neg, UA neg  - As per ED note patient currently on abx for cellulitis of left heel wound? Will f/u with PCP  - Received 1 dose ertapenem last night, now on empiric aztreonam and vancomycin  - Continue IV antibiotics  - Local wound care  - f/u blood cultures   - Monitor WBC/fevers     #COPD  - Continue supplemental O2 as needed  - Bronchodilators PRN    #CAD  - Started aspirin    #DMII  - A1C 6.9% (3/31/21)  - Continue home med: lantus 20 units qHS  - Accuchecks and ISS    #Hx DVT, on eliquis  #s/p left fem pop bypass on 03/24/21  #PAD, PVD  - Continue eliquis  - Continue aspirin  - Continue gabapentin    #Depression  - Continue paxil     #HTN  - Continue home meds: amlodipine, lisinopril, metoprolol    #RA  - Continue home med: methotrexate    #Renal lesion  - F/u renal ultrasound     #Breast nodule  - Mammogram, breast ultrasound as outpt Reviewed prior progress notes, labs and imaging.    To be discussed with Dr Noble    Primary Diagnosis: Dyspnea    85 yo female with COPD, arthritis, CAD, DMII, depression, HTN, PVD, PAD, s/p left fem pop bypass on 03/24/21, +lower ext DVT on eliquis, +chronic left heel ulcer, RA, anemia, presented to the ED with worsening dyspnea, wheezing that started that night. Pt received 2 Duoneb treatments at home, with only slight improvement, so EMS called. At home patient was hypoxic to 87% on room air, was placed on 100% NRB mask. In the ED, pt was placed on 2 LPM via NC, received a dose of Lasix 20 mg IV x 1. CT Chest reports small bilateral pleural effusions and pulmonary edema.  Emphysema, w/ additional honeycombing/fibrosis.  Mildly asymmetrical 1.5 cm nodular breast tissue on the left. Correlation with follow-up mammography/ultrasound is recommended.  2.9 cm slightly hyperdense right renal lesion, most likely hemorrhagic/proteinaceous cyst. Initial trop noted to be 0.17.  BNP >24751    #Acute diastolic CHF   #Elevated trop - ?demand ischemia  - Patient presented with dyspnea, wheezing. CT showed bilateral pleural effusions and pulmonary edema. BNP >30,000   - Troponin elevated 0.172 --> 0.354. Continue to trend. Pt denies chest pain at this time.  - Continue tele monitoring  - Symptoms improved following 20 mg lasix IVP  - Continue lasix 20mg IVP daily for now  - Continue supplemental O2 as needed, currently patient is on nasal cannula 3L/m with SpO2 96%  - f/u TTE  - Started aspirin 81mg daily  - Cardiology consult pending.     #Fever 2/2 ?CAP vs. Chronic left heel ulcer  - Overnight patient was febrile to 103.2  - COVID PCR neg, UA neg  - CXR with mild scattered interstitial infiltrate consistent with pneumonia or pneumonitis without segmental consolidation, layering effusion or cavitation.  - As per ED note patient currently on abx for cellulitis of left heel wound? Will f/u with PCP  - Received 1 dose ertapenem last night, now on empiric aztreonam and vancomycin  - Continue IV antibiotics  - Local wound care  - f/u blood cultures   - Monitor WBC/fevers     #COPD  - Continue supplemental O2 as needed  - Bronchodilators PRN    #CAD  - Started aspirin    #DMII  - A1C 6.9% (3/31/21)  - Continue home med: lantus 20 units qHS  - Accuchecks and ISS    #Hx DVT, on eliquis  #s/p left fem pop bypass on 03/24/21  #PAD, PVD  - Continue eliquis  - Continue aspirin  - Continue gabapentin    #Depression  - Continue paxil     #HTN  - Continue home meds: amlodipine, lisinopril, metoprolol    #RA  - Continue home med: methotrexate    #Renal lesion  - F/u renal ultrasound     #Breast nodule  - Mammogram, breast ultrasound as outpt Reviewed prior progress notes, labs and imaging.    Discussed with Dr Noble    Primary Diagnosis: Dyspnea    85 yo female with COPD, arthritis, CAD, DMII, depression, HTN, PVD, PAD, s/p left fem pop bypass on 03/24/21, +lower ext DVT on eliquis, +chronic left heel ulcer, RA, anemia, presented to the ED with worsening dyspnea, wheezing that started that night. Pt received 2 Duoneb treatments at home, with only slight improvement, so EMS called. At home patient was hypoxic to 87% on room air, was placed on 100% NRB mask. In the ED, pt was placed on 2 LPM via NC, received a dose of Lasix 20 mg IV x 1. CT Chest reports small bilateral pleural effusions and pulmonary edema.  Emphysema, w/ additional honeycombing/fibrosis.  Mildly asymmetrical 1.5 cm nodular breast tissue on the left. Correlation with follow-up mammography/ultrasound is recommended.  2.9 cm slightly hyperdense right renal lesion, most likely hemorrhagic/proteinaceous cyst. Initial trop noted to be 0.17.  BNP >39724    #Acute diastolic CHF   #Elevated trop - ?demand ischemia  - Patient presented with dyspnea, wheezing. CT showed bilateral pleural effusions and pulmonary edema. BNP >30,000   - Troponin elevated 0.172 --> 0.354. Continue to trend. Pt denies chest pain at this time.  - Continue tele monitoring  - Symptoms improved following 20 mg lasix IVP  - Continue lasix 20mg IVP daily for now  - Continue supplemental O2 as needed, currently patient is on nasal cannula 3L/m with SpO2 96%  - f/u TTE  - Started aspirin 81mg daily  - Cardiology consult pending.     #Fever 2/2 ?CAP vs. Chronic left heel ulcer  - Overnight patient was febrile to 103.2  - COVID PCR neg, UA neg  - CXR with mild scattered interstitial infiltrate consistent with pneumonia or pneumonitis without segmental consolidation, layering effusion or cavitation.  - As per ED note patient currently on abx for cellulitis of left heel wound? Will f/u with PCP  - Received 1 dose ertapenem last night, now on empiric aztreonam and vancomycin  - Continue IV antibiotics  - Local wound care  - f/u blood cultures   - Monitor WBC/fevers   - ID and plastics eval    #COPD  - Continue supplemental O2 as needed  - Bronchodilators PRN  - Pulm eval    #CAD  - Started aspirin    #DMII  - A1C 6.9% (3/31/21)  - Continue home med: lantus 20 units qHS  - Accuchecks and ISS    #Hx DVT, on eliquis  #s/p left fem pop bypass on 03/24/21  #PAD, PVD  - Continue eliquis  - Continue aspirin  - Continue gabapentin    #Depression  - Continue paxil     #HTN  - Continue home meds: amlodipine, lisinopril, metoprolol    #RA  - Continue home med: methotrexate    #Renal lesion  - F/u renal ultrasound     #Breast nodule  - Mammogram, breast ultrasound as outpt

## 2021-05-04 NOTE — CONSULT NOTE ADULT - ASSESSMENT
Assessment  1. Dyspnea likely due to Acute on suspected chronic Systolic CHF  2. Chronic COPD in Ex smoker with extensive smoking history.  3. Abnormal CT chest with early honeycombing and bullae appears to be increasing from previous CT chest, suspect related to COPD history.   4. Cellulitis of LLE at site of recent fem/pop bypass.  5. Breast mass on CT   6. Underlying   CAD, DM2, PVD/PAD s/p left fem pop bypass 3/2021, h/o LLE DVT on Eliquis Depression HTN, Chronic Left Heel ulcer, RA, Anemia    Plan  Treat underlying CHF with diuretics    Cardio to optimize cardiac meds. and cardiac workup for CHF  ID for ABX  Add Spiriva and Albuterol PRN    Consider PFT as out patient   N/C o2 to maintain sat, taper down o2 as tolerated  Vascular evaluation to eval Left wound   Check US of left Thigh for ? abscess underneath.   Consider workup of breast lesion in patient vs out patient.   D/w Dr. Rodriguez

## 2021-05-04 NOTE — PROGRESS NOTE ADULT - ASSESSMENT
Pneumonia/hypoxia/COPD - patient is currently resting comfortably and O2 sats are 96 - 98. will obtain pulmonary and ID consults. will continue respiratory treatments  CHF/CAD/elevated troponins/HTN - will continue lasix/metoprolol/aspirin  Type 2 DM - will continue presnt insulin regimena nd follow blood glucoses  Anemia - patient is hemodynamically stable  Left heel wound - will continue local wound care and obtain surgery consult  Right renal lesion - will obtain renal sonogram  Left breast mass - recommend surgeon follow up as an out patient. patient is aware      case discussed with patient and NP Asthma

## 2021-05-05 ENCOUNTER — INPATIENT (INPATIENT)
Facility: HOSPITAL | Age: 86
LOS: 15 days | Discharge: HOME HEALTH | DRG: 856 | End: 2021-05-21
Attending: INTERNAL MEDICINE | Admitting: THORACIC SURGERY (CARDIOTHORACIC VASCULAR SURGERY)
Payer: MEDICARE

## 2021-05-05 ENCOUNTER — RESULT REVIEW (OUTPATIENT)
Age: 86
End: 2021-05-05

## 2021-05-05 ENCOUNTER — TRANSCRIPTION ENCOUNTER (OUTPATIENT)
Age: 86
End: 2021-05-05

## 2021-05-05 VITALS
DIASTOLIC BLOOD PRESSURE: 43 MMHG | SYSTOLIC BLOOD PRESSURE: 118 MMHG | TEMPERATURE: 99 F | OXYGEN SATURATION: 96 % | RESPIRATION RATE: 17 BRPM | HEART RATE: 78 BPM

## 2021-05-05 VITALS
WEIGHT: 160.94 LBS | OXYGEN SATURATION: 91 % | SYSTOLIC BLOOD PRESSURE: 103 MMHG | HEART RATE: 81 BPM | DIASTOLIC BLOOD PRESSURE: 58 MMHG | RESPIRATION RATE: 18 BRPM | HEIGHT: 63 IN | TEMPERATURE: 99 F

## 2021-05-05 DIAGNOSIS — T81.49XA INFECTION FOLLOWING A PROCEDURE, OTHER SURGICAL SITE, INITIAL ENCOUNTER: ICD-10-CM

## 2021-05-05 DIAGNOSIS — S72.009A FRACTURE OF UNSPECIFIED PART OF NECK OF UNSPECIFIED FEMUR, INITIAL ENCOUNTER FOR CLOSED FRACTURE: Chronic | ICD-10-CM

## 2021-05-05 LAB
ALBUMIN SERPL ELPH-MCNC: 1.9 G/DL — LOW (ref 3.3–5)
ALP SERPL-CCNC: 71 U/L — SIGNIFICANT CHANGE UP (ref 40–120)
ALT FLD-CCNC: <6 U/L — LOW (ref 12–78)
ANION GAP SERPL CALC-SCNC: 10 MMOL/L — SIGNIFICANT CHANGE UP (ref 5–17)
ANION GAP SERPL CALC-SCNC: 7 MMOL/L — SIGNIFICANT CHANGE UP (ref 5–17)
ANION GAP SERPL CALC-SCNC: 9 MMOL/L — SIGNIFICANT CHANGE UP (ref 5–17)
AST SERPL-CCNC: 5 U/L — LOW (ref 15–37)
BILIRUB SERPL-MCNC: 0.6 MG/DL — SIGNIFICANT CHANGE UP (ref 0.2–1.2)
BUN SERPL-MCNC: 42 MG/DL — HIGH (ref 7–23)
BUN SERPL-MCNC: 43 MG/DL — HIGH (ref 7–23)
BUN SERPL-MCNC: 46 MG/DL — HIGH (ref 7–23)
CALCIUM SERPL-MCNC: 7.8 MG/DL — LOW (ref 8.5–10.1)
CALCIUM SERPL-MCNC: 8.2 MG/DL — LOW (ref 8.4–10.5)
CALCIUM SERPL-MCNC: 8.8 MG/DL — SIGNIFICANT CHANGE UP (ref 8.5–10.1)
CHLORIDE SERPL-SCNC: 101 MMOL/L — SIGNIFICANT CHANGE UP (ref 96–108)
CHLORIDE SERPL-SCNC: 105 MMOL/L — SIGNIFICANT CHANGE UP (ref 96–108)
CHLORIDE SERPL-SCNC: 106 MMOL/L — SIGNIFICANT CHANGE UP (ref 96–108)
CK SERPL-CCNC: 36 U/L — SIGNIFICANT CHANGE UP (ref 26–192)
CO2 SERPL-SCNC: 19 MMOL/L — LOW (ref 22–31)
CO2 SERPL-SCNC: 21 MMOL/L — LOW (ref 22–31)
CO2 SERPL-SCNC: 23 MMOL/L — SIGNIFICANT CHANGE UP (ref 22–31)
CREAT SERPL-MCNC: 1.06 MG/DL — SIGNIFICANT CHANGE UP (ref 0.5–1.3)
CREAT SERPL-MCNC: 1.25 MG/DL — SIGNIFICANT CHANGE UP (ref 0.5–1.3)
CREAT SERPL-MCNC: 1.39 MG/DL — HIGH (ref 0.5–1.3)
GLUCOSE BLDC GLUCOMTR-MCNC: 162 MG/DL — HIGH (ref 70–99)
GLUCOSE SERPL-MCNC: 108 MG/DL — HIGH (ref 70–99)
GLUCOSE SERPL-MCNC: 132 MG/DL — HIGH (ref 70–99)
GLUCOSE SERPL-MCNC: 186 MG/DL — HIGH (ref 70–99)
HCT VFR BLD CALC: 25.4 % — LOW (ref 34.5–45)
HCT VFR BLD CALC: 26.7 % — LOW (ref 34.5–45)
HCT VFR BLD CALC: 31 % — LOW (ref 34.5–45)
HGB BLD-MCNC: 10.4 G/DL — LOW (ref 11.5–15.5)
HGB BLD-MCNC: 8.2 G/DL — LOW (ref 11.5–15.5)
HGB BLD-MCNC: 8.8 G/DL — LOW (ref 11.5–15.5)
INR BLD: 1.62 RATIO — HIGH (ref 0.88–1.16)
LACTATE SERPL-SCNC: 0.8 MMOL/L — SIGNIFICANT CHANGE UP (ref 0.7–2)
MCHC RBC-ENTMCNC: 30.5 PG — SIGNIFICANT CHANGE UP (ref 27–34)
MCHC RBC-ENTMCNC: 30.7 PG — SIGNIFICANT CHANGE UP (ref 27–34)
MCHC RBC-ENTMCNC: 30.8 PG — SIGNIFICANT CHANGE UP (ref 27–34)
MCHC RBC-ENTMCNC: 32.3 GM/DL — SIGNIFICANT CHANGE UP (ref 32–36)
MCHC RBC-ENTMCNC: 33 GM/DL — SIGNIFICANT CHANGE UP (ref 32–36)
MCHC RBC-ENTMCNC: 33.5 GM/DL — SIGNIFICANT CHANGE UP (ref 32–36)
MCV RBC AUTO: 91.7 FL — SIGNIFICANT CHANGE UP (ref 80–100)
MCV RBC AUTO: 93 FL — SIGNIFICANT CHANGE UP (ref 80–100)
MCV RBC AUTO: 94.4 FL — SIGNIFICANT CHANGE UP (ref 80–100)
NRBC # BLD: 0 /100 WBCS — SIGNIFICANT CHANGE UP (ref 0–0)
PLATELET # BLD AUTO: 182 K/UL — SIGNIFICANT CHANGE UP (ref 150–400)
PLATELET # BLD AUTO: 194 K/UL — SIGNIFICANT CHANGE UP (ref 150–400)
PLATELET # BLD AUTO: 196 K/UL — SIGNIFICANT CHANGE UP (ref 150–400)
POTASSIUM SERPL-MCNC: 4.1 MMOL/L — SIGNIFICANT CHANGE UP (ref 3.5–5.3)
POTASSIUM SERPL-MCNC: 4.2 MMOL/L — SIGNIFICANT CHANGE UP (ref 3.5–5.3)
POTASSIUM SERPL-MCNC: 4.3 MMOL/L — SIGNIFICANT CHANGE UP (ref 3.5–5.3)
POTASSIUM SERPL-SCNC: 4.1 MMOL/L — SIGNIFICANT CHANGE UP (ref 3.5–5.3)
POTASSIUM SERPL-SCNC: 4.2 MMOL/L — SIGNIFICANT CHANGE UP (ref 3.5–5.3)
POTASSIUM SERPL-SCNC: 4.3 MMOL/L — SIGNIFICANT CHANGE UP (ref 3.5–5.3)
PROT SERPL-MCNC: 5.9 GM/DL — LOW (ref 6–8.3)
PROTHROM AB SERPL-ACNC: 18.5 SEC — HIGH (ref 10.6–13.6)
RBC # BLD: 2.69 M/UL — LOW (ref 3.8–5.2)
RBC # BLD: 2.87 M/UL — LOW (ref 3.8–5.2)
RBC # BLD: 3.38 M/UL — LOW (ref 3.8–5.2)
RBC # FLD: 16.9 % — HIGH (ref 10.3–14.5)
RBC # FLD: 17.2 % — HIGH (ref 10.3–14.5)
RBC # FLD: 17.4 % — HIGH (ref 10.3–14.5)
SODIUM SERPL-SCNC: 132 MMOL/L — LOW (ref 135–145)
SODIUM SERPL-SCNC: 134 MMOL/L — LOW (ref 135–145)
SODIUM SERPL-SCNC: 135 MMOL/L — SIGNIFICANT CHANGE UP (ref 135–145)
TROPONIN I SERPL-MCNC: 0.22 NG/ML — HIGH (ref 0.02–0.06)
WBC # BLD: 13.29 K/UL — HIGH (ref 3.8–10.5)
WBC # BLD: 13.78 K/UL — HIGH (ref 3.8–10.5)
WBC # BLD: 15.12 K/UL — HIGH (ref 3.8–10.5)
WBC # FLD AUTO: 13.29 K/UL — HIGH (ref 3.8–10.5)
WBC # FLD AUTO: 13.78 K/UL — HIGH (ref 3.8–10.5)
WBC # FLD AUTO: 15.12 K/UL — HIGH (ref 3.8–10.5)

## 2021-05-05 PROCEDURE — 96374 THER/PROPH/DIAG INJ IV PUSH: CPT

## 2021-05-05 PROCEDURE — 36430 TRANSFUSION BLD/BLD COMPNT: CPT

## 2021-05-05 PROCEDURE — 76775 US EXAM ABDO BACK WALL LIM: CPT

## 2021-05-05 PROCEDURE — 80048 BASIC METABOLIC PNL TOTAL CA: CPT

## 2021-05-05 PROCEDURE — 82962 GLUCOSE BLOOD TEST: CPT

## 2021-05-05 PROCEDURE — U0005: CPT

## 2021-05-05 PROCEDURE — 71250 CT THORAX DX C-: CPT

## 2021-05-05 PROCEDURE — 71045 X-RAY EXAM CHEST 1 VIEW: CPT

## 2021-05-05 PROCEDURE — 97530 THERAPEUTIC ACTIVITIES: CPT | Mod: GP

## 2021-05-05 PROCEDURE — 86923 COMPATIBILITY TEST ELECTRIC: CPT

## 2021-05-05 PROCEDURE — 84300 ASSAY OF URINE SODIUM: CPT

## 2021-05-05 PROCEDURE — 93010 ELECTROCARDIOGRAM REPORT: CPT

## 2021-05-05 PROCEDURE — 97110 THERAPEUTIC EXERCISES: CPT | Mod: GP

## 2021-05-05 PROCEDURE — 81001 URINALYSIS AUTO W/SCOPE: CPT

## 2021-05-05 PROCEDURE — 80061 LIPID PANEL: CPT

## 2021-05-05 PROCEDURE — 97116 GAIT TRAINING THERAPY: CPT | Mod: GP

## 2021-05-05 PROCEDURE — 82550 ASSAY OF CK (CPK): CPT

## 2021-05-05 PROCEDURE — 93306 TTE W/DOPPLER COMPLETE: CPT

## 2021-05-05 PROCEDURE — 80053 COMPREHEN METABOLIC PANEL: CPT

## 2021-05-05 PROCEDURE — 72191 CT ANGIOGRAPH PELV W/O&W/DYE: CPT | Mod: 26,59

## 2021-05-05 PROCEDURE — 76770 US EXAM ABDO BACK WALL COMP: CPT

## 2021-05-05 PROCEDURE — U0003: CPT

## 2021-05-05 PROCEDURE — 76882 US LMTD JT/FCL EVL NVASC XTR: CPT

## 2021-05-05 PROCEDURE — 85025 COMPLETE CBC W/AUTO DIFF WBC: CPT

## 2021-05-05 PROCEDURE — 87070 CULTURE OTHR SPECIMN AEROBIC: CPT

## 2021-05-05 PROCEDURE — 88304 TISSUE EXAM BY PATHOLOGIST: CPT

## 2021-05-05 PROCEDURE — 93971 EXTREMITY STUDY: CPT | Mod: LT

## 2021-05-05 PROCEDURE — 99285 EMERGENCY DEPT VISIT HI MDM: CPT | Mod: 25

## 2021-05-05 PROCEDURE — 87102 FUNGUS ISOLATION CULTURE: CPT

## 2021-05-05 PROCEDURE — 86022 PLATELET ANTIBODIES: CPT

## 2021-05-05 PROCEDURE — 83735 ASSAY OF MAGNESIUM: CPT

## 2021-05-05 PROCEDURE — 84100 ASSAY OF PHOSPHORUS: CPT

## 2021-05-05 PROCEDURE — 87040 BLOOD CULTURE FOR BACTERIA: CPT

## 2021-05-05 PROCEDURE — 80074 ACUTE HEPATITIS PANEL: CPT

## 2021-05-05 PROCEDURE — P9016: CPT

## 2021-05-05 PROCEDURE — C1889: CPT

## 2021-05-05 PROCEDURE — 73706 CT ANGIO LWR EXTR W/O&W/DYE: CPT | Mod: 26,50

## 2021-05-05 PROCEDURE — 36415 COLL VENOUS BLD VENIPUNCTURE: CPT

## 2021-05-05 PROCEDURE — 87186 SC STD MICRODIL/AGAR DIL: CPT

## 2021-05-05 PROCEDURE — 85027 COMPLETE CBC AUTOMATED: CPT

## 2021-05-05 PROCEDURE — 87150 DNA/RNA AMPLIFIED PROBE: CPT

## 2021-05-05 PROCEDURE — 84484 ASSAY OF TROPONIN QUANT: CPT

## 2021-05-05 PROCEDURE — 83880 ASSAY OF NATRIURETIC PEPTIDE: CPT

## 2021-05-05 PROCEDURE — 86850 RBC ANTIBODY SCREEN: CPT

## 2021-05-05 PROCEDURE — 94640 AIRWAY INHALATION TREATMENT: CPT

## 2021-05-05 PROCEDURE — 86769 SARS-COV-2 COVID-19 ANTIBODY: CPT

## 2021-05-05 PROCEDURE — 87635 SARS-COV-2 COVID-19 AMP PRB: CPT

## 2021-05-05 PROCEDURE — 85610 PROTHROMBIN TIME: CPT

## 2021-05-05 PROCEDURE — 72191 CT ANGIOGRAPH PELV W/O&W/DYE: CPT

## 2021-05-05 PROCEDURE — 87075 CULTR BACTERIA EXCEPT BLOOD: CPT

## 2021-05-05 PROCEDURE — 88304 TISSUE EXAM BY PATHOLOGIST: CPT | Mod: 26

## 2021-05-05 PROCEDURE — 96361 HYDRATE IV INFUSION ADD-ON: CPT

## 2021-05-05 PROCEDURE — 83605 ASSAY OF LACTIC ACID: CPT

## 2021-05-05 PROCEDURE — 86901 BLOOD TYPING SEROLOGIC RH(D): CPT

## 2021-05-05 PROCEDURE — 93005 ELECTROCARDIOGRAM TRACING: CPT

## 2021-05-05 PROCEDURE — 73706 CT ANGIO LWR EXTR W/O&W/DYE: CPT | Mod: 50

## 2021-05-05 PROCEDURE — 93970 EXTREMITY STUDY: CPT

## 2021-05-05 PROCEDURE — 97163 PT EVAL HIGH COMPLEX 45 MIN: CPT | Mod: GP

## 2021-05-05 PROCEDURE — 86900 BLOOD TYPING SEROLOGIC ABO: CPT

## 2021-05-05 RX ORDER — ATORVASTATIN CALCIUM 80 MG/1
40 TABLET, FILM COATED ORAL AT BEDTIME
Refills: 0 | Status: DISCONTINUED | OUTPATIENT
Start: 2021-05-05 | End: 2021-05-21

## 2021-05-05 RX ORDER — ASPIRIN/CALCIUM CARB/MAGNESIUM 324 MG
81 TABLET ORAL DAILY
Refills: 0 | Status: DISCONTINUED | OUTPATIENT
Start: 2021-05-05 | End: 2021-05-21

## 2021-05-05 RX ORDER — ALBUTEROL 90 UG/1
1 AEROSOL, METERED ORAL EVERY 6 HOURS
Refills: 0 | Status: DISCONTINUED | OUTPATIENT
Start: 2021-05-05 | End: 2021-05-21

## 2021-05-05 RX ORDER — DEXTROSE 50 % IN WATER 50 %
25 SYRINGE (ML) INTRAVENOUS ONCE
Refills: 0 | Status: DISCONTINUED | OUTPATIENT
Start: 2021-05-05 | End: 2021-05-07

## 2021-05-05 RX ORDER — DEXTROSE 50 % IN WATER 50 %
12.5 SYRINGE (ML) INTRAVENOUS ONCE
Refills: 0 | Status: DISCONTINUED | OUTPATIENT
Start: 2021-05-05 | End: 2021-05-07

## 2021-05-05 RX ORDER — IPRATROPIUM/ALBUTEROL SULFATE 18-103MCG
3 AEROSOL WITH ADAPTER (GRAM) INHALATION
Qty: 0 | Refills: 0 | DISCHARGE
Start: 2021-05-05

## 2021-05-05 RX ORDER — ASPIRIN/CALCIUM CARB/MAGNESIUM 324 MG
1 TABLET ORAL
Qty: 0 | Refills: 0 | DISCHARGE
Start: 2021-05-05

## 2021-05-05 RX ORDER — SODIUM CHLORIDE 9 MG/ML
1000 INJECTION, SOLUTION INTRAVENOUS
Refills: 0 | Status: DISCONTINUED | OUTPATIENT
Start: 2021-05-05 | End: 2021-05-07

## 2021-05-05 RX ORDER — MEROPENEM 1 G/30ML
1000 INJECTION INTRAVENOUS
Qty: 0 | Refills: 0 | DISCHARGE
Start: 2021-05-05

## 2021-05-05 RX ORDER — ALBUTEROL 90 UG/1
1 AEROSOL, METERED ORAL
Qty: 0 | Refills: 0 | DISCHARGE
Start: 2021-05-05

## 2021-05-05 RX ORDER — LISINOPRIL 2.5 MG/1
10 TABLET ORAL DAILY
Refills: 0 | Status: DISCONTINUED | OUTPATIENT
Start: 2021-05-05 | End: 2021-05-06

## 2021-05-05 RX ORDER — AMLODIPINE BESYLATE 2.5 MG/1
10 TABLET ORAL DAILY
Refills: 0 | Status: DISCONTINUED | OUTPATIENT
Start: 2021-05-05 | End: 2021-05-06

## 2021-05-05 RX ORDER — FUROSEMIDE 40 MG
20 TABLET ORAL DAILY
Refills: 0 | Status: DISCONTINUED | OUTPATIENT
Start: 2021-05-05 | End: 2021-05-05

## 2021-05-05 RX ORDER — ATORVASTATIN CALCIUM 80 MG/1
1 TABLET, FILM COATED ORAL
Qty: 0 | Refills: 0 | DISCHARGE
Start: 2021-05-05

## 2021-05-05 RX ORDER — METOPROLOL TARTRATE 50 MG
25 TABLET ORAL
Refills: 0 | Status: DISCONTINUED | OUTPATIENT
Start: 2021-05-05 | End: 2021-05-07

## 2021-05-05 RX ORDER — MORPHINE SULFATE 50 MG/1
5 CAPSULE, EXTENDED RELEASE ORAL
Refills: 0 | Status: DISCONTINUED | OUTPATIENT
Start: 2021-05-05 | End: 2021-05-12

## 2021-05-05 RX ORDER — DEXTROSE 50 % IN WATER 50 %
15 SYRINGE (ML) INTRAVENOUS ONCE
Refills: 0 | Status: DISCONTINUED | OUTPATIENT
Start: 2021-05-05 | End: 2021-05-07

## 2021-05-05 RX ORDER — SODIUM CHLORIDE 9 MG/ML
1000 INJECTION INTRAMUSCULAR; INTRAVENOUS; SUBCUTANEOUS
Refills: 0 | Status: DISCONTINUED | OUTPATIENT
Start: 2021-05-05 | End: 2021-05-06

## 2021-05-05 RX ORDER — TIOTROPIUM BROMIDE 18 UG/1
1 CAPSULE ORAL; RESPIRATORY (INHALATION) DAILY
Refills: 0 | Status: DISCONTINUED | OUTPATIENT
Start: 2021-05-05 | End: 2021-05-05

## 2021-05-05 RX ORDER — MEROPENEM 1 G/30ML
1000 INJECTION INTRAVENOUS EVERY 12 HOURS
Refills: 0 | Status: DISCONTINUED | OUTPATIENT
Start: 2021-05-05 | End: 2021-05-06

## 2021-05-05 RX ORDER — VANCOMYCIN HCL 1 G
1 VIAL (EA) INTRAVENOUS
Qty: 0 | Refills: 0 | DISCHARGE
Start: 2021-05-05

## 2021-05-05 RX ORDER — FOLIC ACID 0.8 MG
1 TABLET ORAL AT BEDTIME
Refills: 0 | Status: DISCONTINUED | OUTPATIENT
Start: 2021-05-05 | End: 2021-05-21

## 2021-05-05 RX ORDER — TIOTROPIUM BROMIDE 18 UG/1
1 CAPSULE ORAL; RESPIRATORY (INHALATION)
Qty: 0 | Refills: 0 | DISCHARGE
Start: 2021-05-05

## 2021-05-05 RX ORDER — OXYCODONE HYDROCHLORIDE 5 MG/1
5 TABLET ORAL ONCE
Refills: 0 | Status: DISCONTINUED | OUTPATIENT
Start: 2021-05-05 | End: 2021-05-05

## 2021-05-05 RX ORDER — ACETAMINOPHEN 500 MG
1000 TABLET ORAL ONCE
Refills: 0 | Status: DISCONTINUED | OUTPATIENT
Start: 2021-05-05 | End: 2021-05-05

## 2021-05-05 RX ORDER — GABAPENTIN 400 MG/1
300 CAPSULE ORAL THREE TIMES A DAY
Refills: 0 | Status: DISCONTINUED | OUTPATIENT
Start: 2021-05-05 | End: 2021-05-21

## 2021-05-05 RX ORDER — ATORVASTATIN CALCIUM 80 MG/1
40 TABLET, FILM COATED ORAL AT BEDTIME
Refills: 0 | Status: DISCONTINUED | OUTPATIENT
Start: 2021-05-05 | End: 2021-05-05

## 2021-05-05 RX ORDER — FUROSEMIDE 40 MG
20 TABLET ORAL
Qty: 0 | Refills: 0 | DISCHARGE
Start: 2021-05-05

## 2021-05-05 RX ORDER — FENTANYL CITRATE 50 UG/ML
25 INJECTION INTRAVENOUS
Refills: 0 | Status: DISCONTINUED | OUTPATIENT
Start: 2021-05-05 | End: 2021-05-05

## 2021-05-05 RX ORDER — SODIUM CHLORIDE 9 MG/ML
1000 INJECTION INTRAMUSCULAR; INTRAVENOUS; SUBCUTANEOUS
Refills: 0 | Status: DISCONTINUED | OUTPATIENT
Start: 2021-05-05 | End: 2021-05-05

## 2021-05-05 RX ORDER — INSULIN LISPRO 100/ML
VIAL (ML) SUBCUTANEOUS
Refills: 0 | Status: DISCONTINUED | OUTPATIENT
Start: 2021-05-05 | End: 2021-05-07

## 2021-05-05 RX ORDER — TIOTROPIUM BROMIDE 18 UG/1
1 CAPSULE ORAL; RESPIRATORY (INHALATION) DAILY
Refills: 0 | Status: DISCONTINUED | OUTPATIENT
Start: 2021-05-05 | End: 2021-05-21

## 2021-05-05 RX ORDER — ONDANSETRON 8 MG/1
4 TABLET, FILM COATED ORAL ONCE
Refills: 0 | Status: DISCONTINUED | OUTPATIENT
Start: 2021-05-05 | End: 2021-05-05

## 2021-05-05 RX ORDER — GLUCAGON INJECTION, SOLUTION 0.5 MG/.1ML
1 INJECTION, SOLUTION SUBCUTANEOUS ONCE
Refills: 0 | Status: DISCONTINUED | OUTPATIENT
Start: 2021-05-05 | End: 2021-05-07

## 2021-05-05 RX ADMIN — GABAPENTIN 300 MILLIGRAM(S): 400 CAPSULE ORAL at 05:10

## 2021-05-05 RX ADMIN — MEROPENEM 100 MILLIGRAM(S): 1 INJECTION INTRAVENOUS at 20:23

## 2021-05-05 RX ADMIN — Medication 25 MILLIGRAM(S): at 05:10

## 2021-05-05 RX ADMIN — GABAPENTIN 300 MILLIGRAM(S): 400 CAPSULE ORAL at 21:42

## 2021-05-05 RX ADMIN — AMLODIPINE BESYLATE 10 MILLIGRAM(S): 2.5 TABLET ORAL at 05:09

## 2021-05-05 RX ADMIN — METHOTREXATE 15 MILLIGRAM(S): 2.5 TABLET ORAL at 09:44

## 2021-05-05 RX ADMIN — Medication 20 MILLIGRAM(S): at 05:10

## 2021-05-05 RX ADMIN — SODIUM CHLORIDE 100 MILLILITER(S): 9 INJECTION INTRAMUSCULAR; INTRAVENOUS; SUBCUTANEOUS at 15:32

## 2021-05-05 RX ADMIN — Medication 25 MILLIGRAM(S): at 21:42

## 2021-05-05 RX ADMIN — LISINOPRIL 10 MILLIGRAM(S): 2.5 TABLET ORAL at 05:10

## 2021-05-05 RX ADMIN — Medication 250 MILLIGRAM(S): at 04:36

## 2021-05-05 RX ADMIN — APIXABAN 5 MILLIGRAM(S): 2.5 TABLET, FILM COATED ORAL at 05:09

## 2021-05-05 RX ADMIN — MEROPENEM 100 MILLIGRAM(S): 1 INJECTION INTRAVENOUS at 05:12

## 2021-05-05 RX ADMIN — ATORVASTATIN CALCIUM 40 MILLIGRAM(S): 80 TABLET, FILM COATED ORAL at 21:42

## 2021-05-05 NOTE — ED ADULT NURSE REASSESSMENT NOTE - NS ED NURSE REASSESS COMMENT FT1
Received pt from [facility]. pt VSS. pt awake, alert. Surgical Pa to bedside to assess patient.

## 2021-05-05 NOTE — H&P ADULT - NSHPPHYSICALEXAM_GEN_ALL_CORE
GENERAL: NAD, eldelry woman appears stated age  HEAD:  Atraumatic, Normocephalic  EYES: EOMI, conjunctiva and sclera clear  ENT: Moist mucous membranes,  NECK: Supple, No JVD, no bruits  CHEST/LUNG: Clear to percussion bilaterally; No rales, rhonchi, wheezing, or rubs  HEART: Regular rate and rhythm; No murmurs, rubs, or gallops PMI non displaced.  ABDOMEN: Soft, Nontender, Nondistended; Bowel sounds present  EXTREMITIES:  2+ Peripheral Pulses, No clubbing, cyanosis, or edema  SKIN: L medial thigh dressing taken down - wound open approx 0.5cm, mild erythema surrounding wound, purulence able to be expressed, new dressing applied  Dopplerable left DP/PT pulses  Chronic left heel ulcer

## 2021-05-05 NOTE — PROGRESS NOTE ADULT - ASSESSMENT
Ass:  S/P exploration of infected incision L distal medial thigh, wash out with antibiotic solution, debridement of incision  Plan:  Continue present management

## 2021-05-05 NOTE — CONSULT NOTE ADULT - SUBJECTIVE AND OBJECTIVE BOX
CC:  Patient is a 86y old  Female who presents with a chief complaint of dyspnea       HPI:  86 y.o. female with COPD, arthritis, CAD, DMII, depression, HTN, PVD, PAD, s/p left fem pop bypass on 21, +lower ext DVT on eliquis, +chronic left heel ulcer, RA, anemia, presents to the ED with worsening dyspnea, wheezing that started tonight.  Pt received 2 Duoneb tx at home, with only slight improvement, so EMS called .  Pt hypoxic to 87% on room air, was placed on 100%NRBM.  In the ED, pt was placed on 2 LPM via NC, received a dose of Lasix 20 mg IV x 1.    CT Chest reports small bilateral pleural effusions and pulmonary edema.  Emphysema, w/ additional honeycombing/fibrosis.  Mildly asymmetrical 1.5 cm nodular breast tissue on the left. Correlation with follow-up mammography/ultrasound is recommended.  2.9 cm slightly hyperdense right renal lesion, most likely hemorrhagic/proteinaceous cyst.   Trop noted to be 0.17.  BMP >28069  Pt states she has occ cough, with greenish phlegm, denies chest pain, fever, abd pain, diarrhea.  She still has chronic left heel ulcer, cleans it Betadine.  Pt states she had cardiac cath in 2021 preop, showed non occlusive CAD.  LAD: Diffuse irregularity.  There is moderate disease in distal LAD   LCx: Diffuse irregularity.Large vessel   RCA: Diffuse irregularity.  Small non-dominant       (03 May 2021 23:51)      PAST MEDICAL & SURGICAL HISTORY:  Hypertension    Anemia    Arthritis, rheumatoid    Depression    Type 2 diabetes mellitus  on insulin    PVD (peripheral vascular disease) with claudication    CAD (coronary artery disease)  non-obstructive    Rheumatoid arthritis    fracture ankle right  plate . screws       S/P cataract surgery      Hip fracture requiring operative repair  Right hip 2020        Allergies    cephalosporins (Other)  penicillins (Urticaria; Pruritus)    Intolerances        SOCIAL HISTORY          Smoking: Yes [ ]  No [ ]   ______pk yrs          ETOH  Yes [ ]  No [ ]  Social [ ]          DRUGS:  Yes [ ]  No [ ]  if so what______________    FAMILY HISTORY:  FH: colon cancer  father    FH: heart attack  father    Family history of atherosclerosis  mother        MEDICATIONS  (STANDING):  amLODIPine   Tablet 10 milliGRAM(s) Oral daily  apixaban 5 milliGRAM(s) Oral every 12 hours  aspirin  chewable 81 milliGRAM(s) Oral daily  dextrose 40% Gel 15 Gram(s) Oral once  dextrose 5%. 1000 milliLiter(s) (50 mL/Hr) IV Continuous <Continuous>  dextrose 5%. 1000 milliLiter(s) (100 mL/Hr) IV Continuous <Continuous>  dextrose 50% Injectable 25 Gram(s) IV Push once  dextrose 50% Injectable 12.5 Gram(s) IV Push once  dextrose 50% Injectable 25 Gram(s) IV Push once  folic acid 1 milliGRAM(s) Oral daily  furosemide   Injectable 20 milliGRAM(s) IV Push daily  gabapentin 300 milliGRAM(s) Oral three times a day  glucagon  Injectable 1 milliGRAM(s) IntraMuscular once  insulin glargine Injectable (LANTUS) 20 Unit(s) SubCutaneous at bedtime  insulin lispro (ADMELOG) corrective regimen sliding scale   SubCutaneous three times a day before meals  lisinopril 10 milliGRAM(s) Oral daily  meropenem  IVPB 1000 milliGRAM(s) IV Intermittent every 12 hours  methotrexate 15 milliGRAM(s) Oral <User Schedule>  metoprolol tartrate 25 milliGRAM(s) Oral two times a day  PARoxetine 20 milliGRAM(s) Oral daily  tiotropium 18 MICROgram(s) Capsule 1 Capsule(s) Inhalation daily  vancomycin  IVPB 1000 milliGRAM(s) IV Intermittent <User Schedule>    MEDICATIONS  (PRN):  acetaminophen   Tablet .. 650 milliGRAM(s) Oral every 6 hours PRN Temp greater or equal to 38C (100.4F), Mild Pain (1 - 3)  ALBUTerol    90 MICROgram(s) HFA Inhaler 1 Puff(s) Inhalation every 6 hours PRN Shortness of Breath  albuterol/ipratropium for Nebulization 3 milliLiter(s) Nebulizer every 4 hours PRN Shortness of Breath and/or Wheezing        Review of systems:  General:   fever    Pulmonary:  SOB  Cardiac:  denies chest pain, palpitations  GI:  no appetite  :  no increase frequency, or burning  Heme:  no easy bruising with minor trauma  Musculoskeletal:  No history of back pain or trauma  Skin:  no history of rashes, injury, trauma  Neuro:  no weakness         Vital Signs Last 24 Hrs  T(C): 37.8 (05 May 2021 05:08), Max: 38.4 (04 May 2021 20:20)  T(F): 100.1 (05 May 2021 05:08), Max: 101.2 (04 May 2021 20:20)  HR: 91 (05 May 2021 05:08) (70 - 97)  BP: 133/45 (05 May 2021 05:08) (111/45 - 133/45)  BP(mean): --  RR: 15 (05 May 2021 05:08) (13 - 20)  SpO2: 97% (05 May 2021 05:08) (97% - 100%)    Physical Exam:    General:    no distress  Extremities:  Left heel with brown soft eschar 2.5 cm x 1.5 cm with no periwound redness.  medial distal left thigh incision with . 5 cm opening with copious purulent drainage.  culture reportedly sent yesterday.  as much as possible purulent drainage was expressed, sterile gauze applied to open wound.  DP and PT pulses present by hand held doppler       LABS:                        8.8    15.12 )-----------( 182      ( 05 May 2021 06:26 )             26.7     05-05    132<L>  |  101  |  46<H>  ----------------------------<  186<H>  4.3   |  21<L>  |  1.39<H>    Ca    8.2<L>      05 May 2021 06:26  Mg     1.7     05-04    TPro  6.7  /  Alb  2.3<L>  /  TBili  0.5  /  DBili  x   /  AST  11  /  ALT  8<L>  /  AlkPhos  91  05-03      Urinalysis Basic - ( 04 May 2021 01:50 )    Color: Yellow / Appearance: Clear / S.015 / pH: x  Gluc: x / Ketone: Negative  / Bili: Negative / Urobili: Negative   Blood: x / Protein: 100 / Nitrite: Negative   Leuk Esterase: Negative / RBC: 0-4 /HPF / WBC 0-2 /HPF   Sq Epi: x / Non Sq Epi: Neg.-Few / Bacteria: Few /HPF        RADIOLOGY & ADDITIONAL STUDIES:    Risks, benefits, and alternatives to treatment discussed. All questions answered with understanding.    Procedure Performed:  (  )Yes     (  c)No  Name of Procedure:      [  ]Debridement     [  ]I&D    [  ]Laceration Repair     [  ]Other:  (  )partial thickness     (  )full thickness     (  )subcutaneous     (  )muscle/tendon     (  )bone  (  )sharp     (  )surgical

## 2021-05-05 NOTE — DISCHARGE NOTE NURSING/CASE MANAGEMENT/SOCIAL WORK - PATIENT PORTAL LINK FT
You can access the FollowMyHealth Patient Portal offered by [facility] by registering at the following website: [email or web address]. By joining [facility]’s FollowMyHealth portal, you will also be able to view your health information using other applications (apps) compatible with our system.

## 2021-05-05 NOTE — DISCHARGE NOTE PROVIDER - NSDCCPCAREPLAN_GEN_ALL_CORE_FT
PRINCIPAL DISCHARGE DIAGNOSIS  Diagnosis: Wound infection after surgery  Assessment and Plan of Treatment: - You will be transferred to [facility] to follow up with your surgeon.   - Follow up with medical team at [facility] and then with your PMD.

## 2021-05-05 NOTE — PROGRESS NOTE ADULT - SUBJECTIVE AND OBJECTIVE BOX
Patient is a 86y old  Female who presents with a chief complaint of S/P Exploration of infected incision L distal medial thigh, wash out with antibiotic solution, debridement of incision    Post-op pt is doing well.  She is awake, sitting up in bed, in good spirits and without complaints    HPI:  86y Female with PMH of COPD, arthritis, CAD, DMII, depression, HTN, PVD, PAD, s/p left fem pop bypass on 03/24/21, +lower ext DVT on eliquis, +chronic left heel ulcer, RA, anemia, presented to the ED at [facility] on 5/3 with worsening dyspnea, wheezing that started that night. Pt received 2 Duoneb treatments at home, with only slight improvement, so EMS called. At home patient was hypoxic to 87% on room air, was placed on 100% NRB mask. In the ED at [facility], pt was placed on 2 LPM via NC, received a dose of Lasix 20 mg IV x 1. Initial trop noted to be 0.17.  BNP >59615    Maria Elena Ahumada was admitted to telemetry floor for acute CHF and elevated troponin. Her respiratory symptoms have improved with diuresis. Her troponin levels were monitored and now have downtrended. She was started on aspirin and statin. Already had been on BB and ACEi. Cardiology was consulted, advised troponin elevated due to type II MI.     On her first night of admission at [facility] 5/3 patient developed fever which has persisted throughout admission. ID was consulted, patient was started on vancomycin and meropenem. Overnight on 5/4-5/5 patient had extensive purulent drainage from left medial thigh surgical site. Plastic surgery was consulted, advised transfer to [facility] for evaluation by Dr Ventura who did fem pop bypass 3/2021.      In ED at [facility] on 5/5, patient had no complaints.  Denied any CP, SOB, lower extremity pain, numbness or tingling.     (05 May 2021 12:19)      PAST MEDICAL & SURGICAL HISTORY:  Hypertension    Anemia    Arthritis, rheumatoid    Depression    Type 2 diabetes mellitus  on insulin    PVD (peripheral vascular disease) with claudication    CAD (coronary artery disease)  non-obstructive    Rheumatoid arthritis    fracture ankle right  plate . screws   2000    S/P cataract surgery  2011    Hip fracture requiring operative repair  Right hip 11/14/2020        MEDICATIONS  (STANDING):  amLODIPine   Tablet 10 milliGRAM(s) Oral daily  aspirin  chewable 81 milliGRAM(s) Oral daily  atorvastatin 40 milliGRAM(s) Oral at bedtime  dextrose 40% Gel 15 Gram(s) Oral once  dextrose 5%. 1000 milliLiter(s) (50 mL/Hr) IV Continuous <Continuous>  dextrose 5%. 1000 milliLiter(s) (100 mL/Hr) IV Continuous <Continuous>  dextrose 50% Injectable 25 Gram(s) IV Push once  dextrose 50% Injectable 12.5 Gram(s) IV Push once  dextrose 50% Injectable 25 Gram(s) IV Push once  folic acid 1 milliGRAM(s) Oral at bedtime  gabapentin 300 milliGRAM(s) Oral three times a day  glucagon  Injectable 1 milliGRAM(s) IntraMuscular once  insulin lispro (ADMELOG) corrective regimen sliding scale   SubCutaneous three times a day before meals  lisinopril 10 milliGRAM(s) Oral daily  meropenem  IVPB 1000 milliGRAM(s) IV Intermittent every 12 hours  metoprolol tartrate 25 milliGRAM(s) Oral two times a day  PARoxetine 20 milliGRAM(s) Oral daily  sodium chloride 0.9%. 1000 milliLiter(s) (80 mL/Hr) IV Continuous <Continuous>  tiotropium 18 MICROgram(s) Capsule 1 Capsule(s) Inhalation daily      PHYSICAL EXAM:  Vitals:   HR:  87  BP:  133/49  O2Sat 98  RR 24              Skin:  warm and dry    Respiratory: Lungs clear and equal bilat, no r/r/w    Cardiovascular:  S1S2 reg, no M    Gastrointestinal:  non-distended, +NABS, soft, non-tender    Extremities:  LLE:  dressing over medial thigh, stained with light pink serosanginous drainage    Vascular:  +doppler pulses bilat lower extremities    Neurological:  A & O X3, CNII-XII grossly intact                 Complete Blood Count (05.05.21 @ 19:15)   WBC Count: 13.29 K/uL   Hemoglobin: 10.4 g/dL   Hematocrit: 31.0 %   Platelet Count - Automated: 194 K/uL     05-05    134<L>  |  106  |  43<H>  ----------------------------<  132<H>  4.1   |  19<L>  |  1.06    Ca    7.8<L>      05 May 2021 19:15    TPro  5.9<L>  /  Alb  1.9<L>  /  TBili  0.6  /  DBili  x   /  AST  5<L>  /  ALT  <6<L>  /  AlkPhos  71  05-05

## 2021-05-05 NOTE — PATIENT PROFILE ADULT - SURGICAL SITE DESCRIPTION
left thigh/knee - unable to visualize due to new surgical dressing from today 5/5 - pt ER-->OR-->PACU-->SICU

## 2021-05-05 NOTE — H&P ADULT - ASSESSMENT
85 yo female transferred to [facility] from [facility] due to fevers, leukocytosis and open/draining left medial thigh wound s/p fem-pop bypass 3/24 with Dr. Ventura, initially admitted to [facility] on 5/3 for dyspnea, pulm edema, elevated troponins, trending down.    -Admit to Dr. Ventura's service, step down unit  -NPO  -NS at 100mls/hr  -Stat CTA with IV contrast of pelvis and LLE  -ID consult, patient on vanco and meropenem at [facility].  Bl cx pending from 5/3, Wound cx pending 5/4  -Stat CBC/CMP/coags/T&S pending in ED  -Consult hospitalist, cardiology and pulmonology   -Hold eliquis, confirmed with NP at [facility] she last received this at 0500AM on 5/5.

## 2021-05-05 NOTE — CHART NOTE - NSCHARTNOTEFT_GEN_A_CORE
Reviewed prior progress notes, labs and imaging.    Discussed with Dr Noble    Primary Diagnosis: Dyspnea    85 yo female with COPD, arthritis, CAD, DMII, depression, HTN, PVD, PAD, s/p left fem pop bypass on 03/24/21, +lower ext DVT on eliquis, +chronic left heel ulcer, RA, anemia, presented to the ED with worsening dyspnea, wheezing that started that night. Pt received 2 Duoneb treatments at home, with only slight improvement, so EMS called. At home patient was hypoxic to 87% on room air, was placed on 100% NRB mask. In the ED, pt was placed on 2 LPM via NC, received a dose of Lasix 20 mg IV x 1. CT Chest reports small bilateral pleural effusions and pulmonary edema.  Emphysema, w/ additional honeycombing/fibrosis.  Mildly asymmetrical 1.5 cm nodular breast tissue on the left. Correlation with follow-up mammography/ultrasound is recommended.  2.9 cm slightly hyperdense right renal lesion, most likely hemorrhagic/proteinaceous cyst. Initial trop noted to be 0.17.  BNP >10886    #Acute diastolic CHF   #Elevated trop - demand ischemia  - Patient presented with dyspnea, wheezing. CT showed bilateral pleural effusions and pulmonary edema. BNP >30,000   - Troponin elevated as high as 0.413     Pt denies chest pain at this time.  - Continue tele monitoring  - Symptoms improved following 20 mg lasix IVP  - Continue lasix 20mg IVP daily for now  - Continue supplemental O2 as needed, currently patient is on nasal cannula 3L/m with SpO2 96%  - f/u TTE  - Started aspirin 81mg daily  - Cardiology consult pending.     #Fever 2/2 ?CAP vs. Chronic left heel ulcer  - Overnight patient was febrile to 103.2  - COVID PCR neg, UA neg  - CXR with mild scattered interstitial infiltrate consistent with pneumonia or pneumonitis without segmental consolidation, layering effusion or cavitation.  - As per ED note patient currently on abx for cellulitis of left heel wound? Will f/u with PCP  - Received 1 dose ertapenem last night, now on empiric aztreonam and vancomycin  - Continue IV antibiotics  - Local wound care  - f/u blood cultures   - Monitor WBC/fevers   - ID and plastics eval    #COPD  - Continue supplemental O2 as needed  - Bronchodilators PRN  - Pulm eval    #CAD  - Started aspirin    #DMII  - A1C 6.9% (3/31/21)  - Continue home med: lantus 20 units qHS  - Accuchecks and ISS    #Hx DVT, on eliquis  #s/p left fem pop bypass on 03/24/21  #PAD, PVD  - Continue eliquis  - Continue aspirin  - Continue gabapentin    #Depression  - Continue paxil     #HTN  - Continue home meds: amlodipine, lisinopril, metoprolol    #RA  - Continue home med: methotrexate    #Renal lesion  - F/u renal ultrasound     #Breast nodule  - Mammogram, breast ultrasound as outpt Reviewed prior progress notes, labs and imaging.    Discussed with Dr Noble    Primary Diagnosis: Dyspnea    87 yo female with COPD, arthritis, CAD, DMII, depression, HTN, PVD, PAD, s/p left fem pop bypass on 03/24/21, +lower ext DVT on eliquis, +chronic left heel ulcer, RA, anemia, presented to the ED with worsening dyspnea, wheezing that started that night. Pt received 2 Duoneb treatments at home, with only slight improvement, so EMS called. At home patient was hypoxic to 87% on room air, was placed on 100% NRB mask. In the ED, pt was placed on 2 LPM via NC, received a dose of Lasix 20 mg IV x 1. CT Chest reports small bilateral pleural effusions and pulmonary edema.  Emphysema, w/ additional honeycombing/fibrosis.  Mildly asymmetrical 1.5 cm nodular breast tissue on the left. Correlation with follow-up mammography/ultrasound is recommended.  2.9 cm slightly hyperdense right renal lesion, most likely hemorrhagic/proteinaceous cyst. Initial trop noted to be 0.17.  BNP >01907    **Overnight patient with extensive purulent drainage from left medial thigh surgical site     #Acute diastolic CHF   #Elevated trop - demand ischemia  - Patient presented with dyspnea, wheezing. CT showed bilateral pleural effusions and pulmonary edema. BNP >30,000   - Troponin elevated as high as 0.413, now downtrending  - Continue lasix 20mg IVP daily for now  - Continue supplemental O2 as needed, currently patient is on nasal cannula 2L/m with SpO2 96%  - f/u TTE  - Started aspirin 81mg daily  - Cardiology consult pending.     #Fever 2/2 suspected surgical site abscess   - Overnight extensive drainage from site. Persistently febrile  - COVID PCR neg, UA neg  - CXR with mild scattered interstitial infiltrate consistent with pneumonia or pneumonitis without segmental consolidation, layering effusion or cavitation.  - As per ED note patient currently on abx for cellulitis of left heel wound? Will f/u with PCP  - Received 1 dose ertapenem last night, now on empiric aztreonam and vancomycin  - Continue IV antibiotics  - Local wound care  - f/u blood cultures   - Monitor WBC/fevers   - ID and plastics eval    #COPD  - Continue supplemental O2 as needed  - Bronchodilators PRN  - Pulm eval    #CAD  - Started aspirin    #DMII  - A1C 6.9% (3/31/21)  - Continue home med: lantus 20 units qHS  - Accuchecks and ISS    #Hx DVT, on eliquis  #s/p left fem pop bypass on 03/24/21  #PAD, PVD  - Continue eliquis  - Continue aspirin  - Continue gabapentin    #Depression  - Continue paxil     #HTN  - Continue home meds: amlodipine, lisinopril, metoprolol    #RA  - Continue home med: methotrexate    #Renal lesion  - < from: US Renal (05.04.21 @ 11:01) >  Lesion seen in the right kidney on the CT corresponds to a mildly complex cyst in the right kidney. No further follow-up is required.  < end of copied text >    #Breast nodule  - Mammogram, breast ultrasound as outpt    Dispo: Possible transfer to [place] for f/u with vascular surgeon given concern for abscess Reviewed prior progress notes, labs and imaging.    Discussed with Dr Noble    Primary Diagnosis: Dyspnea    85 yo female with COPD, arthritis, CAD, DMII, depression, HTN, PVD, PAD, s/p left fem pop bypass on 03/24/21, +lower ext DVT on eliquis, +chronic left heel ulcer, RA, anemia, presented to the ED with worsening dyspnea, wheezing that started that night. Pt received 2 Duoneb treatments at home, with only slight improvement, so EMS called. At home patient was hypoxic to 87% on room air, was placed on 100% NRB mask. In the ED, pt was placed on 2 LPM via NC, received a dose of Lasix 20 mg IV x 1. CT Chest reports small bilateral pleural effusions and pulmonary edema.  Emphysema, w/ additional honeycombing/fibrosis.  Mildly asymmetrical 1.5 cm nodular breast tissue on the left. Correlation with follow-up mammography/ultrasound is recommended.  2.9 cm slightly hyperdense right renal lesion, most likely hemorrhagic/proteinaceous cyst. Initial trop noted to be 0.17.  BNP >75526    **Overnight patient with extensive purulent drainage from left medial thigh surgical site     #Acute diastolic CHF   #Elevated trop - demand ischemia  - Patient presented with dyspnea, wheezing. CT showed bilateral pleural effusions and pulmonary edema. BNP >30,000   - Continue supplemental O2 as needed, currently patient is on nasal cannula 2L/m with SpO2 96%  - Troponin elevated as high as 0.413, now downtrending  - Continue lasix 20mg IVP daily for now  - Started aspirin 81mg daily, will also start statin as per cards recs  - Continue BB, ACEi  - Cardiology following     #Fever 2/2 suspected surgical site abscess   #s/p left fem pop bypass on 03/24/21  - Overnight extensive drainage from site. Persistently febrile  - f/u blood cultures   - Monitor WBC/fevers   - Plastic surgery (Dr. Baugh) recommends transfer to [facility] as recent procedure there with Dr Ventura. Dr. Baugh spoke to Dr Ventura who requests transfer to [place] and to make patient NPO, will make patient NPO  - ID also following, will continue vancomycin and meropenem for now    #COPD  - Continue supplemental O2 as needed  - Spiriva, albuterol PRN  - Appreciate pulm recs    #CAD  - Started aspirin    #DMII  - A1C 6.9% (3/31/21)  - Continue home med: lantus 20 units qHS  - Accuchecks and ISS    #Hx DVT, on eliquis  #PAD, PVD  - Continue eliquis  - Continue aspirin  - Continue gabapentin    #Depression  - Continue paxil     #HTN  - Continue home meds: amlodipine, lisinopril, metoprolol    #RA  - Continue home med: methotrexate    #Renal lesion  - < from: US Renal (05.04.21 @ 11:01) >  Lesion seen in the right kidney on the CT corresponds to a mildly complex cyst in the right kidney. No further follow-up is required.  < end of copied text >    #Breast nodule  - Mammogram, breast ultrasound as outpt    Dispo: Transfer to [place]

## 2021-05-05 NOTE — DISCHARGE NOTE PROVIDER - CARE PROVIDER_API CALL
Uri Noble  INTERNAL MEDICINE  [address] [place] [address], [address]  [place], [place] [id number]  Phone: [phone]  Fax: [phone]  Follow Up Time:     Soren Ventura)  Vascular Surgery  [address] [facility], [place] [address]  Phone: [phone]  Fax: [phone]  Follow Up Time:

## 2021-05-05 NOTE — DISCHARGE NOTE PROVIDER - HOSPITAL COURSE
Hospital Course  86y Female with PMH of COPD, arthritis, CAD, DMII, depression, HTN, PVD, PAD, s/p left fem pop bypass on 03/24/21, +lower ext DVT on eliquis, +chronic left heel ulcer, RA, anemia, presented to the ED with worsening dyspnea, wheezing that started that night. Pt received 2 Duoneb treatments at home, with only slight improvement, so EMS called. At home patient was hypoxic to 87% on room air, was placed on 100% NRB mask. In the ED, pt was placed on 2 LPM via NC, received a dose of Lasix 20 mg IV x 1. Initial trop noted to be 0.17.  BNP >55543    Maria Elena Ahumada was admitted to telemetry floor for acute CHF and elevated troponin. Her respiratory symptoms have improved with diuresis. Her troponin levels were monitored and now have downtrended. She was started on aspirin and statin. Already had been on BB and ACEi. Cardiology was consulted.    On her first night of admission patient developed fever which has persisted throughout admission. ID was consulted, patient was started on vancomycin and meropenem. Overnight on 5/4-5/5 patient had extensive purulent drainage from left medial thigh surgical site. Plastic surgery was consulted, [facility] by Dr Ventura who did fem pop bypass 3/2021.      Patient was made NPO on morning on 5/5 before breakfast.      Dispo: Transfer to [place].    Source of Infection: Left thigh abscess  Antibiotic / Last Day: Vancomycin and meropenem     Discharging Provider:  Josefina Jay NP  Contact Info: [phone] - Please call with any questions or concerns.    Outpatient Provider: Dr. Noble          Hospital Course  86y Female with PMH of COPD, arthritis, CAD, DMII, depression, HTN, PVD, PAD, s/p left fem pop bypass on 03/24/21, +lower ext DVT on eliquis, +chronic left heel ulcer, RA, anemia, presented to the ED with worsening dyspnea, wheezing that started that night. Pt received 2 Duoneb treatments at home, with only slight improvement, so EMS called. At home patient was hypoxic to 87% on room air, was placed on 100% NRB mask. In the ED, pt was placed on 2 LPM via NC, received a dose of Lasix 20 mg IV x 1. Initial trop noted to be 0.17.  BNP >34883    Maria Elena Ahumada was admitted to telemetry floor for acute CHF and elevated troponin. Her respiratory symptoms have improved with diuresis. Her troponin levels were monitored and now have downtrended. She was started on aspirin and statin. Already had been on BB and ACEi. Cardiology was consulted, advised troponin elevated due to type II MI.     On her first night of admission patient developed fever which has persisted throughout admission. ID was consulted, patient was started on vancomycin and meropenem. Overnight on 5/4-5/5 patient had extensive purulent drainage from left medial thigh surgical site. Plastic surgery was consulted, advised transfer to [facility] for evaluation by Dr Ventura who did fem pop bypass 3/2021.      Patient was made NPO on morning on 5/5 before breakfast.    Patient was accepted for transfer to [facility]    Dispo: Transfer to [place].    Source of Infection: Left thigh abscess  Antibiotic / Last Day: Vancomycin and meropenem     Discharging Provider:  Josefina Jay NP  Contact Info: [phone] - Please call with any questions or concerns.    Outpatient Provider: Dr. Noble

## 2021-05-05 NOTE — CONSULT NOTE ADULT - ASSESSMENT
Pt is s/p left fem pop bypass with non vein graft on 3/24 by Dr. Chinchilla at [facility].  Now has purulent discharge from distal surgical incision.  I spoke to Dr. Chinchilla, he wants patient to be transferred to [facility] ASAP, Discussed with ASHLI Rocha patient to be made NPO and to update PMD Dr. Huang.  Also discussed with Dr. Nickerson.  Left leg warm with dopplerable DP and PT pulses

## 2021-05-05 NOTE — DISCHARGE NOTE PROVIDER - NSDCMRMEDTOKEN_GEN_ALL_CORE_FT
acetaminophen 325 mg oral tablet: 2 tab(s) orally every 8 hours, As needed, for pain   amLODIPine 10 mg oral tablet: 1 tab(s) orally once a day  Eliquis 5 mg oral tablet: 1 tab(s) orally 2 times a day  folic acid 1 mg oral tablet: 1 tab(s) orally once a day (at bedtime)  gabapentin 300 mg oral capsule: 1 cap(s) orally 3 times a day  Lantus 100 units/mL subcutaneous solution: 20 unit(s) subcutaneous once a day (at bedtime)  lisinopril 10 mg oral tablet: 1 tab(s) orally once a day  methotrexate 2.5 mg oral tablet: 6 tab(s) orally once a week on Wednesdays  metoprolol tartrate 25 mg oral tablet: 1 tab(s) orally 2 times a day  Paxil 20 mg oral tablet: 1 tab(s) orally once a day   acetaminophen 325 mg oral tablet: 2 tab(s) orally every 8 hours, As needed, for pain   albuterol 90 mcg/inh inhalation aerosol: 1 puff(s) inhaled every 6 hours, As needed, Shortness of Breath  amLODIPine 10 mg oral tablet: 1 tab(s) orally once a day  aspirin 81 mg oral tablet, chewable: 1 tab(s) orally once a day  atorvastatin 40 mg oral tablet: 1 tab(s) orally once a day (at bedtime)  Eliquis 5 mg oral tablet: 1 tab(s) orally 2 times a day  folic acid 1 mg oral tablet: 1 tab(s) orally once a day (at bedtime)  furosemide 100 mg/100 mL-0.9% intravenous solution: 20 milliliter(s) intravenous once a day  gabapentin 300 mg oral capsule: 1 cap(s) orally 3 times a day  ipratropium-albuterol 0.5 mg-2.5 mg/3 mLinhalation solution: 3 milliliter(s) inhaled every 4 hours, As needed, Shortness of Breath and/or Wheezing  Lantus 100 units/mL subcutaneous solution: 20 unit(s) subcutaneous once a day (at bedtime)  lisinopril 10 mg oral tablet: 1 tab(s) orally once a day  meropenem 1000 mg intravenous injection: 1000 milligram(s) intravenous every 12 hours  methotrexate 2.5 mg oral tablet: 6 tab(s) orally once a week on Wednesdays  metoprolol tartrate 25 mg oral tablet: 1 tab(s) orally 2 times a day  Paxil 20 mg oral tablet: 1 tab(s) orally once a day  tiotropium 18 mcg inhalation capsule: 1 cap(s) inhaled once a day  vancomycin 1 g intravenous injection: 1 gram(s) intravenous once a day

## 2021-05-05 NOTE — H&P ADULT - HISTORY OF PRESENT ILLNESS
86y Female with PMH of COPD, arthritis, CAD, DMII, depression, HTN, PVD, PAD, s/p left fem pop bypass on 03/24/21, +lower ext DVT on eliquis, +chronic left heel ulcer, RA, anemia, presented to the ED at [place] on 5/3 with worsening dyspnea, wheezing that started that night. Pt received 2 Duoneb treatments at home, with only slight improvement, so EMS called. At home patient was hypoxic to 87% on room air, was placed on 100% NRB mask. In the ED at [place], pt was placed on 2 LPM via NC, received a dose of Lasix 20 mg IV x 1. Initial trop noted to be 0.17.  BNP >47663    Maria Elena Ahumada was admitted to telemetry floor for acute CHF and elevated troponin. Her respiratory symptoms have improved with diuresis. Her troponin levels were monitored and now have downtrended. She was started on aspirin and statin. Already had been on BB and ACEi. Cardiology was consulted, advised troponin elevated due to type II MI.     On her first night of admission at [facility] 5/3 patient developed fever which has persisted throughout admission. ID was consulted, patient was started on vancomycin and meropenem. Overnight on 5/4-5/5 patient had extensive purulent drainage from left medial thigh surgical site. Plastic surgery was consulted, advised transfer to [facility] for evaluation by Dr Ventura who did fem pop bypass 3/2021.      In ED at [facility] on 5/5, patient had no complaints.  Denied any CP, SOB, lower extremity pain, numbness or tingling.

## 2021-05-06 ENCOUNTER — TRANSCRIPTION ENCOUNTER (OUTPATIENT)
Age: 86
End: 2021-05-06

## 2021-05-06 LAB
ADD ON TEST-SPECIMEN IN LAB: SIGNIFICANT CHANGE UP
ADD ON TEST-SPECIMEN IN LAB: SIGNIFICANT CHANGE UP
ANION GAP SERPL CALC-SCNC: 11 MMOL/L — SIGNIFICANT CHANGE UP (ref 5–17)
ANION GAP SERPL CALC-SCNC: 8 MMOL/L — SIGNIFICANT CHANGE UP (ref 5–17)
BASOPHILS # BLD AUTO: 0 K/UL — SIGNIFICANT CHANGE UP (ref 0–0.2)
BASOPHILS NFR BLD AUTO: 0 % — SIGNIFICANT CHANGE UP (ref 0–2)
BUN SERPL-MCNC: 51 MG/DL — HIGH (ref 7–23)
BUN SERPL-MCNC: 63 MG/DL — HIGH (ref 7–23)
CALCIUM SERPL-MCNC: 7.8 MG/DL — LOW (ref 8.5–10.1)
CALCIUM SERPL-MCNC: 8 MG/DL — LOW (ref 8.5–10.1)
CHLORIDE SERPL-SCNC: 105 MMOL/L — SIGNIFICANT CHANGE UP (ref 96–108)
CHLORIDE SERPL-SCNC: 106 MMOL/L — SIGNIFICANT CHANGE UP (ref 96–108)
CO2 SERPL-SCNC: 18 MMOL/L — LOW (ref 22–31)
CO2 SERPL-SCNC: 20 MMOL/L — LOW (ref 22–31)
COVID-19 SPIKE DOMAIN AB INTERP: POSITIVE
COVID-19 SPIKE DOMAIN ANTIBODY RESULT: 35.3 U/ML — HIGH
CREAT SERPL-MCNC: 1.39 MG/DL — HIGH (ref 0.5–1.3)
CREAT SERPL-MCNC: 2.05 MG/DL — HIGH (ref 0.5–1.3)
EOSINOPHIL # BLD AUTO: 0 K/UL — SIGNIFICANT CHANGE UP (ref 0–0.5)
EOSINOPHIL NFR BLD AUTO: 0 % — SIGNIFICANT CHANGE UP (ref 0–6)
GLUCOSE SERPL-MCNC: 199 MG/DL — HIGH (ref 70–99)
GLUCOSE SERPL-MCNC: 219 MG/DL — HIGH (ref 70–99)
HCT VFR BLD CALC: 31.3 % — LOW (ref 34.5–45)
HGB BLD-MCNC: 10.1 G/DL — LOW (ref 11.5–15.5)
LYMPHOCYTES # BLD AUTO: 0.24 K/UL — LOW (ref 1–3.3)
LYMPHOCYTES # BLD AUTO: 3 % — LOW (ref 13–44)
MCHC RBC-ENTMCNC: 29.9 PG — SIGNIFICANT CHANGE UP (ref 27–34)
MCHC RBC-ENTMCNC: 32.3 GM/DL — SIGNIFICANT CHANGE UP (ref 32–36)
MCV RBC AUTO: 92.6 FL — SIGNIFICANT CHANGE UP (ref 80–100)
MONOCYTES # BLD AUTO: 0.24 K/UL — SIGNIFICANT CHANGE UP (ref 0–0.9)
MONOCYTES NFR BLD AUTO: 3 % — SIGNIFICANT CHANGE UP (ref 2–14)
NEUTROPHILS # BLD AUTO: 7.43 K/UL — HIGH (ref 1.8–7.4)
NEUTROPHILS NFR BLD AUTO: 94 % — HIGH (ref 43–77)
NRBC # BLD: SIGNIFICANT CHANGE UP /100 WBCS (ref 0–0)
PLATELET # BLD AUTO: 199 K/UL — SIGNIFICANT CHANGE UP (ref 150–400)
POTASSIUM SERPL-MCNC: 4.6 MMOL/L — SIGNIFICANT CHANGE UP (ref 3.5–5.3)
POTASSIUM SERPL-MCNC: 4.7 MMOL/L — SIGNIFICANT CHANGE UP (ref 3.5–5.3)
POTASSIUM SERPL-SCNC: 4.6 MMOL/L — SIGNIFICANT CHANGE UP (ref 3.5–5.3)
POTASSIUM SERPL-SCNC: 4.7 MMOL/L — SIGNIFICANT CHANGE UP (ref 3.5–5.3)
RBC # BLD: 3.38 M/UL — LOW (ref 3.8–5.2)
RBC # FLD: 17.7 % — HIGH (ref 10.3–14.5)
SARS-COV-2 IGG+IGM SERPL QL IA: 35.3 U/ML — HIGH
SARS-COV-2 IGG+IGM SERPL QL IA: POSITIVE
SODIUM SERPL-SCNC: 134 MMOL/L — LOW (ref 135–145)
SODIUM SERPL-SCNC: 134 MMOL/L — LOW (ref 135–145)
WBC # BLD: 7.9 K/UL — SIGNIFICANT CHANGE UP (ref 3.8–10.5)
WBC # FLD AUTO: 7.9 K/UL — SIGNIFICANT CHANGE UP (ref 3.8–10.5)

## 2021-05-06 PROCEDURE — 99024 POSTOP FOLLOW-UP VISIT: CPT

## 2021-05-06 PROCEDURE — 12345: CPT | Mod: NC

## 2021-05-06 PROCEDURE — 93010 ELECTROCARDIOGRAM REPORT: CPT

## 2021-05-06 PROCEDURE — 99231 SBSQ HOSP IP/OBS SF/LOW 25: CPT

## 2021-05-06 RX ORDER — HEPARIN SODIUM 5000 [USP'U]/ML
5000 INJECTION INTRAVENOUS; SUBCUTANEOUS EVERY 12 HOURS
Refills: 0 | Status: DISCONTINUED | OUTPATIENT
Start: 2021-05-06 | End: 2021-05-16

## 2021-05-06 RX ORDER — INSULIN LISPRO 100/ML
2 VIAL (ML) SUBCUTANEOUS ONCE
Refills: 0 | Status: COMPLETED | OUTPATIENT
Start: 2021-05-06 | End: 2021-05-06

## 2021-05-06 RX ORDER — SODIUM CHLORIDE 9 MG/ML
1000 INJECTION INTRAMUSCULAR; INTRAVENOUS; SUBCUTANEOUS
Refills: 0 | Status: DISCONTINUED | OUTPATIENT
Start: 2021-05-06 | End: 2021-05-08

## 2021-05-06 RX ORDER — ACETAMINOPHEN 500 MG
650 TABLET ORAL EVERY 6 HOURS
Refills: 0 | Status: DISCONTINUED | OUTPATIENT
Start: 2021-05-06 | End: 2021-05-21

## 2021-05-06 RX ORDER — CEFAZOLIN SODIUM 1 G
2000 VIAL (EA) INJECTION EVERY 12 HOURS
Refills: 0 | Status: DISCONTINUED | OUTPATIENT
Start: 2021-05-06 | End: 2021-05-07

## 2021-05-06 RX ORDER — LISINOPRIL 2.5 MG/1
5 TABLET ORAL DAILY
Refills: 0 | Status: DISCONTINUED | OUTPATIENT
Start: 2021-05-06 | End: 2021-05-06

## 2021-05-06 RX ORDER — FUROSEMIDE 40 MG
40 TABLET ORAL DAILY
Refills: 0 | Status: DISCONTINUED | OUTPATIENT
Start: 2021-05-06 | End: 2021-05-07

## 2021-05-06 RX ORDER — FUROSEMIDE 40 MG
40 TABLET ORAL ONCE
Refills: 0 | Status: COMPLETED | OUTPATIENT
Start: 2021-05-06 | End: 2021-05-06

## 2021-05-06 RX ADMIN — MEROPENEM 100 MILLIGRAM(S): 1 INJECTION INTRAVENOUS at 05:16

## 2021-05-06 RX ADMIN — Medication 650 MILLIGRAM(S): at 16:00

## 2021-05-06 RX ADMIN — Medication 25 MILLIGRAM(S): at 21:11

## 2021-05-06 RX ADMIN — GABAPENTIN 300 MILLIGRAM(S): 400 CAPSULE ORAL at 21:11

## 2021-05-06 RX ADMIN — Medication 650 MILLIGRAM(S): at 16:27

## 2021-05-06 RX ADMIN — Medication 81 MILLIGRAM(S): at 10:39

## 2021-05-06 RX ADMIN — TIOTROPIUM BROMIDE 1 CAPSULE(S): 18 CAPSULE ORAL; RESPIRATORY (INHALATION) at 09:04

## 2021-05-06 RX ADMIN — GABAPENTIN 300 MILLIGRAM(S): 400 CAPSULE ORAL at 13:03

## 2021-05-06 RX ADMIN — HEPARIN SODIUM 5000 UNIT(S): 5000 INJECTION INTRAVENOUS; SUBCUTANEOUS at 10:39

## 2021-05-06 RX ADMIN — Medication 40 MILLIGRAM(S): at 18:04

## 2021-05-06 RX ADMIN — Medication 1: at 08:43

## 2021-05-06 RX ADMIN — Medication 20 MILLIGRAM(S): at 10:39

## 2021-05-06 RX ADMIN — Medication 2: at 12:55

## 2021-05-06 RX ADMIN — Medication 25 MILLIGRAM(S): at 10:39

## 2021-05-06 RX ADMIN — SODIUM CHLORIDE 80 MILLILITER(S): 9 INJECTION INTRAMUSCULAR; INTRAVENOUS; SUBCUTANEOUS at 21:11

## 2021-05-06 RX ADMIN — Medication 40 MILLIGRAM(S): at 13:44

## 2021-05-06 RX ADMIN — Medication 100 MILLIGRAM(S): at 13:42

## 2021-05-06 RX ADMIN — ATORVASTATIN CALCIUM 40 MILLIGRAM(S): 80 TABLET, FILM COATED ORAL at 21:11

## 2021-05-06 RX ADMIN — GABAPENTIN 300 MILLIGRAM(S): 400 CAPSULE ORAL at 05:16

## 2021-05-06 RX ADMIN — Medication 1 MILLIGRAM(S): at 21:11

## 2021-05-06 RX ADMIN — Medication 2: at 16:58

## 2021-05-06 RX ADMIN — Medication 2 UNIT(S): at 22:31

## 2021-05-06 RX ADMIN — Medication 100 MILLIGRAM(S): at 21:12

## 2021-05-06 RX ADMIN — HEPARIN SODIUM 5000 UNIT(S): 5000 INJECTION INTRAVENOUS; SUBCUTANEOUS at 21:11

## 2021-05-06 NOTE — DIETITIAN INITIAL EVALUATION ADULT. - PERTINENT LABORATORY DATA
05-06    134<L>  |  106  |  51<H>  ----------------------------<  199<H>  4.7   |  20<L>  |  1.39<H>    Ca    8.0<L>      06 May 2021 05:49    TPro  5.9<L>  /  Alb  1.9<L>  /  TBili  0.6  /  DBili  x   /  AST  5<L>  /  ALT  <6<L>  /  AlkPhos  71  05-05  BMI: BMI (kg/m2): 28.5 (05-05-21 @ 10:51)  HbA1c: A1C with Estimated Average Glucose Result: 6.9 % (03-31-21 @ 05:00)    Glucose: POCT Blood Glucose.: 239 mg/dL (05-06-21 @ 12:54)    Iron Total, Serum: 54 ug/dL (03-23-21 @ 07:21)  Vitamin B12, Serum: 327 pg/mL (03-23-21 @ 07:21)  Folate, Serum: >20.0 ng/mL (03-23-21 @ 07:21)

## 2021-05-06 NOTE — DIETITIAN INITIAL EVALUATION ADULT. - PERTINENT MEDS FT
MEDICATIONS  (STANDING):  aspirin  chewable 81 milliGRAM(s) Oral daily  atorvastatin 40 milliGRAM(s) Oral at bedtime  ceFAZolin   IVPB 2000 milliGRAM(s) IV Intermittent every 12 hours  dextrose 40% Gel 15 Gram(s) Oral once  dextrose 5%. 1000 milliLiter(s) (50 mL/Hr) IV Continuous <Continuous>  dextrose 5%. 1000 milliLiter(s) (100 mL/Hr) IV Continuous <Continuous>  dextrose 50% Injectable 25 Gram(s) IV Push once  dextrose 50% Injectable 12.5 Gram(s) IV Push once  dextrose 50% Injectable 25 Gram(s) IV Push once  folic acid 1 milliGRAM(s) Oral at bedtime  furosemide   Injectable 40 milliGRAM(s) IV Push daily  gabapentin 300 milliGRAM(s) Oral three times a day  glucagon  Injectable 1 milliGRAM(s) IntraMuscular once  heparin   Injectable 5000 Unit(s) SubCutaneous every 12 hours  insulin lispro (ADMELOG) corrective regimen sliding scale   SubCutaneous three times a day before meals  lisinopril 5 milliGRAM(s) Oral daily  metoprolol tartrate 25 milliGRAM(s) Oral two times a day  PARoxetine 20 milliGRAM(s) Oral daily  tiotropium 18 MICROgram(s) Capsule 1 Capsule(s) Inhalation daily    MEDICATIONS  (PRN):  ALBUTerol    90 MICROgram(s) HFA Inhaler 1 Puff(s) Inhalation every 6 hours PRN Shortness of Breath  morphine  - Injectable 5 milliGRAM(s) IV Push every 3 hours PRN Severe Pain (7 - 10)

## 2021-05-06 NOTE — DIETITIAN INITIAL EVALUATION ADULT. - ORAL INTAKE PTA/DIET HISTORY
takes 2 Phillip per day.  eats 2 meals/day (medium sized).  hasn't eaten anything or minimally since Sunday (5 day meeting <50% of estimated nutr needs)

## 2021-05-06 NOTE — CONSULT NOTE ADULT - ASSESSMENT
85 y/o Female with h/o COPD, arthritis, CAD, DMII, depression, HTN, PVD, PAD, s/p left fem pop bypass on 03/24/21, lower ext DVT on eliquis, chronic left heel ulcer, RA, anemia was admitted on 5/3 at [place] with worsening dyspnea, wheezing that started that night. Pt received 2 Duoneb treatments at home, with only slight improvement. At home patient was hypoxic to 87% on room air, was placed on 100% NRB mask. In the ED at [place], pt was placed on 2 LPM via NC, received a dose of Lasix 20 mg IV x 1. Her respiratory symptoms have improved with diuresis. Her troponin levels were monitored. Cardiology was consulted, advised troponin elevated due to type II MI.  On her first night of admission at [place] 5/3 patient developed fever which has persisted throughout admission. ID was consulted, patient was started on vancomycin and meropenem. Overnight on 5/4-5/5 patient had extensive purulent drainage from left medial thigh surgical site. Plastic surgery was consulted, advised transfer to [facility] for evaluation by Dr Ventura who did fem pop bypass 3/2021. In ED at [facility] on 5/5, she was continued on meropenem.    1. Left leg cellulitis. Postsurgical wound infection s/p recent left femoral-popliteal bypass. Possible underlying vascular graft infection. CRF stage 3. Allergy to cephalosporins and PCN.  -cultures reviewed  -no clinical signs of sepsis  -  -reason for abx use and side effects reviewed with patient; monitor BMP  -monitor closely in [place] of PCN allergy history   -local wound care  -vascular evaluation  -old chart reviewed to assess prior cultures  -monitor temps  -f/u CBC  -supportive care  2. Other issues:   -care per medicine       85 y/o Female with h/o COPD, arthritis, CAD, DMII, depression, HTN, PVD, PAD, s/p left fem pop bypass on 03/24/21, lower ext DVT on eliquis, chronic left heel ulcer, RA, anemia was admitted on 5/3 at [place] with worsening dyspnea, wheezing that started that night. Pt received 2 Duoneb treatments at home, with only slight improvement. At home patient was hypoxic to 87% on room air, was placed on 100% NRB mask. In the ED at [place], pt was placed on 2 LPM via NC, received a dose of Lasix 20 mg IV x 1. Her respiratory symptoms have improved with diuresis. Her troponin levels were monitored. Cardiology was consulted, advised troponin elevated due to type II MI.  On her first night of admission at [place] 5/3 patient developed fever which has persisted throughout admission. ID was consulted, patient was started on vancomycin and meropenem. Overnight on 5/4-5/5 patient had extensive purulent drainage from left medial thigh surgical site. Plastic surgery was consulted, advised transfer to [facility] for evaluation by Dr Ventura who did fem pop bypass 3/2021. In ED at [facility] on 5/5, she was continued on meropenem.    1. Left leg cellulitis. Postsurgical wound infection s/p washout. Recent left femoral-popliteal bypass. Possible underlying vascular graft infection. CRF stage 3. Allergy to cephalosporins and PCN.  -cultures reviewed  -no clinical signs of sepsis  -abx options reviewed with patient; benefits and risks of abx therapy reviewed with patient  -start cefazolin 2 gm IV q12h  -reason for abx use and side effects reviewed with patient; monitor BMP  -monitor closely in [place] of PCN allergy history   -local wound care  -vascular evaluation appreciated  -old chart reviewed to assess prior cultures  -monitor temps  -f/u CBC  -supportive care  2. Other issues:   -care per medicine

## 2021-05-06 NOTE — DISCHARGE NOTE NURSING/CASE MANAGEMENT/SOCIAL WORK - PATIENT PORTAL LINK FT
You can access the FollowMyHealth Patient Portal offered by [facility] by registering at the following website: [email or web address]. By joining [email or web address]’s FollowMyHealth portal, you will also be able to view your health information using other applications (apps) compatible with our system.

## 2021-05-06 NOTE — CONSULT NOTE ADULT - SUBJECTIVE AND OBJECTIVE BOX
Patient is a 86y old  Female who presents with a chief complaint of wound.       HPI:  86y Female with PMH of COPD, arthritis, CAD, DMII, depression, HTN, PVD, PAD, s/p left fem pop bypass on 03/24/21, +lower ext DVT on eliquis, +chronic left heel ulcer, RA, anemia, presented to the ED at [place] on 5/3 with worsening dyspnea, wheezing that started that night. Pt received 2 Duoneb treatments at home, with only slight improvement, so EMS called. At home patient was hypoxic to 87% on room air, was placed on 100% NRB mask. In the ED at [place], pt was placed on 2 LPM via NC, received a dose of Lasix 20 mg IV x 1. Initial trop noted to be 0.17.  BNP >63713    Her respiratory symptoms have improved with diuresis.     On her first night of admission at [place] 5/3 patient developed fever which has persisted throughout admission. ID was consulted, patient was started on vancomycin and meropenem. Overnight on 5/4-5/5 patient had extensive purulent drainage from left medial thigh surgical site. Plastic surgery was consulted, advised transfer to [facility] for evaluation by Dr Ventura who did fem pop bypass 3/2021.      In ED at [facility] on 5/5, patient had no complaints.  Denied any CP, SOB, lower extremity pain, numbness or tingling.        Pt seen now. She is fully alert and was able to give all the history , son in law at bedside.  Pt states that her SOB is better.    NO CP.        PAST MEDICAL & SURGICAL HISTORY:  Hypertension    Anemia    Arthritis, rheumatoid    Depression    Type 2 diabetes mellitus  on insulin    PVD (peripheral vascular disease) with claudication    CAD (coronary artery disease)  non-obstructive    Rheumatoid arthritis    fracture ankle right  plate . screws   2000    S/P cataract surgery  2011    Hip fracture requiring operative repair  Right hip 11/14/2020        MEDICATIONS  (STANDING):  aspirin  chewable 81 milliGRAM(s) Oral daily  atorvastatin 40 milliGRAM(s) Oral at bedtime  ceFAZolin   IVPB 2000 milliGRAM(s) IV Intermittent every 12 hours  dextrose 40% Gel 15 Gram(s) Oral once  dextrose 5%. 1000 milliLiter(s) (50 mL/Hr) IV Continuous <Continuous>  dextrose 5%. 1000 milliLiter(s) (100 mL/Hr) IV Continuous <Continuous>  dextrose 50% Injectable 25 Gram(s) IV Push once  dextrose 50% Injectable 12.5 Gram(s) IV Push once  dextrose 50% Injectable 25 Gram(s) IV Push once  folic acid 1 milliGRAM(s) Oral at bedtime  furosemide   Injectable 40 milliGRAM(s) IV Push daily  gabapentin 300 milliGRAM(s) Oral three times a day  glucagon  Injectable 1 milliGRAM(s) IntraMuscular once  heparin   Injectable 5000 Unit(s) SubCutaneous every 12 hours  insulin lispro (ADMELOG) corrective regimen sliding scale   SubCutaneous three times a day before meals  lisinopril 5 milliGRAM(s) Oral daily  metoprolol tartrate 25 milliGRAM(s) Oral two times a day  PARoxetine 20 milliGRAM(s) Oral daily  tiotropium 18 MICROgram(s) Capsule 1 Capsule(s) Inhalation daily    MEDICATIONS  (PRN):  acetaminophen   Tablet .. 650 milliGRAM(s) Oral every 6 hours PRN Mild Pain (1 - 3)  ALBUTerol    90 MICROgram(s) HFA Inhaler 1 Puff(s) Inhalation every 6 hours PRN Shortness of Breath  morphine  - Injectable 5 milliGRAM(s) IV Push every 3 hours PRN Severe Pain (7 - 10)      FAMILY HISTORY:  FH: colon cancer  father    FH: heart attack  father    Family history of atherosclerosis  mother        SOCIAL HISTORY:    REVIEW OF SYSTEMS:  CONSTITUTIONAL:    No fatigue, malaise, lethargy.  No fever or chills.   RESPIRATORY:  No cough.  No wheeze.  No hemoptysis.  c/o shortness of breath.  CARDIOVASCULAR:  No chest pains.  No palpitations. c/o shortness of breath, No orthopnea or PND.  GASTROINTESTINAL:  No abdominal pain.  No nausea or vomiting.    GENITOURINARY:    No hematuria.    MUSCULOSKELETAL:  c/o L thigh pain   NEUROLOGICAL:  No tingling or numbness or weakness.  PSYCHIATRIC:  No confusion  SKIN:  No rashes.          Vital Signs Last 24 Hrs  T(C): 35 (06 May 2021 11:00), Max: 36.6 (05 May 2021 21:10)  T(F): 95 (06 May 2021 11:00), Max: 97.9 (05 May 2021 21:10)  HR: 63 (06 May 2021 15:00) (57 - 89)  BP: 106/54 (06 May 2021 15:00) (100/52 - 129/40)  BP(mean): 67 (06 May 2021 15:00) (63 - 91)  RR: 24 (06 May 2021 15:00) (11 - 64)  SpO2: 98% (06 May 2021 13:05) (90% - 100%)    PHYSICAL EXAM-    Constitutional: elderly female in no acute distress sitting up in bed now.     Head: Head is normocephalic and atraumatic.      Neck:No JVD.     Cardiovascular: Regular rate and rhythm without S3, S4. No murmurs or rubs are appreciated.      Respiratory: B/l  rales- all lung fields. No wheezing.    Abdomen: Soft, nontender, nondistended with positive bowel sounds.      Extremity: No tenderness.   pitting edema  1+ b/l LE, L thigh area in dressing that is soaked with serous fluid.     Neurologic: The patient is alert and oriented.      Skin: L thigh dressing     Psychiatric: The patient appears to be emotionally stable.      INTERPRETATION OF TELEMETRY: sinus bradycardia , PACs     ECG: Sinus rythm , T wave inversion in I, aVL, biphasic T wave in V2-6     I&O's Detail    05 May 2021 07:01  -  06 May 2021 07:00  --------------------------------------------------------  IN:    IV PiggyBack: 50 mL    sodium chloride 0.9%: 1061 mL  Total IN: 1111 mL    OUT:    Indwelling Catheter - Urethral (mL): 375 mL  Total OUT: 375 mL    Total NET: 736 mL      06 May 2021 07:01  -  06 May 2021 17:09  --------------------------------------------------------  IN:    sodium chloride 0.9%: 492 mL  Total IN: 492 mL    OUT:    Indwelling Catheter - Urethral (mL): 100 mL  Total OUT: 100 mL    Total NET: 392 mL          LABS:                        10.1   7.90  )-----------( 199      ( 06 May 2021 05:49 )             31.3     05-06    134<L>  |  106  |  51<H>  ----------------------------<  199<H>  4.7   |  20<L>  |  1.39<H>    Ca    8.0<L>      06 May 2021 05:49    TPro  5.9<L>  /  Alb  1.9<L>  /  TBili  0.6  /  DBili  x   /  AST  5<L>  /  ALT  <6<L>  /  AlkPhos  71  05-05    CARDIAC MARKERS ( 06 May 2021 05:49 )  x     / x     / 32 U/L / x     / x      CARDIAC MARKERS ( 05 May 2021 19:15 )  x     / x     / 36 U/L / x     / x      CARDIAC MARKERS ( 05 May 2021 00:50 )  .220 ng/mL / x     / x     / x     / x          PT/INR - ( 05 May 2021 11:59 )   PT: 18.5 sec;   INR: 1.62 ratio             I&O's Summary    05 May 2021 07:01  -  06 May 2021 07:00  --------------------------------------------------------  IN: 1111 mL / OUT: 375 mL / NET: 736 mL    06 May 2021 07:01  -  06 May 2021 17:09  --------------------------------------------------------  IN: 492 mL / OUT: 100 mL / NET: 392 mL      BNP  RADIOLOGY & ADDITIONAL STUDIES:  c< from: CT Chest No Cont (05.03.21 @ 23:04) >  IMPRESSION:    1. Small bilateral pleural effusions and pulmonary edema.  2. Emphysema and question additional honeycombing/fibrosis.  3. Mildly asymmetrical 1.5 cm nodular breast tissue on the left. Correlation with follow-up mammography/ultrasound is recommended.  4. 2.9 cm slightly hyperdense right renal lesion, most likely hemorrhagic/proteinaceous cyst. Follow-up nonemergent ultrasound is recommended.                ROSARIO REYES MD; Attending Radiologist  This document has been electronically signed. May  3 2021 11:52PM    < end of copied text >  < from: Xray Chest 1 View AP/PA (05.03.21 @ 22:36) >    EXAM:  XR CHEST AP OR PA 1V      PROCEDURE DATE:  05/03/2021        INTERPRETATION:  Views:1  Comparison: 08/21/15  History: Shortness of breath    There is mild scattered interstitial infiltrate consistent with pneumonia or pneumonitis without segmental consolidation, layering effusion or cavitation. The heart is normal in size.    IMPRESSION: As above              MARIA ESTHER CADET MD; Attending Radiologist  This document has been electronically signed. May  4 2021  9:22AM    < end of copied text >  < from: TTE Echo Complete w/o Contrast w/ Doppler (05.04.21 @ 08:16) >    Summary:   1. Left ventricular ejection fraction, by visual estimation, is 35 to 40%.   2. Moderately toseverely decreased global left ventricular systolic function.   3. Multiple left ventricular regional wall motion abnormalities exist. See wall motion findings.   4. Mildly increased LV wall thickness.   5. Normal left ventricular internal cavity size.   6. Spectral Doppler shows impaired relaxation pattern of left ventricular myocardial filling (Grade I diastolic dysfunction).   7. There is mild concentric left ventricular hypertrophy.   8. Mild mitral annular calcification.   9. Mild mitral valve regurgitation.  10. Thickening and calcification of the anterior and posterior mitral valve leaflets.  11. Mild tricuspid regurgitation.  12. Mild aortic regurgitation.  13. Sclerotic aortic valve with normal opening.  14. Estimated pulmonary artery systolic pressure is 36.6 mmHg assuming a right atrial pressure of 10 mmHg, which is consistent with borderline pulmonary hypertension.    [id number] Toby Peace , Electronically signed on 5/4/2021 at 10:19:28 AM    < end of copied text >

## 2021-05-06 NOTE — CONSULT NOTE ADULT - SUBJECTIVE AND OBJECTIVE BOX
86y Female with PMH of COPD, arthritis, CAD, DMII, depression, HTN, PVD, PAD, s/p left fem pop bypass on 03/24/21, +lower ext DVT on eliquis, +chronic left heel ulcer, RA, anemia, presented to the ED at [place] on 5/3 with worsening dyspnea, wheezing that started that night. Pt received 2 Duoneb treatments at home, with only slight improvement, so EMS called. At home patient was hypoxic to 87% on room air, was placed on 100% NRB mask. In the ED at [place], pt was placed on 2 LPM via NC, received a dose of Lasix 20 mg IV x 1. Initial trop noted to be 0.17.  BNP >19069  On her first night of admission at [place] 5/3 patient developed fever which has persisted throughout admission. ID was consulted, patient was started on vancomycin and meropenem. Overnight on 5/4-5/5 patient had extensive purulent drainage from left medial thigh surgical site. Plastic surgery was consulted, advised transfer to [facility] for evaluation by Dr Ventura who did fem pop bypass 3/2021.      Was taking to OR : purulent fluid in L thigh popliteal fossa above knee; necrotic infected fibrinous material around graft and on tissue in popliteal fossa  Hypothermic this am but with no c/o      Vital Signs Last 24 Hrs  T(C): 34.4 (06 May 2021 05:00), Max: 37.2 (05 May 2021 10:51)  T(F): 94 (06 May 2021 05:00), Max: 98.9 (05 May 2021 10:51)  HR: 73 (06 May 2021 10:00) (61 - 89)  BP: 121/40 (05 May 2021 20:00) (100/52 - 129/40)  BP(mean): 85 (05 May 2021 14:43) (85 - 85)  RR: 21 (06 May 2021 10:00) (11 - 64)  SpO2: 94% (06 May 2021 10:00) (90% - 100%)      Skin:  warm and dry  Respiratory: Lungs clear and equal bilat, no r/r/w  Cardiovascular:  S1S2 reg, no M  Gastrointestinal:  non-distended, +NABS, soft, non-tender  Extremities:  LLE:  dressing over medial thigh,  Vascular:  +doppler pulses bilat lower extremities  Neurological:  A & O X3, CNII-XII grossly intact                          10.1   7.90  )-----------( 199      ( 06 May 2021 05:49 )             31.3   05-06    134<L>  |  106  |  51<H>  ----------------------------<  199<H>  4.7   |  20<L>  |  1.39<H>    Ca    8.0<L>      06 May 2021 05:49    TPro  5.9<L>  /  Alb  1.9<L>  /  TBili  0.6  /  DBili  x   /  AST  5<L>  /  ALT  <6<L>  /  AlkPhos  71  05-05         86y Female with PMH of COPD, arthritis, CAD, DMII, depression, HTN, PVD, PAD, s/p left fem pop bypass on 03/24/21, +lower ext DVT on eliquis, +chronic left heel ulcer, RA, anemia, presented to the ED at [place] on 5/3 with worsening dyspnea, wheezing that started that night. Pt received 2 Duoneb treatments at home, with only slight improvement, so EMS called. At home patient was hypoxic to 87% on room air, was placed on 100% NRB mask. In the ED at [place], pt was placed on 2 LPM via NC, received a dose of Lasix 20 mg IV x 1. Initial trop noted to be 0.17.  BNP >96342  On her first night of admission at [place] 5/3 patient developed fever which has persisted throughout admission. ID was consulted, patient was started on vancomycin and meropenem. Overnight on 5/4-5/5 patient had extensive purulent drainage from left medial thigh surgical site. Plastic surgery was consulted, advised transfer to [facility] for evaluation by Dr Ventura who did fem pop bypass 3/2021.      Was taking to OR : purulent fluid in L thigh popliteal fossa above knee; necrotic infected fibrinous material around graft and on tissue in popliteal fossa  Hypothermic this am but with no c/o      Vital Signs Last 24 Hrs  T(C): 34.4 (06 May 2021 05:00), Max: 37.2 (05 May 2021 10:51)  T(F): 94 (06 May 2021 05:00), Max: 98.9 (05 May 2021 10:51)  HR: 73 (06 May 2021 10:00) (61 - 89)  BP: 121/40 (05 May 2021 20:00) (100/52 - 129/40)  BP(mean): 85 (05 May 2021 14:43) (85 - 85)  RR: 21 (06 May 2021 10:00) (11 - 64)  SpO2: 94% (06 May 2021 10:00) (90% - 100%)      Skin:  warm and dry  Respiratory: Lungs clear and equal bilat, no r/r/w  Cardiovascular:  S1S2 reg, no M  Gastrointestinal:  non-distended, +NABS, soft, non-tender  Extremities:  LLE:  dressing over medial thigh,  Vascular:  +doppler pulses bilat lower extremities  Neurological:  A & O X3, CNII-XII grossly intact                          10.1   7.90  )-----------( 199      ( 06 May 2021 05:49 )             31.3   05-06    134<L>  |  106  |  51<H>  ----------------------------<  199<H>  4.7   |  20<L>  |  1.39<H>    Ca    8.0<L>      06 May 2021 05:49    TPro  5.9<L>  /  Alb  1.9<L>  /  TBili  0.6  /  DBili  x   /  AST  5<L>  /  ALT  <6<L>  /  AlkPhos  71  05-05      TTE Echo Complete w/o Contrast w/ Doppler (05.04.21 @ 08:16) >   1. Left ventricular ejection fraction, by visual estimation, is 35 to 40%.   2. Moderately toseverely decreased global left ventricular systolic function.   3. Multiple left ventricular regional wall motion abnormalities exist. See wall motion findings.   4. Mildly increased LV wall thickness.   5. Normal left ventricular internal cavity size.   6. Spectral Doppler shows impaired relaxation pattern of left ventricular myocardial filling (Grade I diastolic dysfunction).   7. There is mild concentric left ventricular hypertrophy.   8. Mild mitral annular calcification.   9. Mild mitral valve regurgitation.  10. Thickening and calcification of the anterior and posterior mitral valve leaflets.  11. Mild tricuspid regurgitation.  12. Mild aortic regurgitation.  13. Sclerotic aortic valve with normal opening.  14. Estimated pulmonary artery systolic pressure is 36.6 mmHg assuming a right atrial pressure of 10 mmHg, which is consistent with borderline pulmonary hypertension.        * Infected graft POD#1  to return to OR on 24H  Meropenem; hypothermia noted VSS, normal lactate    * Chronic systolic HF  found her on IVF due to low urinary output    *H/o depression / HTN / RA - c/w home meds and f/u outpatient for further management    *  Padilla catheter per Attending doc    * right LE DVT  Eliquis on hold  to resume AC after sx if ok with vascular      *  Type 2 diabetes mellitus, with long-term current use of insulin  - C/w home Lantus 25 units QHS  - Check FS with low dose QAC + HS  - Most recent A1C 7.2      4) RA   MTX 15 mg QD on Wednesdays   FA 1 mg QD for supportive therapy                 86y Female with PMH of COPD, arthritis, CAD, DMII, depression, HTN, PVD, PAD, s/p left fem pop bypass on 03/24/21, +lower ext DVT on eliquis, +chronic left heel ulcer, RA, anemia, presented to the ED at [place] on 5/3 with worsening dyspnea, wheezing that started that night. Pt received 2 Duoneb treatments at home, with only slight improvement, so EMS called. At home patient was hypoxic to 87% on room air, was placed on 100% NRB mask. In the ED at [place], pt was placed on 2 LPM via NC, received a dose of Lasix 20 mg IV x 1. Initial trop noted to be 0.17.  BNP >10971  On her first night of admission at [place] 5/3 patient developed fever which has persisted throughout admission. ID was consulted, patient was started on vancomycin and meropenem. Overnight on 5/4-5/5 patient had extensive purulent drainage from left medial thigh surgical site. Plastic surgery was consulted, advised transfer to [facility] for evaluation by Dr Ventura who did fem pop bypass 3/2021.      Was taking to OR : purulent fluid in L thigh popliteal fossa above knee; necrotic infected fibrinous material around graft and on tissue in popliteal fossa  Hypothermic this am but with no c/o      Vital Signs Last 24 Hrs  T(C): 34.4 (06 May 2021 05:00), Max: 37.2 (05 May 2021 10:51)  T(F): 94 (06 May 2021 05:00), Max: 98.9 (05 May 2021 10:51)  HR: 73 (06 May 2021 10:00) (61 - 89)  BP: 121/40 (05 May 2021 20:00) (100/52 - 129/40)  BP(mean): 85 (05 May 2021 14:43) (85 - 85)  RR: 21 (06 May 2021 10:00) (11 - 64)  SpO2: 94% (06 May 2021 10:00) (90% - 100%)      Skin:  warm and dry  Respiratory: Lungs clear and equal bilat, no r/r/w  Cardiovascular:  S1S2 reg, no M  Gastrointestinal:  non-distended, +NABS, soft, non-tender  Extremities:  LLE:  dressing over medial thigh,  Vascular:  +doppler pulses bilat lower extremities  Neurological:  A & O X3, CNII-XII grossly intact    POCUS:  Lung: diffuse B lines disrupting A lines more pronounced in the zones 1,3 and  lateral view   Lateral view with small pleural effusion                            10.1   7.90  )-----------( 199      ( 06 May 2021 05:49 )             31.3   05-06    134<L>  |  106  |  51<H>  ----------------------------<  199<H>  4.7   |  20<L>  |  1.39<H>    Ca    8.0<L>      06 May 2021 05:49    TPro  5.9<L>  /  Alb  1.9<L>  /  TBili  0.6  /  DBili  x   /  AST  5<L>  /  ALT  <6<L>  /  AlkPhos  71  05-05      TTE Echo Complete w/o Contrast w/ Doppler (05.04.21 @ 08:16) >   1. Left ventricular ejection fraction, by visual estimation, is 35 to 40%.   2. Moderately toseverely decreased global left ventricular systolic function.   3. Multiple left ventricular regional wall motion abnormalities exist. See wall motion findings.   4. Mildly increased LV wall thickness.   5. Normal left ventricular internal cavity size.   6. Spectral Doppler shows impaired relaxation pattern of left ventricular myocardial filling (Grade I diastolic dysfunction).   7. There is mild concentric left ventricular hypertrophy.   8. Mild mitral annular calcification.   9. Mild mitral valve regurgitation.  10. Thickening and calcification of the anterior and posterior mitral valve leaflets.  11. Mild tricuspid regurgitation.  12. Mild aortic regurgitation.  13. Sclerotic aortic valve with normal opening.  14. Estimated pulmonary artery systolic pressure is 36.6 mmHg assuming a right atrial pressure of 10 mmHg, which is consistent with borderline pulmonary hypertension.        * Infected graft POD#1  to return to OR on 24H  Meropenem; hypothermia noted VSS, normal lactate    * Chronic systolic HF with POCUS lung indicating CHF  dc Norvasc; add low dose ACEi  start IV lasix  found her on IVF due to low urinary output  repeat POCUS    *H/o depression / HTN / RA - c/w home meds and f/u outpatient for further management    *  Padilla catheter per Attending doc    * right LE DVT  Eliquis on hold  to resume AC after sx if ok with vascular or bridge in 24 H      *  Type 2 diabetes mellitus, with long-term current use of insulin  was on  Lantus 25 units QHS start with 10 poor appetite  - Check FS with low dose QAC + HS  - Most recent A1C 7.2      4) RA   MTX 15 mg QD on Wednesdays   FA 1 mg QD for supportive therapy

## 2021-05-06 NOTE — CONSULT NOTE ADULT - SUBJECTIVE AND OBJECTIVE BOX
`Patient is a 86y old  Female who presents with a chief complaint of infected graft     HPI:  87 y/o Female with h/o COPD, arthritis, CAD, DMII, depression, HTN, PVD, PAD, s/p left fem pop bypass on 03/24/21, lower ext DVT on eliquis, chronic left heel ulcer, RA, anemia was admitted on 5/3 at [facility] with worsening dyspnea, wheezing that started that night. Pt received 2 Duoneb treatments at home, with only slight improvement. At home patient was hypoxic to 87% on room air, was placed on 100% NRB mask. In the ED at [facility], pt was placed on 2 LPM via NC, received a dose of Lasix 20 mg IV x 1. Her respiratory symptoms have improved with diuresis. Her troponin levels were monitored. Cardiology was consulted, advised troponin elevated due to type II MI.  On her first night of admission at [facility] 5/3 patient developed fever which has persisted throughout admission. ID was consulted, patient was started on vancomycin and meropenem. Overnight on 5/4-5/5 patient had extensive purulent drainage from left medial thigh surgical site. Plastic surgery was consulted, advised transfer to [facility] for evaluation by Dr Ventura who did fem pop bypass 3/2021. In ED at [facility] on 5/5, she was continued on meropenem.  She is reported with STAU in leg wound culture.    PMH: as above  PSH: as above  Meds: per reconciliation sheet, noted below  MEDICATIONS  (STANDING):  amLODIPine   Tablet 10 milliGRAM(s) Oral daily  aspirin  chewable 81 milliGRAM(s) Oral daily  atorvastatin 40 milliGRAM(s) Oral at bedtime  dextrose 40% Gel 15 Gram(s) Oral once  dextrose 5%. 1000 milliLiter(s) (50 mL/Hr) IV Continuous <Continuous>  dextrose 5%. 1000 milliLiter(s) (100 mL/Hr) IV Continuous <Continuous>  dextrose 50% Injectable 25 Gram(s) IV Push once  dextrose 50% Injectable 12.5 Gram(s) IV Push once  dextrose 50% Injectable 25 Gram(s) IV Push once  folic acid 1 milliGRAM(s) Oral at bedtime  gabapentin 300 milliGRAM(s) Oral three times a day  glucagon  Injectable 1 milliGRAM(s) IntraMuscular once  heparin   Injectable 5000 Unit(s) SubCutaneous every 12 hours  insulin lispro (ADMELOG) corrective regimen sliding scale   SubCutaneous three times a day before meals  meropenem  IVPB 1000 milliGRAM(s) IV Intermittent every 12 hours  metoprolol tartrate 25 milliGRAM(s) Oral two times a day  PARoxetine 20 milliGRAM(s) Oral daily  sodium chloride 0.9%. 1000 milliLiter(s) (80 mL/Hr) IV Continuous <Continuous>  tiotropium 18 MICROgram(s) Capsule 1 Capsule(s) Inhalation daily    MEDICATIONS  (PRN):  ALBUTerol    90 MICROgram(s) HFA Inhaler 1 Puff(s) Inhalation every 6 hours PRN Shortness of Breath  morphine  - Injectable 5 milliGRAM(s) IV Push every 3 hours PRN Severe Pain (7 - 10)    Allergies    cephalosporins (Other)  penicillins (Urticaria; Pruritus)    Intolerances      Social: no smoking, no alcohol, no illegal drugs; no recent travel, no exposure to TB  FAMILY HISTORY:  FH: colon cancer  father    FH: heart attack  father    Family history of atherosclerosis  mother      no history of premature cardiovascular disease in first degree relatives    ROS: the patient denies fever, no chills, no HA, no seizures, no dizziness, no sore throat, no nasal congestion, no blurry vision, no CP, no palpitations, has SOB, has cough, no abdominal pain, no diarrhea, no N/V, no dysuria, no leg pain, no claudication, no rash, no joint aches, no rectal pain or bleeding, no night sweats; has increased weakness  All other systems reviewed and are negative    Vital Signs Last 24 Hrs  T(C): 35 (06 May 2021 11:00), Max: 36.9 (05 May 2021 13:26)  T(F): 95 (06 May 2021 11:00), Max: 98.4 (05 May 2021 13:26)  HR: 68 (06 May 2021 11:00) (61 - 89)  BP: 105/86 (06 May 2021 11:00) (100/52 - 129/40)  BP(mean): 91 (06 May 2021 11:00) (65 - 91)  RR: 17 (06 May 2021 11:00) (11 - 64)  SpO2: 97% (06 May 2021 11:00) (90% - 100%)  Daily     Daily     PE:    Constitutional:  No acute distress  HEENT: NC/AT, EOMI, PERRLA, conjunctivae clear; ears and nose atraumatic; pharynx benign  Neck: supple; thyroid not palpable  Back: no tenderness  Respiratory: respiratory effort normal; crackles at bases  Cardiovascular: S1S2 regular, no murmurs  Abdomen: soft, not tender, not distended, positive BS; no liver or spleen organomegaly  Genitourinary: no suprapubic tenderness  Lymphatic: no LN palpable  Musculoskeletal: no muscle tenderness, no joint swelling or tenderness  Extremities: 1+ pedal edema  Neurological/ Psychiatric: AxOx3, judgement and insight normal; moving all extremities  Skin: no rashes; no palpable lesions    Labs: all available labs reviewed                        10.1   7.90  )-----------( 199      ( 06 May 2021 05:49 )             31.3     05-06    134<L>  |  106  |  51<H>  ----------------------------<  199<H>  4.7   |  20<L>  |  1.39<H>    Ca    8.0<L>      06 May 2021 05:49    TPro  5.9<L>  /  Alb  1.9<L>  /  TBili  0.6  /  DBili  x   /  AST  5<L>  /  ALT  <6<L>  /  AlkPhos  71  05-05     LIVER FUNCTIONS - ( 05 May 2021 11:59 )  Alb: 1.9 g/dL / Pro: 5.9 gm/dL / ALK PHOS: 71 U/L / ALT: <6 U/L / AST: 5 U/L / GGT: x             Culture - Fungal, Other (collected 05 May 2021 16:34)  Source: .Other None  Preliminary Report (06 May 2021 09:04):    Testing in progress    Culture - Fungal, Other (collected 05 May 2021 16:34)  Source: .Other None  Preliminary Report (06 May 2021 09:04):    Testing in progress    Culture - Other (collected 04 May 2021 23:00)  Source: .Other wound - L thigh  Preliminary Report (06 May 2021 11:16):    Numerous Staphylococcus aureus    Culture - Blood (collected 03 May 2021 22:10)  Source: .Blood Blood-Peripheral  Preliminary Report (05 May 2021 03:01):    No growth to date.    Culture - Blood (collected 03 May 2021 22:10)  Source: .Blood Blood-Peripheral  Preliminary Report (05 May 2021 03:01):    No growth to date.      COVID-19 PCR: NotDetec (05-03-21 @ 23:17)          Radiology: all available radiological tests reviewed    Advanced directives addressed: full resuscitation `Patient is a 86y old  Female who presents with a chief complaint of infected graft     HPI:  87 y/o Female with h/o COPD, arthritis, CAD, DMII, depression, HTN, PVD, PAD, s/p left fem pop bypass on 03/24/21, lower ext DVT on eliquis, chronic left heel ulcer, RA, anemia was admitted on 5/3 at [place] with worsening dyspnea, wheezing that started that night. Pt received 2 Duoneb treatments at home, with only slight improvement. At home patient was hypoxic to 87% on room air, was placed on 100% NRB mask. In the ED at [place], pt was placed on 2 LPM via NC, received a dose of Lasix 20 mg IV x 1. Her respiratory symptoms have improved with diuresis. Her troponin levels were monitored. Cardiology was consulted, advised troponin elevated due to type II MI.  On her first night of admission at [facility] 5/3 patient developed fever which has persisted throughout admission. ID was consulted, patient was started on vancomycin and meropenem. Overnight on 5/4-5/5 patient had extensive purulent drainage from left medial thigh surgical site. Plastic surgery was consulted, advised transfer to [facility] for evaluation by Dr Ventura who did fem pop bypass 3/2021. In ED at [facility] on 5/5, she was continued on meropenem.  She is reported with STAU in leg wound culture.  She underwent left thigh wound washout    PMH: as above  PSH: as above  Meds: per reconciliation sheet, noted below  MEDICATIONS  (STANDING):  amLODIPine   Tablet 10 milliGRAM(s) Oral daily  aspirin  chewable 81 milliGRAM(s) Oral daily  atorvastatin 40 milliGRAM(s) Oral at bedtime  dextrose 40% Gel 15 Gram(s) Oral once  dextrose 5%. 1000 milliLiter(s) (50 mL/Hr) IV Continuous <Continuous>  dextrose 5%. 1000 milliLiter(s) (100 mL/Hr) IV Continuous <Continuous>  dextrose 50% Injectable 25 Gram(s) IV Push once  dextrose 50% Injectable 12.5 Gram(s) IV Push once  dextrose 50% Injectable 25 Gram(s) IV Push once  folic acid 1 milliGRAM(s) Oral at bedtime  gabapentin 300 milliGRAM(s) Oral three times a day  glucagon  Injectable 1 milliGRAM(s) IntraMuscular once  heparin   Injectable 5000 Unit(s) SubCutaneous every 12 hours  insulin lispro (ADMELOG) corrective regimen sliding scale   SubCutaneous three times a day before meals  meropenem  IVPB 1000 milliGRAM(s) IV Intermittent every 12 hours  metoprolol tartrate 25 milliGRAM(s) Oral two times a day  PARoxetine 20 milliGRAM(s) Oral daily  sodium chloride 0.9%. 1000 milliLiter(s) (80 mL/Hr) IV Continuous <Continuous>  tiotropium 18 MICROgram(s) Capsule 1 Capsule(s) Inhalation daily    MEDICATIONS  (PRN):  ALBUTerol    90 MICROgram(s) HFA Inhaler 1 Puff(s) Inhalation every 6 hours PRN Shortness of Breath  morphine  - Injectable 5 milliGRAM(s) IV Push every 3 hours PRN Severe Pain (7 - 10)    Allergies    cephalosporins (Other)  penicillins (Urticaria; Pruritus)    Intolerances      Social: no smoking, no alcohol, no illegal drugs; no recent travel, no exposure to TB  FAMILY HISTORY:  FH: colon cancer  father    FH: heart attack  father    Family history of atherosclerosis  mother      no history of premature cardiovascular disease in first degree relatives    ROS: the patient denies fever, no chills, no HA, no seizures, no dizziness, no sore throat, no nasal congestion, no blurry vision, no CP, no palpitations, has SOB, has cough, no abdominal pain, no diarrhea, no N/V, no dysuria, no leg pain, no claudication, no rash, no joint aches, no rectal pain or bleeding, no night sweats; has increased weakness  All other systems reviewed and are negative    Vital Signs Last 24 Hrs  T(C): 35 (06 May 2021 11:00), Max: 36.9 (05 May 2021 13:26)  T(F): 95 (06 May 2021 11:00), Max: 98.4 (05 May 2021 13:26)  HR: 68 (06 May 2021 11:00) (61 - 89)  BP: 105/86 (06 May 2021 11:00) (100/52 - 129/40)  BP(mean): 91 (06 May 2021 11:00) (65 - 91)  RR: 17 (06 May 2021 11:00) (11 - 64)  SpO2: 97% (06 May 2021 11:00) (90% - 100%)  Daily     Daily     PE:    Constitutional:  No acute distress  HEENT: NC/AT, EOMI, PERRLA, conjunctivae clear; ears and nose atraumatic; pharynx benign  Neck: supple; thyroid not palpable  Back: no tenderness  Respiratory: respiratory effort normal; crackles at bases  Cardiovascular: S1S2 regular, no murmurs  Abdomen: soft, not tender, not distended, positive BS; no liver or spleen organomegaly  Genitourinary: no suprapubic tenderness  Lymphatic: no LN palpable  Musculoskeletal: no muscle tenderness, no joint swelling or tenderness  Extremities: 1+ pedal edema  Left thigh open wound s/p I and D; paccked  Neurological/ Psychiatric: AxOx3, judgement and insight normal; moving all extremities  Skin: no rashes; no palpable lesions    Labs: all available labs reviewed                        10.1 7.90  )-----------( 199      ( 06 May 2021 05:49 )             31.3     05-06    134<L>  |  106  |  51<H>  ----------------------------<  199<H>  4.7   |  20<L>  |  1.39<H>    Ca    8.0<L>      06 May 2021 05:49    TPro  5.9<L>  /  Alb  1.9<L>  /  TBili  0.6  /  DBili  x   /  AST  5<L>  /  ALT  <6<L>  /  AlkPhos  71  05-05     LIVER FUNCTIONS - ( 05 May 2021 11:59 )  Alb: 1.9 g/dL / Pro: 5.9 gm/dL / ALK PHOS: 71 U/L / ALT: <6 U/L / AST: 5 U/L / GGT: x             Culture - Fungal, Other (collected 05 May 2021 16:34)  Source: .Other None  Preliminary Report (06 May 2021 09:04):    Testing in progress    Culture - Fungal, Other (collected 05 May 2021 16:34)  Source: .Other None  Preliminary Report (06 May 2021 09:04):    Testing in progress    Culture - Other (collected 04 May 2021 23:00)  Source: .Other wound - L thigh  Preliminary Report (06 May 2021 11:16):    Numerous Staphylococcus aureus    Culture - Blood (collected 03 May 2021 22:10)  Source: .Blood Blood-Peripheral  Preliminary Report (05 May 2021 03:01):    No growth to date.    Culture - Blood (collected 03 May 2021 22:10)  Source: .Blood Blood-Peripheral  Preliminary Report (05 May 2021 03:01):    No growth to date.      COVID-19 PCR: NotDetec (05-03-21 @ 23:17)          Radiology: all available radiological tests reviewed    Advanced directives addressed: full resuscitation

## 2021-05-06 NOTE — CONSULT NOTE ADULT - ASSESSMENT
SOB, Acute on chronic decompensated HFref- LVEF 35-40%- hypervolemic, NYHA class III- complicated by CKD and hypoalbuminemia. Cardiorenal syndrome.   2/2021 , LVEF 55-60% on echo  2/2021-LHC-  moderate disease in distal LAD.  Recommend lasix tonight iv with metolazone preceeding it.  Diuresis with close monitoring of the renal function and electrolytes.  Goal potassium of 4 and magnesium of 2.   Strict I/O and daily wt checks. Low sodium diet. Nutrition education.     Drop in LVEF noted in the setting of bacteremia.  Repeat 2 D echo in am to asses LV function.    Hold lisinopril for now given borderline low BP and renal function, will reasses tomorrow am.  continue metoprolol tartrate - will reasses tomorrow to switch to toprol or coreg if she tolerates.    PVD- pending intervention.    Bacteremia- on abx. Management per primary team.     Other medical issues- Management per primary team.   Thank you for allowing me to participate in the care of this patient. Please feel free to contact me with any questions.

## 2021-05-06 NOTE — DIETITIAN INITIAL EVALUATION ADULT. - OTHER INFO
87yo female with PMH significant for COPD, arthritis, CAD, DM2, depression, HTN, PVD, PAD, s/p Lt fem pop bypass, LE DVT, chronic Lt heel ulcer, RA, anemia p/w worsening dyspnea, wheezing.  Pt admitted with pulm edema, elevated troponins.  infected graft to return to OR in 24hours.  chronic systolic HF with POCUS lung indicating CHF.

## 2021-05-06 NOTE — PROGRESS NOTE ADULT - SUBJECTIVE AND OBJECTIVE BOX
vitals stable; comfortable    HCT stable, lytes OK, lactate normal  urine output acceptable    L medial thigh wound without bleeding  L foot warm and well perfused appearing  L groin without evidence of infection (i.e. no cellulitis, tenderness, or sinus)    A/P  stable  will await cultures, continue antibiotics as per ID  plan for muscle flap for Sat with Dr. Pena, covering distal anastomotic area      MEDICATIONS  (STANDING):  amLODIPine   Tablet 10 milliGRAM(s) Oral daily  aspirin  chewable 81 milliGRAM(s) Oral daily  atorvastatin 40 milliGRAM(s) Oral at bedtime  dextrose 40% Gel 15 Gram(s) Oral once  dextrose 5%. 1000 milliLiter(s) (50 mL/Hr) IV Continuous <Continuous>  dextrose 5%. 1000 milliLiter(s) (100 mL/Hr) IV Continuous <Continuous>  dextrose 50% Injectable 25 Gram(s) IV Push once  dextrose 50% Injectable 12.5 Gram(s) IV Push once  dextrose 50% Injectable 25 Gram(s) IV Push once  folic acid 1 milliGRAM(s) Oral at bedtime  gabapentin 300 milliGRAM(s) Oral three times a day  glucagon  Injectable 1 milliGRAM(s) IntraMuscular once  insulin lispro (ADMELOG) corrective regimen sliding scale   SubCutaneous three times a day before meals  lisinopril 10 milliGRAM(s) Oral daily  meropenem  IVPB 1000 milliGRAM(s) IV Intermittent every 12 hours  metoprolol tartrate 25 milliGRAM(s) Oral two times a day  PARoxetine 20 milliGRAM(s) Oral daily  sodium chloride 0.9%. 1000 milliLiter(s) (80 mL/Hr) IV Continuous <Continuous>  tiotropium 18 MICROgram(s) Capsule 1 Capsule(s) Inhalation daily    MEDICATIONS  (PRN):  ALBUTerol    90 MICROgram(s) HFA Inhaler 1 Puff(s) Inhalation every 6 hours PRN Shortness of Breath  morphine  - Injectable 5 milliGRAM(s) IV Push every 3 hours PRN Severe Pain (7 - 10)      Allergies    cephalosporins (Other)  penicillins (Urticaria; Pruritus)    Intolerances        Flatus: [ ] YES [ ] NO             Bowel Movement: [ ] YES [ ] NO  Pain (0-10):            Pain Control Adequate: [ ] YES [ ] NO  Nausea: [ ] YES [ ] NO            Vomiting: [ ] YES [ ] NO  Diarrhea: [ ] YES [ ] NO         Constipation: [ ] YES [ ] NO     Chest Pain: [ ] YES [ ] NO    SOB:  [ ] YES [ ] NO    Vital Signs Last 24 Hrs  T(C): 34.4 (06 May 2021 05:00), Max: 37.3 (05 May 2021 09:37)  T(F): 94 (06 May 2021 05:00), Max: 99.2 (05 May 2021 09:37)  HR: 68 (06 May 2021 05:00) (61 - 89)  BP: 121/40 (05 May 2021 20:00) (100/52 - 129/40)  BP(mean): 85 (05 May 2021 14:43) (85 - 85)  RR: 21 (06 May 2021 05:00) (11 - 27)  SpO2: 95% (06 May 2021 05:00) (90% - 100%)    I&O's Summary    05 May 2021 07:01  -  06 May 2021 06:52  --------------------------------------------------------  IN: 157 mL / OUT: 75 mL / NET: 82 mL        Physical Exam:  General: NAD, resting comfortably  Pulmonary: normal resp effort, CTA-B  Cardiovascular: NSR  Abdominal: soft, NT/ND  Extremities: WWP, normal strength  Neuro: A/O x 3, CNs II-XII grossly intact, normal motor/sensation, no focal deficits  Pulses:   Right:                                                                          Left:  FEM [ ]2+ [ ]1+ [ ]doppler                                             FEM [ ]2+ [ ]1+ [ ]doppler    POP [ ]2+ [ ]1+ [ ]doppler                                             POP [ ]2+ [ ]1+ [ ]doppler    DP [ ]2+ [ ]1+ [ ]doppler                                                DP [ ]2+ [ ]1+ [ ]doppler  PT[ ]2+ [ ]1+ [ ]doppler                                                  PT [ ]2+ [ ]1+ [ ]doppler    LABS:                        10.1   7.90  )-----------( 199      ( 06 May 2021 05:49 )             31.3     05-06    134<L>  |  106  |  51<H>  ----------------------------<  199<H>  4.7   |  20<L>  |  1.39<H>    Ca    8.0<L>      06 May 2021 05:49    TPro  5.9<L>  /  Alb  1.9<L>  /  TBili  0.6  /  DBili  x   /  AST  5<L>  /  ALT  <6<L>  /  AlkPhos  71  05-05    PT/INR - ( 05 May 2021 11:59 )   PT: 18.5 sec;   INR: 1.62 ratio             LIVER FUNCTIONS - ( 05 May 2021 11:59 )  Alb: 1.9 g/dL / Pro: 5.9 gm/dL / ALK PHOS: 71 U/L / ALT: <6 U/L / AST: 5 U/L / GGT: x           CAPILLARY BLOOD GLUCOSE      POCT Blood Glucose.: 196 mg/dL (06 May 2021 05:39)  POCT Blood Glucose.: 110 mg/dL (05 May 2021 18:35)  POCT Blood Glucose.: 84 mg/dL (05 May 2021 15:36)  POCT Blood Glucose.: 91 mg/dL (05 May 2021 13:09)  POCT Blood Glucose.: 162 mg/dL (05 May 2021 07:59)      RADIOLOGY & ADDITIONAL TESTS:

## 2021-05-06 NOTE — DIETITIAN INITIAL EVALUATION ADULT. - ADD RECOMMEND
1) advance diet as tolerated to consistent carb and add ensure max BID to optimize PO intake 2) add MVI with minerals daily to ensure 100% RDI met 3) daily wt checks to track/trend changes 4) monitor BM; if >3 days without BM, add bowel regimen

## 2021-05-06 NOTE — PROGRESS NOTE ADULT - SUBJECTIVE AND OBJECTIVE BOX
Pt resting in bed, Feels well,  denies Abd pain, N/V CP dyspnea.   Pt  was washing Abdomen and dermabond with scab was pulled off accidentally. Pt noted bleeding from wound and RN placed a dressing . at 1730 it was changed by Surgical PA after pressure applied.   Dressing became  saturated and       Vital Signs Last 24 Hrs  T(C): 36.4 (06 May 2021 21:12), Max: 36.4 (06 May 2021 21:12)  T(F): 97.6 (06 May 2021 21:12), Max: 97.6 (06 May 2021 21:12)  HR: 65 (06 May 2021 23:00) (57 - 78)  BP: 104/43 (06 May 2021 23:00) (101/40 - 116/51)  BP(mean): 58 (06 May 2021 23:00) (55 - 91)  RR: 26 (06 May 2021 23:00) (12 - 64)  SpO2: 93% (06 May 2021 23:00) (90% - 100%)    &O's Detail    05 May 2021 07:01  -  06 May 2021 07:00  --------------------------------------------------------  IN:    IV PiggyBack: 50 mL    sodium chloride 0.9%: 1061 mL  Total IN: 1111 mL    OUT:    Indwelling Catheter - Urethral (mL): 375 mL  Total OUT: 375 mL    Total NET: 736 mL      06 May 2021 07:01  -  07 May 2021 01:03  --------------------------------------------------------  IN:    IV PiggyBack: 50 mL    sodium chloride 0.9%: 659 mL  Total IN: 709 mL    OUT:    Indwelling Catheter - Urethral (mL): 110 mL  Total OUT: 110 mL    Total NET: 599 mL NOT ON INCORRECT PT

## 2021-05-06 NOTE — PROGRESS NOTE ADULT - SUBJECTIVE AND OBJECTIVE BOX
Called by Dr. Ventura and asked to remove patient's left external jugular IV and left brachial arterial line.    Left neck:  External jugular line removed without incident.  Sterile dressing placed.    Left upper extremity:  Brachial arterial line removed.  Pressure held to insertion site x 20 minutes.  No evidence of bleeding noted.  Sterile pressure dressing placed.

## 2021-05-07 LAB
-  AMPICILLIN/SULBACTAM: SIGNIFICANT CHANGE UP
-  CEFAZOLIN: SIGNIFICANT CHANGE UP
-  CLINDAMYCIN: SIGNIFICANT CHANGE UP
-  ERYTHROMYCIN: SIGNIFICANT CHANGE UP
-  GENTAMICIN: SIGNIFICANT CHANGE UP
-  OXACILLIN: SIGNIFICANT CHANGE UP
-  RIFAMPIN: SIGNIFICANT CHANGE UP
-  TETRACYCLINE: SIGNIFICANT CHANGE UP
-  TRIMETHOPRIM/SULFAMETHOXAZOLE: SIGNIFICANT CHANGE UP
-  VANCOMYCIN: SIGNIFICANT CHANGE UP
ADD ON TEST-SPECIMEN IN LAB: SIGNIFICANT CHANGE UP
ANION GAP SERPL CALC-SCNC: 11 MMOL/L — SIGNIFICANT CHANGE UP (ref 5–17)
ANION GAP SERPL CALC-SCNC: 12 MMOL/L — SIGNIFICANT CHANGE UP (ref 5–17)
BUN SERPL-MCNC: 74 MG/DL — HIGH (ref 7–23)
BUN SERPL-MCNC: 75 MG/DL — HIGH (ref 7–23)
CALCIUM SERPL-MCNC: 7.8 MG/DL — LOW (ref 8.5–10.1)
CALCIUM SERPL-MCNC: 8 MG/DL — LOW (ref 8.5–10.1)
CHLORIDE SERPL-SCNC: 104 MMOL/L — SIGNIFICANT CHANGE UP (ref 96–108)
CHLORIDE SERPL-SCNC: 106 MMOL/L — SIGNIFICANT CHANGE UP (ref 96–108)
CO2 SERPL-SCNC: 17 MMOL/L — LOW (ref 22–31)
CO2 SERPL-SCNC: 17 MMOL/L — LOW (ref 22–31)
CREAT SERPL-MCNC: 2.59 MG/DL — HIGH (ref 0.5–1.3)
CREAT SERPL-MCNC: 2.85 MG/DL — HIGH (ref 0.5–1.3)
CULTURE RESULTS: SIGNIFICANT CHANGE UP
GLUCOSE SERPL-MCNC: 179 MG/DL — HIGH (ref 70–99)
GLUCOSE SERPL-MCNC: 238 MG/DL — HIGH (ref 70–99)
GRAM STN FLD: SIGNIFICANT CHANGE UP
METHOD TYPE: SIGNIFICANT CHANGE UP
METHOD TYPE: SIGNIFICANT CHANGE UP
MSSA DNA SPEC QL NAA+PROBE: SIGNIFICANT CHANGE UP
ORGANISM # SPEC MICROSCOPIC CNT: SIGNIFICANT CHANGE UP
ORGANISM # SPEC MICROSCOPIC CNT: SIGNIFICANT CHANGE UP
POTASSIUM SERPL-MCNC: 4.5 MMOL/L — SIGNIFICANT CHANGE UP (ref 3.5–5.3)
POTASSIUM SERPL-MCNC: 4.7 MMOL/L — SIGNIFICANT CHANGE UP (ref 3.5–5.3)
POTASSIUM SERPL-SCNC: 4.5 MMOL/L — SIGNIFICANT CHANGE UP (ref 3.5–5.3)
POTASSIUM SERPL-SCNC: 4.7 MMOL/L — SIGNIFICANT CHANGE UP (ref 3.5–5.3)
SODIUM SERPL-SCNC: 133 MMOL/L — LOW (ref 135–145)
SODIUM SERPL-SCNC: 134 MMOL/L — LOW (ref 135–145)
SPECIMEN SOURCE: SIGNIFICANT CHANGE UP

## 2021-05-07 PROCEDURE — 76770 US EXAM ABDO BACK WALL COMP: CPT | Mod: 26

## 2021-05-07 PROCEDURE — 93970 EXTREMITY STUDY: CPT | Mod: 26

## 2021-05-07 PROCEDURE — 99233 SBSQ HOSP IP/OBS HIGH 50: CPT

## 2021-05-07 RX ORDER — DEXTROSE 50 % IN WATER 50 %
25 SYRINGE (ML) INTRAVENOUS ONCE
Refills: 0 | Status: DISCONTINUED | OUTPATIENT
Start: 2021-05-07 | End: 2021-05-21

## 2021-05-07 RX ORDER — CEFAZOLIN SODIUM 1 G
1000 VIAL (EA) INJECTION EVERY 12 HOURS
Refills: 0 | Status: DISCONTINUED | OUTPATIENT
Start: 2021-05-07 | End: 2021-05-11

## 2021-05-07 RX ORDER — SODIUM CHLORIDE 9 MG/ML
1000 INJECTION, SOLUTION INTRAVENOUS
Refills: 0 | Status: DISCONTINUED | OUTPATIENT
Start: 2021-05-07 | End: 2021-05-21

## 2021-05-07 RX ORDER — GLUCAGON INJECTION, SOLUTION 0.5 MG/.1ML
1 INJECTION, SOLUTION SUBCUTANEOUS ONCE
Refills: 0 | Status: DISCONTINUED | OUTPATIENT
Start: 2021-05-07 | End: 2021-05-21

## 2021-05-07 RX ORDER — SODIUM CHLORIDE 9 MG/ML
250 INJECTION INTRAMUSCULAR; INTRAVENOUS; SUBCUTANEOUS ONCE
Refills: 0 | Status: COMPLETED | OUTPATIENT
Start: 2021-05-07 | End: 2021-05-07

## 2021-05-07 RX ORDER — METOPROLOL TARTRATE 50 MG
12.5 TABLET ORAL
Refills: 0 | Status: DISCONTINUED | OUTPATIENT
Start: 2021-05-07 | End: 2021-05-11

## 2021-05-07 RX ORDER — DEXTROSE 50 % IN WATER 50 %
12.5 SYRINGE (ML) INTRAVENOUS ONCE
Refills: 0 | Status: DISCONTINUED | OUTPATIENT
Start: 2021-05-07 | End: 2021-05-21

## 2021-05-07 RX ORDER — SODIUM CHLORIDE 9 MG/ML
500 INJECTION INTRAMUSCULAR; INTRAVENOUS; SUBCUTANEOUS ONCE
Refills: 0 | Status: COMPLETED | OUTPATIENT
Start: 2021-05-07 | End: 2021-05-07

## 2021-05-07 RX ORDER — INSULIN GLARGINE 100 [IU]/ML
5 INJECTION, SOLUTION SUBCUTANEOUS AT BEDTIME
Refills: 0 | Status: DISCONTINUED | OUTPATIENT
Start: 2021-05-07 | End: 2021-05-21

## 2021-05-07 RX ORDER — SODIUM CHLORIDE 9 MG/ML
1000 INJECTION INTRAMUSCULAR; INTRAVENOUS; SUBCUTANEOUS
Refills: 0 | Status: COMPLETED | OUTPATIENT
Start: 2021-05-07 | End: 2021-05-07

## 2021-05-07 RX ORDER — DEXTROSE 50 % IN WATER 50 %
15 SYRINGE (ML) INTRAVENOUS ONCE
Refills: 0 | Status: DISCONTINUED | OUTPATIENT
Start: 2021-05-07 | End: 2021-05-21

## 2021-05-07 RX ORDER — INSULIN LISPRO 100/ML
VIAL (ML) SUBCUTANEOUS
Refills: 0 | Status: DISCONTINUED | OUTPATIENT
Start: 2021-05-07 | End: 2021-05-21

## 2021-05-07 RX ADMIN — GABAPENTIN 300 MILLIGRAM(S): 400 CAPSULE ORAL at 05:55

## 2021-05-07 RX ADMIN — Medication 20 MILLIGRAM(S): at 09:28

## 2021-05-07 RX ADMIN — Medication 1 MILLIGRAM(S): at 21:57

## 2021-05-07 RX ADMIN — ATORVASTATIN CALCIUM 40 MILLIGRAM(S): 80 TABLET, FILM COATED ORAL at 21:56

## 2021-05-07 RX ADMIN — TIOTROPIUM BROMIDE 1 CAPSULE(S): 18 CAPSULE ORAL; RESPIRATORY (INHALATION) at 08:34

## 2021-05-07 RX ADMIN — Medication 12.5 MILLIGRAM(S): at 21:56

## 2021-05-07 RX ADMIN — Medication 2: at 11:59

## 2021-05-07 RX ADMIN — GABAPENTIN 300 MILLIGRAM(S): 400 CAPSULE ORAL at 15:06

## 2021-05-07 RX ADMIN — Medication 100 MILLIGRAM(S): at 09:31

## 2021-05-07 RX ADMIN — Medication 1000 MILLIGRAM(S): at 21:56

## 2021-05-07 RX ADMIN — SODIUM CHLORIDE 80 MILLILITER(S): 9 INJECTION INTRAMUSCULAR; INTRAVENOUS; SUBCUTANEOUS at 05:55

## 2021-05-07 RX ADMIN — Medication 30 MILLILITER(S): at 11:50

## 2021-05-07 RX ADMIN — GABAPENTIN 300 MILLIGRAM(S): 400 CAPSULE ORAL at 21:57

## 2021-05-07 RX ADMIN — INSULIN GLARGINE 5 UNIT(S): 100 INJECTION, SOLUTION SUBCUTANEOUS at 21:56

## 2021-05-07 RX ADMIN — SODIUM CHLORIDE 500 MILLILITER(S): 9 INJECTION INTRAMUSCULAR; INTRAVENOUS; SUBCUTANEOUS at 13:08

## 2021-05-07 RX ADMIN — Medication 12.5 MILLIGRAM(S): at 09:28

## 2021-05-07 RX ADMIN — Medication 30 MILLILITER(S): at 01:53

## 2021-05-07 RX ADMIN — Medication 2: at 07:38

## 2021-05-07 RX ADMIN — HEPARIN SODIUM 5000 UNIT(S): 5000 INJECTION INTRAVENOUS; SUBCUTANEOUS at 21:56

## 2021-05-07 RX ADMIN — HEPARIN SODIUM 5000 UNIT(S): 5000 INJECTION INTRAVENOUS; SUBCUTANEOUS at 09:30

## 2021-05-07 RX ADMIN — Medication 81 MILLIGRAM(S): at 09:31

## 2021-05-07 RX ADMIN — SODIUM CHLORIDE 250 MILLILITER(S): 9 INJECTION INTRAMUSCULAR; INTRAVENOUS; SUBCUTANEOUS at 07:24

## 2021-05-07 RX ADMIN — Medication 1: at 17:02

## 2021-05-07 RX ADMIN — SODIUM CHLORIDE 250 MILLILITER(S): 9 INJECTION INTRAMUSCULAR; INTRAVENOUS; SUBCUTANEOUS at 10:18

## 2021-05-07 NOTE — PROGRESS NOTE ADULT - SUBJECTIVE AND OBJECTIVE BOX
86y Female with PMH of COPD, arthritis, CAD, DMII, depression, HTN, PVD, PAD, s/p left fem pop bypass on 03/24/21, +lower ext DVT on eliquis, +chronic left heel ulcer, RA, anemia, presented to the ED at [place] on 5/3 with worsening dyspnea, wheezing that started that night. Pt received 2 Duoneb treatments at home, with only slight improvement, so EMS called. At home patient was hypoxic to 87% on room air, was placed on 100% NRB mask. In the ED at [place], pt was placed on 2 LPM via NC, received a dose of Lasix 20 mg IV x 1. Initial trop noted to be 0.17.  BNP >60543  On her first night of admission at [place] 5/3 patient developed fever which has persisted throughout admission. ID was consulted, patient was started on vancomycin and meropenem. Overnight on 5/4-5/5 patient had extensive purulent drainage from left medial thigh surgical site. Plastic surgery was consulted, advised transfer to [facility] for evaluation by Dr Ventura who did fem pop bypass 3/2021.      Was taking to OR : purulent fluid in L thigh popliteal fossa above knee; necrotic infected fibrinous material around graft and on tissue in popliteal fossa      ICU Vital Signs Last 24 Hrs  T(C): 36.4 (07 May 2021 09:15), Max: 36.4 (06 May 2021 21:12)  T(F): 97.5 (07 May 2021 09:15), Max: 97.6 (06 May 2021 21:12)  HR: 66 (07 May 2021 08:00) (57 - 73)  BP: 104/47 (07 May 2021 07:00) (93/60 - 116/51)  BP(mean): 61 (07 May 2021 07:00) (55 - 91)  ABP: --  ABP(mean): --  RR: 20 (07 May 2021 08:00) (14 - 57)  SpO2: 98% (07 May 2021 08:00) (91% - 98%)      PHYSICAL EXAM-    Constitutional: elderly female in no acute distress sitting up in bed now.     Head: Head is normocephalic and atraumatic.      Neck:No JVD.     Cardiovascular: Regular rate and rhythm without S3, S4. No murmurs or rubs are appreciated.      Respiratory: B/l  rales-improved from last night     Abdomen: Soft, nontender, nondistended with positive bowel sounds.      Extremity: No tenderness.   pitting edema  1+ b/l LE, L thigh area in dressing that is soaked with serous fluid.     Neurologic: The patient is alert and oriented.      Skin: L thigh dressing     Psychiatric: The patient appears to be emotionally stable.      INTERPRETATION OF TELEMETRY: sinus bradycardia , PACs     ECG: Sinus rythm , T wave inversion in I, aVL, biphasic T wave in V2-6     I&O's Detail    05 May 2021 07:01  -  06 May 2021 07:00  --------------------------------------------------------  IN:    IV PiggyBack: 50 mL    sodium chloride 0.9%: 1061 mL  Total IN: 1111 mL    OUT:    Indwelling Catheter - Urethral (mL): 375 mL  Total OUT: 375 mL    Total NET: 736 mL      06 May 2021 07:01  -  06 May 2021 17:09  --------------------------------------------------------  IN:    sodium chloride 0.9%: 492 mL  Total IN: 492 mL    OUT:    Indwelling Catheter - Urethral (mL): 100 mL  Total OUT: 100 mL    Total NET: 392 mL          LABS:                                   10.1   7.90  )-----------( 199      ( 06 May 2021 05:49 )             31.3     05-07    133<L>  |  104  |  75<H>  ----------------------------<  238<H>  4.7   |  17<L>  |  2.59<H>    Ca    8.0<L>      07 May 2021 06:51    TPro  5.9<L>  /  Alb  1.9<L>  /  TBili  0.6  /  DBili  x   /  AST  5<L>  /  ALT  <6<L>  /  AlkPhos  71  05-05    CARDIAC MARKERS ( 06 May 2021 05:49 )  x     / x     / 32 U/L / x     / x      CARDIAC MARKERS ( 05 May 2021 19:15 )  x     / x     / 36 U/L / x     / x          LIVER FUNCTIONS - ( 05 May 2021 11:59 )  Alb: 1.9 g/dL / Pro: 5.9 gm/dL / ALK PHOS: 71 U/L / ALT: <6 U/L / AST: 5 U/L / GGT: x           PT/INR - ( 05 May 2021 11:59 )   PT: 18.5 sec;   INR: 1.62 ratio           05-06 Chol 128 LDL -- HDL 33<L> Trig 111           I&O's Summary    05 May 2021 07:01  -  06 May 2021 07:00  --------------------------------------------------------  IN: 1111 mL / OUT: 375 mL / NET: 736 mL    06 May 2021 07:01  -  06 May 2021 17:09  --------------------------------------------------------  IN: 492 mL / OUT: 100 mL / NET: 392 mL      BNP  RADIOLOGY & ADDITIONAL STUDIES:  c< from: CT Chest No Cont (05.03.21 @ 23:04) >  IMPRESSION:    1. Small bilateral pleural effusions and pulmonary edema.  2. Emphysema and question additional honeycombing/fibrosis.  3. Mildly asymmetrical 1.5 cm nodular breast tissue on the left. Correlation with follow-up mammography/ultrasound is recommended.  4. 2.9 cm slightly hyperdense right renal lesion, most likely hemorrhagic/proteinaceous cyst. Follow-up nonemergent ultrasound is recommended.                ROSARIO REYES MD; Attending Radiologist  This document has been electronically signed. May  3 2021 11:52PM    < end of copied text >  < from: Xray Chest 1 View AP/PA (05.03.21 @ 22:36) >    EXAM:  XR CHEST AP OR PA 1V      PROCEDURE DATE:  05/03/2021        INTERPRETATION:  Views:1  Comparison: 08/21/15  History: Shortness of breath    There is mild scattered interstitial infiltrate consistent with pneumonia or pneumonitis without segmental consolidation, layering effusion or cavitation. The heart is normal in size.    IMPRESSION: As above              MARIA ESTHER CADET MD; Attending Radiologist  This document has been electronically signed. May  4 2021  9:22AM    < end of copied text >  < from: TTE Echo Complete w/o Contrast w/ Doppler (05.04.21 @ 08:16) >    Summary:   1. Left ventricular ejection fraction, by visual estimation, is 35 to 40%.   2. Moderately toseverely decreased global left ventricular systolic function.   3. Multiple left ventricular regional wall motion abnormalities exist. See wall motion findings.   4. Mildly increased LV wall thickness.   5. Normal left ventricular internal cavity size.   6. Spectral Doppler shows impaired relaxation pattern of left ventricular myocardial filling (Grade I diastolic dysfunction).   7. There is mild concentric left ventricular hypertrophy.   8. Mild mitral annular calcification.   9. Mild mitral valve regurgitation.  10. Thickening and calcification of the anterior and posterior mitral valve leaflets.  11. Mild tricuspid regurgitation.  12. Mild aortic regurgitation.  13. Sclerotic aortic valve with normal opening.  14. Estimated pulmonary artery systolic pressure is 36.6 mmHg assuming a right atrial pressure of 10 mmHg, which is consistent with borderline pulmonary hypertension.    [id number] Toby Peace , Electronically signed on 5/4/2021 at 10:19:28 AM    < end of copied text >       86y Female with PMH of COPD, arthritis, CAD, DMII, depression, HTN, PVD, PAD, s/p left fem pop bypass on 03/24/21, +lower ext DVT on eliquis, +chronic left heel ulcer, RA, anemia, presented to the ED at [place] on 5/3 with worsening dyspnea, wheezing that started that night. Pt received 2 Duoneb treatments at home, with only slight improvement, so EMS called. At home patient was hypoxic to 87% on room air, was placed on 100% NRB mask. In the ED at [place], pt was placed on 2 LPM via NC, received a dose of Lasix 20 mg IV x 1. Initial trop noted to be 0.17.  BNP >00331  On her first night of admission at [place] 5/3 patient developed fever which has persisted throughout admission. ID was consulted, patient was started on vancomycin and meropenem. Overnight on 5/4-5/5 patient had extensive purulent drainage from left medial thigh surgical site. Plastic surgery was consulted, advised transfer to [facility] for evaluation by Dr Ventura who did fem pop bypass 3/2021.      Was taking to OR : purulent fluid in L thigh popliteal fossa above knee; necrotic infected fibrinous material around graft and on tissue in popliteal fossa  oliguric suspect PAT;  no c/o    Vital Signs Last 24 Hrs  T(C): 36.4 (07 May 2021 14:44), Max: 36.4 (06 May 2021 21:12)  T(F): 97.5 (07 May 2021 14:44), Max: 97.6 (06 May 2021 21:12)  HR: 69 (07 May 2021 15:00) (62 - 70)  BP: 96/50 (07 May 2021 15:00) (93/60 - 113/50)  BP(mean): 57 (07 May 2021 15:00) (55 - 66)  RR: 18 (07 May 2021 15:00) (13 - 57)  SpO2: 91% (07 May 2021 14:00) (91% - 98%)    PHYSICAL EXAM-    Constitutional: elderly female in no acute distress sitting up in bed now.   Head: Head is normocephalic and atraumatic.    Neck: No JVD.   Cardiovascular: Regular rate and rhythm without S3, S4. No murmurs or rubs are appreciated.    Respiratory: B/l  rales-improved from last night   Abdomen: Soft, nontender, nondistended with positive bowel sounds.    Extremity: No tenderness.   pitting edema  1+ b/l LE, L thigh area in dressing that is soaked with serous fluid.   Neurologic: The patient is alert and oriented.  Non focal  Skin: L thigh dressing   Psychiatric: The patient appears to be emotionally stable.                              10.1   7.90  )-----------( 199      ( 06 May 2021 05:49 )             31.3   05-07    134<L>  |  106  |  74<H>  ----------------------------<  179<H>  4.5   |  17<L>  |  2.85<H>    Ca    7.8<L>      07 May 2021 14:15          BNP  RADIOLOGY & ADDITIONAL STUDIES:  c< from: CT Chest No Cont (05.03.21 @ 23:04) >  IMPRESSION:    1. Small bilateral pleural effusions and pulmonary edema.  2. Emphysema and question additional honeycombing/fibrosis.  3. Mildly asymmetrical 1.5 cm nodular breast tissue on the left. Correlation with follow-up mammography/ultrasound is recommended.  4. 2.9 cm slightly hyperdense right renal lesion, most likely hemorrhagic/proteinaceous cyst. Follow-up nonemergent ultrasound is recommended.                ROSARIO REYES MD; Attending Radiologist  This document has been electronically signed. May  3 2021 11:52PM    < end of copied text >  < from: Xray Chest 1 View AP/PA (05.03.21 @ 22:36) >    EXAM:  XR CHEST AP OR PA 1V      PROCEDURE DATE:  05/03/2021        INTERPRETATION:  Views:1  Comparison: 08/21/15  History: Shortness of breath    There is mild scattered interstitial infiltrate consistent with pneumonia or pneumonitis without segmental consolidation, layering effusion or cavitation. The heart is normal in size.    IMPRESSION: As above              MARIA ESTHER CADET MD; Attending Radiologist  This document has been electronically signed. May  4 2021  9:22AM    < end of copied text >  < from: TTE Echo Complete w/o Contrast w/ Doppler (05.04.21 @ 08:16) >    Summary:   1. Left ventricular ejection fraction, by visual estimation, is 35 to 40%.   2. Moderately toseverely decreased global left ventricular systolic function.   3. Multiple left ventricular regional wall motion abnormalities exist. See wall motion findings.   4. Mildly increased LV wall thickness.   5. Normal left ventricular internal cavity size.   6. Spectral Doppler shows impaired relaxation pattern of left ventricular myocardial filling (Grade I diastolic dysfunction).   7. There is mild concentric left ventricular hypertrophy.   8. Mild mitral annular calcification.   9. Mild mitral valve regurgitation.  10. Thickening and calcification of the anterior and posterior mitral valve leaflets.  11. Mild tricuspid regurgitation.  12. Mild aortic regurgitation.  13. Sclerotic aortic valve with normal opening.  14. Estimated pulmonary artery systolic pressure is 36.6 mmHg assuming a right atrial pressure of 10 mmHg, which is consistent with borderline pulmonary hypertension.    [id number] Toby Peace , Electronically signed on 5/4/2021 at 10:19:28 AM          * Infected graft POD#1  to return to OR on 24H  Meropenem; hypothermia noted VSS, normal lactate    * Chronic systolic HF with POCUS lung indicating CHF  complicated by PAT, oliguria  intermittent IVF waiting to plateau     *H/o depression / HTN / RA - c/w home meds and f/u outpatient for further management      * right LE DVT  Eliquis on hold  to resume AC after sx if ok with vascular or bridge in 24 H if no OR  doppler leg      *  Type 2 diabetes mellitus, with long-term current use of insulin  was on  Lantus 25 units QHS start with 10 poor appetite  - Check FS with low dose QAC + HS  - Most recent A1C 7.2      4) RA   MTX 15 mg QD on Wednesdays   FA 1 mg QD for supportive therapy

## 2021-05-07 NOTE — PROGRESS NOTE ADULT - ASSESSMENT
SOB, Acute on chronic decompensated HFref- LVEF 35-40%- NYHA class III- complicated by CKD and hypoalbuminemia. Cardiorenal syndrome.   2/2021 , LVEF 55-60% on echo  2/2021-LHC-  moderate disease in distal LAD.  Today she clinically does not appear volume overloaded despite increased BNP.   Agree with gentle hydration and monitoring of the volume status.  Nephrology team following pt.    Her BP overnight was noted to be borderline low.  She had vascular disease, ? hypoperfusion of kidneys.  Nephrology team consulted. D/W Dr López.     Drop in LVEF noted in the setting of bacteremia.  Repeat 2 D echo in am to asses LV function.    Hold lisinopril for now given borderline low BP and renal function.        PVD- pending intervention.  d/w Dr Alfonso.      Bacteremia- on abx. Management per primary team.     Other medical issues- Management per primary team.   Thank you for allowing me to participate in the care of this patient. Please feel free to contact me with any questions.

## 2021-05-07 NOTE — CONSULT NOTE ADULT - SUBJECTIVE AND OBJECTIVE BOX
Patient is a 86y Female whom presented to the hospital with COPD, arthritis, CAD, DMII, depression, HTN, PVD, PAD, s/p left fem pop bypass on 03/24/21, +lower ext DVT on eliquis, +chronic left heel ulcer, RA, anemia, presented to the ED at [place] on 5/3 with worsening dyspnea, renal evaluation of ORLY. Patient has been getting lasix, did get zarox as well on 5/6. Contrast given on 5/5, renal function rising thereafter with decrement in UOP so accordingly renal evaluation called.     PAST MEDICAL & SURGICAL HISTORY:  Hypertension    Anemia    Arthritis, rheumatoid    Depression    Type 2 diabetes mellitus  on insulin    PVD (peripheral vascular disease) with claudication    CAD (coronary artery disease)  non-obstructive    Rheumatoid arthritis    fracture ankle right  plate . screws   2000    S/P cataract surgery  2011    Hip fracture requiring operative repair  Right hip 11/14/2020        MEDICATIONS  (STANDING):  aspirin  chewable 81 milliGRAM(s) Oral daily  atorvastatin 40 milliGRAM(s) Oral at bedtime  ceFAZolin  Injectable. 1000 milliGRAM(s) IV Push every 12 hours  dextrose 40% Gel 15 Gram(s) Oral once  dextrose 5%. 1000 milliLiter(s) (50 mL/Hr) IV Continuous <Continuous>  dextrose 5%. 1000 milliLiter(s) (100 mL/Hr) IV Continuous <Continuous>  dextrose 50% Injectable 25 Gram(s) IV Push once  dextrose 50% Injectable 12.5 Gram(s) IV Push once  dextrose 50% Injectable 25 Gram(s) IV Push once  folic acid 1 milliGRAM(s) Oral at bedtime  gabapentin 300 milliGRAM(s) Oral three times a day  glucagon  Injectable 1 milliGRAM(s) IntraMuscular once  heparin   Injectable 5000 Unit(s) SubCutaneous every 12 hours  insulin lispro (ADMELOG) corrective regimen sliding scale   SubCutaneous three times a day before meals  metoprolol tartrate 12.5 milliGRAM(s) Oral two times a day  PARoxetine 20 milliGRAM(s) Oral daily  sodium chloride 0.9%. 1000 milliLiter(s) (80 mL/Hr) IV Continuous <Continuous>  sodium chloride 0.9%. 1000 milliLiter(s) (250 mL/Hr) IV Continuous <Continuous>  tiotropium 18 MICROgram(s) Capsule 1 Capsule(s) Inhalation daily    MEDICATIONS  (PRN):  acetaminophen   Tablet .. 650 milliGRAM(s) Oral every 6 hours PRN Mild Pain (1 - 3)  ALBUTerol    90 MICROgram(s) HFA Inhaler 1 Puff(s) Inhalation every 6 hours PRN Shortness of Breath  morphine  - Injectable 5 milliGRAM(s) IV Push every 3 hours PRN Severe Pain (7 - 10)      Allergies    cephalosporins (Other)  penicillins (Urticaria; Pruritus)    Intolerances        SOCIAL HISTORY:    FAMILY HISTORY:  FH: colon cancer  father    FH: heart attack  father    Family history of atherosclerosis  mother        REVIEW OF SYSTEMS:    CONSTITUTIONAL: + weakness, fevers or chills  EYES/ENT: No visual changes;  No vertigo or throat pain   NECK: No pain or stiffness  RESPIRATORY: No cough, wheezing, hemoptysis; No shortness of breath  CARDIOVASCULAR: No chest pain or palpitations  GASTROINTESTINAL: No abdominal or epigastric pain. No nausea, vomiting, or hematemesis; No diarrhea or constipation. No melena or hematochezia.  GENITOURINARY: No dysuria, frequency or hematuria  NEUROLOGICAL: No numbness or weakness  SKIN: No itching, burning, rashes, or lesions   All other review of systems is negative unless indicated above.      T(C): , Max: 36.4 (05-06-21 @ 21:12)  T(F): , Max: 97.6 (05-06-21 @ 21:12)  HR: 69 (05-07-21 @ 15:00)  BP: 96/50 (05-07-21 @ 15:00)  BP(mean): 57 (05-07-21 @ 15:00)  RR: 18 (05-07-21 @ 15:00)  SpO2: 91% (05-07-21 @ 14:00)  Wt(kg): --    05-06 @ 07:01  -  05-07 @ 07:00  --------------------------------------------------------  IN: 709 mL / OUT: 110 mL / NET: 599 mL    05-07 @ 07:01  -  05-07 @ 15:28  --------------------------------------------------------  IN: 1000 mL / OUT: 40 mL / NET: 960 mL          PHYSICAL EXAM:    Constitutional: frail, ill appearing  HEENT: dry MM  Neck: No LAD, No JVD  Respiratory: dist  Cardiovascular: S1 and S2  Extremities: LE dsg L  Neurological: A/O x 3, no focal deficits        LABS:                        10.1   7.90  )-----------( 199      ( 06 May 2021 05:49 )             31.3     07 May 2021 14:15    134    |  106    |  74     ----------------------------<  179    4.5     |  17     |  2.85   07 May 2021 06:51    133    |  104    |  75     ----------------------------<  238    4.7     |  17     |  2.59   06 May 2021 17:59    134    |  105    |  63     ----------------------------<  219    4.6     |  18     |  2.05   06 May 2021 05:49    134    |  106    |  51     ----------------------------<  199    4.7     |  20     |  1.39   05 May 2021 19:15    134    |  106    |  43     ----------------------------<  132    4.1     |  19     |  1.06     Ca    7.8        07 May 2021 14:15  Ca    8.0        07 May 2021 06:51  Ca    7.8        06 May 2021 17:59  Ca    8.0        06 May 2021 05:49  Ca    7.8        05 May 2021 19:15  Mg     1.7       04 May 2021 05:20    TPro  5.9    /  Alb  1.9    /  TBili  0.6    /  DBili  x      /  AST  5      /  ALT  <6     /  AlkPhos  71     05 May 2021 11:59  TPro  6.7    /  Alb  2.3    /  TBili  0.5    /  DBili  x      /  AST  11     /  ALT  8      /  AlkPhos  91     03 May 2021 22:10          Urine Studies:          RADIOLOGY & ADDITIONAL STUDIES:

## 2021-05-07 NOTE — PROGRESS NOTE ADULT - SUBJECTIVE AND OBJECTIVE BOX
Patient is a 86y old  Female who presents with a chief complaint of wound.       HPI:  86y Female with PMH of COPD, arthritis, CAD, DMII, depression, HTN, PVD, PAD, s/p left fem pop bypass on 03/24/21, +lower ext DVT on eliquis, +chronic left heel ulcer, RA, anemia, presented to the ED at [facility] on 5/3 with worsening dyspnea, wheezing that started that night. Pt received 2 Duoneb treatments at home, with only slight improvement, so EMS called. At home patient was hypoxic to 87% on room air, was placed on 100% NRB mask. In the ED at [facility], pt was placed on 2 LPM via NC, received a dose of Lasix 20 mg IV x 1. Initial trop noted to be 0.17.  BNP >01677    Her respiratory symptoms have improved with diuresis.     On her first night of admission at [facility] 5/3 patient developed fever which has persisted throughout admission. ID was consulted, patient was started on vancomycin and meropenem. Overnight on 5/4-5/5 patient had extensive purulent drainage from left medial thigh surgical site. Plastic surgery was consulted, advised transfer to [facility] for evaluation by Dr Ventura who did fem pop bypass 3/2021.      In ED at [facility] on 5/5, patient had no complaints.  Denied any CP, SOB, lower extremity pain, numbness or tingling.      5/6-   Pt seen now. She is fully alert and was able to give all the history , son in law at bedside.  Pt states that her SOB is better.    NO CP.    5/7- Pt seen this am. Pt denies any chest pain or SOB.  She was able to lie down flat in bed for a long time without any SOB.  Overnight urine output poor.     PAST MEDICAL & SURGICAL HISTORY:  Hypertension    Anemia    Arthritis, rheumatoid    Depression    Type 2 diabetes mellitus  on insulin    PVD (peripheral vascular disease) with claudication    CAD (coronary artery disease)  non-obstructive    Rheumatoid arthritis    fracture ankle right  plate . screws   2000    S/P cataract surgery  2011    Hip fracture requiring operative repair  Right hip 11/14/2020        MEDICATIONS  (STANDING):  aspirin  chewable 81 milliGRAM(s) Oral daily  atorvastatin 40 milliGRAM(s) Oral at bedtime  ceFAZolin   IVPB 2000 milliGRAM(s) IV Intermittent every 12 hours  dextrose 40% Gel 15 Gram(s) Oral once  dextrose 5%. 1000 milliLiter(s) (50 mL/Hr) IV Continuous <Continuous>  dextrose 5%. 1000 milliLiter(s) (100 mL/Hr) IV Continuous <Continuous>  dextrose 50% Injectable 25 Gram(s) IV Push once  dextrose 50% Injectable 12.5 Gram(s) IV Push once  dextrose 50% Injectable 25 Gram(s) IV Push once  folic acid 1 milliGRAM(s) Oral at bedtime  furosemide   Injectable 40 milliGRAM(s) IV Push daily  gabapentin 300 milliGRAM(s) Oral three times a day  glucagon  Injectable 1 milliGRAM(s) IntraMuscular once  heparin   Injectable 5000 Unit(s) SubCutaneous every 12 hours  insulin lispro (ADMELOG) corrective regimen sliding scale   SubCutaneous three times a day before meals  lisinopril 5 milliGRAM(s) Oral daily  metoprolol tartrate 25 milliGRAM(s) Oral two times a day  PARoxetine 20 milliGRAM(s) Oral daily  tiotropium 18 MICROgram(s) Capsule 1 Capsule(s) Inhalation daily    MEDICATIONS  (PRN):  acetaminophen   Tablet .. 650 milliGRAM(s) Oral every 6 hours PRN Mild Pain (1 - 3)  ALBUTerol    90 MICROgram(s) HFA Inhaler 1 Puff(s) Inhalation every 6 hours PRN Shortness of Breath  morphine  - Injectable 5 milliGRAM(s) IV Push every 3 hours PRN Severe Pain (7 - 10)      FAMILY HISTORY:  FH: colon cancer  father    FH: heart attack  father    Family history of atherosclerosis  mother        SOCIAL HISTORY:    REVIEW OF SYSTEMS:  CONSTITUTIONAL:    No fatigue, malaise, lethargy.  No fever or chills.   RESPIRATORY:  No cough.  No wheeze.  No hemoptysis.  c/o shortness of breath.  CARDIOVASCULAR:  No chest pains.  No palpitations. c/o shortness of breath, No orthopnea or PND.  GASTROINTESTINAL:  No abdominal pain.  No nausea or vomiting.    GENITOURINARY:    No hematuria.    MUSCULOSKELETAL:  c/o L thigh pain   NEUROLOGICAL:  No tingling or numbness or weakness.  PSYCHIATRIC:  No confusion  SKIN:  No rashes.          ICU Vital Signs Last 24 Hrs  T(C): 36.4 (07 May 2021 09:15), Max: 36.4 (06 May 2021 21:12)  T(F): 97.5 (07 May 2021 09:15), Max: 97.6 (06 May 2021 21:12)  HR: 66 (07 May 2021 08:00) (57 - 73)  BP: 104/47 (07 May 2021 07:00) (93/60 - 116/51)  BP(mean): 61 (07 May 2021 07:00) (55 - 91)  ABP: --  ABP(mean): --  RR: 20 (07 May 2021 08:00) (14 - 57)  SpO2: 98% (07 May 2021 08:00) (91% - 98%)      PHYSICAL EXAM-    Constitutional: elderly female in no acute distress sitting up in bed now.     Head: Head is normocephalic and atraumatic.      Neck:No JVD.     Cardiovascular: Regular rate and rhythm without S3, S4. No murmurs or rubs are appreciated.      Respiratory: B/l  rales-improved from last night     Abdomen: Soft, nontender, nondistended with positive bowel sounds.      Extremity: No tenderness.   pitting edema  1+ b/l LE, L thigh area in dressing that is soaked with serous fluid.     Neurologic: The patient is alert and oriented.      Skin: L thigh dressing     Psychiatric: The patient appears to be emotionally stable.      INTERPRETATION OF TELEMETRY: sinus bradycardia , PACs     ECG: Sinus rythm , T wave inversion in I, aVL, biphasic T wave in V2-6     I&O's Detail    05 May 2021 07:01  -  06 May 2021 07:00  --------------------------------------------------------  IN:    IV PiggyBack: 50 mL    sodium chloride 0.9%: 1061 mL  Total IN: 1111 mL    OUT:    Indwelling Catheter - Urethral (mL): 375 mL  Total OUT: 375 mL    Total NET: 736 mL      06 May 2021 07:01  -  06 May 2021 17:09  --------------------------------------------------------  IN:    sodium chloride 0.9%: 492 mL  Total IN: 492 mL    OUT:    Indwelling Catheter - Urethral (mL): 100 mL  Total OUT: 100 mL    Total NET: 392 mL          LABS:                                   10.1   7.90  )-----------( 199      ( 06 May 2021 05:49 )             31.3     05-07    133<L>  |  104  |  75<H>  ----------------------------<  238<H>  4.7   |  17<L>  |  2.59<H>    Ca    8.0<L>      07 May 2021 06:51    TPro  5.9<L>  /  Alb  1.9<L>  /  TBili  0.6  /  DBili  x   /  AST  5<L>  /  ALT  <6<L>  /  AlkPhos  71  05-05    CARDIAC MARKERS ( 06 May 2021 05:49 )  x     / x     / 32 U/L / x     / x      CARDIAC MARKERS ( 05 May 2021 19:15 )  x     / x     / 36 U/L / x     / x          LIVER FUNCTIONS - ( 05 May 2021 11:59 )  Alb: 1.9 g/dL / Pro: 5.9 gm/dL / ALK PHOS: 71 U/L / ALT: <6 U/L / AST: 5 U/L / GGT: x           PT/INR - ( 05 May 2021 11:59 )   PT: 18.5 sec;   INR: 1.62 ratio           05-06 Chol 128 LDL -- HDL 33<L> Trig 111           I&O's Summary    05 May 2021 07:01  -  06 May 2021 07:00  --------------------------------------------------------  IN: 1111 mL / OUT: 375 mL / NET: 736 mL    06 May 2021 07:01  -  06 May 2021 17:09  --------------------------------------------------------  IN: 492 mL / OUT: 100 mL / NET: 392 mL      BNP  RADIOLOGY & ADDITIONAL STUDIES:  c< from: CT Chest No Cont (05.03.21 @ 23:04) >  IMPRESSION:    1. Small bilateral pleural effusions and pulmonary edema.  2. Emphysema and question additional honeycombing/fibrosis.  3. Mildly asymmetrical 1.5 cm nodular breast tissue on the left. Correlation with follow-up mammography/ultrasound is recommended.  4. 2.9 cm slightly hyperdense right renal lesion, most likely hemorrhagic/proteinaceous cyst. Follow-up nonemergent ultrasound is recommended.                ROSARIO REYES MD; Attending Radiologist  This document has been electronically signed. May  3 2021 11:52PM    < end of copied text >  < from: Xray Chest 1 View AP/PA (05.03.21 @ 22:36) >    EXAM:  XR CHEST AP OR PA 1V      PROCEDURE DATE:  05/03/2021        INTERPRETATION:  Views:1  Comparison: 08/21/15  History: Shortness of breath    There is mild scattered interstitial infiltrate consistent with pneumonia or pneumonitis without segmental consolidation, layering effusion or cavitation. The heart is normal in size.    IMPRESSION: As above              MARIA ESTHER CADET MD; Attending Radiologist  This document has been electronically signed. May  4 2021  9:22AM    < end of copied text >  < from: TTE Echo Complete w/o Contrast w/ Doppler (05.04.21 @ 08:16) >    Summary:   1. Left ventricular ejection fraction, by visual estimation, is 35 to 40%.   2. Moderately toseverely decreased global left ventricular systolic function.   3. Multiple left ventricular regional wall motion abnormalities exist. See wall motion findings.   4. Mildly increased LV wall thickness.   5. Normal left ventricular internal cavity size.   6. Spectral Doppler shows impaired relaxation pattern of left ventricular myocardial filling (Grade I diastolic dysfunction).   7. There is mild concentric left ventricular hypertrophy.   8. Mild mitral annular calcification.   9. Mild mitral valve regurgitation.  10. Thickening and calcification of the anterior and posterior mitral valve leaflets.  11. Mild tricuspid regurgitation.  12. Mild aortic regurgitation.  13. Sclerotic aortic valve with normal opening.  14. Estimated pulmonary artery systolic pressure is 36.6 mmHg assuming a right atrial pressure of 10 mmHg, which is consistent with borderline pulmonary hypertension.    [id number] Toby Peace , Electronically signed on 5/4/2021 at 10:19:28 AM    < end of copied text >

## 2021-05-07 NOTE — PROGRESS NOTE ADULT - ASSESSMENT
87 y/o Female with h/o COPD, arthritis, CAD, DMII, depression, HTN, PVD, PAD, s/p left fem pop bypass on 03/24/21, lower ext DVT on eliquis, chronic left heel ulcer, RA, anemia was admitted on 5/3 at [facility] with worsening dyspnea, wheezing that started that night. Pt received 2 Duoneb treatments at home, with only slight improvement. At home patient was hypoxic to 87% on room air, was placed on 100% NRB mask. In the ED at [facility], pt was placed on 2 LPM via NC, received a dose of Lasix 20 mg IV x 1. Her respiratory symptoms have improved with diuresis. Her troponin levels were monitored. Cardiology was consulted, advised troponin elevated due to type II MI.  On her first night of admission at [facility] 5/3 patient developed fever which has persisted throughout admission. ID was consulted, patient was started on vancomycin and meropenem. Overnight on 5/4-5/5 patient had extensive purulent drainage from left medial thigh surgical site. Plastic surgery was consulted, advised transfer to [facility] for evaluation by Dr Ventura who did fem pop bypass 3/2021. In ED at [facility] on 5/5, she was continued on meropenem.    1. Left leg cellulitis. Postsurgical wound infection with MSSA s/p washout. Recent left femoral-popliteal bypass. Possible underlying vascular graft infection. ARF on CRF stage 3. Allergy to cephalosporins and PCN.  -cultures reviewed  -worsening renal function  -on cefazolin 2 gm IV q12h # 2  -tolerating abx well so far; no side effects noted  -decrease cefazolin to 1 gm IV q12h  -reason for abx use and side effects reviewed with patient; monitor BMP  -monitor closely in [facility] of PCN allergy history   -local wound care  -vascular evaluation appreciated  -continue abx coverage  -monitor temps  -f/u CBC  -supportive care  2. Other issues:   -care per medicine

## 2021-05-07 NOTE — CONSULT NOTE ADULT - ASSESSMENT
86y Female whom presented to the hospital with COPD, arthritis, CAD, DMII, depression, HTN, PVD, PAD, s/p left fem pop bypass on 03/24/21, +lower ext DVT on eliquis, +chronic left heel ulcer, RA, anemia, presented to the ED at [facility] on 5/3 with worsening dyspnea, renal evaluation of ORLY. Patient has been getting lasix, did get zarox as well on 5/6. Contrast given on 5/5, renal function rising thereafter with decrement in UOP so accordingly renal evaluation called.     ORLY  -Likely secondary to contrast, PAT. Infection as a potential additive culprit along with diuresis  -Would hold further diuretics  -Keep positive, maintain hurst for I/O's  -renal imaging bedside  -urine studies    Cellulitis  -Abx renall dosed  -NSAID avoidance  -ID follow up    HTN  -Norvasc/anti-htn hold  -Support map with IVF    Thanks, will follow  d/c with RN staff, Dr Landers

## 2021-05-07 NOTE — PROGRESS NOTE ADULT - SUBJECTIVE AND OBJECTIVE BOX
afebrile, VSS    no L foot pain    cardiology note read    urine output low    Cr increasing to ~ 2    PE  No bleeding from wound    A/P  stable  plan for gastrocnemius flap tomorrow  continue antibiotics  will hydrate   MEDICATIONS  (STANDING):  aspirin  chewable 81 milliGRAM(s) Oral daily  atorvastatin 40 milliGRAM(s) Oral at bedtime  ceFAZolin   IVPB 2000 milliGRAM(s) IV Intermittent every 12 hours  dextrose 40% Gel 15 Gram(s) Oral once  dextrose 5%. 1000 milliLiter(s) (50 mL/Hr) IV Continuous <Continuous>  dextrose 5%. 1000 milliLiter(s) (100 mL/Hr) IV Continuous <Continuous>  dextrose 50% Injectable 25 Gram(s) IV Push once  dextrose 50% Injectable 12.5 Gram(s) IV Push once  dextrose 50% Injectable 25 Gram(s) IV Push once  folic acid 1 milliGRAM(s) Oral at bedtime  furosemide   Injectable 40 milliGRAM(s) IV Push daily  gabapentin 300 milliGRAM(s) Oral three times a day  glucagon  Injectable 1 milliGRAM(s) IntraMuscular once  heparin   Injectable 5000 Unit(s) SubCutaneous every 12 hours  insulin lispro (ADMELOG) corrective regimen sliding scale   SubCutaneous three times a day before meals  metoprolol tartrate 25 milliGRAM(s) Oral two times a day  PARoxetine 20 milliGRAM(s) Oral daily  sodium chloride 0.9%. 1000 milliLiter(s) (80 mL/Hr) IV Continuous <Continuous>  tiotropium 18 MICROgram(s) Capsule 1 Capsule(s) Inhalation daily    MEDICATIONS  (PRN):  acetaminophen   Tablet .. 650 milliGRAM(s) Oral every 6 hours PRN Mild Pain (1 - 3)  ALBUTerol    90 MICROgram(s) HFA Inhaler 1 Puff(s) Inhalation every 6 hours PRN Shortness of Breath  morphine  - Injectable 5 milliGRAM(s) IV Push every 3 hours PRN Severe Pain (7 - 10)      Allergies    cephalosporins (Other)  penicillins (Urticaria; Pruritus)    Intolerances        Flatus: [ ] YES [ ] NO             Bowel Movement: [ ] YES [ ] NO  Pain (0-10):            Pain Control Adequate: [ ] YES [ ] NO  Nausea: [ ] YES [ ] NO            Vomiting: [ ] YES [ ] NO  Diarrhea: [ ] YES [ ] NO         Constipation: [ ] YES [ ] NO     Chest Pain: [ ] YES [ ] NO    SOB:  [ ] YES [ ] NO    Vital Signs Last 24 Hrs  T(C): 36.4 (07 May 2021 05:47), Max: 36.4 (06 May 2021 21:12)  T(F): 97.5 (07 May 2021 05:47), Max: 97.6 (06 May 2021 21:12)  HR: 65 (07 May 2021 06:00) (57 - 78)  BP: 103/45 (07 May 2021 06:00) (93/60 - 116/51)  BP(mean): 59 (07 May 2021 06:00) (55 - 91)  RR: 19 (07 May 2021 06:00) (12 - 64)  SpO2: 93% (07 May 2021 06:00) (91% - 99%)    I&O's Summary    05 May 2021 07:01  -  06 May 2021 07:00  --------------------------------------------------------  IN: 1111 mL / OUT: 375 mL / NET: 736 mL    06 May 2021 07:01  -  07 May 2021 06:52  --------------------------------------------------------  IN: 709 mL / OUT: 110 mL / NET: 599 mL        Physical Exam:  General: NAD, resting comfortably  Pulmonary: normal resp effort, CTA-B  Cardiovascular: NSR  Abdominal: soft, NT/ND  Extremities: WWP, normal strength  Neuro: A/O x 3, CNs II-XII grossly intact, normal motor/sensation, no focal deficits  Pulses:   Right:                                                                          Left:  FEM [ ]2+ [ ]1+ [ ]doppler                                             FEM [ ]2+ [ ]1+ [ ]doppler    POP [ ]2+ [ ]1+ [ ]doppler                                             POP [ ]2+ [ ]1+ [ ]doppler    DP [ ]2+ [ ]1+ [ ]doppler                                                DP [ ]2+ [ ]1+ [ ]doppler  PT[ ]2+ [ ]1+ [ ]doppler                                                  PT [ ]2+ [ ]1+ [ ]doppler    LABS:                        10.1   7.90  )-----------( 199      ( 06 May 2021 05:49 )             31.3     05-06    134<L>  |  105  |  63<H>  ----------------------------<  219<H>  4.6   |  18<L>  |  2.05<H>    Ca    7.8<L>      06 May 2021 17:59    TPro  5.9<L>  /  Alb  1.9<L>  /  TBili  0.6  /  DBili  x   /  AST  5<L>  /  ALT  <6<L>  /  AlkPhos  71  05-05    PT/INR - ( 05 May 2021 11:59 )   PT: 18.5 sec;   INR: 1.62 ratio             LIVER FUNCTIONS - ( 05 May 2021 11:59 )  Alb: 1.9 g/dL / Pro: 5.9 gm/dL / ALK PHOS: 71 U/L / ALT: <6 U/L / AST: 5 U/L / GGT: x           CAPILLARY BLOOD GLUCOSE      POCT Blood Glucose.: 254 mg/dL (06 May 2021 22:19)  POCT Blood Glucose.: 239 mg/dL (06 May 2021 16:54)  POCT Blood Glucose.: 239 mg/dL (06 May 2021 12:54)  POCT Blood Glucose.: 196 mg/dL (06 May 2021 08:37)      RADIOLOGY & ADDITIONAL TESTS:

## 2021-05-07 NOTE — PROGRESS NOTE ADULT - SUBJECTIVE AND OBJECTIVE BOX
Date of service: 05-07-21 @ 11:23    Events noted  Reported with Staph in wound culture  Weak looking  No reactions noted from IV abx infusions  Feels weak  Renal function is worse    ROS: no fever or chills; denies dizziness, no HA, no SOB or cough, no abdominal pain, no diarrhea or constipation; no dysuria    MEDICATIONS  (STANDING):  aspirin  chewable 81 milliGRAM(s) Oral daily  atorvastatin 40 milliGRAM(s) Oral at bedtime  ceFAZolin  Injectable. 1000 milliGRAM(s) IV Push every 12 hours  dextrose 40% Gel 15 Gram(s) Oral once  dextrose 5%. 1000 milliLiter(s) (50 mL/Hr) IV Continuous <Continuous>  dextrose 5%. 1000 milliLiter(s) (100 mL/Hr) IV Continuous <Continuous>  dextrose 50% Injectable 25 Gram(s) IV Push once  dextrose 50% Injectable 12.5 Gram(s) IV Push once  dextrose 50% Injectable 25 Gram(s) IV Push once  folic acid 1 milliGRAM(s) Oral at bedtime  gabapentin 300 milliGRAM(s) Oral three times a day  glucagon  Injectable 1 milliGRAM(s) IntraMuscular once  heparin   Injectable 5000 Unit(s) SubCutaneous every 12 hours  insulin lispro (ADMELOG) corrective regimen sliding scale   SubCutaneous three times a day before meals  metoprolol tartrate 12.5 milliGRAM(s) Oral two times a day  PARoxetine 20 milliGRAM(s) Oral daily  sodium chloride 0.9%. 1000 milliLiter(s) (80 mL/Hr) IV Continuous <Continuous>  sodium chloride 0.9%. 1000 milliLiter(s) (250 mL/Hr) IV Continuous <Continuous>  tiotropium 18 MICROgram(s) Capsule 1 Capsule(s) Inhalation daily    Vital Signs Last 24 Hrs  T(C): 36.4 (07 May 2021 09:15), Max: 36.4 (06 May 2021 21:12)  T(F): 97.5 (07 May 2021 09:15), Max: 97.6 (06 May 2021 21:12)  HR: 66 (07 May 2021 08:00) (57 - 69)  BP: 104/47 (07 May 2021 07:00) (93/60 - 109/48)  BP(mean): 61 (07 May 2021 07:00) (55 - 67)  RR: 20 (07 May 2021 08:00) (14 - 57)  SpO2: 98% (07 May 2021 08:00) (91% - 98%)     Physical exam:    Constitutional:  No acute distress  HEENT: NC/AT, EOMI, PERRLA, conjunctivae clear  Neck: supple; thyroid not palpable  Back: no tenderness  Respiratory: respiratory effort normal; crackles at bases  Cardiovascular: S1S2 regular, no murmurs  Abdomen: soft, not tender, not distended, positive BS  Genitourinary: no suprapubic tenderness  Lymphatic: no LN palpable  Musculoskeletal: no muscle tenderness, no joint swelling or tenderness  Extremities: 1+ pedal edema  Left thigh open wound s/p I and D; packed  Neurological/ Psychiatric: AxOx3, moving all extremities  Skin: no rashes; no palpable lesions    Labs: reviewed                        10.1   7.90  )-----------( 199      ( 06 May 2021 05:49 )             31.3     05-07    133<L>  |  104  |  75<H>  ----------------------------<  238<H>  4.7   |  17<L>  |  2.59<H>    Ca    8.0<L>      07 May 2021 06:51    TPro  5.9<L>  /  Alb  1.9<L>  /  TBili  0.6  /  DBili  x   /  AST  5<L>  /  ALT  <6<L>  /  AlkPhos  71  05-05                        10.1 7.90  )-----------( 199      ( 06 May 2021 05:49 )             31.3     05-06    134<L>  |  106  |  51<H>  ----------------------------<  199<H>  4.7   |  20<L>  |  1.39<H>    Ca    8.0<L>      06 May 2021 05:49    TPro  5.9<L>  /  Alb  1.9<L>  /  TBili  0.6  /  DBili  x   /  AST  5<L>  /  ALT  <6<L>  /  AlkPhos  71  05-05     LIVER FUNCTIONS - ( 05 May 2021 11:59 )  Alb: 1.9 g/dL / Pro: 5.9 gm/dL / ALK PHOS: 71 U/L / ALT: <6 U/L / AST: 5 U/L / GGT: x             Culture - Fungal, Other (collected 05 May 2021 16:34)  Source: .Other None  Preliminary Report (06 May 2021 09:04):    Testing in progress    Culture - Fungal, Other (collected 05 May 2021 16:34)  Source: .Other None  Preliminary Report (06 May 2021 09:04):    Testing in progress    Culture - Other (collected 04 May 2021 23:00)  Source: .Other wound - L thigh  Preliminary Report (06 May 2021 11:16):    Numerous Staphylococcus aureus    Culture - Blood (collected 03 May 2021 22:10)  Source: .Blood Blood-Peripheral  Preliminary Report (05 May 2021 03:01):    No growth to date.    Culture - Blood (collected 03 May 2021 22:10)  Source: .Blood Blood-Peripheral  Preliminary Report (05 May 2021 03:01):    No growth to date.      COVID-19 PCR: NotDetec (05-03-21 @ 23:17)          Radiology: all available radiological tests reviewed    Advanced directives addressed: full resuscitation

## 2021-05-08 LAB
-  AMPICILLIN/SULBACTAM: SIGNIFICANT CHANGE UP
-  CEFAZOLIN: SIGNIFICANT CHANGE UP
-  CLINDAMYCIN: SIGNIFICANT CHANGE UP
-  ERYTHROMYCIN: SIGNIFICANT CHANGE UP
-  GENTAMICIN: SIGNIFICANT CHANGE UP
-  OXACILLIN: SIGNIFICANT CHANGE UP
-  RIFAMPIN: SIGNIFICANT CHANGE UP
-  TETRACYCLINE: SIGNIFICANT CHANGE UP
-  TRIMETHOPRIM/SULFAMETHOXAZOLE: SIGNIFICANT CHANGE UP
-  VANCOMYCIN: SIGNIFICANT CHANGE UP
ALBUMIN SERPL ELPH-MCNC: 1.7 G/DL — LOW (ref 3.3–5)
ALP SERPL-CCNC: 99 U/L — SIGNIFICANT CHANGE UP (ref 40–120)
ALT FLD-CCNC: <6 U/L — LOW (ref 12–78)
ANION GAP SERPL CALC-SCNC: 13 MMOL/L — SIGNIFICANT CHANGE UP (ref 5–17)
AST SERPL-CCNC: 15 U/L — SIGNIFICANT CHANGE UP (ref 15–37)
BILIRUB SERPL-MCNC: 0.2 MG/DL — SIGNIFICANT CHANGE UP (ref 0.2–1.2)
BUN SERPL-MCNC: 82 MG/DL — HIGH (ref 7–23)
CALCIUM SERPL-MCNC: 7.7 MG/DL — LOW (ref 8.5–10.1)
CHLORIDE SERPL-SCNC: 106 MMOL/L — SIGNIFICANT CHANGE UP (ref 96–108)
CO2 SERPL-SCNC: 13 MMOL/L — LOW (ref 22–31)
CREAT SERPL-MCNC: 3.53 MG/DL — HIGH (ref 0.5–1.3)
CULTURE RESULTS: SIGNIFICANT CHANGE UP
GLUCOSE SERPL-MCNC: 136 MG/DL — HIGH (ref 70–99)
METHOD TYPE: SIGNIFICANT CHANGE UP
ORGANISM # SPEC MICROSCOPIC CNT: SIGNIFICANT CHANGE UP
PHOSPHATE SERPL-MCNC: 6.2 MG/DL — HIGH (ref 2.5–4.5)
POTASSIUM SERPL-MCNC: 5 MMOL/L — SIGNIFICANT CHANGE UP (ref 3.5–5.3)
POTASSIUM SERPL-SCNC: 5 MMOL/L — SIGNIFICANT CHANGE UP (ref 3.5–5.3)
PROT SERPL-MCNC: 5.5 GM/DL — LOW (ref 6–8.3)
SODIUM SERPL-SCNC: 132 MMOL/L — LOW (ref 135–145)
SODIUM UR-SCNC: <20 MMOL/L — SIGNIFICANT CHANGE UP
SPECIMEN SOURCE: SIGNIFICANT CHANGE UP

## 2021-05-08 PROCEDURE — 99232 SBSQ HOSP IP/OBS MODERATE 35: CPT

## 2021-05-08 RX ORDER — SODIUM BICARBONATE 1 MEQ/ML
0.08 SYRINGE (ML) INTRAVENOUS
Qty: 75 | Refills: 0 | Status: DISCONTINUED | OUTPATIENT
Start: 2021-05-08 | End: 2021-05-09

## 2021-05-08 RX ORDER — SODIUM CHLORIDE 9 MG/ML
1000 INJECTION INTRAMUSCULAR; INTRAVENOUS; SUBCUTANEOUS
Refills: 0 | Status: DISCONTINUED | OUTPATIENT
Start: 2021-05-08 | End: 2021-05-08

## 2021-05-08 RX ADMIN — Medication 12.5 MILLIGRAM(S): at 21:23

## 2021-05-08 RX ADMIN — ATORVASTATIN CALCIUM 40 MILLIGRAM(S): 80 TABLET, FILM COATED ORAL at 21:23

## 2021-05-08 RX ADMIN — Medication 1 MILLIGRAM(S): at 21:23

## 2021-05-08 RX ADMIN — GABAPENTIN 300 MILLIGRAM(S): 400 CAPSULE ORAL at 06:32

## 2021-05-08 RX ADMIN — Medication 75 MEQ/KG/HR: at 11:57

## 2021-05-08 RX ADMIN — Medication 12.5 MILLIGRAM(S): at 11:08

## 2021-05-08 RX ADMIN — Medication 20 MILLIGRAM(S): at 11:07

## 2021-05-08 RX ADMIN — SODIUM CHLORIDE 250 MILLILITER(S): 9 INJECTION INTRAMUSCULAR; INTRAVENOUS; SUBCUTANEOUS at 08:15

## 2021-05-08 RX ADMIN — Medication 1: at 12:02

## 2021-05-08 RX ADMIN — HEPARIN SODIUM 5000 UNIT(S): 5000 INJECTION INTRAVENOUS; SUBCUTANEOUS at 21:23

## 2021-05-08 RX ADMIN — GABAPENTIN 300 MILLIGRAM(S): 400 CAPSULE ORAL at 21:22

## 2021-05-08 RX ADMIN — GABAPENTIN 300 MILLIGRAM(S): 400 CAPSULE ORAL at 13:55

## 2021-05-08 RX ADMIN — TIOTROPIUM BROMIDE 1 CAPSULE(S): 18 CAPSULE ORAL; RESPIRATORY (INHALATION) at 10:58

## 2021-05-08 RX ADMIN — Medication 81 MILLIGRAM(S): at 11:07

## 2021-05-08 RX ADMIN — INSULIN GLARGINE 5 UNIT(S): 100 INJECTION, SOLUTION SUBCUTANEOUS at 21:27

## 2021-05-08 RX ADMIN — Medication 1000 MILLIGRAM(S): at 21:27

## 2021-05-08 RX ADMIN — Medication 1000 MILLIGRAM(S): at 11:08

## 2021-05-08 RX ADMIN — HEPARIN SODIUM 5000 UNIT(S): 5000 INJECTION INTRAVENOUS; SUBCUTANEOUS at 11:08

## 2021-05-08 NOTE — PROGRESS NOTE ADULT - ASSESSMENT
86y Female whom presented to the hospital with COPD, arthritis, CAD, DMII, depression, HTN, PVD, PAD, s/p left fem pop bypass on 03/24/21, +lower ext DVT on eliquis, +chronic left heel ulcer, RA, anemia, presented to the ED at [facility] on 5/3 with worsening dyspnea, renal evaluation of ORLY. Patient has been getting lasix, did get zarox as well on 5/6. Contrast given on 5/5, renal function rising thereafter with decrement in UOP so accordingly renal evaluation called.     ORLY - Anuric   Likely secondary to contrast --> PAT  while on diuretics   - reduce IVF w anuria   - Would hold further diuretics  - Keep positive, maintain hurst for I/O's  - renal imaging no hydro   - ua bland, urine na < 20 - severe ATN     Met Acidosis sec to ORLY    - gentle HCO3 gtt    Cellulitis  -Abx renall dosed  -NSAID avoidance  -ID follow up    HTN  -Norvasc/anti-htn on hold  - Support map with gentle IVF    Spoke w pt and daughter ( Dr Ahumada) regarding renal status at length   RRT in next 24 - 48 hrs if no improvement?    Thanks, will follow

## 2021-05-08 NOTE — PROGRESS NOTE ADULT - SUBJECTIVE AND OBJECTIVE BOX
Patient is a 86y Female whom presented to the hospital with COPD, arthritis, CAD, DMII, depression, HTN, PVD, PAD, s/p left fem pop bypass on 03/24/21, +lower ext DVT on eliquis, +chronic left heel ulcer, RA, anemia, presented to the ED at [place] on 5/3 with worsening dyspnea, renal evaluation of ORLY. Patient has been getting lasix, did get zarox as well on 5/6. Contrast given on 5/5, renal function rising thereafter with decrement in UOP so accordingly renal evaluation called.     Today    feelnig ok    no sob    no uop overnight    IVF bolus given this AM         PAST MEDICAL & SURGICAL HISTORY:  Hypertension    Anemia    Arthritis, rheumatoid    Depression    Type 2 diabetes mellitus  on insulin    PVD (peripheral vascular disease) with claudication    CAD (coronary artery disease)  non-obstructive    Rheumatoid arthritis    fracture ankle right  plate . screws   2000    S/P cataract surgery  2011    Hip fracture requiring operative repair  Right hip 11/14/2020      MEDICATIONS  (STANDING):  aspirin  chewable 81 milliGRAM(s) Oral daily  atorvastatin 40 milliGRAM(s) Oral at bedtime  ceFAZolin  Injectable. 1000 milliGRAM(s) IV Push every 12 hours  dextrose 40% Gel 15 Gram(s) Oral once  dextrose 5%. 1000 milliLiter(s) (50 mL/Hr) IV Continuous <Continuous>  dextrose 5%. 1000 milliLiter(s) (100 mL/Hr) IV Continuous <Continuous>  dextrose 50% Injectable 25 Gram(s) IV Push once  dextrose 50% Injectable 12.5 Gram(s) IV Push once  dextrose 50% Injectable 25 Gram(s) IV Push once  folic acid 1 milliGRAM(s) Oral at bedtime  gabapentin 300 milliGRAM(s) Oral three times a day  glucagon  Injectable 1 milliGRAM(s) IntraMuscular once  heparin   Injectable 5000 Unit(s) SubCutaneous every 12 hours  insulin glargine Injectable (LANTUS) 5 Unit(s) SubCutaneous at bedtime  insulin lispro (ADMELOG) corrective regimen sliding scale   SubCutaneous three times a day before meals  metoprolol tartrate 12.5 milliGRAM(s) Oral two times a day  PARoxetine 20 milliGRAM(s) Oral daily  sodium bicarbonate  Infusion 0.077 mEq/kG/Hr (75 mL/Hr) IV Continuous <Continuous>  sodium chloride 0.9%. 1000 milliLiter(s) (250 mL/Hr) IV Continuous <Continuous>  tiotropium 18 MICROgram(s) Capsule 1 Capsule(s) Inhalation daily    MEDICATIONS  (PRN):  acetaminophen   Tablet .. 650 milliGRAM(s) Oral every 6 hours PRN Mild Pain (1 - 3)  ALBUTerol    90 MICROgram(s) HFA Inhaler 1 Puff(s) Inhalation every 6 hours PRN Shortness of Breath  morphine  - Injectable 5 milliGRAM(s) IV Push every 3 hours PRN Severe Pain (7 - 10)    Allergies    cephalosporins (Other)  penicillins (Urticaria; Pruritus)    Intolerances        SOCIAL HISTORY:    FAMILY HISTORY:  FH: colon cancer  father    FH: heart attack  father    Family history of atherosclerosis  mother        REVIEW OF SYSTEMS:    CONSTITUTIONAL: + weakness, fevers or chills  EYES/ENT: No visual changes;  No vertigo or throat pain   NECK: No pain or stiffness  RESPIRATORY: No cough, wheezing, hemoptysis; No shortness of breath  CARDIOVASCULAR: No chest pain or palpitations  GASTROINTESTINAL: No abdominal or epigastric pain. No nausea, vomiting, or hematemesis; No diarrhea or constipation. No melena or hematochezia.  GENITOURINARY: No dysuria, frequency or hematuria  NEUROLOGICAL: No numbness or weakness  SKIN: No itching, burning, rashes, or lesions   All other review of systems is negative unless indicated above.      Vital Signs Last 24 Hrs  T(C): 36.6 (08 May 2021 09:03), Max: 36.6 (08 May 2021 06:00)  T(F): 97.9 (08 May 2021 09:03), Max: 97.9 (08 May 2021 09:03)  HR: 68 (08 May 2021 11:00) (63 - 78)  BP: 114/43 (08 May 2021 11:00) (96/50 - 135/50)  BP(mean): 61 (08 May 2021 11:00) (57 - 84)  RR: 31 (08 May 2021 11:00) (13 - 36)  SpO2: 98% (08 May 2021 11:00) (89% - 98%)    I&O's Detail    07 May 2021 07:01  -  08 May 2021 07:00  --------------------------------------------------------  IN:    sodium chloride 0.9%: 2473 mL    sodium chloride 0.9%: 500 mL    Sodium Chloride 0.9% Bolus: 500 mL  Total IN: 3473 mL    OUT:    Indwelling Catheter - Urethral (mL): 65 mL  Total OUT: 65 mL    Total NET: 3408 mL    PHYSICAL EXAM:    Constitutional: frail, ill appearing  HEENT: dry MM  Neck: No LAD, No JVD  Respiratory: dist, no rales   Cardiovascular: S1 and S2  Extremities: LE dsg L  Neurological: A/O x 3, no focal deficits        LABS:        132    |  106    |  82     ----------------------------<  136       08 May 2021 06:27  5.0     |  13     |  3.53     134    |  106    |  74     ----------------------------<  179       07 May 2021 14:15  4.5     |  17     |  2.85     133    |  104    |  75     ----------------------------<  238       07 May 2021 06:51  4.7     |  17     |  2.59     Ca    7.7        08 May 2021 06:27  Ca    7.8        07 May 2021 14:15    Phos  6.2       08 May 2021 06:27      TPro  5.5    /  Alb  1.7    /  TBili  0.2    /        08 May 2021 06:27  DBili  x      /  AST  15     /  ALT  <6     /  AlkPhos  99       TPro  5.9    /  Alb  1.9    /  TBili  0.6    /        05 May 2021 11:59  DBili  x      /  AST  5      /  ALT  <6     /  AlkPhos  71           Urine Studies:    Urine Microscopic-Add On (NC) (05.04.21 @ 01:50)   White Blood Cell - Urine: 0-2 /HPF   Red Blood Cell - Urine: 0-4 /HPF   Bacteria: Few /HPF   Epithelial Cells: Neg.-Few       Historical Values  Urine Microscopic-Add On (NC) (05.04.21 @ 01:50)   White Blood Cell - Urine: 0-2 /HPF   Red Blood Cell - Urine: 0-4 /HPF   Bacteria: Few /HPF   Epithelial Cells: Neg.-Few   Urine Microscopic-Add On (NC) (03.22.21 @ 20:15)   White Blood Cell - Urine: Negative   Red Blood Cell - Urine: 0-2 /HPF   Bacteria: Occasional   Epithelial Cells: Negative   Urine Microscopic-Add On (NC) (01.28.21 @ 12:04)       RADIOLOGY & ADDITIONAL STUDIES:    EXAM:  US KIDNEYS AND BLADDER                            PROCEDURE DATE:  05/07/2021          INTERPRETATION:  CLINICAL INFORMATION: Acute renal insufficiency    COMPARISON: 5/4/2021    TECHNIQUE: Sonography of the kidneys    FINDINGS:    Right kidney: 10.7 cm. No renal mass, hydronephrosis or calculi. Findings consistent with medical renal disease.    Left kidney: 11.7 cm. No hydronephrosis or calculus. Indeterminate hypoechoic exophytic left lower pole lesion measuring 1.4 cm. Findings consistent with medical renal disease.    IMPRESSION:    No hydronephrosis bilaterally. Findings consistent with medical renal disease.    Indeterminate exophytic left lower pole renal lesion measuring 1.4 cm.

## 2021-05-08 NOTE — CONSULT NOTE ADULT - SUBJECTIVE AND OBJECTIVE BOX
Patient is a 86y old  Female who presents with a chief complaint of SOB (07 May 2021 15:22)      HPI:  86y Female with PMH of COPD, arthritis, CAD, DMII, depression, HTN, PVD, PAD, s/p left fem pop bypass on 03/24/21, +lower ext DVT on eliquis, +chronic left heel ulcer, RA, anemia, presented to the ED at [place] on 5/3 with worsening dyspnea, wheezing that started that night. Pt received 2 Duoneb treatments at home, with only slight improvement, so EMS called. At home patient was hypoxic to 87% on room air, was placed on 100% NRB mask. In the ED at [place], pt was placed on 2 LPM via NC, received a dose of Lasix 20 mg IV x   On her first night of admission at [facility] 5/3 patient developed fever which has persisted throughout admission. ID was consulted, patient was started on vancomycin and meropenem. Overnight on 5/4-5/5 patient had extensive purulent drainage from left medial thigh surgical site. Plastic surgery was consulted, advised transfer to [facility] for evaluation by Dr Ventura who did fem pop bypass 3/2021.    Above history per chart review  Patient states that she smoked until last year   History of RA as well  Not seen pulmonary in the past   LVEF noted to be 35-40% and PASP elevated at 36 mmHg as well  CT Chest revealed paraseptal emphysematous changes, dilated pulmonary artery and pulmonary fibrosis.         PREVIOUS DIAGNOSTIC TESTING:      MEDICATIONS  (STANDING):  aspirin  chewable 81 milliGRAM(s) Oral daily  atorvastatin 40 milliGRAM(s) Oral at bedtime  ceFAZolin  Injectable. 1000 milliGRAM(s) IV Push every 12 hours  dextrose 40% Gel 15 Gram(s) Oral once  dextrose 5%. 1000 milliLiter(s) (50 mL/Hr) IV Continuous <Continuous>  dextrose 5%. 1000 milliLiter(s) (100 mL/Hr) IV Continuous <Continuous>  dextrose 50% Injectable 25 Gram(s) IV Push once  dextrose 50% Injectable 12.5 Gram(s) IV Push once  dextrose 50% Injectable 25 Gram(s) IV Push once  folic acid 1 milliGRAM(s) Oral at bedtime  gabapentin 300 milliGRAM(s) Oral three times a day  glucagon  Injectable 1 milliGRAM(s) IntraMuscular once  heparin   Injectable 5000 Unit(s) SubCutaneous every 12 hours  insulin glargine Injectable (LANTUS) 5 Unit(s) SubCutaneous at bedtime  insulin lispro (ADMELOG) corrective regimen sliding scale   SubCutaneous three times a day before meals  metoprolol tartrate 12.5 milliGRAM(s) Oral two times a day  PARoxetine 20 milliGRAM(s) Oral daily  sodium chloride 0.9%. 1000 milliLiter(s) (80 mL/Hr) IV Continuous <Continuous>  sodium chloride 0.9%. 1000 milliLiter(s) (250 mL/Hr) IV Continuous <Continuous>  tiotropium 18 MICROgram(s) Capsule 1 Capsule(s) Inhalation daily    MEDICATIONS  (PRN):  acetaminophen   Tablet .. 650 milliGRAM(s) Oral every 6 hours PRN Mild Pain (1 - 3)  ALBUTerol    90 MICROgram(s) HFA Inhaler 1 Puff(s) Inhalation every 6 hours PRN Shortness of Breath  morphine  - Injectable 5 milliGRAM(s) IV Push every 3 hours PRN Severe Pain (7 - 10)      FAMILY HISTORY:  FH: colon cancer  father    FH: heart attack  father    Family history of atherosclerosis  mother        SOCIAL HISTORY:  ***    REVIEW OF SYSTEM:  dyspnea     Vital Signs Last 24 Hrs  T(C): 36.6 (08 May 2021 09:03), Max: 36.6 (08 May 2021 06:00)  T(F): 97.9 (08 May 2021 09:03), Max: 97.9 (08 May 2021 09:03)  HR: 78 (08 May 2021 09:00) (63 - 78)  BP: 135/50 (08 May 2021 09:00) (96/50 - 135/50)  BP(mean): 72 (08 May 2021 09:00) (57 - 84)  RR: 14 (08 May 2021 09:00) (13 - 36)  SpO2: 96% (08 May 2021 09:00) (89% - 96%)    I&O's Summary    07 May 2021 07:01  -  08 May 2021 07:00  --------------------------------------------------------  IN: 3473 mL / OUT: 65 mL / NET: 3408 mL      PHYSICAL EXAM  General Appearance: cooperative, no acute distress,   HEENT: PERRL, conjunctiva clear, EOM's intact, non injected pharynx, no exudate, TM   normal  Neck: Supple, , no adenopathy, thyroid: not enlarged, no carotid bruit or JVD  Back: Symmetric, no  tenderness,no soft tissue tenderness  Lungs: diminished bilaterally  Heart: Regular rate and rhythm, S1, S2 normal, no murmur, rub or gallop  Abdomen: Soft, non-tender, bowel sounds active , no hepatosplenomegaly  Extremities: no cyanosis or edema, no joint swelling  Skin: Skin color, texture normal, no rashes   Neurologic: Alert and oriented X3 , cranial nerves intact, sensory and motor normal,    ECG:    LABS:      05-08    132<L>  |  106  |  82<H>  ----------------------------<  136<H>  5.0   |  13<L>  |  3.53<H>    Ca    7.7<L>      08 May 2021 06:27  Phos  6.2     05-08    TPro  5.5<L>  /  Alb  1.7<L>  /  TBili  0.2  /  DBili  x   /  AST  15  /  ALT  <6<L>  /  AlkPhos  99  05-08        Lipid Panel  128  33  --  111    Pro BNP  [id number] 05-07 @ 06:51  D Dimer  -- 05-07 @ 06:51  Pro BNP  95087 05-06 @ 17:59  D Dimer  -- 05-06 @ 17:59  Pro BNP  [id number] 05-03 @ 22:10  D Dimer  -- 05-03 @ 22:10              RADIOLOGY & ADDITIONAL STUDIES:

## 2021-05-08 NOTE — PROGRESS NOTE ADULT - SUBJECTIVE AND OBJECTIVE BOX
afebrile, VSS    essentially anuric    Cr 2.85 mg/dl    input: ~ 3500 cc yesterday May 7    Dr. Peralta's note read: ORLY from sepsis, hypovolemia, contrast, pre existing renal disease (despite normal parameters intially)    Pt comfortable; no pulmonary complaints    No L foot pain    PE  no bleeding from L distal thigh wound  L foot pink, warm and well perfused appearing  L groin without evidence of infection    A/P  case cancelled today and will reschedule once renal function improves  continue gentle hydration  MEDICATIONS  (STANDING):  aspirin  chewable 81 milliGRAM(s) Oral daily  atorvastatin 40 milliGRAM(s) Oral at bedtime  ceFAZolin  Injectable. 1000 milliGRAM(s) IV Push every 12 hours  dextrose 40% Gel 15 Gram(s) Oral once  dextrose 5%. 1000 milliLiter(s) (50 mL/Hr) IV Continuous <Continuous>  dextrose 5%. 1000 milliLiter(s) (100 mL/Hr) IV Continuous <Continuous>  dextrose 50% Injectable 25 Gram(s) IV Push once  dextrose 50% Injectable 12.5 Gram(s) IV Push once  dextrose 50% Injectable 25 Gram(s) IV Push once  folic acid 1 milliGRAM(s) Oral at bedtime  gabapentin 300 milliGRAM(s) Oral three times a day  glucagon  Injectable 1 milliGRAM(s) IntraMuscular once  heparin   Injectable 5000 Unit(s) SubCutaneous every 12 hours  insulin glargine Injectable (LANTUS) 5 Unit(s) SubCutaneous at bedtime  insulin lispro (ADMELOG) corrective regimen sliding scale   SubCutaneous three times a day before meals  metoprolol tartrate 12.5 milliGRAM(s) Oral two times a day  PARoxetine 20 milliGRAM(s) Oral daily  sodium chloride 0.9%. 1000 milliLiter(s) (80 mL/Hr) IV Continuous <Continuous>  tiotropium 18 MICROgram(s) Capsule 1 Capsule(s) Inhalation daily    MEDICATIONS  (PRN):  acetaminophen   Tablet .. 650 milliGRAM(s) Oral every 6 hours PRN Mild Pain (1 - 3)  ALBUTerol    90 MICROgram(s) HFA Inhaler 1 Puff(s) Inhalation every 6 hours PRN Shortness of Breath  morphine  - Injectable 5 milliGRAM(s) IV Push every 3 hours PRN Severe Pain (7 - 10)      Allergies    cephalosporins (Other)  penicillins (Urticaria; Pruritus)    Intolerances        Flatus: [ ] YES [ ] NO             Bowel Movement: [ ] YES [ ] NO  Pain (0-10):            Pain Control Adequate: [ ] YES [ ] NO  Nausea: [ ] YES [ ] NO            Vomiting: [ ] YES [ ] NO  Diarrhea: [ ] YES [ ] NO         Constipation: [ ] YES [ ] NO     Chest Pain: [ ] YES [ ] NO    SOB:  [ ] YES [ ] NO    Vital Signs Last 24 Hrs  T(C): 36.6 (08 May 2021 06:00), Max: 36.6 (08 May 2021 06:00)  T(F): 97.8 (08 May 2021 06:00), Max: 97.8 (08 May 2021 06:00)  HR: 68 (08 May 2021 06:00) (63 - 76)  BP: 114/42 (08 May 2021 06:00) (96/50 - 122/45)  BP(mean): 61 (08 May 2021 06:00) (57 - 84)  RR: 23 (08 May 2021 06:00) (13 - 36)  SpO2: 89% (08 May 2021 06:00) (89% - 98%)    I&O's Summary    07 May 2021 07:01  -  08 May 2021 07:00  --------------------------------------------------------  IN: 3473 mL / OUT: 65 mL / NET: 3408 mL        Physical Exam:  General: NAD, resting comfortably  Pulmonary: normal resp effort, CTA-B  Cardiovascular: NSR  Abdominal: soft, NT/ND  Extremities: WWP, normal strength  Neuro: A/O x 3, CNs II-XII grossly intact, normal motor/sensation, no focal deficits  Pulses:   Right:                                                                          Left:  FEM [ ]2+ [ ]1+ [ ]doppler                                             FEM [ ]2+ [ ]1+ [ ]doppler    POP [ ]2+ [ ]1+ [ ]doppler                                             POP [ ]2+ [ ]1+ [ ]doppler    DP [ ]2+ [ ]1+ [ ]doppler                                                DP [ ]2+ [ ]1+ [ ]doppler  PT[ ]2+ [ ]1+ [ ]doppler                                                  PT [ ]2+ [ ]1+ [ ]doppler    LABS:    05-07    134<L>  |  106  |  74<H>  ----------------------------<  179<H>  4.5   |  17<L>  |  2.85<H>    Ca    7.8<L>      07 May 2021 14:15            CAPILLARY BLOOD GLUCOSE      POCT Blood Glucose.: 187 mg/dL (07 May 2021 21:46)  POCT Blood Glucose.: 185 mg/dL (07 May 2021 16:59)  POCT Blood Glucose.: 208 mg/dL (07 May 2021 11:55)      RADIOLOGY & ADDITIONAL TESTS:   afebrile, VSS    essentially anuric    Cr 2.85 mg/dl    input: ~ 3500 cc yesterday May 7    Dr. Peralta's note read: ORLY from sepsis, hypovolemia, contrast, pre existing renal disease (despite normal parameters intially)    Pt comfortable; no pulmonary complaints    No L foot pain    PE  no bleeding from L distal thigh wound  L foot pink, warm and well perfused appearing  L groin without evidence of infection    A/P  case cancelled today and will reschedule once renal function improves  continue gentle hydration  IV antibiotics for S aureus  MEDICATIONS  (STANDING):  aspirin  chewable 81 milliGRAM(s) Oral daily  atorvastatin 40 milliGRAM(s) Oral at bedtime  ceFAZolin  Injectable. 1000 milliGRAM(s) IV Push every 12 hours  dextrose 40% Gel 15 Gram(s) Oral once  dextrose 5%. 1000 milliLiter(s) (50 mL/Hr) IV Continuous <Continuous>  dextrose 5%. 1000 milliLiter(s) (100 mL/Hr) IV Continuous <Continuous>  dextrose 50% Injectable 25 Gram(s) IV Push once  dextrose 50% Injectable 12.5 Gram(s) IV Push once  dextrose 50% Injectable 25 Gram(s) IV Push once  folic acid 1 milliGRAM(s) Oral at bedtime  gabapentin 300 milliGRAM(s) Oral three times a day  glucagon  Injectable 1 milliGRAM(s) IntraMuscular once  heparin   Injectable 5000 Unit(s) SubCutaneous every 12 hours  insulin glargine Injectable (LANTUS) 5 Unit(s) SubCutaneous at bedtime  insulin lispro (ADMELOG) corrective regimen sliding scale   SubCutaneous three times a day before meals  metoprolol tartrate 12.5 milliGRAM(s) Oral two times a day  PARoxetine 20 milliGRAM(s) Oral daily  sodium chloride 0.9%. 1000 milliLiter(s) (80 mL/Hr) IV Continuous <Continuous>  tiotropium 18 MICROgram(s) Capsule 1 Capsule(s) Inhalation daily    MEDICATIONS  (PRN):  acetaminophen   Tablet .. 650 milliGRAM(s) Oral every 6 hours PRN Mild Pain (1 - 3)  ALBUTerol    90 MICROgram(s) HFA Inhaler 1 Puff(s) Inhalation every 6 hours PRN Shortness of Breath  morphine  - Injectable 5 milliGRAM(s) IV Push every 3 hours PRN Severe Pain (7 - 10)      Allergies    cephalosporins (Other)  penicillins (Urticaria; Pruritus)    Intolerances        Flatus: [ ] YES [ ] NO             Bowel Movement: [ ] YES [ ] NO  Pain (0-10):            Pain Control Adequate: [ ] YES [ ] NO  Nausea: [ ] YES [ ] NO            Vomiting: [ ] YES [ ] NO  Diarrhea: [ ] YES [ ] NO         Constipation: [ ] YES [ ] NO     Chest Pain: [ ] YES [ ] NO    SOB:  [ ] YES [ ] NO    Vital Signs Last 24 Hrs  T(C): 36.6 (08 May 2021 06:00), Max: 36.6 (08 May 2021 06:00)  T(F): 97.8 (08 May 2021 06:00), Max: 97.8 (08 May 2021 06:00)  HR: 68 (08 May 2021 06:00) (63 - 76)  BP: 114/42 (08 May 2021 06:00) (96/50 - 122/45)  BP(mean): 61 (08 May 2021 06:00) (57 - 84)  RR: 23 (08 May 2021 06:00) (13 - 36)  SpO2: 89% (08 May 2021 06:00) (89% - 98%)    I&O's Summary    07 May 2021 07:01  -  08 May 2021 07:00  --------------------------------------------------------  IN: 3473 mL / OUT: 65 mL / NET: 3408 mL        Physical Exam:  General: NAD, resting comfortably  Pulmonary: normal resp effort, CTA-B  Cardiovascular: NSR  Abdominal: soft, NT/ND  Extremities: WWP, normal strength  Neuro: A/O x 3, CNs II-XII grossly intact, normal motor/sensation, no focal deficits  Pulses:   Right:                                                                          Left:  FEM [ ]2+ [ ]1+ [ ]doppler                                             FEM [ ]2+ [ ]1+ [ ]doppler    POP [ ]2+ [ ]1+ [ ]doppler                                             POP [ ]2+ [ ]1+ [ ]doppler    DP [ ]2+ [ ]1+ [ ]doppler                                                DP [ ]2+ [ ]1+ [ ]doppler  PT[ ]2+ [ ]1+ [ ]doppler                                                  PT [ ]2+ [ ]1+ [ ]doppler    LABS:    05-07    134<L>  |  106  |  74<H>  ----------------------------<  179<H>  4.5   |  17<L>  |  2.85<H>    Ca    7.8<L>      07 May 2021 14:15            CAPILLARY BLOOD GLUCOSE      POCT Blood Glucose.: 187 mg/dL (07 May 2021 21:46)  POCT Blood Glucose.: 185 mg/dL (07 May 2021 16:59)  POCT Blood Glucose.: 208 mg/dL (07 May 2021 11:55)      RADIOLOGY & ADDITIONAL TESTS:

## 2021-05-08 NOTE — PROGRESS NOTE ADULT - SUBJECTIVE AND OBJECTIVE BOX
86y Female with PMH of COPD, arthritis, CAD, DMII, depression, HTN, PVD, PAD, s/p left fem pop bypass on 03/24/21, +lower ext DVT on eliquis, +chronic left heel ulcer, RA, anemia, presented to the ED at [place] on 5/3 with worsening dyspnea, wheezing that started that night. Pt received 2 Duoneb treatments at home, with only slight improvement, so EMS called. At home patient was hypoxic to 87% on room air, was placed on 100% NRB mask. In the ED at [place], pt was placed on 2 LPM via NC, received a dose of Lasix 20 mg IV x 1. Initial trop noted to be 0.17.  BNP >51457  On her first night of admission at [place] 5/3 patient developed fever which has persisted throughout admission. ID was consulted, patient was started on vancomycin and meropenem. Overnight on 5/4-5/5 patient had extensive purulent drainage from left medial thigh surgical site. Plastic surgery was consulted, advised transfer to [facility] for evaluation by Dr Ventura who did fem pop bypass 3/2021.      Was taking to OR : purulent fluid in L thigh popliteal fossa above knee; necrotic infected fibrinous material around graft and on tissue in popliteal fossa  oliguric suspect PAT;  no c/o    Vital Signs Last 24 Hrs  T(C): 36.4 (07 May 2021 14:44), Max: 36.4 (06 May 2021 21:12)  T(F): 97.5 (07 May 2021 14:44), Max: 97.6 (06 May 2021 21:12)  HR: 69 (07 May 2021 15:00) (62 - 70)  BP: 96/50 (07 May 2021 15:00) (93/60 - 113/50)  BP(mean): 57 (07 May 2021 15:00) (55 - 66)  RR: 18 (07 May 2021 15:00) (13 - 57)  SpO2: 91% (07 May 2021 14:00) (91% - 98%)    PHYSICAL EXAM-    Constitutional: elderly female in no acute distress sitting up in bed now.   Head: Head is normocephalic and atraumatic.    Neck: No JVD.   Cardiovascular: Regular rate and rhythm without S3, S4. No murmurs or rubs are appreciated.    Respiratory: B/l  rales-improved from last night   Abdomen: Soft, nontender, nondistended with positive bowel sounds.    Extremity: No tenderness.   pitting edema  1+ b/l LE, L thigh area in dressing that is soaked with serous fluid.   Neurologic: The patient is alert and oriented.  Non focal  Skin: L thigh dressing   Psychiatric: The patient appears to be emotionally stable.                              10.1   7.90  )-----------( 199      ( 06 May 2021 05:49 )             31.3   05-07    134<L>  |  106  |  74<H>  ----------------------------<  179<H>  4.5   |  17<L>  |  2.85<H>    Ca    7.8<L>      07 May 2021 14:15          BNP  RADIOLOGY & ADDITIONAL STUDIES:  c< from: CT Chest No Cont (05.03.21 @ 23:04) >  IMPRESSION:    1. Small bilateral pleural effusions and pulmonary edema.  2. Emphysema and question additional honeycombing/fibrosis.  3. Mildly asymmetrical 1.5 cm nodular breast tissue on the left. Correlation with follow-up mammography/ultrasound is recommended.  4. 2.9 cm slightly hyperdense right renal lesion, most likely hemorrhagic/proteinaceous cyst. Follow-up nonemergent ultrasound is recommended.                ROSARIO REYES MD; Attending Radiologist  This document has been electronically signed. May  3 2021 11:52PM    < end of copied text >  < from: Xray Chest 1 View AP/PA (05.03.21 @ 22:36) >    EXAM:  XR CHEST AP OR PA 1V      PROCEDURE DATE:  05/03/2021        INTERPRETATION:  Views:1  Comparison: 08/21/15  History: Shortness of breath    There is mild scattered interstitial infiltrate consistent with pneumonia or pneumonitis without segmental consolidation, layering effusion or cavitation. The heart is normal in size.    IMPRESSION: As above              MARIA ESTHER CADET MD; Attending Radiologist  This document has been electronically signed. May  4 2021  9:22AM    < end of copied text >  < from: TTE Echo Complete w/o Contrast w/ Doppler (05.04.21 @ 08:16) >    Summary:   1. Left ventricular ejection fraction, by visual estimation, is 35 to 40%.   2. Moderately toseverely decreased global left ventricular systolic function.   3. Multiple left ventricular regional wall motion abnormalities exist. See wall motion findings.   4. Mildly increased LV wall thickness.   5. Normal left ventricular internal cavity size.   6. Spectral Doppler shows impaired relaxation pattern of left ventricular myocardial filling (Grade I diastolic dysfunction).   7. There is mild concentric left ventricular hypertrophy.   8. Mild mitral annular calcification.   9. Mild mitral valve regurgitation.  10. Thickening and calcification of the anterior and posterior mitral valve leaflets.  11. Mild tricuspid regurgitation.  12. Mild aortic regurgitation.  13. Sclerotic aortic valve with normal opening.  14. Estimated pulmonary artery systolic pressure is 36.6 mmHg assuming a right atrial pressure of 10 mmHg, which is consistent with borderline pulmonary hypertension.    [id number] Toby Peace , Electronically signed on 5/4/2021 at 10:19:28 AM          * Infected graft POD#1  to return to OR on 24H  Meropenem; hypothermia noted VSS, normal lactate    * Chronic systolic HF with POCUS lung indicating CHF  complicated by PAT, oliguria  intermittent IVF waiting to plateau     *H/o depression / HTN / RA - c/w home meds and f/u outpatient for further management      * right LE DVT  Eliquis on hold  to resume AC after sx if ok with vascular or bridge in 24 H if no OR  doppler leg      *  Type 2 diabetes mellitus, with long-term current use of insulin  was on  Lantus 25 units QHS start with 10 poor appetite  - Check FS with low dose QAC + HS  - Most recent A1C 7.2      4) RA   MTX 15 mg QD on Wednesdays   FA 1 mg QD for supportive therapy                 86y Female with PMH of COPD, arthritis, CAD, DMII, depression, HTN, PVD, PAD, s/p left fem pop bypass on 03/24/21, +lower ext DVT on eliquis, +chronic left heel ulcer, RA, anemia, presented to the ED at [place] on 5/3 with worsening dyspnea, wheezing that started that night. Pt received 2 Duoneb treatments at home, with only slight improvement, so EMS called. At home patient was hypoxic to 87% on room air, was placed on 100% NRB mask. In the ED at [place], pt was placed on 2 LPM via NC, received a dose of Lasix 20 mg IV x 1. Initial trop noted to be 0.17.  BNP >87318  On her first night of admission at [place] 5/3 patient developed fever which has persisted throughout admission. ID was consulted, patient was started on vancomycin and meropenem. Overnight on 5/4-5/5 patient had extensive purulent drainage from left medial thigh surgical site. Plastic surgery was consulted, advised transfer to [facility] for evaluation by Dr Ventura who did fem pop bypass 3/2021.      Was taking to OR : purulent fluid in L thigh popliteal fossa above knee; necrotic infected fibrinous material around graft and on tissue in popliteal fossa  oliguric suspect PAT;  no c/o    Vital Signs Last 24 Hrs  T(C): 36.4 (07 May 2021 14:44), Max: 36.4 (06 May 2021 21:12)  T(F): 97.5 (07 May 2021 14:44), Max: 97.6 (06 May 2021 21:12)  HR: 69 (07 May 2021 15:00) (62 - 70)  BP: 96/50 (07 May 2021 15:00) (93/60 - 113/50)  BP(mean): 57 (07 May 2021 15:00) (55 - 66)  RR: 18 (07 May 2021 15:00) (13 - 57)  SpO2: 91% (07 May 2021 14:00) (91% - 98%)    PHYSICAL EXAM-    Constitutional: elderly female in no acute distress sitting up in bed now.   Head: Head is normocephalic and atraumatic.    Neck: No JVD.   Cardiovascular: Regular rate and rhythm without S3, S4. No murmurs or rubs are appreciated.    Respiratory: B/l  rales-improved from last night   Abdomen: Soft, nontender, nondistended with positive bowel sounds.    Extremity: No tenderness.   pitting edema  1+ b/l LE, L thigh area in dressing that is soaked with serous fluid.   Neurologic: The patient is alert and oriented.  Non focal  Skin: L thigh dressing   Psychiatric: The patient appears to be emotionally stable.                              10.1   7.90  )-----------( 199      ( 06 May 2021 05:49 )             31.3   05-07    134<L>  |  106  |  74<H>  ----------------------------<  179<H>  4.5   |  17<L>  |  2.85<H>    Ca    7.8<L>      07 May 2021 14:15          BNP  RADIOLOGY & ADDITIONAL STUDIES:  c< from: CT Chest No Cont (05.03.21 @ 23:04) >  IMPRESSION:    1. Small bilateral pleural effusions and pulmonary edema.  2. Emphysema and question additional honeycombing/fibrosis.  3. Mildly asymmetrical 1.5 cm nodular breast tissue on the left. Correlation with follow-up mammography/ultrasound is recommended.  4. 2.9 cm slightly hyperdense right renal lesion, most likely hemorrhagic/proteinaceous cyst. Follow-up nonemergent ultrasound is recommended.                ROSARIO REYES MD; Attending Radiologist  This document has been electronically signed. May  3 2021 11:52PM    < end of copied text >  < from: Xray Chest 1 View AP/PA (05.03.21 @ 22:36) >    EXAM:  XR CHEST AP OR PA 1V      PROCEDURE DATE:  05/03/2021        INTERPRETATION:  Views:1  Comparison: 08/21/15  History: Shortness of breath    There is mild scattered interstitial infiltrate consistent with pneumonia or pneumonitis without segmental consolidation, layering effusion or cavitation. The heart is normal in size.    IMPRESSION: As above              MARIA ESTHER CADET MD; Attending Radiologist  This document has been electronically signed. May  4 2021  9:22AM    < end of copied text >  < from: TTE Echo Complete w/o Contrast w/ Doppler (05.04.21 @ 08:16) >    Summary:   1. Left ventricular ejection fraction, by visual estimation, is 35 to 40%.   2. Moderately toseverely decreased global left ventricular systolic function.   3. Multiple left ventricular regional wall motion abnormalities exist. See wall motion findings.   4. Mildly increased LV wall thickness.   5. Normal left ventricular internal cavity size.   6. Spectral Doppler shows impaired relaxation pattern of left ventricular myocardial filling (Grade I diastolic dysfunction).   7. There is mild concentric left ventricular hypertrophy.   8. Mild mitral annular calcification.   9. Mild mitral valve regurgitation.  10. Thickening and calcification of the anterior and posterior mitral valve leaflets.  11. Mild tricuspid regurgitation.  12. Mild aortic regurgitation.  13. Sclerotic aortic valve with normal opening.  14. Estimated pulmonary artery systolic pressure is 36.6 mmHg assuming a right atrial pressure of 10 mmHg, which is consistent with borderline pulmonary hypertension.    [id number] Toby Peace , Electronically signed on 5/4/2021 at 10:19:28 AM          * Infected graft POD#2  to return to OR   Meropenem; hypothermia noted VSS, normal lactate    * Chronic systolic HF with POCUS lung indicating CHF  complicated by PAT, oliguria  intermittent IVF waiting to plateau     *H/o depression / HTN / RA - c/w home meds and f/u outpatient for further management      * right LE DVT  Eliquis on hold  to resume AC after sx if ok with vascular or bridge in 24 H if no OR  doppler leg      *  Type 2 diabetes mellitus, with long-term current use of insulin  was on  Lantus 25 units QHS start with 10 poor appetite  - Check FS with low dose QAC + HS  - Most recent A1C 7.2      4) RA   MTX 15 mg QD on Wednesdays   FA 1 mg QD for supportive therapy

## 2021-05-08 NOTE — CONSULT NOTE ADULT - ASSESSMENT
1) Dyspnea  2) Pulmonary Fibrosis/Emphysema  3) Hypoxemia   4) HFrEF  5) DVT    86y Female with PMH of COPD, arthritis, CAD, DMII, depression, HTN, PVD, PAD, s/p left fem pop bypass on 03/24/21, +lower ext DVT on eliquis, +chronic left heel ulcer, RA, anemia, presented to the ED at [place] on 5/3 with worsening dyspnea, wheezing that started that night. Pt received 2 Duoneb treatments at home, with only slight improvement, so EMS called. At home patient was hypoxic to 87% on room air, was placed on 100% NRB mask. In the ED at [place], pt was placed on 2 LPM via NC, received a dose of Lasix 20 mg IV x   On her first night of admission at [place] 5/3 patient developed fever which has persisted throughout admission. ID was consulted, patient was started on vancomycin and meropenem. Overnight on 5/4-5/5 patient had extensive purulent drainage from left medial thigh surgical site. Plastic surgery was consulted, advised transfer to [facility] for evaluation by Dr Ventura who did fem pop bypass 3/2021.    Above history per chart review  Patient states that she smoked until last year   History of RA as well  Not seen pulmonary in the past   LVEF noted to be 35-40% and PASP elevated at 36 mmHg as well  CT Chest revealed paraseptal emphysematous changes, dilated pulmonary artery and pulmonary fibrosis.   Reviewed CT chest report, echocardiogram. Hypoxemia issues are likely related to emphysema/RA-ILD/PH complicated by HFrEF and now contrast induced nephropathy.  Continue Spiriva daily for emphysema  Albuterol PRN

## 2021-05-09 LAB
ANION GAP SERPL CALC-SCNC: 13 MMOL/L — SIGNIFICANT CHANGE UP (ref 5–17)
BUN SERPL-MCNC: 91 MG/DL — HIGH (ref 7–23)
CALCIUM SERPL-MCNC: 8.2 MG/DL — LOW (ref 8.5–10.1)
CHLORIDE SERPL-SCNC: 105 MMOL/L — SIGNIFICANT CHANGE UP (ref 96–108)
CO2 SERPL-SCNC: 16 MMOL/L — LOW (ref 22–31)
CREAT SERPL-MCNC: 3.6 MG/DL — HIGH (ref 0.5–1.3)
CULTURE RESULTS: SIGNIFICANT CHANGE UP
GLUCOSE SERPL-MCNC: 128 MG/DL — HIGH (ref 70–99)
HCT VFR BLD CALC: 28.8 % — LOW (ref 34.5–45)
HGB BLD-MCNC: 9.5 G/DL — LOW (ref 11.5–15.5)
MCHC RBC-ENTMCNC: 30.4 PG — SIGNIFICANT CHANGE UP (ref 27–34)
MCHC RBC-ENTMCNC: 33 GM/DL — SIGNIFICANT CHANGE UP (ref 32–36)
MCV RBC AUTO: 92 FL — SIGNIFICANT CHANGE UP (ref 80–100)
PLATELET # BLD AUTO: 209 K/UL — SIGNIFICANT CHANGE UP (ref 150–400)
POTASSIUM SERPL-MCNC: 4.8 MMOL/L — SIGNIFICANT CHANGE UP (ref 3.5–5.3)
POTASSIUM SERPL-SCNC: 4.8 MMOL/L — SIGNIFICANT CHANGE UP (ref 3.5–5.3)
RBC # BLD: 3.13 M/UL — LOW (ref 3.8–5.2)
RBC # FLD: 17 % — HIGH (ref 10.3–14.5)
SODIUM SERPL-SCNC: 134 MMOL/L — LOW (ref 135–145)
SPECIMEN SOURCE: SIGNIFICANT CHANGE UP
WBC # BLD: 9.24 K/UL — SIGNIFICANT CHANGE UP (ref 3.8–10.5)
WBC # FLD AUTO: 9.24 K/UL — SIGNIFICANT CHANGE UP (ref 3.8–10.5)

## 2021-05-09 PROCEDURE — 93971 EXTREMITY STUDY: CPT | Mod: 26,LT

## 2021-05-09 PROCEDURE — 99232 SBSQ HOSP IP/OBS MODERATE 35: CPT

## 2021-05-09 RX ORDER — SODIUM BICARBONATE 1 MEQ/ML
0.05 SYRINGE (ML) INTRAVENOUS
Qty: 75 | Refills: 0 | Status: DISCONTINUED | OUTPATIENT
Start: 2021-05-09 | End: 2021-05-11

## 2021-05-09 RX ADMIN — HEPARIN SODIUM 5000 UNIT(S): 5000 INJECTION INTRAVENOUS; SUBCUTANEOUS at 23:00

## 2021-05-09 RX ADMIN — Medication 1: at 12:02

## 2021-05-09 RX ADMIN — GABAPENTIN 300 MILLIGRAM(S): 400 CAPSULE ORAL at 05:57

## 2021-05-09 RX ADMIN — Medication 20 MILLIGRAM(S): at 10:41

## 2021-05-09 RX ADMIN — HEPARIN SODIUM 5000 UNIT(S): 5000 INJECTION INTRAVENOUS; SUBCUTANEOUS at 10:41

## 2021-05-09 RX ADMIN — Medication 1000 MILLIGRAM(S): at 10:44

## 2021-05-09 RX ADMIN — GABAPENTIN 300 MILLIGRAM(S): 400 CAPSULE ORAL at 22:59

## 2021-05-09 RX ADMIN — Medication 1 MILLIGRAM(S): at 22:59

## 2021-05-09 RX ADMIN — ALBUTEROL 1 PUFF(S): 90 AEROSOL, METERED ORAL at 08:50

## 2021-05-09 RX ADMIN — INSULIN GLARGINE 5 UNIT(S): 100 INJECTION, SOLUTION SUBCUTANEOUS at 23:09

## 2021-05-09 RX ADMIN — ALBUTEROL 1 PUFF(S): 90 AEROSOL, METERED ORAL at 18:32

## 2021-05-09 RX ADMIN — ATORVASTATIN CALCIUM 40 MILLIGRAM(S): 80 TABLET, FILM COATED ORAL at 22:59

## 2021-05-09 RX ADMIN — Medication 12.5 MILLIGRAM(S): at 10:41

## 2021-05-09 RX ADMIN — TIOTROPIUM BROMIDE 1 CAPSULE(S): 18 CAPSULE ORAL; RESPIRATORY (INHALATION) at 08:46

## 2021-05-09 RX ADMIN — Medication 1000 MILLIGRAM(S): at 23:03

## 2021-05-09 RX ADMIN — GABAPENTIN 300 MILLIGRAM(S): 400 CAPSULE ORAL at 13:58

## 2021-05-09 RX ADMIN — Medication 12.5 MILLIGRAM(S): at 23:08

## 2021-05-09 RX ADMIN — Medication 81 MILLIGRAM(S): at 10:41

## 2021-05-09 RX ADMIN — Medication 50 MEQ/KG/HR: at 13:58

## 2021-05-09 RX ADMIN — Medication 50 MEQ/KG/HR: at 20:07

## 2021-05-09 NOTE — PROGRESS NOTE ADULT - SUBJECTIVE AND OBJECTIVE BOX
afebrile, VSS    urine output overnight    wheezing    L thigh incision without bleeding    L foot pink, and warm    A/P  now non oliguric renal failure  check BMP  OOB  bronchodilators  will probably cancel case for tomorrow allowing for more complete recovery of renal function    MEDICATIONS  (STANDING):  aspirin  chewable 81 milliGRAM(s) Oral daily  atorvastatin 40 milliGRAM(s) Oral at bedtime  ceFAZolin  Injectable. 1000 milliGRAM(s) IV Push every 12 hours  dextrose 40% Gel 15 Gram(s) Oral once  dextrose 5%. 1000 milliLiter(s) (50 mL/Hr) IV Continuous <Continuous>  dextrose 5%. 1000 milliLiter(s) (100 mL/Hr) IV Continuous <Continuous>  dextrose 50% Injectable 25 Gram(s) IV Push once  dextrose 50% Injectable 12.5 Gram(s) IV Push once  dextrose 50% Injectable 25 Gram(s) IV Push once  folic acid 1 milliGRAM(s) Oral at bedtime  gabapentin 300 milliGRAM(s) Oral three times a day  glucagon  Injectable 1 milliGRAM(s) IntraMuscular once  heparin   Injectable 5000 Unit(s) SubCutaneous every 12 hours  insulin glargine Injectable (LANTUS) 5 Unit(s) SubCutaneous at bedtime  insulin lispro (ADMELOG) corrective regimen sliding scale   SubCutaneous three times a day before meals  metoprolol tartrate 12.5 milliGRAM(s) Oral two times a day  PARoxetine 20 milliGRAM(s) Oral daily  sodium bicarbonate  Infusion 0.077 mEq/kG/Hr (75 mL/Hr) IV Continuous <Continuous>  tiotropium 18 MICROgram(s) Capsule 1 Capsule(s) Inhalation daily    MEDICATIONS  (PRN):  acetaminophen   Tablet .. 650 milliGRAM(s) Oral every 6 hours PRN Mild Pain (1 - 3)  ALBUTerol    90 MICROgram(s) HFA Inhaler 1 Puff(s) Inhalation every 6 hours PRN Shortness of Breath  morphine  - Injectable 5 milliGRAM(s) IV Push every 3 hours PRN Severe Pain (7 - 10)      Allergies    cephalosporins (Other)  penicillins (Urticaria; Pruritus)    Intolerances        Flatus: [ ] YES [ ] NO             Bowel Movement: [ ] YES [ ] NO  Pain (0-10):            Pain Control Adequate: [ ] YES [ ] NO  Nausea: [ ] YES [ ] NO            Vomiting: [ ] YES [ ] NO  Diarrhea: [ ] YES [ ] NO         Constipation: [ ] YES [ ] NO     Chest Pain: [ ] YES [ ] NO    SOB:  [ ] YES [ ] NO    Vital Signs Last 24 Hrs  T(C): 36.9 (09 May 2021 05:20), Max: 37 (08 May 2021 20:52)  T(F): 98.4 (09 May 2021 05:20), Max: 98.6 (08 May 2021 20:52)  HR: 80 (09 May 2021 06:00) (64 - 80)  BP: 131/61 (09 May 2021 06:00) (104/60 - 137/47)  BP(mean): 76 (09 May 2021 06:00) (59 - 76)  RR: 19 (09 May 2021 06:00) (14 - 35)  SpO2: 92% (09 May 2021 06:00) (92% - 98%)    I&O's Summary    08 May 2021 07:01  -  09 May 2021 07:00  --------------------------------------------------------  IN: 1085 mL / OUT: 325 mL / NET: 760 mL        Physical Exam:  General: NAD, resting comfortably  Pulmonary: normal resp effort, CTA-B  Cardiovascular: NSR  Abdominal: soft, NT/ND  Extremities: WWP, normal strength  Neuro: A/O x 3, CNs II-XII grossly intact, normal motor/sensation, no focal deficits  Pulses:   Right:                                                                          Left:  FEM [ ]2+ [ ]1+ [ ]doppler                                             FEM [ ]2+ [ ]1+ [ ]doppler    POP [ ]2+ [ ]1+ [ ]doppler                                             POP [ ]2+ [ ]1+ [ ]doppler    DP [ ]2+ [ ]1+ [ ]doppler                                                DP [ ]2+ [ ]1+ [ ]doppler  PT[ ]2+ [ ]1+ [ ]doppler                                                  PT [ ]2+ [ ]1+ [ ]doppler    LABS:                        9.0    8.79  )-----------( 210      ( 08 May 2021 16:32 )             28.2     05-08    134<L>  |  105  |  88<H>  ----------------------------<  120<H>  5.1   |  17<L>  |  3.72<H>    Ca    7.4<L>      08 May 2021 16:32  Phos  6.2     05-08    TPro  5.5<L>  /  Alb  1.7<L>  /  TBili  0.2  /  DBili  x   /  AST  15  /  ALT  <6<L>  /  AlkPhos  99  05-08        LIVER FUNCTIONS - ( 08 May 2021 06:27 )  Alb: 1.7 g/dL / Pro: 5.5 gm/dL / ALK PHOS: 99 U/L / ALT: <6 U/L / AST: 15 U/L / GGT: x           CAPILLARY BLOOD GLUCOSE      POCT Blood Glucose.: 193 mg/dL (08 May 2021 21:26)  POCT Blood Glucose.: 123 mg/dL (08 May 2021 17:01)  POCT Blood Glucose.: 151 mg/dL (08 May 2021 12:00)  POCT Blood Glucose.: 146 mg/dL (08 May 2021 09:09)      RADIOLOGY & ADDITIONAL TESTS:

## 2021-05-09 NOTE — PROGRESS NOTE ADULT - SUBJECTIVE AND OBJECTIVE BOX
Patient is a 86y Female whom presented to the hospital with COPD, arthritis, CAD, DMII, depression, HTN, PVD, PAD, s/p left fem pop bypass on 03/24/21, +lower ext DVT on eliquis, +chronic left heel ulcer, RA, anemia, presented to the ED at [place] on 5/3 with worsening dyspnea, renal evaluation of ORLY. Patient has been getting lasix, did get zarox as well on 5/6. Contrast given on 5/5, renal function rising thereafter with decrement in UOP so accordingly renal evaluation called.     pt seen earlier today    pt feelnig ok    no sob    wheezy this AM - improved after albuterol treatments    made  325 ml UOP overnight          PAST MEDICAL & SURGICAL HISTORY:  Hypertension    Anemia    Arthritis, rheumatoid    Depression    Type 2 diabetes mellitus  on insulin    PVD (peripheral vascular disease) with claudication    CAD (coronary artery disease)  non-obstructive    Rheumatoid arthritis    fracture ankle right  plate . screws   2000    S/P cataract surgery  2011    Hip fracture requiring operative repair  Right hip 11/14/2020        Allergies    cephalosporins (Other)  penicillins (Urticaria; Pruritus)    Intolerances        SOCIAL HISTORY:    FAMILY HISTORY:  FH: colon cancer  father    FH: heart attack  father    Family history of atherosclerosis  mother        REVIEW OF SYSTEMS:    CONSTITUTIONAL: + weakness, fevers or chills  EYES/ENT: No visual changes;  No vertigo or throat pain   NECK: No pain or stiffness  RESPIRATORY: No cough, wheezing, hemoptysis; No shortness of breath  CARDIOVASCULAR: No chest pain or palpitations  GASTROINTESTINAL: No abdominal or epigastric pain. No nausea, vomiting, or hematemesis; No diarrhea or constipation. No melena or hematochezia.  GENITOURINARY: No dysuria, frequency or hematuria  NEUROLOGICAL: No numbness or weakness  SKIN: No itching, burning, rashes, or lesions   All other review of systems is negative unless indicated above.      ICU Vital Signs Last 24 Hrs  T(C): 36.4 (09 May 2021 14:34), Max: 37 (08 May 2021 20:52)  T(F): 97.5 (09 May 2021 14:34), Max: 98.6 (08 May 2021 20:52)  HR: 72 (09 May 2021 14:15) (71 - 94)  BP: 110/86 (09 May 2021 14:15) (109/46 - 137/47)  BP(mean): 90 (09 May 2021 14:15) (62 - 103)  ABP: --  ABP(mean): --  RR: 24 (09 May 2021 14:15) (14 - 24)  SpO2: 95% (09 May 2021 14:15) (92% - 97%)        I&O's Detail    08 May 2021 07:01  -  09 May 2021 07:00  --------------------------------------------------------  IN:    IV PiggyBack: 650 mL    Sodium Bicarbonate: 435 mL  Total IN: 1085 mL    OUT:    Indwelling Catheter - Urethral (mL): 325 mL  Total OUT: 325 mL    Total NET: 760 mL      PHYSICAL EXAM:    Constitutional: frail, ill appearing  HEENT: dry MM  Neck: No LAD, No JVD  Respiratory: dist, no rales , no rhonchi   Cardiovascular: S1 and S2  Extremities: LE dsg L  Neurological: A/O x 3, no focal deficits        LABS:        134    |  105    |  91     ----------------------------<  128       09 May 2021 08:34  4.8     |  16     |  3.60     134    |  105    |  88     ----------------------------<  120       08 May 2021 16:32  5.1     |  17     |  3.72     132    |  106    |  82     ----------------------------<  136       08 May 2021 06:27  5.0     |  13     |  3.53     Ca    8.2        09 May 2021 08:34  Ca    7.4        08 May 2021 16:32    TPro  5.5    /  Alb  1.7    /  TBili  0.2    /        08 May 2021 06:27  DBili  x      /  AST  15     /  ALT  <6     /  AlkPhos  99       Phos  6.2       08 May 2021 06:27      Urine Studies:    Urine Microscopic-Add On (NC) (05.04.21 @ 01:50)   White Blood Cell - Urine: 0-2 /HPF   Red Blood Cell - Urine: 0-4 /HPF   Bacteria: Few /HPF   Epithelial Cells: Neg.-Few       Historical Values  Urine Microscopic-Add On (NC) (05.04.21 @ 01:50)   White Blood Cell - Urine: 0-2 /HPF   Red Blood Cell - Urine: 0-4 /HPF   Bacteria: Few /HPF   Epithelial Cells: Neg.-Few   Urine Microscopic-Add On (NC) (03.22.21 @ 20:15)   White Blood Cell - Urine: Negative   Red Blood Cell - Urine: 0-2 /HPF   Bacteria: Occasional   Epithelial Cells: Negative   Urine Microscopic-Add On (NC) (01.28.21 @ 12:04)       RADIOLOGY & ADDITIONAL STUDIES:    EXAM:  US KIDNEYS AND BLADDER                            PROCEDURE DATE:  05/07/2021          INTERPRETATION:  CLINICAL INFORMATION: Acute renal insufficiency    COMPARISON: 5/4/2021    TECHNIQUE: Sonography of the kidneys    FINDINGS:    Right kidney: 10.7 cm. No renal mass, hydronephrosis or calculi. Findings consistent with medical renal disease.    Left kidney: 11.7 cm. No hydronephrosis or calculus. Indeterminate hypoechoic exophytic left lower pole lesion measuring 1.4 cm. Findings consistent with medical renal disease.    IMPRESSION:    No hydronephrosis bilaterally. Findings consistent with medical renal disease.    Indeterminate exophytic left lower pole renal lesion measuring 1.4 cm.

## 2021-05-09 NOTE — PROGRESS NOTE ADULT - SUBJECTIVE AND OBJECTIVE BOX
86y Female with PMH of COPD, arthritis, CAD, DMII, depression, HTN, PVD, PAD, s/p left fem pop bypass on 03/24/21, +lower ext DVT on eliquis, +chronic left heel ulcer, RA, anemia, presented to the ED at [place] on 5/3 with worsening dyspnea, wheezing that started that night. Pt received 2 Duoneb treatments at home, with only slight improvement, so EMS called. At home patient was hypoxic to 87% on room air, was placed on 100% NRB mask. In the ED at [place], pt was placed on 2 LPM via NC, received a dose of Lasix 20 mg IV x 1. Initial trop noted to be 0.17.  BNP >31298  On her first night of admission at [place] 5/3 patient developed fever which has persisted throughout admission. ID was consulted, patient was started on vancomycin and meropenem. Overnight on 5/4-5/5 patient had extensive purulent drainage from left medial thigh surgical site. Plastic surgery was consulted, advised transfer to [facility] for evaluation by Dr Ventura who did fem pop bypass 3/2021.      Was taking to OR : purulent fluid in L thigh popliteal fossa above knee; necrotic infected fibrinous material around graft and on tissue in popliteal fossa  oliguric suspect PAT;  from anuric she is in polyuric phase today    Vital Signs Last 24 Hrs  T(C): 36.5 (09 May 2021 08:54), Max: 37 (08 May 2021 20:52)  T(F): 97.7 (09 May 2021 08:54), Max: 98.6 (08 May 2021 20:52)  HR: 72 (09 May 2021 14:15) (65 - 94)  BP: 110/86 (09 May 2021 14:15) (109/46 - 137/47)  BP(mean): 90 (09 May 2021 14:15) (60 - 103)  RR: 24 (09 May 2021 14:15) (14 - 26)  SpO2: 95% (09 May 2021 14:15) (92% - 97%)    PHYSICAL EXAM-    Constitutional: elderly female in no acute distress sitting up in bed now.   Head: Head is normocephalic and atraumatic.    Neck: No JVD.   Cardiovascular: Regular rate and rhythm without S3, S4. No murmurs or rubs are appreciated.    Respiratory: B/l  rales-improved from last night   Abdomen: Soft, nontender, nondistended with positive bowel sounds.    Extremity: No tenderness.   pitting edema  1+ b/l LE, L thigh area in dressing that is soaked with serous fluid.   Neurologic: The patient is alert and oriented.  Non focal  Skin: L thigh dressing                           9.5    9.24  )-----------( 209      ( 09 May 2021 08:34 )             28.8   05-09    134<L>  |  105  |  91<H>  ----------------------------<  128<H>  4.8   |  16<L>  |  3.60<H>    Ca    8.2<L>      09 May 2021 08:34  Phos  6.2     05-08    TPro  5.5<L>  /  Alb  1.7<L>  /  TBili  0.2  /  DBili  x   /  AST  15  /  ALT  <6<L>  /  AlkPhos  99  05-08              BNP  RADIOLOGY & ADDITIONAL STUDIES:  c< from: CT Chest No Cont (05.03.21 @ 23:04) >  IMPRESSION:    1. Small bilateral pleural effusions and pulmonary edema.  2. Emphysema and question additional honeycombing/fibrosis.  3. Mildly asymmetrical 1.5 cm nodular breast tissue on the left. Correlation with follow-up mammography/ultrasound is recommended.  4. 2.9 cm slightly hyperdense right renal lesion, most likely hemorrhagic/proteinaceous cyst. Follow-up nonemergent ultrasound is recommended.                ROSARIO REYES MD; Attending Radiologist  This document has been electronically signed. May  3 2021 11:52PM    < end of copied text >  < from: Xray Chest 1 View AP/PA (05.03.21 @ 22:36) >    EXAM:  XR CHEST AP OR PA 1V      PROCEDURE DATE:  05/03/2021        INTERPRETATION:  Views:1  Comparison: 08/21/15  History: Shortness of breath    There is mild scattered interstitial infiltrate consistent with pneumonia or pneumonitis without segmental consolidation, layering effusion or cavitation. The heart is normal in size.    IMPRESSION: As above              MARIA ESTHER CADET MD; Attending Radiologist  This document has been electronically signed. May  4 2021  9:22AM    < end of copied text >  < from: TTE Echo Complete w/o Contrast w/ Doppler (05.04.21 @ 08:16) >    Summary:   1. Left ventricular ejection fraction, by visual estimation, is 35 to 40%.   2. Moderately toseverely decreased global left ventricular systolic function.   3. Multiple left ventricular regional wall motion abnormalities exist. See wall motion findings.   4. Mildly increased LV wall thickness.   5. Normal left ventricular internal cavity size.   6. Spectral Doppler shows impaired relaxation pattern of left ventricular myocardial filling (Grade I diastolic dysfunction).   7. There is mild concentric left ventricular hypertrophy.   8. Mild mitral annular calcification.   9. Mild mitral valve regurgitation.  10. Thickening and calcification of the anterior and posterior mitral valve leaflets.  11. Mild tricuspid regurgitation.  12. Mild aortic regurgitation.  13. Sclerotic aortic valve with normal opening.  14. Estimated pulmonary artery systolic pressure is 36.6 mmHg assuming a right atrial pressure of 10 mmHg, which is consistent with borderline pulmonary hypertension.    [id number] Toby Peace , Electronically signed on 5/4/2021 at 10:19:28 AM          * Infected graft POD#3  to return to OR   Meropenem; hypothermia noted VSS, normal lactate    * Chronic systolic HF with POCUS lung indicating CHF  complicated by PAT, in polyuric phase now  bicarb drip  do not return to OR until renal function improving    *H/o depression / HTN / RA - c/w home meds and f/u outpatient for further management      * right LE DVT Feb 2021  Eliquis on hold  to resume AC after sx if ok with vascular or bridge in 24 H if no OR  doppler leg negative resume AC when OK with Vascular      *  Type 2 diabetes mellitus, with long-term current use of insulin  was on  Lantus 25 units QHS start with 10 poor appetite  - Check FS with low dose QAC + HS  - Most recent A1C 7.2      4) RA   MTX 15 mg QD on Wednesdays   FA 1 mg QD for supportive therapy    * Incidental finding renal formation- daughter MD aware outpt f/up      Daughter MD updated [phone] cell       86y Female with PMH of COPD, arthritis, CAD, DMII, depression, HTN, PVD, PAD, s/p left fem pop bypass on 03/24/21, +lower ext DVT on eliquis, +chronic left heel ulcer, RA, anemia, presented to the ED at [place] on 5/3 with worsening dyspnea, wheezing that started that night. Pt received 2 Duoneb treatments at home, with only slight improvement, so EMS called. At home patient was hypoxic to 87% on room air, was placed on 100% NRB mask. In the ED at [place], pt was placed on 2 LPM via NC, received a dose of Lasix 20 mg IV x 1. Initial trop noted to be 0.17.  BNP >24230  On her first night of admission at [place] 5/3 patient developed fever which has persisted throughout admission. ID was consulted, patient was started on vancomycin and meropenem. Overnight on 5/4-5/5 patient had extensive purulent drainage from left medial thigh surgical site. Plastic surgery was consulted, advised transfer to [facility] for evaluation by Dr Ventura who did fem pop bypass 3/2021.      Was taking to OR : purulent fluid in L thigh popliteal fossa above knee; necrotic infected fibrinous material around graft and on tissue in popliteal fossa  oliguric suspect PAT;  from anuric she is in polyuric phase today    Vital Signs Last 24 Hrs  T(C): 36.5 (09 May 2021 08:54), Max: 37 (08 May 2021 20:52)  T(F): 97.7 (09 May 2021 08:54), Max: 98.6 (08 May 2021 20:52)  HR: 72 (09 May 2021 14:15) (65 - 94)  BP: 110/86 (09 May 2021 14:15) (109/46 - 137/47)  BP(mean): 90 (09 May 2021 14:15) (60 - 103)  RR: 24 (09 May 2021 14:15) (14 - 26)  SpO2: 95% (09 May 2021 14:15) (92% - 97%)    PHYSICAL EXAM-    Constitutional: elderly female in no acute distress sitting up in bed now.   Head: Head is normocephalic and atraumatic.    Neck: No JVD.   Cardiovascular: Regular rate and rhythm without S3, S4. No murmurs or rubs are appreciated.    Respiratory: B/l  rales-improved from last night   Abdomen: Soft, nontender, nondistended with positive bowel sounds.    Extremity: No tenderness.   pitting edema  1+ b/l LE, L thigh area in dressing that is soaked with serous fluid.   Neurologic: The patient is alert and oriented.  Non focal  Skin: L thigh dressing                           9.5    9.24  )-----------( 209      ( 09 May 2021 08:34 )             28.8   05-09    134<L>  |  105  |  91<H>  ----------------------------<  128<H>  4.8   |  16<L>  |  3.60<H>    Ca    8.2<L>      09 May 2021 08:34  Phos  6.2     05-08    TPro  5.5<L>  /  Alb  1.7<L>  /  TBili  0.2  /  DBili  x   /  AST  15  /  ALT  <6<L>  /  AlkPhos  99  05-08              BNP  RADIOLOGY & ADDITIONAL STUDIES:  c< from: CT Chest No Cont (05.03.21 @ 23:04) >  IMPRESSION:    1. Small bilateral pleural effusions and pulmonary edema.  2. Emphysema and question additional honeycombing/fibrosis.  3. Mildly asymmetrical 1.5 cm nodular breast tissue on the left. Correlation with follow-up mammography/ultrasound is recommended.  4. 2.9 cm slightly hyperdense right renal lesion, most likely hemorrhagic/proteinaceous cyst. Follow-up nonemergent ultrasound is recommended.                ROSARIO REYES MD; Attending Radiologist  This document has been electronically signed. May  3 2021 11:52PM    < end of copied text >  < from: Xray Chest 1 View AP/PA (05.03.21 @ 22:36) >    EXAM:  XR CHEST AP OR PA 1V      PROCEDURE DATE:  05/03/2021        INTERPRETATION:  Views:1  Comparison: 08/21/15  History: Shortness of breath    There is mild scattered interstitial infiltrate consistent with pneumonia or pneumonitis without segmental consolidation, layering effusion or cavitation. The heart is normal in size.    IMPRESSION: As above              MARIA ESTHER CADET MD; Attending Radiologist  This document has been electronically signed. May  4 2021  9:22AM    < end of copied text >  < from: TTE Echo Complete w/o Contrast w/ Doppler (05.04.21 @ 08:16) >    Summary:   1. Left ventricular ejection fraction, by visual estimation, is 35 to 40%.   2. Moderately toseverely decreased global left ventricular systolic function.   3. Multiple left ventricular regional wall motion abnormalities exist. See wall motion findings.   4. Mildly increased LV wall thickness.   5. Normal left ventricular internal cavity size.   6. Spectral Doppler shows impaired relaxation pattern of left ventricular myocardial filling (Grade I diastolic dysfunction).   7. There is mild concentric left ventricular hypertrophy.   8. Mild mitral annular calcification.   9. Mild mitral valve regurgitation.  10. Thickening and calcification of the anterior and posterior mitral valve leaflets.  11. Mild tricuspid regurgitation.  12. Mild aortic regurgitation.  13. Sclerotic aortic valve with normal opening.  14. Estimated pulmonary artery systolic pressure is 36.6 mmHg assuming a right atrial pressure of 10 mmHg, which is consistent with borderline pulmonary hypertension.    [id number] Toby Peace , Electronically signed on 5/4/2021 at 10:19:28 AM          * Infected graft POD#3  to return to OR   Meropenem; hypothermia noted VSS, normal lactate    * Chronic systolic HF with POCUS lung indicating CHF  complicated by PAT, in polyuric phase now  bicarb drip  do not return to OR until renal function improving    *H/o depression / HTN / RA - c/w home meds and f/u outpatient for further management  CT Chest revealed paraseptal emphysematous changes, dilated pulmonary artery and pulmonary fibrosis. POCUS with isolated B lines making ILD a more likely diagnosis; case d/w Pulm      * right LE DVT Feb 2021  Eliquis on hold  to resume AC after sx if ok with vascular or bridge in 24 H if no OR  doppler leg negative resume AC when OK with Vascular      *  Type 2 diabetes mellitus, with long-term current use of insulin  was on  Lantus 25 units QHS start with 10 poor appetite  - Check FS with low dose QAC + HS  - Most recent A1C 7.2      4) RA   MTX 15 mg QD on Wednesdays   FA 1 mg QD for supportive therapy    * Incidental finding renal formation- daughter MD aware outpt f/up      Daughter MD updated [phone] cell

## 2021-05-09 NOTE — PROGRESS NOTE ADULT - ASSESSMENT
86y Female whom presented to the hospital with COPD, arthritis, CAD, DMII, depression, HTN, PVD, PAD, s/p left fem pop bypass on 03/24/21, +lower ext DVT on eliquis, +chronic left heel ulcer, RA, anemia, presented to the ED at [place] on 5/3 with worsening dyspnea, renal evaluation of ORLY. Patient has been getting lasix, did get zarox as well on 5/6. Contrast given on 5/5, renal function rising thereafter with decrement in UOP so accordingly renal evaluation called.     ORLY - today nonoliguric    ? ATN recovery  / PAT    hopefully Cr and UOP continue to improve   monitor I and O   continue IVF x 500ml    holding  further diuretics   Keep slightly positive, maintain hurst for I/O's   renal imaging no hydro    UA bland    hold any surgical procedures if possible till renal status has improved     Met Acidosis sec to ORLY    continue gentle HCO3 gtt    Cellulitis  -Abx renall dosed  -NSAID avoidance  -ID follow up    HTN  -Norvasc/anti-htn on hold  - Support map with gentle IVF    Spoke w Dr Ventura   Spoke w pt and Dr Landers   Spoke with daughter ( Dr Ahumada) on 5/8     Thanks, will follow

## 2021-05-09 NOTE — PROGRESS NOTE ADULT - SUBJECTIVE AND OBJECTIVE BOX
Patient is a 86y old  Female who presents with a chief complaint of SOB (07 May 2021 15:22)      HPI:  86y Female with PMH of COPD, arthritis, CAD, DMII, depression, HTN, PVD, PAD, s/p left fem pop bypass on 03/24/21, +lower ext DVT on eliquis, +chronic left heel ulcer, RA, anemia, presented to the ED at [place] on 5/3 with worsening dyspnea, wheezing that started that night. Pt received 2 Duoneb treatments at home, with only slight improvement, so EMS called. At home patient was hypoxic to 87% on room air, was placed on 100% NRB mask. In the ED at [place], pt was placed on 2 LPM via NC, received a dose of Lasix 20 mg IV x   On her first night of admission at [place] 5/3 patient developed fever which has persisted throughout admission. ID was consulted, patient was started on vancomycin and meropenem. Overnight on 5/4-5/5 patient had extensive purulent drainage from left medial thigh surgical site. Plastic surgery was consulted, advised transfer to [facility] for evaluation by Dr Ventura who did fem pop bypass 3/2021.    Above history per chart review  Patient states that she smoked until last year   History of RA as well  Not seen pulmonary in the past   LVEF noted to be 35-40% and PASP elevated at 36 mmHg as well  CT Chest revealed paraseptal emphysematous changes, dilated pulmonary artery and pulmonary fibrosis.         PREVIOUS DIAGNOSTIC TESTING:      MEDICATIONS  (STANDING):  aspirin  chewable 81 milliGRAM(s) Oral daily  atorvastatin 40 milliGRAM(s) Oral at bedtime  ceFAZolin  Injectable. 1000 milliGRAM(s) IV Push every 12 hours  dextrose 40% Gel 15 Gram(s) Oral once  dextrose 5%. 1000 milliLiter(s) (50 mL/Hr) IV Continuous <Continuous>  dextrose 5%. 1000 milliLiter(s) (100 mL/Hr) IV Continuous <Continuous>  dextrose 50% Injectable 25 Gram(s) IV Push once  dextrose 50% Injectable 12.5 Gram(s) IV Push once  dextrose 50% Injectable 25 Gram(s) IV Push once  folic acid 1 milliGRAM(s) Oral at bedtime  gabapentin 300 milliGRAM(s) Oral three times a day  glucagon  Injectable 1 milliGRAM(s) IntraMuscular once  heparin   Injectable 5000 Unit(s) SubCutaneous every 12 hours  insulin glargine Injectable (LANTUS) 5 Unit(s) SubCutaneous at bedtime  insulin lispro (ADMELOG) corrective regimen sliding scale   SubCutaneous three times a day before meals  metoprolol tartrate 12.5 milliGRAM(s) Oral two times a day  PARoxetine 20 milliGRAM(s) Oral daily  sodium chloride 0.9%. 1000 milliLiter(s) (80 mL/Hr) IV Continuous <Continuous>  sodium chloride 0.9%. 1000 milliLiter(s) (250 mL/Hr) IV Continuous <Continuous>  tiotropium 18 MICROgram(s) Capsule 1 Capsule(s) Inhalation daily    MEDICATIONS  (PRN):  acetaminophen   Tablet .. 650 milliGRAM(s) Oral every 6 hours PRN Mild Pain (1 - 3)  ALBUTerol    90 MICROgram(s) HFA Inhaler 1 Puff(s) Inhalation every 6 hours PRN Shortness of Breath  morphine  - Injectable 5 milliGRAM(s) IV Push every 3 hours PRN Severe Pain (7 - 10)      FAMILY HISTORY:  FH: colon cancer  father    FH: heart attack  father    Family history of atherosclerosis  mother        SOCIAL HISTORY:  ***    REVIEW OF SYSTEM:  dyspnea     Vital Signs Last 24 Hrs  T(C): 36.6 (08 May 2021 09:03), Max: 36.6 (08 May 2021 06:00)  T(F): 97.9 (08 May 2021 09:03), Max: 97.9 (08 May 2021 09:03)  HR: 78 (08 May 2021 09:00) (63 - 78)  BP: 135/50 (08 May 2021 09:00) (96/50 - 135/50)  BP(mean): 72 (08 May 2021 09:00) (57 - 84)  RR: 14 (08 May 2021 09:00) (13 - 36)  SpO2: 96% (08 May 2021 09:00) (89% - 96%)    I&O's Summary    07 May 2021 07:01  -  08 May 2021 07:00  --------------------------------------------------------  IN: 3473 mL / OUT: 65 mL / NET: 3408 mL      PHYSICAL EXAM  General Appearance: cooperative, no acute distress,   HEENT: PERRL, conjunctiva clear, EOM's intact, non injected pharynx, no exudate, TM   normal  Neck: Supple, , no adenopathy, thyroid: not enlarged, no carotid bruit or JVD  Back: Symmetric, no  tenderness,no soft tissue tenderness  Lungs: diminished bilaterally  Heart: Regular rate and rhythm, S1, S2 normal, no murmur, rub or gallop  Abdomen: Soft, non-tender, bowel sounds active , no hepatosplenomegaly  Extremities: no cyanosis or edema, no joint swelling  Skin: Skin color, texture normal, no rashes   Neurologic: Alert and oriented X3 , cranial nerves intact, sensory and motor normal,    ECG:    LABS:      05-08    132<L>  |  106  |  82<H>  ----------------------------<  136<H>  5.0   |  13<L>  |  3.53<H>    Ca    7.7<L>      08 May 2021 06:27  Phos  6.2     05-08    TPro  5.5<L>  /  Alb  1.7<L>  /  TBili  0.2  /  DBili  x   /  AST  15  /  ALT  <6<L>  /  AlkPhos  99  05-08        Lipid Panel  128  33  --  111    Pro BNP  [id number] 05-07 @ 06:51  D Dimer  -- 05-07 @ 06:51  Pro BNP  [id number] 05-06 @ 17:59  D Dimer  -- 05-06 @ 17:59  Pro BNP  [id number] 05-03 @ 22:10  D Dimer  -- 05-03 @ 22:10              RADIOLOGY & ADDITIONAL STUDIES:

## 2021-05-09 NOTE — CONSULT NOTE ADULT - SUBJECTIVE AND OBJECTIVE BOX
Patient is a 86y old  Female who presents with a chief complaint of wound.       HPI:  86y Female with PMH of COPD, arthritis, CAD, DMII, depression, HTN, PVD, PAD, s/p left fem pop bypass on 03/24/21, +lower ext DVT on eliquis, +chronic left heel ulcer, RA, anemia, presented to the ED at [facility] on 5/3 with worsening dyspnea, wheezing that started that night. Pt received 2 Duoneb treatments at home, with only slight improvement, so EMS called. At home patient was hypoxic to 87% on room air, was placed on 100% NRB mask. In the ED at [facility], pt was placed on 2 LPM via NC, received a dose of Lasix 20 mg IV x 1. Initial trop noted to be 0.17.  BNP >38800    Her respiratory symptoms have improved with diuresis.     On her first night of admission at [facility] 5/3 patient developed fever which has persisted throughout admission. ID was consulted, patient was started on vancomycin and meropenem. Overnight on 5/4-5/5 patient had extensive purulent drainage from left medial thigh surgical site. Plastic surgery was consulted, advised transfer to [facility] for evaluation by Dr Ventura who did fem pop bypass 3/2021.      In ED at [facility] on 5/5, patient had no complaints.  Denied any CP, SOB, lower extremity pain, numbness or tingling.      5/6-   Pt seen now. She is fully alert and was able to give all the history , son in law at bedside.  Pt states that her SOB is better.        NO CP.    5/7- Pt seen this am. Pt denies any chest pain or SOB.  She was able to lie down flat in bed for a long time without any SOB.  Overnight urine output poor.     5/9 Examined at bedsite, awake, alert, urinating overnight    PAST MEDICAL & SURGICAL HISTORY:  Hypertension    Anemia    Arthritis, rheumatoid    Depression    Type 2 diabetes mellitus  on insulin    PVD (peripheral vascular disease) with claudication    CAD (coronary artery disease)  non-obstructive    Rheumatoid arthritis    fracture ankle right  plate . screws   2000    S/P cataract surgery  2011    Hip fracture requiring operative repair  Right hip 11/14/2020        MEDICATIONS  (STANDING):  aspirin  chewable 81 milliGRAM(s) Oral daily  atorvastatin 40 milliGRAM(s) Oral at bedtime  ceFAZolin   IVPB 2000 milliGRAM(s) IV Intermittent every 12 hours  dextrose 40% Gel 15 Gram(s) Oral once  dextrose 5%. 1000 milliLiter(s) (50 mL/Hr) IV Continuous <Continuous>  dextrose 5%. 1000 milliLiter(s) (100 mL/Hr) IV Continuous <Continuous>  dextrose 50% Injectable 25 Gram(s) IV Push once  dextrose 50% Injectable 12.5 Gram(s) IV Push once  dextrose 50% Injectable 25 Gram(s) IV Push once  folic acid 1 milliGRAM(s) Oral at bedtime  furosemide   Injectable 40 milliGRAM(s) IV Push daily  gabapentin 300 milliGRAM(s) Oral three times a day  glucagon  Injectable 1 milliGRAM(s) IntraMuscular once  heparin   Injectable 5000 Unit(s) SubCutaneous every 12 hours  insulin lispro (ADMELOG) corrective regimen sliding scale   SubCutaneous three times a day before meals  lisinopril 5 milliGRAM(s) Oral daily  metoprolol tartrate 25 milliGRAM(s) Oral two times a day  PARoxetine 20 milliGRAM(s) Oral daily  tiotropium 18 MICROgram(s) Capsule 1 Capsule(s) Inhalation daily    MEDICATIONS  (PRN):  acetaminophen   Tablet .. 650 milliGRAM(s) Oral every 6 hours PRN Mild Pain (1 - 3)  ALBUTerol    90 MICROgram(s) HFA Inhaler 1 Puff(s) Inhalation every 6 hours PRN Shortness of Breath  morphine  - Injectable 5 milliGRAM(s) IV Push every 3 hours PRN Severe Pain (7 - 10)      FAMILY HISTORY:  FH: colon cancer  father    FH: heart attack  father    Family history of atherosclerosis  mother        SOCIAL HISTORY:    REVIEW OF SYSTEMS:  CONSTITUTIONAL:    No fatigue, malaise, lethargy.  No fever or chills.   RESPIRATORY:  No cough.  No wheeze.  No hemoptysis.  c/o shortness of breath.  CARDIOVASCULAR:  No chest pains.  No palpitations. c/o shortness of breath, No orthopnea or PND.  GASTROINTESTINAL:  No abdominal pain.  No nausea or vomiting.    GENITOURINARY:    No hematuria.    MUSCULOSKELETAL:  c/o L thigh pain   NEUROLOGICAL:  No tingling or numbness or weakness.  PSYCHIATRIC:  No confusion  SKIN:  No rashes.          ICU Vital Signs Last 24 Hrs  T(C): 36.4 (07 May 2021 09:15), Max: 36.4 (06 May 2021 21:12)  T(F): 97.5 (07 May 2021 09:15), Max: 97.6 (06 May 2021 21:12)  HR: 66 (07 May 2021 08:00) (57 - 73)  BP: 104/47 (07 May 2021 07:00) (93/60 - 116/51)  BP(mean): 61 (07 May 2021 07:00) (55 - 91)  ABP: --  ABP(mean): --  RR: 20 (07 May 2021 08:00) (14 - 57)  SpO2: 98% (07 May 2021 08:00) (91% - 98%)      PHYSICAL EXAM-    Constitutional: elderly female in no acute distress sitting up in bed now.     Head: Head is normocephalic and atraumatic.      Neck:No JVD.     Cardiovascular: Regular rate and rhythm without S3, S4. No murmurs or rubs are appreciated.      Respiratory: B/l  rales-improved from last night     Abdomen: Soft, nontender, nondistended with positive bowel sounds.      Extremity: No tenderness.   pitting edema  1+ b/l LE, L thigh area in dressing that is soaked with serous fluid.           INTERPRETATION OF TELEMETRY: sinus bradycardia , PACs     ECG: Sinus rythm , T wave inversion in I, aVL, biphasic T wave in V2-6     I&O's Detail    05 May 2021 07:01  -  06 May 2021 07:00  --------------------------------------------------------  IN:    IV PiggyBack: 50 mL    sodium chloride 0.9%: 1061 mL  Total IN: 1111 mL    OUT:    Indwelling Catheter - Urethral (mL): 375 mL  Total OUT: 375 mL    Total NET: 736 mL      06 May 2021 07:01  -  06 May 2021 17:09  --------------------------------------------------------  IN:    sodium chloride 0.9%: 492 mL  Total IN: 492 mL    OUT:    Indwelling Catheter - Urethral (mL): 100 mL  Total OUT: 100 mL    Total NET: 392 mL          LABS:                                   10.1   7.90  )-----------( 199      ( 06 May 2021 05:49 )             31.3     05-07    133<L>  |  104  |  75<H>  ----------------------------<  238<H>  4.7   |  17<L>  |  2.59<H>    Ca    8.0<L>      07 May 2021 06:51    TPro  5.9<L>  /  Alb  1.9<L>  /  TBili  0.6  /  DBili  x   /  AST  5<L>  /  ALT  <6<L>  /  AlkPhos  71  05-05    CARDIAC MARKERS ( 06 May 2021 05:49 )  x     / x     / 32 U/L / x     / x      CARDIAC MARKERS ( 05 May 2021 19:15 )  x     / x     / 36 U/L / x     / x          LIVER FUNCTIONS - ( 05 May 2021 11:59 )  Alb: 1.9 g/dL / Pro: 5.9 gm/dL / ALK PHOS: 71 U/L / ALT: <6 U/L / AST: 5 U/L / GGT: x           PT/INR - ( 05 May 2021 11:59 )   PT: 18.5 sec;   INR: 1.62 ratio           05-06 Chol 128 LDL -- HDL 33<L> Trig 111           I&O's Summary    05 May 2021 07:01  -  06 May 2021 07:00  --------------------------------------------------------  IN: 1111 mL / OUT: 375 mL / NET: 736 mL    06 May 2021 07:01  -  06 May 2021 17:09  --------------------------------------------------------  IN: 492 mL / OUT: 100 mL / NET: 392 mL      BNP  RADIOLOGY & ADDITIONAL STUDIES:  c< from: CT Chest No Cont (05.03.21 @ 23:04) >  IMPRESSION:    1. Small bilateral pleural effusions and pulmonary edema.  2. Emphysema and question additional honeycombing/fibrosis.  3. Mildly asymmetrical 1.5 cm nodular breast tissue on the left. Correlation with follow-up mammography/ultrasound is recommended.  4. 2.9 cm slightly hyperdense right renal lesion, most likely hemorrhagic/proteinaceous cyst. Follow-up nonemergent ultrasound is recommended.                ROSARIO REYES MD; Attending Radiologist  This document has been electronically signed. May  3 2021 11:52PM    < end of copied text >  < from: Xray Chest 1 View AP/PA (05.03.21 @ 22:36) >    EXAM:  XR CHEST AP OR PA 1V      PROCEDURE DATE:  05/03/2021        INTERPRETATION:  Views:1  Comparison: 08/21/15  History: Shortness of breath    There is mild scattered interstitial infiltrate consistent with pneumonia or pneumonitis without segmental consolidation, layering effusion or cavitation. The heart is normal in size.    IMPRESSION: As above              MARIA ESTHER CADET MD; Attending Radiologist  This document has been electronically signed. May  4 2021  9:22AM    < end of copied text >  < from: TTE Echo Complete w/o Contrast w/ Doppler (05.04.21 @ 08:16) >    Summary:   1. Left ventricular ejection fraction, by visual estimation, is 35 to 40%.   2. Moderately toseverely decreased global left ventricular systolic function.   3. Multiple left ventricular regional wall motion abnormalities exist. See wall motion findings.   4. Mildly increased LV wall thickness.   5. Normal left ventricular internal cavity size.   6. Spectral Doppler shows impaired relaxation pattern of left ventricular myocardial filling (Grade I diastolic dysfunction).   7. There is mild concentric left ventricular hypertrophy.   8. Mild mitral annular calcification.   9. Mild mitral valve regurgitation.  10. Thickening and calcification of the anterior and posterior mitral valve leaflets.  11. Mild tricuspid regurgitation.  12. Mild aortic regurgitation.  13. Sclerotic aortic valve with normal opening.  14. Estimated pulmonary artery systolic pressure is 36.6 mmHg assuming a right atrial pressure of 10 mmHg, which is consistent with borderline pulmonary hypertension.    [id number] Toby Peace , Electronically signed on 5/4/2021 at 10:19:28 AM    < end of copied text >

## 2021-05-09 NOTE — PROGRESS NOTE ADULT - ASSESSMENT
1) Dyspnea  2) Pulmonary Fibrosis/Emphysema  3) Hypoxemia   4) HFrEF  5) DVT    86y Female with PMH of COPD, arthritis, CAD, DMII, depression, HTN, PVD, PAD, s/p left fem pop bypass on 03/24/21, +lower ext DVT on eliquis, +chronic left heel ulcer, RA, anemia, presented to the ED at [facility] on 5/3 with worsening dyspnea, wheezing that started that night. Pt received 2 Duoneb treatments at home, with only slight improvement, so EMS called. At home patient was hypoxic to 87% on room air, was placed on 100% NRB mask. In the ED at [facility], pt was placed on 2 LPM via NC, received a dose of Lasix 20 mg IV x   On her first night of admission at [facility] 5/3 patient developed fever which has persisted throughout admission. ID was consulted, patient was started on vancomycin and meropenem. Overnight on 5/4-5/5 patient had extensive purulent drainage from left medial thigh surgical site. Plastic surgery was consulted, advised transfer to [facility] for evaluation by Dr Ventura who did fem pop bypass 3/2021.    Above history per chart review  Patient states that she smoked until last year   History of RA as well  Not seen pulmonary in the past   LVEF noted to be 35-40% and PASP elevated at 36 mmHg as well  CT Chest revealed paraseptal emphysematous changes, dilated pulmonary artery and pulmonary fibrosis.   Reviewed CT chest report, echocardiogram. Hypoxemia issues are likely related to emphysema/RA-ILD/PH complicated by HFrEF and now contrast induced nephropathy.  Continue Spiriva daily for emphysema  Albuterol PRN

## 2021-05-09 NOTE — CONSULT NOTE ADULT - ASSESSMENT
SOB, Acute on chronic decompensated HFref- LVEF 35-40%- NYHA class III- complicated by CKD and hypoalbuminemia. Cardiorenal syndrome.   2/2021 , LVEF 55-60% on echo  2/2021-LHC-  moderate disease in distal LAD.  Still dehydrated by physical exam  Continue with hydration  Diuresing at present  Will continue to follow up clinically  D/W vascular surgery

## 2021-05-10 LAB
ANION GAP SERPL CALC-SCNC: 9 MMOL/L — SIGNIFICANT CHANGE UP (ref 5–17)
BUN SERPL-MCNC: 83 MG/DL — HIGH (ref 7–23)
CALCIUM SERPL-MCNC: 8.5 MG/DL — SIGNIFICANT CHANGE UP (ref 8.5–10.1)
CHLORIDE SERPL-SCNC: 112 MMOL/L — HIGH (ref 96–108)
CO2 SERPL-SCNC: 19 MMOL/L — LOW (ref 22–31)
CREAT SERPL-MCNC: 2.45 MG/DL — HIGH (ref 0.5–1.3)
GLUCOSE SERPL-MCNC: 139 MG/DL — HIGH (ref 70–99)
POTASSIUM SERPL-MCNC: 4.7 MMOL/L — SIGNIFICANT CHANGE UP (ref 3.5–5.3)
POTASSIUM SERPL-SCNC: 4.7 MMOL/L — SIGNIFICANT CHANGE UP (ref 3.5–5.3)
SODIUM SERPL-SCNC: 140 MMOL/L — SIGNIFICANT CHANGE UP (ref 135–145)

## 2021-05-10 PROCEDURE — 93970 EXTREMITY STUDY: CPT | Mod: 26

## 2021-05-10 PROCEDURE — 99233 SBSQ HOSP IP/OBS HIGH 50: CPT

## 2021-05-10 RX ORDER — SODIUM CHLORIDE 9 MG/ML
1000 INJECTION, SOLUTION INTRAVENOUS
Refills: 0 | Status: DISCONTINUED | OUTPATIENT
Start: 2021-05-11 | End: 2021-05-11

## 2021-05-10 RX ORDER — SODIUM CHLORIDE 9 MG/ML
1000 INJECTION, SOLUTION INTRAVENOUS
Refills: 0 | Status: DISCONTINUED | OUTPATIENT
Start: 2021-05-10 | End: 2021-05-10

## 2021-05-10 RX ADMIN — INSULIN GLARGINE 5 UNIT(S): 100 INJECTION, SOLUTION SUBCUTANEOUS at 22:39

## 2021-05-10 RX ADMIN — GABAPENTIN 300 MILLIGRAM(S): 400 CAPSULE ORAL at 06:11

## 2021-05-10 RX ADMIN — Medication 1 MILLIGRAM(S): at 22:38

## 2021-05-10 RX ADMIN — Medication 12.5 MILLIGRAM(S): at 22:38

## 2021-05-10 RX ADMIN — Medication 12.5 MILLIGRAM(S): at 10:03

## 2021-05-10 RX ADMIN — HEPARIN SODIUM 5000 UNIT(S): 5000 INJECTION INTRAVENOUS; SUBCUTANEOUS at 22:38

## 2021-05-10 RX ADMIN — Medication 1000 MILLIGRAM(S): at 10:04

## 2021-05-10 RX ADMIN — Medication 81 MILLIGRAM(S): at 10:03

## 2021-05-10 RX ADMIN — SODIUM CHLORIDE 75 MILLILITER(S): 9 INJECTION, SOLUTION INTRAVENOUS at 10:58

## 2021-05-10 RX ADMIN — HEPARIN SODIUM 5000 UNIT(S): 5000 INJECTION INTRAVENOUS; SUBCUTANEOUS at 12:26

## 2021-05-10 RX ADMIN — Medication 1: at 16:18

## 2021-05-10 RX ADMIN — Medication 650 MILLIGRAM(S): at 22:39

## 2021-05-10 RX ADMIN — Medication 20 MILLIGRAM(S): at 10:05

## 2021-05-10 RX ADMIN — Medication 650 MILLIGRAM(S): at 23:10

## 2021-05-10 RX ADMIN — GABAPENTIN 300 MILLIGRAM(S): 400 CAPSULE ORAL at 22:38

## 2021-05-10 RX ADMIN — Medication 1000 MILLIGRAM(S): at 22:40

## 2021-05-10 RX ADMIN — ATORVASTATIN CALCIUM 40 MILLIGRAM(S): 80 TABLET, FILM COATED ORAL at 22:37

## 2021-05-10 RX ADMIN — GABAPENTIN 300 MILLIGRAM(S): 400 CAPSULE ORAL at 13:36

## 2021-05-10 NOTE — PROGRESS NOTE ADULT - SUBJECTIVE AND OBJECTIVE BOX
Patient is a 86y old  Female who presents with a chief complaint of wound.       HPI:  86y Female with PMH of COPD, arthritis, CAD, DMII, depression, HTN, PVD, PAD, s/p left fem pop bypass on 03/24/21, +lower ext DVT on eliquis, +chronic left heel ulcer, RA, anemia, presented to the ED at [facility] on 5/3 with worsening dyspnea, wheezing that started that night. Pt received 2 Duoneb treatments at home, with only slight improvement, so EMS called. At home patient was hypoxic to 87% on room air, was placed on 100% NRB mask. In the ED at [facility], pt was placed on 2 LPM via NC, received a dose of Lasix 20 mg IV x 1. Initial trop noted to be 0.17.  BNP >21341    Her respiratory symptoms have improved with diuresis.     On her first night of admission at [facility] 5/3 patient developed fever which has persisted throughout admission. ID was consulted, patient was started on vancomycin and meropenem. Overnight on 5/4-5/5 patient had extensive purulent drainage from left medial thigh surgical site. Plastic surgery was consulted, advised transfer to [facility] for evaluation by Dr Ventura who did fem pop bypass 3/2021.      In ED at [facility] on 5/5, patient had no complaints.  Denied any CP, SOB, lower extremity pain, numbness or tingling.      5/6-   Pt seen now. She is fully alert and was able to give all the history , son in law at bedside.  Pt states that her SOB is better.    NO CP.    5/7- Pt seen this am. Pt denies any chest pain or SOB.  She was able to lie down flat in bed for a long time without any SOB.  Overnight urine output poor.     5/10- pt seen this am.  Pt denies any symptoms this am. She was happy she was able to make urine.   PAST MEDICAL & SURGICAL HISTORY:  Hypertension    Anemia    Arthritis, rheumatoid    Depression    Type 2 diabetes mellitus  on insulin    PVD (peripheral vascular disease) with claudication    CAD (coronary artery disease)  non-obstructive    Rheumatoid arthritis    fracture ankle right  plate . screws   2000    S/P cataract surgery  2011    Hip fracture requiring operative repair  Right hip 11/14/2020        MEDICATIONS  (STANDING):  aspirin  chewable 81 milliGRAM(s) Oral daily  atorvastatin 40 milliGRAM(s) Oral at bedtime  ceFAZolin   IVPB 2000 milliGRAM(s) IV Intermittent every 12 hours  dextrose 40% Gel 15 Gram(s) Oral once  dextrose 5%. 1000 milliLiter(s) (50 mL/Hr) IV Continuous <Continuous>  dextrose 5%. 1000 milliLiter(s) (100 mL/Hr) IV Continuous <Continuous>  dextrose 50% Injectable 25 Gram(s) IV Push once  dextrose 50% Injectable 12.5 Gram(s) IV Push once  dextrose 50% Injectable 25 Gram(s) IV Push once  folic acid 1 milliGRAM(s) Oral at bedtime  furosemide   Injectable 40 milliGRAM(s) IV Push daily  gabapentin 300 milliGRAM(s) Oral three times a day  glucagon  Injectable 1 milliGRAM(s) IntraMuscular once  heparin   Injectable 5000 Unit(s) SubCutaneous every 12 hours  insulin lispro (ADMELOG) corrective regimen sliding scale   SubCutaneous three times a day before meals  lisinopril 5 milliGRAM(s) Oral daily  metoprolol tartrate 25 milliGRAM(s) Oral two times a day  PARoxetine 20 milliGRAM(s) Oral daily  tiotropium 18 MICROgram(s) Capsule 1 Capsule(s) Inhalation daily    MEDICATIONS  (PRN):  acetaminophen   Tablet .. 650 milliGRAM(s) Oral every 6 hours PRN Mild Pain (1 - 3)  ALBUTerol    90 MICROgram(s) HFA Inhaler 1 Puff(s) Inhalation every 6 hours PRN Shortness of Breath  morphine  - Injectable 5 milliGRAM(s) IV Push every 3 hours PRN Severe Pain (7 - 10)      FAMILY HISTORY:  FH: colon cancer  father    FH: heart attack  father    Family history of atherosclerosis  mother        SOCIAL HISTORY: no recent smoking     REVIEW OF SYSTEMS:  CONSTITUTIONAL:    No fatigue, malaise, lethargy.  No fever or chills.   RESPIRATORY:  No cough.  No wheeze.  No hemoptysis.  c/o shortness of breath.  CARDIOVASCULAR:  No chest pains.  No palpitations. c/o shortness of breath, No orthopnea or PND.  GASTROINTESTINAL:  No abdominal pain.  No nausea or vomiting.    GENITOURINARY:    No hematuria.    MUSCULOSKELETAL:  c/o L thigh pain   NEUROLOGICAL:  No tingling or numbness or weakness.  PSYCHIATRIC:  No confusion  SKIN:  No rashes.        ICU Vital Signs Last 24 Hrs  T(C): 37.1 (09 May 2021 21:22), Max: 37.1 (09 May 2021 21:22)  T(F): 98.7 (09 May 2021 21:22), Max: 98.7 (09 May 2021 21:22)  HR: 89 (10 May 2021 08:00) (70 - 94)  BP: 144/51 (10 May 2021 08:00) (100/84 - 144/51)  BP(mean): 76 (10 May 2021 08:00) (55 - 103)  ABP: --  ABP(mean): --  RR: 16 (10 May 2021 08:00) (14 - 71)  SpO2: 95% (10 May 2021 08:00) (93% - 97%)      PHYSICAL EXAM-    Constitutional: elderly female in no acute distress sitting up in bed now.     Head: Head is normocephalic and atraumatic.      Neck:No JVD.     Cardiovascular: Regular rate and rhythm without S3, S4. No murmurs or rubs are appreciated.      Respiratory: B/l  rales     Abdomen: Soft, nontender, nondistended with positive bowel sounds.      Extremity: No tenderness.   pitting edema  1+ b/l LE, L thigh area in dressing     Neurologic: The patient is alert and oriented.      Skin: L thigh dressing     Psychiatric: The patient appears to be emotionally stable.      INTERPRETATION OF TELEMETRY: sinus bradycardia , PACs     ECG: Sinus rythm , T wave inversion in I, aVL, biphasic T wave in V2-6     I&O's Detail    05 May 2021 07:01  -  06 May 2021 07:00  --------------------------------------------------------  IN:    IV PiggyBack: 50 mL    sodium chloride 0.9%: 1061 mL  Total IN: 1111 mL    OUT:    Indwelling Catheter - Urethral (mL): 375 mL  Total OUT: 375 mL    Total NET: 736 mL      06 May 2021 07:01  -  06 May 2021 17:09  --------------------------------------------------------  IN:    sodium chloride 0.9%: 492 mL  Total IN: 492 mL    OUT:    Indwelling Catheter - Urethral (mL): 100 mL  Total OUT: 100 mL    Total NET: 392 mL          LABS:                                   9.5    9.24  )-----------( 209      ( 09 May 2021 08:34 )             28.8     05-10    140  |  112<H>  |  83<H>  ----------------------------<  139<H>  4.7   |  19<L>  |  2.45<H>    Ca    8.5      10 May 2021 06:44              05-06 Chol 128 LDL -- HDL 33<L> Trig 111        I&O's Summary    05 May 2021 07:01  -  06 May 2021 07:00  --------------------------------------------------------  IN: 1111 mL / OUT: 375 mL / NET: 736 mL    06 May 2021 07:01  -  06 May 2021 17:09  --------------------------------------------------------  IN: 492 mL / OUT: 100 mL / NET: 392 mL      BNP  RADIOLOGY & ADDITIONAL STUDIES:  c< from: CT Chest No Cont (05.03.21 @ 23:04) >  IMPRESSION:    1. Small bilateral pleural effusions and pulmonary edema.  2. Emphysema and question additional honeycombing/fibrosis.  3. Mildly asymmetrical 1.5 cm nodular breast tissue on the left. Correlation with follow-up mammography/ultrasound is recommended.  4. 2.9 cm slightly hyperdense right renal lesion, most likely hemorrhagic/proteinaceous cyst. Follow-up nonemergent ultrasound is recommended.                ROSARIO REYES MD; Attending Radiologist  This document has been electronically signed. May  3 2021 11:52PM    < end of copied text >  < from: Xray Chest 1 View AP/PA (05.03.21 @ 22:36) >    EXAM:  XR CHEST AP OR PA 1V      PROCEDURE DATE:  05/03/2021        INTERPRETATION:  Views:1  Comparison: 08/21/15  History: Shortness of breath    There is mild scattered interstitial infiltrate consistent with pneumonia or pneumonitis without segmental consolidation, layering effusion or cavitation. The heart is normal in size.    IMPRESSION: As above              MARIA ESTHER CADET MD; Attending Radiologist  This document has been electronically signed. May  4 2021  9:22AM    < end of copied text >  < from: TTE Echo Complete w/o Contrast w/ Doppler (05.04.21 @ 08:16) >    Summary:   1. Left ventricular ejection fraction, by visual estimation, is 35 to 40%.   2. Moderately to severely decreased global left ventricular systolic function.   3. Multiple left ventricular regional wall motion abnormalities exist. See wall motion findings.   4. Mildly increased LV wall thickness.   5. Normal left ventricular internal cavity size.   6. Spectral Doppler shows impaired relaxation pattern of left ventricular myocardial filling (Grade I diastolic dysfunction).   7. There is mild concentric left ventricular hypertrophy.   8. Mild mitral annular calcification.   9. Mild mitral valve regurgitation.  10. Thickening and calcification of the anterior and posterior mitral valve leaflets.  11. Mild tricuspid regurgitation.  12. Mild aortic regurgitation.  13. Sclerotic aortic valve with normal opening.  14. Estimated pulmonary artery systolic pressure is 36.6 mmHg assuming a right atrial pressure of 10 mmHg, which is consistent with borderline pulmonary hypertension.    [id number] Toby Peace , Electronically signed on 5/4/2021 at 10:19:28 AM    < end of copied text >

## 2021-05-10 NOTE — PROGRESS NOTE ADULT - ASSESSMENT
SOB,chronic compensated HFref- LVEF 35-40%- NYHA class III- complicated by CKD and hypoalbuminemia. Cardiorenal syndrome.   2/2021 , LVEF 55-60% on echo  2/2021-LHC-  moderate disease in distal LAD.  euvolemic on exam.  Repeat echo to asses LVEF.      Renal failure- just started making urine yesterday.  contrast nephropathy.  Hold lisinopril for now given borderline low BP and renal function.    Renal function improving.       PVD- pending intervention.  d/w Dr Alfonso.      Bacteremia- on abx. Management per primary team.     Other medical issues- Management per primary team.   Thank you for allowing me to participate in the care of this patient. Please feel free to contact me with any questions.

## 2021-05-10 NOTE — PROGRESS NOTE ADULT - ASSESSMENT
85 yo Female whom presented to the hospital with COPD, arthritis, CAD, DMII, depression, HTN, PVD, PAD, s/p left fem pop bypass on 03/24/21, +lower ext DVT on eliquis, +chronic left heel ulcer, RA, anemia, presented to the ED at [facility] on 5/3 with worsening dyspnea, renal evaluation of ORLY. Patient has been getting lasix, did get zarox as well on 5/6. Contrast given on 5/5, renal function rising thereafter with decrement in UOP so accordingly renal evaluation called.     ORLY - today nonoliguric    -Renal function stabilizing, post contrast diuresis   -Brief IVF again today with uop, npo status    Cellulitis  -Abx renally dosed  -NSAID avoidance  -ID follow up    HTN  -Norvasc/anti-htn as needed  - Support map with gentle IVF, keep when npo as well    At 4pm  Spoke w Dr Lorenzo LEDESMA/irena with daughter via phone

## 2021-05-10 NOTE — PROGRESS NOTE ADULT - SUBJECTIVE AND OBJECTIVE BOX
Date of service: 05-10-21 @ 11:24    Lying in bed in NAD  Has left thigh pain  No fever     ROS: no fever or chills; denies dizziness, no HA, no SOB or cough, no abdominal pain, no diarrhea or constipation; no dysuria    MEDICATIONS  (STANDING):  aspirin  chewable 81 milliGRAM(s) Oral daily  atorvastatin 40 milliGRAM(s) Oral at bedtime  ceFAZolin  Injectable. 1000 milliGRAM(s) IV Push every 12 hours  dextrose 40% Gel 15 Gram(s) Oral once  dextrose 5%. 1000 milliLiter(s) (50 mL/Hr) IV Continuous <Continuous>  dextrose 5%. 1000 milliLiter(s) (100 mL/Hr) IV Continuous <Continuous>  dextrose 50% Injectable 25 Gram(s) IV Push once  dextrose 50% Injectable 12.5 Gram(s) IV Push once  dextrose 50% Injectable 25 Gram(s) IV Push once  folic acid 1 milliGRAM(s) Oral at bedtime  gabapentin 300 milliGRAM(s) Oral three times a day  glucagon  Injectable 1 milliGRAM(s) IntraMuscular once  heparin   Injectable 5000 Unit(s) SubCutaneous every 12 hours  insulin glargine Injectable (LANTUS) 5 Unit(s) SubCutaneous at bedtime  insulin lispro (ADMELOG) corrective regimen sliding scale   SubCutaneous three times a day before meals  metoprolol tartrate 12.5 milliGRAM(s) Oral two times a day  PARoxetine 20 milliGRAM(s) Oral daily  sodium bicarbonate  Infusion 0.051 mEq/kG/Hr (50 mL/Hr) IV Continuous <Continuous>  sodium chloride 0.45%. 1000 milliLiter(s) (75 mL/Hr) IV Continuous <Continuous>  tiotropium 18 MICROgram(s) Capsule 1 Capsule(s) Inhalation daily    Vital Signs Last 24 Hrs  T(C): 37 (10 May 2021 09:14), Max: 37.1 (09 May 2021 21:22)  T(F): 98.6 (10 May 2021 09:14), Max: 98.7 (09 May 2021 21:22)  HR: 94 (10 May 2021 10:00) (70 - 94)  BP: 137/46 (10 May 2021 10:00) (100/84 - 144/51)  BP(mean): 70 (10 May 2021 10:00) (55 - 90)  RR: 18 (10 May 2021 10:00) (16 - 71)  SpO2: 96% (10 May 2021 10:00) (93% - 97%)     Physical exam:    Constitutional:  No acute distress  HEENT: NC/AT, EOMI, PERRLA, conjunctivae clear  Neck: supple; thyroid not palpable  Back: no tenderness  Respiratory: respiratory effort normal; crackles at bases  Cardiovascular: S1S2 regular, no murmurs  Abdomen: soft, not tender, not distended, positive BS  Genitourinary: no suprapubic tenderness  Lymphatic: no LN palpable  Musculoskeletal: no muscle tenderness, no joint swelling or tenderness  Extremities: 1+ pedal edema  Left thigh open wound s/p I and D; packed; dressing  Neurological/ Psychiatric: AxOx3, moving all extremities  Skin: no rashes; no palpable lesions    Labs: reviewed                        9.5    9.24  )-----------( 209      ( 09 May 2021 08:34 )             28.8     05-10    140  |  112<H>  |  83<H>  ----------------------------<  139<H>  4.7   |  19<L>  |  2.45<H>    Ca    8.5      10 May 2021 06:44                        10.1   7.90  )-----------( 199      ( 06 May 2021 05:49 )             31.3     05-06    134<L>  |  106  |  51<H>  ----------------------------<  199<H>  4.7   |  20<L>  |  1.39<H>    Ca    8.0<L>      06 May 2021 05:49    TPro  5.9<L>  /  Alb  1.9<L>  /  TBili  0.6  /  DBili  x   /  AST  5<L>  /  ALT  <6<L>  /  AlkPhos  71  05-05     LIVER FUNCTIONS - ( 05 May 2021 11:59 )  Alb: 1.9 g/dL / Pro: 5.9 gm/dL / ALK PHOS: 71 U/L / ALT: <6 U/L / AST: 5 U/L / GGT: x             Culture - Fungal, Other (collected 05 May 2021 16:34)  Source: .Other None  Preliminary Report (06 May 2021 09:04):    Testing in progress    Culture - Surgical Swab (collected 05 May 2021 16:34)  Source: .Surgical Swab None  Preliminary Report (06 May 2021 20:09):    Moderate Staphylococcus aureus  Organism: Staphylococcus aureus (07 May 2021 19:05)  Organism: Staphylococcus aureus (07 May 2021 19:05)      -  Ampicillin/Sulbactam: S <=8/4      -  Cefazolin: S <=4      -  Clindamycin: S <=0.25      -  Erythromycin: S <=0.25      -  Gentamicin: S <=1 Should not be used as monotherapy      -  Oxacillin: S <=0.25      -  RIF- Rifampin: S <=1 Should not be used as monotherapy      -  Tetra/Doxy: S <=1      -  Trimethoprim/Sulfamethoxazole: S <=0.5/9.5      -  Vancomycin: S 1      Method Type: SIMEON    Culture - Fungal, Other (collected 05 May 2021 16:34)  Source: .Other None  Preliminary Report (06 May 2021 09:04):    Testing in progress    Culture - Surgical Swab (collected 05 May 2021 16:34)  Source: .Surgical Swab None  Preliminary Report (06 May 2021 20:04):    Moderate Staphylococcus aureus  Organism: Staphylococcus aureus (07 May 2021 19:06)  Organism: Staphylococcus aureus (07 May 2021 19:06)      -  Ampicillin/Sulbactam: S <=8/4      -  Cefazolin: S <=4      -  Clindamycin: S <=0.25      -  Erythromycin: S <=0.25      -  Gentamicin: S <=1 Should not be used as monotherapy      -  Oxacillin: S <=0.25      -  RIF- Rifampin: S <=1 Should not be used as monotherapy      -  Tetra/Doxy: S <=1      -  Trimethoprim/Sulfamethoxazole: S <=0.5/9.5      -  Vancomycin: S 2      Method Type: SIMEON    Culture - Other (collected 04 May 2021 23:00)  Source: .Other wound - L thigh  Final Report (07 May 2021 07:28):    Numerous Staphylococcus aureus  Organism: Staphylococcus aureus (07 May 2021 07:28)  Organism: Staphylococcus aureus (07 May 2021 07:28)      -  Ampicillin/Sulbactam: S <=8/4      -  Cefazolin: S <=4      -  Clindamycin: S <=0.25      -  Erythromycin: S <=0.25      -  Gentamicin: S <=1 Should not be used as monotherapy      -  Oxacillin: S <=0.25      -  RIF- Rifampin: S <=1 Should not be used as monotherapy      -  Tetra/Doxy: S <=1      -  Trimethoprim/Sulfamethoxazole: S <=0.5/9.5      -  Vancomycin: S 1      Method Type: SIMEON    Culture - Blood (collected 03 May 2021 22:10)  Source: .Blood Blood-Peripheral  Final Report (09 May 2021 03:00):    No Growth Final    Culture - Blood (collected 03 May 2021 22:10)  Source: .Blood Blood-Peripheral  Gram Stain (07 May 2021 01:14):    Growth in aerobic bottle: Gram Positive Cocci in Clusters  Final Report (08 May 2021 17:32):    Growth in aerobic bottle: Staphylococcus aureus  Organism: Blood Culture PCR  Staphylococcus aureus (08 May 2021 17:32)  Organism: Staphylococcus aureus (08 May 2021 17:32)      -  Ampicillin/Sulbactam: S <=8/4      -  Cefazolin: S <=4      -  Clindamycin: S <=0.25      -  Erythromycin: S <=0.25      -  Gentamicin: S <=1 Should not be used as monotherapy      -  Oxacillin: S <=0.25      -  RIF- Rifampin: S <=1 Should not be used as monotherapy      -  Tetra/Doxy: S <=1      -  Trimethoprim/Sulfamethoxazole: S <=0.5/9.5      -  Vancomycin: S 2      Method Type: SIMEON  Organism: Blood Culture PCR (08 May 2021 17:32)      -  Staphylococcus aureus: Detec Any isolate of Staphylococcus aureus from a blood culture is NOT considered a contaminant.      Method Type: PCR        COVID-19 PCR: NotDetec (05-03-21 @ 23:17)          Radiology: all available radiological tests reviewed    Advanced directives addressed: full resuscitation

## 2021-05-10 NOTE — PROGRESS NOTE ADULT - SUBJECTIVE AND OBJECTIVE BOX
86 year old female with complicated medical history, including PVD, s/p left fem-pop bypass. She was re-admitted with an infection of the distal incision. She underwent I and D and washout last week by Dr. Ventura. Now much more medically stable. Will plan for return to OR tomorrow, with another washout and local muscular coverage of bypass graft.

## 2021-05-10 NOTE — PROGRESS NOTE ADULT - SUBJECTIVE AND OBJECTIVE BOX
86 y.o. Female with PMH of COPD, arthritis, CAD, DMII, depression, HTN, PVD, PAD, s/p left fem pop bypass on 03/24/21, +lower ext DVT on eliquis, +chronic left heel ulcer, RA, anemia, presented to the ED at [facility] on 5/3 with worsening dyspnea, wheezing that started that night. Pt received 2 Duoneb treatments at home, with only slight improvement, so EMS called. At home patient was hypoxic to 87% on room air, was placed on 100% NRB mask. In the ED at [facility], pt was placed on 2 LPM via NC, received a dose of Lasix 20 mg IV x 1. Initial trop noted to be 0.17.  BNP >58300  On her first night of admission at [facility] 5/3 patient developed fever which has persisted throughout admission. ID was consulted, patient was started on vancomycin and meropenem. Overnight on 5/4-5/5 patient had extensive purulent drainage from left medial thigh surgical site. Plastic surgery was consulted, advised transfer to [facility] for evaluation by Dr Ventura who did fem pop bypass 3/2021.      Was taking to OR : purulent fluid in L thigh popliteal fossa above knee; necrotic infected fibrinous material around graft and on tissue in popliteal fossa  oliguric suspect PAT;  from anuric she is in polyuric phase today    INTERVAL HPI: comfortable in the bed, no pain large UO  Other ROS reviewed and neg     Vital Signs Last 24 Hrs  T(C): 37 (10 May 2021 09:14), Max: 37.1 (09 May 2021 21:22)  T(F): 98.6 (10 May 2021 09:14), Max: 98.7 (09 May 2021 21:22)  HR: 94 (10 May 2021 10:00) (70 - 94)  BP: 137/46 (10 May 2021 10:00) (100/84 - 144/51)  BP(mean): 70 (10 May 2021 10:00) (55 - 90)  RR: 18 (10 May 2021 10:00) (16 - 71)  SpO2: 96% (10 May 2021 10:00) (93% - 97%)  I&O's Detail    09 May 2021 07:01  -  10 May 2021 07:00  --------------------------------------------------------  IN:    Sodium Bicarbonate: 950 mL  Total IN: 950 mL    OUT:    Indwelling Catheter - Urethral (mL): 4550 mL  Total OUT: 4550 mL    Total NET: -3600 mL      10 May 2021 07:01  -  10 May 2021 10:18  --------------------------------------------------------  IN:  Total IN: 0 mL    OUT:    Indwelling Catheter - Urethral (mL): 1300 mL  Total OUT: 1300 mL    Total NET: -1300 mL                        9.5    9.24  )-----------( 209      ( 09 May 2021 08:34 )             28.8     10 May 2021 06:44    140    |  112    |  83     ----------------------------<  139    4.7     |  19     |  2.45     Ca    8.5        10 May 2021 06:44    CAPILLARY BLOOD GLUCOSE    POCT Blood Glucose.: 163 mg/dL (09 May 2021 23:03)  POCT Blood Glucose.: 133 mg/dL (09 May 2021 17:00)  POCT Blood Glucose.: 168 mg/dL (09 May 2021 11:34)    MEDICATIONS  (STANDING):  aspirin  chewable 81 milliGRAM(s) Oral daily  atorvastatin 40 milliGRAM(s) Oral at bedtime  ceFAZolin  Injectable. 1000 milliGRAM(s) IV Push every 12 hours  dextrose 40% Gel 15 Gram(s) Oral once  dextrose 5%. 1000 milliLiter(s) (50 mL/Hr) IV Continuous <Continuous>  dextrose 5%. 1000 milliLiter(s) (100 mL/Hr) IV Continuous <Continuous>  dextrose 50% Injectable 25 Gram(s) IV Push once  dextrose 50% Injectable 12.5 Gram(s) IV Push once  dextrose 50% Injectable 25 Gram(s) IV Push once  folic acid 1 milliGRAM(s) Oral at bedtime  gabapentin 300 milliGRAM(s) Oral three times a day  glucagon  Injectable 1 milliGRAM(s) IntraMuscular once  heparin   Injectable 5000 Unit(s) SubCutaneous every 12 hours  insulin glargine Injectable (LANTUS) 5 Unit(s) SubCutaneous at bedtime  insulin lispro (ADMELOG) corrective regimen sliding scale   SubCutaneous three times a day before meals  metoprolol tartrate 12.5 milliGRAM(s) Oral two times a day  PARoxetine 20 milliGRAM(s) Oral daily  sodium bicarbonate  Infusion 0.051 mEq/kG/Hr (50 mL/Hr) IV Continuous <Continuous>  tiotropium 18 MICROgram(s) Capsule 1 Capsule(s) Inhalation daily    MEDICATIONS  (PRN):  acetaminophen   Tablet .. 650 milliGRAM(s) Oral every 6 hours PRN Mild Pain (1 - 3)  ALBUTerol    90 MICROgram(s) HFA Inhaler 1 Puff(s) Inhalation every 6 hours PRN Shortness of Breath  morphine  - Injectable 5 milliGRAM(s) IV Push every 3 hours PRN Severe Pain (7 - 10)    RADIOLOGY: personally visualized    PHYSICAL EXAM:    Constitutional: elderly female in no acute distress sitting up in bed now.   Head: Head is normocephalic and atraumatic.    Neck: No JVD.   Cardiovascular: Regular rate and rhythm without S3, S4. No murmurs or rubs are appreciated.    Respiratory: Decreased BS at bases with + crackles  Abdomen: Soft, nontender, nondistended with positive bowel sounds.    Extremity: No tenderness, no pitting edema, Left thigh area in dressing   Neurologic: The patient is alert and oriented.  Non focal  Skin: Left thigh dressing       TTE Summary:   1. Left ventricular ejection fraction, by visual estimation, is 35 to 40%.   2. Moderately toseverely decreased global left ventricular systolic function.   3. Multiple left ventricular regional wall motion abnormalities exist. See wall motion findings.   4. Mildly increased LV wall thickness.   5. Normal left ventricular internal cavity size.   6. Spectral Doppler shows impaired relaxation pattern of left ventricular myocardial filling (Grade I diastolic dysfunction).   7. There is mild concentric left ventricular hypertrophy.   8. Mild mitral annular calcification.   9. Mild mitral valve regurgitation.  10. Thickening and calcification of the anterior and posterior mitral valve leaflets.  11. Mild tricuspid regurgitation.  12. Mild aortic regurgitation.  13. Sclerotic aortic valve with normal opening.  14. Estimated pulmonary artery systolic pressure is 36.6 mmHg assuming a right atrial pressure of 10 mmHg, which is consistent with borderline pulmonary hypertension.

## 2021-05-10 NOTE — PROGRESS NOTE ADULT - ASSESSMENT
87 y/o Female with h/o COPD, arthritis, CAD, DMII, depression, HTN, PVD, PAD, s/p left fem pop bypass on 03/24/21, lower ext DVT on eliquis, chronic left heel ulcer, RA, anemia was admitted on 5/3 at [facility] with worsening dyspnea, wheezing that started that night. Pt received 2 Duoneb treatments at home, with only slight improvement. At home patient was hypoxic to 87% on room air, was placed on 100% NRB mask. In the ED at [facility], pt was placed on 2 LPM via NC, received a dose of Lasix 20 mg IV x 1. Her respiratory symptoms have improved with diuresis. Her troponin levels were monitored. Cardiology was consulted, advised troponin elevated due to type II MI.  On her first night of admission at [facility] 5/3 patient developed fever which has persisted throughout admission. ID was consulted, patient was started on vancomycin and meropenem. Overnight on 5/4-5/5 patient had extensive purulent drainage from left medial thigh surgical site. Plastic surgery was consulted, advised transfer to [facility] for evaluation by Dr Ventura who did fem pop bypass 3/2021. In ED at [facility] on 5/5, she was continued on meropenem.    1. Left leg cellulitis. Postsurgical wound infection with MSSA s/p washout. Recent left femoral-popliteal bypass. Possible underlying vascular graft infection. s/p MSSA sepsis. ARF on CRF stage 3. Allergy to cephalosporins and PCN.  -cultures reviewed  -renal function is poor  -on cefazolin 1 gm IV q12h # 5  -tolerating abx well so far; no side effects noted  -monitor closely in [facility] of PCN allergy history   -local wound care  -vascular evaluation appreciated  -continue abx coverage  -will need longer term IV abx therapy  -monitor temps  -f/u CBC  -supportive care  2. Other issues:   -care per medicine

## 2021-05-10 NOTE — PROGRESS NOTE ADULT - SUBJECTIVE AND OBJECTIVE BOX
Patient is a 86y Female who reports no complaints as new, no cp or sob.       MEDICATIONS  (STANDING):  aspirin  chewable 81 milliGRAM(s) Oral daily  atorvastatin 40 milliGRAM(s) Oral at bedtime  ceFAZolin  Injectable. 1000 milliGRAM(s) IV Push every 12 hours  dextrose 40% Gel 15 Gram(s) Oral once  dextrose 5%. 1000 milliLiter(s) (50 mL/Hr) IV Continuous <Continuous>  dextrose 5%. 1000 milliLiter(s) (100 mL/Hr) IV Continuous <Continuous>  dextrose 50% Injectable 25 Gram(s) IV Push once  dextrose 50% Injectable 12.5 Gram(s) IV Push once  dextrose 50% Injectable 25 Gram(s) IV Push once  folic acid 1 milliGRAM(s) Oral at bedtime  gabapentin 300 milliGRAM(s) Oral three times a day  glucagon  Injectable 1 milliGRAM(s) IntraMuscular once  heparin   Injectable 5000 Unit(s) SubCutaneous every 12 hours  insulin glargine Injectable (LANTUS) 5 Unit(s) SubCutaneous at bedtime  insulin lispro (ADMELOG) corrective regimen sliding scale   SubCutaneous three times a day before meals  metoprolol tartrate 12.5 milliGRAM(s) Oral two times a day  PARoxetine 20 milliGRAM(s) Oral daily  sodium bicarbonate  Infusion 0.051 mEq/kG/Hr (50 mL/Hr) IV Continuous <Continuous>  sodium chloride 0.45%. 1000 milliLiter(s) (75 mL/Hr) IV Continuous <Continuous>  tiotropium 18 MICROgram(s) Capsule 1 Capsule(s) Inhalation daily    MEDICATIONS  (PRN):  acetaminophen   Tablet .. 650 milliGRAM(s) Oral every 6 hours PRN Mild Pain (1 - 3)  ALBUTerol    90 MICROgram(s) HFA Inhaler 1 Puff(s) Inhalation every 6 hours PRN Shortness of Breath  morphine  - Injectable 5 milliGRAM(s) IV Push every 3 hours PRN Severe Pain (7 - 10)        T(C): , Max: 37.1 (05-09-21 @ 21:22)  T(F): , Max: 98.7 (05-09-21 @ 21:22)  HR: 82 (05-10-21 @ 15:00)  BP: 128/46 (05-10-21 @ 15:00)  BP(mean): 66 (05-10-21 @ 15:00)  RR: 19 (05-10-21 @ 15:00)  SpO2: 95% (05-10-21 @ 12:00)  Wt(kg): --    05-09 @ 07:01  -  05-10 @ 07:00  --------------------------------------------------------  IN: 950 mL / OUT: 4550 mL / NET: -3600 mL    05-10 @ 07:01  -  05-10 @ 17:54  --------------------------------------------------------  IN: 437 mL / OUT: 2550 mL / NET: -2113 mL          PHYSICAL EXAM:    Constitutional: NAD,frail  HEENT:  MMM  dist  Cardiovascular: S1 and S2  a 0 x 3  : No Padilla  Skin: No rashes  Access: Not applicable        LABS:                        9.5    9.24  )-----------( 209      ( 09 May 2021 08:34 )             28.8     10 May 2021 06:44    140    |  112    |  83     ----------------------------<  139    4.7     |  19     |  2.45   09 May 2021 08:34    134    |  105    |  91     ----------------------------<  128    4.8     |  16     |  3.60   08 May 2021 16:32    134    |  105    |  88     ----------------------------<  120    5.1     |  17     |  3.72   08 May 2021 06:27    132    |  106    |  82     ----------------------------<  136    5.0     |  13     |  3.53   07 May 2021 14:15    134    |  106    |  74     ----------------------------<  179    4.5     |  17     |  2.85     Ca    8.5        10 May 2021 06:44  Ca    8.2        09 May 2021 08:34  Ca    7.4        08 May 2021 16:32  Ca    7.7        08 May 2021 06:27  Ca    7.8        07 May 2021 14:15  Phos  6.2       08 May 2021 06:27    TPro  5.5    /  Alb  1.7    /  TBili  0.2    /  DBili  x      /  AST  15     /  ALT  <6     /  AlkPhos  99     08 May 2021 06:27          Urine Studies:          RADIOLOGY & ADDITIONAL STUDIES:

## 2021-05-10 NOTE — PROGRESS NOTE ADULT - SUBJECTIVE AND OBJECTIVE BOX
afebrile, VSS  urine output excellent  Cr decreasing (2.45 mg/dl)    Exam about the same    A/P  stable  will plan for muscle flap coverage tomorrow if she continues to improve    MEDICATIONS  (STANDING):  aspirin  chewable 81 milliGRAM(s) Oral daily  atorvastatin 40 milliGRAM(s) Oral at bedtime  ceFAZolin  Injectable. 1000 milliGRAM(s) IV Push every 12 hours  dextrose 40% Gel 15 Gram(s) Oral once  dextrose 5%. 1000 milliLiter(s) (50 mL/Hr) IV Continuous <Continuous>  dextrose 5%. 1000 milliLiter(s) (100 mL/Hr) IV Continuous <Continuous>  dextrose 50% Injectable 25 Gram(s) IV Push once  dextrose 50% Injectable 12.5 Gram(s) IV Push once  dextrose 50% Injectable 25 Gram(s) IV Push once  folic acid 1 milliGRAM(s) Oral at bedtime  gabapentin 300 milliGRAM(s) Oral three times a day  glucagon  Injectable 1 milliGRAM(s) IntraMuscular once  heparin   Injectable 5000 Unit(s) SubCutaneous every 12 hours  insulin glargine Injectable (LANTUS) 5 Unit(s) SubCutaneous at bedtime  insulin lispro (ADMELOG) corrective regimen sliding scale   SubCutaneous three times a day before meals  metoprolol tartrate 12.5 milliGRAM(s) Oral two times a day  PARoxetine 20 milliGRAM(s) Oral daily  sodium bicarbonate  Infusion 0.051 mEq/kG/Hr (50 mL/Hr) IV Continuous <Continuous>  sodium chloride 0.45%. 1000 milliLiter(s) (75 mL/Hr) IV Continuous <Continuous>  tiotropium 18 MICROgram(s) Capsule 1 Capsule(s) Inhalation daily    MEDICATIONS  (PRN):  acetaminophen   Tablet .. 650 milliGRAM(s) Oral every 6 hours PRN Mild Pain (1 - 3)  ALBUTerol    90 MICROgram(s) HFA Inhaler 1 Puff(s) Inhalation every 6 hours PRN Shortness of Breath  morphine  - Injectable 5 milliGRAM(s) IV Push every 3 hours PRN Severe Pain (7 - 10)      Allergies    cephalosporins (Other)  penicillins (Urticaria; Pruritus)    Intolerances        Flatus: [ ] YES [ ] NO             Bowel Movement: [ ] YES [ ] NO  Pain (0-10):            Pain Control Adequate: [ ] YES [ ] NO  Nausea: [ ] YES [ ] NO            Vomiting: [ ] YES [ ] NO  Diarrhea: [ ] YES [ ] NO         Constipation: [ ] YES [ ] NO     Chest Pain: [ ] YES [ ] NO    SOB:  [ ] YES [ ] NO    Vital Signs Last 24 Hrs  T(C): 37 (10 May 2021 09:14), Max: 37.1 (09 May 2021 21:22)  T(F): 98.6 (10 May 2021 09:14), Max: 98.7 (09 May 2021 21:22)  HR: 75 (10 May 2021 12:00) (70 - 94)  BP: 128/44 (10 May 2021 12:00) (100/84 - 144/51)  BP(mean): 66 (10 May 2021 12:00) (55 - 90)  RR: 16 (10 May 2021 12:00) (16 - 71)  SpO2: 95% (10 May 2021 12:00) (93% - 97%)    I&O's Summary    09 May 2021 07:01  -  10 May 2021 07:00  --------------------------------------------------------  IN: 950 mL / OUT: 4550 mL / NET: -3600 mL    10 May 2021 07:01  -  10 May 2021 12:42  --------------------------------------------------------  IN: 0 mL / OUT: 1300 mL / NET: -1300 mL        Physical Exam:  General: NAD, resting comfortably  Pulmonary: normal resp effort, CTA-B  Cardiovascular: NSR  Abdominal: soft, NT/ND  Extremities: WWP, normal strength  Neuro: A/O x 3, CNs II-XII grossly intact, normal motor/sensation, no focal deficits  Pulses:   Right:                                                                          Left:  FEM [ ]2+ [ ]1+ [ ]doppler                                             FEM [ ]2+ [ ]1+ [ ]doppler    POP [ ]2+ [ ]1+ [ ]doppler                                             POP [ ]2+ [ ]1+ [ ]doppler    DP [ ]2+ [ ]1+ [ ]doppler                                                DP [ ]2+ [ ]1+ [ ]doppler  PT[ ]2+ [ ]1+ [ ]doppler                                                  PT [ ]2+ [ ]1+ [ ]doppler    LABS:                        9.5    9.24  )-----------( 209      ( 09 May 2021 08:34 )             28.8     05-10    140  |  112<H>  |  83<H>  ----------------------------<  139<H>  4.7   |  19<L>  |  2.45<H>    Ca    8.5      10 May 2021 06:44            CAPILLARY BLOOD GLUCOSE      POCT Blood Glucose.: 145 mg/dL (10 May 2021 12:29)  POCT Blood Glucose.: 163 mg/dL (09 May 2021 23:03)  POCT Blood Glucose.: 133 mg/dL (09 May 2021 17:00)      RADIOLOGY & ADDITIONAL TESTS:

## 2021-05-11 ENCOUNTER — RESULT REVIEW (OUTPATIENT)
Age: 86
End: 2021-05-11

## 2021-05-11 LAB
ANION GAP SERPL CALC-SCNC: 5 MMOL/L — SIGNIFICANT CHANGE UP (ref 5–17)
ANION GAP SERPL CALC-SCNC: 7 MMOL/L — SIGNIFICANT CHANGE UP (ref 5–17)
BUN SERPL-MCNC: 56 MG/DL — HIGH (ref 7–23)
BUN SERPL-MCNC: 65 MG/DL — HIGH (ref 7–23)
CALCIUM SERPL-MCNC: 9 MG/DL — SIGNIFICANT CHANGE UP (ref 8.5–10.1)
CALCIUM SERPL-MCNC: 9 MG/DL — SIGNIFICANT CHANGE UP (ref 8.5–10.1)
CHLORIDE SERPL-SCNC: 112 MMOL/L — HIGH (ref 96–108)
CHLORIDE SERPL-SCNC: 113 MMOL/L — HIGH (ref 96–108)
CO2 SERPL-SCNC: 23 MMOL/L — SIGNIFICANT CHANGE UP (ref 22–31)
CO2 SERPL-SCNC: 24 MMOL/L — SIGNIFICANT CHANGE UP (ref 22–31)
CREAT SERPL-MCNC: 1.61 MG/DL — HIGH (ref 0.5–1.3)
CREAT SERPL-MCNC: 1.68 MG/DL — HIGH (ref 0.5–1.3)
GLUCOSE SERPL-MCNC: 141 MG/DL — HIGH (ref 70–99)
GLUCOSE SERPL-MCNC: 152 MG/DL — HIGH (ref 70–99)
HCT VFR BLD CALC: 25.7 % — LOW (ref 34.5–45)
HCT VFR BLD CALC: 36 % — SIGNIFICANT CHANGE UP (ref 34.5–45)
HGB BLD-MCNC: 11.3 G/DL — LOW (ref 11.5–15.5)
HGB BLD-MCNC: 8.3 G/DL — LOW (ref 11.5–15.5)
MAGNESIUM SERPL-MCNC: 2.2 MG/DL — SIGNIFICANT CHANGE UP (ref 1.6–2.6)
MCHC RBC-ENTMCNC: 29.4 PG — SIGNIFICANT CHANGE UP (ref 27–34)
MCHC RBC-ENTMCNC: 29.6 PG — SIGNIFICANT CHANGE UP (ref 27–34)
MCHC RBC-ENTMCNC: 31.4 GM/DL — LOW (ref 32–36)
MCHC RBC-ENTMCNC: 32.3 GM/DL — SIGNIFICANT CHANGE UP (ref 32–36)
MCV RBC AUTO: 91.8 FL — SIGNIFICANT CHANGE UP (ref 80–100)
MCV RBC AUTO: 93.8 FL — SIGNIFICANT CHANGE UP (ref 80–100)
PHOSPHATE SERPL-MCNC: 4.8 MG/DL — HIGH (ref 2.5–4.5)
PLATELET # BLD AUTO: 181 K/UL — SIGNIFICANT CHANGE UP (ref 150–400)
PLATELET # BLD AUTO: 184 K/UL — SIGNIFICANT CHANGE UP (ref 150–400)
POTASSIUM SERPL-MCNC: 4.6 MMOL/L — SIGNIFICANT CHANGE UP (ref 3.5–5.3)
POTASSIUM SERPL-MCNC: 5.4 MMOL/L — HIGH (ref 3.5–5.3)
POTASSIUM SERPL-SCNC: 4.6 MMOL/L — SIGNIFICANT CHANGE UP (ref 3.5–5.3)
POTASSIUM SERPL-SCNC: 5.4 MMOL/L — HIGH (ref 3.5–5.3)
RBC # BLD: 2.8 M/UL — LOW (ref 3.8–5.2)
RBC # BLD: 3.84 M/UL — SIGNIFICANT CHANGE UP (ref 3.8–5.2)
RBC # FLD: 16 % — HIGH (ref 10.3–14.5)
RBC # FLD: 16.4 % — HIGH (ref 10.3–14.5)
SARS-COV-2 RNA SPEC QL NAA+PROBE: SIGNIFICANT CHANGE UP
SODIUM SERPL-SCNC: 142 MMOL/L — SIGNIFICANT CHANGE UP (ref 135–145)
SODIUM SERPL-SCNC: 142 MMOL/L — SIGNIFICANT CHANGE UP (ref 135–145)
WBC # BLD: 4.09 K/UL — SIGNIFICANT CHANGE UP (ref 3.8–10.5)
WBC # BLD: 4.09 K/UL — SIGNIFICANT CHANGE UP (ref 3.8–10.5)
WBC # FLD AUTO: 4.09 K/UL — SIGNIFICANT CHANGE UP (ref 3.8–10.5)
WBC # FLD AUTO: 4.09 K/UL — SIGNIFICANT CHANGE UP (ref 3.8–10.5)

## 2021-05-11 PROCEDURE — 88304 TISSUE EXAM BY PATHOLOGIST: CPT | Mod: 26

## 2021-05-11 PROCEDURE — 93306 TTE W/DOPPLER COMPLETE: CPT | Mod: 26

## 2021-05-11 PROCEDURE — 99233 SBSQ HOSP IP/OBS HIGH 50: CPT

## 2021-05-11 RX ORDER — METOPROLOL TARTRATE 50 MG
25 TABLET ORAL
Refills: 0 | Status: DISCONTINUED | OUTPATIENT
Start: 2021-05-11 | End: 2021-05-13

## 2021-05-11 RX ORDER — SODIUM CHLORIDE 9 MG/ML
1000 INJECTION, SOLUTION INTRAVENOUS
Refills: 0 | Status: DISCONTINUED | OUTPATIENT
Start: 2021-05-11 | End: 2021-05-12

## 2021-05-11 RX ORDER — FENTANYL CITRATE 50 UG/ML
50 INJECTION INTRAVENOUS
Refills: 0 | Status: DISCONTINUED | OUTPATIENT
Start: 2021-05-11 | End: 2021-05-11

## 2021-05-11 RX ORDER — CEFAZOLIN SODIUM 1 G
2000 VIAL (EA) INJECTION EVERY 12 HOURS
Refills: 0 | Status: DISCONTINUED | OUTPATIENT
Start: 2021-05-11 | End: 2021-05-13

## 2021-05-11 RX ORDER — CEFAZOLIN SODIUM 1 G
2000 VIAL (EA) INJECTION EVERY 12 HOURS
Refills: 0 | Status: DISCONTINUED | OUTPATIENT
Start: 2021-05-11 | End: 2021-05-11

## 2021-05-11 RX ORDER — ONDANSETRON 8 MG/1
4 TABLET, FILM COATED ORAL ONCE
Refills: 0 | Status: DISCONTINUED | OUTPATIENT
Start: 2021-05-11 | End: 2021-05-11

## 2021-05-11 RX ORDER — OXYCODONE HYDROCHLORIDE 5 MG/1
5 TABLET ORAL ONCE
Refills: 0 | Status: DISCONTINUED | OUTPATIENT
Start: 2021-05-11 | End: 2021-05-11

## 2021-05-11 RX ADMIN — FENTANYL CITRATE 50 MICROGRAM(S): 50 INJECTION INTRAVENOUS at 17:42

## 2021-05-11 RX ADMIN — Medication 1: at 06:46

## 2021-05-11 RX ADMIN — GABAPENTIN 300 MILLIGRAM(S): 400 CAPSULE ORAL at 21:45

## 2021-05-11 RX ADMIN — MORPHINE SULFATE 5 MILLIGRAM(S): 50 CAPSULE, EXTENDED RELEASE ORAL at 23:00

## 2021-05-11 RX ADMIN — ATORVASTATIN CALCIUM 40 MILLIGRAM(S): 80 TABLET, FILM COATED ORAL at 21:45

## 2021-05-11 RX ADMIN — HEPARIN SODIUM 5000 UNIT(S): 5000 INJECTION INTRAVENOUS; SUBCUTANEOUS at 21:43

## 2021-05-11 RX ADMIN — MORPHINE SULFATE 5 MILLIGRAM(S): 50 CAPSULE, EXTENDED RELEASE ORAL at 19:37

## 2021-05-11 RX ADMIN — Medication 12.5 MILLIGRAM(S): at 10:22

## 2021-05-11 RX ADMIN — Medication 20 MILLIGRAM(S): at 10:22

## 2021-05-11 RX ADMIN — Medication 1 MILLIGRAM(S): at 21:45

## 2021-05-11 RX ADMIN — MORPHINE SULFATE 5 MILLIGRAM(S): 50 CAPSULE, EXTENDED RELEASE ORAL at 20:00

## 2021-05-11 RX ADMIN — FENTANYL CITRATE 50 MICROGRAM(S): 50 INJECTION INTRAVENOUS at 17:33

## 2021-05-11 RX ADMIN — MORPHINE SULFATE 5 MILLIGRAM(S): 50 CAPSULE, EXTENDED RELEASE ORAL at 22:38

## 2021-05-11 RX ADMIN — SODIUM CHLORIDE 75 MILLILITER(S): 9 INJECTION, SOLUTION INTRAVENOUS at 17:35

## 2021-05-11 RX ADMIN — INSULIN GLARGINE 5 UNIT(S): 100 INJECTION, SOLUTION SUBCUTANEOUS at 21:47

## 2021-05-11 RX ADMIN — Medication 25 MILLIGRAM(S): at 21:48

## 2021-05-11 RX ADMIN — Medication 100 MILLIGRAM(S): at 10:22

## 2021-05-11 RX ADMIN — TIOTROPIUM BROMIDE 1 CAPSULE(S): 18 CAPSULE ORAL; RESPIRATORY (INHALATION) at 09:10

## 2021-05-11 RX ADMIN — SODIUM CHLORIDE 75 MILLILITER(S): 9 INJECTION, SOLUTION INTRAVENOUS at 04:05

## 2021-05-11 RX ADMIN — Medication 100 MILLIGRAM(S): at 21:44

## 2021-05-11 NOTE — PROGRESS NOTE ADULT - ASSESSMENT
87 yo Female whom presented to the hospital with COPD, arthritis, CAD, DMII, depression, HTN, PVD, PAD, s/p left fem pop bypass on 03/24/21, +lower ext DVT on eliquis, +chronic left heel ulcer, RA, anemia, presented to the ED at [facility] on 5/3 with worsening dyspnea, renal evaluation of ORLY.     ORLY - nonoliguric    -Renal function stabilizing, post contrast diuresis   -Stop IVF when post op, NPO lifted   -No renal issue with planned OR, avoid contrast with likely recent PAT    Cellulitis  -Abx renally dosed  -NSAID avoidance  -ID follow up    HTN  -Norvasc/anti-htn as needed    d/c with staff bedside

## 2021-05-11 NOTE — PROGRESS NOTE ADULT - SUBJECTIVE AND OBJECTIVE BOX
86 y.o. Female with PMH of COPD, arthritis, CAD, DMII, depression, HTN, PVD, PAD, s/p left fem pop bypass on 03/24/21, +lower ext DVT on eliquis, +chronic left heel ulcer, RA, anemia, presented to the ED at [facility] on 5/3 with worsening dyspnea, wheezing that started that night. Pt received 2 Duoneb treatments at home, with only slight improvement, so EMS called. At home patient was hypoxic to 87% on room air, was placed on 100% NRB mask. In the ED at [facility] [facility], pt was placed on 2 LPM via NC, received a dose of Lasix 20 mg IV x 1. Initial trop noted to be 0.17.  BNP >23813  On her first night of admission at [facility] 5/3 patient developed fever which has persisted throughout admission. ID was consulted, patient was started on vancomycin and meropenem. Overnight on 5/4-5/5 patient had extensive purulent drainage from left medial thigh surgical site. Plastic surgery was consulted, advised transfer to [facility] for evaluation by Dr Ventura who did fem pop bypass 3/2021.      Was taking to OR : purulent fluid in L thigh popliteal fossa above knee; necrotic infected fibrinous material around graft and on tissue in popliteal fossa  oliguric suspect PAT;  from anuric she is in polyuric phase today    INTERVAL HPI: comfortable in the bed, no pain, large volume UO  Other ROS reviewed and neg     Vital Signs Last 24 Hrs  T(C): 36.6 (11 May 2021 09:12), Max: 37.4 (10 May 2021 21:30)  T(F): 97.8 (11 May 2021 09:12), Max: 99.3 (10 May 2021 21:30)  HR: 78 (11 May 2021 07:00) (73 - 99)  BP: 126/49 (11 May 2021 07:00) (126/49 - 151/55)  BP(mean): 69 (11 May 2021 07:00) (66 - 79)  RR: 15 (11 May 2021 07:00) (14 - 22)  SpO2: 98% (11 May 2021 07:00) (92% - 98%)  I&O's Detail    10 May 2021 07:01  -  11 May 2021 07:00  --------------------------------------------------------  IN:    sodium chloride 0.45%: 450 mL    sodium chloride 0.45%: 540 mL  Total IN: 990 mL    OUT:    Indwelling Catheter - Urethral (mL): 5325 mL  Total OUT: 5325 mL    Total NET: -4335 mL                      8.3    4.09  )-----------( 184      ( 11 May 2021 05:52 )             25.7     11 May 2021 08:19    142    |  112    |  65     ----------------------------<  141    4.6     |  23     |  1.68     Ca    9.0        11 May 2021 08:19    CAPILLARY BLOOD GLUCOSE    POCT Blood Glucose.: 153 mg/dL (11 May 2021 06:44)  POCT Blood Glucose.: 197 mg/dL (10 May 2021 22:16)  POCT Blood Glucose.: 183 mg/dL (10 May 2021 16:17)  POCT Blood Glucose.: 145 mg/dL (10 May 2021 12:29)      MEDICATIONS  (STANDING):  aspirin  chewable 81 milliGRAM(s) Oral daily  atorvastatin 40 milliGRAM(s) Oral at bedtime  ceFAZolin  Injectable. 2000 milliGRAM(s) IV Push every 12 hours  dextrose 40% Gel 15 Gram(s) Oral once  dextrose 5%. 1000 milliLiter(s) (50 mL/Hr) IV Continuous <Continuous>  dextrose 5%. 1000 milliLiter(s) (100 mL/Hr) IV Continuous <Continuous>  dextrose 50% Injectable 25 Gram(s) IV Push once  dextrose 50% Injectable 12.5 Gram(s) IV Push once  dextrose 50% Injectable 25 Gram(s) IV Push once  folic acid 1 milliGRAM(s) Oral at bedtime  gabapentin 300 milliGRAM(s) Oral three times a day  glucagon  Injectable 1 milliGRAM(s) IntraMuscular once  heparin   Injectable 5000 Unit(s) SubCutaneous every 12 hours  insulin glargine Injectable (LANTUS) 5 Unit(s) SubCutaneous at bedtime  insulin lispro (ADMELOG) corrective regimen sliding scale   SubCutaneous three times a day before meals  metoprolol tartrate 12.5 milliGRAM(s) Oral two times a day  PARoxetine 20 milliGRAM(s) Oral daily  sodium bicarbonate  Infusion 0.051 mEq/kG/Hr (50 mL/Hr) IV Continuous <Continuous>  sodium chloride 0.45%. 1000 milliLiter(s) (75 mL/Hr) IV Continuous <Continuous>  tiotropium 18 MICROgram(s) Capsule 1 Capsule(s) Inhalation daily    MEDICATIONS  (PRN):  acetaminophen   Tablet .. 650 milliGRAM(s) Oral every 6 hours PRN Mild Pain (1 - 3)  ALBUTerol    90 MICROgram(s) HFA Inhaler 1 Puff(s) Inhalation every 6 hours PRN Shortness of Breath  morphine  - Injectable 5 milliGRAM(s) IV Push every 3 hours PRN Severe Pain (7 - 10)    RADIOLOGY: personally visualized    PHYSICAL EXAM:    Constitutional: elderly female in no acute distress sitting up in bed now.   Head: Head is normocephalic and atraumatic.    Neck: No JVD.   Cardiovascular: Regular rate and rhythm without S3, S4. No murmurs or rubs are appreciated.    Respiratory: Decreased BS at bases with + crackles  Abdomen: Soft, nontender, nondistended with positive bowel sounds.    Extremity: No tenderness, no pitting edema, Left thigh area in dressing   Neurologic: The patient is alert and oriented.  Non focal  Skin: Left thigh dressing       TTE Summary:   1. Left ventricular ejection fraction, by visual estimation, is 35 to 40%.   2. Moderately toseverely decreased global left ventricular systolic function.   3. Multiple left ventricular regional wall motion abnormalities exist. See wall motion findings.   4. Mildly increased LV wall thickness.   5. Normal left ventricular internal cavity size.   6. Spectral Doppler shows impaired relaxation pattern of left ventricular myocardial filling (Grade I diastolic dysfunction).   7. There is mild concentric left ventricular hypertrophy.   8. Mild mitral annular calcification.   9. Mild mitral valve regurgitation.  10. Thickening and calcification of the anterior and posterior mitral valve leaflets.  11. Mild tricuspid regurgitation.  12. Mild aortic regurgitation.  13. Sclerotic aortic valve with normal opening.  14. Estimated pulmonary artery systolic pressure is 36.6 mmHg assuming a right atrial pressure of 10 mmHg, which is consistent with borderline pulmonary hypertension.

## 2021-05-11 NOTE — BRIEF OPERATIVE NOTE - NSICDXBRIEFPROCEDURE_GEN_ALL_CORE_FT
PROCEDURES:  Exploration, infected graft, lower extremity 05-May-2021 18:20:11 exploration of infected incision L distal medial thigh, wash out with antibiotic solution, debridement of incision; cultures Soren Ventura  
PROCEDURES:  Exploration, infected graft, lower extremity 05-May-2021 18:20:11 exploration of infected incision L distal medial thigh, wash out with antibiotic solution, debridement of incision; cultures Soren Ventura  Debridement of dermis and subcutaneous tissue, initial 20 sq cm 11-May-2021 16:53:56  Soren Ventura

## 2021-05-11 NOTE — PROGRESS NOTE ADULT - ASSESSMENT
* Infected graft POD#5 with MSSA bacteremia on cefazolin   - to return to OR today     * New acute on Chronic systolic HF in the settings of oliguric ORLY/APT - now resolved, ORLY in polyuric phase now and nearly resolved   - monitor I&Os and renal fx, off bicarb drip now    *H/o depression / HTN / RA - c/w home meds and f/u outpatient for further management    * Right LE DVT Feb 2021 with superficial left basilic vein non-occlusive clot off Eliquis   - to resume AC after sx if ok with vascular   - doppler leg negative - resume AC when OK with Vascular    * DMII - HISS and resumed lantus at low dose - will titrate up as needed    * Incidental finding of 2.5 cm right renal lesion amd 1.5 cm left breat - daughter MD aware, outpt follow up    Daughter MD updated [phone] cell

## 2021-05-11 NOTE — PROGRESS NOTE ADULT - SUBJECTIVE AND OBJECTIVE BOX
Date of service: 05-11-21 @ 10:52    Lying in bed in NAD  Weak looking  Has low grade fever  Has low la leg pain    ROS: denies dizziness, no HA, no SOB or cough, no abdominal pain, no diarrhea or constipation; no dysuria    MEDICATIONS  (STANDING):  aspirin  chewable 81 milliGRAM(s) Oral daily  atorvastatin 40 milliGRAM(s) Oral at bedtime  ceFAZolin   IVPB 2000 milliGRAM(s) IV Intermittent every 12 hours  dextrose 40% Gel 15 Gram(s) Oral once  dextrose 5%. 1000 milliLiter(s) (50 mL/Hr) IV Continuous <Continuous>  dextrose 5%. 1000 milliLiter(s) (100 mL/Hr) IV Continuous <Continuous>  dextrose 50% Injectable 25 Gram(s) IV Push once  dextrose 50% Injectable 12.5 Gram(s) IV Push once  dextrose 50% Injectable 25 Gram(s) IV Push once  folic acid 1 milliGRAM(s) Oral at bedtime  gabapentin 300 milliGRAM(s) Oral three times a day  glucagon  Injectable 1 milliGRAM(s) IntraMuscular once  heparin   Injectable 5000 Unit(s) SubCutaneous every 12 hours  insulin glargine Injectable (LANTUS) 5 Unit(s) SubCutaneous at bedtime  insulin lispro (ADMELOG) corrective regimen sliding scale   SubCutaneous three times a day before meals  metoprolol tartrate 12.5 milliGRAM(s) Oral two times a day  PARoxetine 20 milliGRAM(s) Oral daily  sodium bicarbonate  Infusion 0.051 mEq/kG/Hr (50 mL/Hr) IV Continuous <Continuous>  sodium chloride 0.45%. 1000 milliLiter(s) (75 mL/Hr) IV Continuous <Continuous>  tiotropium 18 MICROgram(s) Capsule 1 Capsule(s) Inhalation daily    Vital Signs Last 24 Hrs  T(C): 36.6 (11 May 2021 09:12), Max: 37.4 (10 May 2021 21:30)  T(F): 97.8 (11 May 2021 09:12), Max: 99.3 (10 May 2021 21:30)  HR: 88 (11 May 2021 10:00) (73 - 99)  BP: 144/58 (11 May 2021 10:00) (126/49 - 156/47)  BP(mean): 78 (11 May 2021 10:00) (66 - 79)  RR: 19 (11 May 2021 10:00) (14 - 22)  SpO2: 100% (11 May 2021 10:00) (92% - 100%)     Physical exam:    Constitutional:  No acute distress  HEENT: NC/AT, EOMI, PERRLA, conjunctivae clear  Neck: supple; thyroid not palpable  Back: no tenderness  Respiratory: respiratory effort normal; crackles at bases  Cardiovascular: S1S2 regular, no murmurs  Abdomen: soft, not tender, not distended, positive BS  Genitourinary: no suprapubic tenderness  Lymphatic: no LN palpable  Musculoskeletal: no muscle tenderness, no joint swelling or tenderness  Extremities: 1+ pedal edema  Left thigh open wound s/p I and D; packed; dressing  Neurological/ Psychiatric: AxOx3, moving all extremities  Skin: no rashes; no palpable lesions    Labs: reviewed                        8.3    4.09  )-----------( 184      ( 11 May 2021 05:52 )             25.7     05-11    142  |  112<H>  |  65<H>  ----------------------------<  141<H>  4.6   |  23  |  1.68<H>    Ca    9.0      11 May 2021 08:19                        10.1   7.90  )-----------( 199      ( 06 May 2021 05:49 )             31.3     05-06    134<L>  |  106  |  51<H>  ----------------------------<  199<H>  4.7   |  20<L>  |  1.39<H>    Ca    8.0<L>      06 May 2021 05:49    TPro  5.9<L>  /  Alb  1.9<L>  /  TBili  0.6  /  DBili  x   /  AST  5<L>  /  ALT  <6<L>  /  AlkPhos  71  05-05     LIVER FUNCTIONS - ( 05 May 2021 11:59 )  Alb: 1.9 g/dL / Pro: 5.9 gm/dL / ALK PHOS: 71 U/L / ALT: <6 U/L / AST: 5 U/L / GGT: x             Culture - Fungal, Other (collected 05 May 2021 16:34)  Source: .Other None  Preliminary Report (06 May 2021 09:04):    Testing in progress    Culture - Surgical Swab (collected 05 May 2021 16:34)  Source: .Surgical Swab None  Preliminary Report (06 May 2021 20:09):    Moderate Staphylococcus aureus  Organism: Staphylococcus aureus (07 May 2021 19:05)  Organism: Staphylococcus aureus (07 May 2021 19:05)      -  Ampicillin/Sulbactam: S <=8/4      -  Cefazolin: S <=4      -  Clindamycin: S <=0.25      -  Erythromycin: S <=0.25      -  Gentamicin: S <=1 Should not be used as monotherapy      -  Oxacillin: S <=0.25      -  RIF- Rifampin: S <=1 Should not be used as monotherapy      -  Tetra/Doxy: S <=1      -  Trimethoprim/Sulfamethoxazole: S <=0.5/9.5      -  Vancomycin: S 1      Method Type: SIMEON    Culture - Fungal, Other (collected 05 May 2021 16:34)  Source: .Other None  Preliminary Report (06 May 2021 09:04):    Testing in progress    Culture - Surgical Swab (collected 05 May 2021 16:34)  Source: .Surgical Swab None  Preliminary Report (06 May 2021 20:04):    Moderate Staphylococcus aureus  Organism: Staphylococcus aureus (07 May 2021 19:06)  Organism: Staphylococcus aureus (07 May 2021 19:06)      -  Ampicillin/Sulbactam: S <=8/4      -  Cefazolin: S <=4      -  Clindamycin: S <=0.25      -  Erythromycin: S <=0.25      -  Gentamicin: S <=1 Should not be used as monotherapy      -  Oxacillin: S <=0.25      -  RIF- Rifampin: S <=1 Should not be used as monotherapy      -  Tetra/Doxy: S <=1      -  Trimethoprim/Sulfamethoxazole: S <=0.5/9.5      -  Vancomycin: S 2      Method Type: SIMEON    Culture - Other (collected 04 May 2021 23:00)  Source: .Other wound - L thigh  Final Report (07 May 2021 07:28):    Numerous Staphylococcus aureus  Organism: Staphylococcus aureus (07 May 2021 07:28)  Organism: Staphylococcus aureus (07 May 2021 07:28)      -  Ampicillin/Sulbactam: S <=8/4      -  Cefazolin: S <=4      -  Clindamycin: S <=0.25      -  Erythromycin: S <=0.25      -  Gentamicin: S <=1 Should not be used as monotherapy      -  Oxacillin: S <=0.25      -  RIF- Rifampin: S <=1 Should not be used as monotherapy      -  Tetra/Doxy: S <=1      -  Trimethoprim/Sulfamethoxazole: S <=0.5/9.5      -  Vancomycin: S 1      Method Type: SIMEON    Culture - Blood (collected 03 May 2021 22:10)  Source: .Blood Blood-Peripheral  Final Report (09 May 2021 03:00):    No Growth Final    Culture - Blood (collected 03 May 2021 22:10)  Source: .Blood Blood-Peripheral  Gram Stain (07 May 2021 01:14):    Growth in aerobic bottle: Gram Positive Cocci in Clusters  Final Report (08 May 2021 17:32):    Growth in aerobic bottle: Staphylococcus aureus  Organism: Blood Culture PCR  Staphylococcus aureus (08 May 2021 17:32)  Organism: Staphylococcus aureus (08 May 2021 17:32)      -  Ampicillin/Sulbactam: S <=8/4      -  Cefazolin: S <=4      -  Clindamycin: S <=0.25      -  Erythromycin: S <=0.25      -  Gentamicin: S <=1 Should not be used as monotherapy      -  Oxacillin: S <=0.25      -  RIF- Rifampin: S <=1 Should not be used as monotherapy      -  Tetra/Doxy: S <=1      -  Trimethoprim/Sulfamethoxazole: S <=0.5/9.5      -  Vancomycin: S 2      Method Type: SIMEON  Organism: Blood Culture PCR (08 May 2021 17:32)      -  Staphylococcus aureus: Detec Any isolate of Staphylococcus aureus from a blood culture is NOT considered a contaminant.      Method Type: PCR    COVID-19 PCR: NotDetec (05-03-21 @ 23:17)    Radiology: all available radiological tests reviewed    Advanced directives addressed: full resuscitation

## 2021-05-11 NOTE — BRIEF OPERATIVE NOTE - NSICDXBRIEFPREOP_GEN_ALL_CORE_FT
PRE-OP DIAGNOSIS:  Complicated wound infection 05-May-2021 18:21:25 L thigh infection; graft infection Soren Ventura  
PRE-OP DIAGNOSIS:  Complicated wound infection 05-May-2021 18:21:25 L thigh infection; graft infection Soren Ventura  Complicated wound infection 11-May-2021 16:54:45  Soren Ventura

## 2021-05-11 NOTE — PROGRESS NOTE ADULT - SUBJECTIVE AND OBJECTIVE BOX
afebrile, VSS    urine output remains prodigious  Cr pending    exam unchanged    for OR today with Plastics    MEDICATIONS  (STANDING):  aspirin  chewable 81 milliGRAM(s) Oral daily  atorvastatin 40 milliGRAM(s) Oral at bedtime  ceFAZolin  Injectable. 1000 milliGRAM(s) IV Push every 12 hours  dextrose 40% Gel 15 Gram(s) Oral once  dextrose 5%. 1000 milliLiter(s) (50 mL/Hr) IV Continuous <Continuous>  dextrose 5%. 1000 milliLiter(s) (100 mL/Hr) IV Continuous <Continuous>  dextrose 50% Injectable 25 Gram(s) IV Push once  dextrose 50% Injectable 12.5 Gram(s) IV Push once  dextrose 50% Injectable 25 Gram(s) IV Push once  folic acid 1 milliGRAM(s) Oral at bedtime  gabapentin 300 milliGRAM(s) Oral three times a day  glucagon  Injectable 1 milliGRAM(s) IntraMuscular once  heparin   Injectable 5000 Unit(s) SubCutaneous every 12 hours  insulin glargine Injectable (LANTUS) 5 Unit(s) SubCutaneous at bedtime  insulin lispro (ADMELOG) corrective regimen sliding scale   SubCutaneous three times a day before meals  metoprolol tartrate 12.5 milliGRAM(s) Oral two times a day  PARoxetine 20 milliGRAM(s) Oral daily  sodium bicarbonate  Infusion 0.051 mEq/kG/Hr (50 mL/Hr) IV Continuous <Continuous>  sodium chloride 0.45%. 1000 milliLiter(s) (75 mL/Hr) IV Continuous <Continuous>  tiotropium 18 MICROgram(s) Capsule 1 Capsule(s) Inhalation daily    MEDICATIONS  (PRN):  acetaminophen   Tablet .. 650 milliGRAM(s) Oral every 6 hours PRN Mild Pain (1 - 3)  ALBUTerol    90 MICROgram(s) HFA Inhaler 1 Puff(s) Inhalation every 6 hours PRN Shortness of Breath  morphine  - Injectable 5 milliGRAM(s) IV Push every 3 hours PRN Severe Pain (7 - 10)      Allergies    cephalosporins (Other)  penicillins (Urticaria; Pruritus)    Intolerances        Flatus: [ ] YES [ ] NO             Bowel Movement: [ ] YES [ ] NO  Pain (0-10):            Pain Control Adequate: [ ] YES [ ] NO  Nausea: [ ] YES [ ] NO            Vomiting: [ ] YES [ ] NO  Diarrhea: [ ] YES [ ] NO         Constipation: [ ] YES [ ] NO     Chest Pain: [ ] YES [ ] NO    SOB:  [ ] YES [ ] NO    Vital Signs Last 24 Hrs  T(C): 37.4 (10 May 2021 21:30), Max: 37.4 (10 May 2021 21:30)  T(F): 99.3 (10 May 2021 21:30), Max: 99.3 (10 May 2021 21:30)  HR: 77 (11 May 2021 06:00) (73 - 99)  BP: 139/50 (11 May 2021 06:00) (128/44 - 151/55)  BP(mean): 72 (11 May 2021 06:00) (66 - 79)  RR: 15 (11 May 2021 06:00) (14 - 22)  SpO2: 98% (11 May 2021 06:00) (92% - 98%)    I&O's Summary    10 May 2021 07:01  -  11 May 2021 07:00  --------------------------------------------------------  IN: 540 mL / OUT: 3850 mL / NET: -3310 mL        Physical Exam:  General: NAD, resting comfortably  Pulmonary: normal resp effort, CTA-B  Cardiovascular: NSR  Abdominal: soft, NT/ND  Extremities: WWP, normal strength  Neuro: A/O x 3, CNs II-XII grossly intact, normal motor/sensation, no focal deficits  Pulses:   Right:                                                                          Left:  FEM [ ]2+ [ ]1+ [ ]doppler                                             FEM [ ]2+ [ ]1+ [ ]doppler    POP [ ]2+ [ ]1+ [ ]doppler                                             POP [ ]2+ [ ]1+ [ ]doppler    DP [ ]2+ [ ]1+ [ ]doppler                                                DP [ ]2+ [ ]1+ [ ]doppler  PT[ ]2+ [ ]1+ [ ]doppler                                                  PT [ ]2+ [ ]1+ [ ]doppler    LABS:                        8.3    4.09  )-----------( 184      ( 11 May 2021 05:52 )             25.7     05-10    140  |  112<H>  |  83<H>  ----------------------------<  139<H>  4.7   |  19<L>  |  2.45<H>    Ca    8.5      10 May 2021 06:44            CAPILLARY BLOOD GLUCOSE      POCT Blood Glucose.: 153 mg/dL (11 May 2021 06:44)  POCT Blood Glucose.: 197 mg/dL (10 May 2021 22:16)  POCT Blood Glucose.: 183 mg/dL (10 May 2021 16:17)  POCT Blood Glucose.: 145 mg/dL (10 May 2021 12:29)      RADIOLOGY & ADDITIONAL TESTS:

## 2021-05-11 NOTE — PROGRESS NOTE ADULT - SUBJECTIVE AND OBJECTIVE BOX
Patient is a 86y old  Female who presents with a chief complaint of wound.       HPI:  86y Female with PMH of COPD, arthritis, CAD, DMII, depression, HTN, PVD, PAD, s/p left fem pop bypass on 03/24/21, +lower ext DVT on eliquis, +chronic left heel ulcer, RA, anemia, presented to the ED at [place] on 5/3 with worsening dyspnea, wheezing that started that night. Pt received 2 Duoneb treatments at home, with only slight improvement, so EMS called. At home patient was hypoxic to 87% on room air, was placed on 100% NRB mask. In the ED at [place], pt was placed on 2 LPM via NC, received a dose of Lasix 20 mg IV x 1. Initial trop noted to be 0.17.  BNP >41121    Her respiratory symptoms have improved with diuresis.     On her first night of admission at [place] 5/3 patient developed fever which has persisted throughout admission. ID was consulted, patient was started on vancomycin and meropenem. Overnight on 5/4-5/5 patient had extensive purulent drainage from left medial thigh surgical site. Plastic surgery was consulted, advised transfer to [facility] for evaluation by Dr Ventura who did fem pop bypass 3/2021.      In ED at [facility] on 5/5, patient had no complaints.  Denied any CP, SOB, lower extremity pain, numbness or tingling.      5/6-   Pt seen now. She is fully alert and was able to give all the history , son in law at bedside.  Pt states that her SOB is better.    NO CP.    5/7- Pt seen this am. Pt denies any chest pain or SOB.  She was able to lie down flat in bed for a long time without any SOB.  Overnight urine output poor.     5/10- pt seen this am.  Pt denies any symptoms this am. She was happy she was able to make urine.     5/11- pt seen this am.  No symptoms or overnight events.   PAST MEDICAL & SURGICAL HISTORY:  Hypertension    Anemia    Arthritis, rheumatoid    Depression    Type 2 diabetes mellitus  on insulin    PVD (peripheral vascular disease) with claudication    CAD (coronary artery disease)  non-obstructive    Rheumatoid arthritis    fracture ankle right  plate . screws   2000    S/P cataract surgery  2011    Hip fracture requiring operative repair  Right hip 11/14/2020        MEDICATIONS  (STANDING):  aspirin  chewable 81 milliGRAM(s) Oral daily  atorvastatin 40 milliGRAM(s) Oral at bedtime  ceFAZolin   IVPB 2000 milliGRAM(s) IV Intermittent every 12 hours  dextrose 40% Gel 15 Gram(s) Oral once  dextrose 5%. 1000 milliLiter(s) (50 mL/Hr) IV Continuous <Continuous>  dextrose 5%. 1000 milliLiter(s) (100 mL/Hr) IV Continuous <Continuous>  dextrose 50% Injectable 25 Gram(s) IV Push once  dextrose 50% Injectable 12.5 Gram(s) IV Push once  dextrose 50% Injectable 25 Gram(s) IV Push once  folic acid 1 milliGRAM(s) Oral at bedtime  furosemide   Injectable 40 milliGRAM(s) IV Push daily  gabapentin 300 milliGRAM(s) Oral three times a day  glucagon  Injectable 1 milliGRAM(s) IntraMuscular once  heparin   Injectable 5000 Unit(s) SubCutaneous every 12 hours  insulin lispro (ADMELOG) corrective regimen sliding scale   SubCutaneous three times a day before meals  lisinopril 5 milliGRAM(s) Oral daily  metoprolol tartrate 25 milliGRAM(s) Oral two times a day  PARoxetine 20 milliGRAM(s) Oral daily  tiotropium 18 MICROgram(s) Capsule 1 Capsule(s) Inhalation daily    MEDICATIONS  (PRN):  acetaminophen   Tablet .. 650 milliGRAM(s) Oral every 6 hours PRN Mild Pain (1 - 3)  ALBUTerol    90 MICROgram(s) HFA Inhaler 1 Puff(s) Inhalation every 6 hours PRN Shortness of Breath  morphine  - Injectable 5 milliGRAM(s) IV Push every 3 hours PRN Severe Pain (7 - 10)      FAMILY HISTORY:  FH: colon cancer  father    FH: heart attack  father    Family history of atherosclerosis  mother        SOCIAL HISTORY: no recent smoking     REVIEW OF SYSTEMS:  CONSTITUTIONAL:    No fatigue, malaise, lethargy.  No fever or chills.   RESPIRATORY:  No cough.  No wheeze.  No hemoptysis.  no shortness of breath.  CARDIOVASCULAR:  No chest pains.  No palpitations. no shortness of breath, No orthopnea or PND.  GASTROINTESTINAL:  No abdominal pain.  No nausea or vomiting.    GENITOURINARY:    No hematuria.    MUSCULOSKELETAL:  c/o L thigh pain   NEUROLOGICAL:  No tingling or numbness or weakness.  PSYCHIATRIC:  No confusion  SKIN:  No rashes.        ICU Vital Signs Last 24 Hrs  T(C): 36.6 (11 May 2021 09:12), Max: 37.4 (10 May 2021 21:30)  T(F): 97.8 (11 May 2021 09:12), Max: 99.3 (10 May 2021 21:30)  HR: 88 (11 May 2021 10:00) (73 - 99)  BP: 144/58 (11 May 2021 10:00) (126/49 - 156/47)  BP(mean): 78 (11 May 2021 10:00) (66 - 79)  ABP: --  ABP(mean): --  RR: 19 (11 May 2021 10:00) (14 - 22)  SpO2: 100% (11 May 2021 10:00) (92% - 100%)        PHYSICAL EXAM-    Constitutional: elderly female in no acute distress sitting up in bed now.     Head: Head is normocephalic and atraumatic.      Neck: No JVD.     Cardiovascular: Regular rate and rhythm without S3, S4. No murmurs or rubs are appreciated.      Respiratory: B/l faint  rales     Abdomen: Soft, nontender, nondistended with positive bowel sounds.      Extremity: No tenderness. pitting edema  1+ b/l LE, L thigh area in dressing     Neurologic: The patient is alert and oriented.      Skin: L thigh dressing     Psychiatric: The patient appears to be emotionally stable.      INTERPRETATION OF TELEMETRY: sinus bradycardia , PACs     ECG: Sinus rythm , T wave inversion in I, aVL, biphasic T wave in V2-6     I&O's Detail    05 May 2021 07:01  -  06 May 2021 07:00  --------------------------------------------------------  IN:    IV PiggyBack: 50 mL    sodium chloride 0.9%: 1061 mL  Total IN: 1111 mL    OUT:    Indwelling Catheter - Urethral (mL): 375 mL  Total OUT: 375 mL    Total NET: 736 mL      06 May 2021 07:01  -  06 May 2021 17:09  --------------------------------------------------------  IN:    sodium chloride 0.9%: 492 mL  Total IN: 492 mL    OUT:    Indwelling Catheter - Urethral (mL): 100 mL  Total OUT: 100 mL    Total NET: 392 mL          LABS:                        8.3    4.09  )-----------( 184      ( 11 May 2021 05:52 )             25.7     05-11    142  |  112<H>  |  65<H>  ----------------------------<  141<H>  4.6   |  23  |  1.68<H>    Ca    9.0      11 May 2021 08:19              05-06 Chol 128 LDL -- HDL 33<L> Trig 111      I&O's Summary    05 May 2021 07:01  -  06 May 2021 07:00  --------------------------------------------------------  IN: 1111 mL / OUT: 375 mL / NET: 736 mL    06 May 2021 07:01  -  06 May 2021 17:09  --------------------------------------------------------  IN: 492 mL / OUT: 100 mL / NET: 392 mL      BNP  RADIOLOGY & ADDITIONAL STUDIES:  c< from: CT Chest No Cont (05.03.21 @ 23:04) >  IMPRESSION:    1. Small bilateral pleural effusions and pulmonary edema.  2. Emphysema and question additional honeycombing/fibrosis.  3. Mildly asymmetrical 1.5 cm nodular breast tissue on the left. Correlation with follow-up mammography/ultrasound is recommended.  4. 2.9 cm slightly hyperdense right renal lesion, most likely hemorrhagic/proteinaceous cyst. Follow-up nonemergent ultrasound is recommended.                ROSARIO REYES MD; Attending Radiologist  This document has been electronically signed. May  3 2021 11:52PM    < end of copied text >  < from: Xray Chest 1 View AP/PA (05.03.21 @ 22:36) >    EXAM:  XR CHEST AP OR PA 1V      PROCEDURE DATE:  05/03/2021        INTERPRETATION:  Views:1  Comparison: 08/21/15  History: Shortness of breath    There is mild scattered interstitial infiltrate consistent with pneumonia or pneumonitis without segmental consolidation, layering effusion or cavitation. The heart is normal in size.    IMPRESSION: As above              MARIA ESTHER CADET MD; Attending Radiologist  This document has been electronically signed. May  4 2021  9:22AM    < end of copied text >  < from: TTE Echo Complete w/o Contrast w/ Doppler (05.04.21 @ 08:16) >    Summary:   1. Left ventricular ejection fraction, by visual estimation, is 35 to 40%.   2. Moderately to severely decreased global left ventricular systolic function.   3. Multiple left ventricular regional wall motion abnormalities exist. See wall motion findings.   4. Mildly increased LV wall thickness.   5. Normal left ventricular internal cavity size.   6. Spectral Doppler shows impaired relaxation pattern of left ventricular myocardial filling (Grade I diastolic dysfunction).   7. There is mild concentric left ventricular hypertrophy.   8. Mild mitral annular calcification.   9. Mild mitral valve regurgitation.  10. Thickening and calcification of the anterior and posterior mitral valve leaflets.  11. Mild tricuspid regurgitation.  12. Mild aortic regurgitation.  13. Sclerotic aortic valve with normal opening.  14. Estimated pulmonary artery systolic pressure is 36.6 mmHg assuming a right atrial pressure of 10 mmHg, which is consistent with borderline pulmonary hypertension.    [id number] Toby Peace , Electronically signed on 5/4/2021 at 10:19:28 AM    < end of copied text >

## 2021-05-11 NOTE — PROGRESS NOTE ADULT - SUBJECTIVE AND OBJECTIVE BOX
Patient is a 86y Female who reports no complaints as new, feels well. Awaits OR      MEDICATIONS  (STANDING):  aspirin  chewable 81 milliGRAM(s) Oral daily  atorvastatin 40 milliGRAM(s) Oral at bedtime  ceFAZolin   IVPB 2000 milliGRAM(s) IV Intermittent every 12 hours  dextrose 40% Gel 15 Gram(s) Oral once  dextrose 5%. 1000 milliLiter(s) (50 mL/Hr) IV Continuous <Continuous>  dextrose 5%. 1000 milliLiter(s) (100 mL/Hr) IV Continuous <Continuous>  dextrose 50% Injectable 25 Gram(s) IV Push once  dextrose 50% Injectable 12.5 Gram(s) IV Push once  dextrose 50% Injectable 25 Gram(s) IV Push once  folic acid 1 milliGRAM(s) Oral at bedtime  gabapentin 300 milliGRAM(s) Oral three times a day  glucagon  Injectable 1 milliGRAM(s) IntraMuscular once  heparin   Injectable 5000 Unit(s) SubCutaneous every 12 hours  insulin glargine Injectable (LANTUS) 5 Unit(s) SubCutaneous at bedtime  insulin lispro (ADMELOG) corrective regimen sliding scale   SubCutaneous three times a day before meals  metoprolol tartrate 12.5 milliGRAM(s) Oral two times a day  PARoxetine 20 milliGRAM(s) Oral daily  sodium bicarbonate  Infusion 0.051 mEq/kG/Hr (50 mL/Hr) IV Continuous <Continuous>  sodium chloride 0.45%. 1000 milliLiter(s) (75 mL/Hr) IV Continuous <Continuous>  tiotropium 18 MICROgram(s) Capsule 1 Capsule(s) Inhalation daily    MEDICATIONS  (PRN):  acetaminophen   Tablet .. 650 milliGRAM(s) Oral every 6 hours PRN Mild Pain (1 - 3)  ALBUTerol    90 MICROgram(s) HFA Inhaler 1 Puff(s) Inhalation every 6 hours PRN Shortness of Breath  morphine  - Injectable 5 milliGRAM(s) IV Push every 3 hours PRN Severe Pain (7 - 10)        T(C): , Max: 37.4 (05-10-21 @ 21:30)  T(F): , Max: 99.3 (05-10-21 @ 21:30)  HR: 88 (05-11-21 @ 10:00)  BP: 144/58 (05-11-21 @ 10:00)  BP(mean): 78 (05-11-21 @ 10:00)  RR: 19 (05-11-21 @ 10:00)  SpO2: 100% (05-11-21 @ 10:00)  Wt(kg): --    05-10 @ 07:01  -  05-11 @ 07:00  --------------------------------------------------------  IN: 990 mL / OUT: 5325 mL / NET: -4335 mL          PHYSICAL EXAM:    Constitutional: frail  HEENT: PERRLA, EOMI,  MMM  Neck: No LAD, No JVD  Respiratory: dist  Cardiovascular: S1 and S2  Extremities: No peripheral edema  Neurological: A/O x 3          LABS:                        8.3    4.09  )-----------( 184      ( 11 May 2021 05:52 )             25.7     11 May 2021 08:19    142    |  112    |  65     ----------------------------<  141    4.6     |  23     |  1.68   10 May 2021 06:44    140    |  112    |  83     ----------------------------<  139    4.7     |  19     |  2.45   09 May 2021 08:34    134    |  105    |  91     ----------------------------<  128    4.8     |  16     |  3.60   08 May 2021 16:32    134    |  105    |  88     ----------------------------<  120    5.1     |  17     |  3.72   08 May 2021 06:27    132    |  106    |  82     ----------------------------<  136    5.0     |  13     |  3.53     Ca    9.0        11 May 2021 08:19  Ca    8.5        10 May 2021 06:44  Ca    8.2        09 May 2021 08:34  Ca    7.4        08 May 2021 16:32  Ca    7.7        08 May 2021 06:27  Phos  6.2       08 May 2021 06:27    TPro  5.5    /  Alb  1.7    /  TBili  0.2    /  DBili  x      /  AST  15     /  ALT  <6     /  AlkPhos  99     08 May 2021 06:27          Urine Studies:          RADIOLOGY & ADDITIONAL STUDIES:

## 2021-05-11 NOTE — PROGRESS NOTE ADULT - ASSESSMENT
85 y/o Female with h/o COPD, arthritis, CAD, DMII, depression, HTN, PVD, PAD, s/p left fem pop bypass on 03/24/21, lower ext DVT on eliquis, chronic left heel ulcer, RA, anemia was admitted on 5/3 at [facility] with worsening dyspnea, wheezing that started that night. Pt received 2 Duoneb treatments at home, with only slight improvement. At home patient was hypoxic to 87% on room air, was placed on 100% NRB mask. In the ED at [place], pt was placed on 2 LPM via NC, received a dose of Lasix 20 mg IV x 1. Her respiratory symptoms have improved with diuresis. Her troponin levels were monitored. Cardiology was consulted, advised troponin elevated due to type II MI.  On her first night of admission at [facility] 5/3 patient developed fever which has persisted throughout admission. ID was consulted, patient was started on vancomycin and meropenem. Overnight on 5/4-5/5 patient had extensive purulent drainage from left medial thigh surgical site. Plastic surgery was consulted, advised transfer to [facility] for evaluation by Dr Ventura who did fem pop bypass 3/2021. In ED at [facility] on 5/5, she was continued on meropenem.    1. Left leg cellulitis. Postsurgical wound infection with MSSA s/p washout. Recent left femoral-popliteal bypass. Possible underlying vascular graft infection. s/p MSSA sepsis. ARF on CRF stage 3. Allergy to cephalosporins and PCN.  -cultures reviewed  -renal function is poor  -on cefazolin 1 gm IV q12h # 6  -tolerating abx well so far; no side effects noted  -monitor closely in [facility] of PCN allergy history   -local wound care  -vascular evaluation appreciated  -plan for further leg surgery today  -continue abx coverage  -will need longer term IV abx therapy  -monitor temps  -f/u CBC  -supportive care  2. Other issues:   -care per medicine

## 2021-05-11 NOTE — BRIEF OPERATIVE NOTE - NSICDXBRIEFPOSTOP_GEN_ALL_CORE_FT
POST-OP DIAGNOSIS:  Complicated wound infection 05-May-2021 18:22:13 L thigh incision infection; L graft infection Soren Ventura  
POST-OP DIAGNOSIS:  Complicated wound infection 05-May-2021 18:22:13 L thigh incision infection; L graft infection Soren Ventura  Infected surgical wound 11-May-2021 16:55:34  Soren Ventura

## 2021-05-11 NOTE — BRIEF OPERATIVE NOTE - OPERATION/FINDINGS
purulent fluid in L thigh popliteal fossa above knee; necrotic infected fibrinous material around graft and on tissue in popliteal fossa
infected but no gross purulence of L distal medial thigh incision

## 2021-05-12 LAB
ANION GAP SERPL CALC-SCNC: 5 MMOL/L — SIGNIFICANT CHANGE UP (ref 5–17)
BASOPHILS # BLD AUTO: 0.02 K/UL — SIGNIFICANT CHANGE UP (ref 0–0.2)
BASOPHILS NFR BLD AUTO: 0.7 % — SIGNIFICANT CHANGE UP (ref 0–2)
BUN SERPL-MCNC: 53 MG/DL — HIGH (ref 7–23)
CALCIUM SERPL-MCNC: 8.2 MG/DL — LOW (ref 8.5–10.1)
CHLORIDE SERPL-SCNC: 109 MMOL/L — HIGH (ref 96–108)
CO2 SERPL-SCNC: 22 MMOL/L — SIGNIFICANT CHANGE UP (ref 22–31)
CREAT SERPL-MCNC: 1.15 MG/DL — SIGNIFICANT CHANGE UP (ref 0.5–1.3)
EOSINOPHIL # BLD AUTO: 0.34 K/UL — SIGNIFICANT CHANGE UP (ref 0–0.5)
EOSINOPHIL NFR BLD AUTO: 12.2 % — HIGH (ref 0–6)
GLUCOSE SERPL-MCNC: 141 MG/DL — HIGH (ref 70–99)
HCT VFR BLD CALC: 31.6 % — LOW (ref 34.5–45)
HGB BLD-MCNC: 10 G/DL — LOW (ref 11.5–15.5)
IMM GRANULOCYTES NFR BLD AUTO: 1.1 % — SIGNIFICANT CHANGE UP (ref 0–1.5)
LYMPHOCYTES # BLD AUTO: 0.95 K/UL — LOW (ref 1–3.3)
LYMPHOCYTES # BLD AUTO: 34.1 % — SIGNIFICANT CHANGE UP (ref 13–44)
MCHC RBC-ENTMCNC: 29.3 PG — SIGNIFICANT CHANGE UP (ref 27–34)
MCHC RBC-ENTMCNC: 31.6 GM/DL — LOW (ref 32–36)
MCV RBC AUTO: 92.7 FL — SIGNIFICANT CHANGE UP (ref 80–100)
MONOCYTES # BLD AUTO: 0.05 K/UL — SIGNIFICANT CHANGE UP (ref 0–0.9)
MONOCYTES NFR BLD AUTO: 1.8 % — LOW (ref 2–14)
NEUTROPHILS # BLD AUTO: 1.4 K/UL — LOW (ref 1.8–7.4)
NEUTROPHILS NFR BLD AUTO: 50.1 % — SIGNIFICANT CHANGE UP (ref 43–77)
PLATELET # BLD AUTO: 158 K/UL — SIGNIFICANT CHANGE UP (ref 150–400)
POTASSIUM SERPL-MCNC: 4.4 MMOL/L — SIGNIFICANT CHANGE UP (ref 3.5–5.3)
POTASSIUM SERPL-SCNC: 4.4 MMOL/L — SIGNIFICANT CHANGE UP (ref 3.5–5.3)
RBC # BLD: 3.41 M/UL — LOW (ref 3.8–5.2)
RBC # FLD: 16.2 % — HIGH (ref 10.3–14.5)
SODIUM SERPL-SCNC: 136 MMOL/L — SIGNIFICANT CHANGE UP (ref 135–145)
WBC # BLD: 2.79 K/UL — LOW (ref 3.8–10.5)
WBC # FLD AUTO: 2.79 K/UL — LOW (ref 3.8–10.5)

## 2021-05-12 PROCEDURE — 99232 SBSQ HOSP IP/OBS MODERATE 35: CPT

## 2021-05-12 RX ORDER — OXYCODONE HYDROCHLORIDE 5 MG/1
5 TABLET ORAL EVERY 6 HOURS
Refills: 0 | Status: DISCONTINUED | OUTPATIENT
Start: 2021-05-12 | End: 2021-05-18

## 2021-05-12 RX ORDER — SODIUM CHLORIDE 9 MG/ML
1000 INJECTION, SOLUTION INTRAVENOUS
Refills: 0 | Status: DISCONTINUED | OUTPATIENT
Start: 2021-05-12 | End: 2021-05-13

## 2021-05-12 RX ADMIN — ATORVASTATIN CALCIUM 40 MILLIGRAM(S): 80 TABLET, FILM COATED ORAL at 22:16

## 2021-05-12 RX ADMIN — Medication 20 MILLIGRAM(S): at 10:48

## 2021-05-12 RX ADMIN — Medication 25 MILLIGRAM(S): at 22:16

## 2021-05-12 RX ADMIN — Medication 100 MILLIGRAM(S): at 10:49

## 2021-05-12 RX ADMIN — Medication 100 MILLIGRAM(S): at 22:14

## 2021-05-12 RX ADMIN — Medication 1: at 17:58

## 2021-05-12 RX ADMIN — Medication 2: at 13:39

## 2021-05-12 RX ADMIN — HEPARIN SODIUM 5000 UNIT(S): 5000 INJECTION INTRAVENOUS; SUBCUTANEOUS at 22:14

## 2021-05-12 RX ADMIN — Medication 25 MILLIGRAM(S): at 09:28

## 2021-05-12 RX ADMIN — GABAPENTIN 300 MILLIGRAM(S): 400 CAPSULE ORAL at 05:46

## 2021-05-12 RX ADMIN — TIOTROPIUM BROMIDE 1 CAPSULE(S): 18 CAPSULE ORAL; RESPIRATORY (INHALATION) at 15:22

## 2021-05-12 RX ADMIN — ALBUTEROL 1 PUFF(S): 90 AEROSOL, METERED ORAL at 15:22

## 2021-05-12 RX ADMIN — MORPHINE SULFATE 5 MILLIGRAM(S): 50 CAPSULE, EXTENDED RELEASE ORAL at 02:20

## 2021-05-12 RX ADMIN — Medication 1 MILLIGRAM(S): at 22:17

## 2021-05-12 RX ADMIN — MORPHINE SULFATE 5 MILLIGRAM(S): 50 CAPSULE, EXTENDED RELEASE ORAL at 02:06

## 2021-05-12 RX ADMIN — HEPARIN SODIUM 5000 UNIT(S): 5000 INJECTION INTRAVENOUS; SUBCUTANEOUS at 09:27

## 2021-05-12 RX ADMIN — SODIUM CHLORIDE 75 MILLILITER(S): 9 INJECTION, SOLUTION INTRAVENOUS at 06:03

## 2021-05-12 RX ADMIN — Medication 81 MILLIGRAM(S): at 09:28

## 2021-05-12 RX ADMIN — GABAPENTIN 300 MILLIGRAM(S): 400 CAPSULE ORAL at 22:16

## 2021-05-12 NOTE — PROGRESS NOTE ADULT - ASSESSMENT
1) Dyspnea  2) Pulmonary Fibrosis/Emphysema  3) Hypoxemia   4) HFrEF  5) DVT    86y Female with PMH of COPD, arthritis, CAD, DMII, depression, HTN, PVD, PAD, s/p left fem pop bypass on 03/24/21, +lower ext DVT on eliquis, +chronic left heel ulcer, RA, anemia, presented to the ED at [place] on 5/3 with worsening dyspnea, wheezing that started that night. Pt received 2 Duoneb treatments at home, with only slight improvement, so EMS called. At home patient was hypoxic to 87% on room air, was placed on 100% NRB mask. In the ED at [place], pt was placed on 2 LPM via NC, received a dose of Lasix 20 mg IV x   On her first night of admission at [facility] 5/3 patient developed fever which has persisted throughout admission. ID was consulted, patient was started on vancomycin and meropenem. Overnight on 5/4-5/5 patient had extensive purulent drainage from left medial thigh surgical site. Plastic surgery was consulted, advised transfer to [facility] for evaluation by Dr Ventura who did fem pop bypass 3/2021.    Above history per chart review  Patient states that she smoked until last year   History of RA as well  Not seen pulmonary in the past   LVEF noted to be 35-40% and PASP elevated at 36 mmHg as well  CT Chest revealed paraseptal emphysematous changes, dilated pulmonary artery and pulmonary fibrosis.   Reviewed CT chest report, echocardiogram. Hypoxemia issues are likely related to emphysema/RA-ILD/PH complicated by HFrEF and now contrast induced nephropathy.  Continue Spiriva daily for emphysema  Albuterol PRN  Incentive spirometer

## 2021-05-12 NOTE — PROGRESS NOTE ADULT - SUBJECTIVE AND OBJECTIVE BOX
Date of service: 05-12-21 @ 11:03    Lying in bed in NAD  s/p left leg washout   Has low grade fever    ROS: no fever or chills; denies dizziness, no HA, no SOB or cough, no abdominal pain, no diarrhea or constipation; no dysuria    MEDICATIONS  (STANDING):  aspirin  chewable 81 milliGRAM(s) Oral daily  atorvastatin 40 milliGRAM(s) Oral at bedtime  ceFAZolin   IVPB 2000 milliGRAM(s) IV Intermittent every 12 hours  dextrose 40% Gel 15 Gram(s) Oral once  dextrose 5%. 1000 milliLiter(s) (50 mL/Hr) IV Continuous <Continuous>  dextrose 5%. 1000 milliLiter(s) (100 mL/Hr) IV Continuous <Continuous>  dextrose 50% Injectable 25 Gram(s) IV Push once  dextrose 50% Injectable 12.5 Gram(s) IV Push once  dextrose 50% Injectable 25 Gram(s) IV Push once  folic acid 1 milliGRAM(s) Oral at bedtime  gabapentin 300 milliGRAM(s) Oral three times a day  glucagon  Injectable 1 milliGRAM(s) IntraMuscular once  heparin   Injectable 5000 Unit(s) SubCutaneous every 12 hours  insulin glargine Injectable (LANTUS) 5 Unit(s) SubCutaneous at bedtime  insulin lispro (ADMELOG) corrective regimen sliding scale   SubCutaneous three times a day before meals  metoprolol tartrate 25 milliGRAM(s) Oral two times a day  PARoxetine 20 milliGRAM(s) Oral daily  sodium chloride 0.45%. 1000 milliLiter(s) (75 mL/Hr) IV Continuous <Continuous>  tiotropium 18 MICROgram(s) Capsule 1 Capsule(s) Inhalation daily    Vital Signs Last 24 Hrs  T(C): 37.1 (12 May 2021 09:20), Max: 37.6 (11 May 2021 19:19)  T(F): 98.7 (12 May 2021 09:20), Max: 99.6 (11 May 2021 19:19)  HR: 85 (12 May 2021 10:00) (67 - 91)  BP: 111/50 (12 May 2021 10:00) (103/54 - 169/70)  BP(mean): 67 (12 May 2021 10:00) (66 - 87)  RR: 12 (12 May 2021 10:00) (9 - 20)  SpO2: 95% (12 May 2021 09:00) (92% - 100%)     Physical exam:    Constitutional:  No acute distress  HEENT: NC/AT, EOMI, PERRLA, conjunctivae clear  Neck: supple; thyroid not palpable  Back: no tenderness  Respiratory: respiratory effort normal; crackles at bases  Cardiovascular: S1S2 regular, no murmurs  Abdomen: soft, not tender, not distended, positive BS  Genitourinary: no suprapubic tenderness  Lymphatic: no LN palpable  Musculoskeletal: no muscle tenderness, no joint swelling or tenderness  Extremities: 1+ pedal edema  Left thigh open wound s/p I and D; packed; dressing  Neurological/ Psychiatric: AxOx3, moving all extremities  Skin: no rashes; no palpable lesions    Labs: reviewed                        10.0   2.79  )-----------( 158      ( 12 May 2021 06:35 )             31.6     05-12    136  |  109<H>  |  53<H>  ----------------------------<  141<H>  4.4   |  22  |  1.15    Ca    8.2<L>      12 May 2021 06:35  Phos  4.8     05-11  Mg     2.2     05-11                                  10.1   7.90  )-----------( 199      ( 06 May 2021 05:49 )             31.3     05-06    134<L>  |  106  |  51<H>  ----------------------------<  199<H>  4.7   |  20<L>  |  1.39<H>    Ca    8.0<L>      06 May 2021 05:49    TPro  5.9<L>  /  Alb  1.9<L>  /  TBili  0.6  /  DBili  x   /  AST  5<L>  /  ALT  <6<L>  /  AlkPhos  71  05-05     LIVER FUNCTIONS - ( 05 May 2021 11:59 )  Alb: 1.9 g/dL / Pro: 5.9 gm/dL / ALK PHOS: 71 U/L / ALT: <6 U/L / AST: 5 U/L / GGT: x             Culture - Fungal, Other (collected 05 May 2021 16:34)  Source: .Other None  Preliminary Report (06 May 2021 09:04):    Testing in progress    Culture - Surgical Swab (collected 05 May 2021 16:34)  Source: .Surgical Swab None  Preliminary Report (06 May 2021 20:09):    Moderate Staphylococcus aureus  Organism: Staphylococcus aureus (07 May 2021 19:05)  Organism: Staphylococcus aureus (07 May 2021 19:05)      -  Ampicillin/Sulbactam: S <=8/4      -  Cefazolin: S <=4      -  Clindamycin: S <=0.25      -  Erythromycin: S <=0.25      -  Gentamicin: S <=1 Should not be used as monotherapy      -  Oxacillin: S <=0.25      -  RIF- Rifampin: S <=1 Should not be used as monotherapy      -  Tetra/Doxy: S <=1      -  Trimethoprim/Sulfamethoxazole: S <=0.5/9.5      -  Vancomycin: S 1      Method Type: SIMEON    Culture - Fungal, Other (collected 05 May 2021 16:34)  Source: .Other None  Preliminary Report (06 May 2021 09:04):    Testing in progress    Culture - Surgical Swab (collected 05 May 2021 16:34)  Source: .Surgical Swab None  Preliminary Report (06 May 2021 20:04):    Moderate Staphylococcus aureus  Organism: Staphylococcus aureus (07 May 2021 19:06)  Organism: Staphylococcus aureus (07 May 2021 19:06)      -  Ampicillin/Sulbactam: S <=8/4      -  Cefazolin: S <=4      -  Clindamycin: S <=0.25      -  Erythromycin: S <=0.25      -  Gentamicin: S <=1 Should not be used as monotherapy      -  Oxacillin: S <=0.25      -  RIF- Rifampin: S <=1 Should not be used as monotherapy      -  Tetra/Doxy: S <=1      -  Trimethoprim/Sulfamethoxazole: S <=0.5/9.5      -  Vancomycin: S 2      Method Type: SIMEON    Culture - Other (collected 04 May 2021 23:00)  Source: .Other wound - L thigh  Final Report (07 May 2021 07:28):    Numerous Staphylococcus aureus  Organism: Staphylococcus aureus (07 May 2021 07:28)  Organism: Staphylococcus aureus (07 May 2021 07:28)      -  Ampicillin/Sulbactam: S <=8/4      -  Cefazolin: S <=4      -  Clindamycin: S <=0.25      -  Erythromycin: S <=0.25      -  Gentamicin: S <=1 Should not be used as monotherapy      -  Oxacillin: S <=0.25      -  RIF- Rifampin: S <=1 Should not be used as monotherapy      -  Tetra/Doxy: S <=1      -  Trimethoprim/Sulfamethoxazole: S <=0.5/9.5      -  Vancomycin: S 1      Method Type: SIMEON    Culture - Blood (collected 03 May 2021 22:10)  Source: .Blood Blood-Peripheral  Final Report (09 May 2021 03:00):    No Growth Final    Culture - Blood (collected 03 May 2021 22:10)  Source: .Blood Blood-Peripheral  Gram Stain (07 May 2021 01:14):    Growth in aerobic bottle: Gram Positive Cocci in Clusters  Final Report (08 May 2021 17:32):    Growth in aerobic bottle: Staphylococcus aureus  Organism: Blood Culture PCR  Staphylococcus aureus (08 May 2021 17:32)  Organism: Staphylococcus aureus (08 May 2021 17:32)      -  Ampicillin/Sulbactam: S <=8/4      -  Cefazolin: S <=4      -  Clindamycin: S <=0.25      -  Erythromycin: S <=0.25      -  Gentamicin: S <=1 Should not be used as monotherapy      -  Oxacillin: S <=0.25      -  RIF- Rifampin: S <=1 Should not be used as monotherapy      -  Tetra/Doxy: S <=1      -  Trimethoprim/Sulfamethoxazole: S <=0.5/9.5      -  Vancomycin: S 2      Method Type: SIMEON  Organism: Blood Culture PCR (08 May 2021 17:32)      -  Staphylococcus aureus: Detec Any isolate of Staphylococcus aureus from a blood culture is NOT considered a contaminant.      Method Type: PCR    COVID-19 PCR: NotDetec (05-03-21 @ 23:17)    Radiology: all available radiological tests reviewed    Advanced directives addressed: full resuscitation

## 2021-05-12 NOTE — PROGRESS NOTE ADULT - SUBJECTIVE AND OBJECTIVE BOX
Patient is a 86y Female who reports no complaints as new      MEDICATIONS  (STANDING):  aspirin  chewable 81 milliGRAM(s) Oral daily  atorvastatin 40 milliGRAM(s) Oral at bedtime  ceFAZolin   IVPB 2000 milliGRAM(s) IV Intermittent every 12 hours  dextrose 40% Gel 15 Gram(s) Oral once  dextrose 5%. 1000 milliLiter(s) (50 mL/Hr) IV Continuous <Continuous>  dextrose 5%. 1000 milliLiter(s) (100 mL/Hr) IV Continuous <Continuous>  dextrose 50% Injectable 25 Gram(s) IV Push once  dextrose 50% Injectable 12.5 Gram(s) IV Push once  dextrose 50% Injectable 25 Gram(s) IV Push once  folic acid 1 milliGRAM(s) Oral at bedtime  gabapentin 300 milliGRAM(s) Oral three times a day  glucagon  Injectable 1 milliGRAM(s) IntraMuscular once  heparin   Injectable 5000 Unit(s) SubCutaneous every 12 hours  insulin glargine Injectable (LANTUS) 5 Unit(s) SubCutaneous at bedtime  insulin lispro (ADMELOG) corrective regimen sliding scale   SubCutaneous three times a day before meals  metoprolol tartrate 25 milliGRAM(s) Oral two times a day  PARoxetine 20 milliGRAM(s) Oral daily  sodium chloride 0.45%. 1000 milliLiter(s) (40 mL/Hr) IV Continuous <Continuous>  tiotropium 18 MICROgram(s) Capsule 1 Capsule(s) Inhalation daily    MEDICATIONS  (PRN):  acetaminophen   Tablet .. 650 milliGRAM(s) Oral every 6 hours PRN Mild Pain (1 - 3)  ALBUTerol    90 MICROgram(s) HFA Inhaler 1 Puff(s) Inhalation every 6 hours PRN Shortness of Breath  oxyCODONE    IR 5 milliGRAM(s) Oral every 6 hours PRN Severe Pain (7 - 10)        T(C): , Max: 37.6 (05-11-21 @ 19:19)  T(F): , Max: 99.6 (05-11-21 @ 19:19)  HR: 82 (05-12-21 @ 16:00)  BP: 129/47 (05-12-21 @ 16:00)  BP(mean): 68 (05-12-21 @ 16:00)  RR: 19 (05-12-21 @ 16:00)  SpO2: 95% (05-12-21 @ 09:00)  Wt(kg): --    05-11 @ 07:01  -  05-12 @ 07:00  --------------------------------------------------------  IN: 2633 mL / OUT: 2975 mL / NET: -342 mL          PHYSICAL EXAM:    Constitutional: NAD, frail  HEENT: PERRLA, EOMI,  MMM  Neck: No LAD, No JVD  Respiratory: francheskat mayelin  Cardiovascular: S1 and S2, RRR  Gastrointestinal: BS+, soft, NT/ND  Extremities:  peripheral edema  Neurological: A/O x 3  : No Padilla          LABS:                        10.0   2.79  )-----------( 158      ( 12 May 2021 06:35 )             31.6     12 May 2021 06:35    136    |  109    |  53     ----------------------------<  141    4.4     |  22     |  1.15   11 May 2021 17:20    142    |  113    |  56     ----------------------------<  152    5.4     |  24     |  1.61   11 May 2021 08:19    142    |  112    |  65     ----------------------------<  141    4.6     |  23     |  1.68   10 May 2021 06:44    140    |  112    |  83     ----------------------------<  139    4.7     |  19     |  2.45   09 May 2021 08:34    134    |  105    |  91     ----------------------------<  128    4.8     |  16     |  3.60     Ca    8.2        12 May 2021 06:35  Ca    9.0        11 May 2021 17:20  Ca    9.0        11 May 2021 08:19  Ca    8.5        10 May 2021 06:44  Ca    8.2        09 May 2021 08:34  Phos  4.8       11 May 2021 17:20  Mg     2.2       11 May 2021 17:20            Urine Studies:          RADIOLOGY & ADDITIONAL STUDIES:

## 2021-05-12 NOTE — PROGRESS NOTE ADULT - SUBJECTIVE AND OBJECTIVE BOX
Patient is a 86y old  Female who presents with a chief complaint of SOB (07 May 2021 15:22)      HPI:  86y Female with PMH of COPD, arthritis, CAD, DMII, depression, HTN, PVD, PAD, s/p left fem pop bypass on 03/24/21, +lower ext DVT on eliquis, +chronic left heel ulcer, RA, anemia, presented to the ED at [place] on 5/3 with worsening dyspnea, wheezing that started that night. Pt received 2 Duoneb treatments at home, with only slight improvement, so EMS called. At home patient was hypoxic to 87% on room air, was placed on 100% NRB mask. In the ED at [place], pt was placed on 2 LPM via NC, received a dose of Lasix 20 mg IV x   On her first night of admission at [facility] 5/3 patient developed fever which has persisted throughout admission. ID was consulted, patient was started on vancomycin and meropenem. Overnight on 5/4-5/5 patient had extensive purulent drainage from left medial thigh surgical site. Plastic surgery was consulted, advised transfer to [facility] for evaluation by Dr Ventura who did fem pop bypass 3/2021.    Above history per chart review  Patient states that she smoked until last year   History of RA as well  Not seen pulmonary in the past   LVEF noted to be 35-40% and PASP elevated at 36 mmHg as well  CT Chest revealed paraseptal emphysematous changes, dilated pulmonary artery and pulmonary fibrosis.     5/12  Patient is s/p Exploration, infected graft, lower extremity with (L distal medial thigh)  No acute pulmonary events occurred overnight    PREVIOUS DIAGNOSTIC TESTING:      MEDICATIONS  (STANDING):  aspirin  chewable 81 milliGRAM(s) Oral daily  atorvastatin 40 milliGRAM(s) Oral at bedtime  ceFAZolin  Injectable. 1000 milliGRAM(s) IV Push every 12 hours  dextrose 40% Gel 15 Gram(s) Oral once  dextrose 5%. 1000 milliLiter(s) (50 mL/Hr) IV Continuous <Continuous>  dextrose 5%. 1000 milliLiter(s) (100 mL/Hr) IV Continuous <Continuous>  dextrose 50% Injectable 25 Gram(s) IV Push once  dextrose 50% Injectable 12.5 Gram(s) IV Push once  dextrose 50% Injectable 25 Gram(s) IV Push once  folic acid 1 milliGRAM(s) Oral at bedtime  gabapentin 300 milliGRAM(s) Oral three times a day  glucagon  Injectable 1 milliGRAM(s) IntraMuscular once  heparin   Injectable 5000 Unit(s) SubCutaneous every 12 hours  insulin glargine Injectable (LANTUS) 5 Unit(s) SubCutaneous at bedtime  insulin lispro (ADMELOG) corrective regimen sliding scale   SubCutaneous three times a day before meals  metoprolol tartrate 12.5 milliGRAM(s) Oral two times a day  PARoxetine 20 milliGRAM(s) Oral daily  sodium chloride 0.9%. 1000 milliLiter(s) (80 mL/Hr) IV Continuous <Continuous>  sodium chloride 0.9%. 1000 milliLiter(s) (250 mL/Hr) IV Continuous <Continuous>  tiotropium 18 MICROgram(s) Capsule 1 Capsule(s) Inhalation daily    MEDICATIONS  (PRN):  acetaminophen   Tablet .. 650 milliGRAM(s) Oral every 6 hours PRN Mild Pain (1 - 3)  ALBUTerol    90 MICROgram(s) HFA Inhaler 1 Puff(s) Inhalation every 6 hours PRN Shortness of Breath  morphine  - Injectable 5 milliGRAM(s) IV Push every 3 hours PRN Severe Pain (7 - 10)      FAMILY HISTORY:  FH: colon cancer  father    FH: heart attack  father    Family history of atherosclerosis  mother  Vital Signs Last 24 Hrs  T(C): 36.5 (12 May 2021 05:40), Max: 37.6 (11 May 2021 19:19)  T(F): 97.7 (12 May 2021 05:40), Max: 99.6 (11 May 2021 19:19)  HR: 83 (12 May 2021 07:00) (67 - 91)  BP: 118/53 (12 May 2021 07:00) (103/54 - 169/70)  BP(mean): 69 (12 May 2021 07:00) (66 - 87)  RR: 20 (12 May 2021 07:00) (9 - 20)  SpO2: 95% (12 May 2021 07:00) (92% - 100%)    I&O's Summary    07 May 2021 07:01  -  08 May 2021 07:00  --------------------------------------------------------  IN: 3473 mL / OUT: 65 mL / NET: 3408 mL      PHYSICAL EXAM  General Appearance: cooperative, no acute distress,   HEENT: PERRL, conjunctiva clear, EOM's intact, non injected pharynx, no exudate, TM   normal  Neck: Supple, , no adenopathy, thyroid: not enlarged, no carotid bruit or JVD  Back: Symmetric, no  tenderness,no soft tissue tenderness  Lungs: diminished bilaterally  Heart: Regular rate and rhythm, S1, S2 normal, no murmur, rub or gallop  Abdomen: Soft, non-tender, bowel sounds active , no hepatosplenomegaly  Extremities: no cyanosis or edema, no joint swelling  Skin: Skin color, texture normal, no rashes   Neurologic: Alert and oriented X3 , cranial nerves intact, sensory and motor normal,    ECG:    LABS:      05-08    132<L>  |  106  |  82<H>  ----------------------------<  136<H>  5.0   |  13<L>  |  3.53<H>    Ca    7.7<L>      08 May 2021 06:27  Phos  6.2     05-08    TPro  5.5<L>  /  Alb  1.7<L>  /  TBili  0.2  /  DBili  x   /  AST  15  /  ALT  <6<L>  /  AlkPhos  99  05-08        Lipid Panel  128  33  --  111    Pro BNP  [id number] 05-07 @ 06:51  D Dimer  -- 05-07 @ 06:51  Pro BNP  [id number] 05-06 @ 17:59  D Dimer  -- 05-06 @ 17:59  Pro BNP  [id number] 05-03 @ 22:10  D Dimer  -- 05-03 @ 22:10              RADIOLOGY & ADDITIONAL STUDIES:

## 2021-05-12 NOTE — PROGRESS NOTE ADULT - ASSESSMENT
85 y/o Female with h/o COPD, arthritis, CAD, DMII, depression, HTN, PVD, PAD, s/p left fem pop bypass on 03/24/21, lower ext DVT on eliquis, chronic left heel ulcer, RA, anemia was admitted on 5/3 at [place] with worsening dyspnea, wheezing that started that night. Pt received 2 Duoneb treatments at home, with only slight improvement. At home patient was hypoxic to 87% on room air, was placed on 100% NRB mask. In the ED at [place], pt was placed on 2 LPM via NC, received a dose of Lasix 20 mg IV x 1. Her respiratory symptoms have improved with diuresis. Her troponin levels were monitored. Cardiology was consulted, advised troponin elevated due to type II MI.  On her first night of admission at [place] 5/3 patient developed fever which has persisted throughout admission. ID was consulted, patient was started on vancomycin and meropenem. Overnight on 5/4-5/5 patient had extensive purulent drainage from left medial thigh surgical site. Plastic surgery was consulted, advised transfer to [facility] for evaluation by Dr Ventura who did fem pop bypass 3/2021. In ED at [facility] on 5/5, she was continued on meropenem.    1. Left leg cellulitis. Postsurgical wound infection with MSSA s/p washout. Recent left femoral-popliteal bypass. Possible underlying vascular graft infection. s/p MSSA sepsis. ARF on CRF stage 3. Allergy to cephalosporins and PCN.  -cultures reviewed  -renal function is poor  -on cefazolin 1 gm IV q12h # 7  -tolerating abx well so far; no side effects noted  -monitor closely in deborah of PCN allergy history   -local wound care  -vascular evaluation appreciated  -plan for further leg surgery today  -continue abx coverage  -will need longer term IV abx therapy  -monitor temps  -f/u CBC  -supportive care  2. Other issues:   -care per medicine

## 2021-05-12 NOTE — PROGRESS NOTE ADULT - ASSESSMENT
87 yo Female whom presented to the hospital with COPD, arthritis, CAD, DMII, depression, HTN, PVD, PAD, s/p left fem pop bypass on 03/24/21, +lower ext DVT on eliquis, +chronic left heel ulcer, RA, anemia, presented to the ED at [place] on 5/3 with worsening dyspnea, renal evaluation of ORLY.     ORLY - nonoliguric    -Renal function stabilizing, post contrast diuresis   -ok to stop ivf   -Vascular follow up    Cellulitis  -Abx renally dosed  -NSAID avoidance  -ID follow up      Will sign off, call with any new questions or issues  d/c with staff bedside

## 2021-05-12 NOTE — PROGRESS NOTE ADULT - SUBJECTIVE AND OBJECTIVE BOX
86 y.o. Female with PMH of COPD, arthritis, CAD, DMII, depression, HTN, PVD, PAD, s/p left fem pop bypass on 03/24/21, +lower ext DVT on eliquis, +chronic left heel ulcer, RA, anemia, presented to the ED at [place] on 5/3 with worsening dyspnea, wheezing that started that night. Pt received 2 Duoneb treatments at home, with only slight improvement, so EMS called. At home patient was hypoxic to 87% on room air, was placed on 100% NRB mask. In the ED at [place], pt was placed on 2 LPM via NC, received a dose of Lasix 20 mg IV x 1. Initial trop noted to be 0.17.  BNP >02876  On her first night of admission at [place] 5/3 patient developed fever which has persisted throughout admission. ID was consulted, patient was started on vancomycin and meropenem. Overnight on 5/4-5/5 patient had extensive purulent drainage from left medial thigh surgical site. Plastic surgery was consulted, advised transfer to [facility] for evaluation by Dr Ventura who did fem pop bypass 3/2021.      Was taking to OR : purulent fluid in L thigh popliteal fossa above knee; necrotic infected fibrinous material around graft and on tissue in popliteal fossa  oliguric suspect PAT;  from anuric she is in polyuric phase today    INTERVAL HPI: comfortable in the bed, no pain, s/p muscle flap on 5/11, pt is alert but slightly lethargic/weak  Other ROS reviewed and neg     Vital Signs Last 24 Hrs  T(C): 37.1 (12 May 2021 14:04), Max: 37.6 (11 May 2021 19:19)  T(F): 98.8 (12 May 2021 14:04), Max: 99.6 (11 May 2021 19:19)  HR: 85 (12 May 2021 10:00) (74 - 91)  BP: 111/50 (12 May 2021 10:00) (103/54 - 169/70)  BP(mean): 67 (12 May 2021 10:00) (66 - 87)  RR: 12 (12 May 2021 10:00) (9 - 20)  SpO2: 95% (12 May 2021 09:00) (92% - 100%)  I&O's Detail    11 May 2021 07:01  -  12 May 2021 07:00  --------------------------------------------------------  IN:    IV PiggyBack: 50 mL    Oral Fluid: 770 mL    Other (mL): 400 mL    PRBCs (Packed Red Blood Cells): 345 mL    sodium chloride 0.45%: 1068 mL  Total IN: 2633 mL    OUT:    Drain (mL): 90 mL    Indwelling Catheter - Urethral (mL): 2710 mL    Other (mL): 175 mL  Total OUT: 2975 mL    Total NET: -342 mL                       10.0   2.79  )-----------( 158      ( 12 May 2021 06:35 )             31.6     12 May 2021 06:35    136    |  109    |  53     ----------------------------<  141    4.4     |  22     |  1.15     Ca    8.2        12 May 2021 06:35  Phos  4.8       11 May 2021 17:20  Mg     2.2       11 May 2021 17:20    CAPILLARY BLOOD GLUCOSE    POCT Blood Glucose.: 210 mg/dL (12 May 2021 13:14)  POCT Blood Glucose.: 143 mg/dL (12 May 2021 08:06)  POCT Blood Glucose.: 195 mg/dL (11 May 2021 21:47)  POCT Blood Glucose.: 147 mg/dL (11 May 2021 17:39)    MEDICATIONS  (STANDING):  aspirin  chewable 81 milliGRAM(s) Oral daily  atorvastatin 40 milliGRAM(s) Oral at bedtime  ceFAZolin   IVPB 2000 milliGRAM(s) IV Intermittent every 12 hours  dextrose 40% Gel 15 Gram(s) Oral once  dextrose 5%. 1000 milliLiter(s) (50 mL/Hr) IV Continuous <Continuous>  dextrose 5%. 1000 milliLiter(s) (100 mL/Hr) IV Continuous <Continuous>  dextrose 50% Injectable 25 Gram(s) IV Push once  dextrose 50% Injectable 12.5 Gram(s) IV Push once  dextrose 50% Injectable 25 Gram(s) IV Push once  folic acid 1 milliGRAM(s) Oral at bedtime  gabapentin 300 milliGRAM(s) Oral three times a day  glucagon  Injectable 1 milliGRAM(s) IntraMuscular once  heparin   Injectable 5000 Unit(s) SubCutaneous every 12 hours  insulin glargine Injectable (LANTUS) 5 Unit(s) SubCutaneous at bedtime  insulin lispro (ADMELOG) corrective regimen sliding scale   SubCutaneous three times a day before meals  metoprolol tartrate 25 milliGRAM(s) Oral two times a day  PARoxetine 20 milliGRAM(s) Oral daily  sodium chloride 0.45%. 1000 milliLiter(s) (40 mL/Hr) IV Continuous <Continuous>  tiotropium 18 MICROgram(s) Capsule 1 Capsule(s) Inhalation daily    MEDICATIONS  (PRN):  acetaminophen   Tablet .. 650 milliGRAM(s) Oral every 6 hours PRN Mild Pain (1 - 3)  ALBUTerol    90 MICROgram(s) HFA Inhaler 1 Puff(s) Inhalation every 6 hours PRN Shortness of Breath  oxyCODONE    IR 5 milliGRAM(s) Oral every 6 hours PRN Severe Pain (7 - 10)    RADIOLOGY: personally visualized    PHYSICAL EXAM:    Constitutional: elderly female in no acute distress sitting up in bed now.   Head: Head is normocephalic and atraumatic.    Neck: No JVD.   Cardiovascular: Regular rate and rhythm without S3, S4. No murmurs or rubs are appreciated.    Respiratory: Decreased BS at bases with + crackles  Abdomen: Soft, nontender, nondistended with positive bowel sounds.    Extremity: No tenderness, no pitting edema, Left thigh area in dressing   Neurologic: The patient is alert and oriented.  Non focal, lethargic but easily arousable  Skin: Left thigh dressing   : + FQ to gravity      TTE Summary:   1. Left ventricular ejection fraction, by visual estimation, is 35 to 40%.   2. Moderately toseverely decreased global left ventricular systolic function.   3. Multiple left ventricular regional wall motion abnormalities exist. See wall motion findings.   4. Mildly increased LV wall thickness.   5. Normal left ventricular internal cavity size.   6. Spectral Doppler shows impaired relaxation pattern of left ventricular myocardial filling (Grade I diastolic dysfunction).   7. There is mild concentric left ventricular hypertrophy.   8. Mild mitral annular calcification.   9. Mild mitral valve regurgitation.  10. Thickening and calcification of the anterior and posterior mitral valve leaflets.  11. Mild tricuspid regurgitation.  12. Mild aortic regurgitation.  13. Sclerotic aortic valve with normal opening.  14. Estimated pulmonary artery systolic pressure is 36.6 mmHg assuming a right atrial pressure of 10 mmHg, which is consistent with borderline pulmonary hypertension.

## 2021-05-12 NOTE — PROGRESS NOTE ADULT - SUBJECTIVE AND OBJECTIVE BOX
afebrile, VSS    incisional pain    urine output remains acceptable; Cr decreased to ~ 1.6 mg/dl  HCT 36 stable  DANYELLE ~ 30 cc    dressing dry  L foot warm    A/P  patent graft   stable status post gracilis muscle flap coverage of distal 2/3 of graft  still concerned about proximal infection   will place PICC line    MEDICATIONS  (STANDING):  aspirin  chewable 81 milliGRAM(s) Oral daily  atorvastatin 40 milliGRAM(s) Oral at bedtime  ceFAZolin   IVPB 2000 milliGRAM(s) IV Intermittent every 12 hours  dextrose 40% Gel 15 Gram(s) Oral once  dextrose 5%. 1000 milliLiter(s) (50 mL/Hr) IV Continuous <Continuous>  dextrose 5%. 1000 milliLiter(s) (100 mL/Hr) IV Continuous <Continuous>  dextrose 50% Injectable 25 Gram(s) IV Push once  dextrose 50% Injectable 12.5 Gram(s) IV Push once  dextrose 50% Injectable 25 Gram(s) IV Push once  folic acid 1 milliGRAM(s) Oral at bedtime  gabapentin 300 milliGRAM(s) Oral three times a day  glucagon  Injectable 1 milliGRAM(s) IntraMuscular once  heparin   Injectable 5000 Unit(s) SubCutaneous every 12 hours  insulin glargine Injectable (LANTUS) 5 Unit(s) SubCutaneous at bedtime  insulin lispro (ADMELOG) corrective regimen sliding scale   SubCutaneous three times a day before meals  metoprolol tartrate 25 milliGRAM(s) Oral two times a day  PARoxetine 20 milliGRAM(s) Oral daily  sodium chloride 0.45%. 1000 milliLiter(s) (75 mL/Hr) IV Continuous <Continuous>  tiotropium 18 MICROgram(s) Capsule 1 Capsule(s) Inhalation daily    MEDICATIONS  (PRN):  acetaminophen   Tablet .. 650 milliGRAM(s) Oral every 6 hours PRN Mild Pain (1 - 3)  ALBUTerol    90 MICROgram(s) HFA Inhaler 1 Puff(s) Inhalation every 6 hours PRN Shortness of Breath  morphine  - Injectable 5 milliGRAM(s) IV Push every 3 hours PRN Severe Pain (7 - 10)      Allergies    cephalosporins (Other)  penicillins (Urticaria; Pruritus)    Intolerances        Flatus: [ ] YES [ ] NO             Bowel Movement: [ ] YES [ ] NO  Pain (0-10):            Pain Control Adequate: [ ] YES [ ] NO  Nausea: [ ] YES [ ] NO            Vomiting: [ ] YES [ ] NO  Diarrhea: [ ] YES [ ] NO         Constipation: [ ] YES [ ] NO     Chest Pain: [ ] YES [ ] NO    SOB:  [ ] YES [ ] NO    Vital Signs Last 24 Hrs  T(C): 36.5 (12 May 2021 05:40), Max: 37.6 (11 May 2021 19:19)  T(F): 97.7 (12 May 2021 05:40), Max: 99.6 (11 May 2021 19:19)  HR: 81 (12 May 2021 06:00) (67 - 91)  BP: 139/59 (12 May 2021 06:00) (103/54 - 169/70)  BP(mean): 80 (12 May 2021 06:00) (66 - 87)  RR: 12 (12 May 2021 06:00) (9 - 19)  SpO2: 95% (12 May 2021 06:00) (92% - 100%)    I&O's Summary    10 May 2021 07:01  -  11 May 2021 07:00  --------------------------------------------------------  IN: 990 mL / OUT: 5325 mL / NET: -4335 mL    11 May 2021 07:01  -  12 May 2021 06:38  --------------------------------------------------------  IN: 2633 mL / OUT: 2915 mL / NET: -282 mL        Physical Exam:  General: NAD, resting comfortably  Pulmonary: normal resp effort, CTA-B  Cardiovascular: NSR  Abdominal: soft, NT/ND  Extremities: WWP, normal strength  Neuro: A/O x 3, CNs II-XII grossly intact, normal motor/sensation, no focal deficits  Pulses:   Right:                                                                          Left:  FEM [ ]2+ [ ]1+ [ ]doppler                                             FEM [ ]2+ [ ]1+ [ ]doppler    POP [ ]2+ [ ]1+ [ ]doppler                                             POP [ ]2+ [ ]1+ [ ]doppler    DP [ ]2+ [ ]1+ [ ]doppler                                                DP [ ]2+ [ ]1+ [ ]doppler  PT[ ]2+ [ ]1+ [ ]doppler                                                  PT [ ]2+ [ ]1+ [ ]doppler    LABS:                        11.3   4.09  )-----------( 181      ( 11 May 2021 17:20 )             36.0     05-11    142  |  113<H>  |  56<H>  ----------------------------<  152<H>  5.4<H>   |  24  |  1.61<H>    Ca    9.0      11 May 2021 17:20  Phos  4.8     05-11  Mg     2.2     05-11            CAPILLARY BLOOD GLUCOSE      POCT Blood Glucose.: 195 mg/dL (11 May 2021 21:47)  POCT Blood Glucose.: 147 mg/dL (11 May 2021 17:39)  POCT Blood Glucose.: 130 mg/dL (11 May 2021 12:15)  POCT Blood Glucose.: 153 mg/dL (11 May 2021 06:44)      RADIOLOGY & ADDITIONAL TESTS:

## 2021-05-12 NOTE — PROGRESS NOTE ADULT - SUBJECTIVE AND OBJECTIVE BOX
Patient is a 86y old  Female who presents with a chief complaint of wound (11 May 2021 10:51)    5/12- POD #1 feels well no CP     MEDICATIONS  (STANDING):  aspirin  chewable 81 milliGRAM(s) Oral daily  atorvastatin 40 milliGRAM(s) Oral at bedtime  ceFAZolin   IVPB 2000 milliGRAM(s) IV Intermittent every 12 hours  dextrose 40% Gel 15 Gram(s) Oral once  dextrose 5%. 1000 milliLiter(s) (50 mL/Hr) IV Continuous <Continuous>  dextrose 5%. 1000 milliLiter(s) (100 mL/Hr) IV Continuous <Continuous>  dextrose 50% Injectable 25 Gram(s) IV Push once  dextrose 50% Injectable 12.5 Gram(s) IV Push once  dextrose 50% Injectable 25 Gram(s) IV Push once  folic acid 1 milliGRAM(s) Oral at bedtime  gabapentin 300 milliGRAM(s) Oral three times a day  glucagon  Injectable 1 milliGRAM(s) IntraMuscular once  heparin   Injectable 5000 Unit(s) SubCutaneous every 12 hours  insulin glargine Injectable (LANTUS) 5 Unit(s) SubCutaneous at bedtime  insulin lispro (ADMELOG) corrective regimen sliding scale   SubCutaneous three times a day before meals  metoprolol tartrate 25 milliGRAM(s) Oral two times a day  PARoxetine 20 milliGRAM(s) Oral daily  sodium chloride 0.45%. 1000 milliLiter(s) (75 mL/Hr) IV Continuous <Continuous>  tiotropium 18 MICROgram(s) Capsule 1 Capsule(s) Inhalation daily    MEDICATIONS  (PRN):  acetaminophen   Tablet .. 650 milliGRAM(s) Oral every 6 hours PRN Mild Pain (1 - 3)  ALBUTerol    90 MICROgram(s) HFA Inhaler 1 Puff(s) Inhalation every 6 hours PRN Shortness of Breath  morphine  - Injectable 5 milliGRAM(s) IV Push every 3 hours PRN Severe Pain (7 - 10)            Vital Signs Last 24 Hrs  T(C): 36.5 (12 May 2021 05:40), Max: 37.6 (11 May 2021 19:19)  T(F): 97.7 (12 May 2021 05:40), Max: 99.6 (11 May 2021 19:19)  HR: 83 (12 May 2021 07:00) (67 - 91)  BP: 118/53 (12 May 2021 07:00) (103/54 - 169/70)  BP(mean): 69 (12 May 2021 07:00) (66 - 87)  RR: 20 (12 May 2021 07:00) (9 - 20)  SpO2: 95% (12 May 2021 07:00) (92% - 100%)            INTERPRETATION OF TELEMETRY: SR    ECG:        LABS:                        10.0   2.79  )-----------( 158      ( 12 May 2021 06:35 )             31.6     05-12    136  |  109<H>  |  53<H>  ----------------------------<  141<H>  4.4   |  22  |  1.15    Ca    8.2<L>      12 May 2021 06:35  Phos  4.8     05-11  Mg     2.2     05-11              I&O's Summary    11 May 2021 07:01  -  12 May 2021 07:00  --------------------------------------------------------  IN: 2633 mL / OUT: 2975 mL / NET: -342 mL      BNP  RADIOLOGY & ADDITIONAL STUDIES:

## 2021-05-12 NOTE — PROGRESS NOTE ADULT - ASSESSMENT
* Infected graft POD#6 with MSSA bacteremia on cefazolin   - s/p muscle flap 5/11   - will need long term IV abx as per ID - midline placement     * New acute on Chronic systolic HF in the settings of oliguric ORLY/PAT - now resolved, ORLY now resolved   - monitor I&Os and renal fx, keep FQ until more ambulatory    *H/o depression / HTN / RA /ILD/ Pulm HTN - c/w home meds and f/u outpatient for further management    * Right LE DVT Feb 2021 with superficial left basilic vein non-occlusive clot off Eliquis   - to resume AC after sx if ok with vascular   - doppler leg negative - resume AC when OK with Vascular    * DMII - HISS and resumed lantus at low dose - will titrate up as needed    * Incidental finding of 2.5 cm right renal lesion amd 1.5 cm left breat - daughter MD aware, outpt follow up    * Weakness/Lethargy - ? debility due to medical course vs oversedation with pain meds - hold pain meds today    Daughter MD updated [phone] cell

## 2021-05-12 NOTE — PROGRESS NOTE ADULT - ASSESSMENT
SOB,chronic compensated HFref- LVEF 35-40%- NYHA class III- complicated by CKD and hypoalbuminemia. Cardiorenal syndrome.   2/2021 , LVEF 55-60% on echo  2/2021-LHC-  moderate disease in distal LAD.  euvolemic on exam.  Repeat echo showed LVEF of about 40%.   Increased metoprlol to 25mg BID.         Renal failure- resolved   contrast nephropathy.  Hold lisinopril for now given borderline low BP and renal function.        PVD- S/p surgery yesterday       Bacteremia- on abx. Management per primary team.     seems stable from a cardiac standpoint

## 2021-05-13 LAB
ANION GAP SERPL CALC-SCNC: 7 MMOL/L — SIGNIFICANT CHANGE UP (ref 5–17)
BUN SERPL-MCNC: 45 MG/DL — HIGH (ref 7–23)
CALCIUM SERPL-MCNC: 8.6 MG/DL — SIGNIFICANT CHANGE UP (ref 8.5–10.1)
CHLORIDE SERPL-SCNC: 112 MMOL/L — HIGH (ref 96–108)
CO2 SERPL-SCNC: 22 MMOL/L — SIGNIFICANT CHANGE UP (ref 22–31)
CREAT SERPL-MCNC: 0.86 MG/DL — SIGNIFICANT CHANGE UP (ref 0.5–1.3)
GLUCOSE SERPL-MCNC: 135 MG/DL — HIGH (ref 70–99)
HCT VFR BLD CALC: 30.1 % — LOW (ref 34.5–45)
HGB BLD-MCNC: 9.6 G/DL — LOW (ref 11.5–15.5)
MCHC RBC-ENTMCNC: 29.7 PG — SIGNIFICANT CHANGE UP (ref 27–34)
MCHC RBC-ENTMCNC: 31.9 GM/DL — LOW (ref 32–36)
MCV RBC AUTO: 93.2 FL — SIGNIFICANT CHANGE UP (ref 80–100)
PLATELET # BLD AUTO: 113 K/UL — LOW (ref 150–400)
POTASSIUM SERPL-MCNC: 4.2 MMOL/L — SIGNIFICANT CHANGE UP (ref 3.5–5.3)
POTASSIUM SERPL-SCNC: 4.2 MMOL/L — SIGNIFICANT CHANGE UP (ref 3.5–5.3)
RBC # BLD: 3.23 M/UL — LOW (ref 3.8–5.2)
RBC # FLD: 15.7 % — HIGH (ref 10.3–14.5)
SODIUM SERPL-SCNC: 141 MMOL/L — SIGNIFICANT CHANGE UP (ref 135–145)
WBC # BLD: 3.9 K/UL — SIGNIFICANT CHANGE UP (ref 3.8–10.5)
WBC # FLD AUTO: 3.9 K/UL — SIGNIFICANT CHANGE UP (ref 3.8–10.5)

## 2021-05-13 PROCEDURE — 99233 SBSQ HOSP IP/OBS HIGH 50: CPT

## 2021-05-13 RX ORDER — METOPROLOL TARTRATE 50 MG
50 TABLET ORAL DAILY
Refills: 0 | Status: DISCONTINUED | OUTPATIENT
Start: 2021-05-13 | End: 2021-05-21

## 2021-05-13 RX ORDER — CEFAZOLIN SODIUM 1 G
2000 VIAL (EA) INJECTION EVERY 8 HOURS
Refills: 0 | Status: DISCONTINUED | OUTPATIENT
Start: 2021-05-13 | End: 2021-05-21

## 2021-05-13 RX ADMIN — GABAPENTIN 300 MILLIGRAM(S): 400 CAPSULE ORAL at 06:04

## 2021-05-13 RX ADMIN — GABAPENTIN 300 MILLIGRAM(S): 400 CAPSULE ORAL at 13:51

## 2021-05-13 RX ADMIN — Medication 100 MILLIGRAM(S): at 11:02

## 2021-05-13 RX ADMIN — Medication 50 MILLIGRAM(S): at 11:02

## 2021-05-13 RX ADMIN — HEPARIN SODIUM 5000 UNIT(S): 5000 INJECTION INTRAVENOUS; SUBCUTANEOUS at 11:02

## 2021-05-13 RX ADMIN — Medication 100 MILLIGRAM(S): at 17:47

## 2021-05-13 RX ADMIN — Medication 1: at 14:05

## 2021-05-13 RX ADMIN — TIOTROPIUM BROMIDE 1 CAPSULE(S): 18 CAPSULE ORAL; RESPIRATORY (INHALATION) at 08:13

## 2021-05-13 RX ADMIN — Medication 1 MILLIGRAM(S): at 23:15

## 2021-05-13 RX ADMIN — INSULIN GLARGINE 5 UNIT(S): 100 INJECTION, SOLUTION SUBCUTANEOUS at 00:04

## 2021-05-13 RX ADMIN — INSULIN GLARGINE 5 UNIT(S): 100 INJECTION, SOLUTION SUBCUTANEOUS at 23:15

## 2021-05-13 RX ADMIN — HEPARIN SODIUM 5000 UNIT(S): 5000 INJECTION INTRAVENOUS; SUBCUTANEOUS at 23:15

## 2021-05-13 RX ADMIN — Medication 20 MILLIGRAM(S): at 11:03

## 2021-05-13 RX ADMIN — ATORVASTATIN CALCIUM 40 MILLIGRAM(S): 80 TABLET, FILM COATED ORAL at 23:15

## 2021-05-13 RX ADMIN — GABAPENTIN 300 MILLIGRAM(S): 400 CAPSULE ORAL at 23:15

## 2021-05-13 RX ADMIN — Medication 81 MILLIGRAM(S): at 11:03

## 2021-05-13 NOTE — PHYSICAL THERAPY INITIAL EVALUATION ADULT - GENERAL OBSERVATIONS, REHAB EVAL
Pt seen for 45min PT bedside Eval as pt reports she was up earlier would like to rest. Pt POD#1 s/p fem-pop bypass wash post debridement. Pt rec'd semi supine in bed in NAD, LLE elevate, dressing C/D/I, son in law bedside, ICU monitoring.

## 2021-05-13 NOTE — PROGRESS NOTE ADULT - ASSESSMENT
85 y/o Female with h/o COPD, arthritis, CAD, DMII, depression, HTN, PVD, PAD, s/p left fem pop bypass on 03/24/21, lower ext DVT on eliquis, chronic left heel ulcer, RA, anemia was admitted on 5/3 at [place] with worsening dyspnea, wheezing that started that night. Pt received 2 Duoneb treatments at home, with only slight improvement. At home patient was hypoxic to 87% on room air, was placed on 100% NRB mask. In the ED at [place], pt was placed on 2 LPM via NC, received a dose of Lasix 20 mg IV x 1. Her respiratory symptoms have improved with diuresis. Her troponin levels were monitored. Cardiology was consulted, advised troponin elevated due to type II MI.  On her first night of admission at [place] 5/3 patient developed fever which has persisted throughout admission. ID was consulted, patient was started on vancomycin and meropenem. Overnight on 5/4-5/5 patient had extensive purulent drainage from left medial thigh surgical site. Plastic surgery was consulted, advised transfer to [facility] for evaluation by Dr Ventura who did fem pop bypass 3/2021. In ED at [facility] on 5/5, she was continued on meropenem.    1. Postsurgical wound infection with MSSA s/p washout. Recent left femoral-popliteal bypass. Probable underlying vascular graft infection. s/p MSSA sepsis. ARF on CRF stage 3. Allergy to cephalosporins and PCN.  -cultures reviewed  -renal function is improving  -on cefazolin 1 gm IV q12h # 8  -tolerating abx well so far; no side effects noted  -monitor closely in [place] of PCN allergy history   -local wound care  -vascular evaluation appreciated  -s/p leg surgery yesterday - vascular graft is likely tainted with infection  -increase cefazolin dose to 2 gm IV q8h in [place] of renal function improving  -continue abx coverage  -will need longer term IV abx therapy  -monitor temps  -f/u CBC  -supportive care  2. Other issues:   -care per medicine    d/w Dr. Ventura and Dr. MOMO Leon

## 2021-05-13 NOTE — PHYSICAL THERAPY INITIAL EVALUATION ADULT - MANUAL MUSCLE TESTING RESULTS, REHAB EVAL
Strength grossly 3+/5 BUE, 3+/5 RLE except R ankle DF 2+/5, LLE grossly 3-/5 limited 2/2 pain./grossly assessed due to

## 2021-05-13 NOTE — PROGRESS NOTE ADULT - ASSESSMENT
SOB,chronic compensated HFref- LVEF 35-40%- NYHA class III- complicated by CKD and hypoalbuminemia. Cardiorenal syndrome.   2/2021 , LVEF 55-60% on echo  2/2021-LHC-  moderate disease in distal LAD.  euvolemic on exam.  Repeat echo showed LVEF of about 40%.   Nonischemic cardiomyopathy  Will switch to toprol XL.  Pt to be started on lisinopril 5mg daily if ok by nephrology team.  Renal function normal.       Renal failure- improved .  contrast nephropathy.        PVD- vascular team following pt.  onabx.       Bacteremia- on abx. Management per primary team.     HTN- BP mildly elevated.  Meds as stated above.      Other medical issues- Management per primary team.   Thank you for allowing me to participate in the care of this patient. Please feel free to contact me with any questions.    SOB,chronic compensated HFref- LVEF 35-40%- NYHA class III- complicated by CKD and hypoalbuminemia. Cardiorenal syndrome.   2/2021 , LVEF 55-60% on echo  2/2021-LHC-  moderate disease in distal LAD.  euvolemic on exam.  Repeat echo showed LVEF of about 40%.   Nonischemic cardiomyopathy  Will switch to toprol XL.  Pt to be started on entresto if ok by nephrology team.  Renal function normal.       Renal failure- improved .  contrast nephropathy.        PVD- vascular team following pt.  onabx.       Bacteremia- on abx. Management per primary team.     HTN- BP mildly elevated.  Meds as stated above.      Other medical issues- Management per primary team.   Thank you for allowing me to participate in the care of this patient. Please feel free to contact me with any questions.

## 2021-05-13 NOTE — PROGRESS NOTE ADULT - ASSESSMENT
* Infected graft POD#7 with MSSA bacteremia on cefazolin   - s/p muscle flap 5/11   - will need long term IV abx as per ID - PICC placement     * New acute on Chronic systolic HF in the settings of oliguric ORLY/PAT - now resolved, ORLY now resolved   - monitor I&Os and renal fx, keep FQ until more ambulatory - voiding trial today and stop IVF    *H/o depression / HTN / RA /ILD/ Pulm HTN - c/w home meds and f/u outpatient for further management    * Right LE DVT Feb 2021 with superficial left basilic vein non-occlusive clot off Eliquis   - to resume AC after sx if ok with vascular   - doppler leg negative - resume AC when OK with Vascular    * DMII - HISS and resumed lantus at low dose - will titrate up as needed    * Incidental finding of 2.5 cm right renal lesion amd 1.5 cm left breat - daughter MD aware, outpt follow up    * Weakness/Lethargy - iatrogenic with pain meds - held and resolved    Discussed with Dr. Ventura and Dr. Ortega    Daughter MD updated [phone] cell

## 2021-05-13 NOTE — PROGRESS NOTE ADULT - SUBJECTIVE AND OBJECTIVE BOX
Date of service: 05-13-21 @ 11:33    Lying in bed in NAD  Weak looking  Has left thigh local pain  Has low grade fever    ROS: denies dizziness, no HA, no SOB or cough, no abdominal pain, no diarrhea or constipation; no dysuria    MEDICATIONS  (STANDING):  aspirin  chewable 81 milliGRAM(s) Oral daily  atorvastatin 40 milliGRAM(s) Oral at bedtime  ceFAZolin   IVPB 2000 milliGRAM(s) IV Intermittent every 12 hours  dextrose 40% Gel 15 Gram(s) Oral once  dextrose 5%. 1000 milliLiter(s) (100 mL/Hr) IV Continuous <Continuous>  dextrose 5%. 1000 milliLiter(s) (50 mL/Hr) IV Continuous <Continuous>  dextrose 50% Injectable 25 Gram(s) IV Push once  dextrose 50% Injectable 12.5 Gram(s) IV Push once  dextrose 50% Injectable 25 Gram(s) IV Push once  folic acid 1 milliGRAM(s) Oral at bedtime  gabapentin 300 milliGRAM(s) Oral three times a day  glucagon  Injectable 1 milliGRAM(s) IntraMuscular once  heparin   Injectable 5000 Unit(s) SubCutaneous every 12 hours  insulin glargine Injectable (LANTUS) 5 Unit(s) SubCutaneous at bedtime  insulin lispro (ADMELOG) corrective regimen sliding scale   SubCutaneous three times a day before meals  metoprolol succinate ER 50 milliGRAM(s) Oral daily  PARoxetine 20 milliGRAM(s) Oral daily  sodium chloride 0.45%. 1000 milliLiter(s) (40 mL/Hr) IV Continuous <Continuous>  tiotropium 18 MICROgram(s) Capsule 1 Capsule(s) Inhalation daily    Vital Signs Last 24 Hrs  T(C): 36.8 (13 May 2021 06:00), Max: 38.1 (12 May 2021 21:37)  T(F): 98.2 (13 May 2021 06:00), Max: 100.5 (12 May 2021 21:37)  HR: 99 (13 May 2021 11:00) (73 - 106)  BP: 145/41 (13 May 2021 11:00) (129/47 - 153/46)  BP(mean): 67 (13 May 2021 11:00) (48 - 74)  RR: 17 (13 May 2021 09:00) (16 - 22)  SpO2: 100% (13 May 2021 11:00) (92% - 100%)     Physical exam:    Constitutional:  No acute distress  HEENT: NC/AT, EOMI, PERRLA, conjunctivae clear  Neck: supple; thyroid not palpable  Back: no tenderness  Respiratory: respiratory effort normal; crackles at bases  Cardiovascular: S1S2 regular, no murmurs  Abdomen: soft, not tender, not distended, positive BS  Genitourinary: no suprapubic tenderness  Lymphatic: no LN palpable  Musculoskeletal: no muscle tenderness, no joint swelling or tenderness  Extremities: 1+ pedal edema  Left thigh open wound s/p I and D; packed; dressing  Neurological/ Psychiatric: AxOx3, moving all extremities  Skin: no rashes; no palpable lesions    Labs: reviewed                        9.6    3.90  )-----------( 113      ( 13 May 2021 05:47 )             30.1     05-13    141  |  112<H>  |  45<H>  ----------------------------<  135<H>  4.2   |  22  |  0.86    Ca    8.6      13 May 2021 05:47  Phos  4.8     05-11  Mg     2.2     05-11                                  10.1   7.90  )-----------( 199      ( 06 May 2021 05:49 )             31.3     05-06    134<L>  |  106  |  51<H>  ----------------------------<  199<H>  4.7   |  20<L>  |  1.39<H>    Ca    8.0<L>      06 May 2021 05:49    TPro  5.9<L>  /  Alb  1.9<L>  /  TBili  0.6  /  DBili  x   /  AST  5<L>  /  ALT  <6<L>  /  AlkPhos  71  05-05     LIVER FUNCTIONS - ( 05 May 2021 11:59 )  Alb: 1.9 g/dL / Pro: 5.9 gm/dL / ALK PHOS: 71 U/L / ALT: <6 U/L / AST: 5 U/L / GGT: x             Culture - Fungal, Other (collected 05 May 2021 16:34)  Source: .Other None  Preliminary Report (06 May 2021 09:04):    Testing in progress    Culture - Surgical Swab (collected 05 May 2021 16:34)  Source: .Surgical Swab None  Preliminary Report (06 May 2021 20:09):    Moderate Staphylococcus aureus  Organism: Staphylococcus aureus (07 May 2021 19:05)  Organism: Staphylococcus aureus (07 May 2021 19:05)      -  Ampicillin/Sulbactam: S <=8/4      -  Cefazolin: S <=4      -  Clindamycin: S <=0.25      -  Erythromycin: S <=0.25      -  Gentamicin: S <=1 Should not be used as monotherapy      -  Oxacillin: S <=0.25      -  RIF- Rifampin: S <=1 Should not be used as monotherapy      -  Tetra/Doxy: S <=1      -  Trimethoprim/Sulfamethoxazole: S <=0.5/9.5      -  Vancomycin: S 1      Method Type: SIMEON    Culture - Fungal, Other (collected 05 May 2021 16:34)  Source: .Other None  Preliminary Report (06 May 2021 09:04):    Testing in progress    Culture - Surgical Swab (collected 05 May 2021 16:34)  Source: .Surgical Swab None  Preliminary Report (06 May 2021 20:04):    Moderate Staphylococcus aureus  Organism: Staphylococcus aureus (07 May 2021 19:06)  Organism: Staphylococcus aureus (07 May 2021 19:06)      -  Ampicillin/Sulbactam: S <=8/4      -  Cefazolin: S <=4      -  Clindamycin: S <=0.25      -  Erythromycin: S <=0.25      -  Gentamicin: S <=1 Should not be used as monotherapy      -  Oxacillin: S <=0.25      -  RIF- Rifampin: S <=1 Should not be used as monotherapy      -  Tetra/Doxy: S <=1      -  Trimethoprim/Sulfamethoxazole: S <=0.5/9.5      -  Vancomycin: S 2      Method Type: SIMEON    Culture - Other (collected 04 May 2021 23:00)  Source: .Other wound - L thigh  Final Report (07 May 2021 07:28):    Numerous Staphylococcus aureus  Organism: Staphylococcus aureus (07 May 2021 07:28)  Organism: Staphylococcus aureus (07 May 2021 07:28)      -  Ampicillin/Sulbactam: S <=8/4      -  Cefazolin: S <=4      -  Clindamycin: S <=0.25      -  Erythromycin: S <=0.25      -  Gentamicin: S <=1 Should not be used as monotherapy      -  Oxacillin: S <=0.25      -  RIF- Rifampin: S <=1 Should not be used as monotherapy      -  Tetra/Doxy: S <=1      -  Trimethoprim/Sulfamethoxazole: S <=0.5/9.5      -  Vancomycin: S 1      Method Type: SIMEON    Culture - Blood (collected 03 May 2021 22:10)  Source: .Blood Blood-Peripheral  Final Report (09 May 2021 03:00):    No Growth Final    Culture - Blood (collected 03 May 2021 22:10)  Source: .Blood Blood-Peripheral  Gram Stain (07 May 2021 01:14):    Growth in aerobic bottle: Gram Positive Cocci in Clusters  Final Report (08 May 2021 17:32):    Growth in aerobic bottle: Staphylococcus aureus  Organism: Blood Culture PCR  Staphylococcus aureus (08 May 2021 17:32)  Organism: Staphylococcus aureus (08 May 2021 17:32)      -  Ampicillin/Sulbactam: S <=8/4      -  Cefazolin: S <=4      -  Clindamycin: S <=0.25      -  Erythromycin: S <=0.25      -  Gentamicin: S <=1 Should not be used as monotherapy      -  Oxacillin: S <=0.25      -  RIF- Rifampin: S <=1 Should not be used as monotherapy      -  Tetra/Doxy: S <=1      -  Trimethoprim/Sulfamethoxazole: S <=0.5/9.5      -  Vancomycin: S 2      Method Type: SIMEON  Organism: Blood Culture PCR (08 May 2021 17:32)      -  Staphylococcus aureus: Detec Any isolate of Staphylococcus aureus from a blood culture is NOT considered a contaminant.      Method Type: PCR    COVID-19 PCR: Calin (05-03-21 @ 23:17)    Radiology: all available radiological tests reviewed    Advanced directives addressed: full resuscitation

## 2021-05-13 NOTE — PHYSICAL THERAPY INITIAL EVALUATION ADULT - ADDITIONAL COMMENTS
IMAGING- CTA Pelvis/b/l LE: Severe stenosis of the left common iliac artery. Patent left femoropopliteal bypass graft. Moderate stenosis of the popliteal artery distal to the graft insertion. Patent left infrapopliteal arteries with three-vessel runoff to the left foot. Patent right common iliac artery stent. Multiple severe stenoses at the femoral, popliteal and infrapopliteal levels of the right leg. Left lower thigh fluid collection adjacent to and surrounding the distal aspect of the femoropopliteal bypass graft, possibly an abscess. Pt reports able to walk with RW prior, requires assist for stairs, indep with some ADLs, requires assist for other ADLs. Pt owns RW, transport w/c    IMAGING- CTA Pelvis/b/l LE: Severe stenosis of the left common iliac artery. Patent left femoropopliteal bypass graft. Moderate stenosis of the popliteal artery distal to the graft insertion. Patent left infrapopliteal arteries with three-vessel runoff to the left foot. Patent right common iliac artery stent. Multiple severe stenoses at the femoral, popliteal and infrapopliteal levels of the right leg. Left lower thigh fluid collection adjacent to and surrounding the distal aspect of the femoropopliteal bypass graft, possibly an abscess.

## 2021-05-13 NOTE — PHYSICAL THERAPY INITIAL EVALUATION ADULT - PLANNED THERAPY INTERVENTIONS, PT EVAL
GOAL: Pt will perform 2 stairs with or without U HR as needed within 4weeks./balance training/bed mobility training/gait training/strengthening/transfer training

## 2021-05-13 NOTE — PHYSICAL THERAPY INITIAL EVALUATION ADULT - ACTIVE RANGE OF MOTION EXAMINATION, REHAB EVAL
LLE limited 2/2 pain/bilateral upper extremity Active ROM was WFL (within functional limits)/bilateral  lower extremity Active ROM was WFL (within functional limits)

## 2021-05-13 NOTE — PHYSICAL THERAPY INITIAL EVALUATION ADULT - PERTINENT HX OF CURRENT PROBLEM, REHAB EVAL
85yo F w/ PMH COPD, arthritis, CAD, DM2, depression, HTN, PVD, PAD, s/p left fempop bypass on 3/24/21, +lower ext DVT on eliquis, +chronic Lt heel ulcer, RA, anemia, presented to the ED at [place] on 5/3 with worsening dyspnea, wheezing that started that night.

## 2021-05-13 NOTE — PHYSICAL THERAPY INITIAL EVALUATION ADULT - DID THE PATIENT HAVE SURGERY?
exploration of infected incision L distal medial thigh, wash out with antibiotic solution, debridement of incision; cultures/yes

## 2021-05-13 NOTE — PROGRESS NOTE ADULT - SUBJECTIVE AND OBJECTIVE BOX
86 y.o. Female with PMH of COPD, arthritis, CAD, DMII, depression, HTN, PVD, PAD, s/p left fem pop bypass on 03/24/21, +lower ext DVT on eliquis, +chronic left heel ulcer, RA, anemia, presented to the ED at [place] on 5/3 with worsening dyspnea, wheezing that started that night. Pt received 2 Duoneb treatments at home, with only slight improvement, so EMS called. At home patient was hypoxic to 87% on room air, was placed on 100% NRB mask. In the ED at [place], pt was placed on 2 LPM via NC, received a dose of Lasix 20 mg IV x 1. Initial trop noted to be 0.17.  BNP >13132  On her first night of admission at [place] 5/3 patient developed fever which has persisted throughout admission. ID was consulted, patient was started on vancomycin and meropenem. Overnight on 5/4-5/5 patient had extensive purulent drainage from left medial thigh surgical site. Plastic surgery was consulted, advised transfer to [facility] for evaluation by Dr Ventura who did fem pop bypass 3/2021.      Was taking to OR : purulent fluid in L thigh popliteal fossa above knee; necrotic infected fibrinous material around graft and on tissue in popliteal fossa  oliguric suspect PAT;  from anuric she is in polyuric phase today    INTERVAL HPI: comfortable in the chair, no pain, s/p muscle flap on 5/11, lethargy resolved  Other ROS reviewed and neg     Vital Signs Last 24 Hrs  T(C): 36.8 (13 May 2021 06:00), Max: 38.1 (12 May 2021 21:37)  T(F): 98.2 (13 May 2021 06:00), Max: 100.5 (12 May 2021 21:37)  HR: 99 (13 May 2021 11:00) (73 - 106)  BP: 145/41 (13 May 2021 11:00) (129/47 - 153/46)  BP(mean): 67 (13 May 2021 11:00) (48 - 74)  RR: 17 (13 May 2021 09:00) (16 - 22)  I&O's Detail    12 May 2021 07:01  -  13 May 2021 07:00  --------------------------------------------------------  IN:    sodium chloride 0.45%: 795 mL  Total IN: 795 mL    OUT:    Drain (mL): 35 mL    Indwelling Catheter - Urethral (mL): 3100 mL  Total OUT: 3135 mL    Total NET: -2340 mL                       9.6    3.90  )-----------( 113      ( 13 May 2021 05:47 )             30.1     13 May 2021 05:47    141    |  112    |  45     ----------------------------<  135    4.2     |  22     |  0.86     Ca    8.6        13 May 2021 05:47  Phos  4.8       11 May 2021 17:20  Mg     2.2       11 May 2021 17:20    CAPILLARY BLOOD GLUCOSE    POCT Blood Glucose.: 146 mg/dL (13 May 2021 08:33)  POCT Blood Glucose.: 213 mg/dL (12 May 2021 22:32)  POCT Blood Glucose.: 185 mg/dL (12 May 2021 17:55)  POCT Blood Glucose.: 210 mg/dL (12 May 2021 13:14)    MEDICATIONS  (STANDING):  aspirin  chewable 81 milliGRAM(s) Oral daily  atorvastatin 40 milliGRAM(s) Oral at bedtime  ceFAZolin   IVPB 2000 milliGRAM(s) IV Intermittent every 8 hours  dextrose 40% Gel 15 Gram(s) Oral once  dextrose 5%. 1000 milliLiter(s) (100 mL/Hr) IV Continuous <Continuous>  dextrose 5%. 1000 milliLiter(s) (50 mL/Hr) IV Continuous <Continuous>  dextrose 50% Injectable 25 Gram(s) IV Push once  dextrose 50% Injectable 12.5 Gram(s) IV Push once  dextrose 50% Injectable 25 Gram(s) IV Push once  folic acid 1 milliGRAM(s) Oral at bedtime  gabapentin 300 milliGRAM(s) Oral three times a day  glucagon  Injectable 1 milliGRAM(s) IntraMuscular once  heparin   Injectable 5000 Unit(s) SubCutaneous every 12 hours  insulin glargine Injectable (LANTUS) 5 Unit(s) SubCutaneous at bedtime  insulin lispro (ADMELOG) corrective regimen sliding scale   SubCutaneous three times a day before meals  metoprolol succinate ER 50 milliGRAM(s) Oral daily  PARoxetine 20 milliGRAM(s) Oral daily  sodium chloride 0.45%. 1000 milliLiter(s) (40 mL/Hr) IV Continuous <Continuous>  tiotropium 18 MICROgram(s) Capsule 1 Capsule(s) Inhalation daily    MEDICATIONS  (PRN):  acetaminophen   Tablet .. 650 milliGRAM(s) Oral every 6 hours PRN Mild Pain (1 - 3)  ALBUTerol    90 MICROgram(s) HFA Inhaler 1 Puff(s) Inhalation every 6 hours PRN Shortness of Breath  oxyCODONE    IR 5 milliGRAM(s) Oral every 6 hours PRN Severe Pain (7 - 10)    RADIOLOGY: personally visualized    PHYSICAL EXAM:    Constitutional: elderly female in no acute distress sitting up in chair   Head: Head is normocephalic and atraumatic.    Neck: No JVD.   Cardiovascular: Regular rate and rhythm without S3, S4. No murmurs or rubs are appreciated.    Respiratory: Decreased BS at bases with + crackles  Abdomen: Soft, nontender, nondistended with positive bowel sounds.    Extremity: No tenderness, no pitting edema, Left thigh area in dressing, LLE distal trace edema  Neurologic: The patient is alert and oriented.  Non focal, lethargic but easily arousable  Skin: Left thigh dressing   : + FQ to gravity      TTE Summary:   1. Left ventricular ejection fraction, by visual estimation, is 35 to 40%.   2. Moderately toseverely decreased global left ventricular systolic function.   3. Multiple left ventricular regional wall motion abnormalities exist. See wall motion findings.   4. Mildly increased LV wall thickness.   5. Normal left ventricular internal cavity size.   6. Spectral Doppler shows impaired relaxation pattern of left ventricular myocardial filling (Grade I diastolic dysfunction).   7. There is mild concentric left ventricular hypertrophy.   8. Mild mitral annular calcification.   9. Mild mitral valve regurgitation.  10. Thickening and calcification of the anterior and posterior mitral valve leaflets.  11. Mild tricuspid regurgitation.  12. Mild aortic regurgitation.  13. Sclerotic aortic valve with normal opening.  14. Estimated pulmonary artery systolic pressure is 36.6 mmHg assuming a right atrial pressure of 10 mmHg, which is consistent with borderline pulmonary hypertension.

## 2021-05-13 NOTE — PHYSICAL THERAPY INITIAL EVALUATION ADULT - PRECAUTIONS/LIMITATIONS, REHAB EVAL
CONTINUED- Pt received 2 Duoneb tx at home, w/ slight improvement, so EMS called. 5/4-5/5 pt had extensive purulent drainage from Lt medial thigh surgical site. Plastic surgery was consulted, trans to [facility] for evaluation by Dr Ventura who did fempop bypass 3/2021./cardiac precautions/fall precautions

## 2021-05-13 NOTE — PROGRESS NOTE ADULT - SUBJECTIVE AND OBJECTIVE BOX
Patient is a 86y old  Female who presents with a chief complaint of wound.       HPI:  86y Female with PMH of COPD, arthritis, CAD, DMII, depression, HTN, PVD, PAD, s/p left fem pop bypass on 03/24/21, +lower ext DVT on eliquis, +chronic left heel ulcer, RA, anemia, presented to the ED at [place] on 5/3 with worsening dyspnea, wheezing that started that night. Pt received 2 Duoneb treatments at home, with only slight improvement, so EMS called. At home patient was hypoxic to 87% on room air, was placed on 100% NRB mask. In the ED at [place], pt was placed on 2 LPM via NC, received a dose of Lasix 20 mg IV x 1. Initial trop noted to be 0.17.  BNP >53840    Her respiratory symptoms have improved with diuresis.     On her first night of admission at [place] 5/3 patient developed fever which has persisted throughout admission. ID was consulted, patient was started on vancomycin and meropenem. Overnight on 5/4-5/5 patient had extensive purulent drainage from left medial thigh surgical site. Plastic surgery was consulted, advised transfer to [facility] for evaluation by Dr Ventura who did fem pop bypass 3/2021.      In ED at [facility] on 5/5, patient had no complaints.  Denied any CP, SOB, lower extremity pain, numbness or tingling.      5/6-   Pt seen now. She is fully alert and was able to give all the history , son in law at bedside.  Pt states that her SOB is better.    NO CP.    5/7- Pt seen this am. Pt denies any chest pain or SOB.  She was able to lie down flat in bed for a long time without any SOB.  Overnight urine output poor.     5/10- pt seen this am.  Pt denies any symptoms this am. She was happy she was able to make urine.     5/11- pt seen this am.  No symptoms or overnight events.     5/13- pt seen this am.  Pt denies any symptoms.     PAST MEDICAL & SURGICAL HISTORY:  Hypertension    Anemia    Arthritis, rheumatoid    Depression    Type 2 diabetes mellitus  on insulin    PVD (peripheral vascular disease) with claudication    CAD (coronary artery disease)  non-obstructive    Rheumatoid arthritis    fracture ankle right  plate . screws   2000    S/P cataract surgery  2011    Hip fracture requiring operative repair  Right hip 11/14/2020        MEDICATIONS  (STANDING):  aspirin  chewable 81 milliGRAM(s) Oral daily  atorvastatin 40 milliGRAM(s) Oral at bedtime  ceFAZolin   IVPB 2000 milliGRAM(s) IV Intermittent every 12 hours  dextrose 40% Gel 15 Gram(s) Oral once  dextrose 5%. 1000 milliLiter(s) (50 mL/Hr) IV Continuous <Continuous>  dextrose 5%. 1000 milliLiter(s) (100 mL/Hr) IV Continuous <Continuous>  dextrose 50% Injectable 25 Gram(s) IV Push once  dextrose 50% Injectable 12.5 Gram(s) IV Push once  dextrose 50% Injectable 25 Gram(s) IV Push once  folic acid 1 milliGRAM(s) Oral at bedtime  furosemide   Injectable 40 milliGRAM(s) IV Push daily  gabapentin 300 milliGRAM(s) Oral three times a day  glucagon  Injectable 1 milliGRAM(s) IntraMuscular once  heparin   Injectable 5000 Unit(s) SubCutaneous every 12 hours  insulin lispro (ADMELOG) corrective regimen sliding scale   SubCutaneous three times a day before meals  lisinopril 5 milliGRAM(s) Oral daily  metoprolol tartrate 25 milliGRAM(s) Oral two times a day  PARoxetine 20 milliGRAM(s) Oral daily  tiotropium 18 MICROgram(s) Capsule 1 Capsule(s) Inhalation daily    MEDICATIONS  (PRN):  acetaminophen   Tablet .. 650 milliGRAM(s) Oral every 6 hours PRN Mild Pain (1 - 3)  ALBUTerol    90 MICROgram(s) HFA Inhaler 1 Puff(s) Inhalation every 6 hours PRN Shortness of Breath  morphine  - Injectable 5 milliGRAM(s) IV Push every 3 hours PRN Severe Pain (7 - 10)      FAMILY HISTORY:  FH: colon cancer  father    FH: heart attack  father    Family history of atherosclerosis  mother        SOCIAL HISTORY: no recent smoking     REVIEW OF SYSTEMS:  CONSTITUTIONAL:    No fatigue, malaise, lethargy.  No fever or chills.   RESPIRATORY:  No cough.  No wheeze.  No hemoptysis.  no shortness of breath.  CARDIOVASCULAR:  No chest pains.  No palpitations. no shortness of breath, No orthopnea or PND.  GASTROINTESTINAL:  No abdominal pain.  No nausea or vomiting.    GENITOURINARY:    No hematuria.    MUSCULOSKELETAL:  c/o L thigh pain   NEUROLOGICAL:  No tingling or numbness or weakness.  PSYCHIATRIC:  No confusion  SKIN:  No rashes.        ICU Vital Signs Last 24 Hrs  T(C): 36.8 (13 May 2021 06:00), Max: 38.1 (12 May 2021 21:37)  T(F): 98.2 (13 May 2021 06:00), Max: 100.5 (12 May 2021 21:37)  HR: 89 (13 May 2021 08:13) (73 - 106)  BP: 144/45 (13 May 2021 06:00) (111/50 - 153/46)  BP(mean): 72 (13 May 2021 06:00) (67 - 74)  ABP: --  ABP(mean): --  RR: 17 (13 May 2021 06:00) (12 - 22)  SpO2: 95% (13 May 2021 08:13) (92% - 95%)        PHYSICAL EXAM-    Constitutional: elderly female in no acute distress sitting up in bed now.     Head: Head is normocephalic and atraumatic.      Neck: No JVD.     Cardiovascular: Regular rate and rhythm without S3, S4. No murmurs or rubs are appreciated.      Respiratory: B/l faint  rales     Abdomen: Soft, nontender, nondistended with positive bowel sounds.      Extremity: No tenderness. pitting edema  1+ b/l LE    Neurologic: The patient is alert and oriented.      Skin: L thigh dressing     Psychiatric: The patient appears to be emotionally stable.      INTERPRETATION OF TELEMETRY: sinus bradycardia , PACs     ECG: Sinus rythm , T wave inversion in I, aVL, biphasic T wave in V2-6     I&O's Detail    05 May 2021 07:01  -  06 May 2021 07:00  --------------------------------------------------------  IN:    IV PiggyBack: 50 mL    sodium chloride 0.9%: 1061 mL  Total IN: 1111 mL    OUT:    Indwelling Catheter - Urethral (mL): 375 mL  Total OUT: 375 mL    Total NET: 736 mL      06 May 2021 07:01  -  06 May 2021 17:09  --------------------------------------------------------  IN:    sodium chloride 0.9%: 492 mL  Total IN: 492 mL    OUT:    Indwelling Catheter - Urethral (mL): 100 mL  Total OUT: 100 mL    Total NET: 392 mL          LABS:                        9.6    3.90  )-----------( 113      ( 13 May 2021 05:47 )             30.1     05-13    141  |  112<H>  |  45<H>  ----------------------------<  135<H>  4.2   |  22  |  0.86    Ca    8.6      13 May 2021 05:47  Phos  4.8     05-11  Mg     2.2     05-11 05-06 Chol 128 LDL -- HDL 33<L> Trig 111      I&O's Summary    05 May 2021 07:01  -  06 May 2021 07:00  --------------------------------------------------------  IN: 1111 mL / OUT: 375 mL / NET: 736 mL    06 May 2021 07:01  -  06 May 2021 17:09  --------------------------------------------------------  IN: 492 mL / OUT: 100 mL / NET: 392 mL      BNP  RADIOLOGY & ADDITIONAL STUDIES:  c< from: CT Chest No Cont (05.03.21 @ 23:04) >  IMPRESSION:    1. Small bilateral pleural effusions and pulmonary edema.  2. Emphysema and question additional honeycombing/fibrosis.  3. Mildly asymmetrical 1.5 cm nodular breast tissue on the left. Correlation with follow-up mammography/ultrasound is recommended.  4. 2.9 cm slightly hyperdense right renal lesion, most likely hemorrhagic/proteinaceous cyst. Follow-up nonemergent ultrasound is recommended.                ROSARIO REYES MD; Attending Radiologist  This document has been electronically signed. May  3 2021 11:52PM    < end of copied text >  < from: Xray Chest 1 View AP/PA (05.03.21 @ 22:36) >    EXAM:  XR CHEST AP OR PA 1V      PROCEDURE DATE:  05/03/2021        INTERPRETATION:  Views:1  Comparison: 08/21/15  History: Shortness of breath    There is mild scattered interstitial infiltrate consistent with pneumonia or pneumonitis without segmental consolidation, layering effusion or cavitation. The heart is normal in size.    IMPRESSION: As above              MARIA ESTHER CADET MD; Attending Radiologist  This document has been electronically signed. May  4 2021  9:22AM    < end of copied text >  < from: TTE Echo Complete w/o Contrast w/ Doppler (05.04.21 @ 08:16) >    Summary:   1. Left ventricular ejection fraction, by visual estimation, is 35 to 40%.   2. Moderately to severely decreased global left ventricular systolic function.   3. Multiple left ventricular regional wall motion abnormalities exist. See wall motion findings.   4. Mildly increased LV wall thickness.   5. Normal left ventricular internal cavity size.   6. Spectral Doppler shows impaired relaxation pattern of left ventricular myocardial filling (Grade I diastolic dysfunction).   7. There is mild concentric left ventricular hypertrophy.   8. Mild mitral annular calcification.   9. Mild mitral valve regurgitation.  10. Thickening and calcification of the anterior and posterior mitral valve leaflets.  11. Mild tricuspid regurgitation.  12. Mild aortic regurgitation.  13. Sclerotic aortic valve with normal opening.  14. Estimated pulmonary artery systolic pressure is 36.6 mmHg assuming a right atrial pressure of 10 mmHg, which is consistent with borderline pulmonary hypertension.    [id number] Toby Peace , Electronically signed on 5/4/2021 at 10:19:28 AM    < end of copied text >

## 2021-05-13 NOTE — PHYSICAL THERAPY INITIAL EVALUATION ADULT - PASSIVE RANGE OF MOTION EXAMINATION, REHAB EVAL
ROM WFL except b/l ankle foot drop unable to get to neutral DF./bilateral upper extremity Passive ROM was WFL (within functional limits)/bilateral lower extremity Passive ROM was WFL (within functional limits)

## 2021-05-14 ENCOUNTER — APPOINTMENT (OUTPATIENT)
Dept: PHYSICAL MEDICINE AND REHAB | Facility: CLINIC | Age: 86
End: 2021-05-14

## 2021-05-14 PROCEDURE — 99232 SBSQ HOSP IP/OBS MODERATE 35: CPT

## 2021-05-14 RX ORDER — SACUBITRIL AND VALSARTAN 24; 26 MG/1; MG/1
1 TABLET, FILM COATED ORAL
Refills: 0 | Status: DISCONTINUED | OUTPATIENT
Start: 2021-05-14 | End: 2021-05-21

## 2021-05-14 RX ADMIN — HEPARIN SODIUM 5000 UNIT(S): 5000 INJECTION INTRAVENOUS; SUBCUTANEOUS at 09:57

## 2021-05-14 RX ADMIN — Medication 2: at 12:20

## 2021-05-14 RX ADMIN — Medication 100 MILLIGRAM(S): at 20:34

## 2021-05-14 RX ADMIN — Medication 81 MILLIGRAM(S): at 09:58

## 2021-05-14 RX ADMIN — Medication 650 MILLIGRAM(S): at 23:10

## 2021-05-14 RX ADMIN — GABAPENTIN 300 MILLIGRAM(S): 400 CAPSULE ORAL at 13:56

## 2021-05-14 RX ADMIN — Medication 650 MILLIGRAM(S): at 22:30

## 2021-05-14 RX ADMIN — GABAPENTIN 300 MILLIGRAM(S): 400 CAPSULE ORAL at 05:41

## 2021-05-14 RX ADMIN — Medication 1 MILLIGRAM(S): at 22:28

## 2021-05-14 RX ADMIN — Medication 50 MILLIGRAM(S): at 09:58

## 2021-05-14 RX ADMIN — INSULIN GLARGINE 5 UNIT(S): 100 INJECTION, SOLUTION SUBCUTANEOUS at 22:41

## 2021-05-14 RX ADMIN — TIOTROPIUM BROMIDE 1 CAPSULE(S): 18 CAPSULE ORAL; RESPIRATORY (INHALATION) at 09:12

## 2021-05-14 RX ADMIN — Medication 100 MILLIGRAM(S): at 05:40

## 2021-05-14 RX ADMIN — GABAPENTIN 300 MILLIGRAM(S): 400 CAPSULE ORAL at 22:29

## 2021-05-14 RX ADMIN — SACUBITRIL AND VALSARTAN 1 TABLET(S): 24; 26 TABLET, FILM COATED ORAL at 09:58

## 2021-05-14 RX ADMIN — HEPARIN SODIUM 5000 UNIT(S): 5000 INJECTION INTRAVENOUS; SUBCUTANEOUS at 22:29

## 2021-05-14 RX ADMIN — ATORVASTATIN CALCIUM 40 MILLIGRAM(S): 80 TABLET, FILM COATED ORAL at 22:28

## 2021-05-14 RX ADMIN — Medication 20 MILLIGRAM(S): at 09:58

## 2021-05-14 RX ADMIN — Medication 100 MILLIGRAM(S): at 12:16

## 2021-05-14 NOTE — PROGRESS NOTE ADULT - ASSESSMENT
1) Dyspnea  2) Pulmonary Fibrosis/Emphysema  3) Hypoxemia   4) HFrEF  5) DVT    86y Female with PMH of COPD, arthritis, CAD, DMII, depression, HTN, PVD, PAD, s/p left fem pop bypass on 03/24/21, +lower ext DVT on eliquis, +chronic left heel ulcer, RA, anemia, presented to the ED at [place] on 5/3 with worsening dyspnea, wheezing that started that night. Pt received 2 Duoneb treatments at home, with only slight improvement, so EMS called. At home patient was hypoxic to 87% on room air, was placed on 100% NRB mask. In the ED at [place], pt was placed on 2 LPM via NC, received a dose of Lasix 20 mg IV x   On her first night of admission at [place] 5/3 patient developed fever which has persisted throughout admission. ID was consulted, patient was started on vancomycin and meropenem. Overnight on 5/4-5/5 patient had extensive purulent drainage from left medial thigh surgical site. Plastic surgery was consulted, advised transfer to [facility] for evaluation by Dr Ventura who did fem pop bypass 3/2021.    Above history per chart review  Patient states that she smoked until last year   History of RA as well  Not seen pulmonary in the past   LVEF noted to be 35-40% and PASP elevated at 36 mmHg as well  CT Chest revealed paraseptal emphysematous changes, dilated pulmonary artery and pulmonary fibrosis.   Reviewed CT chest report, echocardiogram. Hypoxemia issues are likely related to emphysema/RA-ILD/PH complicated by HFrEF and now contrast induced nephropathy.  Continue Spiriva daily for emphysema  Albuterol PRN  Incentive spirometer

## 2021-05-14 NOTE — PROGRESS NOTE ADULT - ASSESSMENT
* Infected graft POD#8 with MSSA bacteremia on cefazolin   - s/p muscle flap 5/11   - will need long term IV abx as per ID - PICC placement prior to DC   - DANYELLE removal as per Dr. Ventura     * New acute on Chronic systolic HF in the settings of oliguric ORLY/PAT - now resolved, ORLY now resolved   - monitor I&Os and renal fx, keep FQ until more ambulatory - successful voiding trial   - started entresto    *H/o depression / HTN / RA /ILD/ Pulm HTN - c/w home meds and f/u outpatient for further management    * Right LE DVT Feb 2021 with superficial left basilic vein non-occlusive clot off Eliquis   - to resume AC after sx if ok with vascular   - doppler leg negative - resume AC when OK with Vascular    * DMII - HISS and resumed lantus     * Incidental finding of 2.5 cm right renal lesion amd 1.5 cm left breat - daughter MD aware, outpt follow up    * Weakness/Lethargy - iatrogenic with pain meds - held and resolved    Discussed with Dr. Ventura and Dr. Ortega    Daughter MD updated [phone] cell

## 2021-05-14 NOTE — PROGRESS NOTE ADULT - SUBJECTIVE AND OBJECTIVE BOX
Date of service: 05-14-21 @ 10:50    Sitting in bed in NAD  Has left thigh tenderness  In good spirits    ROS: no fever or chills; denies dizziness, no HA, no SOB or cough, no abdominal pain, no diarrhea or constipation; no dysuria    MEDICATIONS  (STANDING):  aspirin  chewable 81 milliGRAM(s) Oral daily  atorvastatin 40 milliGRAM(s) Oral at bedtime  ceFAZolin   IVPB 2000 milliGRAM(s) IV Intermittent every 8 hours  dextrose 40% Gel 15 Gram(s) Oral once  dextrose 5%. 1000 milliLiter(s) (50 mL/Hr) IV Continuous <Continuous>  dextrose 5%. 1000 milliLiter(s) (100 mL/Hr) IV Continuous <Continuous>  dextrose 50% Injectable 25 Gram(s) IV Push once  dextrose 50% Injectable 12.5 Gram(s) IV Push once  dextrose 50% Injectable 25 Gram(s) IV Push once  folic acid 1 milliGRAM(s) Oral at bedtime  gabapentin 300 milliGRAM(s) Oral three times a day  glucagon  Injectable 1 milliGRAM(s) IntraMuscular once  heparin   Injectable 5000 Unit(s) SubCutaneous every 12 hours  insulin glargine Injectable (LANTUS) 5 Unit(s) SubCutaneous at bedtime  insulin lispro (ADMELOG) corrective regimen sliding scale   SubCutaneous three times a day before meals  metoprolol succinate ER 50 milliGRAM(s) Oral daily  PARoxetine 20 milliGRAM(s) Oral daily  sacubitril 24 mG/valsartan 26 mG 1 Tablet(s) Oral two times a day  tiotropium 18 MICROgram(s) Capsule 1 Capsule(s) Inhalation daily    Vital Signs Last 24 Hrs  T(C): 36.7 (14 May 2021 09:29), Max: 37 (14 May 2021 05:32)  T(F): 98.1 (14 May 2021 09:29), Max: 98.6 (14 May 2021 05:32)  HR: 81 (14 May 2021 06:00) (73 - 99)  BP: 135/48 (14 May 2021 06:00) (123/41 - 145/41)  BP(mean): 70 (14 May 2021 06:00) (60 - 77)  RR: 16 (14 May 2021 06:00) (14 - 26)  SpO2: 96% (14 May 2021 09:14) (92% - 100%)     Physical exam:    Constitutional:  No acute distress  HEENT: NC/AT, EOMI, PERRLA, conjunctivae clear  Neck: supple; thyroid not palpable  Back: no tenderness  Respiratory: respiratory effort normal; crackles at bases  Cardiovascular: S1S2 regular, no murmurs  Abdomen: soft, not tender, not distended, positive BS  Genitourinary: no suprapubic tenderness  Lymphatic: no LN palpable  Musculoskeletal: no muscle tenderness, no joint swelling or tenderness  Extremities: 1+ pedal edema  Left thigh open wound s/p I and D; packed; dressing  Neurological/ Psychiatric: AxOx3, moving all extremities  Skin: no rashes; no palpable lesions    Labs: reviewed                                   9.6    3.90  )-----------( 113      ( 13 May 2021 05:47 )             30.1     05-13    141  |  112<H>  |  45<H>  ----------------------------<  135<H>  4.2   |  22  |  0.86    Ca    8.6      13 May 2021 05:47  Phos  4.8     05-11  Mg     2.2     05-11                                  10.1   7.90  )-----------( 199      ( 06 May 2021 05:49 )             31.3     05-06    134<L>  |  106  |  51<H>  ----------------------------<  199<H>  4.7   |  20<L>  |  1.39<H>    Ca    8.0<L>      06 May 2021 05:49    TPro  5.9<L>  /  Alb  1.9<L>  /  TBili  0.6  /  DBili  x   /  AST  5<L>  /  ALT  <6<L>  /  AlkPhos  71  05-05     LIVER FUNCTIONS - ( 05 May 2021 11:59 )  Alb: 1.9 g/dL / Pro: 5.9 gm/dL / ALK PHOS: 71 U/L / ALT: <6 U/L / AST: 5 U/L / GGT: x             Culture - Fungal, Other (collected 05 May 2021 16:34)  Source: .Other None  Preliminary Report (06 May 2021 09:04):    Testing in progress    Culture - Surgical Swab (collected 05 May 2021 16:34)  Source: .Surgical Swab None  Preliminary Report (06 May 2021 20:09):    Moderate Staphylococcus aureus  Organism: Staphylococcus aureus (07 May 2021 19:05)  Organism: Staphylococcus aureus (07 May 2021 19:05)      -  Ampicillin/Sulbactam: S <=8/4      -  Cefazolin: S <=4      -  Clindamycin: S <=0.25      -  Erythromycin: S <=0.25      -  Gentamicin: S <=1 Should not be used as monotherapy      -  Oxacillin: S <=0.25      -  RIF- Rifampin: S <=1 Should not be used as monotherapy      -  Tetra/Doxy: S <=1      -  Trimethoprim/Sulfamethoxazole: S <=0.5/9.5      -  Vancomycin: S 1      Method Type: SIMEON    Culture - Fungal, Other (collected 05 May 2021 16:34)  Source: .Other None  Preliminary Report (06 May 2021 09:04):    Testing in progress    Culture - Surgical Swab (collected 05 May 2021 16:34)  Source: .Surgical Swab None  Preliminary Report (06 May 2021 20:04):    Moderate Staphylococcus aureus  Organism: Staphylococcus aureus (07 May 2021 19:06)  Organism: Staphylococcus aureus (07 May 2021 19:06)      -  Ampicillin/Sulbactam: S <=8/4      -  Cefazolin: S <=4      -  Clindamycin: S <=0.25      -  Erythromycin: S <=0.25      -  Gentamicin: S <=1 Should not be used as monotherapy      -  Oxacillin: S <=0.25      -  RIF- Rifampin: S <=1 Should not be used as monotherapy      -  Tetra/Doxy: S <=1      -  Trimethoprim/Sulfamethoxazole: S <=0.5/9.5      -  Vancomycin: S 2      Method Type: SIMEON    Culture - Other (collected 04 May 2021 23:00)  Source: .Other wound - L thigh  Final Report (07 May 2021 07:28):    Numerous Staphylococcus aureus  Organism: Staphylococcus aureus (07 May 2021 07:28)  Organism: Staphylococcus aureus (07 May 2021 07:28)      -  Ampicillin/Sulbactam: S <=8/4      -  Cefazolin: S <=4      -  Clindamycin: S <=0.25      -  Erythromycin: S <=0.25      -  Gentamicin: S <=1 Should not be used as monotherapy      -  Oxacillin: S <=0.25      -  RIF- Rifampin: S <=1 Should not be used as monotherapy      -  Tetra/Doxy: S <=1      -  Trimethoprim/Sulfamethoxazole: S <=0.5/9.5      -  Vancomycin: S 1      Method Type: SIMEON    Culture - Blood (collected 03 May 2021 22:10)  Source: .Blood Blood-Peripheral  Final Report (09 May 2021 03:00):    No Growth Final    Culture - Blood (collected 03 May 2021 22:10)  Source: .Blood Blood-Peripheral  Gram Stain (07 May 2021 01:14):    Growth in aerobic bottle: Gram Positive Cocci in Clusters  Final Report (08 May 2021 17:32):    Growth in aerobic bottle: Staphylococcus aureus  Organism: Blood Culture PCR  Staphylococcus aureus (08 May 2021 17:32)  Organism: Staphylococcus aureus (08 May 2021 17:32)      -  Ampicillin/Sulbactam: S <=8/4      -  Cefazolin: S <=4      -  Clindamycin: S <=0.25      -  Erythromycin: S <=0.25      -  Gentamicin: S <=1 Should not be used as monotherapy      -  Oxacillin: S <=0.25      -  RIF- Rifampin: S <=1 Should not be used as monotherapy      -  Tetra/Doxy: S <=1      -  Trimethoprim/Sulfamethoxazole: S <=0.5/9.5      -  Vancomycin: S 2      Method Type: SIMEON  Organism: Blood Culture PCR (08 May 2021 17:32)      -  Staphylococcus aureus: Detec Any isolate of Staphylococcus aureus from a blood culture is NOT considered a contaminant.      Method Type: PCR    COVID-19 PCR: Calin (05-03-21 @ 23:17)    Radiology: all available radiological tests reviewed    Advanced directives addressed: full resuscitation

## 2021-05-14 NOTE — PROGRESS NOTE ADULT - ASSESSMENT
87 y/o Female with h/o COPD, arthritis, CAD, DMII, depression, HTN, PVD, PAD, s/p left fem pop bypass on 03/24/21, lower ext DVT on eliquis, chronic left heel ulcer, RA, anemia was admitted on 5/3 at [place] with worsening dyspnea, wheezing that started that night. Pt received 2 Duoneb treatments at home, with only slight improvement. At home patient was hypoxic to 87% on room air, was placed on 100% NRB mask. In the ED at [place], pt was placed on 2 LPM via NC, received a dose of Lasix 20 mg IV x 1. Her respiratory symptoms have improved with diuresis. Her troponin levels were monitored. Cardiology was consulted, advised troponin elevated due to type II MI.  On her first night of admission at [place] 5/3 patient developed fever which has persisted throughout admission. ID was consulted, patient was started on vancomycin and meropenem. Overnight on 5/4-5/5 patient had extensive purulent drainage from left medial thigh surgical site. Plastic surgery was consulted, advised transfer to [facility] for evaluation by Dr Ventura who did fem pop bypass 3/2021. In ED at [facility] on 5/5, she was continued on meropenem.    1. Postsurgical wound infection with MSSA s/p washout. Recent left femoral-popliteal bypass. Probable underlying vascular graft infection. s/p MSSA sepsis. ARF on CRF stage 3. Allergy to cephalosporins and PCN.  -cultures reviewed  -renal function is improving  -on cefazolin 2 gm IV q8h # 9  -tolerating abx well so far; no side effects noted  -monitor closely in [place] of PCN allergy history   -local wound care  -vascular evaluation appreciated  -s/p leg surgery yesterday - vascular graft is likely tainted with infection  -continue abx coverage  -will need longer term IV abx therapy  -monitor temps  -f/u CBC  -supportive care  2. Other issues:   -care per medicine    d/w Dr. Ventura and Dr. MOMO Leon

## 2021-05-14 NOTE — PROGRESS NOTE ADULT - SUBJECTIVE AND OBJECTIVE BOX
Patient is a 86y old  Female who presents with a chief complaint of wound.       HPI:  86y Female with PMH of COPD, arthritis, CAD, DMII, depression, HTN, PVD, PAD, s/p left fem pop bypass on 03/24/21, +lower ext DVT on eliquis, +chronic left heel ulcer, RA, anemia, presented to the ED at [place] on 5/3 with worsening dyspnea, wheezing that started that night. Pt received 2 Duoneb treatments at home, with only slight improvement, so EMS called. At home patient was hypoxic to 87% on room air, was placed on 100% NRB mask. In the ED at [place], pt was placed on 2 LPM via NC, received a dose of Lasix 20 mg IV x 1. Initial trop noted to be 0.17.  BNP >69167    Her respiratory symptoms have improved with diuresis.     On her first night of admission at [place] 5/3 patient developed fever which has persisted throughout admission. ID was consulted, patient was started on vancomycin and meropenem. Overnight on 5/4-5/5 patient had extensive purulent drainage from left medial thigh surgical site. Plastic surgery was consulted, advised transfer to [facility] for evaluation by Dr Ventura who did fem pop bypass 3/2021.      In ED at [facility] on 5/5, patient had no complaints.  Denied any CP, SOB, lower extremity pain, numbness or tingling.      5/6-   Pt seen now. She is fully alert and was able to give all the history , son in law at bedside.  Pt states that her SOB is better.    NO CP.    5/7- Pt seen this am. Pt denies any chest pain or SOB.  She was able to lie down flat in bed for a long time without any SOB.  Overnight urine output poor.     5/10- pt seen this am.  Pt denies any symptoms this am. She was happy she was able to make urine.     5/11- pt seen this am.  No symptoms or overnight events.     5/13- pt seen this am.  Pt denies any symptoms.       5/14- pt seen this am.  Pt denies any symptoms this am.  No overnight issues.     PAST MEDICAL & SURGICAL HISTORY:  Hypertension    Anemia    Arthritis, rheumatoid    Depression    Type 2 diabetes mellitus  on insulin    PVD (peripheral vascular disease) with claudication    CAD (coronary artery disease)  non-obstructive    Rheumatoid arthritis    fracture ankle right  plate . screws   2000    S/P cataract surgery  2011    Hip fracture requiring operative repair  Right hip 11/14/2020        MEDICATIONS  (STANDING):  aspirin  chewable 81 milliGRAM(s) Oral daily  atorvastatin 40 milliGRAM(s) Oral at bedtime  ceFAZolin   IVPB 2000 milliGRAM(s) IV Intermittent every 12 hours  dextrose 40% Gel 15 Gram(s) Oral once  dextrose 5%. 1000 milliLiter(s) (50 mL/Hr) IV Continuous <Continuous>  dextrose 5%. 1000 milliLiter(s) (100 mL/Hr) IV Continuous <Continuous>  dextrose 50% Injectable 25 Gram(s) IV Push once  dextrose 50% Injectable 12.5 Gram(s) IV Push once  dextrose 50% Injectable 25 Gram(s) IV Push once  folic acid 1 milliGRAM(s) Oral at bedtime  furosemide   Injectable 40 milliGRAM(s) IV Push daily  gabapentin 300 milliGRAM(s) Oral three times a day  glucagon  Injectable 1 milliGRAM(s) IntraMuscular once  heparin   Injectable 5000 Unit(s) SubCutaneous every 12 hours  insulin lispro (ADMELOG) corrective regimen sliding scale   SubCutaneous three times a day before meals  lisinopril 5 milliGRAM(s) Oral daily  metoprolol tartrate 25 milliGRAM(s) Oral two times a day  PARoxetine 20 milliGRAM(s) Oral daily  tiotropium 18 MICROgram(s) Capsule 1 Capsule(s) Inhalation daily    MEDICATIONS  (PRN):  acetaminophen   Tablet .. 650 milliGRAM(s) Oral every 6 hours PRN Mild Pain (1 - 3)  ALBUTerol    90 MICROgram(s) HFA Inhaler 1 Puff(s) Inhalation every 6 hours PRN Shortness of Breath  morphine  - Injectable 5 milliGRAM(s) IV Push every 3 hours PRN Severe Pain (7 - 10)      FAMILY HISTORY:  FH: colon cancer  father    FH: heart attack  father    Family history of atherosclerosis  mother        SOCIAL HISTORY: no recent smoking     REVIEW OF SYSTEMS:  CONSTITUTIONAL:    No fatigue, malaise, lethargy.  No fever or chills.   RESPIRATORY:  No cough.  No wheeze.  No hemoptysis.  no shortness of breath.  CARDIOVASCULAR:  No chest pains.  No palpitations. no shortness of breath, No orthopnea or PND.  GASTROINTESTINAL:  No abdominal pain.  No nausea or vomiting.    GENITOURINARY:    No hematuria.    MUSCULOSKELETAL:  c/o L thigh pain   NEUROLOGICAL:  No tingling or numbness or weakness.  PSYCHIATRIC:  No confusion  SKIN:  No rashes.        ICU Vital Signs Last 24 Hrs  T(C): 37 (14 May 2021 05:32), Max: 37 (14 May 2021 05:32)  T(F): 98.6 (14 May 2021 05:32), Max: 98.6 (14 May 2021 05:32)  HR: 81 (14 May 2021 06:00) (73 - 99)  BP: 135/48 (14 May 2021 06:00) (123/41 - 147/47)  BP(mean): 70 (14 May 2021 06:00) (48 - 77)  ABP: --  ABP(mean): --  RR: 16 (14 May 2021 06:00) (14 - 26)  SpO2: 92% (14 May 2021 04:00) (92% - 100%)          PHYSICAL EXAM-    Constitutional: elderly female in no acute distress sitting up in bed now.     Head: Head is normocephalic and atraumatic.      Neck: No JVD.     Cardiovascular: Regular rate and rhythm without S3, S4. No murmurs or rubs are appreciated.      Respiratory: B/l faint  rales     Abdomen: Soft, nontender, nondistended with positive bowel sounds.      Extremity: No tenderness. pitting edema  1+ b/l LE    Neurologic: The patient is alert and oriented.      Skin: L thigh dressing     Psychiatric: The patient appears to be emotionally stable.      INTERPRETATION OF TELEMETRY: sinus bradycardia , PACs     ECG: Sinus rythm , T wave inversion in I, aVL, biphasic T wave in V2-6     I&O's Detail    05 May 2021 07:01  -  06 May 2021 07:00  --------------------------------------------------------  IN:    IV PiggyBack: 50 mL    sodium chloride 0.9%: 1061 mL  Total IN: 1111 mL    OUT:    Indwelling Catheter - Urethral (mL): 375 mL  Total OUT: 375 mL    Total NET: 736 mL      06 May 2021 07:01  -  06 May 2021 17:09  --------------------------------------------------------  IN:    sodium chloride 0.9%: 492 mL  Total IN: 492 mL    OUT:    Indwelling Catheter - Urethral (mL): 100 mL  Total OUT: 100 mL    Total NET: 392 mL          LABS:                        9.6    3.90  )-----------( 113      ( 13 May 2021 05:47 )             30.1     05-13    141  |  112<H>  |  45<H>  ----------------------------<  135<H>  4.2   |  22  |  0.86    Ca    8.6      13 May 2021 05:47              05-06 Chol 128 LDL -- HDL 33<L> Trig 111    I&O's Summary    05 May 2021 07:01  -  06 May 2021 07:00  --------------------------------------------------------  IN: 1111 mL / OUT: 375 mL / NET: 736 mL    06 May 2021 07:01  -  06 May 2021 17:09  --------------------------------------------------------  IN: 492 mL / OUT: 100 mL / NET: 392 mL      BNP  RADIOLOGY & ADDITIONAL STUDIES:  c< from: CT Chest No Cont (05.03.21 @ 23:04) >  IMPRESSION:    1. Small bilateral pleural effusions and pulmonary edema.  2. Emphysema and question additional honeycombing/fibrosis.  3. Mildly asymmetrical 1.5 cm nodular breast tissue on the left. Correlation with follow-up mammography/ultrasound is recommended.  4. 2.9 cm slightly hyperdense right renal lesion, most likely hemorrhagic/proteinaceous cyst. Follow-up nonemergent ultrasound is recommended.                ROSARIO REYES MD; Attending Radiologist  This document has been electronically signed. May  3 2021 11:52PM    < end of copied text >  < from: Xray Chest 1 View AP/PA (05.03.21 @ 22:36) >    EXAM:  XR CHEST AP OR PA 1V      PROCEDURE DATE:  05/03/2021        INTERPRETATION:  Views:1  Comparison: 08/21/15  History: Shortness of breath    There is mild scattered interstitial infiltrate consistent with pneumonia or pneumonitis without segmental consolidation, layering effusion or cavitation. The heart is normal in size.    IMPRESSION: As above              MARIA ESTHER CADET MD; Attending Radiologist  This document has been electronically signed. May  4 2021  9:22AM    < end of copied text >  < from: TTE Echo Complete w/o Contrast w/ Doppler (05.04.21 @ 08:16) >    Summary:   1. Left ventricular ejection fraction, by visual estimation, is 35 to 40%.   2. Moderately to severely decreased global left ventricular systolic function.   3. Multiple left ventricular regional wall motion abnormalities exist. See wall motion findings.   4. Mildly increased LV wall thickness.   5. Normal left ventricular internal cavity size.   6. Spectral Doppler shows impaired relaxation pattern of left ventricular myocardial filling (Grade I diastolic dysfunction).   7. There is mild concentric left ventricular hypertrophy.   8. Mild mitral annular calcification.   9. Mild mitral valve regurgitation.  10. Thickening and calcification of the anterior and posterior mitral valve leaflets.  11. Mild tricuspid regurgitation.  12. Mild aortic regurgitation.  13. Sclerotic aortic valve with normal opening.  14. Estimated pulmonary artery systolic pressure is 36.6 mmHg assuming a right atrial pressure of 10 mmHg, which is consistent with borderline pulmonary hypertension.    [id number] Toby Peace , Electronically signed on 5/4/2021 at 10:19:28 AM    < end of copied text >

## 2021-05-14 NOTE — PROGRESS NOTE ADULT - SUBJECTIVE AND OBJECTIVE BOX
86 y.o. Female with PMH of COPD, arthritis, CAD, DMII, depression, HTN, PVD, PAD, s/p left fem pop bypass on 03/24/21, +lower ext DVT on eliquis, +chronic left heel ulcer, RA, anemia, presented to the ED at [place] on 5/3 with worsening dyspnea, wheezing that started that night. Pt received 2 Duoneb treatments at home, with only slight improvement, so EMS called. At home patient was hypoxic to 87% on room air, was placed on 100% NRB mask. In the ED at [place], pt was placed on 2 LPM via NC, received a dose of Lasix 20 mg IV x 1. Initial trop noted to be 0.17.  BNP >46209  On her first night of admission at [place] 5/3 patient developed fever which has persisted throughout admission. ID was consulted, patient was started on vancomycin and meropenem. Overnight on 5/4-5/5 patient had extensive purulent drainage from left medial thigh surgical site. Plastic surgery was consulted, advised transfer to [facility] for evaluation by Dr Ventura who did fem pop bypass 3/2021.      Was taking to OR : purulent fluid in L thigh popliteal fossa above knee; necrotic infected fibrinous material around graft and on tissue in popliteal fossa  oliguric suspect PAT;  from anuric she is in polyuric phase today    INTERVAL HPI: comfortable in the chair, no pain, s/p muscle flap on 5/11, lethargy resolved  Other ROS reviewed and neg     Vital Signs Last 24 Hrs  T(C): 36.5 (14 May 2021 13:31), Max: 37 (14 May 2021 05:32)  T(F): 97.7 (14 May 2021 13:31), Max: 98.6 (14 May 2021 05:32)  HR: 72 (14 May 2021 14:00) (71 - 85)  BP: 113/39 (14 May 2021 14:00) (113/39 - 141/50)  BP(mean): 58 (14 May 2021 14:00) (58 - 97)  RR: 19 (14 May 2021 14:00) (14 - 28)  SpO2: 94% (14 May 2021 10:00) (92% - 98%)  I&O's Detail    13 May 2021 07:01  -  14 May 2021 07:00  --------------------------------------------------------  IN:  Total IN: 0 mL    OUT:    Drain (mL): 20 mL    Indwelling Catheter - Urethral (mL): 1425 mL    Voided (mL): 850 mL  Total OUT: 2295 mL    Total NET: -2295 mL                        9.6    3.90  )-----------( 113      ( 13 May 2021 05:47 )             30.1     13 May 2021 05:47    141    |  112    |  45     ----------------------------<  135    4.2     |  22     |  0.86     Ca    8.6        13 May 2021 05:47    CAPILLARY BLOOD GLUCOSE    POCT Blood Glucose.: 201 mg/dL (14 May 2021 12:20)  POCT Blood Glucose.: 140 mg/dL (14 May 2021 08:15)  POCT Blood Glucose.: 240 mg/dL (13 May 2021 21:49)  POCT Blood Glucose.: 117 mg/dL (13 May 2021 17:43)    MEDICATIONS  (STANDING):  aspirin  chewable 81 milliGRAM(s) Oral daily  atorvastatin 40 milliGRAM(s) Oral at bedtime  ceFAZolin   IVPB 2000 milliGRAM(s) IV Intermittent every 8 hours  dextrose 40% Gel 15 Gram(s) Oral once  dextrose 5%. 1000 milliLiter(s) (50 mL/Hr) IV Continuous <Continuous>  dextrose 5%. 1000 milliLiter(s) (100 mL/Hr) IV Continuous <Continuous>  dextrose 50% Injectable 25 Gram(s) IV Push once  dextrose 50% Injectable 12.5 Gram(s) IV Push once  dextrose 50% Injectable 25 Gram(s) IV Push once  folic acid 1 milliGRAM(s) Oral at bedtime  gabapentin 300 milliGRAM(s) Oral three times a day  glucagon  Injectable 1 milliGRAM(s) IntraMuscular once  heparin   Injectable 5000 Unit(s) SubCutaneous every 12 hours  insulin glargine Injectable (LANTUS) 5 Unit(s) SubCutaneous at bedtime  insulin lispro (ADMELOG) corrective regimen sliding scale   SubCutaneous three times a day before meals  metoprolol succinate ER 50 milliGRAM(s) Oral daily  PARoxetine 20 milliGRAM(s) Oral daily  sacubitril 24 mG/valsartan 26 mG 1 Tablet(s) Oral two times a day  tiotropium 18 MICROgram(s) Capsule 1 Capsule(s) Inhalation daily    MEDICATIONS  (PRN):  acetaminophen   Tablet .. 650 milliGRAM(s) Oral every 6 hours PRN Mild Pain (1 - 3)  ALBUTerol    90 MICROgram(s) HFA Inhaler 1 Puff(s) Inhalation every 6 hours PRN Shortness of Breath  oxyCODONE    IR 5 milliGRAM(s) Oral every 6 hours PRN Severe Pain (7 - 10)    RADIOLOGY: personally visualized    PHYSICAL EXAM:    Constitutional: elderly female in no acute distress sitting up in chair   Head: Head is normocephalic and atraumatic.    Neck: No JVD.   Cardiovascular: Regular rate and rhythm without S3, S4. No murmurs or rubs are appreciated.    Respiratory: Decreased BS at bases with + crackles  Abdomen: Soft, nontender, nondistended with positive bowel sounds.    Extremity: No tenderness, no pitting edema, Left thigh area in dressing, LLE distal trace edema  Neurologic: The patient is alert and oriented.  Non focal, lethargic but easily arousable  Skin: Left thigh dressing   : + FQ to gravity      TTE Summary:   1. Left ventricular ejection fraction, by visual estimation, is 35 to 40%.   2. Moderately toseverely decreased global left ventricular systolic function.   3. Multiple left ventricular regional wall motion abnormalities exist. See wall motion findings.   4. Mildly increased LV wall thickness.   5. Normal left ventricular internal cavity size.   6. Spectral Doppler shows impaired relaxation pattern of left ventricular myocardial filling (Grade I diastolic dysfunction).   7. There is mild concentric left ventricular hypertrophy.   8. Mild mitral annular calcification.   9. Mild mitral valve regurgitation.  10. Thickening and calcification of the anterior and posterior mitral valve leaflets.  11. Mild tricuspid regurgitation.  12. Mild aortic regurgitation.  13. Sclerotic aortic valve with normal opening.  14. Estimated pulmonary artery systolic pressure is 36.6 mmHg assuming a right atrial pressure of 10 mmHg, which is consistent with borderline pulmonary hypertension.

## 2021-05-14 NOTE — PROGRESS NOTE ADULT - SUBJECTIVE AND OBJECTIVE BOX
Patient is a 86y old  Female who presents with a chief complaint of SOB (07 May 2021 15:22)      HPI:  86y Female with PMH of COPD, arthritis, CAD, DMII, depression, HTN, PVD, PAD, s/p left fem pop bypass on 03/24/21, +lower ext DVT on eliquis, +chronic left heel ulcer, RA, anemia, presented to the ED at [place] on 5/3 with worsening dyspnea, wheezing that started that night. Pt received 2 Duoneb treatments at home, with only slight improvement, so EMS called. At home patient was hypoxic to 87% on room air, was placed on 100% NRB mask. In the ED at [place], pt was placed on 2 LPM via NC, received a dose of Lasix 20 mg IV x   On her first night of admission at [place] 5/3 patient developed fever which has persisted throughout admission. ID was consulted, patient was started on vancomycin and meropenem. Overnight on 5/4-5/5 patient had extensive purulent drainage from left medial thigh surgical site. Plastic surgery was consulted, advised transfer to [facility] for evaluation by Dr Ventura who did fem pop bypass 3/2021.    Above history per chart review  Patient states that she smoked until last year   History of RA as well  Not seen pulmonary in the past   LVEF noted to be 35-40% and PASP elevated at 36 mmHg as well  CT Chest revealed paraseptal emphysematous changes, dilated pulmonary artery and pulmonary fibrosis.     5/14  Patient is s/p Exploration, infected graft, lower extremity with (L distal medial thigh on 5/11)  No acute pulmonary events occurred overnight    PREVIOUS DIAGNOSTIC TESTING:      MEDICATIONS  (STANDING):  aspirin  chewable 81 milliGRAM(s) Oral daily  atorvastatin 40 milliGRAM(s) Oral at bedtime  ceFAZolin   IVPB 2000 milliGRAM(s) IV Intermittent every 8 hours  dextrose 40% Gel 15 Gram(s) Oral once  dextrose 5%. 1000 milliLiter(s) (50 mL/Hr) IV Continuous <Continuous>  dextrose 5%. 1000 milliLiter(s) (100 mL/Hr) IV Continuous <Continuous>  dextrose 50% Injectable 25 Gram(s) IV Push once  dextrose 50% Injectable 12.5 Gram(s) IV Push once  dextrose 50% Injectable 25 Gram(s) IV Push once  folic acid 1 milliGRAM(s) Oral at bedtime  gabapentin 300 milliGRAM(s) Oral three times a day  glucagon  Injectable 1 milliGRAM(s) IntraMuscular once  heparin   Injectable 5000 Unit(s) SubCutaneous every 12 hours  insulin glargine Injectable (LANTUS) 5 Unit(s) SubCutaneous at bedtime  insulin lispro (ADMELOG) corrective regimen sliding scale   SubCutaneous three times a day before meals  metoprolol succinate ER 50 milliGRAM(s) Oral daily  PARoxetine 20 milliGRAM(s) Oral daily  sacubitril 24 mG/valsartan 26 mG 1 Tablet(s) Oral two times a day  tiotropium 18 MICROgram(s) Capsule 1 Capsule(s) Inhalation daily    MEDICATIONS  (PRN):  acetaminophen   Tablet .. 650 milliGRAM(s) Oral every 6 hours PRN Mild Pain (1 - 3)  ALBUTerol    90 MICROgram(s) HFA Inhaler 1 Puff(s) Inhalation every 6 hours PRN Shortness of Breath  oxyCODONE    IR 5 milliGRAM(s) Oral every 6 hours PRN Severe Pain (7 - 10)      Vital Signs Last 24 Hrs  T(C): 36.5 (14 May 2021 13:31), Max: 37 (14 May 2021 05:32)  T(F): 97.7 (14 May 2021 13:31), Max: 98.6 (14 May 2021 05:32)  HR: 72 (14 May 2021 14:00) (71 - 85)  BP: 113/39 (14 May 2021 14:00) (113/39 - 141/50)  BP(mean): 58 (14 May 2021 14:00) (58 - 97)  RR: 19 (14 May 2021 14:00) (14 - 28)  SpO2: 94% (14 May 2021 10:00) (92% - 98%)    I&O's Summary    07 May 2021 07:01  -  08 May 2021 07:00  --------------------------------------------------------  IN: 3473 mL / OUT: 65 mL / NET: 3408 mL      PHYSICAL EXAM  General Appearance: cooperative, no acute distress,   HEENT: PERRL, conjunctiva clear, EOM's intact, non injected pharynx, no exudate, TM   normal  Neck: Supple, , no adenopathy, thyroid: not enlarged, no carotid bruit or JVD  Back: Symmetric, no  tenderness,no soft tissue tenderness  Lungs: diminished bilaterally  Heart: Regular rate and rhythm, S1, S2 normal, no murmur, rub or gallop  Abdomen: Soft, non-tender, bowel sounds active , no hepatosplenomegaly  Extremities: no cyanosis or edema, no joint swelling  Skin: Skin color, texture normal, no rashes   Neurologic: Alert and oriented X3 , cranial nerves intact, sensory and motor normal,    ECG:    LABS:      05-08    132<L>  |  106  |  82<H>  ----------------------------<  136<H>  5.0   |  13<L>  |  3.53<H>    Ca    7.7<L>      08 May 2021 06:27  Phos  6.2     05-08    TPro  5.5<L>  /  Alb  1.7<L>  /  TBili  0.2  /  DBili  x   /  AST  15  /  ALT  <6<L>  /  AlkPhos  99  05-08        Lipid Panel  128  33  --  111    Pro BNP  [id number] 05-07 @ 06:51  D Dimer  -- 05-07 @ 06:51  Pro BNP  03518 05-06 @ 17:59  D Dimer  -- 05-06 @ 17:59  Pro BNP  [id number] 05-03 @ 22:10  D Dimer  -- 05-03 @ 22:10              RADIOLOGY & ADDITIONAL STUDIES:

## 2021-05-14 NOTE — PROGRESS NOTE ADULT - SUBJECTIVE AND OBJECTIVE BOX
afebrile, VSS  WBC normal - low  Cr 0.86  blood cultures remain negative    voided after hurst removed    PE  foot adequately perfused  thigh erythema perhaps slightly improved; no drainage from groin    A/P  patent  graft infection status post muscle flap coverage  continue antibiotics  will remove DANYELLE in 24 hours if output remains low  discussed with Dr. Ortega: 6 week course of antibiotics and suppressive oral antibiotics afterwards  MEDICATIONS  (STANDING):  aspirin  chewable 81 milliGRAM(s) Oral daily  atorvastatin 40 milliGRAM(s) Oral at bedtime  ceFAZolin   IVPB 2000 milliGRAM(s) IV Intermittent every 8 hours  dextrose 40% Gel 15 Gram(s) Oral once  dextrose 5%. 1000 milliLiter(s) (50 mL/Hr) IV Continuous <Continuous>  dextrose 5%. 1000 milliLiter(s) (100 mL/Hr) IV Continuous <Continuous>  dextrose 50% Injectable 25 Gram(s) IV Push once  dextrose 50% Injectable 12.5 Gram(s) IV Push once  dextrose 50% Injectable 25 Gram(s) IV Push once  folic acid 1 milliGRAM(s) Oral at bedtime  gabapentin 300 milliGRAM(s) Oral three times a day  glucagon  Injectable 1 milliGRAM(s) IntraMuscular once  heparin   Injectable 5000 Unit(s) SubCutaneous every 12 hours  insulin glargine Injectable (LANTUS) 5 Unit(s) SubCutaneous at bedtime  insulin lispro (ADMELOG) corrective regimen sliding scale   SubCutaneous three times a day before meals  metoprolol succinate ER 50 milliGRAM(s) Oral daily  PARoxetine 20 milliGRAM(s) Oral daily  tiotropium 18 MICROgram(s) Capsule 1 Capsule(s) Inhalation daily    MEDICATIONS  (PRN):  acetaminophen   Tablet .. 650 milliGRAM(s) Oral every 6 hours PRN Mild Pain (1 - 3)  ALBUTerol    90 MICROgram(s) HFA Inhaler 1 Puff(s) Inhalation every 6 hours PRN Shortness of Breath  oxyCODONE    IR 5 milliGRAM(s) Oral every 6 hours PRN Severe Pain (7 - 10)      Allergies    cephalosporins (Other)  penicillins (Urticaria; Pruritus)    Intolerances        Flatus: [ ] YES [ ] NO             Bowel Movement: [ ] YES [ ] NO  Pain (0-10):            Pain Control Adequate: [ ] YES [ ] NO  Nausea: [ ] YES [ ] NO            Vomiting: [ ] YES [ ] NO  Diarrhea: [ ] YES [ ] NO         Constipation: [ ] YES [ ] NO     Chest Pain: [ ] YES [ ] NO    SOB:  [ ] YES [ ] NO    Vital Signs Last 24 Hrs  T(C): 37 (14 May 2021 05:32), Max: 37 (14 May 2021 05:32)  T(F): 98.6 (14 May 2021 05:32), Max: 98.6 (14 May 2021 05:32)  HR: 81 (14 May 2021 06:00) (73 - 99)  BP: 135/48 (14 May 2021 06:00) (123/41 - 147/47)  BP(mean): 70 (14 May 2021 06:00) (48 - 77)  RR: 16 (14 May 2021 06:00) (14 - 26)  SpO2: 92% (14 May 2021 04:00) (92% - 100%)    I&O's Summary    12 May 2021 07:01  -  13 May 2021 07:00  --------------------------------------------------------  IN: 795 mL / OUT: 3135 mL / NET: -2340 mL    13 May 2021 07:01  -  14 May 2021 06:57  --------------------------------------------------------  IN: 0 mL / OUT: 1835 mL / NET: -1835 mL        Physical Exam:  General: NAD, resting comfortably  Pulmonary: normal resp effort, CTA-B  Cardiovascular: NSR  Abdominal: soft, NT/ND  Extremities: WWP, normal strength  Neuro: A/O x 3, CNs II-XII grossly intact, normal motor/sensation, no focal deficits  Pulses:   Right:                                                                          Left:  FEM [ ]2+ [ ]1+ [ ]doppler                                             FEM [ ]2+ [ ]1+ [ ]doppler    POP [ ]2+ [ ]1+ [ ]doppler                                             POP [ ]2+ [ ]1+ [ ]doppler    DP [ ]2+ [ ]1+ [ ]doppler                                                DP [ ]2+ [ ]1+ [ ]doppler  PT[ ]2+ [ ]1+ [ ]doppler                                                  PT [ ]2+ [ ]1+ [ ]doppler    LABS:                        9.6    3.90  )-----------( 113      ( 13 May 2021 05:47 )             30.1     05-13    141  |  112<H>  |  45<H>  ----------------------------<  135<H>  4.2   |  22  |  0.86    Ca    8.6      13 May 2021 05:47            CAPILLARY BLOOD GLUCOSE      POCT Blood Glucose.: 240 mg/dL (13 May 2021 21:49)  POCT Blood Glucose.: 117 mg/dL (13 May 2021 17:43)  POCT Blood Glucose.: 198 mg/dL (13 May 2021 13:54)  POCT Blood Glucose.: 146 mg/dL (13 May 2021 08:33)      RADIOLOGY & ADDITIONAL TESTS:

## 2021-05-14 NOTE — PROGRESS NOTE ADULT - ASSESSMENT
SOB,chronic compensated HFref- LVEF 35-40%- NYHA class III- complicated by CKD and hypoalbuminemia. Cardiorenal syndrome.   2/2021 , LVEF 55-60% on echo  2/2021-LHC-  moderate disease in distal LAD.  euvolemic on exam.  Repeat echo showed LVEF of about 40%.   Nonischemic cardiomyopathy  Continue toprol, started entresto.            Renal failure- improved .  contrast nephropathy.        PVD- vascular team following pt.  onabx.       Bacteremia- on abx. Management per primary team.     HTN- BP mildly elevated.  Meds as stated above.      Other medical issues- Management per primary team.   Thank you for allowing me to participate in the care of this patient. Please feel free to contact me with any questions.

## 2021-05-15 LAB
ANION GAP SERPL CALC-SCNC: 4 MMOL/L — LOW (ref 5–17)
BUN SERPL-MCNC: 32 MG/DL — HIGH (ref 7–23)
CALCIUM SERPL-MCNC: 8.9 MG/DL — SIGNIFICANT CHANGE UP (ref 8.5–10.1)
CHLORIDE SERPL-SCNC: 112 MMOL/L — HIGH (ref 96–108)
CO2 SERPL-SCNC: 27 MMOL/L — SIGNIFICANT CHANGE UP (ref 22–31)
CREAT SERPL-MCNC: 0.83 MG/DL — SIGNIFICANT CHANGE UP (ref 0.5–1.3)
CULTURE RESULTS: SIGNIFICANT CHANGE UP
GLUCOSE SERPL-MCNC: 154 MG/DL — HIGH (ref 70–99)
HCT VFR BLD CALC: 29.6 % — LOW (ref 34.5–45)
HGB BLD-MCNC: 9.3 G/DL — LOW (ref 11.5–15.5)
MCHC RBC-ENTMCNC: 29.5 PG — SIGNIFICANT CHANGE UP (ref 27–34)
MCHC RBC-ENTMCNC: 31.4 GM/DL — LOW (ref 32–36)
MCV RBC AUTO: 94 FL — SIGNIFICANT CHANGE UP (ref 80–100)
PLATELET # BLD AUTO: 59 K/UL — LOW (ref 150–400)
POTASSIUM SERPL-MCNC: 4.4 MMOL/L — SIGNIFICANT CHANGE UP (ref 3.5–5.3)
POTASSIUM SERPL-SCNC: 4.4 MMOL/L — SIGNIFICANT CHANGE UP (ref 3.5–5.3)
RBC # BLD: 3.15 M/UL — LOW (ref 3.8–5.2)
RBC # FLD: 15.4 % — HIGH (ref 10.3–14.5)
SODIUM SERPL-SCNC: 143 MMOL/L — SIGNIFICANT CHANGE UP (ref 135–145)
SPECIMEN SOURCE: SIGNIFICANT CHANGE UP
WBC # BLD: 4.49 K/UL — SIGNIFICANT CHANGE UP (ref 3.8–10.5)
WBC # FLD AUTO: 4.49 K/UL — SIGNIFICANT CHANGE UP (ref 3.8–10.5)

## 2021-05-15 PROCEDURE — 99233 SBSQ HOSP IP/OBS HIGH 50: CPT

## 2021-05-15 RX ADMIN — HEPARIN SODIUM 5000 UNIT(S): 5000 INJECTION INTRAVENOUS; SUBCUTANEOUS at 10:22

## 2021-05-15 RX ADMIN — SACUBITRIL AND VALSARTAN 1 TABLET(S): 24; 26 TABLET, FILM COATED ORAL at 10:23

## 2021-05-15 RX ADMIN — SACUBITRIL AND VALSARTAN 1 TABLET(S): 24; 26 TABLET, FILM COATED ORAL at 00:47

## 2021-05-15 RX ADMIN — TIOTROPIUM BROMIDE 1 CAPSULE(S): 18 CAPSULE ORAL; RESPIRATORY (INHALATION) at 09:33

## 2021-05-15 RX ADMIN — GABAPENTIN 300 MILLIGRAM(S): 400 CAPSULE ORAL at 05:46

## 2021-05-15 RX ADMIN — Medication 1 MILLIGRAM(S): at 22:36

## 2021-05-15 RX ADMIN — Medication 100 MILLIGRAM(S): at 04:24

## 2021-05-15 RX ADMIN — Medication 100 MILLIGRAM(S): at 22:36

## 2021-05-15 RX ADMIN — Medication 1: at 08:19

## 2021-05-15 RX ADMIN — SACUBITRIL AND VALSARTAN 1 TABLET(S): 24; 26 TABLET, FILM COATED ORAL at 22:41

## 2021-05-15 RX ADMIN — Medication 650 MILLIGRAM(S): at 15:00

## 2021-05-15 RX ADMIN — INSULIN GLARGINE 5 UNIT(S): 100 INJECTION, SOLUTION SUBCUTANEOUS at 22:57

## 2021-05-15 RX ADMIN — Medication 50 MILLIGRAM(S): at 10:22

## 2021-05-15 RX ADMIN — Medication 650 MILLIGRAM(S): at 13:59

## 2021-05-15 RX ADMIN — ATORVASTATIN CALCIUM 40 MILLIGRAM(S): 80 TABLET, FILM COATED ORAL at 22:39

## 2021-05-15 RX ADMIN — Medication 81 MILLIGRAM(S): at 10:22

## 2021-05-15 RX ADMIN — GABAPENTIN 300 MILLIGRAM(S): 400 CAPSULE ORAL at 22:39

## 2021-05-15 RX ADMIN — Medication 1: at 12:13

## 2021-05-15 RX ADMIN — Medication 100 MILLIGRAM(S): at 13:59

## 2021-05-15 RX ADMIN — GABAPENTIN 300 MILLIGRAM(S): 400 CAPSULE ORAL at 13:59

## 2021-05-15 RX ADMIN — Medication 20 MILLIGRAM(S): at 10:22

## 2021-05-15 NOTE — PROGRESS NOTE ADULT - ASSESSMENT
* Infected graft POD#9 with MSSA bacteremia on cefazolin   - s/p muscle flap 5/11   - will need long term IV abx as per ID - PICC placement prior to DC   - DANYELLE removal as per Dr. Ventura     * New acute on Chronic systolic HF in the settings of oliguric ORLY/PAT - now resolved, ORLY now resolved   - monitor I&Os and renal fx, keep FQ until more ambulatory - successful voiding trial   - started entresto    *H/o depression / HTN / RA /ILD/ Pulm HTN - c/w home meds and f/u outpatient for further management    * Right LE DVT Feb 2021 with superficial left basilic vein non-occlusive clot off Eliquis   - to resume AC after sx if ok with vascular   - doppler leg negative - resume AC when OK with Vascular   - VTE proph for now UFH - noted drop in platelets - ? etiology - surgery vs consumption vs HIT - monitor, if continue to drop stop UFH    * DMII - HISS and resumed lantus     * Incidental finding of 2.5 cm right renal lesion amd 1.5 cm left breat - daughter MD aware, outpt follow up    * Weakness/Lethargy - iatrogenic with pain meds - held and resolved    Discussed with Dr. Ventura and Dr. Ortega    Daughter MD updated [phone] cell

## 2021-05-15 NOTE — PROGRESS NOTE ADULT - ASSESSMENT
1) Dyspnea  2) Pulmonary Fibrosis/Emphysema  3) Hypoxemia   4) HFrEF  5) DVT    86y Female with PMH of COPD, arthritis, CAD, DMII, depression, HTN, PVD, PAD, s/p left fem pop bypass on 03/24/21, +lower ext DVT on eliquis, +chronic left heel ulcer, RA, anemia, presented to the ED at [facility] on 5/3 with worsening dyspnea, wheezing that started that night. Pt received 2 Duoneb treatments at home, with only slight improvement, so EMS called. At home patient was hypoxic to 87% on room air, was placed on 100% NRB mask. In the ED at [facility], pt was placed on 2 LPM via NC, received a dose of Lasix 20 mg IV x   On her first night of admission at [facility] 5/3 patient developed fever which has persisted throughout admission. ID was consulted, patient was started on vancomycin and meropenem. Overnight on 5/4-5/5 patient had extensive purulent drainage from left medial thigh surgical site. Plastic surgery was consulted, advised transfer to [facility] for evaluation by Dr Ventura who did fem pop bypass 3/2021.    Above history per chart review  Patient states that she smoked until last year   History of RA as well  Not seen pulmonary in the past   LVEF noted to be 35-40% and PASP elevated at 36 mmHg as well  CT Chest revealed paraseptal emphysematous changes, dilated pulmonary artery and pulmonary fibrosis.   Reviewed CT chest report, echocardiogram. Hypoxemia issues are likely related to emphysema/RA-ILD/PH complicated by HFrEF and now contrast induced nephropathy.  Continue Spiriva daily for emphysema  Albuterol PRN  Incentive spirometer   data discussed with RN staff today 5/15

## 2021-05-15 NOTE — PROGRESS NOTE ADULT - ASSESSMENT
85 y/o Female with h/o COPD, arthritis, CAD, DMII, depression, HTN, PVD, PAD, s/p left fem pop bypass on 03/24/21, lower ext DVT on eliquis, chronic left heel ulcer, RA, anemia was admitted on 5/3 at [facility] with worsening dyspnea, wheezing that started that night. Pt received 2 Duoneb treatments at home, with only slight improvement. At home patient was hypoxic to 87% on room air, was placed on 100% NRB mask. In the ED at [facility], pt was placed on 2 LPM via NC, received a dose of Lasix 20 mg IV x 1. Her respiratory symptoms have improved with diuresis. Her troponin levels were monitored. Cardiology was consulted, advised troponin elevated due to type II MI.  On her first night of admission at [facility] 5/3 patient developed fever which has persisted throughout admission. ID was consulted, patient was started on vancomycin and meropenem. Overnight on 5/4-5/5 patient had extensive purulent drainage from left medial thigh surgical site. Plastic surgery was consulted, advised transfer to [facility] for evaluation by Dr Ventura who did fem pop bypass 3/2021. In ED at [facility] on 5/5, she was continued on meropenem.    1. Postsurgical wound infection with MSSA s/p washout. Recent left femoral-popliteal bypass. Probable underlying vascular graft infection. s/p MSSA sepsis. ARF on CRF stage 3. Allergy to cephalosporins and PCN.  -cultures reviewed  -renal function is improved  -on cefazolin 2 gm IV q8h # 10  -tolerating abx well so far; no side effects noted  -monitor closely in [facility] of PCN allergy history   -local wound care  -vascular evaluation appreciated  -s/p leg surgery yesterday - vascular graft is likely tainted with infection  -continue abx coverage  -will need longer term IV abx therapy  -monitor temps  -f/u CBC  -supportive care  2. Other issues:   -care per medicine    d/w Dr. Ventura and Dr. MOMO Leon

## 2021-05-15 NOTE — PROGRESS NOTE ADULT - SUBJECTIVE AND OBJECTIVE BOX
afebrile, VSS    no significant discomfort    dressing clean and dry; L groin erythema resolved;  L foot warm    A/P  status post muscle flap coverage for graft infection  improved  will leave DANYELLE for now (~ 40 cc per day)  continue antibiotics  ARF resolved    MEDICATIONS  (STANDING):  aspirin  chewable 81 milliGRAM(s) Oral daily  atorvastatin 40 milliGRAM(s) Oral at bedtime  ceFAZolin   IVPB 2000 milliGRAM(s) IV Intermittent every 8 hours  dextrose 40% Gel 15 Gram(s) Oral once  dextrose 5%. 1000 milliLiter(s) (50 mL/Hr) IV Continuous <Continuous>  dextrose 5%. 1000 milliLiter(s) (100 mL/Hr) IV Continuous <Continuous>  dextrose 50% Injectable 25 Gram(s) IV Push once  dextrose 50% Injectable 12.5 Gram(s) IV Push once  dextrose 50% Injectable 25 Gram(s) IV Push once  folic acid 1 milliGRAM(s) Oral at bedtime  gabapentin 300 milliGRAM(s) Oral three times a day  glucagon  Injectable 1 milliGRAM(s) IntraMuscular once  heparin   Injectable 5000 Unit(s) SubCutaneous every 12 hours  insulin glargine Injectable (LANTUS) 5 Unit(s) SubCutaneous at bedtime  insulin lispro (ADMELOG) corrective regimen sliding scale   SubCutaneous three times a day before meals  metoprolol succinate ER 50 milliGRAM(s) Oral daily  PARoxetine 20 milliGRAM(s) Oral daily  sacubitril 24 mG/valsartan 26 mG 1 Tablet(s) Oral two times a day  tiotropium 18 MICROgram(s) Capsule 1 Capsule(s) Inhalation daily    MEDICATIONS  (PRN):  acetaminophen   Tablet .. 650 milliGRAM(s) Oral every 6 hours PRN Mild Pain (1 - 3)  ALBUTerol    90 MICROgram(s) HFA Inhaler 1 Puff(s) Inhalation every 6 hours PRN Shortness of Breath  oxyCODONE    IR 5 milliGRAM(s) Oral every 6 hours PRN Severe Pain (7 - 10)      Allergies    cephalosporins (Other)  penicillins (Urticaria; Pruritus)    Intolerances        Flatus: [ ] YES [ ] NO             Bowel Movement: [ ] YES [ ] NO  Pain (0-10):            Pain Control Adequate: [ ] YES [ ] NO  Nausea: [ ] YES [ ] NO            Vomiting: [ ] YES [ ] NO  Diarrhea: [ ] YES [ ] NO         Constipation: [ ] YES [ ] NO     Chest Pain: [ ] YES [ ] NO    SOB:  [ ] YES [ ] NO    Vital Signs Last 24 Hrs  T(C): 36.8 (14 May 2021 21:33), Max: 36.8 (14 May 2021 21:33)  T(F): 98.2 (14 May 2021 21:33), Max: 98.2 (14 May 2021 21:33)  HR: 74 (15 May 2021 09:42) (69 - 81)  BP: 113/27 (15 May 2021 09:00) (101/44 - 146/45)  BP(mean): 50 (15 May 2021 09:00) (43 - 97)  RR: 20 (15 May 2021 09:00) (13 - 30)  SpO2: 93% (15 May 2021 08:00) (90% - 100%)    I&O's Summary    14 May 2021 07:01  -  15 May 2021 07:00  --------------------------------------------------------  IN: 100 mL / OUT: 1275 mL / NET: -1175 mL    15 May 2021 07:01  -  15 May 2021 10:46  --------------------------------------------------------  IN: 0 mL / OUT: 900 mL / NET: -900 mL        Physical Exam:  General: NAD, resting comfortably  Pulmonary: normal resp effort, CTA-B  Cardiovascular: NSR  Abdominal: soft, NT/ND  Extremities: WWP, normal strength  Neuro: A/O x 3, CNs II-XII grossly intact, normal motor/sensation, no focal deficits  Pulses:   Right:                                                                          Left:  FEM [ ]2+ [ ]1+ [ ]doppler                                             FEM [ ]2+ [ ]1+ [ ]doppler    POP [ ]2+ [ ]1+ [ ]doppler                                             POP [ ]2+ [ ]1+ [ ]doppler    DP [ ]2+ [ ]1+ [ ]doppler                                                DP [ ]2+ [ ]1+ [ ]doppler  PT[ ]2+ [ ]1+ [ ]doppler                                                  PT [ ]2+ [ ]1+ [ ]doppler    LABS:                CAPILLARY BLOOD GLUCOSE      POCT Blood Glucose.: 160 mg/dL (15 May 2021 08:16)  POCT Blood Glucose.: 175 mg/dL (14 May 2021 22:34)  POCT Blood Glucose.: 127 mg/dL (14 May 2021 16:34)  POCT Blood Glucose.: 201 mg/dL (14 May 2021 12:20)      RADIOLOGY & ADDITIONAL TESTS:

## 2021-05-15 NOTE — PROGRESS NOTE ADULT - SUBJECTIVE AND OBJECTIVE BOX
Date of service: 05-15-21 @ 10:21    Lying in bed in NAD  Alert and verbal  Denies pain  Has right leg wound -dressed    ROS: no fever or chills; denies dizziness, no HA, no SOB or cough, no abdominal pain, no diarrhea or constipation; no dysuria, no legs pain    MEDICATIONS  (STANDING):  aspirin  chewable 81 milliGRAM(s) Oral daily  atorvastatin 40 milliGRAM(s) Oral at bedtime  ceFAZolin   IVPB 2000 milliGRAM(s) IV Intermittent every 8 hours  dextrose 40% Gel 15 Gram(s) Oral once  dextrose 5%. 1000 milliLiter(s) (50 mL/Hr) IV Continuous <Continuous>  dextrose 5%. 1000 milliLiter(s) (100 mL/Hr) IV Continuous <Continuous>  dextrose 50% Injectable 25 Gram(s) IV Push once  dextrose 50% Injectable 12.5 Gram(s) IV Push once  dextrose 50% Injectable 25 Gram(s) IV Push once  folic acid 1 milliGRAM(s) Oral at bedtime  gabapentin 300 milliGRAM(s) Oral three times a day  glucagon  Injectable 1 milliGRAM(s) IntraMuscular once  heparin   Injectable 5000 Unit(s) SubCutaneous every 12 hours  insulin glargine Injectable (LANTUS) 5 Unit(s) SubCutaneous at bedtime  insulin lispro (ADMELOG) corrective regimen sliding scale   SubCutaneous three times a day before meals  metoprolol succinate ER 50 milliGRAM(s) Oral daily  PARoxetine 20 milliGRAM(s) Oral daily  sacubitril 24 mG/valsartan 26 mG 1 Tablet(s) Oral two times a day  tiotropium 18 MICROgram(s) Capsule 1 Capsule(s) Inhalation daily    Vital Signs Last 24 Hrs  T(C): 36.8 (14 May 2021 21:33), Max: 36.8 (14 May 2021 21:33)  T(F): 98.2 (14 May 2021 21:33), Max: 98.2 (14 May 2021 21:33)  HR: 74 (15 May 2021 09:42) (69 - 81)  BP: 113/27 (15 May 2021 09:00) (101/44 - 146/45)  BP(mean): 50 (15 May 2021 09:00) (43 - 97)  RR: 20 (15 May 2021 09:00) (13 - 30)  SpO2: 93% (15 May 2021 08:00) (90% - 100%)     Physical exam:    Constitutional:  No acute distress  HEENT: NC/AT, EOMI, PERRLA, conjunctivae clear  Neck: supple; thyroid not palpable  Back: no tenderness  Respiratory: respiratory effort normal; crackles at bases  Cardiovascular: S1S2 regular, no murmurs  Abdomen: soft, not tender, not distended, positive BS  Genitourinary: no suprapubic tenderness  Lymphatic: no LN palpable  Musculoskeletal: no muscle tenderness, no joint swelling or tenderness  Extremities: 1+ pedal edema  Left thigh open wound s/p I and D; packed; dressing  Neurological/ Psychiatric: AxOx3, moving all extremities  Skin: no rashes; no palpable lesions    Labs: reviewed                        9.6    3.90  )-----------( 113      ( 13 May 2021 05:47 )             30.1     05-13    141  |  112<H>  |  45<H>  ----------------------------<  135<H>  4.2   |  22  |  0.86    Ca    8.6      13 May 2021 05:47  Phos  4.8     05-11  Mg     2.2     05-11                                  10.1   7.90  )-----------( 199      ( 06 May 2021 05:49 )             31.3     05-06    134<L>  |  106  |  51<H>  ----------------------------<  199<H>  4.7   |  20<L>  |  1.39<H>    Ca    8.0<L>      06 May 2021 05:49    TPro  5.9<L>  /  Alb  1.9<L>  /  TBili  0.6  /  DBili  x   /  AST  5<L>  /  ALT  <6<L>  /  AlkPhos  71  05-05     LIVER FUNCTIONS - ( 05 May 2021 11:59 )  Alb: 1.9 g/dL / Pro: 5.9 gm/dL / ALK PHOS: 71 U/L / ALT: <6 U/L / AST: 5 U/L / GGT: x             Culture - Fungal, Other (collected 05 May 2021 16:34)  Source: .Other None  Preliminary Report (06 May 2021 09:04):    Testing in progress    Culture - Surgical Swab (collected 05 May 2021 16:34)  Source: .Surgical Swab None  Preliminary Report (06 May 2021 20:09):    Moderate Staphylococcus aureus  Organism: Staphylococcus aureus (07 May 2021 19:05)  Organism: Staphylococcus aureus (07 May 2021 19:05)      -  Ampicillin/Sulbactam: S <=8/4      -  Cefazolin: S <=4      -  Clindamycin: S <=0.25      -  Erythromycin: S <=0.25      -  Gentamicin: S <=1 Should not be used as monotherapy      -  Oxacillin: S <=0.25      -  RIF- Rifampin: S <=1 Should not be used as monotherapy      -  Tetra/Doxy: S <=1      -  Trimethoprim/Sulfamethoxazole: S <=0.5/9.5      -  Vancomycin: S 1      Method Type: SIMEON    Culture - Fungal, Other (collected 05 May 2021 16:34)  Source: .Other None  Preliminary Report (06 May 2021 09:04):    Testing in progress    Culture - Surgical Swab (collected 05 May 2021 16:34)  Source: .Surgical Swab None  Preliminary Report (06 May 2021 20:04):    Moderate Staphylococcus aureus  Organism: Staphylococcus aureus (07 May 2021 19:06)  Organism: Staphylococcus aureus (07 May 2021 19:06)      -  Ampicillin/Sulbactam: S <=8/4      -  Cefazolin: S <=4      -  Clindamycin: S <=0.25      -  Erythromycin: S <=0.25      -  Gentamicin: S <=1 Should not be used as monotherapy      -  Oxacillin: S <=0.25      -  RIF- Rifampin: S <=1 Should not be used as monotherapy      -  Tetra/Doxy: S <=1      -  Trimethoprim/Sulfamethoxazole: S <=0.5/9.5      -  Vancomycin: S 2      Method Type: SIMEON    Culture - Other (collected 04 May 2021 23:00)  Source: .Other wound - L thigh  Final Report (07 May 2021 07:28):    Numerous Staphylococcus aureus  Organism: Staphylococcus aureus (07 May 2021 07:28)  Organism: Staphylococcus aureus (07 May 2021 07:28)      -  Ampicillin/Sulbactam: S <=8/4      -  Cefazolin: S <=4      -  Clindamycin: S <=0.25      -  Erythromycin: S <=0.25      -  Gentamicin: S <=1 Should not be used as monotherapy      -  Oxacillin: S <=0.25      -  RIF- Rifampin: S <=1 Should not be used as monotherapy      -  Tetra/Doxy: S <=1      -  Trimethoprim/Sulfamethoxazole: S <=0.5/9.5      -  Vancomycin: S 1      Method Type: SIMEON    Culture - Blood (collected 03 May 2021 22:10)  Source: .Blood Blood-Peripheral  Final Report (09 May 2021 03:00):    No Growth Final    Culture - Blood (collected 03 May 2021 22:10)  Source: .Blood Blood-Peripheral  Gram Stain (07 May 2021 01:14):    Growth in aerobic bottle: Gram Positive Cocci in Clusters  Final Report (08 May 2021 17:32):    Growth in aerobic bottle: Staphylococcus aureus  Organism: Blood Culture PCR  Staphylococcus aureus (08 May 2021 17:32)  Organism: Staphylococcus aureus (08 May 2021 17:32)      -  Ampicillin/Sulbactam: S <=8/4      -  Cefazolin: S <=4      -  Clindamycin: S <=0.25      -  Erythromycin: S <=0.25      -  Gentamicin: S <=1 Should not be used as monotherapy      -  Oxacillin: S <=0.25      -  RIF- Rifampin: S <=1 Should not be used as monotherapy      -  Tetra/Doxy: S <=1      -  Trimethoprim/Sulfamethoxazole: S <=0.5/9.5      -  Vancomycin: S 2      Method Type: SIMEON  Organism: Blood Culture PCR (08 May 2021 17:32)      -  Staphylococcus aureus: Detec Any isolate of Staphylococcus aureus from a blood culture is NOT considered a contaminant.      Method Type: PCR    COVID-19 PCR: NotDetec (05-03-21 @ 23:17)    Radiology: all available radiological tests reviewed    Advanced directives addressed: full resuscitation

## 2021-05-15 NOTE — PROGRESS NOTE ADULT - SUBJECTIVE AND OBJECTIVE BOX
86 y.o. Female with PMH of COPD, arthritis, CAD, DMII, depression, HTN, PVD, PAD, s/p left fem pop bypass on 03/24/21, +lower ext DVT on eliquis, +chronic left heel ulcer, RA, anemia, presented to the ED at [facility] on 5/3 with worsening dyspnea, wheezing that started that night. Pt received 2 Duoneb treatments at home, with only slight improvement, so EMS called. At home patient was hypoxic to 87% on room air, was placed on 100% NRB mask. In the ED at [facility], pt was placed on 2 LPM via NC, received a dose of Lasix 20 mg IV x 1. Initial trop noted to be 0.17.  BNP >63366  On her first night of admission at [facility] 5/3 patient developed fever which has persisted throughout admission. ID was consulted, patient was started on vancomycin and meropenem. Overnight on 5/4-5/5 patient had extensive purulent drainage from left medial thigh surgical site. Plastic surgery was consulted, advised transfer to [facility] for evaluation by Dr Ventura who did fem pop bypass 3/2021.      Was taking to OR : purulent fluid in L thigh popliteal fossa above knee; necrotic infected fibrinous material around graft and on tissue in popliteal fossa  oliguric suspect PAT;  from anuric she is in polyuric phase today    INTERVAL HPI: comfortable in the chair, no pain, s/p muscle flap on 5/11, lethargy resolved  Other ROS reviewed and neg     Vital Signs Last 24 Hrs  T(C): 36.5 (14 May 2021 13:31), Max: 37 (14 May 2021 05:32)  T(F): 97.7 (14 May 2021 13:31), Max: 98.6 (14 May 2021 05:32)  HR: 72 (14 May 2021 14:00) (71 - 85)  BP: 113/39 (14 May 2021 14:00) (113/39 - 141/50)  BP(mean): 58 (14 May 2021 14:00) (58 - 97)  RR: 19 (14 May 2021 14:00) (14 - 28)  SpO2: 94% (14 May 2021 10:00) (92% - 98%)  I&O's Detail    13 May 2021 07:01  -  14 May 2021 07:00  --------------------------------------------------------  IN:  Total IN: 0 mL    OUT:    Drain (mL): 20 mL    Indwelling Catheter - Urethral (mL): 1425 mL    Voided (mL): 850 mL  Total OUT: 2295 mL    Total NET: -2295 mL                        9.6    3.90  )-----------( 113      ( 13 May 2021 05:47 )             30.1     13 May 2021 05:47    141    |  112    |  45     ----------------------------<  135    4.2     |  22     |  0.86     Ca    8.6        13 May 2021 05:47    CAPILLARY BLOOD GLUCOSE    POCT Blood Glucose.: 201 mg/dL (14 May 2021 12:20)  POCT Blood Glucose.: 140 mg/dL (14 May 2021 08:15)  POCT Blood Glucose.: 240 mg/dL (13 May 2021 21:49)  POCT Blood Glucose.: 117 mg/dL (13 May 2021 17:43)    MEDICATIONS  (STANDING):  aspirin  chewable 81 milliGRAM(s) Oral daily  atorvastatin 40 milliGRAM(s) Oral at bedtime  ceFAZolin   IVPB 2000 milliGRAM(s) IV Intermittent every 8 hours  dextrose 40% Gel 15 Gram(s) Oral once  dextrose 5%. 1000 milliLiter(s) (50 mL/Hr) IV Continuous <Continuous>  dextrose 5%. 1000 milliLiter(s) (100 mL/Hr) IV Continuous <Continuous>  dextrose 50% Injectable 25 Gram(s) IV Push once  dextrose 50% Injectable 12.5 Gram(s) IV Push once  dextrose 50% Injectable 25 Gram(s) IV Push once  folic acid 1 milliGRAM(s) Oral at bedtime  gabapentin 300 milliGRAM(s) Oral three times a day  glucagon  Injectable 1 milliGRAM(s) IntraMuscular once  heparin   Injectable 5000 Unit(s) SubCutaneous every 12 hours  insulin glargine Injectable (LANTUS) 5 Unit(s) SubCutaneous at bedtime  insulin lispro (ADMELOG) corrective regimen sliding scale   SubCutaneous three times a day before meals  metoprolol succinate ER 50 milliGRAM(s) Oral daily  PARoxetine 20 milliGRAM(s) Oral daily  sacubitril 24 mG/valsartan 26 mG 1 Tablet(s) Oral two times a day  tiotropium 18 MICROgram(s) Capsule 1 Capsule(s) Inhalation daily    MEDICATIONS  (PRN):  acetaminophen   Tablet .. 650 milliGRAM(s) Oral every 6 hours PRN Mild Pain (1 - 3)  ALBUTerol    90 MICROgram(s) HFA Inhaler 1 Puff(s) Inhalation every 6 hours PRN Shortness of Breath  oxyCODONE    IR 5 milliGRAM(s) Oral every 6 hours PRN Severe Pain (7 - 10)    RADIOLOGY: personally visualized    PHYSICAL EXAM:    Constitutional: elderly female in no acute distress sitting up in chair   Head: Head is normocephalic and atraumatic.    Neck: No JVD.   Cardiovascular: Regular rate and rhythm without S3, S4. No murmurs or rubs are appreciated.    Respiratory: Decreased BS at bases with + crackles  Abdomen: Soft, nontender, nondistended with positive bowel sounds.    Extremity: No tenderness, no pitting edema, Left thigh area in dressing, LLE distal trace edema, DANYELLE in place  Neurologic: The patient is alert and oriented.  Skin: Left thigh dressing   : no CVA tenderness      TTE Summary:   1. Left ventricular ejection fraction, by visual estimation, is 35 to 40%.   2. Moderately toseverely decreased global left ventricular systolic function.   3. Multiple left ventricular regional wall motion abnormalities exist. See wall motion findings.   4. Mildly increased LV wall thickness.   5. Normal left ventricular internal cavity size.   6. Spectral Doppler shows impaired relaxation pattern of left ventricular myocardial filling (Grade I diastolic dysfunction).   7. There is mild concentric left ventricular hypertrophy.   8. Mild mitral annular calcification.   9. Mild mitral valve regurgitation.  10. Thickening and calcification of the anterior and posterior mitral valve leaflets.  11. Mild tricuspid regurgitation.  12. Mild aortic regurgitation.  13. Sclerotic aortic valve with normal opening.  14. Estimated pulmonary artery systolic pressure is 36.6 mmHg assuming a right atrial pressure of 10 mmHg, which is consistent with borderline pulmonary hypertension.

## 2021-05-15 NOTE — PROGRESS NOTE ADULT - SUBJECTIVE AND OBJECTIVE BOX
Patient is a 86y old  Female who presents with a chief complaint of SOB (07 May 2021 15:22)      HPI:  86y Female with PMH of COPD, arthritis, CAD, DMII, depression, HTN, PVD, PAD, s/p left fem pop bypass on 03/24/21, +lower ext DVT on eliquis, +chronic left heel ulcer, RA, anemia, presented to the ED at [place] on 5/3 with worsening dyspnea, wheezing that started that night. Pt received 2 Duoneb treatments at home, with only slight improvement, so EMS called. At home patient was hypoxic to 87% on room air, was placed on 100% NRB mask. In the ED at [place], pt was placed on 2 LPM via NC, received a dose of Lasix 20 mg IV x   On her first night of admission at [place] 5/3 patient developed fever which has persisted throughout admission. ID was consulted, patient was started on vancomycin and meropenem. Overnight on 5/4-5/5 patient had extensive purulent drainage from left medial thigh surgical site. Plastic surgery was consulted, advised transfer to [facility] for evaluation by Dr Ventura who did fem pop bypass 3/2021.    Above history per chart review  Patient states that she smoked until last year   History of RA as well  Not seen pulmonary in the past   LVEF noted to be 35-40% and PASP elevated at 36 mmHg as well  CT Chest revealed paraseptal emphysematous changes, dilated pulmonary artery and pulmonary fibrosis.     5/14  Patient is s/p Exploration, infected graft, lower extremity with (L distal medial thigh on 5/11)  No acute pulmonary events occurred overnight  5/15  she is resting comfortably  no resp distress  data discussed with RN staff at bedside this am  no pulm issues reported  PREVIOUS DIAGNOSTIC TESTING:      MEDICATIONS  (STANDING):  aspirin  chewable 81 milliGRAM(s) Oral daily  atorvastatin 40 milliGRAM(s) Oral at bedtime  ceFAZolin   IVPB 2000 milliGRAM(s) IV Intermittent every 8 hours  dextrose 40% Gel 15 Gram(s) Oral once  dextrose 5%. 1000 milliLiter(s) (50 mL/Hr) IV Continuous <Continuous>  dextrose 5%. 1000 milliLiter(s) (100 mL/Hr) IV Continuous <Continuous>  dextrose 50% Injectable 25 Gram(s) IV Push once  dextrose 50% Injectable 12.5 Gram(s) IV Push once  dextrose 50% Injectable 25 Gram(s) IV Push once  folic acid 1 milliGRAM(s) Oral at bedtime  gabapentin 300 milliGRAM(s) Oral three times a day  glucagon  Injectable 1 milliGRAM(s) IntraMuscular once  heparin   Injectable 5000 Unit(s) SubCutaneous every 12 hours  insulin glargine Injectable (LANTUS) 5 Unit(s) SubCutaneous at bedtime  insulin lispro (ADMELOG) corrective regimen sliding scale   SubCutaneous three times a day before meals  metoprolol succinate ER 50 milliGRAM(s) Oral daily  PARoxetine 20 milliGRAM(s) Oral daily  sacubitril 24 mG/valsartan 26 mG 1 Tablet(s) Oral two times a day  tiotropium 18 MICROgram(s) Capsule 1 Capsule(s) Inhalation daily    MEDICATIONS  (PRN):  acetaminophen   Tablet .. 650 milliGRAM(s) Oral every 6 hours PRN Mild Pain (1 - 3)  ALBUTerol    90 MICROgram(s) HFA Inhaler 1 Puff(s) Inhalation every 6 hours PRN Shortness of Breath  oxyCODONE    IR 5 milliGRAM(s) Oral every 6 hours PRN Severe Pain (7 - 10)    Vital Signs Last 24 Hrs  T(C): 36.8 (14 May 2021 21:33), Max: 36.8 (14 May 2021 21:33)  T(F): 98.2 (14 May 2021 21:33), Max: 98.2 (14 May 2021 21:33)  HR: 74 (15 May 2021 07:00) (69 - 81)  BP: 145/54 (15 May 2021 07:00) (101/44 - 146/45)  BP(mean): 74 (15 May 2021 07:00) (43 - 97)  RR: 16 (15 May 2021 07:00) (13 - 30)  SpO2: 99% (15 May 2021 07:00) (90% - 100%)    I&O's Detail    14 May 2021 07:01  -  15 May 2021 07:00  --------------------------------------------------------  IN:    IV PiggyBack: 100 mL  Total IN: 100 mL    OUT:    Drain (mL): 50 mL    Voided (mL): 1225 mL  Total OUT: 1275 mL    Total NET: -1175 mL      PHYSICAL EXAM  General Appearance: cooperative, no acute distress,   HEENT: PERRL, conjunctiva clear, EOM's intact, non injected pharynx, no exudate, TM   normal  Neck: Supple, , no adenopathy, thyroid: not enlarged, no carotid bruit or JVD  Back: Symmetric, no  tenderness,no soft tissue tenderness  Lungs: diminished bilaterally  Heart: Regular rate and rhythm, S1, S2 normal, no murmur, rub or gallop  Abdomen: Soft, non-tender, bowel sounds active , no hepatosplenomegaly  Extremities: no cyanosis or edema, no joint swelling  Skin: Skin color, texture normal, no rashes   Neurologic: Alert and oriented X3 , cranial nerves intact, sensory and motor normal,    ECG:    LABS:      05-08    132<L>  |  106  |  82<H>  ----------------------------<  136<H>  5.0   |  13<L>  |  3.53<H>    Ca    7.7<L>      08 May 2021 06:27  Phos  6.2     05-08    TPro  5.5<L>  /  Alb  1.7<L>  /  TBili  0.2  /  DBili  x   /  AST  15  /  ALT  <6<L>  /  AlkPhos  99  05-08        Lipid Panel  128  33  --  111    Pro BNP  [id number] 05-07 @ 06:51  D Dimer  -- 05-07 @ 06:51  Pro BNP  [id number] 05-06 @ 17:59  D Dimer  -- 05-06 @ 17:59  Pro BNP  [id number] 05-03 @ 22:10  D Dimer  -- 05-03 @ 22:10        xr< from: Xray Chest 1 View AP/PA (05.03.21 @ 22:36) >  EXAM:  XR CHEST AP OR PA 1V      PROCEDURE DATE:  05/03/2021        INTERPRETATION:  Views:1  Comparison: 08/21/15  History: Shortness of breath    There is mild scattered interstitial infiltrate consistent with pneumonia or pneumonitis without segmental consolidation, layering effusion or cavitation. The heart is normal in size.    IMPRESSION: As above    < end of copied text >        RADIOLOGY & ADDITIONAL STUDIES:

## 2021-05-16 LAB
HCT VFR BLD CALC: 29.3 % — LOW (ref 34.5–45)
HEPARIN-PF4 AB RESULT: <0.6 U/ML — SIGNIFICANT CHANGE UP (ref 0–0.9)
HGB BLD-MCNC: 9.4 G/DL — LOW (ref 11.5–15.5)
MCHC RBC-ENTMCNC: 29.9 PG — SIGNIFICANT CHANGE UP (ref 27–34)
MCHC RBC-ENTMCNC: 32.1 GM/DL — SIGNIFICANT CHANGE UP (ref 32–36)
MCV RBC AUTO: 93.3 FL — SIGNIFICANT CHANGE UP (ref 80–100)
PF4 HEPARIN CMPLX AB SER-ACNC: NEGATIVE — SIGNIFICANT CHANGE UP
PLATELET # BLD AUTO: 53 K/UL — LOW (ref 150–400)
RBC # BLD: 3.14 M/UL — LOW (ref 3.8–5.2)
RBC # FLD: 15.3 % — HIGH (ref 10.3–14.5)
WBC # BLD: 4.42 K/UL — SIGNIFICANT CHANGE UP (ref 3.8–10.5)
WBC # FLD AUTO: 4.42 K/UL — SIGNIFICANT CHANGE UP (ref 3.8–10.5)

## 2021-05-16 PROCEDURE — 99223 1ST HOSP IP/OBS HIGH 75: CPT

## 2021-05-16 PROCEDURE — 99233 SBSQ HOSP IP/OBS HIGH 50: CPT

## 2021-05-16 RX ORDER — FONDAPARINUX SODIUM 2.5 MG/.5ML
7.5 INJECTION, SOLUTION SUBCUTANEOUS DAILY
Refills: 0 | Status: DISCONTINUED | OUTPATIENT
Start: 2021-05-16 | End: 2021-05-16

## 2021-05-16 RX ADMIN — Medication 81 MILLIGRAM(S): at 09:58

## 2021-05-16 RX ADMIN — FONDAPARINUX SODIUM 7.5 MILLIGRAM(S): 2.5 INJECTION, SOLUTION SUBCUTANEOUS at 09:59

## 2021-05-16 RX ADMIN — Medication 20 MILLIGRAM(S): at 10:00

## 2021-05-16 RX ADMIN — GABAPENTIN 300 MILLIGRAM(S): 400 CAPSULE ORAL at 06:24

## 2021-05-16 RX ADMIN — Medication 1: at 09:59

## 2021-05-16 RX ADMIN — SACUBITRIL AND VALSARTAN 1 TABLET(S): 24; 26 TABLET, FILM COATED ORAL at 22:08

## 2021-05-16 RX ADMIN — INSULIN GLARGINE 5 UNIT(S): 100 INJECTION, SOLUTION SUBCUTANEOUS at 22:09

## 2021-05-16 RX ADMIN — GABAPENTIN 300 MILLIGRAM(S): 400 CAPSULE ORAL at 22:07

## 2021-05-16 RX ADMIN — Medication 100 MILLIGRAM(S): at 14:44

## 2021-05-16 RX ADMIN — Medication 50 MILLIGRAM(S): at 10:00

## 2021-05-16 RX ADMIN — TIOTROPIUM BROMIDE 1 CAPSULE(S): 18 CAPSULE ORAL; RESPIRATORY (INHALATION) at 09:18

## 2021-05-16 RX ADMIN — GABAPENTIN 300 MILLIGRAM(S): 400 CAPSULE ORAL at 14:45

## 2021-05-16 RX ADMIN — Medication 1: at 17:11

## 2021-05-16 RX ADMIN — Medication 100 MILLIGRAM(S): at 22:06

## 2021-05-16 RX ADMIN — ATORVASTATIN CALCIUM 40 MILLIGRAM(S): 80 TABLET, FILM COATED ORAL at 22:07

## 2021-05-16 RX ADMIN — SACUBITRIL AND VALSARTAN 1 TABLET(S): 24; 26 TABLET, FILM COATED ORAL at 10:03

## 2021-05-16 RX ADMIN — Medication 100 MILLIGRAM(S): at 06:06

## 2021-05-16 RX ADMIN — Medication 1 MILLIGRAM(S): at 22:07

## 2021-05-16 NOTE — PROGRESS NOTE ADULT - ASSESSMENT
1) Dyspnea  2) Pulmonary Fibrosis/Emphysema  3) Hypoxemia   4) HFrEF  5) DVT    86y Female with PMH of COPD, arthritis, CAD, DMII, depression, HTN, PVD, PAD, s/p left fem pop bypass on 03/24/21, +lower ext DVT on eliquis, +chronic left heel ulcer, RA, anemia, presented to the ED at [place] on 5/3 with worsening dyspnea, wheezing that started that night. Pt received 2 Duoneb treatments at home, with only slight improvement, so EMS called. At home patient was hypoxic to 87% on room air, was placed on 100% NRB mask. In the ED at [place], pt was placed on 2 LPM via NC, received a dose of Lasix 20 mg IV x   On her first night of admission at [place] 5/3 patient developed fever which has persisted throughout admission. ID was consulted, patient was started on vancomycin and meropenem. Overnight on 5/4-5/5 patient had extensive purulent drainage from left medial thigh surgical site. Plastic surgery was consulted, advised transfer to [facility] for evaluation by Dr Ventura who did fem pop bypass 3/2021.    Above history per chart review  Patient states that she smoked until last year   History of RA as well  Not seen pulmonary in the past   LVEF noted to be 35-40% and PASP elevated at 36 mmHg as well  CT Chest revealed paraseptal emphysematous changes, dilated pulmonary artery and pulmonary fibrosis.   Reviewed CT chest report, echocardiogram. Hypoxemia issues are likely related to emphysema/RA-ILD/PH complicated by HFrEF and now contrast induced nephropathy.    Continue Spiriva daily for emphysema  Albuterol PRN  Incentive spirometer   data discussed with RN staff   will get repeat cxr in am  DR Tierney will resume in am

## 2021-05-16 NOTE — PROGRESS NOTE ADULT - SUBJECTIVE AND OBJECTIVE BOX
Patient is a 86y old  Female who presents with a chief complaint of SOB (07 May 2021 15:22)      HPI:  86y Female with PMH of COPD, arthritis, CAD, DMII, depression, HTN, PVD, PAD, s/p left fem pop bypass on 03/24/21, +lower ext DVT on eliquis, +chronic left heel ulcer, RA, anemia, presented to the ED at [place] on 5/3 with worsening dyspnea, wheezing that started that night. Pt received 2 Duoneb treatments at home, with only slight improvement, so EMS called. At home patient was hypoxic to 87% on room air, was placed on 100% NRB mask. In the ED at [place], pt was placed on 2 LPM via NC, received a dose of Lasix 20 mg IV x   On her first night of admission at [place] 5/3 patient developed fever which has persisted throughout admission. ID was consulted, patient was started on vancomycin and meropenem. Overnight on 5/4-5/5 patient had extensive purulent drainage from left medial thigh surgical site. Plastic surgery was consulted, advised transfer to [facility] for evaluation by Dr Ventura who did fem pop bypass 3/2021.    Above history per chart review  Patient states that she smoked until last year   History of RA as well  Not seen pulmonary in the past   LVEF noted to be 35-40% and PASP elevated at 36 mmHg as well  CT Chest revealed paraseptal emphysematous changes, dilated pulmonary artery and pulmonary fibrosis.     5/14  Patient is s/p Exploration, infected graft, lower extremity with (L distal medial thigh on 5/11)  No acute pulmonary events occurred overnight  5/15  she is resting comfortably  no resp distress  data discussed with RN staff at bedside this am  no pulm issues reported  5/16  uneventful overnight  no active pulm issues  PREVIOUS DIAGNOSTIC TESTING:    MEDICATIONS  (STANDING):  aspirin  chewable 81 milliGRAM(s) Oral daily  atorvastatin 40 milliGRAM(s) Oral at bedtime  ceFAZolin   IVPB 2000 milliGRAM(s) IV Intermittent every 8 hours  dextrose 40% Gel 15 Gram(s) Oral once  dextrose 5%. 1000 milliLiter(s) (50 mL/Hr) IV Continuous <Continuous>  dextrose 5%. 1000 milliLiter(s) (100 mL/Hr) IV Continuous <Continuous>  dextrose 50% Injectable 25 Gram(s) IV Push once  dextrose 50% Injectable 12.5 Gram(s) IV Push once  dextrose 50% Injectable 25 Gram(s) IV Push once  folic acid 1 milliGRAM(s) Oral at bedtime  fondaparinux Injectable 7.5 milliGRAM(s) SubCutaneous daily  gabapentin 300 milliGRAM(s) Oral three times a day  glucagon  Injectable 1 milliGRAM(s) IntraMuscular once  insulin glargine Injectable (LANTUS) 5 Unit(s) SubCutaneous at bedtime  insulin lispro (ADMELOG) corrective regimen sliding scale   SubCutaneous three times a day before meals  metoprolol succinate ER 50 milliGRAM(s) Oral daily  PARoxetine 20 milliGRAM(s) Oral daily  sacubitril 24 mG/valsartan 26 mG 1 Tablet(s) Oral two times a day  tiotropium 18 MICROgram(s) Capsule 1 Capsule(s) Inhalation daily      MEDICATIONS  (PRN):  acetaminophen   Tablet .. 650 milliGRAM(s) Oral every 6 hours PRN Mild Pain (1 - 3)  ALBUTerol    90 MICROgram(s) HFA Inhaler 1 Puff(s) Inhalation every 6 hours PRN Shortness of Breath  oxyCODONE    IR 5 milliGRAM(s) Oral every 6 hours PRN Severe Pain (7 - 10)      Vital Signs Last 24 Hrs  T(C): 36.4 (16 May 2021 09:37), Max: 36.8 (15 May 2021 22:00)  T(F): 97.6 (16 May 2021 09:37), Max: 98.2 (15 May 2021 22:00)  HR: 78 (16 May 2021 08:00) (67 - 82)  BP: 143/51 (16 May 2021 08:00) (112/39 - 143/51)  BP(mean): 75 (16 May 2021 08:00) (57 - 104)  RR: 16 (16 May 2021 08:00) (12 - 27)  SpO2: 93% (16 May 2021 06:00) (91% - 99%)    I&O's Detail    15 May 2021 07:01  -  16 May 2021 07:00  --------------------------------------------------------  IN:    IV PiggyBack: 150 mL  Total IN: 150 mL    OUT:    Drain (mL): 20 mL    Voided (mL): 1950 mL  Total OUT: 1970 mL    Total NET: -1820 mL        PHYSICAL EXAM  General Appearance: cooperative, no acute distress,   HEENT: PERRL, conjunctiva clear, EOM's intact, non injected pharynx, no exudate, TM   normal  Neck: Supple, , no adenopathy, thyroid: not enlarged, no carotid bruit or JVD  Back: Symmetric, no  tenderness,no soft tissue tenderness  Lungs: diminished bilaterally  Heart: Regular rate and rhythm, S1, S2 normal, no murmur, rub or gallop  Abdomen: Soft, non-tender, bowel sounds active , no hepatosplenomegaly  Extremities: no cyanosis or edema, no joint swelling  Skin: Skin color, texture normal, no rashes   Neurologic: Alert and oriented X3 , cranial nerves intact, sensory and motor normal,    ECG:    LABS:                        9.4    4.42  )-----------( 53       ( 16 May 2021 06:50 )             29.3   05-15    143  |  112<H>  |  32<H>  ----------------------------<  154<H>  4.4   |  27  |  0.83    Ca    8.9      15 May 2021 10:40        05-08    132<L>  |  106  |  82<H>  ----------------------------<  136<H>  5.0   |  13<L>  |  3.53<H>    Ca    7.7<L>      08 May 2021 06:27  Phos  6.2     05-08    TPro  5.5<L>  /  Alb  1.7<L>  /  TBili  0.2  /  DBili  x   /  AST  15  /  ALT  <6<L>  /  AlkPhos  99  05-08        Lipid Panel  128  33  --  111    Pro BNP  [id number] 05-07 @ 06:51  D Dimer  -- 05-07 @ 06:51  Pro BNP  [id number] 05-06 @ 17:59  D Dimer  -- 05-06 @ 17:59  Pro BNP  [id number] 05-03 @ 22:10  D Dimer  -- 05-03 @ 22:10        xr< from: Xray Chest 1 View AP/PA (05.03.21 @ 22:36) >  EXAM:  XR CHEST AP OR PA 1V      PROCEDURE DATE:  05/03/2021        INTERPRETATION:  Views:1  Comparison: 08/21/15  History: Shortness of breath    There is mild scattered interstitial infiltrate consistent with pneumonia or pneumonitis without segmental consolidation, layering effusion or cavitation. The heart is normal in size.    IMPRESSION: As above    < end of copied text >    xr    RADIOLOGY & ADDITIONAL STUDIES:

## 2021-05-16 NOTE — PROGRESS NOTE ADULT - SUBJECTIVE AND OBJECTIVE BOX
afebrile, VSS    DANYELLE ~ 20 cc for last 24 hours    essentially no pain in incision, slight behind the knee    low platelet count noted; heparin discontinued    PE   dressing intact  L foot warm and well perfused  erythema of L thigh essentially resolved, still edema; no sinus of the L incision    A/P  stable  continue PT, antibiotics  monitor platelets  MEDICATIONS  (STANDING):  aspirin  chewable 81 milliGRAM(s) Oral daily  atorvastatin 40 milliGRAM(s) Oral at bedtime  ceFAZolin   IVPB 2000 milliGRAM(s) IV Intermittent every 8 hours  dextrose 40% Gel 15 Gram(s) Oral once  dextrose 5%. 1000 milliLiter(s) (100 mL/Hr) IV Continuous <Continuous>  dextrose 5%. 1000 milliLiter(s) (50 mL/Hr) IV Continuous <Continuous>  dextrose 50% Injectable 25 Gram(s) IV Push once  dextrose 50% Injectable 12.5 Gram(s) IV Push once  dextrose 50% Injectable 25 Gram(s) IV Push once  folic acid 1 milliGRAM(s) Oral at bedtime  fondaparinux Injectable 7.5 milliGRAM(s) SubCutaneous daily  gabapentin 300 milliGRAM(s) Oral three times a day  glucagon  Injectable 1 milliGRAM(s) IntraMuscular once  insulin glargine Injectable (LANTUS) 5 Unit(s) SubCutaneous at bedtime  insulin lispro (ADMELOG) corrective regimen sliding scale   SubCutaneous three times a day before meals  metoprolol succinate ER 50 milliGRAM(s) Oral daily  PARoxetine 20 milliGRAM(s) Oral daily  sacubitril 24 mG/valsartan 26 mG 1 Tablet(s) Oral two times a day  tiotropium 18 MICROgram(s) Capsule 1 Capsule(s) Inhalation daily    MEDICATIONS  (PRN):  acetaminophen   Tablet .. 650 milliGRAM(s) Oral every 6 hours PRN Mild Pain (1 - 3)  ALBUTerol    90 MICROgram(s) HFA Inhaler 1 Puff(s) Inhalation every 6 hours PRN Shortness of Breath  oxyCODONE    IR 5 milliGRAM(s) Oral every 6 hours PRN Severe Pain (7 - 10)      Allergies    cephalosporins (Other)  penicillins (Urticaria; Pruritus)    Intolerances        Flatus: [ ] YES [ ] NO             Bowel Movement: [ ] YES [ ] NO  Pain (0-10):            Pain Control Adequate: [ ] YES [ ] NO  Nausea: [ ] YES [ ] NO            Vomiting: [ ] YES [ ] NO  Diarrhea: [ ] YES [ ] NO         Constipation: [ ] YES [ ] NO     Chest Pain: [ ] YES [ ] NO    SOB:  [ ] YES [ ] NO    Vital Signs Last 24 Hrs  T(C): 36.2 (16 May 2021 06:47), Max: 36.8 (15 May 2021 22:00)  T(F): 97.1 (16 May 2021 06:47), Max: 98.2 (15 May 2021 22:00)  HR: 78 (16 May 2021 08:00) (67 - 82)  BP: 143/51 (16 May 2021 08:00) (112/39 - 143/51)  BP(mean): 75 (16 May 2021 08:00) (50 - 104)  RR: 16 (16 May 2021 08:00) (12 - 27)  SpO2: 93% (16 May 2021 06:00) (91% - 99%)    I&O's Summary    15 May 2021 07:01  -  16 May 2021 07:00  --------------------------------------------------------  IN: 150 mL / OUT: 1970 mL / NET: -1820 mL        Physical Exam:  General: NAD, resting comfortably  Pulmonary: normal resp effort, CTA-B  Cardiovascular: NSR  Abdominal: soft, NT/ND  Extremities: WWP, normal strength  Neuro: A/O x 3, CNs II-XII grossly intact, normal motor/sensation, no focal deficits  Pulses:   Right:                                                                          Left:  FEM [ ]2+ [ ]1+ [ ]doppler                                             FEM [ ]2+ [ ]1+ [ ]doppler    POP [ ]2+ [ ]1+ [ ]doppler                                             POP [ ]2+ [ ]1+ [ ]doppler    DP [ ]2+ [ ]1+ [ ]doppler                                                DP [ ]2+ [ ]1+ [ ]doppler  PT[ ]2+ [ ]1+ [ ]doppler                                                  PT [ ]2+ [ ]1+ [ ]doppler    LABS:                        9.4    4.42  )-----------( 53       ( 16 May 2021 06:50 )             29.3     05-15    143  |  112<H>  |  32<H>  ----------------------------<  154<H>  4.4   |  27  |  0.83    Ca    8.9      15 May 2021 10:40            CAPILLARY BLOOD GLUCOSE      POCT Blood Glucose.: 182 mg/dL (15 May 2021 22:39)  POCT Blood Glucose.: 150 mg/dL (15 May 2021 17:18)  POCT Blood Glucose.: 151 mg/dL (15 May 2021 12:11)      RADIOLOGY & ADDITIONAL TESTS:

## 2021-05-16 NOTE — PROGRESS NOTE ADULT - ASSESSMENT
87 y/o Female with h/o COPD, arthritis, CAD, DMII, depression, HTN, PVD, PAD, s/p left fem pop bypass on 03/24/21, lower ext DVT on eliquis, chronic left heel ulcer, RA, anemia was admitted on 5/3 at [place] with worsening dyspnea, wheezing that started that night. Pt received 2 Duoneb treatments at home, with only slight improvement. At home patient was hypoxic to 87% on room air, was placed on 100% NRB mask. In the ED at [place], pt was placed on 2 LPM via NC, received a dose of Lasix 20 mg IV x 1. Her respiratory symptoms have improved with diuresis. Her troponin levels were monitored. Cardiology was consulted, advised troponin elevated due to type II MI.  On her first night of admission at [place] 5/3 patient developed fever which has persisted throughout admission. ID was consulted, patient was started on vancomycin and meropenem. Overnight on 5/4-5/5 patient had extensive purulent drainage from left medial thigh surgical site. Plastic surgery was consulted, advised transfer to [facility] for evaluation by Dr Ventura who did fem pop bypass 3/2021. In ED at [facility] on 5/5, she was continued on meropenem.    1. Postsurgical wound infection with MSSA s/p washout. Recent left femoral-popliteal bypass. Probable underlying vascular graft infection. s/p MSSA sepsis. ARF on CRF stage 3. Allergy to cephalosporins and PCN.  -cultures reviewed  -renal function is improved  -on cefazolin 2 gm IV q8h # 11  -tolerating abx well so far; no side effects noted  -monitor closely in [place] of PCN allergy history   -local wound care  -vascular evaluation appreciated  -vascular graft is likely tainted with infection  -continue abx coverage  -will need longer term IV abx therapy and probable long term abx suppression  -monitor temps  -f/u CBC  -supportive care  2. Other issues:   -care per medicine    d/w medical team

## 2021-05-16 NOTE — CONSULT NOTE ADULT - ASSESSMENT
87 y/o female with COPD, CAD, HTN, DMII , PVD  s/p L fem pop bypass [facility] 2021, admitted with graft infection/ left thigh wound, s/p MSSA sepsis, s/p L thigh debridement, clinically improved.  Acute thrombocytopenia noted with plts drop  within 10 days of sc heparin initiation, watson < 60K. Other etiologies: ? due to infection- but infection already improving, due to antibiotics?  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  HPF4 antibodies pending  Heparin d/c .  Needs anticoagulation with non heparin agents: options argatroban versus fondaparinux.  Already started on fondaparinux.   Follow pending HIT serology.  Bilat LE Dopplers .  D/w Dr Landers.

## 2021-05-16 NOTE — PROGRESS NOTE ADULT - SUBJECTIVE AND OBJECTIVE BOX
86 y.o. Female with PMH of COPD, arthritis, CAD, DMII, depression, HTN, PVD, PAD, s/p left fem pop bypass on 03/24/21, +lower ext DVT on eliquis, +chronic left heel ulcer, RA, anemia, presented to the ED at [place] on 5/3 with worsening dyspnea, wheezing that started that night. Pt received 2 Duoneb treatments at home, with only slight improvement, so EMS called. At home patient was hypoxic to 87% on room air, was placed on 100% NRB mask. In the ED at [place], pt was placed on 2 LPM via NC, received a dose of Lasix 20 mg IV x 1. Initial trop noted to be 0.17.  BNP >73561  On her first night of admission at [place] 5/3 patient developed fever which has persisted throughout admission. ID was consulted, patient was started on vancomycin and meropenem. Overnight on 5/4-5/5 patient had extensive purulent drainage from left medial thigh surgical site. Plastic surgery was consulted, advised transfer to [facility] for evaluation by Dr Ventura who did fem pop bypass 3/2021.      Was taking to OR : purulent fluid in L thigh popliteal fossa above knee; necrotic infected fibrinous material around graft and on tissue in popliteal fossa  s/p  PAT  s/p muscle flap on 5/11  Plts dropped r/o HIT, nothing to indicate active thrombosis     Vital Signs Last 24 Hrs  T(C): 36.4 (16 May 2021 09:37), Max: 36.8 (15 May 2021 22:00)  T(F): 97.6 (16 May 2021 09:37), Max: 98.2 (15 May 2021 22:00)  HR: 67 (16 May 2021 12:00) (67 - 82)  BP: 123/46 (16 May 2021 12:00) (112/39 - 143/51)  BP(mean): 64 (16 May 2021 12:00) (57 - 104)  RR: 18 (16 May 2021 12:00) (12 - 27)  SpO2: 93% (16 May 2021 06:00) (91% - 99%)      Constitutional: elderly female in no acute distress sitting up in chair   Head: Head is normocephalic and atraumatic.    Neck: No JVD.   Cardiovascular: Regular rate and rhythm without S3, S4. No murmurs or rubs are appreciated.    Respiratory: Decreased BS at bases with + crackles  Abdomen: Soft, nontender, nondistended with positive bowel sounds.    Extremity: No tenderness, no pitting edema, Left thigh area in dressing, LLE distal trace edema, DANYELLE in place  Neurologic: The patient is alert and oriented.  Skin: Left thigh dressing   : no CVA tenderness                 9.4    4.42  )-----------( 53       ( 16 May 2021 06:50 )             29.3   05-15    143  |  112<H>  |  32<H>  ----------------------------<  154<H>  4.4   |  27  |  0.83    Ca    8.9      15 May 2021 10:40    TTE Summary:   1. Left ventricular ejection fraction, by visual estimation, is 35 to 40%.   2. Moderately toseverely decreased global left ventricular systolic function.   3. Multiple left ventricular regional wall motion abnormalities exist. See wall motion findings.   4. Mildly increased LV wall thickness.   5. Normal left ventricular internal cavity size.   6. Spectral Doppler shows impaired relaxation pattern of left ventricular myocardial filling (Grade I diastolic dysfunction).   7. There is mild concentric left ventricular hypertrophy.   8. Mild mitral annular calcification.   9. Mild mitral valve regurgitation.  10. Thickening and calcification of the anterior and posterior mitral valve leaflets.  11. Mild tricuspid regurgitation.  12. Mild aortic regurgitation.  13. Sclerotic aortic valve with normal opening.  14. Estimated pulmonary artery systolic pressure is 36.6 mmHg assuming a right atrial pressure of 10 mmHg, which is consistent with borderline pulmonary hypertension.    2/2021:    New acute occlusive thrombus in the LEFT gastrocnemius vein.  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  Acute deep venous thrombosis: below the knee.

## 2021-05-16 NOTE — PROGRESS NOTE ADULT - ASSESSMENT
* Infected graft POD#10 with MSSA bacteremia on cefazolin   - s/p muscle flap 5/11   - will need long term IV abx as per ID - PICC placement prior to DC    * Acute thrombocytopenia  r/o HIT; pt got Fondaparinux today after that I decided to call Hematology   she was supposed to be restarted on Eliquis  case d/w Dr Ventura the clot in Feb was bellow the knee he would want me to keep her off AC ; aware Argatroban might be needed and ok with that    * New acute on Chronic systolic HF in the settings of oliguric ORLY/PAT - now resolved, ORLY now resolved   - monitor I&Os and renal fx, keep FQ until more ambulatory - successful voiding trial   - started entresto    *H/o depression / HTN / RA /ILD/ Pulm HTN - c/w home meds and f/u outpatient for further management    * Right LE DVT Feb 2021 with superficial left basilic vein non-occlusive clot       * DMII - HISS and resumed lantus     * Incidental finding of 2.5 cm right renal lesion amd 1.5 cm left breat - daughter MD aware, outpt follow up    * Weakness/Lethargy - iatrogenic with pain meds - held and resolved    Discussed with Dr. Ventura and Dr. Ortega    Daughter MD updated [phone] cell

## 2021-05-16 NOTE — CONSULT NOTE ADULT - CONSULT REQUESTED DATE/TIME
06-May-2021 10:17
06-May-2021 12:27
16-May-2021 11:27
06-May-2021 17:09
07-May-2021 15:25
09-May-2021 08:03
08-May-2021 09:52

## 2021-05-16 NOTE — CONSULT NOTE ADULT - SUBJECTIVE AND OBJECTIVE BOX
87 y/o female PMH COPD arthritis CAD HTN  DMII PVD s/p left fem pop bypass March 2021  LLE DVT on Eliquis admitted 5/3 to [facility] for SOB transferred 5/5 to  for purulent infection of left thigh surgical wound/ vascular graft infection  - with MSSA s/p  MSSA sepsis . Improved. need long term antibiotics.      Clinically doing better.    Hematology consult requested for thrombocytopenia.    baseline platelets ~ 200    5/11  181  5/12  158  5/13  113  5/15   59  5/16   53       PAST MEDICAL & SURGICAL HISTORY:  Hypertension    Anemia    Arthritis, rheumatoid    Depression    Type 2 diabetes mellitus  on insulin    PVD (peripheral vascular disease) with claudication    CAD (coronary artery disease)  non-obstructive    Rheumatoid arthritis    fracture ankle right  plate . screws   2000    S/P cataract surgery  2011    Hip fracture requiring operative repair  Right hip 11/14/2020    FAMILY HISTORY:  FH: colon cancer  father    FH: heart attack  father    Family history of atherosclerosis  mother      Vital Signs Last 24 Hrs  T(C): 36.4 (16 May 2021 09:37), Max: 36.8 (15 May 2021 22:00)  T(F): 97.6 (16 May 2021 09:37), Max: 98.2 (15 May 2021 22:00)  HR: 73 (16 May 2021 10:00) (67 - 82)  BP: 136/64 (16 May 2021 10:00) (112/39 - 143/51)  BP(mean): 82 (16 May 2021 10:00) (57 - 104)  RR: 19 (16 May 2021 10:00) (12 - 27)  SpO2: 93% (16 May 2021 06:00) (91% - 99%)                                      9.4    4.42  )-----------( 53       ( 16 May 2021 06:50 )             29.3   WBC/ H/H stable    05-15    143  |  112<H>  |  32<H>  ----------------------------<  154<H>  4.4   |  27  |  0.83    Ca    8.9      15 May 2021 10:40        MEDICATIONS  (STANDING):  aspirin  chewable 81 milliGRAM(s) Oral daily  atorvastatin 40 milliGRAM(s) Oral at bedtime  ceFAZolin   IVPB 2000 milliGRAM(s) IV Intermittent every 8 hours  dextrose 40% Gel 15 Gram(s) Oral once  dextrose 5%. 1000 milliLiter(s) (50 mL/Hr) IV Continuous <Continuous>  dextrose 5%. 1000 milliLiter(s) (100 mL/Hr) IV Continuous <Continuous>  dextrose 50% Injectable 25 Gram(s) IV Push once  dextrose 50% Injectable 12.5 Gram(s) IV Push once  dextrose 50% Injectable 25 Gram(s) IV Push once  folic acid 1 milliGRAM(s) Oral at bedtime  gabapentin 300 milliGRAM(s) Oral three times a day  glucagon  Injectable 1 milliGRAM(s) IntraMuscular once  insulin glargine Injectable (LANTUS) 5 Unit(s) SubCutaneous at bedtime  insulin lispro (ADMELOG) corrective regimen sliding scale   SubCutaneous three times a day before meals  metoprolol succinate ER 50 milliGRAM(s) Oral daily  PARoxetine 20 milliGRAM(s) Oral daily  sacubitril 24 mG/valsartan 26 mG 1 Tablet(s) Oral two times a day  tiotropium 18 MICROgram(s) Capsule 1 Capsule(s) Inhalation daily    MEDICATIONS  (PRN):  acetaminophen   Tablet .. 650 milliGRAM(s) Oral every 6 hours PRN Mild Pain (1 - 3)  ALBUTerol    90 MICROgram(s) HFA Inhaler 1 Puff(s) Inhalation every 6 hours PRN Shortness of Breath  oxyCODONE    IR 5 milliGRAM(s) Oral every 6 hours PRN Severe Pain (7 - 10)   87 y/o female PMH COPD arthritis CAD HTN  DMII PVD s/p left fem pop bypass March 2021  LLE DVT admitted 5/3 to [facility] for SOB transferred 5/5 to  for purulent infection of left thigh surgical wound/ vascular graft infection  - with MSSA s/p  MSSA sepsis .  5/11/21  debridement and antibiotic wash out left thigh wound     Clinically doing better.    Hematology consult requested for thrombocytopenia.    baseline platelets ~ 200    5/11  181  5/12  158  5/13  113  5/15   59  5/16   53     On heparin sc  5/6- 5/16  .  Received Fondaparinux  7.5 mg  today.       ROS : feels better . Mild discomfort L thigh improved. No fevers. Eating well. No bleeding  All other sx reviewed negative   PAST MEDICAL & SURGICAL HISTORY:  Hypertension    Anemia    Arthritis, rheumatoid    Depression    Type 2 diabetes mellitus  on insulin    PVD (peripheral vascular disease) with claudication    CAD (coronary artery disease)  non-obstructive    Rheumatoid arthritis    fracture ankle right  plate . screws   2000    S/P cataract surgery  2011    Hip fracture requiring operative repair  Right hip 11/14/2020    FAMILY HISTORY:  FH: colon cancer  father    FH: heart attack  father    Family history of atherosclerosis  mother      Vital Signs Last 24 Hrs  T(C): 36.4 (16 May 2021 09:37), Max: 36.8 (15 May 2021 22:00)  T(F): 97.6 (16 May 2021 09:37), Max: 98.2 (15 May 2021 22:00)  HR: 73 (16 May 2021 10:00) (67 - 82)  BP: 136/64 (16 May 2021 10:00) (112/39 - 143/51)  BP(mean): 82 (16 May 2021 10:00) (57 - 104)  RR: 19 (16 May 2021 10:00) (12 - 27)  SpO2: 93% (16 May 2021 06:00) (91% - 99%)    Elderly female sitting in bed eating lunch Looks OK  Sclera not icteric Oral mucosa moits   Neck no masses   Lungs clear b/l   Heart RRR  Abd soft NT BS +  Extr: R no eedema  L   thigh wrapped with dressing   Neuro non focal,  Psych  pleasant, in good spirit                         9.4    4.42  )-----------( 53       ( 16 May 2021 06:50 )             29.3   WBC/ H/H stable    05-15    143  |  112<H>  |  32<H>  ----------------------------<  154<H>  4.4   |  27  |  0.83    Ca    8.9      15 May 2021 10:40        MEDICATIONS  (STANDING):  aspirin  chewable 81 milliGRAM(s) Oral daily  atorvastatin 40 milliGRAM(s) Oral at bedtime  ceFAZolin   IVPB 2000 milliGRAM(s) IV Intermittent every 8 hours  dextrose 40% Gel 15 Gram(s) Oral once  dextrose 5%. 1000 milliLiter(s) (50 mL/Hr) IV Continuous <Continuous>  dextrose 5%. 1000 milliLiter(s) (100 mL/Hr) IV Continuous <Continuous>  dextrose 50% Injectable 25 Gram(s) IV Push once  dextrose 50% Injectable 12.5 Gram(s) IV Push once  dextrose 50% Injectable 25 Gram(s) IV Push once  folic acid 1 milliGRAM(s) Oral at bedtime  gabapentin 300 milliGRAM(s) Oral three times a day  glucagon  Injectable 1 milliGRAM(s) IntraMuscular once  insulin glargine Injectable (LANTUS) 5 Unit(s) SubCutaneous at bedtime  insulin lispro (ADMELOG) corrective regimen sliding scale   SubCutaneous three times a day before meals  metoprolol succinate ER 50 milliGRAM(s) Oral daily  PARoxetine 20 milliGRAM(s) Oral daily  sacubitril 24 mG/valsartan 26 mG 1 Tablet(s) Oral two times a day  tiotropium 18 MICROgram(s) Capsule 1 Capsule(s) Inhalation daily    MEDICATIONS  (PRN):  acetaminophen   Tablet .. 650 milliGRAM(s) Oral every 6 hours PRN Mild Pain (1 - 3)  ALBUTerol    90 MICROgram(s) HFA Inhaler 1 Puff(s) Inhalation every 6 hours PRN Shortness of Breath  oxyCODONE    IR 5 milliGRAM(s) Oral every 6 hours PRN Severe Pain (7 - 10)

## 2021-05-17 LAB
HCT VFR BLD CALC: 30 % — LOW (ref 34.5–45)
HGB BLD-MCNC: 9.5 G/DL — LOW (ref 11.5–15.5)
MCHC RBC-ENTMCNC: 30 PG — SIGNIFICANT CHANGE UP (ref 27–34)
MCHC RBC-ENTMCNC: 31.7 GM/DL — LOW (ref 32–36)
MCV RBC AUTO: 94.6 FL — SIGNIFICANT CHANGE UP (ref 80–100)
PLATELET # BLD AUTO: 69 K/UL — LOW (ref 150–400)
RBC # BLD: 3.17 M/UL — LOW (ref 3.8–5.2)
RBC # FLD: 15.5 % — HIGH (ref 10.3–14.5)
SARS-COV-2 RNA SPEC QL NAA+PROBE: SIGNIFICANT CHANGE UP
WBC # BLD: 4.41 K/UL — SIGNIFICANT CHANGE UP (ref 3.8–10.5)
WBC # FLD AUTO: 4.41 K/UL — SIGNIFICANT CHANGE UP (ref 3.8–10.5)

## 2021-05-17 PROCEDURE — 97112 NEUROMUSCULAR REEDUCATION: CPT

## 2021-05-17 PROCEDURE — 85025 COMPLETE CBC W/AUTO DIFF WBC: CPT

## 2021-05-17 PROCEDURE — 85027 COMPLETE CBC AUTOMATED: CPT

## 2021-05-17 PROCEDURE — 83036 HEMOGLOBIN GLYCOSYLATED A1C: CPT

## 2021-05-17 PROCEDURE — 97163 PT EVAL HIGH COMPLEX 45 MIN: CPT

## 2021-05-17 PROCEDURE — 36415 COLL VENOUS BLD VENIPUNCTURE: CPT

## 2021-05-17 PROCEDURE — 97116 GAIT TRAINING THERAPY: CPT

## 2021-05-17 PROCEDURE — U0003: CPT

## 2021-05-17 PROCEDURE — 86769 SARS-COV-2 COVID-19 ANTIBODY: CPT

## 2021-05-17 PROCEDURE — 99233 SBSQ HOSP IP/OBS HIGH 50: CPT

## 2021-05-17 PROCEDURE — 97110 THERAPEUTIC EXERCISES: CPT

## 2021-05-17 PROCEDURE — 82962 GLUCOSE BLOOD TEST: CPT

## 2021-05-17 PROCEDURE — 97535 SELF CARE MNGMENT TRAINING: CPT

## 2021-05-17 PROCEDURE — 97167 OT EVAL HIGH COMPLEX 60 MIN: CPT

## 2021-05-17 PROCEDURE — 97530 THERAPEUTIC ACTIVITIES: CPT

## 2021-05-17 PROCEDURE — U0005: CPT

## 2021-05-17 PROCEDURE — 80053 COMPREHEN METABOLIC PANEL: CPT

## 2021-05-17 PROCEDURE — 80048 BASIC METABOLIC PNL TOTAL CA: CPT

## 2021-05-17 PROCEDURE — 71045 X-RAY EXAM CHEST 1 VIEW: CPT | Mod: 26

## 2021-05-17 RX ORDER — ENOXAPARIN SODIUM 100 MG/ML
40 INJECTION SUBCUTANEOUS DAILY
Refills: 0 | Status: DISCONTINUED | OUTPATIENT
Start: 2021-05-17 | End: 2021-05-21

## 2021-05-17 RX ADMIN — GABAPENTIN 300 MILLIGRAM(S): 400 CAPSULE ORAL at 13:36

## 2021-05-17 RX ADMIN — GABAPENTIN 300 MILLIGRAM(S): 400 CAPSULE ORAL at 21:48

## 2021-05-17 RX ADMIN — TIOTROPIUM BROMIDE 1 CAPSULE(S): 18 CAPSULE ORAL; RESPIRATORY (INHALATION) at 08:59

## 2021-05-17 RX ADMIN — Medication 81 MILLIGRAM(S): at 09:08

## 2021-05-17 RX ADMIN — Medication 100 MILLIGRAM(S): at 21:49

## 2021-05-17 RX ADMIN — INSULIN GLARGINE 5 UNIT(S): 100 INJECTION, SOLUTION SUBCUTANEOUS at 21:51

## 2021-05-17 RX ADMIN — GABAPENTIN 300 MILLIGRAM(S): 400 CAPSULE ORAL at 05:28

## 2021-05-17 RX ADMIN — Medication 1: at 09:08

## 2021-05-17 RX ADMIN — Medication 1 MILLIGRAM(S): at 21:49

## 2021-05-17 RX ADMIN — Medication 100 MILLIGRAM(S): at 13:36

## 2021-05-17 RX ADMIN — ATORVASTATIN CALCIUM 40 MILLIGRAM(S): 80 TABLET, FILM COATED ORAL at 21:48

## 2021-05-17 RX ADMIN — Medication 100 MILLIGRAM(S): at 05:27

## 2021-05-17 RX ADMIN — Medication 20 MILLIGRAM(S): at 09:10

## 2021-05-17 RX ADMIN — ENOXAPARIN SODIUM 40 MILLIGRAM(S): 100 INJECTION SUBCUTANEOUS at 09:08

## 2021-05-17 RX ADMIN — Medication 50 MILLIGRAM(S): at 09:09

## 2021-05-17 RX ADMIN — Medication 1: at 12:41

## 2021-05-17 RX ADMIN — SACUBITRIL AND VALSARTAN 1 TABLET(S): 24; 26 TABLET, FILM COATED ORAL at 09:09

## 2021-05-17 RX ADMIN — SACUBITRIL AND VALSARTAN 1 TABLET(S): 24; 26 TABLET, FILM COATED ORAL at 21:50

## 2021-05-17 NOTE — PROGRESS NOTE ADULT - ASSESSMENT
85 y/o Female with h/o COPD, arthritis, CAD, DMII, depression, HTN, PVD, PAD, s/p left fem pop bypass on 03/24/21, lower ext DVT on eliquis, chronic left heel ulcer, RA, anemia was admitted on 5/3 at [facility] with worsening dyspnea, wheezing that started that night. Pt received 2 Duoneb treatments at home, with only slight improvement. At home patient was hypoxic to 87% on room air, was placed on 100% NRB mask. In the ED at [facility], pt was placed on 2 LPM via NC, received a dose of Lasix 20 mg IV x 1. Her respiratory symptoms have improved with diuresis. Her troponin levels were monitored. Cardiology was consulted, advised troponin elevated due to type II MI.  On her first night of admission at [facility] 5/3 patient developed fever which has persisted throughout admission. ID was consulted, patient was started on vancomycin and meropenem. Overnight on 5/4-5/5 patient had extensive purulent drainage from left medial thigh surgical site. Plastic surgery was consulted, advised transfer to [facility] for evaluation by Dr Ventura who did fem pop bypass 3/2021. In ED at [facility] on 5/5, she was continued on meropenem.    1. Postsurgical wound infection with MSSA s/p washout. Recent left femoral-popliteal bypass. Probable underlying vascular graft infection. s/p MSSA sepsis. ARF on CRF stage 3. Allergy to cephalosporins and PCN.  -cultures reviewed  -thrombocytopenia ?cause --> concern about cefazolin related thrombocytopenia was raised - will monitor  -renal function is improved  -on cefazolin 2 gm IV q8h # 12  -tolerating abx well so far; no side effects noted  -monitor closely in [place] of PCN allergy history   -local wound care  -vascular evaluation appreciated  -vascular graft is likely tainted with infection  -continue abx coverage  -will need longer term IV abx therapy and probable long term abx suppression  -monitor temps  -f/u CBC  -supportive care  2. Other issues:   -care per medicine    d/w medical team and Dr. Landers

## 2021-05-17 NOTE — PROGRESS NOTE ADULT - SUBJECTIVE AND OBJECTIVE BOX
Date of service: 05-17-21 @ 12:58    Lying in bed in NAD  Alert and verbal  Noted with low platelets   No bleeding    ROS: no fever or chills; denies dizziness, no HA, no SOB or cough, no abdominal pain, no diarrhea or constipation; no dysuria, no legs pain, no rashes    MEDICATIONS  (STANDING):  aspirin  chewable 81 milliGRAM(s) Oral daily  atorvastatin 40 milliGRAM(s) Oral at bedtime  ceFAZolin   IVPB 2000 milliGRAM(s) IV Intermittent every 8 hours  dextrose 40% Gel 15 Gram(s) Oral once  dextrose 5%. 1000 milliLiter(s) (50 mL/Hr) IV Continuous <Continuous>  dextrose 5%. 1000 milliLiter(s) (100 mL/Hr) IV Continuous <Continuous>  dextrose 50% Injectable 25 Gram(s) IV Push once  dextrose 50% Injectable 12.5 Gram(s) IV Push once  dextrose 50% Injectable 25 Gram(s) IV Push once  enoxaparin Injectable 40 milliGRAM(s) SubCutaneous daily  folic acid 1 milliGRAM(s) Oral at bedtime  gabapentin 300 milliGRAM(s) Oral three times a day  glucagon  Injectable 1 milliGRAM(s) IntraMuscular once  insulin glargine Injectable (LANTUS) 5 Unit(s) SubCutaneous at bedtime  insulin lispro (ADMELOG) corrective regimen sliding scale   SubCutaneous three times a day before meals  metoprolol succinate ER 50 milliGRAM(s) Oral daily  PARoxetine 20 milliGRAM(s) Oral daily  sacubitril 24 mG/valsartan 26 mG 1 Tablet(s) Oral two times a day  tiotropium 18 MICROgram(s) Capsule 1 Capsule(s) Inhalation daily    Vital Signs Last 24 Hrs  T(C): 36.8 (17 May 2021 12:00), Max: 36.8 (17 May 2021 06:41)  T(F): 98.2 (17 May 2021 12:00), Max: 98.3 (17 May 2021 06:41)  HR: 72 (17 May 2021 10:00) (68 - 77)  BP: 116/36 (17 May 2021 10:00) (116/36 - 138/69)  BP(mean): 56 (17 May 2021 10:00) (56 - 79)  RR: 14 (17 May 2021 10:00) (11 - 30)  SpO2: 97% (17 May 2021 09:00) (90% - 97%)     Physical exam:    Constitutional:  No acute distress  HEENT: NC/AT, EOMI, PERRLA, conjunctivae clear  Neck: supple; thyroid not palpable  Back: no tenderness  Respiratory: respiratory effort normal; crackles at bases  Cardiovascular: S1S2 regular, no murmurs  Abdomen: soft, not tender, not distended, positive BS  Genitourinary: no suprapubic tenderness  Lymphatic: no LN palpable  Musculoskeletal: no muscle tenderness, no joint swelling or tenderness  Extremities: 1+ pedal edema  Left thigh open wound s/p I and D; packed; dressing  Neurological/ Psychiatric: AxOx3, moving all extremities  Skin: no rashes; no palpable lesions    Labs: reviewed                        9.4    4.42  )-----------( 53       ( 16 May 2021 06:50 )             29.3     05-15    143  |  112<H>  |  32<H>  ----------------------------<  154<H>  4.4   |  27  |  0.83    Ca    8.9      15 May 2021 10:40                        9.6    3.90  )-----------( 113      ( 13 May 2021 05:47 )             30.1     05-13    141  |  112<H>  |  45<H>  ----------------------------<  135<H>  4.2   |  22  |  0.86    Ca    8.6      13 May 2021 05:47  Phos  4.8     05-11  Mg     2.2     05-11                                  10.1   7.90  )-----------( 199      ( 06 May 2021 05:49 )             31.3     05-06    134<L>  |  106  |  51<H>  ----------------------------<  199<H>  4.7   |  20<L>  |  1.39<H>    Ca    8.0<L>      06 May 2021 05:49    TPro  5.9<L>  /  Alb  1.9<L>  /  TBili  0.6  /  DBili  x   /  AST  5<L>  /  ALT  <6<L>  /  AlkPhos  71  05-05     LIVER FUNCTIONS - ( 05 May 2021 11:59 )  Alb: 1.9 g/dL / Pro: 5.9 gm/dL / ALK PHOS: 71 U/L / ALT: <6 U/L / AST: 5 U/L / GGT: x             Culture - Fungal, Other (collected 05 May 2021 16:34)  Source: .Other None  Preliminary Report (06 May 2021 09:04):    Testing in progress    Culture - Surgical Swab (collected 05 May 2021 16:34)  Source: .Surgical Swab None  Preliminary Report (06 May 2021 20:09):    Moderate Staphylococcus aureus  Organism: Staphylococcus aureus (07 May 2021 19:05)  Organism: Staphylococcus aureus (07 May 2021 19:05)      -  Ampicillin/Sulbactam: S <=8/4      -  Cefazolin: S <=4      -  Clindamycin: S <=0.25      -  Erythromycin: S <=0.25      -  Gentamicin: S <=1 Should not be used as monotherapy      -  Oxacillin: S <=0.25      -  RIF- Rifampin: S <=1 Should not be used as monotherapy      -  Tetra/Doxy: S <=1      -  Trimethoprim/Sulfamethoxazole: S <=0.5/9.5      -  Vancomycin: S 1      Method Type: SIMEON    Culture - Fungal, Other (collected 05 May 2021 16:34)  Source: .Other None  Preliminary Report (06 May 2021 09:04):    Testing in progress    Culture - Surgical Swab (collected 05 May 2021 16:34)  Source: .Surgical Swab None  Preliminary Report (06 May 2021 20:04):    Moderate Staphylococcus aureus  Organism: Staphylococcus aureus (07 May 2021 19:06)  Organism: Staphylococcus aureus (07 May 2021 19:06)      -  Ampicillin/Sulbactam: S <=8/4      -  Cefazolin: S <=4      -  Clindamycin: S <=0.25      -  Erythromycin: S <=0.25      -  Gentamicin: S <=1 Should not be used as monotherapy      -  Oxacillin: S <=0.25      -  RIF- Rifampin: S <=1 Should not be used as monotherapy      -  Tetra/Doxy: S <=1      -  Trimethoprim/Sulfamethoxazole: S <=0.5/9.5      -  Vancomycin: S 2      Method Type: SIMEON    Culture - Other (collected 04 May 2021 23:00)  Source: .Other wound - L thigh  Final Report (07 May 2021 07:28):    Numerous Staphylococcus aureus  Organism: Staphylococcus aureus (07 May 2021 07:28)  Organism: Staphylococcus aureus (07 May 2021 07:28)      -  Ampicillin/Sulbactam: S <=8/4      -  Cefazolin: S <=4      -  Clindamycin: S <=0.25      -  Erythromycin: S <=0.25      -  Gentamicin: S <=1 Should not be used as monotherapy      -  Oxacillin: S <=0.25      -  RIF- Rifampin: S <=1 Should not be used as monotherapy      -  Tetra/Doxy: S <=1      -  Trimethoprim/Sulfamethoxazole: S <=0.5/9.5      -  Vancomycin: S 1      Method Type: SIMEON    Culture - Blood (collected 03 May 2021 22:10)  Source: .Blood Blood-Peripheral  Final Report (09 May 2021 03:00):    No Growth Final    Culture - Blood (collected 03 May 2021 22:10)  Source: .Blood Blood-Peripheral  Gram Stain (07 May 2021 01:14):    Growth in aerobic bottle: Gram Positive Cocci in Clusters  Final Report (08 May 2021 17:32):    Growth in aerobic bottle: Staphylococcus aureus  Organism: Blood Culture PCR  Staphylococcus aureus (08 May 2021 17:32)  Organism: Staphylococcus aureus (08 May 2021 17:32)      -  Ampicillin/Sulbactam: S <=8/4      -  Cefazolin: S <=4      -  Clindamycin: S <=0.25      -  Erythromycin: S <=0.25      -  Gentamicin: S <=1 Should not be used as monotherapy      -  Oxacillin: S <=0.25      -  RIF- Rifampin: S <=1 Should not be used as monotherapy      -  Tetra/Doxy: S <=1      -  Trimethoprim/Sulfamethoxazole: S <=0.5/9.5      -  Vancomycin: S 2      Method Type: SIMEON  Organism: Blood Culture PCR (08 May 2021 17:32)      -  Staphylococcus aureus: Detec Any isolate of Staphylococcus aureus from a blood culture is NOT considered a contaminant.      Method Type: PCR    COVID-19 PCR: NotDetec (05-03-21 @ 23:17)    Radiology: all available radiological tests reviewed    Advanced directives addressed: full resuscitation

## 2021-05-17 NOTE — PROGRESS NOTE ADULT - SUBJECTIVE AND OBJECTIVE BOX
afebrile, VSS    walked     Plts increased    no pain    erythema L groin resolved; no sinus L groin or other evidence of infection    A/P  stable  ready for discharge    MEDICATIONS  (STANDING):  aspirin  chewable 81 milliGRAM(s) Oral daily  atorvastatin 40 milliGRAM(s) Oral at bedtime  ceFAZolin   IVPB 2000 milliGRAM(s) IV Intermittent every 8 hours  dextrose 40% Gel 15 Gram(s) Oral once  dextrose 5%. 1000 milliLiter(s) (50 mL/Hr) IV Continuous <Continuous>  dextrose 5%. 1000 milliLiter(s) (100 mL/Hr) IV Continuous <Continuous>  dextrose 50% Injectable 25 Gram(s) IV Push once  dextrose 50% Injectable 12.5 Gram(s) IV Push once  dextrose 50% Injectable 25 Gram(s) IV Push once  enoxaparin Injectable 40 milliGRAM(s) SubCutaneous daily  folic acid 1 milliGRAM(s) Oral at bedtime  gabapentin 300 milliGRAM(s) Oral three times a day  glucagon  Injectable 1 milliGRAM(s) IntraMuscular once  insulin glargine Injectable (LANTUS) 5 Unit(s) SubCutaneous at bedtime  insulin lispro (ADMELOG) corrective regimen sliding scale   SubCutaneous three times a day before meals  metoprolol succinate ER 50 milliGRAM(s) Oral daily  PARoxetine 20 milliGRAM(s) Oral daily  sacubitril 24 mG/valsartan 26 mG 1 Tablet(s) Oral two times a day  tiotropium 18 MICROgram(s) Capsule 1 Capsule(s) Inhalation daily    MEDICATIONS  (PRN):  acetaminophen   Tablet .. 650 milliGRAM(s) Oral every 6 hours PRN Mild Pain (1 - 3)  ALBUTerol    90 MICROgram(s) HFA Inhaler 1 Puff(s) Inhalation every 6 hours PRN Shortness of Breath  oxyCODONE    IR 5 milliGRAM(s) Oral every 6 hours PRN Severe Pain (7 - 10)      Allergies    cephalosporins (Other)  penicillins (Urticaria; Pruritus)    Intolerances        Flatus: [ ] YES [ ] NO             Bowel Movement: [ ] YES [ ] NO  Pain (0-10):            Pain Control Adequate: [ ] YES [ ] NO  Nausea: [ ] YES [ ] NO            Vomiting: [ ] YES [ ] NO  Diarrhea: [ ] YES [ ] NO         Constipation: [ ] YES [ ] NO     Chest Pain: [ ] YES [ ] NO    SOB:  [ ] YES [ ] NO    Vital Signs Last 24 Hrs  T(C): 36.8 (17 May 2021 12:00), Max: 36.8 (17 May 2021 06:41)  T(F): 98.2 (17 May 2021 12:00), Max: 98.3 (17 May 2021 06:41)  HR: 69 (17 May 2021 13:00) (68 - 77)  BP: 126/43 (17 May 2021 13:00) (116/36 - 138/69)  BP(mean): 62 (17 May 2021 13:00) (56 - 79)  RR: 14 (17 May 2021 13:00) (11 - 30)  SpO2: 99% (17 May 2021 12:00) (90% - 99%)    I&O's Summary    16 May 2021 07:01  -  17 May 2021 07:00  --------------------------------------------------------  IN: 0 mL / OUT: 1470 mL / NET: -1470 mL        Physical Exam:  General: NAD, resting comfortably  Pulmonary: normal resp effort, CTA-B  Cardiovascular: NSR  Abdominal: soft, NT/ND  Extremities: WWP, normal strength  Neuro: A/O x 3, CNs II-XII grossly intact, normal motor/sensation, no focal deficits  Pulses:   Right:                                                                          Left:  FEM [ ]2+ [ ]1+ [ ]doppler                                             FEM [ ]2+ [ ]1+ [ ]doppler    POP [ ]2+ [ ]1+ [ ]doppler                                             POP [ ]2+ [ ]1+ [ ]doppler    DP [ ]2+ [ ]1+ [ ]doppler                                                DP [ ]2+ [ ]1+ [ ]doppler  PT[ ]2+ [ ]1+ [ ]doppler                                                  PT [ ]2+ [ ]1+ [ ]doppler    LABS:                        9.5    4.41  )-----------( 69       ( 17 May 2021 08:17 )             30.0                 CAPILLARY BLOOD GLUCOSE      POCT Blood Glucose.: 190 mg/dL (17 May 2021 12:38)  POCT Blood Glucose.: 156 mg/dL (17 May 2021 09:00)  POCT Blood Glucose.: 148 mg/dL (16 May 2021 21:52)  POCT Blood Glucose.: 187 mg/dL (16 May 2021 17:09)      RADIOLOGY & ADDITIONAL TESTS:

## 2021-05-17 NOTE — PROGRESS NOTE ADULT - SUBJECTIVE AND OBJECTIVE BOX
Patient is a 86y old  Female who presents with a chief complaint of wound.       HPI:  86y Female with PMH of COPD, arthritis, CAD, DMII, depression, HTN, PVD, PAD, s/p left fem pop bypass on 03/24/21, +lower ext DVT on eliquis, +chronic left heel ulcer, RA, anemia, presented to the ED at [place] on 5/3 with worsening dyspnea, wheezing that started that night. Pt received 2 Duoneb treatments at home, with only slight improvement, so EMS called. At home patient was hypoxic to 87% on room air, was placed on 100% NRB mask. In the ED at [place], pt was placed on 2 LPM via NC, received a dose of Lasix 20 mg IV x 1. Initial trop noted to be 0.17.  BNP >26831    Her respiratory symptoms have improved with diuresis.     On her first night of admission at [place] 5/3 patient developed fever which has persisted throughout admission. ID was consulted, patient was started on vancomycin and meropenem. Overnight on 5/4-5/5 patient had extensive purulent drainage from left medial thigh surgical site. Plastic surgery was consulted, advised transfer to [facility] for evaluation by Dr Ventura who did fem pop bypass 3/2021.      In ED at [facility] on 5/5, patient had no complaints.  Denied any CP, SOB, lower extremity pain, numbness or tingling.      5/6-   Pt seen now. She is fully alert and was able to give all the history , son in law at bedside.  Pt states that her SOB is better.    NO CP.    5/7- Pt seen this am. Pt denies any chest pain or SOB.  She was able to lie down flat in bed for a long time without any SOB.  Overnight urine output poor.     5/10- pt seen this am.  Pt denies any symptoms this am. She was happy she was able to make urine.     5/11- pt seen this am.  No symptoms or overnight events.     5/13- pt seen this am.  Pt denies any symptoms.       5/17- pt seen this am.  PT deines any symptoms.   PAST MEDICAL & SURGICAL HISTORY:  Hypertension    Anemia    Arthritis, rheumatoid    Depression    Type 2 diabetes mellitus  on insulin    PVD (peripheral vascular disease) with claudication    CAD (coronary artery disease)  non-obstructive    Rheumatoid arthritis    fracture ankle right  plate . screws   2000    S/P cataract surgery  2011    Hip fracture requiring operative repair  Right hip 11/14/2020        MEDICATIONS  (STANDING):  aspirin  chewable 81 milliGRAM(s) Oral daily  atorvastatin 40 milliGRAM(s) Oral at bedtime  ceFAZolin   IVPB 2000 milliGRAM(s) IV Intermittent every 12 hours  dextrose 40% Gel 15 Gram(s) Oral once  dextrose 5%. 1000 milliLiter(s) (50 mL/Hr) IV Continuous <Continuous>  dextrose 5%. 1000 milliLiter(s) (100 mL/Hr) IV Continuous <Continuous>  dextrose 50% Injectable 25 Gram(s) IV Push once  dextrose 50% Injectable 12.5 Gram(s) IV Push once  dextrose 50% Injectable 25 Gram(s) IV Push once  folic acid 1 milliGRAM(s) Oral at bedtime  furosemide   Injectable 40 milliGRAM(s) IV Push daily  gabapentin 300 milliGRAM(s) Oral three times a day  glucagon  Injectable 1 milliGRAM(s) IntraMuscular once  heparin   Injectable 5000 Unit(s) SubCutaneous every 12 hours  insulin lispro (ADMELOG) corrective regimen sliding scale   SubCutaneous three times a day before meals  lisinopril 5 milliGRAM(s) Oral daily  metoprolol tartrate 25 milliGRAM(s) Oral two times a day  PARoxetine 20 milliGRAM(s) Oral daily  tiotropium 18 MICROgram(s) Capsule 1 Capsule(s) Inhalation daily    MEDICATIONS  (PRN):  acetaminophen   Tablet .. 650 milliGRAM(s) Oral every 6 hours PRN Mild Pain (1 - 3)  ALBUTerol    90 MICROgram(s) HFA Inhaler 1 Puff(s) Inhalation every 6 hours PRN Shortness of Breath  morphine  - Injectable 5 milliGRAM(s) IV Push every 3 hours PRN Severe Pain (7 - 10)      FAMILY HISTORY:  FH: colon cancer  father    FH: heart attack  father    Family history of atherosclerosis  mother        SOCIAL HISTORY: no recent smoking     REVIEW OF SYSTEMS:  CONSTITUTIONAL:    No fatigue, malaise, lethargy.  No fever or chills.   RESPIRATORY:  No cough.  No wheeze.  No hemoptysis.  no shortness of breath.  CARDIOVASCULAR:  No chest pains.  No palpitations. no shortness of breath, No orthopnea or PND.  GASTROINTESTINAL:  No abdominal pain.  No nausea or vomiting.    GENITOURINARY:    No hematuria.    MUSCULOSKELETAL:  c/o L thigh pain   NEUROLOGICAL:  No tingling or numbness or weakness.  PSYCHIATRIC:  No confusion  SKIN:  No rashes.        ICU Vital Signs Last 24 Hrs  T(C): 36.8 (17 May 2021 06:41), Max: 36.8 (17 May 2021 06:41)  T(F): 98.3 (17 May 2021 06:41), Max: 98.3 (17 May 2021 06:41)  HR: 70 (17 May 2021 09:00) (67 - 77)  BP: 138/69 (17 May 2021 09:00) (117/42 - 138/69)  BP(mean): 79 (17 May 2021 09:00) (58 - 79)  ABP: --  ABP(mean): --  RR: 20 (17 May 2021 09:00) (11 - 30)  SpO2: 97% (17 May 2021 09:00) (90% - 97%)        PHYSICAL EXAM-    Constitutional: elderly female in no acute distress sitting up in bed now.     Head: Head is normocephalic and atraumatic.      Neck: No JVD.     Cardiovascular: Regular rate and rhythm without S3, S4. No murmurs or rubs are appreciated.      Respiratory: B/l faint  rales     Abdomen: Soft, nontender, nondistended with positive bowel sounds.      Extremity: No tenderness. pitting edema  1+ b/l LE    Neurologic: The patient is alert and oriented.      Skin: L thigh dressing     Psychiatric: The patient appears to be emotionally stable.      INTERPRETATION OF TELEMETRY: sinus bradycardia , PACs     ECG: Sinus rythm , T wave inversion in I, aVL, biphasic T wave in V2-6     I&O's Detail    05 May 2021 07:01  -  06 May 2021 07:00  --------------------------------------------------------  IN:    IV PiggyBack: 50 mL    sodium chloride 0.9%: 1061 mL  Total IN: 1111 mL    OUT:    Indwelling Catheter - Urethral (mL): 375 mL  Total OUT: 375 mL    Total NET: 736 mL      06 May 2021 07:01  -  06 May 2021 17:09  --------------------------------------------------------  IN:    sodium chloride 0.9%: 492 mL  Total IN: 492 mL    OUT:    Indwelling Catheter - Urethral (mL): 100 mL  Total OUT: 100 mL    Total NET: 392 mL          LABS:                                   9.5    4.41  )-----------( 69       ( 17 May 2021 08:17 )             30.0     05-15    143  |  112<H>  |  32<H>  ----------------------------<  154<H>  4.4   |  27  |  0.83    Ca    8.9      15 May 2021 10:40              05-06 Chol 128 LDL -- HDL 33<L> Trig 111      I&O's Summary    05 May 2021 07:01  -  06 May 2021 07:00  --------------------------------------------------------  IN: 1111 mL / OUT: 375 mL / NET: 736 mL    06 May 2021 07:01  -  06 May 2021 17:09  --------------------------------------------------------  IN: 492 mL / OUT: 100 mL / NET: 392 mL      BNP  RADIOLOGY & ADDITIONAL STUDIES:  c< from: CT Chest No Cont (05.03.21 @ 23:04) >  IMPRESSION:    1. Small bilateral pleural effusions and pulmonary edema.  2. Emphysema and question additional honeycombing/fibrosis.  3. Mildly asymmetrical 1.5 cm nodular breast tissue on the left. Correlation with follow-up mammography/ultrasound is recommended.  4. 2.9 cm slightly hyperdense right renal lesion, most likely hemorrhagic/proteinaceous cyst. Follow-up nonemergent ultrasound is recommended.                ROSARIO REYES MD; Attending Radiologist  This document has been electronically signed. May  3 2021 11:52PM    < end of copied text >  < from: Xray Chest 1 View AP/PA (05.03.21 @ 22:36) >    EXAM:  XR CHEST AP OR PA 1V      PROCEDURE DATE:  05/03/2021        INTERPRETATION:  Views:1  Comparison: 08/21/15  History: Shortness of breath    There is mild scattered interstitial infiltrate consistent with pneumonia or pneumonitis without segmental consolidation, layering effusion or cavitation. The heart is normal in size.    IMPRESSION: As above              MARIA ESTHER CADET MD; Attending Radiologist  This document has been electronically signed. May  4 2021  9:22AM    < end of copied text >  < from: TTE Echo Complete w/o Contrast w/ Doppler (05.04.21 @ 08:16) >    Summary:   1. Left ventricular ejection fraction, by visual estimation, is 35 to 40%.   2. Moderately to severely decreased global left ventricular systolic function.   3. Multiple left ventricular regional wall motion abnormalities exist. See wall motion findings.   4. Mildly increased LV wall thickness.   5. Normal left ventricular internal cavity size.   6. Spectral Doppler shows impaired relaxation pattern of left ventricular myocardial filling (Grade I diastolic dysfunction).   7. There is mild concentric left ventricular hypertrophy.   8. Mild mitral annular calcification.   9. Mild mitral valve regurgitation.  10. Thickening and calcification of the anterior and posterior mitral valve leaflets.  11. Mild tricuspid regurgitation.  12. Mild aortic regurgitation.  13. Sclerotic aortic valve with normal opening.  14. Estimated pulmonary artery systolic pressure is 36.6 mmHg assuming a right atrial pressure of 10 mmHg, which is consistent with borderline pulmonary hypertension.    [id number] Toby Peace , Electronically signed on 5/4/2021 at 10:19:28 AM    < end of copied text >

## 2021-05-17 NOTE — PROGRESS NOTE ADULT - ASSESSMENT
* Infected graft POD#10 with MSSA bacteremia on cefazolin   - s/p muscle flap 5/11   - will need long term IV abx as per ID - PICC placement prior to DC    * Acute thrombocytopenia  improving  start LMWH for dvt px    * New acute on Chronic systolic HF in the settings of oliguric ORLY/PAT - now resolved, ORLY now resolved  stable    *H/o depression / HTN / RA /ILD/ Pulm HTN - c/w home meds and f/u outpatient for further management    * Right LE DVT Feb 2021 with superficial left basilic vein non-occlusive clot       * DMII - HISS and resumed lantus     * Incidental finding of 2.5 cm right renal lesion and 1.5 cm left breat - daughter MD aware, outpt follow up      Daughter MD updated [phone] cell      dc planning will need PICC

## 2021-05-17 NOTE — PROGRESS NOTE ADULT - SUBJECTIVE AND OBJECTIVE BOX
86 y.o. Female with PMH of COPD, arthritis, CAD, DMII, depression, HTN, PVD, PAD, s/p left fem pop bypass on 03/24/21, +lower ext DVT on eliquis, +chronic left heel ulcer, RA, anemia, presented to the ED at [place] on 5/3 with worsening dyspnea, wheezing that started that night. Pt received 2 Duoneb treatments at home, with only slight improvement, so EMS called. At home patient was hypoxic to 87% on room air, was placed on 100% NRB mask. In the ED at [place], pt was placed on 2 LPM via NC, received a dose of Lasix 20 mg IV x 1. Initial trop noted to be 0.17.  BNP >18755  On her first night of admission at [place] 5/3 patient developed fever which has persisted throughout admission. ID was consulted, patient was started on vancomycin and meropenem. Overnight on 5/4-5/5 patient had extensive purulent drainage from left medial thigh surgical site. Plastic surgery was consulted, advised transfer to [facility] for evaluation by Dr Ventura who did fem pop bypass 3/2021.      Was taking to OR : purulent fluid in L thigh popliteal fossa above knee; necrotic infected fibrinous material around graft and on tissue in popliteal fossa  s/p  PAT  s/p muscle flap on 5/11  Plts dropped neg HIT, plts recovering     Vital Signs Last 24 Hrs  T(C): 36.8 (17 May 2021 12:00), Max: 36.8 (17 May 2021 06:41)  T(F): 98.2 (17 May 2021 12:00), Max: 98.3 (17 May 2021 06:41)  HR: 69 (17 May 2021 13:00) (68 - 77)  BP: 126/43 (17 May 2021 13:00) (116/36 - 138/69)  BP(mean): 62 (17 May 2021 13:00) (56 - 79)  RR: 14 (17 May 2021 13:00) (11 - 30)  SpO2: 99% (17 May 2021 12:00) (90% - 99%)      Constitutional: elderly female in no acute distress sitting up in chair   Head: Head is normocephalic and atraumatic.    Neck: No JVD.   Cardiovascular: Regular rate and rhythm without S3, S4. No murmurs or rubs are appreciated.    Respiratory: Decreased BS at bases with + crackles  Abdomen: Soft, nontender, nondistended with positive bowel sounds.    Extremity: No tenderness, no pitting edema, Left thigh area in dressing, LLE distal trace edema, DANYELLE in place  Neurologic: The patient is alert and oriented.  Skin: Left thigh dressing   : no CVA tenderness                            9.5    4.41  )-----------( 69       ( 17 May 2021 08:17 )             30.0       TTE Summary:   1. Left ventricular ejection fraction, by visual estimation, is 35 to 40%.   2. Moderately toseverely decreased global left ventricular systolic function.   3. Multiple left ventricular regional wall motion abnormalities exist. See wall motion findings.   4. Mildly increased LV wall thickness.   5. Normal left ventricular internal cavity size.   6. Spectral Doppler shows impaired relaxation pattern of left ventricular myocardial filling (Grade I diastolic dysfunction).   7. There is mild concentric left ventricular hypertrophy.   8. Mild mitral annular calcification.   9. Mild mitral valve regurgitation.  10. Thickening and calcification of the anterior and posterior mitral valve leaflets.  11. Mild tricuspid regurgitation.  12. Mild aortic regurgitation.  13. Sclerotic aortic valve with normal opening.  14. Estimated pulmonary artery systolic pressure is 36.6 mmHg assuming a right atrial pressure of 10 mmHg, which is consistent with borderline pulmonary hypertension.    2/2021:    New acute occlusive thrombus in the LEFT gastrocnemius vein.  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  Acute deep venous thrombosis: below the knee.

## 2021-05-17 NOTE — PROGRESS NOTE ADULT - ASSESSMENT
SOB,chronic compensated HFref- LVEF 35-40%- NYHA class III- complicated by CKD and hypoalbuminemia. Cardiorenal syndrome.   2/2021 , LVEF 55-60% on echo  2/2021-LHC-  moderate disease in distal LAD.  euvolemic on exam.  Repeat echo showed LVEF of about 40%.   Nonischemic cardiomyopathy  continue  toprol XL.  Continue entresto.  f/u with me for congestive  heart failure.       Renal failure- improved .  contrast nephropathy.        PVD- vascular team following pt.  onabx.       Bacteremia- on abx. Management per primary team.     HTN- BP mildly elevated.  Meds as stated above.      Other medical issues- Management per primary team.   Thank you for allowing me to participate in the care of this patient. Please feel free to contact me with any questions.

## 2021-05-18 LAB
ANION GAP SERPL CALC-SCNC: 3 MMOL/L — LOW (ref 5–17)
BUN SERPL-MCNC: 19 MG/DL — SIGNIFICANT CHANGE UP (ref 7–23)
CALCIUM SERPL-MCNC: 8.4 MG/DL — LOW (ref 8.5–10.1)
CHLORIDE SERPL-SCNC: 113 MMOL/L — HIGH (ref 96–108)
CO2 SERPL-SCNC: 27 MMOL/L — SIGNIFICANT CHANGE UP (ref 22–31)
CREAT SERPL-MCNC: 0.69 MG/DL — SIGNIFICANT CHANGE UP (ref 0.5–1.3)
GLUCOSE SERPL-MCNC: 149 MG/DL — HIGH (ref 70–99)
HCT VFR BLD CALC: 28.8 % — LOW (ref 34.5–45)
HGB BLD-MCNC: 9.2 G/DL — LOW (ref 11.5–15.5)
MCHC RBC-ENTMCNC: 30 PG — SIGNIFICANT CHANGE UP (ref 27–34)
MCHC RBC-ENTMCNC: 31.9 GM/DL — LOW (ref 32–36)
MCV RBC AUTO: 93.8 FL — SIGNIFICANT CHANGE UP (ref 80–100)
PLATELET # BLD AUTO: 92 K/UL — LOW (ref 150–400)
POTASSIUM SERPL-MCNC: 4.1 MMOL/L — SIGNIFICANT CHANGE UP (ref 3.5–5.3)
POTASSIUM SERPL-SCNC: 4.1 MMOL/L — SIGNIFICANT CHANGE UP (ref 3.5–5.3)
RBC # BLD: 3.07 M/UL — LOW (ref 3.8–5.2)
RBC # FLD: 15.6 % — HIGH (ref 10.3–14.5)
SODIUM SERPL-SCNC: 143 MMOL/L — SIGNIFICANT CHANGE UP (ref 135–145)
WBC # BLD: 4.19 K/UL — SIGNIFICANT CHANGE UP (ref 3.8–10.5)
WBC # FLD AUTO: 4.19 K/UL — SIGNIFICANT CHANGE UP (ref 3.8–10.5)

## 2021-05-18 PROCEDURE — 99232 SBSQ HOSP IP/OBS MODERATE 35: CPT

## 2021-05-18 RX ORDER — HYDROMORPHONE HYDROCHLORIDE 2 MG/ML
0.5 INJECTION INTRAMUSCULAR; INTRAVENOUS; SUBCUTANEOUS
Refills: 0 | Status: DISCONTINUED | OUTPATIENT
Start: 2021-05-18 | End: 2021-05-21

## 2021-05-18 RX ADMIN — Medication 650 MILLIGRAM(S): at 21:00

## 2021-05-18 RX ADMIN — GABAPENTIN 300 MILLIGRAM(S): 400 CAPSULE ORAL at 14:32

## 2021-05-18 RX ADMIN — Medication 50 MILLIGRAM(S): at 09:57

## 2021-05-18 RX ADMIN — Medication 650 MILLIGRAM(S): at 20:21

## 2021-05-18 RX ADMIN — OXYCODONE HYDROCHLORIDE 5 MILLIGRAM(S): 5 TABLET ORAL at 21:28

## 2021-05-18 RX ADMIN — Medication 81 MILLIGRAM(S): at 09:58

## 2021-05-18 RX ADMIN — Medication 1: at 12:13

## 2021-05-18 RX ADMIN — TIOTROPIUM BROMIDE 1 CAPSULE(S): 18 CAPSULE ORAL; RESPIRATORY (INHALATION) at 08:57

## 2021-05-18 RX ADMIN — ATORVASTATIN CALCIUM 40 MILLIGRAM(S): 80 TABLET, FILM COATED ORAL at 21:28

## 2021-05-18 RX ADMIN — Medication 100 MILLIGRAM(S): at 06:12

## 2021-05-18 RX ADMIN — Medication 100 MILLIGRAM(S): at 14:32

## 2021-05-18 RX ADMIN — Medication 20 MILLIGRAM(S): at 09:58

## 2021-05-18 RX ADMIN — INSULIN GLARGINE 5 UNIT(S): 100 INJECTION, SOLUTION SUBCUTANEOUS at 22:00

## 2021-05-18 RX ADMIN — SACUBITRIL AND VALSARTAN 1 TABLET(S): 24; 26 TABLET, FILM COATED ORAL at 09:57

## 2021-05-18 RX ADMIN — GABAPENTIN 300 MILLIGRAM(S): 400 CAPSULE ORAL at 06:12

## 2021-05-18 RX ADMIN — Medication 2: at 17:35

## 2021-05-18 RX ADMIN — SACUBITRIL AND VALSARTAN 1 TABLET(S): 24; 26 TABLET, FILM COATED ORAL at 21:28

## 2021-05-18 RX ADMIN — Medication 1 MILLIGRAM(S): at 21:28

## 2021-05-18 RX ADMIN — GABAPENTIN 300 MILLIGRAM(S): 400 CAPSULE ORAL at 21:28

## 2021-05-18 RX ADMIN — Medication 100 MILLIGRAM(S): at 21:28

## 2021-05-18 RX ADMIN — ENOXAPARIN SODIUM 40 MILLIGRAM(S): 100 INJECTION SUBCUTANEOUS at 09:58

## 2021-05-18 RX ADMIN — OXYCODONE HYDROCHLORIDE 5 MILLIGRAM(S): 5 TABLET ORAL at 22:15

## 2021-05-18 NOTE — PROGRESS NOTE ADULT - ASSESSMENT
* Infected graft POD#10 with MSSA bacteremia on cefazolin   - s/p muscle flap 5/11   - will need long term IV abx as per ID - PICC placement prior to DC    * Acute thrombocytopenia  improving  started on  LMWH for dvt px    * New acute on Chronic systolic HF in the settings of oliguric ORLY/PAT - now resolved, ORLY now resolved  stable    *H/o depression / HTN / RA /ILD/ Pulm HTN - c/w home meds and f/u outpatient for further management    * Right LE DVT Feb 2021 with superficial left basilic vein non-occlusive clot       * DMII - HISS and resumed lantus she lost weight her NEWSOME requirements decreased significantly     * Incidental finding of 2.5 cm right renal lesion and 1.5 cm left breat - daughter MD aware, outpt follow up      Daughter MD updated [phone] cell      dc planning will need PICC

## 2021-05-18 NOTE — PROGRESS NOTE ADULT - ASSESSMENT
SOB,chronic compensated HFref- LVEF 35-40%- NYHA class III- complicated by CKD and hypoalbuminemia. Cardiorenal syndrome.   2/2021 , LVEF 55-60% on echo  2/2021-LHC-  moderate disease in distal LAD.  euvolemic on exam.  Repeat echo showed LVEF of about 40%.   Nonischemic cardiomyopathy  continue  toprol XL.  Continue entresto.  f/u with me for congestive  heart failure.   She will need further uptitration of entresto as outpt.       Renal failure- improved .  contrast nephropathy.        PVD- vascular team following pt.  onabx.        Bacteremia- on abx. Management per primary team.     HTN- BP mildly elevated.  Meds as stated above.    Will sign off for now.    Other medical issues- Management per primary team.   Thank you for allowing me to participate in the care of this patient. Please feel free to contact me with any questions.

## 2021-05-18 NOTE — PROGRESS NOTE ADULT - SUBJECTIVE AND OBJECTIVE BOX
no significant changes    for PICC line and then discharge to rehab    MEDICATIONS  (STANDING):  aspirin  chewable 81 milliGRAM(s) Oral daily  atorvastatin 40 milliGRAM(s) Oral at bedtime  ceFAZolin   IVPB 2000 milliGRAM(s) IV Intermittent every 8 hours  dextrose 40% Gel 15 Gram(s) Oral once  dextrose 5%. 1000 milliLiter(s) (50 mL/Hr) IV Continuous <Continuous>  dextrose 5%. 1000 milliLiter(s) (100 mL/Hr) IV Continuous <Continuous>  dextrose 50% Injectable 25 Gram(s) IV Push once  dextrose 50% Injectable 12.5 Gram(s) IV Push once  dextrose 50% Injectable 25 Gram(s) IV Push once  enoxaparin Injectable 40 milliGRAM(s) SubCutaneous daily  folic acid 1 milliGRAM(s) Oral at bedtime  gabapentin 300 milliGRAM(s) Oral three times a day  glucagon  Injectable 1 milliGRAM(s) IntraMuscular once  insulin glargine Injectable (LANTUS) 5 Unit(s) SubCutaneous at bedtime  insulin lispro (ADMELOG) corrective regimen sliding scale   SubCutaneous three times a day before meals  metoprolol succinate ER 50 milliGRAM(s) Oral daily  PARoxetine 20 milliGRAM(s) Oral daily  sacubitril 24 mG/valsartan 26 mG 1 Tablet(s) Oral two times a day  tiotropium 18 MICROgram(s) Capsule 1 Capsule(s) Inhalation daily    MEDICATIONS  (PRN):  acetaminophen   Tablet .. 650 milliGRAM(s) Oral every 6 hours PRN Mild Pain (1 - 3)  ALBUTerol    90 MICROgram(s) HFA Inhaler 1 Puff(s) Inhalation every 6 hours PRN Shortness of Breath  oxyCODONE    IR 5 milliGRAM(s) Oral every 6 hours PRN Severe Pain (7 - 10)      Allergies    cephalosporins (Other)  penicillins (Urticaria; Pruritus)    Intolerances        Flatus: [ ] YES [ ] NO             Bowel Movement: [ ] YES [ ] NO  Pain (0-10):            Pain Control Adequate: [ ] YES [ ] NO  Nausea: [ ] YES [ ] NO            Vomiting: [ ] YES [ ] NO  Diarrhea: [ ] YES [ ] NO         Constipation: [ ] YES [ ] NO     Chest Pain: [ ] YES [ ] NO    SOB:  [ ] YES [ ] NO    Vital Signs Last 24 Hrs  T(C): 36.7 (18 May 2021 05:15), Max: 37.1 (17 May 2021 18:00)  T(F): 98.1 (18 May 2021 05:15), Max: 98.8 (17 May 2021 18:00)  HR: 71 (18 May 2021 06:00) (67 - 74)  BP: 133/40 (18 May 2021 06:00) (93/59 - 138/69)  BP(mean): 62 (18 May 2021 06:00) (56 - 82)  RR: 18 (18 May 2021 06:00) (14 - 26)  SpO2: 91% (18 May 2021 06:00) (90% - 99%)    I&O's Summary    17 May 2021 07:01  -  18 May 2021 07:00  --------------------------------------------------------  IN: 550 mL / OUT: 1010 mL / NET: -460 mL        Physical Exam:  General: NAD, resting comfortably  Pulmonary: normal resp effort, CTA-B  Cardiovascular: NSR  Abdominal: soft, NT/ND  Extremities: WWP, normal strength  Neuro: A/O x 3, CNs II-XII grossly intact, normal motor/sensation, no focal deficits  Pulses:   Right:                                                                          Left:  FEM [ ]2+ [ ]1+ [ ]doppler                                             FEM [ ]2+ [ ]1+ [ ]doppler    POP [ ]2+ [ ]1+ [ ]doppler                                             POP [ ]2+ [ ]1+ [ ]doppler    DP [ ]2+ [ ]1+ [ ]doppler                                                DP [ ]2+ [ ]1+ [ ]doppler  PT[ ]2+ [ ]1+ [ ]doppler                                                  PT [ ]2+ [ ]1+ [ ]doppler    LABS:                        9.2    4.19  )-----------( 92       ( 18 May 2021 05:49 )             28.8     05-18    143  |  113<H>  |  19  ----------------------------<  149<H>  4.1   |  27  |  0.69    Ca    8.4<L>      18 May 2021 05:49            CAPILLARY BLOOD GLUCOSE      POCT Blood Glucose.: 202 mg/dL (17 May 2021 21:47)  POCT Blood Glucose.: 124 mg/dL (17 May 2021 17:10)  POCT Blood Glucose.: 190 mg/dL (17 May 2021 12:38)  POCT Blood Glucose.: 156 mg/dL (17 May 2021 09:00)      RADIOLOGY & ADDITIONAL TESTS:

## 2021-05-18 NOTE — PROGRESS NOTE ADULT - SUBJECTIVE AND OBJECTIVE BOX
86 y.o. Female with PMH of COPD, arthritis, CAD, DMII, depression, HTN, PVD, PAD, s/p left fem pop bypass on 03/24/21, +lower ext DVT on eliquis, +chronic left heel ulcer, RA, anemia, presented to the ED at [place] on 5/3 with worsening dyspnea, wheezing that started that night. Pt received 2 Duoneb treatments at home, with only slight improvement, so EMS called. At home patient was hypoxic to 87% on room air, was placed on 100% NRB mask. In the ED at [place], pt was placed on 2 LPM via NC, received a dose of Lasix 20 mg IV x 1. Initial trop noted to be 0.17.  BNP >30953  On her first night of admission at [place] 5/3 patient developed fever which has persisted throughout admission. ID was consulted, patient was started on vancomycin and meropenem. Overnight on 5/4-5/5 patient had extensive purulent drainage from left medial thigh surgical site. Plastic surgery was consulted, advised transfer to [facility] for evaluation by Dr Ventura who did fem pop bypass 3/2021.      Was taking to OR : purulent fluid in L thigh popliteal fossa above knee; necrotic infected fibrinous material around graft and on tissue in popliteal fossa  s/p  PAT  s/p muscle flap on 5/11  Plts dropped neg HIT, plts recovering     Vital Signs Last 24 Hrs  T(C): 36.8 (18 May 2021 09:28), Max: 37.1 (17 May 2021 18:00)  T(F): 98.3 (18 May 2021 09:28), Max: 98.8 (17 May 2021 18:00)  HR: 71 (18 May 2021 10:00) (66 - 75)  BP: 122/41 (18 May 2021 10:00) (93/59 - 136/36)  BP(mean): 62 (18 May 2021 10:00) (49 - 82)  RR: 12 (18 May 2021 10:00) (10 - 26)  SpO2: 96% (18 May 2021 10:00) (90% - 99%)      Constitutional: elderly female in no acute distress sitting up in chair   Head: Head is normocephalic and atraumatic.    Neck: No JVD.   Cardiovascular: Regular rate and rhythm without S3, S4. No murmurs or rubs are appreciated.    Respiratory: Decreased BS at bases with + crackles  Abdomen: Soft, nontender, nondistended with positive bowel sounds.    Extremity: No tenderness, no pitting edema, Left thigh area in dressing, LLE distal trace edema, DANYELLE in place  Neurologic: The patient is alert and oriented.  Skin: Left thigh dressing                               9.5    4.41  )-----------( 69       ( 17 May 2021 08:17 )             30.0       TTE Summary:   1. Left ventricular ejection fraction, by visual estimation, is 35 to 40%.   2. Moderately toseverely decreased global left ventricular systolic function.   3. Multiple left ventricular regional wall motion abnormalities exist. See wall motion findings.   4. Mildly increased LV wall thickness.   5. Normal left ventricular internal cavity size.   6. Spectral Doppler shows impaired relaxation pattern of left ventricular myocardial filling (Grade I diastolic dysfunction).   7. There is mild concentric left ventricular hypertrophy.   8. Mild mitral annular calcification.   9. Mild mitral valve regurgitation.  10. Thickening and calcification of the anterior and posterior mitral valve leaflets.  11. Mild tricuspid regurgitation.  12. Mild aortic regurgitation.  13. Sclerotic aortic valve with normal opening.  14. Estimated pulmonary artery systolic pressure is 36.6 mmHg assuming a right atrial pressure of 10 mmHg, which is consistent with borderline pulmonary hypertension.    2/2021:    New acute occlusive thrombus in the LEFT gastrocnemius vein.  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  Acute deep venous thrombosis: below the knee.

## 2021-05-18 NOTE — PROGRESS NOTE ADULT - SUBJECTIVE AND OBJECTIVE BOX
Patient is a 86y old  Female who presents with a chief complaint of wound.       HPI:  86y Female with PMH of COPD, arthritis, CAD, DMII, depression, HTN, PVD, PAD, s/p left fem pop bypass on 03/24/21, +lower ext DVT on eliquis, +chronic left heel ulcer, RA, anemia, presented to the ED at [facility] on 5/3 with worsening dyspnea, wheezing that started that night. Pt received 2 Duoneb treatments at home, with only slight improvement, so EMS called. At home patient was hypoxic to 87% on room air, was placed on 100% NRB mask. In the ED at [facility], pt was placed on 2 LPM via NC, received a dose of Lasix 20 mg IV x 1. Initial trop noted to be 0.17.  BNP >84743    Her respiratory symptoms have improved with diuresis.     On her first night of admission at [facility] 5/3 patient developed fever which has persisted throughout admission. ID was consulted, patient was started on vancomycin and meropenem. Overnight on 5/4-5/5 patient had extensive purulent drainage from left medial thigh surgical site. Plastic surgery was consulted, advised transfer to [facility] for evaluation by Dr Ventura who did fem pop bypass 3/2021.      In ED at [facility] on 5/5, patient had no complaints.  Denied any CP, SOB, lower extremity pain, numbness or tingling.      5/6-   Pt seen now. She is fully alert and was able to give all the history , son in law at bedside.  Pt states that her SOB is better.    NO CP.    5/7- Pt seen this am. Pt denies any chest pain or SOB.  She was able to lie down flat in bed for a long time without any SOB.  Overnight urine output poor.     5/10- pt seen this am.  Pt denies any symptoms this am. She was happy she was able to make urine.     5/11- pt seen this am.  No symptoms or overnight events.     5/13- pt seen this am.  Pt denies any symptoms.       5/17- pt seen this am.  PT deines any symptoms.     5/18- pt seen this am. Pt denies any symptoms this am.    No overnight issues.  Awaiting transfer to rehab.   PAST MEDICAL & SURGICAL HISTORY:  Hypertension    Anemia    Arthritis, rheumatoid    Depression    Type 2 diabetes mellitus  on insulin    PVD (peripheral vascular disease) with claudication    CAD (coronary artery disease)  non-obstructive    Rheumatoid arthritis    fracture ankle right  plate . screws   2000    S/P cataract surgery  2011    Hip fracture requiring operative repair  Right hip 11/14/2020        MEDICATIONS  (STANDING):  aspirin  chewable 81 milliGRAM(s) Oral daily  atorvastatin 40 milliGRAM(s) Oral at bedtime  ceFAZolin   IVPB 2000 milliGRAM(s) IV Intermittent every 12 hours  dextrose 40% Gel 15 Gram(s) Oral once  dextrose 5%. 1000 milliLiter(s) (50 mL/Hr) IV Continuous <Continuous>  dextrose 5%. 1000 milliLiter(s) (100 mL/Hr) IV Continuous <Continuous>  dextrose 50% Injectable 25 Gram(s) IV Push once  dextrose 50% Injectable 12.5 Gram(s) IV Push once  dextrose 50% Injectable 25 Gram(s) IV Push once  folic acid 1 milliGRAM(s) Oral at bedtime  furosemide   Injectable 40 milliGRAM(s) IV Push daily  gabapentin 300 milliGRAM(s) Oral three times a day  glucagon  Injectable 1 milliGRAM(s) IntraMuscular once  heparin   Injectable 5000 Unit(s) SubCutaneous every 12 hours  insulin lispro (ADMELOG) corrective regimen sliding scale   SubCutaneous three times a day before meals  lisinopril 5 milliGRAM(s) Oral daily  metoprolol tartrate 25 milliGRAM(s) Oral two times a day  PARoxetine 20 milliGRAM(s) Oral daily  tiotropium 18 MICROgram(s) Capsule 1 Capsule(s) Inhalation daily    MEDICATIONS  (PRN):  acetaminophen   Tablet .. 650 milliGRAM(s) Oral every 6 hours PRN Mild Pain (1 - 3)  ALBUTerol    90 MICROgram(s) HFA Inhaler 1 Puff(s) Inhalation every 6 hours PRN Shortness of Breath  morphine  - Injectable 5 milliGRAM(s) IV Push every 3 hours PRN Severe Pain (7 - 10)      FAMILY HISTORY:  FH: colon cancer  father    FH: heart attack  father    Family history of atherosclerosis  mother        SOCIAL HISTORY: no recent smoking     REVIEW OF SYSTEMS:  CONSTITUTIONAL:    No fatigue, malaise, lethargy.  No fever or chills.   RESPIRATORY:  No cough.  No wheeze.  No hemoptysis.  no shortness of breath.  CARDIOVASCULAR:  No chest pains.  No palpitations. no shortness of breath, No orthopnea or PND.  GASTROINTESTINAL:  No abdominal pain.  No nausea or vomiting.    GENITOURINARY:    No hematuria.    MUSCULOSKELETAL:  no pain   NEUROLOGICAL:  No tingling or numbness or weakness.  PSYCHIATRIC:  No confusion  SKIN:  No rashes.        ICU Vital Signs Last 24 Hrs  T(C): 36.7 (18 May 2021 05:15), Max: 37.1 (17 May 2021 18:00)  T(F): 98.1 (18 May 2021 05:15), Max: 98.8 (17 May 2021 18:00)  HR: 71 (18 May 2021 06:00) (67 - 74)  BP: 133/40 (18 May 2021 06:00) (93/59 - 138/69)  BP(mean): 62 (18 May 2021 06:00) (56 - 82)  ABP: --  ABP(mean): --  RR: 18 (18 May 2021 06:00) (14 - 26)  SpO2: 91% (18 May 2021 06:00) (90% - 99%)        PHYSICAL EXAM-    Constitutional: elderly female in no acute distress sitting up in bed now.     Head: Head is normocephalic and atraumatic.      Neck: No JVD.     Cardiovascular: Regular rate and rhythm without S3, S4. No murmurs or rubs are appreciated.      Respiratory: B/l faint  rales     Abdomen: Soft, nontender, nondistended with positive bowel sounds.      Extremity: No tenderness. pitting edema  1+ b/l LE    Neurologic: The patient is alert and oriented.      Psychiatric: The patient appears to be emotionally stable.      INTERPRETATION OF TELEMETRY: sinus bradycardia , PACs     ECG: Sinus rythm , T wave inversion in I, aVL, biphasic T wave in V2-6     I&O's Detail    05 May 2021 07:01  -  06 May 2021 07:00  --------------------------------------------------------  IN:    IV PiggyBack: 50 mL    sodium chloride 0.9%: 1061 mL  Total IN: 1111 mL    OUT:    Indwelling Catheter - Urethral (mL): 375 mL  Total OUT: 375 mL    Total NET: 736 mL      06 May 2021 07:01  -  06 May 2021 17:09  --------------------------------------------------------  IN:    sodium chloride 0.9%: 492 mL  Total IN: 492 mL    OUT:    Indwelling Catheter - Urethral (mL): 100 mL  Total OUT: 100 mL    Total NET: 392 mL          LABS:                                              9.2    4.19  )-----------( 92       ( 18 May 2021 05:49 )             28.8     05-18    143  |  113<H>  |  19  ----------------------------<  149<H>  4.1   |  27  |  0.69    Ca    8.4<L>      18 May 2021 05:49              05-06 Chol 128 LDL -- HDL 33<L> Trig 111        I&O's Summary    05 May 2021 07:01  -  06 May 2021 07:00  --------------------------------------------------------  IN: 1111 mL / OUT: 375 mL / NET: 736 mL    06 May 2021 07:01  -  06 May 2021 17:09  --------------------------------------------------------  IN: 492 mL / OUT: 100 mL / NET: 392 mL      BNP  RADIOLOGY & ADDITIONAL STUDIES:  c< from: CT Chest No Cont (05.03.21 @ 23:04) >  IMPRESSION:    1. Small bilateral pleural effusions and pulmonary edema.  2. Emphysema and question additional honeycombing/fibrosis.  3. Mildly asymmetrical 1.5 cm nodular breast tissue on the left. Correlation with follow-up mammography/ultrasound is recommended.  4. 2.9 cm slightly hyperdense right renal lesion, most likely hemorrhagic/proteinaceous cyst. Follow-up nonemergent ultrasound is recommended.                ROSARIO REYES MD; Attending Radiologist  This document has been electronically signed. May  3 2021 11:52PM    < end of copied text >  < from: Xray Chest 1 View AP/PA (05.03.21 @ 22:36) >    EXAM:  XR CHEST AP OR PA 1V      PROCEDURE DATE:  05/03/2021        INTERPRETATION:  Views:1  Comparison: 08/21/15  History: Shortness of breath    There is mild scattered interstitial infiltrate consistent with pneumonia or pneumonitis without segmental consolidation, layering effusion or cavitation. The heart is normal in size.    IMPRESSION: As above              MARIA ESTHER CADET MD; Attending Radiologist  This document has been electronically signed. May  4 2021  9:22AM    < end of copied text >  < from: TTE Echo Complete w/o Contrast w/ Doppler (05.04.21 @ 08:16) >    Summary:   1. Left ventricular ejection fraction, by visual estimation, is 35 to 40%.   2. Moderately to severely decreased global left ventricular systolic function.   3. Multiple left ventricular regional wall motion abnormalities exist. See wall motion findings.   4. Mildly increased LV wall thickness.   5. Normal left ventricular internal cavity size.   6. Spectral Doppler shows impaired relaxation pattern of left ventricular myocardial filling (Grade I diastolic dysfunction).   7. There is mild concentric left ventricular hypertrophy.   8. Mild mitral annular calcification.   9. Mild mitral valve regurgitation.  10. Thickening and calcification of the anterior and posterior mitral valve leaflets.  11. Mild tricuspid regurgitation.  12. Mild aortic regurgitation.  13. Sclerotic aortic valve with normal opening.  14. Estimated pulmonary artery systolic pressure is 36.6 mmHg assuming a right atrial pressure of 10 mmHg, which is consistent with borderline pulmonary hypertension.    [id number] Toby Peace , Electronically signed on 5/4/2021 at 10:19:28 AM    < end of copied text >

## 2021-05-19 LAB
HCT VFR BLD CALC: 28.3 % — LOW (ref 34.5–45)
HGB BLD-MCNC: 8.9 G/DL — LOW (ref 11.5–15.5)
MCHC RBC-ENTMCNC: 29.9 PG — SIGNIFICANT CHANGE UP (ref 27–34)
MCHC RBC-ENTMCNC: 31.4 GM/DL — LOW (ref 32–36)
MCV RBC AUTO: 95 FL — SIGNIFICANT CHANGE UP (ref 80–100)
PLATELET # BLD AUTO: 120 K/UL — LOW (ref 150–400)
RBC # BLD: 2.98 M/UL — LOW (ref 3.8–5.2)
RBC # FLD: 15.7 % — HIGH (ref 10.3–14.5)
WBC # BLD: 4.24 K/UL — SIGNIFICANT CHANGE UP (ref 3.8–10.5)
WBC # FLD AUTO: 4.24 K/UL — SIGNIFICANT CHANGE UP (ref 3.8–10.5)

## 2021-05-19 PROCEDURE — 99232 SBSQ HOSP IP/OBS MODERATE 35: CPT

## 2021-05-19 RX ADMIN — ENOXAPARIN SODIUM 40 MILLIGRAM(S): 100 INJECTION SUBCUTANEOUS at 10:24

## 2021-05-19 RX ADMIN — Medication 650 MILLIGRAM(S): at 22:30

## 2021-05-19 RX ADMIN — ALBUTEROL 1 PUFF(S): 90 AEROSOL, METERED ORAL at 08:46

## 2021-05-19 RX ADMIN — TIOTROPIUM BROMIDE 1 CAPSULE(S): 18 CAPSULE ORAL; RESPIRATORY (INHALATION) at 08:46

## 2021-05-19 RX ADMIN — Medication 2: at 13:04

## 2021-05-19 RX ADMIN — Medication 100 MILLIGRAM(S): at 21:54

## 2021-05-19 RX ADMIN — Medication 1 MILLIGRAM(S): at 21:55

## 2021-05-19 RX ADMIN — Medication 650 MILLIGRAM(S): at 21:55

## 2021-05-19 RX ADMIN — GABAPENTIN 300 MILLIGRAM(S): 400 CAPSULE ORAL at 13:02

## 2021-05-19 RX ADMIN — Medication 20 MILLIGRAM(S): at 10:25

## 2021-05-19 RX ADMIN — SACUBITRIL AND VALSARTAN 1 TABLET(S): 24; 26 TABLET, FILM COATED ORAL at 21:55

## 2021-05-19 RX ADMIN — ATORVASTATIN CALCIUM 40 MILLIGRAM(S): 80 TABLET, FILM COATED ORAL at 21:54

## 2021-05-19 RX ADMIN — Medication 50 MILLIGRAM(S): at 10:25

## 2021-05-19 RX ADMIN — Medication 100 MILLIGRAM(S): at 05:36

## 2021-05-19 RX ADMIN — Medication 100 MILLIGRAM(S): at 13:03

## 2021-05-19 RX ADMIN — GABAPENTIN 300 MILLIGRAM(S): 400 CAPSULE ORAL at 21:55

## 2021-05-19 RX ADMIN — SACUBITRIL AND VALSARTAN 1 TABLET(S): 24; 26 TABLET, FILM COATED ORAL at 10:25

## 2021-05-19 RX ADMIN — INSULIN GLARGINE 5 UNIT(S): 100 INJECTION, SOLUTION SUBCUTANEOUS at 22:42

## 2021-05-19 RX ADMIN — Medication 1: at 17:15

## 2021-05-19 RX ADMIN — Medication 81 MILLIGRAM(S): at 10:24

## 2021-05-19 RX ADMIN — GABAPENTIN 300 MILLIGRAM(S): 400 CAPSULE ORAL at 05:36

## 2021-05-19 NOTE — PROGRESS NOTE ADULT - ASSESSMENT
* Infected graft POD#10 with MSSA bacteremia on cefazolin   - s/p muscle flap 5/11   - will need long term IV abx as per ID - PICC placement prior to DC    * Acute thrombocytopenia  improving  started on  LMWH for dvt px    * New acute on Chronic systolic HF in the settings of oliguric ORLY/PAT - now resolved, ORLY now resolved  stable    *H/o depression / HTN / RA /ILD/ Pulm HTN - c/w home meds and f/u outpatient for further management    * Right LE DVT Feb 2021 with superficial left basilic vein non-occlusive clot       * DMII - HISS and resumed lantus she lost weight her NEWSOME requirements decreased significantly     * Incidental finding of 2.5 cm right renal lesion and 1.5 cm left breat - daughter MD aware, outpt follow up      Daughter MD updated [phone] cell      dc planning will need PICC

## 2021-05-19 NOTE — PROGRESS NOTE ADULT - SUBJECTIVE AND OBJECTIVE BOX
86 y.o. Female with PMH of COPD, arthritis, CAD, DMII, depression, HTN, PVD, PAD, s/p left fem pop bypass on 03/24/21, +lower ext DVT on eliquis, +chronic left heel ulcer, RA, anemia, presented to the ED at [place] on 5/3 with worsening dyspnea, wheezing that started that night. Pt received 2 Duoneb treatments at home, with only slight improvement, so EMS called. At home patient was hypoxic to 87% on room air, was placed on 100% NRB mask. In the ED at [place], pt was placed on 2 LPM via NC, received a dose of Lasix 20 mg IV x 1. Initial trop noted to be 0.17.  BNP >85840  On her first night of admission at [place] 5/3 patient developed fever which has persisted throughout admission. ID was consulted, patient was started on vancomycin and meropenem. Overnight on 5/4-5/5 patient had extensive purulent drainage from left medial thigh surgical site. Plastic surgery was consulted, advised transfer to [facility] for evaluation by Dr Ventura who did fem pop bypass 3/2021.      Was taking to OR : purulent fluid in L thigh popliteal fossa above knee; necrotic infected fibrinous material around graft and on tissue in popliteal fossa  s/p  PAT  s/p muscle flap on 5/11  Plts dropped neg HIT, plts recovering     Vital Signs Last 24 Hrs  T(C): 36.7 (19 May 2021 14:01), Max: 36.7 (19 May 2021 14:01)  T(F): 98 (19 May 2021 14:01), Max: 98 (19 May 2021 14:01)  HR: 65 (19 May 2021 14:00) (65 - 85)  BP: 122/38 (19 May 2021 14:00) (107/36 - 133/41)  BP(mean): 60 (19 May 2021 14:00) (54 - 66)  RR: 20 (19 May 2021 14:00) (11 - 34)  SpO2: 94% (19 May 2021 08:00) (90% - 98%)      Constitutional: elderly female in no acute distress sitting up in chair   Head: Head is normocephalic and atraumatic.    Neck: No JVD.   Cardiovascular: Regular rate and rhythm without S3, S4. No murmurs or rubs are appreciated.    Respiratory: Decreased BS at bases with + crackles  Abdomen: Soft, nontender, nondistended with positive bowel sounds.    Extremity: No tenderness, no pitting edema, Left thigh area in dressing, LLE distal trace edema, DANYELLE in place  Neurologic: The patient is alert and oriented.  Skin: Left thigh dressing                               9.5    4.41  )-----------( 69       ( 17 May 2021 08:17 )             30.0       TTE Summary:   1. Left ventricular ejection fraction, by visual estimation, is 35 to 40%.   2. Moderately toseverely decreased global left ventricular systolic function.   3. Multiple left ventricular regional wall motion abnormalities exist. See wall motion findings.   4. Mildly increased LV wall thickness.   5. Normal left ventricular internal cavity size.   6. Spectral Doppler shows impaired relaxation pattern of left ventricular myocardial filling (Grade I diastolic dysfunction).   7. There is mild concentric left ventricular hypertrophy.   8. Mild mitral annular calcification.   9. Mild mitral valve regurgitation.  10. Thickening and calcification of the anterior and posterior mitral valve leaflets.  11. Mild tricuspid regurgitation.  12. Mild aortic regurgitation.  13. Sclerotic aortic valve with normal opening.  14. Estimated pulmonary artery systolic pressure is 36.6 mmHg assuming a right atrial pressure of 10 mmHg, which is consistent with borderline pulmonary hypertension.    2/2021:    New acute occlusive thrombus in the LEFT gastrocnemius vein.  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  Acute deep venous thrombosis: below the knee.

## 2021-05-19 NOTE — PROGRESS NOTE ADULT - SUBJECTIVE AND OBJECTIVE BOX
Date of service: 05-19-21 @ 09:37    Lying in bed in NAD  Has left leg tenderness  Weak looking    ROS: no fever or chills; denies dizziness, no HA, no SOB or cough, no abdominal pain, no diarrhea or constipation; no dysuria    MEDICATIONS  (STANDING):  aspirin  chewable 81 milliGRAM(s) Oral daily  atorvastatin 40 milliGRAM(s) Oral at bedtime  ceFAZolin   IVPB 2000 milliGRAM(s) IV Intermittent every 8 hours  dextrose 40% Gel 15 Gram(s) Oral once  dextrose 5%. 1000 milliLiter(s) (50 mL/Hr) IV Continuous <Continuous>  dextrose 5%. 1000 milliLiter(s) (100 mL/Hr) IV Continuous <Continuous>  dextrose 50% Injectable 25 Gram(s) IV Push once  dextrose 50% Injectable 12.5 Gram(s) IV Push once  dextrose 50% Injectable 25 Gram(s) IV Push once  enoxaparin Injectable 40 milliGRAM(s) SubCutaneous daily  folic acid 1 milliGRAM(s) Oral at bedtime  gabapentin 300 milliGRAM(s) Oral three times a day  glucagon  Injectable 1 milliGRAM(s) IntraMuscular once  insulin glargine Injectable (LANTUS) 5 Unit(s) SubCutaneous at bedtime  insulin lispro (ADMELOG) corrective regimen sliding scale   SubCutaneous three times a day before meals  metoprolol succinate ER 50 milliGRAM(s) Oral daily  PARoxetine 20 milliGRAM(s) Oral daily  sacubitril 24 mG/valsartan 26 mG 1 Tablet(s) Oral two times a day  tiotropium 18 MICROgram(s) Capsule 1 Capsule(s) Inhalation daily    Vital Signs Last 24 Hrs  T(C): 36.6 (19 May 2021 06:06), Max: 36.9 (18 May 2021 13:34)  T(F): 97.8 (19 May 2021 06:06), Max: 98.5 (18 May 2021 13:34)  HR: 67 (19 May 2021 08:00) (64 - 74)  BP: 125/44 (19 May 2021 08:00) (107/36 - 129/44)  BP(mean): 63 (19 May 2021 08:00) (54 - 102)  RR: 15 (19 May 2021 08:00) (11 - 26)  SpO2: 94% (19 May 2021 08:00) (90% - 98%)     Physical exam:    Vital Signs Last 24 Hrs  T(C): 36.8 (17 May 2021 12:00), Max: 36.8 (17 May 2021 06:41)  T(F): 98.2 (17 May 2021 12:00), Max: 98.3 (17 May 2021 06:41)  HR: 72 (17 May 2021 10:00) (68 - 77)  BP: 116/36 (17 May 2021 10:00) (116/36 - 138/69)  BP(mean): 56 (17 May 2021 10:00) (56 - 79)  RR: 14 (17 May 2021 10:00) (11 - 30)  SpO2: 97% (17 May 2021 09:00) (90% - 97%)     Physical exam:    Constitutional:  No acute distress  HEENT: NC/AT, EOMI, PERRLA, conjunctivae clear  Neck: supple; thyroid not palpable  Back: no tenderness  Respiratory: respiratory effort normal; crackles at bases  Cardiovascular: S1S2 regular, no murmurs  Abdomen: soft, not tender, not distended, positive BS  Genitourinary: no suprapubic tenderness  Lymphatic: no LN palpable  Musculoskeletal: no muscle tenderness, no joint swelling or tenderness  Extremities: 1+ pedal edema  Left thigh open wound s/p I and D; packed; dressing  Neurological/ Psychiatric: AxOx3, moving all extremities  Skin: no rashes; no palpable lesions    Labs: reviewed                        8.9    4.24  )-----------( 120      ( 19 May 2021 06:19 )             28.3     05-18    143  |  113<H>  |  19  ----------------------------<  149<H>  4.1   |  27  |  0.69    Ca    8.4<L>      18 May 2021 05:49                        9.6    3.90  )-----------( 113      ( 13 May 2021 05:47 )             30.1     05-13    141  |  112<H>  |  45<H>  ----------------------------<  135<H>  4.2   |  22  |  0.86    Ca    8.6      13 May 2021 05:47  Phos  4.8     05-11  Mg     2.2     05-11                                  10.1   7.90  )-----------( 199      ( 06 May 2021 05:49 )             31.3     05-06    134<L>  |  106  |  51<H>  ----------------------------<  199<H>  4.7   |  20<L>  |  1.39<H>    Ca    8.0<L>      06 May 2021 05:49    TPro  5.9<L>  /  Alb  1.9<L>  /  TBili  0.6  /  DBili  x   /  AST  5<L>  /  ALT  <6<L>  /  AlkPhos  71  05-05     LIVER FUNCTIONS - ( 05 May 2021 11:59 )  Alb: 1.9 g/dL / Pro: 5.9 gm/dL / ALK PHOS: 71 U/L / ALT: <6 U/L / AST: 5 U/L / GGT: x             Culture - Fungal, Other (collected 05 May 2021 16:34)  Source: .Other None  Preliminary Report (06 May 2021 09:04):    Testing in progress    Culture - Surgical Swab (collected 05 May 2021 16:34)  Source: .Surgical Swab None  Preliminary Report (06 May 2021 20:09):    Moderate Staphylococcus aureus  Organism: Staphylococcus aureus (07 May 2021 19:05)  Organism: Staphylococcus aureus (07 May 2021 19:05)      -  Ampicillin/Sulbactam: S <=8/4      -  Cefazolin: S <=4      -  Clindamycin: S <=0.25      -  Erythromycin: S <=0.25      -  Gentamicin: S <=1 Should not be used as monotherapy      -  Oxacillin: S <=0.25      -  RIF- Rifampin: S <=1 Should not be used as monotherapy      -  Tetra/Doxy: S <=1      -  Trimethoprim/Sulfamethoxazole: S <=0.5/9.5      -  Vancomycin: S 1      Method Type: SIMEON    Culture - Fungal, Other (collected 05 May 2021 16:34)  Source: .Other None  Preliminary Report (06 May 2021 09:04):    Testing in progress    Culture - Surgical Swab (collected 05 May 2021 16:34)  Source: .Surgical Swab None  Preliminary Report (06 May 2021 20:04):    Moderate Staphylococcus aureus  Organism: Staphylococcus aureus (07 May 2021 19:06)  Organism: Staphylococcus aureus (07 May 2021 19:06)      -  Ampicillin/Sulbactam: S <=8/4      -  Cefazolin: S <=4      -  Clindamycin: S <=0.25      -  Erythromycin: S <=0.25      -  Gentamicin: S <=1 Should not be used as monotherapy      -  Oxacillin: S <=0.25      -  RIF- Rifampin: S <=1 Should not be used as monotherapy      -  Tetra/Doxy: S <=1      -  Trimethoprim/Sulfamethoxazole: S <=0.5/9.5      -  Vancomycin: S 2      Method Type: SIMEON    Culture - Other (collected 04 May 2021 23:00)  Source: .Other wound - L thigh  Final Report (07 May 2021 07:28):    Numerous Staphylococcus aureus  Organism: Staphylococcus aureus (07 May 2021 07:28)  Organism: Staphylococcus aureus (07 May 2021 07:28)      -  Ampicillin/Sulbactam: S <=8/4      -  Cefazolin: S <=4      -  Clindamycin: S <=0.25      -  Erythromycin: S <=0.25      -  Gentamicin: S <=1 Should not be used as monotherapy      -  Oxacillin: S <=0.25      -  RIF- Rifampin: S <=1 Should not be used as monotherapy      -  Tetra/Doxy: S <=1      -  Trimethoprim/Sulfamethoxazole: S <=0.5/9.5      -  Vancomycin: S 1      Method Type: SIMEON    Culture - Blood (collected 03 May 2021 22:10)  Source: .Blood Blood-Peripheral  Final Report (09 May 2021 03:00):    No Growth Final    Culture - Blood (collected 03 May 2021 22:10)  Source: .Blood Blood-Peripheral  Gram Stain (07 May 2021 01:14):    Growth in aerobic bottle: Gram Positive Cocci in Clusters  Final Report (08 May 2021 17:32):    Growth in aerobic bottle: Staphylococcus aureus  Organism: Blood Culture PCR  Staphylococcus aureus (08 May 2021 17:32)  Organism: Staphylococcus aureus (08 May 2021 17:32)      -  Ampicillin/Sulbactam: S <=8/4      -  Cefazolin: S <=4      -  Clindamycin: S <=0.25      -  Erythromycin: S <=0.25      -  Gentamicin: S <=1 Should not be used as monotherapy      -  Oxacillin: S <=0.25      -  RIF- Rifampin: S <=1 Should not be used as monotherapy      -  Tetra/Doxy: S <=1      -  Trimethoprim/Sulfamethoxazole: S <=0.5/9.5      -  Vancomycin: S 2      Method Type: SIMEON  Organism: Blood Culture PCR (08 May 2021 17:32)      -  Staphylococcus aureus: Detec Any isolate of Staphylococcus aureus from a blood culture is NOT considered a contaminant.      Method Type: PCR    COVID-19 PCR: NotDetec (05-03-21 @ 23:17)    Radiology: all available radiological tests reviewed    Advanced directives addressed: full resuscitation

## 2021-05-19 NOTE — PROGRESS NOTE ADULT - ASSESSMENT
85 y/o Female with h/o COPD, arthritis, CAD, DMII, depression, HTN, PVD, PAD, s/p left fem pop bypass on 03/24/21, lower ext DVT on eliquis, chronic left heel ulcer, RA, anemia was admitted on 5/3 at [place] with worsening dyspnea, wheezing that started that night. Pt received 2 Duoneb treatments at home, with only slight improvement. At home patient was hypoxic to 87% on room air, was placed on 100% NRB mask. In the ED at [place], pt was placed on 2 LPM via NC, received a dose of Lasix 20 mg IV x 1. Her respiratory symptoms have improved with diuresis. Her troponin levels were monitored. Cardiology was consulted, advised troponin elevated due to type II MI.  On her first night of admission at [place] 5/3 patient developed fever which has persisted throughout admission. ID was consulted, patient was started on vancomycin and meropenem. Overnight on 5/4-5/5 patient had extensive purulent drainage from left medial thigh surgical site. Plastic surgery was consulted, advised transfer to [facility] for evaluation by Dr Ventura who did fem pop bypass 3/2021. In ED at [facility] on 5/5, she was continued on meropenem.    1. Postsurgical wound infection with MSSA s/p washout. Recent left femoral-popliteal bypass. Probable underlying vascular graft infection. s/p MSSA sepsis. ARF on CRF stage 3. Allergy to cephalosporins and PCN.  -cultures reviewed  -thrombocytopenia is much improved  -renal function is improved  -on cefazolin 2 gm IV q8h # 14  -tolerating abx well so far; no side effects noted  -monitor closely in [place] of PCN allergy history   -local wound care  -vascular evaluation appreciated  -vascular graft is likely tainted with infection  -continue abx coverage  -will need long term IV abx therapy and probable long term abx suppression  -monitor temps  -f/u CBC  -supportive care  2. Other issues:   -care per medicine    d/w medical team and Dr. Landers

## 2021-05-20 ENCOUNTER — TRANSCRIPTION ENCOUNTER (OUTPATIENT)
Age: 86
End: 2021-05-20

## 2021-05-20 LAB
HCT VFR BLD CALC: 29.3 % — LOW (ref 34.5–45)
HGB BLD-MCNC: 9 G/DL — LOW (ref 11.5–15.5)
MCHC RBC-ENTMCNC: 29.8 PG — SIGNIFICANT CHANGE UP (ref 27–34)
MCHC RBC-ENTMCNC: 30.7 GM/DL — LOW (ref 32–36)
MCV RBC AUTO: 97 FL — SIGNIFICANT CHANGE UP (ref 80–100)
PLATELET # BLD AUTO: 170 K/UL — SIGNIFICANT CHANGE UP (ref 150–400)
RBC # BLD: 3.02 M/UL — LOW (ref 3.8–5.2)
RBC # FLD: 16 % — HIGH (ref 10.3–14.5)
WBC # BLD: 3.98 K/UL — SIGNIFICANT CHANGE UP (ref 3.8–10.5)
WBC # FLD AUTO: 3.98 K/UL — SIGNIFICANT CHANGE UP (ref 3.8–10.5)

## 2021-05-20 PROCEDURE — 99232 SBSQ HOSP IP/OBS MODERATE 35: CPT

## 2021-05-20 PROCEDURE — 71045 X-RAY EXAM CHEST 1 VIEW: CPT | Mod: 26

## 2021-05-20 RX ORDER — INSULIN GLARGINE 100 [IU]/ML
20 INJECTION, SOLUTION SUBCUTANEOUS
Qty: 0 | Refills: 0 | DISCHARGE

## 2021-05-20 RX ORDER — INSULIN GLARGINE 100 [IU]/ML
10 INJECTION, SOLUTION SUBCUTANEOUS
Qty: 0 | Refills: 0 | DISCHARGE
Start: 2021-05-20

## 2021-05-20 RX ORDER — SACUBITRIL AND VALSARTAN 24; 26 MG/1; MG/1
1 TABLET, FILM COATED ORAL
Qty: 60 | Refills: 0
Start: 2021-05-20 | End: 2021-06-18

## 2021-05-20 RX ORDER — CEFAZOLIN SODIUM 1 G
0 VIAL (EA) INJECTION
Qty: 0 | Refills: 0 | DISCHARGE
Start: 2021-05-20

## 2021-05-20 RX ORDER — INSULIN GLARGINE 100 [IU]/ML
5 INJECTION, SOLUTION SUBCUTANEOUS
Qty: 0 | Refills: 0 | DISCHARGE
Start: 2021-05-20

## 2021-05-20 RX ADMIN — Medication 100 MILLIGRAM(S): at 05:21

## 2021-05-20 RX ADMIN — SACUBITRIL AND VALSARTAN 1 TABLET(S): 24; 26 TABLET, FILM COATED ORAL at 22:32

## 2021-05-20 RX ADMIN — Medication 20 MILLIGRAM(S): at 11:16

## 2021-05-20 RX ADMIN — Medication 1 MILLIGRAM(S): at 22:31

## 2021-05-20 RX ADMIN — Medication 1: at 11:23

## 2021-05-20 RX ADMIN — SACUBITRIL AND VALSARTAN 1 TABLET(S): 24; 26 TABLET, FILM COATED ORAL at 11:16

## 2021-05-20 RX ADMIN — TIOTROPIUM BROMIDE 1 CAPSULE(S): 18 CAPSULE ORAL; RESPIRATORY (INHALATION) at 08:05

## 2021-05-20 RX ADMIN — ENOXAPARIN SODIUM 40 MILLIGRAM(S): 100 INJECTION SUBCUTANEOUS at 11:15

## 2021-05-20 RX ADMIN — INSULIN GLARGINE 5 UNIT(S): 100 INJECTION, SOLUTION SUBCUTANEOUS at 22:39

## 2021-05-20 RX ADMIN — Medication 100 MILLIGRAM(S): at 15:38

## 2021-05-20 RX ADMIN — Medication 100 MILLIGRAM(S): at 22:31

## 2021-05-20 RX ADMIN — Medication 650 MILLIGRAM(S): at 22:37

## 2021-05-20 RX ADMIN — GABAPENTIN 300 MILLIGRAM(S): 400 CAPSULE ORAL at 05:22

## 2021-05-20 RX ADMIN — Medication 650 MILLIGRAM(S): at 23:05

## 2021-05-20 RX ADMIN — GABAPENTIN 300 MILLIGRAM(S): 400 CAPSULE ORAL at 15:37

## 2021-05-20 RX ADMIN — Medication 81 MILLIGRAM(S): at 11:15

## 2021-05-20 RX ADMIN — GABAPENTIN 300 MILLIGRAM(S): 400 CAPSULE ORAL at 22:31

## 2021-05-20 RX ADMIN — ALBUTEROL 1 PUFF(S): 90 AEROSOL, METERED ORAL at 08:06

## 2021-05-20 RX ADMIN — ATORVASTATIN CALCIUM 40 MILLIGRAM(S): 80 TABLET, FILM COATED ORAL at 22:31

## 2021-05-20 RX ADMIN — Medication 50 MILLIGRAM(S): at 11:16

## 2021-05-20 NOTE — ADVANCED PRACTICE NURSE CONSULT - ASSESSMENT
Patient received resting in bed, awake, alert, oriented x 4, pleasant and cooperative. Benefits, risks and possible complications of procedure explained to patient verbalizes understanding. All questions answered. Gave verbal and written consent. Hand hygiene and time out performed by both team members. Patient verified using first and last name, , MRN and account number. Patient placed in semi-fowlers position. Site prepped with CHG. Draped in sterile fashion. Correct site confirmed, and lidocaine subdermal injected to site prior to start of procedure. Using aeseptic and MST technique, Navilyst 4F single lumen with PASV technology (LOT #[id number]) inserted via ultrasound guidance to right basilic vein, and cut to 39cm. Flushes well with NS, with brisk blood return present Line secured to skin using statlock, site covered with gauze and DSD. End cap placed. Sterile field maintained throughout procedure. Minimal blood loss. No complications. Chest xray to confirm placement. All sharps accounted for. Report given to district RN.

## 2021-05-20 NOTE — PROGRESS NOTE ADULT - SUBJECTIVE AND OBJECTIVE BOX
Date of service: 05-20-21 @ 10:52    Lying in bed in NAD  Weak looking  Left leg discomfort    ROS: no fever or chills; denies dizziness, no HA, no SOB or cough, no abdominal pain, no diarrhea or constipation; no dysuria    MEDICATIONS  (STANDING):  aspirin  chewable 81 milliGRAM(s) Oral daily  atorvastatin 40 milliGRAM(s) Oral at bedtime  ceFAZolin   IVPB 2000 milliGRAM(s) IV Intermittent every 8 hours  dextrose 40% Gel 15 Gram(s) Oral once  dextrose 5%. 1000 milliLiter(s) (50 mL/Hr) IV Continuous <Continuous>  dextrose 5%. 1000 milliLiter(s) (100 mL/Hr) IV Continuous <Continuous>  dextrose 50% Injectable 25 Gram(s) IV Push once  dextrose 50% Injectable 12.5 Gram(s) IV Push once  dextrose 50% Injectable 25 Gram(s) IV Push once  enoxaparin Injectable 40 milliGRAM(s) SubCutaneous daily  folic acid 1 milliGRAM(s) Oral at bedtime  gabapentin 300 milliGRAM(s) Oral three times a day  glucagon  Injectable 1 milliGRAM(s) IntraMuscular once  insulin glargine Injectable (LANTUS) 5 Unit(s) SubCutaneous at bedtime  insulin lispro (ADMELOG) corrective regimen sliding scale   SubCutaneous three times a day before meals  metoprolol succinate ER 50 milliGRAM(s) Oral daily  PARoxetine 20 milliGRAM(s) Oral daily  sacubitril 24 mG/valsartan 26 mG 1 Tablet(s) Oral two times a day  tiotropium 18 MICROgram(s) Capsule 1 Capsule(s) Inhalation daily    Vital Signs Last 24 Hrs  T(C): 36.6 (20 May 2021 09:43), Max: 36.8 (19 May 2021 22:00)  T(F): 97.8 (20 May 2021 09:43), Max: 98.2 (19 May 2021 22:00)  HR: 81 (20 May 2021 10:00) (65 - 81)  BP: 124/46 (20 May 2021 10:00) (106/64 - 132/45)  BP(mean): 63 (20 May 2021 10:00) (59 - 73)  RR: 22 (20 May 2021 10:00) (12 - 25)  SpO2: 91% (20 May 2021 07:00) (90% - 97%)     Physical exam:    Constitutional:  No acute distress  HEENT: NC/AT, EOMI, PERRLA, conjunctivae clear  Neck: supple; thyroid not palpable  Back: no tenderness  Respiratory: respiratory effort normal; crackles at bases  Cardiovascular: S1S2 regular, no murmurs  Abdomen: soft, not tender, not distended, positive BS  Genitourinary: no suprapubic tenderness  Lymphatic: no LN palpable  Musculoskeletal: no muscle tenderness, no joint swelling or tenderness  Extremities: 1+ pedal edema  Left thigh open wound s/p I and D; packed; dressing  Neurological/ Psychiatric: AxOx3, moving all extremities  Skin: no rashes; no palpable lesions    Labs: reviewed                        8.9    4.24  )-----------( 120      ( 19 May 2021 06:19 )             28.3     05-18    143  |  113<H>  |  19  ----------------------------<  149<H>  4.1   |  27  |  0.69    Ca    8.4<L>      18 May 2021 05:49                        9.6    3.90  )-----------( 113      ( 13 May 2021 05:47 )             30.1     05-13    141  |  112<H>  |  45<H>  ----------------------------<  135<H>  4.2   |  22  |  0.86    Ca    8.6      13 May 2021 05:47  Phos  4.8     05-11  Mg     2.2     05-11                                  10.1   7.90  )-----------( 199      ( 06 May 2021 05:49 )             31.3     05-06    134<L>  |  106  |  51<H>  ----------------------------<  199<H>  4.7   |  20<L>  |  1.39<H>    Ca    8.0<L>      06 May 2021 05:49    TPro  5.9<L>  /  Alb  1.9<L>  /  TBili  0.6  /  DBili  x   /  AST  5<L>  /  ALT  <6<L>  /  AlkPhos  71  05-05     LIVER FUNCTIONS - ( 05 May 2021 11:59 )  Alb: 1.9 g/dL / Pro: 5.9 gm/dL / ALK PHOS: 71 U/L / ALT: <6 U/L / AST: 5 U/L / GGT: x             Culture - Fungal, Other (collected 05 May 2021 16:34)  Source: .Other None  Preliminary Report (06 May 2021 09:04):    Testing in progress    Culture - Surgical Swab (collected 05 May 2021 16:34)  Source: .Surgical Swab None  Preliminary Report (06 May 2021 20:09):    Moderate Staphylococcus aureus  Organism: Staphylococcus aureus (07 May 2021 19:05)  Organism: Staphylococcus aureus (07 May 2021 19:05)      -  Ampicillin/Sulbactam: S <=8/4      -  Cefazolin: S <=4      -  Clindamycin: S <=0.25      -  Erythromycin: S <=0.25      -  Gentamicin: S <=1 Should not be used as monotherapy      -  Oxacillin: S <=0.25      -  RIF- Rifampin: S <=1 Should not be used as monotherapy      -  Tetra/Doxy: S <=1      -  Trimethoprim/Sulfamethoxazole: S <=0.5/9.5      -  Vancomycin: S 1      Method Type: SIMEON    Culture - Fungal, Other (collected 05 May 2021 16:34)  Source: .Other None  Preliminary Report (06 May 2021 09:04):    Testing in progress    Culture - Surgical Swab (collected 05 May 2021 16:34)  Source: .Surgical Swab None  Preliminary Report (06 May 2021 20:04):    Moderate Staphylococcus aureus  Organism: Staphylococcus aureus (07 May 2021 19:06)  Organism: Staphylococcus aureus (07 May 2021 19:06)      -  Ampicillin/Sulbactam: S <=8/4      -  Cefazolin: S <=4      -  Clindamycin: S <=0.25      -  Erythromycin: S <=0.25      -  Gentamicin: S <=1 Should not be used as monotherapy      -  Oxacillin: S <=0.25      -  RIF- Rifampin: S <=1 Should not be used as monotherapy      -  Tetra/Doxy: S <=1      -  Trimethoprim/Sulfamethoxazole: S <=0.5/9.5      -  Vancomycin: S 2      Method Type: SIMEON    Culture - Other (collected 04 May 2021 23:00)  Source: .Other wound - L thigh  Final Report (07 May 2021 07:28):    Numerous Staphylococcus aureus  Organism: Staphylococcus aureus (07 May 2021 07:28)  Organism: Staphylococcus aureus (07 May 2021 07:28)      -  Ampicillin/Sulbactam: S <=8/4      -  Cefazolin: S <=4      -  Clindamycin: S <=0.25      -  Erythromycin: S <=0.25      -  Gentamicin: S <=1 Should not be used as monotherapy      -  Oxacillin: S <=0.25      -  RIF- Rifampin: S <=1 Should not be used as monotherapy      -  Tetra/Doxy: S <=1      -  Trimethoprim/Sulfamethoxazole: S <=0.5/9.5      -  Vancomycin: S 1      Method Type: SIMEON    Culture - Blood (collected 03 May 2021 22:10)  Source: .Blood Blood-Peripheral  Final Report (09 May 2021 03:00):    No Growth Final    Culture - Blood (collected 03 May 2021 22:10)  Source: .Blood Blood-Peripheral  Gram Stain (07 May 2021 01:14):    Growth in aerobic bottle: Gram Positive Cocci in Clusters  Final Report (08 May 2021 17:32):    Growth in aerobic bottle: Staphylococcus aureus  Organism: Blood Culture PCR  Staphylococcus aureus (08 May 2021 17:32)  Organism: Staphylococcus aureus (08 May 2021 17:32)      -  Ampicillin/Sulbactam: S <=8/4      -  Cefazolin: S <=4      -  Clindamycin: S <=0.25      -  Erythromycin: S <=0.25      -  Gentamicin: S <=1 Should not be used as monotherapy      -  Oxacillin: S <=0.25      -  RIF- Rifampin: S <=1 Should not be used as monotherapy      -  Tetra/Doxy: S <=1      -  Trimethoprim/Sulfamethoxazole: S <=0.5/9.5      -  Vancomycin: S 2      Method Type: SIMEON  Organism: Blood Culture PCR (08 May 2021 17:32)      -  Staphylococcus aureus: Detec Any isolate of Staphylococcus aureus from a blood culture is NOT considered a contaminant.      Method Type: PCR    COVID-19 PCR: NotDetec (05-03-21 @ 23:17)    Radiology: all available radiological tests reviewed    Advanced directives addressed: full resuscitation

## 2021-05-20 NOTE — PROGRESS NOTE ADULT - ASSESSMENT
* Infected graft POD#10 with MSSA bacteremia on cefazolin   - s/p muscle flap 5/11   - will need long term IV abx as per ID - PICC placement prior to DC    * Acute thrombocytopenia  improving  started on  LMWH for dvt px    * New acute on Chronic systolic HF in the settings of oliguric ORLY/PAT - now resolved, ORLY now resolved  stable  on sacubril now    *H/o depression / HTN / RA /ILD/ Pulm HTN - c/w home meds and f/u outpatient for further management    * Right LE DVT Feb 2021 with superficial left basilic vein non-occlusive clot       * DMII - HISS and resumed lantus she lost weight her NEWSOME requirements decreased significantly     * Incidental finding of 2.5 cm right renal lesion and 1.5 cm left breat - daughter MD aware, outpt follow up      Daughter MD updated [phone] cell      dc planning will need PICC

## 2021-05-20 NOTE — DISCHARGE NOTE PROVIDER - PROVIDER TOKENS
PROVIDER:[TOKEN:[[id number]]],PROVIDER:[TOKEN:[[id number]],FOLLOWUP:[1 week]],PROVIDER:[TOKEN:[[id number]],FOLLOWUP:[2 weeks]]

## 2021-05-20 NOTE — DISCHARGE NOTE PROVIDER - NSDCMRMEDTOKEN_GEN_ALL_CORE_FT
acetaminophen 325 mg oral tablet: 2 tab(s) orally every 8 hours, As needed, for pain   albuterol 90 mcg/inh inhalation aerosol: 1 puff(s) inhaled every 6 hours, As needed, Shortness of Breath  aspirin 81 mg oral tablet, chewable: 1 tab(s) orally once a day  atorvastatin 40 mg oral tablet: 1 tab(s) orally once a day (at bedtime)  ceFAZolin:   folic acid 1 mg oral tablet: 1 tab(s) orally once a day (at bedtime)  gabapentin 300 mg oral capsule: 1 cap(s) orally 3 times a day  ipratropium-albuterol 0.5 mg-2.5 mg/3 mLinhalation solution: 3 milliliter(s) inhaled every 4 hours, As needed, Shortness of Breath and/or Wheezing  Lantus 100 units/mL subcutaneous solution: 5  subcutaneous once a day (at bedtime)  methotrexate 2.5 mg oral tablet: 6 tab(s) orally once a week on Wednesdays  metoprolol tartrate 25 mg oral tablet: 1 tab(s) orally 2 times a day  Paxil 20 mg oral tablet: 1 tab(s) orally once a day  sacubitril-valsartan 24 mg-26 mg oral tablet: 1 tab(s) orally 2 times a day  tiotropium 18 mcg inhalation capsule: 1 cap(s) inhaled once a day

## 2021-05-20 NOTE — DISCHARGE NOTE PROVIDER - CARE PROVIDER_API CALL
Soren Ventura)  Vascular Surgery  [address], [place] [address]  Phone: [phone]  Fax: [phone]  Follow Up Time:     Sil Hamitlon)  Adv Heart Fail Trnsplnt Cardio; Cardiovascular Disease  [address] [place] [address]  Phone: [phone]  Fax: [phone]  Follow Up Time: 1 week    Uri Noble (DO)  Internal Medicine  [address] [place] [address], [address]  [place] [id number]  Phone: [phone]  Fax: [phone]  Follow Up Time: 2 weeks

## 2021-05-20 NOTE — DISCHARGE NOTE PROVIDER - NSDCCPCAREPLAN_GEN_ALL_CORE_FT
PRINCIPAL DISCHARGE DIAGNOSIS  Diagnosis: Infected aortic graft  Assessment and Plan of Treatment: antibiotics IV per ID duration to be determined as outpt; Insulin dose was decreased you lost weight, we use only 5 units here

## 2021-05-20 NOTE — PROGRESS NOTE ADULT - SUBJECTIVE AND OBJECTIVE BOX
86 y.o. Female with PMH of COPD, arthritis, CAD, DMII, depression, HTN, PVD, PAD, s/p left fem pop bypass on 03/24/21, +lower ext DVT on eliquis, +chronic left heel ulcer, RA, anemia, presented to the ED at [facility] on 5/3 with worsening dyspnea, wheezing that started that night. Pt received 2 Duoneb treatments at home, with only slight improvement, so EMS called. At home patient was hypoxic to 87% on room air, was placed on 100% NRB mask. In the ED at [facility], pt was placed on 2 LPM via NC, received a dose of Lasix 20 mg IV x 1. Initial trop noted to be 0.17.  BNP >94933  On her first night of admission at [facility] 5/3 patient developed fever which has persisted throughout admission. ID was consulted, patient was started on vancomycin and meropenem. Overnight on 5/4-5/5 patient had extensive purulent drainage from left medial thigh surgical site. Plastic surgery was consulted, advised transfer to [facility] for evaluation by Dr Ventura who did fem pop bypass 3/2021.      Was taking to OR : purulent fluid in L thigh popliteal fossa above knee; necrotic infected fibrinous material around graft and on tissue in popliteal fossa  s/p  PAT  s/p muscle flap on 5/11  Plts dropped neg HIT, plts recovering     Vital Signs Last 24 Hrs  T(C): 36.7 (20 May 2021 12:46), Max: 36.8 (19 May 2021 22:00)  T(F): 98 (20 May 2021 12:46), Max: 98.2 (19 May 2021 22:00)  HR: 71 (20 May 2021 14:00) (68 - 81)  BP: 121/41 (20 May 2021 14:00) (106/64 - 130/47)  BP(mean): 62 (20 May 2021 14:00) (60 - 73)  RR: 15 (20 May 2021 14:00) (14 - 25)  SpO2: 97% (20 May 2021 14:00) (90% - 97%)      Constitutional: elderly female in no acute distress sitting up in chair   Head: Head is normocephalic and atraumatic.    Neck: No JVD.   Cardiovascular: Regular rate and rhythm without S3, S4. No murmurs or rubs are appreciated.    Respiratory: Decreased BS at bases with + crackles  Abdomen: Soft, nontender, nondistended with positive bowel sounds.    Extremity: No tenderness, no pitting edema, Left thigh area in dressing, LLE distal trace edema, DANYELLE in place  Neurologic: The patient is alert and oriented.  Skin: Left thigh dressing                               9.0    3.98  )-----------( 170      ( 20 May 2021 05:56 )             29.3       TTE Summary:   1. Left ventricular ejection fraction, by visual estimation, is 35 to 40%.   2. Moderately toseverely decreased global left ventricular systolic function.   3. Multiple left ventricular regional wall motion abnormalities exist. See wall motion findings.   4. Mildly increased LV wall thickness.   5. Normal left ventricular internal cavity size.   6. Spectral Doppler shows impaired relaxation pattern of left ventricular myocardial filling (Grade I diastolic dysfunction).   7. There is mild concentric left ventricular hypertrophy.   8. Mild mitral annular calcification.   9. Mild mitral valve regurgitation.  10. Thickening and calcification of the anterior and posterior mitral valve leaflets.  11. Mild tricuspid regurgitation.  12. Mild aortic regurgitation.  13. Sclerotic aortic valve with normal opening.  14. Estimated pulmonary artery systolic pressure is 36.6 mmHg assuming a right atrial pressure of 10 mmHg, which is consistent with borderline pulmonary hypertension.    2/2021:    New acute occlusive thrombus in the LEFT gastrocnemius vein.  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  Acute deep venous thrombosis: below the knee.

## 2021-05-20 NOTE — PROGRESS NOTE ADULT - ASSESSMENT
85 y/o Female with h/o COPD, arthritis, CAD, DMII, depression, HTN, PVD, PAD, s/p left fem pop bypass on 03/24/21, lower ext DVT on eliquis, chronic left heel ulcer, RA, anemia was admitted on 5/3 at [facility] with worsening dyspnea, wheezing that started that night. Pt received 2 Duoneb treatments at home, with only slight improvement. At home patient was hypoxic to 87% on room air, was placed on 100% NRB mask. In the ED at [facility], pt was placed on 2 LPM via NC, received a dose of Lasix 20 mg IV x 1. Her respiratory symptoms have improved with diuresis. Her troponin levels were monitored. Cardiology was consulted, advised troponin elevated due to type II MI.  On her first night of admission at [facility] 5/3 patient developed fever which has persisted throughout admission. ID was consulted, patient was started on vancomycin and meropenem. Overnight on 5/4-5/5 patient had extensive purulent drainage from left medial thigh surgical site. Plastic surgery was consulted, advised transfer to [facility] for evaluation by Dr Ventura who did fem pop bypass 3/2021. In ED at [facility] on 5/5, she was continued on meropenem.    1. Postsurgical wound infection with MSSA s/p washout. Recent left femoral-popliteal bypass. Probable underlying vascular graft infection. s/p MSSA sepsis. ARF on CRF stage 3. Allergy to cephalosporins and PCN.  -cultures reviewed  -thrombocytopenia is much improved  -renal function is improved  -on cefazolin 2 gm IV q8h # 15  -tolerating abx well so far; no side effects noted  -monitor closely in deborah of PCN allergy history   -local wound care  -vascular evaluation appreciated  -vascular graft is likely tainted with infection  -continue abx coverage  -needs long term IV abx therapy  for 8 weeks, then long term abx suppression since graft has not been removed  -home IV abx setup in progress; case reviewed with case management; orders reviewed and called in to pharmacist with infusion company; awaiting insurance approval  -f/u with vascular and ID as outpatient  -weekly labs  -monitor temps  -f/u CBC  -supportive care  2. Other issues:   -care per medicine    d/w medical team and left message with daughter RE abx therapy plan

## 2021-05-20 NOTE — STUDENT SIGN OFF DOCUMENT - DOCUMENTS STUDENTS ARE SIGNED OFF ON
Plan of Care/Assessment and Intervention
pacemaker: good capture, regular rhythm and appropriate intervals.

## 2021-05-20 NOTE — DISCHARGE NOTE PROVIDER - HOSPITAL COURSE
86 y.o. Female with PMH of COPD, arthritis, CAD, DMII, depression, HTN, PVD, PAD, s/p left fem pop bypass on 03/24/21, +lower ext DVT on eliquis, +chronic left heel ulcer, RA, anemia, presented to the ED at [facility] on 5/3 with worsening dyspnea, wheezing that started that night. Pt received 2 Duoneb treatments at home, with only slight improvement, so EMS called. At home patient was hypoxic to 87% on room air, was placed on 100% NRB mask. In the ED at [facility], pt was placed on 2 LPM via NC, received a dose of Lasix 20 mg IV x 1. Initial trop noted to be 0.17.  BNP >05574  On her first night of admission at [facility] 5/3 patient developed fever which has persisted throughout admission. ID was consulted, patient was started on vancomycin and meropenem. Overnight on 5/4-5/5 patient had extensive purulent drainage from left medial thigh surgical site. Plastic surgery was consulted, advised transfer to [facility] for evaluation by Dr Ventura who did fem pop bypass 3/2021.      Was taking to OR : purulent fluid in L thigh popliteal fossa above knee; necrotic infected fibrinous material around graft and on tissue in popliteal fossa  s/p  PAT  s/p muscle flap on 5/11  Plts dropped neg HIT, plts recovering         Constitutional: elderly female in no acute distress sitting up in chair   Head: Head is normocephalic and atraumatic.    Neck: No JVD.   Cardiovascular: Regular rate and rhythm without S3, S4. No murmurs or rubs are appreciated.    Respiratory: Decreased BS at bases with + crackles  Abdomen: Soft, nontender, nondistended with positive bowel sounds.    Extremity: No tenderness, no pitting edema, Left thigh area in dressing, LLE distal trace edema, DANYELLE in place  Neurologic: The patient is alert and oriented.  Skin: Left thigh dressing       TTE Summary:   1. Left ventricular ejection fraction, by visual estimation, is 35 to 40%.   2. Moderately toseverely decreased global left ventricular systolic function.   3. Multiple left ventricular regional wall motion abnormalities exist. See wall motion findings.   4. Mildly increased LV wall thickness.   5. Normal left ventricular internal cavity size.   6. Spectral Doppler shows impaired relaxation pattern of left ventricular myocardial filling (Grade I diastolic dysfunction).   7. There is mild concentric left ventricular hypertrophy.   8. Mild mitral annular calcification.   9. Mild mitral valve regurgitation.  10. Thickening and calcification of the anterior and posterior mitral valve leaflets.  11. Mild tricuspid regurgitation.  12. Mild aortic regurgitation.  13. Sclerotic aortic valve with normal opening.  14. Estimated pulmonary artery systolic pressure is 36.6 mmHg assuming a right atrial pressure of 10 mmHg, which is consistent with borderline pulmonary hypertension.    2/2021:    New acute occlusive thrombus in the LEFT gastrocnemius vein.  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  Acute deep venous thrombosis: below the knee.      Assessment and Plan:   · Assessment	  * Infected graft POD#10 with MSSA bacteremia on cefazolin   - s/p muscle flap 5/11   - will need long term IV abx as per ID - PICC placement prior to DC    * Acute thrombocytopenia  improving  started on  LMWH for dvt px    * New acute on Chronic systolic HF in the settings of oliguric ORLY/PAT - now resolved, ORLY now resolved  stable    *H/o depression / HTN / RA /ILD/ Pulm HTN - c/w home meds and f/u outpatient for further management    * Right LE DVT Feb 2021 with superficial left basilic vein non-occlusive clot off AC      * DMII - HISS and resumed lantus she lost weight her NEWSOME requirements decreased significantly     * Incidental finding of 2.5 cm right renal lesion and 1.5 cm left breat - daughter MD aware, outpt follow up      Daughter MD updated [phone] cell  PCP Dr Huang called and after a long wait and navigation through a v complicated menu I was unable to speak to anyone and was requested to leave a message and I did leave an extenssive message with most pertinent info including  my cell #  Per Dr Ventura the AC was stopped since the prev dvt was bellow the knee and repeat US here showed no evidence of clot present

## 2021-05-21 VITALS
HEART RATE: 70 BPM | OXYGEN SATURATION: 97 % | RESPIRATION RATE: 15 BRPM | DIASTOLIC BLOOD PRESSURE: 46 MMHG | SYSTOLIC BLOOD PRESSURE: 133 MMHG

## 2021-05-21 LAB
ANION GAP SERPL CALC-SCNC: 4 MMOL/L — LOW (ref 5–17)
BUN SERPL-MCNC: 25 MG/DL — HIGH (ref 7–23)
CALCIUM SERPL-MCNC: 8.2 MG/DL — LOW (ref 8.5–10.1)
CHLORIDE SERPL-SCNC: 111 MMOL/L — HIGH (ref 96–108)
CO2 SERPL-SCNC: 28 MMOL/L — SIGNIFICANT CHANGE UP (ref 22–31)
CREAT SERPL-MCNC: 0.77 MG/DL — SIGNIFICANT CHANGE UP (ref 0.5–1.3)
GLUCOSE SERPL-MCNC: 143 MG/DL — HIGH (ref 70–99)
POTASSIUM SERPL-MCNC: 4.1 MMOL/L — SIGNIFICANT CHANGE UP (ref 3.5–5.3)
POTASSIUM SERPL-SCNC: 4.1 MMOL/L — SIGNIFICANT CHANGE UP (ref 3.5–5.3)
SODIUM SERPL-SCNC: 143 MMOL/L — SIGNIFICANT CHANGE UP (ref 135–145)

## 2021-05-21 PROCEDURE — 99239 HOSP IP/OBS DSCHRG MGMT >30: CPT

## 2021-05-21 RX ORDER — TIOTROPIUM BROMIDE 18 UG/1
1 CAPSULE ORAL; RESPIRATORY (INHALATION)
Qty: 30 | Refills: 0
Start: 2021-05-21 | End: 2021-06-19

## 2021-05-21 RX ADMIN — SACUBITRIL AND VALSARTAN 1 TABLET(S): 24; 26 TABLET, FILM COATED ORAL at 10:41

## 2021-05-21 RX ADMIN — Medication 50 MILLIGRAM(S): at 10:41

## 2021-05-21 RX ADMIN — TIOTROPIUM BROMIDE 1 CAPSULE(S): 18 CAPSULE ORAL; RESPIRATORY (INHALATION) at 08:30

## 2021-05-21 RX ADMIN — ENOXAPARIN SODIUM 40 MILLIGRAM(S): 100 INJECTION SUBCUTANEOUS at 10:42

## 2021-05-21 RX ADMIN — GABAPENTIN 300 MILLIGRAM(S): 400 CAPSULE ORAL at 06:30

## 2021-05-21 RX ADMIN — Medication 100 MILLIGRAM(S): at 06:21

## 2021-05-21 RX ADMIN — Medication 81 MILLIGRAM(S): at 10:42

## 2021-05-21 RX ADMIN — Medication 20 MILLIGRAM(S): at 10:41

## 2021-05-21 NOTE — PROGRESS NOTE ADULT - SUBJECTIVE AND OBJECTIVE BOX
86 y.o. Female with PMH of COPD, arthritis, CAD, DMII, depression, HTN, PVD, PAD, s/p left fem pop bypass on 03/24/21, +lower ext DVT on eliquis, +chronic left heel ulcer, RA, anemia, presented to the ED at [facility] on 5/3 with worsening dyspnea, wheezing that started that night. Pt received 2 Duoneb treatments at home, with only slight improvement, so EMS called. At home patient was hypoxic to 87% on room air, was placed on 100% NRB mask. In the ED at [facility], pt was placed on 2 LPM via NC, received a dose of Lasix 20 mg IV x 1. Initial trop noted to be 0.17.  BNP >[id number]  On her first night of admission at [facility] 5/3 patient developed fever which has persisted throughout admission. ID was consulted, patient was started on vancomycin and meropenem. Overnight on 5/4-5/5 patient had extensive purulent drainage from left medial thigh surgical site. Plastic surgery was consulted, advised transfer to [facility] for evaluation by Dr Ventura who did fem pop bypass 3/2021.      Was taking to OR : purulent fluid in L thigh popliteal fossa above knee; necrotic infected fibrinous material around graft and on tissue in popliteal fossa  s/p  PAT  s/p muscle flap on 5/11  Plts dropped neg HIT, plts recovering     Vital Signs Last 24 Hrs  T(C): 36.6 (21 May 2021 09:14), Max: 37.2 (20 May 2021 17:06)  T(F): 97.9 (21 May 2021 09:14), Max: 98.9 (20 May 2021 17:06)  HR: 73 (21 May 2021 10:00) (66 - 74)  BP: 129/73 (21 May 2021 10:00) (94/74 - 138/52)  BP(mean): 86 (21 May 2021 10:00) (57 - 86)  RR: 17 (21 May 2021 10:00) (13 - 25)  SpO2: 98% (21 May 2021 10:00) (92% - 99%)      Constitutional: elderly female in no acute distress sitting up in chair   Head: Head is normocephalic and atraumatic.    Neck: No JVD.   Cardiovascular: Regular rate and rhythm without S3, S4. No murmurs or rubs are appreciated.    Respiratory: Decreased BS at bases with + crackles  Abdomen: Soft, nontender, nondistended with positive bowel sounds.    Extremity: No tenderness, no pitting edema, Left thigh area in dressing, LLE distal trace edema, DANYELLE in place  Neurologic: The patient is alert and oriented.  Skin: Left thigh dressing                               9.0    3.98  )-----------( 170      ( 20 May 2021 05:56 )             29.3       TTE Summary:   1. Left ventricular ejection fraction, by visual estimation, is 35 to 40%.   2. Moderately toseverely decreased global left ventricular systolic function.   3. Multiple left ventricular regional wall motion abnormalities exist. See wall motion findings.   4. Mildly increased LV wall thickness.   5. Normal left ventricular internal cavity size.   6. Spectral Doppler shows impaired relaxation pattern of left ventricular myocardial filling (Grade I diastolic dysfunction).   7. There is mild concentric left ventricular hypertrophy.   8. Mild mitral annular calcification.   9. Mild mitral valve regurgitation.  10. Thickening and calcification of the anterior and posterior mitral valve leaflets.  11. Mild tricuspid regurgitation.  12. Mild aortic regurgitation.  13. Sclerotic aortic valve with normal opening.  14. Estimated pulmonary artery systolic pressure is 36.6 mmHg assuming a right atrial pressure of 10 mmHg, which is consistent with borderline pulmonary hypertension.    2/2021:    New acute occlusive thrombus in the LEFT gastrocnemius vein.  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  Acute deep venous thrombosis: below the knee.

## 2021-05-21 NOTE — PROGRESS NOTE ADULT - REASON FOR ADMISSION
SOB
wound
SOB
wound
SOB
SOB
wound
wound infection
SOB
SOB
wound
SOB
SOB
wound
wound
CP

## 2021-05-21 NOTE — PROGRESS NOTE ADULT - SUBJECTIVE AND OBJECTIVE BOX
Date of service: 05-21-21 @ 07:15    Lying in bed in NAD  Had PICC line placed yesterday  Denies arm pain  Leg feels OK    ROS: no fever or chills; denies dizziness, no HA, no SOB or cough, no abdominal pain, no diarrhea or constipation; no dysuria    MEDICATIONS  (STANDING):  aspirin  chewable 81 milliGRAM(s) Oral daily  atorvastatin 40 milliGRAM(s) Oral at bedtime  ceFAZolin   IVPB 2000 milliGRAM(s) IV Intermittent every 8 hours  dextrose 40% Gel 15 Gram(s) Oral once  dextrose 5%. 1000 milliLiter(s) (50 mL/Hr) IV Continuous <Continuous>  dextrose 5%. 1000 milliLiter(s) (100 mL/Hr) IV Continuous <Continuous>  dextrose 50% Injectable 25 Gram(s) IV Push once  dextrose 50% Injectable 12.5 Gram(s) IV Push once  dextrose 50% Injectable 25 Gram(s) IV Push once  enoxaparin Injectable 40 milliGRAM(s) SubCutaneous daily  folic acid 1 milliGRAM(s) Oral at bedtime  gabapentin 300 milliGRAM(s) Oral three times a day  glucagon  Injectable 1 milliGRAM(s) IntraMuscular once  insulin glargine Injectable (LANTUS) 5 Unit(s) SubCutaneous at bedtime  insulin lispro (ADMELOG) corrective regimen sliding scale   SubCutaneous three times a day before meals  metoprolol succinate ER 50 milliGRAM(s) Oral daily  PARoxetine 20 milliGRAM(s) Oral daily  sacubitril 24 mG/valsartan 26 mG 1 Tablet(s) Oral two times a day  tiotropium 18 MICROgram(s) Capsule 1 Capsule(s) Inhalation daily    Vital Signs Last 24 Hrs  T(C): 35.9 (21 May 2021 05:10), Max: 37.2 (20 May 2021 17:06)  T(F): 96.7 (21 May 2021 05:10), Max: 98.9 (20 May 2021 17:06)  HR: 68 (21 May 2021 06:00) (68 - 81)  BP: 124/42 (21 May 2021 06:00) (94/74 - 130/47)  BP(mean): 63 (21 May 2021 06:00) (57 - 79)  RR: 18 (21 May 2021 06:00) (14 - 25)  SpO2: 93% (21 May 2021 06:00) (92% - 97%)     Physical exam:    Constitutional:  No acute distress  HEENT: NC/AT, EOMI, PERRLA, conjunctivae clear  Neck: supple; thyroid not palpable  Back: no tenderness  Respiratory: respiratory effort normal; crackles at bases  Cardiovascular: S1S2 regular, no murmurs  Abdomen: soft, not tender, not distended, positive BS  Genitourinary: no suprapubic tenderness  Lymphatic: no LN palpable  Musculoskeletal: no muscle tenderness, no joint swelling or tenderness  Extremities: 1+ pedal edema  Left thigh open wound s/p I and D; packed; dressing  Neurological/ Psychiatric: AxOx3, moving all extremities  Skin: no rashes; no palpable lesions    Labs: reviewed                        8.9    4.24  )-----------( 120      ( 19 May 2021 06:19 )             28.3     05-18    143  |  113<H>  |  19  ----------------------------<  149<H>  4.1   |  27  |  0.69    Ca    8.4<L>      18 May 2021 05:49                        9.6    3.90  )-----------( 113      ( 13 May 2021 05:47 )             30.1     05-13    141  |  112<H>  |  45<H>  ----------------------------<  135<H>  4.2   |  22  |  0.86    Ca    8.6      13 May 2021 05:47  Phos  4.8     05-11  Mg     2.2     05-11                                  10.1   7.90  )-----------( 199      ( 06 May 2021 05:49 )             31.3     05-06    134<L>  |  106  |  51<H>  ----------------------------<  199<H>  4.7   |  20<L>  |  1.39<H>    Ca    8.0<L>      06 May 2021 05:49    TPro  5.9<L>  /  Alb  1.9<L>  /  TBili  0.6  /  DBili  x   /  AST  5<L>  /  ALT  <6<L>  /  AlkPhos  71  05-05     LIVER FUNCTIONS - ( 05 May 2021 11:59 )  Alb: 1.9 g/dL / Pro: 5.9 gm/dL / ALK PHOS: 71 U/L / ALT: <6 U/L / AST: 5 U/L / GGT: x             Culture - Fungal, Other (collected 05 May 2021 16:34)  Source: .Other None  Preliminary Report (06 May 2021 09:04):    Testing in progress    Culture - Surgical Swab (collected 05 May 2021 16:34)  Source: .Surgical Swab None  Preliminary Report (06 May 2021 20:09):    Moderate Staphylococcus aureus  Organism: Staphylococcus aureus (07 May 2021 19:05)  Organism: Staphylococcus aureus (07 May 2021 19:05)      -  Ampicillin/Sulbactam: S <=8/4      -  Cefazolin: S <=4      -  Clindamycin: S <=0.25      -  Erythromycin: S <=0.25      -  Gentamicin: S <=1 Should not be used as monotherapy      -  Oxacillin: S <=0.25      -  RIF- Rifampin: S <=1 Should not be used as monotherapy      -  Tetra/Doxy: S <=1      -  Trimethoprim/Sulfamethoxazole: S <=0.5/9.5      -  Vancomycin: S 1      Method Type: SIMEON    Culture - Fungal, Other (collected 05 May 2021 16:34)  Source: .Other None  Preliminary Report (06 May 2021 09:04):    Testing in progress    Culture - Surgical Swab (collected 05 May 2021 16:34)  Source: .Surgical Swab None  Preliminary Report (06 May 2021 20:04):    Moderate Staphylococcus aureus  Organism: Staphylococcus aureus (07 May 2021 19:06)  Organism: Staphylococcus aureus (07 May 2021 19:06)      -  Ampicillin/Sulbactam: S <=8/4      -  Cefazolin: S <=4      -  Clindamycin: S <=0.25      -  Erythromycin: S <=0.25      -  Gentamicin: S <=1 Should not be used as monotherapy      -  Oxacillin: S <=0.25      -  RIF- Rifampin: S <=1 Should not be used as monotherapy      -  Tetra/Doxy: S <=1      -  Trimethoprim/Sulfamethoxazole: S <=0.5/9.5      -  Vancomycin: S 2      Method Type: SIMEON    Culture - Other (collected 04 May 2021 23:00)  Source: .Other wound - L thigh  Final Report (07 May 2021 07:28):    Numerous Staphylococcus aureus  Organism: Staphylococcus aureus (07 May 2021 07:28)  Organism: Staphylococcus aureus (07 May 2021 07:28)      -  Ampicillin/Sulbactam: S <=8/4      -  Cefazolin: S <=4      -  Clindamycin: S <=0.25      -  Erythromycin: S <=0.25      -  Gentamicin: S <=1 Should not be used as monotherapy      -  Oxacillin: S <=0.25      -  RIF- Rifampin: S <=1 Should not be used as monotherapy      -  Tetra/Doxy: S <=1      -  Trimethoprim/Sulfamethoxazole: S <=0.5/9.5      -  Vancomycin: S 1      Method Type: SIMEON    Culture - Blood (collected 03 May 2021 22:10)  Source: .Blood Blood-Peripheral  Final Report (09 May 2021 03:00):    No Growth Final    Culture - Blood (collected 03 May 2021 22:10)  Source: .Blood Blood-Peripheral  Gram Stain (07 May 2021 01:14):    Growth in aerobic bottle: Gram Positive Cocci in Clusters  Final Report (08 May 2021 17:32):    Growth in aerobic bottle: Staphylococcus aureus  Organism: Blood Culture PCR  Staphylococcus aureus (08 May 2021 17:32)  Organism: Staphylococcus aureus (08 May 2021 17:32)      -  Ampicillin/Sulbactam: S <=8/4      -  Cefazolin: S <=4      -  Clindamycin: S <=0.25      -  Erythromycin: S <=0.25      -  Gentamicin: S <=1 Should not be used as monotherapy      -  Oxacillin: S <=0.25      -  RIF- Rifampin: S <=1 Should not be used as monotherapy      -  Tetra/Doxy: S <=1      -  Trimethoprim/Sulfamethoxazole: S <=0.5/9.5      -  Vancomycin: S 2      Method Type: SIMEON  Organism: Blood Culture PCR (08 May 2021 17:32)      -  Staphylococcus aureus: Detec Any isolate of Staphylococcus aureus from a blood culture is NOT considered a contaminant.      Method Type: PCR    COVID-19 PCR: NotDetec (05-03-21 @ 23:17)    Radiology: all available radiological tests reviewed    Advanced directives addressed: full resuscitation

## 2021-05-21 NOTE — PROGRESS NOTE ADULT - ASSESSMENT
87 y/o Female with h/o COPD, arthritis, CAD, DMII, depression, HTN, PVD, PAD, s/p left fem pop bypass on 03/24/21, lower ext DVT on eliquis, chronic left heel ulcer, RA, anemia was admitted on 5/3 at [facility] with worsening dyspnea, wheezing that started that night. Pt received 2 Duoneb treatments at home, with only slight improvement. At home patient was hypoxic to 87% on room air, was placed on 100% NRB mask. In the ED at [facility], pt was placed on 2 LPM via NC, received a dose of Lasix 20 mg IV x 1. Her respiratory symptoms have improved with diuresis. Her troponin levels were monitored. Cardiology was consulted, advised troponin elevated due to type II MI.  On her first night of admission at [facility] 5/3 patient developed fever which has persisted throughout admission. ID was consulted, patient was started on vancomycin and meropenem. Overnight on 5/4-5/5 patient had extensive purulent drainage from left medial thigh surgical site. Plastic surgery was consulted, advised transfer to [facility] for evaluation by Dr Ventura who did fem pop bypass 3/2021. In ED at [facility] on 5/5, she was continued on meropenem.    1. Postsurgical wound infection with MSSA s/p washout. Recent left femoral-popliteal bypass. Probable underlying vascular graft infection. s/p MSSA sepsis. ARF on CRF stage 3. Allergy to cephalosporins and PCN.  -cultures reviewed  -thrombocytopenia is much improved  -renal function is improved  -on cefazolin 2 gm IV q8h # 16  -tolerating abx well so far; no side effects noted  -monitor closely in deborah of PCN allergy history   -local wound care  -vascular evaluation appreciated  -vascular graft is likely tainted with infection  -continue abx coverage  -needs long term IV abx therapy  for 8 weeks, then long term abx suppression since graft has not been removed  -home IV abx setup in progress; case reviewed with case management; orders reviewed and called in to pharmacist with infusion company; awaiting insurance approval  -PICC line in place  -f/u with vascular and ID as outpatient  -weekly labs  -monitor temps  -f/u CBC  -supportive care  2. Other issues:   -care per medicine    d/w medical team and left message with daughter RE abx therapy plan

## 2021-05-21 NOTE — PROGRESS NOTE ADULT - PROVIDER SPECIALTY LIST ADULT
Managed by Dr Jacqueline Bliss, nephrology  The TPN has been reordered by Nephrology Vascular Surgery

## 2021-05-21 NOTE — PROGRESS NOTE ADULT - NSICDXPILOT_GEN_ALL_CORE
[place]

## 2021-05-21 NOTE — PROGRESS NOTE ADULT - ASSESSMENT
* Infected graft POD#10 with MSSA bacteremia on cefazolin   - s/p muscle flap 5/11   - will need long term IV abx as per ID - PICC placement prior to DC    * Acute thrombocytopenia  improving  started on  LMWH for dvt px    * New acute on Chronic systolic HF in the settings of oliguric ORLY/PAT - now resolved, ORLY now resolved  stable  on sacubril now    *H/o depression / HTN / RA /ILD/ Pulm HTN - c/w home meds and f/u outpatient for further management    * Right LE DVT Feb 2021 with superficial left basilic vein non-occlusive clot case d/w Dr Ventura and per his recommendation AC was stopped    * DMII - HISS and resumed lantus she lost weight her NEWSOME requirements decreased significantly     * Incidental finding of 2.5 cm right renal lesion and 1.5 cm left breat - daughter MD aware, outpt follow up      Daughter MD updated [phone] cell       * Infected graft POD#10 with MSSA bacteremia on cefazolin   - s/p muscle flap 5/11   - will need long term IV abx as per ID - PICC placement prior to DC    * Acute thrombocytopenia  improving  started on  LMWH for dvt px    * New acute on Chronic systolic HF in the settings of oliguric ORLY/PAT - now resolved, ORLY now resolved  stable  on sacubril now    *H/o depression / HTN / RA /ILD/ Pulm HTN - c/w home meds and f/u outpatient for further management    * Right LE DVT Feb 2021 with superficial left basilic vein non-occlusive clot case d/w Dr Ventura and per his recommendation AC was stopped    * DMII - HISS and resumed lantus she lost weight her NEWSOME requirements decreased significantly     * Incidental finding of 2.5 cm right renal lesion and 1.5 cm left breat - daughter MD aware, outpt follow up      Daughter MD updated [phone] cell    PCP Lico Huang has not return any of my calls

## 2021-05-21 NOTE — PROGRESS NOTE ADULT - SUBJECTIVE AND OBJECTIVE BOX
afebrile, VSS    doing well    no significant clinical changes    A/P  stable  OK for discharge    MEDICATIONS  (STANDING):  aspirin  chewable 81 milliGRAM(s) Oral daily  atorvastatin 40 milliGRAM(s) Oral at bedtime  ceFAZolin   IVPB 2000 milliGRAM(s) IV Intermittent every 8 hours  dextrose 40% Gel 15 Gram(s) Oral once  dextrose 5%. 1000 milliLiter(s) (50 mL/Hr) IV Continuous <Continuous>  dextrose 5%. 1000 milliLiter(s) (100 mL/Hr) IV Continuous <Continuous>  dextrose 50% Injectable 25 Gram(s) IV Push once  dextrose 50% Injectable 12.5 Gram(s) IV Push once  dextrose 50% Injectable 25 Gram(s) IV Push once  enoxaparin Injectable 40 milliGRAM(s) SubCutaneous daily  folic acid 1 milliGRAM(s) Oral at bedtime  gabapentin 300 milliGRAM(s) Oral three times a day  glucagon  Injectable 1 milliGRAM(s) IntraMuscular once  insulin glargine Injectable (LANTUS) 5 Unit(s) SubCutaneous at bedtime  insulin lispro (ADMELOG) corrective regimen sliding scale   SubCutaneous three times a day before meals  metoprolol succinate ER 50 milliGRAM(s) Oral daily  PARoxetine 20 milliGRAM(s) Oral daily  sacubitril 24 mG/valsartan 26 mG 1 Tablet(s) Oral two times a day  tiotropium 18 MICROgram(s) Capsule 1 Capsule(s) Inhalation daily    MEDICATIONS  (PRN):  acetaminophen   Tablet .. 650 milliGRAM(s) Oral every 6 hours PRN Mild Pain (1 - 3)  ALBUTerol    90 MICROgram(s) HFA Inhaler 1 Puff(s) Inhalation every 6 hours PRN Shortness of Breath  HYDROmorphone  Injectable 0.5 milliGRAM(s) IV Push every 3 hours PRN Severe Pain (7 - 10)      Allergies    cephalosporins (Other)  penicillins (Urticaria; Pruritus)    Intolerances        Flatus: [ ] YES [ ] NO             Bowel Movement: [ ] YES [ ] NO  Pain (0-10):            Pain Control Adequate: [ ] YES [ ] NO  Nausea: [ ] YES [ ] NO            Vomiting: [ ] YES [ ] NO  Diarrhea: [ ] YES [ ] NO         Constipation: [ ] YES [ ] NO     Chest Pain: [ ] YES [ ] NO    SOB:  [ ] YES [ ] NO    Vital Signs Last 24 Hrs  T(C): 35.9 (21 May 2021 05:10), Max: 37.2 (20 May 2021 17:06)  T(F): 96.7 (21 May 2021 05:10), Max: 98.9 (20 May 2021 17:06)  HR: 68 (21 May 2021 06:00) (68 - 81)  BP: 124/42 (21 May 2021 06:00) (94/74 - 130/47)  BP(mean): 63 (21 May 2021 06:00) (57 - 79)  RR: 18 (21 May 2021 06:00) (14 - 25)  SpO2: 93% (21 May 2021 06:00) (91% - 97%)    I&O's Summary    19 May 2021 07:01  -  20 May 2021 07:00  --------------------------------------------------------  IN: 100 mL / OUT: 2000 mL / NET: -1900 mL    20 May 2021 07:01  -  21 May 2021 06:34  --------------------------------------------------------  IN: 100 mL / OUT: 1100 mL / NET: -1000 mL        Physical Exam:  General: NAD, resting comfortably  Pulmonary: normal resp effort, CTA-B  Cardiovascular: NSR  Abdominal: soft, NT/ND  Extremities: WWP, normal strength  Neuro: A/O x 3, CNs II-XII grossly intact, normal motor/sensation, no focal deficits  Pulses:   Right:                                                                          Left:  FEM [ ]2+ [ ]1+ [ ]doppler                                             FEM [ ]2+ [ ]1+ [ ]doppler    POP [ ]2+ [ ]1+ [ ]doppler                                             POP [ ]2+ [ ]1+ [ ]doppler    DP [ ]2+ [ ]1+ [ ]doppler                                                DP [ ]2+ [ ]1+ [ ]doppler  PT[ ]2+ [ ]1+ [ ]doppler                                                  PT [ ]2+ [ ]1+ [ ]doppler    LABS:                        9.0    3.98  )-----------( 170      ( 20 May 2021 05:56 )             29.3                 CAPILLARY BLOOD GLUCOSE      POCT Blood Glucose.: 183 mg/dL (20 May 2021 22:27)  POCT Blood Glucose.: 133 mg/dL (20 May 2021 17:05)  POCT Blood Glucose.: 197 mg/dL (20 May 2021 11:21)  POCT Blood Glucose.: 142 mg/dL (20 May 2021 08:24)      RADIOLOGY & ADDITIONAL TESTS:

## 2021-06-01 DIAGNOSIS — Z79.4 LONG TERM (CURRENT) USE OF INSULIN: ICD-10-CM

## 2021-06-01 DIAGNOSIS — Z79.82 LONG TERM (CURRENT) USE OF ASPIRIN: ICD-10-CM

## 2021-06-01 DIAGNOSIS — L97.419 NON-PRESSURE CHRONIC ULCER OF RIGHT HEEL AND MIDFOOT WITH UNSPECIFIED SEVERITY: ICD-10-CM

## 2021-06-01 DIAGNOSIS — B95.61 METHICILLIN SUSCEPTIBLE STAPHYLOCOCCUS AUREUS INFECTION AS THE CAUSE OF DISEASES CLASSIFIED ELSEWHERE: ICD-10-CM

## 2021-06-01 DIAGNOSIS — N17.9 ACUTE KIDNEY FAILURE, UNSPECIFIED: ICD-10-CM

## 2021-06-01 DIAGNOSIS — D69.6 THROMBOCYTOPENIA, UNSPECIFIED: ICD-10-CM

## 2021-06-01 DIAGNOSIS — M19.90 UNSPECIFIED OSTEOARTHRITIS, UNSPECIFIED SITE: ICD-10-CM

## 2021-06-01 DIAGNOSIS — R09.02 HYPOXEMIA: ICD-10-CM

## 2021-06-01 DIAGNOSIS — N18.30 CHRONIC KIDNEY DISEASE, STAGE 3 UNSPECIFIED: ICD-10-CM

## 2021-06-01 DIAGNOSIS — T50.8X5A ADVERSE EFFECT OF DIAGNOSTIC AGENTS, INITIAL ENCOUNTER: ICD-10-CM

## 2021-06-01 DIAGNOSIS — E88.09 OTHER DISORDERS OF PLASMA-PROTEIN METABOLISM, NOT ELSEWHERE CLASSIFIED: ICD-10-CM
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
## 2021-06-01 DIAGNOSIS — I13.0 HYPERTENSIVE HEART AND CHRONIC KIDNEY DISEASE WITH HEART FAILURE AND STAGE 1 THROUGH STAGE 4 CHRONIC KIDNEY DISEASE, OR UNSPECIFIED CHRONIC KIDNEY DISEASE: ICD-10-CM

## 2021-06-01 DIAGNOSIS — J43.8 OTHER EMPHYSEMA: ICD-10-CM

## 2021-06-01 DIAGNOSIS — E11.51 TYPE 2 DIABETES MELLITUS WITH DIABETIC PERIPHERAL ANGIOPATHY WITHOUT GANGRENE: ICD-10-CM

## 2021-06-01 DIAGNOSIS — F32.9 MAJOR DEPRESSIVE DISORDER, SINGLE EPISODE, UNSPECIFIED: ICD-10-CM

## 2021-06-01 DIAGNOSIS — T81.41XA INFECTION FOLLOWING A PROCEDURE, SUPERFICIAL INCISIONAL SURGICAL SITE, INITIAL ENCOUNTER: ICD-10-CM

## 2021-06-01 DIAGNOSIS — E11.22 TYPE 2 DIABETES MELLITUS WITH DIABETIC CHRONIC KIDNEY DISEASE: ICD-10-CM

## 2021-06-01 DIAGNOSIS — I97.88 OTHER INTRAOPERATIVE COMPLICATIONS OF THE CIRCULATORY SYSTEM, NOT ELSEWHERE CLASSIFIED: ICD-10-CM

## 2021-06-01 DIAGNOSIS — T81.44XA SEPSIS FOLLOWING A PROCEDURE, INITIAL ENCOUNTER: ICD-10-CM

## 2021-06-01 DIAGNOSIS — E87.2 ACIDOSIS: ICD-10-CM

## 2021-06-01 DIAGNOSIS — I42.8 OTHER CARDIOMYOPATHIES: ICD-10-CM

## 2021-06-01 DIAGNOSIS — I95.89 OTHER HYPOTENSION: ICD-10-CM

## 2021-06-01 DIAGNOSIS — M06.9 RHEUMATOID ARTHRITIS, UNSPECIFIED: ICD-10-CM

## 2021-06-01 DIAGNOSIS — E86.0 DEHYDRATION: ICD-10-CM

## 2021-06-01 DIAGNOSIS — Z88.0 ALLERGY STATUS TO PENICILLIN: ICD-10-CM

## 2021-06-01 DIAGNOSIS — I25.10 ATHEROSCLEROTIC HEART DISEASE OF NATIVE CORONARY ARTERY WITHOUT ANGINA PECTORIS: ICD-10-CM

## 2021-06-01 DIAGNOSIS — Z87.891 PERSONAL HISTORY OF NICOTINE DEPENDENCE: ICD-10-CM

## 2021-06-01 DIAGNOSIS — I50.23 ACUTE ON CHRONIC SYSTOLIC (CONGESTIVE) HEART FAILURE: ICD-10-CM

## 2021-06-01 DIAGNOSIS — Z88.1 ALLERGY STATUS TO OTHER ANTIBIOTIC AGENTS STATUS: ICD-10-CM

## 2021-06-01 DIAGNOSIS — A41.01 SEPSIS DUE TO METHICILLIN SUSCEPTIBLE STAPHYLOCOCCUS AUREUS: ICD-10-CM

## 2021-06-01 DIAGNOSIS — I82.462 ACUTE EMBOLISM AND THROMBOSIS OF LEFT CALF MUSCULAR VEIN: ICD-10-CM

## 2021-06-01 DIAGNOSIS — L03.116 CELLULITIS OF LEFT LOWER LIMB: ICD-10-CM

## 2021-06-01 DIAGNOSIS — Z79.2 LONG TERM (CURRENT) USE OF ANTIBIOTICS: ICD-10-CM

## 2021-06-01 DIAGNOSIS — E11.621 TYPE 2 DIABETES MELLITUS WITH FOOT ULCER: ICD-10-CM

## 2021-06-01 DIAGNOSIS — D63.1 ANEMIA IN CHRONIC KIDNEY DISEASE: ICD-10-CM

## 2021-06-01 DIAGNOSIS — J84.10 PULMONARY FIBROSIS, UNSPECIFIED: ICD-10-CM

## 2021-06-01 DIAGNOSIS — Z79.01 LONG TERM (CURRENT) USE OF ANTICOAGULANTS: ICD-10-CM

## 2021-06-01 DIAGNOSIS — I96 GANGRENE, NOT ELSEWHERE CLASSIFIED: ICD-10-CM

## 2021-06-01 DIAGNOSIS — N14.1 NEPHROPATHY INDUCED BY OTHER DRUGS, MEDICAMENTS AND BIOLOGICAL SUBSTANCES: ICD-10-CM

## 2021-07-12 NOTE — CONSULT NOTE ADULT - CONSULT REQUESTED BY NAME
Dr Garnica Opioid Pregnancy And Lactation Text: These medications can lead to premature delivery and should be avoided during pregnancy. These medications are also present in breast milk in small amounts.

## 2021-10-19 ENCOUNTER — INPATIENT (INPATIENT)
Facility: HOSPITAL | Age: 86
LOS: 5 days | Discharge: HOME | DRG: 300 | End: 2021-10-25
Attending: HOSPITALIST | Admitting: INTERNAL MEDICINE
Payer: MEDICARE

## 2021-10-19 VITALS — WEIGHT: 154.98 LBS | HEIGHT: 63 IN

## 2021-10-19 DIAGNOSIS — S72.009A FRACTURE OF UNSPECIFIED PART OF NECK OF UNSPECIFIED FEMUR, INITIAL ENCOUNTER FOR CLOSED FRACTURE: Chronic | ICD-10-CM

## 2021-10-19 DIAGNOSIS — L03.119 CELLULITIS OF UNSPECIFIED PART OF LIMB: ICD-10-CM

## 2021-10-19 LAB
ALBUMIN SERPL ELPH-MCNC: 2.6 G/DL — LOW (ref 3.3–5)
ALP SERPL-CCNC: 97 U/L — SIGNIFICANT CHANGE UP (ref 40–120)
ALT FLD-CCNC: 11 U/L — LOW (ref 12–78)
ANION GAP SERPL CALC-SCNC: 3 MMOL/L — LOW (ref 5–17)
APPEARANCE UR: CLEAR — SIGNIFICANT CHANGE UP
APTT BLD: 28.1 SEC — SIGNIFICANT CHANGE UP (ref 27.5–35.5)
AST SERPL-CCNC: 9 U/L — LOW (ref 15–37)
BASOPHILS # BLD AUTO: 0.05 K/UL — SIGNIFICANT CHANGE UP (ref 0–0.2)
BASOPHILS NFR BLD AUTO: 0.3 % — SIGNIFICANT CHANGE UP (ref 0–2)
BILIRUB SERPL-MCNC: 0.2 MG/DL — SIGNIFICANT CHANGE UP (ref 0.2–1.2)
BILIRUB UR-MCNC: NEGATIVE — SIGNIFICANT CHANGE UP
BUN SERPL-MCNC: 37 MG/DL — HIGH (ref 7–23)
CALCIUM SERPL-MCNC: 8.9 MG/DL — SIGNIFICANT CHANGE UP (ref 8.5–10.1)
CHLORIDE SERPL-SCNC: 108 MMOL/L — SIGNIFICANT CHANGE UP (ref 96–108)
CO2 SERPL-SCNC: 25 MMOL/L — SIGNIFICANT CHANGE UP (ref 22–31)
COLOR SPEC: YELLOW — SIGNIFICANT CHANGE UP
CREAT SERPL-MCNC: 1.12 MG/DL — SIGNIFICANT CHANGE UP (ref 0.5–1.3)
DIFF PNL FLD: NEGATIVE — SIGNIFICANT CHANGE UP
EOSINOPHIL # BLD AUTO: 0.71 K/UL — HIGH (ref 0–0.5)
EOSINOPHIL NFR BLD AUTO: 4.3 % — SIGNIFICANT CHANGE UP (ref 0–6)
GLUCOSE SERPL-MCNC: 251 MG/DL — HIGH (ref 70–99)
GLUCOSE UR QL: 50 MG/DL
HCT VFR BLD CALC: 28.5 % — LOW (ref 34.5–45)
HGB BLD-MCNC: 8.9 G/DL — LOW (ref 11.5–15.5)
IMM GRANULOCYTES NFR BLD AUTO: 0.5 % — SIGNIFICANT CHANGE UP (ref 0–1.5)
INR BLD: 1.06 RATIO — SIGNIFICANT CHANGE UP (ref 0.88–1.16)
KETONES UR-MCNC: NEGATIVE — SIGNIFICANT CHANGE UP
LACTATE SERPL-SCNC: 1.6 MMOL/L — SIGNIFICANT CHANGE UP (ref 0.7–2)
LEUKOCYTE ESTERASE UR-ACNC: ABNORMAL
LYMPHOCYTES # BLD AUTO: 1.28 K/UL — SIGNIFICANT CHANGE UP (ref 1–3.3)
LYMPHOCYTES # BLD AUTO: 7.7 % — LOW (ref 13–44)
MCHC RBC-ENTMCNC: 31.2 GM/DL — LOW (ref 32–36)
MCHC RBC-ENTMCNC: 31.6 PG — SIGNIFICANT CHANGE UP (ref 27–34)
MCV RBC AUTO: 101.1 FL — HIGH (ref 80–100)
MONOCYTES # BLD AUTO: 0.27 K/UL — SIGNIFICANT CHANGE UP (ref 0–0.9)
MONOCYTES NFR BLD AUTO: 1.6 % — LOW (ref 2–14)
NEUTROPHILS # BLD AUTO: 14.21 K/UL — HIGH (ref 1.8–7.4)
NEUTROPHILS NFR BLD AUTO: 85.6 % — HIGH (ref 43–77)
NITRITE UR-MCNC: NEGATIVE — SIGNIFICANT CHANGE UP
PH UR: 5 — SIGNIFICANT CHANGE UP (ref 5–8)
PLATELET # BLD AUTO: 258 K/UL — SIGNIFICANT CHANGE UP (ref 150–400)
POTASSIUM SERPL-MCNC: 5.3 MMOL/L — SIGNIFICANT CHANGE UP (ref 3.5–5.3)
POTASSIUM SERPL-SCNC: 5.3 MMOL/L — SIGNIFICANT CHANGE UP (ref 3.5–5.3)
PROT SERPL-MCNC: 7.3 GM/DL — SIGNIFICANT CHANGE UP (ref 6–8.3)
PROT UR-MCNC: 30 MG/DL
PROTHROM AB SERPL-ACNC: 12.4 SEC — SIGNIFICANT CHANGE UP (ref 10.6–13.6)
RBC # BLD: 2.82 M/UL — LOW (ref 3.8–5.2)
RBC # FLD: 15.9 % — HIGH (ref 10.3–14.5)
SARS-COV-2 RNA SPEC QL NAA+PROBE: SIGNIFICANT CHANGE UP
SODIUM SERPL-SCNC: 136 MMOL/L — SIGNIFICANT CHANGE UP (ref 135–145)
SP GR SPEC: 1.02 — SIGNIFICANT CHANGE UP (ref 1.01–1.02)
UROBILINOGEN FLD QL: NEGATIVE MG/DL — SIGNIFICANT CHANGE UP
WBC # BLD: 16.6 K/UL — HIGH (ref 3.8–10.5)
WBC # FLD AUTO: 16.6 K/UL — HIGH (ref 3.8–10.5)

## 2021-10-19 PROCEDURE — P9040: CPT

## 2021-10-19 PROCEDURE — 86900 BLOOD TYPING SEROLOGIC ABO: CPT

## 2021-10-19 PROCEDURE — 73620 X-RAY EXAM OF FOOT: CPT | Mod: 26,RT

## 2021-10-19 PROCEDURE — 90686 IIV4 VACC NO PRSV 0.5 ML IM: CPT

## 2021-10-19 PROCEDURE — 85027 COMPLETE CBC AUTOMATED: CPT

## 2021-10-19 PROCEDURE — 86850 RBC ANTIBODY SCREEN: CPT

## 2021-10-19 PROCEDURE — 82607 VITAMIN B-12: CPT

## 2021-10-19 PROCEDURE — 83036 HEMOGLOBIN GLYCOSYLATED A1C: CPT

## 2021-10-19 PROCEDURE — 87086 URINE CULTURE/COLONY COUNT: CPT

## 2021-10-19 PROCEDURE — 82962 GLUCOSE BLOOD TEST: CPT

## 2021-10-19 PROCEDURE — 83540 ASSAY OF IRON: CPT

## 2021-10-19 PROCEDURE — 97116 GAIT TRAINING THERAPY: CPT | Mod: GP

## 2021-10-19 PROCEDURE — 82272 OCCULT BLD FECES 1-3 TESTS: CPT

## 2021-10-19 PROCEDURE — 86923 COMPATIBILITY TEST ELECTRIC: CPT

## 2021-10-19 PROCEDURE — 85014 HEMATOCRIT: CPT

## 2021-10-19 PROCEDURE — 85025 COMPLETE CBC W/AUTO DIFF WBC: CPT

## 2021-10-19 PROCEDURE — 85045 AUTOMATED RETICULOCYTE COUNT: CPT

## 2021-10-19 PROCEDURE — 36415 COLL VENOUS BLD VENIPUNCTURE: CPT

## 2021-10-19 PROCEDURE — 94640 AIRWAY INHALATION TREATMENT: CPT

## 2021-10-19 PROCEDURE — 83550 IRON BINDING TEST: CPT

## 2021-10-19 PROCEDURE — 99285 EMERGENCY DEPT VISIT HI MDM: CPT

## 2021-10-19 PROCEDURE — 93010 ELECTROCARDIOGRAM REPORT: CPT

## 2021-10-19 PROCEDURE — 80202 ASSAY OF VANCOMYCIN: CPT

## 2021-10-19 PROCEDURE — 97530 THERAPEUTIC ACTIVITIES: CPT | Mod: GP

## 2021-10-19 PROCEDURE — 82746 ASSAY OF FOLIC ACID SERUM: CPT

## 2021-10-19 PROCEDURE — 99223 1ST HOSP IP/OBS HIGH 75: CPT

## 2021-10-19 PROCEDURE — 71045 X-RAY EXAM CHEST 1 VIEW: CPT | Mod: 26

## 2021-10-19 PROCEDURE — 85018 HEMOGLOBIN: CPT

## 2021-10-19 PROCEDURE — 82728 ASSAY OF FERRITIN: CPT

## 2021-10-19 PROCEDURE — 73590 X-RAY EXAM OF LOWER LEG: CPT | Mod: 26,RT

## 2021-10-19 PROCEDURE — 80048 BASIC METABOLIC PNL TOTAL CA: CPT

## 2021-10-19 PROCEDURE — 86769 SARS-COV-2 COVID-19 ANTIBODY: CPT

## 2021-10-19 PROCEDURE — G0008: CPT

## 2021-10-19 PROCEDURE — 36430 TRANSFUSION BLD/BLD COMPNT: CPT

## 2021-10-19 PROCEDURE — 85652 RBC SED RATE AUTOMATED: CPT

## 2021-10-19 PROCEDURE — 83010 ASSAY OF HAPTOGLOBIN QUANT: CPT

## 2021-10-19 PROCEDURE — 97163 PT EVAL HIGH COMPLEX 45 MIN: CPT | Mod: GP

## 2021-10-19 PROCEDURE — 81001 URINALYSIS AUTO W/SCOPE: CPT

## 2021-10-19 PROCEDURE — 86901 BLOOD TYPING SEROLOGIC RH(D): CPT

## 2021-10-19 PROCEDURE — 75635 CT ANGIO ABDOMINAL ARTERIES: CPT

## 2021-10-19 RX ORDER — LANOLIN ALCOHOL/MO/W.PET/CERES
3 CREAM (GRAM) TOPICAL AT BEDTIME
Refills: 0 | Status: DISCONTINUED | OUTPATIENT
Start: 2021-10-19 | End: 2021-10-25

## 2021-10-19 RX ORDER — VANCOMYCIN HCL 1 G
1000 VIAL (EA) INTRAVENOUS ONCE
Refills: 0 | Status: COMPLETED | OUTPATIENT
Start: 2021-10-19 | End: 2021-10-19

## 2021-10-19 RX ORDER — SACUBITRIL AND VALSARTAN 24; 26 MG/1; MG/1
1 TABLET, FILM COATED ORAL
Refills: 0 | Status: DISCONTINUED | OUTPATIENT
Start: 2021-10-19 | End: 2021-10-25

## 2021-10-19 RX ORDER — TIOTROPIUM BROMIDE 18 UG/1
1 CAPSULE ORAL; RESPIRATORY (INHALATION) DAILY
Refills: 0 | Status: DISCONTINUED | OUTPATIENT
Start: 2021-10-19 | End: 2021-10-25

## 2021-10-19 RX ORDER — GLUCAGON INJECTION, SOLUTION 0.5 MG/.1ML
1 INJECTION, SOLUTION SUBCUTANEOUS ONCE
Refills: 0 | Status: DISCONTINUED | OUTPATIENT
Start: 2021-10-19 | End: 2021-10-25

## 2021-10-19 RX ORDER — INSULIN LISPRO 100/ML
VIAL (ML) SUBCUTANEOUS
Refills: 0 | Status: DISCONTINUED | OUTPATIENT
Start: 2021-10-19 | End: 2021-10-25

## 2021-10-19 RX ORDER — ALBUTEROL 90 UG/1
2 AEROSOL, METERED ORAL EVERY 6 HOURS
Refills: 0 | Status: DISCONTINUED | OUTPATIENT
Start: 2021-10-19 | End: 2021-10-25

## 2021-10-19 RX ORDER — INFLUENZA VIRUS VACCINE 15; 15; 15; 15 UG/.5ML; UG/.5ML; UG/.5ML; UG/.5ML
0.5 SUSPENSION INTRAMUSCULAR ONCE
Refills: 0 | Status: COMPLETED | OUTPATIENT
Start: 2021-10-19 | End: 2021-10-21

## 2021-10-19 RX ORDER — SODIUM CHLORIDE 9 MG/ML
1000 INJECTION, SOLUTION INTRAVENOUS
Refills: 0 | Status: DISCONTINUED | OUTPATIENT
Start: 2021-10-19 | End: 2021-10-25

## 2021-10-19 RX ORDER — FOLIC ACID 0.8 MG
1 TABLET ORAL AT BEDTIME
Refills: 0 | Status: DISCONTINUED | OUTPATIENT
Start: 2021-10-19 | End: 2021-10-25

## 2021-10-19 RX ORDER — CEFEPIME 1 G/1
1000 INJECTION, POWDER, FOR SOLUTION INTRAMUSCULAR; INTRAVENOUS EVERY 12 HOURS
Refills: 0 | Status: DISCONTINUED | OUTPATIENT
Start: 2021-10-19 | End: 2021-10-19

## 2021-10-19 RX ORDER — CEFEPIME 1 G/1
1000 INJECTION, POWDER, FOR SOLUTION INTRAMUSCULAR; INTRAVENOUS EVERY 12 HOURS
Refills: 0 | Status: DISCONTINUED | OUTPATIENT
Start: 2021-10-19 | End: 2021-10-25

## 2021-10-19 RX ORDER — SODIUM CHLORIDE 9 MG/ML
500 INJECTION INTRAMUSCULAR; INTRAVENOUS; SUBCUTANEOUS ONCE
Refills: 0 | Status: COMPLETED | OUTPATIENT
Start: 2021-10-19 | End: 2021-10-19

## 2021-10-19 RX ORDER — METOPROLOL TARTRATE 50 MG
25 TABLET ORAL
Refills: 0 | Status: DISCONTINUED | OUTPATIENT
Start: 2021-10-19 | End: 2021-10-25

## 2021-10-19 RX ORDER — ONDANSETRON 8 MG/1
4 TABLET, FILM COATED ORAL EVERY 8 HOURS
Refills: 0 | Status: DISCONTINUED | OUTPATIENT
Start: 2021-10-19 | End: 2021-10-25

## 2021-10-19 RX ORDER — DEXTROSE 50 % IN WATER 50 %
25 SYRINGE (ML) INTRAVENOUS ONCE
Refills: 0 | Status: DISCONTINUED | OUTPATIENT
Start: 2021-10-19 | End: 2021-10-25

## 2021-10-19 RX ORDER — ATORVASTATIN CALCIUM 80 MG/1
40 TABLET, FILM COATED ORAL AT BEDTIME
Refills: 0 | Status: DISCONTINUED | OUTPATIENT
Start: 2021-10-19 | End: 2021-10-25

## 2021-10-19 RX ORDER — CEFEPIME 1 G/1
2000 INJECTION, POWDER, FOR SOLUTION INTRAMUSCULAR; INTRAVENOUS ONCE
Refills: 0 | Status: COMPLETED | OUTPATIENT
Start: 2021-10-19 | End: 2021-10-19

## 2021-10-19 RX ORDER — INSULIN GLARGINE 100 [IU]/ML
5 INJECTION, SOLUTION SUBCUTANEOUS AT BEDTIME
Refills: 0 | Status: DISCONTINUED | OUTPATIENT
Start: 2021-10-19 | End: 2021-10-20

## 2021-10-19 RX ORDER — OXYCODONE AND ACETAMINOPHEN 5; 325 MG/1; MG/1
2 TABLET ORAL ONCE
Refills: 0 | Status: DISCONTINUED | OUTPATIENT
Start: 2021-10-19 | End: 2021-10-19

## 2021-10-19 RX ORDER — GABAPENTIN 400 MG/1
300 CAPSULE ORAL THREE TIMES A DAY
Refills: 0 | Status: DISCONTINUED | OUTPATIENT
Start: 2021-10-19 | End: 2021-10-25

## 2021-10-19 RX ORDER — OXYCODONE AND ACETAMINOPHEN 5; 325 MG/1; MG/1
1 TABLET ORAL EVERY 6 HOURS
Refills: 0 | Status: DISCONTINUED | OUTPATIENT
Start: 2021-10-19 | End: 2021-10-25

## 2021-10-19 RX ORDER — CEFTRIAXONE 500 MG/1
1000 INJECTION, POWDER, FOR SOLUTION INTRAMUSCULAR; INTRAVENOUS EVERY 24 HOURS
Refills: 0 | Status: DISCONTINUED | OUTPATIENT
Start: 2021-10-19 | End: 2021-10-19

## 2021-10-19 RX ORDER — DEXTROSE 50 % IN WATER 50 %
15 SYRINGE (ML) INTRAVENOUS ONCE
Refills: 0 | Status: DISCONTINUED | OUTPATIENT
Start: 2021-10-19 | End: 2021-10-25

## 2021-10-19 RX ORDER — ASPIRIN/CALCIUM CARB/MAGNESIUM 324 MG
81 TABLET ORAL DAILY
Refills: 0 | Status: DISCONTINUED | OUTPATIENT
Start: 2021-10-19 | End: 2021-10-25

## 2021-10-19 RX ADMIN — ATORVASTATIN CALCIUM 40 MILLIGRAM(S): 80 TABLET, FILM COATED ORAL at 21:24

## 2021-10-19 RX ADMIN — GABAPENTIN 300 MILLIGRAM(S): 400 CAPSULE ORAL at 21:24

## 2021-10-19 RX ADMIN — Medication 81 MILLIGRAM(S): at 17:15

## 2021-10-19 RX ADMIN — CEFEPIME 2000 MILLIGRAM(S): 1 INJECTION, POWDER, FOR SOLUTION INTRAMUSCULAR; INTRAVENOUS at 19:28

## 2021-10-19 RX ADMIN — CEFEPIME 100 MILLIGRAM(S): 1 INJECTION, POWDER, FOR SOLUTION INTRAMUSCULAR; INTRAVENOUS at 14:34

## 2021-10-19 RX ADMIN — Medication 1000 MILLIGRAM(S): at 19:28

## 2021-10-19 RX ADMIN — Medication 3 MILLIGRAM(S): at 21:24

## 2021-10-19 RX ADMIN — Medication 6: at 18:39

## 2021-10-19 RX ADMIN — SACUBITRIL AND VALSARTAN 1 TABLET(S): 24; 26 TABLET, FILM COATED ORAL at 22:20

## 2021-10-19 RX ADMIN — OXYCODONE AND ACETAMINOPHEN 2 TABLET(S): 5; 325 TABLET ORAL at 14:23

## 2021-10-19 RX ADMIN — INSULIN GLARGINE 5 UNIT(S): 100 INJECTION, SOLUTION SUBCUTANEOUS at 22:20

## 2021-10-19 RX ADMIN — Medication 250 MILLIGRAM(S): at 15:10

## 2021-10-19 RX ADMIN — OXYCODONE AND ACETAMINOPHEN 1 TABLET(S): 5; 325 TABLET ORAL at 17:49

## 2021-10-19 RX ADMIN — SODIUM CHLORIDE 500 MILLILITER(S): 9 INJECTION INTRAMUSCULAR; INTRAVENOUS; SUBCUTANEOUS at 14:34

## 2021-10-19 RX ADMIN — Medication 1 MILLIGRAM(S): at 21:24

## 2021-10-19 RX ADMIN — Medication 25 MILLIGRAM(S): at 21:24

## 2021-10-19 NOTE — ED STATDOCS - PROGRESS NOTE DETAILS
Patient seen and evaluated in intake with ED Attending,  ED attending note and orders reviewed, will continue with patient follow up and care -Sandra Chino PA-C spoke to hospitalist service will admit for IV antibiotics, paged Dr. Jones awaiting return call. -Sandra Chino PA-C

## 2021-10-19 NOTE — ED ADULT NURSE REASSESSMENT NOTE - NS ED NURSE REASSESS COMMENT FT1
pt resting comfortably fed dinner covered with insulin as ordered, report given to Leslie giraldo Rn pending admission to the floor

## 2021-10-19 NOTE — ED ADULT NURSE NOTE - OBJECTIVE STATEMENT
Pt reports RLE cellulitis that has not responded to outpt abx. pt states it started on friday, redness tender painful

## 2021-10-19 NOTE — PATIENT PROFILE ADULT - NSASFALLWHENOCCURRED_GEN_A_NUR
[facility]   [address] [facility], [address] [facility]  Phone: [phone]   [phone] NOTE     Patient: Lisa Marks MRN: [id number]  PCP: Km Valladares DO   :     1940  Age:   79 y.o. Sex:  female      Referring physician: Shaun Bobo MD  Reason for consultation: 79 y.o. female with CHF exacerbation ([facility] [address] [J66.5] complicated by ARF  Admission Date: 3/29/2021 11:53 AM  LOS: 1 day      ASSESSMENT and PLAN :   MORENO on CKD   - Etiology : to be determined  - UA, renal US pending   - volume overloaded on CXR : CHF vs ARF   - echo pending to r/o CRS   - continue w/ IV Bumex 2 mg daily   - labs daily   - strict I.O    CKD 3  - baseline Cr ~ 1.4-1.5 mg/dl   - renal US in  (CABG) : suggested renal atrophy   - presumed to be 2/2 DM, HTN     Mild acidosis :  - 2/2 CRF. Watch for now     Pulm edema/ Effusions/ CHF  CAD s/p CABG 2015  - diuresis as above   - Echo pending   - cardiology following     Tremor   - claims anxiety. ? Parkinsons    NCNC Anemia   - ? 2/2 CKD   - check iron, B12, folate   - check Myeloma screen     PVD  S/P Left CEA       Care Plan discussed with:  Pt and cardiology         Thank you for consulting [facility] in the care of your patient. Subjective:   HPI: Lisa Marks is a 79 y.o.  female who has been admitted to the hospital for worsening SOB and weakness. In ER was noted to be in CHF and ARF and we were asked to see her for management of ARF. She was seen by us many years ago for ARF 2/2 Rhabdo in . She subsequently had CABG in 2015. She is unaware of CKD. She is followed by her PCP. She has not seen a nephrologist outside hospital. She lives independently and has no family around. Her short term memory is ok but her long term memory appears to be poor. She has  Obvious tremor that she thinks is due to her anxiety and depression.  She says she was diagnosed w/ parkinsons but its not recorded in her medical hx     Past Medical Hx:   Past Medical History:   Diagnosis Date    Anxiety disorder     Atrial fibrillation (HCC)     CAD (coronary artery disease) 2007    stents, CABG x 3v    Carotid stenosis     Cervical stenosis of spinal canal 07/2019    Chronic kidney disease     Cough     CVA (cerebral vascular accident) ([facility] [address] 07/2019    left lacunar infarct at head of caudate    Depression     AND CHRONIC ANXIETY    Diabetes (HCC)     GERD (gastroesophageal reflux disease)     High cholesterol     History of peptic ulcer     Bleeding ulcer with increased NSAID use    Hypertension     Left carotid stenosis 07/2019    s/p left CEA with Dr. Usama Bustamante, old 2007    PUD (peptic ulcer disease)     Stroke ([facility] [address]     Tremor     Valvular heart disease         Past Surgical Hx:     Past Surgical History:   Procedure Laterality Date    CARDIAC SURG PROCEDURE UNLIST      CABG X3 VESSEL    HX CAROTID ENDARTERECTOMY  07/2019    HX CORONARY ARTERY BYPASS GRAFT      HX TONSILLECTOMY  1963    TOTAL KNEE ARTHROPLASTY Right 2015         No Known Allergies    Social Hx:  reports that she quit smoking about 52 years ago. Her smoking use included cigarettes. She has a 1.25 pack-year smoking history. She has never used smokeless tobacco. She reports current alcohol use. She reports that she does not use drugs. Family History   Problem Relation Age of Onset    Heart Attack Mother 72        Dec 81yo    Hypertension Mother     Other Father         Unknown    Parkinson's Disease Brother     Anesth Problems Neg Hx        Review of Systems:  A thorough twelve point review of system was performed today. Pertinent positives and negatives are mentioned in the HPI. The reminder of the ROS is negative and noncontributory.      Objective:    Vitals:    Vitals:    03/30/21 0400 03/30/21 0410 03/30/21 0846 03/30/21 0849   BP:  123/63 119/68 (!) 181/69   Pulse: (!) 48 (!) 48 (!) 59 (!) 59 Resp:  19 18    Temp:  98.1 °F (36.7 °C) 98.5 °F (36.9 °C)    SpO2:  96% 95% 93%   Weight:       Height:         I&O's:  03/29 0701 - 03/30 0700  In: 12 [P.O.:462]  Out: 650 [Urine:650]  Visit Vitals  BP (!) 181/69 (BP Patient Position: At rest;Lying)   Pulse (!) 59   Temp 98.5 °F (36.9 °C)   Resp 18   Ht 5' 5\" (1.651 m)   Wt 74.8 kg (165 lb)   SpO2 93%   BMI 27.46 kg/m²       Physical Exam:  General:  Appears stated age   HEENT: PERRL,  ++ Pallor , No Icterus  Neck: Supple,no mass palpable  Lungs : b/l rales   CVS: RRR, S1 S2 normal, No murmur   Abdomen: Soft, NT, BS +  Extremities: 1-2+ Edema, tender to touch   Skin: discoloration in both legs . MS: No joint swelling, erythema, warmth  Neurologic: non focal, AAO x 3  Psych: anxious     Laboratory Results:    Recent Labs     03/30/21  0259 03/29/21  1352    140   K 4.6 5.5*   * 108   CO2 19* 20*   * 61*   BUN 33* 31*   CREA 2.41* 2.54*   CA 8.0* 8.5   ALB  --  3.3*   ALT  --  26     Recent Labs     03/30/21  0259 03/29/21  1352   WBC 5.4 7.9   HGB 8.8* 10.4*   HCT 27.7* 33.0*    311     No results found for: SDES  Lab Results   Component Value Date/Time    Culture result: MRSA NOT PRESENT 12/26/2018 09:29 PM    Culture result:  12/26/2018 09:29 PM         Screening of patient nares for MRSA is for surveillance purposes and, if positive, to facilitate isolation considerations in high risk settings. It is not intended for automatic decolonization interventions per se as regimens are not sufficiently effective to warrant routine use. Culture result: MRSA PRESENT 02/09/2015 06:30 PM    Culture result:  02/09/2015 06:30 PM         Screening of patient nares for MRSA is for surveillance purposes and, if positive, to facilitate isolation considerations in high risk settings. It is not intended for automatic decolonization interventions per se as regimens are not sufficiently effective to warrant routine use.      Recent Results (from the past 24 hour(s))   EKG, 12 LEAD, INITIAL    Collection Time: 03/29/21 12:32 PM   Result Value Ref Range    Ventricular Rate 118 BPM    Atrial Rate 111 BPM    QRS Duration 152 ms    Q-T Interval 410 ms    QTC Calculation (Bezet) 574 ms    Calculated R Axis -44 degrees    Calculated T Axis 119 degrees    Diagnosis       Atrial fibrillation  Left axis deviation  Left bundle branch block  When compared with ECG of 02-OCT-2020 10:09,  Atrial fibrillation has replaced Sinus rhythm  Vent. rate has increased BY  59 BPM  Left anterior fascicular block is no longer present  Left bundle branch block is now present  Criteria for Anterior infarct are no longer present  Confirmed by Virginie Pettit MD, Juany Kay ([id number]) on 3/29/2021 2:04:37 PM     SAMPLES BEING HELD    Collection Time: 03/29/21  1:52 PM   Result Value Ref Range    SAMPLES BEING HELD 1red, 1blu     COMMENT        Add-on orders for these samples will be processed based on acceptable specimen integrity and analyte stability, which may vary by analyte. CBC WITH AUTOMATED DIFF    Collection Time: 03/29/21  1:52 PM   Result Value Ref Range    WBC 7.9 3.6 - 11.0 K/uL    RBC 3.31 (L) 3.80 - 5.20 M/uL    HGB 10.4 (L) 11.5 - 16.0 g/dL    HCT 33.0 (L) 35.0 - 47.0 %    MCV 99.7 (H) 80.0 - 99.0 FL    MCH 31.4 26.0 - 34.0 PG    MCHC 31.5 30.0 - 36.5 g/dL    RDW 16.9 (H) 11.5 - 14.5 %    PLATELET 812 657 - 305 K/uL    MPV 8.5 (L) 8.9 - 12.9 FL    NRBC 0.0 0  WBC    ABSOLUTE NRBC 0.00 0.00 - 0.01 K/uL    NEUTROPHILS 84 (H) 32 - 75 %    LYMPHOCYTES 6 (L) 12 - 49 %    MONOCYTES 6 5 - 13 %    EOSINOPHILS 3 0 - 7 %    BASOPHILS 1 0 - 1 %    IMMATURE GRANULOCYTES 0 0.0 - 0.5 %    ABS. NEUTROPHILS 6.6 1.8 - 8.0 K/UL    ABS. LYMPHOCYTES 0.5 (L) 0.8 - 3.5 K/UL    ABS. MONOCYTES 0.5 0.0 - 1.0 K/UL    ABS. EOSINOPHILS 0.2 0.0 - 0.4 K/UL    ABS. BASOPHILS 0.1 0.0 - 0.1 K/UL    ABS. IMM.  GRANS. 0.0 0.00 - 0.04 K/UL    DF SMEAR SCANNED      RBC COMMENTS ANISOCYTOSIS  1+        RBC COMMENTS SPHEROCYTES  PRESENT       TROPONIN I    Collection Time: 03/29/21  1:52 PM   Result Value Ref Range    Troponin-I, Qt. <0.05 <0.05 ng/mL   NT-PRO BNP    Collection Time: 03/29/21  1:52 PM   Result Value Ref Range    NT pro-BNP 6,847 (H) <810 PG/ML   METABOLIC PANEL, COMPREHENSIVE    Collection Time: 03/29/21  1:52 PM   Result Value Ref Range    Sodium 140 136 - 145 mmol/L    Potassium 5.5 (H) 3.5 - 5.1 mmol/L    Chloride 108 97 - 108 mmol/L    CO2 20 (L) 21 - 32 mmol/L    Anion gap 12 5 - 15 mmol/L    Glucose 61 (L) 65 - 100 mg/dL    BUN 31 (H) 6 - 20 MG/DL    Creatinine 2.54 (H) 0.55 - 1.02 MG/DL    BUN/Creatinine ratio 12 12 - 20      GFR est AA 22 (L) >60 ml/min/1.73m2    GFR est non-AA 18 (L) >60 ml/min/1.73m2    Calcium 8.5 8.5 - 10.1 MG/DL    Bilirubin, total 0.8 0.2 - 1.0 MG/DL    ALT (SGPT) 26 12 - 78 U/L    AST (SGOT) 25 15 - 37 U/L    Alk.  phosphatase 95 45 - 117 U/L    Protein, total 6.4 6.4 - 8.2 g/dL    Albumin 3.3 (L) 3.5 - 5.0 g/dL    Globulin 3.1 2.0 - 4.0 g/dL    A-G Ratio 1.1 1.1 - 2.2     COVID-19 RAPID TEST    Collection Time: 03/29/21  4:41 PM   Result Value Ref Range    Specimen source Nasopharyngeal      COVID-19 rapid test Not detected NOTD     D DIMER    Collection Time: 03/29/21  4:41 PM   Result Value Ref Range    D-dimer 0.65 0.00 - 0.65 mg/L FEU   METABOLIC PANEL, BASIC    Collection Time: 03/30/21  2:59 AM   Result Value Ref Range    Sodium 136 136 - 145 mmol/L    Potassium 4.6 3.5 - 5.1 mmol/L    Chloride 109 (H) 97 - 108 mmol/L    CO2 19 (L) 21 - 32 mmol/L    Anion gap 8 5 - 15 mmol/L    Glucose 135 (H) 65 - 100 mg/dL    BUN 33 (H) 6 - 20 MG/DL    Creatinine 2.41 (H) 0.55 - 1.02 MG/DL    BUN/Creatinine ratio 14 12 - 20      GFR est AA 23 (L) >60 ml/min/1.73m2    GFR est non-AA 19 (L) >60 ml/min/1.73m2    Calcium 8.0 (L) 8.5 - 10.1 MG/DL   CBC WITH AUTOMATED DIFF    Collection Time: 03/30/21  2:59 AM   Result Value Ref Range    WBC 5.4 3.6 - 11.0 K/uL    RBC 2.85 (L) 3.80 - 5.20 M/uL HGB 8.8 (L) 11.5 - 16.0 g/dL    HCT 27.7 (L) 35.0 - 47.0 %    MCV 97.2 80.0 - 99.0 FL    MCH 30.9 26.0 - 34.0 PG    MCHC 31.8 30.0 - 36.5 g/dL    RDW 16.5 (H) 11.5 - 14.5 %    PLATELET 400 692 - 732 K/uL    MPV 8.3 (L) 8.9 - 12.9 FL    NRBC 0.0 0  WBC    ABSOLUTE NRBC 0.00 0.00 - 0.01 K/uL    NEUTROPHILS 70 32 - 75 %    LYMPHOCYTES 11 (L) 12 - 49 %    MONOCYTES 11 5 - 13 %    EOSINOPHILS 6 0 - 7 %    BASOPHILS 1 0 - 1 %    IMMATURE GRANULOCYTES 1 (H) 0.0 - 0.5 %    ABS. NEUTROPHILS 3.7 1.8 - 8.0 K/UL    ABS. LYMPHOCYTES 0.6 (L) 0.8 - 3.5 K/UL    ABS. MONOCYTES 0.6 0.0 - 1.0 K/UL    ABS. EOSINOPHILS 0.3 0.0 - 0.4 K/UL    ABS. BASOPHILS 0.1 0.0 - 0.1 K/UL    ABS. IMM. GRANS. 0.1 (H) 0.00 - 0.04 K/UL    DF SMEAR SCANNED      RBC COMMENTS ANISOCYTOSIS  1+        RBC COMMENTS MACROCYTOSIS  1+        RBC COMMENTS OVALOCYTES  1+             Urine dipstick:   Lab Results   Component Value Date/Time    Color YELLOW/STRAW 09/22/2020 06:12 PM    Appearance CLEAR 09/22/2020 06:12 PM    Specific gravity 1.009 09/22/2020 06:12 PM    pH (UA) 7.0 09/22/2020 06:12 PM    Protein 100 (A) 09/22/2020 06:12 PM    Glucose Negative 09/22/2020 06:12 PM    Ketone Negative 09/22/2020 06:12 PM    Bilirubin Negative 09/22/2020 06:12 PM    Urobilinogen 0.2 09/22/2020 06:12 PM    Nitrites Negative 09/22/2020 06:12 PM    Leukocyte Esterase TRACE (A) 09/22/2020 06:12 PM    Epithelial cells FEW 09/22/2020 06:12 PM    Bacteria Negative 09/22/2020 06:12 PM    WBC 0-4 09/22/2020 06:12 PM    RBC 0-5 09/22/2020 06:12 PM       I have reviewed the following: All pertinent labs, microbiology data, radiology imaging for my assessment     Medications list Personally Reviewed   [x]      Yes     []               No       Medications:  Prior to Admission medications    Medication Sig Start Date End Date Taking? Authorizing Provider   amLODIPine (NORVASC) 5 mg tablet Take 1 Tab by mouth daily.  10/5/20   Santiago Diallo MD   carvediloL (COREG) 3.125 mg tablet Take 1 Tab by mouth two (2) times daily (with meals). 10/4/20   Lyndsay Carter MD   hydrALAZINE (APRESOLINE) 25 mg tablet Take 1.5 Tabs by mouth three (3) times daily. 10/4/20   Lyndsay Carter MD   naloxone [facility]) 4 mg/actuation nasal spray Use 1 spray intranasally, then discard. Repeat with new spray every 2 min as needed for opioid overdose symptoms, alternating nostrils. 10/4/20   Lyndsay Carter MD   furosemide (Lasix) 20 mg tablet Take 1 Tab by mouth daily. 10/4/20   Lyndsay Carter MD   atorvastatin (LIPITOR) 40 mg tablet Take 40 mg by mouth nightly. Provider, Historical   nitroglycerin (Nitrostat) 0.4 mg SL tablet 0.4 mg by SubLINGual route every five (5) minutes as needed for Chest Pain. Up to 3 doses. Provider, Historical   aspirin delayed-release (Ecotrin Low Strength) 81 mg tablet Take 81 mg by mouth nightly. Provider, Historical   clopidogreL (Plavix) 75 mg tab Take 75 mg by mouth daily (after breakfast). Provider, Historical   carbidopa-levodopa (SINEMET)  mg per tablet Take 2 Tabs by mouth four (4) times daily. 2/4/20   Maty Webster MD   acetaminophen (TYLENOL) 325 mg tablet Take 650 mg by mouth every six (6) hours as needed for Pain or Fever. Provider, Historical   cyanocobalamin (VITAMIN B12) 100 mcg tablet Take 100 mcg by mouth daily. Provider, Historical   vit C,T-Gj-ajovp-lutein-zeaxan (PRESERVISION AREDS-2) 639-394-48-1 mg-unit-mg-mg cap capsule Take 1 Cap by mouth two (2) times a day. Provider, Historical   senna-docusate (SENNA WITH DOCUSATE SODIUM) 8.6-50 mg per tablet Take 1 Tab by mouth nightly. Provider, Historical   Cholecalciferol, Vitamin D3, 1,000 unit cap Take 1,000 Units by mouth daily. Provider, Historical   pantoprazole (PROTONIX) 40 mg tablet Take 40 mg by mouth Daily (before breakfast). Indications: h/o bleeding ulcer with nsaid    Provider, Historical   DULoxetine (CYMBALTA) 60 mg capsule Take 120 mg by mouth daily. Indications: Anxiousness associated with Depression    Provider, Historical   multivitamins-minerals-lutein (CENTRUM SILVER) tab tablet Take 1 Tab by mouth daily. Provider, Historical        Thank you for allowing us to participate in the care of this patient. We will follow patient. Please dont hesitate to call with any questions    Mehdi Alvarado MD  [facility]   [address] [facility] [address]  Phone - [phone]   Fax - [phone]  www. [employer] last six months

## 2021-10-19 NOTE — ED STATDOCS - ATTENDING CONTRIBUTION TO CARE
I, Brandon Villalpando MD,  performed the initial face to face bedside interview with this patient regarding history of present illness, review of symptoms and relevant past medical, social and family history.  I completed an independent physical examination.  I was the initial provider who evaluated this patient. I have signed out the follow up of any pending tests (i.e. labs, radiological studies) to the ACP.  I have communicated the patient’s plan of care and disposition with the ACP.  The history, relevant review of systems, past medical and surgical history, medical decision making, and physical examination was documented by the scribe in my presence and I attest to the accuracy of the documentation.

## 2021-10-19 NOTE — ED ADULT NURSE NOTE - NSIMPLEMENTINTERV_GEN_ALL_ED
Implemented All Fall with Harm Risk Interventions:  [place] to call system. Call bell, personal items and telephone within reach. Instruct patient to call for assistance. Room bathroom lighting operational. Non-slip footwear when patient is off stretcher. Physically safe environment: no spills, clutter or unnecessary equipment. Stretcher in lowest position, wheels locked, appropriate side rails in place. Provide visual cue, wrist band, yellow gown, etc. Monitor gait and stability. Monitor for mental status changes and reorient to person, place, and time. Review medications for side effects contributing to fall risk. Reinforce activity limits and safety measures with patient and family. Provide visual clues: red socks.

## 2021-10-19 NOTE — H&P ADULT - HISTORY OF PRESENT ILLNESS
87 y/o female with a PMHx of COPD,  CAD, DM II, depression, Chf, EF 40%,  HTN, PVD, PAD, s/p left fem pop bypass on 03/24/21, L thigh infections s/p debridement and muscle flap, PAT, LE DVT on Eliquis,, RA, anemia, presents to the ED c/o worsening chronic wounds to RLE with discharge since last night. Daughter states pt has difficulty bearing weight due to wounds. Pt was placed on outpatient PO abx with no relief. Pt was sent to the ED by Dr. Ventura for iv abx.  -denies fever/chills, no n/v/d, no coughing    PAST MEDICAL HISTORY:  Anemia   Arthritis, rheumatoid   CAD (coronary artery disease) non-obstructive  Depression   Hypertension   PVD (peripheral vascular disease) with claudication   Rheumatoid arthritis   Type 2 diabetes mellitus on insulin.     PAST SURGICAL HISTORY:  fracture ankle right plate . screws   2000  Hip fracture requiring operative repair Right hip 11/14/2020  S/P cataract surgery 2011  L iliac stent  Rt fem-pop bypass 3/2021  Rt thigh  muscle flap     FAMILY HISTORY:  Family history of atherosclerosis, mother  FH: colon cancer, father  FH: heart attack, father.    Social HIstory:   no smoking, no Etoh            REVIEW OF SYSTEMS:    CONSTITUTIONAL: No weakness, No fevers or chills  ENT: No ear ache, No sorethroat  NECK: No pain, No stiffness  RESPIRATORY: No cough, No wheezing, No hemoptysis; No dyspnea  CARDIOVASCULAR: No chest pain, No palpitations  GASTROINTESTINAL: No abd pain, No nausea, No vomiting, No hematemesis, No diarrhea or constipation. No melena, No hematochezia.  GENITOURINARY: No dysuria, No  hematuria  NEUROLOGICAL: No diplopia, No paresthesia, No motor dysfunction  MUSCULOSKELETAL: No arthralgia, + myalgia  SKIN: No rashes, or lesions   PSYCH: no anxiety, no suicidal ideation    All other review of systems is negative unless indicated above    Vital Signs Last 24 Hrs  T(C): 36.7 (19 Oct 2021 13:00), Max: 36.7 (19 Oct 2021 13:00)  T(F): 98 (19 Oct 2021 13:00), Max: 98 (19 Oct 2021 13:00)  HR: 64 (19 Oct 2021 13:00) (64 - 64)  BP: 120/86 (19 Oct 2021 13:00) (120/86 - 120/86)  BP(mean): 94 (19 Oct 2021 13:00) (94 - 94)  RR: 18 (19 Oct 2021 13:00) (18 - 18)  SpO2: 99% (19 Oct 2021 13:00) (99% - 99%)    PHYSICAL EXAM:    GENERAL: NAD, Well nourished  HEENT:  NC/AT, EOMI, PERRLA, No scleral icterus, Moist mucous membranes  NECK: Supple, No JVD  CNS:  Alert & Oriented X3, Motor Strength 5/5 B/L upper and lower extremities; DTRs 2+ intact   LUNG: Normal Breath sounds, Clear to auscultation bilaterally, No rales, No rhonchi, No wheezing  HEART: RRR; No murmurs, No rubs  ABDOMEN: +BS, ST/ND/NT  GENITOURINARY: Voiding, Bladder not distended  EXTREMITIES:  2+ Peripheral Pulses, No clubbing, No cyanosis, No tibial edema  MUSCULOSKELTAL: Joints normal ROM, No TTP, No effusion  VAGINAL: deferred  SKIN: +mild Rt leg rashe, Rt leg open superficial wounds, with serosanguinous discharge and surrounding erythema  RECTAL: deferred, not indicated  BREAST: deferred                          8.9    16.60 )-----------( 258      ( 19 Oct 2021 14:33 )             28.5     10-19    136  |  108  |  37<H>  ----------------------------<  251<H>  5.3   |  25  |  1.12    Ca    8.9      19 Oct 2021 14:33    TPro  7.3  /  Alb  2.6<L>  /  TBili  0.2  /  DBili  x   /  AST  9<L>  /  ALT  11<L>  /  AlkPhos  97  10-19    Vancomycin levels:   Cultures:     MEDICATIONS  (STANDING):  aspirin  chewable 81 milliGRAM(s) Oral daily  atorvastatin 40 milliGRAM(s) Oral at bedtime  cefTRIAXone   IVPB 1000 milliGRAM(s) IV Intermittent every 24 hours  dextrose 40% Gel 15 Gram(s) Oral once  dextrose 5%. 1000 milliLiter(s) (50 mL/Hr) IV Continuous <Continuous>  dextrose 50% Injectable 25 Gram(s) IV Push once  folic acid 1 milliGRAM(s) Oral at bedtime  gabapentin 300 milliGRAM(s) Oral three times a day  glucagon  Injectable 1 milliGRAM(s) IntraMuscular once  insulin glargine Injectable (LANTUS) 5 Unit(s) SubCutaneous at bedtime  insulin lispro (ADMELOG) corrective regimen sliding scale   SubCutaneous three times a day before meals  metoprolol tartrate 25 milliGRAM(s) Oral two times a day  PARoxetine 20 milliGRAM(s) Oral daily  sacubitril 24 mG/valsartan 26 mG 1 Tablet(s) Oral two times a day  tiotropium 18 MICROgram(s) Capsule 1 Capsule(s) Inhalation daily    MEDICATIONS  (PRN):  ALBUTerol    90 MICROgram(s) HFA Inhaler 2 Puff(s) Inhalation every 6 hours PRN Shortness of Breath  aluminum hydroxide/magnesium hydroxide/simethicone Suspension 30 milliLiter(s) Oral every 4 hours PRN Dyspepsia  melatonin 3 milliGRAM(s) Oral at bedtime PRN Insomnia  ondansetron Injectable 4 milliGRAM(s) IV Push every 8 hours PRN Nausea and/or Vomiting  oxycodone    5 mG/acetaminophen 325 mG 1 Tablet(s) Oral every 6 hours PRN Moderate Pain (4 - 6)      all labs reviewed  all imaging reviewed    a/p:    1. Rt leg cellulitis:  Ceftriaxone IV   Blood Cx x2   ID evaluation     2. PVD:  s/p Left iliac angioplasty   Vascular surg evaluation    3. h/o PAT:  will give gentle hydration in anticipation of CTA or angiogram   NS at 60/hr x 24hrs    4. DM II; stable    5. COPD:   stable, c/w inhalers     6. Chronic systolic Chf:  stable, c/w Entresto  EF 40%

## 2021-10-19 NOTE — ED STATDOCS - CLINICAL SUMMARY MEDICAL DECISION MAKING FREE TEXT BOX
pt with wound on R leg, dr young sent pt for admission, concern for infection, pt has greenish/yellow discharge on dressing.

## 2021-10-19 NOTE — ED STATDOCS - OBJECTIVE STATEMENT
85 y/o female with a PMHx of COPD, arthritis, CAD, DMII, depression, HTN, PVD, PAD, s/p left fem pop bypass on 03/24/21, +lower ext DVT on Eliquis, +chronic left heel ulcer, RA, anemia, presents to the ED c/o worsening chronic wounds to RLE with discharge since last night. Daughter states pt has difficulty bearing weight due to wounds. Pt was placed on outpatient PO abx with no relief. Pt was sent to the ED by Dr. Ventura for iv abx.

## 2021-10-19 NOTE — ED STATDOCS - SKIN, MLM
RLE with bandage, bandage removed, pt with ulcer to heal with ttp and mild redness, old abrasion to shin are old wounds to calf with minimal redness on lower leg. distal nvi RLE with bandage, bandage removed, pt with tender to palp heal and mild redness, old abrasion to shin are old wounds to calf with minimal redness on lower leg. distal nvm - I

## 2021-10-20 LAB
A1C WITH ESTIMATED AVERAGE GLUCOSE RESULT: 9.2 % — HIGH (ref 4–5.6)
ANION GAP SERPL CALC-SCNC: 7 MMOL/L — SIGNIFICANT CHANGE UP (ref 5–17)
BUN SERPL-MCNC: 42 MG/DL — HIGH (ref 7–23)
CALCIUM SERPL-MCNC: 8.5 MG/DL — SIGNIFICANT CHANGE UP (ref 8.5–10.1)
CHLORIDE SERPL-SCNC: 110 MMOL/L — HIGH (ref 96–108)
CO2 SERPL-SCNC: 21 MMOL/L — LOW (ref 22–31)
COVID-19 SPIKE DOMAIN AB INTERP: POSITIVE
COVID-19 SPIKE DOMAIN ANTIBODY RESULT: 45.7 U/ML — HIGH
CREAT SERPL-MCNC: 1.08 MG/DL — SIGNIFICANT CHANGE UP (ref 0.5–1.3)
ERYTHROCYTE [SEDIMENTATION RATE] IN BLOOD: 95 MM/HR — HIGH (ref 0–20)
ESTIMATED AVERAGE GLUCOSE: 217 MG/DL — HIGH (ref 68–114)
GLUCOSE SERPL-MCNC: 173 MG/DL — HIGH (ref 70–99)
HCT VFR BLD CALC: 25.5 % — LOW (ref 34.5–45)
HGB BLD-MCNC: 8 G/DL — LOW (ref 11.5–15.5)
MCHC RBC-ENTMCNC: 31.4 GM/DL — LOW (ref 32–36)
MCHC RBC-ENTMCNC: 31.6 PG — SIGNIFICANT CHANGE UP (ref 27–34)
MCV RBC AUTO: 100.8 FL — HIGH (ref 80–100)
PLATELET # BLD AUTO: 215 K/UL — SIGNIFICANT CHANGE UP (ref 150–400)
POTASSIUM SERPL-MCNC: 4.8 MMOL/L — SIGNIFICANT CHANGE UP (ref 3.5–5.3)
POTASSIUM SERPL-SCNC: 4.8 MMOL/L — SIGNIFICANT CHANGE UP (ref 3.5–5.3)
RBC # BLD: 2.53 M/UL — LOW (ref 3.8–5.2)
RBC # FLD: 15.9 % — HIGH (ref 10.3–14.5)
SARS-COV-2 IGG+IGM SERPL QL IA: 45.7 U/ML — HIGH
SARS-COV-2 IGG+IGM SERPL QL IA: POSITIVE
SODIUM SERPL-SCNC: 138 MMOL/L — SIGNIFICANT CHANGE UP (ref 135–145)
WBC # BLD: 11.66 K/UL — HIGH (ref 3.8–10.5)
WBC # FLD AUTO: 11.66 K/UL — HIGH (ref 3.8–10.5)

## 2021-10-20 PROCEDURE — 99233 SBSQ HOSP IP/OBS HIGH 50: CPT

## 2021-10-20 PROCEDURE — 99221 1ST HOSP IP/OBS SF/LOW 40: CPT

## 2021-10-20 RX ORDER — SODIUM CHLORIDE 9 MG/ML
1000 INJECTION INTRAMUSCULAR; INTRAVENOUS; SUBCUTANEOUS
Refills: 0 | Status: DISCONTINUED | OUTPATIENT
Start: 2021-10-20 | End: 2021-10-22

## 2021-10-20 RX ORDER — ENOXAPARIN SODIUM 100 MG/ML
40 INJECTION SUBCUTANEOUS DAILY
Refills: 0 | Status: DISCONTINUED | OUTPATIENT
Start: 2021-10-20 | End: 2021-10-23

## 2021-10-20 RX ORDER — INSULIN GLARGINE 100 [IU]/ML
10 INJECTION, SOLUTION SUBCUTANEOUS AT BEDTIME
Refills: 0 | Status: DISCONTINUED | OUTPATIENT
Start: 2021-10-20 | End: 2021-10-25

## 2021-10-20 RX ORDER — VANCOMYCIN HCL 1 G
750 VIAL (EA) INTRAVENOUS EVERY 12 HOURS
Refills: 0 | Status: DISCONTINUED | OUTPATIENT
Start: 2021-10-20 | End: 2021-10-25

## 2021-10-20 RX ADMIN — Medication 6: at 11:25

## 2021-10-20 RX ADMIN — OXYCODONE AND ACETAMINOPHEN 1 TABLET(S): 5; 325 TABLET ORAL at 14:13

## 2021-10-20 RX ADMIN — Medication 20 MILLIGRAM(S): at 09:32

## 2021-10-20 RX ADMIN — Medication 3 MILLIGRAM(S): at 21:49

## 2021-10-20 RX ADMIN — Medication 4: at 17:14

## 2021-10-20 RX ADMIN — INSULIN GLARGINE 10 UNIT(S): 100 INJECTION, SOLUTION SUBCUTANEOUS at 21:49

## 2021-10-20 RX ADMIN — CEFEPIME 1000 MILLIGRAM(S): 1 INJECTION, POWDER, FOR SOLUTION INTRAMUSCULAR; INTRAVENOUS at 21:49

## 2021-10-20 RX ADMIN — SODIUM CHLORIDE 75 MILLILITER(S): 9 INJECTION INTRAMUSCULAR; INTRAVENOUS; SUBCUTANEOUS at 14:48

## 2021-10-20 RX ADMIN — OXYCODONE AND ACETAMINOPHEN 1 TABLET(S): 5; 325 TABLET ORAL at 07:45

## 2021-10-20 RX ADMIN — ATORVASTATIN CALCIUM 40 MILLIGRAM(S): 80 TABLET, FILM COATED ORAL at 21:50

## 2021-10-20 RX ADMIN — Medication 1 MILLIGRAM(S): at 21:50

## 2021-10-20 RX ADMIN — TIOTROPIUM BROMIDE 1 CAPSULE(S): 18 CAPSULE ORAL; RESPIRATORY (INHALATION) at 09:16

## 2021-10-20 RX ADMIN — ENOXAPARIN SODIUM 40 MILLIGRAM(S): 100 INJECTION SUBCUTANEOUS at 21:51

## 2021-10-20 RX ADMIN — SACUBITRIL AND VALSARTAN 1 TABLET(S): 24; 26 TABLET, FILM COATED ORAL at 21:50

## 2021-10-20 RX ADMIN — Medication 250 MILLIGRAM(S): at 21:49

## 2021-10-20 RX ADMIN — OXYCODONE AND ACETAMINOPHEN 1 TABLET(S): 5; 325 TABLET ORAL at 00:10

## 2021-10-20 RX ADMIN — OXYCODONE AND ACETAMINOPHEN 1 TABLET(S): 5; 325 TABLET ORAL at 07:19

## 2021-10-20 RX ADMIN — Medication 81 MILLIGRAM(S): at 09:32

## 2021-10-20 RX ADMIN — SACUBITRIL AND VALSARTAN 1 TABLET(S): 24; 26 TABLET, FILM COATED ORAL at 09:32

## 2021-10-20 RX ADMIN — GABAPENTIN 300 MILLIGRAM(S): 400 CAPSULE ORAL at 14:17

## 2021-10-20 RX ADMIN — Medication 2: at 08:08

## 2021-10-20 RX ADMIN — GABAPENTIN 300 MILLIGRAM(S): 400 CAPSULE ORAL at 05:33

## 2021-10-20 RX ADMIN — OXYCODONE AND ACETAMINOPHEN 1 TABLET(S): 5; 325 TABLET ORAL at 14:30

## 2021-10-20 RX ADMIN — GABAPENTIN 300 MILLIGRAM(S): 400 CAPSULE ORAL at 21:50

## 2021-10-20 RX ADMIN — OXYCODONE AND ACETAMINOPHEN 1 TABLET(S): 5; 325 TABLET ORAL at 21:49

## 2021-10-20 RX ADMIN — Medication 5 MILLIGRAM(S): at 14:45

## 2021-10-20 NOTE — CONSULT NOTE ADULT - SUBJECTIVE AND OBJECTIVE BOX
Patient is a 86y old  Female who presents with a chief complaint of     HPI:  85 y/o female with a PMHx of COPD,  CAD, DM II, depression, Chf, EF 40%,  HTN, PVD, PAD, s/p left fem pop bypass on 21, L thigh infections s/p debridement and muscle flap, APT, LE DVT on Eliquis,, RA, anemia, presents to the ED c/o worsening chronic wounds to RLE with discharge since last night. Daughter states pt has difficulty bearing weight due to wounds. Pt was placed on outpatient PO abx with no relief. Pt was sent to the ED by Dr. Ventura for iv abx.  -denies fever/chills, no n/v/d, no coughing  No CP,SOB or palpitations    PAST MEDICAL HISTORY:  Anemia   Arthritis, rheumatoid   CAD (coronary artery disease) non-obstructive  Depression   Hypertension   PVD (peripheral vascular disease) with claudication   Rheumatoid arthritis   Type 2 diabetes mellitus on insulin.     PAST SURGICAL HISTORY:  fracture ankle right plate . screws     Hip fracture requiring operative repair Right hip 2020  S/P cataract surgery   L iliac stent  Rt fem-pop bypass 3/2021  Rt thigh  muscle flap     FAMILY HISTORY:  Family history of atherosclerosis, mother  FH: colon cancer, father  FH: heart attack, father.    Social HIstory:   no smoking, no Etoh            REVIEW OF SYSTEMS:    CONSTITUTIONAL: No weakness, No fevers or chills  ENT: No ear ache, No sorethroat  NECK: No pain, No stiffness  RESPIRATORY: No cough, No wheezing, No hemoptysis; No dyspnea  CARDIOVASCULAR: No chest pain, No palpitations  GASTROINTESTINAL: No abd pain, No nausea, No vomiting, No hematemesis, No diarrhea or constipation. No melena, No hematochezia.  GENITOURINARY: No dysuria, No  hematuria  NEUROLOGICAL: No diplopia, No paresthesia, No motor dysfunction  MUSCULOSKELETAL: No arthralgia, + myalgia  SKIN: No rashes, or lesions   PSYCH: no anxiety, no suicidal ideation    All other review of systems is negative unless indicated above    Vital Signs Last 24 Hrs  T(C): 36.7 (19 Oct 2021 13:00), Max: 36.7 (19 Oct 2021 13:00)  T(F): 98 (19 Oct 2021 13:00), Max: 98 (19 Oct 2021 13:00)  HR: 64 (19 Oct 2021 13:00) (64 - 64)  BP: 120/86 (19 Oct 2021 13:00) (120/86 - 120/86)  BP(mean): 94 (19 Oct 2021 13:00) (94 - 94)  RR: 18 (19 Oct 2021 13:00) (18 - 18)  SpO2: 99% (19 Oct 2021 13:00) (99% - 99%)    PHYSICAL EXAM:    GENERAL: NAD, Well nourished  HEENT:  NC/AT, EOMI, PERRLA, No scleral icterus, Moist mucous membranes  NECK: Supple, No JVD  CNS:  Alert & Oriented X3, Motor Strength 5/5 B/L upper and lower extremities; DTRs 2+ intact   LUNG: Normal Breath sounds, Clear to auscultation bilaterally, No rales, No rhonchi, No wheezing  HEART: RRR; No murmurs, No rubs  ABDOMEN: +BS, ST/ND/NT  GENITOURINARY: Voiding, Bladder not distended  EXTREMITIES:  2+ Peripheral Pulses, No clubbing, No cyanosis, No tibial edema  MUSCULOSKELTAL: Joints normal ROM, No TTP, No effusion  VAGINAL: deferred  SKIN: +mild Rt leg rashe, Rt leg open superficial wounds, with serosanguinous discharge and surrounding erythema  RECTAL: deferred, not indicated  BREAST: deferred                          8.9    16.60 )-----------( 258      ( 19 Oct 2021 14:33 )             28.5     10-19    136  |  108  |  37<H>  ----------------------------<  251<H>  5.3   |  25  |  1.12    Ca    8.9      19 Oct 2021 14:33    TPro  7.3  /  Alb  2.6<L>  /  TBili  0.2  /  DBili  x   /  AST  9<L>  /  ALT  11<L>  /  AlkPhos  97  10-19    Vancomycin levels:   Cultures:     MEDICATIONS  (STANDING):  aspirin  chewable 81 milliGRAM(s) Oral daily  atorvastatin 40 milliGRAM(s) Oral at bedtime  cefTRIAXone   IVPB 1000 milliGRAM(s) IV Intermittent every 24 hours  dextrose 40% Gel 15 Gram(s) Oral once  dextrose 5%. 1000 milliLiter(s) (50 mL/Hr) IV Continuous <Continuous>  dextrose 50% Injectable 25 Gram(s) IV Push once  folic acid 1 milliGRAM(s) Oral at bedtime  gabapentin 300 milliGRAM(s) Oral three times a day  glucagon  Injectable 1 milliGRAM(s) IntraMuscular once  insulin glargine Injectable (LANTUS) 5 Unit(s) SubCutaneous at bedtime  insulin lispro (ADMELOG) corrective regimen sliding scale   SubCutaneous three times a day before meals  metoprolol tartrate 25 milliGRAM(s) Oral two times a day  PARoxetine 20 milliGRAM(s) Oral daily  sacubitril 24 mG/valsartan 26 mG 1 Tablet(s) Oral two times a day  tiotropium 18 MICROgram(s) Capsule 1 Capsule(s) Inhalation daily    MEDICATIONS  (PRN):  ALBUTerol    90 MICROgram(s) HFA Inhaler 2 Puff(s) Inhalation every 6 hours PRN Shortness of Breath  aluminum hydroxide/magnesium hydroxide/simethicone Suspension 30 milliLiter(s) Oral every 4 hours PRN Dyspepsia  melatonin 3 milliGRAM(s) Oral at bedtime PRN Insomnia  ondansetron Injectable 4 milliGRAM(s) IV Push every 8 hours PRN Nausea and/or Vomiting  oxycodone    5 mG/acetaminophen 325 mG 1 Tablet(s) Oral every 6 hours PRN Moderate Pain (4 - 6)      all labs reviewed  all imaging reviewed    a/p:    1. Rt leg cellulitis:  Ceftriaxone IV   Blood Cx x2   ID evaluation     2. PVD:  s/p Left iliac angioplasty   Vascular surg evaluation    3. h/o PAT:  will give gentle hydration in anticipation of CTA or angiogram   NS at 60/hr x 24hrs    4. DM II; stable    5. COPD:   stable, c/w inhalers     6. Chronic systolic Chf:  stable, c/w Entresto  EF 40% (19 Oct 2021 16:56)      PAST MEDICAL & SURGICAL HISTORY:  Hypertension    Anemia    Arthritis, rheumatoid    Depression    Type 2 diabetes mellitus  on insulin    PVD (peripheral vascular disease) with claudication    CAD (coronary artery disease)  non-obstructive    Rheumatoid arthritis    fracture ankle right  plate . screws       S/P cataract surgery      Hip fracture requiring operative repair  Right hip 2020        HPI:                PREVIOUS DIAGNOSTIC TESTING:      ECHO  FINDINGS:    STRESS  FINDINGS:    CATHETERIZATION  FINDINGS:    MEDICATIONS  (STANDING):  aspirin  chewable 81 milliGRAM(s) Oral daily  atorvastatin 40 milliGRAM(s) Oral at bedtime  cefepime  Injectable. 1000 milliGRAM(s) IV Push every 12 hours  dextrose 40% Gel 15 Gram(s) Oral once  dextrose 5%. 1000 milliLiter(s) (50 mL/Hr) IV Continuous <Continuous>  dextrose 50% Injectable 25 Gram(s) IV Push once  folic acid 1 milliGRAM(s) Oral at bedtime  gabapentin 300 milliGRAM(s) Oral three times a day  glucagon  Injectable 1 milliGRAM(s) IntraMuscular once  influenza   Vaccine 0.5 milliLiter(s) IntraMuscular once  insulin glargine Injectable (LANTUS) 5 Unit(s) SubCutaneous at bedtime  insulin lispro (ADMELOG) corrective regimen sliding scale   SubCutaneous three times a day before meals  metoprolol tartrate 25 milliGRAM(s) Oral two times a day  PARoxetine 20 milliGRAM(s) Oral daily  sacubitril 24 mG/valsartan 26 mG 1 Tablet(s) Oral two times a day  tiotropium 18 MICROgram(s) Capsule 1 Capsule(s) Inhalation daily    MEDICATIONS  (PRN):  ALBUTerol    90 MICROgram(s) HFA Inhaler 2 Puff(s) Inhalation every 6 hours PRN Shortness of Breath  aluminum hydroxide/magnesium hydroxide/simethicone Suspension 30 milliLiter(s) Oral every 4 hours PRN Dyspepsia  melatonin 3 milliGRAM(s) Oral at bedtime PRN Insomnia  ondansetron Injectable 4 milliGRAM(s) IV Push every 8 hours PRN Nausea and/or Vomiting  oxycodone    5 mG/acetaminophen 325 mG 1 Tablet(s) Oral every 6 hours PRN Moderate Pain (4 - 6)      FAMILY HISTORY:  FH: colon cancer  father    FH: heart attack  father    Family history of atherosclerosis  mother        SOCIAL HISTORY:    CIGARETTES:    ALCOHOL:    REVIEW OF SYSTEMS:  CONSTITUTIONAL:  No night sweats.  No fatigue, malaise, lethargy.  No fever or chills.  HEENT:  Eyes:  No visual changes.  No eye pain.  No eye discharge.    ENT:  No runny nose.  No epistaxis.  No sinus pain.  No sore throat.  No odynophagia.  No ear pain.  No congestion.  RESPIRATORY:  No cough.  No wheeze.  No hemoptysis.  No shortness of breath.  CARDIOVASCULAR:  No chest pains.  No palpitations. No shortness of breath, orthopnea or PND.  GASTROINTESTINAL:  No abdominal pain.  No nausea or vomiting.  No diarrhea or constipation.  No hematemesis.  No hematochezia.  No melena.  GENITOURINARY:  No urgency.  No frequency.  No dysuria.  No hematuria.  No obstructive symptoms.  No discharge.  No pain.  No significant abnormal bleeding.  MUSCULOSKELETAL:  No musculoskeletal pain.  No joint swelling.  No arthritis.  NEUROLOGICAL:  No tingling or numbness or weakness.  PSYCHIATRIC:  No confusion  SKIN:  No rashes.  No lesions.  No wounds.  ENDOCRINE:  No unexplained weight loss.  No polydipsia.  No polyuria.  No polyphagia.  HEMATOLOGIC:  No anemia.  No purpura.  No petechiae.  No prolonged or excessive bleeding.   ALLERGIC AND IMMUNOLOGIC:  No pruritus.  No swelling.         Vital Signs Last 24 Hrs  T(C): 36.3 (20 Oct 2021 08:04), Max: 36.7 (19 Oct 2021 13:00)  T(F): 97.3 (20 Oct 2021 08:04), Max: 98 (19 Oct 2021 13:00)  HR: 63 (20 Oct 2021 08:04) (63 - 83)  BP: 120/38 (20 Oct 2021 08:04) (120/38 - 155/48)  BP(mean): 78 (19 Oct 2021 18:00) (69 - 94)  RR: 18 (20 Oct 2021 08:04) (10 - 18)  SpO2: 97% (20 Oct 2021 08:04) (97% - 99%)    PHYSICAL EXAM-    Constitutional: The patient appears to be normal, well developed, well nourished and alert and oriented to time, place and person. The patient does not appear acutely ill. The patient is alert. Looks dehydrated with decreased skin turgor     Head: Head is normocephalic and atraumatic.      Neck: The patient's neck is supple without enlargement, has no palpable thyromegaly nor thyroid nodules and has no jugular venous distention. No audible carotid bruits. There are strong carotid pulses bilaterally. No JVD.     Cardiovascular: Regular rate and rhythm without S3, S4. II/VI systolic murmus    Respiratory: The patient has no rales and no rhonchi. The patient has no wheezes.     Abdomen: Soft, nontender, nondistended with positive bowel sounds.      Extremity: Poor pulses bilaterally, ulceration noted on the right        INTERPRETATION OF TELEMETRY:    ECG:    I&O's Detail    19 Oct 2021 07:01  -  20 Oct 2021 07:00  --------------------------------------------------------  IN:  Total IN: 0 mL    OUT:    Voided (mL): 200 mL  Total OUT: 200 mL    Total NET: -200 mL          LABS:                        8.0    11.66 )-----------( 215      ( 20 Oct 2021 08:00 )             25.5     10-20    138  |  110<H>  |  42<H>  ----------------------------<  173<H>  4.8   |  21<L>  |  1.08    Ca    8.5      20 Oct 2021 08:00    TPro  7.3  /  Alb  2.6<L>  /  TBili  0.2  /  DBili  x   /  AST  9<L>  /  ALT  11<L>  /  AlkPhos  97  10-19        PT/INR - ( 19 Oct 2021 14:33 )   PT: 12.4 sec;   INR: 1.06 ratio         PTT - ( 19 Oct 2021 14:33 )  PTT:28.1 sec  Urinalysis Basic - ( 19 Oct 2021 22:25 )    Color: Yellow / Appearance: Clear / S.020 / pH: x  Gluc: x / Ketone: Negative  / Bili: Negative / Urobili: Negative mg/dL   Blood: x / Protein: 30 mg/dL / Nitrite: Negative   Leuk Esterase: Trace / RBC: 0-2 /HPF / WBC 0-2   Sq Epi: x / Non Sq Epi: Occasional / Bacteria: Occasional      I&O's Summary    19 Oct 2021 07:01  -  20 Oct 2021 07:00  --------------------------------------------------------  IN: 0 mL / OUT: 200 mL / NET: -200 mL      BNP  RADIOLOGY & ADDITIONAL STUDIES:

## 2021-10-20 NOTE — DIETITIAN INITIAL EVALUATION ADULT. - PERTINENT LABORATORY DATA
10-20    138  |  110<H>  |  42<H>  ----------------------------<  173<H>  4.8   |  21<L>  |  1.08    Ca    8.5      20 Oct 2021 08:00    TPro  7.3  /  Alb  2.6<L>  /  TBili  0.2  /  DBili  x   /  AST  9<L>  /  ALT  11<L>  /  AlkPhos  97  10-19

## 2021-10-20 NOTE — DIETITIAN INITIAL EVALUATION ADULT. - PHYSCIAL ASSESSMENT
Abraham score 14  PU stage 2 coccyx  No BM documented moderate wasting on face, mild upper body/overweight/other (specify)

## 2021-10-20 NOTE — DIETITIAN INITIAL EVALUATION ADULT. - ADD RECOMMEND
1. Add Glucerna BID and gelatein TID to optimize nutrient intake 2/2 increased needs, 3. Recommend to add MVI w/minerals, Vit C 500 mg BID, add Zinc Sulfate 220 mg x 10 days to promote wound healing, 3. Monitor bowel movements, if no BM for >3 days, consider implementing bowel regimen. If pt's POCT and A1C are WNL, consider liberalizing diet to regular to maximize caloric and nutrient intake. RD will continue to monitor PO intake, labs, hydration, and wt prn.

## 2021-10-20 NOTE — DIETITIAN INITIAL EVALUATION ADULT. - PERTINENT MEDS FT
MEDICATIONS  (STANDING):  aspirin  chewable 81 milliGRAM(s) Oral daily  atorvastatin 40 milliGRAM(s) Oral at bedtime  cefepime  Injectable. 1000 milliGRAM(s) IV Push every 12 hours  dextrose 40% Gel 15 Gram(s) Oral once  dextrose 5%. 1000 milliLiter(s) (50 mL/Hr) IV Continuous <Continuous>  dextrose 50% Injectable 25 Gram(s) IV Push once  enoxaparin Injectable 40 milliGRAM(s) SubCutaneous daily  folic acid 1 milliGRAM(s) Oral at bedtime  gabapentin 300 milliGRAM(s) Oral three times a day  glucagon  Injectable 1 milliGRAM(s) IntraMuscular once  influenza   Vaccine 0.5 milliLiter(s) IntraMuscular once  insulin glargine Injectable (LANTUS) 10 Unit(s) SubCutaneous at bedtime  insulin lispro (ADMELOG) corrective regimen sliding scale   SubCutaneous three times a day before meals  metoprolol tartrate 25 milliGRAM(s) Oral two times a day  PARoxetine 20 milliGRAM(s) Oral daily  predniSONE   Tablet 5 milliGRAM(s) Oral daily  sacubitril 24 mG/valsartan 26 mG 1 Tablet(s) Oral two times a day  sodium chloride 0.9%. 1000 milliLiter(s) (75 mL/Hr) IV Continuous <Continuous>  tiotropium 18 MICROgram(s) Capsule 1 Capsule(s) Inhalation daily  vancomycin  IVPB 750 milliGRAM(s) IV Intermittent every 12 hours    MEDICATIONS  (PRN):  ALBUTerol    90 MICROgram(s) HFA Inhaler 2 Puff(s) Inhalation every 6 hours PRN Shortness of Breath  aluminum hydroxide/magnesium hydroxide/simethicone Suspension 30 milliLiter(s) Oral every 4 hours PRN Dyspepsia  melatonin 3 milliGRAM(s) Oral at bedtime PRN Insomnia  ondansetron Injectable 4 milliGRAM(s) IV Push every 8 hours PRN Nausea and/or Vomiting  oxycodone    5 mG/acetaminophen 325 mG 1 Tablet(s) Oral every 6 hours PRN Moderate Pain (4 - 6)

## 2021-10-20 NOTE — DIETITIAN NUTRITION RISK NOTIFICATION - ADDITIONAL COMMENTS/DIETITIAN RECOMMENDATIONS
1. Add Glucerna BID and gelatein TID to optimize nutrient intake 2/2 increased needs, 3. Recommend to add MVI w/minerals, Vit C 500 mg BID, add Zinc Sulfate 220 mg x 10 days to promote wound healing, 3. Monitor bowel movements, if no BM for >3 days, consider implementing bowel regimen. RD will continue to monitor PO intake, labs, hydration, and wt prn.

## 2021-10-20 NOTE — CONSULT NOTE ADULT - ASSESSMENT
Elderly female with Hx of mild systolic dysfunction, non-obstructive CAD, severePVD, coming with unhealed ulcer on the right  S/P revascularisation on the left last year  No cardiac symptoms at rest or at her minimal excertion  Dehadrated by physical exam  Consider iv fluids also on setting of preparation for dye load for vascular evaluation  recall if needed

## 2021-10-20 NOTE — PROGRESS NOTE ADULT - SUBJECTIVE AND OBJECTIVE BOX
History of Present Illness:   85 y/o female with a PMHx of COPD,  CAD, DM II, depression, Chf, EF 40%,  HTN, PVD, PAD, s/p left fem pop bypass on 03/24/21, L thigh infections s/p debridement and muscle flap, PAT, LE DVT on Eliquis,, RA, anemia, presents to the ED c/o worsening chronic wounds to RLE with discharge since last night. Daughter states pt has difficulty bearing weight due to wounds. Pt was placed on outpatient PO abx with no relief. Pt was sent to the ED by Dr. Ventura for iv abx.  -denies fever/chills, no n/v/d, no coughing    10.20: no distress, no cp, no sob, pain in her leg is better today    REVIEW OF SYSTEMS:    CONSTITUTIONAL: No weakness, No fevers or chills  ENT: No ear ache, No sorethroat  NECK: No pain, No stiffness  RESPIRATORY: No cough, No wheezing, No hemoptysis; No dyspnea  CARDIOVASCULAR: No chest pain, No palpitations  GASTROINTESTINAL: No abd pain, No nausea, No vomiting, No hematemesis, No diarrhea or constipation. No melena, No hematochezia.  GENITOURINARY: No dysuria, No  hematuria  NEUROLOGICAL: No diplopia, No paresthesia, No motor dysfunction  MUSCULOSKELETAL: No arthralgia, + myalgia  SKIN: No rashes, or lesions   PSYCH: no anxiety, no suicidal ideation    All other review of systems is negative unless indicated above    Vital Signs Last 24 Hrs  T(C): 36.3 (20 Oct 2021 08:04), Max: 36.5 (19 Oct 2021 21:22)  T(F): 97.3 (20 Oct 2021 08:04), Max: 97.7 (19 Oct 2021 21:22)  HR: 63 (20 Oct 2021 08:04) (63 - 83)  BP: 120/38 (20 Oct 2021 08:04) (120/38 - 155/48)  BP(mean): 78 (19 Oct 2021 18:00) (69 - 83)  RR: 18 (20 Oct 2021 08:04) (10 - 18)  SpO2: 97% (20 Oct 2021 08:04) (97% - 99%)    PHYSICAL EXAM:    GENERAL: NAD, Well nourished  HEENT:  NC/AT, EOMI, PERRLA, No scleral icterus, Moist mucous membranes  NECK: Supple, No JVD  CNS:  Alert & Oriented X3, Motor Strength 5/5 B/L upper and lower extremities; DTRs 2+ intact   LUNG: Normal Breath sounds, Clear to auscultation bilaterally, No rales, No rhonchi, No wheezing  HEART: RRR; No murmurs, No rubs  ABDOMEN: +BS, ST/ND/NT  GENITOURINARY: Voiding, Bladder not distended  EXTREMITIES:  2+ Peripheral Pulses, No clubbing, No cyanosis, No tibial edema  MUSCULOSKELTAL: Joints normal ROM, No TTP, No effusion  VAGINAL: deferred  SKIN: +mild Rt leg rashe, Rt leg open superficial wounds, with serosanguinous discharge and surrounding erythema  RECTAL: deferred, not indicated  BREAST: deferred    Labs:                        8.0    11.66 )-----------( 215      ( 20 Oct 2021 08:00 )             25.5     10-20    138  |  110<H>  |  42<H>  ----------------------------<  173<H>  4.8   |  21<L>  |  1.08    Ca    8.5      20 Oct 2021 08:00    TPro  7.3  /  Alb  2.6<L>  /  TBili  0.2  /  DBili  x   /  AST  9<L>  /  ALT  11<L>  /  AlkPhos  97  10-19           Cultures:     a/p:    1. Rt leg cellulitis:  Cefepime day#2  Blood Cx x2   ID evaluation     2. PVD:  s/p Left iliac angioplasty   Vascular surg evaluation noted: might need CTA  favor conservative management     3. h/o PAT:  will give gentle hydration in anticipation of CTA or angiogram   NS at 60/hr x 48hrs    4. DM II; stable    5. COPD:   stable, c/w inhalers     6. Chronic systolic Chf:  stable, c/w Entresto  EF 40%    7. RA :   Rheumatology eval noted; Prednisone 5mg/day  hold MTX while on acute cellulitis treatment

## 2021-10-20 NOTE — CONSULT NOTE ADULT - ASSESSMENT
87 y/o female with h/o COPD, CAD, DM II, depression, CHF, EF 40%, HTN, PVD, PAD, s/p left fem pop bypass on 03/24/21, L thigh infections s/p debridement and muscle flap, PAT, LE DVT on Eliquis,, RA, anemia was admitted on 10/19 for worsening chronic wounds to RLE with new discharge x one day PTA. Daughter states pt has difficulty bearing weight due to wounds. Pt was placed on outpatient PO abx with no relief. Pt was sent to the ED by Dr. Ventura. No fever or chills at home. In ER he received cefepime.     1. RLE cellulitis. Chronic stasis dermatitis with open wounds. PVD s/p prior vascular bypass. RA. Immunocompromised host.  -obtain BC x 2  -start vancomycin 759 mg IV q12h and cefepime 1 gm IV q12h  -reason for abx use and side effects reviewed with patient; monitor BMP and vancomycin trough levels  -vascular evaluation  -local wound care  -elevate legs  -old chart reviewed to assess prior cultures  -monitor temps  -f/u CBC  -supportive care  2. Other issues:   -care per medicine     87 y/o female with h/o COPD, CAD, DM II, depression, CHF, EF 40%, HTN, PVD, PAD, s/p left fem pop bypass on 03/24/21, L thigh infections s/p debridement and muscle flap, PAT, LE DVT on Eliquis,, RA, anemia was admitted on 10/19 for worsening chronic wounds to RLE with new discharge x one day PTA. Daughter states pt has difficulty bearing weight due to wounds. Pt was placed on outpatient PO abx with no relief. Pt was sent to the ED by Dr. Ventura. No fever or chills at home. In ER he received cefepime.     1. RLE cellulitis. Chronic stasis dermatitis with open wounds. PVD s/p prior vascular bypass. RA. Immunocompromised host.  -leukocytosis  -obtain BC x 2  -start vancomycin 750 mg IV q12h and cefepime 1 gm IV q12h  -reason for abx use and side effects reviewed with patient; monitor BMP and vancomycin trough levels  -vascular evaluation  -local wound care  -elevate legs  -old chart reviewed to assess prior cultures  -monitor temps  -f/u CBC  -supportive care  2. Other issues:   -care per medicine

## 2021-10-20 NOTE — DIETITIAN INITIAL EVALUATION ADULT. - ORAL INTAKE PTA/DIET HISTORY
Reports consuming % of meals with good appetite. Eats little carbohydrates, a lot of chicken and vegetable dishes. Has been consuming 1-2 packets of Phillip daily at home.

## 2021-10-20 NOTE — CONSULT NOTE ADULT - SUBJECTIVE AND OBJECTIVE BOX
Patient is a 86y old  Female who presents with a chief complaint of PVD (20 Oct 2021 10:47)    HPI:  87 y/o female with h/o COPD, CAD, DM II, depression, CHF, EF 40%, HTN, PVD, PAD, s/p left fem pop bypass on 21, L thigh infections s/p debridement and muscle flap, PAT, LE DVT on Eliquis,, RA, anemia was admitted on 10/19 for worsening chronic wounds to [facility] with new discharge x one day PTA. Daughter states pt has difficulty bearing weight due to wounds. Pt was placed on outpatient PO abx with no relief. Pt was sent to the ED by Dr. Ventura. No fever or chills at home. In ER he received cefepime.     PAST MEDICAL HISTORY:  Anemia   Arthritis, rheumatoid   CAD (coronary artery disease) non-obstructive  Depression   Hypertension   PVD (peripheral vascular disease) with claudication   Rheumatoid arthritis   Type 2 diabetes mellitus on insulin.     PAST SURGICAL HISTORY:  fracture ankle right plate . screws     Hip fracture requiring operative repair Right hip 2020  S/P cataract surgery   L iliac stent  Rt fem-pop bypass 3/2021  Rt thigh  muscle flap     Meds: per reconciliation sheet, noted below  MEDICATIONS  (STANDING):  aspirin  chewable 81 milliGRAM(s) Oral daily  atorvastatin 40 milliGRAM(s) Oral at bedtime  cefepime  Injectable. 1000 milliGRAM(s) IV Push every 12 hours  dextrose 40% Gel 15 Gram(s) Oral once  dextrose 5%. 1000 milliLiter(s) (50 mL/Hr) IV Continuous <Continuous>  dextrose 50% Injectable 25 Gram(s) IV Push once  folic acid 1 milliGRAM(s) Oral at bedtime  gabapentin 300 milliGRAM(s) Oral three times a day  glucagon  Injectable 1 milliGRAM(s) IntraMuscular once  influenza   Vaccine 0.5 milliLiter(s) IntraMuscular once  insulin glargine Injectable (LANTUS) 5 Unit(s) SubCutaneous at bedtime  insulin lispro (ADMELOG) corrective regimen sliding scale   SubCutaneous three times a day before meals  metoprolol tartrate 25 milliGRAM(s) Oral two times a day  PARoxetine 20 milliGRAM(s) Oral daily  predniSONE   Tablet 5 milliGRAM(s) Oral daily  sacubitril 24 mG/valsartan 26 mG 1 Tablet(s) Oral two times a day  tiotropium 18 MICROgram(s) Capsule 1 Capsule(s) Inhalation daily    MEDICATIONS  (PRN):  ALBUTerol    90 MICROgram(s) HFA Inhaler 2 Puff(s) Inhalation every 6 hours PRN Shortness of Breath  aluminum hydroxide/magnesium hydroxide/simethicone Suspension 30 milliLiter(s) Oral every 4 hours PRN Dyspepsia  melatonin 3 milliGRAM(s) Oral at bedtime PRN Insomnia  ondansetron Injectable 4 milliGRAM(s) IV Push every 8 hours PRN Nausea and/or Vomiting  oxycodone    5 mG/acetaminophen 325 mG 1 Tablet(s) Oral every 6 hours PRN Moderate Pain (4 - 6)    Allergies    cephalosporins (Other)  penicillins (Urticaria; Pruritus)    Intolerances      Social: no smoking, no alcohol, no illegal drugs; no recent travel, no exposure to TB  FAMILY HISTORY:  FH: colon cancer  father  FH: heart attack  father  Family history of atherosclerosis  mother  no history of premature cardiovascular disease in first degree relatives    ROS: the patient denies fever, no chills, no HA, no seizures, no dizziness, no sore throat, no nasal congestion, no blurry vision, no CP, no palpitations, no SOB, no cough, no abdominal pain, no diarrhea, no N/V, no dysuria, no leg pain, no claudication, no rash, no joint aches, no rectal pain or bleeding, no night sweats  All other systems reviewed and are negative    Vital Signs Last 24 Hrs  T(C): 36.3 (20 Oct 2021 08:04), Max: 36.7 (19 Oct 2021 13:00)  T(F): 97.3 (20 Oct 2021 08:04), Max: 98 (19 Oct 2021 13:00)  HR: 63 (20 Oct 2021 08:04) (63 - 83)  BP: 120/38 (20 Oct 2021 08:04) (120/38 - 155/48)  BP(mean): 78 (19 Oct 2021 18:00) (69 - 94)  RR: 18 (20 Oct 2021 08:04) (10 - 18)  SpO2: 97% (20 Oct 2021 08:04) (97% - 99%)  Daily     Daily     PE:    Constitutional:  No acute distress  HEENT: NC/AT, EOMI, PERRLA, conjunctivae clear; ears and nose atraumatic; pharynx benign  Neck: supple; thyroid not palpable  Back: no tenderness  Respiratory: respiratory effort normal; clear to auscultation  Cardiovascular: S1S2 regular, no murmurs  Abdomen: soft, not tender, not distended, positive BS; no liver or spleen organomegaly  Genitourinary: no suprapubic tenderness  Lymphatic: no LN palpable  Musculoskeletal: no muscle tenderness, no joint swelling or tenderness  Extremities: no pedal edema  Rt leg rash, Rt leg open superficial wounds, with serosanguinous discharge and surrounding erythema  Neurological/ Psychiatric: AxOx3, judgement and insight normal; moving all extremities  Skin: no rashes; no palpable lesions    Labs: all available labs reviewed                        8.0    11.66 )-----------( 215      ( 20 Oct 2021 08:00 )             25.5     10-20    138  |  110<H>  |  42<H>  ----------------------------<  173<H>  4.8   |  21<L>  |  1.08    Ca    8.5      20 Oct 2021 08:00    TPro  7.3  /  Alb  2.6<L>  /  TBili  0.2  /  DBili  x   /  AST  9<L>  /  ALT  11<L>  /  AlkPhos  97  10-19     LIVER FUNCTIONS - ( 19 Oct 2021 14:33 )  Alb: 2.6 g/dL / Pro: 7.3 gm/dL / ALK PHOS: 97 U/L / ALT: 11 U/L / AST: 9 U/L / GGT: x           Urinalysis Basic - ( 19 Oct 2021 22:25 )    Color: Yellow / Appearance: Clear / S.020 / pH: x  Gluc: x / Ketone: Negative  / Bili: Negative / Urobili: Negative mg/dL   Blood: x / Protein: 30 mg/dL / Nitrite: Negative   Leuk Esterase: Trace / RBC: 0-2 /HPF / WBC 0-2   Sq Epi: x / Non Sq Epi: Occasional / Bacteria: Occasional          COVID-19 PCR: NotDetec (10-19-21 @ 14:33)          Radiology: all available radiological tests reviewed    Advanced directives addressed: full resuscitation Patient is a 86y old  Female who presents with a chief complaint of PVD (20 Oct 2021 10:47)    HPI:  87 y/o female with h/o COPD, CAD, DM II, depression, CHF, EF 40%, HTN, PVD, PAD, s/p left fem pop bypass on 21, L thigh infections s/p debridement and muscle flap, PAT, LE DVT on Eliquis,, RA, anemia was admitted on 10/19 for worsening chronic wounds to [facility] with new discharge x one day PTA. Daughter states pt has difficulty bearing weight due to wounds. Pt was placed on outpatient PO abx with no relief. Pt was sent to the ED by Dr. Ventura. No fever or chills at home. In ER he received cefepime.     PAST MEDICAL HISTORY:  Anemia   Arthritis, rheumatoid   CAD (coronary artery disease) non-obstructive  Depression   Hypertension   PVD (peripheral vascular disease) with claudication   Rheumatoid arthritis   Type 2 diabetes mellitus on insulin.     PAST SURGICAL HISTORY:  fracture ankle right plate . screws     Hip fracture requiring operative repair Right hip 2020  S/P cataract surgery   L iliac stent  Rt fem-pop bypass 3/2021  Rt thigh  muscle flap     Meds: per reconciliation sheet, noted below  MEDICATIONS  (STANDING):  aspirin  chewable 81 milliGRAM(s) Oral daily  atorvastatin 40 milliGRAM(s) Oral at bedtime  cefepime  Injectable. 1000 milliGRAM(s) IV Push every 12 hours  dextrose 40% Gel 15 Gram(s) Oral once  dextrose 5%. 1000 milliLiter(s) (50 mL/Hr) IV Continuous <Continuous>  dextrose 50% Injectable 25 Gram(s) IV Push once  folic acid 1 milliGRAM(s) Oral at bedtime  gabapentin 300 milliGRAM(s) Oral three times a day  glucagon  Injectable 1 milliGRAM(s) IntraMuscular once  influenza   Vaccine 0.5 milliLiter(s) IntraMuscular once  insulin glargine Injectable (LANTUS) 5 Unit(s) SubCutaneous at bedtime  insulin lispro (ADMELOG) corrective regimen sliding scale   SubCutaneous three times a day before meals  metoprolol tartrate 25 milliGRAM(s) Oral two times a day  PARoxetine 20 milliGRAM(s) Oral daily  predniSONE   Tablet 5 milliGRAM(s) Oral daily  sacubitril 24 mG/valsartan 26 mG 1 Tablet(s) Oral two times a day  tiotropium 18 MICROgram(s) Capsule 1 Capsule(s) Inhalation daily    MEDICATIONS  (PRN):  ALBUTerol    90 MICROgram(s) HFA Inhaler 2 Puff(s) Inhalation every 6 hours PRN Shortness of Breath  aluminum hydroxide/magnesium hydroxide/simethicone Suspension 30 milliLiter(s) Oral every 4 hours PRN Dyspepsia  melatonin 3 milliGRAM(s) Oral at bedtime PRN Insomnia  ondansetron Injectable 4 milliGRAM(s) IV Push every 8 hours PRN Nausea and/or Vomiting  oxycodone    5 mG/acetaminophen 325 mG 1 Tablet(s) Oral every 6 hours PRN Moderate Pain (4 - 6)    Allergies    cephalosporins (Other)  penicillins (Urticaria; Pruritus)    Intolerances      Social: no smoking, no alcohol, no illegal drugs; no recent travel, no exposure to TB  FAMILY HISTORY:  FH: colon cancer  father  FH: heart attack  father  Family history of atherosclerosis  mother  no history of premature cardiovascular disease in first degree relatives    ROS: the patient denies fever, no chills, no HA, no seizures, no dizziness, no sore throat, no nasal congestion, no blurry vision, no CP, no palpitations, no SOB, no cough, no abdominal pain, no diarrhea, no N/V, no dysuria, has right leg pain, no claudication, has legs rash, no joint aches, no rectal pain or bleeding, no night sweats  All other systems reviewed and are negative    Vital Signs Last 24 Hrs  T(C): 36.3 (20 Oct 2021 08:04), Max: 36.7 (19 Oct 2021 13:00)  T(F): 97.3 (20 Oct 2021 08:04), Max: 98 (19 Oct 2021 13:00)  HR: 63 (20 Oct 2021 08:04) (63 - 83)  BP: 120/38 (20 Oct 2021 08:04) (120/38 - 155/48)  BP(mean): 78 (19 Oct 2021 18:00) (69 - 94)  RR: 18 (20 Oct 2021 08:04) (10 - 18)  SpO2: 97% (20 Oct 2021 08:04) (97% - 99%)  Daily     Daily     PE:    Constitutional:  No acute distress  HEENT: NC/AT, EOMI, PERRLA, conjunctivae clear; ears and nose atraumatic; pharynx benign  Neck: supple; thyroid not palpable  Back: no tenderness  Respiratory: respiratory effort normal; clear to auscultation  Cardiovascular: S1S2 regular, no murmurs  Abdomen: soft, not tender, not distended, positive BS; no liver or spleen organomegaly  Genitourinary: no suprapubic tenderness  Lymphatic: no LN palpable  Musculoskeletal: no muscle tenderness, no joint swelling or tenderness  Extremities: no pedal edema  Rt leg rash, Rt lower lateral leg open superficial wounds, with serosanguinous discharge and surrounding erythema  Neurological/ Psychiatric: AxOx3, judgement and insight normal; moving all extremities  Skin: no rashes; no palpable lesions    Labs: all available labs reviewed                        8.0    11.66 )-----------( 215      ( 20 Oct 2021 08:00 )             25.5     10-20    138  |  110<H>  |  42<H>  ----------------------------<  173<H>  4.8   |  21<L>  |  1.08    Ca    8.5      20 Oct 2021 08:00    TPro  7.3  /  Alb  2.6<L>  /  TBili  0.2  /  DBili  x   /  AST  9<L>  /  ALT  11<L>  /  AlkPhos  97  10-19     LIVER FUNCTIONS - ( 19 Oct 2021 14:33 )  Alb: 2.6 g/dL / Pro: 7.3 gm/dL / ALK PHOS: 97 U/L / ALT: 11 U/L / AST: 9 U/L / GGT: x           Urinalysis Basic - ( 19 Oct 2021 22:25 )    Color: Yellow / Appearance: Clear / S.020 / pH: x  Gluc: x / Ketone: Negative  / Bili: Negative / Urobili: Negative mg/dL   Blood: x / Protein: 30 mg/dL / Nitrite: Negative   Leuk Esterase: Trace / RBC: 0-2 /HPF / WBC 0-2   Sq Epi: x / Non Sq Epi: Occasional / Bacteria: Occasional          COVID-19 PCR: NotDetec (10-19-21 @ 14:33)          Radiology: all available radiological tests reviewed    Advanced directives addressed: full resuscitation

## 2021-10-20 NOTE — CONSULT NOTE ADULT - SUBJECTIVE AND OBJECTIVE BOX
PCP:    REQUESTING PHYSICIAN:  Dr. Heck    REASON FOR CONSULT: Leg pain, hx or RA    CHIEF COMPLAINT:    HPI:  85 y/o female with a PMHx of COPD,  CAD, DM II, depression, Chf, EF 40%,  HTN, PVD, PAD, s/p left fem pop bypass on 03/24/21, L thigh infections s/p debridement and muscle flap, PAT, LE DVT on Eliquis,, RA, anemia, presents to the ED c/o worsening chronic wounds to RLE with discharge since last night. Daughter states pt has difficulty bearing weight due to wounds. Pt was placed on outpatient PO abx with no relief. Pt was sent to the ED by Dr. Ventura for iv abx.  -denies fever/chills, no n/v/d, no coughing      PAST MEDICAL HISTORY:  Anemia   Arthritis, rheumatoid   CAD (coronary artery disease) non-obstructive  Depression   Hypertension   PVD (peripheral vascular disease) with claudication   Rheumatoid arthritis   Type 2 diabetes mellitus on insulin.     PAST SURGICAL HISTORY:  fracture ankle right plate . screws   2000  Hip fracture requiring operative repair Right hip 11/14/2020  S/P cataract surgery 2011  L iliac stent  Rt fem-pop bypass 3/2021  Rt thigh  muscle flap     FAMILY HISTORY:  Family history of atherosclerosis, mother  FH: colon cancer, father  FH: heart attack, father.    Social HIstory:   no smoking, no Etoh    Meds:  Reviewed    ROS:  Negative except as above    PHYSICAL EXAM:    GENERAL: NAD, Well nourished  HEENT:  NC/AT, EOMI, PERRLA, No scleral icterus, Moist mucous membranes  NECK: Supple, No JVD  CNS:  Alert & Oriented X3, Motor Strength 5/5 B/L upper and lower extremities; DTRs 2+ intact   LUNG: Normal Breath sounds, Clear to auscultation bilaterally, No rales, No rhonchi, No wheezing  HEART: RRR; No murmurs, No rubs  ABDOMEN: +BS, ST/ND/NT  GENITOURINARY: Voiding, Bladder not distended  EXTREMITIES:  2+ Peripheral Pulses, No clubbing, No cyanosis, No tibial edema  MUSCULOSKELTAL: Joints normal ROM, No TTP, No effusion  VAGINAL: deferred  SKIN: +mild Rt leg rashe, Rt leg open superficial wounds, with serosanguinous discharge and surrounding erythema  RECTAL: deferred, not indicated  BREAST: deferred                          8.9    16.60 )-----------( 258      ( 19 Oct 2021 14:33 )             28.5     10-19    136  |  108  |  37<H>  ----------------------------<  251<H>  5.3   |  25  |  1.12    Ca    8.9      19 Oct 2021 14:33    TPro  7.3  /  Alb  2.6<L>  /  TBili  0.2  /  DBili  x   /  AST  9<L>  /  ALT  11<L>  /  AlkPhos  97  10-19    Vancomycin levels:   Cultures:     MEDICATIONS  (STANDING):  aspirin  chewable 81 milliGRAM(s) Oral daily  atorvastatin 40 milliGRAM(s) Oral at bedtime  cefTRIAXone   IVPB 1000 milliGRAM(s) IV Intermittent every 24 hours  dextrose 40% Gel 15 Gram(s) Oral once  dextrose 5%. 1000 milliLiter(s) (50 mL/Hr) IV Continuous <Continuous>  dextrose 50% Injectable 25 Gram(s) IV Push once  folic acid 1 milliGRAM(s) Oral at bedtime  gabapentin 300 milliGRAM(s) Oral three times a day  glucagon  Injectable 1 milliGRAM(s) IntraMuscular once  insulin glargine Injectable (LANTUS) 5 Unit(s) SubCutaneous at bedtime  insulin lispro (ADMELOG) corrective regimen sliding scale   SubCutaneous three times a day before meals  metoprolol tartrate 25 milliGRAM(s) Oral two times a day  PARoxetine 20 milliGRAM(s) Oral daily  sacubitril 24 mG/valsartan 26 mG 1 Tablet(s) Oral two times a day  tiotropium 18 MICROgram(s) Capsule 1 Capsule(s) Inhalation daily    MEDICATIONS  (PRN):  ALBUTerol    90 MICROgram(s) HFA Inhaler 2 Puff(s) Inhalation every 6 hours PRN Shortness of Breath  aluminum hydroxide/magnesium hydroxide/simethicone Suspension 30 milliLiter(s) Oral every 4 hours PRN Dyspepsia  melatonin 3 milliGRAM(s) Oral at bedtime PRN Insomnia  ondansetron Injectable 4 milliGRAM(s) IV Push every 8 hours PRN Nausea and/or Vomiting  oxycodone    5 mG/acetaminophen 325 mG 1 Tablet(s) Oral every 6 hours PRN Moderate Pain (4 - 6)  REVIEW OF SYSTEMS:    Vital Signs Last 24 Hrs  T(C): 36.3 (20 Oct 2021 08:04), Max: 36.7 (19 Oct 2021 13:00)  T(F): 97.3 (20 Oct 2021 08:04), Max: 98 (19 Oct 2021 13:00)  HR: 63 (20 Oct 2021 08:04) (63 - 83)  BP: 120/38 (20 Oct 2021 08:04) (120/38 - 155/48)  BP(mean): 78 (19 Oct 2021 18:00) (69 - 94)  RR: 18 (20 Oct 2021 08:04) (10 - 18)  SpO2: 97% (20 Oct 2021 08:04) (97% - 99%)    I&O's Summary    19 Oct 2021 07:01  -  20 Oct 2021 07:00  --------------------------------------------------------  IN: 0 mL / OUT: 200 mL / NET: -200 mL        CAPILLARY BLOOD GLUCOSE      POCT Blood Glucose.: 187 mg/dL (20 Oct 2021 07:41)  POCT Blood Glucose.: 299 mg/dL (19 Oct 2021 22:19)  POCT Blood Glucose.: 299 mg/dL (19 Oct 2021 18:28)      PHYSICAL EXAM:    HEENT- no oral lesions noted, no lymphadenoapthy noted  Heart- S1 S2 reg  Lungs- CTA b/l  Abd- Soft NT  Ext- no edema  Skin- no rashes  Musculoskelatal- FROM all joints, no active synovitis noted  Neurological- intact strength upper and lower extremities    MEDICATIONS:  MEDICATIONS  (STANDING):  aspirin  chewable 81 milliGRAM(s) Oral daily  atorvastatin 40 milliGRAM(s) Oral at bedtime  cefepime  Injectable. 1000 milliGRAM(s) IV Push every 12 hours  dextrose 40% Gel 15 Gram(s) Oral once  dextrose 5%. 1000 milliLiter(s) (50 mL/Hr) IV Continuous <Continuous>  dextrose 50% Injectable 25 Gram(s) IV Push once  folic acid 1 milliGRAM(s) Oral at bedtime  gabapentin 300 milliGRAM(s) Oral three times a day  glucagon  Injectable 1 milliGRAM(s) IntraMuscular once  influenza   Vaccine 0.5 milliLiter(s) IntraMuscular once  insulin glargine Injectable (LANTUS) 5 Unit(s) SubCutaneous at bedtime  insulin lispro (ADMELOG) corrective regimen sliding scale   SubCutaneous three times a day before meals  metoprolol tartrate 25 milliGRAM(s) Oral two times a day  PARoxetine 20 milliGRAM(s) Oral daily  sacubitril 24 mG/valsartan 26 mG 1 Tablet(s) Oral two times a day  tiotropium 18 MICROgram(s) Capsule 1 Capsule(s) Inhalation daily      LABS: All Labs Reviewed:                        8.0    11.66 )-----------( 215      ( 20 Oct 2021 08:00 )             25.5     10-20    138  |  110<H>  |  42<H>  ----------------------------<  173<H>  4.8   |  21<L>  |  1.08    Ca    8.5      20 Oct 2021 08:00    TPro  7.3  /  Alb  2.6<L>  /  TBili  0.2  /  DBili  x   /  AST  9<L>  /  ALT  11<L>  /  AlkPhos  97  10-19    PT/INR - ( 19 Oct 2021 14:33 )   PT: 12.4 sec;   INR: 1.06 ratio         PTT - ( 19 Oct 2021 14:33 )  PTT:28.1 sec      Blood Culture:     RADIOLOGY/EKG:    A/P PCP:    REQUESTING PHYSICIAN:  Dr. Heck    REASON FOR CONSULT: Leg pain, hx or RA    CHIEF COMPLAINT:    HPI:  85 y/o female with a PMHx of COPD,  CAD, DM II, depression, Chf, EF 40%,  HTN, PVD, PAD, s/p left fem pop bypass on 03/24/21, L thigh infections s/p debridement and muscle flap, PAT, LE DVT on Eliquis,, RA, anemia, presents to the ED c/o worsening chronic wounds to RLE with discharge since last night. Daughter states pt has difficulty bearing weight due to wounds. Pt was placed on outpatient PO abx with no relief. Pt was sent to the ED by Dr. Ventura for iv abx.  -denies fever/chills, no n/v/d, no coughing    Patient sees Dr. Naidu for outpatient rheum.  States she is on MTX and prednisone 5mg daily for the past 2 weeks.  She took her last dose of MTX 15mg q week on Friday 10/15/21.  Her RA is typically apparent in her hands, wrists, sometimes shoulders, and sometimes her right knee, she reports.  She currently feels her RA joints are quiescent.        PAST MEDICAL HISTORY:  Anemia   Arthritis, rheumatoid   CAD (coronary artery disease) non-obstructive  Depression   Hypertension   PVD (peripheral vascular disease) with claudication   Rheumatoid arthritis   Type 2 diabetes mellitus on insulin.     PAST SURGICAL HISTORY:  fracture ankle right plate . screws   2000  Hip fracture requiring operative repair Right hip 11/14/2020  S/P cataract surgery 2011  L iliac stent  Rt fem-pop bypass 3/2021  Rt thigh  muscle flap     FAMILY HISTORY:  Family history of atherosclerosis, mother  FH: colon cancer, father  FH: heart attack, father.    Social HIstory:   no smoking, no Etoh    Meds:  Reviewed    ROS:  Negative except as above    PHYSICAL EXAM:    GENERAL: NAD, Well nourished  HEENT:  NC/AT, EOMI, PERRLA, No scleral icterus, Moist mucous membranes  NECK: Supple, No JVD  CNS:  Alert & Oriented X3, Motor Strength 5/5 B/L upper and lower extremities; DTRs 2+ intact   LUNG: Normal Breath sounds, Clear to auscultation bilaterally, No rales, No rhonchi, No wheezing  HEART: RRR; No murmurs, No rubs  ABDOMEN: +BS, ST/ND/NT  GENITOURINARY: Voiding, Bladder not distended  EXTREMITIES:  2+ Peripheral Pulses, No clubbing, No cyanosis, No tibial edema  MUSCULOSKELTAL: RA changes seen in hand, with ulnar deviation.  She has no active synovitis or tender joints at present.    VAGINAL: deferred  SKIN:  Rt leg open superficial wounds, with serosanguinous discharge,  and surrounding erythema  RECTAL: deferred, not indicated  BREAST: deferred                          8.9    16.60 )-----------( 258      ( 19 Oct 2021 14:33 )             28.5     10-19    136  |  108  |  37<H>  ----------------------------<  251<H>  5.3   |  25  |  1.12    Ca    8.9      19 Oct 2021 14:33    TPro  7.3  /  Alb  2.6<L>  /  TBili  0.2  /  DBili  x   /  AST  9<L>  /  ALT  11<L>  /  AlkPhos  97  10-19    Vancomycin levels:   Cultures:     MEDICATIONS  (STANDING):  aspirin  chewable 81 milliGRAM(s) Oral daily  atorvastatin 40 milliGRAM(s) Oral at bedtime  cefTRIAXone   IVPB 1000 milliGRAM(s) IV Intermittent every 24 hours  dextrose 40% Gel 15 Gram(s) Oral once  dextrose 5%. 1000 milliLiter(s) (50 mL/Hr) IV Continuous <Continuous>  dextrose 50% Injectable 25 Gram(s) IV Push once  folic acid 1 milliGRAM(s) Oral at bedtime  gabapentin 300 milliGRAM(s) Oral three times a day  glucagon  Injectable 1 milliGRAM(s) IntraMuscular once  insulin glargine Injectable (LANTUS) 5 Unit(s) SubCutaneous at bedtime  insulin lispro (ADMELOG) corrective regimen sliding scale   SubCutaneous three times a day before meals  metoprolol tartrate 25 milliGRAM(s) Oral two times a day  PARoxetine 20 milliGRAM(s) Oral daily  sacubitril 24 mG/valsartan 26 mG 1 Tablet(s) Oral two times a day  tiotropium 18 MICROgram(s) Capsule 1 Capsule(s) Inhalation daily    MEDICATIONS  (PRN):  ALBUTerol    90 MICROgram(s) HFA Inhaler 2 Puff(s) Inhalation every 6 hours PRN Shortness of Breath  aluminum hydroxide/magnesium hydroxide/simethicone Suspension 30 milliLiter(s) Oral every 4 hours PRN Dyspepsia  melatonin 3 milliGRAM(s) Oral at bedtime PRN Insomnia  ondansetron Injectable 4 milliGRAM(s) IV Push every 8 hours PRN Nausea and/or Vomiting  oxycodone    5 mG/acetaminophen 325 mG 1 Tablet(s) Oral every 6 hours PRN Moderate Pain (4 - 6)  REVIEW OF SYSTEMS:    Vital Signs Last 24 Hrs  T(C): 36.3 (20 Oct 2021 08:04), Max: 36.7 (19 Oct 2021 13:00)  T(F): 97.3 (20 Oct 2021 08:04), Max: 98 (19 Oct 2021 13:00)  HR: 63 (20 Oct 2021 08:04) (63 - 83)  BP: 120/38 (20 Oct 2021 08:04) (120/38 - 155/48)  BP(mean): 78 (19 Oct 2021 18:00) (69 - 94)  RR: 18 (20 Oct 2021 08:04) (10 - 18)  SpO2: 97% (20 Oct 2021 08:04) (97% - 99%)    I&O's Summary    19 Oct 2021 07:01  -  20 Oct 2021 07:00  --------------------------------------------------------  IN: 0 mL / OUT: 200 mL / NET: -200 mL        CAPILLARY BLOOD GLUCOSE      POCT Blood Glucose.: 187 mg/dL (20 Oct 2021 07:41)  POCT Blood Glucose.: 299 mg/dL (19 Oct 2021 22:19)  POCT Blood Glucose.: 299 mg/dL (19 Oct 2021 18:28)      PHYSICAL EXAM:    HEENT- no oral lesions noted, no lymphadenoapthy noted  Heart- S1 S2 reg  Lungs- CTA b/l  Abd- Soft NT  Ext- no edema  Skin- no rashes  Musculoskelatal- FROM all joints, no active synovitis noted  Neurological- intact strength upper and lower extremities    MEDICATIONS:  MEDICATIONS  (STANDING):  aspirin  chewable 81 milliGRAM(s) Oral daily  atorvastatin 40 milliGRAM(s) Oral at bedtime  cefepime  Injectable. 1000 milliGRAM(s) IV Push every 12 hours  dextrose 40% Gel 15 Gram(s) Oral once  dextrose 5%. 1000 milliLiter(s) (50 mL/Hr) IV Continuous <Continuous>  dextrose 50% Injectable 25 Gram(s) IV Push once  folic acid 1 milliGRAM(s) Oral at bedtime  gabapentin 300 milliGRAM(s) Oral three times a day  glucagon  Injectable 1 milliGRAM(s) IntraMuscular once  influenza   Vaccine 0.5 milliLiter(s) IntraMuscular once  insulin glargine Injectable (LANTUS) 5 Unit(s) SubCutaneous at bedtime  insulin lispro (ADMELOG) corrective regimen sliding scale   SubCutaneous three times a day before meals  metoprolol tartrate 25 milliGRAM(s) Oral two times a day  PARoxetine 20 milliGRAM(s) Oral daily  sacubitril 24 mG/valsartan 26 mG 1 Tablet(s) Oral two times a day  tiotropium 18 MICROgram(s) Capsule 1 Capsule(s) Inhalation daily      LABS: All Labs Reviewed:                        8.0    11.66 )-----------( 215      ( 20 Oct 2021 08:00 )             25.5     10-20    138  |  110<H>  |  42<H>  ----------------------------<  173<H>  4.8   |  21<L>  |  1.08    Ca    8.5      20 Oct 2021 08:00    TPro  7.3  /  Alb  2.6<L>  /  TBili  0.2  /  DBili  x   /  AST  9<L>  /  ALT  11<L>  /  AlkPhos  97  10-19    PT/INR - ( 19 Oct 2021 14:33 )   PT: 12.4 sec;   INR: 1.06 ratio         PTT - ( 19 Oct 2021 14:33 )  PTT:28.1 sec    ESR 95     Blood Culture:     RADIOLOGY/EKG:    A/P

## 2021-10-20 NOTE — CONSULT NOTE ADULT - ASSESSMENT
85 y/o female with a PMHx of COPD,  CAD, DM II, depression, Chf, EF 40%,  HTN, PVD, PAD, s/p left fem pop bypass on 03/24/21, L thigh infections s/p debridement and muscle flap, PAT, LE DVT on Eliquis,, RA, anemia, presents to the ED c/o worsening chronic wounds to RLE with discharge since last night. Daughter states pt has difficulty bearing weight due to wounds. Pt was placed on outpatient PO abx with no relief. Pt was sent to the ED by Dr. Ventura for iv abx.    Patient's RA appears stable/quiescent      Plan:  -Last dose MTX 10/15/21. Would hold dose due next friday given her cellulitis.    -Cont prednisone 5mg daily for now.  Will place order.  If RA starts to flare in her typical joints (hands, wrists, shoulders), would increase to 10mg daily x 3 days, then back to 5mg daily thereafter.    -Monitor glucose  -Patient may restart MTX once her cellulitis is resolved.    -Management of cellulitis/wounds per primary team  -Please call me if you have any further questions.    -She should follow up with Dr. Naidu within 1-2 weeks of discharge.

## 2021-10-20 NOTE — DIETITIAN INITIAL EVALUATION ADULT. - MALNUTRITION
Pt meets criteria for moderate malnutrition in context of acute illness Pt meets criteria for moderate malnutrition in context of acute illness r/t decreased ability to meet increased nutrient needs 2/2 cellulitis and PU AEB mild to moderate muscle/ fat wasting, moderate edema

## 2021-10-20 NOTE — DIETITIAN INITIAL EVALUATION ADULT. - OTHER INFO
87 y/o female with a PMHx of COPD,  CAD, DM II, depression, Chf, EF 40%,  HTN, PVD, PAD, s/p left fem pop bypass on 03/24/21, L thigh infections s/p debridement and muscle flap, PAT, LE DVT on Eliquis,, RA, anemia, presents to the ED c/o worsening chronic wounds to RLE with discharge since last night. Daughter states pt has difficulty bearing weight due to wounds.    Pt lives w/ daughter who she states is a doctor at . States she ate 100% of breakfast today. With some moderate and mild upper body wasting. States  lbs, c/w bed scale wt obtained by RD 10/20/21 at 73 kg. Pt with mild to moderate fluid retention skewing wt and appearance. POCT elevated, no HbA1C level available - pt on consistent carbohydrate diet, recommend to continue. Will trial gelatein TID 2/2 to stage 2 PU and glucerna BID. See recommendations below.

## 2021-10-20 NOTE — PROVIDER CONTACT NOTE (OTHER) - SITUATION
notified Dr Brown's office of consult
unable to notified Dr Huang office admission please fax over d.c paperwork to [phone] once pt is d.c
notified Dr Maza's office of consult Spoke to Ester

## 2021-10-21 LAB
ANION GAP SERPL CALC-SCNC: 6 MMOL/L — SIGNIFICANT CHANGE UP (ref 5–17)
BUN SERPL-MCNC: 47 MG/DL — HIGH (ref 7–23)
CALCIUM SERPL-MCNC: 7.9 MG/DL — LOW (ref 8.5–10.1)
CHLORIDE SERPL-SCNC: 111 MMOL/L — HIGH (ref 96–108)
CO2 SERPL-SCNC: 21 MMOL/L — LOW (ref 22–31)
CREAT SERPL-MCNC: 0.99 MG/DL — SIGNIFICANT CHANGE UP (ref 0.5–1.3)
CULTURE RESULTS: SIGNIFICANT CHANGE UP
GLUCOSE SERPL-MCNC: 96 MG/DL — SIGNIFICANT CHANGE UP (ref 70–99)
POTASSIUM SERPL-MCNC: 4.6 MMOL/L — SIGNIFICANT CHANGE UP (ref 3.5–5.3)
POTASSIUM SERPL-SCNC: 4.6 MMOL/L — SIGNIFICANT CHANGE UP (ref 3.5–5.3)
SODIUM SERPL-SCNC: 138 MMOL/L — SIGNIFICANT CHANGE UP (ref 135–145)
SPECIMEN SOURCE: SIGNIFICANT CHANGE UP

## 2021-10-21 PROCEDURE — 99232 SBSQ HOSP IP/OBS MODERATE 35: CPT

## 2021-10-21 PROCEDURE — 75635 CT ANGIO ABDOMINAL ARTERIES: CPT | Mod: 26

## 2021-10-21 RX ADMIN — GABAPENTIN 300 MILLIGRAM(S): 400 CAPSULE ORAL at 21:47

## 2021-10-21 RX ADMIN — Medication 1 MILLIGRAM(S): at 21:47

## 2021-10-21 RX ADMIN — CEFEPIME 1000 MILLIGRAM(S): 1 INJECTION, POWDER, FOR SOLUTION INTRAMUSCULAR; INTRAVENOUS at 21:51

## 2021-10-21 RX ADMIN — INFLUENZA VIRUS VACCINE 0.5 MILLILITER(S): 15; 15; 15; 15 SUSPENSION INTRAMUSCULAR at 13:30

## 2021-10-21 RX ADMIN — Medication 20 MILLIGRAM(S): at 10:45

## 2021-10-21 RX ADMIN — Medication 4: at 12:02

## 2021-10-21 RX ADMIN — Medication 25 MILLIGRAM(S): at 10:45

## 2021-10-21 RX ADMIN — SODIUM CHLORIDE 75 MILLILITER(S): 9 INJECTION INTRAMUSCULAR; INTRAVENOUS; SUBCUTANEOUS at 17:25

## 2021-10-21 RX ADMIN — SODIUM CHLORIDE 75 MILLILITER(S): 9 INJECTION INTRAMUSCULAR; INTRAVENOUS; SUBCUTANEOUS at 03:03

## 2021-10-21 RX ADMIN — Medication 25 MILLIGRAM(S): at 21:47

## 2021-10-21 RX ADMIN — SACUBITRIL AND VALSARTAN 1 TABLET(S): 24; 26 TABLET, FILM COATED ORAL at 21:47

## 2021-10-21 RX ADMIN — INSULIN GLARGINE 10 UNIT(S): 100 INJECTION, SOLUTION SUBCUTANEOUS at 21:48

## 2021-10-21 RX ADMIN — TIOTROPIUM BROMIDE 1 CAPSULE(S): 18 CAPSULE ORAL; RESPIRATORY (INHALATION) at 07:56

## 2021-10-21 RX ADMIN — Medication 250 MILLIGRAM(S): at 10:44

## 2021-10-21 RX ADMIN — Medication 81 MILLIGRAM(S): at 10:45

## 2021-10-21 RX ADMIN — ENOXAPARIN SODIUM 40 MILLIGRAM(S): 100 INJECTION SUBCUTANEOUS at 10:44

## 2021-10-21 RX ADMIN — GABAPENTIN 300 MILLIGRAM(S): 400 CAPSULE ORAL at 05:51

## 2021-10-21 RX ADMIN — Medication 4: at 16:56

## 2021-10-21 RX ADMIN — GABAPENTIN 300 MILLIGRAM(S): 400 CAPSULE ORAL at 13:33

## 2021-10-21 RX ADMIN — Medication 250 MILLIGRAM(S): at 21:49

## 2021-10-21 RX ADMIN — CEFEPIME 1000 MILLIGRAM(S): 1 INJECTION, POWDER, FOR SOLUTION INTRAMUSCULAR; INTRAVENOUS at 10:44

## 2021-10-21 RX ADMIN — OXYCODONE AND ACETAMINOPHEN 1 TABLET(S): 5; 325 TABLET ORAL at 21:47

## 2021-10-21 RX ADMIN — ATORVASTATIN CALCIUM 40 MILLIGRAM(S): 80 TABLET, FILM COATED ORAL at 21:47

## 2021-10-21 RX ADMIN — Medication 5 MILLIGRAM(S): at 10:45

## 2021-10-21 RX ADMIN — SACUBITRIL AND VALSARTAN 1 TABLET(S): 24; 26 TABLET, FILM COATED ORAL at 10:46

## 2021-10-21 RX ADMIN — OXYCODONE AND ACETAMINOPHEN 1 TABLET(S): 5; 325 TABLET ORAL at 05:51

## 2021-10-21 NOTE — PROGRESS NOTE ADULT - SUBJECTIVE AND OBJECTIVE BOX
afebrile, VSS    feels slightly better, no R foot pain; continued posterior calf discomfort  WBC now 11K, down from 16K  Cr normal    will obtain CTA, continue hydration    MEDICATIONS  (STANDING):  aspirin  chewable 81 milliGRAM(s) Oral daily  atorvastatin 40 milliGRAM(s) Oral at bedtime  cefepime  Injectable. 1000 milliGRAM(s) IV Push every 12 hours  dextrose 40% Gel 15 Gram(s) Oral once  dextrose 5%. 1000 milliLiter(s) (50 mL/Hr) IV Continuous <Continuous>  dextrose 50% Injectable 25 Gram(s) IV Push once  enoxaparin Injectable 40 milliGRAM(s) SubCutaneous daily  folic acid 1 milliGRAM(s) Oral at bedtime  gabapentin 300 milliGRAM(s) Oral three times a day  glucagon  Injectable 1 milliGRAM(s) IntraMuscular once  influenza   Vaccine 0.5 milliLiter(s) IntraMuscular once  insulin glargine Injectable (LANTUS) 10 Unit(s) SubCutaneous at bedtime  insulin lispro (ADMELOG) corrective regimen sliding scale   SubCutaneous three times a day before meals  metoprolol tartrate 25 milliGRAM(s) Oral two times a day  PARoxetine 20 milliGRAM(s) Oral daily  predniSONE   Tablet 5 milliGRAM(s) Oral daily  sacubitril 24 mG/valsartan 26 mG 1 Tablet(s) Oral two times a day  sodium chloride 0.9%. 1000 milliLiter(s) (75 mL/Hr) IV Continuous <Continuous>  tiotropium 18 MICROgram(s) Capsule 1 Capsule(s) Inhalation daily  vancomycin  IVPB 750 milliGRAM(s) IV Intermittent every 12 hours    MEDICATIONS  (PRN):  ALBUTerol    90 MICROgram(s) HFA Inhaler 2 Puff(s) Inhalation every 6 hours PRN Shortness of Breath  aluminum hydroxide/magnesium hydroxide/simethicone Suspension 30 milliLiter(s) Oral every 4 hours PRN Dyspepsia  melatonin 3 milliGRAM(s) Oral at bedtime PRN Insomnia  ondansetron Injectable 4 milliGRAM(s) IV Push every 8 hours PRN Nausea and/or Vomiting  oxycodone    5 mG/acetaminophen 325 mG 1 Tablet(s) Oral every 6 hours PRN Moderate Pain (4 - 6)      Allergies    cephalosporins (Other)  penicillins (Urticaria; Pruritus)    Intolerances        Flatus: [ ] YES [ ] NO             Bowel Movement: [ ] YES [ ] NO  Pain (0-10):            Pain Control Adequate: [ ] YES [ ] NO  Nausea: [ ] YES [ ] NO            Vomiting: [ ] YES [ ] NO  Diarrhea: [ ] YES [ ] NO         Constipation: [ ] YES [ ] NO     Chest Pain: [ ] YES [ ] NO    SOB:  [ ] YES [ ] NO    Vital Signs Last 24 Hrs  T(C): 36.3 (20 Oct 2021 21:32), Max: 36.6 (20 Oct 2021 15:12)  T(F): 97.4 (20 Oct 2021 21:32), Max: 97.8 (20 Oct 2021 15:12)  HR: 77 (20 Oct 2021 21:32) (63 - 79)  BP: 114/36 (20 Oct 2021 21:32) (112/39 - 120/38)  BP(mean): --  RR: 18 (20 Oct 2021 21:32) (18 - 18)  SpO2: 97% (20 Oct 2021 21:32) (96% - 97%)    I&O's Summary    20 Oct 2021 07:01  -  21 Oct 2021 07:00  --------------------------------------------------------  IN: 1000 mL / OUT: 750 mL / NET: 250 mL        Physical Exam:  General: NAD, resting comfortably  Pulmonary: normal resp effort, CTA-B  Cardiovascular: NSR  Abdominal: soft, NT/ND  Extremities: WWP, normal strength  Neuro: A/O x 3, CNs II-XII grossly intact, normal motor/sensation, no focal deficits  Pulses:   Right:                                                                          Left:  FEM [ ]2+ [ ]1+ [ ]doppler                                             FEM [ ]2+ [ ]1+ [ ]doppler    POP [ ]2+ [ ]1+ [ ]doppler                                             POP [ ]2+ [ ]1+ [ ]doppler    DP [ ]2+ [ ]1+ [ ]doppler                                                DP [ ]2+ [ ]1+ [ ]doppler  PT[ ]2+ [ ]1+ [ ]doppler                                                  PT [ ]2+ [ ]1+ [ ]doppler    LABS:                        8.0    11.66 )-----------( 215      ( 20 Oct 2021 08:00 )             25.5     10-20    138  |  110<H>  |  42<H>  ----------------------------<  173<H>  4.8   |  21<L>  |  1.08    Ca    8.5      20 Oct 2021 08:00    TPro  7.3  /  Alb  2.6<L>  /  TBili  0.2  /  DBili  x   /  AST  9<L>  /  ALT  11<L>  /  AlkPhos  97  10-19    PT/INR - ( 19 Oct 2021 14:33 )   PT: 12.4 sec;   INR: 1.06 ratio         PTT - ( 19 Oct 2021 14:33 )  PTT:28.1 sec  Urinalysis Basic - ( 19 Oct 2021 22:25 )    Color: Yellow / Appearance: Clear / S.020 / pH: x  Gluc: x / Ketone: Negative  / Bili: Negative / Urobili: Negative mg/dL   Blood: x / Protein: 30 mg/dL / Nitrite: Negative   Leuk Esterase: Trace / RBC: 0-2 /HPF / WBC 0-2   Sq Epi: x / Non Sq Epi: Occasional / Bacteria: Occasional      LIVER FUNCTIONS - ( 19 Oct 2021 14:33 )  Alb: 2.6 g/dL / Pro: 7.3 gm/dL / ALK PHOS: 97 U/L / ALT: 11 U/L / AST: 9 U/L / GGT: x           CAPILLARY BLOOD GLUCOSE      POCT Blood Glucose.: 273 mg/dL (20 Oct 2021 21:42)  POCT Blood Glucose.: 209 mg/dL (20 Oct 2021 17:06)  POCT Blood Glucose.: 292 mg/dL (20 Oct 2021 11:20)  POCT Blood Glucose.: 187 mg/dL (20 Oct 2021 07:41)      RADIOLOGY & ADDITIONAL TESTS:

## 2021-10-21 NOTE — PHYSICAL THERAPY INITIAL EVALUATION ADULT - MODALITIES TREATMENT COMMENTS
pt left seated in recliner post Eval; chair alarm donned; IV intact; LE's elevated with heels unloaded on pillow; callbell in reach; pt instructed not to get up alone; call nursing for assist; nora well; denied pain; RN Nikia made aware pt OOB

## 2021-10-21 NOTE — PROGRESS NOTE ADULT - SUBJECTIVE AND OBJECTIVE BOX
History of Present Illness:   85 y/o female with a PMHx of COPD,  CAD, DM II, depression, Chf, EF 40%,  HTN, PVD, PAD, s/p left fem pop bypass on 03/24/21, L thigh infections s/p debridement and muscle flap, PAT, LE DVT on Eliquis,, RA, anemia, presents to the ED c/o worsening chronic wounds to RLE with discharge since last night. Daughter states pt has difficulty bearing weight due to wounds. Pt was placed on outpatient PO abx with no relief. Pt was sent to the ED by Dr. Ventura for iv abx.  -denies fever/chills, no n/v/d, no coughing    10.20: no distress, no cp, no sob, pain in her leg is better today  10.21: +pain in the left Heel out of proportion with physical findings in that area    REVIEW OF SYSTEMS:    CONSTITUTIONAL: No weakness, No fevers or chills  ENT: No ear ache, No sorethroat  NECK: No pain, No stiffness  RESPIRATORY: No cough, No wheezing, No hemoptysis; No dyspnea  CARDIOVASCULAR: No chest pain, No palpitations  GASTROINTESTINAL: No abd pain, No nausea, No vomiting, No hematemesis, No diarrhea or constipation. No melena, No hematochezia.  GENITOURINARY: No dysuria, No  hematuria  NEUROLOGICAL: No diplopia, No paresthesia, No motor dysfunction  MUSCULOSKELETAL: No arthralgia, + myalgia  SKIN: No rashes, or lesions   PSYCH: no anxiety, no suicidal ideation    Vital Signs Last 24 Hrs  T(C): 36.9 (21 Oct 2021 15:50), Max: 36.9 (21 Oct 2021 15:50)  T(F): 98.4 (21 Oct 2021 15:50), Max: 98.4 (21 Oct 2021 15:50)  HR: 74 (21 Oct 2021 15:50) (72 - 79)  BP: 132/45 (21 Oct 2021 15:50) (114/36 - 132/45)  RR: 18 (21 Oct 2021 15:50) (18 - 18)  SpO2: 96% (21 Oct 2021 15:50) (92% - 97%)    PHYSICAL EXAM:    GENERAL: NAD, Well nourished  HEENT:  NC/AT, EOMI, PERRLA, No scleral icterus, Moist mucous membranes  NECK: Supple, No JVD  CNS:  Alert & Oriented X3, Motor Strength 5/5 B/L upper and lower extremities; DTRs 2+ intact   LUNG: Normal Breath sounds, Clear to auscultation bilaterally, No rales, No rhonchi, No wheezing  HEART: RRR; No murmurs, No rubs  ABDOMEN: +BS, ST/ND/NT  GENITOURINARY: Voiding, Bladder not distended  EXTREMITIES:  2+ Peripheral Pulses, No clubbing, No cyanosis, No tibial edema  MUSCULOSKELTAL: Joints normal ROM, No TTP, No effusion  VAGINAL: deferred  SKIN: +mild Rt leg rashe, Rt leg open superficial wounds, with serosanguinous discharge and surrounding erythema  RECTAL: deferred, not indicated  BREAST: deferred    Labs:                        8.0    11.66 )-----------( 215      ( 20 Oct 2021 08:00 )             25.5     10-20    138  |  110<H>  |  42<H>  ----------------------------<  173<H>  4.8   |  21<L>  |  1.08    Ca    8.5      20 Oct 2021 08:00    TPro  7.3  /  Alb  2.6<L>  /  TBili  0.2  /  DBili  x   /  AST  9<L>  /  ALT  11<L>  /  AlkPhos  97  10-19           Cultures:     a/p:    1. Rt leg cellulitis:  Cefepime day#3  Blood Cx x2 ngtd  ID evaluation noted: c/w abx x 5-7days;    2. PVD:  s/p Left iliac angioplasty   Vascular surg evaluation noted:  CTA done  favor conservative management     3. h/o PAT:  will give gentle hydration in anticipation of CTA or angiogram   NS at 60/hr x 48hrs    4. DM II; stable    5. COPD:   stable, c/w inhalers     6. Chronic systolic Chf:  stable, c/w Entresto  EF 40%    7. RA :   Rheumatology eval noted; Prednisone 5mg/day  hold MTX while on acute cellulitis treatment

## 2021-10-21 NOTE — PROGRESS NOTE ADULT - SUBJECTIVE AND OBJECTIVE BOX
Date of service: 10-21-21 @ 11:21    Lying in bed in NAD  Weak looking  Has right ankle and heel tenderness    ROS: no fever or chills; denies dizziness, no HA, no SOB or cough, no abdominal pain, no diarrhea or constipation; no dysuria    MEDICATIONS  (STANDING):  aspirin  chewable 81 milliGRAM(s) Oral daily  atorvastatin 40 milliGRAM(s) Oral at bedtime  cefepime  Injectable. 1000 milliGRAM(s) IV Push every 12 hours  dextrose 40% Gel 15 Gram(s) Oral once  dextrose 5%. 1000 milliLiter(s) (50 mL/Hr) IV Continuous <Continuous>  dextrose 50% Injectable 25 Gram(s) IV Push once  enoxaparin Injectable 40 milliGRAM(s) SubCutaneous daily  folic acid 1 milliGRAM(s) Oral at bedtime  gabapentin 300 milliGRAM(s) Oral three times a day  glucagon  Injectable 1 milliGRAM(s) IntraMuscular once  influenza   Vaccine 0.5 milliLiter(s) IntraMuscular once  insulin glargine Injectable (LANTUS) 10 Unit(s) SubCutaneous at bedtime  insulin lispro (ADMELOG) corrective regimen sliding scale   SubCutaneous three times a day before meals  metoprolol tartrate 25 milliGRAM(s) Oral two times a day  PARoxetine 20 milliGRAM(s) Oral daily  predniSONE   Tablet 5 milliGRAM(s) Oral daily  sacubitril 24 mG/valsartan 26 mG 1 Tablet(s) Oral two times a day  sodium chloride 0.9%. 1000 milliLiter(s) (75 mL/Hr) IV Continuous <Continuous>  tiotropium 18 MICROgram(s) Capsule 1 Capsule(s) Inhalation daily  vancomycin  IVPB 750 milliGRAM(s) IV Intermittent every 12 hours    Vital Signs Last 24 Hrs  T(C): 36.4 (21 Oct 2021 08:32), Max: 36.6 (20 Oct 2021 15:12)  T(F): 97.6 (21 Oct 2021 08:32), Max: 97.8 (20 Oct 2021 15:12)  HR: 79 (21 Oct 2021 08:32) (77 - 79)  BP: 132/36 (21 Oct 2021 08:32) (112/39 - 132/36)  BP(mean): --  RR: 18 (21 Oct 2021 08:32) (18 - 18)  SpO2: 92% (21 Oct 2021 08:32) (92% - 97%)     Physical exam:    Constitutional:  No acute distress  HEENT: NC/AT, EOMI, PERRLA, conjunctivae clear; ears and nose atraumatic  Neck: supple; thyroid not palpable  Back: no tenderness  Respiratory: respiratory effort normal; clear to auscultation  Cardiovascular: S1S2 regular, no murmurs  Abdomen: soft, not tender, not distended, positive BS; no liver or spleen organomegaly  Genitourinary: no suprapubic tenderness  Lymphatic: no LN palpable  Musculoskeletal: no muscle tenderness, no joint swelling or tenderness  Extremities: no pedal edema  Rt leg rash, Rt heel and right lower lateral leg open superficial ulcers, with scant discharge and surrounding erythema  Neurological/ Psychiatric: AxOx3, judgement and insight normal; moving all extremities  Skin: no rashes; no palpable lesions    Labs: all available labs reviewed                        8.0    11.66 )-----------( 215      ( 20 Oct 2021 08:00 )             25.5     10-20    138  |  110<H>  |  42<H>  ----------------------------<  173<H>  4.8   |  21<L>  |  1.08    Ca    8.5      20 Oct 2021 08:00    TPro  7.3  /  Alb  2.6<L>  /  TBili  0.2  /  DBili  x   /  AST  9<L>  /  ALT  11<L>  /  AlkPhos  97  10-19     LIVER FUNCTIONS - ( 19 Oct 2021 14:33 )  Alb: 2.6 g/dL / Pro: 7.3 gm/dL / ALK PHOS: 97 U/L / ALT: 11 U/L / AST: 9 U/L / GGT: x           Urinalysis Basic - ( 19 Oct 2021 22:25 )    Color: Yellow / Appearance: Clear / S.020 / pH: x  Gluc: x / Ketone: Negative  / Bili: Negative / Urobili: Negative mg/dL   Blood: x / Protein: 30 mg/dL / Nitrite: Negative   Leuk Esterase: Trace / RBC: 0-2 /HPF / WBC 0-2   Sq Epi: x / Non Sq Epi: Occasional / Bacteria: Occasional      Culture - Blood (collected 19 Oct 2021 14:33)  Source: .Blood None  Preliminary Report (20 Oct 2021 19:02):    No growth to date.    Culture - Blood (collected 19 Oct 2021 14:33)  Source: .Blood None  Preliminary Report (20 Oct 2021 19:02):    No growth to date.    COVID-19 PCR: NotDetec (10-19-21 @ 14:33)          Radiology: all available radiological tests reviewed    Advanced directives addressed: full resuscitation

## 2021-10-21 NOTE — PROGRESS NOTE ADULT - SUBJECTIVE AND OBJECTIVE BOX
Patient is a 86y old  Female who presents with a chief complaint of leg wounds.       HPI:  87 y/o female with a PMHx of COPD,  CAD, DM II, depression, Chf, EF 40%,  HTN, PVD, PAD, s/p left fem pop bypass on 21, L thigh infections s/p debridement and muscle flap, PAT, LE DVT on Eliquis, RA, anemia, presents to the ED c/o worsening chronic wounds to RLE with discharge since night before. Daughter states pt has difficulty bearing weight due to wounds. Pt was placed on outpatient PO abx with no relief. Pt was sent to the ED by Dr. Ventura for iv abx.  -denies fever/chills, no n/v/d, no coughing      Pt seen this am.  Pt denies any CP or SOB.   PAST MEDICAL HISTORY:  Anemia   Arthritis, rheumatoid   CAD (coronary artery disease) non-obstructive  Depression   Hypertension   PVD (peripheral vascular disease) with claudication   Rheumatoid arthritis   Type 2 diabetes mellitus on insulin.     PAST SURGICAL HISTORY:  fracture ankle right plate . screws     Hip fracture requiring operative repair Right hip 2020  S/P cataract surgery   L iliac stent  Rt fem-pop bypass 3/2021  Rt thigh  muscle flap     FAMILY HISTORY:  Family history of atherosclerosis, mother  FH: colon cancer, father  FH: heart attack, father.    Social HIstory:   no smoking, no Etoh            REVIEW OF SYSTEMS:    CONSTITUTIONAL: No weakness, No fevers or chills  ENT: No ear ache, No sorethroat  NECK: No pain, No stiffness  RESPIRATORY: No cough, No wheezing, No hemoptysis; No dyspnea  CARDIOVASCULAR: No chest pain, No palpitations  GASTROINTESTINAL: No abd pain, No nausea, No vomiting, No hematemesis, No diarrhea or constipation. No melena, No hematochezia.  GENITOURINARY: No dysuria, No  hematuria  NEUROLOGICAL: No diplopia, No paresthesia, No motor dysfunction  MUSCULOSKELETAL: No arthralgia, + myalgia  SKIN: No rashes, or lesions   PSYCH: no anxiety, no suicidal ideation    All other review of systems is negative unless indicated above    Vital Signs Last 24 Hrs  T(C): 36.7 (19 Oct 2021 13:00), Max: 36.7 (19 Oct 2021 13:00)  T(F): 98 (19 Oct 2021 13:00), Max: 98 (19 Oct 2021 13:00)  HR: 64 (19 Oct 2021 13:00) (64 - 64)  BP: 120/86 (19 Oct 2021 13:00) (120/86 - 120/86)  BP(mean): 94 (19 Oct 2021 13:00) (94 - 94)  RR: 18 (19 Oct 2021 13:00) (18 - 18)  SpO2: 99% (19 Oct 2021 13:00) (99% - 99%)    PHYSICAL EXAM:    GENERAL: NAD, Well nourished  HEENT:  NC/AT, EOMI, PERRLA, No scleral icterus, Moist mucous membranes  NECK: Supple, No JVD  CNS:  Alert & Oriented X3, Motor Strength 5/5 B/L upper and lower extremities; DTRs 2+ intact   LUNG: Normal Breath sounds, Clear to auscultation bilaterally, No rales, No rhonchi, No wheezing  HEART: RRR; No murmurs, No rubs  ABDOMEN: +BS, ST/ND/NT  GENITOURINARY: Voiding, Bladder not distended  EXTREMITIES:  2+ Peripheral Pulses, No clubbing, No cyanosis, No tibial edema  MUSCULOSKELTAL: Joints normal ROM, No TTP, No effusion  VAGINAL: deferred  SKIN: +mild Rt leg rashe, Rt leg open superficial wounds, with serosanguinous discharge and surrounding erythema  RECTAL: deferred, not indicated  BREAST: deferred                          8.9    16.60 )-----------( 258      ( 19 Oct 2021 14:33 )             28.5     10-19    136  |  108  |  37<H>  ----------------------------<  251<H>  5.3   |  25  |  1.12    Ca    8.9      19 Oct 2021 14:33    TPro  7.3  /  Alb  2.6<L>  /  TBili  0.2  /  DBili  x   /  AST  9<L>  /  ALT  11<L>  /  AlkPhos  97  10-19    Vancomycin levels:   Cultures:     MEDICATIONS  (STANDING):  aspirin  chewable 81 milliGRAM(s) Oral daily  atorvastatin 40 milliGRAM(s) Oral at bedtime  cefTRIAXone   IVPB 1000 milliGRAM(s) IV Intermittent every 24 hours  dextrose 40% Gel 15 Gram(s) Oral once  dextrose 5%. 1000 milliLiter(s) (50 mL/Hr) IV Continuous <Continuous>  dextrose 50% Injectable 25 Gram(s) IV Push once  folic acid 1 milliGRAM(s) Oral at bedtime  gabapentin 300 milliGRAM(s) Oral three times a day  glucagon  Injectable 1 milliGRAM(s) IntraMuscular once  insulin glargine Injectable (LANTUS) 5 Unit(s) SubCutaneous at bedtime  insulin lispro (ADMELOG) corrective regimen sliding scale   SubCutaneous three times a day before meals  metoprolol tartrate 25 milliGRAM(s) Oral two times a day  PARoxetine 20 milliGRAM(s) Oral daily  sacubitril 24 mG/valsartan 26 mG 1 Tablet(s) Oral two times a day  tiotropium 18 MICROgram(s) Capsule 1 Capsule(s) Inhalation daily    MEDICATIONS  (PRN):  ALBUTerol    90 MICROgram(s) HFA Inhaler 2 Puff(s) Inhalation every 6 hours PRN Shortness of Breath  aluminum hydroxide/magnesium hydroxide/simethicone Suspension 30 milliLiter(s) Oral every 4 hours PRN Dyspepsia  melatonin 3 milliGRAM(s) Oral at bedtime PRN Insomnia  ondansetron Injectable 4 milliGRAM(s) IV Push every 8 hours PRN Nausea and/or Vomiting  oxycodone    5 mG/acetaminophen 325 mG 1 Tablet(s) Oral every 6 hours PRN Moderate Pain (4 - 6)      all labs reviewed  all imaging reviewed    a/p:    1. Rt leg cellulitis:  Ceftriaxone IV   Blood Cx x2   ID evaluation     2. PVD:  s/p Left iliac angioplasty   Vascular surg evaluation    3. h/o PAT:  will give gentle hydration in anticipation of CTA or angiogram   NS at 60/hr x 24hrs    4. DM II; stable    5. COPD:   stable, c/w inhalers     6. Chronic systolic Chf:  stable, c/w Entresto  EF 40% (19 Oct 2021 16:56)      PAST MEDICAL & SURGICAL HISTORY:  Hypertension    Anemia    Arthritis, rheumatoid    Depression    Type 2 diabetes mellitus  on insulin    PVD (peripheral vascular disease) with claudication    CAD (coronary artery disease)  non-obstructive    Rheumatoid arthritis    fracture ankle right  plate . screws   2000    S/P cataract surgery  2011    Hip fracture requiring operative repair  Right hip 2020        MEDICATIONS  (STANDING):  aspirin  chewable 81 milliGRAM(s) Oral daily  atorvastatin 40 milliGRAM(s) Oral at bedtime  cefepime  Injectable. 1000 milliGRAM(s) IV Push every 12 hours  dextrose 40% Gel 15 Gram(s) Oral once  dextrose 5%. 1000 milliLiter(s) (50 mL/Hr) IV Continuous <Continuous>  dextrose 50% Injectable 25 Gram(s) IV Push once  enoxaparin Injectable 40 milliGRAM(s) SubCutaneous daily  folic acid 1 milliGRAM(s) Oral at bedtime  gabapentin 300 milliGRAM(s) Oral three times a day  glucagon  Injectable 1 milliGRAM(s) IntraMuscular once  influenza   Vaccine 0.5 milliLiter(s) IntraMuscular once  insulin glargine Injectable (LANTUS) 10 Unit(s) SubCutaneous at bedtime  insulin lispro (ADMELOG) corrective regimen sliding scale   SubCutaneous three times a day before meals  metoprolol tartrate 25 milliGRAM(s) Oral two times a day  PARoxetine 20 milliGRAM(s) Oral daily  predniSONE   Tablet 5 milliGRAM(s) Oral daily  sacubitril 24 mG/valsartan 26 mG 1 Tablet(s) Oral two times a day  sodium chloride 0.9%. 1000 milliLiter(s) (75 mL/Hr) IV Continuous <Continuous>  tiotropium 18 MICROgram(s) Capsule 1 Capsule(s) Inhalation daily  vancomycin  IVPB 750 milliGRAM(s) IV Intermittent every 12 hours    MEDICATIONS  (PRN):  ALBUTerol    90 MICROgram(s) HFA Inhaler 2 Puff(s) Inhalation every 6 hours PRN Shortness of Breath  aluminum hydroxide/magnesium hydroxide/simethicone Suspension 30 milliLiter(s) Oral every 4 hours PRN Dyspepsia  melatonin 3 milliGRAM(s) Oral at bedtime PRN Insomnia  ondansetron Injectable 4 milliGRAM(s) IV Push every 8 hours PRN Nausea and/or Vomiting  oxycodone    5 mG/acetaminophen 325 mG 1 Tablet(s) Oral every 6 hours PRN Moderate Pain (4 - 6)      FAMILY HISTORY:  FH: colon cancer  father    FH: heart attack  father    Family history of atherosclerosis  mother        SOCIAL HISTORY:    REVIEW OF SYSTEMS:  CONSTITUTIONAL:    No fatigue, malaise, lethargy.  No fever or chills.  HEENT:  Eyes:  No visual changes.     ENT:  No epistaxis.  No sinus pain.    RESPIRATORY:  No cough.  No wheeze.  No hemoptysis.  No shortness of breath.  CARDIOVASCULAR:  No chest pains.  No palpitations. No shortness of breath, No orthopnea or PND.  GASTROINTESTINAL:  No abdominal pain.  No nausea or vomiting.    GENITOURINARY:    No hematuria.    MUSCULOSKELETAL:  No musculoskeletal pain.  No joint swelling.  No arthritis.  NEUROLOGICAL:  No tingling or numbness or weakness.  PSYCHIATRIC:  No confusion  SKIN:  No rashes.    ENDOCRINE:  No unexplained weight loss.  No polydipsia.   HEMATOLOGIC:  No anemia.  No prolonged or excessive bleeding.   ALLERGIC AND IMMUNOLOGIC:  No pruritus.          Vital Signs Last 24 Hrs  T(C): 36.4 (21 Oct 2021 08:32), Max: 36.6 (20 Oct 2021 15:12)  T(F): 97.6 (21 Oct 2021 08:32), Max: 97.8 (20 Oct 2021 15:12)  HR: 79 (21 Oct 2021 08:32) (77 - 79)  BP: 132/36 (21 Oct 2021 08:32) (112/39 - 132/36)  BP(mean): --  RR: 18 (21 Oct 2021 08:32) (18 - 18)  SpO2: 92% (21 Oct 2021 08:32) (92% - 97%)    PHYSICAL EXAM-    Constitutional: The patient appears to be normal, well developed, well nourished and alert and oriented to time, place and person. The patient does not appear acutely ill.     Head: Head is normocephalic and atraumatic.      Neck: No jugular venous distention. No audible carotid bruits. There are strong carotid pulses bilaterally. No JVD.     Cardiovascular: Regular rate and rhythm without S3, S4. No murmurs or rubs are appreciated.      Respiratory: Breathsounds are normal. No rales. No wheezing.    Abdomen: Soft, nontender, nondistended with positive bowel sounds.      Extremity: No tenderness. No  pitting edema     Neurologic: The patient is alert and oriented.      Skin: No rash, no obvious lesions noted.      Psychiatric: The patient appears to be emotionally stable.      INTERPRETATION OF TELEMETRY:    ECG: Sinus rythm , normal axis, no ST T changes.     I&O's Detail    20 Oct 2021 07:01  -  21 Oct 2021 07:00  --------------------------------------------------------  IN:    sodium chloride 0.9%: 1000 mL  Total IN: 1000 mL    OUT:    Voided (mL): 750 mL  Total OUT: 750 mL    Total NET: 250 mL          LABS:                        8.0    11.66 )-----------( 215      ( 20 Oct 2021 08:00 )             25.5     10-20    138  |  110<H>  |  42<H>  ----------------------------<  173<H>  4.8   |  21<L>  |  1.08    Ca    8.5      20 Oct 2021 08:00    TPro  7.3  /  Alb  2.6<L>  /  TBili  0.2  /  DBili  x   /  AST  9<L>  /  ALT  11<L>  /  AlkPhos  97  10-19        PT/INR - ( 19 Oct 2021 14:33 )   PT: 12.4 sec;   INR: 1.06 ratio         PTT - ( 19 Oct 2021 14:33 )  PTT:28.1 sec  Urinalysis Basic - ( 19 Oct 2021 22:25 )    Color: Yellow / Appearance: Clear / S.020 / pH: x  Gluc: x / Ketone: Negative  / Bili: Negative / Urobili: Negative mg/dL   Blood: x / Protein: 30 mg/dL / Nitrite: Negative   Leuk Esterase: Trace / RBC: 0-2 /HPF / WBC 0-2   Sq Epi: x / Non Sq Epi: Occasional / Bacteria: Occasional      I&O's Summary    20 Oct 2021 07:01  -  21 Oct 2021 07:00  --------------------------------------------------------  IN: 1000 mL / OUT: 750 mL / NET: 250 mL      BNP  RADIOLOGY & ADDITIONAL STUDIES: Patient is a 86y old  Female who presents with a chief complaint of leg wounds.       HPI:  87 y/o female with a PMHx of COPD,  CAD, DM II, depression, Chf, EF 40%,  HTN, PVD, PAD, s/p left fem pop bypass on 21, L thigh infections s/p debridement and muscle flap, PAT, LE DVT on Eliquis, RA, anemia, presents to the ED c/o worsening chronic wounds to RLE with discharge since night before. Daughter states pt has difficulty bearing weight due to wounds. Pt was placed on outpatient PO abx with no relief. Pt was sent to the ED by Dr. Ventura for iv abx.  -denies fever/chills, no n/v/d, no coughing      Pt seen this am.  Pt denies any CP or SOB.     FAMILY HISTORY:  Family history of atherosclerosis, mother  FH: colon cancer, father  FH: heart attack, father.    Social HIstory:   no smoking, no Etoh          PAST MEDICAL & SURGICAL HISTORY:  Hypertension    Anemia    Arthritis, rheumatoid    Depression    Type 2 diabetes mellitus  on insulin    PVD (peripheral vascular disease) with claudication    CAD (coronary artery disease)  non-obstructive    Rheumatoid arthritis    fracture ankle right  plate . screws       S/P cataract surgery      Hip fracture requiring operative repair  Right hip 2020        MEDICATIONS  (STANDING):  aspirin  chewable 81 milliGRAM(s) Oral daily  atorvastatin 40 milliGRAM(s) Oral at bedtime  cefepime  Injectable. 1000 milliGRAM(s) IV Push every 12 hours  dextrose 40% Gel 15 Gram(s) Oral once  dextrose 5%. 1000 milliLiter(s) (50 mL/Hr) IV Continuous <Continuous>  dextrose 50% Injectable 25 Gram(s) IV Push once  enoxaparin Injectable 40 milliGRAM(s) SubCutaneous daily  folic acid 1 milliGRAM(s) Oral at bedtime  gabapentin 300 milliGRAM(s) Oral three times a day  glucagon  Injectable 1 milliGRAM(s) IntraMuscular once  influenza   Vaccine 0.5 milliLiter(s) IntraMuscular once  insulin glargine Injectable (LANTUS) 10 Unit(s) SubCutaneous at bedtime  insulin lispro (ADMELOG) corrective regimen sliding scale   SubCutaneous three times a day before meals  metoprolol tartrate 25 milliGRAM(s) Oral two times a day  PARoxetine 20 milliGRAM(s) Oral daily  predniSONE   Tablet 5 milliGRAM(s) Oral daily  sacubitril 24 mG/valsartan 26 mG 1 Tablet(s) Oral two times a day  sodium chloride 0.9%. 1000 milliLiter(s) (75 mL/Hr) IV Continuous <Continuous>  tiotropium 18 MICROgram(s) Capsule 1 Capsule(s) Inhalation daily  vancomycin  IVPB 750 milliGRAM(s) IV Intermittent every 12 hours    MEDICATIONS  (PRN):  ALBUTerol    90 MICROgram(s) HFA Inhaler 2 Puff(s) Inhalation every 6 hours PRN Shortness of Breath  aluminum hydroxide/magnesium hydroxide/simethicone Suspension 30 milliLiter(s) Oral every 4 hours PRN Dyspepsia  melatonin 3 milliGRAM(s) Oral at bedtime PRN Insomnia  ondansetron Injectable 4 milliGRAM(s) IV Push every 8 hours PRN Nausea and/or Vomiting  oxycodone    5 mG/acetaminophen 325 mG 1 Tablet(s) Oral every 6 hours PRN Moderate Pain (4 - 6)      FAMILY HISTORY:  FH: colon cancer  father    FH: heart attack  father    Family history of atherosclerosis  mother        SOCIAL HISTORY:    REVIEW OF SYSTEMS:  CONSTITUTIONAL:    No fatigue, malaise, lethargy.  No fever or chills.  RESPIRATORY:  No cough.  No wheeze.  No hemoptysis.  No shortness of breath.  CARDIOVASCULAR:  No chest pains.  No palpitations. No shortness of breath, No orthopnea or PND.  GASTROINTESTINAL:  No abdominal pain.  No nausea or vomiting.    GENITOURINARY:    No hematuria.    MUSCULOSKELETAL:  No musculoskeletal pain.  No joint swelling.  No arthritis. R lower extremity wrapped in guaze.   NEUROLOGICAL:  No tingling or numbness or weakness.  PSYCHIATRIC:  No confusion  SKIN:  No rashes.          Vital Signs Last 24 Hrs  T(C): 36.4 (21 Oct 2021 08:32), Max: 36.6 (20 Oct 2021 15:12)  T(F): 97.6 (21 Oct 2021 08:32), Max: 97.8 (20 Oct 2021 15:12)  HR: 79 (21 Oct 2021 08:32) (77 - 79)  BP: 132/36 (21 Oct 2021 08:32) (112/39 - 132/36)  BP(mean): --  RR: 18 (21 Oct 2021 08:32) (18 - 18)  SpO2: 92% (21 Oct 2021 08:32) (92% - 97%)    PHYSICAL EXAM-    Constitutional: elderly frail female in no acute distress     Head: Head is normocephalic and atraumatic.      Neck:  No JVD.     Cardiovascular: Regular rate and rhythm without S3, S4. No murmurs or rubs are appreciated.      Respiratory: Breath sounds are normal. No rales. No wheezing.    Abdomen: Soft, nontender, nondistended with positive bowel sounds.      Extremity: No tenderness. No  pitting edema     Neurologic: The patient is alert and oriented.      Skin: No rash, no obvious lesions noted.      Psychiatric: The patient appears to be emotionally stable.      INTERPRETATION OF TELEMETRY: SR    ECG:     I&O's Detail    20 Oct 2021 07:01  -  21 Oct 2021 07:00  --------------------------------------------------------  IN:    sodium chloride 0.9%: 1000 mL  Total IN: 1000 mL    OUT:    Voided (mL): 750 mL  Total OUT: 750 mL    Total NET: 250 mL          LABS:                        8.0    11.66 )-----------( 215      ( 20 Oct 2021 08:00 )             25.5     10-20    138  |  110<H>  |  42<H>  ----------------------------<  173<H>  4.8   |  21<L>  |  1.08    Ca    8.5      20 Oct 2021 08:00    TPro  7.3  /  Alb  2.6<L>  /  TBili  0.2  /  DBili  x   /  AST  9<L>  /  ALT  11<L>  /  AlkPhos  97  10-19        PT/INR - ( 19 Oct 2021 14:33 )   PT: 12.4 sec;   INR: 1.06 ratio         PTT - ( 19 Oct 2021 14:33 )  PTT:28.1 sec  Urinalysis Basic - ( 19 Oct 2021 22:25 )    Color: Yellow / Appearance: Clear / S.020 / pH: x  Gluc: x / Ketone: Negative  / Bili: Negative / Urobili: Negative mg/dL   Blood: x / Protein: 30 mg/dL / Nitrite: Negative   Leuk Esterase: Trace / RBC: 0-2 /HPF / WBC 0-2   Sq Epi: x / Non Sq Epi: Occasional / Bacteria: Occasional      I&O's Summary    20 Oct 2021 07:01  -  21 Oct 2021 07:00  --------------------------------------------------------  IN: 1000 mL / OUT: 750 mL / NET: 250 mL      BNP  RADIOLOGY & ADDITIONAL STUDIES:

## 2021-10-21 NOTE — PHYSICAL THERAPY INITIAL EVALUATION ADULT - ACTIVE RANGE OF MOTION EXAMINATION, REHAB EVAL
except L ankle PF/DF mod limited; L ankle PF/DF severely limited/no Active ROM deficits were identified

## 2021-10-22 LAB
ANION GAP SERPL CALC-SCNC: 7 MMOL/L — SIGNIFICANT CHANGE UP (ref 5–17)
BUN SERPL-MCNC: 43 MG/DL — HIGH (ref 7–23)
CALCIUM SERPL-MCNC: 8 MG/DL — LOW (ref 8.5–10.1)
CHLORIDE SERPL-SCNC: 113 MMOL/L — HIGH (ref 96–108)
CO2 SERPL-SCNC: 19 MMOL/L — LOW (ref 22–31)
CREAT SERPL-MCNC: 0.96 MG/DL — SIGNIFICANT CHANGE UP (ref 0.5–1.3)
GLUCOSE SERPL-MCNC: 132 MG/DL — HIGH (ref 70–99)
HCT VFR BLD CALC: 23.3 % — LOW (ref 34.5–45)
HGB BLD-MCNC: 7.2 G/DL — LOW (ref 11.5–15.5)
MCHC RBC-ENTMCNC: 30.9 GM/DL — LOW (ref 32–36)
MCHC RBC-ENTMCNC: 31.6 PG — SIGNIFICANT CHANGE UP (ref 27–34)
MCV RBC AUTO: 102.2 FL — HIGH (ref 80–100)
PLATELET # BLD AUTO: 145 K/UL — LOW (ref 150–400)
POTASSIUM SERPL-MCNC: 4.4 MMOL/L — SIGNIFICANT CHANGE UP (ref 3.5–5.3)
POTASSIUM SERPL-SCNC: 4.4 MMOL/L — SIGNIFICANT CHANGE UP (ref 3.5–5.3)
RBC # BLD: 2.28 M/UL — LOW (ref 3.8–5.2)
RBC # FLD: 16.4 % — HIGH (ref 10.3–14.5)
SODIUM SERPL-SCNC: 139 MMOL/L — SIGNIFICANT CHANGE UP (ref 135–145)
VANCOMYCIN TROUGH SERPL-MCNC: 17.8 UG/ML — SIGNIFICANT CHANGE UP (ref 10–20)
WBC # BLD: 20.78 K/UL — HIGH (ref 3.8–10.5)
WBC # FLD AUTO: 20.78 K/UL — HIGH (ref 3.8–10.5)

## 2021-10-22 PROCEDURE — 99232 SBSQ HOSP IP/OBS MODERATE 35: CPT

## 2021-10-22 RX ADMIN — Medication 4: at 16:53

## 2021-10-22 RX ADMIN — Medication 4: at 11:59

## 2021-10-22 RX ADMIN — Medication 25 MILLIGRAM(S): at 11:57

## 2021-10-22 RX ADMIN — CEFEPIME 1000 MILLIGRAM(S): 1 INJECTION, POWDER, FOR SOLUTION INTRAMUSCULAR; INTRAVENOUS at 22:06

## 2021-10-22 RX ADMIN — OXYCODONE AND ACETAMINOPHEN 1 TABLET(S): 5; 325 TABLET ORAL at 22:10

## 2021-10-22 RX ADMIN — Medication 5 MILLIGRAM(S): at 11:57

## 2021-10-22 RX ADMIN — CEFEPIME 1000 MILLIGRAM(S): 1 INJECTION, POWDER, FOR SOLUTION INTRAMUSCULAR; INTRAVENOUS at 11:56

## 2021-10-22 RX ADMIN — GABAPENTIN 300 MILLIGRAM(S): 400 CAPSULE ORAL at 14:22

## 2021-10-22 RX ADMIN — INSULIN GLARGINE 10 UNIT(S): 100 INJECTION, SOLUTION SUBCUTANEOUS at 22:06

## 2021-10-22 RX ADMIN — Medication 25 MILLIGRAM(S): at 22:07

## 2021-10-22 RX ADMIN — Medication 2: at 07:52

## 2021-10-22 RX ADMIN — Medication 20 MILLIGRAM(S): at 11:57

## 2021-10-22 RX ADMIN — GABAPENTIN 300 MILLIGRAM(S): 400 CAPSULE ORAL at 22:10

## 2021-10-22 RX ADMIN — GABAPENTIN 300 MILLIGRAM(S): 400 CAPSULE ORAL at 05:09

## 2021-10-22 RX ADMIN — Medication 250 MILLIGRAM(S): at 11:58

## 2021-10-22 RX ADMIN — SODIUM CHLORIDE 75 MILLILITER(S): 9 INJECTION INTRAMUSCULAR; INTRAVENOUS; SUBCUTANEOUS at 05:09

## 2021-10-22 RX ADMIN — Medication 1 MILLIGRAM(S): at 22:07

## 2021-10-22 RX ADMIN — SACUBITRIL AND VALSARTAN 1 TABLET(S): 24; 26 TABLET, FILM COATED ORAL at 11:58

## 2021-10-22 RX ADMIN — Medication 81 MILLIGRAM(S): at 11:56

## 2021-10-22 RX ADMIN — ENOXAPARIN SODIUM 40 MILLIGRAM(S): 100 INJECTION SUBCUTANEOUS at 11:57

## 2021-10-22 RX ADMIN — ATORVASTATIN CALCIUM 40 MILLIGRAM(S): 80 TABLET, FILM COATED ORAL at 22:06

## 2021-10-22 RX ADMIN — SACUBITRIL AND VALSARTAN 1 TABLET(S): 24; 26 TABLET, FILM COATED ORAL at 22:06

## 2021-10-22 RX ADMIN — TIOTROPIUM BROMIDE 1 CAPSULE(S): 18 CAPSULE ORAL; RESPIRATORY (INHALATION) at 08:13

## 2021-10-22 NOTE — PROGRESS NOTE ADULT - SUBJECTIVE AND OBJECTIVE BOX
History of Present Illness:   87 y/o female with a PMHx of COPD,  CAD, DM II, depression, Chf, EF 40%,  HTN, PVD, PAD, s/p left fem pop bypass on 03/24/21, L thigh infections s/p debridement and muscle flap, PAT, LE DVT on Eliquis,, RA, anemia, presents to the ED c/o worsening chronic wounds to RLE with discharge since last night. Daughter states pt has difficulty bearing weight due to wounds. Pt was placed on outpatient PO abx with no relief. Pt was sent to the ED by Dr. Ventura for iv abx.  -denies fever/chills, no n/v/d, no coughing    10.20: no distress, no cp, no sob, pain in her leg is better today  10.21: +pain in the left Heel out of proportion with physical findings in that area  10.22: states that her L foot pain is improving     REVIEW OF SYSTEMS:    CONSTITUTIONAL: No weakness, No fevers or chills  ENT: No ear ache, No sorethroat  NECK: No pain, No stiffness  RESPIRATORY: No cough, No wheezing, No hemoptysis; No dyspnea  CARDIOVASCULAR: No chest pain, No palpitations  GASTROINTESTINAL: No abd pain, No nausea, No vomiting, No hematemesis, No diarrhea or constipation. No melena, No hematochezia.  GENITOURINARY: No dysuria, No  hematuria  NEUROLOGICAL: No diplopia, No paresthesia, No motor dysfunction  MUSCULOSKELETAL: No arthralgia, + myalgia  SKIN: No rashes, or lesions   PSYCH: no anxiety, no suicidal ideation    Vital Signs Last 24 Hrs  T(C): 36.6 (22 Oct 2021 16:06), Max: 37.5 (22 Oct 2021 08:01)  T(F): 97.8 (22 Oct 2021 16:06), Max: 99.5 (22 Oct 2021 08:01)  HR: 70 (22 Oct 2021 16:06) (70 - 88)  BP: 120/38 (22 Oct 2021 16:06) (120/38 - 145/44)  BP(mean): 65 (21 Oct 2021 21:39) (65 - 65)  RR: 18 (22 Oct 2021 16:06) (17 - 18)  SpO2: 99% (22 Oct 2021 16:06) (93% - 100%)    PHYSICAL EXAM:    GENERAL: NAD, Well nourished  HEENT:  NC/AT, EOMI, PERRLA, No scleral icterus, Moist mucous membranes  NECK: Supple, No JVD  CNS:  Alert & Oriented X3, Motor Strength 5/5 B/L upper and lower extremities; DTRs 2+ intact   LUNG: Normal Breath sounds, Clear to auscultation bilaterally, No rales, No rhonchi, No wheezing  HEART: RRR; No murmurs, No rubs  ABDOMEN: +BS, ST/ND/NT  GENITOURINARY: Voiding, Bladder not distended  EXTREMITIES:  2+ Peripheral Pulses, No clubbing, No cyanosis, No tibial edema  MUSCULOSKELTAL: Joints normal ROM, No TTP, No effusion  VAGINAL: deferred  SKIN: +mild Rt leg rashe, Rt leg open superficial wounds, with serosanguinous discharge and surrounding erythema  RECTAL: deferred, not indicated  BREAST: deferred    Labs:                        8.0    11.66 )-----------( 215      ( 20 Oct 2021 08:00 )             25.5     10-20    138  |  110<H>  |  42<H>  ----------------------------<  173<H>  4.8   |  21<L>  |  1.08    Ca    8.5      20 Oct 2021 08:00    TPro  7.3  /  Alb  2.6<L>  /  TBili  0.2  /  DBili  x   /  AST  9<L>  /  ALT  11<L>  /  AlkPhos  97  10-19           Cultures:     a/p:    1. Rt leg cellulitis:  Cefepime day#4  Blood Cx x2 ngtd  ID evaluation noted: c/w abx x 5-7days;  dc on Doxycycline po Bid    2. PVD:  s/p Left iliac angioplasty   Vascular surg evaluation noted:  CTA done  favor conservative management     3. AOCD:  will transfuse 1u RBC    4. DM II; stable    5. COPD:   stable, c/w inhalers     6. Chronic systolic Chf:  stable, c/w Entresto  EF 40%    7. RA :   Rheumatology eval noted; Prednisone 5mg/day started  hold MTX while on acute cellulitis treatment     Leukocytosis due to steroids    Plan: transfuse, d/c alan on Doxy

## 2021-10-22 NOTE — PROGRESS NOTE ADULT - SUBJECTIVE AND OBJECTIVE BOX
Date of service: 10-22-21 @ 11:53    Lying in bed in NAD  Weak looking  Has dry skin on legs  Right foot tenderness is improving    ROS: no fever or chills; denies dizziness, no HA, no SOB or cough, no abdominal pain, no diarrhea or constipation; no dysuria    MEDICATIONS  (STANDING):  aspirin  chewable 81 milliGRAM(s) Oral daily  atorvastatin 40 milliGRAM(s) Oral at bedtime  cefepime  Injectable. 1000 milliGRAM(s) IV Push every 12 hours  dextrose 40% Gel 15 Gram(s) Oral once  dextrose 5%. 1000 milliLiter(s) (50 mL/Hr) IV Continuous <Continuous>  dextrose 50% Injectable 25 Gram(s) IV Push once  enoxaparin Injectable 40 milliGRAM(s) SubCutaneous daily  folic acid 1 milliGRAM(s) Oral at bedtime  gabapentin 300 milliGRAM(s) Oral three times a day  glucagon  Injectable 1 milliGRAM(s) IntraMuscular once  insulin glargine Injectable (LANTUS) 10 Unit(s) SubCutaneous at bedtime  insulin lispro (ADMELOG) corrective regimen sliding scale   SubCutaneous three times a day before meals  metoprolol tartrate 25 milliGRAM(s) Oral two times a day  PARoxetine 20 milliGRAM(s) Oral daily  predniSONE   Tablet 5 milliGRAM(s) Oral daily  sacubitril 24 mG/valsartan 26 mG 1 Tablet(s) Oral two times a day  sodium chloride 0.9%. 1000 milliLiter(s) (75 mL/Hr) IV Continuous <Continuous>  tiotropium 18 MICROgram(s) Capsule 1 Capsule(s) Inhalation daily  vancomycin  IVPB 750 milliGRAM(s) IV Intermittent every 12 hours    Vital Signs Last 24 Hrs  T(C): 37.5 (22 Oct 2021 08:01), Max: 37.5 (22 Oct 2021 08:01)  T(F): 99.5 (22 Oct 2021 08:01), Max: 99.5 (22 Oct 2021 08:01)  HR: 88 (22 Oct 2021 08:01) (74 - 88)  BP: 132/40 (22 Oct 2021 08:01) (132/40 - 145/44)  BP(mean): 65 (21 Oct 2021 21:39) (65 - 65)  RR: 17 (22 Oct 2021 08:01) (17 - 18)  SpO2: 93% (22 Oct 2021 08:01) (93% - 100%)     Physical exam:    Constitutional:  No acute distress  HEENT: NC/AT, EOMI, PERRLA, conjunctivae clear; ears and nose atraumatic  Neck: supple; thyroid not palpable  Back: no tenderness  Respiratory: respiratory effort normal; clear to auscultation  Cardiovascular: S1S2 regular, no murmurs  Abdomen: soft, not tender, not distended, positive BS; no liver or spleen organomegaly  Genitourinary: no suprapubic tenderness  Lymphatic: no LN palpable  Musculoskeletal: no muscle tenderness, no joint swelling or tenderness  Extremities: no pedal edema  Rt leg rash, Rt heel and right lower lateral leg open superficial ulcers, dry; mild erythema  Neurological/ Psychiatric: AxOx3, judgement and insight normal; moving all extremities  Skin: no rashes; no palpable lesions    Labs: reviewed                        7.2    . )-----------( 145      ( 22 Oct 2021 09:10 )             23.3     10-    139  |  113<H>  |  43<H>  ----------------------------<  132<H>  4.4   |  19<L>  |  0.96    Ca    8.0<L>      22 Oct 2021 09:10    Vancomycin Level, Trough: 17.8 ug/mL (10-22 @ 09:10)                        8.0    11.66 )-----------( 215      ( 20 Oct 2021 08:00 )             25.5     10-20    138  |  110<H>  |  42<H>  ----------------------------<  173<H>  4.8   |  21<L>  |  1.08    Ca    8.5      20 Oct 2021 08:00    TPro  7.3  /  Alb  2.6<L>  /  TBili  0.2  /  DBili  x   /  AST  9<L>  /  ALT  11<L>  /  AlkPhos  97  10-19     LIVER FUNCTIONS - ( 19 Oct 2021 14:33 )  Alb: 2.6 g/dL / Pro: 7.3 gm/dL / ALK PHOS: 97 U/L / ALT: 11 U/L / AST: 9 U/L / GGT: x           Urinalysis Basic - ( 19 Oct 2021 22:25 )    Color: Yellow / Appearance: Clear / S.020 / pH: x  Gluc: x / Ketone: Negative  / Bili: Negative / Urobili: Negative mg/dL   Blood: x / Protein: 30 mg/dL / Nitrite: Negative   Leuk Esterase: Trace / RBC: 0-2 /HPF / WBC 0-2   Sq Epi: x / Non Sq Epi: Occasional / Bacteria: Occasional      Culture - Blood (collected 19 Oct 2021 14:33)  Source: .Blood None  Preliminary Report (20 Oct 2021 19:02):    No growth to date.    Culture - Blood (collected 19 Oct 2021 14:33)  Source: .Blood None  Preliminary Report (20 Oct 2021 19:02):    No growth to date.    COVID-19 PCR: NotDetec (10-19-21 @ 14:33)          Radiology: all available radiological tests reviewed    Advanced directives addressed: full resuscitation

## 2021-10-22 NOTE — PROGRESS NOTE ADULT - SUBJECTIVE AND OBJECTIVE BOX
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         MEDICATIONS  (STANDING):  aspirin  chewable 81 milliGRAM(s) Oral daily  atorvastatin 40 milliGRAM(s) Oral at bedtime  cefepime  Injectable. 1000 milliGRAM(s) IV Push every 12 hours  dextrose 40% Gel 15 Gram(s) Oral once  dextrose 5%. 1000 milliLiter(s) (50 mL/Hr) IV Continuous <Continuous>  dextrose 50% Injectable 25 Gram(s) IV Push once  enoxaparin Injectable 40 milliGRAM(s) SubCutaneous daily  folic acid 1 milliGRAM(s) Oral at bedtime  gabapentin 300 milliGRAM(s) Oral three times a day  glucagon  Injectable 1 milliGRAM(s) IntraMuscular once  insulin glargine Injectable (LANTUS) 10 Unit(s) SubCutaneous at bedtime  insulin lispro (ADMELOG) corrective regimen sliding scale   SubCutaneous three times a day before meals  metoprolol tartrate 25 milliGRAM(s) Oral two times a day  PARoxetine 20 milliGRAM(s) Oral daily  predniSONE   Tablet 5 milliGRAM(s) Oral daily  sacubitril 24 mG/valsartan 26 mG 1 Tablet(s) Oral two times a day  sodium chloride 0.9%. 1000 milliLiter(s) (75 mL/Hr) IV Continuous <Continuous>  tiotropium 18 MICROgram(s) Capsule 1 Capsule(s) Inhalation daily  vancomycin  IVPB 750 milliGRAM(s) IV Intermittent every 12 hours    MEDICATIONS  (PRN):  ALBUTerol    90 MICROgram(s) HFA Inhaler 2 Puff(s) Inhalation every 6 hours PRN Shortness of Breath  aluminum hydroxide/magnesium hydroxide/simethicone Suspension 30 milliLiter(s) Oral every 4 hours PRN Dyspepsia  melatonin 3 milliGRAM(s) Oral at bedtime PRN Insomnia  ondansetron Injectable 4 milliGRAM(s) IV Push every 8 hours PRN Nausea and/or Vomiting  oxycodone    5 mG/acetaminophen 325 mG 1 Tablet(s) Oral every 6 hours PRN Moderate Pain (4 - 6)      Allergies    cephalosporins (Other)  penicillins (Urticaria; Pruritus)    Intolerances        Flatus: [ ] YES [ ] NO             Bowel Movement: [ ] YES [ ] NO  Pain (0-10):            Pain Control Adequate: [ ] YES [ ] NO  Nausea: [ ] YES [ ] NO            Vomiting: [ ] YES [ ] NO  Diarrhea: [ ] YES [ ] NO         Constipation: [ ] YES [ ] NO     Chest Pain: [ ] YES [ ] NO    SOB:  [ ] YES [ ] NO    Vital Signs Last 24 Hrs  T(C): 37.5 (22 Oct 2021 08:01), Max: 37.5 (22 Oct 2021 08:01)  T(F): 99.5 (22 Oct 2021 08:01), Max: 99.5 (22 Oct 2021 08:01)  HR: 88 (22 Oct 2021 08:01) (74 - 88)  BP: 132/40 (22 Oct 2021 08:01) (132/40 - 145/44)  BP(mean): 65 (21 Oct 2021 21:39) (65 - 65)  RR: 17 (22 Oct 2021 08:01) (17 - 18)  SpO2: 93% (22 Oct 2021 08:01) (93% - 100%)    I&O's Summary    21 Oct 2021 07:01  -  22 Oct 2021 07:00  --------------------------------------------------------  IN: 2075 mL / OUT: 375 mL / NET: 1700 mL        Physical Exam:  General: NAD, resting comfortably  Pulmonary: normal resp effort, CTA-B  Cardiovascular: NSR  Abdominal: soft, NT/ND  Extremities: WWP, normal strength  Neuro: A/O x 3, CNs II-XII grossly intact, normal motor/sensation, no focal deficits  Pulses:   Right:                                                                          Left:  FEM [ ]2+ [ ]1+ [ ]doppler                                             FEM [ ]2+ [ ]1+ [ ]doppler    POP [ ]2+ [ ]1+ [ ]doppler                                             POP [ ]2+ [ ]1+ [ ]doppler    DP [ ]2+ [ ]1+ [ ]doppler                                                DP [ ]2+ [ ]1+ [ ]doppler  PT[ ]2+ [ ]1+ [ ]doppler                                                  PT [ ]2+ [ ]1+ [ ]doppler    LABS:                        7.2    20.78 )-----------( 145      ( 22 Oct 2021 09:10 )             23.3     10-22    139  |  113<H>  |  43<H>  ----------------------------<  132<H>  4.4   |  19<L>  |  0.96    Ca    8.0<L>      22 Oct 2021 09:10            CAPILLARY BLOOD GLUCOSE      POCT Blood Glucose.: 185 mg/dL (22 Oct 2021 07:50)  POCT Blood Glucose.: 208 mg/dL (21 Oct 2021 21:44)  POCT Blood Glucose.: 223 mg/dL (21 Oct 2021 16:51)  POCT Blood Glucose.: 203 mg/dL (21 Oct 2021 11:59)      RADIOLOGY & ADDITIONAL TESTS:   afebrile, VSS    WBC 20K  lytes acceptable    CTA results noted: graft patent, fluid around proximal graft; no mention of significant aortic disease which she has; 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         MEDICATIONS  (STANDING):  aspirin  chewable 81 milliGRAM(s) Oral daily  atorvastatin 40 milliGRAM(s) Oral at bedtime  cefepime  Injectable. 1000 milliGRAM(s) IV Push every 12 hours  dextrose 40% Gel 15 Gram(s) Oral once  dextrose 5%. 1000 milliLiter(s) (50 mL/Hr) IV Continuous <Continuous>  dextrose 50% Injectable 25 Gram(s) IV Push once  enoxaparin Injectable 40 milliGRAM(s) SubCutaneous daily  folic acid 1 milliGRAM(s) Oral at bedtime  gabapentin 300 milliGRAM(s) Oral three times a day  glucagon  Injectable 1 milliGRAM(s) IntraMuscular once  insulin glargine Injectable (LANTUS) 10 Unit(s) SubCutaneous at bedtime  insulin lispro (ADMELOG) corrective regimen sliding scale   SubCutaneous three times a day before meals  metoprolol tartrate 25 milliGRAM(s) Oral two times a day  PARoxetine 20 milliGRAM(s) Oral daily  predniSONE   Tablet 5 milliGRAM(s) Oral daily  sacubitril 24 mG/valsartan 26 mG 1 Tablet(s) Oral two times a day  sodium chloride 0.9%. 1000 milliLiter(s) (75 mL/Hr) IV Continuous <Continuous>  tiotropium 18 MICROgram(s) Capsule 1 Capsule(s) Inhalation daily  vancomycin  IVPB 750 milliGRAM(s) IV Intermittent every 12 hours    MEDICATIONS  (PRN):  ALBUTerol    90 MICROgram(s) HFA Inhaler 2 Puff(s) Inhalation every 6 hours PRN Shortness of Breath  aluminum hydroxide/magnesium hydroxide/simethicone Suspension 30 milliLiter(s) Oral every 4 hours PRN Dyspepsia  melatonin 3 milliGRAM(s) Oral at bedtime PRN Insomnia  ondansetron Injectable 4 milliGRAM(s) IV Push every 8 hours PRN Nausea and/or Vomiting  oxycodone    5 mG/acetaminophen 325 mG 1 Tablet(s) Oral every 6 hours PRN Moderate Pain (4 - 6)      Allergies    cephalosporins (Other)  penicillins (Urticaria; Pruritus)    Intolerances        Flatus: [ ] YES [ ] NO             Bowel Movement: [ ] YES [ ] NO  Pain (0-10):            Pain Control Adequate: [ ] YES [ ] NO  Nausea: [ ] YES [ ] NO            Vomiting: [ ] YES [ ] NO  Diarrhea: [ ] YES [ ] NO         Constipation: [ ] YES [ ] NO     Chest Pain: [ ] YES [ ] NO    SOB:  [ ] YES [ ] NO    Vital Signs Last 24 Hrs  T(C): 37.5 (22 Oct 2021 08:01), Max: 37.5 (22 Oct 2021 08:01)  T(F): 99.5 (22 Oct 2021 08:01), Max: 99.5 (22 Oct 2021 08:01)  HR: 88 (22 Oct 2021 08:01) (74 - 88)  BP: 132/40 (22 Oct 2021 08:01) (132/40 - 145/44)  BP(mean): 65 (21 Oct 2021 21:39) (65 - 65)  RR: 17 (22 Oct 2021 08:01) (17 - 18)  SpO2: 93% (22 Oct 2021 08:01) (93% - 100%)    I&O's Summary    21 Oct 2021 07:01  -  22 Oct 2021 07:00  --------------------------------------------------------  IN: 2075 mL / OUT: 375 mL / NET: 1700 mL        Physical Exam:  General: NAD, resting comfortably  Pulmonary: normal resp effort, CTA-B  Cardiovascular: NSR  Abdominal: soft, NT/ND  Extremities: WWP, normal strength  Neuro: A/O x 3, CNs II-XII grossly intact, normal motor/sensation, no focal deficits  Pulses:   Right:                                                                          Left:  FEM [ ]2+ [ ]1+ [ ]doppler                                             FEM [ ]2+ [ ]1+ [ ]doppler    POP [ ]2+ [ ]1+ [ ]doppler                                             POP [ ]2+ [ ]1+ [ ]doppler    DP [ ]2+ [ ]1+ [ ]doppler                                                DP [ ]2+ [ ]1+ [ ]doppler  PT[ ]2+ [ ]1+ [ ]doppler                                                  PT [ ]2+ [ ]1+ [ ]doppler    LABS:                        7.2    20.78 )-----------( 145      ( 22 Oct 2021 09:10 )             23.3     10-22    139  |  113<H>  |  43<H>  ----------------------------<  132<H>  4.4   |  19<L>  |  0.96    Ca    8.0<L>      22 Oct 2021 09:10            CAPILLARY BLOOD GLUCOSE      POCT Blood Glucose.: 185 mg/dL (22 Oct 2021 07:50)  POCT Blood Glucose.: 208 mg/dL (21 Oct 2021 21:44)  POCT Blood Glucose.: 223 mg/dL (21 Oct 2021 16:51)  POCT Blood Glucose.: 203 mg/dL (21 Oct 2021 11:59)      RADIOLOGY & ADDITIONAL TESTS:

## 2021-10-22 NOTE — PROGRESS NOTE ADULT - SUBJECTIVE AND OBJECTIVE BOX
Patient is a 86y old  Female who presents with a chief complaint of leg wounds.       HPI:  87 y/o female with a PMHx of COPD,  CAD, DM II, depression, Chf, EF 40%,  HTN, PVD, PAD, s/p left fem pop bypass on 21, L thigh infections s/p debridement and muscle flap, PAT, LE DVT on Eliquis, RA, anemia, presents to the ED c/o worsening chronic wounds to RLE with discharge since night before. Daughter states pt has difficulty bearing weight due to wounds. Pt was placed on outpatient PO abx with no relief. Pt was sent to the ED by Dr. Ventura for iv abx.  -denies fever/chills, no n/v/d, no coughing    10/21-   Pt seen this am.  Pt denies any CP or SOB.   10/22- pt seen this am.  Pt denies any symptoms this am.     FAMILY HISTORY:  Family history of atherosclerosis, mother  FH: colon cancer, father  FH: heart attack, father.    Social HIstory:   no smoking, no Etoh          PAST MEDICAL & SURGICAL HISTORY:  Hypertension    Anemia    Arthritis, rheumatoid    Depression    Type 2 diabetes mellitus  on insulin    PVD (peripheral vascular disease) with claudication    CAD (coronary artery disease)  non-obstructive    Rheumatoid arthritis    fracture ankle right  plate . screws       S/P cataract surgery  2011    Hip fracture requiring operative repair  Right hip 2020        MEDICATIONS  (STANDING):  aspirin  chewable 81 milliGRAM(s) Oral daily  atorvastatin 40 milliGRAM(s) Oral at bedtime  cefepime  Injectable. 1000 milliGRAM(s) IV Push every 12 hours  dextrose 40% Gel 15 Gram(s) Oral once  dextrose 5%. 1000 milliLiter(s) (50 mL/Hr) IV Continuous <Continuous>  dextrose 50% Injectable 25 Gram(s) IV Push once  enoxaparin Injectable 40 milliGRAM(s) SubCutaneous daily  folic acid 1 milliGRAM(s) Oral at bedtime  gabapentin 300 milliGRAM(s) Oral three times a day  glucagon  Injectable 1 milliGRAM(s) IntraMuscular once  influenza   Vaccine 0.5 milliLiter(s) IntraMuscular once  insulin glargine Injectable (LANTUS) 10 Unit(s) SubCutaneous at bedtime  insulin lispro (ADMELOG) corrective regimen sliding scale   SubCutaneous three times a day before meals  metoprolol tartrate 25 milliGRAM(s) Oral two times a day  PARoxetine 20 milliGRAM(s) Oral daily  predniSONE   Tablet 5 milliGRAM(s) Oral daily  sacubitril 24 mG/valsartan 26 mG 1 Tablet(s) Oral two times a day  sodium chloride 0.9%. 1000 milliLiter(s) (75 mL/Hr) IV Continuous <Continuous>  tiotropium 18 MICROgram(s) Capsule 1 Capsule(s) Inhalation daily  vancomycin  IVPB 750 milliGRAM(s) IV Intermittent every 12 hours    MEDICATIONS  (PRN):  ALBUTerol    90 MICROgram(s) HFA Inhaler 2 Puff(s) Inhalation every 6 hours PRN Shortness of Breath  aluminum hydroxide/magnesium hydroxide/simethicone Suspension 30 milliLiter(s) Oral every 4 hours PRN Dyspepsia  melatonin 3 milliGRAM(s) Oral at bedtime PRN Insomnia  ondansetron Injectable 4 milliGRAM(s) IV Push every 8 hours PRN Nausea and/or Vomiting  oxycodone    5 mG/acetaminophen 325 mG 1 Tablet(s) Oral every 6 hours PRN Moderate Pain (4 - 6)      FAMILY HISTORY:  FH: colon cancer  father    FH: heart attack  father    Family history of atherosclerosis  mother        SOCIAL HISTORY:    REVIEW OF SYSTEMS:  CONSTITUTIONAL:    No fatigue, malaise, lethargy.  No fever or chills.  RESPIRATORY:  No cough.  No wheeze.  No hemoptysis.  No shortness of breath.  CARDIOVASCULAR:  No chest pains.  No palpitations. No shortness of breath, No orthopnea or PND.  GASTROINTESTINAL:  No abdominal pain.  No nausea or vomiting.    GENITOURINARY:    No hematuria.    MUSCULOSKELETAL:  No musculoskeletal pain.  No joint swelling.  No arthritis. R lower extremity wrapped in guaze.   NEUROLOGICAL:  No tingling or numbness or weakness.  PSYCHIATRIC:  No confusion  SKIN:  No rashes.          ICU Vital Signs Last 24 Hrs  T(C): 37.5 (22 Oct 2021 08:01), Max: 37.5 (22 Oct 2021 08:01)  T(F): 99.5 (22 Oct 2021 08:01), Max: 99.5 (22 Oct 2021 08:01)  HR: 88 (22 Oct 2021 08:01) (74 - 88)  BP: 132/40 (22 Oct 2021 08:01) (132/36 - 145/44)  BP(mean): 65 (21 Oct 2021 21:39) (65 - 65)  ABP: --  ABP(mean): --  RR: 17 (22 Oct 2021 08:01) (17 - 18)  SpO2: 93% (22 Oct 2021 08:01) (92% - 100%)      PHYSICAL EXAM-    Constitutional: elderly frail female in no acute distress     Head: Head is normocephalic and atraumatic.      Neck:  No JVD.     Cardiovascular: Regular rate and rhythm without S3, S4. No murmurs or rubs are appreciated.      Respiratory: Breath sounds are normal. No rales. No wheezing.    Abdomen: Soft, nontender, nondistended with positive bowel sounds.      Extremity: No tenderness. No  pitting edema     Neurologic: The patient is alert and oriented.      Skin: No rash, no obvious lesions noted.      Psychiatric: The patient appears to be emotionally stable.      INTERPRETATION OF TELEMETRY: SR    ECG:     I&O's Detail    20 Oct 2021 07:01  -  21 Oct 2021 07:00  --------------------------------------------------------  IN:    sodium chloride 0.9%: 1000 mL  Total IN: 1000 mL    OUT:    Voided (mL): 750 mL  Total OUT: 750 mL    Total NET: 250 mL          LABS:               10-21    138  |  111<H>  |  47<H>  ----------------------------<  96  4.6   |  21<L>  |  0.99    Ca    7.9<L>      21 Oct 2021 08:44           PTT - ( 19 Oct 2021 14:33 )  PTT:28.1 sec  Urinalysis Basic - ( 19 Oct 2021 22:25 )    Color: Yellow / Appearance: Clear / S.020 / pH: x  Gluc: x / Ketone: Negative  / Bili: Negative / Urobili: Negative mg/dL   Blood: x / Protein: 30 mg/dL / Nitrite: Negative   Leuk Esterase: Trace / RBC: 0-2 /HPF / WBC 0-2   Sq Epi: x / Non Sq Epi: Occasional / Bacteria: Occasional      I&O's Summary    20 Oct 2021 07:01  -  21 Oct 2021 07:00  --------------------------------------------------------  IN: 1000 mL / OUT: 750 mL / NET: 250 mL      BNP  RADIOLOGY & ADDITIONAL STUDIES:

## 2021-10-23 LAB
HCT VFR BLD CALC: 25.1 % — LOW (ref 34.5–45)
HCT VFR BLD CALC: 27.8 % — LOW (ref 34.5–45)
HGB BLD-MCNC: 7.8 G/DL — LOW (ref 11.5–15.5)
HGB BLD-MCNC: 8.6 G/DL — LOW (ref 11.5–15.5)
MCHC RBC-ENTMCNC: 30.8 PG — SIGNIFICANT CHANGE UP (ref 27–34)
MCHC RBC-ENTMCNC: 31.1 GM/DL — LOW (ref 32–36)
MCV RBC AUTO: 99.2 FL — SIGNIFICANT CHANGE UP (ref 80–100)
OB PNL STL: POSITIVE
PLATELET # BLD AUTO: 128 K/UL — LOW (ref 150–400)
RBC # BLD: 2.53 M/UL — LOW (ref 3.8–5.2)
RBC # FLD: 16.8 % — HIGH (ref 10.3–14.5)
WBC # BLD: 15.89 K/UL — HIGH (ref 3.8–10.5)
WBC # FLD AUTO: 15.89 K/UL — HIGH (ref 3.8–10.5)

## 2021-10-23 PROCEDURE — 99233 SBSQ HOSP IP/OBS HIGH 50: CPT

## 2021-10-23 RX ADMIN — OXYCODONE AND ACETAMINOPHEN 1 TABLET(S): 5; 325 TABLET ORAL at 13:06

## 2021-10-23 RX ADMIN — GABAPENTIN 300 MILLIGRAM(S): 400 CAPSULE ORAL at 05:50

## 2021-10-23 RX ADMIN — CEFEPIME 1000 MILLIGRAM(S): 1 INJECTION, POWDER, FOR SOLUTION INTRAMUSCULAR; INTRAVENOUS at 21:22

## 2021-10-23 RX ADMIN — Medication 250 MILLIGRAM(S): at 01:17

## 2021-10-23 RX ADMIN — Medication 250 MILLIGRAM(S): at 21:22

## 2021-10-23 RX ADMIN — SACUBITRIL AND VALSARTAN 1 TABLET(S): 24; 26 TABLET, FILM COATED ORAL at 08:55

## 2021-10-23 RX ADMIN — OXYCODONE AND ACETAMINOPHEN 1 TABLET(S): 5; 325 TABLET ORAL at 14:57

## 2021-10-23 RX ADMIN — ATORVASTATIN CALCIUM 40 MILLIGRAM(S): 80 TABLET, FILM COATED ORAL at 21:22

## 2021-10-23 RX ADMIN — Medication 5 MILLIGRAM(S): at 08:55

## 2021-10-23 RX ADMIN — Medication 250 MILLIGRAM(S): at 08:54

## 2021-10-23 RX ADMIN — Medication 25 MILLIGRAM(S): at 21:22

## 2021-10-23 RX ADMIN — ENOXAPARIN SODIUM 40 MILLIGRAM(S): 100 INJECTION SUBCUTANEOUS at 08:54

## 2021-10-23 RX ADMIN — Medication 25 MILLIGRAM(S): at 08:55

## 2021-10-23 RX ADMIN — GABAPENTIN 300 MILLIGRAM(S): 400 CAPSULE ORAL at 13:12

## 2021-10-23 RX ADMIN — TIOTROPIUM BROMIDE 1 CAPSULE(S): 18 CAPSULE ORAL; RESPIRATORY (INHALATION) at 08:45

## 2021-10-23 RX ADMIN — GABAPENTIN 300 MILLIGRAM(S): 400 CAPSULE ORAL at 21:31

## 2021-10-23 RX ADMIN — OXYCODONE AND ACETAMINOPHEN 1 TABLET(S): 5; 325 TABLET ORAL at 01:17

## 2021-10-23 RX ADMIN — Medication 2: at 17:13

## 2021-10-23 RX ADMIN — SACUBITRIL AND VALSARTAN 1 TABLET(S): 24; 26 TABLET, FILM COATED ORAL at 21:22

## 2021-10-23 RX ADMIN — Medication 4: at 12:58

## 2021-10-23 RX ADMIN — Medication 81 MILLIGRAM(S): at 08:55

## 2021-10-23 RX ADMIN — Medication 1 MILLIGRAM(S): at 21:23

## 2021-10-23 RX ADMIN — INSULIN GLARGINE 10 UNIT(S): 100 INJECTION, SOLUTION SUBCUTANEOUS at 21:21

## 2021-10-23 RX ADMIN — Medication 20 MILLIGRAM(S): at 08:55

## 2021-10-23 RX ADMIN — CEFEPIME 1000 MILLIGRAM(S): 1 INJECTION, POWDER, FOR SOLUTION INTRAMUSCULAR; INTRAVENOUS at 08:54

## 2021-10-23 RX ADMIN — Medication 3 MILLIGRAM(S): at 21:22

## 2021-10-23 NOTE — PROGRESS NOTE ADULT - SUBJECTIVE AND OBJECTIVE BOX
Date of service: 10-23-21 @ 12:32    Lying in bed in NAD  Has right ankle discomfort  Has some edema    ROS: no fever or chills; denies dizziness, no HA, no SOB or cough, no abdominal pain, no diarrhea or constipation; no dysuria    MEDICATIONS  (STANDING):  aspirin  chewable 81 milliGRAM(s) Oral daily  atorvastatin 40 milliGRAM(s) Oral at bedtime  cefepime  Injectable. 1000 milliGRAM(s) IV Push every 12 hours  dextrose 40% Gel 15 Gram(s) Oral once  dextrose 5%. 1000 milliLiter(s) (50 mL/Hr) IV Continuous <Continuous>  dextrose 50% Injectable 25 Gram(s) IV Push once  folic acid 1 milliGRAM(s) Oral at bedtime  gabapentin 300 milliGRAM(s) Oral three times a day  glucagon  Injectable 1 milliGRAM(s) IntraMuscular once  insulin glargine Injectable (LANTUS) 10 Unit(s) SubCutaneous at bedtime  insulin lispro (ADMELOG) corrective regimen sliding scale   SubCutaneous three times a day before meals  metoprolol tartrate 25 milliGRAM(s) Oral two times a day  PARoxetine 20 milliGRAM(s) Oral daily  predniSONE   Tablet 5 milliGRAM(s) Oral daily  sacubitril 24 mG/valsartan 26 mG 1 Tablet(s) Oral two times a day  tiotropium 18 MICROgram(s) Capsule 1 Capsule(s) Inhalation daily  vancomycin  IVPB 750 milliGRAM(s) IV Intermittent every 12 hours    Vital Signs Last 24 Hrs  T(C): 36.5 (23 Oct 2021 08:49), Max: 37.1 (22 Oct 2021 22:58)  T(F): 97.7 (23 Oct 2021 08:49), Max: 98.7 (22 Oct 2021 22:58)  HR: 76 (23 Oct 2021 08:49) (70 - 91)  BP: 132/45 (23 Oct 2021 08:49) (111/43 - 141/46)  BP(mean): 60 (23 Oct 2021 01:13) (60 - 69)  RR: 17 (23 Oct 2021 08:49) (17 - 18)  SpO2: 95% (23 Oct 2021 08:49) (94% - 100%)     Physical exam:    Constitutional:  No acute distress  HEENT: NC/AT, EOMI, PERRLA, conjunctivae clear; ears and nose atraumatic  Neck: supple; thyroid not palpable  Back: no tenderness  Respiratory: respiratory effort normal; clear to auscultation  Cardiovascular: S1S2 regular, no murmurs  Abdomen: soft, not tender, not distended, positive BS; no liver or spleen organomegaly  Genitourinary: no suprapubic tenderness  Lymphatic: no LN palpable  Musculoskeletal: no muscle tenderness, no joint swelling or tenderness  Extremities: no pedal edema  Rt leg rash, Rt heel and right lower lateral leg open superficial ulcers, dry; mild erythema  Neurological/ Psychiatric: AxOx3, moving all extremities  Skin: no rashes; no palpable lesions    Labs: reviewed                        7.8    15.89 )-----------( 128      ( 23 Oct 2021 08:16 )             25.1     10-    139  |  113<H>  |  43<H>  ----------------------------<  132<H>  4.4   |  19<L>  |  0.96    Ca    8.0<L>      22 Oct 2021 09:10    Vancomycin Level, Trough: 17.8 ug/mL (10-22 @ 09:10)                        8.0    11.66 )-----------( 215      ( 20 Oct 2021 08:00 )             25.5     10-20    138  |  110<H>  |  42<H>  ----------------------------<  173<H>  4.8   |  21<L>  |  1.08    Ca    8.5      20 Oct 2021 08:00    TPro  7.3  /  Alb  2.6<L>  /  TBili  0.2  /  DBili  x   /  AST  9<L>  /  ALT  11<L>  /  AlkPhos  97  10-19     LIVER FUNCTIONS - ( 19 Oct 2021 14:33 )  Alb: 2.6 g/dL / Pro: 7.3 gm/dL / ALK PHOS: 97 U/L / ALT: 11 U/L / AST: 9 U/L / GGT: x           Urinalysis Basic - ( 19 Oct 2021 22:25 )    Color: Yellow / Appearance: Clear / S.020 / pH: x  Gluc: x / Ketone: Negative  / Bili: Negative / Urobili: Negative mg/dL   Blood: x / Protein: 30 mg/dL / Nitrite: Negative   Leuk Esterase: Trace / RBC: 0-2 /HPF / WBC 0-2   Sq Epi: x / Non Sq Epi: Occasional / Bacteria: Occasional      Culture - Blood (collected 19 Oct 2021 14:33)  Source: .Blood None  Preliminary Report (20 Oct 2021 19:02):    No growth to date.    Culture - Blood (collected 19 Oct 2021 14:33)  Source: .Blood None  Preliminary Report (20 Oct 2021 19:02):    No growth to date.    COVID-19 PCR: NotDetec (10-19-21 @ 14:33)          Radiology: all available radiological tests reviewed    Advanced directives addressed: full resuscitation

## 2021-10-23 NOTE — PROGRESS NOTE ADULT - SUBJECTIVE AND OBJECTIVE BOX
History of Present Illness:   85 y/o female with a PMHx of COPD,  CAD, DM II, depression, Chf, EF 40%,  HTN, PVD, PAD, s/p left fem pop bypass on 03/24/21, L thigh infections s/p debridement and muscle flap, PAT, LE DVT on Eliquis,, RA, anemia, presents to the ED c/o worsening chronic wounds to RLE with discharge since last night. Daughter states pt has difficulty bearing weight due to wounds. Pt was placed on outpatient PO abx with no relief. Pt was sent to the ED by Dr. Ventura for iv abx.      sub: s/p 1 unit prbc on 10/22, marginal improvement in hgb, c/o diarrhea yesterday with slight llq pain.         PHYSICAL EXAM:  ICU Vital Signs Last 24 Hrs  T(C): 36.5 (23 Oct 2021 08:49), Max: 37.1 (22 Oct 2021 22:58)  T(F): 97.7 (23 Oct 2021 08:49), Max: 98.7 (22 Oct 2021 22:58)  HR: 76 (23 Oct 2021 08:49) (70 - 91)  BP: 132/45 (23 Oct 2021 08:49) (111/43 - 141/46)  BP(mean): 60 (23 Oct 2021 01:13) (60 - 69)  ABP: --  ABP(mean): --  RR: 17 (23 Oct 2021 08:49) (17 - 18)  SpO2: 95% (23 Oct 2021 08:49) (94% - 100%)    GENERAL: NAD, Well nourished  HEENT:  NC/AT, EOMI, PERRLA, No scleral icterus, Moist mucous membranes  NECK: Supple, No JVD  CNS:  Alert & Oriented X3, Motor Strength 5/5 B/L upper and lower extremities; DTRs 2+ intact   LUNG: Normal Breath sounds, Clear to auscultation bilaterally, No rales, No rhonchi, No wheezing  HEART: RRR; No murmurs, No rubs  ABDOMEN: +BS, ST/ND/NT  GENITOURINARY: Voiding, Bladder not distended  EXTREMITIES:  2+ Peripheral Pulses, No clubbing, No cyanosis, No tibial edema  MUSCULOSKELTAL: Joints normal ROM, No TTP, No effusion  VAGINAL: deferred  SKIN: +mild Rt leg rashe, Rt leg open superficial wounds, with serosanguinous discharge and surrounding erythema  RECTAL: deferred, not indicated  BREAST: deferred  a/p:    1. Rt leg cellulitis:  Cefepime day#5  Blood Cx x2 ngtd  ID evaluation noted: c/w abx x 5-7days;  rotate to Doxycycline po Bid. D/W Dr Ortega    2. PVD:  s/p Left iliac angioplasty   Vascular surg evaluation noted:  CTA done  favor conservative management     3. Acute on chronic anemia/GIB/symptomatic anemia  -will repeat add'l unit of prbc now  -discussed conservative vs invasive tx (ie colonscopy) with patient and dtr Dr Ahumada  -they are in favor of supportive care for now give her cardiopulmonary history that could pose for intraprocedural complications  -will monitor her h/h for now and have her f/u with GI as outpt  -she denies any "B" symptoms  -denies phyllis hematochezia or melena. No hemodynamic shifts  -if develops brisk bleed, hemodynamic instability or severe symptoms, will reconsider and involve GI  will transfuse 1u RBC today    4. DM II; stable    5. COPD:   stable, c/w inhalers     6. Chronic systolic Chf:  stable, c/w Entresto  EF 40%    7. RA :   Rheumatology eval noted; Prednisone 5mg/day started  hold MTX while on acute cellulitis treatment     Leukocytosis due to steroids. Diarrhea resolved, doubt c diff. IF continues to rise or diarrhea recurs, send off gi/pcr/cdiff    Plan: transfuse and monitor for bleeding. Stop lovenox. Cont ASA for now, no phyllis bleeding History of Present Illness:   87 y/o female with a PMHx of COPD,  CAD, DM II, depression, Chf, EF 40%,  HTN, PVD, PAD, s/p left fem pop bypass on 03/24/21, L thigh infections s/p debridement and muscle flap, PAT, LE DVT on Eliquis,, RA, anemia, presents to the ED c/o worsening chronic wounds to RLE with discharge since last night. Daughter states pt has difficulty bearing weight due to wounds. Pt was placed on outpatient PO abx with no relief. Pt was sent to the ED by Dr. Ventura for iv abx.      sub: s/p 1 unit prbc on 10/22, marginal improvement in hgb, c/o diarrhea yesterday with slight llq pain.         PHYSICAL EXAM:  ICU Vital Signs Last 24 Hrs  T(C): 36.5 (23 Oct 2021 08:49), Max: 37.1 (22 Oct 2021 22:58)  T(F): 97.7 (23 Oct 2021 08:49), Max: 98.7 (22 Oct 2021 22:58)  HR: 76 (23 Oct 2021 08:49) (70 - 91)  BP: 132/45 (23 Oct 2021 08:49) (111/43 - 141/46)  BP(mean): 60 (23 Oct 2021 01:13) (60 - 69)  ABP: --  ABP(mean): --  RR: 17 (23 Oct 2021 08:49) (17 - 18)  SpO2: 95% (23 Oct 2021 08:49) (94% - 100%)    GENERAL: NAD, Well nourished  HEENT:  NC/AT, EOMI, PERRLA, No scleral icterus, Moist mucous membranes  NECK: Supple, No JVD  CNS:  Alert & Oriented X3, Motor Strength 5/5 B/L upper and lower extremities; DTRs 2+ intact   LUNG: Normal Breath sounds, Clear to auscultation bilaterally, No rales, No rhonchi, No wheezing  HEART: RRR; No murmurs, No rubs  ABDOMEN: +BS, ST/ND/NT  GENITOURINARY: Voiding, Bladder not distended  EXTREMITIES:  2+ Peripheral Pulses, No clubbing, No cyanosis, No tibial edema  MUSCULOSKELTAL: Joints normal ROM, No TTP, No effusion  VAGINAL: deferred  SKIN: +mild Rt leg rashe, Rt leg open superficial wounds, with serosanguinous discharge and surrounding erythema  RECTAL: deferred, not indicated  BREAST: deferred  a/p:    1. Rt leg cellulitis:  Cefepime day#5  Blood Cx x2 ngtd  ID evaluation noted: c/w abx x 5-7days;  rotate to Doxycycline on d/c. D/W Dr Ortega    2. PVD:  s/p Left iliac angioplasty   Vascular surg evaluation noted:  CTA done  favor conservative management     3. Acute on chronic anemia/GIB/symptomatic anemia  -will repeat add'l unit of prbc now  -discussed conservative vs invasive tx (ie colonscopy) with patient and dtr Dr Ahumada  -they are in favor of supportive care for now give her cardiopulmonary history that could pose for intraprocedural complications  -will monitor her h/h for now and have her f/u with GI as outpt  -she denies any "B" symptoms  -denies phyllis hematochezia or melena. No hemodynamic shifts  -if develops brisk bleed, hemodynamic instability or severe symptoms, will reconsider and involve GI  will transfuse 1u RBC today    4. DM II; stable    5. COPD:   stable, c/w inhalers     6. Chronic systolic Chf:  stable, c/w Entresto  EF 40%    7. RA :   Rheumatology eval noted; Prednisone 5mg/day started  hold MTX while on acute cellulitis treatment     8. Moderate protein calorie malnutrition  -supportive care      Leukocytosis due to steroids. Diarrhea resolved, doubt c diff. IF continues to rise or diarrhea recurs, send off gi/pcr/cdiff    Plan: transfuse and monitor for bleeding. Stop lovenox. Cont ASA for now, no phyllis bleeding

## 2021-10-23 NOTE — PROGRESS NOTE ADULT - SUBJECTIVE AND OBJECTIVE BOX
afebrile, VSS  received 1 U PRBC last night, feels better (less tired); no R foot pain    sleeping with R foot dangling but denies ischemic type rest pain in this limb and states it is force of habit    PE  R leg dressing intact; foot non ischemic    A/P  significant PAD but non ischemic limb  given her fragile status, and her considerable risk with almost any intervention, will continue with local care, AB, and unloading    MEDICATIONS  (STANDING):  aspirin  chewable 81 milliGRAM(s) Oral daily  atorvastatin 40 milliGRAM(s) Oral at bedtime  cefepime  Injectable. 1000 milliGRAM(s) IV Push every 12 hours  dextrose 40% Gel 15 Gram(s) Oral once  dextrose 5%. 1000 milliLiter(s) (50 mL/Hr) IV Continuous <Continuous>  dextrose 50% Injectable 25 Gram(s) IV Push once  enoxaparin Injectable 40 milliGRAM(s) SubCutaneous daily  folic acid 1 milliGRAM(s) Oral at bedtime  gabapentin 300 milliGRAM(s) Oral three times a day  glucagon  Injectable 1 milliGRAM(s) IntraMuscular once  insulin glargine Injectable (LANTUS) 10 Unit(s) SubCutaneous at bedtime  insulin lispro (ADMELOG) corrective regimen sliding scale   SubCutaneous three times a day before meals  metoprolol tartrate 25 milliGRAM(s) Oral two times a day  PARoxetine 20 milliGRAM(s) Oral daily  predniSONE   Tablet 5 milliGRAM(s) Oral daily  sacubitril 24 mG/valsartan 26 mG 1 Tablet(s) Oral two times a day  tiotropium 18 MICROgram(s) Capsule 1 Capsule(s) Inhalation daily  vancomycin  IVPB 750 milliGRAM(s) IV Intermittent every 12 hours    MEDICATIONS  (PRN):  ALBUTerol    90 MICROgram(s) HFA Inhaler 2 Puff(s) Inhalation every 6 hours PRN Shortness of Breath  aluminum hydroxide/magnesium hydroxide/simethicone Suspension 30 milliLiter(s) Oral every 4 hours PRN Dyspepsia  melatonin 3 milliGRAM(s) Oral at bedtime PRN Insomnia  ondansetron Injectable 4 milliGRAM(s) IV Push every 8 hours PRN Nausea and/or Vomiting  oxycodone    5 mG/acetaminophen 325 mG 1 Tablet(s) Oral every 6 hours PRN Moderate Pain (4 - 6)      Allergies    cephalosporins (Other)  penicillins (Urticaria; Pruritus)    Intolerances        Flatus: [ ] YES [ ] NO             Bowel Movement: [ ] YES [ ] NO  Pain (0-10):            Pain Control Adequate: [ ] YES [ ] NO  Nausea: [ ] YES [ ] NO            Vomiting: [ ] YES [ ] NO  Diarrhea: [ ] YES [ ] NO         Constipation: [ ] YES [ ] NO     Chest Pain: [ ] YES [ ] NO    SOB:  [ ] YES [ ] NO    Vital Signs Last 24 Hrs  T(C): 36.6 (23 Oct 2021 01:13), Max: 37.1 (22 Oct 2021 22:58)  T(F): 97.8 (23 Oct 2021 01:13), Max: 98.7 (22 Oct 2021 22:58)  HR: 72 (23 Oct 2021 01:13) (70 - 91)  BP: 122/39 (23 Oct 2021 01:13) (111/43 - 141/46)  BP(mean): 60 (23 Oct 2021 01:13) (60 - 69)  RR: 18 (23 Oct 2021 01:13) (18 - 18)  SpO2: 95% (23 Oct 2021 01:13) (94% - 100%)    I&O's Summary      Physical Exam:  General: NAD, resting comfortably  Pulmonary: normal resp effort, CTA-B  Cardiovascular: NSR  Abdominal: soft, NT/ND  Extremities: WWP, normal strength  Neuro: A/O x 3, CNs II-XII grossly intact, normal motor/sensation, no focal deficits  Pulses:   Right:                                                                          Left:  FEM [ ]2+ [ ]1+ [ ]doppler                                             FEM [ ]2+ [ ]1+ [ ]doppler    POP [ ]2+ [ ]1+ [ ]doppler                                             POP [ ]2+ [ ]1+ [ ]doppler    DP [ ]2+ [ ]1+ [ ]doppler                                                DP [ ]2+ [ ]1+ [ ]doppler  PT[ ]2+ [ ]1+ [ ]doppler                                                  PT [ ]2+ [ ]1+ [ ]doppler    LABS:                        7.2    20.78 )-----------( 145      ( 22 Oct 2021 09:10 )             23.3     10-22    139  |  113<H>  |  43<H>  ----------------------------<  132<H>  4.4   |  19<L>  |  0.96    Ca    8.0<L>      22 Oct 2021 09:10            CAPILLARY BLOOD GLUCOSE      POCT Blood Glucose.: 146 mg/dL (23 Oct 2021 08:02)  POCT Blood Glucose.: 183 mg/dL (22 Oct 2021 20:22)  POCT Blood Glucose.: 205 mg/dL (22 Oct 2021 16:52)  POCT Blood Glucose.: 203 mg/dL (22 Oct 2021 11:20)      RADIOLOGY & ADDITIONAL TESTS:

## 2021-10-24 LAB
BASOPHILS # BLD AUTO: 0.03 K/UL — SIGNIFICANT CHANGE UP (ref 0–0.2)
BASOPHILS NFR BLD AUTO: 0.2 % — SIGNIFICANT CHANGE UP (ref 0–2)
CULTURE RESULTS: SIGNIFICANT CHANGE UP
CULTURE RESULTS: SIGNIFICANT CHANGE UP
EOSINOPHIL # BLD AUTO: 0.72 K/UL — HIGH (ref 0–0.5)
EOSINOPHIL NFR BLD AUTO: 5.9 % — SIGNIFICANT CHANGE UP (ref 0–6)
FERRITIN SERPL-MCNC: 178 NG/ML — HIGH (ref 15–150)
FOLATE SERPL-MCNC: >20 NG/ML — SIGNIFICANT CHANGE UP
GLUCOSE BLDC GLUCOMTR-MCNC: 181 MG/DL — HIGH (ref 70–99)
GLUCOSE BLDC GLUCOMTR-MCNC: 240 MG/DL — HIGH (ref 70–99)
GLUCOSE BLDC GLUCOMTR-MCNC: 291 MG/DL — HIGH (ref 70–99)
HAPTOGLOB SERPL-MCNC: 309 MG/DL — HIGH (ref 34–200)
HCT VFR BLD CALC: 27.4 % — LOW (ref 34.5–45)
HGB BLD-MCNC: 8.6 G/DL — LOW (ref 11.5–15.5)
IMM GRANULOCYTES NFR BLD AUTO: 0.6 % — SIGNIFICANT CHANGE UP (ref 0–1.5)
IRON SATN MFR SERPL: 22 % — SIGNIFICANT CHANGE UP (ref 14–50)
IRON SATN MFR SERPL: 38 UG/DL — SIGNIFICANT CHANGE UP (ref 30–160)
LYMPHOCYTES # BLD AUTO: 1.61 K/UL — SIGNIFICANT CHANGE UP (ref 1–3.3)
LYMPHOCYTES # BLD AUTO: 13.2 % — SIGNIFICANT CHANGE UP (ref 13–44)
MCHC RBC-ENTMCNC: 30.4 PG — SIGNIFICANT CHANGE UP (ref 27–34)
MCHC RBC-ENTMCNC: 31.4 GM/DL — LOW (ref 32–36)
MCV RBC AUTO: 96.8 FL — SIGNIFICANT CHANGE UP (ref 80–100)
MONOCYTES # BLD AUTO: 1.24 K/UL — HIGH (ref 0–0.9)
MONOCYTES NFR BLD AUTO: 10.1 % — SIGNIFICANT CHANGE UP (ref 2–14)
NEUTROPHILS # BLD AUTO: 8.56 K/UL — HIGH (ref 1.8–7.4)
NEUTROPHILS NFR BLD AUTO: 70 % — SIGNIFICANT CHANGE UP (ref 43–77)
PLATELET # BLD AUTO: 157 K/UL — SIGNIFICANT CHANGE UP (ref 150–400)
RBC # BLD: 2.83 M/UL — LOW (ref 3.8–5.2)
RBC # BLD: 2.83 M/UL — LOW (ref 3.8–5.2)
RBC # FLD: 19.1 % — HIGH (ref 10.3–14.5)
RETICS #: 49 K/UL — SIGNIFICANT CHANGE UP (ref 25–125)
RETICS/RBC NFR: 1.7 % — SIGNIFICANT CHANGE UP (ref 0.5–2.5)
SPECIMEN SOURCE: SIGNIFICANT CHANGE UP
SPECIMEN SOURCE: SIGNIFICANT CHANGE UP
TIBC SERPL-MCNC: 172 UG/DL — LOW (ref 220–430)
UIBC SERPL-MCNC: 134 UG/DL — SIGNIFICANT CHANGE UP (ref 110–370)
VIT B12 SERPL-MCNC: 530 PG/ML — SIGNIFICANT CHANGE UP (ref 232–1245)
WBC # BLD: 12.23 K/UL — HIGH (ref 3.8–10.5)
WBC # FLD AUTO: 12.23 K/UL — HIGH (ref 3.8–10.5)

## 2021-10-24 PROCEDURE — 99232 SBSQ HOSP IP/OBS MODERATE 35: CPT

## 2021-10-24 RX ADMIN — CEFEPIME 1000 MILLIGRAM(S): 1 INJECTION, POWDER, FOR SOLUTION INTRAMUSCULAR; INTRAVENOUS at 09:13

## 2021-10-24 RX ADMIN — CEFEPIME 1000 MILLIGRAM(S): 1 INJECTION, POWDER, FOR SOLUTION INTRAMUSCULAR; INTRAVENOUS at 21:57

## 2021-10-24 RX ADMIN — SACUBITRIL AND VALSARTAN 1 TABLET(S): 24; 26 TABLET, FILM COATED ORAL at 21:58

## 2021-10-24 RX ADMIN — TIOTROPIUM BROMIDE 1 CAPSULE(S): 18 CAPSULE ORAL; RESPIRATORY (INHALATION) at 08:16

## 2021-10-24 RX ADMIN — OXYCODONE AND ACETAMINOPHEN 1 TABLET(S): 5; 325 TABLET ORAL at 10:58

## 2021-10-24 RX ADMIN — GABAPENTIN 300 MILLIGRAM(S): 400 CAPSULE ORAL at 21:57

## 2021-10-24 RX ADMIN — Medication 2: at 08:02

## 2021-10-24 RX ADMIN — Medication 250 MILLIGRAM(S): at 09:13

## 2021-10-24 RX ADMIN — Medication 25 MILLIGRAM(S): at 21:57

## 2021-10-24 RX ADMIN — Medication 1 MILLIGRAM(S): at 21:57

## 2021-10-24 RX ADMIN — Medication 20 MILLIGRAM(S): at 09:14

## 2021-10-24 RX ADMIN — GABAPENTIN 300 MILLIGRAM(S): 400 CAPSULE ORAL at 05:43

## 2021-10-24 RX ADMIN — SACUBITRIL AND VALSARTAN 1 TABLET(S): 24; 26 TABLET, FILM COATED ORAL at 09:15

## 2021-10-24 RX ADMIN — Medication 4: at 16:56

## 2021-10-24 RX ADMIN — Medication 81 MILLIGRAM(S): at 09:13

## 2021-10-24 RX ADMIN — Medication 250 MILLIGRAM(S): at 21:57

## 2021-10-24 RX ADMIN — GABAPENTIN 300 MILLIGRAM(S): 400 CAPSULE ORAL at 13:44

## 2021-10-24 RX ADMIN — Medication 25 MILLIGRAM(S): at 09:14

## 2021-10-24 RX ADMIN — Medication 2: at 12:16

## 2021-10-24 RX ADMIN — OXYCODONE AND ACETAMINOPHEN 1 TABLET(S): 5; 325 TABLET ORAL at 09:14

## 2021-10-24 RX ADMIN — OXYCODONE AND ACETAMINOPHEN 1 TABLET(S): 5; 325 TABLET ORAL at 23:07

## 2021-10-24 RX ADMIN — Medication 5 MILLIGRAM(S): at 09:14

## 2021-10-24 RX ADMIN — ATORVASTATIN CALCIUM 40 MILLIGRAM(S): 80 TABLET, FILM COATED ORAL at 21:58

## 2021-10-24 RX ADMIN — INSULIN GLARGINE 10 UNIT(S): 100 INJECTION, SOLUTION SUBCUTANEOUS at 21:58

## 2021-10-24 NOTE — PROGRESS NOTE ADULT - SUBJECTIVE AND OBJECTIVE BOX
History of Present Illness:   87 y/o female with a PMHx of COPD,  CAD, DM II, depression, Chf, EF 40%,  HTN, PVD, PAD, s/p left fem pop bypass on 03/24/21, L thigh infections s/p debridement and muscle flap, PAT, LE DVT on Eliquis,, RA, anemia, presents to the ED c/o worsening chronic wounds to RLE with discharge since last night. Daughter states pt has difficulty bearing weight due to wounds. Pt was placed on outpatient PO abx with no relief. Pt was sent to the ED by Dr. Ventura for iv abx.      sub: s/p 1 unit prbc on 10/22, marginal improvement in hgb, c/o diarrhea yesterday with slight llq pain.   10/24: No reported bleeding; pain improved. Denies fever, chills, chest pain, SOB, nausea, vomiting  Review of Systems: 14 Point review of systems reviewed and reported as negative unless otherwise stated in Subjective        PHYSICAL EXAM:  T(C): 36.4 (10-24-21 @ 15:20), Max: 36.4 (10-24-21 @ 15:20)  HR: 76 (10-24-21 @ 15:20) (65 - 76)  BP: 145/42 (10-24-21 @ 15:20) (128/42 - 146/41)  RR: 16 (10-24-21 @ 15:20) (16 - 16)  SpO2: 98% (10-24-21 @ 15:20) (98% - 100%)    GENERAL: NAD,   HEENT:  NC/AT, EOMI, PERRL, No scleral icterus, Moist mucous membranes  NECK: Supple, No JVD  CNS:  Alert & Oriented X3, Motor Strength 5/5 B/L upper and lower extremities; DTRs 2+ intact   LUNG: Normal Breath sounds, Clear to auscultation bilaterally, No rales, No rhonchi, No wheezing; Normal respiratory effort  HEART: RRR; No murmurs, No rubs  ABDOMEN: +BS, ST/ND/NT  GENITOURINARY: Voiding, Bladder not distended  EXTREMITIES:  2+ Peripheral Pulses, No clubbing, No cyanosis, No tibial edema  MUSCULOSKELTAL: Joints normal ROM, No TTP, No effusion  SKIN: +mild Rt leg rashe, Rt leg open superficial wounds, with serosanguinous discharge and surrounding erythema improving                          8.6    12.23 )-----------( 157      ( 24 Oct 2021 08:00 )             27.4           COVID-19 PCR: NotDetec (19 Oct 2021 14:33)  COVID-19 PCR: NotDetec (17 May 2021 16:06)  COVID-19 PCR: NotDetec (10 May 2021 21:15)  COVID-19 PCR: NotDetec (03 May 2021 23:17)    CAPILLARY BLOOD GLUCOSE      POCT Blood Glucose.: 240 mg/dL (24 Oct 2021 16:49)  POCT Blood Glucose.: 181 mg/dL (24 Oct 2021 12:06)  POCT Blood Glucose.: 152 mg/dL (24 Oct 2021 08:01)  POCT Blood Glucose.: 188 mg/dL (23 Oct 2021 21:21)  POCT Blood Glucose.: 152 mg/dL (23 Oct 2021 17:10)        a/p:    1. Rt leg cellulitis:  Cefepime day#5  Blood Cx x2 ngtd  ID evaluation noted: c/w abx x 5-7days;  rotate to Doxycycline on d/c. D/W Dr Ortega    2. PVD:  s/p Left iliac angioplasty   Vascular surg evaluation noted:  CTA done  favor conservative management     3. Acute on chronic anemia/GIB/symptomatic anemia  -will repeat add'l unit of prbc now  -discussed conservative vs invasive tx (ie colonscopy) with patient and dtr Dr Ahumada  < from: CT Angio Abd Aorta w/run-off w/ IV Cont (10.21.21 @ 08:43) >  Redemonstration of moderate to severe peripheral arterial disease bilaterally, not significantly changed from prior exam. Infrapopliteal arteries are patent down to at least the ankle in both lower extremities.  Suspicious right renal mass and indeterminate left renal mass are unchanged dating back to December 23, 2020  -they are in favor of supportive care for now give her cardiopulmonary history that could pose for intraprocedural complications  -will monitor her h/h for now and have her f/u with GI as outpt  -she denies any "B" symptoms  -denies phyllis hematochezia or melena. No hemodynamic shifts  -if develops brisk bleed, hemodynamic instability or severe symptoms, will reconsider and involve GI  -Reti index indicative of hypoproliferative state. Iron panel useless as drawn after transufions. B12 adequate. Folate pending. Borderline macrocytic. Could have underlying myeloproliferative disorder.     4. DM II; stable    5. COPD:   stable, c/w inhalers     6. Chronic systolic Chf:  stable, c/w Entresto  EF 40%    7. RA :   Rheumatology eval noted; Prednisone 5mg/day started  hold MTX while on acute cellulitis treatment     8. Moderate protein calorie malnutrition  -supportive care      Leukocytosis due to steroids. Diarrhea resolved, doubt c diff. IF continues to rise or diarrhea recurs, send off gi/pcr/cdiff    If Hgb remains stable and continue clinical improvement, tentative discharge in the next 1-2 days  Lives with daughter, Dr. Ahumada  Discussed with pt, RN, Daughter.

## 2021-10-24 NOTE — PROGRESS NOTE ADULT - NUTRITIONAL ASSESSMENT
This patient has been assessed with a concern for Malnutrition and has been determined to have a diagnosis/diagnoses of Moderate protein-calorie malnutrition.    This patient is being managed with:   Diet Consistent Carbohydrate w/Evening Snack-  Entered: Oct 19 2021  5:55PM    

## 2021-10-24 NOTE — PROGRESS NOTE ADULT - SUBJECTIVE AND OBJECTIVE BOX
afebrile, VSS    HCT 27    feels better but still experiences heel pain with contact to bed; no rest pain as she did prior to her L leg revascularization    A/P  stable  PAD but no acute vascular issue.  I continue to favor conservative management for now understanding she may require intervention in the future.     Medical management as per Dr. Pulliam and her daughter    MEDICATIONS  (STANDING):  aspirin  chewable 81 milliGRAM(s) Oral daily  atorvastatin 40 milliGRAM(s) Oral at bedtime  cefepime  Injectable. 1000 milliGRAM(s) IV Push every 12 hours  dextrose 40% Gel 15 Gram(s) Oral once  dextrose 5%. 1000 milliLiter(s) (50 mL/Hr) IV Continuous <Continuous>  dextrose 50% Injectable 25 Gram(s) IV Push once  folic acid 1 milliGRAM(s) Oral at bedtime  gabapentin 300 milliGRAM(s) Oral three times a day  glucagon  Injectable 1 milliGRAM(s) IntraMuscular once  insulin glargine Injectable (LANTUS) 10 Unit(s) SubCutaneous at bedtime  insulin lispro (ADMELOG) corrective regimen sliding scale   SubCutaneous three times a day before meals  metoprolol tartrate 25 milliGRAM(s) Oral two times a day  PARoxetine 20 milliGRAM(s) Oral daily  predniSONE   Tablet 5 milliGRAM(s) Oral daily  sacubitril 24 mG/valsartan 26 mG 1 Tablet(s) Oral two times a day  tiotropium 18 MICROgram(s) Capsule 1 Capsule(s) Inhalation daily  vancomycin  IVPB 750 milliGRAM(s) IV Intermittent every 12 hours    MEDICATIONS  (PRN):  ALBUTerol    90 MICROgram(s) HFA Inhaler 2 Puff(s) Inhalation every 6 hours PRN Shortness of Breath  aluminum hydroxide/magnesium hydroxide/simethicone Suspension 30 milliLiter(s) Oral every 4 hours PRN Dyspepsia  melatonin 3 milliGRAM(s) Oral at bedtime PRN Insomnia  ondansetron Injectable 4 milliGRAM(s) IV Push every 8 hours PRN Nausea and/or Vomiting  oxycodone    5 mG/acetaminophen 325 mG 1 Tablet(s) Oral every 6 hours PRN Moderate Pain (4 - 6)      Allergies    cephalosporins (Other)  penicillins (Urticaria; Pruritus)    Intolerances        Flatus: [ ] YES [ ] NO             Bowel Movement: [ ] YES [ ] NO  Pain (0-10):            Pain Control Adequate: [ ] YES [ ] NO  Nausea: [ ] YES [ ] NO            Vomiting: [ ] YES [ ] NO  Diarrhea: [ ] YES [ ] NO         Constipation: [ ] YES [ ] NO     Chest Pain: [ ] YES [ ] NO    SOB:  [ ] YES [ ] NO    Vital Signs Last 24 Hrs  T(C): 36.3 (24 Oct 2021 08:25), Max: 36.7 (23 Oct 2021 13:55)  T(F): 97.4 (24 Oct 2021 08:25), Max: 98.1 (23 Oct 2021 13:55)  HR: 65 (24 Oct 2021 08:25) (64 - 78)  BP: 146/41 (24 Oct 2021 08:25) (118/80 - 146/41)  BP(mean): --  RR: 16 (24 Oct 2021 08:25) (16 - 17)  SpO2: 98% (24 Oct 2021 08:25) (95% - 100%)    I&O's Summary    23 Oct 2021 07:01  -  24 Oct 2021 07:00  --------------------------------------------------------  IN: 300 mL / OUT: 600 mL / NET: -300 mL        Physical Exam:  General: NAD, resting comfortably  Pulmonary: normal resp effort, CTA-B  Cardiovascular: NSR  Abdominal: soft, NT/ND  Extremities: WWP, normal strength  Neuro: A/O x 3, CNs II-XII grossly intact, normal motor/sensation, no focal deficits  Pulses:   Right:                                                                          Left:  FEM [ ]2+ [ ]1+ [ ]doppler                                             FEM [ ]2+ [ ]1+ [ ]doppler    POP [ ]2+ [ ]1+ [ ]doppler                                             POP [ ]2+ [ ]1+ [ ]doppler    DP [ ]2+ [ ]1+ [ ]doppler                                                DP [ ]2+ [ ]1+ [ ]doppler  PT[ ]2+ [ ]1+ [ ]doppler                                                  PT [ ]2+ [ ]1+ [ ]doppler    LABS:                        8.6    12.23 )-----------( 157      ( 24 Oct 2021 08:00 )             27.4     10-22    139  |  113<H>  |  43<H>  ----------------------------<  132<H>  4.4   |  19<L>  |  0.96    Ca    8.0<L>      22 Oct 2021 09:10            CAPILLARY BLOOD GLUCOSE      POCT Blood Glucose.: 152 mg/dL (24 Oct 2021 08:01)  POCT Blood Glucose.: 188 mg/dL (23 Oct 2021 21:21)  POCT Blood Glucose.: 152 mg/dL (23 Oct 2021 17:10)  POCT Blood Glucose.: 248 mg/dL (23 Oct 2021 12:01)      RADIOLOGY & ADDITIONAL TESTS:

## 2021-10-24 NOTE — PROGRESS NOTE ADULT - SUBJECTIVE AND OBJECTIVE BOX
Date of service: 10-24-21 @ 14:14    Lying in bed in NAD  Has right foot tenderness  No fever    ROS: no fever or chills; denies dizziness, no HA, no SOB or cough, no abdominal pain, no diarrhea or constipation; no dysuria    MEDICATIONS  (STANDING):  aspirin  chewable 81 milliGRAM(s) Oral daily  atorvastatin 40 milliGRAM(s) Oral at bedtime  cefepime  Injectable. 1000 milliGRAM(s) IV Push every 12 hours  dextrose 40% Gel 15 Gram(s) Oral once  dextrose 5%. 1000 milliLiter(s) (50 mL/Hr) IV Continuous <Continuous>  dextrose 50% Injectable 25 Gram(s) IV Push once  folic acid 1 milliGRAM(s) Oral at bedtime  gabapentin 300 milliGRAM(s) Oral three times a day  glucagon  Injectable 1 milliGRAM(s) IntraMuscular once  insulin glargine Injectable (LANTUS) 10 Unit(s) SubCutaneous at bedtime  insulin lispro (ADMELOG) corrective regimen sliding scale   SubCutaneous three times a day before meals  metoprolol tartrate 25 milliGRAM(s) Oral two times a day  PARoxetine 20 milliGRAM(s) Oral daily  predniSONE   Tablet 5 milliGRAM(s) Oral daily  sacubitril 24 mG/valsartan 26 mG 1 Tablet(s) Oral two times a day  tiotropium 18 MICROgram(s) Capsule 1 Capsule(s) Inhalation daily  vancomycin  IVPB 750 milliGRAM(s) IV Intermittent every 12 hours    Vital Signs Last 24 Hrs  T(C): 36.3 (24 Oct 2021 08:25), Max: 36.4 (23 Oct 2021 16:49)  T(F): 97.4 (24 Oct 2021 08:25), Max: 97.5 (23 Oct 2021 16:49)  HR: 65 (24 Oct 2021 08:25) (64 - 74)  BP: 146/41 (24 Oct 2021 08:25) (119/32 - 146/41)  BP(mean): --  RR: 16 (24 Oct 2021 08:25) (16 - 16)  SpO2: 98% (24 Oct 2021 08:25) (95% - 100%)     Physical exam:    Constitutional:  No acute distress  HEENT: NC/AT, EOMI, PERRLA, conjunctivae clear; ears and nose atraumatic  Neck: supple; thyroid not palpable  Back: no tenderness  Respiratory: respiratory effort normal; clear to auscultation  Cardiovascular: S1S2 regular, no murmurs  Abdomen: soft, not tender, not distended, positive BS; no liver or spleen organomegaly  Genitourinary: no suprapubic tenderness  Lymphatic: no LN palpable  Musculoskeletal: no muscle tenderness, no joint swelling or tenderness  Extremities: no pedal edema  Rt leg rash, Rt heel and right lower lateral leg open superficial ulcers, dry; mild erythema  Neurological/ Psychiatric: AxOx3, moving all extremities  Skin: no rashes; no palpable lesions    Labs: reviewed                        7.8    15.89 )-----------( 128      ( 23 Oct 2021 08:16 )             25.1     10-    139  |  113<H>  |  43<H>  ----------------------------<  132<H>  4.4   |  19<L>  |  0.96    Ca    8.0<L>      22 Oct 2021 09:10    Vancomycin Level, Trough: 17.8 ug/mL (10-22 @ 09:10)                        8.0    11.66 )-----------( 215      ( 20 Oct 2021 08:00 )             25.5     10-20    138  |  110<H>  |  42<H>  ----------------------------<  173<H>  4.8   |  21<L>  |  1.08    Ca    8.5      20 Oct 2021 08:00    TPro  7.3  /  Alb  2.6<L>  /  TBili  0.2  /  DBili  x   /  AST  9<L>  /  ALT  11<L>  /  AlkPhos  97  10-19     LIVER FUNCTIONS - ( 19 Oct 2021 14:33 )  Alb: 2.6 g/dL / Pro: 7.3 gm/dL / ALK PHOS: 97 U/L / ALT: 11 U/L / AST: 9 U/L / GGT: x           Urinalysis Basic - ( 19 Oct 2021 22:25 )    Color: Yellow / Appearance: Clear / S.020 / pH: x  Gluc: x / Ketone: Negative  / Bili: Negative / Urobili: Negative mg/dL   Blood: x / Protein: 30 mg/dL / Nitrite: Negative   Leuk Esterase: Trace / RBC: 0-2 /HPF / WBC 0-2   Sq Epi: x / Non Sq Epi: Occasional / Bacteria: Occasional      Culture - Blood (collected 19 Oct 2021 14:33)  Source: .Blood None  Preliminary Report (20 Oct 2021 19:02):    No growth to date.    Culture - Blood (collected 19 Oct 2021 14:33)  Source: .Blood None  Preliminary Report (20 Oct 2021 19:02):    No growth to date.    COVID-19 PCR: NotDetec (10-19-21 @ 14:33)    Radiology: all available radiological tests reviewed    Advanced directives addressed: full resuscitation

## 2021-10-25 ENCOUNTER — TRANSCRIPTION ENCOUNTER (OUTPATIENT)
Age: 86
End: 2021-10-25

## 2021-10-25 VITALS — OXYGEN SATURATION: 94 %

## 2021-10-25 LAB
GLUCOSE BLDC GLUCOMTR-MCNC: 121 MG/DL — HIGH (ref 70–99)
GLUCOSE BLDC GLUCOMTR-MCNC: 147 MG/DL — HIGH (ref 70–99)
HCT VFR BLD CALC: 29.1 % — LOW (ref 34.5–45)
HGB BLD-MCNC: 9 G/DL — LOW (ref 11.5–15.5)
MCHC RBC-ENTMCNC: 30.2 PG — SIGNIFICANT CHANGE UP (ref 27–34)
MCHC RBC-ENTMCNC: 30.9 GM/DL — LOW (ref 32–36)
MCV RBC AUTO: 97.7 FL — SIGNIFICANT CHANGE UP (ref 80–100)
PLATELET # BLD AUTO: 200 K/UL — SIGNIFICANT CHANGE UP (ref 150–400)
RBC # BLD: 2.98 M/UL — LOW (ref 3.8–5.2)
RBC # FLD: 18.4 % — HIGH (ref 10.3–14.5)
WBC # BLD: 8.71 K/UL — SIGNIFICANT CHANGE UP (ref 3.8–10.5)
WBC # FLD AUTO: 8.71 K/UL — SIGNIFICANT CHANGE UP (ref 3.8–10.5)

## 2021-10-25 PROCEDURE — 99239 HOSP IP/OBS DSCHRG MGMT >30: CPT

## 2021-10-25 RX ORDER — CLOPIDOGREL BISULFATE 75 MG/1
1 TABLET, FILM COATED ORAL
Qty: 0 | Refills: 0 | DISCHARGE

## 2021-10-25 RX ORDER — GABAPENTIN 400 MG/1
1 CAPSULE ORAL
Qty: 0 | Refills: 0 | DISCHARGE

## 2021-10-25 RX ORDER — METOPROLOL TARTRATE 50 MG
1 TABLET ORAL
Qty: 0 | Refills: 0 | DISCHARGE
Start: 2021-10-25

## 2021-10-25 RX ORDER — METHOTREXATE 2.5 MG/1
6 TABLET ORAL
Qty: 0 | Refills: 0 | DISCHARGE

## 2021-10-25 RX ORDER — GABAPENTIN 400 MG/1
1 CAPSULE ORAL
Qty: 0 | Refills: 0 | DISCHARGE
Start: 2021-10-25

## 2021-10-25 RX ADMIN — OXYCODONE AND ACETAMINOPHEN 1 TABLET(S): 5; 325 TABLET ORAL at 00:18

## 2021-10-25 RX ADMIN — SACUBITRIL AND VALSARTAN 1 TABLET(S): 24; 26 TABLET, FILM COATED ORAL at 10:33

## 2021-10-25 RX ADMIN — Medication 250 MILLIGRAM(S): at 10:33

## 2021-10-25 RX ADMIN — Medication 20 MILLIGRAM(S): at 10:33

## 2021-10-25 RX ADMIN — Medication 25 MILLIGRAM(S): at 10:32

## 2021-10-25 RX ADMIN — GABAPENTIN 300 MILLIGRAM(S): 400 CAPSULE ORAL at 06:13

## 2021-10-25 RX ADMIN — TIOTROPIUM BROMIDE 1 CAPSULE(S): 18 CAPSULE ORAL; RESPIRATORY (INHALATION) at 08:36

## 2021-10-25 RX ADMIN — OXYCODONE AND ACETAMINOPHEN 1 TABLET(S): 5; 325 TABLET ORAL at 13:22

## 2021-10-25 RX ADMIN — Medication 5 MILLIGRAM(S): at 10:33

## 2021-10-25 RX ADMIN — Medication 81 MILLIGRAM(S): at 10:30

## 2021-10-25 RX ADMIN — GABAPENTIN 300 MILLIGRAM(S): 400 CAPSULE ORAL at 13:57

## 2021-10-25 RX ADMIN — OXYCODONE AND ACETAMINOPHEN 1 TABLET(S): 5; 325 TABLET ORAL at 06:12

## 2021-10-25 RX ADMIN — CEFEPIME 1000 MILLIGRAM(S): 1 INJECTION, POWDER, FOR SOLUTION INTRAMUSCULAR; INTRAVENOUS at 10:30

## 2021-10-25 NOTE — DISCHARGE NOTE NURSING/CASE MANAGEMENT/SOCIAL WORK - NSDCVIVACCINE_GEN_ALL_CORE_FT
COVID-19 vaccine, vector-nr, [id number], PF, 0.5 mL (Josselyn); 30-Mar-2021 14:39; Marina Quick (RN); [employer]; [id number] (Exp. Date: 25-May-2021); IntraMuscular; Deltoid Left.; 0.5 milliLiter(s);   influenza, injectable, quadrivalent, preservative free; 21-Nov-2020 15:48; Anay Maher (RN); [employer] [id number] (Exp. Date: 30-Jun-2021); IntraMuscular; Deltoid Left.; 0.5 milliLiter(s); VIS (VIS Published: 15-Aug-2019, VIS Presented: 21-Nov-2020);   influenza, injectable, quadrivalent, preservative free; 21-Oct-2021 13:30; Nikia Cerda (ANISH); [employer]; [id number] (Exp. Date: 30-Jun-2022); IntraMuscular; Deltoid Left.; 0.5 milliLiter(s); VIS (VIS Published: 06-Aug-2021, VIS Presented: 21-Oct-2021);

## 2021-10-25 NOTE — PROGRESS NOTE ADULT - PROVIDER SPECIALTY LIST ADULT
Cardiology
Hospitalist
Hospitalist
Infectious Disease
Vascular Surgery
Cardiology
Infectious Disease
Vascular Surgery
Hospitalist
Hospitalist
Infectious Disease
Infectious Disease
Hospitalist
Infectious Disease

## 2021-10-25 NOTE — DISCHARGE NOTE NURSING/CASE MANAGEMENT/SOCIAL WORK - NSPROEXTENSIONSOFSELF_GEN_A_NUR
Glucose = 74.  (79 @ home @ 0630).  Feels good.  Did not take insulin today but ate less than usual yesterday.  Will continue to monitor.  Instructed to notify staff if symptomatic.     none

## 2021-10-25 NOTE — DISCHARGE NOTE PROVIDER - NSDCCPCAREPLAN_GEN_ALL_CORE_FT
PRINCIPAL DISCHARGE DIAGNOSIS  Diagnosis: Cellulitis of foot  Assessment and Plan of Treatment: with non healing right heal wound       PRINCIPAL DISCHARGE DIAGNOSIS  Diagnosis: Cellulitis of foot  Assessment and Plan of Treatment: with non healing right heal wound likely PVD and Uncontrolled Diabetes Mellitus contributing to the non-healing wound

## 2021-10-25 NOTE — DISCHARGE NOTE NURSING/CASE MANAGEMENT/SOCIAL WORK - PATIENT PORTAL LINK FT
You can access the FollowMyHealth Patient Portal offered by [facility] by registering at the following website: [email or web address]. By joining [facility] FollowMyHealth portal, you will also be able to view your health information using other applications (apps) compatible with our system.

## 2021-10-25 NOTE — PROGRESS NOTE ADULT - ASSESSMENT
85 y/o female with h/o COPD, CAD, DM II, depression, CHF, EF 40%, HTN, PVD, PAD, s/p left fem pop bypass on 03/24/21, L thigh infections s/p debridement and muscle flap, PAT, LE DVT on Eliquis,, RA, anemia was admitted on 10/19 for worsening chronic wounds to RLE with new discharge x one day PTA. Daughter states pt has difficulty bearing weight due to wounds. Pt was placed on outpatient PO abx with no relief. Pt was sent to the ED by Dr. Ventura. No fever or chills at home. In ER he received cefepime.     1. RLE cellulitis improving. Chronic stasis dermatitis with open wounds. PVD s/p prior vascular bypass. RA. Immunocompromised host.  -no fever  -leukocytosis  -BC x 2 noted  -on vancomycin 750 mg IV q12h and cefepime 1 gm IV q12h # 5  -tolerating abx well so far; no side effects noted  -vancomycin trough level is therapeutic  -legs are improving  -vascular evaluation appreciated  -continue abx coverage   -local wound care  -elevate legs  -continue abx coverage   -monitor temps  -f/u CBC  -supportive care  2. Other issues:   -care per medicine    
85 y/o female with h/o COPD, CAD, DM II, depression, CHF, EF 40%, HTN, PVD, PAD, s/p left fem pop bypass on 03/24/21, L thigh infections s/p debridement and muscle flap, PAT, LE DVT on Eliquis,, RA, anemia was admitted on 10/19 for worsening chronic wounds to RLE with new discharge x one day PTA. Daughter states pt has difficulty bearing weight due to wounds. Pt was placed on outpatient PO abx with no relief. Pt was sent to the ED by Dr. Ventura. No fever or chills at home. In ER he received cefepime.     1. RLE cellulitis. Chronic stasis dermatitis with open wounds. PVD s/p prior vascular bypass. RA. Immunocompromised host.  -leukocytosis  -BC x 2 noted  -on vancomycin 750 mg IV q12h and cefepime 1 gm IV q12h # 2  -tolerating abx well so far; no side effects noted  -obtain vancomycin trough level  -vascular evaluation  -local wound care  -elevate legs  -continue abx coverage   -monitor temps  -f/u CBC  -supportive care  2. Other issues:   -care per medicine    
87 y/o female with h/o COPD, CAD, DM II, depression, CHF, EF 40%, HTN, PVD, PAD, s/p left fem pop bypass on 03/24/21, L thigh infections s/p debridement and muscle flap, PAT, LE DVT on Eliquis,, RA, anemia was admitted on 10/19 for worsening chronic wounds to RLE with new discharge x one day PTA. Daughter states pt has difficulty bearing weight due to wounds. Pt was placed on outpatient PO abx with no relief. Pt was sent to the ED by Dr. Ventura. No fever or chills at home. In ER he received cefepime.     1. RLE cellulitis improving. Chronic stasis dermatitis with open wounds. PVD s/p prior vascular bypass. RA. Immunocompromised host.  -no fever  -leukocytosis  -BC x 2 noted  -on vancomycin 750 mg IV q12h and cefepime 1 gm IV q12h # 6  -tolerating abx well so far; no side effects noted  -vancomycin trough level is therapeutic  -legs are improving  -vascular evaluation appreciated  -change abx to doxycycline 100 mg PO q12h for 7 more days  -local wound care  -elevate legs  -continue abx coverage   -monitor temps  -f/u CBC  -supportive care  2. Other issues:   -care per medicine    
87 y/o female with h/o COPD, CAD, DM II, depression, CHF, EF 40%, HTN, PVD, PAD, s/p left fem pop bypass on 03/24/21, L thigh infections s/p debridement and muscle flap, PAT, LE DVT on Eliquis,, RA, anemia was admitted on 10/19 for worsening chronic wounds to RLE with new discharge x one day PTA. Daughter states pt has difficulty bearing weight due to wounds. Pt was placed on outpatient PO abx with no relief. Pt was sent to the ED by Dr. Ventura. No fever or chills at home. In ER he received cefepime.     1. RLE cellulitis. Chronic stasis dermatitis with open wounds. PVD s/p prior vascular bypass. RA. Immunocompromised host.  -leukocytosis  -BC x 2 noted  -on vancomycin 750 mg IV q12h and cefepime 1 gm IV q12h # 3  -tolerating abx well so far; no side effects noted  -vancomycin trough level is therapeutic  -legs are improving  -may change abx to doxycycline 100 mg PO q12h if ready for discharge  -local wound care  -elevate legs  -continue abx coverage   -monitor temps  -f/u CBC  -supportive care  2. Other issues:   -care per medicine    
R lower extremity wound- on abx, vascular team following.     SOB- pt denies any SOB now.  She appears euvolemic on exam.  no need for diuretics at this time.    HTN- BP stable.  continue home meds.    Other medical issues- Management per primary team.   Thank you for allowing me to participate in the care of this patient. Please feel free to contact me with any questions. 
R lower extremity wound- on abx, vascular team following.  no intervention for now.    SOB- pt denies any SOB now.  maintaining euvolemia.   no need for diuretics at this time.    HTN- BP stable.  continue home meds.    willsign off for now.   Other medical issues- Management per primary team.   Thank you for allowing me to participate in the care of this patient. Please feel free to contact me with any questions. 
87 y/o female with h/o COPD, CAD, DM II, depression, CHF, EF 40%, HTN, PVD, PAD, s/p left fem pop bypass on 03/24/21, L thigh infections s/p debridement and muscle flap, PAT, LE DVT on Eliquis,, RA, anemia was admitted on 10/19 for worsening chronic wounds to RLE with new discharge x one day PTA. Daughter states pt has difficulty bearing weight due to wounds. Pt was placed on outpatient PO abx with no relief. Pt was sent to the ED by Dr. Ventura. No fever or chills at home. In ER he received cefepime.     1. RLE cellulitis. Chronic stasis dermatitis with open wounds. PVD s/p prior vascular bypass. RA. Immunocompromised host.  -leukocytosis  -BC x 2 noted  -on vancomycin 750 mg IV q12h and cefepime 1 gm IV q12h # 4  -tolerating abx well so far; no side effects noted  -vancomycin trough level is therapeutic  -legs are improving  -vascular evaluation appreciated  -continue abx coverage   -local wound care  -elevate legs  -continue abx coverage   -monitor temps  -f/u CBC  -supportive care  2. Other issues:   -care per medicine

## 2021-10-25 NOTE — PROGRESS NOTE ADULT - SUBJECTIVE AND OBJECTIVE BOX
Date of service: 10-25-21 @ 13:59    Lying in bed in NAD  Has mild right lower ankle discomfort  No fever    ROS: no fever or chills; denies dizziness, no HA, no SOB or cough, no abdominal pain, no diarrhea or constipation; no dysuria    MEDICATIONS  (STANDING):  aspirin  chewable 81 milliGRAM(s) Oral daily  atorvastatin 40 milliGRAM(s) Oral at bedtime  cefepime  Injectable. 1000 milliGRAM(s) IV Push every 12 hours  dextrose 40% Gel 15 Gram(s) Oral once  dextrose 5%. 1000 milliLiter(s) (50 mL/Hr) IV Continuous <Continuous>  dextrose 50% Injectable 25 Gram(s) IV Push once  folic acid 1 milliGRAM(s) Oral at bedtime  gabapentin 300 milliGRAM(s) Oral three times a day  glucagon  Injectable 1 milliGRAM(s) IntraMuscular once  insulin glargine Injectable (LANTUS) 10 Unit(s) SubCutaneous at bedtime  insulin lispro (ADMELOG) corrective regimen sliding scale   SubCutaneous three times a day before meals  metoprolol tartrate 25 milliGRAM(s) Oral two times a day  PARoxetine 20 milliGRAM(s) Oral daily  predniSONE   Tablet 5 milliGRAM(s) Oral daily  sacubitril 24 mG/valsartan 26 mG 1 Tablet(s) Oral two times a day  tiotropium 18 MICROgram(s) Capsule 1 Capsule(s) Inhalation daily  vancomycin  IVPB 750 milliGRAM(s) IV Intermittent every 12 hours    Vital Signs Last 24 Hrs  T(C): 36 (25 Oct 2021 07:44), Max: 36.8 (24 Oct 2021 21:50)  T(F): 96.8 (25 Oct 2021 07:44), Max: 98.2 (24 Oct 2021 21:50)  HR: 58 (25 Oct 2021 08:38) (58 - 76)  BP: 144/40 (25 Oct 2021 07:44) (142/42 - 145/42)  BP(mean): --  RR: 18 (25 Oct 2021 07:44) (16 - 18)  SpO2: 94% (25 Oct 2021 08:38) (94% - 98%)     Physical exam:    Constitutional:  No acute distress  HEENT: NC/AT, EOMI, PERRLA, conjunctivae clear; ears and nose atraumatic  Neck: supple; thyroid not palpable  Back: no tenderness  Respiratory: respiratory effort normal; clear to auscultation  Cardiovascular: S1S2 regular, no murmurs  Abdomen: soft, not tender, not distended, positive BS; no liver or spleen organomegaly  Genitourinary: no suprapubic tenderness  Lymphatic: no LN palpable  Musculoskeletal: no muscle tenderness, no joint swelling or tenderness  Extremities: no pedal edema  Rt leg rash, Rt heel and right lower lateral leg open superficial ulcers, dry; mild erythema  Neurological/ Psychiatric: AxOx3, moving all extremities  Skin: no rashes; no palpable lesions    Labs: reviewed                        9.0    8.71  )-----------( 200      ( 25 Oct 2021 11:18 )             29.1     Ferritin, Serum: 178 ng/mL (10-24- @ 08:00)                        7.8    15.89 )-----------( 128      ( 23 Oct 2021 08:16 )             25.1     10    139  |  113<H>  |  43<H>  ----------------------------<  132<H>  4.4   |  19<L>  |  0.96    Ca    8.0<L>      22 Oct 2021 09:10    Vancomycin Level, Trough: 17.8 ug/mL (10-22 @ 09:10)                        8.0    11.66 )-----------( 215      ( 20 Oct 2021 08:00 )             25.5     10-    138  |  110<H>  |  42<H>  ----------------------------<  173<H>  4.8   |  21<L>  |  1.08    Ca    8.5      20 Oct 2021 08:00    TPro  7.3  /  Alb  2.6<L>  /  TBili  0.2  /  DBili  x   /  AST  9<L>  /  ALT  11<L>  /  AlkPhos  97  1019     LIVER FUNCTIONS - ( 19 Oct 2021 14:33 )  Alb: 2.6 g/dL / Pro: 7.3 gm/dL / ALK PHOS: 97 U/L / ALT: 11 U/L / AST: 9 U/L / GGT: x           Urinalysis Basic - ( 19 Oct 2021 22:25 )    Color: Yellow / Appearance: Clear / S.020 / pH: x  Gluc: x / Ketone: Negative  / Bili: Negative / Urobili: Negative mg/dL   Blood: x / Protein: 30 mg/dL / Nitrite: Negative   Leuk Esterase: Trace / RBC: 0-2 /HPF / WBC 0-2   Sq Epi: x / Non Sq Epi: Occasional / Bacteria: Occasional      Culture - Blood (collected 19 Oct 2021 14:33)  Source: .Blood None  Preliminary Report (20 Oct 2021 19:02):    No growth to date.    Culture - Blood (collected 19 Oct 2021 14:33)  Source: .Blood None  Preliminary Report (20 Oct 2021 19:02):    No growth to date.    COVID-19 PCR: NotDetec (10-19-21 @ 14:33)    Radiology: all available radiological tests reviewed    Advanced directives addressed: full resuscitation

## 2021-10-25 NOTE — DISCHARGE NOTE PROVIDER - HOSPITAL COURSE
FROM H&P:    "85 y/o female with a PMHx of COPD,  CAD, DM II, depression, Chf, EF 40%,  HTN, PVD, PAD, s/p left fem pop bypass on 03/24/21, L thigh infections s/p debridement and muscle flap, PAT, LE DVT on Eliquis,, RA, anemia, presents to the ED c/o worsening chronic wounds to RLE with discharge since last night. Daughter states pt has difficulty bearing weight due to wounds. Pt was placed on outpatient PO abx with no relief. Pt was sent to the ED by Dr. Ventura for iv abx.  -denies fever/chills, no n/v/d, no coughing1. Rt leg cellulitis:  Cefepime day#5  Blood Cx x2 ngtd  ID evaluation noted: c/w abx x 5-7days;  rotate to Doxycycline on d/c. D/W Dr Ortega    2. PVD:  s/p Left iliac angioplasty   Vascular surg evaluation noted:  CTA done  favor conservative management     3. Acute on chronic anemia/GIB/symptomatic anemia  -will repeat add'l unit of prbc now  -discussed conservative vs invasive tx (ie colonscopy) with patient and dtr Dr Ahumada  < from: CT Angio Abd Aorta w/run-off w/ IV Cont (10.21.21 @ 08:43) >  Redemonstration of moderate to severe peripheral arterial disease bilaterally, not significantly changed from prior exam. Infrapopliteal arteries are patent down to at least the ankle in both lower extremities.  Suspicious right renal mass and indeterminate left renal mass are unchanged dating back to December 23, 2020  -they are in favor of supportive care for now give her cardiopulmonary history that could pose for intraprocedural complications  -will monitor her h/h for now and have her f/u with GI as outpt  -she denies any "B" symptoms  -denies phyllis hematochezia or melena. No hemodynamic shifts  -if develops brisk bleed, hemodynamic instability or severe symptoms, will reconsider and involve GI  -Reti index indicative of hypoproliferative state. Iron panel useless as drawn after transufions. B12 adequate. Folate pending. Borderline macrocytic. Could have underlying myeloproliferative disorder.     4. DM II; stable    5. COPD:   stable, c/w inhalers     6. Chronic systolic Chf:  stable, c/w Entresto  EF 40%    7. RA :   Rheumatology eval noted; Prednisone 5mg/day started  hold MTX while on acute cellulitis treatment     8. Moderate protein calorie malnutrition  -supportive care      Leukocytosis due to steroids. Diarrhea resolved, doubt c diff. IF continues to rise or diarrhea recurs, send off gi/pcr/cdiff    If Hgb remains stable and continue clinical improvement, tentative discharge in the next 1-2 days  Lives with daughter, Dr. Ahumada FROM H&P:    "87 y/o female with a PMHx of COPD,  CAD, DM II, depression, Chf, EF 40%,  HTN, PVD, PAD, s/p left fem pop bypass on 03/24/21, L thigh infections s/p debridement and muscle flap, PAT, LE DVT on Eliquis,, RA, anemia, presents to the ED c/o worsening chronic wounds to RLE with discharge since last night. Daughter states pt has difficulty bearing weight due to wounds. Pt was placed on outpatient PO abx with no relief. Pt was sent to the ED by Dr. Ventura for iv abx.  -denies fever/chills, no n/v/d, no coughing1. Rt leg cellulitis:  Cefepime day#5  Blood Cx x2 ngtd  ID evaluation noted: c/w abx x 5-7days;  rotate to Doxycycline on d/c. D/W Dr Ortega    2. PVD:  s/p Left iliac angioplasty   Vascular surg evaluation noted:  CTA done  favor conservative management     3. Acute on chronic anemia/GIB/symptomatic anemia  -will repeat add'l unit of prbc now  -discussed conservative vs invasive tx (ie colonscopy) with patient and dtr Dr Ahumada  < from: CT Angio Abd Aorta w/run-off w/ IV Cont (10.21.21 @ 08:43) >  Redemonstration of moderate to severe peripheral arterial disease bilaterally, not significantly changed from prior exam. Infrapopliteal arteries are patent down to at least the ankle in both lower extremities.  Suspicious right renal mass and indeterminate left renal mass are unchanged dating back to December 23, 2020  -they are in favor of supportive care for now give her cardiopulmonary history that could pose for intraprocedural complications  -will monitor her h/h for now and have her f/u with GI as outpt  -she denies any "B" symptoms  -denies phyllis hematochezia or melena. No hemodynamic shifts  -if develops brisk bleed, hemodynamic instability or severe symptoms, will reconsider and involve GI  -Reti index indicative of hypoproliferative state. Iron panel useless as drawn after transufions. B12 adequate. Folate pending. Borderline macrocytic. Could have underlying myeloproliferative disorder.     4. DM II; stable    5. COPD:   stable, c/w inhalers     6. Chronic systolic Chf:  stable, c/w Entresto  EF 40%    7. RA :   Rheumatology eval noted; Prednisone 5mg/day started  hold MTX while on acute cellulitis treatment     8. Moderate protein calorie malnutrition  -supportive care    Improved clinically. Hgb remained stable. Conservative management approach. Discharge home in stable condition and close outpatient follow up.   T(C): 36 (10-25-21 @ 07:44), Max: 36.8 (10-24-21 @ 21:50)  HR: 58 (10-25-21 @ 08:38) (58 - 76)  BP: 144/40 (10-25-21 @ 07:44) (142/42 - 145/42)  RR: 18 (10-25-21 @ 07:44) (16 - 18)  SpO2: 94% (10-25-21 @ 08:38) (94% - 98%)  GENERAL: NAD,   HEENT:  NC/AT, EOMI, PERRL, No scleral icterus, Moist mucous membranes  NECK: Supple, No JVD  CNS:  Alert & Oriented X3, Motor Strength 5/5 B/L upper and lower extremities; DTRs 2+ intact   LUNG: Normal Breath sounds, Clear to auscultation bilaterally, No rales, No rhonchi, No wheezing; Normal respiratory effort  HEART: RRR; No murmurs, No rubs  ABDOMEN: +BS, ST/ND/NT  GENITOURINARY: Voiding, Bladder not distended  EXTREMITIES:  2+ Peripheral Pulses, No clubbing, No cyanosis, No tibial edema  MUSCULOSKELTAL: Joints normal ROM, No TTP, No effusion  SKIN: +mild Rt leg rashe, Rt leg open superficial wounds, with serosanguinous discharge and surrounding erythema improving  Discharge management: 38 minutes  Date of Discharge/Service: 10/25/2021

## 2021-10-25 NOTE — DISCHARGE NOTE PROVIDER - CARE PROVIDER_API CALL
Uri Noble (DO)  Internal Medicine  [address] [place] [address], [address]  [place], [place] [id number]  Phone: [phone]  Fax: [phone]  Established Patient  Follow Up Time: 1 week    Soren Ventura)  Vascular Surgery  [address] [place] [address], [place] [address]  Phone: [phone]  Fax: [phone]  Established Patient  Follow Up Time: 1-3 days

## 2021-10-25 NOTE — PROGRESS NOTE ADULT - SUBJECTIVE AND OBJECTIVE BOX
afebrile, VSS    no significant changes    for discharge today    follow up as out patient    MEDICATIONS  (STANDING):  aspirin  chewable 81 milliGRAM(s) Oral daily  atorvastatin 40 milliGRAM(s) Oral at bedtime  cefepime  Injectable. 1000 milliGRAM(s) IV Push every 12 hours  dextrose 40% Gel 15 Gram(s) Oral once  dextrose 5%. 1000 milliLiter(s) (50 mL/Hr) IV Continuous <Continuous>  dextrose 50% Injectable 25 Gram(s) IV Push once  folic acid 1 milliGRAM(s) Oral at bedtime  gabapentin 300 milliGRAM(s) Oral three times a day  glucagon  Injectable 1 milliGRAM(s) IntraMuscular once  insulin glargine Injectable (LANTUS) 10 Unit(s) SubCutaneous at bedtime  insulin lispro (ADMELOG) corrective regimen sliding scale   SubCutaneous three times a day before meals  metoprolol tartrate 25 milliGRAM(s) Oral two times a day  PARoxetine 20 milliGRAM(s) Oral daily  predniSONE   Tablet 5 milliGRAM(s) Oral daily  sacubitril 24 mG/valsartan 26 mG 1 Tablet(s) Oral two times a day  tiotropium 18 MICROgram(s) Capsule 1 Capsule(s) Inhalation daily  vancomycin  IVPB 750 milliGRAM(s) IV Intermittent every 12 hours    MEDICATIONS  (PRN):  ALBUTerol    90 MICROgram(s) HFA Inhaler 2 Puff(s) Inhalation every 6 hours PRN Shortness of Breath  aluminum hydroxide/magnesium hydroxide/simethicone Suspension 30 milliLiter(s) Oral every 4 hours PRN Dyspepsia  melatonin 3 milliGRAM(s) Oral at bedtime PRN Insomnia  ondansetron Injectable 4 milliGRAM(s) IV Push every 8 hours PRN Nausea and/or Vomiting  oxycodone    5 mG/acetaminophen 325 mG 1 Tablet(s) Oral every 6 hours PRN Moderate Pain (4 - 6)      Allergies    cephalosporins (Other)  penicillins (Urticaria; Pruritus)    Intolerances        Flatus: [ ] YES [ ] NO             Bowel Movement: [ ] YES [ ] NO  Pain (0-10):            Pain Control Adequate: [ ] YES [ ] NO  Nausea: [ ] YES [ ] NO            Vomiting: [ ] YES [ ] NO  Diarrhea: [ ] YES [ ] NO         Constipation: [ ] YES [ ] NO     Chest Pain: [ ] YES [ ] NO    SOB:  [ ] YES [ ] NO    Vital Signs Last 24 Hrs  T(C): 36.8 (24 Oct 2021 21:50), Max: 36.8 (24 Oct 2021 21:50)  T(F): 98.2 (24 Oct 2021 21:50), Max: 98.2 (24 Oct 2021 21:50)  HR: 63 (24 Oct 2021 21:50) (63 - 76)  BP: 142/42 (24 Oct 2021 21:50) (142/42 - 146/41)  BP(mean): --  RR: 16 (24 Oct 2021 21:50) (16 - 16)  SpO2: 97% (24 Oct 2021 21:50) (97% - 98%)    I&O's Summary    24 Oct 2021 07:01  -  25 Oct 2021 07:00  --------------------------------------------------------  IN: 250 mL / OUT: 950 mL / NET: -700 mL        Physical Exam:  General: NAD, resting comfortably  Pulmonary: normal resp effort, CTA-B  Cardiovascular: NSR  Abdominal: soft, NT/ND  Extremities: WWP, normal strength  Neuro: A/O x 3, CNs II-XII grossly intact, normal motor/sensation, no focal deficits  Pulses:   Right:                                                                          Left:  FEM [ ]2+ [ ]1+ [ ]doppler                                             FEM [ ]2+ [ ]1+ [ ]doppler    POP [ ]2+ [ ]1+ [ ]doppler                                             POP [ ]2+ [ ]1+ [ ]doppler    DP [ ]2+ [ ]1+ [ ]doppler                                                DP [ ]2+ [ ]1+ [ ]doppler  PT[ ]2+ [ ]1+ [ ]doppler                                                  PT [ ]2+ [ ]1+ [ ]doppler    LABS:                        8.6    12.23 )-----------( 157      ( 24 Oct 2021 08:00 )             27.4                 CAPILLARY BLOOD GLUCOSE      POCT Blood Glucose.: 291 mg/dL (24 Oct 2021 21:51)  POCT Blood Glucose.: 240 mg/dL (24 Oct 2021 16:49)  POCT Blood Glucose.: 181 mg/dL (24 Oct 2021 12:06)  POCT Blood Glucose.: 152 mg/dL (24 Oct 2021 08:01)      RADIOLOGY & ADDITIONAL TESTS:

## 2021-10-25 NOTE — ED ADULT NURSE NOTE - PHONE #
Final Anesthesia Post-op Assessment    Patient: Adolph Woods  Procedure(s) Performed: CATARACT EXTRACTION WITH INTRAOCULAR LENS IMPLANTATION - LEFT  Anesthesia type: MAC    Vitals Value Taken Time   Temp 36.5 °C (97.7 °F) 10/25/21 1040   Pulse 68 10/25/21 1100   Resp 19 10/25/21 1100   SpO2 95 % 10/25/21 1100   /75 10/25/21 1100         Patient Location: PACU Phase 2  Post-op Vital Signs:stable  Level of Consciousness: participates in exam, awake, oriented, answers questions appropriately and alert  Respiratory Status: spontaneous ventilation  Cardiovascular blood pressure returned to baseline  Hydration: euvolemic  Pain Management: well controlled  Handoff: Handoff to receiving nurse was performed and questions were answered  Nausea: None  Airway Patency:patent  Post-op Assessment: awake, alert, appropriately conversant, or baseline and no complications  Comments: Advised pt to visit with his PCP in regards to his HTN. Pt states he's not on any HTN medications at this time and doesn't monitor his BP at home. Pt agrees to contact PCP.       No complications documented.    [phone]

## 2021-10-25 NOTE — DISCHARGE NOTE PROVIDER - PROVIDER TOKENS
PROVIDER:[TOKEN:[[id number]],FOLLOWUP:[1 week],ESTABLISHEDPATIENT:[T]],PROVIDER:[TOKEN:[[id number]],FOLLOWUP:[1-3 days],ESTABLISHEDPATIENT:[T]]

## 2021-10-25 NOTE — DISCHARGE NOTE PROVIDER - NSDCMRMEDTOKEN_GEN_ALL_CORE_FT
acetaminophen 325 mg oral tablet: 2 tab(s) orally every 8 hours, As needed, for pain   albuterol 90 mcg/inh inhalation aerosol: 1 puff(s) inhaled every 6 hours, As needed, Shortness of Breath  aspirin 81 mg oral tablet, chewable: 1 tab(s) orally once a day  atorvastatin 40 mg oral tablet: 1 tab(s) orally once a day (at bedtime)  doxycycline hyclate 100 mg oral capsule: 1 cap(s) orally every 12 hours   folic acid 1 mg oral tablet: 1 tab(s) orally once a day (at bedtime)  gabapentin 300 mg oral capsule: 1 cap(s) orally 3 times a day  ipratropium-albuterol 0.5 mg-2.5 mg/3 mLinhalation solution: 3 milliliter(s) inhaled every 4 hours, As needed, Shortness of Breath and/or Wheezing  Lantus 100 units/mL subcutaneous solution: 5  subcutaneous once a day (at bedtime)  metoprolol tartrate 25 mg oral tablet: 1 tab(s) orally 2 times a day  oxycodone-acetaminophen 5 mg-325 mg oral tablet: 1 tab(s) orally every 6 hours, As Needed for Pain MDD:4 tablets  PARoxetine 20 mg oral tablet: 1 tab(s) orally once a day  predniSONE 5 mg oral tablet: 1 tab(s) orally once a day  sacubitril-valsartan 24 mg-26 mg oral tablet: 1 tab(s) orally 2 times a day  tiotropium 18 mcg inhalation capsule: 1 cap(s) inhaled once a day  tiotropium 18 mcg inhalation capsule: 1 cap(s) inhaled once a day

## 2021-10-26 NOTE — CDI QUERY NOTE - NSCDIOTHERTXTBX_GEN_ALL_CORE_HH
Cause and effect    Please document the relationship between the following conditions: Non Healing right foot wound with cellulitis , PVD and DM2    A. Non Healing right foot wound with cellulitis , associated with DM and associated with PVD   B. Non Healing right foot wound with cellulitis , associated with DM and  not associated with PVD   C. Non Healing right foot wound with cellulitis , not associated with DM and  not associated with PVD   D. Other (please specify)  E. Unable to determine    SUPPORTING DOCUMENTATION AND/OR CLINICAL EVIDENCE:    CT angio < from: CT Angio Abd Aorta w/run-off w/ IV Cont (10.21.21 @ 08:43) >  Redemonstration of moderate to severe peripheral arterial disease bilaterally, not significantly changed from prior exam. Infrapopliteal arteries are patent down to at least the ankle in both lower extremities.    Vascular surgery 10/24 PAD but no acute vascular issue.  I continue to favor conservative management for now understanding she may require intervention in the future.     ID s/p left fem pop bypass on 03/24/21, L thigh infections s/p debridement and muscle flap, PAT, LE DVT on Eliquis,, RA, anemia was admitted on 10/19 for worsening chronic wounds to E with new discharge x one day PTA. Daughter states pt has difficulty bearing weight due to wounds. Pt was placed on outpatient PO abx with no relief.

## 2021-11-03 DIAGNOSIS — Z98.49 CATARACT EXTRACTION STATUS, UNSPECIFIED EYE: ICD-10-CM

## 2021-11-03 DIAGNOSIS — E11.51 TYPE 2 DIABETES MELLITUS WITH DIABETIC PERIPHERAL ANGIOPATHY WITHOUT GANGRENE: ICD-10-CM

## 2021-11-03 DIAGNOSIS — D64.9 ANEMIA, UNSPECIFIED: ICD-10-CM

## 2021-11-03 DIAGNOSIS — Z79.01 LONG TERM (CURRENT) USE OF ANTICOAGULANTS: ICD-10-CM

## 2021-11-03 DIAGNOSIS — Z79.4 LONG TERM (CURRENT) USE OF INSULIN: ICD-10-CM

## 2021-11-03 DIAGNOSIS — J44.9 CHRONIC OBSTRUCTIVE PULMONARY DISEASE, UNSPECIFIED: ICD-10-CM

## 2021-11-03 DIAGNOSIS — I25.10 ATHEROSCLEROTIC HEART DISEASE OF NATIVE CORONARY ARTERY WITHOUT ANGINA PECTORIS: ICD-10-CM

## 2021-11-03 DIAGNOSIS — R19.7 DIARRHEA, UNSPECIFIED: ICD-10-CM

## 2021-11-03 DIAGNOSIS — Z88.0 ALLERGY STATUS TO PENICILLIN: ICD-10-CM

## 2021-11-03 DIAGNOSIS — N28.89 OTHER SPECIFIED DISORDERS OF KIDNEY AND URETER: ICD-10-CM

## 2021-11-03 DIAGNOSIS — E44.0 MODERATE PROTEIN-CALORIE MALNUTRITION: ICD-10-CM

## 2021-11-03 DIAGNOSIS — I87.2 VENOUS INSUFFICIENCY (CHRONIC) (PERIPHERAL): ICD-10-CM

## 2021-11-03 DIAGNOSIS — Z23 ENCOUNTER FOR IMMUNIZATION: ICD-10-CM

## 2021-11-03 DIAGNOSIS — Z88.8 ALLERGY STATUS TO OTHER DRUGS, MEDICAMENTS AND BIOLOGICAL SUBSTANCES STATUS: ICD-10-CM

## 2021-11-03 DIAGNOSIS — Z95.820 PERIPHERAL VASCULAR ANGIOPLASTY STATUS WITH IMPLANTS AND GRAFTS: ICD-10-CM

## 2021-11-03 DIAGNOSIS — L03.115 CELLULITIS OF RIGHT LOWER LIMB: ICD-10-CM

## 2021-11-03 DIAGNOSIS — D72.829 ELEVATED WHITE BLOOD CELL COUNT, UNSPECIFIED: ICD-10-CM

## 2021-11-03 DIAGNOSIS — M06.9 RHEUMATOID ARTHRITIS, UNSPECIFIED: ICD-10-CM

## 2021-11-03 DIAGNOSIS — F32.9 MAJOR DEPRESSIVE DISORDER, SINGLE EPISODE, UNSPECIFIED: ICD-10-CM

## 2021-11-03 DIAGNOSIS — E11.622 TYPE 2 DIABETES MELLITUS WITH OTHER SKIN ULCER: ICD-10-CM

## 2021-11-03 DIAGNOSIS — I50.22 CHRONIC SYSTOLIC (CONGESTIVE) HEART FAILURE: ICD-10-CM

## 2021-11-03 DIAGNOSIS — T38.0X5A ADVERSE EFFECT OF GLUCOCORTICOIDS AND SYNTHETIC ANALOGUES, INITIAL ENCOUNTER: ICD-10-CM

## 2021-11-03 DIAGNOSIS — L97.511 NON-PRESSURE CHRONIC ULCER OF OTHER PART OF RIGHT FOOT LIMITED TO BREAKDOWN OF SKIN: ICD-10-CM

## 2021-11-03 DIAGNOSIS — I11.0 HYPERTENSIVE HEART DISEASE WITH HEART FAILURE: ICD-10-CM

## 2022-01-18 ENCOUNTER — INPATIENT (INPATIENT)
Facility: HOSPITAL | Age: 87
LOS: 15 days | Discharge: NURSING, REHAB OR LONG-TERM CARE | DRG: 253 | End: 2022-02-03
Attending: HOSPITALIST | Admitting: HOSPITALIST
Payer: MEDICARE

## 2022-01-18 VITALS — WEIGHT: 164.91 LBS | HEIGHT: 63 IN

## 2022-01-18 DIAGNOSIS — D62 ACUTE POSTHEMORRHAGIC ANEMIA: ICD-10-CM

## 2022-01-18 DIAGNOSIS — I73.9 PERIPHERAL VASCULAR DISEASE, UNSPECIFIED: ICD-10-CM

## 2022-01-18 DIAGNOSIS — E87.5 HYPERKALEMIA: ICD-10-CM

## 2022-01-18 DIAGNOSIS — N17.9 ACUTE KIDNEY FAILURE, UNSPECIFIED: ICD-10-CM

## 2022-01-18 DIAGNOSIS — Z79.52 LONG TERM (CURRENT) USE OF SYSTEMIC STEROIDS: ICD-10-CM

## 2022-01-18 DIAGNOSIS — Z88.1 ALLERGY STATUS TO OTHER ANTIBIOTIC AGENTS STATUS: ICD-10-CM

## 2022-01-18 DIAGNOSIS — E11.69 TYPE 2 DIABETES MELLITUS WITH OTHER SPECIFIED COMPLICATION: ICD-10-CM

## 2022-01-18 DIAGNOSIS — I50.22 CHRONIC SYSTOLIC (CONGESTIVE) HEART FAILURE: ICD-10-CM

## 2022-01-18 DIAGNOSIS — E11.621 TYPE 2 DIABETES MELLITUS WITH FOOT ULCER: ICD-10-CM

## 2022-01-18 DIAGNOSIS — M86.9 OSTEOMYELITIS, UNSPECIFIED: ICD-10-CM

## 2022-01-18 DIAGNOSIS — M86.171 OTHER ACUTE OSTEOMYELITIS, RIGHT ANKLE AND FOOT: ICD-10-CM

## 2022-01-18 DIAGNOSIS — E44.0 MODERATE PROTEIN-CALORIE MALNUTRITION: ICD-10-CM

## 2022-01-18 DIAGNOSIS — E11.51 TYPE 2 DIABETES MELLITUS WITH DIABETIC PERIPHERAL ANGIOPATHY WITHOUT GANGRENE: ICD-10-CM

## 2022-01-18 DIAGNOSIS — Z66 DO NOT RESUSCITATE: ICD-10-CM

## 2022-01-18 DIAGNOSIS — Z79.01 LONG TERM (CURRENT) USE OF ANTICOAGULANTS: ICD-10-CM

## 2022-01-18 DIAGNOSIS — B96.5 PSEUDOMONAS (AERUGINOSA) (MALLEI) (PSEUDOMALLEI) AS THE CAUSE OF DISEASES CLASSIFIED ELSEWHERE: ICD-10-CM

## 2022-01-18 DIAGNOSIS — Z86.718 PERSONAL HISTORY OF OTHER VENOUS THROMBOSIS AND EMBOLISM: ICD-10-CM

## 2022-01-18 DIAGNOSIS — M00.871 ARTHRITIS DUE TO OTHER BACTERIA, RIGHT ANKLE AND FOOT: ICD-10-CM

## 2022-01-18 DIAGNOSIS — B96.4 PROTEUS (MIRABILIS) (MORGANII) AS THE CAUSE OF DISEASES CLASSIFIED ELSEWHERE: ICD-10-CM

## 2022-01-18 DIAGNOSIS — Z20.822 CONTACT WITH AND (SUSPECTED) EXPOSURE TO COVID-19: ICD-10-CM

## 2022-01-18 DIAGNOSIS — S72.009A FRACTURE OF UNSPECIFIED PART OF NECK OF UNSPECIFIED FEMUR, INITIAL ENCOUNTER FOR CLOSED FRACTURE: Chronic | ICD-10-CM

## 2022-01-18 DIAGNOSIS — Z79.4 LONG TERM (CURRENT) USE OF INSULIN: ICD-10-CM

## 2022-01-18 DIAGNOSIS — L97.419 NON-PRESSURE CHRONIC ULCER OF RIGHT HEEL AND MIDFOOT WITH UNSPECIFIED SEVERITY: ICD-10-CM

## 2022-01-18 DIAGNOSIS — E11.65 TYPE 2 DIABETES MELLITUS WITH HYPERGLYCEMIA: ICD-10-CM

## 2022-01-18 DIAGNOSIS — I25.10 ATHEROSCLEROTIC HEART DISEASE OF NATIVE CORONARY ARTERY WITHOUT ANGINA PECTORIS: ICD-10-CM

## 2022-01-18 DIAGNOSIS — I70.221 ATHEROSCLEROSIS OF NATIVE ARTERIES OF EXTREMITIES WITH REST PAIN, RIGHT LEG: ICD-10-CM

## 2022-01-18 DIAGNOSIS — Z88.0 ALLERGY STATUS TO PENICILLIN: ICD-10-CM

## 2022-01-18 DIAGNOSIS — I11.0 HYPERTENSIVE HEART DISEASE WITH HEART FAILURE: ICD-10-CM

## 2022-01-18 LAB
ALBUMIN SERPL ELPH-MCNC: 2.3 G/DL — LOW (ref 3.3–5)
ALP SERPL-CCNC: 140 U/L — HIGH (ref 40–120)
ALT FLD-CCNC: 11 U/L — LOW (ref 12–78)
ANION GAP SERPL CALC-SCNC: 5 MMOL/L — SIGNIFICANT CHANGE UP (ref 5–17)
APTT BLD: 34.4 SEC — SIGNIFICANT CHANGE UP (ref 27.5–35.5)
AST SERPL-CCNC: 13 U/L — LOW (ref 15–37)
BASOPHILS # BLD AUTO: 0.02 K/UL — SIGNIFICANT CHANGE UP (ref 0–0.2)
BASOPHILS NFR BLD AUTO: 0.2 % — SIGNIFICANT CHANGE UP (ref 0–2)
BILIRUB SERPL-MCNC: 0.2 MG/DL — SIGNIFICANT CHANGE UP (ref 0.2–1.2)
BUN SERPL-MCNC: 61 MG/DL — HIGH (ref 7–23)
CALCIUM SERPL-MCNC: 8.5 MG/DL — SIGNIFICANT CHANGE UP (ref 8.5–10.1)
CHLORIDE SERPL-SCNC: 109 MMOL/L — HIGH (ref 96–108)
CO2 SERPL-SCNC: 21 MMOL/L — LOW (ref 22–31)
CREAT SERPL-MCNC: 1.49 MG/DL — HIGH (ref 0.5–1.3)
EOSINOPHIL # BLD AUTO: 0.2 K/UL — SIGNIFICANT CHANGE UP (ref 0–0.5)
EOSINOPHIL NFR BLD AUTO: 1.8 % — SIGNIFICANT CHANGE UP (ref 0–6)
GLUCOSE SERPL-MCNC: 448 MG/DL — HIGH (ref 70–99)
HCT VFR BLD CALC: 26.1 % — LOW (ref 34.5–45)
HGB BLD-MCNC: 7.9 G/DL — LOW (ref 11.5–15.5)
IMM GRANULOCYTES NFR BLD AUTO: 0.3 % — SIGNIFICANT CHANGE UP (ref 0–1.5)
INR BLD: 1.17 RATIO — HIGH (ref 0.88–1.16)
LACTATE SERPL-SCNC: 1.5 MMOL/L — SIGNIFICANT CHANGE UP (ref 0.7–2)
LYMPHOCYTES # BLD AUTO: 1.65 K/UL — SIGNIFICANT CHANGE UP (ref 1–3.3)
LYMPHOCYTES # BLD AUTO: 15.2 % — SIGNIFICANT CHANGE UP (ref 13–44)
MCHC RBC-ENTMCNC: 30.3 GM/DL — LOW (ref 32–36)
MCHC RBC-ENTMCNC: 30.5 PG — SIGNIFICANT CHANGE UP (ref 27–34)
MCV RBC AUTO: 100.8 FL — HIGH (ref 80–100)
MONOCYTES # BLD AUTO: 0.88 K/UL — SIGNIFICANT CHANGE UP (ref 0–0.9)
MONOCYTES NFR BLD AUTO: 8.1 % — SIGNIFICANT CHANGE UP (ref 2–14)
NEUTROPHILS # BLD AUTO: 8.09 K/UL — HIGH (ref 1.8–7.4)
NEUTROPHILS NFR BLD AUTO: 74.4 % — SIGNIFICANT CHANGE UP (ref 43–77)
PLATELET # BLD AUTO: 309 K/UL — SIGNIFICANT CHANGE UP (ref 150–400)
POTASSIUM SERPL-MCNC: 6.1 MMOL/L — HIGH (ref 3.5–5.3)
POTASSIUM SERPL-SCNC: 6.1 MMOL/L — HIGH (ref 3.5–5.3)
PROT SERPL-MCNC: 7.3 GM/DL — SIGNIFICANT CHANGE UP (ref 6–8.3)
PROTHROM AB SERPL-ACNC: 13.5 SEC — SIGNIFICANT CHANGE UP (ref 10.6–13.6)
RBC # BLD: 2.59 M/UL — LOW (ref 3.8–5.2)
RBC # FLD: 14.7 % — HIGH (ref 10.3–14.5)
SODIUM SERPL-SCNC: 135 MMOL/L — SIGNIFICANT CHANGE UP (ref 135–145)
WBC # BLD: 10.87 K/UL — HIGH (ref 3.8–10.5)
WBC # FLD AUTO: 10.87 K/UL — HIGH (ref 3.8–10.5)

## 2022-01-18 PROCEDURE — C1876: CPT

## 2022-01-18 PROCEDURE — C1751: CPT

## 2022-01-18 PROCEDURE — 87070 CULTURE OTHR SPECIMN AEROBIC: CPT

## 2022-01-18 PROCEDURE — 80053 COMPREHEN METABOLIC PANEL: CPT

## 2022-01-18 PROCEDURE — 93005 ELECTROCARDIOGRAM TRACING: CPT

## 2022-01-18 PROCEDURE — 97116 GAIT TRAINING THERAPY: CPT | Mod: GP

## 2022-01-18 PROCEDURE — 83036 HEMOGLOBIN GLYCOSYLATED A1C: CPT

## 2022-01-18 PROCEDURE — 84100 ASSAY OF PHOSPHORUS: CPT

## 2022-01-18 PROCEDURE — 86140 C-REACTIVE PROTEIN: CPT

## 2022-01-18 PROCEDURE — 82607 VITAMIN B-12: CPT

## 2022-01-18 PROCEDURE — 85027 COMPLETE CBC AUTOMATED: CPT

## 2022-01-18 PROCEDURE — C1894: CPT

## 2022-01-18 PROCEDURE — 85025 COMPLETE CBC W/AUTO DIFF WBC: CPT

## 2022-01-18 PROCEDURE — 97530 THERAPEUTIC ACTIVITIES: CPT | Mod: GP

## 2022-01-18 PROCEDURE — 93925 LOWER EXTREMITY STUDY: CPT

## 2022-01-18 PROCEDURE — 93306 TTE W/DOPPLER COMPLETE: CPT

## 2022-01-18 PROCEDURE — U0003: CPT

## 2022-01-18 PROCEDURE — 85610 PROTHROMBIN TIME: CPT

## 2022-01-18 PROCEDURE — 93010 ELECTROCARDIOGRAM REPORT: CPT

## 2022-01-18 PROCEDURE — 36415 COLL VENOUS BLD VENIPUNCTURE: CPT

## 2022-01-18 PROCEDURE — 82962 GLUCOSE BLOOD TEST: CPT

## 2022-01-18 PROCEDURE — 86901 BLOOD TYPING SEROLOGIC RH(D): CPT

## 2022-01-18 PROCEDURE — 71045 X-RAY EXAM CHEST 1 VIEW: CPT

## 2022-01-18 PROCEDURE — 36430 TRANSFUSION BLD/BLD COMPNT: CPT

## 2022-01-18 PROCEDURE — 86850 RBC ANTIBODY SCREEN: CPT

## 2022-01-18 PROCEDURE — 37226: CPT | Mod: RT

## 2022-01-18 PROCEDURE — 87186 SC STD MICRODIL/AGAR DIL: CPT

## 2022-01-18 PROCEDURE — C1769: CPT

## 2022-01-18 PROCEDURE — 73721 MRI JNT OF LWR EXTRE W/O DYE: CPT | Mod: RT

## 2022-01-18 PROCEDURE — 97162 PT EVAL MOD COMPLEX 30 MIN: CPT | Mod: GP

## 2022-01-18 PROCEDURE — C1725: CPT

## 2022-01-18 PROCEDURE — 99285 EMERGENCY DEPT VISIT HI MDM: CPT | Mod: FS

## 2022-01-18 PROCEDURE — 93970 EXTREMITY STUDY: CPT

## 2022-01-18 PROCEDURE — 83735 ASSAY OF MAGNESIUM: CPT

## 2022-01-18 PROCEDURE — 73610 X-RAY EXAM OF ANKLE: CPT | Mod: 26,RT

## 2022-01-18 PROCEDURE — 85652 RBC SED RATE AUTOMATED: CPT

## 2022-01-18 PROCEDURE — 87075 CULTR BACTERIA EXCEPT BLOOD: CPT

## 2022-01-18 PROCEDURE — 86923 COMPATIBILITY TEST ELECTRIC: CPT

## 2022-01-18 PROCEDURE — 99153 MOD SED SAME PHYS/QHP EA: CPT

## 2022-01-18 PROCEDURE — 86900 BLOOD TYPING SEROLOGIC ABO: CPT

## 2022-01-18 PROCEDURE — P9040: CPT

## 2022-01-18 PROCEDURE — 87077 CULTURE AEROBIC IDENTIFY: CPT

## 2022-01-18 PROCEDURE — 99152 MOD SED SAME PHYS/QHP 5/>YRS: CPT

## 2022-01-18 PROCEDURE — U0005: CPT

## 2022-01-18 PROCEDURE — 85730 THROMBOPLASTIN TIME PARTIAL: CPT

## 2022-01-18 PROCEDURE — 84132 ASSAY OF SERUM POTASSIUM: CPT

## 2022-01-18 PROCEDURE — 80048 BASIC METABOLIC PNL TOTAL CA: CPT

## 2022-01-18 PROCEDURE — P9016: CPT

## 2022-01-18 PROCEDURE — 80202 ASSAY OF VANCOMYCIN: CPT

## 2022-01-18 PROCEDURE — 71045 X-RAY EXAM CHEST 1 VIEW: CPT | Mod: 26

## 2022-01-18 PROCEDURE — 94640 AIRWAY INHALATION TREATMENT: CPT

## 2022-01-18 PROCEDURE — 36569 INSJ PICC 5 YR+ W/O IMAGING: CPT

## 2022-01-18 PROCEDURE — C1887: CPT

## 2022-01-18 RX ORDER — CALCIUM GLUCONATE 100 MG/ML
1 VIAL (ML) INTRAVENOUS ONCE
Refills: 0 | Status: COMPLETED | OUTPATIENT
Start: 2022-01-18 | End: 2022-01-18

## 2022-01-18 RX ORDER — INSULIN HUMAN 100 [IU]/ML
10 INJECTION, SOLUTION SUBCUTANEOUS ONCE
Refills: 0 | Status: COMPLETED | OUTPATIENT
Start: 2022-01-18 | End: 2022-01-18

## 2022-01-18 RX ORDER — SODIUM ZIRCONIUM CYCLOSILICATE 10 G/10G
5 POWDER, FOR SUSPENSION ORAL ONCE
Refills: 0 | Status: COMPLETED | OUTPATIENT
Start: 2022-01-18 | End: 2022-01-18

## 2022-01-18 RX ORDER — SODIUM BICARBONATE 1 MEQ/ML
50 SYRINGE (ML) INTRAVENOUS ONCE
Refills: 0 | Status: COMPLETED | OUTPATIENT
Start: 2022-01-18 | End: 2022-01-18

## 2022-01-18 RX ORDER — VANCOMYCIN HCL 1 G
1000 VIAL (EA) INTRAVENOUS ONCE
Refills: 0 | Status: COMPLETED | OUTPATIENT
Start: 2022-01-18 | End: 2022-01-18

## 2022-01-18 RX ORDER — HYDROMORPHONE HYDROCHLORIDE 2 MG/ML
0.5 INJECTION INTRAMUSCULAR; INTRAVENOUS; SUBCUTANEOUS ONCE
Refills: 0 | Status: DISCONTINUED | OUTPATIENT
Start: 2022-01-18 | End: 2022-01-18

## 2022-01-18 RX ORDER — SODIUM CHLORIDE 9 MG/ML
2000 INJECTION INTRAMUSCULAR; INTRAVENOUS; SUBCUTANEOUS ONCE
Refills: 0 | Status: COMPLETED | OUTPATIENT
Start: 2022-01-18 | End: 2022-01-18

## 2022-01-18 RX ORDER — CEFEPIME 1 G/1
1000 INJECTION, POWDER, FOR SOLUTION INTRAMUSCULAR; INTRAVENOUS EVERY 12 HOURS
Refills: 0 | Status: DISCONTINUED | OUTPATIENT
Start: 2022-01-18 | End: 2022-01-19

## 2022-01-18 RX ADMIN — INSULIN HUMAN 10 UNIT(S): 100 INJECTION, SOLUTION SUBCUTANEOUS at 22:49

## 2022-01-18 RX ADMIN — Medication 50 MILLIEQUIVALENT(S): at 22:57

## 2022-01-18 RX ADMIN — CEFEPIME 1000 MILLIGRAM(S): 1 INJECTION, POWDER, FOR SOLUTION INTRAMUSCULAR; INTRAVENOUS at 21:41

## 2022-01-18 RX ADMIN — Medication 1000 MILLIGRAM(S): at 22:42

## 2022-01-18 RX ADMIN — Medication 100 GRAM(S): at 23:04

## 2022-01-18 RX ADMIN — SODIUM CHLORIDE 2000 MILLILITER(S): 9 INJECTION INTRAMUSCULAR; INTRAVENOUS; SUBCUTANEOUS at 23:03

## 2022-01-18 RX ADMIN — Medication 250 MILLIGRAM(S): at 21:42

## 2022-01-18 RX ADMIN — SODIUM ZIRCONIUM CYCLOSILICATE 5 GRAM(S): 10 POWDER, FOR SUSPENSION ORAL at 22:54

## 2022-01-18 RX ADMIN — HYDROMORPHONE HYDROCHLORIDE 0.5 MILLIGRAM(S): 2 INJECTION INTRAMUSCULAR; INTRAVENOUS; SUBCUTANEOUS at 22:11

## 2022-01-18 RX ADMIN — HYDROMORPHONE HYDROCHLORIDE 0.5 MILLIGRAM(S): 2 INJECTION INTRAMUSCULAR; INTRAVENOUS; SUBCUTANEOUS at 21:41

## 2022-01-18 NOTE — ED ADULT NURSE NOTE - NSICDXPASTMEDICALHX_GEN_ALL_CORE_FT
PAST MEDICAL HISTORY:  Anemia     Arthritis, rheumatoid     CAD (coronary artery disease) non-obstructive    Depression     Hypertension     PVD (peripheral vascular disease) with claudication     Rheumatoid arthritis     Type 2 diabetes mellitus on insulin       PAST MEDICAL HISTORY:  Anemia     Arthritis, rheumatoid     CAD (coronary artery disease) non-obstructive    Depression     Hypertension     PVD (peripheral vascular disease) with claudication     Rheumatoid arthritis     Type 2 diabetes mellitus on insulin

## 2022-01-18 NOTE — ED STATDOCS - PROGRESS NOTE DETAILS
85 y/o female with h/o COPD, CAD, DM II, depression, CHF, EF 40%, HTN, PVD, PAD, s/p left fem pop bypass on 03/24/21, L thigh infections s/p debridement and muscle flap, PAT, LE DVT on Eliquis,, RA, anemia, presents to ED c/o drainage, foul odor from chronic wounds to R lower leg.  On exam, wound to R lower leg above medial malleoli with drainage.  2nd wound to posterior lower leg above heel.  Labs and Xrays ordered.  Pt TBA.  Carmen Barros PA-C Pt hyperkalemic, elevated BGM, no gap, bicarb is 21.  Needs EKG, cardiac monitoring, meds for hyperkalemia, more involved nursing, pending XR of ankle.  Sending to MAIN for further evaluation and management.  Signout to Dr. Davis. Yaakov Frank D.O. [employer] for Dr. Jean-Baptiste   87 y/o female with a PMHx of anemia, COPD,  CAD, DM II, depression, CHF EF 40%, HTN, PVD, PAD, s/p left fem pop bypass on 03/24/21, L thigh infections s/p debridement and muscle flap, PAT, LE DVT on Eliquis, RA presents to the ED c/o increasing pain and worsening of her RLE wounds. Pt has been getting wound-care at home by her daughter physician. Is now c/o increased pain, especially to the wound to her posterior RLE. Denies fever, nausea, vomiting.     PE: Pt with 2 RLE wounds to right lateral malleolus and right posterior heel, both about 3 cm in diameter. +Mild erythema of the foot. Good distal pulses in right foot.    MDM: Pt with Hx of PVD, MI with chronic RLE wounds, here with worsening of the wounds. No fever. Pt to be admitted for IV antibiotics.    REBEKAH Morrison attest that this documentation has been prepared under the direction and in the presence of Dr. Jean-Baptiste Documentation as noted above by chapo Morrison. Agree with notes. Ryan BARRERA

## 2022-01-18 NOTE — ED ADULT NURSE NOTE - NSICDXPASTSURGICALHX_GEN_ALL_CORE_FT
PAST SURGICAL HISTORY:  fracture ankle right plate . screws   2000    Hip fracture requiring operative repair Right hip 11/14/2020    S/P cataract surgery 2011       PAST SURGICAL HISTORY:  fracture ankle right plate . screws   2000    Hip fracture requiring operative repair Right hip 11/14/2020    S/P cataract surgery 2011

## 2022-01-18 NOTE — ED STATDOCS - OBJECTIVE STATEMENT
87 y/o female with a PMHx of COPD,  CAD, DM II, depression, CHF EF 40%, HTN, PVD, PAD, s/p left fem pop bypass on 03/24/21, L thigh infections s/p debridement and muscle flap, PAT, LE DVT on Eliquis, RA, anemia, presents to the ED c/o worsening chronic wounds and pain to RLE x2 weeks. Pt reports she has needed to take pin medications when she usually does not because the pain was so bad. Pt states the wound has been having yellow discharge and increased foul smell.

## 2022-01-18 NOTE — ED STATDOCS - CARE PLAN
Principal Discharge DX:	Acute hyperkalemia  Secondary Diagnosis:	Type 2 diabetes mellitus with hyperglycemia  Secondary Diagnosis:	Cellulitis of right lower extremity  Secondary Diagnosis:	Peripheral vascular disease   1

## 2022-01-18 NOTE — ED ADULT TRIAGE NOTE - CHIEF COMPLAINT QUOTE
c/o chronic right ankle ulcer, painful and currently unable to bare weight for past 2 to 3 weeks, denies fever

## 2022-01-18 NOTE — ED ADULT NURSE NOTE - NSIMPLEMENTINTERV_GEN_ALL_ED
Implemented All Fall with Harm Risk Interventions:  [place] to call system. Call bell, personal items and telephone within reach. Instruct patient to call for assistance. Room bathroom lighting operational. Non-slip footwear when patient is off stretcher. Physically safe environment: no spills, clutter or unnecessary equipment. Stretcher in lowest position, wheels locked, appropriate side rails in place. Provide visual cue, wrist band, yellow gown, etc. Monitor gait and stability. Monitor for mental status changes and reorient to person, place, and time. Review medications for side effects contributing to fall risk. Reinforce activity limits and safety measures with patient and family. Provide visual clues: red socks.

## 2022-01-18 NOTE — ED STATDOCS - SKIN, MLM
+3 cm dimanet wound to the R lateral malleolus with some discharge, erythema and edema to RLE, superficial wound to R posterior heel.

## 2022-01-18 NOTE — ED STATDOCS - CLINICAL SUMMARY MEDICAL DECISION MAKING FREE TEXT BOX
Pt with infected wound.  Hyperkalemic, ORLY.  Baseline anemia.  Admit for IVF antibiotics, treated hyperkalemia in ED, will need follow up.

## 2022-01-19 DIAGNOSIS — Z95.828 PRESENCE OF OTHER VASCULAR IMPLANTS AND GRAFTS: Chronic | ICD-10-CM

## 2022-01-19 LAB
A1C WITH ESTIMATED AVERAGE GLUCOSE RESULT: 10.7 % — HIGH (ref 4–5.6)
ADD ON TEST-SPECIMEN IN LAB: SIGNIFICANT CHANGE UP
ADD ON TEST-SPECIMEN IN LAB: SIGNIFICANT CHANGE UP
ALBUMIN SERPL ELPH-MCNC: 2.2 G/DL — LOW (ref 3.3–5)
ALP SERPL-CCNC: 90 U/L — SIGNIFICANT CHANGE UP (ref 40–120)
ALT FLD-CCNC: 10 U/L — LOW (ref 12–78)
ANION GAP SERPL CALC-SCNC: 5 MMOL/L — SIGNIFICANT CHANGE UP (ref 5–17)
APTT BLD: 33.1 SEC — SIGNIFICANT CHANGE UP (ref 27.5–35.5)
AST SERPL-CCNC: 10 U/L — LOW (ref 15–37)
BASOPHILS # BLD AUTO: 0.02 K/UL — SIGNIFICANT CHANGE UP (ref 0–0.2)
BASOPHILS NFR BLD AUTO: 0.2 % — SIGNIFICANT CHANGE UP (ref 0–2)
BILIRUB SERPL-MCNC: 0.2 MG/DL — SIGNIFICANT CHANGE UP (ref 0.2–1.2)
BUN SERPL-MCNC: 51 MG/DL — HIGH (ref 7–23)
CALCIUM SERPL-MCNC: 9.4 MG/DL — SIGNIFICANT CHANGE UP (ref 8.5–10.1)
CHLORIDE SERPL-SCNC: 109 MMOL/L — HIGH (ref 96–108)
CO2 SERPL-SCNC: 23 MMOL/L — SIGNIFICANT CHANGE UP (ref 22–31)
CREAT SERPL-MCNC: 1.16 MG/DL — SIGNIFICANT CHANGE UP (ref 0.5–1.3)
EOSINOPHIL # BLD AUTO: 0.33 K/UL — SIGNIFICANT CHANGE UP (ref 0–0.5)
EOSINOPHIL NFR BLD AUTO: 3.1 % — SIGNIFICANT CHANGE UP (ref 0–6)
ESTIMATED AVERAGE GLUCOSE: 260 MG/DL — HIGH (ref 68–114)
GLUCOSE BLDC GLUCOMTR-MCNC: 254 MG/DL — HIGH (ref 70–99)
GLUCOSE BLDC GLUCOMTR-MCNC: 281 MG/DL — HIGH (ref 70–99)
GLUCOSE BLDC GLUCOMTR-MCNC: 334 MG/DL — HIGH (ref 70–99)
GLUCOSE SERPL-MCNC: 137 MG/DL — HIGH (ref 70–99)
HCT VFR BLD CALC: 26.8 % — LOW (ref 34.5–45)
HGB BLD-MCNC: 7.9 G/DL — LOW (ref 11.5–15.5)
IMM GRANULOCYTES NFR BLD AUTO: 0.5 % — SIGNIFICANT CHANGE UP (ref 0–1.5)
INR BLD: 1.2 RATIO — HIGH (ref 0.88–1.16)
LYMPHOCYTES # BLD AUTO: 19.7 % — SIGNIFICANT CHANGE UP (ref 13–44)
LYMPHOCYTES # BLD AUTO: 2.1 K/UL — SIGNIFICANT CHANGE UP (ref 1–3.3)
MCHC RBC-ENTMCNC: 29.5 GM/DL — LOW (ref 32–36)
MCHC RBC-ENTMCNC: 29.9 PG — SIGNIFICANT CHANGE UP (ref 27–34)
MCV RBC AUTO: 101.5 FL — HIGH (ref 80–100)
MONOCYTES # BLD AUTO: 1.13 K/UL — HIGH (ref 0–0.9)
MONOCYTES NFR BLD AUTO: 10.6 % — SIGNIFICANT CHANGE UP (ref 2–14)
NEUTROPHILS # BLD AUTO: 7.01 K/UL — SIGNIFICANT CHANGE UP (ref 1.8–7.4)
NEUTROPHILS NFR BLD AUTO: 65.9 % — SIGNIFICANT CHANGE UP (ref 43–77)
PLATELET # BLD AUTO: 302 K/UL — SIGNIFICANT CHANGE UP (ref 150–400)
POTASSIUM SERPL-MCNC: 5.1 MMOL/L — SIGNIFICANT CHANGE UP (ref 3.5–5.3)
POTASSIUM SERPL-MCNC: 5.2 MMOL/L — SIGNIFICANT CHANGE UP (ref 3.5–5.3)
POTASSIUM SERPL-SCNC: 5.1 MMOL/L — SIGNIFICANT CHANGE UP (ref 3.5–5.3)
POTASSIUM SERPL-SCNC: 5.2 MMOL/L — SIGNIFICANT CHANGE UP (ref 3.5–5.3)
PROT SERPL-MCNC: 7 GM/DL — SIGNIFICANT CHANGE UP (ref 6–8.3)
PROTHROM AB SERPL-ACNC: 13.9 SEC — HIGH (ref 10.6–13.6)
RBC # BLD: 2.64 M/UL — LOW (ref 3.8–5.2)
RBC # FLD: 14.6 % — HIGH (ref 10.3–14.5)
SARS-COV-2 RNA SPEC QL NAA+PROBE: SIGNIFICANT CHANGE UP
SODIUM SERPL-SCNC: 137 MMOL/L — SIGNIFICANT CHANGE UP (ref 135–145)
WBC # BLD: 10.64 K/UL — HIGH (ref 3.8–10.5)
WBC # FLD AUTO: 10.64 K/UL — HIGH (ref 3.8–10.5)

## 2022-01-19 PROCEDURE — 99232 SBSQ HOSP IP/OBS MODERATE 35: CPT | Mod: GC

## 2022-01-19 PROCEDURE — 93925 LOWER EXTREMITY STUDY: CPT | Mod: 26

## 2022-01-19 PROCEDURE — 73721 MRI JNT OF LWR EXTRE W/O DYE: CPT | Mod: 26,RT

## 2022-01-19 PROCEDURE — 93970 EXTREMITY STUDY: CPT | Mod: 26

## 2022-01-19 PROCEDURE — 12345: CPT | Mod: NC

## 2022-01-19 PROCEDURE — 93010 ELECTROCARDIOGRAM REPORT: CPT

## 2022-01-19 PROCEDURE — 99223 1ST HOSP IP/OBS HIGH 75: CPT

## 2022-01-19 RX ORDER — INSULIN LISPRO 100/ML
VIAL (ML) SUBCUTANEOUS AT BEDTIME
Refills: 0 | Status: DISCONTINUED | OUTPATIENT
Start: 2022-01-19 | End: 2022-01-25

## 2022-01-19 RX ORDER — TIOTROPIUM BROMIDE 18 UG/1
1 CAPSULE ORAL; RESPIRATORY (INHALATION) DAILY
Refills: 0 | Status: DISCONTINUED | OUTPATIENT
Start: 2022-01-19 | End: 2022-01-25

## 2022-01-19 RX ORDER — METOPROLOL TARTRATE 50 MG
25 TABLET ORAL
Refills: 0 | Status: DISCONTINUED | OUTPATIENT
Start: 2022-01-19 | End: 2022-01-25

## 2022-01-19 RX ORDER — SODIUM CHLORIDE 9 MG/ML
1000 INJECTION, SOLUTION INTRAVENOUS
Refills: 0 | Status: DISCONTINUED | OUTPATIENT
Start: 2022-01-19 | End: 2022-01-25

## 2022-01-19 RX ORDER — DEXTROSE 50 % IN WATER 50 %
12.5 SYRINGE (ML) INTRAVENOUS ONCE
Refills: 0 | Status: DISCONTINUED | OUTPATIENT
Start: 2022-01-19 | End: 2022-01-25

## 2022-01-19 RX ORDER — OXYCODONE AND ACETAMINOPHEN 5; 325 MG/1; MG/1
1 TABLET ORAL EVERY 4 HOURS
Refills: 0 | Status: DISCONTINUED | OUTPATIENT
Start: 2022-01-19 | End: 2022-01-25

## 2022-01-19 RX ORDER — APIXABAN 2.5 MG/1
2.5 TABLET, FILM COATED ORAL
Refills: 0 | Status: DISCONTINUED | OUTPATIENT
Start: 2022-01-19 | End: 2022-01-19

## 2022-01-19 RX ORDER — INSULIN GLARGINE 100 [IU]/ML
10 INJECTION, SOLUTION SUBCUTANEOUS AT BEDTIME
Refills: 0 | Status: DISCONTINUED | OUTPATIENT
Start: 2022-01-19 | End: 2022-01-25

## 2022-01-19 RX ORDER — CELECOXIB 200 MG/1
200 CAPSULE ORAL DAILY
Refills: 0 | Status: DISCONTINUED | OUTPATIENT
Start: 2022-01-19 | End: 2022-01-22

## 2022-01-19 RX ORDER — VANCOMYCIN HCL 1 G
1000 VIAL (EA) INTRAVENOUS
Refills: 0 | Status: DISCONTINUED | OUTPATIENT
Start: 2022-01-19 | End: 2022-01-22

## 2022-01-19 RX ORDER — FUROSEMIDE 40 MG
60 TABLET ORAL DAILY
Refills: 0 | Status: DISCONTINUED | OUTPATIENT
Start: 2022-01-19 | End: 2022-01-22

## 2022-01-19 RX ORDER — INSULIN LISPRO 100/ML
VIAL (ML) SUBCUTANEOUS
Refills: 0 | Status: DISCONTINUED | OUTPATIENT
Start: 2022-01-19 | End: 2022-01-25

## 2022-01-19 RX ORDER — GLUCAGON INJECTION, SOLUTION 0.5 MG/.1ML
1 INJECTION, SOLUTION SUBCUTANEOUS ONCE
Refills: 0 | Status: DISCONTINUED | OUTPATIENT
Start: 2022-01-19 | End: 2022-01-25

## 2022-01-19 RX ORDER — DEXTROSE 50 % IN WATER 50 %
25 SYRINGE (ML) INTRAVENOUS ONCE
Refills: 0 | Status: DISCONTINUED | OUTPATIENT
Start: 2022-01-19 | End: 2022-01-25

## 2022-01-19 RX ORDER — ACETAMINOPHEN 500 MG
650 TABLET ORAL EVERY 6 HOURS
Refills: 0 | Status: DISCONTINUED | OUTPATIENT
Start: 2022-01-19 | End: 2022-01-25

## 2022-01-19 RX ORDER — METHOTREXATE 2.5 MG/1
15 TABLET ORAL
Refills: 0 | Status: COMPLETED | OUTPATIENT
Start: 2022-01-19 | End: 2022-01-19

## 2022-01-19 RX ORDER — GABAPENTIN 400 MG/1
300 CAPSULE ORAL THREE TIMES A DAY
Refills: 0 | Status: DISCONTINUED | OUTPATIENT
Start: 2022-01-19 | End: 2022-01-25

## 2022-01-19 RX ORDER — DEXTROSE 50 % IN WATER 50 %
15 SYRINGE (ML) INTRAVENOUS ONCE
Refills: 0 | Status: DISCONTINUED | OUTPATIENT
Start: 2022-01-19 | End: 2022-01-25

## 2022-01-19 RX ORDER — SODIUM CHLORIDE 9 MG/ML
1000 INJECTION INTRAMUSCULAR; INTRAVENOUS; SUBCUTANEOUS
Refills: 0 | Status: DISCONTINUED | OUTPATIENT
Start: 2022-01-19 | End: 2022-01-20

## 2022-01-19 RX ORDER — FOLIC ACID 0.8 MG
1 TABLET ORAL AT BEDTIME
Refills: 0 | Status: DISCONTINUED | OUTPATIENT
Start: 2022-01-19 | End: 2022-01-25

## 2022-01-19 RX ORDER — HYDRALAZINE HCL 50 MG
10 TABLET ORAL EVERY 6 HOURS
Refills: 0 | Status: DISCONTINUED | OUTPATIENT
Start: 2022-01-19 | End: 2022-01-25

## 2022-01-19 RX ORDER — CEFEPIME 1 G/1
2000 INJECTION, POWDER, FOR SOLUTION INTRAMUSCULAR; INTRAVENOUS EVERY 24 HOURS
Refills: 0 | Status: DISCONTINUED | OUTPATIENT
Start: 2022-01-19 | End: 2022-01-25

## 2022-01-19 RX ORDER — ONDANSETRON 8 MG/1
4 TABLET, FILM COATED ORAL EVERY 8 HOURS
Refills: 0 | Status: DISCONTINUED | OUTPATIENT
Start: 2022-01-19 | End: 2022-01-25

## 2022-01-19 RX ORDER — LANOLIN ALCOHOL/MO/W.PET/CERES
3 CREAM (GRAM) TOPICAL AT BEDTIME
Refills: 0 | Status: DISCONTINUED | OUTPATIENT
Start: 2022-01-19 | End: 2022-01-25

## 2022-01-19 RX ORDER — HYDROMORPHONE HYDROCHLORIDE 2 MG/ML
0.5 INJECTION INTRAMUSCULAR; INTRAVENOUS; SUBCUTANEOUS
Refills: 0 | Status: DISCONTINUED | OUTPATIENT
Start: 2022-01-19 | End: 2022-01-25

## 2022-01-19 RX ORDER — ALBUTEROL 90 UG/1
1 AEROSOL, METERED ORAL EVERY 6 HOURS
Refills: 0 | Status: DISCONTINUED | OUTPATIENT
Start: 2022-01-19 | End: 2022-01-25

## 2022-01-19 RX ORDER — ASPIRIN/CALCIUM CARB/MAGNESIUM 324 MG
81 TABLET ORAL DAILY
Refills: 0 | Status: DISCONTINUED | OUTPATIENT
Start: 2022-01-19 | End: 2022-01-25

## 2022-01-19 RX ORDER — SACUBITRIL AND VALSARTAN 24; 26 MG/1; MG/1
1 TABLET, FILM COATED ORAL
Refills: 0 | Status: DISCONTINUED | OUTPATIENT
Start: 2022-01-19 | End: 2022-01-25

## 2022-01-19 RX ADMIN — Medication 25 MILLIGRAM(S): at 10:42

## 2022-01-19 RX ADMIN — Medication 1 MILLIGRAM(S): at 21:24

## 2022-01-19 RX ADMIN — Medication 20 MILLIGRAM(S): at 10:42

## 2022-01-19 RX ADMIN — Medication 81 MILLIGRAM(S): at 10:41

## 2022-01-19 RX ADMIN — Medication 6: at 11:43

## 2022-01-19 RX ADMIN — HYDROMORPHONE HYDROCHLORIDE 0.5 MILLIGRAM(S): 2 INJECTION INTRAMUSCULAR; INTRAVENOUS; SUBCUTANEOUS at 21:26

## 2022-01-19 RX ADMIN — APIXABAN 2.5 MILLIGRAM(S): 2.5 TABLET, FILM COATED ORAL at 10:42

## 2022-01-19 RX ADMIN — INSULIN GLARGINE 10 UNIT(S): 100 INJECTION, SOLUTION SUBCUTANEOUS at 21:24

## 2022-01-19 RX ADMIN — CELECOXIB 200 MILLIGRAM(S): 200 CAPSULE ORAL at 10:42

## 2022-01-19 RX ADMIN — Medication 4: at 21:25

## 2022-01-19 RX ADMIN — METHOTREXATE 15 MILLIGRAM(S): 2.5 TABLET ORAL at 10:43

## 2022-01-19 RX ADMIN — CEFEPIME 100 MILLIGRAM(S): 1 INJECTION, POWDER, FOR SOLUTION INTRAMUSCULAR; INTRAVENOUS at 10:42

## 2022-01-19 RX ADMIN — Medication 60 MILLIGRAM(S): at 10:41

## 2022-01-19 RX ADMIN — GABAPENTIN 300 MILLIGRAM(S): 400 CAPSULE ORAL at 05:58

## 2022-01-19 RX ADMIN — SACUBITRIL AND VALSARTAN 1 TABLET(S): 24; 26 TABLET, FILM COATED ORAL at 10:41

## 2022-01-19 RX ADMIN — HYDROMORPHONE HYDROCHLORIDE 0.5 MILLIGRAM(S): 2 INJECTION INTRAMUSCULAR; INTRAVENOUS; SUBCUTANEOUS at 14:08

## 2022-01-19 RX ADMIN — SACUBITRIL AND VALSARTAN 1 TABLET(S): 24; 26 TABLET, FILM COATED ORAL at 21:23

## 2022-01-19 RX ADMIN — HYDROMORPHONE HYDROCHLORIDE 0.5 MILLIGRAM(S): 2 INJECTION INTRAMUSCULAR; INTRAVENOUS; SUBCUTANEOUS at 03:28

## 2022-01-19 RX ADMIN — GABAPENTIN 300 MILLIGRAM(S): 400 CAPSULE ORAL at 14:09

## 2022-01-19 RX ADMIN — Medication 5 MILLIGRAM(S): at 10:41

## 2022-01-19 RX ADMIN — SODIUM CHLORIDE 65 MILLILITER(S): 9 INJECTION INTRAMUSCULAR; INTRAVENOUS; SUBCUTANEOUS at 04:32

## 2022-01-19 RX ADMIN — Medication 6: at 17:05

## 2022-01-19 RX ADMIN — Medication 250 MILLIGRAM(S): at 21:24

## 2022-01-19 RX ADMIN — GABAPENTIN 300 MILLIGRAM(S): 400 CAPSULE ORAL at 21:24

## 2022-01-19 RX ADMIN — Medication 25 MILLIGRAM(S): at 21:24

## 2022-01-19 NOTE — H&P ADULT - NSHPREVIEWOFSYSTEMS_GEN_ALL_CORE
Constitutional: negative for fatigue, negative for fever, negative for chills, negative for decreased appetite.  Skin: negative for rashes, negative for open wounds, negative for jaundice.   Eyes: negative for blurry vision, negative for double vision.   Ears, nose, throat: negative for ear pain, negative for nasal congestion, negative for sore throat, negative for lymph node swelling.   Cardiovascular: negative for chest pain, negative for palpitations, positive for lower extremity swelling.   Respiratory: negative for shortness of breath, negative for wheezing, negative for cough.   Gastrointestinal: negative for abdominal pain, negative for nausea, negative for vomiting, negative for diarrhea, negative for constipation, negative for blood in the stool, negative for black tarry stools.   Genitourinary: negative for burning on urination, negative for urinary urgency or frequency, negative for blood in the urine.   Endocrine: negative for cold intolerance, negative for heat intolerance, negative for increased thirst.   Hematologic: negative for easy bruising or bleeding.   Musculoskeletal: negative for muscle/joint pain, negative for decreased range of motion, positive for right lower extremity wounds   Neurological: negative for dizziness, negative for headaches, negative for loss of consciousness, negative for motor weakness, negative for sensory deficits.   Psychiatric: negative for depression, negative for anxiety.

## 2022-01-19 NOTE — H&P ADULT - HISTORY OF PRESENT ILLNESS
87 y/o F with PMH COPD, CAD, Type 2 DM on insulin, depression, CHFrEF, HTN, PVD, PAD, s/p left femoral popliteal bypass 3/24/2021, left thigh infections s/p debridement and muscle flap, PAT, lower extremity DVT on Eliquis, DARREN presents with chronic wounds and pain to the right lower extremity for 2 weeks. She first noticed the chronic wound on her right lower extremity about a month ago. She has been completing dressing changes and applying Xeroform. The wound is foul smelling. She has been unable to walk over the last 2 weeks due to increase pressure when placing the foot down. Hanging her lower extremity off the side of the bed helps improve her pain. She also reports occasional diarrhea and lower extremity swelling. Denies headaches, blurry vision, dizziness, fevers, chills, chest pain, SOB, abdominal pain, N/V, diarrhea/constipation.      ER course: /48. Labs: WBC 10.87, Hb 7.9, .8, INR 1.17, K+ 6.1, BUN 61, Cr 1.49 (Baseline ~0.99), Glucose 448, Alkaline phosphatase 140, COVID negative. EKG: NSR with HR 64 bm, MN prolonged to 218 ms (1st degree AV block), QTC prolonged to 470 ms, LVH, T wave inversions in II, AVL, V4-V6, no ST segment changes present on prior EKG (personally reviewed).     Imaging:   - XR right ankle: concern for osteomyelitis, hardware present, no fracture or dislocation (personally reviewed).   - CXR: no consolidation, no effusion, no pneumothorax (personally reviewed).     The pt was given Cefepime and Vancomycin, 2L of NS, 10 units of Humalin, Calcium gluconate, Sodium bicarbonate, Lokelma, and Dilaudid in the ER. She is being admitted to med/surg for further management.

## 2022-01-19 NOTE — CONSULT NOTE ADULT - SUBJECTIVE AND OBJECTIVE BOX
Patient is a 86y old  Female who presents with a chief complaint of Right lower extremity wound    HPI:  87 y/o Female with h/o COPD, CAD, Type 2 DM on insulin, depression, CHFrEF, HTN, PVD, PAD, s/p left femoral popliteal bypass 3/24/2021, prior left thigh infections s/p debridement and muscle flap, PAT, lower extremity DVT on Eliquis, RA was admitted on 1/18 for lower leg nonhealing wounds. She first noticed the chronic wound on her right lower extremity about a month ago. She has been completing dressing changes and applying Xeroform. The wound is foul smelling. She has been unable to walk over the last 2 weeks due to increase pressure when placing the foot down. Hanging her lower extremity off the side of the bed helps improve her pain. She also reports occasional diarrhea and lower extremity swelling. Denies headaches, blurry vision, dizziness, fevers, chills. In ER she received cefepime and vancomycin IV.     PMH: as above  PSH: as above  Meds: per reconciliation sheet, noted below  MEDICATIONS  (STANDING):  apixaban 2.5 milliGRAM(s) Oral two times a day  aspirin  chewable 81 milliGRAM(s) Oral daily  cefepime   IVPB 2000 milliGRAM(s) IV Intermittent every 24 hours  celecoxib 200 milliGRAM(s) Oral daily  dextrose 40% Gel 15 Gram(s) Oral once  dextrose 40% Gel 15 Gram(s) Oral once  dextrose 5%. 1000 milliLiter(s) (100 mL/Hr) IV Continuous <Continuous>  dextrose 5%. 1000 milliLiter(s) (50 mL/Hr) IV Continuous <Continuous>  dextrose 5%. 1000 milliLiter(s) (100 mL/Hr) IV Continuous <Continuous>  dextrose 5%. 1000 milliLiter(s) (50 mL/Hr) IV Continuous <Continuous>  dextrose 50% Injectable 25 Gram(s) IV Push once  dextrose 50% Injectable 25 Gram(s) IV Push once  dextrose 50% Injectable 12.5 Gram(s) IV Push once  dextrose 50% Injectable 25 Gram(s) IV Push once  dextrose 50% Injectable 12.5 Gram(s) IV Push once  dextrose 50% Injectable 25 Gram(s) IV Push once  folic acid 1 milliGRAM(s) Oral at bedtime  furosemide    Tablet 60 milliGRAM(s) Oral daily  gabapentin 300 milliGRAM(s) Oral three times a day  glucagon  Injectable 1 milliGRAM(s) IntraMuscular once  glucagon  Injectable 1 milliGRAM(s) IntraMuscular once  insulin glargine Injectable (LANTUS) 10 Unit(s) SubCutaneous at bedtime  insulin lispro (ADMELOG) corrective regimen sliding scale   SubCutaneous three times a day before meals  insulin lispro (ADMELOG) corrective regimen sliding scale   SubCutaneous at bedtime  metoprolol tartrate 25 milliGRAM(s) Oral two times a day  PARoxetine 20 milliGRAM(s) Oral daily  predniSONE   Tablet 5 milliGRAM(s) Oral daily  sacubitril 24 mG/valsartan 26 mG 1 Tablet(s) Oral two times a day  sodium chloride 0.9%. 1000 milliLiter(s) (65 mL/Hr) IV Continuous <Continuous>  tiotropium 18 MICROgram(s) Capsule 1 Capsule(s) Inhalation daily    MEDICATIONS  (PRN):  acetaminophen     Tablet .. 650 milliGRAM(s) Oral every 6 hours PRN Temp greater or equal to 38C (100.4F), Mild Pain (1 - 3)  ALBUTerol    90 MICROgram(s) HFA Inhaler 1 Puff(s) Inhalation every 6 hours PRN Shortness of Breath  aluminum hydroxide/magnesium hydroxide/simethicone Suspension 30 milliLiter(s) Oral every 4 hours PRN Dyspepsia  hydrALAZINE Injectable 10 milliGRAM(s) IV Push every 6 hours PRN SBP > 160  HYDROmorphone  Injectable 0.5 milliGRAM(s) IV Push every 3 hours PRN Moderate Pain (4 - 6)  melatonin 3 milliGRAM(s) Oral at bedtime PRN Insomnia  ondansetron Injectable 4 milliGRAM(s) IV Push every 8 hours PRN Nausea and/or Vomiting  oxycodone    5 mG/acetaminophen 325 mG 1 Tablet(s) Oral every 4 hours PRN Moderate Pain (4 - 6)    Allergies    cephalosporins (Other)  penicillins (Urticaria; Pruritus)    Intolerances      Social: no smoking, no alcohol, no illegal drugs; no recent travel, no exposure to TB  FAMILY HISTORY:  FH: colon cancer  father    FH: heart attack  father    Family history of atherosclerosis  mother      no history of premature cardiovascular disease in first degree relatives    ROS: the patient denies fever, no chills, no HA, no seizures, no dizziness, no sore throat, no nasal congestion, no blurry vision, no CP, no palpitations, no SOB, no cough, no abdominal pain, no diarrhea, no N/V, no dysuria, no leg pain, leg wounds, has lower legs rash, no joint aches, no rectal pain or bleeding, no night sweats  All other systems reviewed and are negative    Vital Signs Last 24 Hrs  T(C): 36.6 (19 Jan 2022 07:57), Max: 36.6 (18 Jan 2022 23:39)  T(F): 97.8 (19 Jan 2022 07:57), Max: 97.9 (18 Jan 2022 23:39)  HR: 93 (19 Jan 2022 07:57) (63 - 93)  BP: 145/47 (19 Jan 2022 07:57) (144/48 - 162/51)  BP(mean): 82 (18 Jan 2022 23:39) (79 - 82)  RR: 18 (19 Jan 2022 04:00) (18 - 18)  SpO2: 98% (19 Jan 2022 07:57) (94% - 100%)  Daily Height in cm: 160.02 (18 Jan 2022 20:18)    Daily     PE:    Constitutional:  No acute distress  HEENT: NC/AT, EOMI, PERRLA, conjunctivae clear; ears and nose atraumatic; pharynx benign  Neck: supple; thyroid not palpable  Back: no tenderness  Respiratory: respiratory effort normal; clear to auscultation  Cardiovascular: S1S2 regular, no murmurs  Abdomen: soft, not tender, not distended, positive BS; no liver or spleen organomegaly  Genitourinary: no suprapubic tenderness  Lymphatic: no LN palpable  Musculoskeletal: no muscle tenderness, no joint swelling or tenderness  Extremities: 2+ pedal edema  Lower leg wounds  Neurological/ Psychiatric: AxOx3, judgement and insight normal; moving all extremities  Skin: no rashes; no palpable lesions    Labs: all available labs reviewed                        7.9    10.64 )-----------( 302      ( 19 Jan 2022 07:31 )             26.8     01-19    137  |  109<H>  |  51<H>  ----------------------------<  137<H>  5.2   |  23  |  1.16    Ca    9.4      19 Jan 2022 07:31    TPro  7.0  /  Alb  2.2<L>  /  TBili  0.2  /  DBili  x   /  AST  10<L>  /  ALT  10<L>  /  AlkPhos  90  01-19     LIVER FUNCTIONS - ( 19 Jan 2022 07:31 )  Alb: 2.2 g/dL / Pro: 7.0 gm/dL / ALK PHOS: 90 U/L / ALT: 10 U/L / AST: 10 U/L / GGT: x               COVID-19 PCR: NotDetec (01-18-22 @ 21:04)    Radiology: all available radiological tests reviewed    XR right ankle: concern for osteomyelitis, hardware present, no fracture or dislocation  CXR: no consolidation, no effusion, no pneumothorax (personally reviewed).     Advanced directives addressed: full resuscitation Patient is a 86y old  Female who presents with a chief complaint of Right lower extremity wound    HPI:  87 y/o Female with h/o COPD, CAD, Type 2 DM on insulin, depression, CHFrEF, HTN, PVD, PAD, s/p left femoral popliteal bypass 3/24/2021, prior left thigh infections s/p debridement and muscle flap, PAT, lower extremity DVT on Eliquis, RA was admitted on 1/18 for lower leg nonhealing wounds. She first noticed the chronic wound on her right lower extremity about a month ago. She has been completing dressing changes and applying Xeroform. The wound is foul smelling. She has been unable to walk over the last 2 weeks due to increase pressure when placing the foot down. Hanging her lower extremity off the side of the bed helps improve her pain. She also reports occasional diarrhea and lower extremity swelling. Denies headaches, blurry vision, dizziness, fevers, chills. In ER she received cefepime and vancomycin IV.     PMH: as above  PSH: as above  Meds: per reconciliation sheet, noted below  MEDICATIONS  (STANDING):  apixaban 2.5 milliGRAM(s) Oral two times a day  aspirin  chewable 81 milliGRAM(s) Oral daily  cefepime   IVPB 2000 milliGRAM(s) IV Intermittent every 24 hours  celecoxib 200 milliGRAM(s) Oral daily  dextrose 40% Gel 15 Gram(s) Oral once  dextrose 40% Gel 15 Gram(s) Oral once  dextrose 5%. 1000 milliLiter(s) (100 mL/Hr) IV Continuous <Continuous>  dextrose 5%. 1000 milliLiter(s) (50 mL/Hr) IV Continuous <Continuous>  dextrose 5%. 1000 milliLiter(s) (100 mL/Hr) IV Continuous <Continuous>  dextrose 5%. 1000 milliLiter(s) (50 mL/Hr) IV Continuous <Continuous>  dextrose 50% Injectable 25 Gram(s) IV Push once  dextrose 50% Injectable 25 Gram(s) IV Push once  dextrose 50% Injectable 12.5 Gram(s) IV Push once  dextrose 50% Injectable 25 Gram(s) IV Push once  dextrose 50% Injectable 12.5 Gram(s) IV Push once  dextrose 50% Injectable 25 Gram(s) IV Push once  folic acid 1 milliGRAM(s) Oral at bedtime  furosemide    Tablet 60 milliGRAM(s) Oral daily  gabapentin 300 milliGRAM(s) Oral three times a day  glucagon  Injectable 1 milliGRAM(s) IntraMuscular once  glucagon  Injectable 1 milliGRAM(s) IntraMuscular once  insulin glargine Injectable (LANTUS) 10 Unit(s) SubCutaneous at bedtime  insulin lispro (ADMELOG) corrective regimen sliding scale   SubCutaneous three times a day before meals  insulin lispro (ADMELOG) corrective regimen sliding scale   SubCutaneous at bedtime  metoprolol tartrate 25 milliGRAM(s) Oral two times a day  PARoxetine 20 milliGRAM(s) Oral daily  predniSONE   Tablet 5 milliGRAM(s) Oral daily  sacubitril 24 mG/valsartan 26 mG 1 Tablet(s) Oral two times a day  sodium chloride 0.9%. 1000 milliLiter(s) (65 mL/Hr) IV Continuous <Continuous>  tiotropium 18 MICROgram(s) Capsule 1 Capsule(s) Inhalation daily    MEDICATIONS  (PRN):  acetaminophen     Tablet .. 650 milliGRAM(s) Oral every 6 hours PRN Temp greater or equal to 38C (100.4F), Mild Pain (1 - 3)  ALBUTerol    90 MICROgram(s) HFA Inhaler 1 Puff(s) Inhalation every 6 hours PRN Shortness of Breath  aluminum hydroxide/magnesium hydroxide/simethicone Suspension 30 milliLiter(s) Oral every 4 hours PRN Dyspepsia  hydrALAZINE Injectable 10 milliGRAM(s) IV Push every 6 hours PRN SBP > 160  HYDROmorphone  Injectable 0.5 milliGRAM(s) IV Push every 3 hours PRN Moderate Pain (4 - 6)  melatonin 3 milliGRAM(s) Oral at bedtime PRN Insomnia  ondansetron Injectable 4 milliGRAM(s) IV Push every 8 hours PRN Nausea and/or Vomiting  oxycodone    5 mG/acetaminophen 325 mG 1 Tablet(s) Oral every 4 hours PRN Moderate Pain (4 - 6)    Allergies    cephalosporins (Other)  penicillins (Urticaria; Pruritus)    Intolerances      Social: no smoking, no alcohol, no illegal drugs; no recent travel, no exposure to TB  FAMILY HISTORY:  FH: colon cancer  father    FH: heart attack  father    Family history of atherosclerosis  mother      no history of premature cardiovascular disease in first degree relatives    ROS: the patient denies fever, no chills, no HA, no seizures, no dizziness, no sore throat, no nasal congestion, no blurry vision, no CP, no palpitations, no SOB, no cough, no abdominal pain, no diarrhea, no N/V, no dysuria, no leg pain, leg wounds, has lower legs rash, no joint aches, no rectal pain or bleeding, no night sweats  All other systems reviewed and are negative    Vital Signs Last 24 Hrs  T(C): 36.6 (19 Jan 2022 07:57), Max: 36.6 (18 Jan 2022 23:39)  T(F): 97.8 (19 Jan 2022 07:57), Max: 97.9 (18 Jan 2022 23:39)  HR: 93 (19 Jan 2022 07:57) (63 - 93)  BP: 145/47 (19 Jan 2022 07:57) (144/48 - 162/51)  BP(mean): 82 (18 Jan 2022 23:39) (79 - 82)  RR: 18 (19 Jan 2022 04:00) (18 - 18)  SpO2: 98% (19 Jan 2022 07:57) (94% - 100%)  Daily Height in cm: 160.02 (18 Jan 2022 20:18)    Daily     PE:    Constitutional:  No acute distress  HEENT: NC/AT, EOMI, PERRLA, conjunctivae clear; ears and nose atraumatic; pharynx benign  Neck: supple; thyroid not palpable  Back: no tenderness  Respiratory: respiratory effort normal; clear to auscultation  Cardiovascular: S1S2 regular, no murmurs  Abdomen: soft, not tender, not distended, positive BS; no liver or spleen organomegaly  Genitourinary: no suprapubic tenderness  Lymphatic: no LN palpable  Musculoskeletal: no muscle tenderness, no joint swelling or tenderness  Extremities: 2+ pedal edema  Right lower extremity wound at the medial malleolus and posterior lower extremity with foul smelling with sloughed off appearance and surrounding erythema.  Neurological/ Psychiatric: AxOx3, judgement and insight normal; moving all extremities  Skin: no rashes; no palpable lesions    Labs: all available labs reviewed                        7.9    10.64 )-----------( 302      ( 19 Jan 2022 07:31 )             26.8     01-19    137  |  109<H>  |  51<H>  ----------------------------<  137<H>  5.2   |  23  |  1.16    Ca    9.4      19 Jan 2022 07:31    TPro  7.0  /  Alb  2.2<L>  /  TBili  0.2  /  DBili  x   /  AST  10<L>  /  ALT  10<L>  /  AlkPhos  90  01-19     LIVER FUNCTIONS - ( 19 Jan 2022 07:31 )  Alb: 2.2 g/dL / Pro: 7.0 gm/dL / ALK PHOS: 90 U/L / ALT: 10 U/L / AST: 10 U/L / GGT: x               COVID-19 PCR: NotDetec (01-18-22 @ 21:04)    Radiology: all available radiological tests reviewed    XR right ankle: concern for osteomyelitis, hardware present, no fracture or dislocation  CXR: no consolidation, no effusion, no pneumothorax (personally reviewed).     Advanced directives addressed: full resuscitation 27.4

## 2022-01-19 NOTE — CONSULT NOTE ADULT - SUBJECTIVE AND OBJECTIVE BOX
[facility]    Patient is a 86yFemale community ambulator with worsening ambulation for 2 weeks. History of subacute ulcers over the medial mall and posterior ankle for 1 month that have become malodorous and more painful. Mother of ER attending who rec patient admitted for treatment. MR images revealed OM, cellulitis. Patient has history of ankle ORIF in 2000. Patient states she has pain diffusely in ankle, excruciating pain with movement. No fevers/chills. Denies any numbness or tingling. Denies having any other pain elsewhere. No other orthopedic concerns at this time.    Diabetes mellitus type I    Hypertension    Anemia    Arthritis, rheumatoid    Depression    Type 2 diabetes mellitus    PVD (peripheral vascular disease) with claudication    CAD (coronary artery disease)    Rheumatoid arthritis    CHF (congestive heart failure)    PAD (peripheral artery disease)    DVT, lower extremity        cephalosporins (Other)  penicillins (Urticaria; Pruritus)      PHYSICAL EXAM:  T(C): 36.6 (01-19-22 @ 21:14), Max: 36.6 (01-18-22 @ 23:39)  HR: 72 (01-19-22 @ 21:14) (61 - 93)  BP: 132/65 (01-19-22 @ 21:14) (100/60 - 162/51)  RR: 18 (01-19-22 @ 21:14) (18 - 18)  SpO2: 96% (01-19-22 @ 21:14) (94% - 98%)    Gen: NAD, Resting comfortably    RLE:  Skin with medial mall ulcer with purulent drainage, ulcer over posterior ankle with purulent drainage.   TTP diffusely on ankle  Painful A/PROm of ankle, pain with micromotion of ankle  +EHL/FHL/TA/GSC  +SILT L3-S1  + DP  Compartments soft and compressible  No calf tenderness    Secondary Survey:   No TTP over bony prominences, SILT, palpable pulses, full/painless A/PROM, compartments soft. No TTP over spinous processes or paraspinal muscles at C/T/L spine. No palpable step off. No other injuries or complaints.

## 2022-01-19 NOTE — DIETITIAN INITIAL EVALUATION ADULT. - PERTINENT MEDS FT
MEDICATIONS  (STANDING):  apixaban 2.5 milliGRAM(s) Oral two times a day  aspirin  chewable 81 milliGRAM(s) Oral daily  cefepime   IVPB 2000 milliGRAM(s) IV Intermittent every 24 hours  celecoxib 200 milliGRAM(s) Oral daily  dextrose 40% Gel 15 Gram(s) Oral once  dextrose 40% Gel 15 Gram(s) Oral once  dextrose 5%. 1000 milliLiter(s) (50 mL/Hr) IV Continuous <Continuous>  dextrose 5%. 1000 milliLiter(s) (50 mL/Hr) IV Continuous <Continuous>  dextrose 5%. 1000 milliLiter(s) (100 mL/Hr) IV Continuous <Continuous>  dextrose 5%. 1000 milliLiter(s) (100 mL/Hr) IV Continuous <Continuous>  dextrose 50% Injectable 25 Gram(s) IV Push once  dextrose 50% Injectable 12.5 Gram(s) IV Push once  dextrose 50% Injectable 25 Gram(s) IV Push once  dextrose 50% Injectable 25 Gram(s) IV Push once  dextrose 50% Injectable 12.5 Gram(s) IV Push once  dextrose 50% Injectable 25 Gram(s) IV Push once  folic acid 1 milliGRAM(s) Oral at bedtime  furosemide    Tablet 60 milliGRAM(s) Oral daily  gabapentin 300 milliGRAM(s) Oral three times a day  glucagon  Injectable 1 milliGRAM(s) IntraMuscular once  glucagon  Injectable 1 milliGRAM(s) IntraMuscular once  insulin glargine Injectable (LANTUS) 10 Unit(s) SubCutaneous at bedtime  insulin lispro (ADMELOG) corrective regimen sliding scale   SubCutaneous three times a day before meals  insulin lispro (ADMELOG) corrective regimen sliding scale   SubCutaneous at bedtime  methotrexate 15 milliGRAM(s) Oral <User Schedule>  metoprolol tartrate 25 milliGRAM(s) Oral two times a day  PARoxetine 20 milliGRAM(s) Oral daily  predniSONE   Tablet 5 milliGRAM(s) Oral daily  sacubitril 24 mG/valsartan 26 mG 1 Tablet(s) Oral two times a day  sodium chloride 0.9%. 1000 milliLiter(s) (65 mL/Hr) IV Continuous <Continuous>  tiotropium 18 MICROgram(s) Capsule 1 Capsule(s) Inhalation daily

## 2022-01-19 NOTE — H&P ADULT - NSHPOUTPATIENTPROVIDERS_GEN_ALL_CORE
PCP - Dr. Huang   Vascular - Dr. Ventura PCP - Dr. Huang   Vascular - Dr. Ventura  Podiatrist - Dr. Bloom

## 2022-01-19 NOTE — H&P ADULT - ASSESSMENT
85 y/o F presents with right lower extremity wound     1. Right lower extremity chronic ulcer concern for arterial ulcer in the setting of PAD vs. diabetic ulcer vs. osteomyelitis   - Admit to med/surg   - s/p Cefepime and Vancomycin in the ER   - Will c/w Cefepime for now   - Monitor off additional Vancomycin for now given ORLY   - Patient does have right ankle hardware present; if osteomyelitis will need surgical evaluation for removal of hardware   - f/u ESR, CRP, and XR of foot official read   - If XR does not show osteomyelitis, will order MRI   - Pt reports claudication when walking and improvement when her lower extremity hangs off the side of the bed which is concerning for worsening PAD   - Ordered venous and arterial US for further evaluation   - Pain control: Tylenol, Dilaudid, Percocet PRN   - Wound care consult   - ID consult - Dr. Ortega   - Vascular consult - Dr. Ventura     2. Leukocytosis   - WBC 10.87, trend   - Tylenol PRN for temperatures   - Monitor for temperatures   - f/u BCx x 2     3. Hypertension   - /48, monitor closely   - Hydralazine PRN for SBP > 160   - c/w home anti-HTN agents     4. Macrocytic anemia   - Hb 7.9, .8, recent folate and B12 levels WNL   - Monitor Hb closely     5. Hyperkalemia   - K+ 6.1, s/p  Calcium gluconate, Sodium bicarbonate, Lokelma   - Repeat K+ 5.1, f/u AM level     6. Acute kidney injury   - Cr 1.49 (Baseline ~0.99), monitor closely   - s/p 2L of NS in the ER   - c/w gentle hydration with IVF at 65 ml/hr   - Strict I+Os, avoid nephrotoxic medications     7. Hyperglycemia in the setting of Type 2 DM   - Glucose 448, s/p 10 units of Insulin in the ER   - c/w 10 units of lantus QHS, add ISS   - If accuchecks persistently elevated, will need to add bolus insulin   - Diabetic diet     8. Elevated alkaline phosphatase   - Alkaline phosphatase 140, trend     9. History of COPD, CAD, Type 2 DM on insulin, depression, CHFrEF, HTN, PVD, PAD, s/p left femoral popliteal bypass 3/24/2021, left thigh infections s/p debridement and muscle flap, ICN, lower extremity DVT on Eliquis, RA   - c/w home medications     DVT ppx: Eliquis   Code status: Full code (pt agrees to chest compressions and intubation if required).  87 y/o F presents with right lower extremity wound     1. Right lower extremity chronic ulcer concern for arterial ulcer in the setting of PAD vs. diabetic ulcer vs. osteomyelitis   - Admit to med/surg   - s/p Cefepime and Vancomycin in the ER   - Will c/w Cefepime for now   - Monitor off additional Vancomycin for now given ORLY   - Patient does have right ankle hardware present; if osteomyelitis will need surgical evaluation for removal of hardware   - f/u ESR, CRP, and XR of foot official read   - If XR does not show osteomyelitis, will order MRI   - Pt reports claudication when walking and improvement when her lower extremity hangs off the side of the bed which is concerning for worsening PAD   - Ordered venous and arterial US for further evaluation   - Pain control: Tylenol, Dilaudid, Percocet PRN   - Wound care consult   - ID consult - Dr. Ortega   - Vascular consult - Dr. Ventura     2. Leukocytosis   - WBC 10.87, trend   - Tylenol PRN for temperatures   - Monitor for temperatures   - f/u BCx x 2     3. Hypertension   - /48, monitor closely   - Hydralazine PRN for SBP > 160   - c/w home anti-HTN agents     4. Macrocytic anemia   - Hb 7.9, .8, recent folate and B12 levels WNL   - Monitor Hb closely     5. Hyperkalemia   - K+ 6.1, s/p  Calcium gluconate, Sodium bicarbonate, Lokelma   - Repeat K+ 5.1, f/u AM level     6. Acute kidney injury   - Cr 1.49 (Baseline ~0.99), monitor closely   - s/p 2L of NS in the ER   - c/w gentle hydration with IVF at 65 ml/hr   - Strict I+Os, avoid nephrotoxic medications     7. Hyperglycemia in the setting of Type 2 DM   - Glucose 448, s/p 10 units of Insulin in the ER   - c/w 10 units of lantus QHS, add ISS   - If accuchecks persistently elevated, will need to add bolus insulin   - Diabetic diet     8. Elevated alkaline phosphatase   - Alkaline phosphatase 140, trend     9. History of COPD, CAD, Type 2 DM on insulin, depression, CHFrEF, HTN, PVD, PAD, s/p left femoral popliteal bypass 3/24/2021, left thigh infections s/p debridement and muscle flap, ICN, lower extremity DVT on Eliquis, RA   - c/w home medications     DVT ppx: Eliquis   Code status: Full code (pt agrees to chest compressions and intubation if required).     *Please call pt's daughter Dr. Nirali Ahumada (ER physician at ) with updates regarding the plan of care.

## 2022-01-19 NOTE — H&P ADULT - NSHPPHYSICALEXAM_GEN_ALL_CORE
ICU Vital Signs Last 24 Hrs  T(C): 36.3 (19 Jan 2022 04:00), Max: 36.6 (18 Jan 2022 23:39)  T(F): 97.4 (19 Jan 2022 04:00), Max: 97.9 (18 Jan 2022 23:39)  HR: 63 (18 Jan 2022 23:39) (63 - 65)  BP: 162/51 (18 Jan 2022 23:39) (162/48 - 162/51)  BP(mean): 82 (18 Jan 2022 23:39) (79 - 82)  RR: 18 (18 Jan 2022 23:39) (18 - 18)  SpO2: 98% (18 Jan 2022 23:39) (98% - 100%)    General: Awake and alert, cooperative with exam. No acute distress.   Skin: Warm, dry, and pink.   Eyes: Pupils equal and reactive to light. Extraocular eye movements intact. No conjunctival injection, discharge, or scleral icterus.   HEENT: Atraumatic, normocephalic. Moist mucus membranes.   Cardiology: Normal S1, S2. No murmurs, rubs, or gallops. Regular rate and rhythm.   Respiratory: Lungs clear to ascultation bilaterally. Good air exchange. No wheezes, rales, or rhonchi. Normal chest expansion.   Gastrointestinal: Positive bowel sounds. Soft, non-tender, non-distended. No guarding, rigidity, or rebound tenderness. No hepatosplenomegaly.   Musculoskeletal: 5/5 motor strength in all extremities. Normal range of motion. Right lower extremity wound located at the medial malleolus and posterior lower extremity foul smelling with sloughed off appearance and surrounding erythema.   Extremities: 2+ peripheral edema bilaterally. Dorsalis pedis pulses 2+ bilaterally.   Neurological: A+Ox3 (person, place, and time). Cranial nerves 2-12 intact. Normal speech. No facial droop. No focal neurological deficits.   Psychiatric: Normal affect. Normal mood.

## 2022-01-19 NOTE — DIETITIAN INITIAL EVALUATION ADULT. - PERTINENT LABORATORY DATA
01-19    137  |  109<H>  |  51<H>  ----------------------------<  137<H>  5.2   |  23  |  1.16    Ca    9.4      19 Jan 2022 07:31    TPro  7.0  /  Alb  2.2<L>  /  TBili  0.2  /  DBili  x   /  AST  10<L>  /  ALT  10<L>  /  AlkPhos  90  01-19 01-19    137  |  109<H>  |  51<H>  ----------------------------<  137<H>  5.2   |  23  |  1.16    Ca    9.4      19 Jan 2022 07:31    TPro  7.0  /  Alb  2.2<L>  /  TBili  0.2  /  DBili  x   /  AST  10<L>  /  ALT  10<L>  /  AlkPhos  90  01-19    Home Medications:  Lantus 100 units/mL subcutaneous solution: 10  subcutaneous once a day (at bedtime) (19 Jan 2022 04:25)

## 2022-01-19 NOTE — CONSULT NOTE ADULT - ASSESSMENT
A/P: 86F w/ R ankle cellulitis, OM     Case discussed with Dr Ku  Imaging revealing Increased signal intensity on MR indicating cellulitis and OM of the R ankle  Patient admitted to medicine and ID consulted  Continue IV antibiotics   Vasc Sx following, planning angiography   Analgesia prn  WBAT RLE  Ice and elevate as tolerated  DVT ppx: Hold after midnight in case clinical course requires surgical intervention  NPO after midnight in case patient needs to be taken to OR  Patient is HD stable and not currently septic  No plan for acute orthopedic surgical intervention tonight but with monitor closely for any changes that may require surgical intervention.   Discussed with attending who is in agreement with above plan

## 2022-01-19 NOTE — DIETITIAN NUTRITION RISK NOTIFICATION - ADDITIONAL COMMENTS/DIETITIAN RECOMMENDATIONS
Liberalize diet to regular if PO intake falls below 50% of needs,  to maximize caloric and nutrient intake.   Suggest add Vit C 500 mg BID, add Zinc Sulfate 220 mg x 10 days to promote wound healing   Record PO intake in EMR after each meal (nursing.)   Tray set up  Monitor PO intake, tolerance, labs and weight.  Liberalize diet to regular if PO intake falls below 50% of needs,  to maximize caloric and nutrient intake.   Suggest add Vit C 500 mg BID, add Zinc Sulfate 220 mg x 10 days to promote wound healing   Gelatein bid,  Glucerna bid  Record PO intake in EMR after each meal (nursing.)   Tray set up  Monitor PO intake, tolerance, labs and weight.

## 2022-01-19 NOTE — PATIENT PROFILE ADULT - FALL HARM RISK - HARM RISK INTERVENTIONS
Assistance with ambulation/Assistance OOB with selected safe patient handling equipment/Communicate Risk of Fall with Harm to all staff/Discuss with provider need for PT consult/Monitor gait and stability/Reinforce activity limits and safety measures with patient and family/Tailored Fall Risk Interventions/Visual Cue: Yellow wristband and red socks/Bed in lowest position, wheels locked, appropriate side rails in place/Call bell, personal items and telephone in reach/Instruct patient to call for assistance before getting out of bed or chair/Non-slip footwear when patient is out of bed/[place] to call system/Physically safe environment - no spills, clutter or unnecessary equipment/Purposeful Proactive Rounding/Room/bathroom lighting operational, light cord in reach

## 2022-01-19 NOTE — H&P ADULT - NSHPSOCIALHISTORY_GEN_ALL_CORE
Lives with her daughter who is an ER physician at . Smoked 1 ppd for 50 years, quit 1 year ago . Denies smoking or alcohol use. Independent with ADLs and IADLs.

## 2022-01-19 NOTE — H&P ADULT - NSICDXPASTMEDICALHX_GEN_ALL_CORE_FT
PAST MEDICAL HISTORY:  Anemia     Arthritis, rheumatoid     CAD (coronary artery disease) non-obstructive    CHF (congestive heart failure)     Depression     DVT, lower extremity     Hypertension     PAD (peripheral artery disease)     PVD (peripheral vascular disease) with claudication     Rheumatoid arthritis     Type 2 diabetes mellitus on insulin

## 2022-01-19 NOTE — PROGRESS NOTE ADULT - ASSESSMENT
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 At this time patient is medically optimized to procede and benefits outweight risk. Discussed with patient and she in agreement.   -Hardware of the right ankle evaluation. 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   -Admit to medicine  -EMpiric ABX  -US and MRI findings noted  -ID/Vascular/Podiatry/Ortho consult  -Tentative Angio tomorrow with vascular. At this time patient is medically optimized to procede and benefits outweight risk. Discussed with patient and she in agreement.   -Hardware of the right ankle evaluation. (Placed many years ago in [place] as per daughter)  -Pain control/Bowel Regiment  -Wound evaluation\  - Glucose 448, s/p 10 units of Insulin in the ER   - c/w 10 units of lantus QHS, add ISS   - If accuchecks persistently elevated, will need to add bolus insulin   - Diabetic diet     # Hypertension   - /48, monitor closely   - Hydralazine PRN for SBP > 160   - c/w home anti-HTN agents     # Macrocytic anemia   - Hb 7.9, .8, recent folate and B12 levels WNL   - Monitor Hb closely. no overt signs of bleeding.     #. Acute kidney injury   #. Hyperkalemia . Stable  - K+ 6.1, s/p  Calcium gluconate, Sodium bicarbonate, Lokelma   - Repeat K+ 5.1, Continue to follow.   - Cr 1.49 (Baseline ~0.99), monitor closely   - s/p 2L of NS in the ER   - c/w gentle hydration with IVF at 65 ml/hr   - Strict I+Os, avoid nephrotoxic medications       # History of COPD, CAD, Type 2 DM on insulin, depression, CHFrEF, HTN, PVD, PAD, s/p left femoral popliteal bypass 3/24/2021, left thigh infections s/p debridement and muscle flap, ICN, lower extremity DVT on Eliquis, RA   - c/w home medications     DVT ppx: Eliquis   Code status: Full code (pt agrees to chest compressions and intubation if required).     *Please call pt's daughter Dr. Nirali Ahumada (ER physician at ) with updates regarding the plan of care.

## 2022-01-19 NOTE — PROGRESS NOTE ADULT - SUBJECTIVE AND OBJECTIVE BOX
CHIEF COMPLAINT: Right ankle pain/Inability to bear weight RLE/Worsening Right ankle ulcer    SUBJECTIVE: Pain improved and controlled. Denies fever, chills, chest pain, cough, nausea, vomiting, abdominal pain/discomfort    SIGNIFICANT INTERVAL EVENTS/OVERNIGHT EVENTS: None    Review of Systems: 14 Point review of systems reviewed and reported as negative unless otherwise stated in HPI    FROM H&P:  "87 y/o F with PMH COPD, CAD, Type 2 DM on insulin, depression, CHFrEF, HTN, PVD, PAD, s/p left femoral popliteal bypass 3/24/2021, left thigh infections s/p debridement and muscle flap, PAT, lower extremity DVT on Eliquis, RA presents with chronic wounds and pain to the right lower extremity for 2 weeks. She first noticed the chronic wound on her right lower extremity about a month ago. She has been completing dressing changes and applying Xeroform. The wound is foul smelling. She has been unable to walk over the last 2 weeks due to increase pressure when placing the foot down. Hanging her lower extremity off the side of the bed helps improve her pain. She also reports occasional diarrhea and lower extremity swelling. Denies headaches, blurry vision, dizziness, fevers, chills, chest pain, SOB, abdominal pain, N/V, diarrhea/constipation.      ER course: /48. Labs: WBC 10.87, Hb 7.9, .8, INR 1.17, K+ 6.1, BUN 61, Cr 1.49 (Baseline ~0.99), Glucose 448, Alkaline phosphatase 140, COVID negative. EKG: NSR with HR 64 bm, CO prolonged to 218 ms (1st degree AV block), QTC prolonged to 470 ms, LVH, T wave inversions in II, AVL, V4-V6, no ST segment changes present on prior EKG (personally reviewed).     Imaging:   - XR right ankle: concern for osteomyelitis, hardware present, no fracture or dislocation (personally reviewed).   - CXR: no consolidation, no effusion, no pneumothorax (personally reviewed).     The pt was given Cefepime and Vancomycin, 2L of NS, 10 units of Humalin, Calcium gluconate, Sodium bicarbonate, Lokelma, and Dilaudid in the ER. She is being admitted to med/surg for further management. "    PHYSICAL EXAM:    T(C): 36.6 (01-19-22 @ 16:50), Max: 36.6 (01-18-22 @ 23:39)  HR: 61 (01-19-22 @ 16:50) (61 - 93)  BP: 100/60 (01-19-22 @ 16:50) (100/60 - 162/51)  RR: 18 (01-19-22 @ 16:50) (18 - 18)  SpO2: 95% (01-19-22 @ 16:50) (94% - 100%)    General: AAOx3; NAD  Head: AT/NC  ENT: Moist Mucous Membranes; No Injury  Eyes: EOMI; PERRL  Neck: Non-tender; No JVD  CVS: RRR, S1&S2, Murmur +, LE Edema  Respiratory: Lungs CTA B/L; Normal Respiratory Effort  Abdomen/GI: Soft, non-tender, non-distended, no guarding, no rebound, normal bowel sounds  : No bladder distention, No Padilla  Extremities: No cyanosis, No clubbing, LE edema. Diminished pulses bilaterally; Unstageable medial ankle/malelous venous vs arterial ulcer with purulent discharge. Surrounding erythema and edema.   MSK: No CVA tenderness, Normal ROM, No injury  Neuro: AAOx3, CNII-XII grossly intact, non-focal  Psych: Appropriate, Cooperative,  Skin: As described in extremities.       LABS:                          7.9    10.64 )-----------( 302      ( 19 Jan 2022 07:31 )             26.8     01-19    137  |  109<H>  |  51<H>  ----------------------------<  137<H>  5.2   |  23  |  1.16    Ca    9.4      19 Jan 2022 07:31    TPro  7.0  /  Alb  2.2<L>  /  TBili  0.2  /  DBili  x   /  AST  10<L>  /  ALT  10<L>  /  AlkPhos  90  01-19    COVID-19 PCR: NotDetec (18 Jan 2022 21:04)  COVID-19 PCR: NotDetec (19 Oct 2021 14:33)    CAPILLARY BLOOD GLUCOSE      POCT Blood Glucose.: 254 mg/dL (19 Jan 2022 17:01)  POCT Blood Glucose.: 281 mg/dL (19 Jan 2022 11:42)  POCT Blood Glucose.: 123 mg/dL (19 Jan 2022 07:10)  POCT Blood Glucose.: 228 mg/dL (19 Jan 2022 01:37)  POCT Blood Glucose.: 335 mg/dL (19 Jan 2022 00:04)  POCT Blood Glucose.: 457 mg/dL (18 Jan 2022 22:46)    Sedimentation Rate, Erythrocyte: 130 mm/hr (01.19.22 @ 07:31) A1C with Estimated Average Glucose Result: 10.7C-Reactive Protein, Serum: 55 mg/L (01.19.22 @ 01:18) Lactate, Blood: 1.5 mmol/L (01.18.22 @ 21:04)       RADIOLOGY:  < from: MR Ankle No Cont, Right (01.19.22 @ 13:26) >  1.  Medial ankle skin wound with subjacent edema and soft tissue swelling   compatible cellulitis. No drainable fluid collection.  2.  Abnormal marrow signal within the medial malleolus and medial tibia   concerning osteomyelitis given the overlying skin wound.  3.  Small ankle joint effusion. Nonspecific finding. Aspiration can be   performed for further evaluation.    < end of copied text >  < from: US Duplex Venous Lower Ext Complete, Bilateral (01.19.22 @ 09:22) >  IMPRESSION:  No evidence of deep venous thrombosis in either lower extremity.    < end of copied text >  < from: US Duplex Arterial Lower Ext Compl, Bilateral (01.19.22 @ 08:59) >  IMPRESSION: Greater than 50% stenosis within the right common femoral   artery with distal abnormal tardus parvus flow throughout the right lower   extremity. Nonvisualization of the right peroneal and dorsalis pedis   arteries.    Patent left femoral-popliteal bypass graft. Left calf arteries and   dorsalis pedis arteries are not visualized.    There is a 6.3 cm fluid collection along the distal aspect of the left   femoropopliteal bypass graft, likely a seroma.    < end of copied text >      EKG:  < from: 12 Lead ECG (10.19.21 @ 14:14) >    Diagnosis Line Normal sinus rhythm  Left ventricular hypertrophy with repolarization abnormality  Anterior infarct (cited on or before 14-FEB-2021)  Abnormal ECG    < end of copied text >            I personally reviewed labs, imaging, ekg, orders and vitals.    Discussed case with:  [X]RN  [X]CM/SW  [X]Patient  [X]Family: Daughter  [X]Specialist: Vascular; Orthopedics        MEDICATIONS  (STANDING):  aspirin  chewable 81 milliGRAM(s) Oral daily  cefepime   IVPB 2000 milliGRAM(s) IV Intermittent every 24 hours  celecoxib 200 milliGRAM(s) Oral daily  dextrose 40% Gel 15 Gram(s) Oral once  dextrose 40% Gel 15 Gram(s) Oral once  dextrose 5%. 1000 milliLiter(s) (50 mL/Hr) IV Continuous <Continuous>  dextrose 5%. 1000 milliLiter(s) (100 mL/Hr) IV Continuous <Continuous>  dextrose 5%. 1000 milliLiter(s) (50 mL/Hr) IV Continuous <Continuous>  dextrose 5%. 1000 milliLiter(s) (100 mL/Hr) IV Continuous <Continuous>  dextrose 50% Injectable 25 Gram(s) IV Push once  dextrose 50% Injectable 12.5 Gram(s) IV Push once  dextrose 50% Injectable 25 Gram(s) IV Push once  dextrose 50% Injectable 25 Gram(s) IV Push once  dextrose 50% Injectable 12.5 Gram(s) IV Push once  dextrose 50% Injectable 25 Gram(s) IV Push once  folic acid 1 milliGRAM(s) Oral at bedtime  furosemide    Tablet 60 milliGRAM(s) Oral daily  gabapentin 300 milliGRAM(s) Oral three times a day  glucagon  Injectable 1 milliGRAM(s) IntraMuscular once  glucagon  Injectable 1 milliGRAM(s) IntraMuscular once  insulin glargine Injectable (LANTUS) 10 Unit(s) SubCutaneous at bedtime  insulin lispro (ADMELOG) corrective regimen sliding scale   SubCutaneous three times a day before meals  insulin lispro (ADMELOG) corrective regimen sliding scale   SubCutaneous at bedtime  metoprolol tartrate 25 milliGRAM(s) Oral two times a day  PARoxetine 20 milliGRAM(s) Oral daily  predniSONE   Tablet 5 milliGRAM(s) Oral daily  sacubitril 24 mG/valsartan 26 mG 1 Tablet(s) Oral two times a day  sodium chloride 0.9%. 1000 milliLiter(s) (65 mL/Hr) IV Continuous <Continuous>  tiotropium 18 MICROgram(s) Capsule 1 Capsule(s) Inhalation daily  vancomycin  IVPB 1000 milliGRAM(s) IV Intermittent <User Schedule>    MEDICATIONS  (PRN):  acetaminophen     Tablet .. 650 milliGRAM(s) Oral every 6 hours PRN Temp greater or equal to 38C (100.4F), Mild Pain (1 - 3)  ALBUTerol    90 MICROgram(s) HFA Inhaler 1 Puff(s) Inhalation every 6 hours PRN Shortness of Breath  aluminum hydroxide/magnesium hydroxide/simethicone Suspension 30 milliLiter(s) Oral every 4 hours PRN Dyspepsia  hydrALAZINE Injectable 10 milliGRAM(s) IV Push every 6 hours PRN SBP > 160  HYDROmorphone  Injectable 0.5 milliGRAM(s) IV Push every 3 hours PRN Moderate Pain (4 - 6)  melatonin 3 milliGRAM(s) Oral at bedtime PRN Insomnia  ondansetron Injectable 4 milliGRAM(s) IV Push every 8 hours PRN Nausea and/or Vomiting  oxycodone    5 mG/acetaminophen 325 mG 1 Tablet(s) Oral every 4 hours PRN Moderate Pain (4 - 6)

## 2022-01-19 NOTE — H&P ADULT - NSICDXPASTSURGICALHX_GEN_ALL_CORE_FT
PAST SURGICAL HISTORY:  fracture ankle right plate . screws   2000    Hip fracture requiring operative repair Right hip 11/14/2020    S/P cataract surgery 2011    S/P femoral-popliteal bypass surgery 3/24/2021

## 2022-01-19 NOTE — CONSULT NOTE ADULT - ATTENDING COMMENTS
The patient was seen and examined at the bedside in the ICU. I reviewed the chart, the relevant imaging and other consultation notes.     The patient is resting comfortable and appears to not be in distress or in sepsis. She is mentating well although is lethargic.     At this time my main concern is her chronic right ankle wounds and her OM and potential for septic arthritis. She does have motion to the right ankle with pain she endorses to the posterior aspect of the right ankle. I would like to hold off on any invasive intervention while she appears source controlled on abx. She recently had a vascular procedure on 1/20/22 which involved stenting and increased perfusion to her right leg. I spoke to Dr. Ventura who was happy with the procedure. I spoke to the patient's daughter as well who is a physician. At this time she will continue to receive wound care that is appropriate for arterial supply concerns.     I am not in any rush to do any invasive surgical procedure at this time due to her age and underlying medical comorbidities. I will continue to monitor her status and if there is any decline I will consider more invasive options. This may include an ankle joint S[place] and possible I&D on the right side.     The patient and her daughter verbalized understanding and are in agreement with this plan. All questions answered.

## 2022-01-19 NOTE — DIETITIAN INITIAL EVALUATION ADULT. - OTHER INFO
85 y/o F with PMH COPD, CAD, Type 2 DM on insulin, depression, CHFrEF, HTN, PVD, PAD, s/p left femoral popliteal bypass 3/24/2021, left thigh infections s/p debridement and muscle flap, PAT, lower extremity DVT on Eliquis, RA presents with chronic wounds and pain to the right lower extremity for 2 weeks. She first noticed the chronic wound on her right lower extremity about a month ago. She has been completing dressing changes and applying Xeroform. The wound is foul smelling. She has been unable to walk over the last 2 weeks due to increase pressure when placing the foot down. Hanging her lower extremity off the side of the bed helps improve her pain. She also reports occasional diarrhea and lower extremity swelling. Denies headaches, blurry vision, dizziness, fevers, chills, chest pain, SOB, abdominal pain, N/V, diarrhea/constipation. 87 y/o F with PMH COPD, CAD, Type 2 DM on insulin, depression, CHFrEF, HTN, PVD, PAD, s/p left femoral popliteal bypass 3/24/2021, left thigh infections s/p debridement and muscle flap, PAT, lower extremity DVT on Eliquis, RA presents with chronic wounds and pain to the right lower extremity for 2 weeks. She first noticed the chronic wound on her right lower extremity about a month ago. She has been completing dressing changes and applying Xeroform. The wound is foul smelling. She has been unable to walk over the last 2 weeks due to increase pressure when placing the foot down. Hanging her lower extremity off the side of the bed helps improve her pain. She also reports occasional diarrhea and lower extremity swelling. Denies headaches, blurry vision, dizziness, fevers, chills, chest pain, SOB, abdominal pain, N/V, diarrhea/constipation.  Pt with t2DM  On lantus at home 10U HS  At  lantus 10 u HS  HgbA1c is 9.2  POCTs have been > 200, preferred range 140-180   Pt reports she only eats one meal a day at home.  Given DM education, written material for family.  Encouraged to eat more food during the day, consistently   SSI admelog tid  ON consistent carb diet  Pt currently eating well, appetite and po intake poor x 10 days  Pt does not monitor BGs

## 2022-01-19 NOTE — CONSULT NOTE ADULT - ASSESSMENT
87 y/o Female with h/o COPD, CAD, Type 2 DM on insulin, depression, CHFrEF, HTN, PVD, PAD, s/p left femoral popliteal bypass 3/24/2021, prior left thigh infections s/p debridement and muscle flap, PAT, lower extremity DVT on Eliquis, RA was admitted on 1/18 for lower leg nonhealing wounds. She first noticed the chronic wound on her right lower extremity about a month ago. She has been completing dressing changes and applying Xeroform. The wound is foul smelling. She has been unable to walk over the last 2 weeks due to increase pressure when placing the foot down. Hanging her lower extremity off the side of the bed helps improve her pain. She also reports occasional diarrhea and lower extremity swelling. Denies headaches, blurry vision, dizziness, fevers, chills. In ER she received cefepime and vancomycin IV.     1. RLE cellulitis with multiple open wounds. PVD.   87 y/o Female with h/o COPD, CAD, Type 2 DM on insulin, depression, CHFrEF, HTN, PVD, PAD, s/p left femoral popliteal bypass 3/24/2021, prior left thigh infections s/p debridement and muscle flap, PAT, lower extremity DVT on Eliquis, RA was admitted on 1/18 for lower leg nonhealing wounds. She first noticed the chronic wound on her right lower extremity about a month ago. She has been completing dressing changes and applying Xeroform. The wound is foul smelling. She has been unable to walk over the last 2 weeks due to increase pressure when placing the foot down. Hanging her lower extremity off the side of the bed helps improve her pain. She also reports occasional diarrhea and lower extremity swelling. Denies headaches, blurry vision, dizziness, fevers, chills. In ER she received cefepime and vancomycin IV.     1. RLE cellulitis with multiple open wound. Chronic LE stasis dermatitis. PVD.  -mild leukocytosis  -obtain BC x 2   -start vancomycin 1 gm IV qd and cefepime 2 gm IV qd  -reason for abx use and side effects reviewed with patient; monitor BMP and vancomycin trough levels   -elevate legs  .local wound care  -old chart reviewed to assess prior cultures  -monitor temps  -f/u CBC  -supportive care  2. Other issues:   -care per medicine

## 2022-01-19 NOTE — DIETITIAN INITIAL EVALUATION ADULT. - MALNUTRITION
Patient meets criteria for moderate protein-calorie malnutrition in context of chronic disease.  PO intake < 75% estimated nutritional needs > one month. Patient meets criteria for moderate protein-calorie malnutrition in context of chronic disease

## 2022-01-19 NOTE — DIETITIAN INITIAL EVALUATION ADULT. - ADD RECOMMEND
Record PO intake in EMR after each meal (nursing.) Suggest add Vit C 500 mg BID, add Zinc Sulfate 220 mg x 10 days to promote wound healing .  Glucerna bid.  Tray set-up. Monitor PO intake, tolerance, labs and weight. Record PO intake in EMR after each meal (nursing.) Glucerna bid.  Gelatein bid. Suggest add Vit C 500 mg BID, add Zinc Sulfate 220 mg x 10 days to promote wound healing .  Glucerna bid.  Tray set-up. Monitor PO intake, tolerance, labs and weight.

## 2022-01-20 LAB
ANION GAP SERPL CALC-SCNC: 4 MMOL/L — LOW (ref 5–17)
APTT BLD: 31.4 SEC — SIGNIFICANT CHANGE UP (ref 27.5–35.5)
BASOPHILS # BLD AUTO: 0.03 K/UL — SIGNIFICANT CHANGE UP (ref 0–0.2)
BASOPHILS NFR BLD AUTO: 0.3 % — SIGNIFICANT CHANGE UP (ref 0–2)
BUN SERPL-MCNC: 57 MG/DL — HIGH (ref 7–23)
CALCIUM SERPL-MCNC: 8.5 MG/DL — SIGNIFICANT CHANGE UP (ref 8.5–10.1)
CHLORIDE SERPL-SCNC: 114 MMOL/L — HIGH (ref 96–108)
CO2 SERPL-SCNC: 23 MMOL/L — SIGNIFICANT CHANGE UP (ref 22–31)
CREAT SERPL-MCNC: 1.17 MG/DL — SIGNIFICANT CHANGE UP (ref 0.5–1.3)
EOSINOPHIL # BLD AUTO: 0.39 K/UL — SIGNIFICANT CHANGE UP (ref 0–0.5)
EOSINOPHIL NFR BLD AUTO: 4.3 % — SIGNIFICANT CHANGE UP (ref 0–6)
GLUCOSE BLDC GLUCOMTR-MCNC: 193 MG/DL — HIGH (ref 70–99)
GLUCOSE SERPL-MCNC: 177 MG/DL — HIGH (ref 70–99)
HCT VFR BLD CALC: 24.1 % — LOW (ref 34.5–45)
HGB BLD-MCNC: 7.1 G/DL — LOW (ref 11.5–15.5)
IMM GRANULOCYTES NFR BLD AUTO: 0.6 % — SIGNIFICANT CHANGE UP (ref 0–1.5)
INR BLD: 1.1 RATIO — SIGNIFICANT CHANGE UP (ref 0.88–1.16)
LYMPHOCYTES # BLD AUTO: 2.12 K/UL — SIGNIFICANT CHANGE UP (ref 1–3.3)
LYMPHOCYTES # BLD AUTO: 23.6 % — SIGNIFICANT CHANGE UP (ref 13–44)
MAGNESIUM SERPL-MCNC: 2.4 MG/DL — SIGNIFICANT CHANGE UP (ref 1.6–2.6)
MCHC RBC-ENTMCNC: 29.5 GM/DL — LOW (ref 32–36)
MCHC RBC-ENTMCNC: 29.8 PG — SIGNIFICANT CHANGE UP (ref 27–34)
MCV RBC AUTO: 101.3 FL — HIGH (ref 80–100)
MONOCYTES # BLD AUTO: 1 K/UL — HIGH (ref 0–0.9)
MONOCYTES NFR BLD AUTO: 11.1 % — SIGNIFICANT CHANGE UP (ref 2–14)
NEUTROPHILS # BLD AUTO: 5.4 K/UL — SIGNIFICANT CHANGE UP (ref 1.8–7.4)
NEUTROPHILS NFR BLD AUTO: 60.1 % — SIGNIFICANT CHANGE UP (ref 43–77)
PHOSPHATE SERPL-MCNC: 4.1 MG/DL — SIGNIFICANT CHANGE UP (ref 2.5–4.5)
PLATELET # BLD AUTO: 278 K/UL — SIGNIFICANT CHANGE UP (ref 150–400)
POTASSIUM SERPL-MCNC: 5.7 MMOL/L — HIGH (ref 3.5–5.3)
POTASSIUM SERPL-SCNC: 5.7 MMOL/L — HIGH (ref 3.5–5.3)
PROTHROM AB SERPL-ACNC: 12.7 SEC — SIGNIFICANT CHANGE UP (ref 10.6–13.6)
RBC # BLD: 2.38 M/UL — LOW (ref 3.8–5.2)
RBC # FLD: 14.6 % — HIGH (ref 10.3–14.5)
SODIUM SERPL-SCNC: 141 MMOL/L — SIGNIFICANT CHANGE UP (ref 135–145)
WBC # BLD: 8.99 K/UL — SIGNIFICANT CHANGE UP (ref 3.8–10.5)
WBC # FLD AUTO: 8.99 K/UL — SIGNIFICANT CHANGE UP (ref 3.8–10.5)

## 2022-01-20 PROCEDURE — 99233 SBSQ HOSP IP/OBS HIGH 50: CPT

## 2022-01-20 RX ORDER — MORPHINE SULFATE 50 MG/1
5 CAPSULE, EXTENDED RELEASE ORAL
Refills: 0 | Status: DISCONTINUED | OUTPATIENT
Start: 2022-01-20 | End: 2022-01-25

## 2022-01-20 RX ORDER — CLOPIDOGREL BISULFATE 75 MG/1
300 TABLET, FILM COATED ORAL ONCE
Refills: 0 | Status: COMPLETED | OUTPATIENT
Start: 2022-01-20 | End: 2022-01-20

## 2022-01-20 RX ORDER — APIXABAN 2.5 MG/1
2.5 TABLET, FILM COATED ORAL
Refills: 0 | Status: DISCONTINUED | OUTPATIENT
Start: 2022-01-20 | End: 2022-01-24

## 2022-01-20 RX ORDER — SODIUM CHLORIDE 9 MG/ML
1000 INJECTION INTRAMUSCULAR; INTRAVENOUS; SUBCUTANEOUS
Refills: 0 | Status: DISCONTINUED | OUTPATIENT
Start: 2022-01-20 | End: 2022-01-21

## 2022-01-20 RX ORDER — OXYCODONE AND ACETAMINOPHEN 5; 325 MG/1; MG/1
2 TABLET ORAL EVERY 4 HOURS
Refills: 0 | Status: DISCONTINUED | OUTPATIENT
Start: 2022-01-20 | End: 2022-01-25

## 2022-01-20 RX ADMIN — SODIUM CHLORIDE 70 MILLILITER(S): 9 INJECTION INTRAMUSCULAR; INTRAVENOUS; SUBCUTANEOUS at 22:22

## 2022-01-20 RX ADMIN — Medication 81 MILLIGRAM(S): at 10:13

## 2022-01-20 RX ADMIN — Medication 2: at 06:45

## 2022-01-20 RX ADMIN — INSULIN GLARGINE 10 UNIT(S): 100 INJECTION, SOLUTION SUBCUTANEOUS at 22:21

## 2022-01-20 RX ADMIN — OXYCODONE AND ACETAMINOPHEN 1 TABLET(S): 5; 325 TABLET ORAL at 06:01

## 2022-01-20 RX ADMIN — CELECOXIB 200 MILLIGRAM(S): 200 CAPSULE ORAL at 12:26

## 2022-01-20 RX ADMIN — Medication 1 MILLIGRAM(S): at 22:45

## 2022-01-20 RX ADMIN — TIOTROPIUM BROMIDE 1 CAPSULE(S): 18 CAPSULE ORAL; RESPIRATORY (INHALATION) at 08:40

## 2022-01-20 RX ADMIN — HYDROMORPHONE HYDROCHLORIDE 0.5 MILLIGRAM(S): 2 INJECTION INTRAMUSCULAR; INTRAVENOUS; SUBCUTANEOUS at 17:01

## 2022-01-20 RX ADMIN — GABAPENTIN 300 MILLIGRAM(S): 400 CAPSULE ORAL at 06:01

## 2022-01-20 RX ADMIN — OXYCODONE AND ACETAMINOPHEN 2 TABLET(S): 5; 325 TABLET ORAL at 19:04

## 2022-01-20 RX ADMIN — Medication 250 MILLIGRAM(S): at 21:46

## 2022-01-20 RX ADMIN — Medication 60 MILLIGRAM(S): at 10:17

## 2022-01-20 RX ADMIN — Medication 5 MILLIGRAM(S): at 10:13

## 2022-01-20 RX ADMIN — Medication 20 MILLIGRAM(S): at 10:13

## 2022-01-20 RX ADMIN — SACUBITRIL AND VALSARTAN 1 TABLET(S): 24; 26 TABLET, FILM COATED ORAL at 22:45

## 2022-01-20 RX ADMIN — CLOPIDOGREL BISULFATE 300 MILLIGRAM(S): 75 TABLET, FILM COATED ORAL at 17:22

## 2022-01-20 RX ADMIN — GABAPENTIN 300 MILLIGRAM(S): 400 CAPSULE ORAL at 22:22

## 2022-01-20 RX ADMIN — HYDROMORPHONE HYDROCHLORIDE 0.5 MILLIGRAM(S): 2 INJECTION INTRAMUSCULAR; INTRAVENOUS; SUBCUTANEOUS at 04:11

## 2022-01-20 RX ADMIN — SACUBITRIL AND VALSARTAN 1 TABLET(S): 24; 26 TABLET, FILM COATED ORAL at 10:17

## 2022-01-20 RX ADMIN — APIXABAN 2.5 MILLIGRAM(S): 2.5 TABLET, FILM COATED ORAL at 22:45

## 2022-01-20 RX ADMIN — Medication 25 MILLIGRAM(S): at 10:14

## 2022-01-20 RX ADMIN — CEFEPIME 100 MILLIGRAM(S): 1 INJECTION, POWDER, FOR SOLUTION INTRAMUSCULAR; INTRAVENOUS at 12:28

## 2022-01-20 RX ADMIN — Medication 3 MILLIGRAM(S): at 22:22

## 2022-01-20 RX ADMIN — CELECOXIB 200 MILLIGRAM(S): 200 CAPSULE ORAL at 10:14

## 2022-01-20 NOTE — PROGRESS NOTE ADULT - SUBJECTIVE AND OBJECTIVE BOX
CHIEF COMPLAINT: Right ankle pain/Inability to bear weight RLE/Worsening Right ankle ulcer    SUBJECTIVE: Pain improved and controlled. Denies fever, chills, chest pain, cough, nausea, vomiting, abdominal pain/discomfort    SIGNIFICANT INTERVAL EVENTS/OVERNIGHT EVENTS: None    Review of Systems: 14 Point review of systems reviewed and reported as negative unless otherwise stated in HPI    FROM H&P:  "85 y/o F with PMH COPD, CAD, Type 2 DM on insulin, depression, CHFrEF, HTN, PVD, PAD, s/p left femoral popliteal bypass 3/24/2021, left thigh infections s/p debridement and muscle flap, PAT, lower extremity DVT on Eliquis, RA presents with chronic wounds and pain to the right lower extremity for 2 weeks. She first noticed the chronic wound on her right lower extremity about a month ago. She has been completing dressing changes and applying Xeroform. The wound is foul smelling. She has been unable to walk over the last 2 weeks due to increase pressure when placing the foot down. Hanging her lower extremity off the side of the bed helps improve her pain. She also reports occasional diarrhea and lower extremity swelling. Denies headaches, blurry vision, dizziness, fevers, chills, chest pain, SOB, abdominal pain, N/V, diarrhea/constipation.    ER course: /48. Labs: WBC 10.87, Hb 7.9, .8, INR 1.17, K+ 6.1, BUN 61, Cr 1.49 (Baseline ~0.99), Glucose 448, Alkaline phosphatase 140, COVID negative. EKG: NSR with HR 64 bm, OK prolonged to 218 ms (1st degree AV block), QTC prolonged to 470 ms, LVH, T wave inversions in II, AVL, V4-V6, no ST segment changes present on prior EKG (personally reviewed).   Imaging:   - XR right ankle: concern for osteomyelitis, hardware present, no fracture or dislocation (personally reviewed).   - CXR: no consolidation, no effusion, no pneumothorax (personally reviewed).   The pt was given Cefepime and Vancomycin, 2L of NS, 10 units of Humalin, Calcium gluconate, Sodium bicarbonate, Lokelma, and Dilaudid in the ER. She is being admitted to med/surg for further management. "    01/20/22: Patient seen and examined. No new complaints. Receiving blood transfusion.     PHYSICAL EXAM:    Vital Signs Last 24 Hrs  T(C): 36.4 (20 Jan 2022 12:25), Max: 36.6 (19 Jan 2022 16:50)  T(F): 97.5 (20 Jan 2022 12:25), Max: 97.9 (19 Jan 2022 21:14)  HR: 60 (20 Jan 2022 12:25) (59 - 72)  BP: 125/95 (20 Jan 2022 12:25) (100/60 - 138/38)  BP(mean): --  RR: 18 (20 Jan 2022 12:25) (18 - 18)  SpO2: 95% (20 Jan 2022 12:25) (92% - 100%)    General: AAOx3; NAD  Head: AT/NC  ENT: Moist Mucous Membranes; No Injury  Eyes: EOMI; PERRL  Neck: Non-tender; No JVD  CVS: RRR, S1&S2, Murmur +, LE Edema  Respiratory: Lungs CTA B/L; Normal Respiratory Effort  Abdomen/GI: Soft, non-tender, non-distended, no guarding, no rebound, normal bowel sounds  : No bladder distention, No Padilla  Extremities: No cyanosis, No clubbing, LE edema. Diminished pulses bilaterally; Unstageable medial ankle/malelous venous vs arterial ulcer with purulent discharge. Surrounding erythema and edema.   MSK: No CVA tenderness, Normal ROM, No injury  Neuro: AAOx3, CNII-XII grossly intact, non-focal  Psych: Appropriate, Cooperative,  Skin: As described in extremities.       LABS:                              7.1    8.99  )-----------( 278      ( 20 Jan 2022 05:51 )             24.1     20 Jan 2022 05:51    141    |  114    |  57     ----------------------------<  177    5.7     |  23     |  1.17     Ca    8.5        20 Jan 2022 05:51  Phos  4.1       20 Jan 2022 05:51  Mg     2.4       20 Jan 2022 05:51    TPro  7.0    /  Alb  2.2    /  TBili  0.2    /  DBili  x      /  AST  10     /  ALT  10     /  AlkPhos  90     19 Jan 2022 07:31    LIVER FUNCTIONS - ( 19 Jan 2022 07:31 )  Alb: 2.2 g/dL / Pro: 7.0 gm/dL / ALK PHOS: 90 U/L / ALT: 10 U/L / AST: 10 U/L / GGT: x           PT/INR - ( 20 Jan 2022 05:51 )   PT: 12.7 sec;   INR: 1.10 ratio         PTT - ( 20 Jan 2022 05:51 )  PTT:31.4 sec  CAPILLARY BLOOD GLUCOSE      POCT Blood Glucose.: 126 mg/dL (20 Jan 2022 12:16)  POCT Blood Glucose.: 193 mg/dL (20 Jan 2022 06:34)  POCT Blood Glucose.: 334 mg/dL (19 Jan 2022 21:11)  POCT Blood Glucose.: 254 mg/dL (19 Jan 2022 17:01)          MEDICATIONS  (STANDING):  aspirin  chewable 81 milliGRAM(s) Oral daily  cefepime   IVPB 2000 milliGRAM(s) IV Intermittent every 24 hours  celecoxib 200 milliGRAM(s) Oral daily  dextrose 40% Gel 15 Gram(s) Oral once  dextrose 40% Gel 15 Gram(s) Oral once  dextrose 5%. 1000 milliLiter(s) (50 mL/Hr) IV Continuous <Continuous>  dextrose 5%. 1000 milliLiter(s) (100 mL/Hr) IV Continuous <Continuous>  dextrose 5%. 1000 milliLiter(s) (50 mL/Hr) IV Continuous <Continuous>  dextrose 5%. 1000 milliLiter(s) (100 mL/Hr) IV Continuous <Continuous>  dextrose 50% Injectable 25 Gram(s) IV Push once  dextrose 50% Injectable 12.5 Gram(s) IV Push once  dextrose 50% Injectable 25 Gram(s) IV Push once  dextrose 50% Injectable 25 Gram(s) IV Push once  dextrose 50% Injectable 12.5 Gram(s) IV Push once  dextrose 50% Injectable 25 Gram(s) IV Push once  folic acid 1 milliGRAM(s) Oral at bedtime  furosemide    Tablet 60 milliGRAM(s) Oral daily  gabapentin 300 milliGRAM(s) Oral three times a day  glucagon  Injectable 1 milliGRAM(s) IntraMuscular once  glucagon  Injectable 1 milliGRAM(s) IntraMuscular once  insulin glargine Injectable (LANTUS) 10 Unit(s) SubCutaneous at bedtime  insulin lispro (ADMELOG) corrective regimen sliding scale   SubCutaneous three times a day before meals  insulin lispro (ADMELOG) corrective regimen sliding scale   SubCutaneous at bedtime  metoprolol tartrate 25 milliGRAM(s) Oral two times a day  PARoxetine 20 milliGRAM(s) Oral daily  predniSONE   Tablet 5 milliGRAM(s) Oral daily  sacubitril 24 mG/valsartan 26 mG 1 Tablet(s) Oral two times a day  sodium chloride 0.9%. 1000 milliLiter(s) (65 mL/Hr) IV Continuous <Continuous>  tiotropium 18 MICROgram(s) Capsule 1 Capsule(s) Inhalation daily  vancomycin  IVPB 1000 milliGRAM(s) IV Intermittent <User Schedule>    MEDICATIONS  (PRN):  acetaminophen     Tablet .. 650 milliGRAM(s) Oral every 6 hours PRN Temp greater or equal to 38C (100.4F), Mild Pain (1 - 3)  ALBUTerol    90 MICROgram(s) HFA Inhaler 1 Puff(s) Inhalation every 6 hours PRN Shortness of Breath  aluminum hydroxide/magnesium hydroxide/simethicone Suspension 30 milliLiter(s) Oral every 4 hours PRN Dyspepsia  hydrALAZINE Injectable 10 milliGRAM(s) IV Push every 6 hours PRN SBP > 160  HYDROmorphone  Injectable 0.5 milliGRAM(s) IV Push every 3 hours PRN Moderate Pain (4 - 6)  melatonin 3 milliGRAM(s) Oral at bedtime PRN Insomnia  ondansetron Injectable 4 milliGRAM(s) IV Push every 8 hours PRN Nausea and/or Vomiting  oxycodone    5 mG/acetaminophen 325 mG 1 Tablet(s) Oral every 4 hours PRN Moderate Pain (4 - 6)

## 2022-01-20 NOTE — PROGRESS NOTE ADULT - SUBJECTIVE AND OBJECTIVE BOX
Date of service: 01-20-22 @ 13:23    Sitting in bed in NAD  Has Lower legs pain  Has right ankle swelling    ROS: no fever or chills; denies dizziness, no HA, no SOB or cough, no abdominal pain, no diarrhea or constipation; no dysuria    MEDICATIONS  (STANDING):  aspirin  chewable 81 milliGRAM(s) Oral daily  cefepime   IVPB 2000 milliGRAM(s) IV Intermittent every 24 hours  celecoxib 200 milliGRAM(s) Oral daily  dextrose 40% Gel 15 Gram(s) Oral once  dextrose 40% Gel 15 Gram(s) Oral once  dextrose 5%. 1000 milliLiter(s) (50 mL/Hr) IV Continuous <Continuous>  dextrose 5%. 1000 milliLiter(s) (50 mL/Hr) IV Continuous <Continuous>  dextrose 5%. 1000 milliLiter(s) (100 mL/Hr) IV Continuous <Continuous>  dextrose 5%. 1000 milliLiter(s) (100 mL/Hr) IV Continuous <Continuous>  dextrose 50% Injectable 25 Gram(s) IV Push once  dextrose 50% Injectable 12.5 Gram(s) IV Push once  dextrose 50% Injectable 25 Gram(s) IV Push once  dextrose 50% Injectable 25 Gram(s) IV Push once  dextrose 50% Injectable 12.5 Gram(s) IV Push once  dextrose 50% Injectable 25 Gram(s) IV Push once  folic acid 1 milliGRAM(s) Oral at bedtime  furosemide    Tablet 60 milliGRAM(s) Oral daily  gabapentin 300 milliGRAM(s) Oral three times a day  glucagon  Injectable 1 milliGRAM(s) IntraMuscular once  glucagon  Injectable 1 milliGRAM(s) IntraMuscular once  insulin glargine Injectable (LANTUS) 10 Unit(s) SubCutaneous at bedtime  insulin lispro (ADMELOG) corrective regimen sliding scale   SubCutaneous three times a day before meals  insulin lispro (ADMELOG) corrective regimen sliding scale   SubCutaneous at bedtime  metoprolol tartrate 25 milliGRAM(s) Oral two times a day  PARoxetine 20 milliGRAM(s) Oral daily  predniSONE   Tablet 5 milliGRAM(s) Oral daily  sacubitril 24 mG/valsartan 26 mG 1 Tablet(s) Oral two times a day  sodium chloride 0.9%. 1000 milliLiter(s) (65 mL/Hr) IV Continuous <Continuous>  tiotropium 18 MICROgram(s) Capsule 1 Capsule(s) Inhalation daily  vancomycin  IVPB 1000 milliGRAM(s) IV Intermittent <User Schedule>    Vital Signs Last 24 Hrs  T(C): 36.4 (20 Jan 2022 12:25), Max: 36.6 (19 Jan 2022 16:50)  T(F): 97.5 (20 Jan 2022 12:25), Max: 97.9 (19 Jan 2022 21:14)  HR: 60 (20 Jan 2022 12:25) (59 - 72)  BP: 125/95 (20 Jan 2022 12:25) (100/60 - 138/38)  BP(mean): --  RR: 18 (20 Jan 2022 12:25) (18 - 18)  SpO2: 95% (20 Jan 2022 12:25) (92% - 100%)     Physical exam:    Constitutional:  No acute distress  HEENT: NC/AT, EOMI, PERRLA, conjunctivae clear; ears and nose atraumatic  Neck: supple; thyroid not palpable  Back: no tenderness  Respiratory: respiratory effort normal; clear to auscultation  Cardiovascular: S1S2 regular, no murmurs  Abdomen: soft, not tender, not distended, positive BS  Genitourinary: no suprapubic tenderness  Lymphatic: no LN palpable  Musculoskeletal: no muscle tenderness, no joint swelling or tenderness  Extremities: 2+ pedal edema  Right lower extremity wound at the medial malleolus and posterior lower extremity; odor is improved; surrounding erythema.  Neurological/ Psychiatric: AxOx3, judgement and insight normal; moving all extremities  Skin: no rashes; no palpable lesions    Labs: reviewed                        7.1    8.99  )-----------( 278      ( 20 Jan 2022 05:51 )             24.1     01-20    141  |  114<H>  |  57<H>  ----------------------------<  177<H>  5.7<H>   |  23  |  1.17    Ca    8.5      20 Jan 2022 05:51  Phos  4.1     01-20  Mg     2.4     01-20    TPro  7.0  /  Alb  2.2<L>  /  TBili  0.2  /  DBili  x   /  AST  10<L>  /  ALT  10<L>  /  AlkPhos  90  01-19    C-Reactive Protein, Serum: 55 mg/L (01-19-22 @ 01:18)                        7.9    10.64 )-----------( 302      ( 19 Jan 2022 07:31 )             26.8     01-19    137  |  109<H>  |  51<H>  ----------------------------<  137<H>  5.2   |  23  |  1.16    Ca    9.4      19 Jan 2022 07:31    TPro  7.0  /  Alb  2.2<L>  /  TBili  0.2  /  DBili  x   /  AST  10<L>  /  ALT  10<L>  /  AlkPhos  90  01-19     LIVER FUNCTIONS - ( 19 Jan 2022 07:31 )  Alb: 2.2 g/dL / Pro: 7.0 gm/dL / ALK PHOS: 90 U/L / ALT: 10 U/L / AST: 10 U/L / GGT: x             Culture - Blood (collected 18 Jan 2022 21:04)  Source: .Blood None  Preliminary Report (20 Jan 2022 05:01):    No growth to date.    Culture - Blood (collected 18 Jan 2022 21:04)  Source: .Blood None  Preliminary Report (20 Jan 2022 05:01):    No growth to date.    COVID-19 PCR: NotDetec (01-18-22 @ 21:04)    Radiology: all available radiological tests reviewed    XR right ankle: concern for osteomyelitis, hardware present, no fracture or dislocation  CXR: no consolidation, no effusion, no pneumothorax (personally reviewed).     Advanced directives addressed: full resuscitation

## 2022-01-20 NOTE — CONSULT NOTE ADULT - SUBJECTIVE AND OBJECTIVE BOX
History of Present Illness:  Reason for Admission: Right lower extremity wound  History of Present Illness:   87 y/o F with PMH COPD, CAD, Type 2 DM on insulin, depression, CHFrEF, HTN, PVD, PAD, s/p left femoral popliteal bypass 3/24/2021, left thigh infections s/p debridement and muscle flap, PAT, lower extremity DVT on Eliquis, DARREN presents with chronic wounds and pain to the right lower extremity for 2 weeks. She first noticed the chronic wound on her right lower extremity about a month ago. She has been completing dressing changes and applying Xeroform. The wound is foul smelling. She has been unable to walk over the last 2 weeks due to increase pressure when placing the foot down. Hanging her lower extremity off the side of the bed helps improve her pain. She also reports occasional diarrhea and lower extremity swelling. Denies headaches, blurry vision, dizziness, fevers, chills, chest pain, SOB, abdominal pain, N/V, diarrhea/constipation.      ER course: /48. Labs: WBC 10.87, Hb 7.9, .8, INR 1.17, K+ 6.1, BUN 61, Cr 1.49 (Baseline ~0.99), Glucose 448, Alkaline phosphatase 140, COVID negative. EKG: NSR with HR 64 bm, NJ prolonged to 218 ms (1st degree AV block), QTC prolonged to 470 ms, LVH, T wave inversions in II, AVL, V4-V6, no ST segment changes present on prior EKG (personally reviewed).       Pt known to me from previous procedures and office/hospital visits.  She was initially evaluated Jan 5, 2021 with abdominal aortography and angioplasty and stenting of the R common iliac artery for ischemic discomfort bilateral lower extremities.  Post procedure, she improved with her R lower extremity discomfort.   Because of ischemic type pain in her L foot, she underwent a L common femoral endarterectomy and bovine pericardial patch angioplasty on Feb 16, 2021 with improvement in her symptoms initially.  However, she then had recurrent discomfort of the L foot and then underwent a L external iliac to AK popliteal artery bypass on Mar 22.  Her foot pain resolved after the procedure but she then was admitted to [facility] in early May for sepsis and was found to have an infected distal incision.  She was transferred to [facility] and underwent a "washout" and debridement of the distal incision on May 5.  On May 11 she underwent a muscle flap coverage of the distal graft and was treated with a prolonged course of antibiotics.  Her L femoral popliteal bypass remained patent evidenced by palpable L pedal pulse.   She was last seen in my office Dec 14 and was being treated by her daughter  Nirali Pérez for a R medial malleolar area ulcer.  At that time she denied ischemic rest pain in the R foot.   Currently, she notes ischemic pain in the R foot and obtains some relief with placing the limb in a dependent position.  She underwent a MRI of her R ankle during this current admission with findings concerning for osteomyelitis.  She is not complaining of L foot pain.      Imaging:   - XR right ankle: concern for osteomyelitis, hardware present, no fracture or dislocation (personally reviewed).   - CXR: no consolidation, no effusion, no pneumothorax (personally reviewed).     The pt was given Cefepime and Vancomycin, 2L of NS, 10 units of Humalin, Calcium gluconate, Sodium bicarbonate, Lokelma, and Dilaudid in the ER. She is being admitted to med/surg for further management.      Review of Systems:  Review of Systems: Constitutional: negative for fatigue, negative for fever, negative for chills, negative for decreased appetite.  Skin: negative for rashes, negative for open wounds, negative for jaundice.   Eyes: negative for blurry vision, negative for double vision.   Ears, nose, throat: negative for ear pain, negative for nasal congestion, negative for sore throat, negative for lymph node swelling.   Cardiovascular: negative for chest pain, negative for palpitations, positive for lower extremity swelling.   Respiratory: negative for shortness of breath, negative for wheezing, negative for cough.   Gastrointestinal: negative for abdominal pain, negative for nausea, negative for vomiting, negative for diarrhea, negative for constipation, negative for blood in the stool, negative for black tarry stools.   Genitourinary: negative for burning on urination, negative for urinary urgency or frequency, negative for blood in the urine.   Endocrine: negative for cold intolerance, negative for heat intolerance, negative for increased thirst.   Hematologic: negative for easy bruising or bleeding.   Musculoskeletal: negative for muscle/joint pain, negative for decreased range of motion, positive for right lower extremity wounds   Neurological: negative for dizziness, negative for headaches, negative for loss of consciousness, negative for motor weakness, negative for sensory deficits.   Psychiatric: negative for depression, negative for anxiety.      Allergies and Intolerances:        Allergies:  	penicillins: Drug Category, Urticaria, Pruritus  	cephalosporins: Drug Category, Other, unsure    Home Medications:   * Incomplete Medication History as of 19-Jan-2022 03:52 documented in Structured Notes  · 	oxycodone-acetaminophen 5 mg-325 mg oral tablet: Last Dose Taken:  , 1 tab(s) orally every 6 hours, As Needed for Pain MDD:4 tablets  · 	metoprolol tartrate 25 mg oral tablet: Last Dose Taken:  , 1 tab(s) orally 2 times a day  · 	PARoxetine 20 mg oral tablet: Last Dose Taken:  , 1 tab(s) orally once a day  · 	gabapentin 300 mg oral capsule: Last Dose Taken:  , 1 cap(s) orally 3 times a day  · 	predniSONE 5 mg oral tablet: Last Dose Taken:  , 1 tab(s) orally once a day  · 	tiotropium 18 mcg inhalation capsule: Last Dose Taken:  , 1 cap(s) inhaled once a day   · 	Lantus 100 units/mL subcutaneous solution: Last Dose Taken:  , 10  subcutaneous once a day (at bedtime)  · 	sacubitril-valsartan 24 mg-26 mg oral tablet: Last Dose Taken:  , 1 tab(s) orally 2 times a day  · 	tiotropium 18 mcg inhalation capsule: Last Dose Taken:  , 1 cap(s) inhaled once a day  · 	ipratropium-albuterol 0.5 mg-2.5 mg/3 mLinhalation solution: Last Dose Taken:  , 3 milliliter(s) inhaled every 4 hours, As needed, Shortness of Breath and/or Wheezing  · 	albuterol 90 mcg/inh inhalation aerosol: Last Dose Taken:  , 1 puff(s) inhaled every 6 hours, As needed, Shortness of Breath  · 	aspirin 81 mg oral tablet, chewable: Last Dose Taken:  , 1 tab(s) orally once a day  · 	atorvastatin 40 mg oral tablet: Last Dose Taken:  , 1 tab(s) orally once a day (at bedtime)  · 	folic acid 1 mg oral tablet: Last Dose Taken:  , 1 tab(s) orally once a day (at bedtime)  · 	Eliquis 2.5 mg oral tablet: Last Dose Taken:  , 1 tab(s) orally 2 times a day  · 	meloxicam 15 mg oral tablet: Last Dose Taken:  , 1 tab(s) orally once a day    Patient History:    Past Medical, Past Surgical, and Family History:  PAST MEDICAL HISTORY:  Anemia     Arthritis, rheumatoid     CAD (coronary artery disease) non-obstructive    CHF (congestive heart failure)     Depression     DVT, lower extremity     Hypertension     PAD (peripheral artery disease)     PVD (peripheral vascular disease) with claudication     Rheumatoid arthritis     Type 2 diabetes mellitus on insulin.     PAST SURGICAL HISTORY:  fracture ankle right plate . screws   2000    Hip fracture requiring operative repair Right hip 11/14/2020    S/P cataract surgery 2011    S/P femoral-popliteal bypass surgery 3/24/2021.     FAMILY HISTORY:  Family history of atherosclerosis, mother  FH: colon cancer, father  FH: heart attack, father.     Social History:  Social History (marital status, living situation, occupation, tobacco use, alcohol and drug use, and sexual history): Lives with her daughter who is an ER physician at . Smoked 1 ppd for 50 years, quit 1 year ago . Denies smoking or alcohol use. Independent with ADLs and IADLs.     Tobacco Screening:  · Core Measure Site	Yes  · Has the patient used tobacco in the past 30 days?	No    Risk Assessment:    Present on Admission:  Deep Venous Thrombosis	no  Pulmonary Embolus	no     Heart Failure:  Does this patient have a history of or has been diagnosed with heart failure? yes.     LV Function Assessment (LVS function was evaluated before arrival and/or during hospitalization) unknown.    History of Present Illness:  Reason for Admission: Right lower extremity wound  History of Present Illness:   87 y/o F with PMH COPD, CAD, Type 2 DM on insulin, depression, CHFrEF, HTN, PVD, PAD, s/p left femoral popliteal bypass 3/24/2021, left thigh infections s/p debridement and muscle flap, PAT, lower extremity DVT on Eliquis, DARREN presents with chronic wounds and pain to the right lower extremity for 2 weeks. She first noticed the chronic wound on her right lower extremity about a month ago. She has been completing dressing changes and applying Xeroform. The wound is foul smelling. She has been unable to walk over the last 2 weeks due to increase pressure when placing the foot down. Hanging her lower extremity off the side of the bed helps improve her pain. She also reports occasional diarrhea and lower extremity swelling. Denies headaches, blurry vision, dizziness, fevers, chills, chest pain, SOB, abdominal pain, N/V, diarrhea/constipation.      ER course: /48. Labs: WBC 10.87, Hb 7.9, .8, INR 1.17, K+ 6.1, BUN 61, Cr 1.49 (Baseline ~0.99), Glucose 448, Alkaline phosphatase 140, COVID negative. EKG: NSR with HR 64 bm, OH prolonged to 218 ms (1st degree AV block), QTC prolonged to 470 ms, LVH, T wave inversions in II, AVL, V4-V6, no ST segment changes present on prior EKG (personally reviewed).       Pt known to me from previous procedures and office/hospital visits.  She was initially evaluated Jan 5, 2021 with abdominal aortography and angioplasty and stenting of the R common iliac artery for ischemic discomfort bilateral lower extremities.  Post procedure, she improved with her R lower extremity discomfort.   Because of ischemic type pain in her L foot, she underwent a L common femoral endarterectomy and bovine pericardial patch angioplasty on Feb 16, 2021 with improvement in her symptoms initially.  However, she then had recurrent discomfort of the L foot and then underwent a L external iliac to AK popliteal artery bypass on Mar 22.  Her foot pain resolved after the procedure but she then was admitted to [facility] in early May for sepsis and was found to have an infected distal incision.  She was transferred to [facility] and underwent a "washout" and debridement of the distal incision on May 5.  On May 11 she underwent a muscle flap coverage of the distal graft and was treated with a prolonged course of antibiotics.  Her L femoral popliteal bypass remained patent evidenced by palpable L pedal pulse.   She was last seen in my office Dec 14 and was being treated by her daughter  Nirali Ahumada for a R medial malleolar area ulcer.  At that time she denied ischemic rest pain in the R foot.   Currently, she notes ischemic pain in the R foot and obtains some relief with placing the limb in a dependent position.  She underwent a MRI of her R ankle during this current admission with findings concerning for osteomyelitis.  She is not complaining of L foot pain.      Imaging:   - XR right ankle: concern for osteomyelitis, hardware present, no fracture or dislocation (personally reviewed).   - CXR: no consolidation, no effusion, no pneumothorax (personally reviewed).     The pt was given Cefepime and Vancomycin, 2L of NS, 10 units of Humalin, Calcium gluconate, Sodium bicarbonate, Lokelma, and Dilaudid in the ER. She is being admitted to med/surg for further management.      Review of Systems:  Review of Systems: Constitutional: negative for fatigue, negative for fever, negative for chills, negative for decreased appetite.  Skin: negative for rashes, negative for open wounds, negative for jaundice.   Eyes: negative for blurry vision, negative for double vision.   Ears, nose, throat: negative for ear pain, negative for nasal congestion, negative for sore throat, negative for lymph node swelling.   Cardiovascular: negative for chest pain, negative for palpitations, positive for lower extremity swelling.   Respiratory: negative for shortness of breath, negative for wheezing, negative for cough.   Gastrointestinal: negative for abdominal pain, negative for nausea, negative for vomiting, negative for diarrhea, negative for constipation, negative for blood in the stool, negative for black tarry stools.   Genitourinary: negative for burning on urination, negative for urinary urgency or frequency, negative for blood in the urine.   Endocrine: negative for cold intolerance, negative for heat intolerance, negative for increased thirst.   Hematologic: negative for easy bruising or bleeding.   Musculoskeletal: negative for muscle/joint pain, negative for decreased range of motion, positive for right lower extremity wounds   Neurological: negative for dizziness, negative for headaches, negative for loss of consciousness, negative for motor weakness, negative for sensory deficits.   Psychiatric: negative for depression, negative for anxiety.      Allergies and Intolerances:        Allergies:  	penicillins: Drug Category, Urticaria, Pruritus  	cephalosporins: Drug Category, Other, unsure    Home Medications:   * Incomplete Medication History as of 19-Jan-2022 03:52 documented in Structured Notes  · 	oxycodone-acetaminophen 5 mg-325 mg oral tablet: Last Dose Taken:  , 1 tab(s) orally every 6 hours, As Needed for Pain MDD:4 tablets  · 	metoprolol tartrate 25 mg oral tablet: Last Dose Taken:  , 1 tab(s) orally 2 times a day  · 	PARoxetine 20 mg oral tablet: Last Dose Taken:  , 1 tab(s) orally once a day  · 	gabapentin 300 mg oral capsule: Last Dose Taken:  , 1 cap(s) orally 3 times a day  · 	predniSONE 5 mg oral tablet: Last Dose Taken:  , 1 tab(s) orally once a day  · 	tiotropium 18 mcg inhalation capsule: Last Dose Taken:  , 1 cap(s) inhaled once a day   · 	Lantus 100 units/mL subcutaneous solution: Last Dose Taken:  , 10  subcutaneous once a day (at bedtime)  · 	sacubitril-valsartan 24 mg-26 mg oral tablet: Last Dose Taken:  , 1 tab(s) orally 2 times a day  · 	tiotropium 18 mcg inhalation capsule: Last Dose Taken:  , 1 cap(s) inhaled once a day  · 	ipratropium-albuterol 0.5 mg-2.5 mg/3 mLinhalation solution: Last Dose Taken:  , 3 milliliter(s) inhaled every 4 hours, As needed, Shortness of Breath and/or Wheezing  · 	albuterol 90 mcg/inh inhalation aerosol: Last Dose Taken:  , 1 puff(s) inhaled every 6 hours, As needed, Shortness of Breath  · 	aspirin 81 mg oral tablet, chewable: Last Dose Taken:  , 1 tab(s) orally once a day  · 	atorvastatin 40 mg oral tablet: Last Dose Taken:  , 1 tab(s) orally once a day (at bedtime)  · 	folic acid 1 mg oral tablet: Last Dose Taken:  , 1 tab(s) orally once a day (at bedtime)  · 	Eliquis 2.5 mg oral tablet: Last Dose Taken:  , 1 tab(s) orally 2 times a day  · 	meloxicam 15 mg oral tablet: Last Dose Taken:  , 1 tab(s) orally once a day    Patient History:    Past Medical, Past Surgical, and Family History:  PAST MEDICAL HISTORY:  Anemia     Arthritis, rheumatoid     CAD (coronary artery disease) non-obstructive    CHF (congestive heart failure)     Depression     DVT, lower extremity     Hypertension     PAD (peripheral artery disease)     PVD (peripheral vascular disease) with claudication     Rheumatoid arthritis     Type 2 diabetes mellitus on insulin.     PAST SURGICAL HISTORY:  fracture ankle right plate . screws   2000    Hip fracture requiring operative repair Right hip 11/14/2020    S/P cataract surgery 2011    S/P femoral-popliteal bypass surgery 3/24/2021.     FAMILY HISTORY:  Family history of atherosclerosis, mother  FH: colon cancer, father  FH: heart attack, father.     Social History:  Social History (marital status, living situation, occupation, tobacco use, alcohol and drug use, and sexual history): Lives with her daughter who is an ER physician at . Smoked 1 ppd for 50 years, quit 1 year ago . Denies smoking or alcohol use. Independent with ADLs and IADLs.     Tobacco Screening:  · Core Measure Site	Yes  · Has the patient used tobacco in the past 30 days?	No    Risk Assessment:    Present on Admission:  Deep Venous Thrombosis	no  Pulmonary Embolus	no     Heart Failure:  Does this patient have a history of or has been diagnosed with heart failure? yes.     LV Function Assessment (LVS function was evaluated before arrival and/or during hospitalization) unknown.

## 2022-01-20 NOTE — PROGRESS NOTE ADULT - ASSESSMENT
Pt / infected foul smelling ischemic ulcers R ankle & lower leg. MR suspicious for osteomyelitis medial ankle. Awaiting vascular intervention consider debridement post angio by ORTHO. GARLAND H&H 7.1/24.1 medical eval. Will F/U if requested

## 2022-01-20 NOTE — PROGRESS NOTE ADULT - ASSESSMENT
85 y/o Female with h/o COPD, CAD, Type 2 DM on insulin, depression, CHFrEF, HTN, PVD, PAD, s/p left femoral popliteal bypass 3/24/2021, prior left thigh infections s/p debridement and muscle flap, PAT, lower extremity DVT on Eliquis, RA was admitted on 1/18 for lower leg nonhealing wounds. She first noticed the chronic wound on her right lower extremity about a month ago. She has been completing dressing changes and applying Xeroform. The wound is foul smelling. She has been unable to walk over the last 2 weeks due to increase pressure when placing the foot down. Hanging her lower extremity off the side of the bed helps improve her pain. She also reports occasional diarrhea and lower extremity swelling. Denies headaches, blurry vision, dizziness, fevers, chills. In ER she received cefepime and vancomycin IV.     1. RLE cellulitis with multiple open wound. Chronic LE stasis dermatitis. PVD.  -lower leg erythema  -mild leukocytosis  -BC x 2 noted  -on vancomycin 1 gm IV qd and cefepime 2 gm IV qd # 2  -tolerating abx well so far; no side effects noted  -obtain vancomycin trough level   -elevate legs  .local wound care  -continue abx coverage   -monitor temps  -f/u CBC  -supportive care  2. Other issues:   -care per medicine

## 2022-01-20 NOTE — PROGRESS NOTE ADULT - SUBJECTIVE AND OBJECTIVE BOX
Orthopedics     Pt seen and examined at the bedside. Pain is unchanged from exam last night, no overnight fevers/chills.     Vital Signs Last 24 Hrs  T(C): 36.4 (01-20-22 @ 08:53), Max: 36.6 (01-19-22 @ 16:50)  T(F): 97.6 (01-20-22 @ 08:53), Max: 97.9 (01-19-22 @ 21:14)  HR: 62 (01-20-22 @ 08:53) (61 - 72)  BP: 136/32 (01-20-22 @ 08:53) (100/60 - 138/38)  BP(mean): --  RR: 18 (01-20-22 @ 08:53) (18 - 18)  SpO2: 95% (01-20-22 @ 08:53) (92% - 96%)                        7.1    8.99  )-----------( 278      ( 20 Jan 2022 05:51 )             24.1     20 Jan 2022 05:51    141    |  114    |  57     ----------------------------<  177    5.7     |  23     |  1.17     Ca    8.5        20 Jan 2022 05:51  Phos  4.1       20 Jan 2022 05:51  Mg     2.4       20 Jan 2022 05:51    TPro  7.0    /  Alb  2.2    /  TBili  0.2    /  DBili  x      /  AST  10     /  ALT  10     /  AlkPhos  90     19 Jan 2022 07:31    PT/INR - ( 20 Jan 2022 05:51 )   PT: 12.7 sec;   INR: 1.10 ratio         PTT - ( 20 Jan 2022 05:51 )  PTT:31.4 sec    Exam:  Gen: NAD, Resting comfortably    RLE:  Skin with medial mall ulcer with purulent drainage, ulcer over posterior ankle with purulent drainage.   TTP diffusely on ankle  Painful A/PROm of ankle, pain with micromotion of ankle  +EHL/FHL/TA/GSC  +SILT L3-S1  + DP  Compartments soft and compressible  No calf tenderness

## 2022-01-20 NOTE — CONSULT NOTE ADULT - ASSESSMENT
Physical Exam:  Physical Exam: ICU Vital Signs Last 24 Hrs T(C): 36.3 (19 Jan 2022 04:00), Max: 36.6 (18 Jan 2022 23:39) T(F): 97.4 (19 Jan 2022 04:00), Max: 97.9 (18 Jan 2022 23:39) HR: 63 (18 Jan 2022 23:39) (63 - 65) BP: 162/51 (18 Jan 2022 23:39) (162/48 - 162/51) BP(mean): 82 (18 Jan 2022 23:39) (79 - 82) RR: 18 (18 Jan 2022 23:39) (18 - 18) SpO2: 98% (18 Jan 2022 23:39) (98% - 100%)  General: Awake and alert, cooperative with exam. No acute distress.  Skin: Warm, dry, and pink.  Eyes: Pupils equal and reactive to light. Extraocular eye movements intact. No conjunctival injection, discharge, or scleral icterus.  HEENT: Atraumatic, normocephalic. Moist mucus membranes.  Cardiology: Normal S1, S2. No murmurs, rubs, or gallops. Regular rate and rhythm.  Respiratory: Lungs clear to ascultation bilaterally. Good air exchange. No wheezes, rales, or rhonchi. Normal chest expansion.  Gastrointestinal: Positive bowel sounds. Soft, non-tender, non-distended. No guarding, rigidity, or rebound tenderness. No hepatosplenomegaly.  Musculoskeletal: 5/5 motor strength in all extremities. Normal range of motion. Right lower extremity wound located at the medial malleolus and posterior lower extremity foul smelling with sloughed off appearance and surrounding erythema.  Extremities:? femoral pulses bilaterally; no palpable distal/pedal pulse either extremity; ~ 2-3 cm ulcer of R medial malleolus with fibrinonecrotic debris; no lymphangitis Neurological: A+Ox3 (person, place, and time). Cranial nerves 2-12 intact. Normal speech. No facial droop. No focal neurological deficits.  Psychiatric: Normal affect. Normal mood.   Laboratory:  Admit:   19-Jan-2022 02:34, Bed Request Bed Status: CLEAN Patient Bed Information: Nurse Station: [id number] 	Room Number: 0534 	Bed Number: 01 Blood Bank:   18-Jan-2022 21:04, Antibody Screen Interpretation Antibody Screen: NEG   18-Jan-2022 21:04, Type + Screen ABO RH Interpretation: O POS General Chemistry:   18-Jan-2022 21:04, Comprehensive Metabolic Panel Sodium, Serum: 135, [135 - 145 mmol/L] Potassium, Serum:    6.1, [3.5 - 5.3 mmol/L] Chloride, Serum:    109, [96 - 108 mmol/L] Carbon Dioxide, Serum:    21, [22 - 31 mmol/L] Anion Gap, Serum: 5, [5 - 17 mmol/L] Blood Urea Nitrogen, Serum:    61, [7 - 23 mg/dL] Creatinine, Serum:    1.49, [0.50 - 1.30 mg/dL] Glucose, Serum:    448, [70 - 99 mg/dL] Calcium, Total Serum: 8.5, [8.5 - 10.1 mg/dL] Protein Total, Serum: 7.3, [6.0 - 8.3 gm/dL] Albumin, Serum:    2.3, [3.3 - 5.0 g/dL] Bilirubin Total, Serum: 0.2, [0.2 - 1.2 mg/dL] Alkaline Phosphatase, Serum:    140, [40 - 120 U/L] Aspartate Aminotransferase (AST/SGOT):    13, [15 - 37 U/L] Alanine Aminotransferase (ALT/SGPT):    11, [12 - 78 U/L] eGFR if Non :    32, [>=60 mL/min/1.73M2], Interpretative comment 	The units for eGFR are mL/min/1.73M2 (normalized body surface area). The 	eGFR is calculated from a serum creatinine using the CKD-EPI equation. 	Other variables required for calculation are race, age and sex. Among 	patients with chronic kidney disease (CKD), the eGFR is useful in 	determining the stage of disease according to KDOQI CKD classification. 	All eGFR results are reported numerically with the following 	interpretation. 	        GFR                    With                 Without 	   (ml/min/1.73 m2)    Kidney Damage       Kidney Damage 	      >= 90                    Stage 1                     Normal 	      60-89                    Stage 2                     Decreased GFR 	      30-59     Stage 3                     Stage 3 	      15-29                    Stage 4                     Stage 4 	      < 15                      Stage 5                     Stage 5 	Each stage of CKD assumes that the associated GFR level has been in 	effect for at least 3 months. Determination of stages one and two (with 	eGFR > 59 ml/min/m2) requires estimation of kidney damage for at least 3 	months as defined by structural or functional abnormalities. 	Limitations: All estimates of GFR will be less accurate for patients at 	extremes of muscle mass (including but not limited to frail elderly, 	critically ill, or cancer patients), those with unusual diets, and those 	with conditions associated with reduced secretion or extrarenal 	elimination of creatinine. The eGFR equation is not recommended for use 	in patients with unstable creatinine levels. eGFR if :    37, [>=60 mL/min/1.73M2]   18-Jan-2022 21:04, Lactate, Blood Lactate, Blood: 1.5, [0.7 - 2.0 mmol/L]   19-Jan-2022 01:18, Potassium, Serum Potassium, Serum: 5.1, [3.5 - 5.3 mmol/L] Coagulation:   18-Jan-2022 21:04, Activated Partial Thromboplastin Time Activated Partial Thromboplastin Time: 34.4, [27.5 - 35.5 sec], The recommended therapeutic heparin range (full dose) is 58-99 seconds. 	Argatroban range is 1.5 to 3.0 times of the baseline APTT value, not to 	exceed 100 seconds. 	Routine coagulation results should be interpreted with caution when 	taking Factor Xa inhibitors or direct thrombin inhibitors; blood sampling 	prior to drug intake is recommended.   18-Jan-2022 21:04, Prothrombin Time and INR, Plasma Prothrombin Time, Plasma: 13.5, [10.6 - 13.6 sec] INR:    1.17, [0.88 - 1.16 ratio], Recommended targets/ranges for therapeutic INR: 	2.0-3.0 Deep vein thrombosis, pulmonary embolism, atrial fibrillation 	2.0-3.0 Mechanical aortic valve, antiphospholipid syndrome with previous 	arterial or venous thromboembolism 	2.5-3.5 Mechanical mitral valve, double mechanical valve (aortic and 	mitral positions, high risk valves) 	Note: Chest 2012 Feb;141(2 Suppl):7S-47S 	Routine coagulation results should be interpreted with caution when 	taking Factor Xa inhibitors or direct thrombin inhibitors; blood sampling 	prior to drug intake is recommended. Hematology:   18-Jan-2022 21:04, Complete Blood Count + Automated Diff WBC Count:    10.87, [3.80 - 10.50 K/uL] RBC Count:    2.59, [3.80 - 5.20 M/uL] Hemoglobin:    7.9, [11.5 - 15.5 g/dL] Hematocrit:    26.1, [34.5 - 45.0 %] Mean Cell Volume:    100.8, [80.0 - 100.0 fl] Mean Cell Hemoglobin: 30.5, [27.0 - 34.0 pg] Mean Cell Hemoglobin Conc:    30.3, [32.0 - 36.0 gm/dL] Red Cell Distrib Width:    14.7, [10.3 - 14.5 %] Platelet Count - Automated: 309, [150 - 400 K/uL] Auto Neutrophil #:    8.09, [1.80 - 7.40 K/uL] Auto Lymphocyte #: 1.65, [1.00 - 3.30 K/uL] Auto Monocyte #: 0.88, [0.00 - 0.90 K/uL] Auto Eosinophil #: 0.20, [0.00 - 0.50 K/uL] Auto Basophil #: 0.02, [0.00 - 0.20 K/uL] Auto Neutrophil %: 74.4, [43.0 - 77.0 %], Differential percentages must be correlated with absolute numbers for 	clinical significance. Auto Lymphocyte %: 15.2, [13.0 - 44.0 %] Auto Monocyte %: 8.1, [2.0 - 14.0 %] Auto Eosinophil %: 1.8, [0.0 - 6.0 %] Auto Basophil %: 0.2, [0.0 - 2.0 %] Auto Immature Granulocyte %: 0.3, [0.0 - 1.5 %], (Includes meta, myelo and promyelocytes)  Assessment and Plan:   Assessment: · Assessment	 85 y/o F presents with right lower extremity wound   1. Right lower extremity chronic ulcer concern for arterial ulcer in the setting of PAD vs. diabetic ulcer vs. osteomyelitis  - Admit to med/surg   ischemic ulcer R medial malleolar area with rest pain in setting of significant aortic disease and infrainguinal disease as well.  Access via L groin may be challenging and potentially impossible given previous surgery at which point I will likely perform access via R groin for diagnostic purposes.  In the interim, she will be treated with IV antibiotics and pain management.  She will be transfused. - s/p Cefepime and Vancomycin in the ER  - Will c/w Cefepime for now  - Monitor off additional Vancomycin for now given ORLY  - Patient does have right ankle hardware present; if osteomyelitis will need surgical evaluation for removal of hardware  - f/u ESR, CRP, and XR of foot official read  - If XR does not show osteomyelitis, will order MRI  - Pt reports claudication when walking and improvement when her lower extremity hangs off the side of the bed which is concerning for worsening PAD  - Ordered venous and arterial US for further evaluation  - Pain control: Tylenol, Dilaudid, Percocet PRN  - Wound care consult  - ID consult - Dr. Ortega  - Vascular consult - Dr. Ventura   2. Leukocytosis  - WBC 10.87, trend  - Tylenol PRN for temperatures  - Monitor for temperatures  - f/u BCx x 2   3. Hypertension  - /48, monitor closely  - Hydralazine PRN for SBP > 160  - c/w home anti-HTN agents   4. Macrocytic anemia  - Hb 7.9, .8, recent folate and B12 levels WNL  - Monitor Hb closely   5. Hyperkalemia  - K+ 6.1, s/p  Calcium gluconate, Sodium bicarbonate, Lokelma  - Repeat K+ 5.1, f/u AM level   6. Acute kidney injury  - Cr 1.49 (Baseline ~0.99), monitor closely  - s/p 2L of NS in the ER  - c/w gentle hydration with IVF at 65 ml/hr  - Strict I+Os, avoid nephrotoxic medications   7. Hyperglycemia in the setting of Type 2 DM  - Glucose 448, s/p 10 units of Insulin in the ER  - c/w 10 units of lantus QHS, add ISS  - If accuchecks persistently elevated, will need to add bolus insulin  - Diabetic diet   8. Elevated alkaline phosphatase  - Alkaline phosphatase 140, trend   9. History of COPD, CAD, Type 2 DM on insulin, depression, CHFrEF, HTN, PVD, PAD, s/p left femoral popliteal bypass 3/24/2021, left thigh infections s/p debridement and muscle flap, ICN, lower extremity DVT on Eliquis, RA  - c/w home medications   DVT ppx: Eliquis  Code status: Full code (pt agrees to chest compressions and intubation if required).   *Please call pt's daughter Dr. Nirali Ahumada (ER physician at ) with updates regarding the plan of care.

## 2022-01-20 NOTE — PROGRESS NOTE ADULT - SUBJECTIVE AND OBJECTIVE BOX
Patient is a 86y old  Female who presents with a chief complaint of Right lower extremity wound (19 Jan 2022 19:43) in bed / R LE hanging dependent. Well known to me.      HPI:  87 y/o F with PMH COPD, CAD, Type 2 DM on insulin, depression, CHFrEF, HTN, PVD, PAD, s/p left femoral popliteal bypass 3/24/2021, left thigh infections s/p debridement and muscle flap, PAT, lower extremity DVT on Eliquis, RA past tobacco abuse  presents with chronic wounds and pain to the right lower extremity for 2 weeks. She first noticed the chronic wound on her right lower extremity about a month ago. She has been completing dressing changes and applying Xeroform. The wound is foul smelling. She has been unable to walk over the last 2 weeks due to increase pressure when placing the foot down. Hanging her lower extremity off the side of the bed helps improve her pain. She also reports occasional diarrhea and lower extremity swelling. Denies headaches, blurry vision, dizziness, fevers, chills, chest pain, SOB, abdominal pain, N/V, diarrhea/constipation.      ER course: /48. Labs: WBC 10.87, Hb 7.9, .8, INR 1.17, K+ 6.1, BUN 61, Cr 1.49 (Baseline ~0.99), Glucose 448, Alkaline phosphatase 140, COVID negative. EKG: NSR with HR 64 bm, WA prolonged to 218 ms (1st degree AV block), QTC prolonged to 470 ms, LVH, T wave inversions in II, AVL, V4-V6, no ST segment changes present on prior EKG (personally reviewed).     Imaging:   - XR right ankle: concern for osteomyelitis, hardware present, no fracture or dislocation (personally reviewed).   - CXR: no consolidation, no effusion, no pneumothorax (personally reviewed).     The pt was given Cefepime and Vancomycin, 2L of NS, 10 units of Humalin, Calcium gluconate, Sodium bicarbonate, Lokelma, and Dilaudid in the ER. She is being admitted to med/surg for further management.  (19 Jan 2022 04:03)      Allergies    cephalosporins (Other)  penicillins (Urticaria; Pruritus)    Intolerances        MEDICATIONS  (STANDING):  aspirin  chewable 81 milliGRAM(s) Oral daily  cefepime   IVPB 2000 milliGRAM(s) IV Intermittent every 24 hours  celecoxib 200 milliGRAM(s) Oral daily  dextrose 40% Gel 15 Gram(s) Oral once  dextrose 40% Gel 15 Gram(s) Oral once  dextrose 5%. 1000 milliLiter(s) (50 mL/Hr) IV Continuous <Continuous>  dextrose 5%. 1000 milliLiter(s) (50 mL/Hr) IV Continuous <Continuous>  dextrose 5%. 1000 milliLiter(s) (100 mL/Hr) IV Continuous <Continuous>  dextrose 5%. 1000 milliLiter(s) (100 mL/Hr) IV Continuous <Continuous>  dextrose 50% Injectable 25 Gram(s) IV Push once  dextrose 50% Injectable 12.5 Gram(s) IV Push once  dextrose 50% Injectable 25 Gram(s) IV Push once  dextrose 50% Injectable 25 Gram(s) IV Push once  dextrose 50% Injectable 12.5 Gram(s) IV Push once  dextrose 50% Injectable 25 Gram(s) IV Push once  folic acid 1 milliGRAM(s) Oral at bedtime  furosemide    Tablet 60 milliGRAM(s) Oral daily  gabapentin 300 milliGRAM(s) Oral three times a day  glucagon  Injectable 1 milliGRAM(s) IntraMuscular once  glucagon  Injectable 1 milliGRAM(s) IntraMuscular once  insulin glargine Injectable (LANTUS) 10 Unit(s) SubCutaneous at bedtime  insulin lispro (ADMELOG) corrective regimen sliding scale   SubCutaneous three times a day before meals  insulin lispro (ADMELOG) corrective regimen sliding scale   SubCutaneous at bedtime  metoprolol tartrate 25 milliGRAM(s) Oral two times a day  PARoxetine 20 milliGRAM(s) Oral daily  predniSONE   Tablet 5 milliGRAM(s) Oral daily  sacubitril 24 mG/valsartan 26 mG 1 Tablet(s) Oral two times a day  sodium chloride 0.9%. 1000 milliLiter(s) (65 mL/Hr) IV Continuous <Continuous>  tiotropium 18 MICROgram(s) Capsule 1 Capsule(s) Inhalation daily  vancomycin  IVPB 1000 milliGRAM(s) IV Intermittent <User Schedule>    MEDICATIONS  (PRN):  acetaminophen     Tablet .. 650 milliGRAM(s) Oral every 6 hours PRN Temp greater or equal to 38C (100.4F), Mild Pain (1 - 3)  ALBUTerol    90 MICROgram(s) HFA Inhaler 1 Puff(s) Inhalation every 6 hours PRN Shortness of Breath  aluminum hydroxide/magnesium hydroxide/simethicone Suspension 30 milliLiter(s) Oral every 4 hours PRN Dyspepsia  hydrALAZINE Injectable 10 milliGRAM(s) IV Push every 6 hours PRN SBP > 160  HYDROmorphone  Injectable 0.5 milliGRAM(s) IV Push every 3 hours PRN Moderate Pain (4 - 6)  melatonin 3 milliGRAM(s) Oral at bedtime PRN Insomnia  ondansetron Injectable 4 milliGRAM(s) IV Push every 8 hours PRN Nausea and/or Vomiting  oxycodone    5 mG/acetaminophen 325 mG 1 Tablet(s) Oral every 4 hours PRN Moderate Pain (4 - 6)      PHYSICAL EXAM: Nonpalpable DP/PT pulses, hair absent temp grad reversed R.L cap fill delayed. Muted DTRs.STRs, bilat LE. R lower leg post aspect with necrotic superficial wound, R medial malleolus with necrotic, foul smelling silver dollar ulcer / low grade cellulitis.       Constitutional:See HPI    Eyes:clear moist    ENMT:no tinnitus    Neck:no DJV    Breasts:defer    Back:no LBP    Respiratory:no cough    Cardiovascular:no SOB    Gastrointestinal:No nausea    Genitourinary:no frequency    Rectal:defer    Extremities: R LE rest pain    Vascular:PVD LE    Neurological:muted sensorium    Skin: necrotic ulcers R LE    Lymph Nodes:no    Musculoskeletal:weakness    Psychiatric:?        RADIOLOGY    CBC Full  -  ( 20 Jan 2022 05:51 )  WBC Count : 8.99 K/uL  RBC Count : 2.38 M/uL  Hemoglobin : 7.1 g/dL  Hematocrit : 24.1 %  Platelet Count - Automated : 278 K/uL  Mean Cell Volume : 101.3 fl  Mean Cell Hemoglobin : 29.8 pg  Mean Cell Hemoglobin Concentration : 29.5 gm/dL  Auto Neutrophil # : 5.40 K/uL  Auto Lymphocyte # : 2.12 K/uL  Auto Monocyte # : 1.00 K/uL  Auto Eosinophil # : 0.39 K/uL  Auto Basophil # : 0.03 K/uL  Auto Neutrophil % : 60.1 %  Auto Lymphocyte % : 23.6 %  Auto Monocyte % : 11.1 %  Auto Eosinophil % : 4.3 %  Auto Basophil % : 0.3 %      01-20    141  |  114<H>  |  57<H>  ----------------------------<  177<H>  5.7<H>   |  23  |  1.17    Ca    8.5      20 Jan 2022 05:51  Phos  4.1     01-20  Mg     2.4     01-20    TPro  7.0  /  Alb  2.2<L>  /  TBili  0.2  /  DBili  x   /  AST  10<L>  /  ALT  10<L>  /  AlkPhos  90  01-19      < from: MR Ankle No Cont, Right (01.19.22 @ 13:26) >    ACC: [id number] EXAM:  MR ANKLE RT                          PROCEDURE DATE:  01/19/2022          INTERPRETATION:  MR ANKLE RIGHT dated 1/19/2022 1:26 PM    INDICATION: Pain and swelling    COMPARISON: Ankle radiographs dated 1/18/2022    TECHNIQUE: Multi-sequential, multiplanar MRI imaging of the right lower   extremity was performed per standard protocol.    FINDINGS:    BONE MARROW: There is T1 signal hypointensity and marrow edema along the   medial malleolus and the medial tibial metaphysis. There is an   osteochondral injury at the medial talar dome measuring 0.6 cm.   Susceptibility artifact along the fibula from lateral plate and screw   hardware noted. No fracture is identified.  SYNOVIUM/ JOINT FLUID: Small fluid in the tibiotalar joint space.  TENDONS: Mild tendinosis at the insertion the Achilles tendon. The   remaining tendons are intact.  MUSCLES: Severe atrophy of the musculature  NEUROVASCULAR STRUCTURES: Preserved  SUBCUTANEOUS SOFT TISSUES: Soft tissue swelling with skin wound at the   medial ankle. The skin wound measures approximately 2.3 cm. There is   subjacent edema. No drainable fluid collection.    IMPRESSION:    1.  Medial ankle skin wound with subjacent edema and soft tissue swelling   compatible cellulitis. No drainable fluid collection.  2.  Abnormal marrow signal within the medial malleolus and medial tibia   concerning osteomyelitis given the overlying skin wound.  3.  Small ankle joint effusion. Nonspecific finding. Aspiration can be   performed for further evaluation.    --- End of Report ---            NATHAN LESLIE MD; Attending Radiologist  This document has been electronically signed. Jan 19 2022  2:07PM    < end of copied text >  Culture - Blood (01.18.22 @ 21:04)   Specimen Source: .Blood None   Culture Results:   No growth to date.     < from: Xray Ankle Complete 3 Views, Right (01.18.22 @ 21:59) >  ACC: [id number] EXAM:  XR ANKLE COMP MIN 3 VIEWS RT                        ACC: [id number] EXAM:  XR CHEST 1 VIEW                          PROCEDURE DATE:  01/18/2022          INTERPRETATION:  Chest and right ankle. Patient has an ankle wound.    AP chest on January 18, 2022 at 9:48 PM.    Heart normal for projection.    Lungs are clear.    Chest is similar to October 19, 2021.    Right ankle. 3 views.    Plate and screw for a now healed distal fibular fracture noted with good   alignment.    Thereis degeneration of the medial malleolar tip.    There appears to be disuse osteoporosis around the ankle joint increased   from October 19, 2021.    No definitive signs of bone destruction or acute fracture.    IMPRESSION: Disuse osteoporosis. Healedfracture distal fibula. Mild   ankle degeneration. Clear lungs.    --- End of Report ---            WHITNEY DIETZ MD; Attending Radiologist  This document has been electronically signed. Jan 19 2022  2:03PM    < end of copied text >

## 2022-01-20 NOTE — PROGRESS NOTE ADULT - ASSESSMENT
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   -Continue IV vancomycin and cefepime as per ID  -US and MRI findings noted  -ID/Vascular/Podiatry/Ortho consult appreciated  -Tentative Angio today with vascular.   -Pain control/Bowel Regiment    # Hypertension   - Hydralazine PRN for SBP > 160   - c/w home anti-HTN agents     # Acute on chr anemia   - S/P 2 unit s of PRBCs in am  -Follow H/H    #. Acute kidney injury   #. Hyperkalemia .   Resolving   Continue to follow.   - Strict I+Os, avoid nephrotoxic medications       # History of COPD, CAD, Type 2 DM on insulin, depression, CHFrEF, HTN, PVD, PAD, s/p left femoral popliteal bypass 3/24/2021, left thigh infections s/p debridement and muscle flap, ICN, lower extremity DVT on Eliquis, RA   - c/w home medications     DVT ppx: Eliquis   Code status: Full code (pt agrees to chest compressions and intubation if required).

## 2022-01-21 LAB
ANION GAP SERPL CALC-SCNC: 4 MMOL/L — LOW (ref 5–17)
ANION GAP SERPL CALC-SCNC: 5 MMOL/L — SIGNIFICANT CHANGE UP (ref 5–17)
BUN SERPL-MCNC: 50 MG/DL — HIGH (ref 7–23)
BUN SERPL-MCNC: 54 MG/DL — HIGH (ref 7–23)
CALCIUM SERPL-MCNC: 8.4 MG/DL — LOW (ref 8.5–10.1)
CALCIUM SERPL-MCNC: 8.7 MG/DL — SIGNIFICANT CHANGE UP (ref 8.5–10.1)
CHLORIDE SERPL-SCNC: 114 MMOL/L — HIGH (ref 96–108)
CHLORIDE SERPL-SCNC: 114 MMOL/L — HIGH (ref 96–108)
CO2 SERPL-SCNC: 22 MMOL/L — SIGNIFICANT CHANGE UP (ref 22–31)
CO2 SERPL-SCNC: 22 MMOL/L — SIGNIFICANT CHANGE UP (ref 22–31)
CREAT SERPL-MCNC: 1.19 MG/DL — SIGNIFICANT CHANGE UP (ref 0.5–1.3)
CREAT SERPL-MCNC: 1.37 MG/DL — HIGH (ref 0.5–1.3)
GLUCOSE SERPL-MCNC: 179 MG/DL — HIGH (ref 70–99)
GLUCOSE SERPL-MCNC: 249 MG/DL — HIGH (ref 70–99)
POTASSIUM SERPL-MCNC: 5.1 MMOL/L — SIGNIFICANT CHANGE UP (ref 3.5–5.3)
POTASSIUM SERPL-MCNC: 5.3 MMOL/L — SIGNIFICANT CHANGE UP (ref 3.5–5.3)
POTASSIUM SERPL-SCNC: 5.1 MMOL/L — SIGNIFICANT CHANGE UP (ref 3.5–5.3)
POTASSIUM SERPL-SCNC: 5.3 MMOL/L — SIGNIFICANT CHANGE UP (ref 3.5–5.3)
SODIUM SERPL-SCNC: 140 MMOL/L — SIGNIFICANT CHANGE UP (ref 135–145)
SODIUM SERPL-SCNC: 141 MMOL/L — SIGNIFICANT CHANGE UP (ref 135–145)

## 2022-01-21 PROCEDURE — 99221 1ST HOSP IP/OBS SF/LOW 40: CPT

## 2022-01-21 PROCEDURE — 99233 SBSQ HOSP IP/OBS HIGH 50: CPT

## 2022-01-21 RX ORDER — SODIUM HYPOCHLORITE 0.125 %
1 SOLUTION, NON-ORAL MISCELLANEOUS EVERY 8 HOURS
Refills: 0 | Status: DISCONTINUED | OUTPATIENT
Start: 2022-01-21 | End: 2022-01-25

## 2022-01-21 RX ADMIN — HYDROMORPHONE HYDROCHLORIDE 0.5 MILLIGRAM(S): 2 INJECTION INTRAMUSCULAR; INTRAVENOUS; SUBCUTANEOUS at 08:44

## 2022-01-21 RX ADMIN — Medication 6: at 12:22

## 2022-01-21 RX ADMIN — Medication 5 MILLIGRAM(S): at 10:07

## 2022-01-21 RX ADMIN — ONDANSETRON 4 MILLIGRAM(S): 8 TABLET, FILM COATED ORAL at 05:28

## 2022-01-21 RX ADMIN — Medication 1 APPLICATION(S): at 21:10

## 2022-01-21 RX ADMIN — Medication 20 MILLIGRAM(S): at 10:07

## 2022-01-21 RX ADMIN — Medication 1 MILLIGRAM(S): at 21:09

## 2022-01-21 RX ADMIN — Medication 25 MILLIGRAM(S): at 11:28

## 2022-01-21 RX ADMIN — OXYCODONE AND ACETAMINOPHEN 2 TABLET(S): 5; 325 TABLET ORAL at 20:26

## 2022-01-21 RX ADMIN — CEFEPIME 100 MILLIGRAM(S): 1 INJECTION, POWDER, FOR SOLUTION INTRAMUSCULAR; INTRAVENOUS at 10:07

## 2022-01-21 RX ADMIN — Medication 25 MILLIGRAM(S): at 21:08

## 2022-01-21 RX ADMIN — SACUBITRIL AND VALSARTAN 1 TABLET(S): 24; 26 TABLET, FILM COATED ORAL at 10:06

## 2022-01-21 RX ADMIN — APIXABAN 2.5 MILLIGRAM(S): 2.5 TABLET, FILM COATED ORAL at 08:56

## 2022-01-21 RX ADMIN — OXYCODONE AND ACETAMINOPHEN 2 TABLET(S): 5; 325 TABLET ORAL at 11:27

## 2022-01-21 RX ADMIN — INSULIN GLARGINE 10 UNIT(S): 100 INJECTION, SOLUTION SUBCUTANEOUS at 21:08

## 2022-01-21 RX ADMIN — GABAPENTIN 300 MILLIGRAM(S): 400 CAPSULE ORAL at 05:28

## 2022-01-21 RX ADMIN — Medication 2: at 08:44

## 2022-01-21 RX ADMIN — Medication 2: at 21:09

## 2022-01-21 RX ADMIN — GABAPENTIN 300 MILLIGRAM(S): 400 CAPSULE ORAL at 15:09

## 2022-01-21 RX ADMIN — HYDROMORPHONE HYDROCHLORIDE 0.5 MILLIGRAM(S): 2 INJECTION INTRAMUSCULAR; INTRAVENOUS; SUBCUTANEOUS at 09:01

## 2022-01-21 RX ADMIN — SACUBITRIL AND VALSARTAN 1 TABLET(S): 24; 26 TABLET, FILM COATED ORAL at 21:09

## 2022-01-21 RX ADMIN — CELECOXIB 200 MILLIGRAM(S): 200 CAPSULE ORAL at 10:07

## 2022-01-21 RX ADMIN — OXYCODONE AND ACETAMINOPHEN 2 TABLET(S): 5; 325 TABLET ORAL at 12:00

## 2022-01-21 RX ADMIN — Medication 81 MILLIGRAM(S): at 10:06

## 2022-01-21 RX ADMIN — OXYCODONE AND ACETAMINOPHEN 2 TABLET(S): 5; 325 TABLET ORAL at 19:38

## 2022-01-21 RX ADMIN — Medication 60 MILLIGRAM(S): at 10:06

## 2022-01-21 RX ADMIN — OXYCODONE AND ACETAMINOPHEN 1 TABLET(S): 5; 325 TABLET ORAL at 05:50

## 2022-01-21 RX ADMIN — APIXABAN 2.5 MILLIGRAM(S): 2.5 TABLET, FILM COATED ORAL at 19:38

## 2022-01-21 RX ADMIN — OXYCODONE AND ACETAMINOPHEN 2 TABLET(S): 5; 325 TABLET ORAL at 01:20

## 2022-01-21 RX ADMIN — Medication 250 MILLIGRAM(S): at 11:28

## 2022-01-21 RX ADMIN — Medication 2: at 16:10

## 2022-01-21 RX ADMIN — OXYCODONE AND ACETAMINOPHEN 2 TABLET(S): 5; 325 TABLET ORAL at 01:50

## 2022-01-21 RX ADMIN — CELECOXIB 200 MILLIGRAM(S): 200 CAPSULE ORAL at 11:27

## 2022-01-21 RX ADMIN — Medication 1 APPLICATION(S): at 15:10

## 2022-01-21 RX ADMIN — OXYCODONE AND ACETAMINOPHEN 1 TABLET(S): 5; 325 TABLET ORAL at 06:20

## 2022-01-21 RX ADMIN — TIOTROPIUM BROMIDE 1 CAPSULE(S): 18 CAPSULE ORAL; RESPIRATORY (INHALATION) at 08:36

## 2022-01-21 RX ADMIN — GABAPENTIN 300 MILLIGRAM(S): 400 CAPSULE ORAL at 21:08

## 2022-01-21 NOTE — PROGRESS NOTE ADULT - ASSESSMENT
87 y/o Female with h/o COPD, CAD, Type 2 DM on insulin, depression, CHFrEF, HTN, PVD, PAD, s/p left femoral popliteal bypass 3/24/2021, prior left thigh infections s/p debridement and muscle flap, PAT, lower extremity DVT on Eliquis, RA was admitted on 1/18 for lower leg nonhealing wounds. She first noticed the chronic wound on her right lower extremity about a month ago. She has been completing dressing changes and applying Xeroform. The wound is foul smelling. She has been unable to walk over the last 2 weeks due to increase pressure when placing the foot down. Hanging her lower extremity off the side of the bed helps improve her pain. She also reports occasional diarrhea and lower extremity swelling. Denies headaches, blurry vision, dizziness, fevers, chills. In ER she received cefepime and vancomycin IV.     1. RLE cellulitis with multiple open wound. Chronic LE stasis dermatitis. PVD s/p angioplasty.  -lower leg erythema  -mild leukocytosis  -BC x 2 noted  -on vancomycin 1 gm IV qd and cefepime 2 gm IV qd # 3  -tolerating abx well so far; no side effects noted  -obtain vancomycin trough level   -elevate legs  .local wound care  -continue abx coverage   -monitor temps  -f/u CBC  -supportive care  2. Other issues:   -care per medicine

## 2022-01-21 NOTE — PROGRESS NOTE ADULT - ASSESSMENT
A/P: 86F w/ R ankle cellulitis, OM     Medical management appreciated  Imaging revealing Increased signal intensity on MR indicating cellulitis and OM of the R ankle  Continue IV antibiotics per ID team  Vasc Sx following; s/p LLE angiogram yesterday - will speak with them this morning regarding procedure and if need to be done on right lower extremity as well  Analgesia PRN  WBAT RLE  Warm compress as tolerated  DVT ppx per vascular/primary team  Patient is stable and not currently septic  No plan for acute orthopedic surgical intervention tonight but with monitor closely for any changes that may require surgical intervention.   Patient will need improved Peripheral vascular function before any surgical intervention can be performed given poor ability to heal, will monitor progress w/ VSx intervention  Contact with major changes in clinical status which may indicate surgical intervention  Will discuss with attending and advise if any changes to plan

## 2022-01-21 NOTE — PROGRESS NOTE ADULT - SUBJECTIVE AND OBJECTIVE BOX
CHIEF COMPLAINT: Right ankle pain/Inability to bear weight RLE/Worsening Right ankle ulcer    85 y/o F with PMH COPD, CAD, Type 2 DM on insulin, depression, CHFrEF, HTN, PVD, PAD, s/p left femoral popliteal bypass 3/24/2021, left thigh infections s/p debridement and muscle flap, PAT, lower extremity DVT on Eliquis, RA presents with chronic wounds and pain to the right lower extremity for 2 weeks. She first noticed the chronic wound on her right lower extremity about a month ago. She has been completing dressing changes and applying Xeroform. The wound is foul smelling. She has been unable to walk over the last 2 weeks due to increase pressure when placing the foot down. Hanging her lower extremity off the side of the bed helps improve her pain. She also reports occasional diarrhea and lower extremity swelling. Denies headaches, blurry vision, dizziness, fevers, chills, chest pain, SOB, abdominal pain, N/V, diarrhea/constipation.    ER course: /48. Labs: WBC 10.87, Hb 7.9, .8, INR 1.17, K+ 6.1, BUN 61, Cr 1.49 (Baseline ~0.99), Glucose 448, Alkaline phosphatase 140, COVID negative. EKG: NSR with HR 64 bm, CT prolonged to 218 ms (1st degree AV block), QTC prolonged to 470 ms, LVH, T wave inversions in II, AVL, V4-V6, no ST segment changes present on prior EKG (personally reviewed).   Imaging:   - XR right ankle: concern for osteomyelitis, hardware present, no fracture or dislocation (personally reviewed).   - CXR: no consolidation, no effusion, no pneumothorax (personally reviewed).   The pt was given Cefepime and Vancomycin, 2L of NS, 10 units of Humalin, Calcium gluconate, Sodium bicarbonate, Lokelma, and Dilaudid in the ER. She is being admitted to med/surg for further management. "    01/21/22: Patient seen and examined. No new complaints.     Vital Signs Last 24 Hrs  T(C): 36.6 (21 Jan 2022 14:17), Max: 36.7 (21 Jan 2022 06:00)  T(F): 97.9 (21 Jan 2022 14:17), Max: 98 (21 Jan 2022 06:00)  HR: 61 (21 Jan 2022 14:00) (58 - 96)  BP: 115/60 (21 Jan 2022 14:00) (108/50 - 165/53)  BP(mean): 74 (21 Jan 2022 14:00) (66 - 104)  RR: 11 (21 Jan 2022 14:00) (9 - 18)  SpO2: 100% (21 Jan 2022 14:00) (95% - 100%)    General: AAOx3; NAD  Head: AT/NC  ENT: Moist Mucous Membranes; No Injury  Eyes: EOMI; PERRL  Neck: Non-tender; No JVD  CVS: RRR, S1&S2, Murmur +, LE Edema  Respiratory: Lungs CTA B/L; Normal Respiratory Effort  Abdomen/GI: Soft, non-tender, non-distended, no guarding, no rebound, normal bowel sounds  : No bladder distention, No Padilla  Extremities: No cyanosis, No clubbing, LE edema. Diminished pulses bilaterally; Unstageable medial ankle/malelous venous vs arterial ulcer with purulent discharge. Surrounding erythema and edema.   MSK: No CVA tenderness, Normal ROM, No injury  Neuro: AAOx3, CNII-XII grossly intact, non-focal  Psych: Appropriate, Cooperative,  Skin: As described in extremities.       Home Medications:  albuterol 90 mcg/inh inhalation aerosol: 1 puff(s) inhaled every 6 hours, As needed, Shortness of Breath (19 Jan 2022 04:25)  aspirin 81 mg oral tablet, chewable: 1 tab(s) orally once a day (19 Jan 2022 04:25)  Eliquis 2.5 mg oral tablet: 1 tab(s) orally 2 times a day (19 Jan 2022 04:25)  folic acid 1 mg oral tablet: 1 tab(s) orally once a day (at bedtime) (19 Jan 2022 04:25)  gabapentin 300 mg oral capsule: 1 cap(s) orally 3 times a day (19 Jan 2022 04:25)  ipratropium-albuterol 0.5 mg-2.5 mg/3 mLinhalation solution: 3 milliliter(s) inhaled every 4 hours, As needed, Shortness of Breath and/or Wheezing (19 Jan 2022 04:25)  Lantus 100 units/mL subcutaneous solution: 10  subcutaneous once a day (at bedtime) (19 Jan 2022 04:25)  Lasix 20 mg oral tablet: 3 tab(s) orally once a day (19 Jan 2022 04:25)  meloxicam 15 mg oral tablet: 1 tab(s) orally once a day (19 Jan 2022 04:25)  methotrexate 2.5 mg oral tablet: 6 tab(s) orally once a week (19 Jan 2022 04:25)  metoprolol tartrate 25 mg oral tablet: 1 tab(s) orally 2 times a day (19 Jan 2022 04:25)  PARoxetine 20 mg oral tablet: 1 tab(s) orally once a day (19 Jan 2022 04:25)    MEDICATIONS  (STANDING):  apixaban 2.5 milliGRAM(s) Oral two times a day  aspirin  chewable 81 milliGRAM(s) Oral daily  cefepime   IVPB 2000 milliGRAM(s) IV Intermittent every 24 hours  celecoxib 200 milliGRAM(s) Oral daily  Dakins Solution - 1/4 Strength 1 Application(s) Topical every 8 hours  folic acid 1 milliGRAM(s) Oral at bedtime  furosemide    Tablet 60 milliGRAM(s) Oral daily  gabapentin 300 milliGRAM(s) Oral three times a day  glucagon  Injectable 1 milliGRAM(s) IntraMuscular once  glucagon  Injectable 1 milliGRAM(s) IntraMuscular once  insulin glargine Injectable (LANTUS) 10 Unit(s) SubCutaneous at bedtime  insulin lispro (ADMELOG) corrective regimen sliding scale   SubCutaneous three times a day before meals  insulin lispro (ADMELOG) corrective regimen sliding scale   SubCutaneous at bedtime  metoprolol tartrate 25 milliGRAM(s) Oral two times a day  PARoxetine 20 milliGRAM(s) Oral daily  predniSONE   Tablet 5 milliGRAM(s) Oral daily  sacubitril 24 mG/valsartan 26 mG 1 Tablet(s) Oral two times a day  tiotropium 18 MICROgram(s) Capsule 1 Capsule(s) Inhalation daily  vancomycin  IVPB 1000 milliGRAM(s) IV Intermittent <User Schedule>        LABS:                            8.6    11.83 )-----------( 290      ( 21 Jan 2022 06:36 )             28.7   01-21    140  |  114<H>  |  54<H>  ----------------------------<  249<H>  5.3   |  22  |  1.37<H>    Ca    8.4<L>      21 Jan 2022 11:58  Phos  4.1     01-20  Mg     2.4     01-20                  TTE Echo Complete w/o Contrast w/ Doppler (05.11.21 @ 10:31) >   Mild left ventricular enlargement with with moderate, segmental systolic   dysfunction. The apex and mid to apical anteroseptum and inferoseptum   appear hypokinetic. Estimated left ventricular ejection fraction is 40%.   Mild diastolic dysfunction (stage I).   Moderate mitral stenosis.   Mild aortic regurgitation.

## 2022-01-21 NOTE — PROGRESS NOTE ADULT - SUBJECTIVE AND OBJECTIVE BOX
Date of service: 01-21-22 @ 14:54    Lying in bed in NAD  s/p vascular procedure  Lower legs are warm  Denies pain    ROS: no fever or chills; denies dizziness, no HA, no SOB or cough, no abdominal pain, no diarrhea or constipation; no dysuria    MEDICATIONS  (STANDING):  apixaban 2.5 milliGRAM(s) Oral two times a day  aspirin  chewable 81 milliGRAM(s) Oral daily  cefepime   IVPB 2000 milliGRAM(s) IV Intermittent every 24 hours  celecoxib 200 milliGRAM(s) Oral daily  Dakins Solution - 1/4 Strength 1 Application(s) Topical every 8 hours  dextrose 40% Gel 15 Gram(s) Oral once  dextrose 40% Gel 15 Gram(s) Oral once  dextrose 5%. 1000 milliLiter(s) (50 mL/Hr) IV Continuous <Continuous>  dextrose 5%. 1000 milliLiter(s) (100 mL/Hr) IV Continuous <Continuous>  dextrose 5%. 1000 milliLiter(s) (50 mL/Hr) IV Continuous <Continuous>  dextrose 5%. 1000 milliLiter(s) (100 mL/Hr) IV Continuous <Continuous>  dextrose 50% Injectable 25 Gram(s) IV Push once  dextrose 50% Injectable 12.5 Gram(s) IV Push once  dextrose 50% Injectable 25 Gram(s) IV Push once  dextrose 50% Injectable 25 Gram(s) IV Push once  dextrose 50% Injectable 12.5 Gram(s) IV Push once  dextrose 50% Injectable 25 Gram(s) IV Push once  folic acid 1 milliGRAM(s) Oral at bedtime  furosemide    Tablet 60 milliGRAM(s) Oral daily  gabapentin 300 milliGRAM(s) Oral three times a day  glucagon  Injectable 1 milliGRAM(s) IntraMuscular once  glucagon  Injectable 1 milliGRAM(s) IntraMuscular once  insulin glargine Injectable (LANTUS) 10 Unit(s) SubCutaneous at bedtime  insulin lispro (ADMELOG) corrective regimen sliding scale   SubCutaneous three times a day before meals  insulin lispro (ADMELOG) corrective regimen sliding scale   SubCutaneous at bedtime  metoprolol tartrate 25 milliGRAM(s) Oral two times a day  PARoxetine 20 milliGRAM(s) Oral daily  predniSONE   Tablet 5 milliGRAM(s) Oral daily  sacubitril 24 mG/valsartan 26 mG 1 Tablet(s) Oral two times a day  tiotropium 18 MICROgram(s) Capsule 1 Capsule(s) Inhalation daily  vancomycin  IVPB 1000 milliGRAM(s) IV Intermittent <User Schedule>    Vital Signs Last 24 Hrs  T(C): 36.6 (21 Jan 2022 14:17), Max: 36.7 (21 Jan 2022 06:00)  T(F): 97.9 (21 Jan 2022 14:17), Max: 98 (21 Jan 2022 06:00)  HR: 61 (21 Jan 2022 14:00) (58 - 96)  BP: 115/60 (21 Jan 2022 14:00) (108/50 - 165/53)  BP(mean): 74 (21 Jan 2022 14:00) (66 - 104)  RR: 11 (21 Jan 2022 14:00) (9 - 18)  SpO2: 100% (21 Jan 2022 14:00) (95% - 100%)     Physical exam:    Constitutional:  No acute distress  HEENT: NC/AT, EOMI, PERRLA, conjunctivae clear; ears and nose atraumatic  Neck: supple; thyroid not palpable  Back: no tenderness  Respiratory: respiratory effort normal; clear to auscultation  Cardiovascular: S1S2 regular, no murmurs  Abdomen: soft, not tender, not distended, positive BS  Genitourinary: no suprapubic tenderness  Lymphatic: no LN palpable  Musculoskeletal: no muscle tenderness, no joint swelling or tenderness  Extremities: 2+ pedal edema  Right lower extremity wound at the medial malleolus and posterior lower extremity; odor is improved; surrounding erythema.  Neurological/ Psychiatric: AxOx3, judgement and insight normal; moving all extremities  Skin: no rashes; no palpable lesions    Labs: reviewed                        8.6    11.83 )-----------( 290      ( 21 Jan 2022 06:36 )             28.7     01-21    140  |  114<H>  |  54<H>  ----------------------------<  249<H>  5.3   |  22  |  1.37<H>    Ca    8.4<L>      21 Jan 2022 11:58  Phos  4.1     01-20  Mg     2.4     01-20      C-Reactive Protein, Serum: 55 mg/L (01-19-22 @ 01:18)                        7.1    8.99  )-----------( 278      ( 20 Jan 2022 05:51 )             24.1     01-20    141  |  114<H>  |  57<H>  ----------------------------<  177<H>  5.7<H>   |  23  |  1.17    Ca    8.5      20 Jan 2022 05:51  Phos  4.1     01-20  Mg     2.4     01-20    TPro  7.0  /  Alb  2.2<L>  /  TBili  0.2  /  DBili  x   /  AST  10<L>  /  ALT  10<L>  /  AlkPhos  90  01-19    C-Reactive Protein, Serum: 55 mg/L (01-19-22 @ 01:18)                        7.9    10.64 )-----------( 302      ( 19 Jan 2022 07:31 )             26.8     01-19    137  |  109<H>  |  51<H>  ----------------------------<  137<H>  5.2   |  23  |  1.16    Ca    9.4      19 Jan 2022 07:31    TPro  7.0  /  Alb  2.2<L>  /  TBili  0.2  /  DBili  x   /  AST  10<L>  /  ALT  10<L>  /  AlkPhos  90  01-19     LIVER FUNCTIONS - ( 19 Jan 2022 07:31 )  Alb: 2.2 g/dL / Pro: 7.0 gm/dL / ALK PHOS: 90 U/L / ALT: 10 U/L / AST: 10 U/L / GGT: x             Culture - Blood (collected 18 Jan 2022 21:04)  Source: .Blood None  Preliminary Report (20 Jan 2022 05:01):    No growth to date.    Culture - Blood (collected 18 Jan 2022 21:04)  Source: .Blood None  Preliminary Report (20 Jan 2022 05:01):    No growth to date.    COVID-19 PCR: NotDetec (01-18-22 @ 21:04)    Radiology: all available radiological tests reviewed    XR right ankle: concern for osteomyelitis, hardware present, no fracture or dislocation  CXR: no consolidation, no effusion, no pneumothorax (personally reviewed).     Advanced directives addressed: full resuscitation

## 2022-01-21 NOTE — PROGRESS NOTE ADULT - SUBJECTIVE AND OBJECTIVE BOX
afebrile, VSS    HCT 28 after transfusion    still has intermittent R foot pain but she is improved c.f. prior to procedure    PE  no L groin hematom  R foot warm, adequately perfused appearing;  cap refill ~ 2 s    A/P  improved perfusion R foot  continue Plavix, Eliquis  angio demonstrated patent L external iliac to popliteal bypass graft ( I believed it to be occluded based on no palpable pedal pulse despite her absent rest pain)  OOB  stop IV fluids  local wound care to R medial malleolar area      MEDICATIONS  (STANDING):  apixaban 2.5 milliGRAM(s) Oral two times a day  aspirin  chewable 81 milliGRAM(s) Oral daily  cefepime   IVPB 2000 milliGRAM(s) IV Intermittent every 24 hours  celecoxib 200 milliGRAM(s) Oral daily  dextrose 40% Gel 15 Gram(s) Oral once  dextrose 40% Gel 15 Gram(s) Oral once  dextrose 5%. 1000 milliLiter(s) (50 mL/Hr) IV Continuous <Continuous>  dextrose 5%. 1000 milliLiter(s) (100 mL/Hr) IV Continuous <Continuous>  dextrose 5%. 1000 milliLiter(s) (100 mL/Hr) IV Continuous <Continuous>  dextrose 5%. 1000 milliLiter(s) (50 mL/Hr) IV Continuous <Continuous>  dextrose 50% Injectable 25 Gram(s) IV Push once  dextrose 50% Injectable 12.5 Gram(s) IV Push once  dextrose 50% Injectable 25 Gram(s) IV Push once  dextrose 50% Injectable 25 Gram(s) IV Push once  dextrose 50% Injectable 12.5 Gram(s) IV Push once  dextrose 50% Injectable 25 Gram(s) IV Push once  folic acid 1 milliGRAM(s) Oral at bedtime  furosemide    Tablet 60 milliGRAM(s) Oral daily  gabapentin 300 milliGRAM(s) Oral three times a day  glucagon  Injectable 1 milliGRAM(s) IntraMuscular once  glucagon  Injectable 1 milliGRAM(s) IntraMuscular once  insulin glargine Injectable (LANTUS) 10 Unit(s) SubCutaneous at bedtime  insulin lispro (ADMELOG) corrective regimen sliding scale   SubCutaneous three times a day before meals  insulin lispro (ADMELOG) corrective regimen sliding scale   SubCutaneous at bedtime  metoprolol tartrate 25 milliGRAM(s) Oral two times a day  PARoxetine 20 milliGRAM(s) Oral daily  predniSONE   Tablet 5 milliGRAM(s) Oral daily  sacubitril 24 mG/valsartan 26 mG 1 Tablet(s) Oral two times a day  sodium chloride 0.9%. 1000 milliLiter(s) (70 mL/Hr) IV Continuous <Continuous>  tiotropium 18 MICROgram(s) Capsule 1 Capsule(s) Inhalation daily  vancomycin  IVPB 1000 milliGRAM(s) IV Intermittent <User Schedule>    MEDICATIONS  (PRN):  acetaminophen     Tablet .. 650 milliGRAM(s) Oral every 6 hours PRN Temp greater or equal to 38C (100.4F), Mild Pain (1 - 3)  ALBUTerol    90 MICROgram(s) HFA Inhaler 1 Puff(s) Inhalation every 6 hours PRN Shortness of Breath  aluminum hydroxide/magnesium hydroxide/simethicone Suspension 30 milliLiter(s) Oral every 4 hours PRN Dyspepsia  hydrALAZINE Injectable 10 milliGRAM(s) IV Push every 6 hours PRN SBP > 160  HYDROmorphone  Injectable 0.5 milliGRAM(s) IV Push every 3 hours PRN Moderate Pain (4 - 6)  melatonin 3 milliGRAM(s) Oral at bedtime PRN Insomnia  morphine  - Injectable 5 milliGRAM(s) IV Push every 3 hours PRN Severe Pain (7 - 10)  ondansetron Injectable 4 milliGRAM(s) IV Push every 8 hours PRN Nausea and/or Vomiting  oxycodone    5 mG/acetaminophen 325 mG 1 Tablet(s) Oral every 4 hours PRN Moderate Pain (4 - 6)  oxycodone    5 mG/acetaminophen 325 mG 2 Tablet(s) Oral every 4 hours PRN Severe Pain (7 - 10)      Allergies    cephalosporins (Other)  penicillins (Urticaria; Pruritus)    Intolerances        Flatus: [ ] YES [ ] NO             Bowel Movement: [ ] YES [ ] NO  Pain (0-10):            Pain Control Adequate: [ ] YES [ ] NO  Nausea: [ ] YES [ ] NO            Vomiting: [ ] YES [ ] NO  Diarrhea: [ ] YES [ ] NO         Constipation: [ ] YES [ ] NO     Chest Pain: [ ] YES [ ] NO    SOB:  [ ] YES [ ] NO    Vital Signs Last 24 Hrs  T(C): 36.7 (21 Jan 2022 06:00), Max: 36.7 (21 Jan 2022 06:00)  T(F): 98 (21 Jan 2022 06:00), Max: 98 (21 Jan 2022 06:00)  HR: 64 (21 Jan 2022 06:00) (58 - 70)  BP: 134/69 (21 Jan 2022 06:00) (107/90 - 165/53)  BP(mean): 88 (21 Jan 2022 06:00) (66 - 104)  RR: 12 (21 Jan 2022 06:00) (9 - 18)  SpO2: 95% (21 Jan 2022 06:00) (95% - 100%)    I&O's Summary    20 Jan 2022 07:01  -  21 Jan 2022 07:00  --------------------------------------------------------  IN: 360 mL / OUT: 1175 mL / NET: -815 mL        Physical Exam:  General: NAD, resting comfortably  Pulmonary: normal resp effort, CTA-B  Cardiovascular: NSR  Abdominal: soft, NT/ND  Extremities: WWP, normal strength  Neuro: A/O x 3, CNs II-XII grossly intact, normal motor/sensation, no focal deficits  Pulses:   Right:                                                                          Left:  FEM [ ]2+ [ ]1+ [ ]doppler                                             FEM [ ]2+ [ ]1+ [ ]doppler    POP [ ]2+ [ ]1+ [ ]doppler                                             POP [ ]2+ [ ]1+ [ ]doppler    DP [ ]2+ [ ]1+ [ ]doppler                                                DP [ ]2+ [ ]1+ [ ]doppler  PT[ ]2+ [ ]1+ [ ]doppler                                                  PT [ ]2+ [ ]1+ [ ]doppler    LABS:                        8.6    11.83 )-----------( 290      ( 21 Jan 2022 06:36 )             28.7     01-21    141  |  114<H>  |  50<H>  ----------------------------<  179<H>  5.1   |  22  |  1.19    Ca    8.7      21 Jan 2022 06:36  Phos  4.1     01-20  Mg     2.4     01-20      PT/INR - ( 20 Jan 2022 05:51 )   PT: 12.7 sec;   INR: 1.10 ratio         PTT - ( 20 Jan 2022 05:51 )  PTT:31.4 sec      CAPILLARY BLOOD GLUCOSE      POCT Blood Glucose.: 213 mg/dL (20 Jan 2022 21:59)  POCT Blood Glucose.: 126 mg/dL (20 Jan 2022 12:16)      RADIOLOGY & ADDITIONAL TESTS:

## 2022-01-21 NOTE — PROGRESS NOTE ADULT - ASSESSMENT
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   -Continue IV vancomycin and cefepime as per ID  -US and MRI findings noted  -ID/Vascular/Podiatry/Ortho consult appreciated  -     # Hypertension   - Hydralazine PRN for SBP > 160   - c/w home anti-HTN agents     # borderline K repeat AM labs    # Acute on chr anemia   - S/P 2 unit s of PRBCs in am  -Follow H/H        # History of COPD, CAD, Type 2 DM on insulin, depression, CHFrEF, HTN, PVD, PAD, s/p left femoral popliteal bypass 3/24/2021, left thigh infections s/p debridement and muscle flap, ICN, lower extremity DVT on Eliquis, RA   - c/w home medications   monitor glucose might need higher NEWSOME dose    DVT ppx: Eliquis   Code status: Full code (pt agrees to chest compressions and intubation if required).         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

## 2022-01-21 NOTE — PROVIDER CONTACT NOTE (OTHER) - SITUATION
Tele MD office left message on answering machine regarding routine consult.
Tele Ortho resident.  Already aware of consult.
Spoke with office to inform dr that patient is in [facility].  Please fax discharge papers to [phone]
notified Dr Noble's office of admission please fax over discharge paperwork once patient is discharged to [phone]

## 2022-01-21 NOTE — PROGRESS NOTE ADULT - SUBJECTIVE AND OBJECTIVE BOX
Orthopedics     Pt seen and examined at the bedside. Pain is unchanged from exam last night, no overnight fevers/chills. Patient underwent angiogram with vascular surgery on left lower extremity yesterday.    Vital Signs Last 24 Hrs  T(C): 36.7 (21 Jan 2022 06:00), Max: 36.7 (21 Jan 2022 06:00)  T(F): 98 (21 Jan 2022 06:00), Max: 98 (21 Jan 2022 06:00)  HR: 64 (21 Jan 2022 06:00) (58 - 70)  BP: 134/69 (21 Jan 2022 06:00) (107/90 - 165/53)  BP(mean): 88 (21 Jan 2022 06:00) (66 - 104)  RR: 12 (21 Jan 2022 06:00) (9 - 18)  SpO2: 95% (21 Jan 2022 06:00) (95% - 100%)                          7.1    8.99  )-----------( 278      ( 20 Jan 2022 05:51 )             24.1       Exam:  Gen: NAD, Resting comfortably    RLE:  Skin with medial mall ulcer with purulent drainage, ulcer over posterior ankle with purulent drainage.   TTP diffusely on ankle  Painful A/PROm of ankle, Minimal pain with micromotion of ankle.  +EHL/FHL/TA/GSC  +SILT L3-S1  + DP  Compartments soft and compressible  No calf tenderness

## 2022-01-21 NOTE — ADVANCED PRACTICE NURSE CONSULT - ASSESSMENT
This is a 86 year old female admitted to the hospital on 1/18/2022 for hyperkalemia: PMH: DVT LE, CHF, PAD, PVD, DM-2, Depression, Arthritis Anemia, HTN. Consulted to assess patients wounds on right LE, Left Heel, and Sacrum. Right Medial Malleolus measures 3.5cm x 2.5cm x 100% of yellow to tannish firmly attached slough malodor with carlitos wound skin scaling and dry and hemosiderin staining.     Posterior Right LE lower Calf measures 5cm x 4cm x 0.2cm with 75% yellow slough firmly attached with 25% of pink   granulation tissue. Carlitos Wound is fragile, dry scaly and with hemosiderin staining. Sacrum  This is a 86 year old female admitted to the hospital on 1/18/2022 for hyperkalemia: PMH: DVT LE, CHF, PAD, PVD, DM-2, Depression, Arthritis Anemia, HTN. Consulted to assess patients wounds on right LE, Left Heel, and Sacrum. Right Medial Malleolus measures 3.5cm x 2.5cm x 100% of yellow to tannish firmly attached slough malodor with carlitos wound skin scaling and dry and hemosiderin staining.     Posterior Right LE lower Calf measures 5cm x 4cm x 0.2cm with 75% yellow slough firmly attached with 25% of pink   granulation tissue. Carlitos Wound is fragile, dry scaly and with hemosiderin staining. Sacrum measures 2cm x 2.5cm x 0.5cm with red granulation 100% wound bed. Noted maceration  on periwound. Applied Triad cream. Continue to turn and position every two hours. No Diapering. Apply PrimaFit and change every 6-12 hours and prn if soiled with stool. Left Heel with 0.2cm x 0.2cmx 0.2cm with pink wound base. Applied Foam and Heel Foam boots on Bilateral Heels.

## 2022-01-22 LAB
ANION GAP SERPL CALC-SCNC: 3 MMOL/L — LOW (ref 5–17)
BUN SERPL-MCNC: 57 MG/DL — HIGH (ref 7–23)
CALCIUM SERPL-MCNC: 8.8 MG/DL — SIGNIFICANT CHANGE UP (ref 8.5–10.1)
CHLORIDE SERPL-SCNC: 115 MMOL/L — HIGH (ref 96–108)
CO2 SERPL-SCNC: 20 MMOL/L — LOW (ref 22–31)
CREAT SERPL-MCNC: 1.44 MG/DL — HIGH (ref 0.5–1.3)
GLUCOSE SERPL-MCNC: 151 MG/DL — HIGH (ref 70–99)
HCT VFR BLD CALC: 27.6 % — LOW (ref 34.5–45)
HGB BLD-MCNC: 7.9 G/DL — LOW (ref 11.5–15.5)
MCHC RBC-ENTMCNC: 28.6 GM/DL — LOW (ref 32–36)
MCHC RBC-ENTMCNC: 29.3 PG — SIGNIFICANT CHANGE UP (ref 27–34)
MCV RBC AUTO: 102.2 FL — HIGH (ref 80–100)
PLATELET # BLD AUTO: 257 K/UL — SIGNIFICANT CHANGE UP (ref 150–400)
POTASSIUM SERPL-MCNC: 5.8 MMOL/L — HIGH (ref 3.5–5.3)
POTASSIUM SERPL-SCNC: 5.8 MMOL/L — HIGH (ref 3.5–5.3)
RBC # BLD: 2.7 M/UL — LOW (ref 3.8–5.2)
RBC # FLD: 15.4 % — HIGH (ref 10.3–14.5)
SODIUM SERPL-SCNC: 138 MMOL/L — SIGNIFICANT CHANGE UP (ref 135–145)
VANCOMYCIN TROUGH SERPL-MCNC: 23.1 UG/ML — HIGH (ref 10–20)
WBC # BLD: 10.9 K/UL — HIGH (ref 3.8–10.5)
WBC # FLD AUTO: 10.9 K/UL — HIGH (ref 3.8–10.5)

## 2022-01-22 PROCEDURE — 99233 SBSQ HOSP IP/OBS HIGH 50: CPT

## 2022-01-22 RX ORDER — SENNA PLUS 8.6 MG/1
2 TABLET ORAL AT BEDTIME
Refills: 0 | Status: DISCONTINUED | OUTPATIENT
Start: 2022-01-22 | End: 2022-01-25

## 2022-01-22 RX ORDER — SODIUM POLYSTYRENE SULFONATE 4.1 MEQ/G
30 POWDER, FOR SUSPENSION ORAL ONCE
Refills: 0 | Status: COMPLETED | OUTPATIENT
Start: 2022-01-22 | End: 2022-01-22

## 2022-01-22 RX ORDER — POLYETHYLENE GLYCOL 3350 17 G/17G
17 POWDER, FOR SOLUTION ORAL DAILY
Refills: 0 | Status: DISCONTINUED | OUTPATIENT
Start: 2022-01-22 | End: 2022-01-25

## 2022-01-22 RX ORDER — VANCOMYCIN HCL 1 G
750 VIAL (EA) INTRAVENOUS EVERY 24 HOURS
Refills: 0 | Status: DISCONTINUED | OUTPATIENT
Start: 2022-01-22 | End: 2022-01-24

## 2022-01-22 RX ADMIN — CELECOXIB 200 MILLIGRAM(S): 200 CAPSULE ORAL at 11:48

## 2022-01-22 RX ADMIN — Medication 1 APPLICATION(S): at 04:53

## 2022-01-22 RX ADMIN — Medication 81 MILLIGRAM(S): at 10:32

## 2022-01-22 RX ADMIN — Medication 25 MILLIGRAM(S): at 21:04

## 2022-01-22 RX ADMIN — Medication 1 APPLICATION(S): at 14:14

## 2022-01-22 RX ADMIN — SACUBITRIL AND VALSARTAN 1 TABLET(S): 24; 26 TABLET, FILM COATED ORAL at 21:04

## 2022-01-22 RX ADMIN — APIXABAN 2.5 MILLIGRAM(S): 2.5 TABLET, FILM COATED ORAL at 19:09

## 2022-01-22 RX ADMIN — OXYCODONE AND ACETAMINOPHEN 2 TABLET(S): 5; 325 TABLET ORAL at 23:33

## 2022-01-22 RX ADMIN — CEFEPIME 100 MILLIGRAM(S): 1 INJECTION, POWDER, FOR SOLUTION INTRAMUSCULAR; INTRAVENOUS at 10:33

## 2022-01-22 RX ADMIN — Medication 25 MILLIGRAM(S): at 10:32

## 2022-01-22 RX ADMIN — SODIUM POLYSTYRENE SULFONATE 30 GRAM(S): 4.1 POWDER, FOR SUSPENSION ORAL at 12:36

## 2022-01-22 RX ADMIN — CELECOXIB 200 MILLIGRAM(S): 200 CAPSULE ORAL at 10:33

## 2022-01-22 RX ADMIN — Medication 1 MILLIGRAM(S): at 21:04

## 2022-01-22 RX ADMIN — GABAPENTIN 300 MILLIGRAM(S): 400 CAPSULE ORAL at 21:04

## 2022-01-22 RX ADMIN — SACUBITRIL AND VALSARTAN 1 TABLET(S): 24; 26 TABLET, FILM COATED ORAL at 10:33

## 2022-01-22 RX ADMIN — POLYETHYLENE GLYCOL 3350 17 GRAM(S): 17 POWDER, FOR SOLUTION ORAL at 11:46

## 2022-01-22 RX ADMIN — Medication 60 MILLIGRAM(S): at 10:32

## 2022-01-22 RX ADMIN — SENNA PLUS 2 TABLET(S): 8.6 TABLET ORAL at 21:04

## 2022-01-22 RX ADMIN — Medication 250 MILLIGRAM(S): at 11:45

## 2022-01-22 RX ADMIN — Medication 20 MILLIGRAM(S): at 10:32

## 2022-01-22 RX ADMIN — INSULIN GLARGINE 10 UNIT(S): 100 INJECTION, SOLUTION SUBCUTANEOUS at 21:05

## 2022-01-22 RX ADMIN — Medication 1 APPLICATION(S): at 21:05

## 2022-01-22 RX ADMIN — OXYCODONE AND ACETAMINOPHEN 2 TABLET(S): 5; 325 TABLET ORAL at 16:52

## 2022-01-22 RX ADMIN — TIOTROPIUM BROMIDE 1 CAPSULE(S): 18 CAPSULE ORAL; RESPIRATORY (INHALATION) at 08:26

## 2022-01-22 RX ADMIN — Medication 2: at 08:13

## 2022-01-22 RX ADMIN — GABAPENTIN 300 MILLIGRAM(S): 400 CAPSULE ORAL at 13:20

## 2022-01-22 RX ADMIN — Medication 0: at 21:05

## 2022-01-22 RX ADMIN — APIXABAN 2.5 MILLIGRAM(S): 2.5 TABLET, FILM COATED ORAL at 08:41

## 2022-01-22 RX ADMIN — OXYCODONE AND ACETAMINOPHEN 2 TABLET(S): 5; 325 TABLET ORAL at 04:53

## 2022-01-22 RX ADMIN — OXYCODONE AND ACETAMINOPHEN 2 TABLET(S): 5; 325 TABLET ORAL at 17:52

## 2022-01-22 RX ADMIN — GABAPENTIN 300 MILLIGRAM(S): 400 CAPSULE ORAL at 04:53

## 2022-01-22 RX ADMIN — Medication 5 MILLIGRAM(S): at 10:33

## 2022-01-22 RX ADMIN — Medication 8: at 18:19

## 2022-01-22 RX ADMIN — OXYCODONE AND ACETAMINOPHEN 2 TABLET(S): 5; 325 TABLET ORAL at 05:30

## 2022-01-22 NOTE — PROGRESS NOTE ADULT - ASSESSMENT
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   - continue with the cefipime and check with the I.D with the high vancomycin level   -US and MRI findings noted  -ID/Vascular/Podiatry/Ortho consult appreciated  -     status post right femoral angioplasty and stenting with the right  popliteal angioplasty doing well post op    # Hypertension   - Hydralazine PRN for SBP > 160   - c/w home anti-HTN agents that include lasix and secabutril     #elevated cr with the labs yesterday with no new labs today   would order basic metabolic panel stat   check bladder ultrasound to rule out urinary retention     # Acute on chr anemia   - S/P 2 unit s of PRBCs in am  -Follow H/H  - has macrocytic anemia will check b12 levels and continue with the folic acid         # History of COPD, CAD, Type 2 DM on insulin, depression, CHFrEF, HTN, PVD, PAD, s/p left femoral popliteal bypass 3/24/2021, left thigh infections s/p debridement and muscle flap, ICN, lower extremity DVT on Eliquis, RA   - c/w home medications   monitor glucose levels so far most of the BGM are acceptable     DVT ppx: Eliquis   Code status: Full code (pt agrees to chest compressions and intubation if required).         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

## 2022-01-22 NOTE — PROGRESS NOTE ADULT - SUBJECTIVE AND OBJECTIVE BOX
afebrile, VSS    no rest pain but ankle pain    PE  no L groin hematoma  R foot warm and well perfused appearing    ankle bandaged    A/P  adequately perfused R foot status post angioplasty and stenting of R CFA and SFA, and below knee popliteal artery angioplasty    Her main issue currently her medial malleolar ulcer and underlying osteomyelitis of the medial malleolus.  She is on antibiotics as per Dr. Ortega (ID).    I had a conversation with Dr. Ku yesterday re her orthopedic issue and he will determine her care from this aspect but at least at this time he will be conservative: i.e. not intervene on her ankle.      Continue antibiotics, anti coagulation, local care to R medial ankle wound  MEDICATIONS  (STANDING):  apixaban 2.5 milliGRAM(s) Oral two times a day  aspirin  chewable 81 milliGRAM(s) Oral daily  cefepime   IVPB 2000 milliGRAM(s) IV Intermittent every 24 hours  celecoxib 200 milliGRAM(s) Oral daily  Dakins Solution - 1/4 Strength 1 Application(s) Topical every 8 hours  dextrose 40% Gel 15 Gram(s) Oral once  dextrose 40% Gel 15 Gram(s) Oral once  dextrose 5%. 1000 milliLiter(s) (50 mL/Hr) IV Continuous <Continuous>  dextrose 5%. 1000 milliLiter(s) (50 mL/Hr) IV Continuous <Continuous>  dextrose 5%. 1000 milliLiter(s) (100 mL/Hr) IV Continuous <Continuous>  dextrose 5%. 1000 milliLiter(s) (100 mL/Hr) IV Continuous <Continuous>  dextrose 50% Injectable 25 Gram(s) IV Push once  dextrose 50% Injectable 12.5 Gram(s) IV Push once  dextrose 50% Injectable 25 Gram(s) IV Push once  dextrose 50% Injectable 25 Gram(s) IV Push once  dextrose 50% Injectable 12.5 Gram(s) IV Push once  dextrose 50% Injectable 25 Gram(s) IV Push once  folic acid 1 milliGRAM(s) Oral at bedtime  furosemide    Tablet 60 milliGRAM(s) Oral daily  gabapentin 300 milliGRAM(s) Oral three times a day  glucagon  Injectable 1 milliGRAM(s) IntraMuscular once  glucagon  Injectable 1 milliGRAM(s) IntraMuscular once  insulin glargine Injectable (LANTUS) 10 Unit(s) SubCutaneous at bedtime  insulin lispro (ADMELOG) corrective regimen sliding scale   SubCutaneous three times a day before meals  insulin lispro (ADMELOG) corrective regimen sliding scale   SubCutaneous at bedtime  metoprolol tartrate 25 milliGRAM(s) Oral two times a day  PARoxetine 20 milliGRAM(s) Oral daily  predniSONE   Tablet 5 milliGRAM(s) Oral daily  sacubitril 24 mG/valsartan 26 mG 1 Tablet(s) Oral two times a day  tiotropium 18 MICROgram(s) Capsule 1 Capsule(s) Inhalation daily  vancomycin  IVPB 1000 milliGRAM(s) IV Intermittent <User Schedule>    MEDICATIONS  (PRN):  acetaminophen     Tablet .. 650 milliGRAM(s) Oral every 6 hours PRN Temp greater or equal to 38C (100.4F), Mild Pain (1 - 3)  ALBUTerol    90 MICROgram(s) HFA Inhaler 1 Puff(s) Inhalation every 6 hours PRN Shortness of Breath  aluminum hydroxide/magnesium hydroxide/simethicone Suspension 30 milliLiter(s) Oral every 4 hours PRN Dyspepsia  hydrALAZINE Injectable 10 milliGRAM(s) IV Push every 6 hours PRN SBP > 160  HYDROmorphone  Injectable 0.5 milliGRAM(s) IV Push every 3 hours PRN Moderate Pain (4 - 6)  melatonin 3 milliGRAM(s) Oral at bedtime PRN Insomnia  morphine  - Injectable 5 milliGRAM(s) IV Push every 3 hours PRN Severe Pain (7 - 10)  ondansetron Injectable 4 milliGRAM(s) IV Push every 8 hours PRN Nausea and/or Vomiting  oxycodone    5 mG/acetaminophen 325 mG 2 Tablet(s) Oral every 4 hours PRN Severe Pain (7 - 10)  oxycodone    5 mG/acetaminophen 325 mG 1 Tablet(s) Oral every 4 hours PRN Moderate Pain (4 - 6)      Allergies    cephalosporins (Other)  penicillins (Urticaria; Pruritus)    Intolerances        Flatus: [ ] YES [ ] NO             Bowel Movement: [ ] YES [ ] NO  Pain (0-10):            Pain Control Adequate: [ ] YES [ ] NO  Nausea: [ ] YES [ ] NO            Vomiting: [ ] YES [ ] NO  Diarrhea: [ ] YES [ ] NO         Constipation: [ ] YES [ ] NO     Chest Pain: [ ] YES [ ] NO    SOB:  [ ] YES [ ] NO    Vital Signs Last 24 Hrs  T(C): 36.4 (22 Jan 2022 08:31), Max: 36.7 (21 Jan 2022 21:06)  T(F): 97.6 (22 Jan 2022 08:31), Max: 98 (21 Jan 2022 21:06)  HR: 60 (22 Jan 2022 08:00) (58 - 85)  BP: 119/39 (22 Jan 2022 08:00) (108/56 - 151/49)  BP(mean): 64 (22 Jan 2022 08:00) (59 - 77)  RR: 11 (22 Jan 2022 08:00) (9 - 14)  SpO2: 97% (22 Jan 2022 08:00) (93% - 100%)    I&O's Summary    21 Jan 2022 07:01  -  22 Jan 2022 07:00  --------------------------------------------------------  IN: 0 mL / OUT: 750 mL / NET: -750 mL        Physical Exam:  General: NAD, resting comfortably  Pulmonary: normal resp effort, CTA-B  Cardiovascular: NSR  Abdominal: soft, NT/ND  Extremities: WWP, normal strength  Neuro: A/O x 3, CNs II-XII grossly intact, normal motor/sensation, no focal deficits  Pulses:   Right:                                                                          Left:  FEM [ ]2+ [ ]1+ [ ]doppler                                             FEM [ ]2+ [ ]1+ [ ]doppler    POP [ ]2+ [ ]1+ [ ]doppler                                             POP [ ]2+ [ ]1+ [ ]doppler    DP [ ]2+ [ ]1+ [ ]doppler                                                DP [ ]2+ [ ]1+ [ ]doppler  PT[ ]2+ [ ]1+ [ ]doppler                                                  PT [ ]2+ [ ]1+ [ ]doppler    LABS:                        7.9    10.90 )-----------( 257      ( 22 Jan 2022 05:50 )             27.6     01-21    140  |  114<H>  |  54<H>  ----------------------------<  249<H>  5.3   |  22  |  1.37<H>    Ca    8.4<L>      21 Jan 2022 11:58            CAPILLARY BLOOD GLUCOSE      POCT Blood Glucose.: 160 mg/dL (22 Jan 2022 08:03)  POCT Blood Glucose.: 286 mg/dL (21 Jan 2022 21:07)  POCT Blood Glucose.: 183 mg/dL (21 Jan 2022 16:07)  POCT Blood Glucose.: 256 mg/dL (21 Jan 2022 12:18)      RADIOLOGY & ADDITIONAL TESTS:

## 2022-01-22 NOTE — PROGRESS NOTE ADULT - SUBJECTIVE AND OBJECTIVE BOX
CHIEF COMPLAINT: Right ankle pain/Inability to bear weight RLE/Worsening Right ankle ulcer    87 y/o F with PMH COPD, CAD, Type 2 DM on insulin, depression, CHFrEF, HTN, PVD, PAD, s/p left femoral popliteal bypass 3/24/2021, left thigh infections s/p debridement and muscle flap, PAT, lower extremity DVT on Eliquis, RA presents with chronic wounds and pain to the right lower extremity for 2 weeks. She first noticed the chronic wound on her right lower extremity about a month ago. She has been completing dressing changes and applying Xeroform. The wound is foul smelling. She has been unable to walk over the last 2 weeks due to increase pressure when placing the foot down. Hanging her lower extremity off the side of the bed helps improve her pain. She also reports occasional diarrhea and lower extremity swelling. Denies headaches, blurry vision, dizziness, fevers, chills, chest pain, SOB, abdominal pain, N/V, diarrhea/constipation.    ER course: /48. Labs: WBC 10.87, Hb 7.9, .8, INR 1.17, K+ 6.1, BUN 61, Cr 1.49 (Baseline ~0.99), Glucose 448, Alkaline phosphatase 140, COVID negative. EKG: NSR with HR 64 bm, NV prolonged to 218 ms (1st degree AV block), QTC prolonged to 470 ms, LVH, T wave inversions in II, AVL, V4-V6, no ST segment changes present on prior EKG (personally reviewed).   Imaging:   - XR right ankle: concern for osteomyelitis, hardware present, no fracture or dislocation (personally reviewed).   - CXR: no consolidation, no effusion, no pneumothorax (personally reviewed).   The pt was given Cefepime and Vancomycin, 2L of NS, 10 units of Humalin, Calcium gluconate, Sodium bicarbonate, Lokelma, and Dilaudid in the ER. She is being admitted to med/surg for further management. "    01/22 /22: Patient seen and examined.   she has some pain over the ankle area       Vital Signs Last 24 Hrs  T(C): 36.4 (22 Jan 2022 08:31), Max: 36.7 (21 Jan 2022 21:06)  T(F): 97.6 (22 Jan 2022 08:31), Max: 98 (21 Jan 2022 21:06)  HR: 60 (22 Jan 2022 10:00) (58 - 66)  BP: 139/45 (22 Jan 2022 10:00) (108/56 - 151/49)  BP(mean): 73 (22 Jan 2022 10:00) (59 - 77)  RR: 14 (22 Jan 2022 10:00) (9 - 14)  SpO2: 98% (22 Jan 2022 10:00) (93% - 100%)    General: AAOx3; NAD  Head: AT/NC  ENT: Moist Mucous Membranes; No Injury  Neck: Non-tender; No JVD  CVS: RRR, S1&S2, Murmur +, LE Edema  Respiratory: Lungs CTA B/L; Normal Respiratory Effort  Abdomen/GI: Soft, non-tender, non-distended, no guarding, no rebound,  : difficult to check for the bladder distension   Extremities: status post surgery with the ankle wound with the drainage ulcer covered with the dressing   MSK: No CVA tenderness, Normal ROM, No injury  Neuro: AAOx3, CNII-XII grossly intact, non-focal  Psych: Appropriate, Cooperative,  Skin: As described in extremities.       Home Medications:  albuterol 90 mcg/inh inhalation aerosol: 1 puff(s) inhaled every 6 hours, As needed, Shortness of Breath (19 Jan 2022 04:25)  aspirin 81 mg oral tablet, chewable: 1 tab(s) orally once a day (19 Jan 2022 04:25)  Eliquis 2.5 mg oral tablet: 1 tab(s) orally 2 times a day (19 Jan 2022 04:25)  folic acid 1 mg oral tablet: 1 tab(s) orally once a day (at bedtime) (19 Jan 2022 04:25)  gabapentin 300 mg oral capsule: 1 cap(s) orally 3 times a day (19 Jan 2022 04:25)  ipratropium-albuterol 0.5 mg-2.5 mg/3 mLinhalation solution: 3 milliliter(s) inhaled every 4 hours, As needed, Shortness of Breath and/or Wheezing (19 Jan 2022 04:25)  Lantus 100 units/mL subcutaneous solution: 10  subcutaneous once a day (at bedtime) (19 Jan 2022 04:25)  Lasix 20 mg oral tablet: 3 tab(s) orally once a day (19 Jan 2022 04:25)  meloxicam 15 mg oral tablet: 1 tab(s) orally once a day (19 Jan 2022 04:25)  methotrexate 2.5 mg oral tablet: 6 tab(s) orally once a week (19 Jan 2022 04:25)  metoprolol tartrate 25 mg oral tablet: 1 tab(s) orally 2 times a day (19 Jan 2022 04:25)  PARoxetine 20 mg oral tablet: 1 tab(s) orally once a day (19 Jan 2022 04:25)    MEDICATIONS  (STANDING):  apixaban 2.5 milliGRAM(s) Oral two times a day  aspirin  chewable 81 milliGRAM(s) Oral daily  cefepime   IVPB 2000 milliGRAM(s) IV Intermittent every 24 hours  celecoxib 200 milliGRAM(s) Oral daily  Dakins Solution - 1/4 Strength 1 Application(s) Topical every 8 hours  folic acid 1 milliGRAM(s) Oral at bedtime  furosemide    Tablet 60 milliGRAM(s) Oral daily  gabapentin 300 milliGRAM(s) Oral three times a day  glucagon  Injectable 1 milliGRAM(s) IntraMuscular once  glucagon  Injectable 1 milliGRAM(s) IntraMuscular once  insulin glargine Injectable (LANTUS) 10 Unit(s) SubCutaneous at bedtime  insulin lispro (ADMELOG) corrective regimen sliding scale   SubCutaneous three times a day before meals  insulin lispro (ADMELOG) corrective regimen sliding scale   SubCutaneous at bedtime  metoprolol tartrate 25 milliGRAM(s) Oral two times a day  PARoxetine 20 milliGRAM(s) Oral daily  predniSONE   Tablet 5 milliGRAM(s) Oral daily  sacubitril 24 mG/valsartan 26 mG 1 Tablet(s) Oral two times a day  tiotropium 18 MICROgram(s) Capsule 1 Capsule(s) Inhalation daily  vancomycin  IVPB 1000 milliGRAM(s) IV Intermittent <User Schedule>        LABS:                          7.9    10.90 )-----------( 257      ( 22 Jan 2022 05:50 )             27.6                 TTE Echo Complete w/o Contrast w/ Doppler (05.11.21 @ 10:31) >   Mild left ventricular enlargement with with moderate, segmental systolic   dysfunction. The apex and mid to apical anteroseptum and inferoseptum   appear hypokinetic. Estimated left ventricular ejection fraction is 40%.   Mild diastolic dysfunction (stage I).   Moderate mitral stenosis.   Mild aortic regurgitation.      Basic Metabolic Panel (01.21.22 @ 11:58)   Sodium, Serum: 140 mmol/L   Potassium, Serum: 5.3 mmol/L   Chloride, Serum: 114 mmol/L   Carbon Dioxide, Serum: 22 mmol/L   Anion Gap, Serum: 4 mmol/L   Blood Urea Nitrogen, Serum: 54 mg/dL   Creatinine, Serum: 1.37 mg/dL   Glucose, Serum: 249 mg/dL   Calcium, Total Serum: 8.4 mg/dL

## 2022-01-22 NOTE — PROGRESS NOTE ADULT - SUBJECTIVE AND OBJECTIVE BOX
Orthopedics     Pt seen and examined at the bedside. Pain is mildly improved since yesterday. No overnight fevers/chills. Denies n/v/cp/sob    LABS:                        7.9    10.90 )-----------( 257      ( 22 Jan 2022 05:50 )             27.6     01-21    140  |  114<H>  |  54<H>  ----------------------------<  249<H>  5.3   |  22  |  1.37<H>    Ca    8.4<L>      21 Jan 2022 11:58            VITAL SIGNS:  T(C): 36.4 (01-22-22 @ 08:31), Max: 36.7 (01-21-22 @ 21:06)  HR: 60 (01-22-22 @ 08:00) (58 - 85)  BP: 119/39 (01-22-22 @ 08:00) (108/56 - 151/49)  RR: 11 (01-22-22 @ 08:00) (9 - 14)  SpO2: 97% (01-22-22 @ 08:00) (93% - 100%)      Exam:  Gen: NAD, Resting comfortably    RLE:  Skin with medial mall ulcer with purulent drainage, ulcer over posterior ankle with purulent drainage.   TTP diffusely on ankle  Painful A/PROm of ankle, Minimal pain with micromotion of ankle.  +EHL/FHL/TA/GSC  +SILT L3-S1  + DP  Compartments soft and compressible  No calf tenderness    A/P: 86F w/ R ankle cellulitis, OM     Medical management appreciated  Imaging revealing Increased signal intensity on MR indicating cellulitis and OM of the R ankle  Continue IV antibiotics per ID team  Vasc Sx following; s/p LLE angiogram yesterday - Angioplasty/stenting R common, SFA arteries, and angioplasty of R below knee popliteal artery. Demonstrating increased perfusion  Analgesia PRN  WBAT RLE  Warm compress as tolerated  DVT ppx per vascular/primary team  Patient is stable and not currently septic  No plan for acute orthopedic surgical intervention tonight but with monitor closely for any changes that may require surgical intervention.   Patient will need improved Peripheral vascular function before any surgical intervention can be performed given poor ability to heal, will monitor progress w/ VSx intervention  Contact with major changes in clinical status which may indicate surgical intervention  Will discuss with attending and advise if any changes to plan

## 2022-01-22 NOTE — PROGRESS NOTE ADULT - SUBJECTIVE AND OBJECTIVE BOX
patient has stat labs ordered   show raising cr with the high k   1 dose of kexalate was given   vancomycin dose adjusted   hold lasix now and discontinue celocoxib   repeat labs ordered   bladder sono no significant urinary retention   could be multifactorial   would continue to monitor with the AM labs hold off the fluids for now with the history of chf

## 2022-01-22 NOTE — PROGRESS NOTE ADULT - ASSESSMENT
85 y/o Female with h/o COPD, CAD, Type 2 DM on insulin, depression, CHFrEF, HTN, PVD, PAD, s/p left femoral popliteal bypass 3/24/2021, prior left thigh infections s/p debridement and muscle flap, PAT, lower extremity DVT on Eliquis, RA was admitted on 1/18 for lower leg nonhealing wounds. She first noticed the chronic wound on her right lower extremity about a month ago. She has been completing dressing changes and applying Xeroform. The wound is foul smelling. She has been unable to walk over the last 2 weeks due to increase pressure when placing the foot down. Hanging her lower extremity off the side of the bed helps improve her pain. She also reports occasional diarrhea and lower extremity swelling. Denies headaches, blurry vision, dizziness, fevers, chills. In ER she received cefepime and vancomycin IV.     1. RLE cellulitis with multiple open wound. Chronic LE stasis dermatitis. PVD s/p angioplasty.  -lower leg erythema  -mild leukocytosis  -BC x 2 noted  -on vancomycin 1 gm IV qd and cefepime 2 gm IV qd # 4  -tolerating abx well so far; no side effects noted  -vancomycin trough level collected  -elevate legs  .local wound care  -continue abx coverage   -vascular evaluation appreciatd  -monitor temps  -f/u CBC  -supportive care  2. Other issues:   -care per medicine

## 2022-01-22 NOTE — PROGRESS NOTE ADULT - SUBJECTIVE AND OBJECTIVE BOX
Date of service: 01-22-22 @ 10:27    Lying in bed in NAD  Weak looking  Right foot with less edema  Has ankle reddness    ROS: no fever or chills; denies dizziness, no HA, no SOB or cough, no abdominal pain, no diarrhea or constipation; no dysuria    MEDICATIONS  (STANDING):  apixaban 2.5 milliGRAM(s) Oral two times a day  aspirin  chewable 81 milliGRAM(s) Oral daily  cefepime   IVPB 2000 milliGRAM(s) IV Intermittent every 24 hours  celecoxib 200 milliGRAM(s) Oral daily  Dakins Solution - 1/4 Strength 1 Application(s) Topical every 8 hours  dextrose 40% Gel 15 Gram(s) Oral once  dextrose 40% Gel 15 Gram(s) Oral once  dextrose 5%. 1000 milliLiter(s) (50 mL/Hr) IV Continuous <Continuous>  dextrose 5%. 1000 milliLiter(s) (50 mL/Hr) IV Continuous <Continuous>  dextrose 5%. 1000 milliLiter(s) (100 mL/Hr) IV Continuous <Continuous>  dextrose 5%. 1000 milliLiter(s) (100 mL/Hr) IV Continuous <Continuous>  dextrose 50% Injectable 25 Gram(s) IV Push once  dextrose 50% Injectable 12.5 Gram(s) IV Push once  dextrose 50% Injectable 25 Gram(s) IV Push once  dextrose 50% Injectable 25 Gram(s) IV Push once  dextrose 50% Injectable 12.5 Gram(s) IV Push once  dextrose 50% Injectable 25 Gram(s) IV Push once  folic acid 1 milliGRAM(s) Oral at bedtime  furosemide    Tablet 60 milliGRAM(s) Oral daily  gabapentin 300 milliGRAM(s) Oral three times a day  glucagon  Injectable 1 milliGRAM(s) IntraMuscular once  glucagon  Injectable 1 milliGRAM(s) IntraMuscular once  insulin glargine Injectable (LANTUS) 10 Unit(s) SubCutaneous at bedtime  insulin lispro (ADMELOG) corrective regimen sliding scale   SubCutaneous three times a day before meals  insulin lispro (ADMELOG) corrective regimen sliding scale   SubCutaneous at bedtime  metoprolol tartrate 25 milliGRAM(s) Oral two times a day  PARoxetine 20 milliGRAM(s) Oral daily  polyethylene glycol 3350 17 Gram(s) Oral daily  predniSONE   Tablet 5 milliGRAM(s) Oral daily  sacubitril 24 mG/valsartan 26 mG 1 Tablet(s) Oral two times a day  senna 2 Tablet(s) Oral at bedtime  tiotropium 18 MICROgram(s) Capsule 1 Capsule(s) Inhalation daily  vancomycin  IVPB 1000 milliGRAM(s) IV Intermittent <User Schedule>    Vital Signs Last 24 Hrs  T(C): 36.4 (22 Jan 2022 08:31), Max: 36.7 (21 Jan 2022 21:06)  T(F): 97.6 (22 Jan 2022 08:31), Max: 98 (21 Jan 2022 21:06)  HR: 60 (22 Jan 2022 08:00) (58 - 66)  BP: 119/39 (22 Jan 2022 08:00) (108/56 - 151/49)  BP(mean): 64 (22 Jan 2022 08:00) (59 - 77)  RR: 11 (22 Jan 2022 08:00) (9 - 14)  SpO2: 97% (22 Jan 2022 08:00) (93% - 100%)     Physical exam:    Constitutional:  No acute distress  HEENT: NC/AT, EOMI, PERRLA, conjunctivae clear; ears and nose atraumatic  Neck: supple; thyroid not palpable  Back: no tenderness  Respiratory: respiratory effort normal; clear to auscultation  Cardiovascular: S1S2 regular, no murmurs  Abdomen: soft, not tender, not distended, positive BS  Genitourinary: no suprapubic tenderness  Lymphatic: no LN palpable  Musculoskeletal: no muscle tenderness, no joint swelling or tenderness  Extremities: 2+ pedal edema  Right lower extremity wound at the medial malleolus and posterior lower extremity; odor is improved; surrounding erythema.  Neurological/ Psychiatric: AxOx3, judgement and insight normal; moving all extremities  Skin: no rashes; no palpable lesions    Labs: reviewed                        7.9    10.90 )-----------( 257      ( 22 Jan 2022 05:50 )             27.6     01-21    140  |  114<H>  |  54<H>  ----------------------------<  249<H>  5.3   |  22  |  1.37<H>    Ca    8.4<L>      21 Jan 2022 11:58    C-Reactive Protein, Serum: 55 mg/L (01-19-22 @ 01:18)                        7.1    8.99  )-----------( 278      ( 20 Jan 2022 05:51 )             24.1     01-20    141  |  114<H>  |  57<H>  ----------------------------<  177<H>  5.7<H>   |  23  |  1.17    Ca    8.5      20 Jan 2022 05:51  Phos  4.1     01-20  Mg     2.4     01-20    TPro  7.0  /  Alb  2.2<L>  /  TBili  0.2  /  DBili  x   /  AST  10<L>  /  ALT  10<L>  /  AlkPhos  90  01-19    C-Reactive Protein, Serum: 55 mg/L (01-19-22 @ 01:18)                        7.9    10.64 )-----------( 302      ( 19 Jan 2022 07:31 )             26.8     01-19    137  |  109<H>  |  51<H>  ----------------------------<  137<H>  5.2   |  23  |  1.16    Ca    9.4      19 Jan 2022 07:31    TPro  7.0  /  Alb  2.2<L>  /  TBili  0.2  /  DBili  x   /  AST  10<L>  /  ALT  10<L>  /  AlkPhos  90  01-19     LIVER FUNCTIONS - ( 19 Jan 2022 07:31 )  Alb: 2.2 g/dL / Pro: 7.0 gm/dL / ALK PHOS: 90 U/L / ALT: 10 U/L / AST: 10 U/L / GGT: x             Culture - Blood (collected 18 Jan 2022 21:04)  Source: .Blood None  Preliminary Report (20 Jan 2022 05:01):    No growth to date.    Culture - Blood (collected 18 Jan 2022 21:04)  Source: .Blood None  Preliminary Report (20 Jan 2022 05:01):    No growth to date.    COVID-19 PCR: NotDetec (01-18-22 @ 21:04)    Radiology: all available radiological tests reviewed    XR right ankle: concern for osteomyelitis, hardware present, no fracture or dislocation  CXR: no consolidation, no effusion, no pneumothorax (personally reviewed).     Advanced directives addressed: full resuscitation

## 2022-01-23 LAB
ANION GAP SERPL CALC-SCNC: 7 MMOL/L — SIGNIFICANT CHANGE UP (ref 5–17)
BUN SERPL-MCNC: 62 MG/DL — HIGH (ref 7–23)
CALCIUM SERPL-MCNC: 8.6 MG/DL — SIGNIFICANT CHANGE UP (ref 8.5–10.1)
CHLORIDE SERPL-SCNC: 111 MMOL/L — HIGH (ref 96–108)
CO2 SERPL-SCNC: 22 MMOL/L — SIGNIFICANT CHANGE UP (ref 22–31)
CREAT SERPL-MCNC: 1.34 MG/DL — HIGH (ref 0.5–1.3)
CRP SERPL-MCNC: 175 MG/L — HIGH
GLUCOSE SERPL-MCNC: 211 MG/DL — HIGH (ref 70–99)
POTASSIUM SERPL-MCNC: 4.5 MMOL/L — SIGNIFICANT CHANGE UP (ref 3.5–5.3)
POTASSIUM SERPL-SCNC: 4.5 MMOL/L — SIGNIFICANT CHANGE UP (ref 3.5–5.3)
SODIUM SERPL-SCNC: 140 MMOL/L — SIGNIFICANT CHANGE UP (ref 135–145)
VIT B12 SERPL-MCNC: 479 PG/ML — SIGNIFICANT CHANGE UP (ref 232–1245)

## 2022-01-23 PROCEDURE — 99232 SBSQ HOSP IP/OBS MODERATE 35: CPT

## 2022-01-23 RX ORDER — SODIUM CHLORIDE 9 MG/ML
600 INJECTION INTRAMUSCULAR; INTRAVENOUS; SUBCUTANEOUS
Refills: 0 | Status: DISCONTINUED | OUTPATIENT
Start: 2022-01-23 | End: 2022-01-25

## 2022-01-23 RX ORDER — ACETAMINOPHEN 500 MG
1000 TABLET ORAL ONCE
Refills: 0 | Status: COMPLETED | OUTPATIENT
Start: 2022-01-23 | End: 2022-01-23

## 2022-01-23 RX ADMIN — SENNA PLUS 2 TABLET(S): 8.6 TABLET ORAL at 22:02

## 2022-01-23 RX ADMIN — Medication 400 MILLIGRAM(S): at 17:11

## 2022-01-23 RX ADMIN — OXYCODONE AND ACETAMINOPHEN 2 TABLET(S): 5; 325 TABLET ORAL at 11:23

## 2022-01-23 RX ADMIN — Medication 250 MILLIGRAM(S): at 11:03

## 2022-01-23 RX ADMIN — Medication 2: at 11:10

## 2022-01-23 RX ADMIN — Medication 4: at 07:51

## 2022-01-23 RX ADMIN — Medication 1 APPLICATION(S): at 14:19

## 2022-01-23 RX ADMIN — OXYCODONE AND ACETAMINOPHEN 1 TABLET(S): 5; 325 TABLET ORAL at 22:45

## 2022-01-23 RX ADMIN — OXYCODONE AND ACETAMINOPHEN 1 TABLET(S): 5; 325 TABLET ORAL at 22:03

## 2022-01-23 RX ADMIN — Medication 25 MILLIGRAM(S): at 10:06

## 2022-01-23 RX ADMIN — Medication 3 MILLIGRAM(S): at 22:01

## 2022-01-23 RX ADMIN — HYDROMORPHONE HYDROCHLORIDE 0.5 MILLIGRAM(S): 2 INJECTION INTRAMUSCULAR; INTRAVENOUS; SUBCUTANEOUS at 02:00

## 2022-01-23 RX ADMIN — Medication 1 MILLIGRAM(S): at 22:01

## 2022-01-23 RX ADMIN — APIXABAN 2.5 MILLIGRAM(S): 2.5 TABLET, FILM COATED ORAL at 07:46

## 2022-01-23 RX ADMIN — Medication 5 MILLIGRAM(S): at 10:06

## 2022-01-23 RX ADMIN — APIXABAN 2.5 MILLIGRAM(S): 2.5 TABLET, FILM COATED ORAL at 22:01

## 2022-01-23 RX ADMIN — OXYCODONE AND ACETAMINOPHEN 2 TABLET(S): 5; 325 TABLET ORAL at 00:03

## 2022-01-23 RX ADMIN — HYDROMORPHONE HYDROCHLORIDE 0.5 MILLIGRAM(S): 2 INJECTION INTRAMUSCULAR; INTRAVENOUS; SUBCUTANEOUS at 22:38

## 2022-01-23 RX ADMIN — TIOTROPIUM BROMIDE 1 CAPSULE(S): 18 CAPSULE ORAL; RESPIRATORY (INHALATION) at 08:39

## 2022-01-23 RX ADMIN — OXYCODONE AND ACETAMINOPHEN 2 TABLET(S): 5; 325 TABLET ORAL at 12:00

## 2022-01-23 RX ADMIN — SACUBITRIL AND VALSARTAN 1 TABLET(S): 24; 26 TABLET, FILM COATED ORAL at 22:01

## 2022-01-23 RX ADMIN — GABAPENTIN 300 MILLIGRAM(S): 400 CAPSULE ORAL at 18:24

## 2022-01-23 RX ADMIN — Medication 1 APPLICATION(S): at 05:08

## 2022-01-23 RX ADMIN — Medication 1000 MILLIGRAM(S): at 18:37

## 2022-01-23 RX ADMIN — Medication 6: at 16:35

## 2022-01-23 RX ADMIN — SACUBITRIL AND VALSARTAN 1 TABLET(S): 24; 26 TABLET, FILM COATED ORAL at 11:03

## 2022-01-23 RX ADMIN — SODIUM CHLORIDE 50 MILLILITER(S): 9 INJECTION INTRAMUSCULAR; INTRAVENOUS; SUBCUTANEOUS at 11:23

## 2022-01-23 RX ADMIN — Medication 1 APPLICATION(S): at 22:05

## 2022-01-23 RX ADMIN — HYDROMORPHONE HYDROCHLORIDE 0.5 MILLIGRAM(S): 2 INJECTION INTRAMUSCULAR; INTRAVENOUS; SUBCUTANEOUS at 23:23

## 2022-01-23 RX ADMIN — Medication 20 MILLIGRAM(S): at 10:07

## 2022-01-23 RX ADMIN — CEFEPIME 100 MILLIGRAM(S): 1 INJECTION, POWDER, FOR SOLUTION INTRAMUSCULAR; INTRAVENOUS at 10:06

## 2022-01-23 RX ADMIN — HYDROMORPHONE HYDROCHLORIDE 0.5 MILLIGRAM(S): 2 INJECTION INTRAMUSCULAR; INTRAVENOUS; SUBCUTANEOUS at 01:22

## 2022-01-23 RX ADMIN — Medication 25 MILLIGRAM(S): at 22:02

## 2022-01-23 RX ADMIN — INSULIN GLARGINE 10 UNIT(S): 100 INJECTION, SOLUTION SUBCUTANEOUS at 22:02

## 2022-01-23 RX ADMIN — Medication 81 MILLIGRAM(S): at 10:06

## 2022-01-23 NOTE — PROGRESS NOTE ADULT - ASSESSMENT
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   - continue with the cefipime and vancomycin and follow up of the vancomycin levels closely   -US and MRI findings noted  -ID/Vascular/Podiatry/Ortho consult appreciated    peripheral vascular disease   status post right femoral angioplasty and stenting with the right  popliteal angioplasty doing well post op      # Hypertension   - Hydralazine PRN for SBP > 160   - c/w home anti-HTN agents secabutril , metoprolol and lasix was on hold due to acute kidney injury   - resumption of lasix once renal function improves     #acute kidney injury on the chronic CKD   no urinary retention with the bladder sono   NAISD celebrex  was on hold due to the ORLY with the lasix   k improved with the kaexalate   would give gentle hydration for the 12 hours   encourage po intake   hold nephrotoxic drugs   on vancomycin and dose was adjusted by the I.D     # Acute on chr anemia   - S/P 2 unit s of PRBCs in am  -Follow H/H has been stable   - has macrocytic anemia will check b12 levels and continue with the folic acid         # History of COPD, CAD, Type 2 DM on insulin, depression, CHFrEF, HTN, PVD, PAD, s/p left femoral popliteal bypass 3/24/2021, left thigh infections s/p debridement and muscle flap, ICN, lower extremity DVT on Eliquis, RA   - c/w home medications   monitor glucose levels and adjustment of the insulin dose for the D.M   continue with the spiriva for the copd     DVT ppx: Eliquis   Code status: Full code (pt agrees to chest compressions and intubation if required).         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

## 2022-01-23 NOTE — PROGRESS NOTE ADULT - SUBJECTIVE AND OBJECTIVE BOX
2200- pt received HS meds, cleaned of incontinent episode. 0100- Pt noted sleeping no acute distress or sob. Call bell in reach. afebrile, VSS    sedated from Dilaudid and percocet    R foot medial malleolar ulcer still with necrotic base; posterior calf with supf skin breakdown  loud doppler signal at popliteal location    A/P  debilitated female status post angioplasty and stenting of R CFA and SFA, and angioplasty of popliteal artery  she will likely need debridement of her malleolar wound at some point but will discuss with Dr. Ku re timing  continue local care and antibiotics for now    MEDICATIONS  (STANDING):  apixaban 2.5 milliGRAM(s) Oral two times a day  aspirin  chewable 81 milliGRAM(s) Oral daily  cefepime   IVPB 2000 milliGRAM(s) IV Intermittent every 24 hours  Dakins Solution - 1/4 Strength 1 Application(s) Topical every 8 hours  dextrose 40% Gel 15 Gram(s) Oral once  dextrose 40% Gel 15 Gram(s) Oral once  dextrose 5%. 1000 milliLiter(s) (50 mL/Hr) IV Continuous <Continuous>  dextrose 5%. 1000 milliLiter(s) (50 mL/Hr) IV Continuous <Continuous>  dextrose 5%. 1000 milliLiter(s) (100 mL/Hr) IV Continuous <Continuous>  dextrose 5%. 1000 milliLiter(s) (100 mL/Hr) IV Continuous <Continuous>  dextrose 50% Injectable 25 Gram(s) IV Push once  dextrose 50% Injectable 12.5 Gram(s) IV Push once  dextrose 50% Injectable 25 Gram(s) IV Push once  dextrose 50% Injectable 25 Gram(s) IV Push once  dextrose 50% Injectable 12.5 Gram(s) IV Push once  dextrose 50% Injectable 25 Gram(s) IV Push once  folic acid 1 milliGRAM(s) Oral at bedtime  gabapentin 300 milliGRAM(s) Oral three times a day  glucagon  Injectable 1 milliGRAM(s) IntraMuscular once  glucagon  Injectable 1 milliGRAM(s) IntraMuscular once  insulin glargine Injectable (LANTUS) 10 Unit(s) SubCutaneous at bedtime  insulin lispro (ADMELOG) corrective regimen sliding scale   SubCutaneous three times a day before meals  insulin lispro (ADMELOG) corrective regimen sliding scale   SubCutaneous at bedtime  metoprolol tartrate 25 milliGRAM(s) Oral two times a day  PARoxetine 20 milliGRAM(s) Oral daily  polyethylene glycol 3350 17 Gram(s) Oral daily  predniSONE   Tablet 5 milliGRAM(s) Oral daily  sacubitril 24 mG/valsartan 26 mG 1 Tablet(s) Oral two times a day  senna 2 Tablet(s) Oral at bedtime  tiotropium 18 MICROgram(s) Capsule 1 Capsule(s) Inhalation daily  vancomycin  IVPB 750 milliGRAM(s) IV Intermittent every 24 hours    MEDICATIONS  (PRN):  acetaminophen     Tablet .. 650 milliGRAM(s) Oral every 6 hours PRN Temp greater or equal to 38C (100.4F), Mild Pain (1 - 3)  ALBUTerol    90 MICROgram(s) HFA Inhaler 1 Puff(s) Inhalation every 6 hours PRN Shortness of Breath  aluminum hydroxide/magnesium hydroxide/simethicone Suspension 30 milliLiter(s) Oral every 4 hours PRN Dyspepsia  hydrALAZINE Injectable 10 milliGRAM(s) IV Push every 6 hours PRN SBP > 160  HYDROmorphone  Injectable 0.5 milliGRAM(s) IV Push every 3 hours PRN Moderate Pain (4 - 6)  melatonin 3 milliGRAM(s) Oral at bedtime PRN Insomnia  morphine  - Injectable 5 milliGRAM(s) IV Push every 3 hours PRN Severe Pain (7 - 10)  ondansetron Injectable 4 milliGRAM(s) IV Push every 8 hours PRN Nausea and/or Vomiting  oxycodone    5 mG/acetaminophen 325 mG 2 Tablet(s) Oral every 4 hours PRN Severe Pain (7 - 10)  oxycodone    5 mG/acetaminophen 325 mG 1 Tablet(s) Oral every 4 hours PRN Moderate Pain (4 - 6)      Allergies    cephalosporins (Other)  penicillins (Urticaria; Pruritus)    Intolerances        Flatus: [ ] YES [ ] NO             Bowel Movement: [ ] YES [ ] NO  Pain (0-10):            Pain Control Adequate: [ ] YES [ ] NO  Nausea: [ ] YES [ ] NO            Vomiting: [ ] YES [ ] NO  Diarrhea: [ ] YES [ ] NO         Constipation: [ ] YES [ ] NO     Chest Pain: [ ] YES [ ] NO    SOB:  [ ] YES [ ] NO    Vital Signs Last 24 Hrs  T(C): 36.5 (23 Jan 2022 06:34), Max: 36.5 (23 Jan 2022 06:34)  T(F): 97.7 (23 Jan 2022 06:34), Max: 97.7 (23 Jan 2022 06:34)  HR: 70 (23 Jan 2022 06:00) (57 - 71)  BP: 126/41 (23 Jan 2022 06:00) (122/46 - 155/49)  BP(mean): 68 (23 Jan 2022 06:00) (64 - 99)  RR: 11 (23 Jan 2022 06:00) (10 - 26)  SpO2: 95% (23 Jan 2022 06:00) (94% - 100%)    I&O's Summary    22 Jan 2022 07:01  -  23 Jan 2022 07:00  --------------------------------------------------------  IN: 0 mL / OUT: 750 mL / NET: -750 mL        Physical Exam:  General: NAD, resting comfortably  Pulmonary: normal resp effort, CTA-B  Cardiovascular: NSR  Abdominal: soft, NT/ND  Extremities: WWP, normal strength  Neuro: A/O x 3, CNs II-XII grossly intact, normal motor/sensation, no focal deficits  Pulses:   Right:                                                                          Left:  FEM [ ]2+ [ ]1+ [ ]doppler                                             FEM [ ]2+ [ ]1+ [ ]doppler    POP [ ]2+ [ ]1+ [ ]doppler                                             POP [ ]2+ [ ]1+ [ ]doppler    DP [ ]2+ [ ]1+ [ ]doppler                                                DP [ ]2+ [ ]1+ [ ]doppler  PT[ ]2+ [ ]1+ [ ]doppler                                                  PT [ ]2+ [ ]1+ [ ]doppler    LABS:                        7.9    10.90 )-----------( 257      ( 22 Jan 2022 05:50 )             27.6     01-23    140  |  111<H>  |  62<H>  ----------------------------<  211<H>  4.5   |  22  |  1.34<H>    Ca    8.6      23 Jan 2022 05:14            CAPILLARY BLOOD GLUCOSE      POCT Blood Glucose.: 241 mg/dL (23 Jan 2022 07:48)  POCT Blood Glucose.: 192 mg/dL (22 Jan 2022 21:03)  POCT Blood Glucose.: 348 mg/dL (22 Jan 2022 18:17)  POCT Blood Glucose.: 79 mg/dL (22 Jan 2022 11:45)      RADIOLOGY & ADDITIONAL TESTS:

## 2022-01-23 NOTE — PROGRESS NOTE ADULT - SUBJECTIVE AND OBJECTIVE BOX
Pt seen and examined at the bedside. Pain is mildly improved since yesterday. No overnight fevers/chills. Denies n/v/cp/sob    LABS:                        7.9    10.90 )-----------( 257      ( 22 Jan 2022 05:50 )             27.6     01-23    140  |  111<H>  |  62<H>  ----------------------------<  211<H>  4.5   |  22  |  1.34<H>    Ca    8.6      23 Jan 2022 05:14      VITAL SIGNS:  T(C): 36.5 (01-23-22 @ 06:34), Max: 36.5 (01-23-22 @ 06:34)  HR: 70 (01-23-22 @ 06:00) (57 - 71)  BP: 126/41 (01-23-22 @ 06:00) (119/39 - 155/49)  RR: 11 (01-23-22 @ 06:00) (10 - 26)  SpO2: 95% (01-23-22 @ 06:00) (94% - 100%)      Exam:  Gen: NAD, Resting comfortably    RLE:  Skin with medial mall ulcer with purulent drainage, ulcer over posterior ankle with purulent drainage.   TTP diffusely on ankle  Painful PROm of ankle, Minimal pain with micromotion of ankle.  unable to fire EHL/FHL/TA/GSC  +SILT L3-S1  + DP  Compartments soft and compressible  No calf tenderness    A/P: 86F w/ R ankle cellulitis, OM     Medical management appreciated  Imaging revealing Increased signal intensity on MR indicating cellulitis and OM of the R ankle  Continue IV antibiotics per ID team; BCx NGTD  Vasc Sx following; s/p LLE angiogram 1/21- Angioplasty/stenting R common, SFA arteries, and angioplasty of R below knee popliteal artery. Demonstrating increased perfusion  Analgesia PRN  WBAT RLE  Warm compress as tolerated  DVT ppx per vascular/primary team  Patient is stable and not currently septic  No plan for acute orthopedic surgical intervention tonight but with monitor closely for any changes that may require surgical intervention.   Patient will need improved Peripheral vascular function before any surgical intervention can be performed given poor ability to heal, will monitor progress w/ VSx intervention  Contact with major changes in clinical status which may indicate surgical intervention  Will discuss with attending and advise if any changes to plan

## 2022-01-23 NOTE — PROGRESS NOTE ADULT - SUBJECTIVE AND OBJECTIVE BOX
CHIEF COMPLAINT: Right ankle pain/Inability to bear weight RLE/Worsening Right ankle ulcer    85 y/o F with PMH COPD, CAD, Type 2 DM on insulin, depression, CHFrEF, HTN, PVD, PAD, s/p left femoral popliteal bypass 3/24/2021, left thigh infections s/p debridement and muscle flap, PAT, lower extremity DVT on Eliquis, RA presents with chronic wounds and pain to the right lower extremity for 2 weeks. She first noticed the chronic wound on her right lower extremity about a month ago. She has been completing dressing changes and applying Xeroform. The wound is foul smelling. She has been unable to walk over the last 2 weeks due to increase pressure when placing the foot down. Hanging her lower extremity off the side of the bed helps improve her pain. She also reports occasional diarrhea and lower extremity swelling. Denies headaches, blurry vision, dizziness, fevers, chills, chest pain, SOB, abdominal pain, N/V, diarrhea/constipation.    ER course: /48. Labs: WBC 10.87, Hb 7.9, .8, INR 1.17, K+ 6.1, BUN 61, Cr 1.49 (Baseline ~0.99), Glucose 448, Alkaline phosphatase 140, COVID negative. EKG: NSR with HR 64 bm, MI prolonged to 218 ms (1st degree AV block), QTC prolonged to 470 ms, LVH, T wave inversions in II, AVL, V4-V6, no ST segment changes present on prior EKG (personally reviewed).   Imaging:   - XR right ankle: concern for osteomyelitis, hardware present, no fracture or dislocation (personally reviewed).   - CXR: no consolidation, no effusion, no pneumothorax (personally reviewed).   The pt was given Cefepime and Vancomycin, 2L of NS, 10 units of Humalin, Calcium gluconate, Sodium bicarbonate, Lokelma, and Dilaudid in the ER. She is being admitted to med/surg for further management. "    01/22 /22: Patient seen and examined.   she has some pain over the ankle area     01/23/22   patient has pain over the right ankle area and feels week   no sob or chest pain .  cr is trending even though BUN is high     Vital Signs Last 24 Hrs  T(C): 36.8 (23 Jan 2022 09:03), Max: 36.8 (23 Jan 2022 09:03)  T(F): 98.3 (23 Jan 2022 09:03), Max: 98.3 (23 Jan 2022 09:03)  HR: 72 (23 Jan 2022 08:41) (57 - 76)  BP: 139/69 (23 Jan 2022 08:00) (122/46 - 155/49)  BP(mean): 88 (23 Jan 2022 08:00) (64 - 99)  RR: 15 (23 Jan 2022 08:00) (10 - 26)  SpO2: 98% (23 Jan 2022 08:00) (94% - 100%)    General: AAOx3; NAD  Head: AT/NC  ENT: Moist Mucous Membranes; No Injury  Neck: Non-tender; No JVD  CVS: RRR, S1&S2, Murmur +,  Respiratory: Lungs CTA B/L; Normal Respiratory Effort  Abdomen/GI: Soft, non-tender, non-distended, no guarding, no rebound,  : no bladder distension   Extremities: status post surgery with the ankle wound with the drainage ulcer covered with the dressing   MSK: No CVA tenderness, Normal ROM, No injury  Neuro: AAOx3, CNII-XII grossly intact, non-focal  Psych: Appropriate, Cooperative,  Skin: echymosis of the extremities       Home Medications:  albuterol 90 mcg/inh inhalation aerosol: 1 puff(s) inhaled every 6 hours, As needed, Shortness of Breath (19 Jan 2022 04:25)  aspirin 81 mg oral tablet, chewable: 1 tab(s) orally once a day (19 Jan 2022 04:25)  Eliquis 2.5 mg oral tablet: 1 tab(s) orally 2 times a day (19 Jan 2022 04:25)  folic acid 1 mg oral tablet: 1 tab(s) orally once a day (at bedtime) (19 Jan 2022 04:25)  gabapentin 300 mg oral capsule: 1 cap(s) orally 3 times a day (19 Jan 2022 04:25)  ipratropium-albuterol 0.5 mg-2.5 mg/3 mLinhalation solution: 3 milliliter(s) inhaled every 4 hours, As needed, Shortness of Breath and/or Wheezing (19 Jan 2022 04:25)  Lantus 100 units/mL subcutaneous solution: 10  subcutaneous once a day (at bedtime) (19 Jan 2022 04:25)  Lasix 20 mg oral tablet: 3 tab(s) orally once a day (19 Jan 2022 04:25)  meloxicam 15 mg oral tablet: 1 tab(s) orally once a day (19 Jan 2022 04:25)  methotrexate 2.5 mg oral tablet: 6 tab(s) orally once a week (19 Jan 2022 04:25)  metoprolol tartrate 25 mg oral tablet: 1 tab(s) orally 2 times a day (19 Jan 2022 04:25)  PARoxetine 20 mg oral tablet: 1 tab(s) orally once a day (19 Jan 2022 04:25)    MEDICATIONS  (STANDING):  apixaban 2.5 milliGRAM(s) Oral two times a day  aspirin  chewable 81 milliGRAM(s) Oral daily  cefepime   IVPB 2000 milliGRAM(s) IV Intermittent every 24 hours  Dakins Solution - 1/4 Strength 1 Application(s) Topical every 8 hours  dextrose 40% Gel 15 Gram(s) Oral once  dextrose 40% Gel 15 Gram(s) Oral once  dextrose 5%. 1000 milliLiter(s) (50 mL/Hr) IV Continuous <Continuous>  dextrose 5%. 1000 milliLiter(s) (50 mL/Hr) IV Continuous <Continuous>  dextrose 5%. 1000 milliLiter(s) (100 mL/Hr) IV Continuous <Continuous>  dextrose 5%. 1000 milliLiter(s) (100 mL/Hr) IV Continuous <Continuous>  dextrose 50% Injectable 25 Gram(s) IV Push once  dextrose 50% Injectable 12.5 Gram(s) IV Push once  dextrose 50% Injectable 25 Gram(s) IV Push once  dextrose 50% Injectable 25 Gram(s) IV Push once  dextrose 50% Injectable 12.5 Gram(s) IV Push once  dextrose 50% Injectable 25 Gram(s) IV Push once  folic acid 1 milliGRAM(s) Oral at bedtime  gabapentin 300 milliGRAM(s) Oral three times a day  glucagon  Injectable 1 milliGRAM(s) IntraMuscular once  glucagon  Injectable 1 milliGRAM(s) IntraMuscular once  insulin glargine Injectable (LANTUS) 10 Unit(s) SubCutaneous at bedtime  insulin lispro (ADMELOG) corrective regimen sliding scale   SubCutaneous three times a day before meals  insulin lispro (ADMELOG) corrective regimen sliding scale   SubCutaneous at bedtime  metoprolol tartrate 25 milliGRAM(s) Oral two times a day  PARoxetine 20 milliGRAM(s) Oral daily  polyethylene glycol 3350 17 Gram(s) Oral daily  predniSONE   Tablet 5 milliGRAM(s) Oral daily  sacubitril 24 mG/valsartan 26 mG 1 Tablet(s) Oral two times a day  senna 2 Tablet(s) Oral at bedtime  tiotropium 18 MICROgram(s) Capsule 1 Capsule(s) Inhalation daily  vancomycin  IVPB 750 milliGRAM(s) IV Intermittent every 24 hours    MEDICATIONS  (PRN):  acetaminophen     Tablet .. 650 milliGRAM(s) Oral every 6 hours PRN Temp greater or equal to 38C (100.4F), Mild Pain (1 - 3)  ALBUTerol    90 MICROgram(s) HFA Inhaler 1 Puff(s) Inhalation every 6 hours PRN Shortness of Breath  aluminum hydroxide/magnesium hydroxide/simethicone Suspension 30 milliLiter(s) Oral every 4 hours PRN Dyspepsia  hydrALAZINE Injectable 10 milliGRAM(s) IV Push every 6 hours PRN SBP > 160  HYDROmorphone  Injectable 0.5 milliGRAM(s) IV Push every 3 hours PRN Moderate Pain (4 - 6)  melatonin 3 milliGRAM(s) Oral at bedtime PRN Insomnia  morphine  - Injectable 5 milliGRAM(s) IV Push every 3 hours PRN Severe Pain (7 - 10)  ondansetron Injectable 4 milliGRAM(s) IV Push every 8 hours PRN Nausea and/or Vomiting  oxycodone    5 mG/acetaminophen 325 mG 2 Tablet(s) Oral every 4 hours PRN Severe Pain (7 - 10)  oxycodone    5 mG/acetaminophen 325 mG 1 Tablet(s) Oral every 4 hours PRN Moderate Pain (4 - 6)        LABS:                          7.9    10.90 )-----------( 257      ( 22 Jan 2022 05:50 ).               27.6     01-23    140  |  111<H>  |  62<H>  ----------------------------<  211<H>  4.5   |  22  |  1.34<H>    Ca    8.6      23 Jan 2022 05:14                  TTE Echo Complete w/o Contrast w/ Doppler (05.11.21 @ 10:31) >   Mild left ventricular enlargement with with moderate, segmental systolic   dysfunction. The apex and mid to apical anteroseptum and inferoseptum   appear hypokinetic. Estimated left ventricular ejection fraction is 40%.   Mild diastolic dysfunction (stage I).   Moderate mitral stenosis.   Mild aortic regurgitation.

## 2022-01-23 NOTE — PROGRESS NOTE ADULT - ASSESSMENT
85 y/o Female with h/o COPD, CAD, Type 2 DM on insulin, depression, CHFrEF, HTN, PVD, PAD, s/p left femoral popliteal bypass 3/24/2021, prior left thigh infections s/p debridement and muscle flap, PAT, lower extremity DVT on Eliquis, RA was admitted on 1/18 for lower leg nonhealing wounds. She first noticed the chronic wound on her right lower extremity about a month ago. She has been completing dressing changes and applying Xeroform. The wound is foul smelling. She has been unable to walk over the last 2 weeks due to increase pressure when placing the foot down. Hanging her lower extremity off the side of the bed helps improve her pain. She also reports occasional diarrhea and lower extremity swelling. Denies headaches, blurry vision, dizziness, fevers, chills. In ER she received cefepime and vancomycin IV.     1. RLE cellulitis with multiple open wound. Chronic LE stasis dermatitis. PVD s/p angioplasty.  -lower leg erythema  -mild leukocytosis  -BC x 2 noted  -on vancomycin 1 gm IV qd and cefepime 2 gm IV qd # 5  -tolerating abx well so far; no side effects noted  -vancomycin trough level is high; decrease vancomycin dose to 750 mg IV qd  -elevate legs  .local wound care  -continue abx coverage   -vascular evaluation appreciated  -monitor temps  -f/u CBC  -supportive care  2. Other issues:   -care per medicine

## 2022-01-23 NOTE — PROGRESS NOTE ADULT - SUBJECTIVE AND OBJECTIVE BOX
Date of service: 01-23-22 @ 10:05    Sleepy; arousable  Mild right ankle tenderness  No fever  Reported with vancomycin level high    ROS: no fever or chills; no HA, no SOB or cough, no abdominal pain, no diarrhea or constipation; no dysuria    MEDICATIONS  (STANDING):  apixaban 2.5 milliGRAM(s) Oral two times a day  aspirin  chewable 81 milliGRAM(s) Oral daily  cefepime   IVPB 2000 milliGRAM(s) IV Intermittent every 24 hours  Dakins Solution - 1/4 Strength 1 Application(s) Topical every 8 hours  dextrose 40% Gel 15 Gram(s) Oral once  dextrose 40% Gel 15 Gram(s) Oral once  dextrose 5%. 1000 milliLiter(s) (100 mL/Hr) IV Continuous <Continuous>  dextrose 5%. 1000 milliLiter(s) (100 mL/Hr) IV Continuous <Continuous>  dextrose 5%. 1000 milliLiter(s) (50 mL/Hr) IV Continuous <Continuous>  dextrose 5%. 1000 milliLiter(s) (50 mL/Hr) IV Continuous <Continuous>  dextrose 50% Injectable 25 Gram(s) IV Push once  dextrose 50% Injectable 12.5 Gram(s) IV Push once  dextrose 50% Injectable 25 Gram(s) IV Push once  dextrose 50% Injectable 25 Gram(s) IV Push once  dextrose 50% Injectable 12.5 Gram(s) IV Push once  dextrose 50% Injectable 25 Gram(s) IV Push once  folic acid 1 milliGRAM(s) Oral at bedtime  gabapentin 300 milliGRAM(s) Oral three times a day  glucagon  Injectable 1 milliGRAM(s) IntraMuscular once  glucagon  Injectable 1 milliGRAM(s) IntraMuscular once  insulin glargine Injectable (LANTUS) 10 Unit(s) SubCutaneous at bedtime  insulin lispro (ADMELOG) corrective regimen sliding scale   SubCutaneous three times a day before meals  insulin lispro (ADMELOG) corrective regimen sliding scale   SubCutaneous at bedtime  metoprolol tartrate 25 milliGRAM(s) Oral two times a day  PARoxetine 20 milliGRAM(s) Oral daily  polyethylene glycol 3350 17 Gram(s) Oral daily  predniSONE   Tablet 5 milliGRAM(s) Oral daily  sacubitril 24 mG/valsartan 26 mG 1 Tablet(s) Oral two times a day  senna 2 Tablet(s) Oral at bedtime  tiotropium 18 MICROgram(s) Capsule 1 Capsule(s) Inhalation daily  vancomycin  IVPB 750 milliGRAM(s) IV Intermittent every 24 hours    Vital Signs Last 24 Hrs  T(C): 36.8 (23 Jan 2022 09:03), Max: 36.8 (23 Jan 2022 09:03)  T(F): 98.3 (23 Jan 2022 09:03), Max: 98.3 (23 Jan 2022 09:03)  HR: 72 (23 Jan 2022 08:41) (57 - 76)  BP: 139/69 (23 Jan 2022 08:00) (122/46 - 155/49)  BP(mean): 88 (23 Jan 2022 08:00) (64 - 99)  RR: 15 (23 Jan 2022 08:00) (10 - 26)  SpO2: 98% (23 Jan 2022 08:00) (94% - 100%)     Physical exam:    Constitutional:  No acute distress  HEENT: NC/AT, EOMI, PERRLA, conjunctivae clear; ears and nose atraumatic  Neck: supple; thyroid not palpable  Back: no tenderness  Respiratory: respiratory effort normal; clear to auscultation  Cardiovascular: S1S2 regular, no murmurs  Abdomen: soft, not tender, not distended, positive BS  Genitourinary: no suprapubic tenderness  Lymphatic: no LN palpable  Musculoskeletal: no muscle tenderness, no joint swelling or tenderness  Extremities: 2+ pedal edema  Right lower extremity wound at the medial malleolus and posterior lower extremity; surrounding erythema.  Right malleolar ulcer with necrotic base  Neurological/ Psychiatric: AxOx3, judgement and insight normal; moving all extremities  Skin: no rashes; no palpable lesions    Labs: reviewed                        7.9    10.90 )-----------( 257      ( 22 Jan 2022 05:50 )             27.6     01-23    140  |  111<H>  |  62<H>  ----------------------------<  211<H>  4.5   |  22  |  1.34<H>    Ca    8.6      23 Jan 2022 05:14    Vancomycin Level, Trough: 23.1 ug/mL (01-22 @ 09:57)    C-Reactive Protein, Serum: 55 mg/L (01-19-22 @ 01:18)                        7.9    10.90 )-----------( 257      ( 22 Jan 2022 05:50 )             27.6     01-21    140  |  114<H>  |  54<H>  ----------------------------<  249<H>  5.3   |  22  |  1.37<H>    Ca    8.4<L>      21 Jan 2022 11:58    C-Reactive Protein, Serum: 55 mg/L (01-19-22 @ 01:18)                        7.1    8.99  )-----------( 278      ( 20 Jan 2022 05:51 )             24.1     01-20    141  |  114<H>  |  57<H>  ----------------------------<  177<H>  5.7<H>   |  23  |  1.17    Ca    8.5      20 Jan 2022 05:51  Phos  4.1     01-20  Mg     2.4     01-20    TPro  7.0  /  Alb  2.2<L>  /  TBili  0.2  /  DBili  x   /  AST  10<L>  /  ALT  10<L>  /  AlkPhos  90  01-19    C-Reactive Protein, Serum: 55 mg/L (01-19-22 @ 01:18)                        7.9    10.64 )-----------( 302      ( 19 Jan 2022 07:31 )             26.8     01-19    137  |  109<H>  |  51<H>  ----------------------------<  137<H>  5.2   |  23  |  1.16    Ca    9.4      19 Jan 2022 07:31    TPro  7.0  /  Alb  2.2<L>  /  TBili  0.2  /  DBili  x   /  AST  10<L>  /  ALT  10<L>  /  AlkPhos  90  01-19     LIVER FUNCTIONS - ( 19 Jan 2022 07:31 )  Alb: 2.2 g/dL / Pro: 7.0 gm/dL / ALK PHOS: 90 U/L / ALT: 10 U/L / AST: 10 U/L / GGT: x             Culture - Blood (collected 18 Jan 2022 21:04)  Source: .Blood None  Preliminary Report (20 Jan 2022 05:01):    No growth to date.    Culture - Blood (collected 18 Jan 2022 21:04)  Source: .Blood None  Preliminary Report (20 Jan 2022 05:01):    No growth to date.    COVID-19 PCR: NotDetec (01-18-22 @ 21:04)    Radiology: all available radiological tests reviewed    XR right ankle: concern for osteomyelitis, hardware present, no fracture or dislocation  CXR: no consolidation, no effusion, no pneumothorax (personally reviewed).     Advanced directives addressed: full resuscitation

## 2022-01-24 LAB
ANION GAP SERPL CALC-SCNC: 6 MMOL/L — SIGNIFICANT CHANGE UP (ref 5–17)
BUN SERPL-MCNC: 43 MG/DL — HIGH (ref 7–23)
CALCIUM SERPL-MCNC: 8.6 MG/DL — SIGNIFICANT CHANGE UP (ref 8.5–10.1)
CHLORIDE SERPL-SCNC: 115 MMOL/L — HIGH (ref 96–108)
CO2 SERPL-SCNC: 21 MMOL/L — LOW (ref 22–31)
CREAT SERPL-MCNC: 0.95 MG/DL — SIGNIFICANT CHANGE UP (ref 0.5–1.3)
CULTURE RESULTS: SIGNIFICANT CHANGE UP
CULTURE RESULTS: SIGNIFICANT CHANGE UP
GLUCOSE SERPL-MCNC: 147 MG/DL — HIGH (ref 70–99)
HCT VFR BLD CALC: 25.3 % — LOW (ref 34.5–45)
HGB BLD-MCNC: 7.7 G/DL — LOW (ref 11.5–15.5)
MCHC RBC-ENTMCNC: 30.2 PG — SIGNIFICANT CHANGE UP (ref 27–34)
MCHC RBC-ENTMCNC: 30.4 GM/DL — LOW (ref 32–36)
MCV RBC AUTO: 99.2 FL — SIGNIFICANT CHANGE UP (ref 80–100)
PLATELET # BLD AUTO: 244 K/UL — SIGNIFICANT CHANGE UP (ref 150–400)
POTASSIUM SERPL-MCNC: 4.3 MMOL/L — SIGNIFICANT CHANGE UP (ref 3.5–5.3)
POTASSIUM SERPL-SCNC: 4.3 MMOL/L — SIGNIFICANT CHANGE UP (ref 3.5–5.3)
RBC # BLD: 2.55 M/UL — LOW (ref 3.8–5.2)
RBC # FLD: 14.7 % — HIGH (ref 10.3–14.5)
SARS-COV-2 RNA SPEC QL NAA+PROBE: SIGNIFICANT CHANGE UP
SODIUM SERPL-SCNC: 142 MMOL/L — SIGNIFICANT CHANGE UP (ref 135–145)
SPECIMEN SOURCE: SIGNIFICANT CHANGE UP
SPECIMEN SOURCE: SIGNIFICANT CHANGE UP
VANCOMYCIN TROUGH SERPL-MCNC: 20.2 UG/ML — HIGH (ref 10–20)
VANCOMYCIN TROUGH SERPL-MCNC: 21.3 UG/ML — HIGH (ref 10–20)
WBC # BLD: 11.81 K/UL — HIGH (ref 3.8–10.5)
WBC # FLD AUTO: 11.81 K/UL — HIGH (ref 3.8–10.5)

## 2022-01-24 PROCEDURE — 99233 SBSQ HOSP IP/OBS HIGH 50: CPT

## 2022-01-24 PROCEDURE — 93306 TTE W/DOPPLER COMPLETE: CPT | Mod: 26

## 2022-01-24 RX ORDER — VANCOMYCIN HCL 1 G
600 VIAL (EA) INTRAVENOUS EVERY 24 HOURS
Refills: 0 | Status: DISCONTINUED | OUTPATIENT
Start: 2022-01-25 | End: 2022-01-25

## 2022-01-24 RX ADMIN — OXYCODONE AND ACETAMINOPHEN 1 TABLET(S): 5; 325 TABLET ORAL at 10:45

## 2022-01-24 RX ADMIN — SACUBITRIL AND VALSARTAN 1 TABLET(S): 24; 26 TABLET, FILM COATED ORAL at 21:16

## 2022-01-24 RX ADMIN — GABAPENTIN 300 MILLIGRAM(S): 400 CAPSULE ORAL at 21:16

## 2022-01-24 RX ADMIN — Medication 1 APPLICATION(S): at 21:18

## 2022-01-24 RX ADMIN — SENNA PLUS 2 TABLET(S): 8.6 TABLET ORAL at 21:16

## 2022-01-24 RX ADMIN — Medication 20 MILLIGRAM(S): at 09:49

## 2022-01-24 RX ADMIN — Medication 1 MILLIGRAM(S): at 21:16

## 2022-01-24 RX ADMIN — GABAPENTIN 300 MILLIGRAM(S): 400 CAPSULE ORAL at 14:05

## 2022-01-24 RX ADMIN — Medication 25 MILLIGRAM(S): at 21:16

## 2022-01-24 RX ADMIN — CEFEPIME 100 MILLIGRAM(S): 1 INJECTION, POWDER, FOR SOLUTION INTRAMUSCULAR; INTRAVENOUS at 09:52

## 2022-01-24 RX ADMIN — MORPHINE SULFATE 5 MILLIGRAM(S): 50 CAPSULE, EXTENDED RELEASE ORAL at 16:03

## 2022-01-24 RX ADMIN — INSULIN GLARGINE 10 UNIT(S): 100 INJECTION, SOLUTION SUBCUTANEOUS at 22:11

## 2022-01-24 RX ADMIN — HYDROMORPHONE HYDROCHLORIDE 0.5 MILLIGRAM(S): 2 INJECTION INTRAMUSCULAR; INTRAVENOUS; SUBCUTANEOUS at 21:46

## 2022-01-24 RX ADMIN — SODIUM CHLORIDE 50 MILLILITER(S): 9 INJECTION INTRAMUSCULAR; INTRAVENOUS; SUBCUTANEOUS at 06:03

## 2022-01-24 RX ADMIN — Medication 81 MILLIGRAM(S): at 09:46

## 2022-01-24 RX ADMIN — GABAPENTIN 300 MILLIGRAM(S): 400 CAPSULE ORAL at 06:03

## 2022-01-24 RX ADMIN — TIOTROPIUM BROMIDE 1 CAPSULE(S): 18 CAPSULE ORAL; RESPIRATORY (INHALATION) at 09:00

## 2022-01-24 RX ADMIN — HYDROMORPHONE HYDROCHLORIDE 0.5 MILLIGRAM(S): 2 INJECTION INTRAMUSCULAR; INTRAVENOUS; SUBCUTANEOUS at 21:16

## 2022-01-24 RX ADMIN — OXYCODONE AND ACETAMINOPHEN 1 TABLET(S): 5; 325 TABLET ORAL at 09:46

## 2022-01-24 RX ADMIN — MORPHINE SULFATE 5 MILLIGRAM(S): 50 CAPSULE, EXTENDED RELEASE ORAL at 07:46

## 2022-01-24 RX ADMIN — Medication 5 MILLIGRAM(S): at 09:49

## 2022-01-24 RX ADMIN — MORPHINE SULFATE 5 MILLIGRAM(S): 50 CAPSULE, EXTENDED RELEASE ORAL at 16:17

## 2022-01-24 RX ADMIN — POLYETHYLENE GLYCOL 3350 17 GRAM(S): 17 POWDER, FOR SOLUTION ORAL at 09:49

## 2022-01-24 RX ADMIN — OXYCODONE AND ACETAMINOPHEN 2 TABLET(S): 5; 325 TABLET ORAL at 22:10

## 2022-01-24 RX ADMIN — Medication 1 APPLICATION(S): at 06:04

## 2022-01-24 RX ADMIN — SACUBITRIL AND VALSARTAN 1 TABLET(S): 24; 26 TABLET, FILM COATED ORAL at 09:49

## 2022-01-24 RX ADMIN — Medication 2: at 14:05

## 2022-01-24 RX ADMIN — MORPHINE SULFATE 5 MILLIGRAM(S): 50 CAPSULE, EXTENDED RELEASE ORAL at 08:05

## 2022-01-24 RX ADMIN — OXYCODONE AND ACETAMINOPHEN 2 TABLET(S): 5; 325 TABLET ORAL at 23:00

## 2022-01-24 RX ADMIN — Medication 1 APPLICATION(S): at 14:05

## 2022-01-24 RX ADMIN — Medication 25 MILLIGRAM(S): at 09:52

## 2022-01-24 RX ADMIN — APIXABAN 2.5 MILLIGRAM(S): 2.5 TABLET, FILM COATED ORAL at 07:49

## 2022-01-24 NOTE — PROGRESS NOTE ADULT - SUBJECTIVE AND OBJECTIVE BOX
CHIEF COMPLAINT: Right ankle pain/Inability to bear weight RLE/Worsening Right ankle ulcer    85 y/o F with PMH COPD, CAD, Type 2 DM on insulin, depression, CHFrEF, HTN, PVD, PAD, s/p left femoral popliteal bypass 3/24/2021, left thigh infections s/p debridement and muscle flap, PAT, lower extremity DVT on Eliquis, RA presents with chronic wounds and pain to the right lower extremity for 2 weeks. She first noticed the chronic wound on her right lower extremity about a month ago. She has been completing dressing changes and applying Xeroform. The wound is foul smelling. She has been unable to walk over the last 2 weeks due to increase pressure when placing the foot down. Hanging her lower extremity off the side of the bed helps improve her pain. She also reports occasional diarrhea and lower extremity swelling. Denies headaches, blurry vision, dizziness, fevers, chills, chest pain, SOB, abdominal pain, N/V, diarrhea/constipation.    ER course: /48. Labs: WBC 10.87, Hb 7.9, .8, INR 1.17, K+ 6.1, BUN 61, Cr 1.49 (Baseline ~0.99), Glucose 448, Alkaline phosphatase 140, COVID negative. EKG: NSR with HR 64 bm, UT prolonged to 218 ms (1st degree AV block), QTC prolonged to 470 ms, LVH, T wave inversions in II, AVL, V4-V6, no ST segment changes present on prior EKG (personally reviewed).   Imaging:   - XR right ankle: concern for osteomyelitis, hardware present, no fracture or dislocation (personally reviewed).   - CXR: no consolidation, no effusion, no pneumothorax (personally reviewed).   The pt was given Cefepime and Vancomycin, 2L of NS, 10 units of Humalin, Calcium gluconate, Sodium bicarbonate, Lokelma, and Dilaudid in the ER. She is being admitted to med/surg for further management. "    No change in her status; for OR in AM    Vital Signs Last 24 Hrs  T(C): 36.8 (23 Jan 2022 09:03), Max: 36.8 (23 Jan 2022 09:03)  T(F): 98.3 (23 Jan 2022 09:03), Max: 98.3 (23 Jan 2022 09:03)  HR: 72 (23 Jan 2022 08:41) (57 - 76)  BP: 139/69 (23 Jan 2022 08:00) (122/46 - 155/49)  BP(mean): 88 (23 Jan 2022 08:00) (64 - 99)  RR: 15 (23 Jan 2022 08:00) (10 - 26)  SpO2: 98% (23 Jan 2022 08:00) (94% - 100%)    General: AAOx3; NAD  Head: AT/NC  ENT: Moist Mucous Membranes; No Injury  Neck: Non-tender; No JVD  CVS: RRR, S1&S2, Murmur +,  Respiratory: Lungs CTA B/L; Normal Respiratory Effort  Abdomen/GI: Soft, non-tender, non-distended, no guarding, no rebound,  : no bladder distension   Extremities: status post surgery with the ankle wound with the drainage ulcer covered with the dressing   MSK: No CVA tenderness, Normal ROM, No injury  Neuro: AAOx3, CNII-XII grossly intact, non-focal  Psych: Appropriate, Cooperative,  Skin: echymosis of the extremities       Home Medications:  albuterol 90 mcg/inh inhalation aerosol: 1 puff(s) inhaled every 6 hours, As needed, Shortness of Breath (19 Jan 2022 04:25)  aspirin 81 mg oral tablet, chewable: 1 tab(s) orally once a day (19 Jan 2022 04:25)  Eliquis 2.5 mg oral tablet: 1 tab(s) orally 2 times a day (19 Jan 2022 04:25)  folic acid 1 mg oral tablet: 1 tab(s) orally once a day (at bedtime) (19 Jan 2022 04:25)  gabapentin 300 mg oral capsule: 1 cap(s) orally 3 times a day (19 Jan 2022 04:25)  ipratropium-albuterol 0.5 mg-2.5 mg/3 mLinhalation solution: 3 milliliter(s) inhaled every 4 hours, As needed, Shortness of Breath and/or Wheezing (19 Jan 2022 04:25)  Lantus 100 units/mL subcutaneous solution: 10  subcutaneous once a day (at bedtime) (19 Jan 2022 04:25)  Lasix 20 mg oral tablet: 3 tab(s) orally once a day (19 Jan 2022 04:25)  meloxicam 15 mg oral tablet: 1 tab(s) orally once a day (19 Jan 2022 04:25)  methotrexate 2.5 mg oral tablet: 6 tab(s) orally once a week (19 Jan 2022 04:25)  metoprolol tartrate 25 mg oral tablet: 1 tab(s) orally 2 times a day (19 Jan 2022 04:25)  PARoxetine 20 mg oral tablet: 1 tab(s) orally once a day (19 Jan 2022 04:25)    MEDICATIONS  (STANDING):  apixaban 2.5 milliGRAM(s) Oral two times a day  aspirin  chewable 81 milliGRAM(s) Oral daily  cefepime   IVPB 2000 milliGRAM(s) IV Intermittent every 24 hours  Dakins Solution - 1/4 Strength 1 Application(s) Topical every 8 hours  dextrose 40% Gel 15 Gram(s) Oral once  dextrose 40% Gel 15 Gram(s) Oral once  dextrose 5%. 1000 milliLiter(s) (50 mL/Hr) IV Continuous <Continuous>  dextrose 5%. 1000 milliLiter(s) (50 mL/Hr) IV Continuous <Continuous>  dextrose 5%. 1000 milliLiter(s) (100 mL/Hr) IV Continuous <Continuous>  dextrose 5%. 1000 milliLiter(s) (100 mL/Hr) IV Continuous <Continuous>  dextrose 50% Injectable 25 Gram(s) IV Push once  dextrose 50% Injectable 12.5 Gram(s) IV Push once  dextrose 50% Injectable 25 Gram(s) IV Push once  dextrose 50% Injectable 25 Gram(s) IV Push once  dextrose 50% Injectable 12.5 Gram(s) IV Push once  dextrose 50% Injectable 25 Gram(s) IV Push once  folic acid 1 milliGRAM(s) Oral at bedtime  gabapentin 300 milliGRAM(s) Oral three times a day  glucagon  Injectable 1 milliGRAM(s) IntraMuscular once  glucagon  Injectable 1 milliGRAM(s) IntraMuscular once  insulin glargine Injectable (LANTUS) 10 Unit(s) SubCutaneous at bedtime  insulin lispro (ADMELOG) corrective regimen sliding scale   SubCutaneous three times a day before meals  insulin lispro (ADMELOG) corrective regimen sliding scale   SubCutaneous at bedtime  metoprolol tartrate 25 milliGRAM(s) Oral two times a day  PARoxetine 20 milliGRAM(s) Oral daily  polyethylene glycol 3350 17 Gram(s) Oral daily  predniSONE   Tablet 5 milliGRAM(s) Oral daily  sacubitril 24 mG/valsartan 26 mG 1 Tablet(s) Oral two times a day  senna 2 Tablet(s) Oral at bedtime  tiotropium 18 MICROgram(s) Capsule 1 Capsule(s) Inhalation daily  vancomycin  IVPB 750 milliGRAM(s) IV Intermittent every 24 hours    MEDICATIONS  (PRN):  acetaminophen     Tablet .. 650 milliGRAM(s) Oral every 6 hours PRN Temp greater or equal to 38C (100.4F), Mild Pain (1 - 3)  ALBUTerol    90 MICROgram(s) HFA Inhaler 1 Puff(s) Inhalation every 6 hours PRN Shortness of Breath  aluminum hydroxide/magnesium hydroxide/simethicone Suspension 30 milliLiter(s) Oral every 4 hours PRN Dyspepsia  hydrALAZINE Injectable 10 milliGRAM(s) IV Push every 6 hours PRN SBP > 160  HYDROmorphone  Injectable 0.5 milliGRAM(s) IV Push every 3 hours PRN Moderate Pain (4 - 6)  melatonin 3 milliGRAM(s) Oral at bedtime PRN Insomnia  morphine  - Injectable 5 milliGRAM(s) IV Push every 3 hours PRN Severe Pain (7 - 10)  ondansetron Injectable 4 milliGRAM(s) IV Push every 8 hours PRN Nausea and/or Vomiting  oxycodone    5 mG/acetaminophen 325 mG 2 Tablet(s) Oral every 4 hours PRN Severe Pain (7 - 10)  oxycodone    5 mG/acetaminophen 325 mG 1 Tablet(s) Oral every 4 hours PRN Moderate Pain (4 - 6)        LABS:                          7.9    10.90 )-----------( 257      ( 22 Jan 2022 05:50 ).               27.6     01-23    140  |  111<H>  |  62<H>  ----------------------------<  211<H>  4.5   |  22  |  1.34<H>    Ca    8.6      23 Jan 2022 05:14                  TTE Echo Complete w/o Contrast w/ Doppler (05.11.21 @ 10:31) >   Mild left ventricular enlargement with with moderate, segmental systolic   dysfunction. The apex and mid to apical anteroseptum and inferoseptum   appear hypokinetic. Estimated left ventricular ejection fraction is 40%.   Mild diastolic dysfunction (stage I).   Moderate mitral stenosis.   Mild aortic regurgitation.       CHIEF COMPLAINT: Right ankle pain/Inability to bear weight RLE/Worsening Right ankle ulcer    85 y/o F with PMH COPD, CAD, Type 2 DM on insulin, depression, CHFrEF, HTN, PVD, PAD, s/p left femoral popliteal bypass 3/24/2021, left thigh infections s/p debridement and muscle flap, PAT, lower extremity DVT on Eliquis, RA presents with chronic wounds and pain to the right lower extremity for 2 weeks. She first noticed the chronic wound on her right lower extremity about a month ago. She has been completing dressing changes and applying Xeroform. The wound is foul smelling. She has been unable to walk over the last 2 weeks due to increase pressure when placing the foot down. Hanging her lower extremity off the side of the bed helps improve her pain. She also reports occasional diarrhea and lower extremity swelling. Denies headaches, blurry vision, dizziness, fevers, chills, chest pain, SOB, abdominal pain, N/V, diarrhea/constipation.    ER course: /48. Labs: WBC 10.87, Hb 7.9, .8, INR 1.17, K+ 6.1, BUN 61, Cr 1.49 (Baseline ~0.99), Glucose 448, Alkaline phosphatase 140, COVID negative. EKG: NSR with HR 64 bm, NV prolonged to 218 ms (1st degree AV block), QTC prolonged to 470 ms, LVH, T wave inversions in II, AVL, V4-V6, no ST segment changes present on prior EKG (personally reviewed).   Imaging:   - XR right ankle: concern for osteomyelitis, hardware present, no fracture or dislocation (personally reviewed).   - CXR: no consolidation, no effusion, no pneumothorax (personally reviewed).   The pt was given Cefepime and Vancomycin, 2L of NS, 10 units of Humalin, Calcium gluconate, Sodium bicarbonate, Lokelma, and Dilaudid in the ER. She is being admitted to med/surg for further management. "    No change in her status; for OR in AM    Vital Signs Last 24 Hrs  T(C): 36.8 (23 Jan 2022 09:03), Max: 36.8 (23 Jan 2022 09:03)  T(F): 98.3 (23 Jan 2022 09:03), Max: 98.3 (23 Jan 2022 09:03)  HR: 72 (23 Jan 2022 08:41) (57 - 76)  BP: 139/69 (23 Jan 2022 08:00) (122/46 - 155/49)  BP(mean): 88 (23 Jan 2022 08:00) (64 - 99)  RR: 15 (23 Jan 2022 08:00) (10 - 26)  SpO2: 98% (23 Jan 2022 08:00) (94% - 100%)    General: AAOx3; NAD  Head: AT/NC  ENT: Moist Mucous Membranes; No Injury  Neck: Non-tender; No JVD  CVS: RRR, S1&S2, Murmur +,  Respiratory: Lungs CTA B/L; Normal Respiratory Effort  Abdomen/GI: Soft, non-tender, non-distended, no guarding, no rebound,  : no bladder distension   Extremities: status post surgery with the ankle wound with the drainage ulcer covered with the dressing   MSK: No CVA tenderness, Normal ROM, No injury  Neuro: AAOx3, CNII-XII grossly intact, non-focal  Psych: Appropriate, Cooperative,  Skin: echymosis of the extremities       Home Medications:  albuterol 90 mcg/inh inhalation aerosol: 1 puff(s) inhaled every 6 hours, As needed, Shortness of Breath (19 Jan 2022 04:25)  aspirin 81 mg oral tablet, chewable: 1 tab(s) orally once a day (19 Jan 2022 04:25)  Eliquis 2.5 mg oral tablet: 1 tab(s) orally 2 times a day (19 Jan 2022 04:25)  folic acid 1 mg oral tablet: 1 tab(s) orally once a day (at bedtime) (19 Jan 2022 04:25)  gabapentin 300 mg oral capsule: 1 cap(s) orally 3 times a day (19 Jan 2022 04:25)  ipratropium-albuterol 0.5 mg-2.5 mg/3 mLinhalation solution: 3 milliliter(s) inhaled every 4 hours, As needed, Shortness of Breath and/or Wheezing (19 Jan 2022 04:25)  Lantus 100 units/mL subcutaneous solution: 10  subcutaneous once a day (at bedtime) (19 Jan 2022 04:25)  Lasix 20 mg oral tablet: 3 tab(s) orally once a day (19 Jan 2022 04:25)  meloxicam 15 mg oral tablet: 1 tab(s) orally once a day (19 Jan 2022 04:25)  methotrexate 2.5 mg oral tablet: 6 tab(s) orally once a week (19 Jan 2022 04:25)  metoprolol tartrate 25 mg oral tablet: 1 tab(s) orally 2 times a day (19 Jan 2022 04:25)  PARoxetine 20 mg oral tablet: 1 tab(s) orally once a day (19 Jan 2022 04:25)    MEDICATIONS  (STANDING):  apixaban 2.5 milliGRAM(s) Oral two times a day  aspirin  chewable 81 milliGRAM(s) Oral daily  cefepime   IVPB 2000 milliGRAM(s) IV Intermittent every 24 hours  folic acid 1 milliGRAM(s) Oral at bedtime  gabapentin 300 milliGRAM(s) Oral three times a day  glucagon  Injectable 1 milliGRAM(s) IntraMuscular once  glucagon  Injectable 1 milliGRAM(s) IntraMuscular once  insulin glargine Injectable (LANTUS) 10 Unit(s) SubCutaneous at bedtime  insulin lispro (ADMELOG) corrective regimen sliding scale   SubCutaneous three times a day before meals  insulin lispro (ADMELOG) corrective regimen sliding scale   SubCutaneous at bedtime  metoprolol tartrate 25 milliGRAM(s) Oral two times a day  PARoxetine 20 milliGRAM(s) Oral daily  polyethylene glycol 3350 17 Gram(s) Oral daily  predniSONE   Tablet 5 milliGRAM(s) Oral daily  sacubitril 24 mG/valsartan 26 mG 1 Tablet(s) Oral two times a day  senna 2 Tablet(s) Oral at bedtime  tiotropium 18 MICROgram(s) Capsule 1 Capsule(s) Inhalation daily  vancomycin  IVPB 750 milliGRAM(s) IV Intermittent every 24 hours    MEDICATIONS  (PRN):  acetaminophen     Tablet .. 650 milliGRAM(s) Oral every 6 hours PRN Temp greater or equal to 38C (100.4F), Mild Pain (1 - 3)  ALBUTerol    90 MICROgram(s) HFA Inhaler 1 Puff(s) Inhalation every 6 hours PRN Shortness of Breath  aluminum hydroxide/magnesium hydroxide/simethicone Suspension 30 milliLiter(s) Oral every 4 hours PRN Dyspepsia  hydrALAZINE Injectable 10 milliGRAM(s) IV Push every 6 hours PRN SBP > 160  HYDROmorphone  Injectable 0.5 milliGRAM(s) IV Push every 3 hours PRN Moderate Pain (4 - 6)  melatonin 3 milliGRAM(s) Oral at bedtime PRN Insomnia  morphine  - Injectable 5 milliGRAM(s) IV Push every 3 hours PRN Severe Pain (7 - 10)  ondansetron Injectable 4 milliGRAM(s) IV Push every 8 hours PRN Nausea and/or Vomiting  oxycodone    5 mG/acetaminophen 325 mG 2 Tablet(s) Oral every 4 hours PRN Severe Pain (7 - 10)  oxycodone    5 mG/acetaminophen 325 mG 1 Tablet(s) Oral every 4 hours PRN Moderate Pain (4 - 6)        LABS:                        7.7    11.81 )-----------( 244      ( 24 Jan 2022 09:33 )             25.3   01-24    142  |  115<H>  |  43<H>  ----------------------------<  147<H>  4.3   |  21<L>  |  0.95    Ca    8.6      24 Jan 2022 09:33                                 TTE Echo Complete w/o Contrast w/ Doppler (05.11.21 @ 10:31) >   Mild left ventricular enlargement with with moderate, segmental systolic   dysfunction. The apex and mid to apical anteroseptum and inferoseptum   appear hypokinetic. Estimated left ventricular ejection fraction is 40%.   Mild diastolic dysfunction (stage I).   Moderate mitral stenosis.   Mild aortic regurgitation.

## 2022-01-24 NOTE — PROGRESS NOTE ADULT - SUBJECTIVE AND OBJECTIVE BOX
Pt seen and examined at the bedside. Patient states that she feels she is getting worse. States the pain in the ankle has worsened slighly. No overnight fevers/chills. Denies n/v/cp/sob    LABS:                        7.4    13.46 )-----------( 254      ( 23 Jan 2022 12:37 )             24.9     01-23    140  |  111<H>  |  62<H>  ----------------------------<  211<H>  4.5   |  22  |  1.34<H>    Ca    8.6      23 Jan 2022 05:14            VITAL SIGNS:  T(C): 37.1 (01-24-22 @ 06:15), Max: 37.1 (01-23-22 @ 20:32)  HR: 67 (01-24-22 @ 04:00) (65 - 77)  BP: 160/50 (01-24-22 @ 04:00) (117/52 - 160/50)  RR: 13 (01-24-22 @ 04:00) (11 - 19)  SpO2: 95% (01-24-22 @ 04:00) (92% - 99%)    Exam:  Gen: NAD, Resting comfortably    RLE:  Skin with medial mall ulcer with purulent drainage, ulcer over posterior ankle with purulent drainage.   TTP diffusely on ankle  Painful PROm of ankle, Minimal pain with micromotion of ankle.  unable to fire +EHL/FHL; able to actively DF/PF a couple degrees but is limited due to pain, difficult to assess if this is GS/TA  +SILT L3-S1  + DP  Compartments soft and compressible  No calf tenderness    A/P: 86F w/ R ankle cellulitis, OM     Medical management appreciated  Imaging revealing Increased signal intensity on MR indicating cellulitis and OM of the R ankle  Continue IV antibiotics per ID team; BCx NGTD  Vasc Sx following; s/p LLE angiogram 1/21- Angioplasty/stenting R common, SFA arteries, and angioplasty of R below knee popliteal artery. Demonstrating increased perfusion  Medicine holding nephrotoxic meds  Analgesia PRN  WBAT RLE  Warm compress as tolerated  DVT ppx per vascular/primary team  Patient is stable and not currently septic  No plan for acute orthopedic surgical intervention tonight but with monitor closely for any changes that may require surgical intervention.   Patient will need improved Peripheral vascular function before any surgical intervention can be performed given poor ability to heal, will monitor progress w/ VSx intervention  Contact with major changes in clinical status which may indicate surgical intervention  Will discuss with attending and advise if any changes to plan       Pt seen and examined at the bedside. Patient states that she feels she is getting worse. States the pain in the ankle has worsened slighly. No overnight fevers/chills. Denies n/v/cp/sob    LABS:                        7.4    13.46 )-----------( 254      ( 23 Jan 2022 12:37 )             24.9     01-23    140  |  111<H>  |  62<H>  ----------------------------<  211<H>  4.5   |  22  |  1.34<H>    Ca    8.6      23 Jan 2022 05:14            VITAL SIGNS:  T(C): 37.1 (01-24-22 @ 06:15), Max: 37.1 (01-23-22 @ 20:32)  HR: 67 (01-24-22 @ 04:00) (65 - 77)  BP: 160/50 (01-24-22 @ 04:00) (117/52 - 160/50)  RR: 13 (01-24-22 @ 04:00) (11 - 19)  SpO2: 95% (01-24-22 @ 04:00) (92% - 99%)    Exam:  Gen: NAD, Resting comfortably    RLE:  Skin with medial mall ulcer with purulent drainage, ulcer over posterior ankle with purulent drainage.   TTP diffusely on ankle  Painful PROm of ankle, Minimal pain with micromotion of ankle.  unable to fire +EHL/FHL; able to actively DF/PF a couple degrees but is limited due to pain, difficult to assess if this is GS/TA  +SILT L3-S1  + DP  Compartments soft and compressible  No calf tenderness    A/P: 86F w/ R ankle cellulitis, OM     Medical management appreciated  Imaging revealing Increased signal intensity on MR indicating cellulitis and OM of the R ankle  Continue IV antibiotics per ID team; BCx NGTD  Vasc Sx following; s/p LLE angiogram 1/21- Angioplasty/stenting R common, SFA arteries, and angioplasty of R below knee popliteal artery. Demonstrating increased perfusion  Medicine holding nephrotoxic meds  Analgesia PRN  WBAT RLE  Warm compress as tolerated  Plan for OR tomorrow for arthroscopic irrigation and debridement of R ankle  DVT ppx per vascular/primary team, will discuss with team holding eliquis for today and tomorrow in anticipation of OR  NPO after midnight except medications  Discussed case with attending who agrees with above plan

## 2022-01-24 NOTE — PROGRESS NOTE ADULT - ATTENDING COMMENTS
Pt chart reviewed. I spoke to Dr. Ventura - he updated me on the patient's vascular procedure. The wounds were reviewed via clinical pictures from over the weekend. Given the patient's increasing pain and wound status, it seems prudent to move forward with surgical I&D via arthroscopy with possible bone biopsy of the right ankle. The patient is extremely frail and has been lethargic.     I spoke to the daughter at length regarding the surgical plan. The is M&M risk associated with this. I made her aware that this possible septic pathology likely has been ongoing since prior to her admission and the I&D will be to dilute the possible source and provide some pain relief, if any, given the degree of arthritic change and underlying cellulitis and wound damage. All questions were answered.    Optimization is appreciated.  All RBA reviewed at length.

## 2022-01-24 NOTE — PROGRESS NOTE ADULT - ASSESSMENT
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   - continue with the cefipime and vancomycin and follow up of the vancomycin levels closely   -US and MRI findings noted  -ID/Vascular/Podiatry/Ortho consult appreciated    peripheral vascular disease   status post right femoral angioplasty and stenting with the right  popliteal angioplasty doing well post op      # Hypertension   - Hydralazine PRN for SBP > 160   - c/w home anti-HTN agents secabutril , metoprolol and lasix was on hold due to acute kidney injury   - resumption of lasix once renal function improves     #acute kidney injury on the chronic CKD   no urinary retention with the bladder sono   NAISD celebrex  was on hold due to the ORLY with the lasix   k improved with the kaexalate   would give gentle hydration for the 12 hours   encourage po intake   hold nephrotoxic drugs   on vancomycin and dose was adjusted by the I.D     # Acute on chr anemia   - S/P 2 unit s of PRBCs in am  -Follow H/H has been stable   - has macrocytic anemia will check b12 levels and continue with the folic acid         # History of COPD, CAD, Type 2 DM on insulin, depression, CHFrEF, HTN, PVD, PAD, s/p left femoral popliteal bypass 3/24/2021, left thigh infections s/p debridement and muscle flap, ICN, lower extremity DVT on Eliquis, RA   - c/w home medications   monitor glucose levels and adjustment of the insulin dose for the D.M   continue with the spiriva for the copd     DVT ppx: Eliquis   Code status: Full code (pt agrees to chest compressions and intubation if required).         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   - continue with the cefipime and vancomycin and follow up of the vancomycin levels closely   OR in am for irrigation debridement; cardio clearance to follow  PRBC preop    peripheral vascular disease   status post right femoral angioplasty and stenting with the right  popliteal angioplasty doing well post op      # Hypertension   - Hydralazine PRN for SBP > 160   - c/w home anti-HTN agents secabutril , metoprolol and lasix was on hold due to acute kidney injury resume postop    # AF  off Eliquis      # Acute on chr anemia   - S/P 2 unit s of PRBCs in am  repeat am HH transfuse if necessary before OR          # History of COPD, CAD, Type 2 DM on insulin, depression, CHFrEF, HTN, PVD, PAD, s/p left femoral popliteal bypass 3/24/2021, left thigh infections s/p debridement and muscle flap, ICN, lower extremity DVT on Eliquis, RA   - c/w home medications   monitor glucose levels and adjustment of the insulin dose for the D.M   continue with the spiriva for the copd       Code status: Full code         Ischemic limb and non healing medial right ankle ulcer , and 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   - continue with the cefipime and vancomycin and follow up of the vancomycin levels closely   OR in am for irrigation debridement;   low risk for periop complications  1 unit PRBC    peripheral vascular disease   status post right femoral angioplasty and stenting with the right  popliteal angioplasty doing well post op      # Hypertension   - Hydralazine PRN for SBP > 160   - c/w home anti-HTN agents secabutril , metoprolol and lasix was on hold due to acute kidney injury resume postop    # AF  off Eliquis preop      # Acute on chr anemia   - S/P 2 unit s of PRBCs in am  repeat am HH transfuse if necessary before OR          # History of COPD, CAD, Type 2 DM on insulin, depression, CHFrEF, HTN, PVD, PAD, s/p left femoral popliteal bypass 3/24/2021, left thigh infections s/p debridement and muscle flap, ICN, lower extremity DVT on Eliquis, RA   - c/w home medications   monitor glucose levels and adjustment of the insulin dose for the D.M   continue with the spiriva for the copd       Code status: Full code         Ischemic limb and non healing medial right ankle ulcer , and 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

## 2022-01-24 NOTE — PROGRESS NOTE ADULT - ASSESSMENT
85 y/o Female with h/o COPD, CAD, Type 2 DM on insulin, depression, CHFrEF, HTN, PVD, PAD, s/p left femoral popliteal bypass 3/24/2021, prior left thigh infections s/p debridement and muscle flap, PAT, lower extremity DVT on Eliquis, RA was admitted on 1/18 for lower leg nonhealing wounds. She first noticed the chronic wound on her right lower extremity about a month ago. She has been completing dressing changes and applying Xeroform. The wound is foul smelling. She has been unable to walk over the last 2 weeks due to increase pressure when placing the foot down. Hanging her lower extremity off the side of the bed helps improve her pain. She also reports occasional diarrhea and lower extremity swelling. Denies headaches, blurry vision, dizziness, fevers, chills. In ER she received cefepime and vancomycin IV.     1. RLE cellulitis with multiple open wound. Chronic LE stasis dermatitis. PVD s/p angioplasty.  -lower leg erythema  -mild leukocytosis  -BC x 2 noted  -on vancomycin 750 gm IV qd and cefepime 2 gm IV qd # 5  -tolerating abx well so far; no side effects noted  -vancomycin trough level is reported high again; hold vanco today and restart at 600 mg IV qd  -elevate legs  .local wound care  -continue abx coverage   -vascular evaluation appreciated  -monitor temps  -f/u CBC  -supportive care  2. Other issues:   -care per medicine

## 2022-01-24 NOTE — PROGRESS NOTE ADULT - SUBJECTIVE AND OBJECTIVE BOX
afebrile, VSS    complains of pain in ankle wound, not in foot    ESR > 140    WBC ~ 13.4 K    R foot pink, warm    A/P  infected joint  will discuss with Dr. Ku today re management    MEDICATIONS  (STANDING):  apixaban 2.5 milliGRAM(s) Oral two times a day  aspirin  chewable 81 milliGRAM(s) Oral daily  cefepime   IVPB 2000 milliGRAM(s) IV Intermittent every 24 hours  Dakins Solution - 1/4 Strength 1 Application(s) Topical every 8 hours  dextrose 40% Gel 15 Gram(s) Oral once  dextrose 40% Gel 15 Gram(s) Oral once  dextrose 5%. 1000 milliLiter(s) (50 mL/Hr) IV Continuous <Continuous>  dextrose 5%. 1000 milliLiter(s) (100 mL/Hr) IV Continuous <Continuous>  dextrose 5%. 1000 milliLiter(s) (100 mL/Hr) IV Continuous <Continuous>  dextrose 5%. 1000 milliLiter(s) (50 mL/Hr) IV Continuous <Continuous>  dextrose 50% Injectable 25 Gram(s) IV Push once  dextrose 50% Injectable 12.5 Gram(s) IV Push once  dextrose 50% Injectable 25 Gram(s) IV Push once  dextrose 50% Injectable 25 Gram(s) IV Push once  dextrose 50% Injectable 12.5 Gram(s) IV Push once  dextrose 50% Injectable 25 Gram(s) IV Push once  folic acid 1 milliGRAM(s) Oral at bedtime  gabapentin 300 milliGRAM(s) Oral three times a day  glucagon  Injectable 1 milliGRAM(s) IntraMuscular once  glucagon  Injectable 1 milliGRAM(s) IntraMuscular once  insulin glargine Injectable (LANTUS) 10 Unit(s) SubCutaneous at bedtime  insulin lispro (ADMELOG) corrective regimen sliding scale   SubCutaneous three times a day before meals  insulin lispro (ADMELOG) corrective regimen sliding scale   SubCutaneous at bedtime  metoprolol tartrate 25 milliGRAM(s) Oral two times a day  PARoxetine 20 milliGRAM(s) Oral daily  polyethylene glycol 3350 17 Gram(s) Oral daily  predniSONE   Tablet 5 milliGRAM(s) Oral daily  sacubitril 24 mG/valsartan 26 mG 1 Tablet(s) Oral two times a day  senna 2 Tablet(s) Oral at bedtime  sodium chloride 0.9%. 600 milliLiter(s) (50 mL/Hr) IV Continuous <Continuous>  tiotropium 18 MICROgram(s) Capsule 1 Capsule(s) Inhalation daily  vancomycin  IVPB 750 milliGRAM(s) IV Intermittent every 24 hours    MEDICATIONS  (PRN):  acetaminophen     Tablet .. 650 milliGRAM(s) Oral every 6 hours PRN Temp greater or equal to 38C (100.4F), Mild Pain (1 - 3)  ALBUTerol    90 MICROgram(s) HFA Inhaler 1 Puff(s) Inhalation every 6 hours PRN Shortness of Breath  aluminum hydroxide/magnesium hydroxide/simethicone Suspension 30 milliLiter(s) Oral every 4 hours PRN Dyspepsia  hydrALAZINE Injectable 10 milliGRAM(s) IV Push every 6 hours PRN SBP > 160  HYDROmorphone  Injectable 0.5 milliGRAM(s) IV Push every 3 hours PRN Moderate Pain (4 - 6)  melatonin 3 milliGRAM(s) Oral at bedtime PRN Insomnia  morphine  - Injectable 5 milliGRAM(s) IV Push every 3 hours PRN Severe Pain (7 - 10)  ondansetron Injectable 4 milliGRAM(s) IV Push every 8 hours PRN Nausea and/or Vomiting  oxycodone    5 mG/acetaminophen 325 mG 1 Tablet(s) Oral every 4 hours PRN Moderate Pain (4 - 6)  oxycodone    5 mG/acetaminophen 325 mG 2 Tablet(s) Oral every 4 hours PRN Severe Pain (7 - 10)      Allergies    cephalosporins (Other)  penicillins (Urticaria; Pruritus)    Intolerances        Flatus: [ ] YES [ ] NO             Bowel Movement: [ ] YES [ ] NO  Pain (0-10):            Pain Control Adequate: [ ] YES [ ] NO  Nausea: [ ] YES [ ] NO            Vomiting: [ ] YES [ ] NO  Diarrhea: [ ] YES [ ] NO         Constipation: [ ] YES [ ] NO     Chest Pain: [ ] YES [ ] NO    SOB:  [ ] YES [ ] NO    Vital Signs Last 24 Hrs  T(C): 37.1 (24 Jan 2022 06:15), Max: 37.1 (23 Jan 2022 20:32)  T(F): 98.7 (24 Jan 2022 06:15), Max: 98.7 (23 Jan 2022 20:32)  HR: 69 (24 Jan 2022 06:00) (65 - 77)  BP: 166/45 (24 Jan 2022 06:00) (117/52 - 166/45)  BP(mean): 82 (24 Jan 2022 06:00) (70 - 88)  RR: 12 (24 Jan 2022 06:00) (11 - 19)  SpO2: 95% (24 Jan 2022 06:00) (92% - 99%)    I&O's Summary    22 Jan 2022 07:01  -  23 Jan 2022 07:00  --------------------------------------------------------  IN: 0 mL / OUT: 750 mL / NET: -750 mL    23 Jan 2022 07:01  -  24 Jan 2022 06:49  --------------------------------------------------------  IN: 0 mL / OUT: 1400 mL / NET: -1400 mL        Physical Exam:  General: NAD, resting comfortably  Pulmonary: normal resp effort, CTA-B  Cardiovascular: NSR  Abdominal: soft, NT/ND  Extremities: WWP, normal strength  Neuro: A/O x 3, CNs II-XII grossly intact, normal motor/sensation, no focal deficits  Pulses:   Right:                                                                          Left:  FEM [ ]2+ [ ]1+ [ ]doppler                                             FEM [ ]2+ [ ]1+ [ ]doppler    POP [ ]2+ [ ]1+ [ ]doppler                                             POP [ ]2+ [ ]1+ [ ]doppler    DP [ ]2+ [ ]1+ [ ]doppler                                                DP [ ]2+ [ ]1+ [ ]doppler  PT[ ]2+ [ ]1+ [ ]doppler                                                  PT [ ]2+ [ ]1+ [ ]doppler    LABS:                        7.4    13.46 )-----------( 254      ( 23 Jan 2022 12:37 )             24.9     01-23    140  |  111<H>  |  62<H>  ----------------------------<  211<H>  4.5   |  22  |  1.34<H>    Ca    8.6      23 Jan 2022 05:14            CAPILLARY BLOOD GLUCOSE      POCT Blood Glucose.: 195 mg/dL (23 Jan 2022 20:55)  POCT Blood Glucose.: 284 mg/dL (23 Jan 2022 16:31)  POCT Blood Glucose.: 179 mg/dL (23 Jan 2022 11:07)  POCT Blood Glucose.: 241 mg/dL (23 Jan 2022 07:48)      RADIOLOGY & ADDITIONAL TESTS:

## 2022-01-24 NOTE — PROGRESS NOTE ADULT - SUBJECTIVE AND OBJECTIVE BOX
Date of service: 01-24-22 @ 12:43    Lying in bed in NAD  Alert and mild confused  Has right ankle tenderness  Reported with high vanco level    ROS: no fever or chills; denies dizziness, no HA, no SOB or cough, no abdominal pain, no diarrhea or constipation; no dysuria    MEDICATIONS  (STANDING):  aspirin  chewable 81 milliGRAM(s) Oral daily  cefepime   IVPB 2000 milliGRAM(s) IV Intermittent every 24 hours  Dakins Solution - 1/4 Strength 1 Application(s) Topical every 8 hours  dextrose 40% Gel 15 Gram(s) Oral once  dextrose 40% Gel 15 Gram(s) Oral once  dextrose 5%. 1000 milliLiter(s) (50 mL/Hr) IV Continuous <Continuous>  dextrose 5%. 1000 milliLiter(s) (100 mL/Hr) IV Continuous <Continuous>  dextrose 5%. 1000 milliLiter(s) (100 mL/Hr) IV Continuous <Continuous>  dextrose 5%. 1000 milliLiter(s) (50 mL/Hr) IV Continuous <Continuous>  dextrose 50% Injectable 25 Gram(s) IV Push once  dextrose 50% Injectable 12.5 Gram(s) IV Push once  dextrose 50% Injectable 25 Gram(s) IV Push once  dextrose 50% Injectable 25 Gram(s) IV Push once  dextrose 50% Injectable 12.5 Gram(s) IV Push once  dextrose 50% Injectable 25 Gram(s) IV Push once  folic acid 1 milliGRAM(s) Oral at bedtime  gabapentin 300 milliGRAM(s) Oral three times a day  glucagon  Injectable 1 milliGRAM(s) IntraMuscular once  glucagon  Injectable 1 milliGRAM(s) IntraMuscular once  insulin glargine Injectable (LANTUS) 10 Unit(s) SubCutaneous at bedtime  insulin lispro (ADMELOG) corrective regimen sliding scale   SubCutaneous at bedtime  insulin lispro (ADMELOG) corrective regimen sliding scale   SubCutaneous three times a day before meals  metoprolol tartrate 25 milliGRAM(s) Oral two times a day  PARoxetine 20 milliGRAM(s) Oral daily  polyethylene glycol 3350 17 Gram(s) Oral daily  predniSONE   Tablet 5 milliGRAM(s) Oral daily  sacubitril 24 mG/valsartan 26 mG 1 Tablet(s) Oral two times a day  senna 2 Tablet(s) Oral at bedtime  sodium chloride 0.9%. 600 milliLiter(s) (50 mL/Hr) IV Continuous <Continuous>  tiotropium 18 MICROgram(s) Capsule 1 Capsule(s) Inhalation daily  vancomycin  IVPB 750 milliGRAM(s) IV Intermittent every 24 hours    Vital Signs Last 24 Hrs  T(C): 37.1 (24 Jan 2022 10:20), Max: 37.1 (23 Jan 2022 20:32)  T(F): 98.7 (24 Jan 2022 10:20), Max: 98.7 (23 Jan 2022 20:32)  HR: 64 (24 Jan 2022 12:00) (64 - 78)  BP: 145/41 (24 Jan 2022 12:00) (117/52 - 166/45)  BP(mean): 70 (24 Jan 2022 12:00) (70 - 85)  RR: 13 (24 Jan 2022 12:00) (11 - 19)  SpO2: 96% (24 Jan 2022 12:00) (85% - 99%)     Physical exam:    Constitutional:  No acute distress  HEENT: NC/AT, EOMI, PERRLA, conjunctivae clear; ears and nose atraumatic  Neck: supple; thyroid not palpable  Back: no tenderness  Respiratory: respiratory effort normal; clear to auscultation  Cardiovascular: S1S2 regular, no murmurs  Abdomen: soft, not tender, not distended, positive BS  Genitourinary: no suprapubic tenderness  Lymphatic: no LN palpable  Musculoskeletal: no muscle tenderness, no joint swelling or tenderness  Extremities: 2+ pedal edema  Right lower extremity wound at the medial malleolus and posterior lower extremity; surrounding erythema.  Right malleolar ulcer with necrotic base  Neurological/ Psychiatric: AxOx3, judgement and insight normal; moving all extremities  Skin: no rashes; no palpable lesions    Labs: reviewed                        7.7    11.81 )-----------( 244      ( 24 Jan 2022 09:33 )             25.3     01-24    142  |  115<H>  |  43<H>  ----------------------------<  147<H>  4.3   |  21<L>  |  0.95    Ca    8.6      24 Jan 2022 09:33    Vancomycin Level, Trough: 20.2 ug/mL (01-24 @ 11:22)  Vancomycin Level, Trough: 21.3 ug/mL (01-24 @ 09:33)    C-Reactive Protein, Serum: 175 mg/L (01-23-22 @ 11:08)  C-Reactive Protein, Serum: 55 mg/L (01-19-22 @ 01:18)                        7.9    10.90 )-----------( 257      ( 22 Jan 2022 05:50 )             27.6     01-23    140  |  111<H>  |  62<H>  ----------------------------<  211<H>  4.5   |  22  |  1.34<H>    Ca    8.6      23 Jan 2022 05:14    Vancomycin Level, Trough: 23.1 ug/mL (01-22 @ 09:57)    C-Reactive Protein, Serum: 55 mg/L (01-19-22 @ 01:18)                        7.9    10.90 )-----------( 257      ( 22 Jan 2022 05:50 )             27.6     01-21    140  |  114<H>  |  54<H>  ----------------------------<  249<H>  5.3   |  22  |  1.37<H>    Ca    8.4<L>      21 Jan 2022 11:58    C-Reactive Protein, Serum: 55 mg/L (01-19-22 @ 01:18)                        7.1    8.99  )-----------( 278      ( 20 Jan 2022 05:51 )             24.1     01-20    141  |  114<H>  |  57<H>  ----------------------------<  177<H>  5.7<H>   |  23  |  1.17    Ca    8.5      20 Jan 2022 05:51  Phos  4.1     01-20  Mg     2.4     01-20    TPro  7.0  /  Alb  2.2<L>  /  TBili  0.2  /  DBili  x   /  AST  10<L>  /  ALT  10<L>  /  AlkPhos  90  01-19    C-Reactive Protein, Serum: 55 mg/L (01-19-22 @ 01:18)                        7.9    10.64 )-----------( 302      ( 19 Jan 2022 07:31 )             26.8     01-19    137  |  109<H>  |  51<H>  ----------------------------<  137<H>  5.2   |  23  |  1.16    Ca    9.4      19 Jan 2022 07:31    TPro  7.0  /  Alb  2.2<L>  /  TBili  0.2  /  DBili  x   /  AST  10<L>  /  ALT  10<L>  /  AlkPhos  90  01-19     LIVER FUNCTIONS - ( 19 Jan 2022 07:31 )  Alb: 2.2 g/dL / Pro: 7.0 gm/dL / ALK PHOS: 90 U/L / ALT: 10 U/L / AST: 10 U/L / GGT: x             Culture - Blood (collected 18 Jan 2022 21:04)  Source: .Blood None  Preliminary Report (20 Jan 2022 05:01):    No growth to date.    Culture - Blood (collected 18 Jan 2022 21:04)  Source: .Blood None  Preliminary Report (20 Jan 2022 05:01):    No growth to date.    COVID-19 PCR: NotDetec (01-18-22 @ 21:04)    Radiology: all available radiological tests reviewed    XR right ankle: concern for osteomyelitis, hardware present, no fracture or dislocation  CXR: no consolidation, no effusion, no pneumothorax (personally reviewed).     Advanced directives addressed: full resuscitation

## 2022-01-25 LAB
ANION GAP SERPL CALC-SCNC: 5 MMOL/L — SIGNIFICANT CHANGE UP (ref 5–17)
APTT BLD: 27.7 SEC — SIGNIFICANT CHANGE UP (ref 27.5–35.5)
BUN SERPL-MCNC: 36 MG/DL — HIGH (ref 7–23)
CALCIUM SERPL-MCNC: 8.7 MG/DL — SIGNIFICANT CHANGE UP (ref 8.5–10.1)
CHLORIDE SERPL-SCNC: 114 MMOL/L — HIGH (ref 96–108)
CO2 SERPL-SCNC: 23 MMOL/L — SIGNIFICANT CHANGE UP (ref 22–31)
CREAT SERPL-MCNC: 0.92 MG/DL — SIGNIFICANT CHANGE UP (ref 0.5–1.3)
CRP SERPL-MCNC: 226 MG/L — HIGH
GLUCOSE SERPL-MCNC: 103 MG/DL — HIGH (ref 70–99)
HCT VFR BLD CALC: 25.7 % — LOW (ref 34.5–45)
HCT VFR BLD CALC: 28 % — LOW (ref 34.5–45)
HGB BLD-MCNC: 7.8 G/DL — LOW (ref 11.5–15.5)
HGB BLD-MCNC: 8.6 G/DL — LOW (ref 11.5–15.5)
INR BLD: 1.3 RATIO — HIGH (ref 0.88–1.16)
MCHC RBC-ENTMCNC: 29.7 PG — SIGNIFICANT CHANGE UP (ref 27–34)
MCHC RBC-ENTMCNC: 29.9 PG — SIGNIFICANT CHANGE UP (ref 27–34)
MCHC RBC-ENTMCNC: 30.4 GM/DL — LOW (ref 32–36)
MCHC RBC-ENTMCNC: 30.7 GM/DL — LOW (ref 32–36)
MCV RBC AUTO: 97.2 FL — SIGNIFICANT CHANGE UP (ref 80–100)
MCV RBC AUTO: 97.7 FL — SIGNIFICANT CHANGE UP (ref 80–100)
PLATELET # BLD AUTO: 232 K/UL — SIGNIFICANT CHANGE UP (ref 150–400)
PLATELET # BLD AUTO: 252 K/UL — SIGNIFICANT CHANGE UP (ref 150–400)
POTASSIUM SERPL-MCNC: 4.2 MMOL/L — SIGNIFICANT CHANGE UP (ref 3.5–5.3)
POTASSIUM SERPL-SCNC: 4.2 MMOL/L — SIGNIFICANT CHANGE UP (ref 3.5–5.3)
PROTHROM AB SERPL-ACNC: 15 SEC — HIGH (ref 10.6–13.6)
RBC # BLD: 2.63 M/UL — LOW (ref 3.8–5.2)
RBC # BLD: 2.88 M/UL — LOW (ref 3.8–5.2)
RBC # FLD: 15.8 % — HIGH (ref 10.3–14.5)
RBC # FLD: 15.8 % — HIGH (ref 10.3–14.5)
SODIUM SERPL-SCNC: 142 MMOL/L — SIGNIFICANT CHANGE UP (ref 135–145)
WBC # BLD: 12.26 K/UL — HIGH (ref 3.8–10.5)
WBC # BLD: 12.65 K/UL — HIGH (ref 3.8–10.5)
WBC # FLD AUTO: 12.26 K/UL — HIGH (ref 3.8–10.5)
WBC # FLD AUTO: 12.65 K/UL — HIGH (ref 3.8–10.5)

## 2022-01-25 PROCEDURE — 11046 DBRDMT MUSC&/FSCA EA ADDL: CPT | Mod: 59,RT

## 2022-01-25 PROCEDURE — 97607 NEG PRS WND THR NDME<=50SQCM: CPT

## 2022-01-25 PROCEDURE — 27620 EXPLORE/TREAT ANKLE JOINT: CPT | Mod: RT

## 2022-01-25 PROCEDURE — 99233 SBSQ HOSP IP/OBS HIGH 50: CPT

## 2022-01-25 PROCEDURE — 11045 DBRDMT SUBQ TISS EACH ADDL: CPT | Mod: 59,RT

## 2022-01-25 PROCEDURE — 11043 DBRDMT MUSC&/FSCA 1ST 20/<: CPT | Mod: 59,RT

## 2022-01-25 PROCEDURE — 99497 ADVNCD CARE PLAN 30 MIN: CPT | Mod: 25

## 2022-01-25 PROCEDURE — 11042 DBRDMT SUBQ TIS 1ST 20SQCM/<: CPT | Mod: 59,RT

## 2022-01-25 PROCEDURE — 99221 1ST HOSP IP/OBS SF/LOW 40: CPT

## 2022-01-25 RX ORDER — DEXTROSE 50 % IN WATER 50 %
25 SYRINGE (ML) INTRAVENOUS ONCE
Refills: 0 | Status: DISCONTINUED | OUTPATIENT
Start: 2022-01-25 | End: 2022-02-03

## 2022-01-25 RX ORDER — LANOLIN ALCOHOL/MO/W.PET/CERES
3 CREAM (GRAM) TOPICAL AT BEDTIME
Refills: 0 | Status: DISCONTINUED | OUTPATIENT
Start: 2022-01-25 | End: 2022-02-03

## 2022-01-25 RX ORDER — ALBUTEROL 90 UG/1
1 AEROSOL, METERED ORAL EVERY 6 HOURS
Refills: 0 | Status: DISCONTINUED | OUTPATIENT
Start: 2022-01-25 | End: 2022-02-03

## 2022-01-25 RX ORDER — DEXTROSE 50 % IN WATER 50 %
12.5 SYRINGE (ML) INTRAVENOUS ONCE
Refills: 0 | Status: DISCONTINUED | OUTPATIENT
Start: 2022-01-25 | End: 2022-02-03

## 2022-01-25 RX ORDER — SODIUM CHLORIDE 9 MG/ML
1000 INJECTION, SOLUTION INTRAVENOUS
Refills: 0 | Status: DISCONTINUED | OUTPATIENT
Start: 2022-01-25 | End: 2022-02-03

## 2022-01-25 RX ORDER — MORPHINE SULFATE 50 MG/1
5 CAPSULE, EXTENDED RELEASE ORAL
Refills: 0 | Status: DISCONTINUED | OUTPATIENT
Start: 2022-01-25 | End: 2022-01-26

## 2022-01-25 RX ORDER — SODIUM CHLORIDE 9 MG/ML
600 INJECTION INTRAMUSCULAR; INTRAVENOUS; SUBCUTANEOUS
Refills: 0 | Status: DISCONTINUED | OUTPATIENT
Start: 2022-01-25 | End: 2022-02-02

## 2022-01-25 RX ORDER — OXYCODONE AND ACETAMINOPHEN 5; 325 MG/1; MG/1
1 TABLET ORAL EVERY 4 HOURS
Refills: 0 | Status: DISCONTINUED | OUTPATIENT
Start: 2022-01-25 | End: 2022-01-26

## 2022-01-25 RX ORDER — GLUCAGON INJECTION, SOLUTION 0.5 MG/.1ML
1 INJECTION, SOLUTION SUBCUTANEOUS ONCE
Refills: 0 | Status: DISCONTINUED | OUTPATIENT
Start: 2022-01-25 | End: 2022-02-03

## 2022-01-25 RX ORDER — ACETAMINOPHEN 500 MG
1000 TABLET ORAL ONCE
Refills: 0 | Status: DISCONTINUED | OUTPATIENT
Start: 2022-01-25 | End: 2022-01-25

## 2022-01-25 RX ORDER — SACUBITRIL AND VALSARTAN 24; 26 MG/1; MG/1
1 TABLET, FILM COATED ORAL
Refills: 0 | Status: DISCONTINUED | OUTPATIENT
Start: 2022-01-25 | End: 2022-02-03

## 2022-01-25 RX ORDER — DEXTROSE 50 % IN WATER 50 %
15 SYRINGE (ML) INTRAVENOUS ONCE
Refills: 0 | Status: DISCONTINUED | OUTPATIENT
Start: 2022-01-25 | End: 2022-02-03

## 2022-01-25 RX ORDER — GABAPENTIN 400 MG/1
300 CAPSULE ORAL THREE TIMES A DAY
Refills: 0 | Status: DISCONTINUED | OUTPATIENT
Start: 2022-01-25 | End: 2022-02-03

## 2022-01-25 RX ORDER — ASPIRIN/CALCIUM CARB/MAGNESIUM 324 MG
81 TABLET ORAL DAILY
Refills: 0 | Status: DISCONTINUED | OUTPATIENT
Start: 2022-01-25 | End: 2022-02-03

## 2022-01-25 RX ORDER — ACETAMINOPHEN 500 MG
1000 TABLET ORAL ONCE
Refills: 0 | Status: COMPLETED | OUTPATIENT
Start: 2022-01-25 | End: 2022-01-25

## 2022-01-25 RX ORDER — HYDROMORPHONE HYDROCHLORIDE 2 MG/ML
0.5 INJECTION INTRAMUSCULAR; INTRAVENOUS; SUBCUTANEOUS
Refills: 0 | Status: DISCONTINUED | OUTPATIENT
Start: 2022-01-25 | End: 2022-01-29

## 2022-01-25 RX ORDER — CEFEPIME 1 G/1
2000 INJECTION, POWDER, FOR SOLUTION INTRAMUSCULAR; INTRAVENOUS EVERY 24 HOURS
Refills: 0 | Status: DISCONTINUED | OUTPATIENT
Start: 2022-01-25 | End: 2022-02-01

## 2022-01-25 RX ORDER — SODIUM CHLORIDE 9 MG/ML
1000 INJECTION, SOLUTION INTRAVENOUS
Refills: 0 | Status: DISCONTINUED | OUTPATIENT
Start: 2022-01-25 | End: 2022-01-25

## 2022-01-25 RX ORDER — FENTANYL CITRATE 50 UG/ML
50 INJECTION INTRAVENOUS
Refills: 0 | Status: DISCONTINUED | OUTPATIENT
Start: 2022-01-25 | End: 2022-01-25

## 2022-01-25 RX ORDER — ONDANSETRON 8 MG/1
4 TABLET, FILM COATED ORAL EVERY 6 HOURS
Refills: 0 | Status: DISCONTINUED | OUTPATIENT
Start: 2022-01-25 | End: 2022-01-25

## 2022-01-25 RX ORDER — ONDANSETRON 8 MG/1
4 TABLET, FILM COATED ORAL EVERY 8 HOURS
Refills: 0 | Status: DISCONTINUED | OUTPATIENT
Start: 2022-01-25 | End: 2022-02-02

## 2022-01-25 RX ORDER — HYDRALAZINE HCL 50 MG
10 TABLET ORAL EVERY 6 HOURS
Refills: 0 | Status: DISCONTINUED | OUTPATIENT
Start: 2022-01-25 | End: 2022-02-02

## 2022-01-25 RX ORDER — INSULIN LISPRO 100/ML
VIAL (ML) SUBCUTANEOUS AT BEDTIME
Refills: 0 | Status: DISCONTINUED | OUTPATIENT
Start: 2022-01-25 | End: 2022-02-03

## 2022-01-25 RX ORDER — FOLIC ACID 0.8 MG
1 TABLET ORAL AT BEDTIME
Refills: 0 | Status: DISCONTINUED | OUTPATIENT
Start: 2022-01-25 | End: 2022-02-03

## 2022-01-25 RX ORDER — POLYETHYLENE GLYCOL 3350 17 G/17G
17 POWDER, FOR SOLUTION ORAL DAILY
Refills: 0 | Status: DISCONTINUED | OUTPATIENT
Start: 2022-01-25 | End: 2022-02-03

## 2022-01-25 RX ORDER — TIOTROPIUM BROMIDE 18 UG/1
1 CAPSULE ORAL; RESPIRATORY (INHALATION) DAILY
Refills: 0 | Status: DISCONTINUED | OUTPATIENT
Start: 2022-01-25 | End: 2022-02-03

## 2022-01-25 RX ORDER — VANCOMYCIN HCL 1 G
600 VIAL (EA) INTRAVENOUS EVERY 24 HOURS
Refills: 0 | Status: COMPLETED | OUTPATIENT
Start: 2022-01-25 | End: 2022-02-01

## 2022-01-25 RX ORDER — OXYCODONE AND ACETAMINOPHEN 5; 325 MG/1; MG/1
2 TABLET ORAL EVERY 4 HOURS
Refills: 0 | Status: DISCONTINUED | OUTPATIENT
Start: 2022-01-25 | End: 2022-01-26

## 2022-01-25 RX ORDER — SENNA PLUS 8.6 MG/1
2 TABLET ORAL AT BEDTIME
Refills: 0 | Status: DISCONTINUED | OUTPATIENT
Start: 2022-01-25 | End: 2022-02-03

## 2022-01-25 RX ORDER — OXYCODONE HYDROCHLORIDE 5 MG/1
5 TABLET ORAL ONCE
Refills: 0 | Status: DISCONTINUED | OUTPATIENT
Start: 2022-01-25 | End: 2022-01-25

## 2022-01-25 RX ORDER — ACETAMINOPHEN 500 MG
650 TABLET ORAL EVERY 6 HOURS
Refills: 0 | Status: DISCONTINUED | OUTPATIENT
Start: 2022-01-25 | End: 2022-02-02

## 2022-01-25 RX ORDER — METOPROLOL TARTRATE 50 MG
25 TABLET ORAL
Refills: 0 | Status: DISCONTINUED | OUTPATIENT
Start: 2022-01-25 | End: 2022-02-03

## 2022-01-25 RX ORDER — INSULIN LISPRO 100/ML
VIAL (ML) SUBCUTANEOUS
Refills: 0 | Status: DISCONTINUED | OUTPATIENT
Start: 2022-01-25 | End: 2022-02-03

## 2022-01-25 RX ADMIN — Medication 5 MILLIGRAM(S): at 09:56

## 2022-01-25 RX ADMIN — HYDROMORPHONE HYDROCHLORIDE 0.5 MILLIGRAM(S): 2 INJECTION INTRAMUSCULAR; INTRAVENOUS; SUBCUTANEOUS at 19:39

## 2022-01-25 RX ADMIN — GABAPENTIN 300 MILLIGRAM(S): 400 CAPSULE ORAL at 05:43

## 2022-01-25 RX ADMIN — Medication 25 MILLIGRAM(S): at 21:02

## 2022-01-25 RX ADMIN — HYDROMORPHONE HYDROCHLORIDE 0.5 MILLIGRAM(S): 2 INJECTION INTRAMUSCULAR; INTRAVENOUS; SUBCUTANEOUS at 20:00

## 2022-01-25 RX ADMIN — SODIUM CHLORIDE 50 MILLILITER(S): 9 INJECTION INTRAMUSCULAR; INTRAVENOUS; SUBCUTANEOUS at 05:41

## 2022-01-25 RX ADMIN — GABAPENTIN 300 MILLIGRAM(S): 400 CAPSULE ORAL at 21:02

## 2022-01-25 RX ADMIN — CEFEPIME 100 MILLIGRAM(S): 1 INJECTION, POWDER, FOR SOLUTION INTRAMUSCULAR; INTRAVENOUS at 09:58

## 2022-01-25 RX ADMIN — FENTANYL CITRATE 50 MICROGRAM(S): 50 INJECTION INTRAVENOUS at 17:53

## 2022-01-25 RX ADMIN — OXYCODONE HYDROCHLORIDE 5 MILLIGRAM(S): 5 TABLET ORAL at 17:42

## 2022-01-25 RX ADMIN — OXYCODONE AND ACETAMINOPHEN 2 TABLET(S): 5; 325 TABLET ORAL at 07:09

## 2022-01-25 RX ADMIN — OXYCODONE AND ACETAMINOPHEN 2 TABLET(S): 5; 325 TABLET ORAL at 06:31

## 2022-01-25 RX ADMIN — TIOTROPIUM BROMIDE 1 CAPSULE(S): 18 CAPSULE ORAL; RESPIRATORY (INHALATION) at 10:47

## 2022-01-25 RX ADMIN — OXYCODONE HYDROCHLORIDE 5 MILLIGRAM(S): 5 TABLET ORAL at 17:43

## 2022-01-25 RX ADMIN — Medication 1 APPLICATION(S): at 05:41

## 2022-01-25 RX ADMIN — MORPHINE SULFATE 5 MILLIGRAM(S): 50 CAPSULE, EXTENDED RELEASE ORAL at 20:46

## 2022-01-25 RX ADMIN — Medication 20 MILLIGRAM(S): at 09:56

## 2022-01-25 RX ADMIN — SODIUM CHLORIDE 75 MILLILITER(S): 9 INJECTION, SOLUTION INTRAVENOUS at 17:43

## 2022-01-25 RX ADMIN — Medication 1000 MILLIGRAM(S): at 22:00

## 2022-01-25 RX ADMIN — Medication 81 MILLIGRAM(S): at 12:16

## 2022-01-25 RX ADMIN — Medication 250 MILLIGRAM(S): at 06:51

## 2022-01-25 RX ADMIN — FENTANYL CITRATE 50 MICROGRAM(S): 50 INJECTION INTRAVENOUS at 17:15

## 2022-01-25 RX ADMIN — MORPHINE SULFATE 5 MILLIGRAM(S): 50 CAPSULE, EXTENDED RELEASE ORAL at 20:00

## 2022-01-25 RX ADMIN — SACUBITRIL AND VALSARTAN 1 TABLET(S): 24; 26 TABLET, FILM COATED ORAL at 09:56

## 2022-01-25 RX ADMIN — Medication 25 MILLIGRAM(S): at 09:56

## 2022-01-25 RX ADMIN — Medication 400 MILLIGRAM(S): at 21:20

## 2022-01-25 RX ADMIN — HYDROMORPHONE HYDROCHLORIDE 0.5 MILLIGRAM(S): 2 INJECTION INTRAMUSCULAR; INTRAVENOUS; SUBCUTANEOUS at 12:15

## 2022-01-25 NOTE — CONSULT NOTE ADULT - CONVERSATION DETAILS
We discussed Palliative Medicine team being a supportive team when a patient has ongoing illnesses.     We discussed CPR and Intubation briefly with patient and I did again separately with patients HCP.   At time of my interview patient was a little more somnolent and deferred to her daughter .    She seemed to be having problems concentrating on discussion, possibly some underlying delirium.     That being said her daughter agrees pt is normally A/Ox4.    She normally reports she wants FULL CANDELARIA and 'chance to survive'    Patients daughter agrees this is what she would want and would support her mother who has capacity in that decision.

## 2022-01-25 NOTE — BRIEF OPERATIVE NOTE - NSICDXBRIEFPREOP_GEN_ALL_CORE_FT
PRE-OP DIAGNOSIS:  Limb ischemia 20-Jan-2022 16:53:15  Soren Ventura  Non-healing ulcer of ankle 25-Jan-2022 16:51:53  Dedrick Valdivia  
PRE-OP DIAGNOSIS:  Limb ischemia 20-Jan-2022 16:53:15  Soren Ventura

## 2022-01-25 NOTE — BRIEF OPERATIVE NOTE - NSICDXBRIEFPROCEDURE_GEN_ALL_CORE_FT
PROCEDURES:  Abdominal aortic angiogram 20-Jan-2022 16:52:03 abdominopelvic, bilateral lower extremity angiogram, angioplasty and stenting of the R common, SFA arteries, angioplasty of R below knee popliteal artery Soren Ventura  
PROCEDURES:  Selective debridement of wound 25-Jan-2022 16:51:15 debridement of arterial ankle ulceration left side Dedrick Valdivia

## 2022-01-25 NOTE — PROGRESS NOTE ADULT - SUBJECTIVE AND OBJECTIVE BOX
Pt seen and examined at the bedside. Patient states that feels lousy and is having significant ankle pain, similar to yesterday. No overnight fevers/chills. Denies n/v/cp/sob    LABS:                        7.8    12.65 )-----------( 252      ( 25 Jan 2022 05:07 )             25.7     01-25    142  |  114<H>  |  36<H>  ----------------------------<  103<H>  4.2   |  23  |  0.92    Ca    8.7      25 Jan 2022 05:07      PT/INR - ( 25 Jan 2022 05:07 )   PT: 15.0 sec;   INR: 1.30 ratio         PTT - ( 25 Jan 2022 05:07 )  PTT:27.7 sec      VITAL SIGNS:  T(C): 36.3 (01-25-22 @ 06:03), Max: 37.1 (01-24-22 @ 10:20)  HR: 64 (01-25-22 @ 04:00) (63 - 78)  BP: 149/43 (01-25-22 @ 04:00) (127/40 - 170/60)  RR: 13 (01-25-22 @ 04:00) (10 - 19)  SpO2: 96% (01-25-22 @ 04:00) (85% - 99%)    Exam:  Gen: NAD, Resting comfortably    RLE:  Skin with medial mall ulcer with purulent drainage, ulcer over posterior ankle with purulent drainage. Erythema around the medial ulcer has expanded  TTP diffusely on ankle  Painful PROM of ankle, Minimal pain with micromotion of ankle.  +EHL/FHL; able to actively DF/PF a couple degrees but is limited due to pain, difficult to assess GS/TA  +SILT L3-S1  + DP  Compartments soft and compressible  No calf tenderness    A/P: 86F w/ R ankle cellulitis, OM     Medical management appreciated  Imaging revealing Increased signal intensity on MR indicating cellulitis and OM of the R ankle  Continue IV antibiotics per ID team; BCx NGTD  Vasc Sx following; s/p LLE angiogram 1/21- Angioplasty/stenting R common, SFA arteries, and angioplasty of R below knee popliteal artery. Demonstrating increased perfusion  Medicine holding nephrotoxic meds  Analgesia PRN  WBAT RLE  Warm compress as tolerated  Plan for OR today for arthroscopic irrigation and debridement of R ankle  DVT ppx per vascular/primary team, will hold eliquis for today  in anticipation of OR  NPO except medications  Discussed case with attending who agrees with above plan

## 2022-01-25 NOTE — CONSULT NOTE ADULT - REASON FOR ADMISSION
Right lower extremity wound

## 2022-01-25 NOTE — PROGRESS NOTE ADULT - ATTENDING COMMENTS
Pt chart reviewed. I spoke to Dr. Ventura - he updated me on the patient's vascular procedure. The wounds were reviewed via clinical pictures from over the weekend. Given the patient's increasing pain and wound status, it seems prudent to move forward with surgical I&D via arthroscopy with possible bone biopsy of the right ankle. The patient is extremely frail and has been lethargic.     I spoke to the daughter at length regarding the surgical plan. The is M&M risk associated with this. I made her aware that this possible septic pathology likely has been ongoing since prior to her admission and the I&D will be to dilute the possible source and provide some pain relief, if any, given the degree of arthritic change and underlying cellulitis and wound damage. All questions were answered.    Optimization is appreciated.  All RBA reviewed at length. Pt chart reviewed. I spoke to Dr. Ventura - he updated me on the patient's vascular procedure. The wounds were reviewed via clinical pictures from over the weekend. Given the patient's increasing pain and wound status, it seems prudent to move forward with surgical I&D via arthroscopy with possible bone biopsy of the right ankle. The patient is extremely frail and has been lethargic.     I spoke to the daughter at length regarding the surgical plan. The is M&M risk associated with this. I made her aware that this possible septic pathology likely has been ongoing since prior to her admission and the I&D will be to dilute the possible source and provide some pain relief, if any, given the degree of arthritic change and underlying cellulitis and wound damage. All questions were answered.    Optimization is appreciated.  All RBA reviewed at length.    UPDATE:  1/25/22 @ 9908:  The plan for this patient is to debride the medial ankle wounds and access the ankle joint for I&D through least invasive technique. This surgery is high risk and the patient and her daughter are aware. I spoke to vascular surgery who will intervene if needed but this leg may not be salvageable over time and may require BKA vs. AKA vs. hospice palliative intervention if there is no significant improvement. All parties involved are understanding of this. We may place a vac on the ankle wound.

## 2022-01-25 NOTE — PROGRESS NOTE ADULT - SUBJECTIVE AND OBJECTIVE BOX
Date of service: 01-25-22 @ 09:19    Has left ankle pain at rest  Open necrotic maleolar wound    ROS: no fever or chills; denies dizziness, no HA, no SOB or cough, no abdominal pain, no diarrhea or constipation; no dysuria    MEDICATIONS  (STANDING):  aspirin  chewable 81 milliGRAM(s) Oral daily  cefepime   IVPB 2000 milliGRAM(s) IV Intermittent every 24 hours  Dakins Solution - 1/4 Strength 1 Application(s) Topical every 8 hours  dextrose 40% Gel 15 Gram(s) Oral once  dextrose 40% Gel 15 Gram(s) Oral once  dextrose 5%. 1000 milliLiter(s) (50 mL/Hr) IV Continuous <Continuous>  dextrose 5%. 1000 milliLiter(s) (100 mL/Hr) IV Continuous <Continuous>  dextrose 5%. 1000 milliLiter(s) (50 mL/Hr) IV Continuous <Continuous>  dextrose 5%. 1000 milliLiter(s) (100 mL/Hr) IV Continuous <Continuous>  dextrose 50% Injectable 25 Gram(s) IV Push once  dextrose 50% Injectable 12.5 Gram(s) IV Push once  dextrose 50% Injectable 25 Gram(s) IV Push once  dextrose 50% Injectable 25 Gram(s) IV Push once  dextrose 50% Injectable 12.5 Gram(s) IV Push once  dextrose 50% Injectable 25 Gram(s) IV Push once  folic acid 1 milliGRAM(s) Oral at bedtime  gabapentin 300 milliGRAM(s) Oral three times a day  glucagon  Injectable 1 milliGRAM(s) IntraMuscular once  glucagon  Injectable 1 milliGRAM(s) IntraMuscular once  insulin glargine Injectable (LANTUS) 10 Unit(s) SubCutaneous at bedtime  insulin lispro (ADMELOG) corrective regimen sliding scale   SubCutaneous three times a day before meals  insulin lispro (ADMELOG) corrective regimen sliding scale   SubCutaneous at bedtime  metoprolol tartrate 25 milliGRAM(s) Oral two times a day  PARoxetine 20 milliGRAM(s) Oral daily  polyethylene glycol 3350 17 Gram(s) Oral daily  predniSONE   Tablet 5 milliGRAM(s) Oral daily  sacubitril 24 mG/valsartan 26 mG 1 Tablet(s) Oral two times a day  senna 2 Tablet(s) Oral at bedtime  sodium chloride 0.9%. 600 milliLiter(s) (50 mL/Hr) IV Continuous <Continuous>  tiotropium 18 MICROgram(s) Capsule 1 Capsule(s) Inhalation daily  vancomycin  IVPB 600 milliGRAM(s) IV Intermittent every 24 hours    Vital Signs Last 24 Hrs  T(C): 36.6 (25 Jan 2022 08:01), Max: 37.1 (24 Jan 2022 10:20)  T(F): 97.8 (25 Jan 2022 08:01), Max: 98.8 (24 Jan 2022 21:31)  HR: 65 (25 Jan 2022 08:00) (63 - 78)  BP: 153/53 (25 Jan 2022 08:00) (127/40 - 170/60)  BP(mean): 80 (25 Jan 2022 08:00) (66 - 89)  RR: 14 (25 Jan 2022 08:00) (10 - 19)  SpO2: 96% (25 Jan 2022 04:00) (85% - 99%)     Physical exam:    Constitutional:  No acute distress  HEENT: NC/AT, EOMI, PERRLA, conjunctivae clear; ears and nose atraumatic  Neck: supple; thyroid not palpable  Back: no tenderness  Respiratory: respiratory effort normal; clear to auscultation  Cardiovascular: S1S2 regular, no murmurs  Abdomen: soft, not tender, not distended, positive BS  Genitourinary: no suprapubic tenderness  Lymphatic: no LN palpable  Musculoskeletal: no muscle tenderness, no joint swelling or tenderness  Extremities: 2+ pedal edema  Right lower extremity wound at the medial malleolus and posterior lower extremity; surrounding erythema.  Right malleolar ulcer with necrotic base  Neurological/ Psychiatric: AxOx3, judgement and insight normal; moving all extremities  Skin: no rashes; no palpable lesions    Labs: reviewed                        8.6    12.26 )-----------( 232      ( 25 Jan 2022 08:39 )             28.0     01-25    142  |  114<H>  |  36<H>  ----------------------------<  103<H>  4.2   |  23  |  0.92    Ca    8.7      25 Jan 2022 05:07    Vancomycin Level, Trough: 20.2 ug/mL (01-24 @ 11:22)  Vancomycin Level, Trough: 21.3 ug/mL (01-24 @ 09:33)    C-Reactive Protein, Serum: 175 mg/L (01-23-22 @ 11:08)  C-Reactive Protein, Serum: 55 mg/L (01-19-22 @ 01:18)                        7.7    11.81 )-----------( 244      ( 24 Jan 2022 09:33 )             25.3     01-24    142  |  115<H>  |  43<H>  ----------------------------<  147<H>  4.3   |  21<L>  |  0.95    Ca    8.6      24 Jan 2022 09:33    Vancomycin Level, Trough: 20.2 ug/mL (01-24 @ 11:22)  Vancomycin Level, Trough: 21.3 ug/mL (01-24 @ 09:33)    C-Reactive Protein, Serum: 175 mg/L (01-23-22 @ 11:08)  C-Reactive Protein, Serum: 55 mg/L (01-19-22 @ 01:18)                        7.9    10.90 )-----------( 257      ( 22 Jan 2022 05:50 )             27.6     01-23    140  |  111<H>  |  62<H>  ----------------------------<  211<H>  4.5   |  22  |  1.34<H>    Ca    8.6      23 Jan 2022 05:14    Vancomycin Level, Trough: 23.1 ug/mL (01-22 @ 09:57)    C-Reactive Protein, Serum: 55 mg/L (01-19-22 @ 01:18)                        7.9    10.90 )-----------( 257      ( 22 Jan 2022 05:50 )             27.6     01-21    140  |  114<H>  |  54<H>  ----------------------------<  249<H>  5.3   |  22  |  1.37<H>    Ca    8.4<L>      21 Jan 2022 11:58    C-Reactive Protein, Serum: 55 mg/L (01-19-22 @ 01:18)                        7.1    8.99  )-----------( 278      ( 20 Jan 2022 05:51 )             24.1     01-20    141  |  114<H>  |  57<H>  ----------------------------<  177<H>  5.7<H>   |  23  |  1.17    Ca    8.5      20 Jan 2022 05:51  Phos  4.1     01-20  Mg     2.4     01-20    TPro  7.0  /  Alb  2.2<L>  /  TBili  0.2  /  DBili  x   /  AST  10<L>  /  ALT  10<L>  /  AlkPhos  90  01-19    C-Reactive Protein, Serum: 55 mg/L (01-19-22 @ 01:18)                        7.9    10.64 )-----------( 302      ( 19 Jan 2022 07:31 )             26.8     01-19    137  |  109<H>  |  51<H>  ----------------------------<  137<H>  5.2   |  23  |  1.16    Ca    9.4      19 Jan 2022 07:31    TPro  7.0  /  Alb  2.2<L>  /  TBili  0.2  /  DBili  x   /  AST  10<L>  /  ALT  10<L>  /  AlkPhos  90  01-19     LIVER FUNCTIONS - ( 19 Jan 2022 07:31 )  Alb: 2.2 g/dL / Pro: 7.0 gm/dL / ALK PHOS: 90 U/L / ALT: 10 U/L / AST: 10 U/L / GGT: x             Culture - Blood (collected 18 Jan 2022 21:04)  Source: .Blood None  Preliminary Report (20 Jan 2022 05:01):    No growth to date.    Culture - Blood (collected 18 Jan 2022 21:04)  Source: .Blood None  Preliminary Report (20 Jan 2022 05:01):    No growth to date.    COVID-19 PCR: NotDetec (01-18-22 @ 21:04)    Radiology: all available radiological tests reviewed    XR right ankle: concern for osteomyelitis, hardware present, no fracture or dislocation  CXR: no consolidation, no effusion, no pneumothorax (personally reviewed).     Advanced directives addressed: full resuscitation

## 2022-01-25 NOTE — CONSULT NOTE ADULT - CONSULT REASON
ischemic R foot with medial malleolar ulcer
wound care
R ankle OM
Complex goals of care and symptom management 2/2 multiple coomorbidities

## 2022-01-25 NOTE — PROGRESS NOTE ADULT - ASSESSMENT
87 y/o Female with h/o COPD, CAD, Type 2 DM on insulin, depression, CHFrEF, HTN, PVD, PAD, s/p left femoral popliteal bypass 3/24/2021, prior left thigh infections s/p debridement and muscle flap, PAT, lower extremity DVT on Eliquis, RA was admitted on 1/18 for lower leg nonhealing wounds. She first noticed the chronic wound on her right lower extremity about a month ago. She has been completing dressing changes and applying Xeroform. The wound is foul smelling. She has been unable to walk over the last 2 weeks due to increase pressure when placing the foot down. Hanging her lower extremity off the side of the bed helps improve her pain. She also reports occasional diarrhea and lower extremity swelling. Denies headaches, blurry vision, dizziness, fevers, chills. In ER she received cefepime and vancomycin IV.     1. RLE cellulitis with multiple open wound. Chronic LE stasis dermatitis. PVD s/p angioplasty.  -lower leg erythema  -mild leukocytosis  -has ankle pain ?due to ischemia  -BC x 2 noted  -on vancomycin 600 gm IV qd and cefepime 2 gm IV qd # 6  -tolerating abx well so far; no side effects noted  -repeat vancomycin trough level  -elevate legs  .local wound care  -continue abx coverage   -vascular evaluation appreciated  -monitor temps  -f/u CBC  -supportive care  2. Other issues:   -care per medicine

## 2022-01-25 NOTE — BRIEF OPERATIVE NOTE - NSICDXBRIEFPOSTOP_GEN_ALL_CORE_FT
POST-OP DIAGNOSIS:  Limb ischemia 20-Jan-2022 16:53:43  Soren Ventura  
POST-OP DIAGNOSIS:  Non-healing ulcer of ankle 25-Jan-2022 16:52:04  Dedrick Valdivia

## 2022-01-25 NOTE — PROGRESS NOTE ADULT - SUBJECTIVE AND OBJECTIVE BOX
Patient seen and examined at bedside. Patient received from OR. Currently confused in her room. Pain appears controlled. wound vac in palce    PE:  Vital Signs Last 24 Hrs  T(C): 36.6 (01-25-22 @ 21:33), Max: 36.6 (01-25-22 @ 08:01)  T(F): 97.9 (01-25-22 @ 21:33), Max: 97.9 (01-25-22 @ 21:33)  HR: 81 (01-25-22 @ 20:00) (60 - 86)  BP: 138/50 (01-25-22 @ 20:00) (109/42 - 169/49)  BP(mean): 77 (01-25-22 @ 20:00) (70 - 81)  RR: 8 (01-25-22 @ 20:00) (8 - 25)  SpO2: 99% (01-25-22 @ 20:00) (90% - 100%)    General: NAD, resting comfortably in bed, confused  RLE:  wound vac in placed over right ankle, wrapped in ACE, clean dry and intact  SCD in place contralaterally  No calf tenderness contralterally  unable to assess motor and sensation due to confusion  toes are WWP                          8.6    12.26 )-----------( 232      ( 25 Jan 2022 08:39 )             28.0     25 Jan 2022 05:07    142    |  114    |  36     ----------------------------<  103    4.2     |  23     |  0.92     Ca    8.7        25 Jan 2022 05:07      PT/INR - ( 25 Jan 2022 05:07 )   PT: 15.0 sec;   INR: 1.30 ratio         PTT - ( 25 Jan 2022 05:07 )  PTT:27.7 sec    A/P:  86y f s/p right ankle open I&D with wound vac placement    -PT/OT -WBAT RLE  -Pain Control  -DVT ppx   -Wound vac in place, please record output  -Continue perioperative abx x 24 hours, abx per ID  -FU AM Labs  -Rest, ice, compress and elevate the extremity as we needed  - ICU care  -Incentive Spirometry  -Medical management appreciated

## 2022-01-25 NOTE — PROGRESS NOTE ADULT - SUBJECTIVE AND OBJECTIVE BOX
CHIEF COMPLAINT: Right ankle pain/Inability to bear weight RLE/Worsening Right ankle ulcer    85 y/o F with PMH COPD, CAD, Type 2 DM on insulin, depression, CHFrEF, HTN, PVD, PAD, s/p left femoral popliteal bypass 3/24/2021, left thigh infections s/p debridement and muscle flap, PAT, lower extremity DVT on Eliquis, RA presents with chronic wounds and pain to the right lower extremity for 2 weeks. She first noticed the chronic wound on her right lower extremity about a month ago. She has been completing dressing changes and applying Xeroform. The wound is foul smelling. She has been unable to walk over the last 2 weeks due to increase pressure when placing the foot down. Hanging her lower extremity off the side of the bed helps improve her pain. She also reports occasional diarrhea and lower extremity swelling. Denies headaches, blurry vision, dizziness, fevers, chills, chest pain, SOB, abdominal pain, N/V, diarrhea/constipation.    ER course: /48. Labs: WBC 10.87, Hb 7.9, .8, INR 1.17, K+ 6.1, BUN 61, Cr 1.49 (Baseline ~0.99), Glucose 448, Alkaline phosphatase 140, COVID negative. EKG: NSR with HR 64 bm, W[place] prolonged to 218 ms (1st degree AV block), QTC prolonged to 470 ms, LVH, T wave inversions in II, AVL, V4-V6, no ST segment changes present on prior EKG (personally reviewed).   Imaging:   - XR right ankle: concern for osteomyelitis, hardware present, no fracture or dislocation (personally reviewed).   - CXR: no consolidation, no effusion, no pneumothorax (personally reviewed).   The pt was given Cefepime and Vancomycin, 2L of NS, 10 units of Humalin, Calcium gluconate, Sodium bicarbonate, Lokelma, and Dilaudid in the ER. She is being admitted to med/surg for further management. "    No change in her status; for OR today    Vital Signs Last 24 Hrs  T(C): 36.6 (25 Jan 2022 08:01), Max: 37.1 (24 Jan 2022 13:40)  T(F): 97.8 (25 Jan 2022 08:01), Max: 98.8 (24 Jan 2022 21:31)  HR: 60 (25 Jan 2022 12:00) (60 - 69)  BP: 139/43 (25 Jan 2022 12:00) (127/40 - 170/60)  BP(mean): 70 (25 Jan 2022 12:00) (66 - 89)  RR: 14 (25 Jan 2022 12:00) (10 - 19)  SpO2: 100% (25 Jan 2022 10:00) (93% - 100%)    General: AAOx3; NAD; frail  Head: AT/NC  ENT: Moist Mucous Membranes; No Injury  Neck: Non-tender; No JVD  CVS: RRR, S1&S2, Murmur +,  Respiratory: Lungs CTA B/L; Normal Respiratory Effort  Abdomen/GI: Soft, non-tender, non-distended, no guarding, no rebound,  : no bladder distension   Extremities: status post surgery with the ankle wound with the drainage ulcer covered with the dressing   MSK: No CVA tenderness, Normal ROM, No injury  Neuro: AAOx3, gen weakness  Psych: Appropriate, Cooperative,  Skin: echymosis of the extremities Skin with medial mall ulcer with purulent drainage, ulcer over posterior ankle with purulent drainage. Erythema around the medial ulcer has expanded  TTP diffusely on ankle  Painful PROM of ankle, Minimal pain with micromotion of ankle.        Home Medications:  albuterol 90 mcg/inh inhalation aerosol: 1 puff(s) inhaled every 6 hours, As needed, Shortness of Breath (19 Jan 2022 04:25)  aspirin 81 mg oral tablet, chewable: 1 tab(s) orally once a day (19 Jan 2022 04:25)  Eliquis 2.5 mg oral tablet: 1 tab(s) orally 2 times a day (19 Jan 2022 04:25)  folic acid 1 mg oral tablet: 1 tab(s) orally once a day (at bedtime) (19 Jan 2022 04:25)  gabapentin 300 mg oral capsule: 1 cap(s) orally 3 times a day (19 Jan 2022 04:25)  ipratropium-albuterol 0.5 mg-2.5 mg/3 mLinhalation solution: 3 milliliter(s) inhaled every 4 hours, As needed, Shortness of Breath and/or Wheezing (19 Jan 2022 04:25)  Lantus 100 units/mL subcutaneous solution: 10  subcutaneous once a day (at bedtime) (19 Jan 2022 04:25)  Lasix 20 mg oral tablet: 3 tab(s) orally once a day (19 Jan 2022 04:25)  meloxicam 15 mg oral tablet: 1 tab(s) orally once a day (19 Jan 2022 04:25)  methotrexate 2.5 mg oral tablet: 6 tab(s) orally once a week (19 Jan 2022 04:25)  metoprolol tartrate 25 mg oral tablet: 1 tab(s) orally 2 times a day (19 Jan 2022 04:25)  PARoxetine 20 mg oral tablet: 1 tab(s) orally once a day (19 Jan 2022 04:25)    MEDICATIONS  (STANDING):  apixaban 2.5 milliGRAM(s) Oral two times a day  aspirin  chewable 81 milliGRAM(s) Oral daily  cefepime   IVPB 2000 milliGRAM(s) IV Intermittent every 24 hours  folic acid 1 milliGRAM(s) Oral at bedtime  gabapentin 300 milliGRAM(s) Oral three times a day  glucagon  Injectable 1 milliGRAM(s) IntraMuscular once  glucagon  Injectable 1 milliGRAM(s) IntraMuscular once  insulin glargine Injectable (LANTUS) 10 Unit(s) SubCutaneous at bedtime  insulin lispro (ADMELOG) corrective regimen sliding scale   SubCutaneous three times a day before meals  insulin lispro (ADMELOG) corrective regimen sliding scale   SubCutaneous at bedtime  metoprolol tartrate 25 milliGRAM(s) Oral two times a day  PARoxetine 20 milliGRAM(s) Oral daily  polyethylene glycol 3350 17 Gram(s) Oral daily  predniSONE   Tablet 5 milliGRAM(s) Oral daily  sacubitril 24 mG/valsartan 26 mG 1 Tablet(s) Oral two times a day  senna 2 Tablet(s) Oral at bedtime  tiotropium 18 MICROgram(s) Capsule 1 Capsule(s) Inhalation daily  vancomycin  IVPB 750 milliGRAM(s) IV Intermittent every 24 hours                              8.6    12.26 )-----------( 232      ( 25 Jan 2022 08:39 )             28.0   01-25    142  |  114<H>  |  36<H>  ----------------------------<  103<H>  4.2   |  23  |  0.92    Ca    8.7      25 Jan 2022 05:07                    TTE Echo Complete w/o Contrast w/ Doppler (05.11.21 @ 10:31) >   Mild left ventricular enlargement with with moderate, segmental systolic   dysfunction. The apex and mid to apical anteroseptum and inferoseptum   appear hypokinetic. Estimated left ventricular ejection fraction is 40%.   Mild diastolic dysfunction (stage I).   Moderate mitral stenosis.   Mild aortic regurgitation.

## 2022-01-25 NOTE — BRIEF OPERATIVE NOTE - OPERATION/FINDINGS
No phyllis pus noted on exam, debridement of all non viable skin and soft tissue with irrigation of ankle joint
diseased aorta, calcified stenotic R CFA and proximal SFA and profunda femoral artery and more distal SFA, and focal area of below knee popliteal artery

## 2022-01-25 NOTE — PROGRESS NOTE ADULT - ASSESSMENT
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   - continue with the cefipime and vancomycin and follow up of the vancomycin levels closely   OR in am for irrigation debridement;   low risk intervention-  for periop complications arthroscopic irrigation debridement   s/p 1 unit PRBC   I don't think  the ankle is salvable will most likely need amputation vs hospice; pt's pain needs better management  needs GOC also    peripheral vascular disease   status post right femoral angioplasty and stenting with the right  popliteal angioplasty doing well post op      # Hypertension   - Hydralazine PRN for SBP > 160   - c/w home anti-HTN agents secabutril , metoprolol and lasix was on hold due to acute kidney injury resume postop    # AF  off Eliquis preop    # Acute on chr anemia   this is most likely sec to chronic disease    # History of COPD, CAD, Type 2 DM on insulin, depression, CHFrEF, HTN, PVD, PAD, s/p left femoral popliteal bypass 3/24/2021, left thigh infections s/p debridement and muscle flap, ICN, lower extremity DVT on Eliquis, RA   - c/w home medications   monitor glucose levels and adjustment of the insulin dose for the D.M   continue with the spiriva for the copd       Code status: Full code     Pall consult    Ischemic limb and non healing medial right ankle ulcer , and 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

## 2022-01-25 NOTE — CONSULT NOTE ADULT - ASSESSMENT
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  Non healing medial right ankle complicated d/t DMII   PAD with 50% CFA stenosis and s/p Left Fem Pop bypass (3/2021)  Associated intractable pain/debility due to above  c/w current management for OR today   c/w cefepime     c/w Vancomycin       DMII  insulin glargine Injectable (LANTUS) 10 Unit(s) SubCutaneous at bedtime  insulin lispro (ADMELOG) corrective regimen sliding scale   SubCutaneous three times a day before meals  insulin lispro (ADMELOG) corrective regimen sliding scale   SubCutaneous at bedtime  gabapentin 300 milliGRAM(s) Oral three times a day    Associated Leukocytosis without Sepsis POA.   continue with the cefepime and vancomycin and follow up of the vancomycin levels closely   OR in am for irrigation debridement;     PVD   post right femoral angioplasty and stenting with the right  popliteal angioplasty  asa daily    ORLY  monitor creatinine    HTN   as per primary team  PRN Hydralizine  sacubitril 24 mG/valsartan 26 mG 1 Tablet(s) Oral two times a day  metoprolol tartrate 25 milliGRAM(s) Oral two times a day  holding lasix d/t ORLY    Atrial Fibrilation  holding eliquis d/t OR plans    Acute on chronic anemia  2 units given of PRBCs  moniotr H/hH    moderate protein deficiency   as per primary team        Physical Aspects of Care  --Pain  patient denies at this time  c/w current management  c/w IV Dilaudid 0.5mg IV [id number] PRN (w/ holding parameters)     ONCE PO again can restart   start Tylenol 1000mg TID PO x 3 days   Start Oxycodone 5mg for moderate pain 4-6  Start oxycodone 10mg for severe pain   this will allow to optimize opiate sparing pharmacy        Delirium PPX   c/w melatonin 3mg qhs, to promote maintenance of circadian rhythm/restful sleep, and for ppx of future susceptibility to delirium upon resolution of presenting condition.  Avoid deliriogenic agents including BZD, anticholinergics, and sedating agents when possible  Re-orient frequently, encourage family at bedside as able. Early mobilization recommended if feasible.    --Bowel Regimen  denies constipation  risk for constipation d/t immobility  senna and Miralax daily    --Dyspnea  No SOB at this time  comfortable and in NAD    --Nausea Vomiting  denies    --Weakness  PT as tolerated     Psychological and Psychiatric Aspects of Care:   --Greif/Bereavment: emotional support provided  --Hx of psychiatric dx: none  -Pastoral Care Available PRN     Social Aspects of Care  -SW involved     Cultural Aspects  -Primary Language: English    Goals of Care:     We discussed Palliative Care team being a supportive team when a patient has ongoing illnesses.  We also discussed that it is not an end of life care service, but can help navigate symptoms and emotional support througout their hospital stay here.    We briefly discussed FC vs. DNR DNI .   She states she never thought about it too much but had some discussions perhaps with her daughter.   Discussed again briefly w/ daughter who states at this time patient has made known she wants full code, even though daughter may not fully agree w/ it those are her mothers known wishes.      Ethical and Legal Aspects:   NA        Capacity- defers discussions at time to daughter was a little somnolent and lacking capacity for complex decision making   HCP/Surrogate: Nirali [phone]    Code Status- FULL CODE  MOLST- na  Dispo Plan- unkown   Discussed With: Case coordinated with RN     Time Spent: 90 minutes including the care, coordination and counseling of this patient, 50% of which was spent coordinating and counseling.

## 2022-01-25 NOTE — CONSULT NOTE ADULT - SUBJECTIVE AND OBJECTIVE BOX
HPI:     Hx as per patient, chart, collateral information-     87 y/o F with PMH COPD, CAD, Type 2 DM on insulin, depression, CHFrEF, HTN, PVD, PAD, s/p left femoral popliteal bypass 3/24/2021, left thigh infections s/p debridement and muscle flap, PAT, lower extremity DVT on Eliquis, RA presents with chronic wounds and pain to the right lower extremity for 2 weeks. She first noticed the chronic wound on her right lower extremity about a month ago. She has been completing dressing changes and applying Xeroform. The wound wasfoul smelling. She has been unable to walk over the last 2 weeks due to increase pressure when placing the foot down. Hanging her lower extremity off the side of the bed helps improve her pain. She also reports occasional diarrhea and lower extremity swelling. Denies headaches, blurry vision, dizziness, fevers, chills, chest pain, SOB, abdominal pain, N/V, diarrhea/constipation.      In ER pt was HTN, w/ elevated WEBCs and Cr 1.49.     1/25/22  pt seen and examined this morning  Alert oriented to place and person, she was a little more confused on why she was in hospital but she stated she understood she was going to the OR this morning for her ankle.      She did state her HCP would be Dr.Beverly Ahumada (physician).      PAIN: (x )Yes   ( )No  Level: mild to moderate at this time  Location: ankle  Intensity:    4/10  Quality: throbing aching  Aggravating Factors: movment  Alleviating Factors: ?   Radiation: stated no  Duration/Timing: ongoing  Impact on ADLs: signficant    DYSPNEA: ( ) Yes  (x ) No  Level:    PAST MEDICAL & SURGICAL HISTORY:  Hypertension    Anemia    Arthritis, rheumatoid    Depression    Type 2 diabetes mellitus  on insulin    PVD (peripheral vascular disease) with claudication    CAD (coronary artery disease)  non-obstructive    Rheumatoid arthritis    CHF (congestive heart failure)    PAD (peripheral artery disease)    DVT, lower extremity    fracture ankle right  plate . screws   2000    S/P cataract surgery  2011    Hip fracture requiring operative repair  Right hip 11/14/2020    S/P femoral-popliteal bypass surgery  3/24/2021        SOCIAL HX:    Hx opiate tolerance ( )YES  (x )NO    Baseline ADLs  (Prior to Admission)  (x ) Independent   ( )Dependent    FAMILY HISTORY:  FH: colon cancer  father    FH: heart attack  father    Family history of atherosclerosis  mother        Review of Systems:    Anxiety- denies  Depression- denies  Physical Discomfort- comfortable at this time, reports some pain controlled w/ regimen  Dyspnea- denies  Constipation- denies  Diarrhea- denies  Nausea- denies  Vomiting- denies  Anorexia- denies  Weight Loss- denies  Cough-denies  Secretions- none noted and denies  Fatigue- ++ more fatigued today  Weakness- reports some generalized weakness  Delirium- at high risk    All other systems reviewed and negative    PHYSICAL EXAM:    Vital Signs Last 24 Hrs  T(C): 36.6 (25 Jan 2022 08:01), Max: 37.1 (24 Jan 2022 13:40)  T(F): 97.8 (25 Jan 2022 08:01), Max: 98.8 (24 Jan 2022 21:31)  HR: 69 (25 Jan 2022 10:00) (63 - 69)  BP: 161/43 (25 Jan 2022 10:00) (127/40 - 170/60)  BP(mean): 77 (25 Jan 2022 10:00) (66 - 89)  RR: 13 (25 Jan 2022 10:00) (10 - 19)  SpO2: 100% (25 Jan 2022 10:00) (93% - 100%)  Daily     Daily     PPSV2:  40 %  FAST: NA          General: pt in No acute distress, comfortable  Mental Status:  awake, oriented x 3 some drowsyness high risk for delirium  HEENT: Atraumatic, normocephalic, +EMOI, +PERRL  Lungs: clear to auscultation, no wheezing rales or rhonchi   Cardiac: no m/g/r  GI: soft , nontender, non-distended, normal bowel sounds   : urinating   Ext: Normal ROM no edema   Neuro: A+Ox3,  no gross deficits noted on exam.           LABS:                        8.6    12.26 )-----------( 232      ( 25 Jan 2022 08:39 )             28.0     01-25    142  |  114<H>  |  36<H>  ----------------------------<  103<H>  4.2   |  23  |  0.92    Ca    8.7      25 Jan 2022 05:07      PT/INR - ( 25 Jan 2022 05:07 )   PT: 15.0 sec;   INR: 1.30 ratio         PTT - ( 25 Jan 2022 05:07 )  PTT:27.7 sec  Albumin: Albumin, Serum: 2.2 g/dL (01-19 @ 07:31)      Allergies    cephalosporins (Other)  penicillins (Urticaria; Pruritus)    Intolerances      MEDICATIONS  (STANDING):  aspirin  chewable 81 milliGRAM(s) Oral daily  cefepime   IVPB 2000 milliGRAM(s) IV Intermittent every 24 hours  Dakins Solution - 1/4 Strength 1 Application(s) Topical every 8 hours  dextrose 40% Gel 15 Gram(s) Oral once  dextrose 40% Gel 15 Gram(s) Oral once  dextrose 5%. 1000 milliLiter(s) (50 mL/Hr) IV Continuous <Continuous>  dextrose 5%. 1000 milliLiter(s) (100 mL/Hr) IV Continuous <Continuous>  dextrose 5%. 1000 milliLiter(s) (50 mL/Hr) IV Continuous <Continuous>  dextrose 5%. 1000 milliLiter(s) (100 mL/Hr) IV Continuous <Continuous>  dextrose 50% Injectable 25 Gram(s) IV Push once  dextrose 50% Injectable 12.5 Gram(s) IV Push once  dextrose 50% Injectable 25 Gram(s) IV Push once  dextrose 50% Injectable 25 Gram(s) IV Push once  dextrose 50% Injectable 12.5 Gram(s) IV Push once  dextrose 50% Injectable 25 Gram(s) IV Push once  folic acid 1 milliGRAM(s) Oral at bedtime  gabapentin 300 milliGRAM(s) Oral three times a day  glucagon  Injectable 1 milliGRAM(s) IntraMuscular once  glucagon  Injectable 1 milliGRAM(s) IntraMuscular once  insulin glargine Injectable (LANTUS) 10 Unit(s) SubCutaneous at bedtime  insulin lispro (ADMELOG) corrective regimen sliding scale   SubCutaneous three times a day before meals  insulin lispro (ADMELOG) corrective regimen sliding scale   SubCutaneous at bedtime  metoprolol tartrate 25 milliGRAM(s) Oral two times a day  PARoxetine 20 milliGRAM(s) Oral daily  polyethylene glycol 3350 17 Gram(s) Oral daily  predniSONE   Tablet 5 milliGRAM(s) Oral daily  sacubitril 24 mG/valsartan 26 mG 1 Tablet(s) Oral two times a day  senna 2 Tablet(s) Oral at bedtime  sodium chloride 0.9%. 600 milliLiter(s) (50 mL/Hr) IV Continuous <Continuous>  tiotropium 18 MICROgram(s) Capsule 1 Capsule(s) Inhalation daily  vancomycin  IVPB 600 milliGRAM(s) IV Intermittent every 24 hours    MEDICATIONS  (PRN):  acetaminophen     Tablet .. 650 milliGRAM(s) Oral every 6 hours PRN Temp greater or equal to 38C (100.4F), Mild Pain (1 - 3)  ALBUTerol    90 MICROgram(s) HFA Inhaler 1 Puff(s) Inhalation every 6 hours PRN Shortness of Breath  aluminum hydroxide/magnesium hydroxide/simethicone Suspension 30 milliLiter(s) Oral every 4 hours PRN Dyspepsia  hydrALAZINE Injectable 10 milliGRAM(s) IV Push every 6 hours PRN SBP > 160  HYDROmorphone  Injectable 0.5 milliGRAM(s) IV Push every 3 hours PRN Moderate Pain (4 - 6)  melatonin 3 milliGRAM(s) Oral at bedtime PRN Insomnia  morphine  - Injectable 5 milliGRAM(s) IV Push every 3 hours PRN Severe Pain (7 - 10)  ondansetron Injectable 4 milliGRAM(s) IV Push every 8 hours PRN Nausea and/or Vomiting  oxycodone    5 mG/acetaminophen 325 mG 1 Tablet(s) Oral every 4 hours PRN Moderate Pain (4 - 6)  oxycodone    5 mG/acetaminophen 325 mG 2 Tablet(s) Oral every 4 hours PRN Severe Pain (7 - 10)      RADIOLOGY/ADDITIONAL STUDIES:

## 2022-01-26 ENCOUNTER — TRANSCRIPTION ENCOUNTER (OUTPATIENT)
Age: 87
End: 2022-01-26

## 2022-01-26 LAB
ANION GAP SERPL CALC-SCNC: 7 MMOL/L — SIGNIFICANT CHANGE UP (ref 5–17)
BUN SERPL-MCNC: 32 MG/DL — HIGH (ref 7–23)
CALCIUM SERPL-MCNC: 8.4 MG/DL — LOW (ref 8.5–10.1)
CHLORIDE SERPL-SCNC: 115 MMOL/L — HIGH (ref 96–108)
CO2 SERPL-SCNC: 21 MMOL/L — LOW (ref 22–31)
CREAT SERPL-MCNC: 1 MG/DL — SIGNIFICANT CHANGE UP (ref 0.5–1.3)
CRP SERPL-MCNC: 178 MG/L — HIGH
GLUCOSE SERPL-MCNC: 135 MG/DL — HIGH (ref 70–99)
HCT VFR BLD CALC: 26.6 % — LOW (ref 34.5–45)
HGB BLD-MCNC: 7.8 G/DL — LOW (ref 11.5–15.5)
MCHC RBC-ENTMCNC: 29.3 GM/DL — LOW (ref 32–36)
MCHC RBC-ENTMCNC: 29.3 PG — SIGNIFICANT CHANGE UP (ref 27–34)
MCV RBC AUTO: 100 FL — SIGNIFICANT CHANGE UP (ref 80–100)
PLATELET # BLD AUTO: 225 K/UL — SIGNIFICANT CHANGE UP (ref 150–400)
POTASSIUM SERPL-MCNC: 4.6 MMOL/L — SIGNIFICANT CHANGE UP (ref 3.5–5.3)
POTASSIUM SERPL-SCNC: 4.6 MMOL/L — SIGNIFICANT CHANGE UP (ref 3.5–5.3)
RBC # BLD: 2.66 M/UL — LOW (ref 3.8–5.2)
RBC # FLD: 15.8 % — HIGH (ref 10.3–14.5)
SODIUM SERPL-SCNC: 143 MMOL/L — SIGNIFICANT CHANGE UP (ref 135–145)
WBC # BLD: 12.44 K/UL — HIGH (ref 3.8–10.5)
WBC # FLD AUTO: 12.44 K/UL — HIGH (ref 3.8–10.5)

## 2022-01-26 PROCEDURE — 99232 SBSQ HOSP IP/OBS MODERATE 35: CPT

## 2022-01-26 PROCEDURE — 99233 SBSQ HOSP IP/OBS HIGH 50: CPT

## 2022-01-26 RX ORDER — OXYCODONE HYDROCHLORIDE 5 MG/1
10 TABLET ORAL EVERY 4 HOURS
Refills: 0 | Status: DISCONTINUED | OUTPATIENT
Start: 2022-01-26 | End: 2022-02-02

## 2022-01-26 RX ORDER — ACETAMINOPHEN 500 MG
1000 TABLET ORAL EVERY 8 HOURS
Refills: 0 | Status: COMPLETED | OUTPATIENT
Start: 2022-01-26 | End: 2022-01-29

## 2022-01-26 RX ORDER — HEPARIN SODIUM 5000 [USP'U]/ML
5000 INJECTION INTRAVENOUS; SUBCUTANEOUS EVERY 12 HOURS
Refills: 0 | Status: DISCONTINUED | OUTPATIENT
Start: 2022-01-26 | End: 2022-01-27

## 2022-01-26 RX ORDER — HYDROMORPHONE HYDROCHLORIDE 2 MG/ML
0.5 INJECTION INTRAMUSCULAR; INTRAVENOUS; SUBCUTANEOUS ONCE
Refills: 0 | Status: DISCONTINUED | OUTPATIENT
Start: 2022-01-26 | End: 2022-01-26

## 2022-01-26 RX ORDER — HYDROMORPHONE HYDROCHLORIDE 2 MG/ML
1 INJECTION INTRAMUSCULAR; INTRAVENOUS; SUBCUTANEOUS
Refills: 0 | Status: DISCONTINUED | OUTPATIENT
Start: 2022-01-26 | End: 2022-02-02

## 2022-01-26 RX ORDER — HYDROMORPHONE HYDROCHLORIDE 2 MG/ML
1 INJECTION INTRAMUSCULAR; INTRAVENOUS; SUBCUTANEOUS ONCE
Refills: 0 | Status: DISCONTINUED | OUTPATIENT
Start: 2022-01-26 | End: 2022-01-26

## 2022-01-26 RX ADMIN — OXYCODONE AND ACETAMINOPHEN 2 TABLET(S): 5; 325 TABLET ORAL at 09:39

## 2022-01-26 RX ADMIN — HYDROMORPHONE HYDROCHLORIDE 0.5 MILLIGRAM(S): 2 INJECTION INTRAMUSCULAR; INTRAVENOUS; SUBCUTANEOUS at 10:31

## 2022-01-26 RX ADMIN — SENNA PLUS 2 TABLET(S): 8.6 TABLET ORAL at 21:05

## 2022-01-26 RX ADMIN — HYDROMORPHONE HYDROCHLORIDE 0.5 MILLIGRAM(S): 2 INJECTION INTRAMUSCULAR; INTRAVENOUS; SUBCUTANEOUS at 17:58

## 2022-01-26 RX ADMIN — Medication 25 MILLIGRAM(S): at 09:39

## 2022-01-26 RX ADMIN — HYDROMORPHONE HYDROCHLORIDE 0.5 MILLIGRAM(S): 2 INJECTION INTRAMUSCULAR; INTRAVENOUS; SUBCUTANEOUS at 11:00

## 2022-01-26 RX ADMIN — SACUBITRIL AND VALSARTAN 1 TABLET(S): 24; 26 TABLET, FILM COATED ORAL at 21:04

## 2022-01-26 RX ADMIN — HYDROMORPHONE HYDROCHLORIDE 0.5 MILLIGRAM(S): 2 INJECTION INTRAMUSCULAR; INTRAVENOUS; SUBCUTANEOUS at 17:30

## 2022-01-26 RX ADMIN — Medication 250 MILLIGRAM(S): at 05:00

## 2022-01-26 RX ADMIN — GABAPENTIN 300 MILLIGRAM(S): 400 CAPSULE ORAL at 15:24

## 2022-01-26 RX ADMIN — Medication 5 MILLIGRAM(S): at 09:39

## 2022-01-26 RX ADMIN — OXYCODONE AND ACETAMINOPHEN 2 TABLET(S): 5; 325 TABLET ORAL at 10:30

## 2022-01-26 RX ADMIN — Medication 6: at 17:59

## 2022-01-26 RX ADMIN — SACUBITRIL AND VALSARTAN 1 TABLET(S): 24; 26 TABLET, FILM COATED ORAL at 09:39

## 2022-01-26 RX ADMIN — POLYETHYLENE GLYCOL 3350 17 GRAM(S): 17 POWDER, FOR SOLUTION ORAL at 09:39

## 2022-01-26 RX ADMIN — Medication 20 MILLIGRAM(S): at 09:39

## 2022-01-26 RX ADMIN — HYDROMORPHONE HYDROCHLORIDE 0.5 MILLIGRAM(S): 2 INJECTION INTRAMUSCULAR; INTRAVENOUS; SUBCUTANEOUS at 05:11

## 2022-01-26 RX ADMIN — Medication 1000 MILLIGRAM(S): at 21:53

## 2022-01-26 RX ADMIN — Medication 1000 MILLIGRAM(S): at 21:06

## 2022-01-26 RX ADMIN — TIOTROPIUM BROMIDE 1 CAPSULE(S): 18 CAPSULE ORAL; RESPIRATORY (INHALATION) at 09:20

## 2022-01-26 RX ADMIN — HEPARIN SODIUM 5000 UNIT(S): 5000 INJECTION INTRAVENOUS; SUBCUTANEOUS at 21:04

## 2022-01-26 RX ADMIN — Medication 2: at 08:16

## 2022-01-26 RX ADMIN — GABAPENTIN 300 MILLIGRAM(S): 400 CAPSULE ORAL at 05:00

## 2022-01-26 RX ADMIN — Medication 81 MILLIGRAM(S): at 09:39

## 2022-01-26 RX ADMIN — Medication 1 MILLIGRAM(S): at 21:04

## 2022-01-26 RX ADMIN — HYDROMORPHONE HYDROCHLORIDE 0.5 MILLIGRAM(S): 2 INJECTION INTRAMUSCULAR; INTRAVENOUS; SUBCUTANEOUS at 18:30

## 2022-01-26 RX ADMIN — HYDROMORPHONE HYDROCHLORIDE 0.5 MILLIGRAM(S): 2 INJECTION INTRAMUSCULAR; INTRAVENOUS; SUBCUTANEOUS at 16:42

## 2022-01-26 RX ADMIN — Medication 25 MILLIGRAM(S): at 21:05

## 2022-01-26 RX ADMIN — SODIUM CHLORIDE 50 MILLILITER(S): 9 INJECTION INTRAMUSCULAR; INTRAVENOUS; SUBCUTANEOUS at 05:00

## 2022-01-26 RX ADMIN — HYDROMORPHONE HYDROCHLORIDE 0.5 MILLIGRAM(S): 2 INJECTION INTRAMUSCULAR; INTRAVENOUS; SUBCUTANEOUS at 04:53

## 2022-01-26 RX ADMIN — CEFEPIME 100 MILLIGRAM(S): 1 INJECTION, POWDER, FOR SOLUTION INTRAMUSCULAR; INTRAVENOUS at 12:41

## 2022-01-26 RX ADMIN — GABAPENTIN 300 MILLIGRAM(S): 400 CAPSULE ORAL at 21:04

## 2022-01-26 NOTE — DISCHARGE NOTE PROVIDER - NSDCFUADDINST_GEN_ALL_CORE_FT
Orthopedic Surgery DC Instructions:    1. ACTIVITY: Weightbearing as tolerated with assistive devices as needed (i.e. cane/walker).  2. DVT/PE Prophylaxis: Continue anticoagulation medication per AC team. See Med Rec for duration and dose.  3. PHYSICAL THERAPY: You should receive physical therapy daily.  4. FOLLOW UP: Follow up outpatient with your Orthopedic Surgeon Dr. Ku in 2 weeks from surgery.  5. Sutures: You should have no sutures that require postoperative removal  6. BANDAGE: Change bandage on knee on POD #7 to dry gauze and tegaderm or paper tape. Can also change PRN if saturated. If going to rehab facility, Do NOT remove on arrival to inspect wound. This will be changed at your postoperative visit.  7. BATHING: Showering is allowed, however sponge baths are recommended. You must keep your dressing clean/dry/and intact. Please cover splint and dressing with plastic bag/wrap to prevent dressing from becoming wet. Do NOT submerge dressing in water. Please avoid baths/pools/hot tubs until cleared by your surgeon.

## 2022-01-26 NOTE — PHYSICAL THERAPY INITIAL EVALUATION ADULT - PATIENT PROFILE REVIEW, REHAB EVAL
PMH COPD, CAD, Type 2 DM on insulin, depression, CHFrEF, HTN, PVD, PAD, s/p left femoral popliteal bypass 3/24/2021, left thigh infections s/p debridement and muscle flap, PAT, lower extremity DVT on Eliquis, RA/yes

## 2022-01-26 NOTE — PROGRESS NOTE ADULT - SUBJECTIVE AND OBJECTIVE BOX
CHIEF COMPLAINT: Right ankle pain/Inability to bear weight RLE/Worsening Right ankle ulcer    85 y/o F with PMH COPD, CAD, Type 2 DM on insulin, depression, CHFrEF, HTN, PVD, PAD, s/p left femoral popliteal bypass 3/24/2021, left thigh infections s/p debridement and muscle flap, PAT, lower extremity DVT on Eliquis, RA presents with chronic wounds and pain to the right lower extremity for 2 weeks. She first noticed the chronic wound on her right lower extremity about a month ago. She has been completing dressing changes and applying Xeroform. The wound is foul smelling. She has been unable to walk over the last 2 weeks due to increase pressure when placing the foot down. Hanging her lower extremity off the side of the bed helps improve her pain. She also reports occasional diarrhea and lower extremity swelling. Denies headaches, blurry vision, dizziness, fevers, chills, chest pain, SOB, abdominal pain, N/V, diarrhea/constipation.    ER course: /48. Labs: WBC 10.87, Hb 7.9, .8, INR 1.17, K+ 6.1, BUN 61, Cr 1.49 (Baseline ~0.99), Glucose 448, Alkaline phosphatase 140, COVID negative. EKG: NSR with HR 64 bm, MI prolonged to 218 ms (1st degree AV block), QTC prolonged to 470 ms, LVH, T wave inversions in II, AVL, V4-V6, no ST segment changes present on prior EKG (personally reviewed).   Imaging:   - XR right ankle: concern for osteomyelitis, hardware present, no fracture or dislocation (personally reviewed).   - CXR: no consolidation, no effusion, no pneumothorax (personally reviewed).   The pt was given Cefepime and Vancomycin, 2L of NS, 10 units of Humalin, Calcium gluconate, Sodium bicarbonate, Lokelma, and Dilaudid in the ER. She is being admitted to med/surg for further management. "    No change in her status; POD#1 selective debridement of wound 25-Jan-2022 16:51:15 debridement of arterial ankle ulceration left side   Pt in pain this am; seems under control at this time; I suspect minimal PO intake    Vital Signs Last 24 Hrs  T(C): 36.7 (26 Jan 2022 09:37), Max: 36.7 (26 Jan 2022 09:37)  T(F): 98.1 (26 Jan 2022 09:37), Max: 98.1 (26 Jan 2022 09:37)  HR: 65 (26 Jan 2022 12:00) (65 - 88)  BP: 129/45 (26 Jan 2022 12:00) (109/42 - 156/50)  BP(mean): 69 (26 Jan 2022 12:00) (69 - 85)  RR: 9 (26 Jan 2022 12:00) (8 - 25)  SpO2: 94% (26 Jan 2022 12:00) (90% - 99%)      General: AAOx3; NAD; frail  Head: AT/NC  ENT: Dry Mucous Membranes; No Injury  Neck: Non-tender; No JVD  CVS: RRR, S1&S2, Murmur +,  Respiratory: Lungs CTA B/L; Normal Respiratory Effort  Abdomen/GI: Soft, non-tender, non-distended, no guarding, no rebound,  : no bladder distension   Extremities: status post surgery with the ankle wound with the drainage ulcer covered with the dressing   MSK: No CVA tenderness, Normal ROM, No injury  Neuro: AAOx3, gen weakness  Psych: Appropriate, Cooperative,  Skin: left ankle wrapped see Ortho's note   TTP diffusely on ankle      MEDICATIONS  (STANDING):  aspirin  chewable 81 milliGRAM(s) Oral daily  cefepime   IVPB 2000 milliGRAM(s) IV Intermittent every 24 hours  folic acid 1 milliGRAM(s) Oral at bedtime  gabapentin 300 milliGRAM(s) Oral three times a day  glucagon  Injectable 1 milliGRAM(s) IntraMuscular once  glucagon  Injectable 1 milliGRAM(s) IntraMuscular once  heparin   Injectable 5000 Unit(s) SubCutaneous every 12 hours  insulin lispro (ADMELOG) corrective regimen sliding scale   SubCutaneous three times a day before meals  insulin lispro (ADMELOG) corrective regimen sliding scale   SubCutaneous at bedtime  metoprolol tartrate 25 milliGRAM(s) Oral two times a day  PARoxetine 20 milliGRAM(s) Oral daily  polyethylene glycol 3350 17 Gram(s) Oral daily  predniSONE   Tablet 5 milliGRAM(s) Oral daily  sacubitril 24 mG/valsartan 26 mG 1 Tablet(s) Oral two times a day  senna 2 Tablet(s) Oral at bedtime  sodium chloride 0.9%. 600 milliLiter(s) (50 mL/Hr) IV Continuous <Continuous>  tiotropium 18 MICROgram(s) Capsule 1 Capsule(s) Inhalation daily  vancomycin  IVPB 600 milliGRAM(s) IV Intermittent every 24 hours                                8.6    12.26 )-----------( 232      ( 25 Jan 2022 08:39 )             28.0   01-25    142  |  114<H>  |  36<H>  ----------------------------<  103<H>  4.2   |  23  |  0.92    Ca    8.7      25 Jan 2022 05:07                    TTE Echo Complete w/o Contrast w/ Doppler (05.11.21 @ 10:31) >   Mild left ventricular enlargement with with moderate, segmental systolic   dysfunction. The apex and mid to apical anteroseptum and inferoseptum   appear hypokinetic. Estimated left ventricular ejection fraction is 40%.   Mild diastolic dysfunction (stage I).   Moderate mitral stenosis.   Mild aortic regurgitation.

## 2022-01-26 NOTE — PHYSICAL THERAPY INITIAL EVALUATION ADULT - PERTINENT HX OF CURRENT PROBLEM, REHAB EVAL
87 y/o F presents with chronic wounds and pain to the right lower extremity for 2 weeks. XR right ankle: concern for osteomyelitis, hardware present, no fracture or dislocation.  MRI:  Medial ankle skin wound with subjacent edema and soft tissue swelling. Abnormal marrow signal within the medial malleolus and medial tibia concerning osteomyelitis. 87 y/o F presents with chronic wounds and pain to the right lower extremity for 2 weeks. XR right ankle: concern for osteomyelitis, hardware present, no fracture or dislocation.  MRI:  Medial ankle skin wound with subjacent edema and soft tissue swelling. Abnormal marrow signal within the medial malleolus and medial tibia concerning osteomyelitis. S/P 3 units of blood this admit, H/H stable today.  Wound vac removed.

## 2022-01-26 NOTE — PHYSICAL THERAPY INITIAL EVALUATION ADULT - PASSIVE RANGE OF MOTION EXAMINATION, REHAB EVAL
Unable to range R LE due to extreme c/o pain, patient screaming out./bilateral upper extremity Passive ROM was WFL (within functional limits)/Left LE Passive ROM was WFL (within functional limits)

## 2022-01-26 NOTE — PROGRESS NOTE ADULT - ASSESSMENT
85 y/o Female with h/o COPD, CAD, Type 2 DM on insulin, depression, CHFrEF, HTN, PVD, PAD, s/p left femoral popliteal bypass 3/24/2021, prior left thigh infections s/p debridement and muscle flap, PAT, lower extremity DVT on Eliquis, RA was admitted on 1/18 for lower leg nonhealing wounds. She first noticed the chronic wound on her right lower extremity about a month ago. She has been completing dressing changes and applying Xeroform. The wound is foul smelling. She has been unable to walk over the last 2 weeks due to increase pressure when placing the foot down. Hanging her lower extremity off the side of the bed helps improve her pain. She also reports occasional diarrhea and lower extremity swelling. Denies headaches, blurry vision, dizziness, fevers, chills. In ER she received cefepime and vancomycin IV.     1. RLE cellulitis with multiple open wound. Chronic LE stasis dermatitis. PVD s/p angioplasty.  -lower leg erythema  -mild leukocytosis  -has ankle pain ?due to ischemia  -BC x 2 noted  -on vancomycin 600 gm IV qd and cefepime 2 gm IV qd # 7  -tolerating abx well so far; no side effects noted  -repeat vancomycin trough level  -elevate legs  .local wound care  -continue abx coverage for now  -vascular evaluation appreciated  -monitor temps  -f/u CBC  -supportive care  2. Other issues:   -care per medicine

## 2022-01-26 NOTE — PROGRESS NOTE ADULT - SUBJECTIVE AND OBJECTIVE BOX
Afebrile, VSS    status post debridement, washout of R ankle joint, and VAC placement yesterday by Dr. Ku    sleeping    dressing intact, VAC in place    continue local care, IV antibiotics, pain management    MEDICATIONS  (STANDING):  aspirin  chewable 81 milliGRAM(s) Oral daily  cefepime   IVPB 2000 milliGRAM(s) IV Intermittent every 24 hours  dextrose 40% Gel 15 Gram(s) Oral once  dextrose 40% Gel 15 Gram(s) Oral once  dextrose 5%. 1000 milliLiter(s) (50 mL/Hr) IV Continuous <Continuous>  dextrose 5%. 1000 milliLiter(s) (50 mL/Hr) IV Continuous <Continuous>  dextrose 5%. 1000 milliLiter(s) (100 mL/Hr) IV Continuous <Continuous>  dextrose 5%. 1000 milliLiter(s) (100 mL/Hr) IV Continuous <Continuous>  dextrose 50% Injectable 25 Gram(s) IV Push once  dextrose 50% Injectable 25 Gram(s) IV Push once  dextrose 50% Injectable 12.5 Gram(s) IV Push once  dextrose 50% Injectable 25 Gram(s) IV Push once  dextrose 50% Injectable 12.5 Gram(s) IV Push once  dextrose 50% Injectable 25 Gram(s) IV Push once  folic acid 1 milliGRAM(s) Oral at bedtime  gabapentin 300 milliGRAM(s) Oral three times a day  glucagon  Injectable 1 milliGRAM(s) IntraMuscular once  glucagon  Injectable 1 milliGRAM(s) IntraMuscular once  insulin lispro (ADMELOG) corrective regimen sliding scale   SubCutaneous at bedtime  insulin lispro (ADMELOG) corrective regimen sliding scale   SubCutaneous three times a day before meals  metoprolol tartrate 25 milliGRAM(s) Oral two times a day  PARoxetine 20 milliGRAM(s) Oral daily  polyethylene glycol 3350 17 Gram(s) Oral daily  predniSONE   Tablet 5 milliGRAM(s) Oral daily  sacubitril 24 mG/valsartan 26 mG 1 Tablet(s) Oral two times a day  senna 2 Tablet(s) Oral at bedtime  sodium chloride 0.9%. 600 milliLiter(s) (50 mL/Hr) IV Continuous <Continuous>  tiotropium 18 MICROgram(s) Capsule 1 Capsule(s) Inhalation daily  vancomycin  IVPB 600 milliGRAM(s) IV Intermittent every 24 hours    MEDICATIONS  (PRN):  acetaminophen     Tablet .. 650 milliGRAM(s) Oral every 6 hours PRN Temp greater or equal to 38C (100.4F), Mild Pain (1 - 3)  ALBUTerol    90 MICROgram(s) HFA Inhaler 1 Puff(s) Inhalation every 6 hours PRN Shortness of Breath  aluminum hydroxide/magnesium hydroxide/simethicone Suspension 30 milliLiter(s) Oral every 4 hours PRN Dyspepsia  hydrALAZINE Injectable 10 milliGRAM(s) IV Push every 6 hours PRN SBP > 160  HYDROmorphone  Injectable 0.5 milliGRAM(s) IV Push every 3 hours PRN Moderate Pain (4 - 6)  melatonin 3 milliGRAM(s) Oral at bedtime PRN Insomnia  morphine  - Injectable 5 milliGRAM(s) IV Push every 3 hours PRN Severe Pain (7 - 10)  ondansetron Injectable 4 milliGRAM(s) IV Push every 8 hours PRN Nausea and/or Vomiting  oxycodone    5 mG/acetaminophen 325 mG 2 Tablet(s) Oral every 4 hours PRN Severe Pain (7 - 10)  oxycodone    5 mG/acetaminophen 325 mG 1 Tablet(s) Oral every 4 hours PRN Moderate Pain (4 - 6)      Allergies    cephalosporins (Other)  penicillins (Urticaria; Pruritus)    Intolerances        Flatus: [ ] YES [ ] NO             Bowel Movement: [ ] YES [ ] NO  Pain (0-10):            Pain Control Adequate: [ ] YES [ ] NO  Nausea: [ ] YES [ ] NO            Vomiting: [ ] YES [ ] NO  Diarrhea: [ ] YES [ ] NO         Constipation: [ ] YES [ ] NO     Chest Pain: [ ] YES [ ] NO    SOB:  [ ] YES [ ] NO    Vital Signs Last 24 Hrs  T(C): 36.3 (26 Jan 2022 06:39), Max: 36.6 (25 Jan 2022 08:01)  T(F): 97.4 (26 Jan 2022 06:39), Max: 97.9 (25 Jan 2022 21:33)  HR: 85 (26 Jan 2022 06:00) (60 - 86)  BP: 156/50 (26 Jan 2022 06:00) (109/42 - 161/43)  BP(mean): 76 (26 Jan 2022 06:00) (70 - 85)  RR: 12 (26 Jan 2022 06:00) (8 - 25)  SpO2: 95% (26 Jan 2022 06:00) (90% - 100%)    I&O's Summary    25 Jan 2022 07:01  -  26 Jan 2022 07:00  --------------------------------------------------------  IN: 1513 mL / OUT: 405 mL / NET: 1108 mL        Physical Exam:  General: NAD, resting comfortably  Pulmonary: normal resp effort, CTA-B  Cardiovascular: NSR  Abdominal: soft, NT/ND  Extremities: WWP, normal strength  Neuro: A/O x 3, CNs II-XII grossly intact, normal motor/sensation, no focal deficits  Pulses:   Right:                                                                          Left:  FEM [ ]2+ [ ]1+ [ ]doppler                                             FEM [ ]2+ [ ]1+ [ ]doppler    POP [ ]2+ [ ]1+ [ ]doppler                                             POP [ ]2+ [ ]1+ [ ]doppler    DP [ ]2+ [ ]1+ [ ]doppler                                                DP [ ]2+ [ ]1+ [ ]doppler  PT[ ]2+ [ ]1+ [ ]doppler                                                  PT [ ]2+ [ ]1+ [ ]doppler    LABS:                        7.8    12.44 )-----------( 225      ( 26 Jan 2022 05:01 )             26.6     01-26    143  |  115<H>  |  32<H>  ----------------------------<  135<H>  4.6   |  21<L>  |  1.00    Ca    8.4<L>      26 Jan 2022 05:01      PT/INR - ( 25 Jan 2022 05:07 )   PT: 15.0 sec;   INR: 1.30 ratio         PTT - ( 25 Jan 2022 05:07 )  PTT:27.7 sec      CAPILLARY BLOOD GLUCOSE      POCT Blood Glucose.: 133 mg/dL (25 Jan 2022 21:16)  POCT Blood Glucose.: 193 mg/dL (25 Jan 2022 16:52)  POCT Blood Glucose.: 137 mg/dL (25 Jan 2022 13:16)      RADIOLOGY & ADDITIONAL TESTS:

## 2022-01-26 NOTE — PHYSICAL THERAPY INITIAL EVALUATION ADULT - MD ORDER
Discussed meds with pt.    Recommended that she call her endocrinologist and change her meds to propylthiouracil for at least the first trimester.  Recommended that she do this and switch her meds this week.   Cautioned that it is not a good idea just to stop her meds that graves disease can also be problematic in a pregnancy, that thyroid control is important to a normal healthy pregnancy/fetus.   Marleny Lozano, SAL, CNM      
Patient seen in clinic for confirmation of pregnancy.   LMP: Patient's last menstrual period was 09/10/2017.  GA: 5w2d  JEFFRY: Estimated Date of Delivery: Jun 17, 2018    Discussed medications are class C and D. Advised to contact prescribing provider to discuss Risk verse Benefit of medications and fetus.     Patient is requesting a Dating ultrasound. New patient to [place] but would like to follow here for her pregnancy. Previously seen in [facility] and [facility].    Please review and place orders as appropriate.  Elida Martinez RN, BSN     
01/26/2022 08:59

## 2022-01-26 NOTE — DISCHARGE NOTE PROVIDER - CARE PROVIDER_API CALL
Anil Ku (DO)  Orthopaedic Surgery  [address] [place] [address]  Phone: [phone]  Fax: [phone]  Follow Up Time:    Anil Ku (DO)  Orthopaedic Surgery  [address] [place] [address]  Phone: [phone]  Fax: [phone]  Follow Up Time: 2 weeks    Soren Ventura)  Vascular Surgery  [address], [place] [address]  Phone: [phone]  Fax: [phone]  Follow Up Time: 1 week    Ernie Ortega  INFECTIOUS DISEASE  [address], [address], [place] [address]  Phone: [phone]  Fax: [phone]  Follow Up Time: 2 weeks

## 2022-01-26 NOTE — DISCHARGE NOTE PROVIDER - NSDCACTIVITY_GEN_ALL_CORE
Walking - Indoors allowed/Walking - Outdoors allowed No restrictions/Walking - Indoors allowed/Walking - Outdoors allowed

## 2022-01-26 NOTE — PROGRESS NOTE ADULT - ASSESSMENT
Assessment:   85 y/o F COPD CAD DMII CHFrEF, HTN, PGVD, PAD sp femoral popliteal bypass now w/ right lower extremity OM       Process of Care  --Reviewed dx/treatment problems and alignment with Goals of Care    Right Medial Maleolus and Distal Tibial Osteomyelitis  Non healing medial right ankle complicated d/t DMII   PAD with 50% CFA stenosis and s/p Left Fem Pop bypass (3/2021)  Associated intractable pain/debility due to above  sp surgery  c/w cefepime     c/w Vancomycin       DMII  insulin glargine Injectable (LANTUS) 10 Unit(s) SubCutaneous at bedtime  insulin lispro (ADMELOG) corrective regimen sliding scale   SubCutaneous three times a day before meals  insulin lispro (ADMELOG) corrective regimen sliding scale   SubCutaneous at bedtime  gabapentin 300 milliGRAM(s) Oral three times a day    Associated Leukocytosis without Sepsis POA.   continue with the cefepime and vancomycin and follow up of the vancomycin levels closely   OR in am for irrigation debridement;     PVD   post right femoral angioplasty and stenting with the right  popliteal angioplasty  asa daily    ORLY  monitor creatinine    HTN   as per primary team  PRN Hydralizine  sacubitril 24 mG/valsartan 26 mG 1 Tablet(s) Oral two times a day  metoprolol tartrate 25 milliGRAM(s) Oral two times a day  holding lasix d/t ORLY    Atrial Fibrilation  s/p OR  as per primary team    Acute on chronic anemia  2 units given of PRBCs  moniotr H/hH    moderate protein deficiency   as per primary team        Physical Aspects of Care  --Pain  patient denies at this time  c/w current management  c/w IV Dilaudid 0.5mg IV [id number] PRN (w/ holding parameters)   DC Morphine 5mg IV [id number] PRN for Severe pain  STARTED  Dilaudid 1mg  IV [id number] PRN for severe pain (~equivalent to 5mg Morphine IV)   STARTED Tylenol 1000mg TID POx3 days (opiate sparing)  Start oxycodone 10mg for moderate pain Give first if patient can tolerate PO - if patient can take PO would prefer as it lasts longer.        Delirium PPX   c/w melatonin 3mg qhs, to promote maintenance of circadian rhythm/restful sleep, and for ppx of future susceptibility to delirium upon resolution of presenting condition.  Avoid deliriogenic agents including BZD, anticholinergics, and sedating agents when possible  Re-orient frequently, encourage family at bedside as able. Early mobilization recommended if feasible.    --Bowel Regimen  denies constipation  risk for constipation d/t immobility  senna and Miralax daily    --Dyspnea  No SOB at this time  comfortable and in NAD    --Nausea Vomiting  denies    --Weakness  PT as tolerated     Psychological and Psychiatric Aspects of Care:   Grief Bereavement emotional support provided  --Hx of psychiatric dx: none  -Pastoral Care Available PRN     Social Aspects of Care  -SW involved     Cultural Aspects  -Primary Language: English    Goals of Care:     We discussed Palliative Care team being a supportive team when a patient has ongoing illnesses.  We also discussed that it is not an end of life care service, but can help navigate symptoms and emotional support througout their hospital stay here.    We briefly discussed FC vs. DNR DNI .   She states she never thought about it too much but had some discussions perhaps with her daughter.   Discussed again briefly w/ daughter who states at this time patient has made known she wants full code, even though daughter may not fully agree w/ it those are her mothers known wishes.    1/26/22  pt seen and examined  feels medication doses work well  would start PO Oxy 10mg [id number] prn rather than IV when possible   DNR DNI today  no other changes in treatment plan at [facility] time  will continue to follow for symptom managment  Ethical and Legal Aspects:   NA        Capacity- defers discussions at time to daughter was a little somnolent and lacking capacity for complex decision making   HCP/Surrogate: Nirali [phone]    Code Status- DNR DNI  [facility]- completed by primary team  Dispo Plan- unknown   Discussed With: Case coordinated with RN     Time Spent: 45 minutes including the care, coordination and counseling of this patient, 50% of which was spent coordinating and counseling.  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  Non healing medial right ankle complicated d/t DMII   PAD with 50% CFA stenosis and s/p Left Fem Pop bypass (3/2021)  Associated intractable pain/debility due to above  treatment as per primary team    DMII  insulin glargine Injectable (LANTUS) 10 Unit(s) SubCutaneous at bedtime  insulin lispro (ADMELOG) corrective regimen sliding scale   SubCutaneous three times a day before meals  insulin lispro (ADMELOG) corrective regimen sliding scale   SubCutaneous at bedtime  gabapentin 300 milliGRAM(s) Oral three times a day    Associated Leukocytosis without Sepsis POA.   continue with the cefepime and vancomycin and follow up of the vancomycin levels closely   OR in am for irrigation debridement;     PVD   post right femoral angioplasty and stenting with the right  popliteal angioplasty  asa daily    ORLY  monitor creatinine    HTN   as per primary team  PRN Hydralizine  sacubitril 24 mG/valsartan 26 mG 1 Tablet(s) Oral two times a day  metoprolol tartrate 25 milliGRAM(s) Oral two times a day  holding lasix d/t ORLY    Atrial Fibrilation  s/p OR  as per primary team    Acute on chronic anemia  2 units given of PRBCs  moniotr H/hH    moderate protein deficiency   as per primary team        Physical Aspects of Care  --Pain  patient denies at this time  c/w current management  c/w IV Dilaudid 0.5mg IV [id number] PRN (w/ holding parameters)   c/w  Dilaudid 1mg  IV Q6rocdm PRN for severe pain (~equivalent to 5mg Morphine IV)   c/w Tylenol 1000mg TID POx3 days (opiate sparing)  c/w  10mg for moderate pain Give first if patient can tolerate PO - if patient can take PO would prefer as it lasts longer.        Delirium PPX   c/w melatonin 3mg qhs, to promote maintenance of circadian rhythm/restful sleep, and for ppx of future susceptibility to delirium upon resolution of presenting condition.  Avoid deliriogenic agents including BZD, anticholinergics, and sedating agents when possible  Re-orient frequently, encourage family at bedside as able. Early mobilization recommended if feasible.    --Bowel Regimen  denies constipation  risk for constipation d/t immobility  senna and Miralax daily    --Dyspnea  No SOB at this time  comfortable and in NAD    --Nausea Vomiting  denies    --Weakness  PT as tolerated     Psychological and Psychiatric Aspects of Care:   Grief Bereavement emotional support provided  --Hx of psychiatric dx: none  -Pastoral Care Available PRN     Social Aspects of Care  -SW involved     Cultural Aspects  -Primary Language: English    Goals of Care:     We discussed Palliative Care team being a supportive team when a patient has ongoing illnesses.  We also discussed that it is not an end of life care service, but can help navigate symptoms and emotional support througout their hospital stay here.    We briefly discussed FC vs. DNR DNI .   She states she never thought about it too much but had some discussions perhaps with her daughter.   Discussed again briefly w/ daughter who states at this time patient has made known she wants full code, even though daughter may not fully agree w/ it those are her mothers known wishes.    1/26/22  pt seen and examined  feels medication doses work well  would start PO Oxy 10mg [id number] prn rather than IV when possible   DNR DNI today  no other changes in treatment plan at hospitals time  will continue to follow for symptom managment  Ethical and Legal Aspects:   NA    1/27/22  medications reviewed and discusse dw/ staff  if pt tolerates PO Oxycodone 10mg for moderate pain   would DC IV and keep PO instead      Capacity- defers discussions at time to daughter was a little somnolent and lacking capacity for complex decision making   HCP/Surrogate: Nirali [phone]    Code Status- DNR DNI  [facility]- completed by primary team  Dispo Plan-  planning in process  Discussed With: Case coordinated with RN     Time Spent: 45 minutes including the care, coordination and counseling of this patient, 50% of which was spent coordinating and counseling.

## 2022-01-26 NOTE — PHYSICAL THERAPY INITIAL EVALUATION ADULT - GENERAL OBSERVATIONS, REHAB EVAL
Asked to hold eval today, per Dr Landers,, as patient is in too much pain. Patient received in bed in SICU, +ICU monitors, +IV, +PrimaFit, +O2@2L via nc, R lower leg and ankle wrapped with gauze. Toes warm, not mobile on command.  Patient lethargic, just medicated for pain.  Wound vac just removed by vascular. Eval postponed x 2 days due to pain, anemia.

## 2022-01-26 NOTE — PROGRESS NOTE ADULT - ASSESSMENT
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  #Non healing medial right ankle Ulcer  #DMII (Inuslin dependent) with Hyperglycemia  #PAD with 50% CFA stenosis and s/p Left Fem Pop bypass (3/2021)  #Associated intractable pain/debility due to above  - continue with the cefipime and vancomycin and follow up of the vancomycin levels closely   s/p OR in am for irrigation debridement  s/p 1 unit PRBC   I don't think  the ankle is salvable will most likely need amputation vs hospice; pt's pain needs better management  [facility] Pall consult in progress    #peripheral vascular disease   status post right femoral angioplasty and stenting with the right  popliteal angioplasty doing well post op      # T2DM  off NEWSOME due to poor intake  I will give her 500ml IVF today    # Hypertension   - Hydralazine PRN for SBP > 160   - c/w home anti-HTN agents secabutril , metoprolol and lasix was on hold due to acute kidney injury resume postop    # AF  off Eliquis preop plan to resume if ok with Ortho    # Acute on chr anemia   this is most likely sec to chronic disease    # History of COPD, CAD, Type 2 DM on insulin, depression, CHFrEF, HTN, PVD, PAD, s/p left femoral popliteal bypass 3/24/2021, left thigh infections s/p debridement and muscle flap, ICN, lower extremity DVT on Eliquis, RA   - c/w home medications   monitor glucose levels and adjustment of the insulin dose for the D.M   continue with the spiriva for the copd       Code status: DNR DNI as of today

## 2022-01-26 NOTE — PHYSICAL THERAPY INITIAL EVALUATION ADULT - ADDITIONAL COMMENTS
Info from last admit in 10/2021, where patient was able to ambulate 30 feet with RW and returned home.

## 2022-01-26 NOTE — DISCHARGE NOTE PROVIDER - CARE PROVIDERS DIRECT ADDRESSES
,[email or web address],DirectAddress_Unknown,DirectAddress_Unknown

## 2022-01-26 NOTE — DISCHARGE NOTE PROVIDER - NSDCMRMEDTOKEN_GEN_ALL_CORE_FT
albuterol 90 mcg/inh inhalation aerosol: 1 puff(s) inhaled every 6 hours, As needed, Shortness of Breath  aspirin 81 mg oral tablet, chewable: 1 tab(s) orally once a day  Eliquis 2.5 mg oral tablet: 1 tab(s) orally 2 times a day  folic acid 1 mg oral tablet: 1 tab(s) orally once a day (at bedtime)  gabapentin 300 mg oral capsule: 1 cap(s) orally 3 times a day  ipratropium-albuterol 0.5 mg-2.5 mg/3 mLinhalation solution: 3 milliliter(s) inhaled every 4 hours, As needed, Shortness of Breath and/or Wheezing  Lantus 100 units/mL subcutaneous solution: 10  subcutaneous once a day (at bedtime)  Lasix 20 mg oral tablet: 3 tab(s) orally once a day  meloxicam 15 mg oral tablet: 1 tab(s) orally once a day  methotrexate 2.5 mg oral tablet: 6 tab(s) orally once a week  metoprolol tartrate 25 mg oral tablet: 1 tab(s) orally 2 times a day  oxycodone-acetaminophen 5 mg-325 mg oral tablet: 1 tab(s) orally every 6 hours, As Needed for Pain MDD:4 tablets  PARoxetine 20 mg oral tablet: 1 tab(s) orally once a day  predniSONE 5 mg oral tablet: 1 tab(s) orally once a day  sacubitril-valsartan 24 mg-26 mg oral tablet: 1 tab(s) orally 2 times a day  tiotropium 18 mcg inhalation capsule: 1 cap(s) inhaled once a day    acetaminophen 500 mg oral tablet: 2 tab(s) orally every 8 hours, As needed, Mild Pain (1 - 3)  albuterol 90 mcg/inh inhalation aerosol: 1 puff(s) inhaled every 6 hours, As needed, Shortness of Breath  aspirin 81 mg oral tablet, chewable: 1 tab(s) orally once a day  Eliquis 2.5 mg oral tablet: 1 tab(s) orally 2 times a day  erythromycin 0.5% ophthalmic ointment: 1 application to each affected eye 4 times a day  folic acid 1 mg oral tablet: 1 tab(s) orally once a day (at bedtime)  gabapentin 300 mg oral capsule: 1 cap(s) orally 3 times a day  insulin glargine 100 units/mL subcutaneous solution: 5 unit(s) subcutaneous once a day (at bedtime)  ipratropium-albuterol 0.5 mg-2.5 mg/3 mLinhalation solution: 3 milliliter(s) inhaled every 4 hours, As needed, Shortness of Breath and/or Wheezing  meropenem 1000 mg intravenous injection: 1000 milligram(s) intravenous every 8 hours for 6 weeks  metoprolol tartrate 25 mg oral tablet: 1 tab(s) orally 2 times a day  ocular lubricant ophthalmic solution: 2 drop(s) to each affected eye 3 times a day  ofloxacin 0.3% ophthalmic solution: 1 drop(s) to each affected eye 5 times a day  oxyCODONE 10 mg oral tablet: 1 tab(s) orally every 4 hours, As needed, Severe Pain (7 - 10)  oxyCODONE 5 mg oral tablet: 1 tab(s) orally every 4 hours, As needed, Moderate Pain (4 - 6)  PARoxetine 20 mg oral tablet: 1 tab(s) orally once a day  polyethylene glycol 3350 oral powder for reconstitution: 17 gram(s) orally once a day  predniSONE 5 mg oral tablet: 1 tab(s) orally once a day  sacubitril-valsartan 24 mg-26 mg oral tablet: 1 tab(s) orally 2 times a day  senna oral tablet: 2 tab(s) orally once a day (at bedtime)  tiotropium 18 mcg inhalation capsule: 1 cap(s) inhaled once a day

## 2022-01-26 NOTE — DISCHARGE NOTE PROVIDER - NSDCCPTREATMENT_GEN_ALL_CORE_FT
PRINCIPAL PROCEDURE  Procedure: Selective debridement of wound  Findings and Treatment: debridement of arterial ankle ulceration left side

## 2022-01-26 NOTE — DISCHARGE NOTE PROVIDER - NSDCCPCAREPLAN_GEN_ALL_CORE_FT
PRINCIPAL DISCHARGE DIAGNOSIS  Diagnosis: Acute hyperkalemia  Assessment and Plan of Treatment:       SECONDARY DISCHARGE DIAGNOSES  Diagnosis: Type 2 diabetes mellitus with hyperglycemia  Assessment and Plan of Treatment:     Diagnosis: Cellulitis of right lower extremity  Assessment and Plan of Treatment:     Diagnosis: Peripheral vascular disease  Assessment and Plan of Treatment:      PRINCIPAL DISCHARGE DIAGNOSIS  Diagnosis: Acute osteomyelitis  Assessment and Plan of Treatment: Take meropenem for 6 weeks, trend ESR, CRP and other labs per Dr. Ortega. Follow up with Dr. Ventura (vascular surgery) and Dr. Ku (podiatry       PRINCIPAL DISCHARGE DIAGNOSIS  Diagnosis: Acute osteomyelitis  Assessment and Plan of Treatment: Take meropenem for 6 weeks, trend ESR, CRP and other labs per Dr. Ortega. Follow up with Dr. Ventura (vascular surgery) and Dr. Ku (ortho)

## 2022-01-26 NOTE — DISCHARGE NOTE PROVIDER - PROVIDER TOKENS
PROVIDER:[TOKEN:[[id number]]] PROVIDER:[TOKEN:[[id number]],FOLLOWUP:[2 weeks]],PROVIDER:[TOKEN:[[id number]],FOLLOWUP:[1 week]],PROVIDER:[TOKEN:[[id number]],FOLLOWUP:[2 weeks]]

## 2022-01-26 NOTE — PROGRESS NOTE ADULT - SUBJECTIVE AND OBJECTIVE BOX
HPI:      PAIN: ( )Yes   ( )No  Level:  Location:  Intensity:    /10  Quality:  Aggravating Factors:  Alleviating Factors:  Radiation:  Duration/Timing:  Impact on ADLs:    DYSPNEA: ( ) Yes  ( ) No  Level:        Review of Systems:    Anxiety-  Depression-  Physical Discomfort-  Dyspnea-  Constipation-  Diarrhea-  Nausea-  Vomiting-  Anorexia-  Weight Loss-   Cough-  Secretions-  Fatigue-  Weakness-  Delirium-    All other systems reviewed and negative  Unable to obtain/Limited due to:        PHYSICAL EXAM:    Vital Signs Last 24 Hrs  T(C): 36.7 (26 Jan 2022 09:37), Max: 36.7 (26 Jan 2022 09:37)  T(F): 98.1 (26 Jan 2022 09:37), Max: 98.1 (26 Jan 2022 09:37)  HR: 65 (26 Jan 2022 12:00) (65 - 88)  BP: 129/45 (26 Jan 2022 12:00) (109/42 - 156/50)  BP(mean): 69 (26 Jan 2022 12:00) (69 - 85)  RR: 9 (26 Jan 2022 12:00) (8 - 25)  SpO2: 94% (26 Jan 2022 12:00) (90% - 99%)  Daily     Daily     PPSV2: 45  %  FAST: NA        LABS:                        7.8    12.44 )-----------( 225      ( 26 Jan 2022 05:01 )             26.6     01-26    143  |  115<H>  |  32<H>  ----------------------------<  135<H>  4.6   |  21<L>  |  1.00    Ca    8.4<L>      26 Jan 2022 05:01      PT/INR - ( 25 Jan 2022 05:07 )   PT: 15.0 sec;   INR: 1.30 ratio         PTT - ( 25 Jan 2022 05:07 )  PTT:27.7 sec  Albumin:     Allergies    cephalosporins (Other)  penicillins (Urticaria; Pruritus)    Intolerances      MEDICATIONS  (STANDING):  aspirin  chewable 81 milliGRAM(s) Oral daily  cefepime   IVPB 2000 milliGRAM(s) IV Intermittent every 24 hours  dextrose 40% Gel 15 Gram(s) Oral once  dextrose 40% Gel 15 Gram(s) Oral once  dextrose 5%. 1000 milliLiter(s) (50 mL/Hr) IV Continuous <Continuous>  dextrose 5%. 1000 milliLiter(s) (100 mL/Hr) IV Continuous <Continuous>  dextrose 5%. 1000 milliLiter(s) (50 mL/Hr) IV Continuous <Continuous>  dextrose 5%. 1000 milliLiter(s) (100 mL/Hr) IV Continuous <Continuous>  dextrose 50% Injectable 25 Gram(s) IV Push once  dextrose 50% Injectable 12.5 Gram(s) IV Push once  dextrose 50% Injectable 25 Gram(s) IV Push once  dextrose 50% Injectable 25 Gram(s) IV Push once  dextrose 50% Injectable 12.5 Gram(s) IV Push once  dextrose 50% Injectable 25 Gram(s) IV Push once  folic acid 1 milliGRAM(s) Oral at bedtime  gabapentin 300 milliGRAM(s) Oral three times a day  glucagon  Injectable 1 milliGRAM(s) IntraMuscular once  glucagon  Injectable 1 milliGRAM(s) IntraMuscular once  heparin   Injectable 5000 Unit(s) SubCutaneous every 12 hours  insulin lispro (ADMELOG) corrective regimen sliding scale   SubCutaneous three times a day before meals  insulin lispro (ADMELOG) corrective regimen sliding scale   SubCutaneous at bedtime  metoprolol tartrate 25 milliGRAM(s) Oral two times a day  PARoxetine 20 milliGRAM(s) Oral daily  polyethylene glycol 3350 17 Gram(s) Oral daily  predniSONE   Tablet 5 milliGRAM(s) Oral daily  sacubitril 24 mG/valsartan 26 mG 1 Tablet(s) Oral two times a day  senna 2 Tablet(s) Oral at bedtime  sodium chloride 0.9%. 600 milliLiter(s) (50 mL/Hr) IV Continuous <Continuous>  tiotropium 18 MICROgram(s) Capsule 1 Capsule(s) Inhalation daily  vancomycin  IVPB 600 milliGRAM(s) IV Intermittent every 24 hours    MEDICATIONS  (PRN):  acetaminophen     Tablet .. 650 milliGRAM(s) Oral every 6 hours PRN Temp greater or equal to 38C (100.4F), Mild Pain (1 - 3)  ALBUTerol    90 MICROgram(s) HFA Inhaler 1 Puff(s) Inhalation every 6 hours PRN Shortness of Breath  aluminum hydroxide/magnesium hydroxide/simethicone Suspension 30 milliLiter(s) Oral every 4 hours PRN Dyspepsia  hydrALAZINE Injectable 10 milliGRAM(s) IV Push every 6 hours PRN SBP > 160  HYDROmorphone  Injectable 0.5 milliGRAM(s) IV Push every 3 hours PRN Moderate Pain (4 - 6)  melatonin 3 milliGRAM(s) Oral at bedtime PRN Insomnia  morphine  - Injectable 5 milliGRAM(s) IV Push every 3 hours PRN Severe Pain (7 - 10)  ondansetron Injectable 4 milliGRAM(s) IV Push every 8 hours PRN Nausea and/or Vomiting  oxycodone    5 mG/acetaminophen 325 mG 2 Tablet(s) Oral every 4 hours PRN Severe Pain (7 - 10)  oxycodone    5 mG/acetaminophen 325 mG 1 Tablet(s) Oral every 4 hours PRN Moderate Pain (4 - 6)      RADIOLOGY:   HPI:    85 y/o F with PMH COPD, CAD, Type 2 DM on insulin, depression, CHFrEF, HTN, PVD, PAD, s/p left femoral popliteal bypass 3/24/2021, left thigh infections s/p debridement and muscle flap, PAT, lower extremity DVT on Eliquis, DARREN presents with chronic wounds and pain to the right lower extremity for 2 weeks. She first noticed the chronic wound on her right lower extremity about a month ago. She has been completing dressing changes and applying Xeroform. The wound wasfoul smelling. She has been unable to walk over the last 2 weeks due to increase pressure when placing the foot down. Hanging her lower extremity off the side of the bed helps improve her pain. She also reports occasional diarrhea and lower extremity swelling. Denies headaches, blurry vision, dizziness, fevers, chills, chest pain, SOB, abdominal pain, N/V, diarrhea/constipation.      In ER pt was HTN, w/ elevated WEBCs and Cr 1.49.     1/25/22  pt seen and examined this morning  Alert oriented to place and person, she was a little more confused on why she was in hospital but she stated she understood she was going to the OR this morning for her ankle.      She did state her HCP would be Dr.Beverly Ahumada (physician).      1/26/22  pt seen and examined  feels medication doses work well  would start PO Oxy 10mg [id number] prn rather than IV when possible   DNR DNI today  no other changes in treatment plan at [facility] time  will continue to follow for symptom management      Ethical and Legal Aspects:   NA    PAIN: (x )Yes   ( )No  Level: mild to moderate at this time  Location: ankle  Intensity:    4/10  Quality: throbing aching  Aggravating Factors: movment  Alleviating Factors: ?   Radiation: stated no  Duration/Timing: ongoing  Impact on ADLs: signficant    DYSPNEA: ( ) Yes  (x ) No      Review of Systems:    Anxiety- denies  Depression- denies  Physical Discomfort- comfortable at this time, reports some pain controlled w/ regimen med adjustments discussed  Dyspnea- denies  Constipation- denies  Diarrhea- denies  Nausea- denies  Vomiting- denies  Anorexia- denies  Weight Loss- denies  Cough-denies  Secretions- none noted and denies  Fatigue- feels a little better  Weakness- reports some generalized weakness  Delirium- at high risk    All other systems reviewed and negative        PHYSICAL EXAM:    Vital Signs Last 24 Hrs  T(C): 36.7 (26 Jan 2022 09:37), Max: 36.7 (26 Jan 2022 09:37)  T(F): 98.1 (26 Jan 2022 09:37), Max: 98.1 (26 Jan 2022 09:37)  HR: 65 (26 Jan 2022 12:00) (65 - 88)  BP: 129/45 (26 Jan 2022 12:00) (109/42 - 156/50)  BP(mean): 69 (26 Jan 2022 12:00) (69 - 85)  RR: 9 (26 Jan 2022 12:00) (8 - 25)  SpO2: 94% (26 Jan 2022 12:00) (90% - 99%)  Daily     Daily     PPSV2: 45  %  FAST: NA        LABS:                        7.8    12.44 )-----------( 225      ( 26 Jan 2022 05:01 )             26.6     01-26    143  |  115<H>  |  32<H>  ----------------------------<  135<H>  4.6   |  21<L>  |  1.00    Ca    8.4<L>      26 Jan 2022 05:01      PT/INR - ( 25 Jan 2022 05:07 )   PT: 15.0 sec;   INR: 1.30 ratio         PTT - ( 25 Jan 2022 05:07 )  PTT:27.7 sec  Albumin:     Allergies    cephalosporins (Other)  penicillins (Urticaria; Pruritus)    Intolerances      MEDICATIONS  (STANDING):  aspirin  chewable 81 milliGRAM(s) Oral daily  cefepime   IVPB 2000 milliGRAM(s) IV Intermittent every 24 hours  dextrose 40% Gel 15 Gram(s) Oral once  dextrose 40% Gel 15 Gram(s) Oral once  dextrose 5%. 1000 milliLiter(s) (50 mL/Hr) IV Continuous <Continuous>  dextrose 5%. 1000 milliLiter(s) (100 mL/Hr) IV Continuous <Continuous>  dextrose 5%. 1000 milliLiter(s) (50 mL/Hr) IV Continuous <Continuous>  dextrose 5%. 1000 milliLiter(s) (100 mL/Hr) IV Continuous <Continuous>  dextrose 50% Injectable 25 Gram(s) IV Push once  dextrose 50% Injectable 12.5 Gram(s) IV Push once  dextrose 50% Injectable 25 Gram(s) IV Push once  dextrose 50% Injectable 25 Gram(s) IV Push once  dextrose 50% Injectable 12.5 Gram(s) IV Push once  dextrose 50% Injectable 25 Gram(s) IV Push once  folic acid 1 milliGRAM(s) Oral at bedtime  gabapentin 300 milliGRAM(s) Oral three times a day  glucagon  Injectable 1 milliGRAM(s) IntraMuscular once  glucagon  Injectable 1 milliGRAM(s) IntraMuscular once  heparin   Injectable 5000 Unit(s) SubCutaneous every 12 hours  insulin lispro (ADMELOG) corrective regimen sliding scale   SubCutaneous three times a day before meals  insulin lispro (ADMELOG) corrective regimen sliding scale   SubCutaneous at bedtime  metoprolol tartrate 25 milliGRAM(s) Oral two times a day  PARoxetine 20 milliGRAM(s) Oral daily  polyethylene glycol 3350 17 Gram(s) Oral daily  predniSONE   Tablet 5 milliGRAM(s) Oral daily  sacubitril 24 mG/valsartan 26 mG 1 Tablet(s) Oral two times a day  senna 2 Tablet(s) Oral at bedtime  sodium chloride 0.9%. 600 milliLiter(s) (50 mL/Hr) IV Continuous <Continuous>  tiotropium 18 MICROgram(s) Capsule 1 Capsule(s) Inhalation daily  vancomycin  IVPB 600 milliGRAM(s) IV Intermittent every 24 hours    MEDICATIONS  (PRN):  acetaminophen     Tablet .. 650 milliGRAM(s) Oral every 6 hours PRN Temp greater or equal to 38C (100.4F), Mild Pain (1 - 3)  ALBUTerol    90 MICROgram(s) HFA Inhaler 1 Puff(s) Inhalation every 6 hours PRN Shortness of Breath  aluminum hydroxide/magnesium hydroxide/simethicone Suspension 30 milliLiter(s) Oral every 4 hours PRN Dyspepsia  hydrALAZINE Injectable 10 milliGRAM(s) IV Push every 6 hours PRN SBP > 160  HYDROmorphone  Injectable 0.5 milliGRAM(s) IV Push every 3 hours PRN Moderate Pain (4 - 6)  melatonin 3 milliGRAM(s) Oral at bedtime PRN Insomnia  morphine  - Injectable 5 milliGRAM(s) IV Push every 3 hours PRN Severe Pain (7 - 10)  ondansetron Injectable 4 milliGRAM(s) IV Push every 8 hours PRN Nausea and/or Vomiting  oxycodone    5 mG/acetaminophen 325 mG 2 Tablet(s) Oral every 4 hours PRN Severe Pain (7 - 10)  oxycodone    5 mG/acetaminophen 325 mG 1 Tablet(s) Oral every 4 hours PRN Moderate Pain (4 - 6)      RADIOLOGY:   HPI:    87 y/o F with PMH COPD, CAD, Type 2 DM on insulin, depression, CHFrEF, HTN, PVD, PAD, s/p left femoral popliteal bypass 3/24/2021, left thigh infections s/p debridement and muscle flap, PAT, lower extremity DVT on Eliquis, DARREN presents with chronic wounds and pain to the right lower extremity for 2 weeks. She first noticed the chronic wound on her right lower extremity about a month ago. She has been completing dressing changes and applying Xeroform. The wound wasfoul smelling. She has been unable to walk over the last 2 weeks due to increase pressure when placing the foot down. Hanging her lower extremity off the side of the bed helps improve her pain. She also reports occasional diarrhea and lower extremity swelling. Denies headaches, blurry vision, dizziness, fevers, chills, chest pain, SOB, abdominal pain, N/V, diarrhea/constipation.      In ER pt was HTN, w/ elevated WEBCs and Cr 1.49.     1/25/22  pt seen and examined this morning  Alert oriented to place and person, she was a little more confused on why she was in hospital but she stated she understood she was going to the OR this morning for her ankle.      She did state her HCP would be Dr.Beverly Ahumada (physician).      1/26/22  pt seen and examined  feels medication doses work well  would start PO Oxy 10mg [id number] prn rather than IV when possible   DNR DNI today  no other changes in treatment plan at [facility] time  will continue to follow for symptom management      1/27/22  pt comfortable and in NAD  states her pain meds work well  encouraged to try PO Oxycodone 10mg rather than IV dilaudid   case d/w staff in unit as well  no nausea vomiting sob pt alert and oriented x 3    Ethical and Legal Aspects:   NA    PAIN: (x )Yes   ( )No  Level: mild to moderate at this time      DYSPNEA: ( ) Yes  (x ) No      Review of Systems:    Anxiety- denies  Depression- denies  Physical Discomfort- comfortable at this time, reports some pain controlled w/ regimen med adjustments discussed  Dyspnea- denies  Constipation- denies  Diarrhea- denies  Nausea- denies  Vomiting- denies  Anorexia- denies  Weight Loss- denies  Cough-denies  Secretions- none noted and denies  Fatigue- feels a little better  Weakness- feeling better  Delirium- at high risk    All other systems reviewed and negative        PHYSICAL EXAM:    Vital Signs Last 24 Hrs  T(C): 36.7 (26 Jan 2022 09:37), Max: 36.7 (26 Jan 2022 09:37)  T(F): 98.1 (26 Jan 2022 09:37), Max: 98.1 (26 Jan 2022 09:37)  HR: 65 (26 Jan 2022 12:00) (65 - 88)  BP: 129/45 (26 Jan 2022 12:00) (109/42 - 156/50)  BP(mean): 69 (26 Jan 2022 12:00) (69 - 85)  RR: 9 (26 Jan 2022 12:00) (8 - 25)  SpO2: 94% (26 Jan 2022 12:00) (90% - 99%)  Daily     Daily     PPSV2: 45  %  FAST: NA        LABS:                        7.8    12.44 )-----------( 225      ( 26 Jan 2022 05:01 )             26.6     01-26    143  |  115<H>  |  32<H>  ----------------------------<  135<H>  4.6   |  21<L>  |  1.00    Ca    8.4<L>      26 Jan 2022 05:01      PT/INR - ( 25 Jan 2022 05:07 )   PT: 15.0 sec;   INR: 1.30 ratio         PTT - ( 25 Jan 2022 05:07 )  PTT:27.7 sec  Albumin:     Allergies    cephalosporins (Other)  penicillins (Urticaria; Pruritus)    Intolerances      MEDICATIONS  (STANDING):  aspirin  chewable 81 milliGRAM(s) Oral daily  cefepime   IVPB 2000 milliGRAM(s) IV Intermittent every 24 hours  dextrose 40% Gel 15 Gram(s) Oral once  dextrose 40% Gel 15 Gram(s) Oral once  dextrose 5%. 1000 milliLiter(s) (50 mL/Hr) IV Continuous <Continuous>  dextrose 5%. 1000 milliLiter(s) (100 mL/Hr) IV Continuous <Continuous>  dextrose 5%. 1000 milliLiter(s) (50 mL/Hr) IV Continuous <Continuous>  dextrose 5%. 1000 milliLiter(s) (100 mL/Hr) IV Continuous <Continuous>  dextrose 50% Injectable 25 Gram(s) IV Push once  dextrose 50% Injectable 12.5 Gram(s) IV Push once  dextrose 50% Injectable 25 Gram(s) IV Push once  dextrose 50% Injectable 25 Gram(s) IV Push once  dextrose 50% Injectable 12.5 Gram(s) IV Push once  dextrose 50% Injectable 25 Gram(s) IV Push once  folic acid 1 milliGRAM(s) Oral at bedtime  gabapentin 300 milliGRAM(s) Oral three times a day  glucagon  Injectable 1 milliGRAM(s) IntraMuscular once  glucagon  Injectable 1 milliGRAM(s) IntraMuscular once  heparin   Injectable 5000 Unit(s) SubCutaneous every 12 hours  insulin lispro (ADMELOG) corrective regimen sliding scale   SubCutaneous three times a day before meals  insulin lispro (ADMELOG) corrective regimen sliding scale   SubCutaneous at bedtime  metoprolol tartrate 25 milliGRAM(s) Oral two times a day  PARoxetine 20 milliGRAM(s) Oral daily  polyethylene glycol 3350 17 Gram(s) Oral daily  predniSONE   Tablet 5 milliGRAM(s) Oral daily  sacubitril 24 mG/valsartan 26 mG 1 Tablet(s) Oral two times a day  senna 2 Tablet(s) Oral at bedtime  sodium chloride 0.9%. 600 milliLiter(s) (50 mL/Hr) IV Continuous <Continuous>  tiotropium 18 MICROgram(s) Capsule 1 Capsule(s) Inhalation daily  vancomycin  IVPB 600 milliGRAM(s) IV Intermittent every 24 hours    MEDICATIONS  (PRN):  acetaminophen     Tablet .. 650 milliGRAM(s) Oral every 6 hours PRN Temp greater or equal to 38C (100.4F), Mild Pain (1 - 3)  ALBUTerol    90 MICROgram(s) HFA Inhaler 1 Puff(s) Inhalation every 6 hours PRN Shortness of Breath  aluminum hydroxide/magnesium hydroxide/simethicone Suspension 30 milliLiter(s) Oral every 4 hours PRN Dyspepsia  hydrALAZINE Injectable 10 milliGRAM(s) IV Push every 6 hours PRN SBP > 160  HYDROmorphone  Injectable 0.5 milliGRAM(s) IV Push every 3 hours PRN Moderate Pain (4 - 6)  melatonin 3 milliGRAM(s) Oral at bedtime PRN Insomnia  morphine  - Injectable 5 milliGRAM(s) IV Push every 3 hours PRN Severe Pain (7 - 10)  ondansetron Injectable 4 milliGRAM(s) IV Push every 8 hours PRN Nausea and/or Vomiting  oxycodone    5 mG/acetaminophen 325 mG 2 Tablet(s) Oral every 4 hours PRN Severe Pain (7 - 10)  oxycodone    5 mG/acetaminophen 325 mG 1 Tablet(s) Oral every 4 hours PRN Moderate Pain (4 - 6)      RADIOLOGY:

## 2022-01-26 NOTE — DISCHARGE NOTE PROVIDER - NSDCCAREPROVSEEN_GEN_ALL_CORE_FT
Anil Ku (podiatry)  Sheree Kirby (hospital medicine)  Soren Ventura (vascular surgery)  Ernie Ortega (infectious disease)

## 2022-01-26 NOTE — DISCHARGE NOTE PROVIDER - DETAILS OF MALNUTRITION DIAGNOSIS/DIAGNOSES
This patient has been assessed with a concern for Malnutrition and was treated during this hospitalization for the following Nutrition diagnosis/diagnoses:     -  01/19/2022: Moderate protein-calorie malnutrition

## 2022-01-26 NOTE — PROGRESS NOTE ADULT - SUBJECTIVE AND OBJECTIVE BOX
Patient seen and examined at bedside. Patient received from OR. Patient less confused than yesterday night. Pain appears controlled with no manipulation of R Ankle. wound vac in place    LABS:                        7.8    12.44 )-----------( 225      ( 26 Jan 2022 05:01 )             26.6     01-25    142  |  114<H>  |  36<H>  ----------------------------<  103<H>  4.2   |  23  |  0.92    Ca    8.7      25 Jan 2022 05:07      PT/INR - ( 25 Jan 2022 05:07 )   PT: 15.0 sec;   INR: 1.30 ratio         PTT - ( 25 Jan 2022 05:07 )  PTT:27.7 sec      VITAL SIGNS:  T(C): 36.6 (01-25-22 @ 21:33), Max: 36.6 (01-25-22 @ 08:01)  HR: 84 (01-26-22 @ 04:00) (60 - 86)  BP: 143/46 (01-26-22 @ 04:00) (109/42 - 161/43)  RR: 11 (01-26-22 @ 04:00) (8 - 25)  SpO2: 95% (01-26-22 @ 04:00) (90% - 100%)    General: NAD, resting comfortably in bed, confused  RLE:  wound vac in placed over right ankle, wrapped in ACE, clean dry and intact, kerlix intact  SCD in place contralaterally  No calf tenderness contralterally  unable to assess motor and sensation due to confusion  toes are WWP    A/P:  86y f s/p right ankle open I&D with wound vac placement POD1    -PT/OT  -WBAT RLE  -Pain Control  -DVT ppx   -Wound vac in place, please record output  -Continue perioperative abx, abx per ID  -FU AM Labs  -Rest, ice, compress and elevate the extremity as we needed  - ICU care  -Incentive Spirometry  -Medical management appreciated   Patient seen and examined at bedside. Patient received from OR. Patient less confused than yesterday night. Pain appears controlled with no manipulation of R Ankle. wound vac in place    LABS:                        7.8    12.44 )-----------( 225      ( 26 Jan 2022 05:01 )             26.6     01-25    142  |  114<H>  |  36<H>  ----------------------------<  103<H>  4.2   |  23  |  0.92    Ca    8.7      25 Jan 2022 05:07      PT/INR - ( 25 Jan 2022 05:07 )   PT: 15.0 sec;   INR: 1.30 ratio         PTT - ( 25 Jan 2022 05:07 )  PTT:27.7 sec      VITAL SIGNS:  T(C): 36.6 (01-25-22 @ 21:33), Max: 36.6 (01-25-22 @ 08:01)  HR: 84 (01-26-22 @ 04:00) (60 - 86)  BP: 143/46 (01-26-22 @ 04:00) (109/42 - 161/43)  RR: 11 (01-26-22 @ 04:00) (8 - 25)  SpO2: 95% (01-26-22 @ 04:00) (90% - 100%)    General: NAD, resting comfortably in bed, confused  RLE:  wound vac in placed over right ankle, wrapped in ACE, clean dry and intact, kerlix intact  SCD in place contralaterally  No calf tenderness contralaterally  Able to flex/extension all toes slightly moving. Unable to move ankle 2/2 pain  toes are WWP    A/P:  86y f s/p right ankle open I&D with wound vac placement POD1    -PT/OT  -WBAT RLE  -Pain Control  -DVT ppx   -Wound vac in place, please record output  -Continue perioperative abx, abx per ID  -FU AM Labs  -FU intraop cultures  -Rest, ice, compress and elevate the extremity as we needed  -ICU care  -Incentive Spirometry  -Medical management appreciated

## 2022-01-26 NOTE — DISCHARGE NOTE PROVIDER - HOSPITAL COURSE
CC: RLE wound  HPI and Hospital course: 85 y/o Female with PMH COPD, CAD, DMII on insulin, depression, CHFrEF, HTN, PVD, PAD, s/p left femoral popliteal bypass 3/24/2021, left thigh infections s/p debridement and muscle flap, PAT, lower extremity DVT, now on ppx dose Eliquis, RA on chronic prednisone, presents with chronic wounds and pain to the right lower extremity for 2 weeks. Was seen by Dr Ventura and underwent abdominal aortic angiogram 20-Jan-2022  abdominopelvic, bilateral lower extremity angiogram, angioplasty and stenting of the R common, SFA arteries, angioplasty of R below knee popliteal artery and then selective debridement of wound 25-Jan-2022. Multiple organisms from wound culture, now medically stable for d/c on 6wks iv meropenem. See below for problem based plan.    Gen: NAD  HEENT:  pupils equal and reactive, EOMI, no oropharyngeal lesions, erythema, exudates, oral thrush  NECK:   supple, no carotid bruits, no palpable lymph nodes, no thyromegaly  CV:  +S1, +S2, regular, no murmurs or rubs  RESP:   lungs clear to auscultation bilaterally, no wheezing, rales, rhonchi, good air entry bilaterally  BREAST:  not examined  GI:  abdomen soft, non-tender, non-distended, normal BS, no bruits, no abdominal masses, no palpable masses  RECTAL:  not examined  :  not examined  MSK:   normal muscle tone, no atrophy, no rigidity, no contractions  EXT:  no clubbing, no cyanosis, RLE wrapped  VASCULAR:  pulses equal and symmetric in the upper and lower extremities  NEURO:  AAOX3, no focal neurological deficits, follows all commands, able to move extremities spontaneously  SKIN:  no ulcers, lesions or rashes    Surgical cx 1/25/22: Proteus, pseudomonas, enterococcus    R ankle MRI 1/19/22: 1.  Medial ankle skin wound with subjacent edema and soft tissue swelling compatible cellulitis. No drainable fluid collection. 2.  Abnormal marrow signal within the medial malleolus and medial tibia concerning osteomyelitis given the overlying skin wound. 3.  Small ankle joint effusion. Nonspecific finding. Aspiration can be performed for further evaluation.    Echo 1/24/22:  Fibrocalcific changes noted to the mitral valve leaflets with preserved leaflet excursion. Moderate mitral annular calcification is present. Mild (1+) mitral regurgitation is present. EA reversal of the mitral inflow consistent with reduced compliance of the left ventricle. Fibrocalcific changes noted to the Aortic valve leaflets with preserved leaflet excursion. Mild to Moderate aortic regurgitation is present. The tricuspid valve leaflets are thin and pliable; valve motion is normal. Trace tricuspid valve regurgitation is present. Pulmonic valve not well seen, probably normal pulmonic valve function. The left atrium is mildly dilated. Estimated left ventricular ejection fraction is 45-50 %. Mild concentric left ventricular hypertrophy is present. The right atrium is normal. The right ventricle is normal in size. The IVC appears normal.    B/l LE venous doppler 1/19/22: No evidence of deep venous thrombosis in either lower extremity.    B/l LE arterial doppler 1/29/22: Greater than 50% stenosis within the right common femoral artery with distal abnormal tardus parvus flow throughout the right lower extremity. Nonvisualization of the right peroneal and dorsalis pedis arteries. Patent left femoral-popliteal bypass graft. Left calf arteries and dorsalis pedis arteries are not visualized. There is a 6.3 cm fluid collection along the distal aspect of the left femoropopliteal bypass graft, likely a seroma.    CXR, R ankle xray 1/18/22: Disuse osteoporosis. Healed fracture distal fibula. Mild ankle degeneration. Clear lungs.    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 Ventura  -s/p debridement on 1/25/22 with Dr. Ventura  -wound cx with proteus, pseudomonas, enterococcus  -Meropenem x 6wks per ID  -appreciate vascular (Lorenzo) and orthopedic surgery (Anil Ku) input    #Type 2 DM, insulin dependent  -A1c 10.7  -continue lantus and SSI    #PAD with 50% CFA stenosis and s/p Left Fem Pop bypass (3/2021) status post right femoral angioplasty and stenting with the right  popliteal angioplasty    - if no proper healing of ulcers and unable to bear weight due to pain amputation may be considered eventually    #Anemia of ABL s/p 1 unit PRBC - stable    # Hypertension, HFrEF  -continue home meds excluding lasix    #prior DVT (>6mo ago)  -received course of Eliquis 5 BID, then reduced to 2.5mg BID per Dr. Ventura for ppx, continue this dose    #corneal ulcer  -drops per ophtho     #DVT ppx- Eliquis    #DNR    #d/c to [facility]    I have spent 45 min on day of d/c coordinating care.       CC: RLE wound  HPI and Hospital course: 85 y/o Female with PMH COPD, CAD, DMII on insulin, depression, CHFrEF, HTN, PVD, PAD, s/p left femoral popliteal bypass 3/24/2021, left thigh infections s/p debridement and muscle flap, PAT, lower extremity DVT, now on ppx dose Eliquis, RA on chronic prednisone, presents with chronic wounds and pain to the right lower extremity for 2 weeks. Was seen by Dr Ventura and underwent abdominal aortic angiogram 20-Jan-2022  abdominopelvic, bilateral lower extremity angiogram, angioplasty and stenting of the R common, SFA arteries, angioplasty of R below knee popliteal artery and then selective debridement of wound 25-Jan-2022. Multiple organisms from wound culture, now medically stable for d/c on 6wks iv meropenem. See below for problem based plan.    Vital Signs Last 24 Hrs  T(C): 36.6 (03 Feb 2022 08:04), Max: 37.3 (02 Feb 2022 16:16)  T(F): 97.9 (03 Feb 2022 08:04), Max: 99.1 (02 Feb 2022 16:16)  HR: 71 (03 Feb 2022 08:04) (64 - 90)  BP: 155/48 (03 Feb 2022 08:04) (153/45 - 155/48)  BP(mean): --  RR: 18 (03 Feb 2022 08:04) (16 - 18)  SpO2: 90% (03 Feb 2022 08:04) (90% - 93%)    Gen: NAD  HEENT:  pupils equal and reactive, EOMI, no oropharyngeal lesions, erythema, exudates, oral thrush  NECK:   supple, no carotid bruits, no palpable lymph nodes, no thyromegaly  CV:  +S1, +S2, regular, no murmurs or rubs  RESP:   lungs clear to auscultation bilaterally, no wheezing, rales, rhonchi, good air entry bilaterally  BREAST:  not examined  GI:  abdomen soft, non-tender, non-distended, normal BS, no bruits, no abdominal masses, no palpable masses  RECTAL:  not examined  :  not examined  MSK:   normal muscle tone, no atrophy, no rigidity, no contractions  EXT:  no clubbing, no cyanosis, RLE wrapped  VASCULAR:  pulses equal and symmetric in the upper and lower extremities  NEURO:  AAOX3, no focal neurological deficits, follows all commands, able to move extremities spontaneously  SKIN:  no ulcers, lesions or rashes    Surgical cx 1/25/22: Proteus, pseudomonas, enterococcus    R ankle MRI 1/19/22: 1.  Medial ankle skin wound with subjacent edema and soft tissue swelling compatible cellulitis. No drainable fluid collection. 2.  Abnormal marrow signal within the medial malleolus and medial tibia concerning osteomyelitis given the overlying skin wound. 3.  Small ankle joint effusion. Nonspecific finding. Aspiration can be performed for further evaluation.    Echo 1/24/22:  Fibrocalcific changes noted to the mitral valve leaflets with preserved leaflet excursion. Moderate mitral annular calcification is present. Mild (1+) mitral regurgitation is present. EA reversal of the mitral inflow consistent with reduced compliance of the left ventricle. Fibrocalcific changes noted to the Aortic valve leaflets with preserved leaflet excursion. Mild to Moderate aortic regurgitation is present. The tricuspid valve leaflets are thin and pliable; valve motion is normal. Trace tricuspid valve regurgitation is present. Pulmonic valve not well seen, probably normal pulmonic valve function. The left atrium is mildly dilated. Estimated left ventricular ejection fraction is 45-50 %. Mild concentric left ventricular hypertrophy is present. The right atrium is normal. The right ventricle is normal in size. The IVC appears normal.    B/l LE venous doppler 1/19/22: No evidence of deep venous thrombosis in either lower extremity.    B/l LE arterial doppler 1/29/22: Greater than 50% stenosis within the right common femoral artery with distal abnormal tardus parvus flow throughout the right lower extremity. Nonvisualization of the right peroneal and dorsalis pedis arteries. Patent left femoral-popliteal bypass graft. Left calf arteries and dorsalis pedis arteries are not visualized. There is a 6.3 cm fluid collection along the distal aspect of the left femoropopliteal bypass graft, likely a seroma.    CXR, R ankle xray 1/18/22: Disuse osteoporosis. Healed fracture distal fibula. Mild ankle degeneration. Clear lungs.    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 Ventura  -s/p debridement on 1/25/22 with Dr. Ventura  -wound cx with proteus, pseudomonas, enterococcus  -Meropenem x 6wks per ID  -appreciate vascular (Lorenzo) and orthopedic surgery (Anil Ku) input    #Type 2 DM, insulin dependent  -A1c 10.7  -continue lantus and SSI    #PAD with 50% CFA stenosis and s/p Left Fem Pop bypass (3/2021) status post right femoral angioplasty and stenting with the right  popliteal angioplasty    - if no proper healing of ulcers and unable to bear weight due to pain amputation may be considered eventually    #Anemia of ABL s/p 1 unit PRBC - stable    # Hypertension, HFrEF  -continue home meds excluding lasix    #prior DVT (>6mo ago)  -received course of Eliquis 5 BID, then reduced to 2.5mg BID per Dr. Ventura for ppx, continue this dose    #corneal ulcer  -drops per ophtho     #DVT ppx- Eliquis    #DNR    #d/c to [facility]    I have spent 45 min on day of d/c coordinating care.

## 2022-01-27 LAB
ANION GAP SERPL CALC-SCNC: 5 MMOL/L — SIGNIFICANT CHANGE UP (ref 5–17)
BUN SERPL-MCNC: 46 MG/DL — HIGH (ref 7–23)
CALCIUM SERPL-MCNC: 8.4 MG/DL — LOW (ref 8.5–10.1)
CHLORIDE SERPL-SCNC: 117 MMOL/L — HIGH (ref 96–108)
CO2 SERPL-SCNC: 22 MMOL/L — SIGNIFICANT CHANGE UP (ref 22–31)
CREAT SERPL-MCNC: 0.99 MG/DL — SIGNIFICANT CHANGE UP (ref 0.5–1.3)
CRP SERPL-MCNC: 165 MG/L — HIGH
ERYTHROCYTE [SEDIMENTATION RATE] IN BLOOD: >140 — SIGNIFICANT CHANGE UP (ref 0–20)
GLUCOSE SERPL-MCNC: 202 MG/DL — HIGH (ref 70–99)
HCT VFR BLD CALC: 22.7 % — LOW (ref 34.5–45)
HCT VFR BLD CALC: 23.4 % — LOW (ref 34.5–45)
HGB BLD-MCNC: 6.7 G/DL — CRITICAL LOW (ref 11.5–15.5)
HGB BLD-MCNC: 7 G/DL — CRITICAL LOW (ref 11.5–15.5)
MCHC RBC-ENTMCNC: 29.1 PG — SIGNIFICANT CHANGE UP (ref 27–34)
MCHC RBC-ENTMCNC: 29.5 GM/DL — LOW (ref 32–36)
MCHC RBC-ENTMCNC: 29.9 GM/DL — LOW (ref 32–36)
MCHC RBC-ENTMCNC: 29.9 PG — SIGNIFICANT CHANGE UP (ref 27–34)
MCV RBC AUTO: 100 FL — SIGNIFICANT CHANGE UP (ref 80–100)
MCV RBC AUTO: 98.7 FL — SIGNIFICANT CHANGE UP (ref 80–100)
PLATELET # BLD AUTO: 213 K/UL — SIGNIFICANT CHANGE UP (ref 150–400)
PLATELET # BLD AUTO: 223 K/UL — SIGNIFICANT CHANGE UP (ref 150–400)
POTASSIUM SERPL-MCNC: 4.7 MMOL/L — SIGNIFICANT CHANGE UP (ref 3.5–5.3)
POTASSIUM SERPL-SCNC: 4.7 MMOL/L — SIGNIFICANT CHANGE UP (ref 3.5–5.3)
RBC # BLD: 2.3 M/UL — LOW (ref 3.8–5.2)
RBC # BLD: 2.34 M/UL — LOW (ref 3.8–5.2)
RBC # FLD: 15.7 % — HIGH (ref 10.3–14.5)
RBC # FLD: 15.9 % — HIGH (ref 10.3–14.5)
SODIUM SERPL-SCNC: 144 MMOL/L — SIGNIFICANT CHANGE UP (ref 135–145)
VANCOMYCIN TROUGH SERPL-MCNC: 15.8 UG/ML — SIGNIFICANT CHANGE UP (ref 10–20)
WBC # BLD: 9.31 K/UL — SIGNIFICANT CHANGE UP (ref 3.8–10.5)
WBC # BLD: 9.37 K/UL — SIGNIFICANT CHANGE UP (ref 3.8–10.5)
WBC # FLD AUTO: 9.31 K/UL — SIGNIFICANT CHANGE UP (ref 3.8–10.5)
WBC # FLD AUTO: 9.37 K/UL — SIGNIFICANT CHANGE UP (ref 3.8–10.5)

## 2022-01-27 PROCEDURE — 99233 SBSQ HOSP IP/OBS HIGH 50: CPT

## 2022-01-27 RX ORDER — APIXABAN 2.5 MG/1
2.5 TABLET, FILM COATED ORAL
Refills: 0 | Status: DISCONTINUED | OUTPATIENT
Start: 2022-01-27 | End: 2022-02-03

## 2022-01-27 RX ORDER — INSULIN GLARGINE 100 [IU]/ML
5 INJECTION, SOLUTION SUBCUTANEOUS AT BEDTIME
Refills: 0 | Status: DISCONTINUED | OUTPATIENT
Start: 2022-01-27 | End: 2022-02-03

## 2022-01-27 RX ADMIN — Medication 1 MILLIGRAM(S): at 21:21

## 2022-01-27 RX ADMIN — OXYCODONE HYDROCHLORIDE 10 MILLIGRAM(S): 5 TABLET ORAL at 10:52

## 2022-01-27 RX ADMIN — Medication 1000 MILLIGRAM(S): at 21:24

## 2022-01-27 RX ADMIN — Medication 25 MILLIGRAM(S): at 21:21

## 2022-01-27 RX ADMIN — OXYCODONE HYDROCHLORIDE 10 MILLIGRAM(S): 5 TABLET ORAL at 02:21

## 2022-01-27 RX ADMIN — Medication 1000 MILLIGRAM(S): at 06:00

## 2022-01-27 RX ADMIN — GABAPENTIN 300 MILLIGRAM(S): 400 CAPSULE ORAL at 05:00

## 2022-01-27 RX ADMIN — Medication 1000 MILLIGRAM(S): at 22:00

## 2022-01-27 RX ADMIN — Medication 1000 MILLIGRAM(S): at 05:01

## 2022-01-27 RX ADMIN — OXYCODONE HYDROCHLORIDE 10 MILLIGRAM(S): 5 TABLET ORAL at 20:36

## 2022-01-27 RX ADMIN — Medication 4: at 21:20

## 2022-01-27 RX ADMIN — SACUBITRIL AND VALSARTAN 1 TABLET(S): 24; 26 TABLET, FILM COATED ORAL at 21:19

## 2022-01-27 RX ADMIN — HYDROMORPHONE HYDROCHLORIDE 0.5 MILLIGRAM(S): 2 INJECTION INTRAMUSCULAR; INTRAVENOUS; SUBCUTANEOUS at 22:16

## 2022-01-27 RX ADMIN — APIXABAN 2.5 MILLIGRAM(S): 2.5 TABLET, FILM COATED ORAL at 21:20

## 2022-01-27 RX ADMIN — Medication 250 MILLIGRAM(S): at 10:11

## 2022-01-27 RX ADMIN — Medication 1000 MILLIGRAM(S): at 15:35

## 2022-01-27 RX ADMIN — HYDROMORPHONE HYDROCHLORIDE 0.5 MILLIGRAM(S): 2 INJECTION INTRAMUSCULAR; INTRAVENOUS; SUBCUTANEOUS at 14:00

## 2022-01-27 RX ADMIN — Medication 25 MILLIGRAM(S): at 10:11

## 2022-01-27 RX ADMIN — Medication 20 MILLIGRAM(S): at 10:11

## 2022-01-27 RX ADMIN — GABAPENTIN 300 MILLIGRAM(S): 400 CAPSULE ORAL at 21:19

## 2022-01-27 RX ADMIN — Medication 2 DROP(S): at 21:20

## 2022-01-27 RX ADMIN — INSULIN GLARGINE 5 UNIT(S): 100 INJECTION, SOLUTION SUBCUTANEOUS at 21:19

## 2022-01-27 RX ADMIN — Medication 5 MILLIGRAM(S): at 10:11

## 2022-01-27 RX ADMIN — TIOTROPIUM BROMIDE 1 CAPSULE(S): 18 CAPSULE ORAL; RESPIRATORY (INHALATION) at 10:01

## 2022-01-27 RX ADMIN — HYDROMORPHONE HYDROCHLORIDE 1 MILLIGRAM(S): 2 INJECTION INTRAMUSCULAR; INTRAVENOUS; SUBCUTANEOUS at 00:12

## 2022-01-27 RX ADMIN — HYDROMORPHONE HYDROCHLORIDE 0.5 MILLIGRAM(S): 2 INJECTION INTRAMUSCULAR; INTRAVENOUS; SUBCUTANEOUS at 13:30

## 2022-01-27 RX ADMIN — HYDROMORPHONE HYDROCHLORIDE 1 MILLIGRAM(S): 2 INJECTION INTRAMUSCULAR; INTRAVENOUS; SUBCUTANEOUS at 00:22

## 2022-01-27 RX ADMIN — Medication 4: at 12:12

## 2022-01-27 RX ADMIN — CEFEPIME 100 MILLIGRAM(S): 1 INJECTION, POWDER, FOR SOLUTION INTRAMUSCULAR; INTRAVENOUS at 13:23

## 2022-01-27 RX ADMIN — SENNA PLUS 2 TABLET(S): 8.6 TABLET ORAL at 21:20

## 2022-01-27 RX ADMIN — Medication 1000 MILLIGRAM(S): at 14:46

## 2022-01-27 RX ADMIN — OXYCODONE HYDROCHLORIDE 10 MILLIGRAM(S): 5 TABLET ORAL at 11:45

## 2022-01-27 RX ADMIN — Medication 4: at 17:00

## 2022-01-27 RX ADMIN — Medication 81 MILLIGRAM(S): at 10:11

## 2022-01-27 RX ADMIN — Medication 2: at 07:57

## 2022-01-27 RX ADMIN — HYDROMORPHONE HYDROCHLORIDE 0.5 MILLIGRAM(S): 2 INJECTION INTRAMUSCULAR; INTRAVENOUS; SUBCUTANEOUS at 23:20

## 2022-01-27 RX ADMIN — POLYETHYLENE GLYCOL 3350 17 GRAM(S): 17 POWDER, FOR SOLUTION ORAL at 10:13

## 2022-01-27 RX ADMIN — SACUBITRIL AND VALSARTAN 1 TABLET(S): 24; 26 TABLET, FILM COATED ORAL at 11:55

## 2022-01-27 RX ADMIN — HEPARIN SODIUM 5000 UNIT(S): 5000 INJECTION INTRAVENOUS; SUBCUTANEOUS at 10:12

## 2022-01-27 RX ADMIN — GABAPENTIN 300 MILLIGRAM(S): 400 CAPSULE ORAL at 14:46

## 2022-01-27 NOTE — PROGRESS NOTE ADULT - ASSESSMENT
87 y/o Female with h/o COPD, CAD, Type 2 DM on insulin, depression, CHFrEF, HTN, PVD, PAD, s/p left femoral popliteal bypass 3/24/2021, prior left thigh infections s/p debridement and muscle flap, PAT, lower extremity DVT on Eliquis, RA was admitted on 1/18 for lower leg nonhealing wounds. She first noticed the chronic wound on her right lower extremity about a month ago. She has been completing dressing changes and applying Xeroform. The wound is foul smelling. She has been unable to walk over the last 2 weeks due to increase pressure when placing the foot down. Hanging her lower extremity off the side of the bed helps improve her pain. She also reports occasional diarrhea and lower extremity swelling. Denies headaches, blurry vision, dizziness, fevers, chills. In ER she received cefepime and vancomycin IV.     1. RLE cellulitis with multiple open wound. RIght ankle septic arthritis with PSAE, PRMI and ENspp. Chronic LE stasis dermatitis. PVD s/p angioplasty.  -lower leg erythema  -mild leukocytosis  -has ankle pain ?due to ischemia  -BC x 2 noted  -on vancomycin 600 gm IV qd and cefepime 2 gm IV qd # 8  -tolerating abx well so far; no side effects noted  -f/u repeat vancomycin trough level  -elevate legs  .local wound care  -continue abx coverage   -vascular evaluation appreciated  -monitor temps  -f/u CBC  -supportive care  2. Other issues:   -care per medicine

## 2022-01-27 NOTE — PROGRESS NOTE ADULT - SUBJECTIVE AND OBJECTIVE BOX
Patient seen and examined at bedside. Patient more alert than yesterday. Patient states that she thinks she is doing better today than yesterday. Pain appears controlled with no manipulation of R Ankle. Wound vac in place over R lowerleg. Denies fevers/chills. Patient opted for DNR/DNI after discussions with Palliative care. Cultures preliminary resulted.    LABS:                        7.8    12.44 )-----------( 225      ( 26 Jan 2022 05:01 )             26.6     01-27    144  |  117<H>  |  46<H>  ----------------------------<  202<H>  4.7   |  22  |  0.99    Ca    8.4<L>      27 Jan 2022 05:11            VITAL SIGNS:  T(C): 36.2 (01-27-22 @ 06:22), Max: 37.1 (01-26-22 @ 14:00)  HR: 71 (01-27-22 @ 06:00) (65 - 88)  BP: 140/41 (01-27-22 @ 06:00) (121/45 - 143/48)  RR: 11 (01-27-22 @ 06:00) (9 - 14)  SpO2: 97% (01-27-22 @ 06:00) (93% - 97%)    General: NAD, resting comfortably in bed, Alert  RLE:  wound vac in placed over right ankle, wrapped in ACE, clean dry and intact, kerlix intact  SCD in place contralaterally  No calf tenderness contralaterally  Able to flex/extension all toes slightly moving. Able to flex/extend ankle actively for 5 degrees  Less pain with passive DF/PF of ankle when compared to yesteday.  toes are WWP    A/P:  86y f s/p right ankle open I&D with wound vac placement POD2    -H/H Low this am, 6.7/22.7  -Cx preliminary growing pseudomonas, E. faecalis, proteus  -Clinical exam slightly improving compared to yesterday  -In discussions with Palliative care, patient opted for DNR/DNI  -PT  -WBAT RLE  -Pain Control  -DVT ppx per primary team, currently heparin  -Wound vac in place, please record output  -Continue abx per ID  -FU AM Labs  -Rest, ice, compress and elevate the extremity as we needed  -ICU care  -Incentive Spirometry  -Medical management appreciated

## 2022-01-27 NOTE — PROGRESS NOTE ADULT - SUBJECTIVE AND OBJECTIVE BOX
afebrile, VSS    HCT 22    more alert this am, still with considerable ankle area pain    R toes viable appearing    A/P  stable  HCT being repeated, if still low would transfuse  will take down VAC tomorrow    MEDICATIONS  (STANDING):  acetaminophen     Tablet .. 1000 milliGRAM(s) Oral every 8 hours  aspirin  chewable 81 milliGRAM(s) Oral daily  cefepime   IVPB 2000 milliGRAM(s) IV Intermittent every 24 hours  dextrose 40% Gel 15 Gram(s) Oral once  dextrose 40% Gel 15 Gram(s) Oral once  dextrose 5%. 1000 milliLiter(s) (50 mL/Hr) IV Continuous <Continuous>  dextrose 5%. 1000 milliLiter(s) (100 mL/Hr) IV Continuous <Continuous>  dextrose 5%. 1000 milliLiter(s) (100 mL/Hr) IV Continuous <Continuous>  dextrose 5%. 1000 milliLiter(s) (50 mL/Hr) IV Continuous <Continuous>  dextrose 50% Injectable 25 Gram(s) IV Push once  dextrose 50% Injectable 12.5 Gram(s) IV Push once  dextrose 50% Injectable 25 Gram(s) IV Push once  dextrose 50% Injectable 25 Gram(s) IV Push once  dextrose 50% Injectable 12.5 Gram(s) IV Push once  dextrose 50% Injectable 25 Gram(s) IV Push once  folic acid 1 milliGRAM(s) Oral at bedtime  gabapentin 300 milliGRAM(s) Oral three times a day  glucagon  Injectable 1 milliGRAM(s) IntraMuscular once  glucagon  Injectable 1 milliGRAM(s) IntraMuscular once  heparin   Injectable 5000 Unit(s) SubCutaneous every 12 hours  insulin lispro (ADMELOG) corrective regimen sliding scale   SubCutaneous at bedtime  insulin lispro (ADMELOG) corrective regimen sliding scale   SubCutaneous three times a day before meals  metoprolol tartrate 25 milliGRAM(s) Oral two times a day  PARoxetine 20 milliGRAM(s) Oral daily  polyethylene glycol 3350 17 Gram(s) Oral daily  predniSONE   Tablet 5 milliGRAM(s) Oral daily  sacubitril 24 mG/valsartan 26 mG 1 Tablet(s) Oral two times a day  senna 2 Tablet(s) Oral at bedtime  sodium chloride 0.9%. 600 milliLiter(s) (50 mL/Hr) IV Continuous <Continuous>  tiotropium 18 MICROgram(s) Capsule 1 Capsule(s) Inhalation daily  vancomycin  IVPB 600 milliGRAM(s) IV Intermittent every 24 hours    MEDICATIONS  (PRN):  acetaminophen     Tablet .. 650 milliGRAM(s) Oral every 6 hours PRN Temp greater or equal to 38C (100.4F), Mild Pain (1 - 3)  ALBUTerol    90 MICROgram(s) HFA Inhaler 1 Puff(s) Inhalation every 6 hours PRN Shortness of Breath  aluminum hydroxide/magnesium hydroxide/simethicone Suspension 30 milliLiter(s) Oral every 4 hours PRN Dyspepsia  hydrALAZINE Injectable 10 milliGRAM(s) IV Push every 6 hours PRN SBP > 160  HYDROmorphone  Injectable 1 milliGRAM(s) IV Push every 3 hours PRN Severe Pain (7 - 10)  HYDROmorphone  Injectable 0.5 milliGRAM(s) IV Push every 3 hours PRN Moderate Pain (4 - 6)  melatonin 3 milliGRAM(s) Oral at bedtime PRN Insomnia  ondansetron Injectable 4 milliGRAM(s) IV Push every 8 hours PRN Nausea and/or Vomiting  oxyCODONE    IR 10 milliGRAM(s) Oral every 4 hours PRN Moderate Pain (4 - 6)      Allergies    cephalosporins (Other)  penicillins (Urticaria; Pruritus)    Intolerances        Flatus: [ ] YES [ ] NO             Bowel Movement: [ ] YES [ ] NO  Pain (0-10):            Pain Control Adequate: [ ] YES [ ] NO  Nausea: [ ] YES [ ] NO            Vomiting: [ ] YES [ ] NO  Diarrhea: [ ] YES [ ] NO         Constipation: [ ] YES [ ] NO     Chest Pain: [ ] YES [ ] NO    SOB:  [ ] YES [ ] NO    Vital Signs Last 24 Hrs  T(C): 36.2 (27 Jan 2022 06:22), Max: 37.1 (26 Jan 2022 14:00)  T(F): 97.2 (27 Jan 2022 06:22), Max: 98.7 (26 Jan 2022 14:00)  HR: 67 (27 Jan 2022 07:00) (65 - 88)  BP: 140/42 (27 Jan 2022 07:00) (121/45 - 143/48)  BP(mean): 70 (27 Jan 2022 07:00) (66 - 77)  RR: 12 (27 Jan 2022 07:00) (9 - 14)  SpO2: 94% (27 Jan 2022 07:00) (93% - 97%)    I&O's Summary    26 Jan 2022 07:01  -  27 Jan 2022 07:00  --------------------------------------------------------  IN: 1296 mL / OUT: 600 mL / NET: 696 mL        Physical Exam:  General: NAD, resting comfortably  Pulmonary: normal resp effort, CTA-B  Cardiovascular: NSR  Abdominal: soft, NT/ND  Extremities: WWP, normal strength  Neuro: A/O x 3, CNs II-XII grossly intact, normal motor/sensation, no focal deficits  Pulses:   Right:                                                                          Left:  FEM [ ]2+ [ ]1+ [ ]doppler                                             FEM [ ]2+ [ ]1+ [ ]doppler    POP [ ]2+ [ ]1+ [ ]doppler                                             POP [ ]2+ [ ]1+ [ ]doppler    DP [ ]2+ [ ]1+ [ ]doppler                                                DP [ ]2+ [ ]1+ [ ]doppler  PT[ ]2+ [ ]1+ [ ]doppler                                                  PT [ ]2+ [ ]1+ [ ]doppler    LABS:                        6.7    9.31  )-----------( 213      ( 27 Jan 2022 05:11 )             22.7     01-27    144  |  117<H>  |  46<H>  ----------------------------<  202<H>  4.7   |  22  |  0.99    Ca    8.4<L>      27 Jan 2022 05:11            CAPILLARY BLOOD GLUCOSE      POCT Blood Glucose.: 231 mg/dL (26 Jan 2022 21:03)  POCT Blood Glucose.: 290 mg/dL (26 Jan 2022 17:57)  POCT Blood Glucose.: 147 mg/dL (26 Jan 2022 12:30)  POCT Blood Glucose.: 161 mg/dL (26 Jan 2022 08:12)      RADIOLOGY & ADDITIONAL TESTS:

## 2022-01-27 NOTE — PROGRESS NOTE ADULT - SUBJECTIVE AND OBJECTIVE BOX
CHIEF COMPLAINT: Right ankle pain/Inability to bear weight RLE/Worsening Right ankle ulcer    87 y/o F with PMH COPD, CAD, Type 2 DM on insulin, depression, CHFrEF, HTN, PVD, PAD, s/p left femoral popliteal bypass 3/24/2021, left thigh infections s/p debridement and muscle flap, PAT, lower extremity DVT on Eliquis, RA presents with chronic wounds and pain to the right lower extremity for 2 weeks. She first noticed the chronic wound on her right lower extremity about a month ago. She has been completing dressing changes and applying Xeroform. The wound is foul smelling. She has been unable to walk over the last 2 weeks due to increase pressure when placing the foot down. Hanging her lower extremity off the side of the bed helps improve her pain. She also reports occasional diarrhea and lower extremity swelling. Denies headaches, blurry vision, dizziness, fevers, chills, chest pain, SOB, abdominal pain, N/V, diarrhea/constipation.    ER course: /48. Labs: WBC 10.87, Hb 7.9, .8, INR 1.17, K+ 6.1, BUN 61, Cr 1.49 (Baseline ~0.99), Glucose 448, Alkaline phosphatase 140, COVID negative. EKG: NSR with HR 64 bm, AK prolonged to 218 ms (1st degree AV block), QTC prolonged to 470 ms, LVH, T wave inversions in II, AVL, V4-V6, no ST segment changes present on prior EKG (personally reviewed).   Imaging:   - XR right ankle: concern for osteomyelitis, hardware present, no fracture or dislocation (personally reviewed).   - CXR: no consolidation, no effusion, no pneumothorax (personally reviewed).   The pt was given Cefepime and Vancomycin, 2L of NS, 10 units of Humalin, Calcium gluconate, Sodium bicarbonate, Lokelma, and Dilaudid in the ER. She is being admitted to med/surg for further management. "    No change in her status; POD#2 selective debridement of wound 25-Jan-2022 16:51:15 debridement of arterial ankle ulceration left side   Pain is better ctr    Vital Signs Last 24 Hrs  T(C): 36.5 (27 Jan 2022 12:02), Max: 37.1 (26 Jan 2022 17:13)  T(F): 97.7 (27 Jan 2022 12:02), Max: 98.7 (26 Jan 2022 17:13)  HR: 68 (27 Jan 2022 13:00) (67 - 79)  BP: 130/73 (27 Jan 2022 13:00) (124/48 - 156/49)  BP(mean): 83 (27 Jan 2022 13:00) (66 - 83)  RR: 18 (27 Jan 2022 13:00) (9 - 19)  SpO2: 99% (27 Jan 2022 13:00) (93% - 100%)      General: AAOx3; NAD; frail  Head: AT/NC  ENT: Dry Mucous Membranes; No Injury  Neck: Non-tender; No JVD  CVS: RRR, S1&S2, Murmur +,  Respiratory: Lungs CTA B/L; Normal Respiratory Effort  Abdomen/GI: Soft, non-tender, non-distended, no guarding, no rebound,  : no bladder distension   Extremities: status post surgery with the ankle wound with the drainage ulcer covered with the dressing   MSK: No CVA tenderness, Normal ROM, No injury  Neuro: AAOx3, gen weakness  Psych: Appropriate, Cooperative,  Skin: left ankle wrapped see Ortho's note   TTP diffusely on ankle      MEDICATIONS  (STANDING):  aspirin  chewable 81 milliGRAM(s) Oral daily  cefepime   IVPB 2000 milliGRAM(s) IV Intermittent every 24 hours  folic acid 1 milliGRAM(s) Oral at bedtime  gabapentin 300 milliGRAM(s) Oral three times a day  glucagon  Injectable 1 milliGRAM(s) IntraMuscular once  glucagon  Injectable 1 milliGRAM(s) IntraMuscular once  heparin   Injectable 5000 Unit(s) SubCutaneous every 12 hours  insulin lispro (ADMELOG) corrective regimen sliding scale   SubCutaneous three times a day before meals  insulin lispro (ADMELOG) corrective regimen sliding scale   SubCutaneous at bedtime  metoprolol tartrate 25 milliGRAM(s) Oral two times a day  PARoxetine 20 milliGRAM(s) Oral daily  polyethylene glycol 3350 17 Gram(s) Oral daily  predniSONE   Tablet 5 milliGRAM(s) Oral daily  sacubitril 24 mG/valsartan 26 mG 1 Tablet(s) Oral two times a day  senna 2 Tablet(s) Oral at bedtime  sodium chloride 0.9%. 600 milliLiter(s) (50 mL/Hr) IV Continuous <Continuous>  tiotropium 18 MICROgram(s) Capsule 1 Capsule(s) Inhalation daily  vancomycin  IVPB 600 milliGRAM(s) IV Intermittent every 24 hours                                8.6    12.26 )-----------( 232      ( 25 Jan 2022 08:39 )             28.0   01-25    142  |  114<H>  |  36<H>  ----------------------------<  103<H>  4.2   |  23  |  0.92    Ca    8.7      25 Jan 2022 05:07                    TTE Echo Complete w/o Contrast w/ Doppler (05.11.21 @ 10:31) >   Mild left ventricular enlargement with with moderate, segmental systolic   dysfunction. The apex and mid to apical anteroseptum and inferoseptum   appear hypokinetic. Estimated left ventricular ejection fraction is 40%.   Mild diastolic dysfunction (stage I).   Moderate mitral stenosis.   Mild aortic regurgitation.

## 2022-01-27 NOTE — PROGRESS NOTE ADULT - ASSESSMENT
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  #Non healing medial right ankle Ulcer  #DMII (Inuslin dependent) with Hyperglycemia  #PAD with 50% CFA stenosis and s/p Left Fem Pop bypass (3/2021)  #Associated intractable pain/debility due to above  - continue with the cefipime and vancomycin and follow up of the vancomycin levels closely   s/p OR in am for irrigation debridement  s/p 1 unit PRBC; repeat   I don't think  the ankle is salvable will most likely need amputation vs hospice;   GOC Pall consult in progress    #peripheral vascular disease   status post right femoral angioplasty and stenting with the right  popliteal angioplasty doing well post op      # T2DM  off NEWSOME due to poor intake  I will give her 500ml IVF today    # Hypertension   - Hydralazine PRN for SBP > 160   - c/w home anti-HTN agents secabutril , metoprolol and lasix was on hold due to acute kidney injury resume postop    # AF  off Eliquis preop - resume    # Acute on chr anemia   this is most likely sec to chronic disease    # History of COPD, CAD, Type 2 DM on insulin, depression, CHFrEF, HTN, PVD, PAD, s/p left femoral popliteal bypass 3/24/2021, left thigh infections s/p debridement and muscle flap, ICN, lower extremity DVT on Eliquis, RA   - c/w home medications   monitor glucose levels and adjustment of the insulin dose for the D.M   continue with the spiriva for the copd       Code status: DNR DNI      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  #Non healing medial right ankle Ulcer  #DMII (Inuslin dependent) with Hyperglycemia  #PAD with 50% CFA stenosis and s/p Left Fem Pop bypass (3/2021)  #Associated intractable pain/debility due to above  - continue with the cefipime and vancomycin and follow up of the vancomycin levels closely   s/p OR in am for irrigation debridement  s/p 1 unit PRBC; repeat   I don't think  the ankle is salvable will most likely need amputation vs hospice;   GOC Pall consult in progress    #peripheral vascular disease   status post right femoral angioplasty and stenting with the right  popliteal angioplasty doing well post op      # T2DM  off NEWSOME due to poor intake; restart lower dose tonight      # Hypertension   - Hydralazine PRN for SBP > 160   lasix on hold post op pt seems dry    # AF  off Eliquis preop - resume    # Acute on chr anemia   this is most likely sec to chronic disease    # History of COPD, CAD, Type 2 DM on insulin, depression, CHFrEF, HTN, PVD, PAD, s/p left femoral popliteal bypass 3/24/2021, left thigh infections s/p debridement and muscle flap, ICN, lower extremity DVT on Eliquis, RA   - c/w home medications   monitor glucose levels and adjustment of the insulin dose for the D.M   continue with the spiriva for the copd       Code status: DNR DNI      No

## 2022-01-27 NOTE — PROGRESS NOTE ADULT - SUBJECTIVE AND OBJECTIVE BOX
Date of service: 01-27-22 @ 08:23    Lying in bed in NAD  Sleepy but arousable  Has right foot local pain  Foot is warm    ROS: no fever or chills; poorly verbal    MEDICATIONS  (STANDING):  acetaminophen     Tablet .. 1000 milliGRAM(s) Oral every 8 hours  aspirin  chewable 81 milliGRAM(s) Oral daily  cefepime   IVPB 2000 milliGRAM(s) IV Intermittent every 24 hours  dextrose 40% Gel 15 Gram(s) Oral once  dextrose 40% Gel 15 Gram(s) Oral once  dextrose 5%. 1000 milliLiter(s) (50 mL/Hr) IV Continuous <Continuous>  dextrose 5%. 1000 milliLiter(s) (50 mL/Hr) IV Continuous <Continuous>  dextrose 5%. 1000 milliLiter(s) (100 mL/Hr) IV Continuous <Continuous>  dextrose 5%. 1000 milliLiter(s) (100 mL/Hr) IV Continuous <Continuous>  dextrose 50% Injectable 25 Gram(s) IV Push once  dextrose 50% Injectable 12.5 Gram(s) IV Push once  dextrose 50% Injectable 25 Gram(s) IV Push once  dextrose 50% Injectable 25 Gram(s) IV Push once  dextrose 50% Injectable 12.5 Gram(s) IV Push once  dextrose 50% Injectable 25 Gram(s) IV Push once  folic acid 1 milliGRAM(s) Oral at bedtime  gabapentin 300 milliGRAM(s) Oral three times a day  glucagon  Injectable 1 milliGRAM(s) IntraMuscular once  glucagon  Injectable 1 milliGRAM(s) IntraMuscular once  heparin   Injectable 5000 Unit(s) SubCutaneous every 12 hours  insulin lispro (ADMELOG) corrective regimen sliding scale   SubCutaneous three times a day before meals  insulin lispro (ADMELOG) corrective regimen sliding scale   SubCutaneous at bedtime  metoprolol tartrate 25 milliGRAM(s) Oral two times a day  PARoxetine 20 milliGRAM(s) Oral daily  polyethylene glycol 3350 17 Gram(s) Oral daily  predniSONE   Tablet 5 milliGRAM(s) Oral daily  sacubitril 24 mG/valsartan 26 mG 1 Tablet(s) Oral two times a day  senna 2 Tablet(s) Oral at bedtime  sodium chloride 0.9%. 600 milliLiter(s) (50 mL/Hr) IV Continuous <Continuous>  tiotropium 18 MICROgram(s) Capsule 1 Capsule(s) Inhalation daily  vancomycin  IVPB 600 milliGRAM(s) IV Intermittent every 24 hours    Vital Signs Last 24 Hrs  T(C): 36.2 (27 Jan 2022 06:22), Max: 37.1 (26 Jan 2022 14:00)  T(F): 97.2 (27 Jan 2022 06:22), Max: 98.7 (26 Jan 2022 14:00)  HR: 67 (27 Jan 2022 07:00) (65 - 85)  BP: 140/42 (27 Jan 2022 07:00) (121/45 - 143/48)  BP(mean): 70 (27 Jan 2022 07:00) (66 - 77)  RR: 12 (27 Jan 2022 07:00) (9 - 14)  SpO2: 94% (27 Jan 2022 07:00) (93% - 97%)     Physical exam:    Constitutional:  No acute distress  HEENT: NC/AT, EOMI, PERRLA, conjunctivae clear; ears and nose atraumatic  Neck: supple; thyroid not palpable  Back: no tenderness  Respiratory: respiratory effort normal; clear to auscultation  Cardiovascular: S1S2 regular, no murmurs  Abdomen: soft, not tender, not distended, positive BS  Genitourinary: no suprapubic tenderness  Lymphatic: no LN palpable  Musculoskeletal: no muscle tenderness, no joint swelling or tenderness  Extremities: 2+ pedal edema  Right lower extremity wound at the medial malleolus and posterior lower extremity; surrounding erythema.  Right malleolar ulcer with necrotic base  Neurological/ Psychiatric: AxOx3, judgement and insight normal; moving all extremities  Skin: no rashes; no palpable lesions    Labs: reviewed                        7.0    9.37  )-----------( 223      ( 27 Jan 2022 08:02 )             23.4     01-27    144  |  117<H>  |  46<H>  ----------------------------<  202<H>  4.7   |  22  |  0.99    Ca    8.4<L>      27 Jan 2022 05:11    C-Reactive Protein, Serum: 178 mg/L (01-26-22 @ 05:01)  C-Reactive Protein, Serum: 226 mg/L (01-25-22 @ 05:07)  C-Reactive Protein, Serum: 175 mg/L (01-23-22 @ 11:08)  C-Reactive Protein, Serum: 55 mg/L (01-19-22 @ 01:18)                        8.6    12.26 )-----------( 232      ( 25 Jan 2022 08:39 )             28.0     01-25    142  |  114<H>  |  36<H>  ----------------------------<  103<H>  4.2   |  23  |  0.92    Ca    8.7      25 Jan 2022 05:07    Vancomycin Level, Trough: 20.2 ug/mL (01-24 @ 11:22)  Vancomycin Level, Trough: 21.3 ug/mL (01-24 @ 09:33)    C-Reactive Protein, Serum: 175 mg/L (01-23-22 @ 11:08)  C-Reactive Protein, Serum: 55 mg/L (01-19-22 @ 01:18)                        7.9    10.90 )-----------( 257      ( 22 Jan 2022 05:50 )             27.6     01-23    140  |  111<H>  |  62<H>  ----------------------------<  211<H>  4.5   |  22  |  1.34<H>    Ca    8.6      23 Jan 2022 05:14    Vancomycin Level, Trough: 23.1 ug/mL (01-22 @ 09:57)    C-Reactive Protein, Serum: 55 mg/L (01-19-22 @ 01:18)                        7.9    10.90 )-----------( 257      ( 22 Jan 2022 05:50 )             27.6     01-21    140  |  114<H>  |  54<H>  ----------------------------<  249<H>  5.3   |  22  |  1.37<H>    Ca    8.4<L>      21 Jan 2022 11:58    C-Reactive Protein, Serum: 55 mg/L (01-19-22 @ 01:18)                        7.1    8.99  )-----------( 278      ( 20 Jan 2022 05:51 )             24.1     01-20    141  |  114<H>  |  57<H>  ----------------------------<  177<H>  5.7<H>   |  23  |  1.17    Ca    8.5      20 Jan 2022 05:51  Phos  4.1     01-20  Mg     2.4     01-20    TPro  7.0  /  Alb  2.2<L>  /  TBili  0.2  /  DBili  x   /  AST  10<L>  /  ALT  10<L>  /  AlkPhos  90  01-19    C-Reactive Protein, Serum: 55 mg/L (01-19-22 @ 01:18)                        7.9    10.64 )-----------( 302      ( 19 Jan 2022 07:31 )             26.8     01-19    137  |  109<H>  |  51<H>  ----------------------------<  137<H>  5.2   |  23  |  1.16    Ca    9.4      19 Jan 2022 07:31    TPro  7.0  /  Alb  2.2<L>  /  TBili  0.2  /  DBili  x   /  AST  10<L>  /  ALT  10<L>  /  AlkPhos  90  01-19     LIVER FUNCTIONS - ( 19 Jan 2022 07:31 )  Alb: 2.2 g/dL / Pro: 7.0 gm/dL / ALK PHOS: 90 U/L / ALT: 10 U/L / AST: 10 U/L / GGT: x             Culture - Blood (collected 18 Jan 2022 21:04)  Source: .Blood None  Preliminary Report (20 Jan 2022 05:01):    No growth to date.    Culture - Blood (collected 18 Jan 2022 21:04)  Source: .Blood None  Preliminary Report (20 Jan 2022 05:01):    No growth to date.      Culture - Surgical Swab (collected 25 Jan 2022 15:06)  Source: .Surgical Swab RIGHT ANKLE #3  Preliminary Report (26 Jan 2022 17:36):    Rare Pseudomonas aeruginosa    Culture - Surgical Swab (collected 25 Jan 2022 15:06)  Source: .Surgical Swab RIGHT ANKLE #2  Preliminary Report (26 Jan 2022 17:34):    Rare Proteus mirabilis    Few Pseudomonas aeruginosa    Few Enterococcus faecalis    Culture - Surgical Swab (collected 25 Jan 2022 15:06)  Source: .Surgical Swab RIGHT ANKLE #1  Preliminary Report (26 Jan 2022 17:35):    Rare Pseudomonas aeruginosa    Culture - Blood (collected 18 Jan 2022 21:04)  Source: .Blood None  Final Report (24 Jan 2022 05:00):    No Growth Final    Culture - Blood (collected 18 Jan 2022 21:04)  Source: .Blood None  Final Report (24 Jan 2022 05:00):    No Growth Final    COVID-19 PCR: NotDetec (01-18-22 @ 21:04)    Radiology: all available radiological tests reviewed    XR right ankle: concern for osteomyelitis, hardware present, no fracture or dislocation  CXR: no consolidation, no effusion, no pneumothorax (personally reviewed).     Advanced directives addressed: full resuscitation

## 2022-01-28 LAB
ANION GAP SERPL CALC-SCNC: 4 MMOL/L — LOW (ref 5–17)
BUN SERPL-MCNC: 37 MG/DL — HIGH (ref 7–23)
CALCIUM SERPL-MCNC: 8.3 MG/DL — LOW (ref 8.5–10.1)
CHLORIDE SERPL-SCNC: 117 MMOL/L — HIGH (ref 96–108)
CO2 SERPL-SCNC: 22 MMOL/L — SIGNIFICANT CHANGE UP (ref 22–31)
CREAT SERPL-MCNC: 0.85 MG/DL — SIGNIFICANT CHANGE UP (ref 0.5–1.3)
GLUCOSE SERPL-MCNC: 181 MG/DL — HIGH (ref 70–99)
HCT VFR BLD CALC: 28.1 % — LOW (ref 34.5–45)
HGB BLD-MCNC: 8.6 G/DL — LOW (ref 11.5–15.5)
MCHC RBC-ENTMCNC: 29.7 PG — SIGNIFICANT CHANGE UP (ref 27–34)
MCHC RBC-ENTMCNC: 30.6 GM/DL — LOW (ref 32–36)
MCV RBC AUTO: 96.9 FL — SIGNIFICANT CHANGE UP (ref 80–100)
PLATELET # BLD AUTO: 215 K/UL — SIGNIFICANT CHANGE UP (ref 150–400)
POTASSIUM SERPL-MCNC: 4.6 MMOL/L — SIGNIFICANT CHANGE UP (ref 3.5–5.3)
POTASSIUM SERPL-SCNC: 4.6 MMOL/L — SIGNIFICANT CHANGE UP (ref 3.5–5.3)
RBC # BLD: 2.9 M/UL — LOW (ref 3.8–5.2)
RBC # FLD: 16.2 % — HIGH (ref 10.3–14.5)
SODIUM SERPL-SCNC: 143 MMOL/L — SIGNIFICANT CHANGE UP (ref 135–145)
WBC # BLD: 8.48 K/UL — SIGNIFICANT CHANGE UP (ref 3.8–10.5)
WBC # FLD AUTO: 8.48 K/UL — SIGNIFICANT CHANGE UP (ref 3.8–10.5)

## 2022-01-28 PROCEDURE — 99233 SBSQ HOSP IP/OBS HIGH 50: CPT

## 2022-01-28 RX ADMIN — Medication 250 MILLIGRAM(S): at 05:07

## 2022-01-28 RX ADMIN — SACUBITRIL AND VALSARTAN 1 TABLET(S): 24; 26 TABLET, FILM COATED ORAL at 21:13

## 2022-01-28 RX ADMIN — Medication 2: at 09:01

## 2022-01-28 RX ADMIN — Medication 1000 MILLIGRAM(S): at 22:13

## 2022-01-28 RX ADMIN — Medication 20 MILLIGRAM(S): at 11:41

## 2022-01-28 RX ADMIN — GABAPENTIN 300 MILLIGRAM(S): 400 CAPSULE ORAL at 21:13

## 2022-01-28 RX ADMIN — Medication 2 DROP(S): at 14:55

## 2022-01-28 RX ADMIN — Medication 1000 MILLIGRAM(S): at 14:55

## 2022-01-28 RX ADMIN — APIXABAN 2.5 MILLIGRAM(S): 2.5 TABLET, FILM COATED ORAL at 11:40

## 2022-01-28 RX ADMIN — CEFEPIME 100 MILLIGRAM(S): 1 INJECTION, POWDER, FOR SOLUTION INTRAMUSCULAR; INTRAVENOUS at 11:42

## 2022-01-28 RX ADMIN — Medication 81 MILLIGRAM(S): at 11:41

## 2022-01-28 RX ADMIN — TIOTROPIUM BROMIDE 1 CAPSULE(S): 18 CAPSULE ORAL; RESPIRATORY (INHALATION) at 10:08

## 2022-01-28 RX ADMIN — Medication 25 MILLIGRAM(S): at 11:41

## 2022-01-28 RX ADMIN — Medication 8: at 16:59

## 2022-01-28 RX ADMIN — SACUBITRIL AND VALSARTAN 1 TABLET(S): 24; 26 TABLET, FILM COATED ORAL at 11:41

## 2022-01-28 RX ADMIN — Medication 1000 MILLIGRAM(S): at 05:09

## 2022-01-28 RX ADMIN — Medication 2: at 12:50

## 2022-01-28 RX ADMIN — OXYCODONE HYDROCHLORIDE 10 MILLIGRAM(S): 5 TABLET ORAL at 10:00

## 2022-01-28 RX ADMIN — Medication 10 MILLIGRAM(S): at 02:04

## 2022-01-28 RX ADMIN — Medication 25 MILLIGRAM(S): at 21:13

## 2022-01-28 RX ADMIN — Medication 2 DROP(S): at 05:09

## 2022-01-28 RX ADMIN — OXYCODONE HYDROCHLORIDE 10 MILLIGRAM(S): 5 TABLET ORAL at 09:02

## 2022-01-28 RX ADMIN — GABAPENTIN 300 MILLIGRAM(S): 400 CAPSULE ORAL at 05:07

## 2022-01-28 RX ADMIN — GABAPENTIN 300 MILLIGRAM(S): 400 CAPSULE ORAL at 14:55

## 2022-01-28 RX ADMIN — Medication 5 MILLIGRAM(S): at 11:41

## 2022-01-28 RX ADMIN — INSULIN GLARGINE 5 UNIT(S): 100 INJECTION, SOLUTION SUBCUTANEOUS at 21:14

## 2022-01-28 RX ADMIN — Medication 2 DROP(S): at 23:05

## 2022-01-28 RX ADMIN — APIXABAN 2.5 MILLIGRAM(S): 2.5 TABLET, FILM COATED ORAL at 21:14

## 2022-01-28 RX ADMIN — SENNA PLUS 2 TABLET(S): 8.6 TABLET ORAL at 21:13

## 2022-01-28 RX ADMIN — HYDROMORPHONE HYDROCHLORIDE 1 MILLIGRAM(S): 2 INJECTION INTRAMUSCULAR; INTRAVENOUS; SUBCUTANEOUS at 02:43

## 2022-01-28 RX ADMIN — Medication 1 MILLIGRAM(S): at 21:13

## 2022-01-28 RX ADMIN — Medication 1000 MILLIGRAM(S): at 21:15

## 2022-01-28 NOTE — PROGRESS NOTE ADULT - SUBJECTIVE AND OBJECTIVE BOX
CHIEF COMPLAINT: Right ankle pain/Inability to bear weight RLE/Worsening Right ankle ulcer    87 y/o F with PMH COPD, CAD, Type 2 DM on insulin, depression, CHFrEF, HTN, PVD, PAD, s/p left femoral popliteal bypass 3/24/2021, left thigh infections s/p debridement and muscle flap, PAT, lower extremity DVT on Eliquis, RA presents with chronic wounds and pain to the right lower extremity for 2 weeks. She first noticed the chronic wound on her right lower extremity about a month ago. She has been completing dressing changes and applying Xeroform. The wound is foul smelling. She has been unable to walk over the last 2 weeks due to increase pressure when placing the foot down. Hanging her lower extremity off the side of the bed helps improve her pain. She also reports occasional diarrhea and lower extremity swelling. Denies headaches, blurry vision, dizziness, fevers, chills, chest pain, SOB, abdominal pain, N/V, diarrhea/constipation.    ER course:  Imaging:   - XR right ankle: concern for osteomyelitis, hardware present, no fracture or dislocation (personally reviewed).   - CXR: no consolidation, no effusion, no pneumothorax (personally reviewed).   The pt was given Cefepime and Vancomycin, 2L of NS, 10 units of Humalin, Calcium gluconate, Sodium bicarbonate, Lokelma, and Dilaudid in the ER. She is being admitted to med/surg for further management.     Was seen by Dr Ventura and underwent abdominal aortic angiogram 20-Jan-2022  abdominopelvic, bilateral lower extremity angiogram, angioplasty and stenting of the R common, SFA arteries, angioplasty of R below knee popliteal artery   After few days: Selective debridement of wound 25-Jan-2022 debridement of arterial ankle ulceration left side   Has multiorganisms growing from the ankle culture;       No change in her status; POD#3 selective debridement of wound 25-Jan-2022  debridement of arterial ankle ulceration left side   Intermittent pain, more today    Vital Signs Last 24 Hrs  T(C): 36.7 (28 Jan 2022 09:04), Max: 37 (27 Jan 2022 15:25)  T(F): 98 (28 Jan 2022 09:04), Max: 98.6 (27 Jan 2022 15:25)  HR: 68 (28 Jan 2022 12:00) (61 - 74)  BP: 161/53 (28 Jan 2022 12:00) (132/41 - 172/58)  BP(mean): 84 (28 Jan 2022 12:00) (62 - 96)  RR: 14 (28 Jan 2022 12:00) (0 - 20)  SpO2: 99% (28 Jan 2022 12:00) (90% - 100%)      General: AAOx3; NAD; frail  Head: AT/NC  ENT: Dry Mucous Membranes; No Injury  Neck: Non-tender; No JVD  CVS: RRR, S1&S2, Murmur +,  Respiratory: Lungs CTA B/L; Normal Respiratory Effort  Abdomen/GI: Soft, non-tender, non-distended, no guarding, no rebound,  : no bladder distension   Extremities: status post surgery with the ankle wound with the drainage ulcer covered with the dressing   MSK: No CVA tenderness, Normal ROM, No injury  Neuro: AAOx3, gen weakness  Psych: Appropriate, Cooperative,  Skin: left ankle wound vac was removed, dry op site, mild erythema  TTP diffusely on ankle      MEDICATIONS  (STANDING):  acetaminophen     Tablet .. 1000 milliGRAM(s) Oral every 8 hours  apixaban 2.5 milliGRAM(s) Oral two times a day  artificial  tears Solution 2 Drop(s) Both EYES three times a day  aspirin  chewable 81 milliGRAM(s) Oral daily  cefepime   IVPB 2000 milliGRAM(s) IV Intermittent every 24 hours  folic acid 1 milliGRAM(s) Oral at bedtime  gabapentin 300 milliGRAM(s) Oral three times a day  glucagon  Injectable 1 milliGRAM(s) IntraMuscular once  glucagon  Injectable 1 milliGRAM(s) IntraMuscular once  insulin glargine SubCutaneous Injection (LANTUS) - Peds 5 Unit(s) SubCutaneous at bedtime  insulin lispro (ADMELOG) corrective regimen sliding scale   SubCutaneous at bedtime  insulin lispro (ADMELOG) corrective regimen sliding scale   SubCutaneous three times a day before meals  metoprolol tartrate 25 milliGRAM(s) Oral two times a day  PARoxetine 20 milliGRAM(s) Oral daily  polyethylene glycol 3350 17 Gram(s) Oral daily  predniSONE   Tablet 5 milliGRAM(s) Oral daily  sacubitril 24 mG/valsartan 26 mG 1 Tablet(s) Oral two times a day  senna 2 Tablet(s) Oral at bedtime  sodium chloride 0.9%. 600 milliLiter(s) (50 mL/Hr) IV Continuous <Continuous>  tiotropium 18 MICROgram(s) Capsule 1 Capsule(s) Inhalation daily  vancomycin  IVPB 600 milliGRAM(s) IV Intermittent every 24 hours                                  8.6    8.48  )-----------( 215      ( 28 Jan 2022 06:37 )             28.1   01-28    143  |  117<H>  |  37<H>  ----------------------------<  181<H>  4.6   |  22  |  0.85    Ca    8.3<L>      28 Jan 2022 06:37                        TTE Echo Complete w/o Contrast w/ Doppler (05.11.21 @ 10:31) >   Mild left ventricular enlargement with with moderate, segmental systolic   dysfunction. The apex and mid to apical anteroseptum and inferoseptum   appear hypokinetic. Estimated left ventricular ejection fraction is 40%.   Mild diastolic dysfunction (stage I).   Moderate mitral stenosis.   Mild aortic regurgitation.

## 2022-01-28 NOTE — PROGRESS NOTE ADULT - ASSESSMENT
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  #Non healing medial right ankle Ulcer  #DMII (Inuslin dependent) with Hyperglycemia  #PAD with 50% CFA stenosis and s/p Left Fem Pop bypass (3/2021)  - continue with the cefipime and vancomycin and follow up of the vancomycin levels closely   s/p OR in am for irrigation debridement  s/p 1 unit PRBC; repeat   pt also has a h/o RA but seems dormant  GOC Pall consult in progress: DNR DNI    #peripheral vascular disease   status post right femoral angioplasty and stenting with the right  popliteal angioplasty doing well post op      # T2DM  off NEWSOME due to poor intake; restarted at  lower dose adjust if necessary      # Hypertension   - Hydralazine PRN for SBP > 160   lasix on hold post op pt seems dry    # AF  off Eliquis preop - resumed    # Acute on chr anemia   this is most likely sec to chronic disease  s/p 2 PRBC    # History of COPD, CAD, Type 2 DM on insulin, depression, CHFrEF, HTN, PVD, PAD, s/p left femoral popliteal bypass 3/24/2021, left thigh infections s/p debridement and muscle flap, ICN, lower extremity DVT on Eliquis, RA   - c/w home medications   monitor glucose levels and adjustment of the insulin dose for the D.M   continue with the spiriva for the copd       Code status: DNR DNI   Need ID to outline plan for antibiotic Rx; unable to participate in PT at this time/ pain after wound vac removed

## 2022-01-28 NOTE — PROGRESS NOTE ADULT - SUBJECTIVE AND OBJECTIVE BOX
Patient seen and examined at bedside. Patient more alert than yesterday. Patient states that she is doing better today than yesterday.Wound vac in place over R lowerleg. Denies fevers/chills. Patient got 1U PRBC 1/27. Endorses a history of a drop foot in RLE.    LABS:                        7.0    9.37  )-----------( 223      ( 27 Jan 2022 08:02 )             23.4     01-27    144  |  117<H>  |  46<H>  ----------------------------<  202<H>  4.7   |  22  |  0.99    Ca    8.4<L>      27 Jan 2022 05:11            VITAL SIGNS:  T(C): 36.8 (01-27-22 @ 21:25), Max: 37 (01-27-22 @ 15:25)  HR: 69 (01-28-22 @ 06:00) (61 - 76)  BP: 145/55 (01-28-22 @ 06:00) (130/73 - 172/58)  RR: 18 (01-28-22 @ 06:00) (9 - 20)  SpO2: 99% (01-28-22 @ 06:00) (90% - 100%)    General: NAD, resting comfortably in bed, Alert  RLE:  wound vac in placed over right ankle, wrapped in ACE, clean dry and intact, kerlix intact  SCD in place contralaterally  No calf tenderness contralaterally  Able to flex/extension all toes slightly moving. Able to flex/extend ankle actively for 5 degrees  Less pain with passive DF/PF of ankle when compared to yesterday  toes are WWP    A/P:  86y f s/p right ankle open I&D with wound vac placement POD3    -H/H Low yesterday, received 1U PRBC  -Cx growing pseudomonas, E. faecalis, proteus  -Clinical exam slightly improving compared to yesterday  -CRP Trending down  -PT  -WBAT RLE  -Pain Control  -DVT ppx per primary team, currently Eliquis  -Wound vac in place, please record output  -Continue abx per ID  -FU AM Labs  -Rest, ice, compress and elevate the extremity as needed  -ICU care  -Incentive Spirometry  -Medical management appreciated

## 2022-01-28 NOTE — PROGRESS NOTE ADULT - SUBJECTIVE AND OBJECTIVE BOX
afebrile, VSS    more alert today    denies rest pain she experienced prior to angiogram and angioplasty and stenting    VAC removed: necrotic R medial malleolar wound centrally with granulation tissue at edges; posterior calf (Achilles area) with tendon exposed; light touch preserved to R foot, able to move toes minimally    A/P  necrotic ankle wound with osteo of ankle joint    this may be an unsalvageable limb but will discuss with Dr. Ku        MEDICATIONS  (STANDING):  acetaminophen     Tablet .. 1000 milliGRAM(s) Oral every 8 hours  apixaban 2.5 milliGRAM(s) Oral two times a day  artificial  tears Solution 2 Drop(s) Both EYES three times a day  aspirin  chewable 81 milliGRAM(s) Oral daily  cefepime   IVPB 2000 milliGRAM(s) IV Intermittent every 24 hours  dextrose 40% Gel 15 Gram(s) Oral once  dextrose 40% Gel 15 Gram(s) Oral once  dextrose 5%. 1000 milliLiter(s) (50 mL/Hr) IV Continuous <Continuous>  dextrose 5%. 1000 milliLiter(s) (100 mL/Hr) IV Continuous <Continuous>  dextrose 5%. 1000 milliLiter(s) (50 mL/Hr) IV Continuous <Continuous>  dextrose 5%. 1000 milliLiter(s) (100 mL/Hr) IV Continuous <Continuous>  dextrose 50% Injectable 25 Gram(s) IV Push once  dextrose 50% Injectable 25 Gram(s) IV Push once  dextrose 50% Injectable 12.5 Gram(s) IV Push once  dextrose 50% Injectable 25 Gram(s) IV Push once  dextrose 50% Injectable 12.5 Gram(s) IV Push once  dextrose 50% Injectable 25 Gram(s) IV Push once  folic acid 1 milliGRAM(s) Oral at bedtime  gabapentin 300 milliGRAM(s) Oral three times a day  glucagon  Injectable 1 milliGRAM(s) IntraMuscular once  glucagon  Injectable 1 milliGRAM(s) IntraMuscular once  insulin glargine SubCutaneous Injection (LANTUS) - Peds 5 Unit(s) SubCutaneous at bedtime  insulin lispro (ADMELOG) corrective regimen sliding scale   SubCutaneous three times a day before meals  insulin lispro (ADMELOG) corrective regimen sliding scale   SubCutaneous at bedtime  metoprolol tartrate 25 milliGRAM(s) Oral two times a day  PARoxetine 20 milliGRAM(s) Oral daily  polyethylene glycol 3350 17 Gram(s) Oral daily  predniSONE   Tablet 5 milliGRAM(s) Oral daily  sacubitril 24 mG/valsartan 26 mG 1 Tablet(s) Oral two times a day  senna 2 Tablet(s) Oral at bedtime  sodium chloride 0.9%. 600 milliLiter(s) (50 mL/Hr) IV Continuous <Continuous>  tiotropium 18 MICROgram(s) Capsule 1 Capsule(s) Inhalation daily  vancomycin  IVPB 600 milliGRAM(s) IV Intermittent every 24 hours    MEDICATIONS  (PRN):  acetaminophen     Tablet .. 650 milliGRAM(s) Oral every 6 hours PRN Temp greater or equal to 38C (100.4F), Mild Pain (1 - 3)  ALBUTerol    90 MICROgram(s) HFA Inhaler 1 Puff(s) Inhalation every 6 hours PRN Shortness of Breath  aluminum hydroxide/magnesium hydroxide/simethicone Suspension 30 milliLiter(s) Oral every 4 hours PRN Dyspepsia  hydrALAZINE Injectable 10 milliGRAM(s) IV Push every 6 hours PRN SBP > 160  HYDROmorphone  Injectable 0.5 milliGRAM(s) IV Push every 3 hours PRN Moderate Pain (4 - 6)  HYDROmorphone  Injectable 1 milliGRAM(s) IV Push every 3 hours PRN Severe Pain (7 - 10)  melatonin 3 milliGRAM(s) Oral at bedtime PRN Insomnia  ondansetron Injectable 4 milliGRAM(s) IV Push every 8 hours PRN Nausea and/or Vomiting  oxyCODONE    IR 10 milliGRAM(s) Oral every 4 hours PRN Moderate Pain (4 - 6)      Allergies    cephalosporins (Other)  penicillins (Urticaria; Pruritus)    Intolerances        Flatus: [ ] YES [ ] NO             Bowel Movement: [ ] YES [ ] NO  Pain (0-10):            Pain Control Adequate: [ ] YES [ ] NO  Nausea: [ ] YES [ ] NO            Vomiting: [ ] YES [ ] NO  Diarrhea: [ ] YES [ ] NO         Constipation: [ ] YES [ ] NO     Chest Pain: [ ] YES [ ] NO    SOB:  [ ] YES [ ] NO    Vital Signs Last 24 Hrs  T(C): 36.7 (28 Jan 2022 09:04), Max: 37 (27 Jan 2022 15:25)  T(F): 98 (28 Jan 2022 09:04), Max: 98.6 (27 Jan 2022 15:25)  HR: 67 (28 Jan 2022 08:00) (61 - 75)  BP: 150/46 (28 Jan 2022 08:00) (130/73 - 172/58)  BP(mean): 77 (28 Jan 2022 08:00) (62 - 96)  RR: 12 (28 Jan 2022 08:00) (9 - 20)  SpO2: 97% (28 Jan 2022 08:00) (90% - 100%)    I&O's Summary    27 Jan 2022 07:01  -  28 Jan 2022 07:00  --------------------------------------------------------  IN: 1804 mL / OUT: 910 mL / NET: 894 mL        Physical Exam:  General: NAD, resting comfortably  Pulmonary: normal resp effort, CTA-B  Cardiovascular: NSR  Abdominal: soft, NT/ND  Extremities: WWP, normal strength  Neuro: A/O x 3, CNs II-XII grossly intact, normal motor/sensation, no focal deficits  Pulses:   Right:                                                                          Left:  FEM [ ]2+ [ ]1+ [ ]doppler                                             FEM [ ]2+ [ ]1+ [ ]doppler    POP [ ]2+ [ ]1+ [ ]doppler                                             POP [ ]2+ [ ]1+ [ ]doppler    DP [ ]2+ [ ]1+ [ ]doppler                                                DP [ ]2+ [ ]1+ [ ]doppler  PT[ ]2+ [ ]1+ [ ]doppler                                                  PT [ ]2+ [ ]1+ [ ]doppler    LABS:                        8.6    8.48  )-----------( 215      ( 28 Jan 2022 06:37 )             28.1     01-28    143  |  117<H>  |  37<H>  ----------------------------<  181<H>  4.6   |  22  |  0.85    Ca    8.3<L>      28 Jan 2022 06:37            CAPILLARY BLOOD GLUCOSE      POCT Blood Glucose.: 195 mg/dL (28 Jan 2022 08:53)  POCT Blood Glucose.: 303 mg/dL (27 Jan 2022 21:18)  POCT Blood Glucose.: 224 mg/dL (27 Jan 2022 16:56)  POCT Blood Glucose.: 238 mg/dL (27 Jan 2022 12:11)      RADIOLOGY & ADDITIONAL TESTS:

## 2022-01-29 DIAGNOSIS — I73.9 PERIPHERAL VASCULAR DISEASE, UNSPECIFIED: ICD-10-CM

## 2022-01-29 DIAGNOSIS — E11.9 TYPE 2 DIABETES MELLITUS WITHOUT COMPLICATIONS: ICD-10-CM

## 2022-01-29 DIAGNOSIS — M86.10 OTHER ACUTE OSTEOMYELITIS, UNSPECIFIED SITE: ICD-10-CM

## 2022-01-29 LAB
ANION GAP SERPL CALC-SCNC: 3 MMOL/L — LOW (ref 5–17)
BUN SERPL-MCNC: 33 MG/DL — HIGH (ref 7–23)
CALCIUM SERPL-MCNC: 8.5 MG/DL — SIGNIFICANT CHANGE UP (ref 8.5–10.1)
CHLORIDE SERPL-SCNC: 118 MMOL/L — HIGH (ref 96–108)
CO2 SERPL-SCNC: 20 MMOL/L — LOW (ref 22–31)
CREAT SERPL-MCNC: 0.83 MG/DL — SIGNIFICANT CHANGE UP (ref 0.5–1.3)
CRP SERPL-MCNC: 85 MG/L — HIGH
ERYTHROCYTE [SEDIMENTATION RATE] IN BLOOD: 120 MM/HR — HIGH (ref 0–20)
GLUCOSE SERPL-MCNC: 179 MG/DL — HIGH (ref 70–99)
HCT VFR BLD CALC: 29.7 % — LOW (ref 34.5–45)
HGB BLD-MCNC: 9.1 G/DL — LOW (ref 11.5–15.5)
MCHC RBC-ENTMCNC: 29.8 PG — SIGNIFICANT CHANGE UP (ref 27–34)
MCHC RBC-ENTMCNC: 30.6 GM/DL — LOW (ref 32–36)
MCV RBC AUTO: 97.4 FL — SIGNIFICANT CHANGE UP (ref 80–100)
PLATELET # BLD AUTO: 245 K/UL — SIGNIFICANT CHANGE UP (ref 150–400)
POTASSIUM SERPL-MCNC: 4.7 MMOL/L — SIGNIFICANT CHANGE UP (ref 3.5–5.3)
POTASSIUM SERPL-SCNC: 4.7 MMOL/L — SIGNIFICANT CHANGE UP (ref 3.5–5.3)
RBC # BLD: 3.05 M/UL — LOW (ref 3.8–5.2)
RBC # FLD: 16.1 % — HIGH (ref 10.3–14.5)
SODIUM SERPL-SCNC: 141 MMOL/L — SIGNIFICANT CHANGE UP (ref 135–145)
WBC # BLD: 10.19 K/UL — SIGNIFICANT CHANGE UP (ref 3.8–10.5)
WBC # FLD AUTO: 10.19 K/UL — SIGNIFICANT CHANGE UP (ref 3.8–10.5)

## 2022-01-29 PROCEDURE — 99233 SBSQ HOSP IP/OBS HIGH 50: CPT

## 2022-01-29 RX ADMIN — CEFEPIME 100 MILLIGRAM(S): 1 INJECTION, POWDER, FOR SOLUTION INTRAMUSCULAR; INTRAVENOUS at 11:12

## 2022-01-29 RX ADMIN — Medication 1000 MILLIGRAM(S): at 05:56

## 2022-01-29 RX ADMIN — Medication 2 DROP(S): at 21:06

## 2022-01-29 RX ADMIN — Medication 20 MILLIGRAM(S): at 11:02

## 2022-01-29 RX ADMIN — Medication 3 MILLIGRAM(S): at 21:05

## 2022-01-29 RX ADMIN — APIXABAN 2.5 MILLIGRAM(S): 2.5 TABLET, FILM COATED ORAL at 21:05

## 2022-01-29 RX ADMIN — GABAPENTIN 300 MILLIGRAM(S): 400 CAPSULE ORAL at 13:15

## 2022-01-29 RX ADMIN — APIXABAN 2.5 MILLIGRAM(S): 2.5 TABLET, FILM COATED ORAL at 11:02

## 2022-01-29 RX ADMIN — Medication 250 MILLIGRAM(S): at 05:08

## 2022-01-29 RX ADMIN — GABAPENTIN 300 MILLIGRAM(S): 400 CAPSULE ORAL at 05:08

## 2022-01-29 RX ADMIN — SENNA PLUS 2 TABLET(S): 8.6 TABLET ORAL at 21:05

## 2022-01-29 RX ADMIN — INSULIN GLARGINE 5 UNIT(S): 100 INJECTION, SOLUTION SUBCUTANEOUS at 21:03

## 2022-01-29 RX ADMIN — Medication 1 MILLIGRAM(S): at 21:05

## 2022-01-29 RX ADMIN — Medication 81 MILLIGRAM(S): at 11:01

## 2022-01-29 RX ADMIN — Medication 25 MILLIGRAM(S): at 11:01

## 2022-01-29 RX ADMIN — Medication 1000 MILLIGRAM(S): at 13:15

## 2022-01-29 RX ADMIN — Medication 1000 MILLIGRAM(S): at 13:50

## 2022-01-29 RX ADMIN — GABAPENTIN 300 MILLIGRAM(S): 400 CAPSULE ORAL at 21:05

## 2022-01-29 RX ADMIN — TIOTROPIUM BROMIDE 1 CAPSULE(S): 18 CAPSULE ORAL; RESPIRATORY (INHALATION) at 09:58

## 2022-01-29 RX ADMIN — OXYCODONE HYDROCHLORIDE 10 MILLIGRAM(S): 5 TABLET ORAL at 11:10

## 2022-01-29 RX ADMIN — SODIUM CHLORIDE 50 MILLILITER(S): 9 INJECTION INTRAMUSCULAR; INTRAVENOUS; SUBCUTANEOUS at 11:11

## 2022-01-29 RX ADMIN — Medication 2: at 08:43

## 2022-01-29 RX ADMIN — Medication 1000 MILLIGRAM(S): at 05:08

## 2022-01-29 RX ADMIN — Medication 2: at 17:54

## 2022-01-29 RX ADMIN — OXYCODONE HYDROCHLORIDE 10 MILLIGRAM(S): 5 TABLET ORAL at 11:30

## 2022-01-29 RX ADMIN — HYDROMORPHONE HYDROCHLORIDE 0.5 MILLIGRAM(S): 2 INJECTION INTRAMUSCULAR; INTRAVENOUS; SUBCUTANEOUS at 16:50

## 2022-01-29 RX ADMIN — SACUBITRIL AND VALSARTAN 1 TABLET(S): 24; 26 TABLET, FILM COATED ORAL at 11:02

## 2022-01-29 RX ADMIN — Medication 2 DROP(S): at 13:16

## 2022-01-29 RX ADMIN — Medication 2: at 13:15

## 2022-01-29 RX ADMIN — Medication 2 DROP(S): at 05:07

## 2022-01-29 RX ADMIN — SACUBITRIL AND VALSARTAN 1 TABLET(S): 24; 26 TABLET, FILM COATED ORAL at 21:05

## 2022-01-29 RX ADMIN — OXYCODONE HYDROCHLORIDE 10 MILLIGRAM(S): 5 TABLET ORAL at 21:05

## 2022-01-29 RX ADMIN — Medication 5 MILLIGRAM(S): at 11:02

## 2022-01-29 RX ADMIN — HYDROMORPHONE HYDROCHLORIDE 0.5 MILLIGRAM(S): 2 INJECTION INTRAMUSCULAR; INTRAVENOUS; SUBCUTANEOUS at 17:10

## 2022-01-29 RX ADMIN — Medication 25 MILLIGRAM(S): at 21:05

## 2022-01-29 NOTE — PROGRESS NOTE ADULT - ASSESSMENT
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 - s/p revascularization and wash out on IV abx - started to improve, cont pain control and iV abx    #Non healing medial right ankle Ulcer - ESR started to improved with IV abx    #DMII (Inuslin dependent) with Hyperglycemia - NEWSOME + HISS    #PAD with 50% CFA stenosis and s/p Left Fem Pop bypass (3/2021) status post right femoral angioplasty and stenting with the right  popliteal angioplasty     #Anemia of ABL s/p 1 unit PRBC - monitor     GOC Pall consult in progress: DNR DNI    # Hypertension  - Hydralazine PRN for SBP > 160    - lasix on hold post op    # AF - -eliquis resumed    # History of COPD, CAD, Type 2 DM on insulin, depression, CHFrEF, HTN, PVD, PAD, s/p left femoral popliteal bypass 3/24/2021, left thigh infections s/p debridement and muscle flap, ICN, lower extremity DVT on Eliquis, RA    - c/w home medications    - continue with the spiriva for the copd

## 2022-01-29 NOTE — PROGRESS NOTE ADULT - SUBJECTIVE AND OBJECTIVE BOX
Patient seen and examined at bedside. Patient states that she is doing better today than yesterday. Denies fevers/chills.     LABS:                        9.1    10.19 )-----------( 245      ( 29 Jan 2022 06:25 )             29.7     01-29    141  |  118<H>  |  33<H>  ----------------------------<  179<H>  4.7   |  20<L>  |  0.83    Ca    8.5      29 Jan 2022 06:25            VITAL SIGNS:  T(C): 36.3 (01-28-22 @ 20:04), Max: 36.7 (01-28-22 @ 09:04)  HR: 70 (01-29-22 @ 06:00) (64 - 82)  BP: 168/54 (01-29-22 @ 06:00) (132/41 - 168/54)  RR: 17 (01-29-22 @ 06:00) (0 - 23)  SpO2: 93% (01-29-22 @ 06:00) (93% - 100%)    General: NAD, resting comfortably in bed, Alert  RLE:   right ankle, wrapped in ACE, clean dry and intact, kerlix intact  SCD in place contralaterally  No calf tenderness contralaterally  Able to flex/extension all toes slightly moving.   Less pain with passive DF/PF of ankle when compared to yesterday  toes are WWP    A/P:  86y f s/p right ankle open I&D with wound vac placement POD4, sp wound vac removal    -Wound vac removed  -Cx growing pseudomonas, E. faecalis, proteus, bacteroides vulgatus  -Clinical exam slightly improving compared to yesterday  -CRP Trending down  -PT  -WBAT RLE  -Pain Control  -DVT ppx per primary team, currently Eliquis  -Wound vac in place, please record output  -Continue abx per ID  -FU AM Labs  -Rest, ice, compress and elevate the extremity as needed  -ICU care  -Incentive Spirometry  -Medical management appreciated

## 2022-01-29 NOTE — PROGRESS NOTE ADULT - SUBJECTIVE AND OBJECTIVE BOX
CHIEF COMPLAINT: Right ankle pain/Inability to bear weight RLE/Worsening Right ankle ulcer    87 y/o Female with PMH COPD, CAD, DMII on insulin, depression, CHFrEF, HTN, PVD, PAD, s/p left femoral popliteal bypass 3/24/2021, left thigh infections s/p debridement and muscle flap, PAT, lower extremity DVT on Eliquis, RA presents with chronic wounds and pain to the right lower extremity for 2 weeks. She first noticed the chronic wound on her right lower extremity about a month ago. She has been completing dressing changes and applying Xeroform. The wound is foul smelling. She has been unable to walk over the last 2 weeks due to increase pressure when placing the foot down. Hanging her lower extremity off the side of the bed helps improve her pain. She also reports occasional diarrhea and lower extremity swelling. Denies headaches, blurry vision, dizziness, fevers, chills, chest pain, SOB, abdominal pain, N/V, diarrhea/constipation.    ER course:  Imaging:   - XR right ankle: concern for osteomyelitis, hardware present, no fracture or dislocation (personally reviewed).   - CXR: no consolidation, no effusion, no pneumothorax (personally reviewed).   The pt was given Cefepime and Vancomycin, 2L of NS, 10 units of Humalin, Calcium gluconate, Sodium bicarbonate, Lokelma, and Dilaudid in the ER. She is being admitted to med/surg for further management.     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     INTERVAL HPI: pain is less, comfortable in the bed, + numbness/tingling in right foot  Other ROS reviewed and neg     Vital Signs Last 24 Hrs  T(C): 36.4 (29 Jan 2022 09:57), Max: 36.4 (29 Jan 2022 09:57)  T(F): 97.5 (29 Jan 2022 09:57), Max: 97.5 (29 Jan 2022 09:57)  HR: 65 (29 Jan 2022 12:00) (64 - 82)  BP: 169/59 (29 Jan 2022 12:00) (143/48 - 169/59)  BP(mean): 89 (29 Jan 2022 12:00) (74 - 102)  RR: 12 (29 Jan 2022 12:00) (10 - 23)  SpO2: 97% (29 Jan 2022 12:00) (93% - 99%)  I&O's Detail    28 Jan 2022 07:01  -  29 Jan 2022 07:00  --------------------------------------------------------  IN:    IV PiggyBack: 50 mL    Oral Fluid: 890 mL    sodium chloride 0.9%: 1154 mL  Total IN: 2094 mL    OUT:    Voided (mL): 1000 mL  Total OUT: 1000 mL    Total NET: 1094 mL                       9.1    10.19 )-----------( 245      ( 29 Jan 2022 06:25 )             29.7     29 Jan 2022 06:25    141    |  118    |  33     ----------------------------<  179    4.7     |  20     |  0.83     Ca    8.5        29 Jan 2022 06:25    CAPILLARY BLOOD GLUCOSE  POCT Blood Glucose.: 191 mg/dL (29 Jan 2022 13:08)  POCT Blood Glucose.: 189 mg/dL (29 Jan 2022 08:34)  POCT Blood Glucose.: 220 mg/dL (28 Jan 2022 21:12)  POCT Blood Glucose.: 307 mg/dL (28 Jan 2022 16:46)    MEDICATIONS  (STANDING):  apixaban 2.5 milliGRAM(s) Oral two times a day  artificial  tears Solution 2 Drop(s) Both EYES three times a day  aspirin  chewable 81 milliGRAM(s) Oral daily  cefepime   IVPB 2000 milliGRAM(s) IV Intermittent every 24 hours  dextrose 40% Gel 15 Gram(s) Oral once  dextrose 40% Gel 15 Gram(s) Oral once  dextrose 5%. 1000 milliLiter(s) (50 mL/Hr) IV Continuous <Continuous>  dextrose 5%. 1000 milliLiter(s) (50 mL/Hr) IV Continuous <Continuous>  dextrose 5%. 1000 milliLiter(s) (100 mL/Hr) IV Continuous <Continuous>  dextrose 5%. 1000 milliLiter(s) (100 mL/Hr) IV Continuous <Continuous>  dextrose 50% Injectable 25 Gram(s) IV Push once  dextrose 50% Injectable 12.5 Gram(s) IV Push once  dextrose 50% Injectable 25 Gram(s) IV Push once  dextrose 50% Injectable 25 Gram(s) IV Push once  dextrose 50% Injectable 12.5 Gram(s) IV Push once  dextrose 50% Injectable 25 Gram(s) IV Push once  folic acid 1 milliGRAM(s) Oral at bedtime  gabapentin 300 milliGRAM(s) Oral three times a day  glucagon  Injectable 1 milliGRAM(s) IntraMuscular once  glucagon  Injectable 1 milliGRAM(s) IntraMuscular once  insulin glargine SubCutaneous Injection (LANTUS) - Peds 5 Unit(s) SubCutaneous at bedtime  insulin lispro (ADMELOG) corrective regimen sliding scale   SubCutaneous at bedtime  insulin lispro (ADMELOG) corrective regimen sliding scale   SubCutaneous three times a day before meals  metoprolol tartrate 25 milliGRAM(s) Oral two times a day  PARoxetine 20 milliGRAM(s) Oral daily  polyethylene glycol 3350 17 Gram(s) Oral daily  predniSONE   Tablet 5 milliGRAM(s) Oral daily  sacubitril 24 mG/valsartan 26 mG 1 Tablet(s) Oral two times a day  senna 2 Tablet(s) Oral at bedtime  sodium chloride 0.9%. 600 milliLiter(s) (50 mL/Hr) IV Continuous <Continuous>  tiotropium 18 MICROgram(s) Capsule 1 Capsule(s) Inhalation daily  vancomycin  IVPB 600 milliGRAM(s) IV Intermittent every 24 hours    MEDICATIONS  (PRN):  acetaminophen     Tablet .. 650 milliGRAM(s) Oral every 6 hours PRN Temp greater or equal to 38C (100.4F), Mild Pain (1 - 3)  ALBUTerol    90 MICROgram(s) HFA Inhaler 1 Puff(s) Inhalation every 6 hours PRN Shortness of Breath  aluminum hydroxide/magnesium hydroxide/simethicone Suspension 30 milliLiter(s) Oral every 4 hours PRN Dyspepsia  hydrALAZINE Injectable 10 milliGRAM(s) IV Push every 6 hours PRN SBP > 160  HYDROmorphone  Injectable 0.5 milliGRAM(s) IV Push every 3 hours PRN Moderate Pain (4 - 6)  HYDROmorphone  Injectable 1 milliGRAM(s) IV Push every 3 hours PRN Severe Pain (7 - 10)  melatonin 3 milliGRAM(s) Oral at bedtime PRN Insomnia  ondansetron Injectable 4 milliGRAM(s) IV Push every 8 hours PRN Nausea and/or Vomiting  oxyCODONE    IR 10 milliGRAM(s) Oral every 4 hours PRN Moderate Pain (4 - 6)    RADIOLOGY: personally visualized    General: elderly frail female in NAD  Head: AT/NC  ENT: Dry Mucous Membranes; No Injury  Neck: Non-tender; No JVD  CVS: S1, S2 reg with II/VI MILADY  Respiratory: CTA B/L; no RRW, Normal Respiratory Effort  Abdomen/GI: Soft, non-tender, non-distended, no guarding, no rebound,  : no bladder distension   Extremities: status post surgery with the ankle wound with the drainage ulcer covered with the dressing   MSK: No CVA tenderness, Normal ROM, No injury  Neuro: AAOx3, gen weakness  Psych: Appropriate, Cooperative,  Skin: posop area in dressing, C/D/I  TTP diffusely on ankle              TTE Echo Complete w/o Contrast w/ Doppler (05.11.21 @ 10:31) >   Mild left ventricular enlargement with with moderate, segmental systolic   dysfunction. The apex and mid to apical anteroseptum and inferoseptum   appear hypokinetic. Estimated left ventricular ejection fraction is 40%.   Mild diastolic dysfunction (stage I).   Moderate mitral stenosis.   Mild aortic regurgitation.

## 2022-01-30 LAB
ANION GAP SERPL CALC-SCNC: 4 MMOL/L — LOW (ref 5–17)
APTT BLD: 33.1 SEC — SIGNIFICANT CHANGE UP (ref 27.5–35.5)
BUN SERPL-MCNC: 31 MG/DL — HIGH (ref 7–23)
CALCIUM SERPL-MCNC: 8.5 MG/DL — SIGNIFICANT CHANGE UP (ref 8.5–10.1)
CHLORIDE SERPL-SCNC: 117 MMOL/L — HIGH (ref 96–108)
CO2 SERPL-SCNC: 22 MMOL/L — SIGNIFICANT CHANGE UP (ref 22–31)
CREAT SERPL-MCNC: 0.79 MG/DL — SIGNIFICANT CHANGE UP (ref 0.5–1.3)
GLUCOSE SERPL-MCNC: 123 MG/DL — HIGH (ref 70–99)
HCT VFR BLD CALC: 29.1 % — LOW (ref 34.5–45)
HGB BLD-MCNC: 8.9 G/DL — LOW (ref 11.5–15.5)
INR BLD: 1.28 RATIO — HIGH (ref 0.88–1.16)
MCHC RBC-ENTMCNC: 29.7 PG — SIGNIFICANT CHANGE UP (ref 27–34)
MCHC RBC-ENTMCNC: 30.6 GM/DL — LOW (ref 32–36)
MCV RBC AUTO: 97 FL — SIGNIFICANT CHANGE UP (ref 80–100)
PLATELET # BLD AUTO: 263 K/UL — SIGNIFICANT CHANGE UP (ref 150–400)
POTASSIUM SERPL-MCNC: 4.4 MMOL/L — SIGNIFICANT CHANGE UP (ref 3.5–5.3)
POTASSIUM SERPL-SCNC: 4.4 MMOL/L — SIGNIFICANT CHANGE UP (ref 3.5–5.3)
PROTHROM AB SERPL-ACNC: 14.7 SEC — HIGH (ref 10.6–13.6)
RBC # BLD: 3 M/UL — LOW (ref 3.8–5.2)
RBC # FLD: 15.9 % — HIGH (ref 10.3–14.5)
SODIUM SERPL-SCNC: 143 MMOL/L — SIGNIFICANT CHANGE UP (ref 135–145)
WBC # BLD: 10.41 K/UL — SIGNIFICANT CHANGE UP (ref 3.8–10.5)
WBC # FLD AUTO: 10.41 K/UL — SIGNIFICANT CHANGE UP (ref 3.8–10.5)

## 2022-01-30 PROCEDURE — 99233 SBSQ HOSP IP/OBS HIGH 50: CPT

## 2022-01-30 RX ADMIN — OXYCODONE HYDROCHLORIDE 10 MILLIGRAM(S): 5 TABLET ORAL at 06:20

## 2022-01-30 RX ADMIN — Medication 1 MILLIGRAM(S): at 21:37

## 2022-01-30 RX ADMIN — Medication 4: at 12:07

## 2022-01-30 RX ADMIN — Medication 2 DROP(S): at 15:08

## 2022-01-30 RX ADMIN — GABAPENTIN 300 MILLIGRAM(S): 400 CAPSULE ORAL at 05:05

## 2022-01-30 RX ADMIN — OXYCODONE HYDROCHLORIDE 10 MILLIGRAM(S): 5 TABLET ORAL at 12:37

## 2022-01-30 RX ADMIN — Medication 25 MILLIGRAM(S): at 10:48

## 2022-01-30 RX ADMIN — APIXABAN 2.5 MILLIGRAM(S): 2.5 TABLET, FILM COATED ORAL at 21:37

## 2022-01-30 RX ADMIN — Medication 250 MILLIGRAM(S): at 05:06

## 2022-01-30 RX ADMIN — SENNA PLUS 2 TABLET(S): 8.6 TABLET ORAL at 21:39

## 2022-01-30 RX ADMIN — OXYCODONE HYDROCHLORIDE 10 MILLIGRAM(S): 5 TABLET ORAL at 22:41

## 2022-01-30 RX ADMIN — CEFEPIME 100 MILLIGRAM(S): 1 INJECTION, POWDER, FOR SOLUTION INTRAMUSCULAR; INTRAVENOUS at 12:07

## 2022-01-30 RX ADMIN — SACUBITRIL AND VALSARTAN 1 TABLET(S): 24; 26 TABLET, FILM COATED ORAL at 10:48

## 2022-01-30 RX ADMIN — TIOTROPIUM BROMIDE 1 CAPSULE(S): 18 CAPSULE ORAL; RESPIRATORY (INHALATION) at 08:26

## 2022-01-30 RX ADMIN — APIXABAN 2.5 MILLIGRAM(S): 2.5 TABLET, FILM COATED ORAL at 10:48

## 2022-01-30 RX ADMIN — Medication 25 MILLIGRAM(S): at 21:36

## 2022-01-30 RX ADMIN — Medication 2 DROP(S): at 21:36

## 2022-01-30 RX ADMIN — OXYCODONE HYDROCHLORIDE 10 MILLIGRAM(S): 5 TABLET ORAL at 06:19

## 2022-01-30 RX ADMIN — GABAPENTIN 300 MILLIGRAM(S): 400 CAPSULE ORAL at 15:08

## 2022-01-30 RX ADMIN — Medication 20 MILLIGRAM(S): at 10:47

## 2022-01-30 RX ADMIN — Medication 5 MILLIGRAM(S): at 10:48

## 2022-01-30 RX ADMIN — SODIUM CHLORIDE 50 MILLILITER(S): 9 INJECTION INTRAMUSCULAR; INTRAVENOUS; SUBCUTANEOUS at 21:42

## 2022-01-30 RX ADMIN — Medication 2 DROP(S): at 05:06

## 2022-01-30 RX ADMIN — GABAPENTIN 300 MILLIGRAM(S): 400 CAPSULE ORAL at 21:35

## 2022-01-30 RX ADMIN — OXYCODONE HYDROCHLORIDE 10 MILLIGRAM(S): 5 TABLET ORAL at 21:41

## 2022-01-30 RX ADMIN — Medication 3 MILLIGRAM(S): at 21:36

## 2022-01-30 RX ADMIN — Medication 81 MILLIGRAM(S): at 10:48

## 2022-01-30 RX ADMIN — INSULIN GLARGINE 5 UNIT(S): 100 INJECTION, SOLUTION SUBCUTANEOUS at 21:35

## 2022-01-30 RX ADMIN — OXYCODONE HYDROCHLORIDE 10 MILLIGRAM(S): 5 TABLET ORAL at 12:07

## 2022-01-30 RX ADMIN — Medication 2: at 17:30

## 2022-01-30 NOTE — PROGRESS NOTE ADULT - ASSESSMENT
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 - s/p revascularization and wash out on IV abx - started to improve, cont pain control and iV abx    #Non healing medial right ankle Ulcer - ESR started to improved with IV abx    #DMII (Inuslin dependent) with Hyperglycemia - NEWSOME + HISS    #PAD with 50% CFA stenosis and s/p Left Fem Pop bypass (3/2021) status post right femoral angioplasty and stenting with the right  popliteal angioplasty    - if no proper healing of ulcers and unable to bear weight due to pain amputation may be considered eventually    #Anemia of ABL s/p 1 unit PRBC - monitor HH, stable    GOC Pall consult in progress: DNR DNI    # Hypertension  - Hydralazine PRN for SBP > 160    - lasix on hold post op    # AF - -eliquis resumed    # History of COPD, CAD, Type 2 DM on insulin, depression, CHFrEF, HTN, PVD, PAD, s/p left femoral popliteal bypass 3/24/2021, left thigh infections s/p debridement and muscle flap, ICN, lower extremity DVT on Eliquis, RA    - c/w home medications    - continue with the spiriva for the copd

## 2022-01-30 NOTE — PROGRESS NOTE ADULT - SUBJECTIVE AND OBJECTIVE BOX
CHIEF COMPLAINT: Right ankle pain/Inability to bear weight RLE/Worsening Right ankle ulcer    85 y/o Female with PMH COPD, CAD, DMII on insulin, depression, CHFrEF, HTN, PVD, PAD, s/p left femoral popliteal bypass 3/24/2021, left thigh infections s/p debridement and muscle flap, PAT, lower extremity DVT on Eliquis, RA presents with chronic wounds and pain to the right lower extremity for 2 weeks. She first noticed the chronic wound on her right lower extremity about a month ago. She has been completing dressing changes and applying Xeroform. The wound is foul smelling. She has been unable to walk over the last 2 weeks due to increase pressure when placing the foot down. Hanging her lower extremity off the side of the bed helps improve her pain. She also reports occasional diarrhea and lower extremity swelling. Denies headaches, blurry vision, dizziness, fevers, chills, chest pain, SOB, abdominal pain, N/V, diarrhea/constipation.    ER course:  Imaging:   - XR right ankle: concern for osteomyelitis, hardware present, no fracture or dislocation (personally reviewed).   - CXR: no consolidation, no effusion, no pneumothorax (personally reviewed).   The pt was given Cefepime and Vancomycin, 2L of NS, 10 units of Humalin, Calcium gluconate, Sodium bicarbonate, Lokelma, and Dilaudid in the ER. She is being admitted to med/surg for further management.     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     INTERVAL HPI: pain is less but still very sensitive to touch, unable to bear the weight, comfortable in the bed, + numbness/tingling in right foot  Other ROS reviewed and neg     Vital Signs Last 24 Hrs  T(C): 36.8 (30 Jan 2022 07:26), Max: 36.8 (30 Jan 2022 07:26)  T(F): 98.3 (30 Jan 2022 07:26), Max: 98.3 (30 Jan 2022 07:26)  HR: 84 (30 Jan 2022 12:00) (62 - 89)  BP: 156/58 (30 Jan 2022 12:00) (126/45 - 171/55)  BP(mean): 87 (30 Jan 2022 12:00) (71 - 89)  RR: 16 (30 Jan 2022 12:00) (8 - 17)  SpO2: 97% (30 Jan 2022 12:00) (92% - 100%)  I&O's Detail    29 Jan 2022 07:01  -  30 Jan 2022 07:00  --------------------------------------------------------  IN:    IV PiggyBack: 250 mL    sodium chloride 0.9%: 550 mL  Total IN: 800 mL    OUT:    Intermittent Catheterization - Urethral (mL): 400 mL    Voided (mL): 600 mL  Total OUT: 1000 mL    Total NET: -200 mL      30 Jan 2022 07:01  -  30 Jan 2022 12:29  --------------------------------------------------------  IN:  Total IN: 0 mL    OUT:    Intermittent Catheterization - Urethral (mL): 300 mL    Voided (mL): 100 mL  Total OUT: 400 mL    Total NET: -400 mL                       8.9    10.41 )-----------( 263      ( 30 Jan 2022 06:25 )             29.1     30 Jan 2022 06:25    143    |  117    |  31     ----------------------------<  123    4.4     |  22     |  0.79     Ca    8.5        30 Jan 2022 06:25    PT/INR - ( 30 Jan 2022 06:25 )   PT: 14.7 sec;   INR: 1.28 ratio       PTT - ( 30 Jan 2022 06:25 )  PTT:33.1 sec  CAPILLARY BLOOD GLUCOSE    POCT Blood Glucose.: 204 mg/dL (30 Jan 2022 11:13)  POCT Blood Glucose.: 134 mg/dL (30 Jan 2022 07:50)  POCT Blood Glucose.: 211 mg/dL (29 Jan 2022 21:20)  POCT Blood Glucose.: 194 mg/dL (29 Jan 2022 17:53)  POCT Blood Glucose.: 191 mg/dL (29 Jan 2022 13:08)    MEDICATIONS  (STANDING):  apixaban 2.5 milliGRAM(s) Oral two times a day  artificial  tears Solution 2 Drop(s) Both EYES three times a day  aspirin  chewable 81 milliGRAM(s) Oral daily  cefepime   IVPB 2000 milliGRAM(s) IV Intermittent every 24 hours  dextrose 40% Gel 15 Gram(s) Oral once  dextrose 40% Gel 15 Gram(s) Oral once  dextrose 5%. 1000 milliLiter(s) (100 mL/Hr) IV Continuous <Continuous>  dextrose 5%. 1000 milliLiter(s) (50 mL/Hr) IV Continuous <Continuous>  dextrose 5%. 1000 milliLiter(s) (50 mL/Hr) IV Continuous <Continuous>  dextrose 5%. 1000 milliLiter(s) (100 mL/Hr) IV Continuous <Continuous>  dextrose 50% Injectable 12.5 Gram(s) IV Push once  dextrose 50% Injectable 25 Gram(s) IV Push once  dextrose 50% Injectable 25 Gram(s) IV Push once  dextrose 50% Injectable 25 Gram(s) IV Push once  dextrose 50% Injectable 12.5 Gram(s) IV Push once  dextrose 50% Injectable 25 Gram(s) IV Push once  folic acid 1 milliGRAM(s) Oral at bedtime  gabapentin 300 milliGRAM(s) Oral three times a day  glucagon  Injectable 1 milliGRAM(s) IntraMuscular once  glucagon  Injectable 1 milliGRAM(s) IntraMuscular once  insulin glargine SubCutaneous Injection (LANTUS) - Peds 5 Unit(s) SubCutaneous at bedtime  insulin lispro (ADMELOG) corrective regimen sliding scale   SubCutaneous three times a day before meals  insulin lispro (ADMELOG) corrective regimen sliding scale   SubCutaneous at bedtime  metoprolol tartrate 25 milliGRAM(s) Oral two times a day  PARoxetine 20 milliGRAM(s) Oral daily  polyethylene glycol 3350 17 Gram(s) Oral daily  predniSONE   Tablet 5 milliGRAM(s) Oral daily  sacubitril 24 mG/valsartan 26 mG 1 Tablet(s) Oral two times a day  senna 2 Tablet(s) Oral at bedtime  sodium chloride 0.9%. 600 milliLiter(s) (50 mL/Hr) IV Continuous <Continuous>  tiotropium 18 MICROgram(s) Capsule 1 Capsule(s) Inhalation daily  vancomycin  IVPB 600 milliGRAM(s) IV Intermittent every 24 hours    MEDICATIONS  (PRN):  acetaminophen     Tablet .. 650 milliGRAM(s) Oral every 6 hours PRN Temp greater or equal to 38C (100.4F), Mild Pain (1 - 3)  ALBUTerol    90 MICROgram(s) HFA Inhaler 1 Puff(s) Inhalation every 6 hours PRN Shortness of Breath  aluminum hydroxide/magnesium hydroxide/simethicone Suspension 30 milliLiter(s) Oral every 4 hours PRN Dyspepsia  hydrALAZINE Injectable 10 milliGRAM(s) IV Push every 6 hours PRN SBP > 160  HYDROmorphone  Injectable 0.5 milliGRAM(s) IV Push every 3 hours PRN Moderate Pain (4 - 6)  HYDROmorphone  Injectable 1 milliGRAM(s) IV Push every 3 hours PRN Severe Pain (7 - 10)  melatonin 3 milliGRAM(s) Oral at bedtime PRN Insomnia  ondansetron Injectable 4 milliGRAM(s) IV Push every 8 hours PRN Nausea and/or Vomiting  oxyCODONE    IR 10 milliGRAM(s) Oral every 4 hours PRN Moderate Pain (4 - 6)    RADIOLOGY: personally visualized    General: elderly frail female in NAD  Head: AT/NC  ENT: Dry Mucous Membranes; No Injury  Neck: Non-tender; No JVD  CVS: S1, S2 reg with II/VI MILADY  Respiratory: CTA B/L; no RRW, Normal Respiratory Effort  Abdomen/GI: Soft, non-tender, non-distended, no guarding, no rebound,  : no bladder distension   Extremities: status post surgery with the ankle wound with the drainage ulcer covered with the dressing   MSK: No CVA tenderness, Normal ROM, No injury  Neuro: AAOx3, gen weakness  Psych: Appropriate, Cooperative,  Skin: postop area in dressing, C/D/I, lateral pressure rell on her distal foot  TTP diffusely on ankle              TTE Echo Complete w/o Contrast w/ Doppler (05.11.21 @ 10:31) >   Mild left ventricular enlargement with with moderate, segmental systolic   dysfunction. The apex and mid to apical anteroseptum and inferoseptum   appear hypokinetic. Estimated left ventricular ejection fraction is 40%.   Mild diastolic dysfunction (stage I).   Moderate mitral stenosis.   Mild aortic regurgitation.

## 2022-01-30 NOTE — PROGRESS NOTE ADULT - SUBJECTIVE AND OBJECTIVE BOX
Patient seen and examined at bedside. Patient states that she is feeling a bit tired this morning. Otherwise doing about the same when compared to yesterday. Denies fevers/chills.     LABS:                        8.9    10.41 )-----------( 263      ( 30 Jan 2022 06:25 )             29.1     01-30    143  |  117<H>  |  31<H>  ----------------------------<  123<H>  4.4   |  22  |  0.79    Ca    8.5      30 Jan 2022 06:25      PT/INR - ( 30 Jan 2022 06:25 )   PT: 14.7 sec;   INR: 1.28 ratio         PTT - ( 30 Jan 2022 06:25 )  PTT:33.1 sec      VITAL SIGNS:  T(C): 36.8 (01-30-22 @ 07:26), Max: 36.8 (01-30-22 @ 07:26)  HR: 70 (01-30-22 @ 06:00) (62 - 89)  BP: 140/42 (01-30-22 @ 06:00) (126/45 - 171/55)  RR: 14 (01-30-22 @ 06:00) (8 - 18)  SpO2: 95% (01-30-22 @ 06:00) (92% - 100%)    General: NAD, resting comfortably in bed, Alert  RLE:   right ankle, wrapped in kerlix, clean dry and intact  SCD in place contralaterally  No calf tenderness contralaterally  Able to flex/extension all toes slightly moving. Able to DF/PF ankle a few degrees   Less pain with passive DF/PF of ankle when compared to yesterday  toes are WWP    A/P:  86y f s/p right ankle open I&D POD5, sp wound vac removal    -Dressings removed and replaced yesterday. Lesions look slightly worse than the previous day.  -Cx growing pseudomonas, E. faecalis, proteus, bacteroides vulgatus  -Clinical exam slightly improving compared to yesterday  -CRP Trending down  -PT  -WBAT RLE  -Pain Control  -DVT ppx per primary team, currently Eliquis  -Continue abx per ID  -FU AM Labs  -Rest, ice, compress and elevate the extremity as needed  -ICU care  -Incentive Spirometry  -Medical management appreciated

## 2022-01-31 LAB
ANION GAP SERPL CALC-SCNC: 3 MMOL/L — LOW (ref 5–17)
BUN SERPL-MCNC: 29 MG/DL — HIGH (ref 7–23)
CALCIUM SERPL-MCNC: 8.7 MG/DL — SIGNIFICANT CHANGE UP (ref 8.5–10.1)
CHLORIDE SERPL-SCNC: 117 MMOL/L — HIGH (ref 96–108)
CO2 SERPL-SCNC: 23 MMOL/L — SIGNIFICANT CHANGE UP (ref 22–31)
CREAT SERPL-MCNC: 0.88 MG/DL — SIGNIFICANT CHANGE UP (ref 0.5–1.3)
CRP SERPL-MCNC: 47 MG/L — HIGH
GLUCOSE SERPL-MCNC: 175 MG/DL — HIGH (ref 70–99)
POTASSIUM SERPL-MCNC: 4.6 MMOL/L — SIGNIFICANT CHANGE UP (ref 3.5–5.3)
POTASSIUM SERPL-SCNC: 4.6 MMOL/L — SIGNIFICANT CHANGE UP (ref 3.5–5.3)
SARS-COV-2 RNA SPEC QL NAA+PROBE: SIGNIFICANT CHANGE UP
SODIUM SERPL-SCNC: 143 MMOL/L — SIGNIFICANT CHANGE UP (ref 135–145)

## 2022-01-31 PROCEDURE — 99232 SBSQ HOSP IP/OBS MODERATE 35: CPT

## 2022-01-31 PROCEDURE — 99233 SBSQ HOSP IP/OBS HIGH 50: CPT

## 2022-01-31 RX ADMIN — Medication 2 DROP(S): at 05:33

## 2022-01-31 RX ADMIN — SACUBITRIL AND VALSARTAN 1 TABLET(S): 24; 26 TABLET, FILM COATED ORAL at 09:31

## 2022-01-31 RX ADMIN — Medication 2: at 16:21

## 2022-01-31 RX ADMIN — Medication 1 MILLIGRAM(S): at 21:37

## 2022-01-31 RX ADMIN — Medication 2: at 12:30

## 2022-01-31 RX ADMIN — OXYCODONE HYDROCHLORIDE 10 MILLIGRAM(S): 5 TABLET ORAL at 16:48

## 2022-01-31 RX ADMIN — SENNA PLUS 2 TABLET(S): 8.6 TABLET ORAL at 21:35

## 2022-01-31 RX ADMIN — Medication 25 MILLIGRAM(S): at 09:31

## 2022-01-31 RX ADMIN — GABAPENTIN 300 MILLIGRAM(S): 400 CAPSULE ORAL at 05:33

## 2022-01-31 RX ADMIN — Medication 81 MILLIGRAM(S): at 09:31

## 2022-01-31 RX ADMIN — GABAPENTIN 300 MILLIGRAM(S): 400 CAPSULE ORAL at 13:46

## 2022-01-31 RX ADMIN — Medication 5 MILLIGRAM(S): at 09:31

## 2022-01-31 RX ADMIN — OXYCODONE HYDROCHLORIDE 10 MILLIGRAM(S): 5 TABLET ORAL at 17:39

## 2022-01-31 RX ADMIN — APIXABAN 2.5 MILLIGRAM(S): 2.5 TABLET, FILM COATED ORAL at 09:31

## 2022-01-31 RX ADMIN — OXYCODONE HYDROCHLORIDE 10 MILLIGRAM(S): 5 TABLET ORAL at 09:33

## 2022-01-31 RX ADMIN — TIOTROPIUM BROMIDE 1 CAPSULE(S): 18 CAPSULE ORAL; RESPIRATORY (INHALATION) at 08:02

## 2022-01-31 RX ADMIN — Medication 2: at 08:49

## 2022-01-31 RX ADMIN — Medication 250 MILLIGRAM(S): at 05:33

## 2022-01-31 RX ADMIN — APIXABAN 2.5 MILLIGRAM(S): 2.5 TABLET, FILM COATED ORAL at 21:37

## 2022-01-31 RX ADMIN — CEFEPIME 100 MILLIGRAM(S): 1 INJECTION, POWDER, FOR SOLUTION INTRAMUSCULAR; INTRAVENOUS at 12:32

## 2022-01-31 RX ADMIN — Medication 2 DROP(S): at 21:41

## 2022-01-31 RX ADMIN — Medication 3 MILLIGRAM(S): at 21:34

## 2022-01-31 RX ADMIN — OXYCODONE HYDROCHLORIDE 10 MILLIGRAM(S): 5 TABLET ORAL at 10:49

## 2022-01-31 RX ADMIN — OXYCODONE HYDROCHLORIDE 10 MILLIGRAM(S): 5 TABLET ORAL at 06:33

## 2022-01-31 RX ADMIN — OXYCODONE HYDROCHLORIDE 10 MILLIGRAM(S): 5 TABLET ORAL at 05:33

## 2022-01-31 RX ADMIN — Medication 20 MILLIGRAM(S): at 09:31

## 2022-01-31 RX ADMIN — INSULIN GLARGINE 5 UNIT(S): 100 INJECTION, SOLUTION SUBCUTANEOUS at 22:23

## 2022-01-31 RX ADMIN — Medication 2 DROP(S): at 13:46

## 2022-01-31 RX ADMIN — SACUBITRIL AND VALSARTAN 1 TABLET(S): 24; 26 TABLET, FILM COATED ORAL at 21:37

## 2022-01-31 RX ADMIN — GABAPENTIN 300 MILLIGRAM(S): 400 CAPSULE ORAL at 21:34

## 2022-01-31 RX ADMIN — OXYCODONE HYDROCHLORIDE 10 MILLIGRAM(S): 5 TABLET ORAL at 22:05

## 2022-01-31 RX ADMIN — Medication 25 MILLIGRAM(S): at 21:35

## 2022-01-31 RX ADMIN — OXYCODONE HYDROCHLORIDE 10 MILLIGRAM(S): 5 TABLET ORAL at 21:35

## 2022-01-31 NOTE — PROGRESS NOTE ADULT - ASSESSMENT
85 y/o F COPD CAD DMII CHFrEF, HTN, PGVD, PAD sp femoral popliteal bypass now w/ right lower extremity OM. Palliative Care consulted to assist with establishing GOC    1) OM/Pain  -Right Medial Maleolus and Distal Tibial Osteomyelitis  -Non healing medial right ankle complicated d/t DMII   -PAD with 50% CFA stenosis and s/p Left Fem Pop bypass (3/2021)  - vascular surgery notes appreciated- pain controlled, recommending MICHELLE  -c/w IV Dilaudid 0.5mg IV [id number] PRN (w/ holding parameters)   -c/w  Dilaudid 1mg  IV [id number] PRN for severe pain (~equivalent to 5mg Morphine IV)   - s/p Tylenol 1000mg TID POx3 days (opiate sparing)  -c/w  10mg for moderate pain, tolerating well    2) Associated Leukocytosis without Sepsis POA.   -continue with the cefepime and vancomycin and follow up of the vancomycin levels closely   - s/p debridement    3) Acute on chronic anemia  - 2 units given of PRBCs  -moniotr H/H     4) Delirium PPX   c/w melatonin 3mg qhs, to promote maintenance of circadian rhythm/restful sleep, and for ppx of future susceptibility to delirium upon resolution of presenting condition.  Avoid deliriogenic agents including BZD, anticholinergics, and sedating agents when possible  Re-orient frequently, encourage family at bedside as able. Early mobilization recommended if feasible.    5) Bowel Regimen  denies constipation  risk for constipation d/t immobility  senna and Miralax daily    6) Goals of Care/Advanced Directives:     -Dr. Redmond discussed Palliative Care team being a supportive team when a patient has ongoing illnesses.  We also discussed that it is not an end of life care service, but can help navigate symptoms and emotional support througout their hospital stay here. Briefly discussed FC vs. DNR DNI .  She states she never thought about it too much but had some discussions perhaps with her daughter. -Capacity- defers discussions at time to daughter was a little somnolent and lacking capacity for complex decision making   HCP/Surrogate: Nirali [phone]  Code Status- DNR DNI  MOLST- completed by primary team  Dispo Plan-  planning in process  Discussed With: Case coordinated with RN     -Dr. Redmond to resume care 2/1

## 2022-01-31 NOTE — PROGRESS NOTE ADULT - ASSESSMENT
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 - s/p revascularization and wash out on IV abx - started to improve, cont pain control and iV abx    #Non healing medial right ankle Ulcer - ESR started to improved with IV abx    #DMII (Inuslin dependent) with Hyperglycemia - NEWSOME + HISS    #PAD with 50% CFA stenosis and s/p Left Fem Pop bypass (3/2021) status post right femoral angioplasty and stenting with the right  popliteal angioplasty    - if no proper healing of ulcers and unable to bear weight due to pain amputation may be considered eventually    #Anemia of ABL s/p 1 unit PRBC - monitor HH, stable    GOC Pall consult in progress: DNR DNI    # Hypertension  - Hydralazine PRN for SBP > 160    - lasix on hold post op    # AF - eliquis resumed    # History of COPD, CAD, Type 2 DM on insulin, depression, CHFrEF, HTN, PVD, PAD, s/p left femoral popliteal bypass 3/24/2021, left thigh infections s/p debridement and muscle flap, ICN, lower extremity DVT on Eliquis, RA    - c/w home medications    - continue with the spiriva for the copd     DC planning to rehab if pt is able to participate  Discussed with Dr. Ventura

## 2022-01-31 NOTE — PROGRESS NOTE ADULT - SUBJECTIVE AND OBJECTIVE BOX
CHIEF COMPLAINT: Right ankle pain/Inability to bear weight RLE/Worsening Right ankle ulcer    87 y/o Female with PMH COPD, CAD, DMII on insulin, depression, CHFrEF, HTN, PVD, PAD, s/p left femoral popliteal bypass 3/24/2021, left thigh infections s/p debridement and muscle flap, PAT, lower extremity DVT on Eliquis, RA presents with chronic wounds and pain to the right lower extremity for 2 weeks. She first noticed the chronic wound on her right lower extremity about a month ago. She has been completing dressing changes and applying Xeroform. The wound is foul smelling. She has been unable to walk over the last 2 weeks due to increase pressure when placing the foot down. Hanging her lower extremity off the side of the bed helps improve her pain. She also reports occasional diarrhea and lower extremity swelling. Denies headaches, blurry vision, dizziness, fevers, chills, chest pain, SOB, abdominal pain, N/V, diarrhea/constipation.    ER course:  Imaging:   - XR right ankle: concern for osteomyelitis, hardware present, no fracture or dislocation (personally reviewed).   - CXR: no consolidation, no effusion, no pneumothorax (personally reviewed).   The pt was given Cefepime and Vancomycin, 2L of NS, 10 units of Humalin, Calcium gluconate, Sodium bicarbonate, Lokelma, and Dilaudid in the ER. She is being admitted to med/surg for further management.     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     INTERVAL HPI: pain is less but still very sensitive to touch, unable to bear the weight, comfortable in the bed, + numbness/tingling in right foot  Other ROS reviewed and neg     Vital Signs Last 24 Hrs  T(C): 36.8 (31 Jan 2022 10:11), Max: 36.8 (30 Jan 2022 21:13)  T(F): 98.3 (31 Jan 2022 10:11), Max: 98.3 (31 Jan 2022 10:11)  HR: 59 (31 Jan 2022 12:00) (59 - 89)  BP: 156/42 (31 Jan 2022 12:00) (139/45 - 159/58)  BP(mean): 70 (31 Jan 2022 12:00) (70 - 93)  RR: 10 (31 Jan 2022 12:00) (10 - 18)  SpO2: 94% (31 Jan 2022 12:00) (92% - 100%)  I&O's Detail    30 Jan 2022 07:01  -  31 Jan 2022 07:00  --------------------------------------------------------  IN:    IV PiggyBack: 100 mL    sodium chloride 0.9%: 687 mL  Total IN: 787 mL    OUT:    Intermittent Catheterization - Urethral (mL): 300 mL    Voided (mL): 1000 mL  Total OUT: 1300 mL    Total NET: -513 mL                       8.0    9.69  )-----------( 251      ( 31 Jan 2022 06:40 )             26.6     31 Jan 2022 05:51    143    |  117    |  29     ----------------------------<  175    4.6     |  23     |  0.88     Ca    8.7        31 Jan 2022 05:51    PT/INR - ( 30 Jan 2022 06:25 )   PT: 14.7 sec;   INR: 1.28 ratio      PTT - ( 30 Jan 2022 06:25 )  PTT:33.1 sec  CAPILLARY BLOOD GLUCOSE    POCT Blood Glucose.: 166 mg/dL (31 Jan 2022 12:21)  POCT Blood Glucose.: 181 mg/dL (31 Jan 2022 08:45)  POCT Blood Glucose.: 152 mg/dL (31 Jan 2022 05:34)  POCT Blood Glucose.: 219 mg/dL (30 Jan 2022 21:33)  POCT Blood Glucose.: 189 mg/dL (30 Jan 2022 17:13)    MEDICATIONS  (STANDING):  apixaban 2.5 milliGRAM(s) Oral two times a day  artificial  tears Solution 2 Drop(s) Both EYES three times a day  aspirin  chewable 81 milliGRAM(s) Oral daily  cefepime   IVPB 2000 milliGRAM(s) IV Intermittent every 24 hours  dextrose 40% Gel 15 Gram(s) Oral once  dextrose 40% Gel 15 Gram(s) Oral once  dextrose 5%. 1000 milliLiter(s) (50 mL/Hr) IV Continuous <Continuous>  dextrose 5%. 1000 milliLiter(s) (100 mL/Hr) IV Continuous <Continuous>  dextrose 5%. 1000 milliLiter(s) (50 mL/Hr) IV Continuous <Continuous>  dextrose 5%. 1000 milliLiter(s) (100 mL/Hr) IV Continuous <Continuous>  dextrose 50% Injectable 25 Gram(s) IV Push once  dextrose 50% Injectable 12.5 Gram(s) IV Push once  dextrose 50% Injectable 25 Gram(s) IV Push once  dextrose 50% Injectable 25 Gram(s) IV Push once  dextrose 50% Injectable 12.5 Gram(s) IV Push once  dextrose 50% Injectable 25 Gram(s) IV Push once  folic acid 1 milliGRAM(s) Oral at bedtime  gabapentin 300 milliGRAM(s) Oral three times a day  glucagon  Injectable 1 milliGRAM(s) IntraMuscular once  glucagon  Injectable 1 milliGRAM(s) IntraMuscular once  insulin glargine SubCutaneous Injection (LANTUS) - Peds 5 Unit(s) SubCutaneous at bedtime  insulin lispro (ADMELOG) corrective regimen sliding scale   SubCutaneous at bedtime  insulin lispro (ADMELOG) corrective regimen sliding scale   SubCutaneous three times a day before meals  metoprolol tartrate 25 milliGRAM(s) Oral two times a day  PARoxetine 20 milliGRAM(s) Oral daily  polyethylene glycol 3350 17 Gram(s) Oral daily  predniSONE   Tablet 5 milliGRAM(s) Oral daily  sacubitril 24 mG/valsartan 26 mG 1 Tablet(s) Oral two times a day  senna 2 Tablet(s) Oral at bedtime  sodium chloride 0.9%. 600 milliLiter(s) (50 mL/Hr) IV Continuous <Continuous>  tiotropium 18 MICROgram(s) Capsule 1 Capsule(s) Inhalation daily  vancomycin  IVPB 600 milliGRAM(s) IV Intermittent every 24 hours    MEDICATIONS  (PRN):  acetaminophen     Tablet .. 650 milliGRAM(s) Oral every 6 hours PRN Temp greater or equal to 38C (100.4F), Mild Pain (1 - 3)  ALBUTerol    90 MICROgram(s) HFA Inhaler 1 Puff(s) Inhalation every 6 hours PRN Shortness of Breath  aluminum hydroxide/magnesium hydroxide/simethicone Suspension 30 milliLiter(s) Oral every 4 hours PRN Dyspepsia  hydrALAZINE Injectable 10 milliGRAM(s) IV Push every 6 hours PRN SBP > 160  HYDROmorphone  Injectable 0.5 milliGRAM(s) IV Push every 3 hours PRN Moderate Pain (4 - 6)  HYDROmorphone  Injectable 1 milliGRAM(s) IV Push every 3 hours PRN Severe Pain (7 - 10)  melatonin 3 milliGRAM(s) Oral at bedtime PRN Insomnia  ondansetron Injectable 4 milliGRAM(s) IV Push every 8 hours PRN Nausea and/or Vomiting  oxyCODONE    IR 10 milliGRAM(s) Oral every 4 hours PRN Moderate Pain (4 - 6)    RADIOLOGY: personally visualized    General: elderly frail female in NAD  Head: AT/NC  ENT: Dry Mucous Membranes; No Injury  Neck: Non-tender; No JVD  CVS: S1, S2 reg with II/VI MILADY  Respiratory: CTA B/L; no RRW, Normal Respiratory Effort  Abdomen/GI: Soft, non-tender, non-distended, no guarding, no rebound,  : no bladder distension   Extremities: status post surgery with the ankle wound with the drainage ulcer covered with the dressing   MSK: No CVA tenderness, Normal ROM, No injury  Neuro: AAOx3, gen weakness  Psych: Appropriate, Cooperative,  Skin: postop area in dressing, C/D/I, lateral pressure rell on her distal foot  TTP diffusely on ankle              TTE Echo Complete w/o Contrast w/ Doppler (05.11.21 @ 10:31) >   Mild left ventricular enlargement with with moderate, segmental systolic   dysfunction. The apex and mid to apical anteroseptum and inferoseptum   appear hypokinetic. Estimated left ventricular ejection fraction is 40%.   Mild diastolic dysfunction (stage I).   Moderate mitral stenosis.   Mild aortic regurgitation.

## 2022-01-31 NOTE — ADVANCED PRACTICE NURSE CONSULT - RECOMMEDATIONS
1)Continue to Elevate heels off of Mattress  2)Continue to Turn and position every 2 Hours  3)Right Medial Malleolus: 1/4 Strength Dakins soaked gauze to wound bed and change every 8 Hours cover with ABD Pad (wrapped wound separately so RN staff can easily access wound every 8 hours)   4) All other wounds: Apply Xeroform to wound beds and cover with nonstick Telfa ABD and Lightly wrap with Kerlix.    5) Sacrum: Apply Triad and Cover with Small Foam   6) Left Heel please continue to Keep in Foam Heel Protector.   7) Collaborated with Dr Ventura for Wound dressing and protection.

## 2022-01-31 NOTE — ADVANCED PRACTICE NURSE CONSULT - ASSESSMENT
This is a 86 year old female admitted to the hospital on 1/18/2022 for hyperkalemia: PMH: DVT LE, CHF, PAD, PVD, DM-2, Depression, Arthritis Anemia, HTN. Consulted to assess patients wounds on right LE, Left Heel, and Sacrum. Right Medial Malleolus measures 6cm x 4cm x 100% yellow to tan firmly attached slough and malodors. (That last wound measurement was  3.5cm x 2.5cm) with carlitos wound skin scaling and dry and hemosiderin staining. As preciously recommended and also spoke with Dr Ventura, Applied 1/4 strength Dakin's Soaked gauze to wound bed every 8 Hours. This will assist with the odor and assist with decreasing slough. Patient is on IV Antibiotics as well.   Posterior Right LE lower Calf measures 6cm x 6cm x 0.3cm with tendon exposed (previous measurements  5cm x 4cm x 0.2cm) with 75% yellow slough firmly attached with no granulation tissue. . Carlitos Wound is fragile, dry scaly and with hemosiderin staining. Sacrum measures 1.9cm x 1.8cm x 0.5cm with red granulation 100% wound bed. Noted maceration  on periwound. Applied Triad cream. Continue to turn and position every two hours. No Diapering. Apply PrimaFit and change every 6-12 hours and prn if soiled with stool. Left Heel current measurements 0.2cm x 0.2cmx 0.2cm with pink wound base. Applied Foam and Heel Foam boots on Bilateral Heels. Right heel with black skin nonblanchable measuring 1.2cm 1.1cm covered with ABD pad for protection.   Right LE anterior Shin inferior wound measures 2cm x 1cm x 0.1 cm with 100% pink wound bed and bleeding easily. Xeroform applied to wound bed. Right Anterior Superior Shin wound measuring 2cm 1.5cm 0.1cm with pink wound base and bleeding easily. Xeroform applied to wound base. Right LE Lateral wound superior to Lateral Malleolus 2.8cm x 1.2cm x 100% firmly attached yellow slough. Xeroform applied to wound base.   Right dorsum of foot with 6cm x 4cm x not open but noted dusky area. Light wrapped leg with Kerlix to ensure no constriction. Patient reports pain in toes and heel.

## 2022-01-31 NOTE — PROGRESS NOTE ADULT - SUBJECTIVE AND OBJECTIVE BOX
Orthopedics    Patient seen and examined at bedside. Patient states doing about the same when compared to yesterday. Denies fevers/chills.     Vital Signs Last 24 Hrs  T(C): 36.5 (31 Jan 2022 05:30), Max: 36.8 (30 Jan 2022 07:26)  T(F): 97.7 (31 Jan 2022 05:30), Max: 98.3 (30 Jan 2022 07:26)  HR: 64 (31 Jan 2022 06:00) (62 - 89)  BP: 139/45 (31 Jan 2022 06:00) (139/45 - 159/58)  BP(mean): 73 (31 Jan 2022 06:00) (71 - 93)  RR: 15 (31 Jan 2022 06:00) (11 - 18)  SpO2: 95% (31 Jan 2022 06:00) (92% - 100%)      General: NAD, resting comfortably in bed, Alert  RLE:   right ankle, wrapped in kerlix, clean dry and intact  SCD in place contralaterally  No calf tenderness contralaterally  Able to flex/extension all toes slightly moving. Able to DF/PF ankle a few degrees   Less pain with passive DF/PF of ankle when compared to yesterday  toes are WWP      A/P:  86y f s/p right ankle open I&D POD6, sp wound vac removal    -Dressings removed and replaced yesterday. Lesions look slightly worse than the previous day.  -Cx growing pseudomonas, E. faecalis, proteus, bacteroides vulgatus, Porphyromas Somerae  -Clinical exam slightly improving compared to yesterday  -CRP Trending down, FU repeats  -FU Vasc Surg recs  -FU ID recs  -PT  -WBAT RLE  -Pain Control  -DVT ppx per primary team, currently Eliquis  -Continue abx per ID  -FU AM Labs  -Rest, ice, compress and elevate the extremity as needed  -ICU care  -Incentive Spirometry  -No ortho surgical intervention planned at this time  -Medical management appreciated

## 2022-01-31 NOTE — PROGRESS NOTE ADULT - SUBJECTIVE AND OBJECTIVE BOX
afebrile, VSS    pain remains well controlled, improved    foot appears adequately perfused    discussed yesterday with patient and then later with her daughter Nirali that options continued to be conservative management with pain control, local care and antibiotics but that I did not believe there was a >50% likelihood that limb salvage will be maintained given her wounds and probability that her infected joint will not be able to heal or the infection cleared.   For the time being, given her non septic clinical status and adequately controlled pain, she can be treated conservatively and planning for transfer to rehab or other skilled nursing center an option.      MEDICATIONS  (STANDING):  apixaban 2.5 milliGRAM(s) Oral two times a day  artificial  tears Solution 2 Drop(s) Both EYES three times a day  aspirin  chewable 81 milliGRAM(s) Oral daily  cefepime   IVPB 2000 milliGRAM(s) IV Intermittent every 24 hours  dextrose 40% Gel 15 Gram(s) Oral once  dextrose 40% Gel 15 Gram(s) Oral once  dextrose 5%. 1000 milliLiter(s) (50 mL/Hr) IV Continuous <Continuous>  dextrose 5%. 1000 milliLiter(s) (100 mL/Hr) IV Continuous <Continuous>  dextrose 5%. 1000 milliLiter(s) (50 mL/Hr) IV Continuous <Continuous>  dextrose 5%. 1000 milliLiter(s) (100 mL/Hr) IV Continuous <Continuous>  dextrose 50% Injectable 25 Gram(s) IV Push once  dextrose 50% Injectable 12.5 Gram(s) IV Push once  dextrose 50% Injectable 25 Gram(s) IV Push once  dextrose 50% Injectable 25 Gram(s) IV Push once  dextrose 50% Injectable 12.5 Gram(s) IV Push once  dextrose 50% Injectable 25 Gram(s) IV Push once  folic acid 1 milliGRAM(s) Oral at bedtime  gabapentin 300 milliGRAM(s) Oral three times a day  glucagon  Injectable 1 milliGRAM(s) IntraMuscular once  glucagon  Injectable 1 milliGRAM(s) IntraMuscular once  insulin glargine SubCutaneous Injection (LANTUS) - Peds 5 Unit(s) SubCutaneous at bedtime  insulin lispro (ADMELOG) corrective regimen sliding scale   SubCutaneous three times a day before meals  insulin lispro (ADMELOG) corrective regimen sliding scale   SubCutaneous at bedtime  metoprolol tartrate 25 milliGRAM(s) Oral two times a day  PARoxetine 20 milliGRAM(s) Oral daily  polyethylene glycol 3350 17 Gram(s) Oral daily  predniSONE   Tablet 5 milliGRAM(s) Oral daily  sacubitril 24 mG/valsartan 26 mG 1 Tablet(s) Oral two times a day  senna 2 Tablet(s) Oral at bedtime  sodium chloride 0.9%. 600 milliLiter(s) (50 mL/Hr) IV Continuous <Continuous>  tiotropium 18 MICROgram(s) Capsule 1 Capsule(s) Inhalation daily  vancomycin  IVPB 600 milliGRAM(s) IV Intermittent every 24 hours    MEDICATIONS  (PRN):  acetaminophen     Tablet .. 650 milliGRAM(s) Oral every 6 hours PRN Temp greater or equal to 38C (100.4F), Mild Pain (1 - 3)  ALBUTerol    90 MICROgram(s) HFA Inhaler 1 Puff(s) Inhalation every 6 hours PRN Shortness of Breath  aluminum hydroxide/magnesium hydroxide/simethicone Suspension 30 milliLiter(s) Oral every 4 hours PRN Dyspepsia  hydrALAZINE Injectable 10 milliGRAM(s) IV Push every 6 hours PRN SBP > 160  HYDROmorphone  Injectable 0.5 milliGRAM(s) IV Push every 3 hours PRN Moderate Pain (4 - 6)  HYDROmorphone  Injectable 1 milliGRAM(s) IV Push every 3 hours PRN Severe Pain (7 - 10)  melatonin 3 milliGRAM(s) Oral at bedtime PRN Insomnia  ondansetron Injectable 4 milliGRAM(s) IV Push every 8 hours PRN Nausea and/or Vomiting  oxyCODONE    IR 10 milliGRAM(s) Oral every 4 hours PRN Moderate Pain (4 - 6)      Allergies    cephalosporins (Other)  penicillins (Urticaria; Pruritus)    Intolerances        Flatus: [ ] YES [ ] NO             Bowel Movement: [ ] YES [ ] NO  Pain (0-10):            Pain Control Adequate: [ ] YES [ ] NO  Nausea: [ ] YES [ ] NO            Vomiting: [ ] YES [ ] NO  Diarrhea: [ ] YES [ ] NO         Constipation: [ ] YES [ ] NO     Chest Pain: [ ] YES [ ] NO    SOB:  [ ] YES [ ] NO    Vital Signs Last 24 Hrs  T(C): 36.5 (31 Jan 2022 05:30), Max: 36.8 (30 Jan 2022 21:13)  T(F): 97.7 (31 Jan 2022 05:30), Max: 98.2 (30 Jan 2022 21:13)  HR: 64 (31 Jan 2022 06:00) (62 - 89)  BP: 139/45 (31 Jan 2022 06:00) (139/45 - 159/58)  BP(mean): 73 (31 Jan 2022 06:00) (71 - 93)  RR: 15 (31 Jan 2022 06:00) (11 - 18)  SpO2: 95% (31 Jan 2022 06:00) (92% - 100%)    I&O's Summary    30 Jan 2022 07:01  -  31 Jan 2022 07:00  --------------------------------------------------------  IN: 787 mL / OUT: 1300 mL / NET: -513 mL        Physical Exam:  General: NAD, resting comfortably  Pulmonary: normal resp effort, CTA-B  Cardiovascular: NSR  Abdominal: soft, NT/ND  Extremities: WWP, normal strength  Neuro: A/O x 3, CNs II-XII grossly intact, normal motor/sensation, no focal deficits  Pulses:   Right:                                                                          Left:  FEM [ ]2+ [ ]1+ [ ]doppler                                             FEM [ ]2+ [ ]1+ [ ]doppler    POP [ ]2+ [ ]1+ [ ]doppler                                             POP [ ]2+ [ ]1+ [ ]doppler    DP [ ]2+ [ ]1+ [ ]doppler                                                DP [ ]2+ [ ]1+ [ ]doppler  PT[ ]2+ [ ]1+ [ ]doppler                                                  PT [ ]2+ [ ]1+ [ ]doppler    LABS:                        8.0    9.69  )-----------( 251      ( 31 Jan 2022 06:40 )             26.6     01-31    143  |  117<H>  |  29<H>  ----------------------------<  175<H>  4.6   |  23  |  0.88    Ca    8.7      31 Jan 2022 05:51      PT/INR - ( 30 Jan 2022 06:25 )   PT: 14.7 sec;   INR: 1.28 ratio         PTT - ( 30 Jan 2022 06:25 )  PTT:33.1 sec      CAPILLARY BLOOD GLUCOSE      POCT Blood Glucose.: 152 mg/dL (31 Jan 2022 05:34)  POCT Blood Glucose.: 219 mg/dL (30 Jan 2022 21:33)  POCT Blood Glucose.: 189 mg/dL (30 Jan 2022 17:13)  POCT Blood Glucose.: 204 mg/dL (30 Jan 2022 11:13)  POCT Blood Glucose.: 134 mg/dL (30 Jan 2022 07:50)      RADIOLOGY & ADDITIONAL TESTS:

## 2022-01-31 NOTE — PROGRESS NOTE ADULT - SUBJECTIVE AND OBJECTIVE BOX
(note for Mrs. Ahumada written Sunday Jan 30 but placed in wrong patient's chart)      "afebrile, VSS    pain of R ankle area improving; no rest pain    PE  2 X 6 patch of skin of lateral mid distal foot with cyanotic changes; no ulceration of gangrene; able to move toes and light touch preserved; foot warm    A/P  infected R ankle joint with polymicrobial virulent organisms    discussed with Dr. Ku re options of primary AKA vs local control of infection with wound care and antibiotics and observe her clinical course.  If she becomes septic, or pain extreme and unmanageable, would proceed to amputation.  I believe her perfusion acceptable at this time given her improved pain.  The new foot findings are probably a consequence of pressure. "

## 2022-01-31 NOTE — PROGRESS NOTE ADULT - SUBJECTIVE AND OBJECTIVE BOX
HPI: Pt seen and examined this am in follow up for sx. Pt feeling better, noting that she has more ROM in her RLE now. She feels pain is well controlled with oxy, noting that she only took two doses yesterday. Counseled pt on taking what she needs and not baring through the pain. She agreed, noting that she is not afraid of addiction- knowing that she will take pain meds when she needs them. She endorsed 4/10 pain in R ankle at time of encounter open to dose of oxy which she says resolves pain when she takes it. Denies side effects. No issues as per RNs.       PAIN: yes  Location- r ankle  Intensity- mild  Quality- ache  Aggravating Factors- movement/touch  Alleviating Factors- pain meds, rest  Timing- intermittent    DYSPNEA: denies      ROS:    Weakness- improving    All other systems reviewed and negative      PHYSICAL EXAM:    Vital Signs Last 24 Hrs  T(C): 36.5 (31 Jan 2022 05:30), Max: 36.8 (30 Jan 2022 21:13)  T(F): 97.7 (31 Jan 2022 05:30), Max: 98.2 (30 Jan 2022 21:13)  HR: 80 (31 Jan 2022 08:03) (62 - 89)  BP: 146/43 (31 Jan 2022 08:00) (139/45 - 159/58)  BP(mean): 74 (31 Jan 2022 08:00) (71 - 93)  RR: 10 (31 Jan 2022 08:00) (10 - 18)  SpO2: 97% (31 Jan 2022 08:00) (92% - 100%)  Daily     Daily     General: Elderly female sitting up in bed, pleasant, NAD  HEENT: mmm, + temporal  wasting  Lungs:  clear  Cardiac: + s1 s2 rrr  GI: soft nt nd +bs  : incontinence  MSK/skin: moves all extremities, + R ankle wrapped, some yellowish drainage in guaze  Neuro: no focal deficits      LABS:                        8.0    9.69  )-----------( 251      ( 31 Jan 2022 06:40 )             26.6     01-31    143  |  117<H>  |  29<H>  ----------------------------<  175<H>  4.6   |  23  |  0.88    Ca    8.7      31 Jan 2022 05:51      PT/INR - ( 30 Jan 2022 06:25 )   PT: 14.7 sec;   INR: 1.28 ratio         PTT - ( 30 Jan 2022 06:25 )  PTT:33.1 sec  Albumin:     Allergies    cephalosporins (Other)  penicillins (Urticaria; Pruritus)    Intolerances      MEDICATIONS  (STANDING):  apixaban 2.5 milliGRAM(s) Oral two times a day  artificial  tears Solution 2 Drop(s) Both EYES three times a day  aspirin  chewable 81 milliGRAM(s) Oral daily  cefepime   IVPB 2000 milliGRAM(s) IV Intermittent every 24 hours  dextrose 40% Gel 15 Gram(s) Oral once  dextrose 40% Gel 15 Gram(s) Oral once  dextrose 5%. 1000 milliLiter(s) (50 mL/Hr) IV Continuous <Continuous>  dextrose 5%. 1000 milliLiter(s) (50 mL/Hr) IV Continuous <Continuous>  dextrose 5%. 1000 milliLiter(s) (100 mL/Hr) IV Continuous <Continuous>  dextrose 5%. 1000 milliLiter(s) (100 mL/Hr) IV Continuous <Continuous>  dextrose 50% Injectable 25 Gram(s) IV Push once  dextrose 50% Injectable 12.5 Gram(s) IV Push once  dextrose 50% Injectable 25 Gram(s) IV Push once  dextrose 50% Injectable 25 Gram(s) IV Push once  dextrose 50% Injectable 12.5 Gram(s) IV Push once  dextrose 50% Injectable 25 Gram(s) IV Push once  folic acid 1 milliGRAM(s) Oral at bedtime  gabapentin 300 milliGRAM(s) Oral three times a day  glucagon  Injectable 1 milliGRAM(s) IntraMuscular once  glucagon  Injectable 1 milliGRAM(s) IntraMuscular once  insulin glargine SubCutaneous Injection (LANTUS) - Peds 5 Unit(s) SubCutaneous at bedtime  insulin lispro (ADMELOG) corrective regimen sliding scale   SubCutaneous at bedtime  insulin lispro (ADMELOG) corrective regimen sliding scale   SubCutaneous three times a day before meals  metoprolol tartrate 25 milliGRAM(s) Oral two times a day  PARoxetine 20 milliGRAM(s) Oral daily  polyethylene glycol 3350 17 Gram(s) Oral daily  predniSONE   Tablet 5 milliGRAM(s) Oral daily  sacubitril 24 mG/valsartan 26 mG 1 Tablet(s) Oral two times a day  senna 2 Tablet(s) Oral at bedtime  sodium chloride 0.9%. 600 milliLiter(s) (50 mL/Hr) IV Continuous <Continuous>  tiotropium 18 MICROgram(s) Capsule 1 Capsule(s) Inhalation daily  vancomycin  IVPB 600 milliGRAM(s) IV Intermittent every 24 hours    MEDICATIONS  (PRN):  acetaminophen     Tablet .. 650 milliGRAM(s) Oral every 6 hours PRN Temp greater or equal to 38C (100.4F), Mild Pain (1 - 3)  ALBUTerol    90 MICROgram(s) HFA Inhaler 1 Puff(s) Inhalation every 6 hours PRN Shortness of Breath  aluminum hydroxide/magnesium hydroxide/simethicone Suspension 30 milliLiter(s) Oral every 4 hours PRN Dyspepsia  hydrALAZINE Injectable 10 milliGRAM(s) IV Push every 6 hours PRN SBP > 160  HYDROmorphone  Injectable 0.5 milliGRAM(s) IV Push every 3 hours PRN Moderate Pain (4 - 6)  HYDROmorphone  Injectable 1 milliGRAM(s) IV Push every 3 hours PRN Severe Pain (7 - 10)  melatonin 3 milliGRAM(s) Oral at bedtime PRN Insomnia  ondansetron Injectable 4 milliGRAM(s) IV Push every 8 hours PRN Nausea and/or Vomiting  oxyCODONE    IR 10 milliGRAM(s) Oral every 4 hours PRN Moderate Pain (4 - 6)     Former smoker

## 2022-02-01 LAB
HCT VFR BLD CALC: 27.8 % — LOW (ref 34.5–45)
HGB BLD-MCNC: 8.4 G/DL — LOW (ref 11.5–15.5)
MCHC RBC-ENTMCNC: 29.6 PG — SIGNIFICANT CHANGE UP (ref 27–34)
MCHC RBC-ENTMCNC: 30.2 GM/DL — LOW (ref 32–36)
MCV RBC AUTO: 97.9 FL — SIGNIFICANT CHANGE UP (ref 80–100)
PLATELET # BLD AUTO: 303 K/UL — SIGNIFICANT CHANGE UP (ref 150–400)
RBC # BLD: 2.84 M/UL — LOW (ref 3.8–5.2)
RBC # FLD: 15.8 % — HIGH (ref 10.3–14.5)
WBC # BLD: 9.94 K/UL — SIGNIFICANT CHANGE UP (ref 3.8–10.5)
WBC # FLD AUTO: 9.94 K/UL — SIGNIFICANT CHANGE UP (ref 3.8–10.5)

## 2022-02-01 PROCEDURE — 99232 SBSQ HOSP IP/OBS MODERATE 35: CPT

## 2022-02-01 RX ORDER — BACITRACIN 500 [USP'U]/G
1 OINTMENT OPHTHALMIC
Refills: 0 | Status: DISCONTINUED | OUTPATIENT
Start: 2022-02-01 | End: 2022-02-01

## 2022-02-01 RX ORDER — OFLOXACIN 0.3 %
1 DROPS OPHTHALMIC (EYE)
Refills: 0 | Status: DISCONTINUED | OUTPATIENT
Start: 2022-02-01 | End: 2022-02-03

## 2022-02-01 RX ORDER — ERYTHROMYCIN BASE 5 MG/GRAM
1 OINTMENT (GRAM) OPHTHALMIC (EYE)
Refills: 0 | Status: DISCONTINUED | OUTPATIENT
Start: 2022-02-01 | End: 2022-02-03

## 2022-02-01 RX ORDER — MEROPENEM 1 G/30ML
1000 INJECTION INTRAVENOUS EVERY 8 HOURS
Refills: 0 | Status: DISCONTINUED | OUTPATIENT
Start: 2022-02-01 | End: 2022-02-03

## 2022-02-01 RX ADMIN — OXYCODONE HYDROCHLORIDE 10 MILLIGRAM(S): 5 TABLET ORAL at 18:33

## 2022-02-01 RX ADMIN — Medication 81 MILLIGRAM(S): at 10:57

## 2022-02-01 RX ADMIN — SACUBITRIL AND VALSARTAN 1 TABLET(S): 24; 26 TABLET, FILM COATED ORAL at 10:57

## 2022-02-01 RX ADMIN — Medication 4: at 17:14

## 2022-02-01 RX ADMIN — Medication 25 MILLIGRAM(S): at 21:36

## 2022-02-01 RX ADMIN — GABAPENTIN 300 MILLIGRAM(S): 400 CAPSULE ORAL at 21:36

## 2022-02-01 RX ADMIN — OXYCODONE HYDROCHLORIDE 10 MILLIGRAM(S): 5 TABLET ORAL at 08:33

## 2022-02-01 RX ADMIN — Medication 1 APPLICATION(S): at 23:32

## 2022-02-01 RX ADMIN — MEROPENEM 100 MILLIGRAM(S): 1 INJECTION INTRAVENOUS at 14:03

## 2022-02-01 RX ADMIN — SENNA PLUS 2 TABLET(S): 8.6 TABLET ORAL at 21:37

## 2022-02-01 RX ADMIN — Medication 1 MILLIGRAM(S): at 21:35

## 2022-02-01 RX ADMIN — HYDROMORPHONE HYDROCHLORIDE 1 MILLIGRAM(S): 2 INJECTION INTRAMUSCULAR; INTRAVENOUS; SUBCUTANEOUS at 11:27

## 2022-02-01 RX ADMIN — APIXABAN 2.5 MILLIGRAM(S): 2.5 TABLET, FILM COATED ORAL at 10:57

## 2022-02-01 RX ADMIN — HYDROMORPHONE HYDROCHLORIDE 1 MILLIGRAM(S): 2 INJECTION INTRAMUSCULAR; INTRAVENOUS; SUBCUTANEOUS at 10:58

## 2022-02-01 RX ADMIN — APIXABAN 2.5 MILLIGRAM(S): 2.5 TABLET, FILM COATED ORAL at 21:35

## 2022-02-01 RX ADMIN — Medication 2: at 08:27

## 2022-02-01 RX ADMIN — INSULIN GLARGINE 5 UNIT(S): 100 INJECTION, SOLUTION SUBCUTANEOUS at 21:55

## 2022-02-01 RX ADMIN — OXYCODONE HYDROCHLORIDE 10 MILLIGRAM(S): 5 TABLET ORAL at 09:48

## 2022-02-01 RX ADMIN — TIOTROPIUM BROMIDE 1 CAPSULE(S): 18 CAPSULE ORAL; RESPIRATORY (INHALATION) at 10:46

## 2022-02-01 RX ADMIN — Medication 5 MILLIGRAM(S): at 10:57

## 2022-02-01 RX ADMIN — MEROPENEM 100 MILLIGRAM(S): 1 INJECTION INTRAVENOUS at 21:34

## 2022-02-01 RX ADMIN — POLYETHYLENE GLYCOL 3350 17 GRAM(S): 17 POWDER, FOR SOLUTION ORAL at 11:05

## 2022-02-01 RX ADMIN — Medication 250 MILLIGRAM(S): at 05:22

## 2022-02-01 RX ADMIN — Medication 25 MILLIGRAM(S): at 10:57

## 2022-02-01 RX ADMIN — Medication 2 DROP(S): at 21:35

## 2022-02-01 RX ADMIN — Medication 2 DROP(S): at 14:52

## 2022-02-01 RX ADMIN — Medication 10 MILLIGRAM(S): at 05:25

## 2022-02-01 RX ADMIN — HYDROMORPHONE HYDROCHLORIDE 1 MILLIGRAM(S): 2 INJECTION INTRAMUSCULAR; INTRAVENOUS; SUBCUTANEOUS at 23:41

## 2022-02-01 RX ADMIN — Medication 20 MILLIGRAM(S): at 10:57

## 2022-02-01 RX ADMIN — Medication 1 DROP(S): at 23:32

## 2022-02-01 RX ADMIN — SODIUM CHLORIDE 50 MILLILITER(S): 9 INJECTION INTRAMUSCULAR; INTRAVENOUS; SUBCUTANEOUS at 21:34

## 2022-02-01 RX ADMIN — Medication 2 DROP(S): at 05:24

## 2022-02-01 RX ADMIN — SACUBITRIL AND VALSARTAN 1 TABLET(S): 24; 26 TABLET, FILM COATED ORAL at 21:36

## 2022-02-01 RX ADMIN — Medication 0: at 21:54

## 2022-02-01 RX ADMIN — GABAPENTIN 300 MILLIGRAM(S): 400 CAPSULE ORAL at 14:51

## 2022-02-01 RX ADMIN — SODIUM CHLORIDE 50 MILLILITER(S): 9 INJECTION INTRAMUSCULAR; INTRAVENOUS; SUBCUTANEOUS at 05:25

## 2022-02-01 RX ADMIN — GABAPENTIN 300 MILLIGRAM(S): 400 CAPSULE ORAL at 05:24

## 2022-02-01 NOTE — PROGRESS NOTE ADULT - SUBJECTIVE AND OBJECTIVE BOX
Orthopedics    Patient seen and examined at bedside. Patient states doing about the same when compared to yesterday. Denies fevers/chills.     LABS:                        8.0    9.69  )-----------( 251      ( 31 Jan 2022 06:40 )             26.6     01-31    143  |  117<H>  |  29<H>  ----------------------------<  175<H>  4.6   |  23  |  0.88    Ca    8.7      31 Jan 2022 05:51      PT/INR - ( 30 Jan 2022 06:25 )   PT: 14.7 sec;   INR: 1.28 ratio         PTT - ( 30 Jan 2022 06:25 )  PTT:33.1 sec      VITAL SIGNS:  T(C): 36.7 (01-31-22 @ 22:14), Max: 36.8 (01-31-22 @ 10:11)  HR: 63 (02-01-22 @ 04:00) (59 - 89)  BP: 159/49 (02-01-22 @ 04:00) (133/41 - 163/54)  RR: 15 (02-01-22 @ 04:00) (10 - 16)  SpO2: 92% (02-01-22 @ 04:00) (92% - 99%)    General: NAD, resting comfortably in bed, Alert  RLE:   right ankle, wrapped in kerlix, clean dry and intact  SCD in place contralaterally  No calf tenderness contralaterally  Able to flex/extension all toes slightly moving. Able to DF/PF ankle a few degrees   Less pain with passive DF/PF of ankle when compared to yesterday  toes are WWP      A/P:  86y f s/p right ankle open I&D POD7, sp wound vac removal    -Ulcers/lesions look stable when compared to prior days  -Cx growing pseudomonas, E. faecalis, proteus, bacteroides vulgatus, Porphyromas Somerae  -Clinical exam stable  -CRP Trending down, FU repeats  -FU Vasc Surg recs  -FU ID recs  -PT  -WBAT RLE  -Pain Control  -DVT ppx per primary team, currently Eliquis  -Continue abx per ID  -FU AM Labs  -Rest, ice, compress and elevate the extremity as needed  -ICU care  -Incentive Spirometry  -No ortho surgical intervention planned at this time  -Medical management appreciated   Orthopedics    Patient seen and examined at bedside. Pain controlled with medications. No change compared to prior per patient. Denies fevers/chills, cp, sob, n/v, any other acute complaints. No overnight events per nursing - dressing change during examination.       Vital Signs Last 24 Hrs  T(C): 36.4 (01 Feb 2022 08:39), Max: 36.8 (31 Jan 2022 10:11)  T(F): 97.5 (01 Feb 2022 08:39), Max: 98.3 (31 Jan 2022 10:11)  HR: 69 (01 Feb 2022 08:00) (59 - 89)  BP: 144/55 (01 Feb 2022 08:00) (133/41 - 163/54)  BP(mean): 79 (01 Feb 2022 08:00) (68 - 88)  RR: 16 (01 Feb 2022 08:00) (10 - 18)  SpO2: 96% (01 Feb 2022 08:00) (92% - 99%)                          8.4    9.94  )-----------( 303      ( 01 Feb 2022 06:31 )             27.8         General: NAD, resting comfortably in bed, Alert  RLE:  Right ankle wrapped in kerlix, clean dry and intact  Able to minimally wiggle toes. Very minimal dorsi/plantarflexion present  Less pain with passive DF/PF of ankle when compared to yesterday  Toes are WWP  SCD in place contralaterally  No calf tenderness contralaterally      A/P:  86y f s/p right ankle open I&D POD7, sp wound vac removal    -ICU management appreciated  -Dressing change underway during evaluation this morning, Ulcers/lesions look stable when compared to prior days  -OR Cx: pseudomonas, E. faecalis, proteus, bacteroides vulgatus, Porphyromas Somerae  -Clinical exam stable, aseptic at this time  -Trend CRP  -Vasc Surg recs appreciated; if becomes septic would need to consider amputation  - ID recs appreciated, continue ABx  -PT  -WBAT RLE  -Pain Control PRN  -DVT ppx per primary team: Eliquis  -FU AM Labs  -Rest, ice, compress and elevate the extremity as needed  -Incentive Spirometry  -No further ortho surgical intervention planned at this time. Ortho stable for discharge to [facility] when medically appropriate. Patient to follow up with Dr. Ku as outpatient for further evaluation and treatment.   -Will discuss with attending Dr. Ku and advise if any changes to plan

## 2022-02-01 NOTE — PROGRESS NOTE ADULT - SUBJECTIVE AND OBJECTIVE BOX
Date of service: 02-01-22 @ 10:44    OOB to chair  Able to take a few steps with PT  No fever  Denies pain    ROS: no fever or chills; denies dizziness, no HA, no SOB or cough, no abdominal pain, no diarrhea or constipation; no dysuria, no legs pain    MEDICATIONS  (STANDING):  apixaban 2.5 milliGRAM(s) Oral two times a day  artificial  tears Solution 2 Drop(s) Both EYES three times a day  aspirin  chewable 81 milliGRAM(s) Oral daily  cefepime   IVPB 2000 milliGRAM(s) IV Intermittent every 24 hours  dextrose 40% Gel 15 Gram(s) Oral once  dextrose 40% Gel 15 Gram(s) Oral once  dextrose 5%. 1000 milliLiter(s) (50 mL/Hr) IV Continuous <Continuous>  dextrose 5%. 1000 milliLiter(s) (50 mL/Hr) IV Continuous <Continuous>  dextrose 5%. 1000 milliLiter(s) (100 mL/Hr) IV Continuous <Continuous>  dextrose 5%. 1000 milliLiter(s) (100 mL/Hr) IV Continuous <Continuous>  dextrose 50% Injectable 25 Gram(s) IV Push once  dextrose 50% Injectable 12.5 Gram(s) IV Push once  dextrose 50% Injectable 25 Gram(s) IV Push once  dextrose 50% Injectable 25 Gram(s) IV Push once  dextrose 50% Injectable 12.5 Gram(s) IV Push once  dextrose 50% Injectable 25 Gram(s) IV Push once  folic acid 1 milliGRAM(s) Oral at bedtime  gabapentin 300 milliGRAM(s) Oral three times a day  glucagon  Injectable 1 milliGRAM(s) IntraMuscular once  glucagon  Injectable 1 milliGRAM(s) IntraMuscular once  insulin glargine SubCutaneous Injection (LANTUS) - Peds 5 Unit(s) SubCutaneous at bedtime  insulin lispro (ADMELOG) corrective regimen sliding scale   SubCutaneous three times a day before meals  insulin lispro (ADMELOG) corrective regimen sliding scale   SubCutaneous at bedtime  metoprolol tartrate 25 milliGRAM(s) Oral two times a day  PARoxetine 20 milliGRAM(s) Oral daily  polyethylene glycol 3350 17 Gram(s) Oral daily  predniSONE   Tablet 5 milliGRAM(s) Oral daily  sacubitril 24 mG/valsartan 26 mG 1 Tablet(s) Oral two times a day  senna 2 Tablet(s) Oral at bedtime  sodium chloride 0.9%. 600 milliLiter(s) (50 mL/Hr) IV Continuous <Continuous>  tiotropium 18 MICROgram(s) Capsule 1 Capsule(s) Inhalation daily    Vital Signs Last 24 Hrs  T(C): 36.4 (01 Feb 2022 08:39), Max: 36.7 (31 Jan 2022 16:38)  T(F): 97.5 (01 Feb 2022 08:39), Max: 98.1 (31 Jan 2022 16:38)  HR: 69 (01 Feb 2022 08:00) (59 - 77)  BP: 144/55 (01 Feb 2022 08:00) (133/41 - 163/54)  BP(mean): 79 (01 Feb 2022 08:00) (68 - 88)  RR: 16 (01 Feb 2022 08:00) (10 - 18)  SpO2: 96% (01 Feb 2022 08:00) (92% - 99%)     Physical exam:    Constitutional:  No acute distress  HEENT: NC/AT, EOMI, PERRLA, conjunctivae clear; ears and nose atraumatic  Neck: supple; thyroid not palpable  Back: no tenderness  Respiratory: respiratory effort normal; clear to auscultation  Cardiovascular: S1S2 regular, no murmurs  Abdomen: soft, not tender, not distended, positive BS  Genitourinary: no suprapubic tenderness  Lymphatic: no LN palpable  Musculoskeletal: no muscle tenderness, no joint swelling or tenderness  Extremities: 2+ pedal edema  Right lower extremity wound at the medial malleolus and posterior lower extremity; surrounding erythema improving.  Right malleolar ulcer with necrotic base; granulation  Dusky lateral foot  Neurological/ Psychiatric: AxOx3, judgement and insight normal; moving all extremities  Skin: no rashes; no palpable lesions    Labs: reviewed                        7.0    9.37  )-----------( 223      ( 27 Jan 2022 08:02 )             23.4     01-27    144  |  117<H>  |  46<H>  ----------------------------<  202<H>  4.7   |  22  |  0.99    Ca    8.4<L>      27 Jan 2022 05:11    C-Reactive Protein, Serum: 178 mg/L (01-26-22 @ 05:01)  C-Reactive Protein, Serum: 226 mg/L (01-25-22 @ 05:07)  C-Reactive Protein, Serum: 175 mg/L (01-23-22 @ 11:08)  C-Reactive Protein, Serum: 55 mg/L (01-19-22 @ 01:18)                        8.6    12.26 )-----------( 232      ( 25 Jan 2022 08:39 )             28.0     01-25    142  |  114<H>  |  36<H>  ----------------------------<  103<H>  4.2   |  23  |  0.92    Ca    8.7      25 Jan 2022 05:07    Vancomycin Level, Trough: 20.2 ug/mL (01-24 @ 11:22)  Vancomycin Level, Trough: 21.3 ug/mL (01-24 @ 09:33)    C-Reactive Protein, Serum: 175 mg/L (01-23-22 @ 11:08)  C-Reactive Protein, Serum: 55 mg/L (01-19-22 @ 01:18)                        7.9    10.90 )-----------( 257      ( 22 Jan 2022 05:50 )             27.6     01-23    140  |  111<H>  |  62<H>  ----------------------------<  211<H>  4.5   |  22  |  1.34<H>    Ca    8.6      23 Jan 2022 05:14    Vancomycin Level, Trough: 23.1 ug/mL (01-22 @ 09:57)    C-Reactive Protein, Serum: 55 mg/L (01-19-22 @ 01:18)                        7.9    10.90 )-----------( 257      ( 22 Jan 2022 05:50 )             27.6     01-21    140  |  114<H>  |  54<H>  ----------------------------<  249<H>  5.3   |  22  |  1.37<H>    Ca    8.4<L>      21 Jan 2022 11:58    C-Reactive Protein, Serum: 55 mg/L (01-19-22 @ 01:18)                        7.1    8.99  )-----------( 278      ( 20 Jan 2022 05:51 )             24.1     01-20    141  |  114<H>  |  57<H>  ----------------------------<  177<H>  5.7<H>   |  23  |  1.17    Ca    8.5      20 Jan 2022 05:51  Phos  4.1     01-20  Mg     2.4     01-20    TPro  7.0  /  Alb  2.2<L>  /  TBili  0.2  /  DBili  x   /  AST  10<L>  /  ALT  10<L>  /  AlkPhos  90  01-19    C-Reactive Protein, Serum: 55 mg/L (01-19-22 @ 01:18)                        7.9    10.64 )-----------( 302      ( 19 Jan 2022 07:31 )             26.8     01-19    137  |  109<H>  |  51<H>  ----------------------------<  137<H>  5.2   |  23  |  1.16    Ca    9.4      19 Jan 2022 07:31    TPro  7.0  /  Alb  2.2<L>  /  TBili  0.2  /  DBili  x   /  AST  10<L>  /  ALT  10<L>  /  AlkPhos  90  01-19     LIVER FUNCTIONS - ( 19 Jan 2022 07:31 )  Alb: 2.2 g/dL / Pro: 7.0 gm/dL / ALK PHOS: 90 U/L / ALT: 10 U/L / AST: 10 U/L / GGT: x             Culture - Blood (collected 18 Jan 2022 21:04)  Source: .Blood None  Preliminary Report (20 Jan 2022 05:01):    No growth to date.    Culture - Blood (collected 18 Jan 2022 21:04)  Source: .Blood None  Preliminary Report (20 Jan 2022 05:01):    No growth to date.      Culture - Surgical Swab (collected 25 Jan 2022 15:06)  Source: .Surgical Swab RIGHT ANKLE #3  Preliminary Report (26 Jan 2022 17:36):    Rare Pseudomonas aeruginosa    Culture - Surgical Swab (collected 25 Jan 2022 15:06)  Source: .Surgical Swab RIGHT ANKLE #2  Preliminary Report (26 Jan 2022 17:34):    Rare Proteus mirabilis    Few Pseudomonas aeruginosa    Few Enterococcus faecalis    Culture - Surgical Swab (collected 25 Jan 2022 15:06)  Source: .Surgical Swab RIGHT ANKLE #1  Preliminary Report (26 Jan 2022 17:35):    Rare Pseudomonas aeruginosa    Culture - Blood (collected 18 Jan 2022 21:04)  Source: .Blood None  Final Report (24 Jan 2022 05:00):    No Growth Final    Culture - Blood (collected 18 Jan 2022 21:04)  Source: .Blood None  Final Report (24 Jan 2022 05:00):    No Growth Final    COVID-19 PCR: NotDetec (01-18-22 @ 21:04)    Radiology: all available radiological tests reviewed    XR right ankle: concern for osteomyelitis, hardware present, no fracture or dislocation  CXR: no consolidation, no effusion, no pneumothorax (personally reviewed).     Advanced directives addressed: full resuscitation

## 2022-02-01 NOTE — PROGRESS NOTE ADULT - ASSESSMENT
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 - s/p revascularization and wash out on IV abx - started to improve, cont pain control and iV abx    #Non healing medial right ankle Ulcer - ESR started to improved with IV abx    #DMII (Inuslin dependent) with Hyperglycemia - NEWSOME + HISS    #PAD with 50% CFA stenosis and s/p Left Fem Pop bypass (3/2021) status post right femoral angioplasty and stenting with the right  popliteal angioplasty    - if no proper healing of ulcers and unable to bear weight due to pain amputation may be considered eventually    #Anemia of ABL s/p 1 unit PRBC - monitor HH, stable    GOC Pall consult in progress: DNR DNI    # Hypertension  - Hydralazine PRN for SBP > 160    - lasix on hold post op    # AF - eliquis resumed    # History of COPD, CAD, Type 2 DM on insulin, depression, CHFrEF, HTN, PVD, PAD, s/p left femoral popliteal bypass 3/24/2021, left thigh infections s/p debridement and muscle flap, ICN, lower extremity DVT on Eliquis, RA    - c/w home medications    - continue with the spiriva for the copd     DC planning to rehab if pt is able to participate  Discussed with Dr. 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 - s/p revascularization and wash out on IV abx - started to improve, cont pain control and iV abx    #Non healing medial right ankle Ulcer - ESR started to improved with IV abx    #DMII (Inuslin dependent) with Hyperglycemia - NEWSOME + HISS    #PAD with 50% CFA stenosis and s/p Left Fem Pop bypass (3/2021) status post right femoral angioplasty and stenting with the right  popliteal angioplasty    - if no proper healing of ulcers and unable to bear weight due to pain amputation may be considered eventually    #Anemia of ABL s/p 1 unit PRBC - monitor HH, stable      # Hypertension  - Hydralazine PRN for SBP > 160    - lasix on hold post op    # AF - eliquis resumed    # corneal ulcer  Ocuflox and Erhythro per Ophtalmic     # History of COPD, CAD, Type 2 DM on insulin, depression, CHFrEF, HTN, PVD, PAD, s/p left femoral popliteal bypass 3/24/2021, left thigh infections s/p debridement and muscle flap, ICN, lower extremity DVT on Eliquis, RA    - c/w home medications    - continue with the spiriva for the copd     DC planning to rehab if pt is able to participate  Discussed with Dr. Ventura

## 2022-02-01 NOTE — PROGRESS NOTE ADULT - ASSESSMENT
85 y/o Female with h/o COPD, CAD, Type 2 DM on insulin, depression, CHFrEF, HTN, PVD, PAD, s/p left femoral popliteal bypass 3/24/2021, prior left thigh infections s/p debridement and muscle flap, PAT, lower extremity DVT on Eliquis, RA was admitted on 1/18 for lower leg nonhealing wounds. She first noticed the chronic wound on her right lower extremity about a month ago. She has been completing dressing changes and applying Xeroform. The wound is foul smelling. She has been unable to walk over the last 2 weeks due to increase pressure when placing the foot down. Hanging her lower extremity off the side of the bed helps improve her pain. She also reports occasional diarrhea and lower extremity swelling. Denies headaches, blurry vision, dizziness, fevers, chills. In ER she received cefepime and vancomycin IV.     1. RLE cellulitis with multiple open wound. RIght ankle septic arthritis with PSAE, PRMI and ENspp. Chronic LE stasis dermatitis. PVD s/p angioplasty. Allergy to PCN.  -lower leg erythema improving  -leukocytosis resolved  -BC x 2 and wound cultures noted  -on vancomycin 600 gm IV qd and cefepime 2 gm IV qd # 13  -tolerating abx well so far; no side effects noted  -d/c vancomycin   -elevate legs  -local wound care  -change cefepime to meropenem 1 gm IV q8h  -reason for abx use and side effects reviewed with patient; monitor BMP   -monitor closely in deborah of PCN allergy history  -vascular evaluation appreciated  -monitor temps  -f/u CBC  -supportive care  2. Other issues:   -care per medicine

## 2022-02-01 NOTE — PROGRESS NOTE ADULT - SUBJECTIVE AND OBJECTIVE BOX
CHIEF COMPLAINT: Right ankle pain/Inability to bear weight RLE/Worsening Right ankle ulcer    85 y/o Female with PMH COPD, CAD, DMII on insulin, depression, CHFrEF, HTN, PVD, PAD, s/p left femoral popliteal bypass 3/24/2021, left thigh infections s/p debridement and muscle flap, PAT, lower extremity DVT on Eliquis, RA presents with chronic wounds and pain to the right lower extremity for 2 weeks. She first noticed the chronic wound on her right lower extremity about a month ago. She has been completing dressing changes and applying Xeroform. The wound is foul smelling. She has been unable to walk over the last 2 weeks due to increase pressure when placing the foot down. Hanging her lower extremity off the side of the bed helps improve her pain. She also reports occasional diarrhea and lower extremity swelling. Denies headaches, blurry vision, dizziness, fevers, chills, chest pain, SOB, abdominal pain, N/V, diarrhea/constipation.    ER course:  Imaging:   - XR right ankle: concern for osteomyelitis, hardware present, no fracture or dislocation (personally reviewed).   - CXR: no consolidation, no effusion, no pneumothorax (personally reviewed).   The pt was given Cefepime and Vancomycin, 2L of NS, 10 units of Humalin, Calcium gluconate, Sodium bicarbonate, Lokelma, and Dilaudid in the ER. She is being admitted to med/surg for further management.     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     No change in her status      Vital Signs Last 24 Hrs  T(C): 36.4 (01 Feb 2022 08:39), Max: 36.7 (31 Jan 2022 16:38)  T(F): 97.5 (01 Feb 2022 08:39), Max: 98.1 (31 Jan 2022 16:38)  HR: 58 (01 Feb 2022 12:00) (58 - 77)  BP: 120/58 (01 Feb 2022 12:00) (120/58 - 163/54)  BP(mean): 76 (01 Feb 2022 12:00) (68 - 88)  RR: 10 (01 Feb 2022 12:00) (10 - 18)  SpO2: 97% (01 Feb 2022 12:00) (92% - 100%)    General: elderly frail female in NAD  Head: AT/NC  ENT: Dry Mucous Membranes; No Injury  Neck: Non-tender; No JVD  CVS: S1, S2 reg with II/VI MILADY  Respiratory: CTA B/L; no RRW, Normal Respiratory Effort  Abdomen/GI: Soft, non-tender, non-distended, no guarding, no rebound,  : no bladder distension   Extremities: status post surgery with the ankle wound with the drainage ulcer covered with the dressing   MSK: No CVA tenderness, Normal ROM, No injury  Neuro: AAOx3, gen weakness  Psych: Appropriate, Cooperative,  Skin: postop area in dressing, C/D/I, lateral pressure rell on her distal foot  TTP diffusely on ankle              TTE Echo Complete w/o Contrast w/ Doppler (05.11.21 @ 10:31) >   Mild left ventricular enlargement with with moderate, segmental systolic   dysfunction. The apex and mid to apical anteroseptum and inferoseptum   appear hypokinetic. Estimated left ventricular ejection fraction is 40%.   Mild diastolic dysfunction (stage I).   Moderate mitral stenosis.   Mild aortic regurgitation.

## 2022-02-01 NOTE — PROGRESS NOTE ADULT - SUBJECTIVE AND OBJECTIVE BOX
afebrile, VSS    spent much of day in chair yesterday    pain appears improved    no rest pain    PE  foot adequately perfused appearing  dusky area on lateral foot unchanged  R medial malleolar area with granulating periphery and necrotic base    A/P  stable, slightly improved    exposed ankle joint, not currently septic    conservative care as previously noted  MEDICATIONS  (STANDING):  apixaban 2.5 milliGRAM(s) Oral two times a day  artificial  tears Solution 2 Drop(s) Both EYES three times a day  aspirin  chewable 81 milliGRAM(s) Oral daily  cefepime   IVPB 2000 milliGRAM(s) IV Intermittent every 24 hours  dextrose 40% Gel 15 Gram(s) Oral once  dextrose 40% Gel 15 Gram(s) Oral once  dextrose 5%. 1000 milliLiter(s) (50 mL/Hr) IV Continuous <Continuous>  dextrose 5%. 1000 milliLiter(s) (100 mL/Hr) IV Continuous <Continuous>  dextrose 5%. 1000 milliLiter(s) (50 mL/Hr) IV Continuous <Continuous>  dextrose 5%. 1000 milliLiter(s) (100 mL/Hr) IV Continuous <Continuous>  dextrose 50% Injectable 25 Gram(s) IV Push once  dextrose 50% Injectable 12.5 Gram(s) IV Push once  dextrose 50% Injectable 25 Gram(s) IV Push once  dextrose 50% Injectable 25 Gram(s) IV Push once  dextrose 50% Injectable 12.5 Gram(s) IV Push once  dextrose 50% Injectable 25 Gram(s) IV Push once  folic acid 1 milliGRAM(s) Oral at bedtime  gabapentin 300 milliGRAM(s) Oral three times a day  glucagon  Injectable 1 milliGRAM(s) IntraMuscular once  glucagon  Injectable 1 milliGRAM(s) IntraMuscular once  insulin glargine SubCutaneous Injection (LANTUS) - Peds 5 Unit(s) SubCutaneous at bedtime  insulin lispro (ADMELOG) corrective regimen sliding scale   SubCutaneous at bedtime  insulin lispro (ADMELOG) corrective regimen sliding scale   SubCutaneous three times a day before meals  metoprolol tartrate 25 milliGRAM(s) Oral two times a day  PARoxetine 20 milliGRAM(s) Oral daily  polyethylene glycol 3350 17 Gram(s) Oral daily  predniSONE   Tablet 5 milliGRAM(s) Oral daily  sacubitril 24 mG/valsartan 26 mG 1 Tablet(s) Oral two times a day  senna 2 Tablet(s) Oral at bedtime  sodium chloride 0.9%. 600 milliLiter(s) (50 mL/Hr) IV Continuous <Continuous>  tiotropium 18 MICROgram(s) Capsule 1 Capsule(s) Inhalation daily    MEDICATIONS  (PRN):  acetaminophen     Tablet .. 650 milliGRAM(s) Oral every 6 hours PRN Temp greater or equal to 38C (100.4F), Mild Pain (1 - 3)  ALBUTerol    90 MICROgram(s) HFA Inhaler 1 Puff(s) Inhalation every 6 hours PRN Shortness of Breath  aluminum hydroxide/magnesium hydroxide/simethicone Suspension 30 milliLiter(s) Oral every 4 hours PRN Dyspepsia  hydrALAZINE Injectable 10 milliGRAM(s) IV Push every 6 hours PRN SBP > 160  HYDROmorphone  Injectable 0.5 milliGRAM(s) IV Push every 3 hours PRN Moderate Pain (4 - 6)  HYDROmorphone  Injectable 1 milliGRAM(s) IV Push every 3 hours PRN Severe Pain (7 - 10)  melatonin 3 milliGRAM(s) Oral at bedtime PRN Insomnia  ondansetron Injectable 4 milliGRAM(s) IV Push every 8 hours PRN Nausea and/or Vomiting  oxyCODONE    IR 10 milliGRAM(s) Oral every 4 hours PRN Moderate Pain (4 - 6)      Allergies    cephalosporins (Other)  penicillins (Urticaria; Pruritus)    Intolerances        Flatus: [ ] YES [ ] NO             Bowel Movement: [ ] YES [ ] NO  Pain (0-10):            Pain Control Adequate: [ ] YES [ ] NO  Nausea: [ ] YES [ ] NO            Vomiting: [ ] YES [ ] NO  Diarrhea: [ ] YES [ ] NO         Constipation: [ ] YES [ ] NO     Chest Pain: [ ] YES [ ] NO    SOB:  [ ] YES [ ] NO    Vital Signs Last 24 Hrs  T(C): 36.7 (01 Feb 2022 06:46), Max: 36.8 (31 Jan 2022 10:11)  T(F): 98 (01 Feb 2022 06:46), Max: 98.3 (31 Jan 2022 10:11)  HR: 77 (01 Feb 2022 06:16) (59 - 89)  BP: 156/59 (01 Feb 2022 06:16) (133/41 - 163/54)  BP(mean): 88 (01 Feb 2022 06:16) (68 - 88)  RR: 18 (01 Feb 2022 06:16) (10 - 18)  SpO2: 92% (01 Feb 2022 04:00) (92% - 99%)    I&O's Summary    31 Jan 2022 07:01  -  01 Feb 2022 07:00  --------------------------------------------------------  IN: 0 mL / OUT: 500 mL / NET: -500 mL        Physical Exam:  General: NAD, resting comfortably  Pulmonary: normal resp effort, CTA-B  Cardiovascular: NSR  Abdominal: soft, NT/ND  Extremities: WWP, normal strength  Neuro: A/O x 3, CNs II-XII grossly intact, normal motor/sensation, no focal deficits  Pulses:   Right:                                                                          Left:  FEM [ ]2+ [ ]1+ [ ]doppler                                             FEM [ ]2+ [ ]1+ [ ]doppler    POP [ ]2+ [ ]1+ [ ]doppler                                             POP [ ]2+ [ ]1+ [ ]doppler    DP [ ]2+ [ ]1+ [ ]doppler                                                DP [ ]2+ [ ]1+ [ ]doppler  PT[ ]2+ [ ]1+ [ ]doppler                                                  PT [ ]2+ [ ]1+ [ ]doppler    LABS:                        8.4    9.94  )-----------( 303      ( 01 Feb 2022 06:31 )             27.8     01-31    143  |  117<H>  |  29<H>  ----------------------------<  175<H>  4.6   |  23  |  0.88    Ca    8.7      31 Jan 2022 05:51            CAPILLARY BLOOD GLUCOSE      POCT Blood Glucose.: 199 mg/dL (01 Feb 2022 08:11)  POCT Blood Glucose.: 224 mg/dL (31 Jan 2022 21:32)  POCT Blood Glucose.: 194 mg/dL (31 Jan 2022 16:18)  POCT Blood Glucose.: 166 mg/dL (31 Jan 2022 12:21)  POCT Blood Glucose.: 181 mg/dL (31 Jan 2022 08:45)      RADIOLOGY & ADDITIONAL TESTS:

## 2022-02-02 LAB
GLUCOSE BLDC GLUCOMTR-MCNC: 138 MG/DL — HIGH (ref 70–99)
GLUCOSE BLDC GLUCOMTR-MCNC: 200 MG/DL — HIGH (ref 70–99)
GLUCOSE BLDC GLUCOMTR-MCNC: 273 MG/DL — HIGH (ref 70–99)
SARS-COV-2 RNA SPEC QL NAA+PROBE: SIGNIFICANT CHANGE UP

## 2022-02-02 PROCEDURE — 99233 SBSQ HOSP IP/OBS HIGH 50: CPT

## 2022-02-02 PROCEDURE — 71045 X-RAY EXAM CHEST 1 VIEW: CPT | Mod: 26

## 2022-02-02 PROCEDURE — 99232 SBSQ HOSP IP/OBS MODERATE 35: CPT

## 2022-02-02 RX ORDER — OXYCODONE HYDROCHLORIDE 5 MG/1
1 TABLET ORAL
Qty: 0 | Refills: 0 | DISCHARGE
Start: 2022-02-02

## 2022-02-02 RX ORDER — ACETAMINOPHEN 500 MG
1000 TABLET ORAL EVERY 8 HOURS
Refills: 0 | Status: DISCONTINUED | OUTPATIENT
Start: 2022-02-02 | End: 2022-02-03

## 2022-02-02 RX ORDER — OXYCODONE HYDROCHLORIDE 5 MG/1
5 TABLET ORAL EVERY 4 HOURS
Refills: 0 | Status: DISCONTINUED | OUTPATIENT
Start: 2022-02-02 | End: 2022-02-03

## 2022-02-02 RX ORDER — MELOXICAM 15 MG/1
1 TABLET ORAL
Qty: 0 | Refills: 0 | DISCHARGE

## 2022-02-02 RX ORDER — METHOTREXATE 2.5 MG/1
6 TABLET ORAL
Qty: 0 | Refills: 0 | DISCHARGE

## 2022-02-02 RX ORDER — ACETAMINOPHEN 500 MG
2 TABLET ORAL
Qty: 0 | Refills: 0 | DISCHARGE
Start: 2022-02-02

## 2022-02-02 RX ORDER — POLYETHYLENE GLYCOL 3350 17 G/17G
17 POWDER, FOR SOLUTION ORAL
Qty: 0 | Refills: 0 | DISCHARGE
Start: 2022-02-02

## 2022-02-02 RX ORDER — INSULIN GLARGINE 100 [IU]/ML
5 INJECTION, SOLUTION SUBCUTANEOUS
Qty: 0 | Refills: 0 | DISCHARGE
Start: 2022-02-02

## 2022-02-02 RX ORDER — MEROPENEM 1 G/30ML
1000 INJECTION INTRAVENOUS
Qty: 0 | Refills: 0 | DISCHARGE
Start: 2022-02-02

## 2022-02-02 RX ORDER — ERYTHROMYCIN BASE 5 MG/GRAM
1 OINTMENT (GRAM) OPHTHALMIC (EYE)
Qty: 0 | Refills: 0 | DISCHARGE
Start: 2022-02-02

## 2022-02-02 RX ORDER — OFLOXACIN 0.3 %
1 DROPS OPHTHALMIC (EYE)
Qty: 0 | Refills: 0 | DISCHARGE
Start: 2022-02-02

## 2022-02-02 RX ORDER — SENNA PLUS 8.6 MG/1
2 TABLET ORAL
Qty: 0 | Refills: 0 | DISCHARGE
Start: 2022-02-02

## 2022-02-02 RX ORDER — INSULIN GLARGINE 100 [IU]/ML
12 INJECTION, SOLUTION SUBCUTANEOUS
Qty: 0 | Refills: 0 | DISCHARGE
Start: 2022-02-02

## 2022-02-02 RX ORDER — FUROSEMIDE 40 MG
3 TABLET ORAL
Qty: 0 | Refills: 0 | DISCHARGE

## 2022-02-02 RX ORDER — OXYCODONE HYDROCHLORIDE 5 MG/1
10 TABLET ORAL EVERY 4 HOURS
Refills: 0 | Status: DISCONTINUED | OUTPATIENT
Start: 2022-02-02 | End: 2022-02-03

## 2022-02-02 RX ADMIN — Medication 1 DROP(S): at 23:20

## 2022-02-02 RX ADMIN — MEROPENEM 100 MILLIGRAM(S): 1 INJECTION INTRAVENOUS at 05:48

## 2022-02-02 RX ADMIN — OXYCODONE HYDROCHLORIDE 10 MILLIGRAM(S): 5 TABLET ORAL at 20:00

## 2022-02-02 RX ADMIN — Medication 1 DROP(S): at 16:02

## 2022-02-02 RX ADMIN — SENNA PLUS 2 TABLET(S): 8.6 TABLET ORAL at 21:16

## 2022-02-02 RX ADMIN — SACUBITRIL AND VALSARTAN 1 TABLET(S): 24; 26 TABLET, FILM COATED ORAL at 09:27

## 2022-02-02 RX ADMIN — Medication 2 DROP(S): at 05:48

## 2022-02-02 RX ADMIN — Medication 5 MILLIGRAM(S): at 09:27

## 2022-02-02 RX ADMIN — SACUBITRIL AND VALSARTAN 1 TABLET(S): 24; 26 TABLET, FILM COATED ORAL at 21:15

## 2022-02-02 RX ADMIN — Medication 1 APPLICATION(S): at 05:49

## 2022-02-02 RX ADMIN — Medication 25 MILLIGRAM(S): at 09:27

## 2022-02-02 RX ADMIN — TIOTROPIUM BROMIDE 1 CAPSULE(S): 18 CAPSULE ORAL; RESPIRATORY (INHALATION) at 08:03

## 2022-02-02 RX ADMIN — Medication 6: at 11:58

## 2022-02-02 RX ADMIN — Medication 1 DROP(S): at 21:15

## 2022-02-02 RX ADMIN — Medication 1 DROP(S): at 12:02

## 2022-02-02 RX ADMIN — Medication 1 MILLIGRAM(S): at 21:13

## 2022-02-02 RX ADMIN — Medication 0: at 06:39

## 2022-02-02 RX ADMIN — INSULIN GLARGINE 5 UNIT(S): 100 INJECTION, SOLUTION SUBCUTANEOUS at 21:14

## 2022-02-02 RX ADMIN — MEROPENEM 100 MILLIGRAM(S): 1 INJECTION INTRAVENOUS at 13:17

## 2022-02-02 RX ADMIN — APIXABAN 2.5 MILLIGRAM(S): 2.5 TABLET, FILM COATED ORAL at 09:27

## 2022-02-02 RX ADMIN — Medication 1 APPLICATION(S): at 23:21

## 2022-02-02 RX ADMIN — Medication 20 MILLIGRAM(S): at 09:26

## 2022-02-02 RX ADMIN — Medication 25 MILLIGRAM(S): at 21:15

## 2022-02-02 RX ADMIN — SODIUM CHLORIDE 50 MILLILITER(S): 9 INJECTION INTRAMUSCULAR; INTRAVENOUS; SUBCUTANEOUS at 09:26

## 2022-02-02 RX ADMIN — OXYCODONE HYDROCHLORIDE 10 MILLIGRAM(S): 5 TABLET ORAL at 12:20

## 2022-02-02 RX ADMIN — APIXABAN 2.5 MILLIGRAM(S): 2.5 TABLET, FILM COATED ORAL at 21:13

## 2022-02-02 RX ADMIN — Medication 1 APPLICATION(S): at 12:03

## 2022-02-02 RX ADMIN — GABAPENTIN 300 MILLIGRAM(S): 400 CAPSULE ORAL at 05:48

## 2022-02-02 RX ADMIN — Medication 1 APPLICATION(S): at 17:23

## 2022-02-02 RX ADMIN — GABAPENTIN 300 MILLIGRAM(S): 400 CAPSULE ORAL at 21:16

## 2022-02-02 RX ADMIN — MEROPENEM 100 MILLIGRAM(S): 1 INJECTION INTRAVENOUS at 21:14

## 2022-02-02 RX ADMIN — Medication 2 DROP(S): at 13:17

## 2022-02-02 RX ADMIN — Medication 81 MILLIGRAM(S): at 09:28

## 2022-02-02 RX ADMIN — GABAPENTIN 300 MILLIGRAM(S): 400 CAPSULE ORAL at 13:17

## 2022-02-02 RX ADMIN — Medication 0: at 21:12

## 2022-02-02 RX ADMIN — POLYETHYLENE GLYCOL 3350 17 GRAM(S): 17 POWDER, FOR SOLUTION ORAL at 09:28

## 2022-02-02 RX ADMIN — Medication 2 DROP(S): at 21:22

## 2022-02-02 RX ADMIN — Medication 1 DROP(S): at 09:25

## 2022-02-02 NOTE — PROGRESS NOTE ADULT - SUBJECTIVE AND OBJECTIVE BOX
afebrile, VSS    no significant changes    continue management as currently directed    for discharge to skilled nursing facility    MEDICATIONS  (STANDING):  apixaban 2.5 milliGRAM(s) Oral two times a day  artificial  tears Solution 2 Drop(s) Both EYES three times a day  aspirin  chewable 81 milliGRAM(s) Oral daily  dextrose 40% Gel 15 Gram(s) Oral once  dextrose 40% Gel 15 Gram(s) Oral once  dextrose 5%. 1000 milliLiter(s) (50 mL/Hr) IV Continuous <Continuous>  dextrose 5%. 1000 milliLiter(s) (50 mL/Hr) IV Continuous <Continuous>  dextrose 5%. 1000 milliLiter(s) (100 mL/Hr) IV Continuous <Continuous>  dextrose 5%. 1000 milliLiter(s) (100 mL/Hr) IV Continuous <Continuous>  dextrose 50% Injectable 25 Gram(s) IV Push once  dextrose 50% Injectable 12.5 Gram(s) IV Push once  dextrose 50% Injectable 25 Gram(s) IV Push once  dextrose 50% Injectable 25 Gram(s) IV Push once  dextrose 50% Injectable 12.5 Gram(s) IV Push once  dextrose 50% Injectable 25 Gram(s) IV Push once  erythromycin   Ointment 1 Application(s) Left EYE four times a day  folic acid 1 milliGRAM(s) Oral at bedtime  gabapentin 300 milliGRAM(s) Oral three times a day  glucagon  Injectable 1 milliGRAM(s) IntraMuscular once  glucagon  Injectable 1 milliGRAM(s) IntraMuscular once  insulin glargine SubCutaneous Injection (LANTUS) - Peds 5 Unit(s) SubCutaneous at bedtime  insulin lispro (ADMELOG) corrective regimen sliding scale   SubCutaneous at bedtime  insulin lispro (ADMELOG) corrective regimen sliding scale   SubCutaneous three times a day before meals  meropenem  IVPB 1000 milliGRAM(s) IV Intermittent every 8 hours  metoprolol tartrate 25 milliGRAM(s) Oral two times a day  ofloxacin 0.3% Solution 1 Drop(s) Left EYE five times a day  PARoxetine 20 milliGRAM(s) Oral daily  polyethylene glycol 3350 17 Gram(s) Oral daily  predniSONE   Tablet 5 milliGRAM(s) Oral daily  sacubitril 24 mG/valsartan 26 mG 1 Tablet(s) Oral two times a day  senna 2 Tablet(s) Oral at bedtime  sodium chloride 0.9%. 600 milliLiter(s) (50 mL/Hr) IV Continuous <Continuous>  tiotropium 18 MICROgram(s) Capsule 1 Capsule(s) Inhalation daily    MEDICATIONS  (PRN):  acetaminophen     Tablet .. 650 milliGRAM(s) Oral every 6 hours PRN Temp greater or equal to 38C (100.4F), Mild Pain (1 - 3)  ALBUTerol    90 MICROgram(s) HFA Inhaler 1 Puff(s) Inhalation every 6 hours PRN Shortness of Breath  aluminum hydroxide/magnesium hydroxide/simethicone Suspension 30 milliLiter(s) Oral every 4 hours PRN Dyspepsia  hydrALAZINE Injectable 10 milliGRAM(s) IV Push every 6 hours PRN SBP > 160  HYDROmorphone  Injectable 1 milliGRAM(s) IV Push every 3 hours PRN Severe Pain (7 - 10)  melatonin 3 milliGRAM(s) Oral at bedtime PRN Insomnia  ondansetron Injectable 4 milliGRAM(s) IV Push every 8 hours PRN Nausea and/or Vomiting  oxyCODONE    IR 10 milliGRAM(s) Oral every 4 hours PRN Moderate Pain (4 - 6)      Allergies    cephalosporins (Other)  penicillins (Urticaria; Pruritus)    Intolerances        Flatus: [ ] YES [ ] NO             Bowel Movement: [ ] YES [ ] NO  Pain (0-10):            Pain Control Adequate: [ ] YES [ ] NO  Nausea: [ ] YES [ ] NO            Vomiting: [ ] YES [ ] NO  Diarrhea: [ ] YES [ ] NO         Constipation: [ ] YES [ ] NO     Chest Pain: [ ] YES [ ] NO    SOB:  [ ] YES [ ] NO    Vital Signs Last 24 Hrs  T(C): 36.6 (01 Feb 2022 21:22), Max: 36.6 (01 Feb 2022 16:27)  T(F): 97.8 (01 Feb 2022 21:22), Max: 97.8 (01 Feb 2022 16:27)  HR: 96 (01 Feb 2022 21:22) (58 - 96)  BP: 137/42 (01 Feb 2022 21:22) (113/44 - 153/51)  BP(mean): 65 (01 Feb 2022 16:00) (65 - 82)  RR: 16 (01 Feb 2022 21:22) (10 - 16)  SpO2: 97% (01 Feb 2022 21:22) (95% - 100%)    I&O's Summary    01 Feb 2022 07:01  -  02 Feb 2022 07:00  --------------------------------------------------------  IN: 0 mL / OUT: 450 mL / NET: -450 mL        Physical Exam:  General: NAD, resting comfortably  Pulmonary: normal resp effort, CTA-B  Cardiovascular: NSR  Abdominal: soft, NT/ND  Extremities: WWP, normal strength  Neuro: A/O x 3, CNs II-XII grossly intact, normal motor/sensation, no focal deficits  Pulses:   Right:                                                                          Left:  FEM [ ]2+ [ ]1+ [ ]doppler                                             FEM [ ]2+ [ ]1+ [ ]doppler    POP [ ]2+ [ ]1+ [ ]doppler                                             POP [ ]2+ [ ]1+ [ ]doppler    DP [ ]2+ [ ]1+ [ ]doppler                                                DP [ ]2+ [ ]1+ [ ]doppler  PT[ ]2+ [ ]1+ [ ]doppler                                                  PT [ ]2+ [ ]1+ [ ]doppler    LABS:                        8.4    9.94  )-----------( 303      ( 01 Feb 2022 06:31 )             27.8                 CAPILLARY BLOOD GLUCOSE      POCT Blood Glucose.: 133 mg/dL (02 Feb 2022 06:38)  POCT Blood Glucose.: 157 mg/dL (01 Feb 2022 21:53)  POCT Blood Glucose.: 224 mg/dL (01 Feb 2022 17:12)  POCT Blood Glucose.: 150 mg/dL (01 Feb 2022 10:56)  POCT Blood Glucose.: 199 mg/dL (01 Feb 2022 08:11)      RADIOLOGY & ADDITIONAL TESTS:

## 2022-02-02 NOTE — PROGRESS NOTE ADULT - SUBJECTIVE AND OBJECTIVE BOX
HPI:   1/31/22  Pt seen and examined this am in follow up for sx. Pt feeling better, noting that she has more ROM in her RLE now. She feels pain is well controlled with oxy, noting that she only took two doses yesterday. Counseled pt on taking what she needs and not baring through the pain. She agreed, noting that she is not afraid of addiction- knowing that she will take pain meds when she needs them. She endorsed 4/10 pain in R ankle at time of encounter open to dose of oxy which she says resolves pain when she takes it. Denies side effects. No issues as per RNs.     2/2/22  pt in good spirits  reports pain is doing well   c/w Oxycodone (renewed order for 10mg severe pain, 5mg moderate)   pt looking forward to  breakfast has no further complaints  GoC are clear at this time      PAIN: yes  Location- r ankle  Intensity- mild  Quality- ache  Aggravating Factors- movement/touch  Alleviating Factors- pain meds, rest  Timing- intermittent    DYSPNEA: denies      ROS:  Denies nausea vomiting or sob     Weakness- improving    All other systems reviewed and negative    All other systems reviewed and negative  Unable to obtain/Limited due to:        PHYSICAL EXAM:    Vital Signs Last 24 Hrs  T(C): 36.6 (02 Feb 2022 08:00), Max: 36.6 (01 Feb 2022 16:27)  T(F): 97.9 (02 Feb 2022 08:00), Max: 97.9 (02 Feb 2022 08:00)  HR: 62 (02 Feb 2022 08:00) (58 - 96)  BP: 144/37 (02 Feb 2022 08:00) (113/44 - 144/37)  BP(mean): 65 (01 Feb 2022 16:00) (65 - 76)  RR: 18 (02 Feb 2022 08:00) (10 - 18)  SpO2: 94% (02 Feb 2022 08:00) (94% - 100%)  Daily     Daily     PPSV2:  45 %    General: Elderly female sitting up in bed, pleasant, NAD  HEENT: mmm, AT NC  Lungs:  clear no wheezing or rales   Cardiac: + s1 s2 rrr  GI: soft nt nd +bs  : ivoiding  MSK/skin: moves all extremities, + R ankle wrapped  Neuro: no focal deficits    LABS:                        8.4    9.94  )-----------( 303      ( 01 Feb 2022 06:31 )             27.8             Albumin:     Allergies    cephalosporins (Other)  penicillins (Urticaria; Pruritus)    Intolerances      MEDICATIONS  (STANDING):  apixaban 2.5 milliGRAM(s) Oral two times a day  artificial  tears Solution 2 Drop(s) Both EYES three times a day  aspirin  chewable 81 milliGRAM(s) Oral daily  dextrose 40% Gel 15 Gram(s) Oral once  dextrose 40% Gel 15 Gram(s) Oral once  dextrose 5%. 1000 milliLiter(s) (50 mL/Hr) IV Continuous <Continuous>  dextrose 5%. 1000 milliLiter(s) (100 mL/Hr) IV Continuous <Continuous>  dextrose 5%. 1000 milliLiter(s) (50 mL/Hr) IV Continuous <Continuous>  dextrose 5%. 1000 milliLiter(s) (100 mL/Hr) IV Continuous <Continuous>  dextrose 50% Injectable 25 Gram(s) IV Push once  dextrose 50% Injectable 12.5 Gram(s) IV Push once  dextrose 50% Injectable 25 Gram(s) IV Push once  dextrose 50% Injectable 25 Gram(s) IV Push once  dextrose 50% Injectable 12.5 Gram(s) IV Push once  dextrose 50% Injectable 25 Gram(s) IV Push once  erythromycin   Ointment 1 Application(s) Left EYE four times a day  folic acid 1 milliGRAM(s) Oral at bedtime  gabapentin 300 milliGRAM(s) Oral three times a day  glucagon  Injectable 1 milliGRAM(s) IntraMuscular once  glucagon  Injectable 1 milliGRAM(s) IntraMuscular once  insulin glargine SubCutaneous Injection (LANTUS) - Peds 5 Unit(s) SubCutaneous at bedtime  insulin lispro (ADMELOG) corrective regimen sliding scale   SubCutaneous at bedtime  insulin lispro (ADMELOG) corrective regimen sliding scale   SubCutaneous three times a day before meals  meropenem  IVPB 1000 milliGRAM(s) IV Intermittent every 8 hours  metoprolol tartrate 25 milliGRAM(s) Oral two times a day  ofloxacin 0.3% Solution 1 Drop(s) Left EYE five times a day  PARoxetine 20 milliGRAM(s) Oral daily  polyethylene glycol 3350 17 Gram(s) Oral daily  predniSONE   Tablet 5 milliGRAM(s) Oral daily  sacubitril 24 mG/valsartan 26 mG 1 Tablet(s) Oral two times a day  senna 2 Tablet(s) Oral at bedtime  tiotropium 18 MICROgram(s) Capsule 1 Capsule(s) Inhalation daily    MEDICATIONS  (PRN):  acetaminophen     Tablet .. 650 milliGRAM(s) Oral every 6 hours PRN Temp greater or equal to 38C (100.4F), Mild Pain (1 - 3)  acetaminophen     Tablet .. 1000 milliGRAM(s) Oral every 8 hours PRN Mild Pain (1 - 3)  ALBUTerol    90 MICROgram(s) HFA Inhaler 1 Puff(s) Inhalation every 6 hours PRN Shortness of Breath  aluminum hydroxide/magnesium hydroxide/simethicone Suspension 30 milliLiter(s) Oral every 4 hours PRN Dyspepsia  melatonin 3 milliGRAM(s) Oral at bedtime PRN Insomnia  ondansetron Injectable 4 milliGRAM(s) IV Push every 8 hours PRN Nausea and/or Vomiting  oxyCODONE    IR 10 milliGRAM(s) Oral every 4 hours PRN Severe Pain (7 - 10)  oxyCODONE    IR 5 milliGRAM(s) Oral every 4 hours PRN Moderate Pain (4 - 6)      RADIOLOGY:

## 2022-02-02 NOTE — PROGRESS NOTE ADULT - ASSESSMENT
87 y/o Female with PMH COPD, CAD, DMII on insulin, depression, CHFrEF, HTN, PVD, PAD, s/p left femoral popliteal bypass 3/24/2021, left thigh infections s/p debridement and muscle flap, PAT, lower extremity DVT, now on ppx dose Eliquis, RA on chronic prednisone, presents with chronic wounds and pain to the right lower extremity for 2 weeks. Was seen by Dr Ventura and underwent abdominal aortic angiogram 20-Jan-2022  abdominopelvic, bilateral lower extremity angiogram, angioplasty and stenting of the R common, SFA arteries, angioplasty of R below knee popliteal artery and then selective debridement of wound 25-Jan-2022. Multiple organisms from wound culture, now medically stable for d/c on 6wks iv meropenem.    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 Ventura  -s/p debridement on 1/25/22 with Dr. Ventura  -wound cx with proteus, pseudomonas, enterococcus  -Mereopenem x 6wks per ID  -appreciate vascular (Lorenzo) and orthopedic surgery (Anil Ku) input    #Type 2 DM, insulin dependent  -A1c 10.7  -continue lantus and SSI      #PAD with 50% CFA stenosis and s/p Left Fem Pop bypass (3/2021) status post right femoral angioplasty and stenting with the right  popliteal angioplasty    - if no proper healing of ulcers and unable to bear weight due to pain amputation may be considered eventually    #Anemia of ABL s/p 1 unit PRBC - stable      # Hypertension, HFrEF  -continue home meds excluding lasix    #prior DVT (>6mo ago)  -received course of Eliquis 5 BID, reduced to 2.5mg BID per Dr. Ventura for ppx prior, continue this dose    #corneal ulcer  -drops per ophtho     #DVT ppx- Eliquis    #DNR    #pending line placement and d/c to [facility]

## 2022-02-02 NOTE — PROGRESS NOTE ADULT - SUBJECTIVE AND OBJECTIVE BOX
Orthopedics    Patient seen and examined at bedside. Pain controlled with medications. No change compared to prior per patient. Denies fevers/chills, cp, sob, n/v, any other acute complaints. No overnight events per nursing       Vital Signs Last 24 Hrs  T(C): 36.6 (01 Feb 2022 21:22), Max: 36.7 (01 Feb 2022 06:46)  T(F): 97.8 (01 Feb 2022 21:22), Max: 98 (01 Feb 2022 06:46)  HR: 96 (01 Feb 2022 21:22) (58 - 96)  BP: 137/42 (01 Feb 2022 21:22) (113/44 - 156/59)  BP(mean): 65 (01 Feb 2022 16:00) (65 - 88)  RR: 16 (01 Feb 2022 21:22) (10 - 18)  SpO2: 97% (01 Feb 2022 21:22) (95% - 100%)        General: NAD, resting comfortably in bed, Alert  RLE:  Right ankle wrapped in kerlix, clean dry and intact  Able to minimally wiggle toes. Very minimal dorsi/plantarflexion present  Less pain with passive DF/PF of ankle when compared to yesterday  Toes are WWP  SCD in place contralaterally  No calf tenderness contralaterally      A/P:  86y f s/p right ankle open I&D POD8, sp wound vac removal    -ICU management appreciated  -Dressing change underway during evaluation this morning, Ulcers/lesions look stable when compared to prior days  -OR Cx: pseudomonas, E. faecalis, proteus, bacteroides vulgatus, Porphyromas Somerae  -Clinical exam stable, aseptic at this time  -Trend CRP  -Vasc Surg recs appreciated; rec [facility] at this time, no intervention unless clinical status changes, agree with recs   - ID recs appreciated, continue ABx  -PT  -WBAT RLE  -Pain Control PRN  -DVT ppx per primary team: Eliquis  -FU AM Labs  -Rest, ice, compress and elevate the extremity as needed  -Incentive Spirometry  -No further ortho surgical intervention planned at this time. Ortho stable for discharge to [facility] when medically appropriate. Patient to follow up with Dr. Ku as outpatient for further evaluation and treatment. Plan for outpatient FU with Vasc Sx  -Will discuss with attending Dr. Ku and advise if any changes to plan

## 2022-02-02 NOTE — PROGRESS NOTE ADULT - SUBJECTIVE AND OBJECTIVE BOX
Date of service: 02-02-22 @ 10:46    Lying in bed in NAD  Has right ankle discomfort  No fever    ROS: no fever or chills; denies dizziness, no HA, no SOB or cough, no abdominal pain, no diarrhea or constipation; no dysuria    MEDICATIONS  (STANDING):  apixaban 2.5 milliGRAM(s) Oral two times a day  artificial  tears Solution 2 Drop(s) Both EYES three times a day  aspirin  chewable 81 milliGRAM(s) Oral daily  dextrose 40% Gel 15 Gram(s) Oral once  dextrose 40% Gel 15 Gram(s) Oral once  dextrose 5%. 1000 milliLiter(s) (50 mL/Hr) IV Continuous <Continuous>  dextrose 5%. 1000 milliLiter(s) (50 mL/Hr) IV Continuous <Continuous>  dextrose 5%. 1000 milliLiter(s) (100 mL/Hr) IV Continuous <Continuous>  dextrose 5%. 1000 milliLiter(s) (100 mL/Hr) IV Continuous <Continuous>  dextrose 50% Injectable 25 Gram(s) IV Push once  dextrose 50% Injectable 12.5 Gram(s) IV Push once  dextrose 50% Injectable 25 Gram(s) IV Push once  dextrose 50% Injectable 25 Gram(s) IV Push once  dextrose 50% Injectable 12.5 Gram(s) IV Push once  dextrose 50% Injectable 25 Gram(s) IV Push once  erythromycin   Ointment 1 Application(s) Left EYE four times a day  folic acid 1 milliGRAM(s) Oral at bedtime  gabapentin 300 milliGRAM(s) Oral three times a day  glucagon  Injectable 1 milliGRAM(s) IntraMuscular once  glucagon  Injectable 1 milliGRAM(s) IntraMuscular once  insulin glargine SubCutaneous Injection (LANTUS) - Peds 5 Unit(s) SubCutaneous at bedtime  insulin lispro (ADMELOG) corrective regimen sliding scale   SubCutaneous at bedtime  insulin lispro (ADMELOG) corrective regimen sliding scale   SubCutaneous three times a day before meals  meropenem  IVPB 1000 milliGRAM(s) IV Intermittent every 8 hours  metoprolol tartrate 25 milliGRAM(s) Oral two times a day  ofloxacin 0.3% Solution 1 Drop(s) Left EYE five times a day  PARoxetine 20 milliGRAM(s) Oral daily  polyethylene glycol 3350 17 Gram(s) Oral daily  predniSONE   Tablet 5 milliGRAM(s) Oral daily  sacubitril 24 mG/valsartan 26 mG 1 Tablet(s) Oral two times a day  senna 2 Tablet(s) Oral at bedtime  sodium chloride 0.9%. 600 milliLiter(s) (50 mL/Hr) IV Continuous <Continuous>  tiotropium 18 MICROgram(s) Capsule 1 Capsule(s) Inhalation daily    Vital Signs Last 24 Hrs  T(C): 36.6 (02 Feb 2022 08:00), Max: 36.6 (01 Feb 2022 16:27)  T(F): 97.9 (02 Feb 2022 08:00), Max: 97.9 (02 Feb 2022 08:00)  HR: 62 (02 Feb 2022 08:00) (58 - 96)  BP: 144/37 (02 Feb 2022 08:00) (113/44 - 144/37)  BP(mean): 65 (01 Feb 2022 16:00) (65 - 76)  RR: 18 (02 Feb 2022 08:00) (10 - 18)  SpO2: 94% (02 Feb 2022 08:00) (94% - 100%)     Physical exam:    Constitutional:  No acute distress  HEENT: NC/AT, EOMI, PERRLA, conjunctivae clear; ears and nose atraumatic  Neck: supple; thyroid not palpable  Back: no tenderness  Respiratory: respiratory effort normal; clear to auscultation  Cardiovascular: S1S2 regular, no murmurs  Abdomen: soft, not tender, not distended, positive BS  Genitourinary: no suprapubic tenderness  Lymphatic: no LN palpable  Musculoskeletal: no muscle tenderness, no joint swelling or tenderness  Extremities: 2+ pedal edema  Right lower extremity wound at the medial malleolus and posterior lower extremity; surrounding erythema improving.  Right malleolar ulcer with necrotic base; granulation  Dusky lateral foot  Neurological/ Psychiatric: AxOx3, judgement and insight normal; moving all extremities  Skin: no rashes; no palpable lesions    Labs: reviewed                        8.4    9.94  )-----------( 303      ( 01 Feb 2022 06:31 )             27.8     C-Reactive Protein, Serum: 47 mg/L (01-31-22 @ 05:51)  C-Reactive Protein, Serum: 85 mg/L (01-29-22 @ 06:25)  C-Reactive Protein, Serum: 165 mg/L (01-27-22 @ 05:11)  C-Reactive Protein, Serum: 178 mg/L (01-26-22 @ 05:01)  C-Reactive Protein, Serum: 226 mg/L (01-25-22 @ 05:07)  C-Reactive Protein, Serum: 175 mg/L (01-23-22 @ 11:08)               8.6    12.26 )-----------( 232      ( 25 Jan 2022 08:39 )             28.0     01-25    142  |  114<H>  |  36<H>  ----------------------------<  103<H>  4.2   |  23  |  0.92    Ca    8.7      25 Jan 2022 05:07    Vancomycin Level, Trough: 20.2 ug/mL (01-24 @ 11:22)  Vancomycin Level, Trough: 21.3 ug/mL (01-24 @ 09:33)    C-Reactive Protein, Serum: 175 mg/L (01-23-22 @ 11:08)  C-Reactive Protein, Serum: 55 mg/L (01-19-22 @ 01:18)                        7.9    10.90 )-----------( 257      ( 22 Jan 2022 05:50 )             27.6     01-23    140  |  111<H>  |  62<H>  ----------------------------<  211<H>  4.5   |  22  |  1.34<H>    Ca    8.6      23 Jan 2022 05:14    Vancomycin Level, Trough: 23.1 ug/mL (01-22 @ 09:57)    C-Reactive Protein, Serum: 55 mg/L (01-19-22 @ 01:18)                        7.9    10.90 )-----------( 257      ( 22 Jan 2022 05:50 )             27.6     01-21    140  |  114<H>  |  54<H>  ----------------------------<  249<H>  5.3   |  22  |  1.37<H>    Ca    8.4<L>      21 Jan 2022 11:58    C-Reactive Protein, Serum: 55 mg/L (01-19-22 @ 01:18)                        7.1    8.99  )-----------( 278      ( 20 Jan 2022 05:51 )             24.1     01-20    141  |  114<H>  |  57<H>  ----------------------------<  177<H>  5.7<H>   |  23  |  1.17    Ca    8.5      20 Jan 2022 05:51  Phos  4.1     01-20  Mg     2.4     01-20    TPro  7.0  /  Alb  2.2<L>  /  TBili  0.2  /  DBili  x   /  AST  10<L>  /  ALT  10<L>  /  AlkPhos  90  01-19    C-Reactive Protein, Serum: 55 mg/L (01-19-22 @ 01:18)                        7.9    10.64 )-----------( 302      ( 19 Jan 2022 07:31 )             26.8     01-19    137  |  109<H>  |  51<H>  ----------------------------<  137<H>  5.2   |  23  |  1.16    Ca    9.4      19 Jan 2022 07:31    TPro  7.0  /  Alb  2.2<L>  /  TBili  0.2  /  DBili  x   /  AST  10<L>  /  ALT  10<L>  /  AlkPhos  90  01-19     LIVER FUNCTIONS - ( 19 Jan 2022 07:31 )  Alb: 2.2 g/dL / Pro: 7.0 gm/dL / ALK PHOS: 90 U/L / ALT: 10 U/L / AST: 10 U/L / GGT: x             Culture - Blood (collected 18 Jan 2022 21:04)  Source: .Blood None  Preliminary Report (20 Jan 2022 05:01):    No growth to date.    Culture - Blood (collected 18 Jan 2022 21:04)  Source: .Blood None  Preliminary Report (20 Jan 2022 05:01):    No growth to date.      Culture - Surgical Swab (collected 25 Jan 2022 15:06)  Source: .Surgical Swab RIGHT ANKLE #3  Preliminary Report (26 Jan 2022 17:36):    Rare Pseudomonas aeruginosa    Culture - Surgical Swab (collected 25 Jan 2022 15:06)  Source: .Surgical Swab RIGHT ANKLE #2  Preliminary Report (26 Jan 2022 17:34):    Rare Proteus mirabilis    Few Pseudomonas aeruginosa    Few Enterococcus faecalis    Culture - Surgical Swab (collected 25 Jan 2022 15:06)  Source: .Surgical Swab RIGHT ANKLE #1  Preliminary Report (26 Jan 2022 17:35):    Rare Pseudomonas aeruginosa    Culture - Blood (collected 18 Jan 2022 21:04)  Source: .Blood None  Final Report (24 Jan 2022 05:00):    No Growth Final    Culture - Blood (collected 18 Jan 2022 21:04)  Source: .Blood None  Final Report (24 Jan 2022 05:00):    No Growth Final    COVID-19 PCR: NotDetec (01-18-22 @ 21:04)    Radiology: all available radiological tests reviewed    XR right ankle: concern for osteomyelitis, hardware present, no fracture or dislocation  CXR: no consolidation, no effusion, no pneumothorax (personally reviewed).     Advanced directives addressed: full resuscitation

## 2022-02-02 NOTE — ADVANCED PRACTICE NURSE CONSULT - ASSESSMENT
Patient received awake, alert, oriented x 4, pleasant and cooperative. Risks and benefits of PICC placement explained to patient and verbalizes understanding. All questions answered in detail. Gave verbal and written consent. Time out performed, and patient identified using name, , MRN and account number. Hand hygiene performed by both team members. Site cleansed with chlorhexidine. Draped in sterile fashion. Site verified, and lidocaine 1% subdermal given prior to start of procedure. Using aeseptic MST technique, Navilyst 4F PICC with PASV technology (LOT #[id number]) inserted to right basilic vein with ultrasound guidance, and cut to 40cm. Flushes well with 30ml of NS with brisk blood return present. End cap placed. Line secured to skin using statlock, gauze and DSD applied. Minimal blood loss. Chest x ray ordered to verify placement. Patient tolerated procedure well.

## 2022-02-02 NOTE — PROGRESS NOTE ADULT - SUBJECTIVE AND OBJECTIVE BOX
HOSPITALIST ATTENDING PROGRESS NOTE    Chart and meds reviewed.  Patient seen and examined.    CC: RLE wound    Subjective: Pt cleared for d/c by vascular and ortho. Reports pain improved with oxy prn. Amenable to rehab at [facility].    All other systems reviewed and found to be negative with the exception of what has been described above.    MEDICATIONS  (STANDING):  apixaban 2.5 milliGRAM(s) Oral two times a day  artificial  tears Solution 2 Drop(s) Both EYES three times a day  aspirin  chewable 81 milliGRAM(s) Oral daily  dextrose 40% Gel 15 Gram(s) Oral once  dextrose 40% Gel 15 Gram(s) Oral once  dextrose 5%. 1000 milliLiter(s) (50 mL/Hr) IV Continuous <Continuous>  dextrose 5%. 1000 milliLiter(s) (100 mL/Hr) IV Continuous <Continuous>  dextrose 5%. 1000 milliLiter(s) (50 mL/Hr) IV Continuous <Continuous>  dextrose 5%. 1000 milliLiter(s) (100 mL/Hr) IV Continuous <Continuous>  dextrose 50% Injectable 25 Gram(s) IV Push once  dextrose 50% Injectable 25 Gram(s) IV Push once  dextrose 50% Injectable 12.5 Gram(s) IV Push once  dextrose 50% Injectable 25 Gram(s) IV Push once  dextrose 50% Injectable 12.5 Gram(s) IV Push once  dextrose 50% Injectable 25 Gram(s) IV Push once  erythromycin   Ointment 1 Application(s) Left EYE four times a day  folic acid 1 milliGRAM(s) Oral at bedtime  gabapentin 300 milliGRAM(s) Oral three times a day  glucagon  Injectable 1 milliGRAM(s) IntraMuscular once  glucagon  Injectable 1 milliGRAM(s) IntraMuscular once  insulin glargine SubCutaneous Injection (LANTUS) - Peds 5 Unit(s) SubCutaneous at bedtime  insulin lispro (ADMELOG) corrective regimen sliding scale   SubCutaneous at bedtime  insulin lispro (ADMELOG) corrective regimen sliding scale   SubCutaneous three times a day before meals  meropenem  IVPB 1000 milliGRAM(s) IV Intermittent every 8 hours  metoprolol tartrate 25 milliGRAM(s) Oral two times a day  ofloxacin 0.3% Solution 1 Drop(s) Left EYE five times a day  PARoxetine 20 milliGRAM(s) Oral daily  polyethylene glycol 3350 17 Gram(s) Oral daily  predniSONE   Tablet 5 milliGRAM(s) Oral daily  sacubitril 24 mG/valsartan 26 mG 1 Tablet(s) Oral two times a day  senna 2 Tablet(s) Oral at bedtime  tiotropium 18 MICROgram(s) Capsule 1 Capsule(s) Inhalation daily    MEDICATIONS  (PRN):  acetaminophen     Tablet .. 1000 milliGRAM(s) Oral every 8 hours PRN Mild Pain (1 - 3)  ALBUTerol    90 MICROgram(s) HFA Inhaler 1 Puff(s) Inhalation every 6 hours PRN Shortness of Breath  aluminum hydroxide/magnesium hydroxide/simethicone Suspension 30 milliLiter(s) Oral every 4 hours PRN Dyspepsia  melatonin 3 milliGRAM(s) Oral at bedtime PRN Insomnia  oxyCODONE    IR 10 milliGRAM(s) Oral every 4 hours PRN Severe Pain (7 - 10)  oxyCODONE    IR 5 milliGRAM(s) Oral every 4 hours PRN Moderate Pain (4 - 6)      VITALS:  T(F): 97.9 (02-02-22 @ 08:00), Max: 97.9 (02-02-22 @ 08:00)  HR: 62 (02-02-22 @ 08:00) (62 - 96)  BP: 144/37 (02-02-22 @ 08:00) (113/44 - 144/37)  RR: 18 (02-02-22 @ 08:00) (14 - 18)  SpO2: 94% (02-02-22 @ 08:00) (94% - 100%)  Wt(kg): --    I&O's Summary    01 Feb 2022 07:01  -  02 Feb 2022 07:00  --------------------------------------------------------  IN: 0 mL / OUT: 450 mL / NET: -450 mL        CAPILLARY BLOOD GLUCOSE      POCT Blood Glucose.: 273 mg/dL (02 Feb 2022 11:47)  POCT Blood Glucose.: 133 mg/dL (02 Feb 2022 06:38)  POCT Blood Glucose.: 157 mg/dL (01 Feb 2022 21:53)  POCT Blood Glucose.: 224 mg/dL (01 Feb 2022 17:12)      PHYSICAL EXAM:  Gen: NAD  HEENT:  pupils equal and reactive, EOMI, no oropharyngeal lesions, erythema, exudates, oral thrush  NECK:   supple, no carotid bruits, no palpable lymph nodes, no thyromegaly  CV:  +S1, +S2, regular, no murmurs or rubs  RESP:   lungs clear to auscultation bilaterally, no wheezing, rales, rhonchi, good air entry bilaterally  BREAST:  not examined  GI:  abdomen soft, non-tender, non-distended, normal BS, no bruits, no abdominal masses, no palpable masses  RECTAL:  not examined  :  not examined  MSK:   normal muscle tone, no atrophy, no rigidity, no contractions  EXT:  no clubbing, no cyanosis, RLE wrapped  VASCULAR:  pulses equal and symmetric in the upper and lower extremities  NEURO:  AAOX3, no focal neurological deficits, follows all commands, able to move extremities spontaneously  SKIN:  no ulcers, lesions or rashes    LABS:                            8.4    9.94  )-----------( 303      ( 01 Feb 2022 06:31 )             27.8     CULTURES:  Surgical cx 1/25/22: Proteus, pseudomonas, enterococcus    Additional results/Imaging, I have personally reviewed:  R ankle MRI 1/19/22: 1.  Medial ankle skin wound with subjacent edema and soft tissue swelling compatible cellulitis. No drainable fluid collection. 2.  Abnormal marrow signal within the medial malleolus and medial tibia concerning osteomyelitis given the overlying skin wound. 3.  Small ankle joint effusion. Nonspecific finding. Aspiration can be performed for further evaluation.    Echo 1/24/22:  Fibrocalcific changes noted to the mitral valve leaflets with preserved leaflet excursion.Moderate mitral annular calcification is present. Mild (1+) mitral regurgitation is present. EA reversal of the mitral inflow consistent with reduced compliance of the left ventricle. Fibrocalcific changes noted to the Aortic valve leaflets with preserved leaflet excursion. Mild to Moderate aortic regurgitation is present. The tricuspid valve leaflets are thin and pliable; valve motion is normal. Trace tricuspid valve regurgitation is present. Pulmonic valve not well seen, probably normal pulmonic valve function. The left atrium is mildly dilated. Estimated left ventricular ejection fraction is 45-50 %. Mild concentric left ventricular hypertrophy is present. The right atrium is normal. The right ventricle is normal in size. The IVC appears normal.    B/l LE venous doppler 1/19/22: No evidence of deep venous thrombosis in either lower extremity.    B/l LE arterial doppler 1/29/22: Greater than 50% stenosis within the right common femoral artery with distal abnormal tardus parvus flow throughout the right lower extremity. Nonvisualization of the right peroneal and dorsalis pedis arteries. Patent left femoral-popliteal bypass graft. Left calf arteries and dorsalis pedis arteries are not visualized. There is a 6.3 cm fluid collection along the distal aspect of the left femoropopliteal bypass graft, likely a seroma.    CXR, R ankle xray 1/18/22: Disuse osteoporosis. Healed fracture distal fibula. Mild ankle degeneration. Clear lungs.

## 2022-02-02 NOTE — PROGRESS NOTE ADULT - ASSESSMENT
87 y/o F COPD CAD DMII CHFrEF, HTN, PGVD, PAD sp femoral popliteal bypass now w/ right lower extremity OM. 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   -continue with the cefepime and vancomycin and follow up of the vancomycin levels closely   - s/p debridement    3) Acute on chronic anemia  - 2 units given of PRBCs  -moniotr H/H     4) Delirium PPX   c/w melatonin 3mg qhs, to promote maintenance of circadian rhythm/restful sleep, and for ppx of future susceptibility to delirium upon resolution of presenting condition.  Avoid deliriogenic agents including BZD, anticholinergics, and sedating agents when possible  Re-orient frequently, encourage family at bedside as able. Early mobilization recommended if feasible.    5) Bowel Regimen  denies constipation  risk for constipation d/t immobility  senna and Miralax daily    6) Goals of Care/Advanced Directives:   Clear GoC at this time  DNR DNI w/ agressive disease oriented treatment       -Capacity- pt w/ capacity  HCP/Surrogate: Nirali [phone]  Code Status- DNR DNI  MOLST- completed by primary team  Dispo Plan-  planning in process  Discussed With: Case coordinated with RN     PARISH Clear at this time, symptoms well managed will sign off please reconsult PRN, Thank you!

## 2022-02-02 NOTE — PROGRESS NOTE ADULT - ASSESSMENT
85 y/o Female with h/o COPD, CAD, Type 2 DM on insulin, depression, CHFrEF, HTN, PVD, PAD, s/p left femoral popliteal bypass 3/24/2021, prior left thigh infections s/p debridement and muscle flap, PAT, lower extremity DVT on Eliquis, RA was admitted on 1/18 for lower leg nonhealing wounds. She first noticed the chronic wound on her right lower extremity about a month ago. She has been completing dressing changes and applying Xeroform. The wound is foul smelling. She has been unable to walk over the last 2 weeks due to increase pressure when placing the foot down. Hanging her lower extremity off the side of the bed helps improve her pain. She also reports occasional diarrhea and lower extremity swelling. Denies headaches, blurry vision, dizziness, fevers, chills. In ER she received cefepime and vancomycin IV.     1. RLE cellulitis with multiple open wound. RIght ankle septic arthritis with PSAE, PRMI and ENspp. Chronic LE stasis dermatitis. PVD s/p angioplasty. Allergy to PCN.  -lower leg erythema improving  -leukocytosis resolved  -BC x 2 and wound cultures noted  -s/p vancomycin 600 gm IV qd and cefepime 2 gm IV qd # 13  -on  meropenem 1 gm IV q8h # 2  -tolerating abx well so far; no side effects noted  -elevate legs  -local wound care  -monitor closely in deborah of PCN allergy history  -vascular evaluation appreciated  -continue abx coverage   -she may need longer term IV abx therapy to allow wound to heal  -monitor temps  -f/u CBC  -supportive care  2. Other issues:   -care per medicine

## 2022-02-02 NOTE — PROGRESS NOTE ADULT - NUTRITIONAL ASSESSMENT
This patient has been assessed with a concern for Malnutrition and has been determined to have a diagnosis/diagnoses of Moderate protein-calorie malnutrition.    This patient is being managed with:   Diet Consistent Carbohydrate/No Snacks-  Entered: Jan 19 2022  3:54AM    
This patient has been assessed with a concern for Malnutrition and has been determined to have a diagnosis/diagnoses of Moderate protein-calorie malnutrition.    This patient is being managed with:   Diet Consistent Carbohydrate/No Snacks-  Entered: Jan 20 2022  5:01PM    Diet NPO after Midnight-     NPO Start Date: 19-Jan-2022   NPO Start Time: 23:59  Except Medications  Entered: Jan 19 2022  9:55PM    
This patient has been assessed with a concern for Malnutrition and has been determined to have a diagnosis/diagnoses of Moderate protein-calorie malnutrition.    This patient is being managed with:   Diet Consistent Carbohydrate/No Snacks-  Entered: Jan 20 2022  5:01PM    Diet NPO after Midnight-     NPO Start Date: 19-Jan-2022   NPO Start Time: 23:59  Except Medications  Entered: Jan 19 2022  9:55PM    
This patient has been assessed with a concern for Malnutrition and has been determined to have a diagnosis/diagnoses of Moderate protein-calorie malnutrition.    This patient is being managed with:   Diet Consistent Carbohydrate/No Snacks-  Entered: Jan 28 2022  9:37AM    
This patient has been assessed with a concern for Malnutrition and has been determined to have a diagnosis/diagnoses of Moderate protein-calorie malnutrition.    This patient is being managed with:   Diet DASH/TLC-  Sodium & Cholesterol Restricted  Consistent Carbohydrate {Evening Snack} (CSTCHOSN)  Entered: Feb 2 2022 10:59AM    
This patient has been assessed with a concern for Malnutrition and has been determined to have a diagnosis/diagnoses of Moderate protein-calorie malnutrition.    This patient is being managed with:   Diet NPO after Midnight-     NPO Start Date: 24-Jan-2022   NPO Start Time: 23:59  Except Medications  Entered: Jan 24 2022  7:43AM    Diet Consistent Carbohydrate/No Snacks-  Entered: Jan 20 2022  5:01PM    
This patient has been assessed with a concern for Malnutrition and has been determined to have a diagnosis/diagnoses of Moderate protein-calorie malnutrition.    This patient is being managed with:   Diet Consistent Carbohydrate/No Snacks-  Entered: Jan 20 2022  5:01PM    Diet NPO after Midnight-     NPO Start Date: 19-Jan-2022   NPO Start Time: 23:59  Except Medications  Entered: Jan 19 2022  9:55PM    
This patient has been assessed with a concern for Malnutrition and has been determined to have a diagnosis/diagnoses of Moderate protein-calorie malnutrition.    This patient is being managed with:   Diet Consistent Carbohydrate/No Snacks-  Entered: Jan 28 2022  9:37AM    
This patient has been assessed with a concern for Malnutrition and has been determined to have a diagnosis/diagnoses of Moderate protein-calorie malnutrition.    This patient is being managed with:   Diet NPO-  Except Medications  Entered: Jan 20 2022 12:46AM    Diet NPO after Midnight-     NPO Start Date: 19-Jan-2022   NPO Start Time: 23:59  Except Medications  Entered: Jan 19 2022  9:55PM    
This patient has been assessed with a concern for Malnutrition and has been determined to have a diagnosis/diagnoses of Moderate protein-calorie malnutrition.    This patient is being managed with:   Diet Consistent Carbohydrate/No Snacks-  Entered: Jan 20 2022  5:01PM    Diet NPO after Midnight-     NPO Start Date: 19-Jan-2022   NPO Start Time: 23:59  Except Medications  Entered: Jan 19 2022  9:55PM    
This patient has been assessed with a concern for Malnutrition and has been determined to have a diagnosis/diagnoses of Moderate protein-calorie malnutrition.    This patient is being managed with:   Diet Consistent Carbohydrate/No Snacks-  Entered: Jan 28 2022  9:37AM    
This patient has been assessed with a concern for Malnutrition and has been determined to have a diagnosis/diagnoses of Moderate protein-calorie malnutrition.    This patient is being managed with:   Diet Consistent Carbohydrate/No Snacks-  Entered: Jan 25 2022  5:47PM    
This patient has been assessed with a concern for Malnutrition and has been determined to have a diagnosis/diagnoses of Moderate protein-calorie malnutrition.    This patient is being managed with:   Diet Consistent Carbohydrate/No Snacks-  Entered: Jan 28 2022  9:37AM    
This patient has been assessed with a concern for Malnutrition and has been determined to have a diagnosis/diagnoses of Moderate protein-calorie malnutrition.    This patient is being managed with:   Diet Consistent Carbohydrate/No Snacks-  Entered: Jan 20 2022  5:01PM    Diet NPO after Midnight-     NPO Start Date: 19-Jan-2022   NPO Start Time: 23:59  Except Medications  Entered: Jan 19 2022  9:55PM    
This patient has been assessed with a concern for Malnutrition and has been determined to have a diagnosis/diagnoses of Moderate protein-calorie malnutrition.    This patient is being managed with:   Diet Consistent Carbohydrate/No Snacks-  Entered: Jan 25 2022  5:47PM    
This patient has been assessed with a concern for Malnutrition and has been determined to have a diagnosis/diagnoses of Moderate protein-calorie malnutrition.    This patient is being managed with:   Diet Consistent Carbohydrate/No Snacks-  Entered: Jan 25 2022  5:47PM    
This patient has been assessed with a concern for Malnutrition and has been determined to have a diagnosis/diagnoses of Moderate protein-calorie malnutrition.    This patient is being managed with:   Diet Consistent Carbohydrate/No Snacks-  Entered: Jan 28 2022  9:37AM    
This patient has been assessed with a concern for Malnutrition and has been determined to have a diagnosis/diagnoses of Moderate protein-calorie malnutrition.    This patient is being managed with:   Diet NPO after Midnight-     NPO Start Date: 24-Jan-2022   NPO Start Time: 23:59  Except Medications  Entered: Jan 24 2022  7:43AM    Diet Consistent Carbohydrate/No Snacks-  Entered: Jan 20 2022  5:01PM

## 2022-02-03 ENCOUNTER — TRANSCRIPTION ENCOUNTER (OUTPATIENT)
Age: 87
End: 2022-02-03

## 2022-02-03 VITALS
RESPIRATION RATE: 18 BRPM | TEMPERATURE: 98 F | HEART RATE: 71 BPM | OXYGEN SATURATION: 90 % | DIASTOLIC BLOOD PRESSURE: 48 MMHG | SYSTOLIC BLOOD PRESSURE: 155 MMHG

## 2022-02-03 LAB
ANION GAP SERPL CALC-SCNC: 5 MMOL/L — SIGNIFICANT CHANGE UP (ref 5–17)
BUN SERPL-MCNC: 29 MG/DL — HIGH (ref 7–23)
CALCIUM SERPL-MCNC: 8.7 MG/DL — SIGNIFICANT CHANGE UP (ref 8.5–10.1)
CHLORIDE SERPL-SCNC: 116 MMOL/L — HIGH (ref 96–108)
CO2 SERPL-SCNC: 22 MMOL/L — SIGNIFICANT CHANGE UP (ref 22–31)
CREAT SERPL-MCNC: 0.89 MG/DL — SIGNIFICANT CHANGE UP (ref 0.5–1.3)
GLUCOSE BLDC GLUCOMTR-MCNC: 146 MG/DL — HIGH (ref 70–99)
GLUCOSE SERPL-MCNC: 147 MG/DL — HIGH (ref 70–99)
HCT VFR BLD CALC: 25.8 % — LOW (ref 34.5–45)
HGB BLD-MCNC: 7.7 G/DL — LOW (ref 11.5–15.5)
MCHC RBC-ENTMCNC: 29.2 PG — SIGNIFICANT CHANGE UP (ref 27–34)
MCHC RBC-ENTMCNC: 29.8 GM/DL — LOW (ref 32–36)
MCV RBC AUTO: 97.7 FL — SIGNIFICANT CHANGE UP (ref 80–100)
PLATELET # BLD AUTO: 333 K/UL — SIGNIFICANT CHANGE UP (ref 150–400)
POTASSIUM SERPL-MCNC: 4.5 MMOL/L — SIGNIFICANT CHANGE UP (ref 3.5–5.3)
POTASSIUM SERPL-SCNC: 4.5 MMOL/L — SIGNIFICANT CHANGE UP (ref 3.5–5.3)
RBC # BLD: 2.64 M/UL — LOW (ref 3.8–5.2)
RBC # FLD: 15.6 % — HIGH (ref 10.3–14.5)
SODIUM SERPL-SCNC: 143 MMOL/L — SIGNIFICANT CHANGE UP (ref 135–145)
WBC # BLD: 9.96 K/UL — SIGNIFICANT CHANGE UP (ref 3.8–10.5)
WBC # FLD AUTO: 9.96 K/UL — SIGNIFICANT CHANGE UP (ref 3.8–10.5)

## 2022-02-03 PROCEDURE — 99239 HOSP IP/OBS DSCHRG MGMT >30: CPT

## 2022-02-03 RX ADMIN — Medication 81 MILLIGRAM(S): at 09:26

## 2022-02-03 RX ADMIN — Medication 1 APPLICATION(S): at 05:37

## 2022-02-03 RX ADMIN — Medication 25 MILLIGRAM(S): at 09:25

## 2022-02-03 RX ADMIN — GABAPENTIN 300 MILLIGRAM(S): 400 CAPSULE ORAL at 05:38

## 2022-02-03 RX ADMIN — APIXABAN 2.5 MILLIGRAM(S): 2.5 TABLET, FILM COATED ORAL at 09:28

## 2022-02-03 RX ADMIN — OXYCODONE HYDROCHLORIDE 10 MILLIGRAM(S): 5 TABLET ORAL at 03:04

## 2022-02-03 RX ADMIN — Medication 20 MILLIGRAM(S): at 09:20

## 2022-02-03 RX ADMIN — SACUBITRIL AND VALSARTAN 1 TABLET(S): 24; 26 TABLET, FILM COATED ORAL at 09:20

## 2022-02-03 RX ADMIN — Medication 0: at 06:40

## 2022-02-03 RX ADMIN — MEROPENEM 100 MILLIGRAM(S): 1 INJECTION INTRAVENOUS at 05:38

## 2022-02-03 RX ADMIN — Medication 2 DROP(S): at 05:38

## 2022-02-03 RX ADMIN — POLYETHYLENE GLYCOL 3350 17 GRAM(S): 17 POWDER, FOR SOLUTION ORAL at 09:23

## 2022-02-03 RX ADMIN — Medication 5 MILLIGRAM(S): at 09:21

## 2022-02-03 RX ADMIN — TIOTROPIUM BROMIDE 1 CAPSULE(S): 18 CAPSULE ORAL; RESPIRATORY (INHALATION) at 08:37

## 2022-02-03 RX ADMIN — Medication 1 DROP(S): at 09:22

## 2022-02-03 NOTE — DISCHARGE NOTE NURSING/CASE MANAGEMENT/SOCIAL WORK - NSDCPEFALRISK_GEN_ALL_CORE
For information on Fall & Injury Prevention, visit: [email or web address] OR  [email or web address] OR  https://www.cdc.gov/steadi/patient.html

## 2022-02-03 NOTE — PROGRESS NOTE ADULT - SUBJECTIVE AND OBJECTIVE BOX
MEDICATIONS  (STANDING):  apixaban 2.5 milliGRAM(s) Oral two times a day  artificial  tears Solution 2 Drop(s) Both EYES three times a day  aspirin  chewable 81 milliGRAM(s) Oral daily  dextrose 40% Gel 15 Gram(s) Oral once  dextrose 40% Gel 15 Gram(s) Oral once  dextrose 5%. 1000 milliLiter(s) (50 mL/Hr) IV Continuous <Continuous>  dextrose 5%. 1000 milliLiter(s) (100 mL/Hr) IV Continuous <Continuous>  dextrose 5%. 1000 milliLiter(s) (50 mL/Hr) IV Continuous <Continuous>  dextrose 5%. 1000 milliLiter(s) (100 mL/Hr) IV Continuous <Continuous>  dextrose 50% Injectable 25 Gram(s) IV Push once  dextrose 50% Injectable 12.5 Gram(s) IV Push once  dextrose 50% Injectable 25 Gram(s) IV Push once  dextrose 50% Injectable 12.5 Gram(s) IV Push once  dextrose 50% Injectable 25 Gram(s) IV Push once  dextrose 50% Injectable 25 Gram(s) IV Push once  erythromycin   Ointment 1 Application(s) Left EYE four times a day  folic acid 1 milliGRAM(s) Oral at bedtime  gabapentin 300 milliGRAM(s) Oral three times a day  glucagon  Injectable 1 milliGRAM(s) IntraMuscular once  glucagon  Injectable 1 milliGRAM(s) IntraMuscular once  insulin glargine SubCutaneous Injection (LANTUS) - Peds 5 Unit(s) SubCutaneous at bedtime  insulin lispro (ADMELOG) corrective regimen sliding scale   SubCutaneous three times a day before meals  insulin lispro (ADMELOG) corrective regimen sliding scale   SubCutaneous at bedtime  meropenem  IVPB 1000 milliGRAM(s) IV Intermittent every 8 hours  metoprolol tartrate 25 milliGRAM(s) Oral two times a day  ofloxacin 0.3% Solution 1 Drop(s) Left EYE five times a day  PARoxetine 20 milliGRAM(s) Oral daily  polyethylene glycol 3350 17 Gram(s) Oral daily  predniSONE   Tablet 5 milliGRAM(s) Oral daily  sacubitril 24 mG/valsartan 26 mG 1 Tablet(s) Oral two times a day  senna 2 Tablet(s) Oral at bedtime  tiotropium 18 MICROgram(s) Capsule 1 Capsule(s) Inhalation daily    MEDICATIONS  (PRN):  acetaminophen     Tablet .. 1000 milliGRAM(s) Oral every 8 hours PRN Mild Pain (1 - 3)  ALBUTerol    90 MICROgram(s) HFA Inhaler 1 Puff(s) Inhalation every 6 hours PRN Shortness of Breath  aluminum hydroxide/magnesium hydroxide/simethicone Suspension 30 milliLiter(s) Oral every 4 hours PRN Dyspepsia  melatonin 3 milliGRAM(s) Oral at bedtime PRN Insomnia  oxyCODONE    IR 10 milliGRAM(s) Oral every 4 hours PRN Severe Pain (7 - 10)  oxyCODONE    IR 5 milliGRAM(s) Oral every 4 hours PRN Moderate Pain (4 - 6)      Allergies    cephalosporins (Other)  penicillins (Urticaria; Pruritus)    Intolerances        Flatus: [ ] YES [ ] NO             Bowel Movement: [ ] YES [ ] NO  Pain (0-10):            Pain Control Adequate: [ ] YES [ ] NO  Nausea: [ ] YES [ ] NO            Vomiting: [ ] YES [ ] NO  Diarrhea: [ ] YES [ ] NO         Constipation: [ ] YES [ ] NO     Chest Pain: [ ] YES [ ] NO    SOB:  [ ] YES [ ] NO    Vital Signs Last 24 Hrs  T(C): 37 (02 Feb 2022 21:00), Max: 37.3 (02 Feb 2022 16:16)  T(F): 98.6 (02 Feb 2022 21:00), Max: 99.1 (02 Feb 2022 16:16)  HR: 90 (02 Feb 2022 21:00) (62 - 90)  BP: 153/45 (02 Feb 2022 21:00) (144/37 - 155/41)  BP(mean): --  RR: 16 (02 Feb 2022 21:00) (16 - 18)  SpO2: 92% (02 Feb 2022 21:00) (92% - 94%)    I&O's Summary  afebrile, VSS    appears more comfortable although still pain when R leg/foot moved    PE  toes pink, warm and well perfused appearing  dressing stained over medial malleolar area    A/P  infected R ankle joint with adequately perfused R foot    discussed again with patient that should she deteriorate (sepsis, pain unmanageable), I will recommend a major amputation.   She understood our conversation.  She will follow up as out patient.     She is dischargeable from vascular standpoint.   Physical Exam:  General: NAD, resting comfortably  Pulmonary: normal resp effort, CTA-B  Cardiovascular: NSR  Abdominal: soft, NT/ND  Extremities: WWP, normal strength  Neuro: A/O x 3, CNs II-XII grossly intact, normal motor/sensation, no focal deficits  Pulses:   Right:                                                                          Left:  FEM [ ]2+ [ ]1+ [ ]doppler                                             FEM [ ]2+ [ ]1+ [ ]doppler    POP [ ]2+ [ ]1+ [ ]doppler                                             POP [ ]2+ [ ]1+ [ ]doppler    DP [ ]2+ [ ]1+ [ ]doppler                                                DP [ ]2+ [ ]1+ [ ]doppler  PT[ ]2+ [ ]1+ [ ]doppler                                                  PT [ ]2+ [ ]1+ [ ]doppler    LABS:                CAPILLARY BLOOD GLUCOSE      POCT Blood Glucose.: 146 mg/dL (03 Feb 2022 06:31)  POCT Blood Glucose.: 200 mg/dL (02 Feb 2022 21:10)  POCT Blood Glucose.: 138 mg/dL (02 Feb 2022 16:08)  POCT Blood Glucose.: 273 mg/dL (02 Feb 2022 11:47)      RADIOLOGY & ADDITIONAL TESTS:

## 2022-02-03 NOTE — DISCHARGE NOTE NURSING/CASE MANAGEMENT/SOCIAL WORK - PATIENT PORTAL LINK FT
You can access the FollowMyHealth Patient Portal offered by [facility] by registering at the following website: [email or web address]. By joining [employer]’s FollowMyHealth portal, you will also be able to view your health information using other applications (apps) compatible with our system.

## 2022-02-03 NOTE — PROGRESS NOTE ADULT - PROVIDER SPECIALTY LIST ADULT
Hospitalist
Infectious Disease
Orthopedics
Vascular Surgery
Vascular Surgery
Hospitalist
Orthopedics
Vascular Surgery
Hospitalist
Infectious Disease
Orthopedics
Palliative Care
Vascular Surgery
Hospitalist
Infectious Disease
Infectious Disease
Orthopedics
Palliative Care
Podiatry
Vascular Surgery
Vascular Surgery
Hospitalist
Orthopedics
Orthopedics
Palliative Care

## 2022-02-03 NOTE — DISCHARGE NOTE NURSING/CASE MANAGEMENT/SOCIAL WORK - NSDCVIVACCINE_GEN_ALL_CORE_FT
COVID-19 vaccine, vector-nr, [id number], PF, 0.5 mL (Josselyn); 30-Mar-2021 14:39; Marina Quick (RN); [id number]; [id number] (Exp. Date: 25-May-2021); IntraMuscular; Deltoid Left.; 0.5 milliLiter(s);   influenza, injectable, quadrivalent, preservative free; 21-Nov-2020 15:48; Anay Maher (RN); [employer]; [id number] (Exp. Date: 30-Jun-2021); IntraMuscular; Deltoid Left.; 0.5 milliLiter(s); VIS (VIS Published: 15-Aug-2019, VIS Presented: 21-Nov-2020);   influenza, injectable, quadrivalent, preservative free; 21-Oct-2021 13:30; Nikia Cerda (ANISH); [employer]; [id number] (Exp. Date: 30-Jun-2022); IntraMuscular; Deltoid Left.; 0.5 milliLiter(s); VIS (VIS Published: 06-Aug-2021, VIS Presented: 21-Oct-2021);

## 2022-02-03 NOTE — PROGRESS NOTE ADULT - REASON FOR ADMISSION
Right lower extremity wound

## 2022-02-07 NOTE — CDI QUERY NOTE - NSCDIOTHERTXTBX_GEN_ALL_CORE_HH
This patient was admitted with lower extremity osteomyelitis and ulcers.     Documentation reflects:    SPO2: 95% on 2L N/c with a RR of 9 on 1/20/22     Throughout the stay, the patient was on 2L of oxygen via nasal cannula.     On 1/24 the patient's SPO2 went to 85% on room air with a RR of 15.    Nurses documentation on vitals flow sheet reflects: "nasal canula replaced"    SPO2 went as low as 90% on oxygen therapy.    Is the above clinical criteria indicative of a further diagnosis?  A) Acute hypoxic respiratory failure.  B) Acute on chronic respiratory failure.  C) No clinical indication of respiratory failure.  D) Unable to determine.
Cause and effect    Please document the relationship between the following conditions: DM2 , OM and PAD/PVD and ischemic limb ,Right Medial Maleolus and Distal Tibial Osteomyelitis,Non healing medial right ankle Ulcer      A. Ischemic limb and non healing medial right ankle ulcer , and 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  B. Ischemic limb and non healing medial right ankle ulcer , and Right Medial Maleolus and Distal Tibial Osteomyelitis not associated with /related to DM2 and PAD/PVD  C. Other please specify  D. Unable to determine      SUPPORTING DOCUMENTATION AND/OR CLINICAL EVIDENCE:    -Vascular surgery note 1/21 improved perfusion R footcontinue Plavix, Eliquis  angio demonstrated patent L external iliac to popliteal bypass graft ( I believed it to be occluded based on no palpable pedal pulse despite her absent rest pain)  OOBstop IV fluidslocal wound care to R medial malleolar area    -Ortho note 1/21 A/P: 86F w/ R ankle cellulitis, OM Medical management appreciated  Imaging revealing Increased signal intensity on MR indicating cellulitis and OM of the R ankleContinue IV antibiotics per ID teamVasc Sx following; s/p LLE angiogram yesterday - will speak with them this morning regarding procedure and if need to be done on right lower extremity as wellPressure held on right groin for 30 minutes. Groin soft with No signs of hematoma.  Tegaderm placed over site.    -Op brief note 1/20 Abdominal aortic angiogram 20-Jan-2022 16:52:03 abdominopelvic, bilateral lower extremity angiogram, angioplasty and stenting of the R common, SFA arteries, angioplasty of R below knee popliteal artery Soren Ventura.    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     -ID 1/20 Has Lower legs pain Has right ankle swelling 1. RLE cellulitis with multiple open wound. Chronic LE stasis dermatitis. PVD.    MR ankle right < from: MR Ankle No Cont, Right (01.19.22 @ 13:26) >IMPRESSION:1.  Medial ankle skin wound with subjacent edema and soft tissue swelling compatible cellulitis. No drainable fluid collection.2.  Abnormal marrow signal within the medial malleolus and medial tibia concerning osteomyelitis given the overlying skin wound.3.  Small ankle joint effusion. Nonspecific finding. Aspiration can be performed for further evaluation.

## 2022-03-02 NOTE — CHART NOTE - NSCHARTNOTEFT_GEN_A_CORE
HPI:  85 y/o F with PMH COPD, CAD, Type 2 DM on insulin, depression, CHFrEF, HTN, PVD, PAD, s/p left femoral popliteal bypass 3/24/2021, left thigh infections s/p debridement and muscle flap, PAT, lower extremity DVT on Eliquis, RA presents with chronic wounds and pain to the right lower extremity for 2 weeks.  (19 Jan 2022 04:03)      PERTINENT PMH REVIEWED:  [ X ] YES [ ] NO           Primary Contact:          Nirali Manzo [phone] daughter HCP copy in [employer]     HCP [ X  ] Surrogate [   ] Guardian [   ]    Mental Status: [ X ] Alert  [ X ] Oriented [  ] Confused [  ] Lethargic [  ]  Concerns of Depression [  ]- history of, expressed feelings depressed sometimes especially with having to have readmissions to the hospital   Anxiety [   ]- anxious at times   Baseline ADLs (prior to admission):  Independent [  ] moderately [ ] fully   Dependent   [ X ] moderately [ ]fully    Family Meeting attendees: Took place with pts daughter and Dr. Redmond yesterday     Anticipated Grief: Patient [ X ] Family [  ]    Caregiver [place] Assessed: Yes [  X   ] No [  ]    Confucianist: Judaism     Spiritual Concerns: Not identified     Goals of Care: to be determined     Previous Services: [facility]    ADVANCE DIRECTIVES: Pt. is now DNR/DNI     Anticipated D/C Plan: home with home care VS [facility]                     Summary:    This SW met with pt. at bedside to follow up and offer support. Role of Palliative  explained. Pt. discussed how she lives with her daughter who assists her. She reports she has a RW and wasn't very mobile prior to admission. She shared feeling depressed sometimes as it has been a lot to cope with having to come back and forth to the hospital over the last year. She shared some of her frustrations. Pts feelings explored and support provided. Spoke with pts daughter over the phone to offer support. Explained role. She shared that she spoke with pt. today and they made the decision to put DNR/DNI in place. At this time the plan is either for pt. to return home with [facility] VS [facility], depending on pts needs. Emotional support provided. Our team will continue to follow.
Clarification of Diagnosis:  No clinical indication of respiratory failure.
Pressure held on right groin for 30 minutes. Groin soft with No signs of hematoma.  Tegaderm placed over site.

## 2022-03-29 ENCOUNTER — APPOINTMENT (OUTPATIENT)
Dept: RHEUMATOLOGY | Facility: CLINIC | Age: 87
End: 2022-03-29

## 2022-07-08 NOTE — ED STATDOCS - CPE ED CARDIAC NORM
[facility]  EMERGENCY DEPARTMENT ENCOUNTER NOTE    Date: 7/8/2022  Patient Name: Moris Patel    History of Presenting Illness     Chief Complaint   Patient presents with    Skin Ulcer     HPI: Moris Patel, 28 y.o. male with a past medical history and outpatient medications as listed and reviewed below  presents for bilateral lower extremity chronic ulcers. He reports that he has chronic bilateral lower extremity venous stasis and has had managed him with \"water pills\". However, since he went to snf, he has not been able to fill his prescriptions. He went out and started working and his swelling increased worsening his lower extremity ulcers. The ulcers are large, around 7 cm in diameter, and located at the mid leg level. No fevers or chills. No discharge. He took an unidentified antibiotic from his friend for the past few days and his ulcers has improved. Medical History   I reviewed the medical, surgical, family, and social history, as well as allergies:    PCP: None    Past Medical History:  Past Medical History:   Diagnosis Date    Anxiety     Depression     Hypertension     Pt reported 08/07/2020    Polysubstance abuse ([facility] [address]     Thromboembolus ([facility] [address]     Pt reported 08/07/2020     Past Surgical History:  History reviewed. No pertinent surgical history. Current Outpatient Medications:  Current Outpatient Medications   Medication Instructions    acetaminophen (TYLENOL) 650 mg, Oral, EVERY 6 HOURS AS NEEDED    doxycycline (VIBRA-TABS) 100 mg, Oral, 2 TIMES DAILY    furosemide (LASIX) 20 mg, Oral, DAILY AS NEEDED    ibuprofen (MOTRIN) 600 mg, Oral, 3 TIMES DAILY AS NEEDED    methocarbamoL (ROBAXIN-750) 750 mg, Oral, 3 TIMES DAILY AS NEEDED    naloxone (Narcan) 4 mg/actuation nasal spray Use 1 spray intranasally, then discard. Repeat with new spray every 2 min as needed for opioid overdose symptoms, alternating nostrils.     naloxone (Narcan) 4 mg/actuation nasal spray Use 1 spray intranasally, then discard. Repeat with new spray every 2 min as needed for opioid overdose symptoms, alternating nostrils. Family History:  History reviewed. No pertinent family history. Social History:  Social History     Tobacco Use    Smoking status: Current Every Day Smoker     Packs/day: 1.00    Smokeless tobacco: Never Used   Substance Use Topics    Alcohol use: Yes     Comment: socially    Drug use: Yes     Types: Heroin, Cocaine     Allergies:  No Known Allergies    Review of Systems     Review of Systems  Negative: All other systems negative. Physical Exam and Vital Signs   Vital Signs - Reviewed the patient's vital signs. Patient Vitals for the past 12 hrs:   Temp Pulse Resp BP SpO2   07/08/22 1632 98.3 °F (36.8 °C) 92 16 129/68 100 %     Physical Exam:    GENERAL: not in apparent distress  HEENT:  * EOMI  * Head atraumatic  CV:  * warm extremities  * no cyanosis  PULMONARY: no respiratory distress, non labored breathing, no audible wheezing or stridor, no accessory muscle use  ABDOMEN: soft, moving in bed and pulls to seated position without grimace or pain  EXTREMITIES/BACK: moving four extremities without limitation  SKIN: Bilateral venous stasis ulcers with fat layer exposure without any cellulitis. No drainage. Noted 3+ pitting edema in the bilateral lower extremities. NEURO:  * Speech clear  * GCS 15    Medical Decision Making   - I am the first and primary provider for this patient and am the primary provider of record. - I reviewed the vital signs, available nursing notes, past medical history, past surgical history, family history and social history. - Initial assessment performed. The patients presenting problems have been discussed, and the staff are in agreement with the care plan formulated and outlined with them. I have encouraged them to ask questions as they arise throughout their visit.   - Available medical records, nursing notes, old EKGs, and EMS run sheets (if patient was EMS transported) were reviewed    MDM:   Patient is a 28 y.o. male presenting for venous stasis dermatitits with ulcers. Vitals reveal no significant abnormalities and physical exam reveals ulcers as described above. Based on the history, physical exam, risk factors, and vital signs, differential includes: Chronic venous stasis, ulcers, occult infection. We will treat the patient with doxycycline, as needed Lasix for swelling, and follow-up with PCP. See ED Course and Reassessment for evaluation and discussion. Results     Labs:  No results found for this or any previous visit (from the past 12 hour(s)). Radiologic Studies:  CT Results  (Last 48 hours)    None        CXR Results  (Last 48 hours)    None        Medications ordered:  Medications   HYDROcodone-acetaminophen (NORCO) 5-325 mg per tablet 1 Tablet (has no administration in time range)   doxycycline (MONODOX) capsule 100 mg (has no administration in time range)     ED Course and Reassessment     ED Course:          Reassessment:    Understanding was insured that at this time there is no evidence for a more malignant underlying process, but that early in the process of an illness, an emergency department workup can be falsely reassuring. Routine discharge counseling was given including the fact that any worsening, changing or persistent symptoms should prompt an immediate call or follow up with their primary physician or the emergency department. The importance of appropriate follow up was also discussed. More extensive discharge instructions were given in the patient's discharge paperwork. After completion of evaluation and discussion of results and diagnoses, all the questions were answered. If required, all follow up appointments and treatments were discussed and explained. Understanding was insured prior to discharge.       Final Disposition     Discharge: DISCHARGED FROM EMERGENCY DEPARTMENT    Patient will be discharged from the Emergency Department in stable condition. All of the diagnostic tests were reviewed and any questions were answered. Diagnosis, results, follow up if applicable, and return precautions were discussed. I have also put together printed discharge instructions for them that include: 1) educational information regarding their diagnosis, 2) how to care for their diagnosis at home, as well a 3) list of reasons why they would want to return to the ED prior to their follow-up appointment, should their condition change. Any labs or imaging done in the ED will be either printed with the discharge paperwork or available through [address] DISCHARGE PLAN:  1. Current Discharge Medication List      START taking these medications    Details   doxycycline (VIBRA-TABS) 100 mg tablet Take 1 Tablet by mouth two (2) times a day for 14 days. Qty: 28 Tablet, Refills: 0      furosemide (Lasix) 40 mg tablet Take 0.5 Tablets by mouth daily as needed for PRN Reason (Other) (swelling). Qty: 10 Tablet, Refills: 0      methocarbamoL (Robaxin-750) 750 mg tablet Take 1 Tablet by mouth three (3) times daily as needed for Muscle Spasm(s). Qty: 20 Tablet, Refills: 0      ibuprofen (MOTRIN) 600 mg tablet Take 1 Tablet by mouth three (3) times daily as needed for Pain. Qty: 20 Tablet, Refills: 0      acetaminophen (TYLENOL) 325 mg tablet Take 2 Tablets by mouth every six (6) hours as needed for Pain. Qty: 20 Tablet, Refills: 0         CONTINUE these medications which have NOT CHANGED    Details   !! naloxone (Narcan) 4 mg/actuation nasal spray Use 1 spray intranasally, then discard. Repeat with new spray every 2 min as needed for opioid overdose symptoms, alternating nostrils. Qty: 2 Each, Refills: 0      !! naloxone (Narcan) 4 mg/actuation nasal spray Use 1 spray intranasally, then discard.  Repeat with new spray every 2 min as needed for opioid overdose symptoms, alternating nostrils. Qty: 2 Each, Refills: 0       !! - Potential duplicate medications found. Please discuss with provider. 2.   Follow-up Information     Follow up With Specialties Details Why Contact Info    [address] [facility] Emergency Medicine Go to  If symptoms worsen [address]    Brenda Gomez MD Internal Medicine Physician Schedule an appointment as soon as possible for a visit in 1 week  [address] [facility] [address]      Janette Flores MD Family Medicine Schedule an appointment as soon as possible for a visit in 1 week  [address] [facility] [address]  [phone]      Denice Aguirre MD Family Medicine Schedule an appointment as soon as possible for a visit in 1 week  [address]  [phone]          3. Return to ED if worse    4. Current Discharge Medication List      START taking these medications    Details   doxycycline (VIBRA-TABS) 100 mg tablet Take 1 Tablet by mouth two (2) times a day for 14 days. Qty: 28 Tablet, Refills: 0  Start date: 7/8/2022, End date: 7/22/2022      furosemide (Lasix) 40 mg tablet Take 0.5 Tablets by mouth daily as needed for PRN Reason (Other) (swelling). Qty: 10 Tablet, Refills: 0  Start date: 7/8/2022      methocarbamoL (Robaxin-750) 750 mg tablet Take 1 Tablet by mouth three (3) times daily as needed for Muscle Spasm(s). Qty: 20 Tablet, Refills: 0  Start date: 7/8/2022      ibuprofen (MOTRIN) 600 mg tablet Take 1 Tablet by mouth three (3) times daily as needed for Pain. Qty: 20 Tablet, Refills: 0  Start date: 7/8/2022      acetaminophen (TYLENOL) 325 mg tablet Take 2 Tablets by mouth every six (6) hours as needed for Pain. Qty: 20 Tablet, Refills: 0  Start date: 7/8/2022             Diagnosis     Clinical Impression:   1.  Venous stasis ulcer of other part of lower leg with fat layer exposed without varicose veins, unspecified laterality [facility])      Attestations:    Veena Levy MD    Please note that this dictation was completed with [employer], the computer voice recognition software. Quite often unanticipated grammatical, syntax, homophones, and other interpretive errors are inadvertently transcribed by the computer software. Please disregard these errors. Please excuse any errors that have escaped final proofreading. Thank you. normal...

## 2022-08-01 NOTE — ASU PREOP CHECKLIST - INTERNAL PROSTHESES
[FreeTextEntry1] : \par Megan Dao MD\par [address], \[place] [address]\par [phone]\par \par Dear Dr. Dao, \par \par Reason for visit: Bladder stone\par \par This is a 59 year year -year-old man with hematuria. Patient returns today for followup. Since the patient's last visit, patient underwent cystoscopy today. Patient denies any changes in health. Patient denies any gross hematuria. His past medical history, family history and social history are unchanged. All other review of systems are negative. Patient denies any changes in medications. Medication list was reconciled.\par \par On examination, the patient is a healthy-appearing man in no acute distress. He is alert and oriented follows commands. He has normal mood and affect. He is normocephalic. Neck is supple. Respirations are unlabored. Abdomen is soft and nontender. Liver is not palpable. Bladder is nonpalpable. No CVA tenderness. Neurologically she is grossly intact. No peripheral edema. Skin without gross abnormality.\par \par Cystoscopy demonstrated 3 cm bladder stone.\par \par Assessment: Bladder stone\par \par I counseled the patient. I discussed the endoscopic findings today. Patient has a bladder stone I discussed the risk occult malignancy. I recommend the patient proceed with laser cystolitholapaxy.  I counseled the patient regarding the procedure. The risks and benefits were discussed. Alternatives were given. I answered the patient's questions. The patient will take the necessary preparations for the procedure. The patient will follow up as directed and will contact me with any questions or concerns. \par \par Plan: Laser cystolitholapaxy.
no

## 2022-08-18 ENCOUNTER — NON-APPOINTMENT (OUTPATIENT)
Age: 87
End: 2022-08-18

## 2022-08-19 ENCOUNTER — INPATIENT (INPATIENT)
Facility: HOSPITAL | Age: 87
LOS: 12 days | Discharge: HOME HEALTH | DRG: 157 | End: 2022-09-01
Attending: INTERNAL MEDICINE | Admitting: FAMILY MEDICINE
Payer: MEDICARE

## 2022-08-19 VITALS — WEIGHT: 154.1 LBS | HEIGHT: 63 IN

## 2022-08-19 DIAGNOSIS — Z95.828 PRESENCE OF OTHER VASCULAR IMPLANTS AND GRAFTS: Chronic | ICD-10-CM

## 2022-08-19 DIAGNOSIS — D64.9 ANEMIA, UNSPECIFIED: ICD-10-CM

## 2022-08-19 DIAGNOSIS — S72.009A FRACTURE OF UNSPECIFIED PART OF NECK OF UNSPECIFIED FEMUR, INITIAL ENCOUNTER FOR CLOSED FRACTURE: Chronic | ICD-10-CM

## 2022-08-19 PROBLEM — I82.409 ACUTE EMBOLISM AND THROMBOSIS OF UNSPECIFIED DEEP VEINS OF UNSPECIFIED LOWER EXTREMITY: Chronic | Status: ACTIVE | Noted: 2022-01-19

## 2022-08-19 PROBLEM — I50.9 HEART FAILURE, UNSPECIFIED: Chronic | Status: ACTIVE | Noted: 2022-01-19

## 2022-08-19 PROBLEM — I73.9 PERIPHERAL VASCULAR DISEASE, UNSPECIFIED: Chronic | Status: ACTIVE | Noted: 2022-01-19

## 2022-08-19 LAB
ADD ON TEST-SPECIMEN IN LAB: SIGNIFICANT CHANGE UP
ALBUMIN SERPL ELPH-MCNC: 2.8 G/DL — LOW (ref 3.3–5)
ALP SERPL-CCNC: 71 U/L — SIGNIFICANT CHANGE UP (ref 40–120)
ALT FLD-CCNC: 16 U/L — SIGNIFICANT CHANGE UP (ref 12–78)
ANION GAP SERPL CALC-SCNC: 8 MMOL/L — SIGNIFICANT CHANGE UP (ref 5–17)
AST SERPL-CCNC: 32 U/L — SIGNIFICANT CHANGE UP (ref 15–37)
BASE EXCESS BLDV CALC-SCNC: -2.3 MMOL/L — SIGNIFICANT CHANGE UP
BASOPHILS # BLD AUTO: 0 K/UL — SIGNIFICANT CHANGE UP (ref 0–0.2)
BASOPHILS NFR BLD AUTO: 0 % — SIGNIFICANT CHANGE UP (ref 0–2)
BILIRUB SERPL-MCNC: 0.3 MG/DL — SIGNIFICANT CHANGE UP (ref 0.2–1.2)
BUN SERPL-MCNC: 51 MG/DL — HIGH (ref 7–23)
CALCIUM SERPL-MCNC: 9 MG/DL — SIGNIFICANT CHANGE UP (ref 8.5–10.1)
CHLORIDE SERPL-SCNC: 104 MMOL/L — SIGNIFICANT CHANGE UP (ref 96–108)
CO2 BLDV-SCNC: 24 MMOL/L — SIGNIFICANT CHANGE UP (ref 22–26)
CO2 SERPL-SCNC: 23 MMOL/L — SIGNIFICANT CHANGE UP (ref 22–31)
CREAT SERPL-MCNC: 1.31 MG/DL — HIGH (ref 0.5–1.3)
EGFR: 40 ML/MIN/1.73M2 — LOW
EOSINOPHIL # BLD AUTO: 0.11 K/UL — SIGNIFICANT CHANGE UP (ref 0–0.5)
EOSINOPHIL NFR BLD AUTO: 3 % — SIGNIFICANT CHANGE UP (ref 0–6)
FLUAV AG NPH QL: SIGNIFICANT CHANGE UP
FLUBV AG NPH QL: SIGNIFICANT CHANGE UP
GLUCOSE SERPL-MCNC: 189 MG/DL — HIGH (ref 70–99)
HCO3 BLDV-SCNC: 22 MMOL/L — SIGNIFICANT CHANGE UP (ref 22–29)
HCT VFR BLD CALC: 22.9 % — LOW (ref 34.5–45)
HGB BLD-MCNC: 7.2 G/DL — LOW (ref 11.5–15.5)
LACTATE SERPL-SCNC: 1.8 MMOL/L — SIGNIFICANT CHANGE UP (ref 0.7–2)
LACTATE SERPL-SCNC: 3.5 MMOL/L — HIGH (ref 0.7–2)
LYMPHOCYTES # BLD AUTO: 0.67 K/UL — LOW (ref 1–3.3)
LYMPHOCYTES # BLD AUTO: 19 % — SIGNIFICANT CHANGE UP (ref 13–44)
MCHC RBC-ENTMCNC: 31.4 GM/DL — LOW (ref 32–36)
MCHC RBC-ENTMCNC: 32.1 PG — SIGNIFICANT CHANGE UP (ref 27–34)
MCV RBC AUTO: 102.2 FL — HIGH (ref 80–100)
MONOCYTES # BLD AUTO: 0.04 K/UL — SIGNIFICANT CHANGE UP (ref 0–0.9)
MONOCYTES NFR BLD AUTO: 1 % — LOW (ref 2–14)
NEUTROPHILS # BLD AUTO: 2.72 K/UL — SIGNIFICANT CHANGE UP (ref 1.8–7.4)
NEUTROPHILS NFR BLD AUTO: 77 % — SIGNIFICANT CHANGE UP (ref 43–77)
NRBC # BLD: SIGNIFICANT CHANGE UP /100 WBCS (ref 0–0)
PCO2 BLDV: 38 MMHG — LOW (ref 39–42)
PH BLDV: 7.38 — SIGNIFICANT CHANGE UP (ref 7.32–7.43)
PLATELET # BLD AUTO: 201 K/UL — SIGNIFICANT CHANGE UP (ref 150–400)
PO2 BLDV: 81 MMHG — SIGNIFICANT CHANGE UP
POTASSIUM SERPL-MCNC: 4.8 MMOL/L — SIGNIFICANT CHANGE UP (ref 3.5–5.3)
POTASSIUM SERPL-SCNC: 4.8 MMOL/L — SIGNIFICANT CHANGE UP (ref 3.5–5.3)
PROT SERPL-MCNC: 7.8 GM/DL — SIGNIFICANT CHANGE UP (ref 6–8.3)
RBC # BLD: 2.24 M/UL — LOW (ref 3.8–5.2)
RBC # FLD: 18.8 % — HIGH (ref 10.3–14.5)
RSV RNA NPH QL NAA+NON-PROBE: SIGNIFICANT CHANGE UP
SAO2 % BLDV: 96.4 % — SIGNIFICANT CHANGE UP
SARS-COV-2 RNA SPEC QL NAA+PROBE: DETECTED
SODIUM SERPL-SCNC: 135 MMOL/L — SIGNIFICANT CHANGE UP (ref 135–145)
WBC # BLD: 3.53 K/UL — LOW (ref 3.8–10.5)
WBC # FLD AUTO: 3.53 K/UL — LOW (ref 3.8–10.5)

## 2022-08-19 PROCEDURE — 84436 ASSAY OF TOTAL THYROXINE: CPT

## 2022-08-19 PROCEDURE — 82746 ASSAY OF FOLIC ACID SERUM: CPT

## 2022-08-19 PROCEDURE — 83036 HEMOGLOBIN GLYCOSYLATED A1C: CPT

## 2022-08-19 PROCEDURE — 83550 IRON BINDING TEST: CPT

## 2022-08-19 PROCEDURE — 82607 VITAMIN B-12: CPT

## 2022-08-19 PROCEDURE — P9100: CPT

## 2022-08-19 PROCEDURE — 73630 X-RAY EXAM OF FOOT: CPT | Mod: 26,RT

## 2022-08-19 PROCEDURE — 97162 PT EVAL MOD COMPLEX 30 MIN: CPT | Mod: GP

## 2022-08-19 PROCEDURE — 85027 COMPLETE CBC AUTOMATED: CPT

## 2022-08-19 PROCEDURE — 80048 BASIC METABOLIC PNL TOTAL CA: CPT

## 2022-08-19 PROCEDURE — 99497 ADVNCD CARE PLAN 30 MIN: CPT | Mod: 25

## 2022-08-19 PROCEDURE — 36430 TRANSFUSION BLD/BLD COMPNT: CPT

## 2022-08-19 PROCEDURE — 87635 SARS-COV-2 COVID-19 AMP PRB: CPT

## 2022-08-19 PROCEDURE — 70491 CT SOFT TISSUE NECK W/DYE: CPT | Mod: 26,MA

## 2022-08-19 PROCEDURE — P9040: CPT

## 2022-08-19 PROCEDURE — 85025 COMPLETE CBC W/AUTO DIFF WBC: CPT

## 2022-08-19 PROCEDURE — 84480 ASSAY TRIIODOTHYRONINE (T3): CPT

## 2022-08-19 PROCEDURE — 85730 THROMBOPLASTIN TIME PARTIAL: CPT

## 2022-08-19 PROCEDURE — 99223 1ST HOSP IP/OBS HIGH 75: CPT

## 2022-08-19 PROCEDURE — 36415 COLL VENOUS BLD VENIPUNCTURE: CPT

## 2022-08-19 PROCEDURE — 86850 RBC ANTIBODY SCREEN: CPT

## 2022-08-19 PROCEDURE — 84443 ASSAY THYROID STIM HORMONE: CPT

## 2022-08-19 PROCEDURE — 82962 GLUCOSE BLOOD TEST: CPT

## 2022-08-19 PROCEDURE — 85610 PROTHROMBIN TIME: CPT

## 2022-08-19 PROCEDURE — 86923 COMPATIBILITY TEST ELECTRIC: CPT

## 2022-08-19 PROCEDURE — 99285 EMERGENCY DEPT VISIT HI MDM: CPT | Mod: CS

## 2022-08-19 PROCEDURE — 70480 CT ORBIT/EAR/FOSSA W/O DYE: CPT | Mod: 26,59,MA

## 2022-08-19 PROCEDURE — 86900 BLOOD TYPING SEROLOGIC ABO: CPT

## 2022-08-19 PROCEDURE — P9037: CPT

## 2022-08-19 PROCEDURE — 83540 ASSAY OF IRON: CPT

## 2022-08-19 PROCEDURE — 97116 GAIT TRAINING THERAPY: CPT | Mod: GP

## 2022-08-19 PROCEDURE — 70460 CT HEAD/BRAIN W/DYE: CPT | Mod: 26,MA

## 2022-08-19 PROCEDURE — P9016: CPT

## 2022-08-19 PROCEDURE — 86901 BLOOD TYPING SEROLOGIC RH(D): CPT

## 2022-08-19 PROCEDURE — 86658 ENTEROVIRUS ANTIBODY: CPT

## 2022-08-19 PROCEDURE — 80053 COMPREHEN METABOLIC PANEL: CPT

## 2022-08-19 PROCEDURE — 82728 ASSAY OF FERRITIN: CPT

## 2022-08-19 PROCEDURE — 80202 ASSAY OF VANCOMYCIN: CPT

## 2022-08-19 PROCEDURE — 97530 THERAPEUTIC ACTIVITIES: CPT | Mod: GP

## 2022-08-19 RX ORDER — MORPHINE SULFATE 50 MG/1
4 CAPSULE, EXTENDED RELEASE ORAL ONCE
Refills: 0 | Status: DISCONTINUED | OUTPATIENT
Start: 2022-08-19 | End: 2022-08-19

## 2022-08-19 RX ORDER — ONDANSETRON 8 MG/1
4 TABLET, FILM COATED ORAL EVERY 8 HOURS
Refills: 0 | Status: DISCONTINUED | OUTPATIENT
Start: 2022-08-19 | End: 2022-09-01

## 2022-08-19 RX ORDER — MORPHINE SULFATE 50 MG/1
2 CAPSULE, EXTENDED RELEASE ORAL EVERY 4 HOURS
Refills: 0 | Status: DISCONTINUED | OUTPATIENT
Start: 2022-08-19 | End: 2022-08-22

## 2022-08-19 RX ORDER — MEROPENEM 1 G/30ML
2000 INJECTION INTRAVENOUS EVERY 12 HOURS
Refills: 0 | Status: DISCONTINUED | OUTPATIENT
Start: 2022-08-20 | End: 2022-08-20

## 2022-08-19 RX ORDER — VANCOMYCIN HCL 1 G
750 VIAL (EA) INTRAVENOUS EVERY 12 HOURS
Refills: 0 | Status: DISCONTINUED | OUTPATIENT
Start: 2022-08-19 | End: 2022-08-24

## 2022-08-19 RX ORDER — ACETAMINOPHEN 500 MG
650 TABLET ORAL EVERY 6 HOURS
Refills: 0 | Status: DISCONTINUED | OUTPATIENT
Start: 2022-08-19 | End: 2022-08-28

## 2022-08-19 RX ORDER — SODIUM CHLORIDE 9 MG/ML
1000 INJECTION INTRAMUSCULAR; INTRAVENOUS; SUBCUTANEOUS ONCE
Refills: 0 | Status: COMPLETED | OUTPATIENT
Start: 2022-08-19 | End: 2022-08-19

## 2022-08-19 RX ORDER — VANCOMYCIN HCL 1 G
1000 VIAL (EA) INTRAVENOUS ONCE
Refills: 0 | Status: COMPLETED | OUTPATIENT
Start: 2022-08-19 | End: 2022-08-19

## 2022-08-19 RX ORDER — ONDANSETRON 8 MG/1
4 TABLET, FILM COATED ORAL ONCE
Refills: 0 | Status: COMPLETED | OUTPATIENT
Start: 2022-08-19 | End: 2022-08-19

## 2022-08-19 RX ORDER — MEROPENEM 1 G/30ML
INJECTION INTRAVENOUS
Refills: 0 | Status: DISCONTINUED | OUTPATIENT
Start: 2022-08-20 | End: 2022-08-20

## 2022-08-19 RX ORDER — ACETAMINOPHEN 500 MG
1000 TABLET ORAL ONCE
Refills: 0 | Status: COMPLETED | OUTPATIENT
Start: 2022-08-19 | End: 2022-08-19

## 2022-08-19 RX ORDER — MEROPENEM 1 G/30ML
2000 INJECTION INTRAVENOUS ONCE
Refills: 0 | Status: COMPLETED | OUTPATIENT
Start: 2022-08-19 | End: 2022-08-20

## 2022-08-19 RX ORDER — OXYCODONE AND ACETAMINOPHEN 5; 325 MG/1; MG/1
1 TABLET ORAL EVERY 6 HOURS
Refills: 0 | Status: DISCONTINUED | OUTPATIENT
Start: 2022-08-19 | End: 2022-08-26

## 2022-08-19 RX ORDER — DEXAMETHASONE 0.5 MG/5ML
4 ELIXIR ORAL EVERY 6 HOURS
Refills: 0 | Status: DISCONTINUED | OUTPATIENT
Start: 2022-08-19 | End: 2022-08-22

## 2022-08-19 RX ADMIN — SODIUM CHLORIDE 1000 MILLILITER(S): 9 INJECTION INTRAMUSCULAR; INTRAVENOUS; SUBCUTANEOUS at 16:17

## 2022-08-19 RX ADMIN — MORPHINE SULFATE 4 MILLIGRAM(S): 50 CAPSULE, EXTENDED RELEASE ORAL at 20:25

## 2022-08-19 RX ADMIN — ONDANSETRON 4 MILLIGRAM(S): 8 TABLET, FILM COATED ORAL at 15:11

## 2022-08-19 RX ADMIN — MORPHINE SULFATE 4 MILLIGRAM(S): 50 CAPSULE, EXTENDED RELEASE ORAL at 15:11

## 2022-08-19 RX ADMIN — Medication 250 MILLIGRAM(S): at 16:17

## 2022-08-19 RX ADMIN — Medication 1000 MILLIGRAM(S): at 16:56

## 2022-08-19 RX ADMIN — Medication 100 MILLIGRAM(S): at 15:11

## 2022-08-19 NOTE — ED ADULT TRIAGE NOTE - DOMESTIC TRAVEL HIGH RISK QUESTION
No Asthma    COPD (chronic obstructive pulmonary disease)    HTN (hypertension)    Hypercholesteremia    Vertigo

## 2022-08-19 NOTE — H&P ADULT - ASSESSMENT
Pt is an 88 yo female with pmh/o DMII on insulin, h/o cellulitis and PVD with RLE wounds, who presents to ED in setting of poor oral intake due to sublingual/gum/lip/throat pain and swelling, partially alleviated by clindamycin and dexamethasone as outpatient and associated with drooling, who also presents with right ear pain and "wax build up" and development of purulence to Right foot/lower extremity wounds, who is admitted due to:    #Sublingual, submandibular, and oral pain and swelling concerning for Jaime's angina  #Right otitis media concerning for otomastoiditis  Admit to med/surg  Meropenem started to cover for Jaime's angina in setting of allergy profile  Clindamycin discontinued  Vancomycin continued and renally dosed  ID consulted  CT scan reviewed and physical exam with tense tympanic membrane and fluid with post auricular lymphadenopathy and tenderness to palpation c/w otitis media/mastoiditis, no abscess or soft tissue swelling of oral cavity noted on report- transfer recommended for ENT evaluation by admitting team  however declined per ED conversation with family  F/u blood cultures  Tylenol prn fever, pain  IV morphine prn severe pain, has required several doses for oral pain  Dexamethasone continued due to oral swelling, pt able to speak and swallow with minimal difficulty, no respiratory distress or airway compromise at this time, will consult ICU should sx worsen   Clear liquid diet started with aspiration precautions, advance as tolerated with diet restrictions as below  Activity-Bedrest, fall precautions, advance as tolerated  On Eliquis for DVT ppx  f/u AM labs, coags, cbc, cmp, A1c    #Lactic acidosis  Resolved s/p 1L IVF in ED  Likely reactive in setting of acute infections  No other criteria for sepsis met    #Anemia, chronic  Baseline Hgb between 7-8  Denies melena, hematochezia, abdominal pain, or other s/s bleeding  Pt on eliquis  S/p 1U PRBC-being transfused now   F/u H&H in AM  Fecal occult stool ordered    #Leukopenia  Likely due to viral infection in setting of COVID  Monitor CBC    #Acute Renal failure  S/p 1L IVF  PO hydration promoted  Will hold further IVF given h/o CHF  Will hold Entresto and Lasix with close monitoring of volume status via daily weights and i&o's  Monitor renal function on serum chemistry  Medications renally dosed with pharmacy  Avoid nephrotoxic meds    #Right lower extremity cellulitis concerning for osteomyelitis  Ortho following, wounds dressed in ED  Elevate lower extremities  Fall precautions  Obtain MRI with contrast of RLE as XR concerning for OM, will hold for now and treat due to ARF to optimize renal function for imaging  C/w IV abx  F/u CRP    #COVID, asymptomatic  Zofran prn  Albuterol prn  Tylenol prn  ID consulted    #Protein calorie malnutrition  Nutrition consulted for education and recommendations  Per daughter, pt ordering poor quality foods online     #Multinodular thyroid  TFTs ordered    #DMII, insulin dependent, with hyperglycemia  c/w lantus, decreased to 10 from 12 due to poor oral intake  AISS ordered  Accuchecks qAC/HS  Pt on dexamethasone-titrate lantus pending trend  Carb restriction when diet advanced    #CAD/HTN/CHF/PVD  Hold Lasix due to ARF  Hold Entresto due to ARF  c/w Eliquis with close monitoring of h/h  c/w metoprolol with holding parameters  Weigh daily  Intake & output per routine  DASH/TLC diet restrictions when advanced

## 2022-08-19 NOTE — H&P ADULT - CONVERSATION DETAILS
16 min spent discussing advanced care planning with patient and also confirmed via telephone with daughter. Pt is DNR/DNI. MOLST signed and placed in chart. Pt accepting of IV medications, fluids, po meds, blood products but does not want intubation or cpr.

## 2022-08-19 NOTE — ED STATDOCS - PROGRESS NOTE DETAILS
Nikhil Atkins for attending Dr. bianchi: 87 F with multiple PMHx  with difficulty swallowing, speaking. Will send pt to main ED for further evaluation.

## 2022-08-19 NOTE — ED PROVIDER NOTE - PROGRESS NOTE DETAILS
I consulted ENT via the [facility] transfer center.  Dr. Mckinney was on call. they reviewed the CT imaging and the CT report.  They do not recommend transfer for ENT evaluation given that there is no clinical concern for otitis externa malignant clinically   The patient has no tenderness to her ear denies ear pain denies mastoid tenderness there is no erythema edema or purulence in or around the ear canal.  She has no tenderness to palpation to her right ear or around that area.     Ortho  at bedside re-dressing right foot wounds.   Admitted to medicine for IV ABx (vanc/clinda)    I spoke to one of the community ENT doctors regarding the patient's mouth pain.  He states that sometimes dehydration can cause this symptom.  The patient does have tenderness to her lower lip however there is no intra oral abscess infection or Ludwid's angina .  The patient's vital signs are stable. I consulted ENT via the [facility] transfer center.  Dr. Mckinney was on call. they reviewed the CT imaging and the CT report.  They do not recommend transfer for ENT evaluation given that there is no clinical concern for otitis externa malignant clinically. The patient has no tenderness to her ear denies ear pain denies mastoid tenderness there is no erythema edema or purulence in or around the ear canal.  She has no tenderness to palpation to her right ear or around that area.     Ortho  at bedside re-dressing right foot wounds.   Admitted to medicine for IV ABx (vanc/clinda)    I spoke to one of the community ENT doctors regarding the patient's mouth pain.  He states that sometimes dehydration can cause this symptom if there are no clear signs of infection on exam.  The patient does have tenderness to her lower lip however there is no intra oral abscess infection or Ludwid's angina .  The patient's vital signs are stable.    shared decision making made with pt's daughter to admit to  and try to get ENT consult as an inpatient here, as opposed to transfer for [facility] where ENT is in house.

## 2022-08-19 NOTE — ED PROVIDER NOTE - CARE PLAN
1 Principal Discharge DX:	Anemia  Secondary Diagnosis:	Mouth pain  Secondary Diagnosis:	Diabetic foot ulcer

## 2022-08-19 NOTE — ED ADULT NURSE NOTE - OBJECTIVE STATEMENT
Patient having throat pain and bottom lip pain for a couple days.  Says she drools a lot and it hurts to swallow.

## 2022-08-19 NOTE — H&P ADULT - ENMT COMMENTS
lip swelling, swelling with redness and tenderness to palpation to floor of mouth under tongue. gum, lip, sublingal swelling and pain, throat swelling

## 2022-08-19 NOTE — H&P ADULT - NEGATIVE ENMT SYMPTOMS
no hearing difficulty/no sinus symptoms/no nasal congestion/no nasal discharge/no nasal obstruction/no post-nasal discharge/no nose bleeds/no abnormal taste sensation/no gum bleeding

## 2022-08-19 NOTE — ED ADULT NURSE NOTE - HOW OFTEN DO YOU HAVE A DRINK CONTAINING ALCOHOL?
[facility]    [address] [facility] [address]    Phone:  [phone]       Thank You for choosing us for your health care visit. We are glad to serve you and happy to provide you with this summary of your visit. Please help us to ensure we have accurate records. If you find anything that needs to be changed, please let our staff know as soon as possible.          Your Demographic Information     Patient Name Sex Cathleen Santos Female 1998       Ethnic Group Patient Race    Not of  or  Origin White      Your Visit Details     Date & Time Provider Department    2017 12:00 PM Barbara Tony MD [facility]      Your Upcoming Appointment*(Max 10)     2017 10:30 AM CDT   Complete Physical Exam with Barbara Tony MD   [facility] ([facility])    [address] [facility] [address]   [phone]              Your To Do List     Future Orders Please Complete On or Around Expires    CBC & AUTO DIFFERENTIAL  May 25, 2017 2017    COMPREHENSIVE METABOLIC PANEL  May 25, 2017 2017    MONOSPOT WITHOUT REFLEX  May 25, 2017 2017    Follow-Up    Return if symptoms worsen or fail to improve.      Conditions Discussed Today or Order-Related Diagnoses        Comments    Acute pharyngitis, unspecified etiology    -  Primary       Your Vitals Were     BP Pulse Temp Resp Height Weight    102/60 (20 %/ 31 %)* 84 98.5 °F (36.9 °C) (Tympanic) 14 5' 3\" (1.6 m) (32 %, Z= -0.47)† 131 lb 6.4 oz (59.6 kg) (64 %, Z= 0.37)†    LMP BMI Smoking Status             2017 23.28 kg/m2 (72 %, Z= 0.57)† Never Smoker       *BP percentiles are based on NHBPEP's 4th Report    †Growth percentiles are based on CDC 2-20 Years data.      Medications Prescribed or Re-Ordered Today     azithromycin (ZITHROMAX) 250 MG tablet    Sig: Take 2 tablets by mouth today, then one tablet daily  for the next 4 days.    Class: Eprescribe    Pharmacy: [id number] - [place], [place] - [address] , [address] Ph #: [phone]      Your Current Medications Are        Disp Refills Start End    azithromycin (ZITHROMAX) 250 MG tablet 6 tablet 0 5/25/2017     Sig: Take 2 tablets by mouth today, then one tablet daily for the next 4 days.    Class: Eprescribe    amoxicillin (AMOXIL) 875 MG tablet 20 tablet 0 5/22/2017 6/1/2017    Sig - Route: Take 1 tablet by mouth 2 times daily for 10 days. - Oral    Class: Eprescribe    Probiotic Product (PROBIOTIC ADVANCED PO)        Class: Historical Med    Route: Oral    levonorgestrel-ethinyl estradiol (LEVORA 0.15/30, 28,) 0.15-30 MG-MCG per tablet 84 tablet 3 4/27/2017     Sig - Route: Take 1 tablet by mouth daily. - Oral    Class: Eprescribe    ibuprofen (MOTRIN) 200 MG tablet        Sig - Route: Take 200 mg by mouth every 6 hours as needed for Pain. - Oral    Class: Historical Med      Allergies     Seasonal Other (See Comments)    Itchy eyes      Immunizations History as of 5/25/2017     Name Date    DTaP 12/5/2000    HEPATITIS A CHILD 8/6/2015 11:58 AM    HPV 9-VALENT 8/6/2015 12:03 PM    Hib/hepatitis B 8/8/2000    Influenza 11/4/2013    MMR 12/5/2000    PPD 1/9/2017  3:15 PM    Polio, INACTIVATED 8/8/2000    Tdap 8/6/2015 12:00 PM    Varicella 8/8/2000      Problem List as of 5/25/2017     strabismus, Left    History of menorrhagia            Patient Instructions     None       Never

## 2022-08-19 NOTE — ED PROVIDER NOTE - CLINICAL SUMMARY MEDICAL DECISION MAKING FREE TEXT BOX
elderly female coming in for mouth pain. concerning for potentially Jaime's angina or intraoral abscess. will ct scan to rule out the above. symptoms could also be vital so will swab patient. additionally., pt will be evaluated for wounds to right foot. to rule out osteomyelitis.

## 2022-08-19 NOTE — ED PROVIDER NOTE - ENMT, MLM
TTP of the fore of the mouth. lower lip TTP. no fluctuance  inside of mouth. no signs of pulpitis of any of the teeth. permanent dentures in place. TTP of the floor of the mouth. lower lip TTP. no fluctuance inside of mouth. no signs of pulpitis of any of the teeth. permanent dentures in place.

## 2022-08-19 NOTE — H&P ADULT - HISTORY OF PRESENT ILLNESS
Pt is an 88 yo female with a pmh/o COPD, RA on MTX, CAD, DMII on insulin, depression, CHFrEF, HTN, PVD, PAD, s/p left fem-pop bypass 2021, left thigh infection s/p debridement and muscle flap, who presents to the ED from home due to oral pain and swelling associated with difficulty swallowing and drooling. Pt states that her pain is located under her tongue and in her gums and is associated with lip and throat swelling. Per daughter, pt began to have these symptoms two days ago and has had two days of clindamycin and one day of dexamethasone and was brought to ED per pt request due to pain. Pt states her pain and swelling has improved from two days ago however she states she still feels discomfort and is only able to tolerate fluids at this time. Pain is constant, no alleviating factors other than 4mg IV morphine in ED, aggravated by palpation, not quantifiable on scale, unable to describe character of pain and states that it is non radiating. Pt also endorsing right ear pain. Pt states that her right ear is a "wax builder" and had it irrigated by her daughter with success and that one day prior, she on her own, used a tool and pulled out a "big black scab" from inside ear. Pt states her ear is only tender when pulled during exam. Pt daughter also endorsing that right leg wounds have become purulent and is concerned for infection.     ED course: Pt received to ED c/o drooling and oral/airway swelling in setting of mouth and ear discomfort. 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 Pt found to have WBC <4, anemia, lactic acidosis to 3.5, ORLY. Pt given  clindamycin and vancomycin and IV morphine. Imaging reviewed with ENT at transfer center and per ED MD, transfer not recommended due to benign clinical exam. ENT Dr. Nata tolliver called by ED MD and does not have privileges here but states that symptoms could be due to dehydration. Pt noted to have started lasix on 8/9/22 per [facility] program and daughter reports poor po intake. Pt given 1L IVF. Ortho called and evaluated wounds to right foot as pt follows with Dr. Devries and XR performed showing "no gas" per ED MD. Pt also noted to have HgB 7.2 and transfused 1U PRBC, baseline 7-8 per HIE review. Extensive discussion had with ED due to concerns for malignant otitis media and ludwigs angina as on admission exam pt found to have posterior auricular lymphadenopathy and tenderness to palpation and swelling and pain on floor of mouth with difficulty swallowing and recommendation made for transfer to [facility] for ENT services however per ED MD family declining transfer, although attempt was made.

## 2022-08-19 NOTE — ED ADULT NURSE NOTE - ALCOHOL PRE SCREEN (AUDIT - C)
27w3d  Regimen:    Glyburide 1.25 mg nightly      Pt provided Free Style Ev Download for review     Statement Selected

## 2022-08-19 NOTE — ED ADULT TRIAGE NOTE - CHIEF COMPLAINT QUOTE
patient presents c/o pain, swelling and difficulty swallowing.  patient reports onset of pain in mouth, lips x 2 days.  has had difficulty swallowing/talking and has been drooling.  wears dentures on top.

## 2022-08-19 NOTE — CONSULT NOTE ADULT - SUBJECTIVE AND OBJECTIVE BOX
Patient is a 87F community-ambulator with assistive devices who presents to the [facility] ED with two days of a swollen mouth with increased drooling and difficulty swallowing, admitted for anemia and mouth pain. Orthopedic team consulted for evaluation of foot wound. Per patient's family, patient follows with Dr. Anil Ku for wound care after undergoing soft tissue debridement procedure in January 2022. Patient states she has no pain her wounds, but add that the one on the back of her foot is "a little irritating." States that the two large wound on her foot have been present since a surgery "last March" and that she has not been walking since that time. However, the patient adds that her non-ambulatory status is not due to foot phuong, but rather "balance issues." Patient states she fell approximately one month ago with no sustained traumas, head-strikes, or losses of consciousness. Denies pain in the Right foot. Denies any associated numbness or tingling in the affected extremity. Denies having any other pain elsewhere. No other orthopaedic concerns at this time.      VITAL SIGNS:  T(C): 36.3 (08-20-22 @ 08:27), Max: 36.8 (08-20-22 @ 00:46)  HR: 93 (08-20-22 @ 08:27) (70 - 93)  BP: 112/84 (08-20-22 @ 08:27) (111/55 - 145/47)  RR: 18 (08-20-22 @ 08:27) (17 - 18)  SpO2: 100% (08-20-22 @ 08:27) (98% - 100%)      LABS:                        7.6    3.35  )-----------( 137      ( 20 Aug 2022 09:22 )             24.6     135  |  106  |  42<H>  ----------------------------<  251<H>  4.6   |  22  |  1.26    Ca    8.5      20 Aug 2022 09:22    TPro  6.6  /  Alb  2.4<L>  /  TBili  0.3  /  DBili  x   /  AST  13<L>  /  ALT  14  /  AlkPhos  63  08-20    PT/INR - ( 20 Aug 2022 09:22 )   PT: 12.8 sec;   INR: 1.10 ratio         PTT - ( 20 Aug 2022 09:22 )  PTT:28.8 sec          Gen: NAD, Resting comfortably    RLE:  Large granulating wound over the dorsomedial foot with an additional large granulating wound over the achilles with no exposed tending and a small black eschar over the lateral plantar aspect of the foot.   No bony tenderness to palpation  +EHL/FHL/TA/GSC  +SILT L3-S1  + DP  Compartments soft and compressible  No calf tenderness    Secondary Survey:   LLE/RUE/LUE: No TTP over bony prominences, SILT, palpable pulses, full/painless range of motion, compartments soft  Spine: No bony tenderness. No palpable stepoffs.      Imaging:    A/P:  Patient is a 87y Female with healing chronic ankle wounds of the Right foot.  - Pain control as needed.   - FU ESR/CRP/BCx/UCx.   - FU XR Right Foot official read.   - NWB RLE in soft dressings.    - DVT Ppx: Per Primary AC Team.   - No acute orthopaedic surgical intervention indicated at this time  - Will discuss with Dr. Ku and will advise if any changes made.

## 2022-08-19 NOTE — ED PROVIDER NOTE - OBJECTIVE STATEMENT
88 y/o female presents to the ED c/o mouth pain. Pt presents with a swollen mouth. +hard to swallow, drooling. dentures worn. no chest pain or difficulty breathing. pt mumbling, daughter states this isn't her baseline. insulin requiring diabetic. no MRSA infection before. 88 y/o female, insulin requiring diabetic. no hx MRSA infection before, presents to the ED c/o mouth pain. Pt presents with a swollen mouth, difficulty swallowing, and drooling. permanent dentures in place. denies tooth pain. No fevers. no chest pain or difficulty breathing. no vomiting. Patient on Clinda and decadron. Daughter also concerned about diabetic food wounds, worsening in nature, stating they smell infected. Patient follows with Dr. Ku for diabetic foot ulcers.

## 2022-08-20 LAB
A1C WITH ESTIMATED AVERAGE GLUCOSE RESULT: 8.3 % — HIGH (ref 4–5.6)
APTT BLD: 28.8 SEC — SIGNIFICANT CHANGE UP (ref 27.5–35.5)
BASOPHILS # BLD AUTO: 0 K/UL — SIGNIFICANT CHANGE UP (ref 0–0.2)
BASOPHILS NFR BLD AUTO: 0 % — SIGNIFICANT CHANGE UP (ref 0–2)
EOSINOPHIL # BLD AUTO: 0 K/UL — SIGNIFICANT CHANGE UP (ref 0–0.5)
EOSINOPHIL NFR BLD AUTO: 0 % — SIGNIFICANT CHANGE UP (ref 0–6)
ESTIMATED AVERAGE GLUCOSE: 192 MG/DL — HIGH (ref 68–114)
HCT VFR BLD CALC: 24.6 % — LOW (ref 34.5–45)
HGB BLD-MCNC: 7.6 G/DL — LOW (ref 11.5–15.5)
INR BLD: 1.1 RATIO — SIGNIFICANT CHANGE UP (ref 0.88–1.16)
LYMPHOCYTES # BLD AUTO: 0.44 K/UL — LOW (ref 1–3.3)
LYMPHOCYTES # BLD AUTO: 13 % — SIGNIFICANT CHANGE UP (ref 13–44)
MCHC RBC-ENTMCNC: 30.9 GM/DL — LOW (ref 32–36)
MCHC RBC-ENTMCNC: 31.9 PG — SIGNIFICANT CHANGE UP (ref 27–34)
MCV RBC AUTO: 103.4 FL — HIGH (ref 80–100)
MONOCYTES # BLD AUTO: 0 K/UL — SIGNIFICANT CHANGE UP (ref 0–0.9)
MONOCYTES NFR BLD AUTO: 0 % — LOW (ref 2–14)
NEUTROPHILS # BLD AUTO: 2.91 K/UL — SIGNIFICANT CHANGE UP (ref 1.8–7.4)
NEUTROPHILS NFR BLD AUTO: 87 % — HIGH (ref 43–77)
NRBC # BLD: SIGNIFICANT CHANGE UP /100 WBCS (ref 0–0)
PLATELET # BLD AUTO: 137 K/UL — LOW (ref 150–400)
PROTHROM AB SERPL-ACNC: 12.8 SEC — SIGNIFICANT CHANGE UP (ref 10.5–13.4)
RBC # BLD: 2.38 M/UL — LOW (ref 3.8–5.2)
RBC # FLD: 19.1 % — HIGH (ref 10.3–14.5)
T3 SERPL-MCNC: 80 NG/DL — SIGNIFICANT CHANGE UP (ref 80–200)
T4 AB SER-ACNC: 6.9 UG/DL — SIGNIFICANT CHANGE UP (ref 4.6–12)
TSH SERPL-MCNC: 0.91 UU/ML — SIGNIFICANT CHANGE UP (ref 0.34–4.82)
VANCOMYCIN TROUGH SERPL-MCNC: 13.6 UG/ML — SIGNIFICANT CHANGE UP (ref 10–20)
WBC # BLD: 3.35 K/UL — LOW (ref 3.8–10.5)
WBC # FLD AUTO: 3.35 K/UL — LOW (ref 3.8–10.5)

## 2022-08-20 PROCEDURE — 99233 SBSQ HOSP IP/OBS HIGH 50: CPT

## 2022-08-20 RX ORDER — MEROPENEM 1 G/30ML
1000 INJECTION INTRAVENOUS EVERY 8 HOURS
Refills: 0 | Status: DISCONTINUED | OUTPATIENT
Start: 2022-08-20 | End: 2022-08-24

## 2022-08-20 RX ORDER — INSULIN GLARGINE 100 [IU]/ML
10 INJECTION, SOLUTION SUBCUTANEOUS AT BEDTIME
Refills: 0 | Status: DISCONTINUED | OUTPATIENT
Start: 2022-08-20 | End: 2022-09-01

## 2022-08-20 RX ORDER — METOPROLOL TARTRATE 50 MG
25 TABLET ORAL
Refills: 0 | Status: DISCONTINUED | OUTPATIENT
Start: 2022-08-20 | End: 2022-08-29

## 2022-08-20 RX ORDER — SODIUM CHLORIDE 9 MG/ML
1000 INJECTION, SOLUTION INTRAVENOUS
Refills: 0 | Status: DISCONTINUED | OUTPATIENT
Start: 2022-08-20 | End: 2022-09-01

## 2022-08-20 RX ORDER — GLUCAGON INJECTION, SOLUTION 0.5 MG/.1ML
1 INJECTION, SOLUTION SUBCUTANEOUS ONCE
Refills: 0 | Status: DISCONTINUED | OUTPATIENT
Start: 2022-08-20 | End: 2022-09-01

## 2022-08-20 RX ORDER — INSULIN LISPRO 100/ML
VIAL (ML) SUBCUTANEOUS
Refills: 0 | Status: DISCONTINUED | OUTPATIENT
Start: 2022-08-20 | End: 2022-09-01

## 2022-08-20 RX ORDER — DEXTROSE 50 % IN WATER 50 %
12.5 SYRINGE (ML) INTRAVENOUS ONCE
Refills: 0 | Status: DISCONTINUED | OUTPATIENT
Start: 2022-08-20 | End: 2022-09-01

## 2022-08-20 RX ORDER — ASPIRIN/CALCIUM CARB/MAGNESIUM 324 MG
81 TABLET ORAL DAILY
Refills: 0 | Status: DISCONTINUED | OUTPATIENT
Start: 2022-08-20 | End: 2022-08-26

## 2022-08-20 RX ORDER — FOLIC ACID 0.8 MG
1 TABLET ORAL
Qty: 0 | Refills: 0 | DISCHARGE

## 2022-08-20 RX ORDER — GABAPENTIN 400 MG/1
300 CAPSULE ORAL THREE TIMES A DAY
Refills: 0 | Status: DISCONTINUED | OUTPATIENT
Start: 2022-08-20 | End: 2022-09-01

## 2022-08-20 RX ORDER — DEXTROSE 50 % IN WATER 50 %
15 SYRINGE (ML) INTRAVENOUS ONCE
Refills: 0 | Status: DISCONTINUED | OUTPATIENT
Start: 2022-08-20 | End: 2022-09-01

## 2022-08-20 RX ORDER — APIXABAN 2.5 MG/1
2.5 TABLET, FILM COATED ORAL
Refills: 0 | Status: DISCONTINUED | OUTPATIENT
Start: 2022-08-20 | End: 2022-08-24

## 2022-08-20 RX ORDER — DEXTROSE 50 % IN WATER 50 %
25 SYRINGE (ML) INTRAVENOUS ONCE
Refills: 0 | Status: DISCONTINUED | OUTPATIENT
Start: 2022-08-20 | End: 2022-09-01

## 2022-08-20 RX ORDER — MEROPENEM 1 G/30ML
2000 INJECTION INTRAVENOUS EVERY 12 HOURS
Refills: 0 | Status: DISCONTINUED | OUTPATIENT
Start: 2022-08-20 | End: 2022-08-20

## 2022-08-20 RX ORDER — ALBUTEROL 90 UG/1
1 AEROSOL, METERED ORAL EVERY 6 HOURS
Refills: 0 | Status: DISCONTINUED | OUTPATIENT
Start: 2022-08-20 | End: 2022-09-01

## 2022-08-20 RX ADMIN — GABAPENTIN 300 MILLIGRAM(S): 400 CAPSULE ORAL at 15:49

## 2022-08-20 RX ADMIN — INSULIN GLARGINE 10 UNIT(S): 100 INJECTION, SOLUTION SUBCUTANEOUS at 22:03

## 2022-08-20 RX ADMIN — Medication 4 MILLIGRAM(S): at 10:09

## 2022-08-20 RX ADMIN — Medication 4 MILLIGRAM(S): at 21:44

## 2022-08-20 RX ADMIN — Medication 250 MILLIGRAM(S): at 22:48

## 2022-08-20 RX ADMIN — Medication 250 MILLIGRAM(S): at 10:09

## 2022-08-20 RX ADMIN — MORPHINE SULFATE 2 MILLIGRAM(S): 50 CAPSULE, EXTENDED RELEASE ORAL at 03:26

## 2022-08-20 RX ADMIN — APIXABAN 2.5 MILLIGRAM(S): 2.5 TABLET, FILM COATED ORAL at 21:44

## 2022-08-20 RX ADMIN — Medication 25 MILLIGRAM(S): at 10:08

## 2022-08-20 RX ADMIN — MEROPENEM 280 MILLIGRAM(S): 1 INJECTION INTRAVENOUS at 10:08

## 2022-08-20 RX ADMIN — OXYCODONE AND ACETAMINOPHEN 1 TABLET(S): 5; 325 TABLET ORAL at 22:45

## 2022-08-20 RX ADMIN — Medication 25 MILLIGRAM(S): at 21:44

## 2022-08-20 RX ADMIN — Medication 81 MILLIGRAM(S): at 10:08

## 2022-08-20 RX ADMIN — APIXABAN 2.5 MILLIGRAM(S): 2.5 TABLET, FILM COATED ORAL at 10:09

## 2022-08-20 RX ADMIN — OXYCODONE AND ACETAMINOPHEN 1 TABLET(S): 5; 325 TABLET ORAL at 22:00

## 2022-08-20 RX ADMIN — Medication 4: at 11:54

## 2022-08-20 RX ADMIN — MEROPENEM 100 MILLIGRAM(S): 1 INJECTION INTRAVENOUS at 21:44

## 2022-08-20 RX ADMIN — Medication 4 MILLIGRAM(S): at 03:26

## 2022-08-20 RX ADMIN — Medication 4 MILLIGRAM(S): at 15:49

## 2022-08-20 RX ADMIN — Medication 6: at 08:00

## 2022-08-20 RX ADMIN — MEROPENEM 280 MILLIGRAM(S): 1 INJECTION INTRAVENOUS at 03:27

## 2022-08-20 RX ADMIN — GABAPENTIN 300 MILLIGRAM(S): 400 CAPSULE ORAL at 21:44

## 2022-08-20 RX ADMIN — GABAPENTIN 300 MILLIGRAM(S): 400 CAPSULE ORAL at 05:08

## 2022-08-20 NOTE — DIETITIAN INITIAL EVALUATION ADULT - PERTINENT MEDS FT
MEDICATIONS  (STANDING):  apixaban 2.5 milliGRAM(s) Oral two times a day  aspirin  chewable 81 milliGRAM(s) Oral daily  dexAMETHasone  Injectable 4 milliGRAM(s) IV Push every 6 hours  dextrose 5%. 1000 milliLiter(s) (50 mL/Hr) IV Continuous <Continuous>  dextrose 5%. 1000 milliLiter(s) (100 mL/Hr) IV Continuous <Continuous>  dextrose 50% Injectable 25 Gram(s) IV Push once  dextrose 50% Injectable 12.5 Gram(s) IV Push once  dextrose 50% Injectable 25 Gram(s) IV Push once  gabapentin 300 milliGRAM(s) Oral three times a day  glucagon  Injectable 1 milliGRAM(s) IntraMuscular once  insulin glargine Injectable (LANTUS) 10 Unit(s) SubCutaneous at bedtime  insulin lispro (ADMELOG) corrective regimen sliding scale   SubCutaneous three times a day before meals  meropenem  IVPB 1000 milliGRAM(s) IV Intermittent every 8 hours  metoprolol tartrate 25 milliGRAM(s) Oral two times a day  vancomycin  IVPB 750 milliGRAM(s) IV Intermittent every 12 hours    MEDICATIONS  (PRN):  acetaminophen     Tablet .. 650 milliGRAM(s) Oral every 6 hours PRN Temp greater or equal to 38C (100.4F), Mild Pain (1 - 3)  ALBUTerol    90 MICROgram(s) HFA Inhaler 1 Puff(s) Inhalation every 6 hours PRN Shortness of Breath  dextrose Oral Gel 15 Gram(s) Oral once PRN Blood Glucose LESS THAN 70 milliGRAM(s)/deciliter  morphine  - Injectable 2 milliGRAM(s) IV Push every 4 hours PRN Severe Pain (7 - 10)  ondansetron Injectable 4 milliGRAM(s) IV Push every 8 hours PRN Nausea and/or Vomiting  oxycodone    5 mG/acetaminophen 325 mG 1 Tablet(s) Oral every 6 hours PRN Moderate Pain (4 - 6)

## 2022-08-20 NOTE — DIETITIAN INITIAL EVALUATION ADULT - PERTINENT LABORATORY DATA
08-20    135  |  106  |  42<H>  ----------------------------<  251<H>  4.6   |  22  |  1.26    Ca    8.5      20 Aug 2022 09:22    TPro  6.6  /  Alb  2.4<L>  /  TBili  0.3  /  DBili  x   /  AST  13<L>  /  ALT  14  /  AlkPhos  63  08-20  POCT Blood Glucose.: 219 mg/dL (08-20-22 @ 11:52)  A1C with Estimated Average Glucose Result: 10.7 % (01-19-22 @ 07:31)  A1C with Estimated Average Glucose Result: 9.2 % (10-20-21 @ 08:00)

## 2022-08-20 NOTE — DIETITIAN INITIAL EVALUATION ADULT - ORAL INTAKE PTA/DIET HISTORY
Very poor appetite - states, "I do not feel hungry until about 4 PM daily." Drinks coffee in AM and then at 4 PM will drink juice and eat soup. Meeting <50% ENN chronically  Has been eating <25% x 1 week PTA 2/2 mouth pain/ swelling.  Lives w/ DTRs who do cooking/ shopping

## 2022-08-20 NOTE — DIETITIAN INITIAL EVALUATION ADULT - OTHER INFO
86 yo female with a pmh/o COPD, RA on MTX, CAD, DMII on insulin, depression, CHFrEF, HTN, PVD, PAD, s/p left fem-pop bypass 2021, left thigh infection s/p debridement and muscle flap, who presents to the ED from home due to oral pain and swelling associated with difficulty swallowing and drooling.Pt states that her pain is located under her tongue and in her gums and is associated with lip and throat swelling. Per daughter, pt began to have these symptoms two days ago. She states she still feels discomfort and is only able to tolerate fluids at this time. Pt daughter also endorsing that right leg wounds have become purulent and is concerned for infection.     Pt appears extremely thin, frail, hamlet with severe muscle/ fat wasting; meets criteria for PCM. States UBW ~150# but this was over a year ago. Reports has been losing wt slowly over the last few years 2/2 poor appetite/ PO intake. RD took bed scale wt on 8/20 at 135#. Reports very poor appetite/ PO intake - on CLD but states would be able to tolerate soft foods. Spoke to hospitalist, Dr. Borges, about advancing diet to easy to chew. Pt receptive to trialing ensure enlive TID. See other recommendations below.

## 2022-08-20 NOTE — PATIENT PROFILE ADULT - NS PRO AD PATIENT TYPE ON CHART
Do you recall which medication she is referring to?  Is it the losartan/hctz combo?   Do Not Resuscitate (DNR)

## 2022-08-20 NOTE — PATIENT PROFILE ADULT - FALL HARM RISK - HARM RISK INTERVENTIONS
Assistance with ambulation/Assistance OOB with selected safe patient handling equipment/Communicate Risk of Fall with Harm to all staff/Discuss with provider need for PT consult/Monitor gait and stability/Reinforce activity limits and safety measures with patient and family/Tailored Fall Risk Interventions/Visual Cue: Yellow wristband and red socks/Bed in lowest position, wheels locked, appropriate side rails in place/Call bell, personal items and telephone in reach/Instruct patient to call for assistance before getting out of bed or chair/Non-slip footwear when patient is out of bed/[place] to call system/Physically safe environment - no spills, clutter or unnecessary equipment/Purposeful Proactive Rounding/Room/bathroom lighting operational, light cord in reach

## 2022-08-20 NOTE — DIETITIAN INITIAL EVALUATION ADULT - ADD RECOMMEND
1) change diet to easy to chew diet to optimize PO/ nutrient intake, 2) Add ensure enlive TID to optimize PO intake, 3) Encourage protein-rich foods, maximize food preferences, 4) Recommend to add MVI w/minerals, Vit C 500 mg BID, add Zinc Sulfate 220 mg x 10 days to promote wound healing, 5) Consider adding thiamine 100 mg daily 2/2 poor PO intake/ malnutrition, 6) Monitor bowel movements, if no BM for >3 days, consider implementing bowel regimen, 7) Obtain vitamin D 25OH level to assess nutriture, 8) Confirm goals of care regarding nutrition support. RD will continue to monitor PO intake, labs, hydration, and wt prn.

## 2022-08-20 NOTE — PROGRESS NOTE ADULT - SUBJECTIVE AND OBJECTIVE BOX
Patient interviewed and exmained at bedside, well-appearing and in no acute distress. No acute events overnight.     Vital Signs Last 24 Hrs  T(C): 36.3 (20 Aug 2022 08:27), Max: 36.8 (20 Aug 2022 00:46)  T(F): 97.4 (20 Aug 2022 08:27), Max: 98.3 (20 Aug 2022 00:46)  HR: 93 (20 Aug 2022 08:27) (70 - 93)  BP: 112/84 (20 Aug 2022 08:27) (111/55 - 145/47)  BP(mean): 69 (20 Aug 2022 03:19) (69 - 72)  ABP: --  ABP(mean): --  RR: 18 (20 Aug 2022 08:27) (17 - 18)  SpO2: 100% (20 Aug 2022 08:27) (98% - 100%)        LABS:               7.6    3.35  )-----------( 137      ( 20 Aug 2022 09:22 )             24.6     08-20    135  |  106  |  42<H>  ----------------------------<  251<H>  4.6   |  22  |  1.26    Ca    8.5      20 Aug 2022 09:22    TPro  6.6  /  Alb  2.4<L>  /  TBili  0.3  /  DBili  x   /  AST  13<L>  /  ALT  14  /  AlkPhos  63  08-20    PT/INR - ( 20 Aug 2022 09:22 )   PT: 12.8 sec;   INR: 1.10 ratio       PTT - ( 20 Aug 2022 09:22 )  PTT:28.8 sec      Gen: NAD, Resting comfortably    RLE:  Large granulating wound over the dorsomedial foot with an additional large granulating wound over the achilles with no exposed tending and a small black eschar over the lateral plantar aspect of the foot.   No bony tenderness to palpation  +EHL/FHL/TA/GSC  +SILT L3-S1  + DP  Compartments soft and compressible  No calf tenderness      Imaging:  XR Right Foot: Severe bone demineralization.      A/P:  Patient is a 87y Female with healing chronic ankle wounds of the Right foot.  - Pain control as needed.   - FU ESR/CRP/BCx/UCx.   - FU XR Right Foot official read.   - NWB RLE in soft dressings.    - DVT Ppx: Per Primary AC Team.   - No acute orthopaedic surgical intervention indicated at this time.   - Will discuss with Dr. Ku and will advise if any changes made.

## 2022-08-20 NOTE — PROGRESS NOTE ADULT - SUBJECTIVE AND OBJECTIVE BOX
Pt is an 88 yo female with a pmh/o COPD, RA on MTX, CAD, DMII on insulin, depression, CHFrEF, HTN, PVD, PAD, s/p left fem-pop bypass 2021, left thigh infection s/p debridement and muscle flap, who presents to the ED from home due to oral pain and swelling associated with difficulty swallowing and drooling. Pt states that her pain is located under her tongue and in her gums and is associated with lip and throat swelling. Per daughter, pt began to have these symptoms two days ago and has had two days of clindamycin and one day of dexamethasone and was brought to ED per pt request due to pain. Pt states her pain and swelling has improved from two days ago however she states she still feels discomfort and is only able to tolerate fluids at this time. Pain is constant, no alleviating factors other than 4mg IV morphine in ED, aggravated by palpation, not quantifiable on scale, unable to describe character of pain and states that it is non radiating. Pt also endorsing right ear pain. Pt states that her right ear is a "wax builder" and had it irrigated by her daughter with success and that one day prior, she on her own, used a tool and pulled out a "big black scab" from inside ear. Pt states her ear is only tender when pulled during exam. Pt daughter also endorsing that right leg wounds have become purulent and is concerned for infection.   ED course: Pt received to ED c/o drooling and oral/airway swelling in setting of mouth and ear discomfort. 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 Pt found to have WBC <4, anemia, lactic acidosis to 3.5, ORLY. Pt given  clindamycin and vancomycin and IV morphine. Imaging reviewed with ENT at transfer center and per ED MD, transfer not recommended due to benign clinical exam. ENT Dr. Nata tolliver called by ED MD and does not have privileges here but states that symptoms could be due to dehydration. Pt noted to have started lasix on 8/9/22 per [facility] program and daughter reports poor po intake. Pt given 1L IVF. Ortho called and evaluated wounds to right foot as pt follows with Dr. Devries and XR performed showing "no gas" per ED MD. Pt also noted to have HgB 7.2 and transfused 1U PRBC, baseline 7-8 per HIE review. Extensive discussion had with ED due to concerns for malignant otitis media and ludwigs angina as on admission exam pt found to have posterior auricular lymphadenopathy and tenderness to palpation and swelling and pain on floor of mouth with difficulty swallowing and recommendation made for transfer to [facility] for ENT services however per ED MD family declining transfer, although attempt was made.         Vital Signs Last 24 Hrs  T(C): 36.3 (20 Aug 2022 08:27), Max: 36.8 (20 Aug 2022 00:46)  T(F): 97.4 (20 Aug 2022 08:27), Max: 98.3 (20 Aug 2022 00:46)  HR: 93 (20 Aug 2022 08:27) (70 - 93)  BP: 112/84 (20 Aug 2022 08:27) (111/55 - 145/47)  BP(mean): 69 (20 Aug 2022 03:19) (69 - 69)  RR: 18 (20 Aug 2022 08:27) (17 - 18)  SpO2: 100% (20 Aug 2022 08:27) (98% - 100%)    Parameters below as of 20 Aug 2022 08:27  Patient On (Oxygen Delivery Method): room air            Physical Exam:  · Constitutional	well-groomed; no distress  · Eyes	PERRL; EOMI; conjunctiva clear  · ENMT	ear L; ear R; mouth  · External Ear L	normal  · TMJ L	normal  · Ear Canal L	occluded with wax  · Tympanic Membrane L	not visualized  · External Ear R	normal  · TMJ R	tenderness; tender to post auricular node and with pulling of pinna only  · Ear Canal R	slight wax build up  · Tympanic Membrane R	bulging; air-fluid level  · Mouth	dry; swollen gums  · ENMT Comments	lip swelling, swelling with redness and tenderness to palpation to floor of mouth under tongue.  · Respiratory	clear to auscultation bilaterally  · Cardiovascular	regular rate and rhythm  · Gastrointestinal	soft; nontender; nondistended; normal active bowel sounds  · Neurological	cranial nerves II-XII intact; sensation intact  · Skin Comments	RLE with wounds dressed, dressing c/d/i. LLE with healing skin tear to shin. No edema bilaterally.                                7.6    3.35  )-----------( 137      ( 20 Aug 2022 09:22 )             24.6     20 Aug 2022 09:22    135    |  106    |  42     ----------------------------<  251    4.6     |  22     |  1.26     Ca    8.5        20 Aug 2022 09:22    TPro  6.6    /  Alb  2.4    /  TBili  0.3    /  DBili  x      /  AST  13     /  ALT  14     /  AlkPhos  63     20 Aug 2022 09:22    LIVER FUNCTIONS - ( 20 Aug 2022 09:22 )  Alb: 2.4 g/dL / Pro: 6.6 gm/dL / ALK PHOS: 63 U/L / ALT: 14 U/L / AST: 13 U/L / GGT: x           PT/INR - ( 20 Aug 2022 09:22 )   PT: 12.8 sec;   INR: 1.10 ratio         PTT - ( 20 Aug 2022 09:22 )  PTT:28.8 sec  CAPILLARY BLOOD GLUCOSE      POCT Blood Glucose.: 219 mg/dL (20 Aug 2022 11:52)  POCT Blood Glucose.: 284 mg/dL (20 Aug 2022 07:59)  POCT Blood Glucose.: 200 mg/dL (20 Aug 2022 05:08)  POCT Blood Glucose.: 168 mg/dL (20 Aug 2022 00:59)          MEDICATIONS  (STANDING):  apixaban 2.5 milliGRAM(s) Oral two times a day  aspirin  chewable 81 milliGRAM(s) Oral daily  dexAMETHasone  Injectable 4 milliGRAM(s) IV Push every 6 hours  dextrose 5%. 1000 milliLiter(s) (50 mL/Hr) IV Continuous <Continuous>  dextrose 5%. 1000 milliLiter(s) (100 mL/Hr) IV Continuous <Continuous>  dextrose 50% Injectable 25 Gram(s) IV Push once  dextrose 50% Injectable 12.5 Gram(s) IV Push once  dextrose 50% Injectable 25 Gram(s) IV Push once  gabapentin 300 milliGRAM(s) Oral three times a day  glucagon  Injectable 1 milliGRAM(s) IntraMuscular once  insulin glargine Injectable (LANTUS) 10 Unit(s) SubCutaneous at bedtime  insulin lispro (ADMELOG) corrective regimen sliding scale   SubCutaneous three times a day before meals  meropenem  IVPB 1000 milliGRAM(s) IV Intermittent every 8 hours  metoprolol tartrate 25 milliGRAM(s) Oral two times a day  vancomycin  IVPB 750 milliGRAM(s) IV Intermittent every 12 hours    MEDICATIONS  (PRN):  acetaminophen     Tablet .. 650 milliGRAM(s) Oral every 6 hours PRN Temp greater or equal to 38C (100.4F), Mild Pain (1 - 3)  ALBUTerol    90 MICROgram(s) HFA Inhaler 1 Puff(s) Inhalation every 6 hours PRN Shortness of Breath  dextrose Oral Gel 15 Gram(s) Oral once PRN Blood Glucose LESS THAN 70 milliGRAM(s)/deciliter  morphine  - Injectable 2 milliGRAM(s) IV Push every 4 hours PRN Severe Pain (7 - 10)  ondansetron Injectable 4 milliGRAM(s) IV Push every 8 hours PRN Nausea and/or Vomiting  oxycodone    5 mG/acetaminophen 325 mG 1 Tablet(s) Oral every 6 hours PRN Moderate Pain (4 - 6)              Assessment:  · Assessment	  Pt is an 88 yo female with pmh/o DMII on insulin, h/o cellulitis and PVD with RLE wounds, who presents to ED in setting of poor oral intake due to sublingual/gum/lip/throat pain and swelling, partially alleviated by clindamycin and dexamethasone as outpatient and associated with drooling, who also presents with right ear pain and "wax build up" and development of purulence to Right foot/lower extremity wounds, who is admitted due to:    #Sublingual, submandibular, and oral pain and swelling concerning for Jaime's angina  #Right otitis media concerning for otomastoiditis  Meropenem started to cover for Jaime's angina in setting of allergy profile  Vancomycin continued and renally dosed  ID consult appreciated  F/u blood cultures  IV morphine prn severe pain,  Dexamethasone continued due to oral swelling, pt able to speak and swallow with minimal difficulty, no respiratory distress or airway compromise at this time, will consult ICU should sx worsen   On Eliquis for DVT ppx    #Lactic acidosis  Resolved s/p 1L IVF in ED  Likely reactive in setting of acute infections  No other criteria for sepsis met    #Anemia, chronic  Baseline Hgb between 7-8  Denies melena, hematochezia, abdominal pain, or other s/s bleeding  Pt on eliquis  S/p 1U PRBC-being transfused last night    #Leukopenia  Likely due to viral infection in setting of COVID  Monitor CBC    #Acute Renal failure  S/p 1L IVF    #Right lower extremity cellulitis concerning for osteomyelitis  Ortho following, wounds dressed in ED  Elevate lower extremities  Obtain MRI with contrast of RLE as XR concerning for OM, will hold for now and treat due to ARF to optimize renal function for imaging  C/w IV abx    #COVID, asymptomatic    #Protein calorie malnutrition  Nutrition consulted for education and recommendations    #Multinodular thyroid    #DMII, insulin dependent, with hyperglycemia  c/w lantus, decreased to 10 from 12 due to poor oral intake  AISS ordered  Accuchecks qAC/HS    #CAD/HTN/CHF/PVD  Hold Lasix due to ARF  Hold Entresto due to ARF  c/w Eliquis with close monitoring of h/h  c/w metoprolol with holding parameters  Intake & output per routine  DASH/TLC diet restrictions when advanced

## 2022-08-20 NOTE — DIETITIAN NUTRITION RISK NOTIFICATION - TREATMENT: THE FOLLOWING DIET HAS BEEN RECOMMENDED
Diet, Easy to Chew (08-20-22 @ 10:31) [Pending Verification By Attending]  Diet, Clear Liquid (08-19-22 @ 23:46) [Active]

## 2022-08-20 NOTE — CONSULT NOTE ADULT - ASSESSMENT
87 y/o Female with h/o COPD, CAD, Type 2 DM on insulin, depression, CHFrEF, HTN, PVD, PAD, s/p left femoral popliteal bypass 3/24/2021, chronic LE stasis dermatitis, prior left thigh infections s/p debridement and muscle flap, PAT, lower extremity DVT on Eliquis, RA, right ankle septic arthritis with  PSAE, PRMI and ENspp in Jan 2022 s/p IV meropenem was admitted on 8/19 for oral pain and swelling associated with difficulty swallowing and drooling. Pt states that her pain is located under her tongue and in her gums and is associated with lip and throat swelling. Per daughter, pt began to have these symptoms two days PTA and she received clindamycin and dexamethasone. Pt states her pain and swelling has improved from two days ago however she states she still feels discomfort and is only able to tolerate fluids at this time. Pain is constant, no alleviating factors other than 4mg IV morphine in ED, aggravated by palpation. Pt also endorsing right ear pain. Pt states that her right ear is a "wax builder" and had it irrigated by her daughter with success PTA. In ER she received clindamycin, vancomycin IV, then meropenem.     1. Right mastoid air cells and right mille ear cavity opacification. Possible right mastoiditis and right otitis media. Right foot ulcer. Possible underlying OM. Chronic LE stasis dermatitis. PVD s/p angioplasty. CRF stage 3.  Allergy to PCN and cephalosporines.  -obtain BC x 2, urine c/s  -given vancomycin 750 mg IV q12h and meropenem 1 gm IV q8h  -reason for abx use and side effects reviewed with patient; monitor BMP and vancomycin trough levels  -ENT evaluation  -elevate legs  -local wound care  -monitor closely in deborah of PCN allergy history  -monitor temps  -f/u CBC  -supportive care  2. Other issues:   -care per medicine

## 2022-08-20 NOTE — CONSULT NOTE ADULT - SUBJECTIVE AND OBJECTIVE BOX
Patient is a 87y old  Female who presents with a chief complaint of Acute Renal Failure, Jaime's angina, Otitis media/mastoiditis, Cellulitis with possible OM     HPI:  85 y/o Female with h/o COPD, CAD, Type 2 DM on insulin, depression, CHFrEF, HTN, PVD, PAD, s/p left femoral popliteal bypass 3/24/2021, chronic LE stasis dermatitis, prior left thigh infections s/p debridement and muscle flap, PAT, lower extremity DVT on Eliquis, RA, right ankle septic arthritis with  PSAE, PRMI and ENspp in Jan 2022 s/p IV meropenem was admitted on 8/19 for oral pain and swelling associated with difficulty swallowing and drooling. Pt states that her pain is located under her tongue and in her gums and is associated with lip and throat swelling. Per daughter, pt began to have these symptoms two days PTA and she received clindamycin and dexamethasone. Pt states her pain and swelling has improved from two days ago however she states she still feels discomfort and is only able to tolerate fluids at this time. Pain is constant, no alleviating factors other than 4mg IV morphine in ED, aggravated by palpation. Pt also endorsing right ear pain. Pt states that her right ear is a "wax builder" and had it irrigated by her daughter with success PTA. In ER she received clindamycin, vancomycin IV, then meropenem.     Pt daughter also endorsing that right leg wounds have become purulent and is concerned for infection.   ENT Dr. Nata tolliver called by ED MD and does not have privileges here but states that symptoms could be due to dehydration.   Ortho called and evaluated wounds to right foot as pt follows with Dr. Devries and XR performed showing "no gas" per ED MD.   In ED concerns for malignant otitis media and Ludwigs angina as on admission exam pt found to have posterior auricular lymphadenopathy and tenderness to palpation and swelling and pain on floor of mouth with difficulty swallowing.    PMH: as above  PSH: as above  Meds: per reconciliation sheet, noted below  MEDICATIONS  (STANDING):  apixaban 2.5 milliGRAM(s) Oral two times a day  aspirin  chewable 81 milliGRAM(s) Oral daily  dexAMETHasone  Injectable 4 milliGRAM(s) IV Push every 6 hours  dextrose 5%. 1000 milliLiter(s) (50 mL/Hr) IV Continuous <Continuous>  dextrose 5%. 1000 milliLiter(s) (100 mL/Hr) IV Continuous <Continuous>  dextrose 50% Injectable 25 Gram(s) IV Push once  dextrose 50% Injectable 12.5 Gram(s) IV Push once  dextrose 50% Injectable 25 Gram(s) IV Push once  gabapentin 300 milliGRAM(s) Oral three times a day  glucagon  Injectable 1 milliGRAM(s) IntraMuscular once  insulin glargine Injectable (LANTUS) 10 Unit(s) SubCutaneous at bedtime  insulin lispro (ADMELOG) corrective regimen sliding scale   SubCutaneous three times a day before meals  meropenem  IVPB 2000 milliGRAM(s) IV Intermittent every 12 hours  metoprolol tartrate 25 milliGRAM(s) Oral two times a day  vancomycin  IVPB 750 milliGRAM(s) IV Intermittent every 12 hours    MEDICATIONS  (PRN):  acetaminophen     Tablet .. 650 milliGRAM(s) Oral every 6 hours PRN Temp greater or equal to 38C (100.4F), Mild Pain (1 - 3)  ALBUTerol    90 MICROgram(s) HFA Inhaler 1 Puff(s) Inhalation every 6 hours PRN Shortness of Breath  dextrose Oral Gel 15 Gram(s) Oral once PRN Blood Glucose LESS THAN 70 milliGRAM(s)/deciliter  morphine  - Injectable 2 milliGRAM(s) IV Push every 4 hours PRN Severe Pain (7 - 10)  ondansetron Injectable 4 milliGRAM(s) IV Push every 8 hours PRN Nausea and/or Vomiting  oxycodone    5 mG/acetaminophen 325 mG 1 Tablet(s) Oral every 6 hours PRN Moderate Pain (4 - 6)    Allergies    cephalosporins (Other) tolerated cefepime in the past  penicillins (Urticaria; Pruritus)    Intolerances      Social: no smoking, no alcohol, no illegal drugs; no recent travel, no exposure to TB  FAMILY HISTORY:  FH: colon cancer father  FH: heart attack father  Family history of atherosclerosis mother  no history of premature cardiovascular disease in first degree relatives    ROS: the patient denies fever, no chills, no HA, no seizures, no dizziness, has sore throat, no nasal congestion, no blurry vision, no CP, no palpitations, no SOB, no cough, no abdominal pain, no diarrhea, no N/V, no dysuria, no leg pain, no claudication, no rash, no joint aches, no rectal pain or bleeding, no night sweats  All other systems reviewed and are negative    Vital Signs Last 24 Hrs  T(C): 36.3 (20 Aug 2022 08:27), Max: 36.8 (20 Aug 2022 00:46)  T(F): 97.4 (20 Aug 2022 08:27), Max: 98.3 (20 Aug 2022 00:46)  HR: 93 (20 Aug 2022 08:27) (70 - 93)  BP: 112/84 (20 Aug 2022 08:27) (111/55 - 145/47)  BP(mean): 69 (20 Aug 2022 03:19) (69 - 72)  RR: 18 (20 Aug 2022 08:27) (17 - 18)  SpO2: 100% (20 Aug 2022 08:27) (98% - 100%)    Parameters below as of 20 Aug 2022 08:27  Patient On (Oxygen Delivery Method): room air      Daily Height in cm: 160.02 (19 Aug 2022 14:18)    Daily     PE:    Constitutional:  No acute distress  HEENT: NC/AT, EOMI, PERRLA, conjunctivae clear; ears and nose atraumatic; pharynx mild erythema  posterior auricular lymphadenopathy  Neck: supple; thyroid not palpable  Back: no tenderness  Respiratory: respiratory effort normal; clear to auscultation  Cardiovascular: S1S2 regular, no murmurs  Abdomen: soft, not tender, not distended, positive BS; no liver or spleen organomegaly  Genitourinary: no suprapubic tenderness  Lymphatic: no LN palpable  Musculoskeletal: no muscle tenderness, no joint swelling or tenderness  Extremities: no pedal edema  Neurological/ Psychiatric: AxOx3, judgement and insight normal; moving all extremities  Skin: papular skin rash in right thigh; no vesicles; no palpable lesions    Labs: all available labs reviewed                        7.6    3.35  )-----------( 137      ( 20 Aug 2022 09:22 )             24.6     08-20    135  |  106  |  42<H>  ----------------------------<  251<H>  4.6   |  22  |  1.26    Ca    8.5      20 Aug 2022 09:22    TPro  6.6  /  Alb  2.4<L>  /  TBili  0.3  /  DBili  x   /  AST  13<L>  /  ALT  14  /  AlkPhos  63  08-20     LIVER FUNCTIONS - ( 20 Aug 2022 09:22 )  Alb: 2.4 g/dL / Pro: 6.6 gm/dL / ALK PHOS: 63 U/L / ALT: 14 U/L / AST: 13 U/L / GGT: x           Radiology: all available radiological tests reviewed    < from: CT Temporal Bones No Cont (08.19.22 @ 19:12) >  There is opacification of the right mastoid air cells with possible focal   regions of mastoid septal erosions, opacification of the right middle ear   cavity and prominent soft tissue thickening along the right bony external   auditory canal with underlying prominent bony erosions as detailed above   which may represent an extensive infectious process such as malignant   otitis media and otomastoiditis with coalescence. Underlying neoplasm or   cholesteatoma is not entirely excluded. No associated swelling or   discrete fluid collection within the adjacent fascial and periauricular   soft tissues. No large effusion within the right TMJ joint. Recommend   follow-up evaluation with ENT.    < end of copied text >  < from: Xray Foot AP + Lateral + Oblique, Right (08.19.22 @ 16:27) >  Severe bone demineralization.  Osteomyelitis cannot be excluded.  If osteomyelitis is considered  despite conservative therapy, and soft   tissue / bone infection requires further assessment, follow-up MRI recommended.  < end of copied text >    < from: CT Neck Soft Tissue w/ IV Cont (08.19.22 @ 15:36) >  CT head: No acute intracranial hemorrhage or acute territorial   infarction. Mild chronic microvascular ischemic changes.    CTA neck: There is opacification of the right mastoid air cells and right   middle ear cavity. There is moderate soft tissue thickening along the   bony right external auditory canal with underlying prominent erosion of   the anterior wall of the external auditory canal. No significant   associated periauricular soft tissue swelling or discrete fluid   collections. No lymphadenopathy. Findings may represent infectious   process such as malignant otitis externa with direct extension into the   right middle ear cavity and right mastoid. Underlying cholesteatoma or   neoplasm is not entirely excluded. Recommend correlation with clinical   aspects of the case and follow-up with ENT.  < end of copied text >      Advanced directives addressed: full resuscitation

## 2022-08-21 LAB
ANION GAP SERPL CALC-SCNC: 6 MMOL/L — SIGNIFICANT CHANGE UP (ref 5–17)
BUN SERPL-MCNC: 34 MG/DL — HIGH (ref 7–23)
CALCIUM SERPL-MCNC: 8.3 MG/DL — LOW (ref 8.5–10.1)
CHLORIDE SERPL-SCNC: 108 MMOL/L — SIGNIFICANT CHANGE UP (ref 96–108)
CO2 SERPL-SCNC: 23 MMOL/L — SIGNIFICANT CHANGE UP (ref 22–31)
CREAT SERPL-MCNC: 0.86 MG/DL — SIGNIFICANT CHANGE UP (ref 0.5–1.3)
EGFR: 66 ML/MIN/1.73M2 — SIGNIFICANT CHANGE UP
GLUCOSE SERPL-MCNC: 208 MG/DL — HIGH (ref 70–99)
HCT VFR BLD CALC: 19.9 % — CRITICAL LOW (ref 34.5–45)
HGB BLD-MCNC: 6.2 G/DL — CRITICAL LOW (ref 11.5–15.5)
MCHC RBC-ENTMCNC: 31.2 GM/DL — LOW (ref 32–36)
MCHC RBC-ENTMCNC: 31.6 PG — SIGNIFICANT CHANGE UP (ref 27–34)
MCV RBC AUTO: 101.5 FL — HIGH (ref 80–100)
PLATELET # BLD AUTO: 91 K/UL — LOW (ref 150–400)
POTASSIUM SERPL-MCNC: 4.8 MMOL/L — SIGNIFICANT CHANGE UP (ref 3.5–5.3)
POTASSIUM SERPL-SCNC: 4.8 MMOL/L — SIGNIFICANT CHANGE UP (ref 3.5–5.3)
RBC # BLD: 1.96 M/UL — LOW (ref 3.8–5.2)
RBC # FLD: 19 % — HIGH (ref 10.3–14.5)
SODIUM SERPL-SCNC: 137 MMOL/L — SIGNIFICANT CHANGE UP (ref 135–145)
WBC # BLD: 2.6 K/UL — LOW (ref 3.8–10.5)
WBC # FLD AUTO: 2.6 K/UL — LOW (ref 3.8–10.5)

## 2022-08-21 PROCEDURE — 99233 SBSQ HOSP IP/OBS HIGH 50: CPT

## 2022-08-21 RX ORDER — FUROSEMIDE 40 MG
40 TABLET ORAL ONCE
Refills: 0 | Status: COMPLETED | OUTPATIENT
Start: 2022-08-21 | End: 2022-08-21

## 2022-08-21 RX ADMIN — Medication 4 MILLIGRAM(S): at 02:47

## 2022-08-21 RX ADMIN — GABAPENTIN 300 MILLIGRAM(S): 400 CAPSULE ORAL at 21:55

## 2022-08-21 RX ADMIN — OXYCODONE AND ACETAMINOPHEN 1 TABLET(S): 5; 325 TABLET ORAL at 15:37

## 2022-08-21 RX ADMIN — INSULIN GLARGINE 10 UNIT(S): 100 INJECTION, SOLUTION SUBCUTANEOUS at 21:54

## 2022-08-21 RX ADMIN — Medication 40 MILLIGRAM(S): at 15:37

## 2022-08-21 RX ADMIN — Medication 2: at 11:59

## 2022-08-21 RX ADMIN — Medication 81 MILLIGRAM(S): at 10:29

## 2022-08-21 RX ADMIN — OXYCODONE AND ACETAMINOPHEN 1 TABLET(S): 5; 325 TABLET ORAL at 16:18

## 2022-08-21 RX ADMIN — APIXABAN 2.5 MILLIGRAM(S): 2.5 TABLET, FILM COATED ORAL at 10:29

## 2022-08-21 RX ADMIN — Medication 4: at 07:54

## 2022-08-21 RX ADMIN — GABAPENTIN 300 MILLIGRAM(S): 400 CAPSULE ORAL at 05:58

## 2022-08-21 RX ADMIN — Medication 4 MILLIGRAM(S): at 10:29

## 2022-08-21 RX ADMIN — Medication 4 MILLIGRAM(S): at 15:37

## 2022-08-21 RX ADMIN — MEROPENEM 100 MILLIGRAM(S): 1 INJECTION INTRAVENOUS at 14:07

## 2022-08-21 RX ADMIN — MEROPENEM 100 MILLIGRAM(S): 1 INJECTION INTRAVENOUS at 21:52

## 2022-08-21 RX ADMIN — Medication 25 MILLIGRAM(S): at 10:28

## 2022-08-21 RX ADMIN — GABAPENTIN 300 MILLIGRAM(S): 400 CAPSULE ORAL at 14:07

## 2022-08-21 RX ADMIN — OXYCODONE AND ACETAMINOPHEN 1 TABLET(S): 5; 325 TABLET ORAL at 23:29

## 2022-08-21 RX ADMIN — APIXABAN 2.5 MILLIGRAM(S): 2.5 TABLET, FILM COATED ORAL at 21:54

## 2022-08-21 RX ADMIN — Medication 6: at 16:42

## 2022-08-21 RX ADMIN — OXYCODONE AND ACETAMINOPHEN 1 TABLET(S): 5; 325 TABLET ORAL at 21:53

## 2022-08-21 RX ADMIN — MEROPENEM 100 MILLIGRAM(S): 1 INJECTION INTRAVENOUS at 05:58

## 2022-08-21 RX ADMIN — Medication 4 MILLIGRAM(S): at 21:54

## 2022-08-21 RX ADMIN — Medication 250 MILLIGRAM(S): at 21:53

## 2022-08-21 RX ADMIN — Medication 25 MILLIGRAM(S): at 21:56

## 2022-08-21 RX ADMIN — Medication 250 MILLIGRAM(S): at 10:28

## 2022-08-21 NOTE — PROGRESS NOTE ADULT - SUBJECTIVE AND OBJECTIVE BOX
Pt is an 86 yo female with a pmh/o COPD, RA on MTX, CAD, DMII on insulin, depression, CHFrEF, HTN, PVD, PAD, s/p left fem-pop bypass 2021, left thigh infection s/p debridement and muscle flap, who presents to the ED from home due to oral pain and swelling associated with difficulty swallowing and drooling. Pt states that her pain is located under her tongue and in her gums and is associated with lip and throat swelling. Per daughter, pt began to have these symptoms two days ago and has had two days of clindamycin and one day of dexamethasone and was brought to ED per pt request due to pain. Pt states her pain and swelling has improved from two days ago however she states she still feels discomfort and is only able to tolerate fluids at this time. Pain is constant, no alleviating factors other than 4mg IV morphine in ED, aggravated by palpation, not quantifiable on scale, unable to describe character of pain and states that it is non radiating. Pt also endorsing right ear pain. Pt states that her right ear is a "wax builder" and had it irrigated by her daughter with success and that one day prior, she on her own, used a tool and pulled out a "big black scab" from inside ear. Pt states her ear is only tender when pulled during exam. Pt daughter also endorsing that right leg wounds have become purulent and is concerned for infection.   ED course: Pt received to ED c/o drooling and oral/airway swelling in setting of mouth and ear discomfort. 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 Pt found to have WBC <4, anemia, lactic acidosis to 3.5, ORLY. Pt given  clindamycin and vancomycin and IV morphine. Imaging reviewed with ENT at transfer center and per ED MD, transfer not recommended due to benign clinical exam. ENT Dr. Nata tolliver called by ED MD and does not have privileges here but states that symptoms could be due to dehydration. Pt noted to have started lasix on 8/9/22 per [facility] program and daughter reports poor po intake. Pt given 1L IVF. Ortho called and evaluated wounds to right foot as pt follows with Dr. Devries and XR performed showing "no gas" per ED MD. Pt also noted to have HgB 7.2 and transfused 1U PRBC, baseline 7-8 per HIE review. Extensive discussion had with ED due to concerns for malignant otitis media and ludwigs angina as on admission exam pt found to have posterior auricular lymphadenopathy and tenderness to palpation and swelling and pain on floor of mouth with difficulty swallowing and recommendation made for transfer to [facility] for ENT services however per ED MD family declining transfer, although attempt was made.         08/21/22: Patient seen and examined. Feels better today, feels hungry. Discussed with Dr Ortega        Vital Signs Last 24 Hrs  T(C): 36.8 (21 Aug 2022 12:34), Max: 36.8 (20 Aug 2022 21:48)  T(F): 98.2 (21 Aug 2022 12:34), Max: 98.3 (20 Aug 2022 21:48)  HR: 75 (21 Aug 2022 12:34) (64 - 78)  BP: 127/40 (21 Aug 2022 12:34) (115/45 - 134/41)  BP(mean): --  RR: 19 (21 Aug 2022 12:34) (18 - 19)  SpO2: 100% (21 Aug 2022 12:34) (90% - 100%)    Parameters below as of 21 Aug 2022 12:34  Patient On (Oxygen Delivery Method): room air              Physical Exam:  · Constitutional	well-groomed; no distress  · Eyes	PERRL; EOMI; conjunctiva clear  · ENMT	ear L; ear R; mouth  · External Ear L	normal  · TMJ L	normal  · Ear Canal L	occluded with wax  · Tympanic Membrane L	not visualized  · External Ear R	normal  · TMJ R	tenderness; tender to post auricular node and with pulling of pinna only  · Ear Canal R	slight wax build up  · Tympanic Membrane R	bulging; air-fluid level  · Mouth	dry; swollen gums  · ENMT Comments	lip swelling, swelling with redness and tenderness to palpation to floor of mouth under tongue.  · Respiratory	clear to auscultation bilaterally  · Cardiovascular	regular rate and rhythm  · Gastrointestinal	soft; nontender; nondistended; normal active bowel sounds  · Neurological	cranial nerves II-XII intact; sensation intact  · Skin Comments	RLE with wounds dressed, dressing c/d/i. LLE with healing skin tear to shin. No edema bilaterally.                                           6.2    2.60  )-----------( 91       ( 21 Aug 2022 08:43 )             19.9     21 Aug 2022 08:43    137    |  108    |  34     ----------------------------<  208    4.8     |  23     |  0.86     Ca    8.3        21 Aug 2022 08:43    TPro  6.6    /  Alb  2.4    /  TBili  0.3    /  DBili  x      /  AST  13     /  ALT  14     /  AlkPhos  63     20 Aug 2022 09:22    LIVER FUNCTIONS - ( 20 Aug 2022 09:22 )  Alb: 2.4 g/dL / Pro: 6.6 gm/dL / ALK PHOS: 63 U/L / ALT: 14 U/L / AST: 13 U/L / GGT: x           PT/INR - ( 20 Aug 2022 09:22 )   PT: 12.8 sec;   INR: 1.10 ratio         PTT - ( 20 Aug 2022 09:22 )  PTT:28.8 sec  CAPILLARY BLOOD GLUCOSE      POCT Blood Glucose.: 185 mg/dL (21 Aug 2022 11:55)  POCT Blood Glucose.: 218 mg/dL (21 Aug 2022 07:49)  POCT Blood Glucose.: 253 mg/dL (20 Aug 2022 22:03)  POCT Blood Glucose.: 134 mg/dL (20 Aug 2022 16:53)            MEDICATIONS  (STANDING):  apixaban 2.5 milliGRAM(s) Oral two times a day  aspirin  chewable 81 milliGRAM(s) Oral daily  dexAMETHasone  Injectable 4 milliGRAM(s) IV Push every 6 hours  dextrose 5%. 1000 milliLiter(s) (50 mL/Hr) IV Continuous <Continuous>  dextrose 5%. 1000 milliLiter(s) (100 mL/Hr) IV Continuous <Continuous>  dextrose 50% Injectable 25 Gram(s) IV Push once  dextrose 50% Injectable 12.5 Gram(s) IV Push once  dextrose 50% Injectable 25 Gram(s) IV Push once  gabapentin 300 milliGRAM(s) Oral three times a day  glucagon  Injectable 1 milliGRAM(s) IntraMuscular once  insulin glargine Injectable (LANTUS) 10 Unit(s) SubCutaneous at bedtime  insulin lispro (ADMELOG) corrective regimen sliding scale   SubCutaneous three times a day before meals  meropenem  IVPB 1000 milliGRAM(s) IV Intermittent every 8 hours  metoprolol tartrate 25 milliGRAM(s) Oral two times a day  vancomycin  IVPB 750 milliGRAM(s) IV Intermittent every 12 hours    MEDICATIONS  (PRN):  acetaminophen     Tablet .. 650 milliGRAM(s) Oral every 6 hours PRN Temp greater or equal to 38C (100.4F), Mild Pain (1 - 3)  ALBUTerol    90 MICROgram(s) HFA Inhaler 1 Puff(s) Inhalation every 6 hours PRN Shortness of Breath  dextrose Oral Gel 15 Gram(s) Oral once PRN Blood Glucose LESS THAN 70 milliGRAM(s)/deciliter  morphine  - Injectable 2 milliGRAM(s) IV Push every 4 hours PRN Severe Pain (7 - 10)  ondansetron Injectable 4 milliGRAM(s) IV Push every 8 hours PRN Nausea and/or Vomiting  oxycodone    5 mG/acetaminophen 325 mG 1 Tablet(s) Oral every 6 hours PRN Moderate Pain (4 - 6)              Assessment:  · Assessment	  Pt is an 86 yo female with pmh/o DMII on insulin, h/o cellulitis and PVD with RLE wounds, who presents to ED in setting of poor oral intake due to sublingual/gum/lip/throat pain and swelling, partially alleviated by clindamycin and dexamethasone as outpatient and associated with drooling, who also presents with right ear pain and "wax build up" and development of purulence to Right foot/lower extremity wounds, who is admitted due to:    #Sublingual, submandibular, and oral pain and swelling concerning for Jaime's angina  #Right otitis media concerning for otomastoiditis  Meropenem started to cover for Jaime's angina in setting of allergy profile  Vancomycin continued and renally dosed  ID consult appreciated  F/u blood cultures: no growth  IV morphine prn severe pain,  Dexamethasone continued due to oral swelling,   On Eliquis for DVT ppx    #Lactic acidosis  Resolved s/p 1L IVF in ED  Likely reactive in setting of acute infections  No other criteria for sepsis met    #Anemia, acute on chronic due to infection  No signs of bleeding  Baseline Hgb between 7-8  Denies melena, hematochezia, abdominal pain, or other s/s bleeding  Pt on eliquis  S/p 1U PRBC-being transfused on admission  Transfuse another 2 units today    #Leukopenia  Likely due to viral infection in setting of COVID  Monitor CBC    #Acute Renal failure-resolved  S/p 1L IVF    #Right lower extremity cellulitis concerning for osteomyelitis  Ortho following, wounds dressed in ED  Elevate lower extremities  Obtain MRI with contrast of RLE as XR concerning for OM, will hold for now and treat due to ARF to optimize renal function for imaging  C/w IV abx    #COVID, asymptomatic    #Protein calorie malnutrition  Nutrition consulted for education and recommendations    #Multinodular thyroid    #DMII, insulin dependent, with hyperglycemia  c/w lantus, decreased to 10 from 12 due to poor oral intake  AISS ordered  Accuchecks qAC/HS    #CAD/HTN/CHF/PVD  Hold Lasix due to ARF  Hold Entresto due to ARF  c/w Eliquis with close monitoring of h/h  c/w metoprolol with holding parameters  Intake & output per routine  DASH/TLC diet restrictions when advanced

## 2022-08-21 NOTE — PROGRESS NOTE ADULT - SUBJECTIVE AND OBJECTIVE BOX
Pt was seen and examined at bedside. Pt had no pain, denies f/c/n/v/cp/sob. Pt has no acute orthopaedic complaints at this time.    T(C): 36.4 (08-21-22 @ 08:21), Max: 36.8 (08-20-22 @ 21:48)  T(F): 97.5 (08-21-22 @ 08:21), Max: 98.3 (08-20-22 @ 21:48)  HR: 78 (08-21-22 @ 08:21) (74 - 78)  BP: 117/38 (08-21-22 @ 08:21) (115/45 - 117/46)  RR: 18 (08-21-22 @ 08:21) (18 - 18)  SpO2: 97% (08-21-22 @ 08:21) (90% - 98%)    Exam:    General: NAD    RLE:  Dressings c/d/i  Motor: +EHL/FHL/TA/GSc  SILT: Hernandez/Sa/SPN/DPN  2+ DP/PT  Compartments soft and compressible  No pain on dorsiflexion        A/P:  Patient is a 87y Female with healing chronic ankle wounds of the Right foot.  - Pain control as needed.   - FU BCx/UCx.   - NWB RLE in soft dressings.    - DVT Ppx: Per Primary AC Team.   - No acute orthopaedic surgical intervention indicated at this time.   - Will discuss with Dr. Ku and will advise if any changes made.

## 2022-08-21 NOTE — PROGRESS NOTE ADULT - SUBJECTIVE AND OBJECTIVE BOX
Date of service: 08-21-22 @ 11:42    Lying in bed in NAD  Alert and verbal  Crying at times  Dose not seem in pain  No fever    ROS: no fever or chills; denies dizziness, no HA, no SOB or cough, no abdominal pain, no diarrhea or constipation; no dysuria, no legs pain, no rashes    MEDICATIONS  (STANDING):  apixaban 2.5 milliGRAM(s) Oral two times a day  aspirin  chewable 81 milliGRAM(s) Oral daily  dexAMETHasone  Injectable 4 milliGRAM(s) IV Push every 6 hours  dextrose 5%. 1000 milliLiter(s) (50 mL/Hr) IV Continuous <Continuous>  dextrose 5%. 1000 milliLiter(s) (100 mL/Hr) IV Continuous <Continuous>  dextrose 50% Injectable 25 Gram(s) IV Push once  dextrose 50% Injectable 25 Gram(s) IV Push once  dextrose 50% Injectable 12.5 Gram(s) IV Push once  furosemide   Injectable 40 milliGRAM(s) IV Push once  gabapentin 300 milliGRAM(s) Oral three times a day  glucagon  Injectable 1 milliGRAM(s) IntraMuscular once  insulin glargine Injectable (LANTUS) 10 Unit(s) SubCutaneous at bedtime  insulin lispro (ADMELOG) corrective regimen sliding scale   SubCutaneous three times a day before meals  meropenem  IVPB 1000 milliGRAM(s) IV Intermittent every 8 hours  metoprolol tartrate 25 milliGRAM(s) Oral two times a day  vancomycin  IVPB 750 milliGRAM(s) IV Intermittent every 12 hours    Vital Signs Last 24 Hrs  T(C): 36.4 (21 Aug 2022 08:21), Max: 36.8 (20 Aug 2022 21:48)  T(F): 97.5 (21 Aug 2022 08:21), Max: 98.3 (20 Aug 2022 21:48)  HR: 78 (21 Aug 2022 08:21) (74 - 78)  BP: 117/38 (21 Aug 2022 08:21) (115/45 - 117/46)  BP(mean): --  RR: 18 (21 Aug 2022 08:21) (18 - 18)  SpO2: 97% (21 Aug 2022 08:21) (90% - 98%)    Parameters below as of 21 Aug 2022 08:21  Patient On (Oxygen Delivery Method): room air     Physical exam:    Constitutional:  No acute distress  HEENT: NC/AT, EOMI, PERRLA, conjunctivae clear; ears and nose atraumatic; pharynx mild erythema  posterior auricular lymphadenopathy  Neck: supple; thyroid not palpable  Back: no tenderness  Respiratory: respiratory effort normal; clear to auscultation  Cardiovascular: S1S2 regular, no murmurs  Abdomen: soft, not tender, not distended, positive BS; no liver or spleen organomegaly  Genitourinary: no suprapubic tenderness  Lymphatic: no LN palpable  Musculoskeletal: no muscle tenderness, no joint swelling or tenderness  Extremities: no pedal edema  Neurological/ Psychiatric: AxOx3, judgement and insight normal; moving all extremities  Skin: papular skin rash in right thigh; no vesicles; no palpable lesions    Labs: all available labs reviewed                        7.6    3.35  )-----------( 137      ( 20 Aug 2022 09:22 )             24.6     08-20    135  |  106  |  42<H>  ----------------------------<  251<H>  4.6   |  22  |  1.26    Ca    8.5      20 Aug 2022 09:22    TPro  6.6  /  Alb  2.4<L>  /  TBili  0.3  /  DBili  x   /  AST  13<L>  /  ALT  14  /  AlkPhos  63  08-20     LIVER FUNCTIONS - ( 20 Aug 2022 09:22 )  Alb: 2.4 g/dL / Pro: 6.6 gm/dL / ALK PHOS: 63 U/L / ALT: 14 U/L / AST: 13 U/L / GGT: x             Culture - Blood (collected 19 Aug 2022 15:05)  Source: .Blood None  Preliminary Report (20 Aug 2022 22:01):    No growth to date.    Culture - Blood (collected 19 Aug 2022 15:05)  Source: .Blood None  Preliminary Report (20 Aug 2022 22:01):    No growth to date.    Radiology: all available radiological tests reviewed    < from: CT Temporal Bones No Cont (08.19.22 @ 19:12) >  There is opacification of the right mastoid air cells with possible focal   regions of mastoid septal erosions, opacification of the right middle ear   cavity and prominent soft tissue thickening along the right bony external   auditory canal with underlying prominent bony erosions as detailed above   which may represent an extensive infectious process such as malignant   otitis media and otomastoiditis with coalescence. Underlying neoplasm or   cholesteatoma is not entirely excluded. No associated swelling or   discrete fluid collection within the adjacent fascial and periauricular   soft tissues. No large effusion within the right TMJ joint. Recommend   follow-up evaluation with ENT.    < end of copied text >  < from: Xray Foot AP + Lateral + Oblique, Right (08.19.22 @ 16:27) >  Severe bone demineralization.  Osteomyelitis cannot be excluded.  If osteomyelitis is considered  despite conservative therapy, and soft   tissue / bone infection requires further assessment, follow-up MRI recommended.  < end of copied text >    < from: CT Neck Soft Tissue w/ IV Cont (08.19.22 @ 15:36) >  CT head: No acute intracranial hemorrhage or acute territorial   infarction. Mild chronic microvascular ischemic changes.    CTA neck: There is opacification of the right mastoid air cells and right   middle ear cavity. There is moderate soft tissue thickening along the   bony right external auditory canal with underlying prominent erosion of   the anterior wall of the external auditory canal. No significant   associated periauricular soft tissue swelling or discrete fluid   collections. No lymphadenopathy. Findings may represent infectious   process such as malignant otitis externa with direct extension into the   right middle ear cavity and right mastoid. Underlying cholesteatoma or   neoplasm is not entirely excluded. Recommend correlation with clinical   aspects of the case and follow-up with ENT.  < end of copied text >      Advanced directives addressed: full resuscitation

## 2022-08-21 NOTE — PROGRESS NOTE ADULT - ASSESSMENT
87 y/o Female with h/o COPD, CAD, Type 2 DM on insulin, depression, CHFrEF, HTN, PVD, PAD, s/p left femoral popliteal bypass 3/24/2021, chronic LE stasis dermatitis, prior left thigh infections s/p debridement and muscle flap, PAT, lower extremity DVT on Eliquis, RA, right ankle septic arthritis with  PSAE, PRMI and ENspp in Jan 2022 s/p IV meropenem was admitted on 8/19 for oral pain and swelling associated with difficulty swallowing and drooling. Pt states that her pain is located under her tongue and in her gums and is associated with lip and throat swelling. Per daughter, pt began to have these symptoms two days PTA and she received clindamycin and dexamethasone. Pt states her pain and swelling has improved from two days ago however she states she still feels discomfort and is only able to tolerate fluids at this time. Pain is constant, no alleviating factors other than 4mg IV morphine in ED, aggravated by palpation. Pt also endorsing right ear pain. Pt states that her right ear is a "wax builder" and had it irrigated by her daughter with success PTA. In ER she received clindamycin, vancomycin IV, then meropenem.     1. Right mastoid air cells and right mille ear cavity opacification. Possible right mastoiditis and right otitis media. Right foot ulcer. Possible underlying OM. Chronic LE stasis dermatitis. PVD s/p angioplasty. CRF stage 3.  Allergy to PCN and cephalosporines.  -BC x 2, urine c/s noted  -on vancomycin 750 mg IV q12h and meropenem 1 gm IV q8h # 2  -tolerating abx well so far; no side effects noted  -obtain vancomycin trough level  -ENT evaluation  -continue abx coverage   -elevate legs  -local wound care  -monitor closely in deborah of PCN allergy history  -monitor temps  -f/u CBC  -supportive care  2. Other issues:   -care per medicine

## 2022-08-22 LAB
ANION GAP SERPL CALC-SCNC: 5 MMOL/L — SIGNIFICANT CHANGE UP (ref 5–17)
BUN SERPL-MCNC: 37 MG/DL — HIGH (ref 7–23)
CALCIUM SERPL-MCNC: 8.3 MG/DL — LOW (ref 8.5–10.1)
CHLORIDE SERPL-SCNC: 110 MMOL/L — HIGH (ref 96–108)
CO2 SERPL-SCNC: 24 MMOL/L — SIGNIFICANT CHANGE UP (ref 22–31)
CREAT SERPL-MCNC: 0.92 MG/DL — SIGNIFICANT CHANGE UP (ref 0.5–1.3)
EGFR: 61 ML/MIN/1.73M2 — SIGNIFICANT CHANGE UP
GLUCOSE SERPL-MCNC: 134 MG/DL — HIGH (ref 70–99)
HCT VFR BLD CALC: 27.7 % — LOW (ref 34.5–45)
HGB BLD-MCNC: 8.8 G/DL — LOW (ref 11.5–15.5)
MCHC RBC-ENTMCNC: 31.3 PG — SIGNIFICANT CHANGE UP (ref 27–34)
MCHC RBC-ENTMCNC: 31.8 GM/DL — LOW (ref 32–36)
MCV RBC AUTO: 98.6 FL — SIGNIFICANT CHANGE UP (ref 80–100)
PLATELET # BLD AUTO: 64 K/UL — LOW (ref 150–400)
POTASSIUM SERPL-MCNC: 4.4 MMOL/L — SIGNIFICANT CHANGE UP (ref 3.5–5.3)
POTASSIUM SERPL-SCNC: 4.4 MMOL/L — SIGNIFICANT CHANGE UP (ref 3.5–5.3)
RBC # BLD: 2.81 M/UL — LOW (ref 3.8–5.2)
RBC # FLD: 18.5 % — HIGH (ref 10.3–14.5)
SODIUM SERPL-SCNC: 139 MMOL/L — SIGNIFICANT CHANGE UP (ref 135–145)
WBC # BLD: 4.28 K/UL — SIGNIFICANT CHANGE UP (ref 3.8–10.5)
WBC # FLD AUTO: 4.28 K/UL — SIGNIFICANT CHANGE UP (ref 3.8–10.5)

## 2022-08-22 PROCEDURE — 99233 SBSQ HOSP IP/OBS HIGH 50: CPT

## 2022-08-22 RX ORDER — SACUBITRIL AND VALSARTAN 24; 26 MG/1; MG/1
1 TABLET, FILM COATED ORAL
Refills: 0 | Status: DISCONTINUED | OUTPATIENT
Start: 2022-08-22 | End: 2022-08-29

## 2022-08-22 RX ADMIN — OXYCODONE AND ACETAMINOPHEN 1 TABLET(S): 5; 325 TABLET ORAL at 05:34

## 2022-08-22 RX ADMIN — OXYCODONE AND ACETAMINOPHEN 1 TABLET(S): 5; 325 TABLET ORAL at 12:50

## 2022-08-22 RX ADMIN — APIXABAN 2.5 MILLIGRAM(S): 2.5 TABLET, FILM COATED ORAL at 22:55

## 2022-08-22 RX ADMIN — Medication 4: at 16:18

## 2022-08-22 RX ADMIN — Medication 4 MILLIGRAM(S): at 03:23

## 2022-08-22 RX ADMIN — Medication 25 MILLIGRAM(S): at 22:55

## 2022-08-22 RX ADMIN — MEROPENEM 100 MILLIGRAM(S): 1 INJECTION INTRAVENOUS at 22:54

## 2022-08-22 RX ADMIN — GABAPENTIN 300 MILLIGRAM(S): 400 CAPSULE ORAL at 05:34

## 2022-08-22 RX ADMIN — GABAPENTIN 300 MILLIGRAM(S): 400 CAPSULE ORAL at 16:19

## 2022-08-22 RX ADMIN — Medication 250 MILLIGRAM(S): at 10:38

## 2022-08-22 RX ADMIN — Medication 4: at 11:48

## 2022-08-22 RX ADMIN — SACUBITRIL AND VALSARTAN 1 TABLET(S): 24; 26 TABLET, FILM COATED ORAL at 22:56

## 2022-08-22 RX ADMIN — MEROPENEM 100 MILLIGRAM(S): 1 INJECTION INTRAVENOUS at 05:34

## 2022-08-22 RX ADMIN — INSULIN GLARGINE 10 UNIT(S): 100 INJECTION, SOLUTION SUBCUTANEOUS at 22:54

## 2022-08-22 RX ADMIN — OXYCODONE AND ACETAMINOPHEN 1 TABLET(S): 5; 325 TABLET ORAL at 11:48

## 2022-08-22 RX ADMIN — Medication 81 MILLIGRAM(S): at 10:38

## 2022-08-22 RX ADMIN — APIXABAN 2.5 MILLIGRAM(S): 2.5 TABLET, FILM COATED ORAL at 10:38

## 2022-08-22 RX ADMIN — Medication 2: at 07:54

## 2022-08-22 RX ADMIN — GABAPENTIN 300 MILLIGRAM(S): 400 CAPSULE ORAL at 22:55

## 2022-08-22 RX ADMIN — Medication 25 MILLIGRAM(S): at 10:38

## 2022-08-22 RX ADMIN — Medication 4 MILLIGRAM(S): at 10:38

## 2022-08-22 RX ADMIN — MEROPENEM 100 MILLIGRAM(S): 1 INJECTION INTRAVENOUS at 16:19

## 2022-08-22 NOTE — PROGRESS NOTE ADULT - SUBJECTIVE AND OBJECTIVE BOX
Pt is an 86 yo female with a pmh/o COPD, RA on MTX, CAD, DMII on insulin, depression, CHFrEF, HTN, PVD, PAD, s/p left fem-pop bypass 2021, left thigh infection s/p debridement and muscle flap, who presents to the ED from home due to oral pain and swelling associated with difficulty swallowing and drooling. Pt states that her pain is located under her tongue and in her gums and is associated with lip and throat swelling. Per daughter, pt began to have these symptoms two days ago and has had two days of clindamycin and one day of dexamethasone and was brought to ED per pt request due to pain. Pt states her pain and swelling has improved from two days ago however she states she still feels discomfort and is only able to tolerate fluids at this time. Pain is constant, no alleviating factors other than 4mg IV morphine in ED, aggravated by palpation, not quantifiable on scale, unable to describe character of pain and states that it is non radiating. Pt also endorsing right ear pain. Pt states that her right ear is a "wax builder" and had it irrigated by her daughter with success and that one day prior, she on her own, used a tool and pulled out a "big black scab" from inside ear. Pt states her ear is only tender when pulled during exam. Pt daughter also endorsing that right leg wounds have become purulent and is concerned for infection.   ED course: Pt received to ED c/o drooling and oral/airway swelling in setting of mouth and ear discomfort. 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 Pt found to have WBC <4, anemia, lactic acidosis to 3.5, ORLY. Pt given  clindamycin and vancomycin and IV morphine. Imaging reviewed with ENT at transfer center and per ED MD, transfer not recommended due to benign clinical exam. ENT Dr. Nata tolliver called by ED MD and does not have privileges here but states that symptoms could be due to dehydration. Pt noted to have started lasix on 8/9/22 per [facility] program and daughter reports poor po intake. Pt given 1L IVF. Ortho called and evaluated wounds to right foot as pt follows with Dr. Devries and XR performed showing "no gas" per ED MD. Pt also noted to have HgB 7.2 and transfused 1U PRBC, baseline 7-8 per HIE review. Extensive discussion had with ED due to concerns for malignant otitis media and ludwigs angina as on admission exam pt found to have posterior auricular lymphadenopathy and tenderness to palpation and swelling and pain on floor of mouth with difficulty swallowing and recommendation made for transfer to [facility] for E[facility] services however per ED MD family declining transfer, although attempt was made.     08/21/22: Patient seen and examined. Feels better today, feels hungry. Discussed with Dr Ortega  08/22/22:       Vital Signs Last 24 Hrs  T(C): 36.3 (22 Aug 2022 09:33), Max: 36.8 (21 Aug 2022 15:30)  T(F): 97.4 (22 Aug 2022 09:33), Max: 98.2 (21 Aug 2022 15:30)  HR: 65 (22 Aug 2022 09:33) (65 - 83)  BP: 128/51 (22 Aug 2022 09:33) (114/39 - 137/62)  BP(mean): --  RR: 18 (22 Aug 2022 09:33) (18 - 19)  SpO2: 97% (22 Aug 2022 09:33) (94% - 99%)    Parameters below as of 22 Aug 2022 09:33  Patient On (Oxygen Delivery Method): room air          Physical Exam:  · Constitutional	well-groomed; no distress  · Eyes	PERRL; EOMI; conjunctiva clear  · ENMT	ear L; ear R; mouth  · External Ear L	normal  · TMJ L	normal  · Ear Canal L	occluded with wax  · Tympanic Membrane L	not visualized  · External Ear R	normal  · TMJ R	tenderness; tender to post auricular node and with pulling of pinna only  · Ear Canal R	slight wax build up  · Tympanic Membrane R	bulging; air-fluid level  · Mouth	dry; swollen gums  · ENMT Comments	lip swelling, swelling with redness and tenderness to palpation to floor of mouth under tongue.  · Respiratory	clear to auscultation bilaterally  · Cardiovascular	regular rate and rhythm  · Gastrointestinal	soft; nontender; nondistended; normal active bowel sounds  · Neurological	cranial nerves II-XII intact; sensation intact  · Skin Comments	RLE with wounds dressed, dressing c/d/i. LLE with healing skin tear to shin. No edema bilaterally.                                  8.8    4.28  )-----------( 64       ( 22 Aug 2022 08:51 )             27.7     22 Aug 2022 08:51    139    |  110    |  37     ----------------------------<  134    4.4     |  24     |  0.92     Ca    8.3        22 Aug 2022 08:51          CAPILLARY BLOOD GLUCOSE      POCT Blood Glucose.: 235 mg/dL (22 Aug 2022 11:45)  POCT Blood Glucose.: 159 mg/dL (22 Aug 2022 07:52)  POCT Blood Glucose.: 167 mg/dL (21 Aug 2022 21:46)  POCT Blood Glucose.: 276 mg/dL (21 Aug 2022 16:32)              MEDICATIONS  (STANDING):  apixaban 2.5 milliGRAM(s) Oral two times a day  aspirin  chewable 81 milliGRAM(s) Oral daily  dexAMETHasone  Injectable 4 milliGRAM(s) IV Push every 6 hours  dextrose 5%. 1000 milliLiter(s) (50 mL/Hr) IV Continuous <Continuous>  dextrose 5%. 1000 milliLiter(s) (100 mL/Hr) IV Continuous <Continuous>  dextrose 50% Injectable 25 Gram(s) IV Push once  dextrose 50% Injectable 12.5 Gram(s) IV Push once  dextrose 50% Injectable 25 Gram(s) IV Push once  gabapentin 300 milliGRAM(s) Oral three times a day  glucagon  Injectable 1 milliGRAM(s) IntraMuscular once  insulin glargine Injectable (LANTUS) 10 Unit(s) SubCutaneous at bedtime  insulin lispro (ADMELOG) corrective regimen sliding scale   SubCutaneous three times a day before meals  meropenem  IVPB 1000 milliGRAM(s) IV Intermittent every 8 hours  metoprolol tartrate 25 milliGRAM(s) Oral two times a day  vancomycin  IVPB 750 milliGRAM(s) IV Intermittent every 12 hours    MEDICATIONS  (PRN):  acetaminophen     Tablet .. 650 milliGRAM(s) Oral every 6 hours PRN Temp greater or equal to 38C (100.4F), Mild Pain (1 - 3)  ALBUTerol    90 MICROgram(s) HFA Inhaler 1 Puff(s) Inhalation every 6 hours PRN Shortness of Breath  dextrose Oral Gel 15 Gram(s) Oral once PRN Blood Glucose LESS THAN 70 milliGRAM(s)/deciliter  morphine  - Injectable 2 milliGRAM(s) IV Push every 4 hours PRN Severe Pain (7 - 10)  ondansetron Injectable 4 milliGRAM(s) IV Push every 8 hours PRN Nausea and/or Vomiting  oxycodone    5 mG/acetaminophen 325 mG 1 Tablet(s) Oral every 6 hours PRN Moderate Pain (4 - 6)              Assessment:  · Assessment	  Pt is an 86 yo female with pmh/o DMII on insulin, h/o cellulitis and PVD with RLE wounds, who presents to ED in setting of poor oral intake due to sublingual/gum/lip/throat pain and swelling, partially alleviated by clindamycin and dexamethasone as outpatient and associated with drooling, who also presents with right ear pain and "wax build up" and development of purulence to Right foot/lower extremity wounds, who is admitted due to:    #Sublingual, submandibular, and oral pain and swelling concerning for Jaime's angina  #Right otitis media concerning for otomastoiditis  Meropenem started to cover for Jaime's angina in setting of allergy profile  Vancomycin  ID consult and follow up appreciated  blood cultures: no growth  D/C Dexamethasone   On Eliquis for DVT ppx    #Lactic acidosis  Resolved   Likely reactive in setting of acute infections  No other criteria for sepsis met    #Anemia, acute on chronic due to infection  No signs of bleeding  Baseline Hgb between 7-8  Denies melena, hematochezia, abdominal pain, or other s/s bleeding  S/p 1U PRBC-being transfused on admission  Transfuse another 2 units yesterday. HGB today 8.8    #Leukopenia  Likely due to viral infection in setting of COVID  Resolved  Monitor CBC    #Acute Renal failure-resolved  S/p 1L IVF    #Right lower extremity cellulitis concerning for osteomyelitis  Ortho following, wounds dressed in ED  Elevate lower extremities  C/w IV abx    #COVID, asymptomatic    #Protein calorie malnutrition  Nutrition consult appreciated     #Multinodular thyroid  Outpatient follow up    #DMII, insulin dependent, with hyperglycemia  c/w lantus, decreased to 10 from 12 due to poor oral intake  AISS ordered  Accuchecks qAC/HS    #CAD/HTN/CHF/PVD  Hold Lasix  Restart Entresto, was on hold  due to ARF  c/w Eliquis with close monitoring of h/h  c/w metoprolol with holding parameters  Intake & output per routine

## 2022-08-22 NOTE — PROGRESS NOTE ADULT - SUBJECTIVE AND OBJECTIVE BOX
Date of service: 08-22-22 @ 14:54    Sitting in bed in NAD  Has right face tenderness  No nausea  Lower legs ulcers are clean    ROS: no fever or chills; denies dizziness, no HA, no SOB or cough, no abdominal pain, no diarrhea or constipation; no dysuria    MEDICATIONS  (STANDING):  apixaban 2.5 milliGRAM(s) Oral two times a day  aspirin  chewable 81 milliGRAM(s) Oral daily  dextrose 5%. 1000 milliLiter(s) (50 mL/Hr) IV Continuous <Continuous>  dextrose 5%. 1000 milliLiter(s) (100 mL/Hr) IV Continuous <Continuous>  dextrose 50% Injectable 25 Gram(s) IV Push once  dextrose 50% Injectable 12.5 Gram(s) IV Push once  dextrose 50% Injectable 25 Gram(s) IV Push once  gabapentin 300 milliGRAM(s) Oral three times a day  glucagon  Injectable 1 milliGRAM(s) IntraMuscular once  insulin glargine Injectable (LANTUS) 10 Unit(s) SubCutaneous at bedtime  insulin lispro (ADMELOG) corrective regimen sliding scale   SubCutaneous three times a day before meals  meropenem  IVPB 1000 milliGRAM(s) IV Intermittent every 8 hours  metoprolol tartrate 25 milliGRAM(s) Oral two times a day  sacubitril 24 mG/valsartan 26 mG 1 Tablet(s) Oral two times a day  vancomycin  IVPB 750 milliGRAM(s) IV Intermittent every 12 hours    Vital Signs Last 24 Hrs  T(C): 36.3 (22 Aug 2022 09:33), Max: 36.8 (21 Aug 2022 15:30)  T(F): 97.4 (22 Aug 2022 09:33), Max: 98.2 (21 Aug 2022 15:30)  HR: 65 (22 Aug 2022 09:33) (65 - 83)  BP: 128/51 (22 Aug 2022 09:33) (114/39 - 137/62)  BP(mean): --  RR: 18 (22 Aug 2022 09:33) (18 - 19)  SpO2: 97% (22 Aug 2022 09:33) (94% - 99%)    Parameters below as of 22 Aug 2022 09:33  Patient On (Oxygen Delivery Method): room air     Physical exam:    Constitutional:  No acute distress  HEENT: NC/AT, EOMI, PERRLA, conjunctivae clear; ears and nose atraumatic; pharynx mild erythema  posterior auricular lymphadenopathy  Neck: supple; thyroid not palpable  Back: no tenderness  Respiratory: respiratory effort normal; clear to auscultation  Cardiovascular: S1S2 regular, no murmurs  Abdomen: soft, not tender, not distended, positive BS  Genitourinary: no suprapubic tenderness  Lymphatic: no LN palpable  Musculoskeletal: no muscle tenderness, no joint swelling or tenderness  Extremities: no pedal edema  Neurological/ Psychiatric: AxOx3, judgement and insight normal; moving all extremities  Skin: papular skin rash in right thigh; no vesicles; no palpable lesions    Labs: reviewed                        8.8    4.28  )-----------( 64       ( 22 Aug 2022 08:51 )             27.7     08-22    139  |  110<H>  |  37<H>  ----------------------------<  134<H>  4.4   |  24  |  0.92    Ca    8.3<L>      22 Aug 2022 08:51    Vancomycin Level, Trough: 13.6 ug/mL (08-20 @ 21:35)    C-Reactive Protein, Serum: 43 mg/L (08-19-22 @ 15:05)                        7.6    3.35  )-----------( 137      ( 20 Aug 2022 09:22 )             24.6     08-20    135  |  106  |  42<H>  ----------------------------<  251<H>  4.6   |  22  |  1.26    Ca    8.5      20 Aug 2022 09:22    TPro  6.6  /  Alb  2.4<L>  /  TBili  0.3  /  DBili  x   /  AST  13<L>  /  ALT  14  /  AlkPhos  63  08-20     LIVER FUNCTIONS - ( 20 Aug 2022 09:22 )  Alb: 2.4 g/dL / Pro: 6.6 gm/dL / ALK PHOS: 63 U/L / ALT: 14 U/L / AST: 13 U/L / GGT: x             Culture - Blood (collected 19 Aug 2022 15:05)  Source: .Blood None  Preliminary Report (20 Aug 2022 22:01):    No growth to date.    Culture - Blood (collected 19 Aug 2022 15:05)  Source: .Blood None  Preliminary Report (20 Aug 2022 22:01):    No growth to date.    Radiology: all available radiological tests reviewed    < from: CT Temporal Bones No Cont (08.19.22 @ 19:12) >  There is opacification of the right mastoid air cells with possible focal   regions of mastoid septal erosions, opacification of the right middle ear   cavity and prominent soft tissue thickening along the right bony external   auditory canal with underlying prominent bony erosions as detailed above   which may represent an extensive infectious process such as malignant   otitis media and otomastoiditis with coalescence. Underlying neoplasm or   cholesteatoma is not entirely excluded. No associated swelling or   discrete fluid collection within the adjacent fascial and periauricular   soft tissues. No large effusion within the right TMJ joint. Recommend   follow-up evaluation with ENT.    < end of copied text >  < from: Xray Foot AP + Lateral + Oblique, Right (08.19.22 @ 16:27) >  Severe bone demineralization.  Osteomyelitis cannot be excluded.  If osteomyelitis is considered  despite conservative therapy, and soft   tissue / bone infection requires further assessment, follow-up MRI recommended.  < end of copied text >    < from: CT Neck Soft Tissue w/ IV Cont (08.19.22 @ 15:36) >  CT head: No acute intracranial hemorrhage or acute territorial   infarction. Mild chronic microvascular ischemic changes.    CTA neck: There is opacification of the right mastoid air cells and right   middle ear cavity. There is moderate soft tissue thickening along the   bony right external auditory canal with underlying prominent erosion of   the anterior wall of the external auditory canal. No significant   associated periauricular soft tissue swelling or discrete fluid   collections. No lymphadenopathy. Findings may represent infectious   process such as malignant otitis externa with direct extension into the   right middle ear cavity and right mastoid. Underlying cholesteatoma or   neoplasm is not entirely excluded. Recommend correlation with clinical   aspects of the case and follow-up with ENT.  < end of copied text >      Advanced directives addressed: full resuscitation

## 2022-08-22 NOTE — PROGRESS NOTE ADULT - ASSESSMENT
87 y/o Female with h/o COPD, CAD, Type 2 DM on insulin, depression, CHFrEF, HTN, PVD, PAD, s/p left femoral popliteal bypass 3/24/2021, chronic LE stasis dermatitis, prior left thigh infections s/p debridement and muscle flap, PAT, lower extremity DVT on Eliquis, RA, right ankle septic arthritis with  PSAE, PRMI and ENspp in Jan 2022 s/p IV meropenem was admitted on 8/19 for oral pain and swelling associated with difficulty swallowing and drooling. Pt states that her pain is located under her tongue and in her gums and is associated with lip and throat swelling. Per daughter, pt began to have these symptoms two days PTA and she received clindamycin and dexamethasone. Pt states her pain and swelling has improved from two days ago however she states she still feels discomfort and is only able to tolerate fluids at this time. Pain is constant, no alleviating factors other than 4mg IV morphine in ED, aggravated by palpation. Pt also endorsing right ear pain. Pt states that her right ear is a "wax builder" and had it irrigated by her daughter with success PTA. In ER she received clindamycin, vancomycin IV, then meropenem.     1. Right mastoid air cells and right mille ear cavity opacification. Possible right mastoiditis and right otitis media. Right foot ulcer. Possible underlying OM. Chronic LE stasis dermatitis. PVD s/p angioplasty. CRF stage 3.  Allergy to PCN and cephalosporines.  -BC x 2, urine c/s noted  -on vancomycin 750 mg IV q12h and meropenem 1 gm IV q8h # 3  -tolerating abx well so far; no side effects noted  -vancomycin trough level therapeutic  -continue abx coverage   -elevate legs  -local wound care  -monitor closely in deborah of PCN allergy history  -monitor temps  -f/u CBC  -supportive care  2. Other issues:   -care per medicine

## 2022-08-23 LAB
ANION GAP SERPL CALC-SCNC: 5 MMOL/L — SIGNIFICANT CHANGE UP (ref 5–17)
BUN SERPL-MCNC: 34 MG/DL — HIGH (ref 7–23)
CALCIUM SERPL-MCNC: 8.2 MG/DL — LOW (ref 8.5–10.1)
CHLORIDE SERPL-SCNC: 112 MMOL/L — HIGH (ref 96–108)
CO2 SERPL-SCNC: 20 MMOL/L — LOW (ref 22–31)
CREAT SERPL-MCNC: 0.71 MG/DL — SIGNIFICANT CHANGE UP (ref 0.5–1.3)
EGFR: 83 ML/MIN/1.73M2 — SIGNIFICANT CHANGE UP
GLUCOSE SERPL-MCNC: 100 MG/DL — HIGH (ref 70–99)
POTASSIUM SERPL-MCNC: 4.3 MMOL/L — SIGNIFICANT CHANGE UP (ref 3.5–5.3)
POTASSIUM SERPL-SCNC: 4.3 MMOL/L — SIGNIFICANT CHANGE UP (ref 3.5–5.3)
SODIUM SERPL-SCNC: 137 MMOL/L — SIGNIFICANT CHANGE UP (ref 135–145)

## 2022-08-23 PROCEDURE — 99232 SBSQ HOSP IP/OBS MODERATE 35: CPT

## 2022-08-23 RX ADMIN — MEROPENEM 100 MILLIGRAM(S): 1 INJECTION INTRAVENOUS at 14:35

## 2022-08-23 RX ADMIN — Medication 250 MILLIGRAM(S): at 00:00

## 2022-08-23 RX ADMIN — SACUBITRIL AND VALSARTAN 1 TABLET(S): 24; 26 TABLET, FILM COATED ORAL at 09:41

## 2022-08-23 RX ADMIN — APIXABAN 2.5 MILLIGRAM(S): 2.5 TABLET, FILM COATED ORAL at 09:41

## 2022-08-23 RX ADMIN — GABAPENTIN 300 MILLIGRAM(S): 400 CAPSULE ORAL at 14:35

## 2022-08-23 RX ADMIN — GABAPENTIN 300 MILLIGRAM(S): 400 CAPSULE ORAL at 22:10

## 2022-08-23 RX ADMIN — GABAPENTIN 300 MILLIGRAM(S): 400 CAPSULE ORAL at 05:37

## 2022-08-23 RX ADMIN — OXYCODONE AND ACETAMINOPHEN 1 TABLET(S): 5; 325 TABLET ORAL at 19:30

## 2022-08-23 RX ADMIN — INSULIN GLARGINE 10 UNIT(S): 100 INJECTION, SOLUTION SUBCUTANEOUS at 22:11

## 2022-08-23 RX ADMIN — OXYCODONE AND ACETAMINOPHEN 1 TABLET(S): 5; 325 TABLET ORAL at 09:47

## 2022-08-23 RX ADMIN — Medication 250 MILLIGRAM(S): at 09:41

## 2022-08-23 RX ADMIN — Medication 650 MILLIGRAM(S): at 22:24

## 2022-08-23 RX ADMIN — SACUBITRIL AND VALSARTAN 1 TABLET(S): 24; 26 TABLET, FILM COATED ORAL at 22:13

## 2022-08-23 RX ADMIN — OXYCODONE AND ACETAMINOPHEN 1 TABLET(S): 5; 325 TABLET ORAL at 09:54

## 2022-08-23 RX ADMIN — Medication 81 MILLIGRAM(S): at 09:41

## 2022-08-23 RX ADMIN — MEROPENEM 100 MILLIGRAM(S): 1 INJECTION INTRAVENOUS at 05:38

## 2022-08-23 RX ADMIN — APIXABAN 2.5 MILLIGRAM(S): 2.5 TABLET, FILM COATED ORAL at 22:10

## 2022-08-23 RX ADMIN — MEROPENEM 100 MILLIGRAM(S): 1 INJECTION INTRAVENOUS at 22:12

## 2022-08-23 RX ADMIN — OXYCODONE AND ACETAMINOPHEN 1 TABLET(S): 5; 325 TABLET ORAL at 04:17

## 2022-08-23 RX ADMIN — Medication 25 MILLIGRAM(S): at 22:10

## 2022-08-23 RX ADMIN — Medication 25 MILLIGRAM(S): at 09:41

## 2022-08-23 NOTE — PROGRESS NOTE ADULT - SUBJECTIVE AND OBJECTIVE BOX
Pt is an 86 yo female with a pmh/o COPD, RA on MTX, CAD, DMII on insulin, depression, CHFrEF, HTN, PVD, PAD, s/p left fem-pop bypass 2021, left thigh infection s/p debridement and muscle flap, who presents to the ED from home due to oral pain and swelling associated with difficulty swallowing and drooling. Pt states that her pain is located under her tongue and in her gums and is associated with lip and throat swelling. Per daughter, pt began to have these symptoms two days ago and has had two days of clindamycin and one day of dexamethasone and was brought to ED per pt request due to pain. Pt states her pain and swelling has improved from two days ago however she states she still feels discomfort and is only able to tolerate fluids at this time. Pain is constant, no alleviating factors other than 4mg IV morphine in ED, aggravated by palpation, not quantifiable on scale, unable to describe character of pain and states that it is non radiating. Pt also endorsing right ear pain. Pt states that her right ear is a "wax builder" and had it irrigated by her daughter with success and that one day prior, she on her own, used a tool and pulled out a "big black scab" from inside ear. Pt states her ear is only tender when pulled during exam. Pt daughter also endorsing that right leg wounds have become purulent and is concerned for infection.   ED course: Pt received to ED c/o drooling and oral/airway swelling in setting of mouth and ear discomfort. 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 Pt found to have WBC <4, anemia, lactic acidosis to 3.5, ORLY. Pt given  clindamycin and vancomycin and IV morphine. Imaging reviewed with ENT at transfer center and per ED MD, transfer not recommended due to benign clinical exam. ENT Dr. Nata tolliver called by ED MD and does not have privileges here but states that symptoms could be due to dehydration. Pt noted to have started lasix on 8/9/22 per [facility] program and daughter reports poor po intake. Pt given 1L IVF. Ortho called and evaluated wounds to right foot as pt follows with Dr. Devries and XR performed showing "no gas" per ED MD. Pt also noted to have HgB 7.2 and transfused 1U PRBC, baseline 7-8 per HIE review. Extensive discussion had with ED due to concerns for malignant otitis media and ludwigs angina as on admission exam pt found to have posterior auricular lymphadenopathy and tenderness to palpation and swelling and pain on floor of mouth with difficulty swallowing and recommendation made for transfer to [facility] for ENT services however per ED MD family declining transfer, although attempt was made.     08/21/22: Patient seen and examined. Feels better today, feels hungry. Discussed with Dr Ortega  08/23/22: No new issues. Discussed with patient regarding management and d/c plan.       Vital Signs Last 24 Hrs  T(C): 36.8 (22 Aug 2022 22:53), Max: 36.8 (22 Aug 2022 22:53)  T(F): 98.3 (22 Aug 2022 22:53), Max: 98.3 (22 Aug 2022 22:53)  HR: 67 (22 Aug 2022 22:53) (67 - 67)  BP: 133/46 (22 Aug 2022 22:53) (133/46 - 133/46)  BP(mean): --  RR: 18 (22 Aug 2022 22:53) (18 - 18)  SpO2: 96% (22 Aug 2022 22:53) (96% - 96%)    Parameters below as of 22 Aug 2022 22:53  Patient On (Oxygen Delivery Method): room air              Physical Exam:  · Constitutional	well-groomed; no distress  · Eyes	PERRL; EOMI; conjunctiva clear  · ENMT	ear L; ear R; mouth  · External Ear L	normal  · TMJ L	normal  · Ear Canal L	occluded with wax  · Tympanic Membrane L	not visualized  · External Ear R	normal  · TMJ R	tenderness; tender to post auricular node and with pulling of pinna only  · Ear Canal R	slight wax build up  · Tympanic Membrane R	bulging; air-fluid level  · Mouth	dry; swollen gums  · ENMT Comments	lip swelling, swelling with redness and tenderness to palpation to floor of mouth under tongue.  · Respiratory	clear to auscultation bilaterally  · Cardiovascular	regular rate and rhythm  · Gastrointestinal	soft; nontender; nondistended; normal active bowel sounds  · Neurological	cranial nerves II-XII intact; sensation intact  · Skin Comments	RLE with wounds dressed, dressing c/d/i. LLE with healing skin tear to shin. No edema bilaterally.                                  8.8    2.93  )-----------( 42       ( 23 Aug 2022 10:40 )             28.6     23 Aug 2022 08:39    137    |  112    |  34     ----------------------------<  100    4.3     |  20     |  0.71     Ca    8.2        23 Aug 2022 08:39          CAPILLARY BLOOD GLUCOSE      POCT Blood Glucose.: 146 mg/dL (23 Aug 2022 12:17)  POCT Blood Glucose.: 114 mg/dL (23 Aug 2022 07:36)  POCT Blood Glucose.: 192 mg/dL (22 Aug 2022 22:48)  POCT Blood Glucose.: 225 mg/dL (22 Aug 2022 16:16)            MEDICATIONS  (STANDING):  apixaban 2.5 milliGRAM(s) Oral two times a day  aspirin  chewable 81 milliGRAM(s) Oral daily  dexAMETHasone  Injectable 4 milliGRAM(s) IV Push every 6 hours  dextrose 5%. 1000 milliLiter(s) (50 mL/Hr) IV Continuous <Continuous>  dextrose 5%. 1000 milliLiter(s) (100 mL/Hr) IV Continuous <Continuous>  dextrose 50% Injectable 25 Gram(s) IV Push once  dextrose 50% Injectable 12.5 Gram(s) IV Push once  dextrose 50% Injectable 25 Gram(s) IV Push once  gabapentin 300 milliGRAM(s) Oral three times a day  glucagon  Injectable 1 milliGRAM(s) IntraMuscular once  insulin glargine Injectable (LANTUS) 10 Unit(s) SubCutaneous at bedtime  insulin lispro (ADMELOG) corrective regimen sliding scale   SubCutaneous three times a day before meals  meropenem  IVPB 1000 milliGRAM(s) IV Intermittent every 8 hours  metoprolol tartrate 25 milliGRAM(s) Oral two times a day  vancomycin  IVPB 750 milliGRAM(s) IV Intermittent every 12 hours    MEDICATIONS  (PRN):  acetaminophen     Tablet .. 650 milliGRAM(s) Oral every 6 hours PRN Temp greater or equal to 38C (100.4F), Mild Pain (1 - 3)  ALBUTerol    90 MICROgram(s) HFA Inhaler 1 Puff(s) Inhalation every 6 hours PRN Shortness of Breath  dextrose Oral Gel 15 Gram(s) Oral once PRN Blood Glucose LESS THAN 70 milliGRAM(s)/deciliter  morphine  - Injectable 2 milliGRAM(s) IV Push every 4 hours PRN Severe Pain (7 - 10)  ondansetron Injectable 4 milliGRAM(s) IV Push every 8 hours PRN Nausea and/or Vomiting  oxycodone    5 mG/acetaminophen 325 mG 1 Tablet(s) Oral every 6 hours PRN Moderate Pain (4 - 6)              Assessment:  · Assessment	  Pt is an 86 yo female with pmh/o DMII on insulin, h/o cellulitis and PVD with RLE wounds, who presents to ED in setting of poor oral intake due to sublingual/gum/lip/throat pain and swelling, partially alleviated by clindamycin and dexamethasone as outpatient and associated with drooling, who also presents with right ear pain and "wax build up" and development of purulence to Right foot/lower extremity wounds, who is admitted due to:    #Sublingual, submandibular, and oral pain and swelling concerning for Jaime's angina  #Right otitis media concerning for otomastoiditis  On IV Meropenem and   Vancomycin  ID consult and follow up appreciated  blood cultures: no growth  D/C Dexamethasone   On Eliquis for DVT ppx    #Lactic acidosis  Resolved   Likely reactive in setting of acute infections  No other criteria for sepsis met    #Anemia, acute on chronic due to infection  No signs of bleeding  Baseline Hgb between 7-8  Denies melena, hematochezia, abdominal pain, or other s/s bleeding  S/p 1U PRBC-being transfused on admission  Transfuse another 2 units yesterday. HGB today 8.8    #Leukopenia  Likely due to viral infection in setting of COVID  Resolved  Monitor CBC    #Acute Renal failure-resolved  S/p 1L IVF    #Right lower extremity cellulitis concerning for osteomyelitis  Ortho following, wounds dressed in ED  Elevate lower extremities  C/w IV abx    #COVID, asymptomatic    #Protein calorie malnutrition  Nutrition consult appreciated     #Multinodular thyroid  Outpatient follow up    #DMII, insulin dependent, with hyperglycemia  c/w lantus, decreased to 10 from 12 due to poor oral intake  AISS ordered  Accuchecks qAC/HS    #CAD/HTN/CHF/PVD  Hold Lasix  Restart Entresto, was on hold  due to ARF  c/w Eliquis with close monitoring of h/h  c/w metoprolol with holding parameters  Intake & output per routine       Pt is an 88 yo female with a pmh/o COPD, RA on MTX, CAD, DMII on insulin, depression, CHFrEF, HTN, PVD, PAD, s/p left fem-pop bypass 2021, left thigh infection s/p debridement and muscle flap, who presents to the ED from home due to oral pain and swelling associated with difficulty swallowing and drooling. Pt states that her pain is located under her tongue and in her gums and is associated with lip and throat swelling. Per daughter, pt began to have these symptoms two days ago and has had two days of clindamycin and one day of dexamethasone and was brought to ED per pt request due to pain. Pt states her pain and swelling has improved from two days ago however she states she still feels discomfort and is only able to tolerate fluids at this time. Pain is constant, no alleviating factors other than 4mg IV morphine in ED, aggravated by palpation, not quantifiable on scale, unable to describe character of pain and states that it is non radiating. Pt also endorsing right ear pain. Pt states that her right ear is a "wax builder" and had it irrigated by her daughter with success and that one day prior, she on her own, used a tool and pulled out a "big black scab" from inside ear. Pt states her ear is only tender when pulled during exam. Pt daughter also endorsing that right leg wounds have become purulent and is concerned for infection.   ED course: Pt received to ED c/o drooling and oral/airway swelling in setting of mouth and ear discomfort. 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 Pt found to have WBC <4, anemia, lactic acidosis to 3.5, ORLY. Pt given  clindamycin and vancomycin and IV morphine. Imaging reviewed with ENT at transfer center and per ED MD, transfer not recommended due to benign clinical exam. ENT Dr. Nata tolliver called by ED MD and does not have privileges here but states that symptoms could be due to dehydration. Pt noted to have started lasix on 8/9/22 per [facility] program and daughter reports poor po intake. Pt given 1L IVF. Ortho called and evaluated wounds to right foot as pt follows with Dr. Devries and XR performed showing "no gas" per ED MD. Pt also noted to have HgB 7.2 and transfused 1U PRBC, baseline 7-8 per HIE review. Extensive discussion had with ED due to concerns for malignant otitis media and ludwigs angina as on admission exam pt found to have posterior auricular lymphadenopathy and tenderness to palpation and swelling and pain on floor of mouth with difficulty swallowing and recommendation made for transfer to [facility] for E[facility] services however per ED MD family declining transfer, although attempt was made.     08/21/22: Patient seen and examined. Feels better today, feels hungry. Discussed with Dr Ortega  08/23/22: No new issues. Discussed with patient regarding management and d/c plan.       Vital Signs Last 24 Hrs  T(C): 36.8 (22 Aug 2022 22:53), Max: 36.8 (22 Aug 2022 22:53)  T(F): 98.3 (22 Aug 2022 22:53), Max: 98.3 (22 Aug 2022 22:53)  HR: 67 (22 Aug 2022 22:53) (67 - 67)  BP: 133/46 (22 Aug 2022 22:53) (133/46 - 133/46)  BP(mean): --  RR: 18 (22 Aug 2022 22:53) (18 - 18)  SpO2: 96% (22 Aug 2022 22:53) (96% - 96%)    Parameters below as of 22 Aug 2022 22:53  Patient On (Oxygen Delivery Method): room air              Physical Exam:  · Constitutional	well-groomed; no distress  · Eyes	PERRL; EOMI; conjunctiva clear  · ENMT	ear L; ear R; mouth  · External Ear L	normal  · TMJ L	normal  · Ear Canal L	occluded with wax  · Tympanic Membrane L	not visualized  · External Ear R	normal  · TMJ R	tenderness; tender to post auricular node and with pulling of pinna only  · Ear Canal R	slight wax build up  · Tympanic Membrane R	bulging; air-fluid level  · Mouth	dry; swollen gums  · ENMT Comments	lip swelling, swelling with redness and tenderness to palpation to floor of mouth under tongue.  · Respiratory	clear to auscultation bilaterally  · Cardiovascular	regular rate and rhythm  · Gastrointestinal	soft; nontender; nondistended; normal active bowel sounds  · Neurological	cranial nerves II-XII intact; sensation intact  · Skin Comments	RLE with wounds dressed, dressing c/d/i. LLE with healing skin tear to shin. No edema bilaterally.                                  8.8    2.93  )-----------( 42       ( 23 Aug 2022 10:40 )             28.6     23 Aug 2022 08:39    137    |  112    |  34     ----------------------------<  100    4.3     |  20     |  0.71     Ca    8.2        23 Aug 2022 08:39          CAPILLARY BLOOD GLUCOSE      POCT Blood Glucose.: 146 mg/dL (23 Aug 2022 12:17)  POCT Blood Glucose.: 114 mg/dL (23 Aug 2022 07:36)  POCT Blood Glucose.: 192 mg/dL (22 Aug 2022 22:48)  POCT Blood Glucose.: 225 mg/dL (22 Aug 2022 16:16)            MEDICATIONS  (STANDING):  apixaban 2.5 milliGRAM(s) Oral two times a day  aspirin  chewable 81 milliGRAM(s) Oral daily  dexAMETHasone  Injectable 4 milliGRAM(s) IV Push every 6 hours  dextrose 5%. 1000 milliLiter(s) (50 mL/Hr) IV Continuous <Continuous>  dextrose 5%. 1000 milliLiter(s) (100 mL/Hr) IV Continuous <Continuous>  dextrose 50% Injectable 25 Gram(s) IV Push once  dextrose 50% Injectable 12.5 Gram(s) IV Push once  dextrose 50% Injectable 25 Gram(s) IV Push once  gabapentin 300 milliGRAM(s) Oral three times a day  glucagon  Injectable 1 milliGRAM(s) IntraMuscular once  insulin glargine Injectable (LANTUS) 10 Unit(s) SubCutaneous at bedtime  insulin lispro (ADMELOG) corrective regimen sliding scale   SubCutaneous three times a day before meals  meropenem  IVPB 1000 milliGRAM(s) IV Intermittent every 8 hours  metoprolol tartrate 25 milliGRAM(s) Oral two times a day  vancomycin  IVPB 750 milliGRAM(s) IV Intermittent every 12 hours    MEDICATIONS  (PRN):  acetaminophen     Tablet .. 650 milliGRAM(s) Oral every 6 hours PRN Temp greater or equal to 38C (100.4F), Mild Pain (1 - 3)  ALBUTerol    90 MICROgram(s) HFA Inhaler 1 Puff(s) Inhalation every 6 hours PRN Shortness of Breath  dextrose Oral Gel 15 Gram(s) Oral once PRN Blood Glucose LESS THAN 70 milliGRAM(s)/deciliter  morphine  - Injectable 2 milliGRAM(s) IV Push every 4 hours PRN Severe Pain (7 - 10)  ondansetron Injectable 4 milliGRAM(s) IV Push every 8 hours PRN Nausea and/or Vomiting  oxycodone    5 mG/acetaminophen 325 mG 1 Tablet(s) Oral every 6 hours PRN Moderate Pain (4 - 6)              Assessment:  · Assessment	  Pt is an 88 yo female with pmh/o DMII on insulin, h/o cellulitis and PVD with RLE wounds, who presents to ED in setting of poor oral intake due to sublingual/gum/lip/throat pain and swelling, partially alleviated by clindamycin and dexamethasone as outpatient and associated with drooling, who also presents with right ear pain and "wax build up" and development of purulence to Right foot/lower extremity wounds, who is admitted due to:    #Sublingual, submandibular, and oral pain and swelling concerning for Jaime's angina  #Right otitis media concerning for otomastoiditis  On IV Meropenem and   Vancomycin  ID consult and follow up appreciated  blood cultures: no growth  D/C Dexamethasone   On Eliquis for DVT ppx    #Lactic acidosis  Resolved   Likely reactive in setting of acute infections  No other criteria for sepsis met    #Anemia, acute on chronic due to infection  No signs of bleeding  Baseline Hgb between 7-8  Denies melena, hematochezia, abdominal pain, or other s/s bleeding  S/p 1U PRBC-being transfused on admission  Transfuse another 2 units yesterday. HGB today 8.8    #Leukopenia  Likely due to viral infection in setting of COVID  Resolved  Monitor CBC    # Thrombocytopenia-  Possibly due to infection and COVID-19  Follow    #Acute Renal failure-resolved  S/p 1L IVF    #Right lower extremity cellulitis concerning for osteomyelitis  Ortho following, wounds dressed in ED  Elevate lower extremities  C/w IV abx    #COVID, asymptomatic    #Protein calorie malnutrition  Nutrition consult appreciated     #Multinodular thyroid  Outpatient follow up    #DMII, insulin dependent, with hyperglycemia  c/w lantus, decreased to 10 from 12 due to poor oral intake  AISS ordered  Accuchecks qAC/HS    #CAD/HTN/CHF/PVD  Hold Lasix  Restart Entresto, was on hold  due to ARF  c/w Eliquis with close monitoring of h/h  c/w metoprolol with holding parameters  Intake & output per routine

## 2022-08-24 LAB
CULTURE RESULTS: SIGNIFICANT CHANGE UP
CULTURE RESULTS: SIGNIFICANT CHANGE UP
HCT VFR BLD CALC: 27.9 % — LOW (ref 34.5–45)
HGB BLD-MCNC: 8.8 G/DL — LOW (ref 11.5–15.5)
MCHC RBC-ENTMCNC: 31.5 GM/DL — LOW (ref 32–36)
MCHC RBC-ENTMCNC: 32.4 PG — SIGNIFICANT CHANGE UP (ref 27–34)
MCV RBC AUTO: 102.6 FL — HIGH (ref 80–100)
PLATELET # BLD AUTO: 23 K/UL — LOW (ref 150–400)
RBC # BLD: 2.72 M/UL — LOW (ref 3.8–5.2)
RBC # FLD: 17.9 % — HIGH (ref 10.3–14.5)
SPECIMEN SOURCE: SIGNIFICANT CHANGE UP
SPECIMEN SOURCE: SIGNIFICANT CHANGE UP
WBC # BLD: 2.04 K/UL — LOW (ref 3.8–10.5)
WBC # FLD AUTO: 2.04 K/UL — LOW (ref 3.8–10.5)

## 2022-08-24 PROCEDURE — 99223 1ST HOSP IP/OBS HIGH 75: CPT

## 2022-08-24 PROCEDURE — 99232 SBSQ HOSP IP/OBS MODERATE 35: CPT

## 2022-08-24 RX ADMIN — Medication 25 MILLIGRAM(S): at 09:04

## 2022-08-24 RX ADMIN — Medication 650 MILLIGRAM(S): at 07:49

## 2022-08-24 RX ADMIN — OXYCODONE AND ACETAMINOPHEN 1 TABLET(S): 5; 325 TABLET ORAL at 01:55

## 2022-08-24 RX ADMIN — OXYCODONE AND ACETAMINOPHEN 1 TABLET(S): 5; 325 TABLET ORAL at 09:48

## 2022-08-24 RX ADMIN — SACUBITRIL AND VALSARTAN 1 TABLET(S): 24; 26 TABLET, FILM COATED ORAL at 09:05

## 2022-08-24 RX ADMIN — Medication 25 MILLIGRAM(S): at 21:19

## 2022-08-24 RX ADMIN — OXYCODONE AND ACETAMINOPHEN 1 TABLET(S): 5; 325 TABLET ORAL at 18:15

## 2022-08-24 RX ADMIN — GABAPENTIN 300 MILLIGRAM(S): 400 CAPSULE ORAL at 13:14

## 2022-08-24 RX ADMIN — OXYCODONE AND ACETAMINOPHEN 1 TABLET(S): 5; 325 TABLET ORAL at 09:17

## 2022-08-24 RX ADMIN — Medication 650 MILLIGRAM(S): at 22:35

## 2022-08-24 RX ADMIN — INSULIN GLARGINE 10 UNIT(S): 100 INJECTION, SOLUTION SUBCUTANEOUS at 21:19

## 2022-08-24 RX ADMIN — APIXABAN 2.5 MILLIGRAM(S): 2.5 TABLET, FILM COATED ORAL at 09:04

## 2022-08-24 RX ADMIN — MEROPENEM 100 MILLIGRAM(S): 1 INJECTION INTRAVENOUS at 05:40

## 2022-08-24 RX ADMIN — SACUBITRIL AND VALSARTAN 1 TABLET(S): 24; 26 TABLET, FILM COATED ORAL at 21:18

## 2022-08-24 RX ADMIN — Medication 81 MILLIGRAM(S): at 09:04

## 2022-08-24 RX ADMIN — Medication 650 MILLIGRAM(S): at 21:31

## 2022-08-24 RX ADMIN — Medication 250 MILLIGRAM(S): at 09:04

## 2022-08-24 RX ADMIN — Medication 250 MILLIGRAM(S): at 00:38

## 2022-08-24 RX ADMIN — GABAPENTIN 300 MILLIGRAM(S): 400 CAPSULE ORAL at 21:18

## 2022-08-24 RX ADMIN — Medication 2: at 11:46

## 2022-08-24 RX ADMIN — Medication 650 MILLIGRAM(S): at 05:48

## 2022-08-24 RX ADMIN — GABAPENTIN 300 MILLIGRAM(S): 400 CAPSULE ORAL at 05:40

## 2022-08-24 RX ADMIN — OXYCODONE AND ACETAMINOPHEN 1 TABLET(S): 5; 325 TABLET ORAL at 18:46

## 2022-08-24 NOTE — PROGRESS NOTE ADULT - SUBJECTIVE AND OBJECTIVE BOX
Pt is an 88 yo female with a pmh/o COPD, RA on MTX, CAD, DMII on insulin, depression, CHFrEF, HTN, PVD, PAD, s/p left fem-pop bypass 2021, left thigh infection s/p debridement and muscle flap, who presents to the ED from home due to oral pain and swelling associated with difficulty swallowing and drooling. Pt states that her pain is located under her tongue and in her gums and is associated with lip and throat swelling. Per daughter, pt began to have these symptoms two days ago and has had two days of clindamycin and one day of dexamethasone and was brought to ED per pt request due to pain. Pt states her pain and swelling has improved from two days ago however she states she still feels discomfort and is only able to tolerate fluids at this time. Pain is constant, no alleviating factors other than 4mg IV morphine in ED, aggravated by palpation, not quantifiable on scale, unable to describe character of pain and states that it is non radiating. Pt also endorsing right ear pain. Pt states that her right ear is a "wax builder" and had it irrigated by her daughter with success and that one day prior, she on her own, used a tool and pulled out a "big black scab" from inside ear. Pt states her ear is only tender when pulled during exam. Pt daughter also endorsing that right leg wounds have become purulent and is concerned for infection.   ED course: Pt received to ED c/o drooling and oral/airway swelling in setting of mouth and ear discomfort. 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 Pt found to have WBC <4, anemia, lactic acidosis to 3.5, ORLY. Pt given  clindamycin and vancomycin and IV morphine. Imaging reviewed with ENT at transfer center and per ED MD, transfer not recommended due to benign clinical exam. ENT Dr. Nata tolliver called by ED MD and does not have privileges here but states that symptoms could be due to dehydration. Pt noted to have started lasix on 8/9/22 per [facility] program and daughter reports poor po intake. Pt given 1L IVF. Ortho called and evaluated wounds to right foot as pt follows with Dr. Devries and XR performed showing "no gas" per ED MD. Pt also noted to have HgB 7.2 and transfused 1U PRBC, baseline 7-8 per HIE review. Extensive discussion had with ED due to concerns for malignant otitis media and ludwigs angina as on admission exam pt found to have posterior auricular lymphadenopathy and tenderness to palpation and swelling and pain on floor of mouth with difficulty swallowing and recommendation made for transfer to [facility] for ENT services however per ED MD family declining transfer, although attempt was made.     08/21/22: Patient seen and examined. Feels better today, feels hungry. Discussed with Dr Ortega  08/23/22: No new issues. Discussed with patient regarding management and d/c plan.  08/24/22: Complaining of occasional bleeding from mouth. Platelets down to 23 today from 45 yesterday. Discussed with daughter on phone in length regarding management and d/c plan.        Vital Signs Last 24 Hrs  T(C): 36.8 (24 Aug 2022 08:28), Max: 37.4 (23 Aug 2022 22:00)  T(F): 98.3 (24 Aug 2022 08:28), Max: 99.3 (23 Aug 2022 22:00)  HR: 65 (24 Aug 2022 08:28) (63 - 93)  BP: 117/40 (24 Aug 2022 08:28) (113/40 - 139/52)  BP(mean): --  RR: 18 (24 Aug 2022 08:28) (17 - 18)  SpO2: 98% (24 Aug 2022 08:28) (95% - 98%)    Parameters below as of 24 Aug 2022 08:28  Patient On (Oxygen Delivery Method): room air            Physical Exam:  · Constitutional	well-groomed; no distress  · Eyes	PERRL; EOMI; conjunctiva clear  · ENMT	ear L; ear R; mouth  · External Ear L	normal  · TMJ L	normal  · Ear Canal L	occluded with wax  · Tympanic Membrane L	not visualized  · External Ear R	normal  · TMJ R	tenderness; tender to post auricular node and with pulling of pinna only  · Ear Canal R	slight wax build up  · Tympanic Membrane R	bulging; air-fluid level  · Mouth	dry; swollen gums  · ENMT Comments	lip swelling, swelling with redness and tenderness to palpation to floor of mouth under tongue.  · Respiratory	clear to auscultation bilaterally  · Cardiovascular	regular rate and rhythm  · Gastrointestinal	soft; nontender; nondistended; normal active bowel sounds  · Neurological	cranial nerves II-XII intact; sensation intact  · Skin Comments	RLE with wounds dressed, dressing c/d/i. LLE with healing skin tear to shin. No edema bilaterally.                                  8.8    2.04  )-----------( 23       ( 24 Aug 2022 07:34 )             27.9     23 Aug 2022 08:39    137    |  112    |  34     ----------------------------<  100    4.3     |  20     |  0.71     Ca    8.2        23 Aug 2022 08:39          CAPILLARY BLOOD GLUCOSE      POCT Blood Glucose.: 154 mg/dL (24 Aug 2022 11:43)  POCT Blood Glucose.: 129 mg/dL (24 Aug 2022 07:40)  POCT Blood Glucose.: 187 mg/dL (23 Aug 2022 22:08)  POCT Blood Glucose.: 145 mg/dL (23 Aug 2022 16:53)            MEDICATIONS  (STANDING):  aspirin  chewable 81 milliGRAM(s) Oral daily  dextrose 5%. 1000 milliLiter(s) (50 mL/Hr) IV Continuous <Continuous>  dextrose 5%. 1000 milliLiter(s) (100 mL/Hr) IV Continuous <Continuous>  dextrose 50% Injectable 25 Gram(s) IV Push once  dextrose 50% Injectable 25 Gram(s) IV Push once  dextrose 50% Injectable 12.5 Gram(s) IV Push once  gabapentin 300 milliGRAM(s) Oral three times a day  glucagon  Injectable 1 milliGRAM(s) IntraMuscular once  insulin glargine Injectable (LANTUS) 10 Unit(s) SubCutaneous at bedtime  insulin lispro (ADMELOG) corrective regimen sliding scale   SubCutaneous three times a day before meals  metoprolol tartrate 25 milliGRAM(s) Oral two times a day  sacubitril 24 mG/valsartan 26 mG 1 Tablet(s) Oral two times a day    MEDICATIONS  (PRN):  acetaminophen     Tablet .. 650 milliGRAM(s) Oral every 6 hours PRN Temp greater or equal to 38C (100.4F), Mild Pain (1 - 3)  ALBUTerol    90 MICROgram(s) HFA Inhaler 1 Puff(s) Inhalation every 6 hours PRN Shortness of Breath  dextrose Oral Gel 15 Gram(s) Oral once PRN Blood Glucose LESS THAN 70 milliGRAM(s)/deciliter  ondansetron Injectable 4 milliGRAM(s) IV Push every 8 hours PRN Nausea and/or Vomiting  oxycodone    5 mG/acetaminophen 325 mG 1 Tablet(s) Oral every 6 hours PRN Moderate Pain (4 - 6)            Assessment:  · Assessment	  Pt is an 88 yo female with pmh/o DMII on insulin, h/o cellulitis and PVD with RLE wounds, who presents to ED in setting of poor oral intake due to sublingual/gum/lip/throat pain and swelling, partially alleviated by clindamycin and dexamethasone as outpatient and associated with drooling, who also presents with right ear pain and "wax build up" and development of purulence to Right foot/lower extremity wounds, who is admitted due to:    #Sublingual, submandibular, and oral pain and swelling concerning for Jaime's angina  #Right otitis media concerning for otomastoiditis  Hold IV Meropenem and   Vancomycin due to thrombocytopenia  ID consult and follow up appreciated  blood cultures: no growth  S/P Dexamethasone   On Eliquis for DVT ppx- hold due to thrombocytopenia     #Lactic acidosis  Resolved   Likely reactive in setting of acute infections  No other criteria for sepsis met    #Anemia, acute on chronic due to infection  No signs of bleeding  Baseline Hgb between 7-8  Denies melena, hematochezia, abdominal pain, or other s/s bleeding  S/p 3U PRBCs, HGB stable    #Leukopenia  Likely due to viral infection in setting of COVID  Resolved  Monitor CBC    # Thrombocytopenia-  Possibly due to infection and COVID-19  Follow  Heme eval  D/C IV antibiotics    #Acute Renal failure-resolved  S/p 1L IVF    #Right lower extremity cellulitis concerning for osteomyelitis  Ortho following, wounds dressed in ED  Elevate lower extremities  C/w IV abx    #COVID, asymptomatic    #Protein calorie malnutrition  Nutrition consult appreciated     #Multinodular thyroid  Outpatient follow up    #DMII, insulin dependent, with hyperglycemia  c/w lantus, decreased to 10 from 12 due to poor oral intake  AISS ordered  Accuchecks qAC/HS    #CAD/HTN/CHF/PVD  Hold Lasix  Restart Entresto, was on hold  due to ARF  Hold Eliquis w  c/w metoprolol with holding parameters  Intake & output per routine

## 2022-08-24 NOTE — PROGRESS NOTE ADULT - SUBJECTIVE AND OBJECTIVE BOX
Pt was seen and examined at bedside. Pt reports pain well controlled. Pt has no acute orthopaedic complaints at this time.    T(C): 37.4 (08-23-22 @ 22:00), Max: 37.4 (08-23-22 @ 22:00)  T(F): 99.3 (08-23-22 @ 22:00), Max: 99.3 (08-23-22 @ 22:00)  HR: 93 (08-23-22 @ 22:00) (63 - 93)  BP: 139/52 (08-23-22 @ 22:00) (113/40 - 139/52)  RR: 18 (08-23-22 @ 22:00) (17 - 18)  SpO2: 95% (08-23-22 @ 22:00) (95% - 98%)    Exam:    General: NAD    RLE:  Dressings c/d/i  Motor: +EHL/FHL/TA/GSc  SILT: Hernandez/Sa/SPN/DPN  Compartments soft and compressible  2+ DP/PT    A/P:  Patient is a 87y Female with healing chronic ankle wounds of the Right foot.  - Pain control as needed.   - FU BCx/UCx.   - NWB RLE in soft dressings.    - DVT Ppx: Per Primary AC Team.   - No acute orthopaedic surgical intervention indicated at this time.   - Will discuss with Dr. Ku and will advise if any changes made.

## 2022-08-24 NOTE — PROGRESS NOTE ADULT - ASSESSMENT
87 y/o Female with h/o COPD, CAD, Type 2 DM on insulin, depression, CHFrEF, HTN, PVD, PAD, s/p left femoral popliteal bypass 3/24/2021, chronic LE stasis dermatitis, prior left thigh infections s/p debridement and muscle flap, PAT, lower extremity DVT on Eliquis, RA, right ankle septic arthritis with  PSAE, PRMI and ENspp in Jan 2022 s/p IV meropenem was admitted on 8/19 for oral pain and swelling associated with difficulty swallowing and drooling. Pt states that her pain is located under her tongue and in her gums and is associated with lip and throat swelling. Per daughter, pt began to have these symptoms two days PTA and she received clindamycin and dexamethasone. Pt states her pain and swelling has improved from two days ago however she states she still feels discomfort and is only able to tolerate fluids at this time. Pain is constant, no alleviating factors other than 4mg IV morphine in ED, aggravated by palpation. Pt also endorsing right ear pain. Pt states that her right ear is a "wax builder" and had it irrigated by her daughter with success PTA. In ER she received clindamycin, vancomycin IV, then meropenem.     1. Right mastoid air cells and right mille ear cavity opacification. Possible right mastoiditis and right otitis media. Right foot ulcer. Possible underlying OM. Chronic LE stasis dermatitis. PVD s/p angioplasty. CRF stage 3.  Allergy to PCN and cephalosporines.  -BC x 2, urine c/s noted  -on vancomycin 750 mg IV q12h and meropenem 1 gm IV q8h # 5  -tolerating abx well so far; no side effects noted  -vancomycin trough level therapeutic  -case reviewed again; noted with plts lower ?related to abx therapy  -cultures are negative to date  -hold off further abx therapy for now  -f/u cultures  -elevate legs  -local wound care  -monitor temps  -f/u CBC  -supportive care  2. Other issues:   -care per medicine

## 2022-08-24 NOTE — PROGRESS NOTE ADULT - SUBJECTIVE AND OBJECTIVE BOX
Date of service: 08-24-22 @ 13:09    Lying in bed in NAD  Weak looking  Has low grade fever  More alert and verbal    ROS: no fever or chills; denies dizziness, no HA, no SOB or cough, no abdominal pain, no diarrhea or constipation; no dysuria, no legs pain, no rashes    MEDICATIONS  (STANDING):  aspirin  chewable 81 milliGRAM(s) Oral daily  dextrose 5%. 1000 milliLiter(s) (50 mL/Hr) IV Continuous <Continuous>  dextrose 5%. 1000 milliLiter(s) (100 mL/Hr) IV Continuous <Continuous>  dextrose 50% Injectable 25 Gram(s) IV Push once  dextrose 50% Injectable 12.5 Gram(s) IV Push once  dextrose 50% Injectable 25 Gram(s) IV Push once  gabapentin 300 milliGRAM(s) Oral three times a day  glucagon  Injectable 1 milliGRAM(s) IntraMuscular once  insulin glargine Injectable (LANTUS) 10 Unit(s) SubCutaneous at bedtime  insulin lispro (ADMELOG) corrective regimen sliding scale   SubCutaneous three times a day before meals  metoprolol tartrate 25 milliGRAM(s) Oral two times a day  sacubitril 24 mG/valsartan 26 mG 1 Tablet(s) Oral two times a day    Vital Signs Last 24 Hrs  T(C): 36.8 (24 Aug 2022 08:28), Max: 37.4 (23 Aug 2022 22:00)  T(F): 98.3 (24 Aug 2022 08:28), Max: 99.3 (23 Aug 2022 22:00)  HR: 65 (24 Aug 2022 08:28) (63 - 93)  BP: 117/40 (24 Aug 2022 08:28) (113/40 - 139/52)  BP(mean): --  RR: 18 (24 Aug 2022 08:28) (17 - 18)  SpO2: 98% (24 Aug 2022 08:28) (95% - 98%)    Parameters below as of 24 Aug 2022 08:28  Patient On (Oxygen Delivery Method): room air     Physical exam:    Constitutional:  No acute distress  HEENT: NC/AT, EOMI, PERRLA, conjunctivae clear; ears and nose atraumatic; pharynx mild erythema  posterior auricular lymphadenopathy  Neck: supple; thyroid not palpable  Back: no tenderness  Respiratory: respiratory effort normal; clear to auscultation  Cardiovascular: S1S2 regular, no murmurs  Abdomen: soft, not tender, not distended, positive BS  Genitourinary: no suprapubic tenderness  Lymphatic: no LN palpable  Musculoskeletal: no muscle tenderness, no joint swelling or tenderness  Extremities: no pedal edema  Neurological/ Psychiatric: AxOx3, judgement and insight normal; moving all extremities  Skin: papular skin rash in right thigh; no vesicles; no palpable lesions    Labs: reviewed                        8.8    4.28  )-----------( 64       ( 22 Aug 2022 08:51 )             27.7     08-22    139  |  110<H>  |  37<H>  ----------------------------<  134<H>  4.4   |  24  |  0.92    Ca    8.3<L>      22 Aug 2022 08:51    Vancomycin Level, Trough: 13.6 ug/mL (08-20 @ 21:35)    C-Reactive Protein, Serum: 43 mg/L (08-19-22 @ 15:05)                        7.6    3.35  )-----------( 137      ( 20 Aug 2022 09:22 )             24.6     08-20    135  |  106  |  42<H>  ----------------------------<  251<H>  4.6   |  22  |  1.26    Ca    8.5      20 Aug 2022 09:22    TPro  6.6  /  Alb  2.4<L>  /  TBili  0.3  /  DBili  x   /  AST  13<L>  /  ALT  14  /  AlkPhos  63  08-20     LIVER FUNCTIONS - ( 20 Aug 2022 09:22 )  Alb: 2.4 g/dL / Pro: 6.6 gm/dL / ALK PHOS: 63 U/L / ALT: 14 U/L / AST: 13 U/L / GGT: x             Culture - Blood (collected 19 Aug 2022 15:05)  Source: .Blood None  Preliminary Report (20 Aug 2022 22:01):    No growth to date.    Culture - Blood (collected 19 Aug 2022 15:05)  Source: .Blood None  Preliminary Report (20 Aug 2022 22:01):    No growth to date.    Radiology: all available radiological tests reviewed    < from: CT Temporal Bones No Cont (08.19.22 @ 19:12) >  There is opacification of the right mastoid air cells with possible focal   regions of mastoid septal erosions, opacification of the right middle ear   cavity and prominent soft tissue thickening along the right bony external   auditory canal with underlying prominent bony erosions as detailed above   which may represent an extensive infectious process such as malignant   otitis media and otomastoiditis with coalescence. Underlying neoplasm or   cholesteatoma is not entirely excluded. No associated swelling or   discrete fluid collection within the adjacent fascial and periauricular   soft tissues. No large effusion within the right TMJ joint. Recommend   follow-up evaluation with ENT.    < end of copied text >  < from: Xray Foot AP + Lateral + Oblique, Right (08.19.22 @ 16:27) >  Severe bone demineralization.  Osteomyelitis cannot be excluded.  If osteomyelitis is considered  despite conservative therapy, and soft   tissue / bone infection requires further assessment, follow-up MRI recommended.  < end of copied text >    < from: CT Neck Soft Tissue w/ IV Cont (08.19.22 @ 15:36) >  CT head: No acute intracranial hemorrhage or acute territorial   infarction. Mild chronic microvascular ischemic changes.    CTA neck: There is opacification of the right mastoid air cells and right   middle ear cavity. There is moderate soft tissue thickening along the   bony right external auditory canal with underlying prominent erosion of   the anterior wall of the external auditory canal. No significant   associated periauricular soft tissue swelling or discrete fluid   collections. No lymphadenopathy. Findings may represent infectious   process such as malignant otitis externa with direct extension into the   right middle ear cavity and right mastoid. Underlying cholesteatoma or   neoplasm is not entirely excluded. Recommend correlation with clinical   aspects of the case and follow-up with ENT.  < end of copied text >      Advanced directives addressed: full resuscitation

## 2022-08-25 LAB
BASOPHILS # BLD AUTO: 0 K/UL — SIGNIFICANT CHANGE UP (ref 0–0.2)
BASOPHILS NFR BLD AUTO: 0 % — SIGNIFICANT CHANGE UP (ref 0–2)
EOSINOPHIL # BLD AUTO: 0.56 K/UL — HIGH (ref 0–0.5)
EOSINOPHIL NFR BLD AUTO: 27.7 % — HIGH (ref 0–6)
HCT VFR BLD CALC: 27.7 % — LOW (ref 34.5–45)
HGB BLD-MCNC: 8.9 G/DL — LOW (ref 11.5–15.5)
IMM GRANULOCYTES NFR BLD AUTO: 0 % — SIGNIFICANT CHANGE UP (ref 0–1.5)
LYMPHOCYTES # BLD AUTO: 1.37 K/UL — SIGNIFICANT CHANGE UP (ref 1–3.3)
LYMPHOCYTES # BLD AUTO: 67.8 % — HIGH (ref 13–44)
MCHC RBC-ENTMCNC: 32 PG — SIGNIFICANT CHANGE UP (ref 27–34)
MCHC RBC-ENTMCNC: 32.1 GM/DL — SIGNIFICANT CHANGE UP (ref 32–36)
MCV RBC AUTO: 99.6 FL — SIGNIFICANT CHANGE UP (ref 80–100)
MONOCYTES # BLD AUTO: 0.02 K/UL — SIGNIFICANT CHANGE UP (ref 0–0.9)
MONOCYTES NFR BLD AUTO: 1 % — LOW (ref 2–14)
NEUTROPHILS # BLD AUTO: 0.07 K/UL — LOW (ref 1.8–7.4)
NEUTROPHILS NFR BLD AUTO: 3.5 % — LOW (ref 43–77)
PLATELET # BLD AUTO: 11 K/UL — CRITICAL LOW (ref 150–400)
RBC # BLD: 2.78 M/UL — LOW (ref 3.8–5.2)
RBC # FLD: 17.5 % — HIGH (ref 10.3–14.5)
WBC # BLD: 2.02 K/UL — LOW (ref 3.8–10.5)
WBC # FLD AUTO: 2.02 K/UL — LOW (ref 3.8–10.5)

## 2022-08-25 PROCEDURE — 99233 SBSQ HOSP IP/OBS HIGH 50: CPT

## 2022-08-25 RX ORDER — DIPHENHYDRAMINE HYDROCHLORIDE AND LIDOCAINE HYDROCHLORIDE AND ALUMINUM HYDROXIDE AND MAGNESIUM HYDRO
10 KIT EVERY 6 HOURS
Refills: 0 | Status: DISCONTINUED | OUTPATIENT
Start: 2022-08-25 | End: 2022-09-01

## 2022-08-25 RX ADMIN — OXYCODONE AND ACETAMINOPHEN 1 TABLET(S): 5; 325 TABLET ORAL at 01:17

## 2022-08-25 RX ADMIN — Medication 2: at 17:13

## 2022-08-25 RX ADMIN — OXYCODONE AND ACETAMINOPHEN 1 TABLET(S): 5; 325 TABLET ORAL at 10:00

## 2022-08-25 RX ADMIN — DIPHENHYDRAMINE HYDROCHLORIDE AND LIDOCAINE HYDROCHLORIDE AND ALUMINUM HYDROXIDE AND MAGNESIUM HYDRO 10 MILLILITER(S): KIT at 13:16

## 2022-08-25 RX ADMIN — GABAPENTIN 300 MILLIGRAM(S): 400 CAPSULE ORAL at 05:15

## 2022-08-25 RX ADMIN — OXYCODONE AND ACETAMINOPHEN 1 TABLET(S): 5; 325 TABLET ORAL at 09:29

## 2022-08-25 RX ADMIN — Medication 25 MILLIGRAM(S): at 09:24

## 2022-08-25 RX ADMIN — DIPHENHYDRAMINE HYDROCHLORIDE AND LIDOCAINE HYDROCHLORIDE AND ALUMINUM HYDROXIDE AND MAGNESIUM HYDRO 10 MILLILITER(S): KIT at 18:22

## 2022-08-25 RX ADMIN — OXYCODONE AND ACETAMINOPHEN 1 TABLET(S): 5; 325 TABLET ORAL at 02:34

## 2022-08-25 RX ADMIN — Medication 2: at 07:51

## 2022-08-25 RX ADMIN — Medication 650 MILLIGRAM(S): at 15:39

## 2022-08-25 RX ADMIN — GABAPENTIN 300 MILLIGRAM(S): 400 CAPSULE ORAL at 13:16

## 2022-08-25 RX ADMIN — Medication 25 MILLIGRAM(S): at 21:41

## 2022-08-25 RX ADMIN — OXYCODONE AND ACETAMINOPHEN 1 TABLET(S): 5; 325 TABLET ORAL at 17:16

## 2022-08-25 RX ADMIN — INSULIN GLARGINE 10 UNIT(S): 100 INJECTION, SOLUTION SUBCUTANEOUS at 21:40

## 2022-08-25 RX ADMIN — SACUBITRIL AND VALSARTAN 1 TABLET(S): 24; 26 TABLET, FILM COATED ORAL at 21:40

## 2022-08-25 RX ADMIN — OXYCODONE AND ACETAMINOPHEN 1 TABLET(S): 5; 325 TABLET ORAL at 17:50

## 2022-08-25 RX ADMIN — Medication 650 MILLIGRAM(S): at 06:02

## 2022-08-25 RX ADMIN — Medication 81 MILLIGRAM(S): at 09:24

## 2022-08-25 RX ADMIN — GABAPENTIN 300 MILLIGRAM(S): 400 CAPSULE ORAL at 21:41

## 2022-08-25 RX ADMIN — Medication 650 MILLIGRAM(S): at 05:15

## 2022-08-25 RX ADMIN — DIPHENHYDRAMINE HYDROCHLORIDE AND LIDOCAINE HYDROCHLORIDE AND ALUMINUM HYDROXIDE AND MAGNESIUM HYDRO 10 MILLILITER(S): KIT at 21:40

## 2022-08-25 RX ADMIN — SACUBITRIL AND VALSARTAN 1 TABLET(S): 24; 26 TABLET, FILM COATED ORAL at 09:24

## 2022-08-25 RX ADMIN — Medication 650 MILLIGRAM(S): at 21:49

## 2022-08-25 RX ADMIN — Medication 650 MILLIGRAM(S): at 16:09

## 2022-08-25 RX ADMIN — Medication 650 MILLIGRAM(S): at 22:47

## 2022-08-25 NOTE — PROGRESS NOTE ADULT - SUBJECTIVE AND OBJECTIVE BOX
Date of service: 08-25-22 @ 10:30    Sitting in bed in NAD  Alert and verbal  Denies pain    ROS: no fever or chills; denies dizziness, no HA, no SOB or cough, no abdominal pain, no diarrhea or constipation; no dysuria, no legs pain, no new rashes    MEDICATIONS  (STANDING):  aspirin  chewable 81 milliGRAM(s) Oral daily  dextrose 5%. 1000 milliLiter(s) (100 mL/Hr) IV Continuous <Continuous>  dextrose 5%. 1000 milliLiter(s) (50 mL/Hr) IV Continuous <Continuous>  dextrose 50% Injectable 25 Gram(s) IV Push once  dextrose 50% Injectable 12.5 Gram(s) IV Push once  dextrose 50% Injectable 25 Gram(s) IV Push once  gabapentin 300 milliGRAM(s) Oral three times a day  glucagon  Injectable 1 milliGRAM(s) IntraMuscular once  insulin glargine Injectable (LANTUS) 10 Unit(s) SubCutaneous at bedtime  insulin lispro (ADMELOG) corrective regimen sliding scale   SubCutaneous three times a day before meals  metoprolol tartrate 25 milliGRAM(s) Oral two times a day  sacubitril 24 mG/valsartan 26 mG 1 Tablet(s) Oral two times a day    Vital Signs Last 24 Hrs  T(C): 37.1 (25 Aug 2022 09:38), Max: 37.1 (25 Aug 2022 09:38)  T(F): 98.7 (25 Aug 2022 09:38), Max: 98.7 (25 Aug 2022 09:38)  HR: 85 (25 Aug 2022 09:38) (73 - 110)  BP: 126/44 (25 Aug 2022 09:38) (126/39 - 135/59)  BP(mean): --  RR: 17 (25 Aug 2022 09:38) (17 - 18)  SpO2: 97% (25 Aug 2022 09:38) (96% - 97%)    Parameters below as of 25 Aug 2022 09:38  Patient On (Oxygen Delivery Method): room air         Physical exam:    Constitutional:  No acute distress  HEENT: NC/AT, EOMI, PERRLA, conjunctivae clear; ears and nose atraumatic  posterior auricular lymphadenopathy  Neck: supple; thyroid not palpable  Back: no tenderness  Respiratory: respiratory effort normal; clear to auscultation  Cardiovascular: S1S2 regular, no murmurs  Abdomen: soft, not tender, not distended, positive BS  Genitourinary: no suprapubic tenderness  Lymphatic: no LN palpable  Musculoskeletal: no muscle tenderness, no joint swelling or tenderness  Extremities: no pedal edema  Neurological/ Psychiatric: AxOx3, judgement and insight normal; moving all extremities  Skin: papular skin rash in right thigh; no vesicles; no palpable lesions    Labs: reviewed                        8.9    2.02  )-----------( 11       ( 25 Aug 2022 07:32 )             27.7     C-Reactive Protein, Serum: 43 mg/L (08-19-22 @ 15:05)                        8.8    4.28  )-----------( 64       ( 22 Aug 2022 08:51 )             27.7     08-22    139  |  110<H>  |  37<H>  ----------------------------<  134<H>  4.4   |  24  |  0.92    Ca    8.3<L>      22 Aug 2022 08:51    Vancomycin Level, Trough: 13.6 ug/mL (08-20 @ 21:35)    C-Reactive Protein, Serum: 43 mg/L (08-19-22 @ 15:05)                        7.6    3.35  )-----------( 137      ( 20 Aug 2022 09:22 )             24.6     08-20    135  |  106  |  42<H>  ----------------------------<  251<H>  4.6   |  22  |  1.26    Ca    8.5      20 Aug 2022 09:22    TPro  6.6  /  Alb  2.4<L>  /  TBili  0.3  /  DBili  x   /  AST  13<L>  /  ALT  14  /  AlkPhos  63  08-20     LIVER FUNCTIONS - ( 20 Aug 2022 09:22 )  Alb: 2.4 g/dL / Pro: 6.6 gm/dL / ALK PHOS: 63 U/L / ALT: 14 U/L / AST: 13 U/L / GGT: x             Culture - Blood (collected 19 Aug 2022 15:05)  Source: .Blood None  Preliminary Report (20 Aug 2022 22:01):    No growth to date.    Culture - Blood (collected 19 Aug 2022 15:05)  Source: .Blood None  Preliminary Report (20 Aug 2022 22:01):    No growth to date.    Radiology: all available radiological tests reviewed    < from: CT Temporal Bones No Cont (08.19.22 @ 19:12) >  There is opacification of the right mastoid air cells with possible focal   regions of mastoid septal erosions, opacification of the right middle ear   cavity and prominent soft tissue thickening along the right bony external   auditory canal with underlying prominent bony erosions as detailed above   which may represent an extensive infectious process such as malignant   otitis media and otomastoiditis with coalescence. Underlying neoplasm or   cholesteatoma is not entirely excluded. No associated swelling or   discrete fluid collection within the adjacent fascial and periauricular   soft tissues. No large effusion within the right TMJ joint. Recommend   follow-up evaluation with ENT.    < end of copied text >  < from: Xray Foot AP + Lateral + Oblique, Right (08.19.22 @ 16:27) >  Severe bone demineralization.  Osteomyelitis cannot be excluded.  If osteomyelitis is considered  despite conservative therapy, and soft   tissue / bone infection requires further assessment, follow-up MRI recommended.  < end of copied text >    < from: CT Neck Soft Tissue w/ IV Cont (08.19.22 @ 15:36) >  CT head: No acute intracranial hemorrhage or acute territorial   infarction. Mild chronic microvascular ischemic changes.    CTA neck: There is opacification of the right mastoid air cells and right   middle ear cavity. There is moderate soft tissue thickening along the   bony right external auditory canal with underlying prominent erosion of   the anterior wall of the external auditory canal. No significant   associated periauricular soft tissue swelling or discrete fluid   collections. No lymphadenopathy. Findings may represent infectious   process such as malignant otitis externa with direct extension into the   right middle ear cavity and right mastoid. Underlying cholesteatoma or   neoplasm is not entirely excluded. Recommend correlation with clinical   aspects of the case and follow-up with ENT.  < end of copied text >      Advanced directives addressed: full resuscitation

## 2022-08-25 NOTE — ADVANCED PRACTICE NURSE CONSULT - RECOMMEDATIONS
1)Continue to Elevate heels off of Mattress  2)Continue to Turn and position every 2 Hours  3)Right Medial Malleolus: Apply Xeroform to wound beds and cover with nonstick Telfa ABD and Lightly wrap with Kerlix. Daily Change   4) Right Dorsal Foot  Apply Xeroform to wound beds and cover with nonstick Telfa ABD and Lightly wrap with Kerlix.  Daily Change   5) Right Achilles Apply Xeroform to wound beds and cover with nonstick Telfa ABD and Lightly wrap with Kerlix. Daily Change   5) Sacrum: Apply Triad and Cover  6) Left Heel please continue to Keep in Foam Heel Protector.    
Self

## 2022-08-25 NOTE — PROGRESS NOTE ADULT - ASSESSMENT
87 y/o Female with h/o COPD, CAD, Type 2 DM on insulin, depression, CHFrEF, HTN, PVD, PAD, s/p left femoral popliteal bypass 3/24/2021, chronic LE stasis dermatitis, prior left thigh infections s/p debridement and muscle flap, PAT, lower extremity DVT on Eliquis, RA, right ankle septic arthritis with  PSAE, PRMI and ENspp in Jan 2022 s/p IV meropenem was admitted on 8/19 for oral pain and swelling associated with difficulty swallowing and drooling. Pt states that her pain is located under her tongue and in her gums and is associated with lip and throat swelling. Per daughter, pt began to have these symptoms two days PTA and she received clindamycin and dexamethasone. Pt states her pain and swelling has improved from two days ago however she states she still feels discomfort and is only able to tolerate fluids at this time. Pain is constant, no alleviating factors other than 4mg IV morphine in ED, aggravated by palpation. Pt also endorsing right ear pain. Pt states that her right ear is a "wax builder" and had it irrigated by her daughter with success PTA. In ER she received clindamycin, vancomycin IV, then meropenem.     1. Pancytopenia. Right mastoid air cells and right mille ear cavity opacification. Possible right mastoiditis and right otitis media. Right foot ulcer. Possible underlying OM. Chronic LE stasis dermatitis. PVD s/p angioplasty. CRF stage 3.  Allergy to PCN and cephalosporines.  -thrombocytopenia is worse  -BC x 2, urine c/s noted  -s/p vancomycin 750 mg IV q12h and meropenem 1 gm IV q8h # 5  -tolerating abx well so far; no side effects noted  -noted with plts lower ?related to abx therapy - abx were stopped   -cultures are negative to date  -continue to observe off abx therapy  -f/u cultures  -elevate legs  -local wound care  -monitor temps  -f/u CBC  -supportive care  2. Other issues:   -care per medicine    d/w Dr. Borges

## 2022-08-25 NOTE — PROGRESS NOTE ADULT - SUBJECTIVE AND OBJECTIVE BOX
HPI:  Pt is an 86 yo female with a pmh/o COPD, RA on MTX, CAD, DMII on insulin, depression, CHFrEF, HTN, PVD, PAD, s/p left fem-pop bypass 2021, left thigh infection s/p debridement and muscle flap, who presents to the ED from home due to oral pain and swelling associated with difficulty swallowing and drooling. Pt states that her pain is located under her tongue and in her gums and is associated with lip and throat swelling. Per daughter, pt began to have these symptoms two days ago and has had two days of clindamycin and one day of dexamethasone and was brought to ED per pt request due to pain. Pt states her pain and swelling has improved from two days ago however she states she still feels discomfort and is only able to tolerate fluids at this time. Pain is constant, no alleviating factors other than 4mg IV morphine in ED, aggravated by palpation, not quantifiable on scale, unable to describe character of pain and states that it is non radiating. Pt also endorsing right ear pain. Pt states that her right ear is a "wax builder" and had it irrigated by her daughter with success and that one day prior, she on her own, used a tool and pulled out a "big black scab" from inside ear. Pt states her ear is only tender when pulled during exam. Pt daughter also endorsing that right leg wounds have become purulent and is concerned for infection.     ED course: Pt received to ED c/o drooling and oral/airway swelling in setting of mouth and ear discomfort. 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 Pt found to have WBC <4, anemia, lactic acidosis to 3.5, ORLY. Pt given  clindamycin and vancomycin and IV morphine. Imaging reviewed with ENT at transfer center and per ED MD, transfer not recommended due to benign clinical exam. ENT Dr. Nata tolliver called by ED MD and does not have privileges here but states that symptoms could be due to dehydration. Pt noted to have started lasix on 8/9/22 per [facility] program and daughter reports poor po intake. Pt given 1L IVF. Ortho called and evaluated wounds to right foot as pt follows with Dr. Devries and XR performed showing "no gas" per ED MD. Pt also noted to have HgB 7.2 and transfused 1U PRBC, baseline 7-8 per HIE review. Extensive discussion had with ED due to concerns for malignant otitis media and ludwigs angina as on admission exam pt found to have posterior auricular lymphadenopathy and tenderness to palpation and swelling and pain on floor of mouth with difficulty swallowing and recommendation made for transfer to [facility] for E[facility] services however per ED MD family declining transfer, although attempt was made.    (19 Aug 2022 22:52)      8/25/22  C/o painful blood blisters in the mouth, pain on swallowing, but otherwise she states she is doing OK.    PAST MEDICAL & SURGICAL HISTORY:  Hypertension      Anemia      Arthritis, rheumatoid      Depression      Type 2 diabetes mellitus  on insulin      PVD (peripheral vascular disease) with claudication      CAD (coronary artery disease)  non-obstructive      Rheumatoid arthritis      CHF (congestive heart failure)      PAD (peripheral artery disease)      DVT, lower extremity      fracture ankle right  plate . screws   2000      S/P cataract surgery  2011      Hip fracture requiring operative repair  Right hip 11/14/2020      S/P femoral-popliteal bypass surgery  3/24/2021      MEDICATIONS  (STANDING):  aspirin  chewable 81 milliGRAM(s) Oral daily  dextrose 5%. 1000 milliLiter(s) (50 mL/Hr) IV Continuous <Continuous>  dextrose 5%. 1000 milliLiter(s) (100 mL/Hr) IV Continuous <Continuous>  dextrose 50% Injectable 25 Gram(s) IV Push once  dextrose 50% Injectable 12.5 Gram(s) IV Push once  dextrose 50% Injectable 25 Gram(s) IV Push once  FIRST- Mouthwash  BLM 10 milliLiter(s) Swish and Spit every 6 hours  gabapentin 300 milliGRAM(s) Oral three times a day  glucagon  Injectable 1 milliGRAM(s) IntraMuscular once  insulin glargine Injectable (LANTUS) 10 Unit(s) SubCutaneous at bedtime  insulin lispro (ADMELOG) corrective regimen sliding scale   SubCutaneous three times a day before meals  metoprolol tartrate 25 milliGRAM(s) Oral two times a day  sacubitril 24 mG/valsartan 26 mG 1 Tablet(s) Oral two times a day    MEDICATIONS  (PRN):  acetaminophen     Tablet .. 650 milliGRAM(s) Oral every 6 hours PRN Temp greater or equal to 38C (100.4F), Mild Pain (1 - 3)  ALBUTerol    90 MICROgram(s) HFA Inhaler 1 Puff(s) Inhalation every 6 hours PRN Shortness of Breath  dextrose Oral Gel 15 Gram(s) Oral once PRN Blood Glucose LESS THAN 70 milliGRAM(s)/deciliter  ondansetron Injectable 4 milliGRAM(s) IV Push every 8 hours PRN Nausea and/or Vomiting  oxycodone    5 mG/acetaminophen 325 mG 1 Tablet(s) Oral every 6 hours PRN Moderate Pain (4 - 6)    Vital Signs Last 24 Hrs  T(C): 37.1 (25 Aug 2022 09:38), Max: 37.1 (25 Aug 2022 09:38)  T(F): 98.7 (25 Aug 2022 09:38), Max: 98.7 (25 Aug 2022 09:38)  HR: 85 (25 Aug 2022 09:38) (73 - 110)  BP: 126/44 (25 Aug 2022 09:38) (126/39 - 135/59)  BP(mean): --  RR: 17 (25 Aug 2022 09:38) (17 - 18)  SpO2: 97% (25 Aug 2022 09:38) (96% - 97%)    Parameters below as of 25 Aug 2022 09:38  Patient On (Oxygen Delivery Method): room air    Elderly female in bed NAD   Sclera not icteric   Oral mucosa- blisters some with blood   No JADE  Lungs clear  Heart RRR  Abd soft NT  Extr no pedal edema  Left thigh long surgical scar   Skin- thin, ecchymoses  Neuro non focal, speech normal   Psych normal affect     CBC Full  -  ( 25 Aug 2022 07:32 )  WBC Count : 2.02 K/uL  RBC Count : 2.78 M/uL  Hemoglobin : 8.9 g/dL  Hematocrit : 27.7 %  Platelet Count - Automated : 11 K/uL  Mean Cell Volume : 99.6 fl  Mean Cell Hemoglobin : 32.0 pg  Mean Cell Hemoglobin Concentration : 32.1 gm/dL  Auto Neutrophil # : 0.07 K/uL  Auto Lymphocyte # : 1.37 K/uL  Auto Monocyte # : 0.02 K/uL  Auto Eosinophil # : 0.56 K/uL  Auto Basophil # : 0.00 K/uL  Auto Neutrophil % : 3.5 %  Auto Lymphocyte % : 67.8 %  Auto Monocyte % : 1.0 %  Auto Eosinophil % : 27.7 %  Auto Basophil % : 0.0 %

## 2022-08-25 NOTE — CONSULT NOTE ADULT - SUBJECTIVE AND OBJECTIVE BOX
HPI:  Pt is an 88 yo female with a pmh/o COPD, RA on MTX, CAD, DMII on insulin, depression, CHFrEF, HTN, PVD, PAD, s/p left fem-pop bypass 2021, left thigh infection s/p debridement and muscle flap, who presents to the ED from home due to oral pain and swelling associated with difficulty swallowing and drooling. Pt states that her pain is located under her tongue and in her gums and is associated with lip and throat swelling. Per daughter, pt began to have these symptoms two days ago and has had two days of clindamycin and one day of dexamethasone and was brought to ED per pt request due to pain. Pt states her pain and swelling has improved from two days ago however she states she still feels discomfort and is only able to tolerate fluids at this time. Pain is constant, no alleviating factors other than 4mg IV morphine in ED, aggravated by palpation, not quantifiable on scale, unable to describe character of pain and states that it is non radiating. Pt also endorsing right ear pain. Pt states that her right ear is a "wax builder" and had it irrigated by her daughter with success and that one day prior, she on her own, used a tool and pulled out a "big black scab" from inside ear. Pt states her ear is only tender when pulled during exam. Pt daughter also endorsing that right leg wounds have become purulent and is concerned for infection.     ED course: Pt received to ED c/o drooling and oral/airway swelling in setting of mouth and ear discomfort. 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 Pt found to have WBC <4, anemia, lactic acidosis to 3.5, ORLY. Pt given  clindamycin and vancomycin and IV morphine. Imaging reviewed with ENT at transfer center and per ED MD, transfer not recommended due to benign clinical exam. ENT Dr. Nata tolliver called by ED MD and does not have privileges here but states that symptoms could be due to dehydration. Pt noted to have started lasix on 8/9/22 per [facility] program and daughter reports poor po intake. Pt given 1L IVF. Ortho called and evaluated wounds to right foot as pt follows with Dr. Devries and XR performed showing "no gas" per ED MD. Pt also noted to have HgB 7.2 and transfused 1U PRBC, baseline 7-8 per HIE review. Extensive discussion had with ED due to concerns for malignant otitis media and ludwigs angina as on admission exam pt found to have posterior auricular lymphadenopathy and tenderness to palpation and swelling and pain on floor of mouth with difficulty swallowing and recommendation made for transfer to [facility] for ENT services however per ED MD family declining transfer, although attempt was made.    (19 Aug 2022 22:52)      PAST MEDICAL & SURGICAL HISTORY:  Hypertension      Anemia      Arthritis, rheumatoid      Depression      Type 2 diabetes mellitus  on insulin      PVD (peripheral vascular disease) with claudication      CAD (coronary artery disease)  non-obstructive      Rheumatoid arthritis      CHF (congestive heart failure)      PAD (peripheral artery disease)      DVT, lower extremity      fracture ankle right  plate . screws   2000      S/P cataract surgery  2011      Hip fracture requiring operative repair  Right hip 11/14/2020      S/P femoral-popliteal bypass surgery  3/24/2021          MEDICATIONS  (STANDING):  aspirin  chewable 81 milliGRAM(s) Oral daily  dextrose 5%. 1000 milliLiter(s) (50 mL/Hr) IV Continuous <Continuous>  dextrose 5%. 1000 milliLiter(s) (100 mL/Hr) IV Continuous <Continuous>  dextrose 50% Injectable 25 Gram(s) IV Push once  dextrose 50% Injectable 12.5 Gram(s) IV Push once  dextrose 50% Injectable 25 Gram(s) IV Push once  gabapentin 300 milliGRAM(s) Oral three times a day  glucagon  Injectable 1 milliGRAM(s) IntraMuscular once  insulin glargine Injectable (LANTUS) 10 Unit(s) SubCutaneous at bedtime  insulin lispro (ADMELOG) corrective regimen sliding scale   SubCutaneous three times a day before meals  metoprolol tartrate 25 milliGRAM(s) Oral two times a day  sacubitril 24 mG/valsartan 26 mG 1 Tablet(s) Oral two times a day    MEDICATIONS  (PRN):  acetaminophen     Tablet .. 650 milliGRAM(s) Oral every 6 hours PRN Temp greater or equal to 38C (100.4F), Mild Pain (1 - 3)  ALBUTerol    90 MICROgram(s) HFA Inhaler 1 Puff(s) Inhalation every 6 hours PRN Shortness of Breath  dextrose Oral Gel 15 Gram(s) Oral once PRN Blood Glucose LESS THAN 70 milliGRAM(s)/deciliter  ondansetron Injectable 4 milliGRAM(s) IV Push every 8 hours PRN Nausea and/or Vomiting  oxycodone    5 mG/acetaminophen 325 mG 1 Tablet(s) Oral every 6 hours PRN Moderate Pain (4 - 6)      Allergies    cephalosporins (Other)  penicillins (Urticaria; Pruritus)    Intolerances        FAMILY HISTORY:  FH: colon cancer  father    FH: heart attack  father    Family history of atherosclerosis  mother        Review of Systems    Constitutional, Eyes, ENT, Cardiovascular, Respiratory, Gastrointestinal, Genitourinary, Musculoskeletal, Integumentary, Neurological, Psychiatric, Endocrine, Heme/Lymph and Allergic/Immunologic review of systems are otherwise negative except as noted in HPI.     Vital Signs Last 24 Hrs  T(C): 36.6 (24 Aug 2022 21:14), Max: 36.8 (24 Aug 2022 08:28)  T(F): 97.9 (24 Aug 2022 21:14), Max: 98.3 (24 Aug 2022 08:28)  HR: 110 (24 Aug 2022 21:14) (65 - 110)  BP: 135/59 (24 Aug 2022 21:14) (117/40 - 135/59)  BP(mean): --  RR: 18 (24 Aug 2022 21:14) (18 - 18)  SpO2: 96% (24 Aug 2022 21:14) (96% - 98%)    Parameters below as of 24 Aug 2022 21:14  Patient On (Oxygen Delivery Method): room air        Physical Exam  Eyes: PERRL, EOMI, no conjunctival infection, anicteric.   ENT: pharynx is unremarkable, moist mucus membrane, no oral lesions.   Neck: supple without JVD, no thyromegaly or masses appreciated.   Pulmonary: clear to auscultation bilaterally, no dullness, no wheezing.   Cardiac: RRR, normal S1S2, no murmurs, rubs, gallops.   Vascular: no JVD, no calf tenderness, venous stasis changes, varices.   Abdomen: normoactive bowel sounds, soft and nontender, no hepatosplenomegaly or masses appreciated.   Lymphatic: no peripheral adenopathy appreciated.   Musculoskeletal: full range of motion and no deformities appreciated.   Skin: normal appearance, no rash, nodules, vesicles, ulcers, erythema.   Neurology: grossly intact.   Psychiatric: affect appropriate.       LABS:  CBC Full  -  ( 24 Aug 2022 07:34 )  WBC Count : 2.04 K/uL  RBC Count : 2.72 M/uL  Hemoglobin : 8.8 g/dL  Hematocrit : 27.9 %  Platelet Count - Automated : 23 K/uL  Mean Cell Volume : 102.6 fl  Mean Cell Hemoglobin : 32.4 pg  Mean Cell Hemoglobin Concentration : 31.5 gm/dL  Auto Neutrophil # : x  Auto Lymphocyte # : x  Auto Monocyte # : x  Auto Eosinophil # : x  Auto Basophil # : x  Auto Neutrophil % : x  Auto Lymphocyte % : x  Auto Monocyte % : x  Auto Eosinophil % : x  Auto Basophil % : x    08-23    137  |  112<H>  |  34<H>  ----------------------------<  100<H>  4.3   |  20<L>  |  0.71    Ca    8.2<L>      23 Aug 2022 08:39            RADIOLOGY & ADDITIONAL STUDIES:    Hematology consult requested for thrombocytopenia.    baseline platelets ~ 200    5/11  181  5/12  158  5/13  113  5/15   59  5/16   53     On heparin sc  5/6- 5/16  .  Received Fondaparinux  7.5 mg  today.       ROS : feels better . Mild discomfort L thigh improved. No fevers. Eating well. No bleeding  All other sx reviewed negative   PAST MEDICAL & SURGICAL HISTORY:  Hypertension    Anemia    Arthritis, rheumatoid    Depression    Type 2 diabetes mellitus  on insulin    PVD (peripheral vascular disease) with claudication    CAD (coronary artery disease)  non-obstructive    Rheumatoid arthritis    fracture ankle right  plate . screws   2000    S/P cataract surgery  2011    Hip fracture requiring operative repair  Right hip 11/14/2020    FAMILY HISTORY:  FH: colon cancer  father    FH: heart attack  father    Family history of atherosclerosis  mother      Vital Signs Last 24 Hrs  T(C): 36.4 (16 May 2021 09:37), Max: 36.8 (15 May 2021 22:00)  T(F): 97.6 (16 May 2021 09:37), Max: 98.2 (15 May 2021 22:00)  HR: 73 (16 May 2021 10:00) (67 - 82)  BP: 136/64 (16 May 2021 10:00) (112/39 - 143/51)  BP(mean): 82 (16 May 2021 10:00) (57 - 104)  RR: 19 (16 May 2021 10:00) (12 - 27)  SpO2: 93% (16 May 2021 06:00) (91% - 99%)    Elderly female sitting in bed eating lunch Looks OK  Sclera not icteric Oral mucosa moits   Neck no masses   Lungs clear b/l   Heart RRR  Abd soft NT BS +  Extr: R no eedema  L   thigh wrapped with dressing   Neuro non focal,  Psych  pleasant, in good spirit                         9.4    4.42  )-----------( 53       ( 16 May 2021 06:50 )             29.3   WBC/ H/H stable    05-15    143  |  112<H>  |  32<H>  ----------------------------<  154<H>  4.4   |  27  |  0.83    Ca    8.9      15 May 2021 10:40        MEDICATIONS  (STANDING):  aspirin  chewable 81 milliGRAM(s) Oral daily  atorvastatin 40 milliGRAM(s) Oral at bedtime  ceFAZolin   IVPB 2000 milliGRAM(s) IV Intermittent every 8 hours  dextrose 40% Gel 15 Gram(s) Oral once  dextrose 5%. 1000 milliLiter(s) (50 mL/Hr) IV Continuous <Continuous>  dextrose 5%. 1000 milliLiter(s) (100 mL/Hr) IV Continuous <Continuous>  dextrose 50% Injectable 25 Gram(s) IV Push once  dextrose 50% Injectable 12.5 Gram(s) IV Push once  dextrose 50% Injectable 25 Gram(s) IV Push once  folic acid 1 milliGRAM(s) Oral at bedtime  gabapentin 300 milliGRAM(s) Oral three times a day  glucagon  Injectable 1 milliGRAM(s) IntraMuscular once  insulin glargine Injectable (LANTUS) 5 Unit(s) SubCutaneous at bedtime  insulin lispro (ADMELOG) corrective regimen sliding scale   SubCutaneous three times a day before meals  metoprolol succinate ER 50 milliGRAM(s) Oral daily  PARoxetine 20 milliGRAM(s) Oral daily  sacubitril 24 mG/valsartan 26 mG 1 Tablet(s) Oral two times a day  tiotropium 18 MICROgram(s) Capsule 1 Capsule(s) Inhalation daily    MEDICATIONS  (PRN):  acetaminophen   Tablet .. 650 milliGRAM(s) Oral every 6 hours PRN Mild Pain (1 - 3)  ALBUTerol    90 MICROgram(s) HFA Inhaler 1 Puff(s) Inhalation every 6 hours PRN Shortness of Breath  oxyCODONE    IR 5 milliGRAM(s) Oral every 6 hours PRN Severe Pain (7 - 10)   HPI:  Pt is an 88 yo female with a pmh/o COPD, RA on MTX, CAD, DMII on insulin, depression, CHFrEF, HTN, PVD, PAD, s/p left fem-pop bypass 2021, left thigh infection s/p debridement and muscle flap, who presents to the ED from home due to oral pain and swelling associated with difficulty swallowing and drooling. Pt states that her pain is located under her tongue and in her gums and is associated with lip and throat swelling. Per daughter, pt began to have these symptoms two days ago and has had two days of clindamycin and one day of dexamethasone and was brought to ED per pt request due to pain. Pt states her pain and swelling has improved from two days ago however she states she still feels discomfort and is only able to tolerate fluids at this time. Pain is constant, no alleviating factors other than 4mg IV morphine in ED, aggravated by palpation, not quantifiable on scale, unable to describe character of pain and states that it is non radiating. Pt also endorsing right ear pain. Pt states that her right ear is a "wax builder" and had it irrigated by her daughter with success and that one day prior, she on her own, used a tool and pulled out a "big black scab" from inside ear. Pt states her ear is only tender when pulled during exam. Pt daughter also endorsing that right leg wounds have become purulent and is concerned for infection.     ED course: Pt received to ED c/o drooling and oral/airway swelling in setting of mouth and ear discomfort. 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 Pt found to have WBC <4, anemia, lactic acidosis to 3.5, ORLY. Pt given  clindamycin and vancomycin and IV morphine. Imaging reviewed with ENT at transfer center and per ED MD, transfer not recommended due to benign clinical exam. ENT Dr. Nata tolliver called by ED. MD and does not have privileges here but states that symptoms could be due to dehydration. Pt noted to have started lasix on 8/9/22 per [facility] program and daughter reports poor po intake. Pt given 1L IVF. Ortho called and evaluated wounds to right foot as pt follows with Dr. Devries and XR performed showing "no gas" per ED MD. Pt also noted to have HgB 7.2 and transfused 1U PRBC, baseline 7-8 per HIE review. Extensive discussion had with ED due to concerns for malignant otitis media and ludwigs angina as on admission exam pt found to have posterior auricular lymphadenopathy and tenderness to palpation and swelling and pain on floor of mouth with difficulty swallowing and recommendation made for transfer to [facility] for E[facility] services however per ED MD family declining transfer, although attempt was made.    (19 Aug 2022 22:52)      PAST MEDICAL & SURGICAL HISTORY:  Hypertension      Anemia      Arthritis, rheumatoid      Depression      Type 2 diabetes mellitus  on insulin      PVD (peripheral vascular disease) with claudication      CAD (coronary artery disease)  non-obstructive      Rheumatoid arthritis      CHF (congestive heart failure)      PAD (peripheral artery disease)      DVT, lower extremity      fracture ankle right  plate . screws   2000      S/P cataract surgery  2011      Hip fracture requiring operative repair  Right hip 11/14/2020      S/P femoral-popliteal bypass surgery  3/24/2021          MEDICATIONS  (STANDING):  aspirin  chewable 81 milliGRAM(s) Oral daily  dextrose 5%. 1000 milliLiter(s) (50 mL/Hr) IV Continuous <Continuous>  dextrose 5%. 1000 milliLiter(s) (100 mL/Hr) IV Continuous <Continuous>  dextrose 50% Injectable 25 Gram(s) IV Push once  dextrose 50% Injectable 12.5 Gram(s) IV Push once  dextrose 50% Injectable 25 Gram(s) IV Push once  gabapentin 300 milliGRAM(s) Oral three times a day  glucagon  Injectable 1 milliGRAM(s) IntraMuscular once  insulin glargine Injectable (LANTUS) 10 Unit(s) SubCutaneous at bedtime  insulin lispro (ADMELOG) corrective regimen sliding scale   SubCutaneous three times a day before meals  metoprolol tartrate 25 milliGRAM(s) Oral two times a day  sacubitril 24 mG/valsartan 26 mG 1 Tablet(s) Oral two times a day    MEDICATIONS  (PRN):  acetaminophen     Tablet .. 650 milliGRAM(s) Oral every 6 hours PRN Temp greater or equal to 38C (100.4F), Mild Pain (1 - 3)  ALBUTerol    90 MICROgram(s) HFA Inhaler 1 Puff(s) Inhalation every 6 hours PRN Shortness of Breath  dextrose Oral Gel 15 Gram(s) Oral once PRN Blood Glucose LESS THAN 70 milliGRAM(s)/deciliter  ondansetron Injectable 4 milliGRAM(s) IV Push every 8 hours PRN Nausea and/or Vomiting  oxycodone    5 mG/acetaminophen 325 mG 1 Tablet(s) Oral every 6 hours PRN Moderate Pain (4 - 6)      Allergies    cephalosporins (Other)  penicillins (Urticaria; Pruritus)    Intolerances        FAMILY HISTORY:  FH: colon cancer  father    FH: heart attack  father    Family history of atherosclerosis  mother        Review of Systems    Constitutional, Eyes, ENT, Cardiovascular, Respiratory, Gastrointestinal, Genitourinary, Musculoskeletal, Integumentary, Neurological, Psychiatric, Endocrine, Heme/Lymph and Allergic/Immunologic review of systems are otherwise negative except as noted in HPI.     Vital Signs Last 24 Hrs  T(C): 36.6 (24 Aug 2022 21:14), Max: 36.8 (24 Aug 2022 08:28)  T(F): 97.9 (24 Aug 2022 21:14), Max: 98.3 (24 Aug 2022 08:28)  HR: 110 (24 Aug 2022 21:14) (65 - 110)  BP: 135/59 (24 Aug 2022 21:14) (117/40 - 135/59)  BP(mean): --  RR: 18 (24 Aug 2022 21:14) (18 - 18)  SpO2: 96% (24 Aug 2022 21:14) (96% - 98%)    Parameters below as of 24 Aug 2022 21:14  Patient On (Oxygen Delivery Method): room air        Physical Exam  Elderly female sitting in bed eating lunch Looks OK  Sclera not icteric Oral mucosa moits   Neck no masses   Lungs clear b/l   Heart RRR  Abd soft NT BS +  Extr: R no eedema  L   thigh wrapped with dressing   Neuro non focal,  Psych  pleasant, in good spirit       LABS:  CBC Full  -  ( 24 Aug 2022 07:34 )  WBC Count : 2.04 K/uL  RBC Count : 2.72 M/uL  Hemoglobin : 8.8 g/dL  Hematocrit : 27.9 %  Platelet Count - Automated : 23 K/uL  Mean Cell Volume : 102.6 fl  Mean Cell Hemoglobin : 32.4 pg  Mean Cell Hemoglobin Concentration : 31.5 gm/dL  Auto Neutrophil # : x  Auto Lymphocyte # : x  Auto Monocyte # : x  Auto Eosinophil # : x  Auto Basophil # : x  Auto Neutrophil % : x  Auto Lymphocyte % : x  Auto Monocyte % : x  Auto Eosinophil % : x  Auto Basophil % : x    08-23    137  |  112<H>  |  34<H>  ----------------------------<  100<H>  4.3   |  20<L>  |  0.71    Ca    8.2<L>      23 Aug 2022 08:39            RADIOLOGY & ADDITIONAL STUDIES:  < from: CT Temporal Bones No Cont (08.19.22 @ 19:12) >  ACC: [id number] EXAM:  CT TEMPORAL BONES                          PROCEDURE DATE:  08/19/2022          INTERPRETATION:  CT TEMPORAL BONES WITHOUT CONTRAST    CLINICAL INFORMATION: Right malignant otitis externa    COMPARISON: None available.    TECHNIQUE: Non contrast high resolution CT imaging of the temporal bones   was performed. Sagittal and coronal reformatted images were obtained from   the source data and also reviewed.    FINDINGS:  There is opacification of the right mastoid air cells. There are more   confluent areas of opacification within the right mastoid air cells which   may represent erosion of the mastoid air cell bony septa and suggests the   possibility of coalescent mastoiditis. The bony margins and roof of the   right mastoid are otherwise intact. There is opacification of the right   middle ear cavity. There is soft tissue thickening along the bony right   external auditory canal. There is prominent erosion of the underlying   anterior wall of the bony right externalauditory canal which   communicates with the right TMJ joint. No abnormal effusion in the right   TMJ joint. No associated overlying soft tissue swelling or discrete fluid   collections.  The ossicular chain is intact, without erosion. The otic capsule appears   within normal limits. The facial nerve has a normal course. The inner ear   structures are normally formed. The internal auditory canal is normal in   size.      On the left, the external auditory canal is normal appearance. The   tympanic membrane is not thickened. The middle ear cavity is   well-aerated. The ossicular chain is intact. The otic capsule appears   within normal limits. The facial nerve has a normal course. The inner ear   structures are normally formed. The internal auditory canal is normal in   size. Mastoid air cells are well-aerated. No bony erosion or destruction   is seen.    IMPRESSION:  There is opacification of the right mastoid air cells with possible focal   regions of mastoid septal erosions, opacification of the right middle ear   cavity and prominent soft tissue thickening along the right bony external   auditory canal with underlying prominent bony erosions as detailed above   which may represent an extensive infectious process such as malignant   otitis media and otomastoiditis with coalescence. Underlying neoplasm or   cholesteatoma is not entirely excluded. No associated swelling or   discrete fluid collection within the adjacent fascial and periauricular   soft tissues. No large effusion within the right TMJ joint. Recommend   follow-up evaluation with ENT.    < end of copied text >    < from: Xray Foot AP + Lateral + Oblique, Right (08.19.22 @ 16:27) >    ACC: [id number] EXAM:  XR FOOT COMP MIN 3 VIEWS RT                          PROCEDURE DATE:  08/19/2022          INTERPRETATION:  RIGHT foot    CLINICAL INFORMATION:Injury with  Pain.    TECHNIQUE: AP,lateral and oblique views.    FINDINGS:  Diffuse soft tissue swelling without subcutaneous air or radiopaque   foreign body.  Severe bone demineralization of the RIGHT foot osseous structures.   Periosteal reaction of the metatarsal bones. Possible nondisplaced   fracture of the fifth metatarsal base styloid.  Osteoarthrosis of the tarsal bones.        IMPRESSION:    Severe bone demineralization.  Osteomyelitis cannot be excluded.  If osteomyelitis is considered  despite conservative therapy, and soft   tissue / bone infection requires further assessment, follow-up MRI   recommended.    < end of copied text >

## 2022-08-25 NOTE — PROGRESS NOTE ADULT - ASSESSMENT
88 y/o female with multiple medical problems including HTN CAD CHF DM on insulin, PVD admitted with oral pain, diagnosed with  mastoiditis/ otitis media, s/p antibiotics including Vancomycin, no antibiotics d/c per ID.  Acute thrombocytopenia developed after admission ( 201 on admission now down to 11 K)- likely due to meds versus acute infection ( although profound thrombocytopenia would be unusual due  to infection only).  Vancomycin is known to cause drug induced immune mediated thrombocytopenia which can be rapid onset and profound . It usually resolves within 7 days after drug discontinuation and treatment with steroids is not necessary.  Transfuse platelets.   Leukopenia/ neutropenia could be in setting of infection/ less likely drug induced but if no improvement   in several days - might need bone marrow bx.  Anemia- reviewed old labs- chronic  was 7-8 in Jan 2022 Repeat irons B12/ folate ( ordered)  D/w Dr Borges

## 2022-08-25 NOTE — PROGRESS NOTE ADULT - SUBJECTIVE AND OBJECTIVE BOX
Pt is an 88 yo female with a pmh/o COPD, RA on MTX, CAD, DMII on insulin, depression, CHFrEF, HTN, PVD, PAD, s/p left fem-pop bypass 2021, left thigh infection s/p debridement and muscle flap, who presents to the ED from home due to oral pain and swelling associated with difficulty swallowing and drooling. Pt states that her pain is located under her tongue and in her gums and is associated with lip and throat swelling. Per daughter, pt began to have these symptoms two days ago and has had two days of clindamycin and one day of dexamethasone and was brought to ED per pt request due to pain. Pt states her pain and swelling has improved from two days ago however she states she still feels discomfort and is only able to tolerate fluids at this time. Pain is constant, no alleviating factors other than 4mg IV morphine in ED, aggravated by palpation, not quantifiable on scale, unable to describe character of pain and states that it is non radiating. Pt also endorsing right ear pain. Pt states that her right ear is a "wax builder" and had it irrigated by her daughter with success and that one day prior, she on her own, used a tool and pulled out a "big black scab" from inside ear. Pt states her ear is only tender when pulled during exam. Pt daughter also endorsing that right leg wounds have become purulent and is concerned for infection.   ED course: Pt received to ED c/o drooling and oral/airway swelling in setting of mouth and ear discomfort. 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 Pt found to have WBC <4, anemia, lactic acidosis to 3.5, ORLY. Pt given  clindamycin and vancomycin and IV morphine. Imaging reviewed with ENT at transfer center and per ED MD, transfer not recommended due to benign clinical exam. ENT Dr. Nata tolliver called by ED MD and does not have privileges here but states that symptoms could be due to dehydration. Pt noted to have started lasix on 8/9/22 per [facility] program and daughter reports poor po intake. Pt given 1L IVF. Ortho called and evaluated wounds to right foot as pt follows with Dr. Devries and XR performed showing "no gas" per ED MD. Pt also noted to have HgB 7.2 and transfused 1U PRBC, baseline 7-8 per HIE review. Extensive discussion had with ED due to concerns for malignant otitis media and ludwigs angina as on admission exam pt found to have posterior auricular lymphadenopathy and tenderness to palpation and swelling and pain on floor of mouth with difficulty swallowing and recommendation made for transfer to [facility] for ENT services however per ED MD family declining transfer, although attempt was made.     08/21/22: Patient seen and examined. Feels better today, feels hungry. Discussed with Dr Ortega  08/23/22: No new issues. Discussed with patient regarding management and d/c plan.  08/24/22: Complaining of occasional bleeding from mouth. Platelets down to 23 today from 45 yesterday. Discussed with daughter on phone in length regarding management and d/c plan.   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 She was on IV meropenem and IV vancomycin. Also found to have acute on chr anemia.   S/P 2 units of PRBCs. Clinically improving. She then developed  acute thrombocytopenia possibly due to IV Vancomycin as per heme. IV antibiotics were discontinued yesterday. Platelet today only 11, 000  Will receive 2 units platelets. Case discussed with her daughter Dr Ahumada. Discussed with Dr Dia      Vital Signs Last 24 Hrs  T(C): 37.1 (25 Aug 2022 09:38), Max: 37.1 (25 Aug 2022 09:38)  T(F): 98.7 (25 Aug 2022 09:38), Max: 98.7 (25 Aug 2022 09:38)  HR: 85 (25 Aug 2022 09:38) (73 - 110)  BP: 126/44 (25 Aug 2022 09:38) (126/39 - 135/59)  BP(mean): --  RR: 17 (25 Aug 2022 09:38) (17 - 18)  SpO2: 97% (25 Aug 2022 09:38) (96% - 97%)    Parameters below as of 25 Aug 2022 09:38  Patient On (Oxygen Delivery Method): room air                Physical Exam:  · Constitutional	well-groomed; no distress  · Eyes	PERRL; EOMI; conjunctiva clear  · ENMT	ear L; ear R; mouth  · External Ear L	normal  · TMJ L	normal  · Ear Canal L	occluded with wax  · Tympanic Membrane L	not visualized  · External Ear R	normal  · TMJ R	tenderness; tender to post auricular node and with pulling of pinna only  · Ear Canal R	slight wax build up  · Tympanic Membrane R	bulging; air-fluid level  · Mouth	dry; swollen gums  · ENMT Comments	lip swelling, swelling with redness and tenderness to palpation to floor of mouth under tongue.  · Respiratory	clear to auscultation bilaterally  · Cardiovascular	regular rate and rhythm  · Gastrointestinal	soft; nontender; nondistended; normal active bowel sounds  · Neurological	cranial nerves II-XII intact; sensation intact  · Skin Comments	RLE with wounds dressed, dressing c/d/i. LLE with healing skin tear to shin. No edema bilaterally.                                8.9    2.02  )-----------( 11       ( 25 Aug 2022 07:32 )             27.7               CAPILLARY BLOOD GLUCOSE      POCT Blood Glucose.: 130 mg/dL (25 Aug 2022 11:22)  POCT Blood Glucose.: 166 mg/dL (25 Aug 2022 07:29)  POCT Blood Glucose.: 214 mg/dL (24 Aug 2022 21:08)  POCT Blood Glucose.: 104 mg/dL (24 Aug 2022 16:21)                  MEDICATIONS  (STANDING):  aspirin  chewable 81 milliGRAM(s) Oral daily  dextrose 5%. 1000 milliLiter(s) (50 mL/Hr) IV Continuous <Continuous>  dextrose 5%. 1000 milliLiter(s) (100 mL/Hr) IV Continuous <Continuous>  dextrose 50% Injectable 25 Gram(s) IV Push once  dextrose 50% Injectable 25 Gram(s) IV Push once  dextrose 50% Injectable 12.5 Gram(s) IV Push once  gabapentin 300 milliGRAM(s) Oral three times a day  glucagon  Injectable 1 milliGRAM(s) IntraMuscular once  insulin glargine Injectable (LANTUS) 10 Unit(s) SubCutaneous at bedtime  insulin lispro (ADMELOG) corrective regimen sliding scale   SubCutaneous three times a day before meals  metoprolol tartrate 25 milliGRAM(s) Oral two times a day  sacubitril 24 mG/valsartan 26 mG 1 Tablet(s) Oral two times a day    MEDICATIONS  (PRN):  acetaminophen     Tablet .. 650 milliGRAM(s) Oral every 6 hours PRN Temp greater or equal to 38C (100.4F), Mild Pain (1 - 3)  ALBUTerol    90 MICROgram(s) HFA Inhaler 1 Puff(s) Inhalation every 6 hours PRN Shortness of Breath  dextrose Oral Gel 15 Gram(s) Oral once PRN Blood Glucose LESS THAN 70 milliGRAM(s)/deciliter  ondansetron Injectable 4 milliGRAM(s) IV Push every 8 hours PRN Nausea and/or Vomiting  oxycodone    5 mG/acetaminophen 325 mG 1 Tablet(s) Oral every 6 hours PRN Moderate Pain (4 - 6)            Assessment:  · Assessment	  Pt is an 88 yo female with pmh/o DMII on insulin, h/o cellulitis and PVD with RLE wounds, who presents to ED in setting of poor oral intake due to sublingual/gum/lip/throat pain and swelling, partially alleviated by clindamycin and dexamethasone as outpatient and associated with drooling, who also presents with right ear pain and "wax build up" and development of purulence to Right foot/lower extremity wounds, who is admitted due to:    #Sublingual, submandibular, and oral pain and swelling concerning for Jaime's angina  #Right otitis media concerning for otomastoiditis  Stopped IV Meropenem and Vancomycin due to thrombocytopenia  ID consult and follow up appreciated  blood cultures: no growth  S/P Dexamethasone     #Lactic acidosis  Resolved   Likely reactive in setting of acute infections  No other criteria for sepsis met    #Anemia, acute on chronic due to infection  No signs of bleeding  Baseline Hgb between 7-8  Denies melena, hematochezia, abdominal pain, or other s/s bleeding  S/p 3U PRBCs, HGB stable    #Leukopenia  Likely due to viral infection in setting of COVID  Resolved  Monitor CBC    # Thrombocytopenia-acute  Possibly due to IV vancomycin  Transfuse 2 units platelets today  Follow  Heme eval appreciated  D/C IV antibiotics    #Acute Renal failure-resolved  S/p IVF    #Right lower extremity cellulitis concerning for osteomyelitis  Ortho following,   Elevate lower extremities      #Protein calorie malnutrition  Nutrition consult appreciated     #Multinodular thyroid  Outpatient follow up    #DMII, insulin dependent, with hyperglycemia  c/w lantus, decreased to 10 from 12 due to poor oral intake  AISS ordered  Accuchecks qAC/HS    #CAD/HTN/CHF/PVD  Hold Lasix  On Entresto, was on hold  due to ARF  Hold Eliquis due to thrombocytopenia  c/w metoprolol with holding parameters  Intake & output per routine

## 2022-08-25 NOTE — ADVANCED PRACTICE NURSE CONSULT - ASSESSMENT
This is a 86 year old female admitted to the hospital on 8/19/2022 : PMH: DVT LE, CHF, PAD, PVD, DM-2, Depression, Arthritis Anemia, HTN.   Right Medial Malleolus measures 5.5cm x 3.0cm x 0.3cm with 50% yellow firmly attached slough and 50% pink granulation .   Posterior Right LE lower Achilles 5.4cm x 4.5cm x 0.3 with 75% yellow slough firmly attached 25% granulation tissue.   Right Dorsum Foot 5.5cm x 4.1cm x 0.3cm 65% yellow slough and 35%pink granulation tissue.  Aidee Wound intact and dry. .   Sacrum measures 1.9cm x  0.6cm x 0.2cm with maceration. This can be classified as a Stage 2 pressure injury with moisture associated skin damage Applied Triad cream. Continue to turn and position every two hours. No Diapering . Heel Foam boots on Bilateral Heels.

## 2022-08-25 NOTE — CONSULT NOTE ADULT - ASSESSMENT
85 y/o female with COPD, CAD, HTN, DMII , PVD  s/p L fem pop bypass [facility] 2021, admitted with graft infection/ left thigh wound, s/p MSSA sepsis, s/p L thigh debridement, clinically improved.  Acute thrombocytopenia noted with plts drop  within 10 days of sc heparin initiation, watson < 60K. Other etiologies: ? due to infection- but infection already improving, due to antibiotics?  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  HPF4 antibodies pending  Heparin d/c .  Needs anticoagulation with non heparin agents: options argatroban versus fondaparinux.  Already started on fondaparinux.   Follow pending HIT serology.  Bilat LE Dopplers .  D/w Dr Landers.  87 y/o Female with h/o COPD, CAD, Type 2 DM on insulin, depression, CHFrEF, HTN, PVD, PAD, s/p left femoral popliteal bypass 3/24/2021, chronic LE stasis dermatitis, prior left thigh infections s/p debridement and muscle flap, PAT, lower extremity DVT on Eliquis, RA, right ankle septic arthritis with  PSAE, PRMI and ENspp in Jan 2022 s/p IV meropenem was admitted on 8/19 for oral pain and swelling associated with difficulty swallowing and drooling.  COVID detected.      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   -s/p clindamycin, meropenem and vancomycin   -appreciate ID recs; hold off further abx therapy for now as cultures are negative to date  -f/u cultures  -given acute timing of pancytopenia, etiology likely s/t to infection/ COVID vs antibiotics.  Will continue to monitor with goal plt > 20K if no evidence of bleeding.  -manual diff ordered for AM, last done 8/20/22 with no evidence of immature granulocytes or blasts; not neutropenic    # Right foot ulcer. Possible underlying OM  -pt follows with Dr. Devries   [facility].  Osteomyelitis cannot be excluded.

## 2022-08-26 LAB
ANION GAP SERPL CALC-SCNC: 4 MMOL/L — LOW (ref 5–17)
BUN SERPL-MCNC: 27 MG/DL — HIGH (ref 7–23)
CALCIUM SERPL-MCNC: 8.2 MG/DL — LOW (ref 8.5–10.1)
CHLORIDE SERPL-SCNC: 109 MMOL/L — HIGH (ref 96–108)
CO2 SERPL-SCNC: 27 MMOL/L — SIGNIFICANT CHANGE UP (ref 22–31)
CREAT SERPL-MCNC: 0.77 MG/DL — SIGNIFICANT CHANGE UP (ref 0.5–1.3)
EGFR: 75 ML/MIN/1.73M2 — SIGNIFICANT CHANGE UP
FERRITIN SERPL-MCNC: 333 NG/ML — HIGH (ref 15–150)
FOLATE SERPL-MCNC: 4.7 NG/ML — SIGNIFICANT CHANGE UP
GLUCOSE SERPL-MCNC: 149 MG/DL — HIGH (ref 70–99)
HCT VFR BLD CALC: 25.1 % — LOW (ref 34.5–45)
HGB BLD-MCNC: 8 G/DL — LOW (ref 11.5–15.5)
IRON SATN MFR SERPL: 38 % — SIGNIFICANT CHANGE UP (ref 14–50)
IRON SATN MFR SERPL: 55 UG/DL — SIGNIFICANT CHANGE UP (ref 30–160)
MCHC RBC-ENTMCNC: 31.9 GM/DL — LOW (ref 32–36)
MCHC RBC-ENTMCNC: 32 PG — SIGNIFICANT CHANGE UP (ref 27–34)
MCV RBC AUTO: 100.4 FL — HIGH (ref 80–100)
NRBC # BLD: SIGNIFICANT CHANGE UP /100 WBCS (ref 0–0)
PLATELET # BLD AUTO: 77 K/UL — LOW (ref 150–400)
POTASSIUM SERPL-MCNC: 4 MMOL/L — SIGNIFICANT CHANGE UP (ref 3.5–5.3)
POTASSIUM SERPL-SCNC: 4 MMOL/L — SIGNIFICANT CHANGE UP (ref 3.5–5.3)
RBC # BLD: 2.5 M/UL — LOW (ref 3.8–5.2)
RBC # FLD: 16.9 % — HIGH (ref 10.3–14.5)
SODIUM SERPL-SCNC: 140 MMOL/L — SIGNIFICANT CHANGE UP (ref 135–145)
TIBC SERPL-MCNC: 145 UG/DL — LOW (ref 220–430)
UIBC SERPL-MCNC: 90 UG/DL — LOW (ref 110–370)
VIT B12 SERPL-MCNC: 317 PG/ML — SIGNIFICANT CHANGE UP (ref 232–1245)
WBC # BLD: 1.58 K/UL — LOW (ref 3.8–10.5)
WBC # FLD AUTO: 1.58 K/UL — LOW (ref 3.8–10.5)

## 2022-08-26 PROCEDURE — 99232 SBSQ HOSP IP/OBS MODERATE 35: CPT

## 2022-08-26 RX ADMIN — GABAPENTIN 300 MILLIGRAM(S): 400 CAPSULE ORAL at 05:00

## 2022-08-26 RX ADMIN — Medication 25 MILLIGRAM(S): at 21:05

## 2022-08-26 RX ADMIN — Medication 81 MILLIGRAM(S): at 09:33

## 2022-08-26 RX ADMIN — GABAPENTIN 300 MILLIGRAM(S): 400 CAPSULE ORAL at 13:40

## 2022-08-26 RX ADMIN — OXYCODONE AND ACETAMINOPHEN 1 TABLET(S): 5; 325 TABLET ORAL at 16:30

## 2022-08-26 RX ADMIN — Medication 650 MILLIGRAM(S): at 22:35

## 2022-08-26 RX ADMIN — Medication 2: at 16:29

## 2022-08-26 RX ADMIN — INSULIN GLARGINE 10 UNIT(S): 100 INJECTION, SOLUTION SUBCUTANEOUS at 21:05

## 2022-08-26 RX ADMIN — DIPHENHYDRAMINE HYDROCHLORIDE AND LIDOCAINE HYDROCHLORIDE AND ALUMINUM HYDROXIDE AND MAGNESIUM HYDRO 10 MILLILITER(S): KIT at 05:00

## 2022-08-26 RX ADMIN — Medication 650 MILLIGRAM(S): at 05:00

## 2022-08-26 RX ADMIN — GABAPENTIN 300 MILLIGRAM(S): 400 CAPSULE ORAL at 21:05

## 2022-08-26 RX ADMIN — Medication 650 MILLIGRAM(S): at 21:06

## 2022-08-26 RX ADMIN — SACUBITRIL AND VALSARTAN 1 TABLET(S): 24; 26 TABLET, FILM COATED ORAL at 09:34

## 2022-08-26 RX ADMIN — OXYCODONE AND ACETAMINOPHEN 1 TABLET(S): 5; 325 TABLET ORAL at 00:41

## 2022-08-26 RX ADMIN — DIPHENHYDRAMINE HYDROCHLORIDE AND LIDOCAINE HYDROCHLORIDE AND ALUMINUM HYDROXIDE AND MAGNESIUM HYDRO 10 MILLILITER(S): KIT at 13:40

## 2022-08-26 RX ADMIN — Medication 650 MILLIGRAM(S): at 06:42

## 2022-08-26 RX ADMIN — DIPHENHYDRAMINE HYDROCHLORIDE AND LIDOCAINE HYDROCHLORIDE AND ALUMINUM HYDROXIDE AND MAGNESIUM HYDRO 10 MILLILITER(S): KIT at 17:35

## 2022-08-26 RX ADMIN — SACUBITRIL AND VALSARTAN 1 TABLET(S): 24; 26 TABLET, FILM COATED ORAL at 21:05

## 2022-08-26 RX ADMIN — Medication 2: at 07:36

## 2022-08-26 RX ADMIN — OXYCODONE AND ACETAMINOPHEN 1 TABLET(S): 5; 325 TABLET ORAL at 01:26

## 2022-08-26 RX ADMIN — Medication 2: at 11:59

## 2022-08-26 RX ADMIN — Medication 25 MILLIGRAM(S): at 09:33

## 2022-08-26 NOTE — PROGRESS NOTE ADULT - SUBJECTIVE AND OBJECTIVE BOX
CC: Oral Pain  · Subjective and Objective:   Pt is an 88 yo female with a pmh/o COPD, RA on MTX, CAD, DMII on insulin, depression, CHFrEF, HTN, PVD, PAD, s/p left fem-pop bypass 2021, left thigh infection s/p debridement and muscle flap, who presents to the ED from home due to oral pain and swelling associated with difficulty swallowing and drooling. Pt states that her pain is located under her tongue and in her gums and is associated with lip and throat swelling. Per daughter, pt began to have these symptoms two days ago and has had two days of clindamycin and one day of dexamethasone and was brought to ED per pt request due to pain. Pt states her pain and swelling has improved from two days ago however she states she still feels discomfort and is only able to tolerate fluids at this time. Pain is constant, no alleviating factors other than 4mg IV morphine in ED, aggravated by palpation, not quantifiable on scale, unable to describe character of pain and states that it is non radiating. Pt also endorsing right ear pain. Pt states that her right ear is a "wax builder" and had it irrigated by her daughter with success and that one day prior, she on her own, used a tool and pulled out a "big black scab" from inside ear. Pt states her ear is only tender when pulled during exam. Pt daughter also endorsing that right leg wounds have become purulent and is concerned for infection.   ED course: Pt received to ED c/o drooling and oral/airway swelling in setting of mouth and ear discomfort. 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 Pt found to have WBC <4, anemia, lactic acidosis to 3.5, ORLY. Pt given  clindamycin and vancomycin and IV morphine. Imaging reviewed with ENT at transfer center and per ED MD, transfer not recommended due to benign clinical exam. ENT Dr. Nata tolliver called by ED MD and does not have privileges here but states that symptoms could be due to dehydration. Pt noted to have started lasix on 8/9/22 per [facility] program and daughter reports poor po intake. Pt given 1L IVF. Ortho called and evaluated wounds to right foot as pt follows with Dr. Devries and XR performed showing "no gas" per ED MD. Pt also noted to have HgB 7.2 and transfused 1U PRBC, baseline 7-8 per HIE review. Extensive discussion had with ED due to concerns for malignant otitis media and ludwigs angina as on admission exam pt found to have posterior auricular lymphadenopathy and tenderness to palpation and swelling and pain on floor of mouth with difficulty swallowing and recommendation made for transfer to [facility] for ENT services however per ED MD family declining transfer, although attempt was made.     08/21/22: Patient seen and examined. Feels better today, feels hungry. Discussed with Dr Ortega  08/23/22: No new issues. Discussed with patient regarding management and d/c plan.  08/24/22: Complaining of occasional bleeding from mouth. Platelets down to 23 today from 45 yesterday. Discussed with daughter on phone in length regarding management and d/c plan.   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 She was on IV meropenem and IV vancomycin. Also found to have acute on chr anemia.   S/P 2 units of PRBCs. Clinically improving. She then developed  acute thrombocytopenia possibly due to IV Vancomycin as per heme. IV antibiotics were discontinued yesterday. Platelet today only 11, 000  Will receive 2 units platelets. Case discussed with her daughter Dr Ahumada. Discussed with Dr Dia    8/26: eating, drinking, has some oral discomfort but getting better.  No bleeding today.  No fever, chills, n,  v.  Updated pt and daughter on plan of care.    REVIEW OF SYSTEMS: All other review of systems is negative unless indicated above.      Vital Signs Last 24 Hrs  T(C): 36.5 (26 Aug 2022 08:05), Max: 37.7 (25 Aug 2022 15:48)  T(F): 97.7 (26 Aug 2022 08:05), Max: 99.8 (25 Aug 2022 15:48)  HR: 96 (26 Aug 2022 08:05) (82 - 96)  BP: 127/46 (26 Aug 2022 08:05) (127/46 - 152/65)  BP(mean): --  RR: 18 (26 Aug 2022 08:05) (17 - 18)  SpO2: 98% (26 Aug 2022 08:05) (94% - 100%)    Parameters below as of 26 Aug 2022 08:05  Patient On (Oxygen Delivery Method): room air        PHYSICAL EXAM:    Constitutional: NAD, awake and alert, frail, elderly appearing  HEENT: PERR, EOMI, Normal Hearing, multiple lip and oral ulcerations, no active bleeding, petechae noted hard palate  Neck: Soft and supple  Respiratory: Breath sounds are clear bilaterally, No wheezing, rales or rhonchi  Cardiovascular: S1 and S2, regular rate and rhythm, no Murmurs, gallops or rubs  Gastrointestinal: Bowel Sounds present, soft, nontender, nondistended, no guarding, no rebound  Extremities: No peripheral edema, right foot wrapped in bandage, dry  Neurological: A/O x 3, no focal deficits in my limited exam                                    8.9    2.02  )-----------( 11       ( 25 Aug 2022 07:32 )             27.7               CAPILLARY BLOOD GLUCOSE      POCT Blood Glucose.: 130 mg/dL (25 Aug 2022 11:22)  POCT Blood Glucose.: 166 mg/dL (25 Aug 2022 07:29)  POCT Blood Glucose.: 214 mg/dL (24 Aug 2022 21:08)  POCT Blood Glucose.: 104 mg/dL (24 Aug 2022 16:21)                  MEDICATIONS  (STANDING):  aspirin  chewable 81 milliGRAM(s) Oral daily  dextrose 5%. 1000 milliLiter(s) (50 mL/Hr) IV Continuous <Continuous>  dextrose 5%. 1000 milliLiter(s) (100 mL/Hr) IV Continuous <Continuous>  dextrose 50% Injectable 25 Gram(s) IV Push once  dextrose 50% Injectable 25 Gram(s) IV Push once  dextrose 50% Injectable 12.5 Gram(s) IV Push once  gabapentin 300 milliGRAM(s) Oral three times a day  glucagon  Injectable 1 milliGRAM(s) IntraMuscular once  insulin glargine Injectable (LANTUS) 10 Unit(s) SubCutaneous at bedtime  insulin lispro (ADMELOG) corrective regimen sliding scale   SubCutaneous three times a day before meals  metoprolol tartrate 25 milliGRAM(s) Oral two times a day  sacubitril 24 mG/valsartan 26 mG 1 Tablet(s) Oral two times a day    MEDICATIONS  (PRN):  acetaminophen     Tablet .. 650 milliGRAM(s) Oral every 6 hours PRN Temp greater or equal to 38C (100.4F), Mild Pain (1 - 3)  ALBUTerol    90 MICROgram(s) HFA Inhaler 1 Puff(s) Inhalation every 6 hours PRN Shortness of Breath  dextrose Oral Gel 15 Gram(s) Oral once PRN Blood Glucose LESS THAN 70 milliGRAM(s)/deciliter  ondansetron Injectable 4 milliGRAM(s) IV Push every 8 hours PRN Nausea and/or Vomiting  oxycodone    5 mG/acetaminophen 325 mG 1 Tablet(s) Oral every 6 hours PRN Moderate Pain (4 - 6)            Assessment/Plan:  Pt is an 88 yo female with pmh/o DMII on insulin, h/o cellulitis and PVD with RLE wounds, who presents to ED in setting of poor oral intake due to sublingual/gum/lip/throat pain and swelling, partially alleviated by clindamycin and dexamethasone as outpatient and associated with drooling, who also presents with right ear pain and "wax build up" and development of purulence to Right foot/lower extremity wounds, who is admitted due to:    #Sublingual, submandibular, and oral pain and swelling concerning for Jaime's angina: IMPROVING   #Right otitis media concerning for otomastoiditis  Stopped IV Meropenem and Vancomycin due to thrombocytopenia  blood cultures: no growth  S/P Dexamethasone   supportive care, pain management.  Tolerating oral intake.  Lactic acidosis: RESOLVED    #Anemia, acute on chronic due to infection: STABLE  No signs of bleeding  Baseline Hgb between 7-8  Denies melena, hematochezia, abdominal pain, or other s/s bleeding  S/p 3U PRBCs    #Leukopenia:  Likely due to viral infection in setting of COVID  monitor    #Acute Thrombocytopenia: Likely drug induced  Possibly due to IV vancomycin, now off antibiotics   s/p Transfuse 2 units platelets 8/25  platelets 11k-->77k  Follow  Heme eval appreciated      #Acute Renal failure-resolved    #Chronic right lower extremity/ankle diabetic wounds:  - NWB RLE in soft dressings.    - No acute orthopaedic surgical intervention indicated at this time.       #Protein calorie malnutrition  Nutrition consult appreciated     #Multinodular thyroid  Outpatient follow up    #DMII, insulin dependent, with hyperglycemia  a1c 8.3  c/w lantus  AISS       #CAD/HTN/CHF/PVD  Hold Lasix  hold aspirin (last dose 8/26) and eliquis in setting of thrombocytopenia  c/w entresto       dvt ppx:  -SCDs    Dispo:  updated daughter Nirali on plan of care.  Tentative plan to d/c to [facility] once medically ready.        Assessment and Plan:   Nutritional Assessment:  · Nutritional Assessment	This patient has been assessed with a concern for Malnutrition and has been determined to have a diagnosis/diagnoses of Severe protein-calorie malnutrition.    This patient is being managed with:   Diet Easy to Chew-  Entered: Aug 20 2022 10:30AM

## 2022-08-26 NOTE — PROGRESS NOTE ADULT - SUBJECTIVE AND OBJECTIVE BOX
Pt was seen and examined at bedside. Pt now being seen by wound care, denies f/c/n/v/cp/sob. Pt had no acute orthopaedic complaints at this time.    T(C): 36.6 (08-25-22 @ 21:34), Max: 37.7 (08-25-22 @ 15:48)  T(F): 97.9 (08-25-22 @ 21:34), Max: 99.8 (08-25-22 @ 15:48)  HR: 88 (08-25-22 @ 21:34) (76 - 89)  BP: 137/41 (08-25-22 @ 21:34) (126/44 - 152/65)  RR: 18 (08-25-22 @ 21:34) (17 - 18)  SpO2: 99% (08-25-22 @ 21:34) (94% - 100%)    Exam:     General: NAD    RLE:   R foot dressings c/d/i  Motor: +EHL/FHL/TA/GSc  SILT: Hernandez/Sa/SPN/DPN  2+ DP/PT  Compartments soft and compressible  No pain on dorsiflexion    A/P:  Patient is a 87y Female with healing chronic ankle wounds of the Right foot.  - Wound care following, recs appreciated  - Pain control as needed.   - FU BCx/UCx.   - NWB RLE in soft dressings.    - DVT Ppx: Per Primary AC Team.   - No acute orthopaedic surgical intervention indicated at this time.   - Will discuss with Dr. Ku and will advise if any changes made.

## 2022-08-27 LAB
HCT VFR BLD CALC: 25 % — LOW (ref 34.5–45)
HGB BLD-MCNC: 7.8 G/DL — LOW (ref 11.5–15.5)
MCHC RBC-ENTMCNC: 31.2 GM/DL — LOW (ref 32–36)
MCHC RBC-ENTMCNC: 31.5 PG — SIGNIFICANT CHANGE UP (ref 27–34)
MCV RBC AUTO: 100.8 FL — HIGH (ref 80–100)
PLATELET # BLD AUTO: 52 K/UL — LOW (ref 150–400)
RBC # BLD: 2.48 M/UL — LOW (ref 3.8–5.2)
RBC # FLD: 16.9 % — HIGH (ref 10.3–14.5)
WBC # BLD: 2.09 K/UL — LOW (ref 3.8–10.5)
WBC # FLD AUTO: 2.09 K/UL — LOW (ref 3.8–10.5)

## 2022-08-27 PROCEDURE — 99232 SBSQ HOSP IP/OBS MODERATE 35: CPT

## 2022-08-27 RX ORDER — OXYCODONE AND ACETAMINOPHEN 5; 325 MG/1; MG/1
1 TABLET ORAL EVERY 6 HOURS
Refills: 0 | Status: DISCONTINUED | OUTPATIENT
Start: 2022-08-27 | End: 2022-08-28

## 2022-08-27 RX ORDER — FOLIC ACID 0.8 MG
1 TABLET ORAL DAILY
Refills: 0 | Status: DISCONTINUED | OUTPATIENT
Start: 2022-08-27 | End: 2022-09-01

## 2022-08-27 RX ADMIN — GABAPENTIN 300 MILLIGRAM(S): 400 CAPSULE ORAL at 21:22

## 2022-08-27 RX ADMIN — Medication 25 MILLIGRAM(S): at 21:22

## 2022-08-27 RX ADMIN — Medication 650 MILLIGRAM(S): at 06:06

## 2022-08-27 RX ADMIN — INSULIN GLARGINE 10 UNIT(S): 100 INJECTION, SOLUTION SUBCUTANEOUS at 21:22

## 2022-08-27 RX ADMIN — SACUBITRIL AND VALSARTAN 1 TABLET(S): 24; 26 TABLET, FILM COATED ORAL at 10:26

## 2022-08-27 RX ADMIN — DIPHENHYDRAMINE HYDROCHLORIDE AND LIDOCAINE HYDROCHLORIDE AND ALUMINUM HYDROXIDE AND MAGNESIUM HYDRO 10 MILLILITER(S): KIT at 15:01

## 2022-08-27 RX ADMIN — DIPHENHYDRAMINE HYDROCHLORIDE AND LIDOCAINE HYDROCHLORIDE AND ALUMINUM HYDROXIDE AND MAGNESIUM HYDRO 10 MILLILITER(S): KIT at 19:07

## 2022-08-27 RX ADMIN — Medication 650 MILLIGRAM(S): at 04:56

## 2022-08-27 RX ADMIN — Medication 25 MILLIGRAM(S): at 10:27

## 2022-08-27 RX ADMIN — DIPHENHYDRAMINE HYDROCHLORIDE AND LIDOCAINE HYDROCHLORIDE AND ALUMINUM HYDROXIDE AND MAGNESIUM HYDRO 10 MILLILITER(S): KIT at 04:55

## 2022-08-27 RX ADMIN — SACUBITRIL AND VALSARTAN 1 TABLET(S): 24; 26 TABLET, FILM COATED ORAL at 21:22

## 2022-08-27 RX ADMIN — DIPHENHYDRAMINE HYDROCHLORIDE AND LIDOCAINE HYDROCHLORIDE AND ALUMINUM HYDROXIDE AND MAGNESIUM HYDRO 10 MILLILITER(S): KIT at 00:11

## 2022-08-27 RX ADMIN — GABAPENTIN 300 MILLIGRAM(S): 400 CAPSULE ORAL at 15:02

## 2022-08-27 RX ADMIN — GABAPENTIN 300 MILLIGRAM(S): 400 CAPSULE ORAL at 04:55

## 2022-08-27 RX ADMIN — Medication 1 MILLIGRAM(S): at 15:02

## 2022-08-27 NOTE — PROGRESS NOTE ADULT - ASSESSMENT
86 y/o female with multiple medical problems including HTN CAD CHF DM on insulin, PVD admitted with oral pain, diagnosed with  mastoiditis/ otitis media, s/p antibiotics including Vancomycin, no antibiotics d/c per ID.    1)Acute thrombocytopenia developed after admission ( 201 on admission now down to 11 K)- likely due to meds versus acute infection ( although profound thrombocytopenia would be unusual due  to infection only).  Vancomycin is known to cause drug induced immune mediated thrombocytopenia which can be rapid onset and profound . It usually resolves within 7 days after drug discontinuation and treatment with steroids is not necessary.  -s/p 2 units plts on 8/25 --> plts 77 yesterday and 52K today  -continue to trend daily   -no role for transfusion at this time.       2)Leukopenia/ neutropenia could be in setting of infection/ less likely drug induced but if no improvement in several days - might need bone marrow bx.    3)Anemia- likely AOCD.  Folate is borderline at 4.7, can start folic acid 1 mg daily.  B12 and iron stores are replete   86 y/o female with multiple medical problems including HTN CAD CHF DM on insulin, PVD admitted with oral pain, diagnosed with  mastoiditis/ otitis media, s/p antibiotics including Vancomycin, no antibiotics d/c per ID.    1)Acute thrombocytopenia developed after admission ( 201 on admission then watson at 11 K)- likely due to meds versus acute infection  Vancomycin is known to cause drug induced immune mediated thrombocytopenia which can be rapid onset and profound . It usually resolves within 7 days after drug discontinuation and treatment with steroids is not necessary.  -s/p 2 units plts on 8/25 --> plts 77 yesterday and 52K today  -continue to trend daily   -no role for platelet transfusion at this time.       2)Leukopenia/ neutropenia could be in setting of infection/ less likely drug induced but if no improvement in several days - might need bone marrow bx.    3)Anemia- likely AOCD.  Folate is borderline at 4.7, can start folic acid 1 mg daily.  B12 and iron stores are replete

## 2022-08-27 NOTE — PROGRESS NOTE ADULT - SUBJECTIVE AND OBJECTIVE BOX
HPI:  Pt is an 86 yo female with a pmh/o COPD, RA on MTX, CAD, DMII on insulin, depression, CHFrEF, HTN, PVD, PAD, s/p left fem-pop bypass 2021, left thigh infection s/p debridement and muscle flap, who presents to the ED from home due to oral pain and swelling associated with difficulty swallowing and drooling. Pt states that her pain is located under her tongue and in her gums and is associated with lip and throat swelling. Per daughter, pt began to have these symptoms two days ago and has had two days of clindamycin and one day of dexamethasone and was brought to ED per pt request due to pain. Pt states her pain and swelling has improved from two days ago however she states she still feels discomfort and is only able to tolerate fluids at this time. Pain is constant, no alleviating factors other than 4mg IV morphine in ED, aggravated by palpation, not quantifiable on scale, unable to describe character of pain and states that it is non radiating. Pt also endorsing right ear pain. Pt states that her right ear is a "wax builder" and had it irrigated by her daughter with success and that one day prior, she on her own, used a tool and pulled out a "big black scab" from inside ear. Pt states her ear is only tender when pulled during exam. Pt daughter also endorsing that right leg wounds have become purulent and is concerned for infection.     MEDICATIONS  (STANDING):  dextrose 5%. 1000 milliLiter(s) (100 mL/Hr) IV Continuous <Continuous>  dextrose 5%. 1000 milliLiter(s) (50 mL/Hr) IV Continuous <Continuous>  dextrose 50% Injectable 25 Gram(s) IV Push once  dextrose 50% Injectable 12.5 Gram(s) IV Push once  dextrose 50% Injectable 25 Gram(s) IV Push once  FIRST- Mouthwash  BLM 10 milliLiter(s) Swish and Spit every 6 hours  gabapentin 300 milliGRAM(s) Oral three times a day  glucagon  Injectable 1 milliGRAM(s) IntraMuscular once  insulin glargine Injectable (LANTUS) 10 Unit(s) SubCutaneous at bedtime  insulin lispro (ADMELOG) corrective regimen sliding scale   SubCutaneous three times a day before meals  metoprolol tartrate 25 milliGRAM(s) Oral two times a day  sacubitril 24 mG/valsartan 26 mG 1 Tablet(s) Oral two times a day    MEDICATIONS  (PRN):  acetaminophen     Tablet .. 650 milliGRAM(s) Oral every 6 hours PRN Temp greater or equal to 38C (100.4F), Mild Pain (1 - 3)  ALBUTerol    90 MICROgram(s) HFA Inhaler 1 Puff(s) Inhalation every 6 hours PRN Shortness of Breath  dextrose Oral Gel 15 Gram(s) Oral once PRN Blood Glucose LESS THAN 70 milliGRAM(s)/deciliter  ondansetron Injectable 4 milliGRAM(s) IV Push every 8 hours PRN Nausea and/or Vomiting  oxycodone    5 mG/acetaminophen 325 mG 1 Tablet(s) Oral every 6 hours PRN Moderate Pain (4 - 6)            Review of Systems    Constitutional, Eyes, ENT, Cardiovascular, Respiratory, Gastrointestinal, Genitourinary, Musculoskeletal, Integumentary, Neurological, Psychiatric, Endocrine, Heme/Lymph and Allergic/Immunologic review of systems are otherwise negative except as noted in HPI.           Physical Exam  Elderly female sitting in bed eating lunch Looks OK  Sclera not icteric Oral mucosa moits   Neck no masses   Lungs clear b/l   Heart RRR  Abd soft NT BS +  Extr: R no eedema  L   thigh wrapped with dressing   Neuro non focal,  Psych  pleasant, in good spirit                             7.8    2.09  )-----------( 52       ( 27 Aug 2022 07:52 )             25.0         RADIOLOGY & ADDITIONAL STUDIES:  < from: CT Temporal Bones No Cont (08.19.22 @ 19:12) >  ACC: [id number] EXAM:  CT TEMPORAL BONES                          PROCEDURE DATE:  08/19/2022          INTERPRETATION:  CT TEMPORAL BONES WITHOUT CONTRAST    CLINICAL INFORMATION: Right malignant otitis externa    COMPARISON: None available.    TECHNIQUE: Non contrast high resolution CT imaging of the temporal bones   was performed. Sagittal and coronal reformatted images were obtained from   the source data and also reviewed.    FINDINGS:  There is opacification of the right mastoid air cells. There are more   confluent areas of opacification within the right mastoid air cells which   may represent erosion of the mastoid air cell bony septa and suggests the   possibility of coalescent mastoiditis. The bony margins and roof of the   right mastoid are otherwise intact. There is opacification of the right   middle ear cavity. There is soft tissue thickening along the bony right   external auditory canal. There is prominent erosion of the underlying   anterior wall of the bony right externalauditory canal which   communicates with the right TMJ joint. No abnormal effusion in the right   TMJ joint. No associated overlying soft tissue swelling or discrete fluid   collections.  The ossicular chain is intact, without erosion. The otic capsule appears   within normal limits. The facial nerve has a normal course. The inner ear   structures are normally formed. The internal auditory canal is normal in   size.      On the left, the external auditory canal is normal appearance. The   tympanic membrane is not thickened. The middle ear cavity is   well-aerated. The ossicular chain is intact. The otic capsule appears   within normal limits. The facial nerve has a normal course. The inner ear   structures are normally formed. The internal auditory canal is normal in   size. Mastoid air cells are well-aerated. No bony erosion or destruction   is seen.    IMPRESSION:  There is opacification of the right mastoid air cells with possible focal   regions of mastoid septal erosions, opacification of the right middle ear   cavity and prominent soft tissue thickening along the right bony external   auditory canal with underlying prominent bony erosions as detailed above   which may represent an extensive infectious process such as malignant   otitis media and otomastoiditis with coalescence. Underlying neoplasm or   cholesteatoma is not entirely excluded. No associated swelling or   discrete fluid collection within the adjacent fascial and periauricular   soft tissues. No large effusion within the right TMJ joint. Recommend   follow-up evaluation with ENT.    < end of copied text >    < from: Xray Foot AP + Lateral + Oblique, Right (08.19.22 @ 16:27) >    ACC: [id number] EXAM:  XR FOOT COMP MIN 3 VIEWS RT                          PROCEDURE DATE:  08/19/2022          INTERPRETATION:  RIGHT foot    CLINICAL INFORMATION:Injury with  Pain.    TECHNIQUE: AP,lateral and oblique views.    FINDINGS:  Diffuse soft tissue swelling without subcutaneous air or radiopaque   foreign body.  Severe bone demineralization of the RIGHT foot osseous structures.   Periosteal reaction of the metatarsal bones. Possible nondisplaced   fracture of the fifth metatarsal base styloid.  Osteoarthrosis of the tarsal bones.        IMPRESSION:    Severe bone demineralization.  Osteomyelitis cannot be excluded.  If osteomyelitis is considered  despite conservative therapy, and soft   tissue / bone infection requires further assessment, follow-up MRI   recommended.    < end of copied text >         Interval History:  Denies pain / bleeding  S/p 2 units plts transfusion on 8/25      HPI:  Pt is an 88 yo female with a pmh/o COPD, RA on MTX, CAD, DMII on insulin, depression, CHFrEF, HTN, PVD, PAD, s/p left fem-pop bypass 2021, left thigh infection s/p debridement and muscle flap, who presents to the ED from home due to oral pain and swelling associated with difficulty swallowing and drooling. Pt states that her pain is located under her tongue and in her gums and is associated with lip and throat swelling. Per daughter, pt began to have these symptoms two days ago and has had two days of clindamycin and one day of dexamethasone and was brought to ED per pt request due to pain. Pt states her pain and swelling has improved from two days ago however she states she still feels discomfort and is only able to tolerate fluids at this time. Pain is constant, no alleviating factors other than 4mg IV morphine in ED, aggravated by palpation, not quantifiable on scale, unable to describe character of pain and states that it is non radiating. Pt also endorsing right ear pain. Pt states that her right ear is a "wax builder" and had it irrigated by her daughter with success and that one day prior, she on her own, used a tool and pulled out a "big black scab" from inside ear. Pt states her ear is only tender when pulled during exam. Pt daughter also endorsing that right leg wounds have become purulent and is concerned for infection.     MEDICATIONS  (STANDING):  dextrose 5%. 1000 milliLiter(s) (100 mL/Hr) IV Continuous <Continuous>  dextrose 5%. 1000 milliLiter(s) (50 mL/Hr) IV Continuous <Continuous>  dextrose 50% Injectable 25 Gram(s) IV Push once  dextrose 50% Injectable 12.5 Gram(s) IV Push once  dextrose 50% Injectable 25 Gram(s) IV Push once  FIRST- Mouthwash  BLM 10 milliLiter(s) Swish and Spit every 6 hours  gabapentin 300 milliGRAM(s) Oral three times a day  glucagon  Injectable 1 milliGRAM(s) IntraMuscular once  insulin glargine Injectable (LANTUS) 10 Unit(s) SubCutaneous at bedtime  insulin lispro (ADMELOG) corrective regimen sliding scale   SubCutaneous three times a day before meals  metoprolol tartrate 25 milliGRAM(s) Oral two times a day  sacubitril 24 mG/valsartan 26 mG 1 Tablet(s) Oral two times a day    MEDICATIONS  (PRN):  acetaminophen     Tablet .. 650 milliGRAM(s) Oral every 6 hours PRN Temp greater or equal to 38C (100.4F), Mild Pain (1 - 3)  ALBUTerol    90 MICROgram(s) HFA Inhaler 1 Puff(s) Inhalation every 6 hours PRN Shortness of Breath  dextrose Oral Gel 15 Gram(s) Oral once PRN Blood Glucose LESS THAN 70 milliGRAM(s)/deciliter  ondansetron Injectable 4 milliGRAM(s) IV Push every 8 hours PRN Nausea and/or Vomiting  oxycodone    5 mG/acetaminophen 325 mG 1 Tablet(s) Oral every 6 hours PRN Moderate Pain (4 - 6)            Review of Systems    Constitutional, Eyes, ENT, Cardiovascular, Respiratory, Gastrointestinal, Genitourinary, Musculoskeletal, Integumentary, Neurological, Psychiatric, Endocrine, Heme/Lymph and Allergic/Immunologic review of systems are otherwise negative except as noted in HPI.           Physical Exam  Elderly female sitting in bed eating lunch Looks OK  Sclera not icteric Oral mucosa moits   Neck no masses   Lungs clear b/l   Heart RRR  Abd soft NT BS +  Extr: R no eedema  L   thigh wrapped with dressing   Neuro non focal,  Psych  pleasant, in good spirit                             7.8    2.09  )-----------( 52       ( 27 Aug 2022 07:52 )             25.0         RADIOLOGY & ADDITIONAL STUDIES:  < from: CT Temporal Bones No Cont (08.19.22 @ 19:12) >  ACC: [id number] EXAM:  CT TEMPORAL BONES                          PROCEDURE DATE:  08/19/2022          INTERPRETATION:  CT TEMPORAL BONES WITHOUT CONTRAST    CLINICAL INFORMATION: Right malignant otitis externa    COMPARISON: None available.    TECHNIQUE: Non contrast high resolution CT imaging of the temporal bones   was performed. Sagittal and coronal reformatted images were obtained from   the source data and also reviewed.    FINDINGS:  There is opacification of the right mastoid air cells. There are more   confluent areas of opacification within the right mastoid air cells which   may represent erosion of the mastoid air cell bony septa and suggests the   possibility of coalescent mastoiditis. The bony margins and roof of the   right mastoid are otherwise intact. There is opacification of the right   middle ear cavity. There is soft tissue thickening along the bony right   external auditory canal. There is prominent erosion of the underlying   anterior wall of the bony right externalauditory canal which   communicates with the right TMJ joint. No abnormal effusion in the right   TMJ joint. No associated overlying soft tissue swelling or discrete fluid   collections.  The ossicular chain is intact, without erosion. The otic capsule appears   within normal limits. The facial nerve has a normal course. The inner ear   structures are normally formed. The internal auditory canal is normal in   size.      On the left, the external auditory canal is normal appearance. The   tympanic membrane is not thickened. The middle ear cavity is   well-aerated. The ossicular chain is intact. The otic capsule appears   within normal limits. The facial nerve has a normal course. The inner ear   structures are normally formed. The internal auditory canal is normal in   size. Mastoid air cells are well-aerated. No bony erosion or destruction   is seen.    IMPRESSION:  There is opacification of the right mastoid air cells with possible focal   regions of mastoid septal erosions, opacification of the right middle ear   cavity and prominent soft tissue thickening along the right bony external   auditory canal with underlying prominent bony erosions as detailed above   which may represent an extensive infectious process such as malignant   otitis media and otomastoiditis with coalescence. Underlying neoplasm or   cholesteatoma is not entirely excluded. No associated swelling or   discrete fluid collection within the adjacent fascial and periauricular   soft tissues. No large effusion within the right TMJ joint. Recommend   follow-up evaluation with ENT.    < end of copied text >    < from: Xray Foot AP + Lateral + Oblique, Right (08.19.22 @ 16:27) >    ACC: [id number] EXAM:  XR FOOT COMP MIN 3 VIEWS RT                          PROCEDURE DATE:  08/19/2022          INTERPRETATION:  RIGHT foot    CLINICAL INFORMATION:Injury with  Pain.    TECHNIQUE: AP,lateral and oblique views.    FINDINGS:  Diffuse soft tissue swelling without subcutaneous air or radiopaque   foreign body.  Severe bone demineralization of the RIGHT foot osseous structures.   Periosteal reaction of the metatarsal bones. Possible nondisplaced   fracture of the fifth metatarsal base styloid.  Osteoarthrosis of the tarsal bones.        IMPRESSION:    Severe bone demineralization.  Osteomyelitis cannot be excluded.  If osteomyelitis is considered  despite conservative therapy, and soft   tissue / bone infection requires further assessment, follow-up MRI   recommended.    < end of copied text >

## 2022-08-27 NOTE — PROGRESS NOTE ADULT - SUBJECTIVE AND OBJECTIVE BOX
CC: Oral Pain  · Subjective and Objective:   Pt is an 86 yo female with a pmh/o COPD, RA on MTX, CAD, DMII on insulin, depression, CHFrEF, HTN, PVD, PAD, s/p left fem-pop bypass 2021, left thigh infection s/p debridement and muscle flap, who presents to the ED from home due to oral pain and swelling associated with difficulty swallowing and drooling. Pt states that her pain is located under her tongue and in her gums and is associated with lip and throat swelling. Per daughter, pt began to have these symptoms two days ago and has had two days of clindamycin and one day of dexamethasone and was brought to ED per pt request due to pain. Pt states her pain and swelling has improved from two days ago however she states she still feels discomfort and is only able to tolerate fluids at this time. Pain is constant, no alleviating factors other than 4mg IV morphine in ED, aggravated by palpation, not quantifiable on scale, unable to describe character of pain and states that it is non radiating. Pt also endorsing right ear pain. Pt states that her right ear is a "wax builder" and had it irrigated by her daughter with success and that one day prior, she on her own, used a tool and pulled out a "big black scab" from inside ear. Pt states her ear is only tender when pulled during exam. Pt daughter also endorsing that right leg wounds have become purulent and is concerned for infection.   ED course: Pt received to ED c/o drooling and oral/airway swelling in setting of mouth and ear discomfort. 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 Pt found to have WBC <4, anemia, lactic acidosis to 3.5, ORLY. Pt given  clindamycin and vancomycin and IV morphine. Imaging reviewed with ENT at transfer center and per ED MD, transfer not recommended due to benign clinical exam. ENT Dr. Nata tolliver called by ED MD and does not have privileges here but states that symptoms could be due to dehydration. Pt noted to have started lasix on 8/9/22 per [facility] program and daughter reports poor po intake. Pt given 1L IVF. Ortho called and evaluated wounds to right foot as pt follows with Dr. Devries and XR performed showing "no gas" per ED MD. Pt also noted to have HgB 7.2 and transfused 1U PRBC, baseline 7-8 per HIE review. Extensive discussion had with ED due to concerns for malignant otitis media and ludwigs angina as on admission exam pt found to have posterior auricular lymphadenopathy and tenderness to palpation and swelling and pain on floor of mouth with difficulty swallowing and recommendation made for transfer to [facility] for E[facility] services however per ED MD family declining transfer, although attempt was made.     08/21/22: Patient seen and examined. Feels better today, feels hungry. Discussed with Dr Ortega  08/23/22: No new issues. Discussed with patient regarding management and d/c plan.  08/24/22: Complaining of occasional bleeding from mouth. Platelets down to 23 today from 45 yesterday. Discussed with daughter on phone in length regarding management and d/c plan.   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 She was on IV meropenem and IV vancomycin. Also found to have acute on chr anemia.   S/P 2 units of PRBCs. Clinically improving. She then developed  acute thrombocytopenia possibly due to IV Vancomycin as per heme. IV antibiotics were discontinued yesterday. Platelet today only 11, 000  Will receive 2 units platelets. Case discussed with her daughter Dr Ahumada. Discussed with Dr Dia    8/26: eating, drinking, has some oral discomfort but getting better.  No bleeding today.  No fever, chills, n,  v.  Updated pt and daughter on plan of care.  8/27: Alert, answering questions, tolerating food and liquid.  No bleeding today.      REVIEW OF SYSTEMS: All other review of systems is negative unless indicated above.      Vital Signs Last 24 Hrs  T(C): 37.4 (27 Aug 2022 08:55), Max: 37.4 (27 Aug 2022 08:55)  T(F): 99.3 (27 Aug 2022 08:55), Max: 99.3 (27 Aug 2022 08:55)  HR: 98 (27 Aug 2022 08:55) (82 - 98)  BP: 115/44 (27 Aug 2022 08:55) (115/44 - 147/40)  BP(mean): --  RR: 18 (27 Aug 2022 08:55) (18 - 18)  SpO2: 93% (27 Aug 2022 08:55) (93% - 97%)    Parameters below as of 27 Aug 2022 08:55  Patient On (Oxygen Delivery Method): room air            PHYSICAL EXAM:    Constitutional: NAD, awake and alert, frail, elderly appearing  HEENT: PERR, EOMI, Normal Hearing, multiple lip and oral ulcerations, no active bleeding, petechae noted hard palate  Neck: Soft and supple  Respiratory: Breath sounds are clear bilaterally, No wheezing, rales or rhonchi  Cardiovascular: S1 and S2, regular rate and rhythm, no Murmurs, gallops or rubs  Gastrointestinal: Bowel Sounds present, soft, nontender, nondistended, no guarding, no rebound  Extremities: No peripheral edema, right foot wrapped in bandage, dry  Neurological: A/O x 3, no focal deficits in my limited exam      MEDICATIONS  (STANDING):  dextrose 5%. 1000 milliLiter(s) (100 mL/Hr) IV Continuous <Continuous>  dextrose 5%. 1000 milliLiter(s) (50 mL/Hr) IV Continuous <Continuous>  dextrose 50% Injectable 25 Gram(s) IV Push once  dextrose 50% Injectable 12.5 Gram(s) IV Push once  dextrose 50% Injectable 25 Gram(s) IV Push once  FIRST- Mouthwash  BLM 10 milliLiter(s) Swish and Spit every 6 hours  folic acid 1 milliGRAM(s) Oral daily  gabapentin 300 milliGRAM(s) Oral three times a day  glucagon  Injectable 1 milliGRAM(s) IntraMuscular once  insulin glargine Injectable (LANTUS) 10 Unit(s) SubCutaneous at bedtime  insulin lispro (ADMELOG) corrective regimen sliding scale   SubCutaneous three times a day before meals  metoprolol tartrate 25 milliGRAM(s) Oral two times a day  sacubitril 24 mG/valsartan 26 mG 1 Tablet(s) Oral two times a day    MEDICATIONS  (PRN):  acetaminophen     Tablet .. 650 milliGRAM(s) Oral every 6 hours PRN Temp greater or equal to 38C (100.4F), Mild Pain (1 - 3)  ALBUTerol    90 MICROgram(s) HFA Inhaler 1 Puff(s) Inhalation every 6 hours PRN Shortness of Breath  dextrose Oral Gel 15 Gram(s) Oral once PRN Blood Glucose LESS THAN 70 milliGRAM(s)/deciliter  ondansetron Injectable 4 milliGRAM(s) IV Push every 8 hours PRN Nausea and/or Vomiting  oxycodone    5 mG/acetaminophen 325 mG 1 Tablet(s) Oral every 6 hours PRN Moderate Pain (4 - 6)                                7.8    2.09  )-----------( 52       ( 27 Aug 2022 07:52 )             25.0     08-26    140  |  109<H>  |  27<H>  ----------------------------<  149<H>  4.0   |  27  |  0.77    Ca    8.2<L>      26 Aug 2022 08:08      CAPILLARY BLOOD GLUCOSE      POCT Blood Glucose.: 139 mg/dL (27 Aug 2022 12:32)  POCT Blood Glucose.: 109 mg/dL (27 Aug 2022 08:02)  POCT Blood Glucose.: 182 mg/dL (26 Aug 2022 21:00)  POCT Blood Glucose.: 178 mg/dL (26 Aug 2022 16:16)            Assessment/Plan:  Pt is an 86 yo female with pmh/o DMII on insulin, h/o cellulitis and PVD with RLE wounds, who presents to ED in setting of poor oral intake due to sublingual/gum/lip/throat pain and swelling, partially alleviated by clindamycin and dexamethasone as outpatient and associated with drooling, who also presents with right ear pain and "wax build up" and development of purulence to Right foot/lower extremity wounds, who is admitted due to:    #Sublingual, submandibular, and oral pain and swelling concerning for Jaime's angina: IMPROVING   #Right otitis media concerning for otomastoiditis  Stopped IV Meropenem and Vancomycin due to thrombocytopenia  blood cultures: no growth  S/P Dexamethasone   supportive care, pain management.  Tolerating oral intake.  Lactic acidosis: RESOLVED    #Anemia, acute on chronic due to infection: STABLE  No signs of bleeding  Baseline Hgb between 7-8  Denies melena, hematochezia, abdominal pain, or other s/s bleeding  S/p 3U PRBCs    #Leukopenia:  Likely due to viral infection in setting of COVID  monitor    #Acute Thrombocytopenia: Likely drug induced  Possibly due to IV vancomycin, now off antibiotics   s/p Transfuse 2 units platelets 8/25  platelets 11k-->77k  Follow  Heme eval appreciated      #Acute Renal failure-resolved    #Chronic right lower extremity/ankle diabetic wounds:  - NWB RLE in soft dressings.    - No acute orthopaedic surgical intervention indicated at this time.       #Protein calorie malnutrition  Nutrition consult appreciated     #Multinodular thyroid  Outpatient follow up    #DMII, insulin dependent, with hyperglycemia  a1c 8.3  c/w lantus  AISS       #CAD/HTN/CHF/PVD  Hold Lasix  hold aspirin (last dose 8/26) and eliquis in setting of thrombocytopenia  c/w entresto       dvt ppx:  -SCDs    Dispo:  updated daughter Nirali on plan of care on 8/26.  Tentative plan to d/c to [facility] once medically ready.        Assessment and Plan:   Nutritional Assessment:  · Nutritional Assessment	This patient has been assessed with a concern for Malnutrition and has been determined to have a diagnosis/diagnoses of Severe protein-calorie malnutrition.    This patient is being managed with:   Diet Easy to Chew-  Entered: Aug 20 2022 10:30AM

## 2022-08-28 LAB
HCT VFR BLD CALC: 21.6 % — LOW (ref 34.5–45)
HGB BLD-MCNC: 7 G/DL — CRITICAL LOW (ref 11.5–15.5)
MCHC RBC-ENTMCNC: 32.3 PG — SIGNIFICANT CHANGE UP (ref 27–34)
MCHC RBC-ENTMCNC: 32.4 GM/DL — SIGNIFICANT CHANGE UP (ref 32–36)
MCV RBC AUTO: 99.5 FL — SIGNIFICANT CHANGE UP (ref 80–100)
PLATELET # BLD AUTO: 38 K/UL — LOW (ref 150–400)
RBC # BLD: 2.17 M/UL — LOW (ref 3.8–5.2)
RBC # FLD: 16.8 % — HIGH (ref 10.3–14.5)
WBC # BLD: 2.07 K/UL — LOW (ref 3.8–10.5)
WBC # FLD AUTO: 2.07 K/UL — LOW (ref 3.8–10.5)

## 2022-08-28 PROCEDURE — 99232 SBSQ HOSP IP/OBS MODERATE 35: CPT

## 2022-08-28 RX ORDER — OXYCODONE AND ACETAMINOPHEN 5; 325 MG/1; MG/1
1 TABLET ORAL EVERY 4 HOURS
Refills: 0 | Status: DISCONTINUED | OUTPATIENT
Start: 2022-08-28 | End: 2022-09-01

## 2022-08-28 RX ADMIN — SACUBITRIL AND VALSARTAN 1 TABLET(S): 24; 26 TABLET, FILM COATED ORAL at 21:19

## 2022-08-28 RX ADMIN — Medication 25 MILLIGRAM(S): at 21:31

## 2022-08-28 RX ADMIN — GABAPENTIN 300 MILLIGRAM(S): 400 CAPSULE ORAL at 13:34

## 2022-08-28 RX ADMIN — Medication 1 MILLIGRAM(S): at 12:10

## 2022-08-28 RX ADMIN — DIPHENHYDRAMINE HYDROCHLORIDE AND LIDOCAINE HYDROCHLORIDE AND ALUMINUM HYDROXIDE AND MAGNESIUM HYDRO 10 MILLILITER(S): KIT at 05:28

## 2022-08-28 RX ADMIN — OXYCODONE AND ACETAMINOPHEN 1 TABLET(S): 5; 325 TABLET ORAL at 04:45

## 2022-08-28 RX ADMIN — OXYCODONE AND ACETAMINOPHEN 1 TABLET(S): 5; 325 TABLET ORAL at 14:30

## 2022-08-28 RX ADMIN — OXYCODONE AND ACETAMINOPHEN 1 TABLET(S): 5; 325 TABLET ORAL at 13:31

## 2022-08-28 RX ADMIN — DIPHENHYDRAMINE HYDROCHLORIDE AND LIDOCAINE HYDROCHLORIDE AND ALUMINUM HYDROXIDE AND MAGNESIUM HYDRO 10 MILLILITER(S): KIT at 02:02

## 2022-08-28 RX ADMIN — DIPHENHYDRAMINE HYDROCHLORIDE AND LIDOCAINE HYDROCHLORIDE AND ALUMINUM HYDROXIDE AND MAGNESIUM HYDRO 10 MILLILITER(S): KIT at 12:10

## 2022-08-28 RX ADMIN — INSULIN GLARGINE 10 UNIT(S): 100 INJECTION, SOLUTION SUBCUTANEOUS at 21:20

## 2022-08-28 RX ADMIN — GABAPENTIN 300 MILLIGRAM(S): 400 CAPSULE ORAL at 21:19

## 2022-08-28 RX ADMIN — GABAPENTIN 300 MILLIGRAM(S): 400 CAPSULE ORAL at 05:28

## 2022-08-28 NOTE — PROGRESS NOTE ADULT - SUBJECTIVE AND OBJECTIVE BOX
CC: Oral Pain  · Subjective and Objective:   Pt is an 88 yo female with a pmh/o COPD, RA on MTX, CAD, DMII on insulin, depression, CHFrEF, HTN, PVD, PAD, s/p left fem-pop bypass 2021, left thigh infection s/p debridement and muscle flap, who presents to the ED from home due to oral pain and swelling associated with difficulty swallowing and drooling. Pt states that her pain is located under her tongue and in her gums and is associated with lip and throat swelling. Per daughter, pt began to have these symptoms two days ago and has had two days of clindamycin and one day of dexamethasone and was brought to ED per pt request due to pain. Pt states her pain and swelling has improved from two days ago however she states she still feels discomfort and is only able to tolerate fluids at this time. Pain is constant, no alleviating factors other than 4mg IV morphine in ED, aggravated by palpation, not quantifiable on scale, unable to describe character of pain and states that it is non radiating. Pt also endorsing right ear pain. Pt states that her right ear is a "wax builder" and had it irrigated by her daughter with success and that one day prior, she on her own, used a tool and pulled out a "big black scab" from inside ear. Pt states her ear is only tender when pulled during exam. Pt daughter also endorsing that right leg wounds have become purulent and is concerned for infection.   ED course: Pt received to ED c/o drooling and oral/airway swelling in setting of mouth and ear discomfort. 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 Pt found to have WBC <4, anemia, lactic acidosis to 3.5, ORLY. Pt given  clindamycin and vancomycin and IV morphine. Imaging reviewed with ENT at transfer center and per ED MD, transfer not recommended due to benign clinical exam. ENT Dr. Nata tolliver called by ED MD and does not have privileges here but states that symptoms could be due to dehydration. Pt noted to have started lasix on 8/9/22 per [facility] program and daughter reports poor po intake. Pt given 1L IVF. Ortho called and evaluated wounds to right foot as pt follows with Dr. Devries and XR performed showing "no gas" per ED MD. Pt also noted to have HgB 7.2 and transfused 1U PRBC, baseline 7-8 per HIE review. Extensive discussion had with ED due to concerns for malignant otitis media and ludwigs angina as on admission exam pt found to have posterior auricular lymphadenopathy and tenderness to palpation and swelling and pain on floor of mouth with difficulty swallowing and recommendation made for transfer to [facility] for ENT services however per ED MD family declining transfer, although attempt was made.     08/21/22: Patient seen and examined. Feels better today, feels hungry. Discussed with Dr Ortega  08/23/22: No new issues. Discussed with patient regarding management and d/c plan.  08/24/22: Complaining of occasional bleeding from mouth. Platelets down to 23 today from 45 yesterday. Discussed with daughter on phone in length regarding management and d/c plan.   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 She was on IV meropenem and IV vancomycin. Also found to have acute on chr anemia.   S/P 2 units of PRBCs. Clinically improving. She then developed  acute thrombocytopenia possibly due to IV Vancomycin as per heme. IV antibiotics were discontinued yesterday. Platelet today only 11, 000  Will receive 2 units platelets. Case discussed with her daughter Dr Ahumada. Discussed with Dr Dia    8/26: eating, drinking, has some oral discomfort but getting better.  No bleeding today.  No fever, chills, n,  v.  Updated pt and daughter on plan of care.  8/27: Alert, answering questions, tolerating food and liquid.  No bleeding today.  8/28: Oral pain continues, having diarrhea, new onset. BP meds held today.  Discussed with daughter increasing pain meds due to discomfort.    REVIEW OF SYSTEMS: All other review of systems is negative unless indicated above.      Vital Signs Last 24 Hrs  T(C): 36.7 (27 Aug 2022 21:20), Max: 36.7 (27 Aug 2022 21:20)  T(F): 98.1 (27 Aug 2022 21:20), Max: 98.1 (27 Aug 2022 21:20)  HR: 94 (27 Aug 2022 21:20) (94 - 94)  BP: 106/35 (28 Aug 2022 07:45) (106/35 - 130/47)  BP(mean): --  RR: 18 (27 Aug 2022 21:20) (18 - 18)  SpO2: 93% (27 Aug 2022 21:20) (93% - 93%)    Parameters below as of 27 Aug 2022 21:20  Patient On (Oxygen Delivery Method): room air          PHYSICAL EXAM:    Constitutional: NAD, awake and alert, frail, elderly appearing  HEENT: PERR, EOMI, Normal Hearing, multiple lip and oral ulcerations, no active bleeding, petechae noted hard palate  Neck: Soft and supple  Respiratory: Breath sounds are clear bilaterally, No wheezing, rales or rhonchi  Cardiovascular: S1 and S2, regular rate and rhythm, no Murmurs, gallops or rubs  Gastrointestinal: Bowel Sounds present, soft, nontender, nondistended, no guarding, no rebound  Extremities: No peripheral edema, right foot wrapped in bandage, dry  Neurological: A/O x 3, no focal deficits in my limited exam      MEDICATIONS  (STANDING):  dextrose 5%. 1000 milliLiter(s) (100 mL/Hr) IV Continuous <Continuous>  dextrose 5%. 1000 milliLiter(s) (50 mL/Hr) IV Continuous <Continuous>  dextrose 50% Injectable 25 Gram(s) IV Push once  dextrose 50% Injectable 12.5 Gram(s) IV Push once  dextrose 50% Injectable 25 Gram(s) IV Push once  FIRST- Mouthwash  BLM 10 milliLiter(s) Swish and Spit every 6 hours  folic acid 1 milliGRAM(s) Oral daily  gabapentin 300 milliGRAM(s) Oral three times a day  glucagon  Injectable 1 milliGRAM(s) IntraMuscular once  insulin glargine Injectable (LANTUS) 10 Unit(s) SubCutaneous at bedtime  insulin lispro (ADMELOG) corrective regimen sliding scale   SubCutaneous three times a day before meals  metoprolol tartrate 25 milliGRAM(s) Oral two times a day  sacubitril 24 mG/valsartan 26 mG 1 Tablet(s) Oral two times a day    MEDICATIONS  (PRN):  ALBUTerol    90 MICROgram(s) HFA Inhaler 1 Puff(s) Inhalation every 6 hours PRN Shortness of Breath  dextrose Oral Gel 15 Gram(s) Oral once PRN Blood Glucose LESS THAN 70 milliGRAM(s)/deciliter  ondansetron Injectable 4 milliGRAM(s) IV Push every 8 hours PRN Nausea and/or Vomiting  oxycodone    5 mG/acetaminophen 325 mG 1 Tablet(s) Oral every 4 hours PRN Severe Pain (7 - 10)                                7.0    2.07  )-----------( 38       ( 28 Aug 2022 07:45 )             21.6           CAPILLARY BLOOD GLUCOSE      POCT Blood Glucose.: 95 mg/dL (28 Aug 2022 12:20)  POCT Blood Glucose.: 85 mg/dL (28 Aug 2022 08:04)  POCT Blood Glucose.: 177 mg/dL (27 Aug 2022 21:11)  POCT Blood Glucose.: 115 mg/dL (27 Aug 2022 17:16)            Assessment/Plan:  Pt is an 88 yo female with pmh/o DMII on insulin, h/o cellulitis and PVD with RLE wounds, who presents to ED in setting of poor oral intake due to sublingual/gum/lip/throat pain and swelling, partially alleviated by clindamycin and dexamethasone as outpatient and associated with drooling, who also presents with right ear pain and "wax build up" and development of purulence to Right foot/lower extremity wounds, who is admitted due to:    #Sublingual, submandibular, and oral pain and swelling concerning for Jaime's angina: IMPROVING slowly  #Right otitis media concerning for otomastoiditis  Stopped IV Meropenem and Vancomycin due to thrombocytopenia  blood cultures: no growth  S/P Dexamethasone   percocet every 4 hours from 6hs PRN pain  supportive care, pain management.  Tolerating oral intake.  Lactic acidosis: RESOLVED    #Anemia, acute on chronic due to infection: STABLE  No signs of bleeding  Baseline Hgb between 7-8, today 7, continue to monitor   Denies melena, hematochezia, abdominal pain, or other s/s bleeding  S/p 3U PRBCs    Diarrhea: New onset  -started today, monitor, if persists test for cdiff  -avoid laxatives  -Monitor, hold BP meds today    #Acute Thrombocytopenia / leukopenia: Likely drug induced also possibly due to recent COVID infection  Possibly due to IV vancomycin, now off antibiotics   s/p Transfuse 2 units platelets 8/25  platelets 11k-->77k --> 52 --> 38  hematology following, started on folic acid      #Acute Renal failure-resolved    #Chronic right lower extremity/ankle diabetic wounds:  - NWB RLE in soft dressings.    - No acute orthopaedic surgical intervention indicated at this time.       #Protein calorie malnutrition  Nutrition consult appreciated     #Multinodular thyroid  Outpatient follow up    #DMII, insulin dependent, with hyperglycemia  a1c 8.3  c/w lantus  AISS       #CAD/HTN/CHF/PVD  Hold Lasix  hold aspirin (last dose 8/26) and eliquis in setting of thrombocytopenia  hold metoprolol and entresto today due to borderline low BP      dvt ppx:  SCDs    Dispo:  PT  updated daughter Nirali on plan of care on 8/28.  Tentative plan to d/c to [facility] once medically ready.        Assessment and Plan:   Nutritional Assessment:  · Nutritional Assessment	This patient has been assessed with a concern for Malnutrition and has been determined to have a diagnosis/diagnoses of Severe protein-calorie malnutrition.    This patient is being managed with:   Diet Easy to Chew-  Entered: Aug 20 2022 10:30AM

## 2022-08-29 LAB
ANION GAP SERPL CALC-SCNC: 7 MMOL/L — SIGNIFICANT CHANGE UP (ref 5–17)
BUN SERPL-MCNC: 20 MG/DL — SIGNIFICANT CHANGE UP (ref 7–23)
CALCIUM SERPL-MCNC: 7.7 MG/DL — LOW (ref 8.5–10.1)
CHLORIDE SERPL-SCNC: 103 MMOL/L — SIGNIFICANT CHANGE UP (ref 96–108)
CO2 SERPL-SCNC: 24 MMOL/L — SIGNIFICANT CHANGE UP (ref 22–31)
CREAT SERPL-MCNC: 1.08 MG/DL — SIGNIFICANT CHANGE UP (ref 0.5–1.3)
EGFR: 50 ML/MIN/1.73M2 — LOW
GLUCOSE SERPL-MCNC: 145 MG/DL — HIGH (ref 70–99)
HCT VFR BLD CALC: 21.1 % — LOW (ref 34.5–45)
HGB BLD-MCNC: 6.7 G/DL — CRITICAL LOW (ref 11.5–15.5)
MCHC RBC-ENTMCNC: 31.6 PG — SIGNIFICANT CHANGE UP (ref 27–34)
MCHC RBC-ENTMCNC: 31.8 GM/DL — LOW (ref 32–36)
MCV RBC AUTO: 99.5 FL — SIGNIFICANT CHANGE UP (ref 80–100)
PLATELET # BLD AUTO: 42 K/UL — LOW (ref 150–400)
POTASSIUM SERPL-MCNC: 3.6 MMOL/L — SIGNIFICANT CHANGE UP (ref 3.5–5.3)
POTASSIUM SERPL-SCNC: 3.6 MMOL/L — SIGNIFICANT CHANGE UP (ref 3.5–5.3)
RBC # BLD: 2.12 M/UL — LOW (ref 3.8–5.2)
RBC # FLD: 17 % — HIGH (ref 10.3–14.5)
SODIUM SERPL-SCNC: 134 MMOL/L — LOW (ref 135–145)
WBC # BLD: 2.52 K/UL — LOW (ref 3.8–10.5)
WBC # FLD AUTO: 2.52 K/UL — LOW (ref 3.8–10.5)

## 2022-08-29 PROCEDURE — 99233 SBSQ HOSP IP/OBS HIGH 50: CPT

## 2022-08-29 RX ADMIN — OXYCODONE AND ACETAMINOPHEN 1 TABLET(S): 5; 325 TABLET ORAL at 21:35

## 2022-08-29 RX ADMIN — GABAPENTIN 300 MILLIGRAM(S): 400 CAPSULE ORAL at 05:27

## 2022-08-29 RX ADMIN — DIPHENHYDRAMINE HYDROCHLORIDE AND LIDOCAINE HYDROCHLORIDE AND ALUMINUM HYDROXIDE AND MAGNESIUM HYDRO 10 MILLILITER(S): KIT at 23:10

## 2022-08-29 RX ADMIN — DIPHENHYDRAMINE HYDROCHLORIDE AND LIDOCAINE HYDROCHLORIDE AND ALUMINUM HYDROXIDE AND MAGNESIUM HYDRO 10 MILLILITER(S): KIT at 00:55

## 2022-08-29 RX ADMIN — GABAPENTIN 300 MILLIGRAM(S): 400 CAPSULE ORAL at 21:51

## 2022-08-29 RX ADMIN — GABAPENTIN 300 MILLIGRAM(S): 400 CAPSULE ORAL at 14:08

## 2022-08-29 RX ADMIN — Medication 1 MILLIGRAM(S): at 09:27

## 2022-08-29 RX ADMIN — DIPHENHYDRAMINE HYDROCHLORIDE AND LIDOCAINE HYDROCHLORIDE AND ALUMINUM HYDROXIDE AND MAGNESIUM HYDRO 10 MILLILITER(S): KIT at 11:29

## 2022-08-29 RX ADMIN — OXYCODONE AND ACETAMINOPHEN 1 TABLET(S): 5; 325 TABLET ORAL at 20:35

## 2022-08-29 RX ADMIN — DIPHENHYDRAMINE HYDROCHLORIDE AND LIDOCAINE HYDROCHLORIDE AND ALUMINUM HYDROXIDE AND MAGNESIUM HYDRO 10 MILLILITER(S): KIT at 05:27

## 2022-08-29 RX ADMIN — OXYCODONE AND ACETAMINOPHEN 1 TABLET(S): 5; 325 TABLET ORAL at 06:05

## 2022-08-29 RX ADMIN — Medication 2: at 11:29

## 2022-08-29 RX ADMIN — INSULIN GLARGINE 10 UNIT(S): 100 INJECTION, SOLUTION SUBCUTANEOUS at 22:00

## 2022-08-29 NOTE — PROGRESS NOTE ADULT - SUBJECTIVE AND OBJECTIVE BOX
Pt was seen and examined at bedside. Pt now being seen by wound care, denies f/c/n/v/cp/sob. Pt had no acute orthopaedic complaints at this time.    LABS:                        7.0    2.07  )-----------( 38       ( 28 Aug 2022 07:45 )             21.6                 VITAL SIGNS:  T(C): 37.2 (08-29-22 @ 07:26), Max: 37.2 (08-29-22 @ 07:26)  HR: 99 (08-29-22 @ 07:26) (97 - 103)  BP: 90/39 (08-29-22 @ 07:26) (90/39 - 130/39)  RR: 18 (08-29-22 @ 07:26) (18 - 18)  SpO2: 95% (08-29-22 @ 07:26) (92% - 95%)    Exam:     General: NAD    RLE:   R foot dressings with some strikethrough  Motor: +EHL/FHLl unable to plantarflex or dorsiflex ankle which pt states has been chronic for 1.5 years  SILT: Hernandez/Sa/SPN/DPN  2+ DP/PT  Compartments soft and compressible  No pain on dorsiflexion    A/P:  Patient is a 87y Female with healing chronic ankle wounds of the Right foot.  - Wound care following, recs appreciated  - Pain control as needed.   - BCx: NGF  - NWB RLE in soft dressings. changed on 8/29    - DVT Ppx: Per Primary AC Team.   - No acute orthopaedic surgical intervention indicated at this time.   - Will discuss with Dr. Ku and will advise if any changes made.

## 2022-08-29 NOTE — PROGRESS NOTE ADULT - SUBJECTIVE AND OBJECTIVE BOX
CC: Oral Pain  · Subjective and Objective:   Pt is an 88 yo female with a pmh/o COPD, RA on MTX, CAD, DMII on insulin, depression, CHFrEF, HTN, PVD, PAD, s/p left fem-pop bypass 2021, left thigh infection s/p debridement and muscle flap, who presents to the ED from home due to oral pain and swelling associated with difficulty swallowing and drooling. Pt states that her pain is located under her tongue and in her gums and is associated with lip and throat swelling. Per daughter, pt began to have these symptoms two days ago and has had two days of clindamycin and one day of dexamethasone and was brought to ED per pt request due to pain. Pt states her pain and swelling has improved from two days ago however she states she still feels discomfort and is only able to tolerate fluids at this time. Pain is constant, no alleviating factors other than 4mg IV morphine in ED, aggravated by palpation, not quantifiable on scale, unable to describe character of pain and states that it is non radiating. Pt also endorsing right ear pain. Pt states that her right ear is a "wax builder" and had it irrigated by her daughter with success and that one day prior, she on her own, used a tool and pulled out a "big black scab" from inside ear. Pt states her ear is only tender when pulled during exam. Pt daughter also endorsing that right leg wounds have become purulent and is concerned for infection.   ED course: Pt received to ED c/o drooling and oral/airway swelling in setting of mouth and ear discomfort. 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 Pt found to have WBC <4, anemia, lactic acidosis to 3.5, ORLY. Pt given  clindamycin and vancomycin and IV morphine. Imaging reviewed with ENT at transfer center and per ED MD, transfer not recommended due to benign clinical exam. ENT Dr. Nata tolliver called by ED MD and does not have privileges here but states that symptoms could be due to dehydration. Pt noted to have started lasix on 8/9/22 per [facility] program and daughter reports poor po intake. Pt given 1L IVF. Ortho called and evaluated wounds to right foot as pt follows with Dr. Devries and XR performed showing "no gas" per ED MD. Pt also noted to have HgB 7.2 and transfused 1U PRBC, baseline 7-8 per HIE review. Extensive discussion had with ED due to concerns for malignant otitis media and ludwigs angina as on admission exam pt found to have posterior auricular lymphadenopathy and tenderness to palpation and swelling and pain on floor of mouth with difficulty swallowing and recommendation made for transfer to [facility] for E[facility] services however per ED MD family declining transfer, although attempt was made.     08/21/22: Patient seen and examined. Feels better today, feels hungry. Discussed with Dr Ortega  08/23/22: No new issues. Discussed with patient regarding management and d/c plan.  08/24/22: Complaining of occasional bleeding from mouth. Platelets down to 23 today from 45 yesterday. Discussed with daughter on phone in length regarding management and d/c plan.   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 She was on IV meropenem and IV vancomycin. Also found to have acute on chr anemia.   S/P 2 units of PRBCs. Clinically improving. She then developed  acute thrombocytopenia possibly due to IV Vancomycin as per heme. IV antibiotics were discontinued yesterday. Platelet today only 11, 000  Will receive 2 units platelets. Case discussed with her daughter Dr Ahumada. Discussed with Dr Dia    8/26: eating, drinking, has some oral discomfort but getting better.  No bleeding today.  No fever, chills, n,  v.  Updated pt and daughter on plan of care.  8/27: Alert, answering questions, tolerating food and liquid.  No bleeding today.  8/28: Oral pain continues, having diarrhea, new onset. BP meds held today.  Discussed with daughter increasing pain meds due to discomfort.  8/29: Feeling better, less mouth pain, oral lesions improving. no fever, chills, more energetic today.      REVIEW OF SYSTEMS: All other review of systems is negative unless indicated above.      Vital Signs Last 24 Hrs  T(C): 37.2 (29 Aug 2022 07:26), Max: 37.2 (29 Aug 2022 07:26)  T(F): 98.9 (29 Aug 2022 07:26), Max: 98.9 (29 Aug 2022 07:26)  HR: 99 (29 Aug 2022 07:26) (97 - 103)  BP: 90/39 (29 Aug 2022 07:26) (90/39 - 130/39)  BP(mean): --  RR: 18 (29 Aug 2022 07:26) (18 - 18)  SpO2: 95% (29 Aug 2022 07:26) (92% - 95%)    Parameters below as of 29 Aug 2022 07:26  Patient On (Oxygen Delivery Method): room air            PHYSICAL EXAM:    Constitutional: NAD, awake and alert, frail, elderly appearing  HEENT: PERR, EOMI, Normal Hearing, multiple lip and oral ulcerations, no active bleeding, petechae noted hard palate - IMPROVING  Neck: Soft and supple  Respiratory: Breath sounds are clear bilaterally, No wheezing, rales or rhonchi  Cardiovascular: S1 and S2, regular rate and rhythm, no Murmurs, gallops or rubs  Gastrointestinal: Bowel Sounds present, soft, nontender, nondistended, no guarding, no rebound  Extremities: No peripheral edema, right foot wrapped in bandage, dry  Neurological: A/O x 3, no focal deficits in my limited exam      MEDICATIONS  (STANDING):  dextrose 5%. 1000 milliLiter(s) (100 mL/Hr) IV Continuous <Continuous>  dextrose 5%. 1000 milliLiter(s) (50 mL/Hr) IV Continuous <Continuous>  dextrose 50% Injectable 25 Gram(s) IV Push once  dextrose 50% Injectable 12.5 Gram(s) IV Push once  dextrose 50% Injectable 25 Gram(s) IV Push once  FIRST- Mouthwash  BLM 10 milliLiter(s) Swish and Spit every 6 hours  folic acid 1 milliGRAM(s) Oral daily  gabapentin 300 milliGRAM(s) Oral three times a day  glucagon  Injectable 1 milliGRAM(s) IntraMuscular once  insulin glargine Injectable (LANTUS) 10 Unit(s) SubCutaneous at bedtime  insulin lispro (ADMELOG) corrective regimen sliding scale   SubCutaneous three times a day before meals    MEDICATIONS  (PRN):  ALBUTerol    90 MICROgram(s) HFA Inhaler 1 Puff(s) Inhalation every 6 hours PRN Shortness of Breath  dextrose Oral Gel 15 Gram(s) Oral once PRN Blood Glucose LESS THAN 70 milliGRAM(s)/deciliter  ondansetron Injectable 4 milliGRAM(s) IV Push every 8 hours PRN Nausea and/or Vomiting  oxycodone    5 mG/acetaminophen 325 mG 1 Tablet(s) Oral every 4 hours PRN Severe Pain (7 - 10)                                6.7    2.52  )-----------( 42       ( 29 Aug 2022 09:40 )             21.1     08-29    134<L>  |  103  |  20  ----------------------------<  145<H>  3.6   |  24  |  1.08    Ca    7.7<L>      29 Aug 2022 11:27      CAPILLARY BLOOD GLUCOSE      POCT Blood Glucose.: 154 mg/dL (29 Aug 2022 11:27)  POCT Blood Glucose.: 114 mg/dL (29 Aug 2022 07:58)  POCT Blood Glucose.: 161 mg/dL (28 Aug 2022 21:09)  POCT Blood Glucose.: 109 mg/dL (28 Aug 2022 17:00)                Assessment/Plan:  Pt is an 88 yo female with pmh/o DMII on insulin, h/o cellulitis and PVD with RLE wounds, who presents to ED in setting of poor oral intake due to sublingual/gum/lip/throat pain and swelling, partially alleviated by clindamycin and dexamethasone as outpatient and associated with drooling, who also presents with right ear pain and "wax build up" and development of purulence to Right foot/lower extremity wounds, who is admitted due to:    #Sublingual, submandibular, and oral pain and swelling concerning for Jaime's angina: IMPROVING   #Right otitis media concerning for otomastoiditis  Stopped IV Meropenem and Vancomycin due to thrombocytopenia  blood cultures: no growth  S/P Dexamethasone   percocet every 4 hours from 6hs PRN pain  supportive care, pain management.  Tolerating oral intake.  Lactic acidosis: RESOLVED    #Anemia, acute on chronic due to infection: STABLE  No signs of bleeding  Baseline Hgb between 7-8,   Denies melena, hematochezia, abdominal pain, or other s/s bleeding  S/p 3U PRBCs  transfuse 1u PRBC today 8/29 for Hb of 6.7    Diarrhea: New onset: RESOLVED      #Acute Thrombocytopenia / leukopenia: Likely drug induced also possibly due to recent COVID infection  Possibly due to IV vancomycin, now off antibiotics   s/p Transfuse 2 units platelets 8/25  platelets 11k-->77k --> 52 --> 38 --> 42  hematology following, started on folic acid      #Acute Renal failure-resolved    #Chronic right lower extremity/ankle diabetic wounds:  - NWB RLE in soft dressings.    - No acute orthopaedic surgical intervention indicated at this time.       #Protein calorie malnutrition  Nutrition consult appreciated     #Multinodular thyroid  Outpatient follow up    #DMII, insulin dependent, with hyperglycemia  a1c 8.3  c/w lantus  AISS       #CAD/HTN/CHF/PVD  Hold Lasix  hold aspirin (last dose 8/26) and eliquis in setting of thrombocytopenia  hold metoprolol and entresto due to borderline low BP      dvt ppx:  SCDs    Dispo:  PT  updated daughter Nirali on plan of care on 8/29.  Tentative plan to d/c to [facility] once medically ready.        Assessment and Plan:   Nutritional Assessment:  · Nutritional Assessment	This patient has been assessed with a concern for Malnutrition and has been determined to have a diagnosis/diagnoses of Severe protein-calorie malnutrition.    This patient is being managed with:   Diet Easy to Chew-  Entered: Aug 20 2022 10:30AM    Last Updated: 28-Aug-2022 13:40 by Harshal Landin (DO)

## 2022-08-29 NOTE — PHYSICAL THERAPY INITIAL EVALUATION ADULT - PERTINENT HX OF CURRENT PROBLEM, REHAB EVAL
Pt presents to the ED from home due to oral pain and swelling associated with difficulty swallowing and drooling. Pt states that her pain is located under her tongue and in her gums and is associated with lip and throat swelling. Pt also endorsing right ear pain. right leg wounds have become purulent, CT head/neck/temporal bones performed showing possible malignant otitis media and otomastoiditis with coalescence. Sublingual, submandibular, and oral pain and swelling concerning for Jaime's angina: Chronic right lower extremity/ankle diabetic wounds: RLE NWB

## 2022-08-29 NOTE — PHYSICAL THERAPY INITIAL EVALUATION ADULT - DIAGNOSIS, PT EVAL
Acute mastoiditis and possible Jaime's angina. Chronic right lower extremity/ankle diabetic wounds: NWB RLE, X Rays Rt foot: Severe bone demineralization, Anemia: 6.7/21.1

## 2022-08-29 NOTE — PHYSICAL THERAPY INITIAL EVALUATION ADULT - GENERAL OBSERVATIONS, REHAB EVAL
Pt was found lying in bed with RT foot dressing+, pt is c/o RLE pain,pleasant and willing to participate in therex

## 2022-08-30 ENCOUNTER — TRANSCRIPTION ENCOUNTER (OUTPATIENT)
Age: 87
End: 2022-08-30

## 2022-08-30 LAB
ANION GAP SERPL CALC-SCNC: 6 MMOL/L — SIGNIFICANT CHANGE UP (ref 5–17)
BUN SERPL-MCNC: 18 MG/DL — SIGNIFICANT CHANGE UP (ref 7–23)
CALCIUM SERPL-MCNC: 8 MG/DL — LOW (ref 8.5–10.1)
CHLORIDE SERPL-SCNC: 107 MMOL/L — SIGNIFICANT CHANGE UP (ref 96–108)
CO2 SERPL-SCNC: 25 MMOL/L — SIGNIFICANT CHANGE UP (ref 22–31)
CREAT SERPL-MCNC: 0.86 MG/DL — SIGNIFICANT CHANGE UP (ref 0.5–1.3)
EGFR: 66 ML/MIN/1.73M2 — SIGNIFICANT CHANGE UP
GLUCOSE SERPL-MCNC: 76 MG/DL — SIGNIFICANT CHANGE UP (ref 70–99)
HCT VFR BLD CALC: 25.3 % — LOW (ref 34.5–45)
HGB BLD-MCNC: 8.1 G/DL — LOW (ref 11.5–15.5)
MCHC RBC-ENTMCNC: 31.4 PG — SIGNIFICANT CHANGE UP (ref 27–34)
MCHC RBC-ENTMCNC: 32 GM/DL — SIGNIFICANT CHANGE UP (ref 32–36)
MCV RBC AUTO: 98.1 FL — SIGNIFICANT CHANGE UP (ref 80–100)
PLATELET # BLD AUTO: 57 K/UL — LOW (ref 150–400)
POTASSIUM SERPL-MCNC: 3.8 MMOL/L — SIGNIFICANT CHANGE UP (ref 3.5–5.3)
POTASSIUM SERPL-SCNC: 3.8 MMOL/L — SIGNIFICANT CHANGE UP (ref 3.5–5.3)
RBC # BLD: 2.58 M/UL — LOW (ref 3.8–5.2)
RBC # FLD: 17.5 % — HIGH (ref 10.3–14.5)
SARS-COV-2 RNA SPEC QL NAA+PROBE: DETECTED
SODIUM SERPL-SCNC: 138 MMOL/L — SIGNIFICANT CHANGE UP (ref 135–145)
WBC # BLD: 2.79 K/UL — LOW (ref 3.8–10.5)
WBC # FLD AUTO: 2.79 K/UL — LOW (ref 3.8–10.5)

## 2022-08-30 PROCEDURE — 99232 SBSQ HOSP IP/OBS MODERATE 35: CPT

## 2022-08-30 RX ORDER — FOLIC ACID 0.8 MG
1 TABLET ORAL
Qty: 0 | Refills: 0 | DISCHARGE
Start: 2022-08-30

## 2022-08-30 RX ORDER — FUROSEMIDE 40 MG
1 TABLET ORAL
Qty: 0 | Refills: 0 | DISCHARGE

## 2022-08-30 RX ORDER — APIXABAN 2.5 MG/1
1 TABLET, FILM COATED ORAL
Qty: 0 | Refills: 0 | DISCHARGE

## 2022-08-30 RX ORDER — METHOTREXATE 2.5 MG/1
6 TABLET ORAL
Qty: 0 | Refills: 0 | DISCHARGE

## 2022-08-30 RX ADMIN — Medication 1 MILLIGRAM(S): at 09:37

## 2022-08-30 RX ADMIN — GABAPENTIN 300 MILLIGRAM(S): 400 CAPSULE ORAL at 05:55

## 2022-08-30 RX ADMIN — DIPHENHYDRAMINE HYDROCHLORIDE AND LIDOCAINE HYDROCHLORIDE AND ALUMINUM HYDROXIDE AND MAGNESIUM HYDRO 10 MILLILITER(S): KIT at 17:51

## 2022-08-30 RX ADMIN — GABAPENTIN 300 MILLIGRAM(S): 400 CAPSULE ORAL at 14:21

## 2022-08-30 RX ADMIN — DIPHENHYDRAMINE HYDROCHLORIDE AND LIDOCAINE HYDROCHLORIDE AND ALUMINUM HYDROXIDE AND MAGNESIUM HYDRO 10 MILLILITER(S): KIT at 12:52

## 2022-08-30 RX ADMIN — INSULIN GLARGINE 10 UNIT(S): 100 INJECTION, SOLUTION SUBCUTANEOUS at 21:23

## 2022-08-30 RX ADMIN — OXYCODONE AND ACETAMINOPHEN 1 TABLET(S): 5; 325 TABLET ORAL at 19:57

## 2022-08-30 RX ADMIN — Medication 2: at 12:52

## 2022-08-30 RX ADMIN — OXYCODONE AND ACETAMINOPHEN 1 TABLET(S): 5; 325 TABLET ORAL at 20:27

## 2022-08-30 RX ADMIN — GABAPENTIN 300 MILLIGRAM(S): 400 CAPSULE ORAL at 21:23

## 2022-08-30 RX ADMIN — DIPHENHYDRAMINE HYDROCHLORIDE AND LIDOCAINE HYDROCHLORIDE AND ALUMINUM HYDROXIDE AND MAGNESIUM HYDRO 10 MILLILITER(S): KIT at 22:00

## 2022-08-30 NOTE — DISCHARGE NOTE PROVIDER - NSDCMRMEDTOKEN_GEN_ALL_CORE_FT
albuterol 90 mcg/inh inhalation aerosol: 1 puff(s) inhaled every 6 hours, As needed, Shortness of Breath  folic acid 1 mg oral tablet: 1 tab(s) orally once a day  gabapentin 300 mg oral capsule: 1 cap(s) orally 3 times a day  insulin glargine 100 units/mL subcutaneous solution: 12 unit(s) subcutaneous once a day (at bedtime)  ipratropium-albuterol 0.5 mg-2.5 mg/3 mLinhalation solution: 3 milliliter(s) inhaled every 4 hours, As needed, Shortness of Breath and/or Wheezing  oxycodone-acetaminophen 5 mg-325 mg oral tablet: 1 tab(s) orally every 8 hours, As Needed - 10)   albuterol 90 mcg/inh inhalation aerosol: 1 puff(s) inhaled every 6 hours, As needed, Shortness of Breath  folic acid 1 mg oral tablet: 1 tab(s) orally once a day  gabapentin 300 mg oral capsule: 1 cap(s) orally 3 times a day  insulin glargine 100 units/mL subcutaneous solution: 12 unit(s) subcutaneous once a day (at bedtime)  ipratropium-albuterol 0.5 mg-2.5 mg/3 mLinhalation solution: 3 milliliter(s) inhaled every 4 hours, As needed, Shortness of Breath and/or Wheezing  oxycodone-acetaminophen 5 mg-325 mg oral tablet: 1 tab(s) orally every 8 hours, As Needed - 10) MDD:15mg

## 2022-08-30 NOTE — DISCHARGE NOTE PROVIDER - PROVIDER TOKENS
FREE:[LAST:[Follow up with your pcp 1 week],PHONE:[(   )    -],FAX:[(   )    -]],FREE:[LAST:[follow up with your cardiologist 1 week],PHONE:[(   )    -],FAX:[(   )    -]]

## 2022-08-30 NOTE — DISCHARGE NOTE PROVIDER - DETAILS OF MALNUTRITION DIAGNOSIS/DIAGNOSES
This patient has been assessed with a concern for Malnutrition and was treated during this hospitalization for the following Nutrition diagnosis/diagnoses:     -  08/20/2022: Severe protein-calorie malnutrition

## 2022-08-30 NOTE — PROVIDER CONTACT NOTE (OTHER) - SITUATION
left a message on Dr Huang's machine to notify them of admission.  When patient is discharge please fax over discharge paperwork to [phone]
Covid-19 PCR sent on patient today for discharge screen.

## 2022-08-30 NOTE — DISCHARGE NOTE PROVIDER - NSDCCPCAREPLAN_GEN_ALL_CORE_FT
PRINCIPAL DISCHARGE DIAGNOSIS  Diagnosis: Mouth pain  Assessment and Plan of Treatment: Probable daisha angina complicated by adverse drug reaction - RESOLVING.    -Continue supportive care.      SECONDARY DISCHARGE DIAGNOSES  Diagnosis: Diabetic foot ulcer  Assessment and Plan of Treatment: Continu wound care.  Continue diabetic meds for blood sugar control.  Out patient follow up.  No indication for orthopedic intervention.    Diagnosis: Pancytopenia  Assessment and Plan of Treatment: Blood levels IMPROVING.  -stop aspirin, re-evaluate as out patient with your provider.  -stop eliquis, re-evaluate as out patient with your provider.  -stop methotrexate, re-evaluate as out patient with your provider.    Diagnosis: CAD (coronary artery disease)  Assessment and Plan of Treatment: Hold aspirin and eliquis for your CAD, PAD and re-evaluate as out patient.  Hold lasix, metoprolol, entresto due to borderline low blood pressure and re-evaluate as outpatient with your cardiologist.

## 2022-08-30 NOTE — DISCHARGE NOTE PROVIDER - HOSPITAL COURSE
CC: Oral Pain  · Subjective and Objective:   Pt is an 86 yo female with a pmh/o COPD, RA on MTX, CAD, DMII on insulin, depression, CHFrEF, HTN, PVD, PAD, s/p left fem-pop bypass 2021, left thigh infection s/p debridement and muscle flap, who presents to the ED from home due to oral pain and swelling associated with difficulty swallowing and drooling. Pt states that her pain is located under her tongue and in her gums and is associated with lip and throat swelling. Per daughter, pt began to have these symptoms two days ago and has had two days of clindamycin and one day of dexamethasone and was brought to ED per pt request due to pain. Pt states her pain and swelling has improved from two days ago however she states she still feels discomfort and is only able to tolerate fluids at this time. Pain is constant, no alleviating factors other than 4mg IV morphine in ED, aggravated by palpation, not quantifiable on scale, unable to describe character of pain and states that it is non radiating. Pt also endorsing right ear pain. Pt states that her right ear is a "wax builder" and had it irrigated by her daughter with success and that one day prior, she on her own, used a tool and pulled out a "big black scab" from inside ear. Pt states her ear is only tender when pulled during exam. Pt daughter also endorsing that right leg wounds have become purulent and is concerned for infection.   ED course: Pt received to ED c/o drooling and oral/airway swelling in setting of mouth and ear discomfort. 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 Pt found to have WBC <4, anemia, lactic acidosis to 3.5, ORLY. Pt given  clindamycin and vancomycin and IV morphine. Imaging reviewed with ENT at transfer center and per ED MD, transfer not recommended due to benign clinical exam. ENT Dr. Nata tolliver called by ED MD and does not have privileges here but states that symptoms could be due to dehydration. Pt noted to have started lasix on 8/9/22 per [facility] program and daughter reports poor po intake. Pt given 1L IVF. Ortho called and evaluated wounds to right foot as pt follows with Dr. Devries and XR performed showing "no gas" per ED MD. Pt also noted to have HgB 7.2 and transfused 1U PRBC, baseline 7-8 per HIE review. Extensive discussion had with ED due to concerns for malignant otitis media and ludwigs angina as on admission exam pt found to have posterior auricular lymphadenopathy and tenderness to palpation and swelling and pain on floor of mouth with difficulty swallowing and recommendation made for transfer to [facility] for ENT services however per ED MD family declining transfer, although attempt was made.     Hospital course:  Pt admitted for sublingual, submandibular, and oral pain and swelling concerning for Jaime's angina and right otitis media concerning for otomastoiditis.  Pt completed course of meropenem and vancomycin which was stopped early due to possible adverse reaction leading to pancytopenia.  Pt was transfused 4units PRBC total and 2units of platelets total during hospital course with improvement of counts. Counts slowly improving with out need for further transfusions.  SYmptoms of oral lesions with bleeding also resolving.  Pain improving, overall pt doing better.  Overall she is weak due to prolonged hospitalization.  Daughter updated on plan of care and likely need for MICHELLE upon discharge.   In terms of her right chronic ankle wounds, wound care was applied daily. No further intervention indicated.      REVIEW OF SYSTEMS: All other review of systems is negative unless indicated above.    Vital Signs Last 24 Hrs  T(C): 36.9 (30 Aug 2022 09:09), Max: 37.7 (29 Aug 2022 19:19)  T(F): 98.4 (30 Aug 2022 09:09), Max: 99.8 (29 Aug 2022 19:19)  HR: 86 (30 Aug 2022 09:09) (85 - 96)  BP: 127/38 (30 Aug 2022 09:09) (102/75 - 136/36)  BP(mean): --  RR: 17 (30 Aug 2022 09:09) (15 - 18)  SpO2: 95% (30 Aug 2022 09:09) (90% - 95%)    PHYSICAL EXAM:  Constitutional: NAD, awake and alert, frail, elderly appearing  HEENT: PERR, EOMI, Normal Hearing, multiple lip and oral ulcerations, no active bleeding, petechae noted hard palate - IMPROVING  Neck: Soft and supple  Respiratory: Breath sounds are clear bilaterally, No wheezing, rales or rhonchi  Cardiovascular: S1 and S2, regular rate and rhythm, no Murmurs, gallops or rubs  Gastrointestinal: Bowel Sounds present, soft, nontender, nondistended, no guarding, no rebound  Extremities: No peripheral edema, right foot wrapped in bandage, dry  Neurological: A/O x 3, no focal deficits in my limited exam    med/labs: Reviewed and interpreted     Assessment/Plan:  Pt is an 86 yo female with pmh/o DMII on insulin, h/o cellulitis and PVD with RLE wounds, who presents to ED in setting of poor oral intake due to sublingual/gum/lip/throat pain and swelling, partially alleviated by clindamycin and dexamethasone as outpatient and associated with drooling, who also presents with right ear pain and "wax build up" and development of purulence to Right foot/lower extremity wounds, who is admitted due to:    #Sublingual, submandibular, and oral pain and swelling concerning for Jaime's angina / Right otitis media concerning for otomastoiditis: RESOLVING  -Lactic acidosis: RESOLVED  -s/p IV Meropenem and Vancomycin stopped due to thrombocytopenia   -S/P Dexamethasone   -blood cultures: no growth  -pain management, feeling better, less discomfort.  -supportive care, pain management.    -Tolerating oral intake.    #Anemia, acute on chronic due to infection: STABLE  -S/p 4U PRBCs, H+H stable.  -No signs of bleeding  -Baseline Hgb between 7-8,     #Acute Thrombocytopenia / leukopenia: Likely drug induced also possibly due to recent COVID infection: RESOLVING  -Possibly due to IV vancomycin, now off antibiotics   -s/p 2 units platelets   -platelets 11k-->77k --> 52 --> 38 --> 42 --> 57K  -hematology following, started on folic acid    #Acute Renal failure-resolved    #Chronic right lower extremity/ankle diabetic wounds:  - NWB RLE in soft dressings.    - No acute orthopaedic surgical intervention indicated at this time.     #Protein calorie malnutrition  Nutrition consult appreciated     #Multinodular thyroid  -Outpatient follow up    #DMII, insulin dependent, with hyperglycemia  -a1c 8.3  -c/w lantus  -BROOKE       #CAD/HTN/CHF/PVD  -Hold Lasix  -hold aspirin (last dose 8/26) and eliquis in setting of thrombocytopenia  -hold metoprolol and entresto due to borderline low BP  -cardiac meds need re-evaluation as out patient     d/c to [facility]  Attending Statement: 40 minutes spent on total encounter and discharge planning.   CC: Oral Pain  · Subjective and Objective:   Pt is an 88 yo female with a pmh/o COPD, RA on MTX, CAD, DMII on insulin, depression, CHFrEF, HTN, PVD, PAD, s/p left fem-pop bypass 2021, left thigh infection s/p debridement and muscle flap, who presents to the ED from home due to oral pain and swelling associated with difficulty swallowing and drooling. Pt states that her pain is located under her tongue and in her gums and is associated with lip and throat swelling. Per daughter, pt began to have these symptoms two days ago and has had two days of clindamycin and one day of dexamethasone and was brought to ED per pt request due to pain. Pt states her pain and swelling has improved from two days ago however she states she still feels discomfort and is only able to tolerate fluids at this time. Pain is constant, no alleviating factors other than 4mg IV morphine in ED, aggravated by palpation, not quantifiable on scale, unable to describe character of pain and states that it is non radiating. Pt also endorsing right ear pain. Pt states that her right ear is a "wax builder" and had it irrigated by her daughter with success and that one day prior, she on her own, used a tool and pulled out a "big black scab" from inside ear. Pt states her ear is only tender when pulled during exam. Pt daughter also endorsing that right leg wounds have become purulent and is concerned for infection.   ED course: Pt received to ED c/o drooling and oral/airway swelling in setting of mouth and ear discomfort. 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 Pt found to have WBC <4, anemia, lactic acidosis to 3.5, ORLY. Pt given  clindamycin and vancomycin and IV morphine. Imaging reviewed with ENT at transfer center and per ED MD, transfer not recommended due to benign clinical exam. ENT Dr. Nata tolliver called by ED MD and does not have privileges here but states that symptoms could be due to dehydration. Pt noted to have started lasix on 8/9/22 per [facility] program and daughter reports poor po intake. Pt given 1L IVF. Ortho called and evaluated wounds to right foot as pt follows with Dr. Devries and XR performed showing "no gas" per ED MD. Pt also noted to have HgB 7.2 and transfused 1U PRBC, baseline 7-8 per HIE review. Extensive discussion had with ED due to concerns for malignant otitis media and ludwigs angina as on admission exam pt found to have posterior auricular lymphadenopathy and tenderness to palpation and swelling and pain on floor of mouth with difficulty swallowing and recommendation made for transfer to [facility] for ENT services however per ED MD family declining transfer, although attempt was made.     Hospital course:  Pt admitted for sublingual, submandibular, and oral pain and swelling concerning for Jaime's angina and right otitis media concerning for otomastoiditis.  Pt completed course of meropenem and vancomycin which was stopped early due to possible adverse reaction leading to pancytopenia.  Pt was transfused 4units PRBC total and 2units of platelets total during hospital course with improvement of counts. Counts slowly improving with out need for further transfusions.  SYmptoms of oral lesions with bleeding also resolving.  Pain improving, overall pt doing better.  Overall she is weak due to prolonged hospitalization.  Daughter updated on plan of care and likely need for MICHELLE upon discharge.   In terms of her right chronic ankle wounds, wound care was applied daily. No further intervention indicated.      REVIEW OF SYSTEMS: All other review of systems is negative unless indicated above.    Vital Signs Last 24 Hrs  T(C): 36.9 (30 Aug 2022 09:09), Max: 37.7 (29 Aug 2022 19:19)  T(F): 98.4 (30 Aug 2022 09:09), Max: 99.8 (29 Aug 2022 19:19)  HR: 86 (30 Aug 2022 09:09) (85 - 96)  BP: 127/38 (30 Aug 2022 09:09) (102/75 - 136/36)  BP(mean): --  RR: 17 (30 Aug 2022 09:09) (15 - 18)  SpO2: 95% (30 Aug 2022 09:09) (90% - 95%)    PHYSICAL EXAM:  Constitutional: NAD, awake and alert, frail, elderly appearing  HEENT: PERR, EOMI, Normal Hearing, multiple lip and oral ulcerations, no active bleeding, petechae noted hard palate - IMPROVING  Neck: Soft and supple  Respiratory: Breath sounds are clear bilaterally, No wheezing, rales or rhonchi  Cardiovascular: S1 and S2, regular rate and rhythm, no Murmurs, gallops or rubs  Gastrointestinal: Bowel Sounds present, soft, nontender, nondistended, no guarding, no rebound  Extremities: No peripheral edema, right foot wrapped in bandage, dry  Neurological: A/O x 3, no focal deficits in my limited exam    med/labs: Reviewed and interpreted     Assessment/Plan:  Pt is an 88 yo female with pmh/o DMII on insulin, h/o cellulitis and PVD with RLE wounds, who presents to ED in setting of poor oral intake due to sublingual/gum/lip/throat pain and swelling, partially alleviated by clindamycin and dexamethasone as outpatient and associated with drooling, who also presents with right ear pain and "wax build up" and development of purulence to Right foot/lower extremity wounds, who is admitted due to:    #Sublingual, submandibular, and oral pain and swelling concerning for Jaime's angina / Right otitis media concerning for otomastoiditis: RESOLVING  -Lactic acidosis: RESOLVED  -s/p IV Meropenem and Vancomycin stopped due to thrombocytopenia   -S/P Dexamethasone   -blood cultures: no growth  -pain management, feeling better, less discomfort.  -supportive care, pain management.    -Tolerating oral intake.    #Anemia, acute on chronic due to infection: STABLE  -S/p 4U PRBCs, H+H stable.  -No signs of bleeding  -Baseline Hgb between 7-8,     #Acute Thrombocytopenia / leukopenia: Likely drug induced also possibly due to recent COVID infection: RESOLVING  -Possibly due to IV vancomycin, now off antibiotics   -s/p 2 units platelets   -platelets 11k-->77k --> 52 --> 38 --> 42 --> 57K  -hematology following, started on folic acid    #Acute Renal failure-resolved    #Chronic right lower extremity/ankle diabetic wounds:  - NWB RLE in soft dressings.    - No acute orthopaedic surgical intervention indicated at this time.     #Protein calorie malnutrition  Nutrition consult appreciated     #Multinodular thyroid  -Outpatient follow up    #DMII, insulin dependent, with hyperglycemia  -a1c 8.3  -c/w lantus  -BROOKE       #CAD/HTN/CHF/PVD  -Hold Lasix  -hold aspirin (last dose 8/26) and eliquis in setting of thrombocytopenia  -hold metoprolol and entresto due to borderline low BP  -cardiac meds need re-evaluation as out patient     d/c to home.  pt declines MICHELLE.  Attending Statement: 40 minutes spent on total encounter and discharge planning.

## 2022-08-30 NOTE — DISCHARGE NOTE PROVIDER - CARE PROVIDER_API CALL
Follow up with your pcp 1 week,   Phone: (   )    -  Fax: (   )    -  Follow Up Time:     follow up with your cardiologist 1 week,   Phone: (   )    -  Fax: (   )    -  Follow Up Time:

## 2022-08-31 ENCOUNTER — TRANSCRIPTION ENCOUNTER (OUTPATIENT)
Age: 87
End: 2022-08-31

## 2022-08-31 LAB
CK MB BLD-MCNC: HIGH TITER
COXSACKIE TYPE A-24: HIGH TITER
CV A24 IGG TITR SER IF: HIGH TITER
CV A7 AB SER-ACNC: HIGH TITER
CV A9 AB TITR FLD: HIGH TITER

## 2022-08-31 PROCEDURE — 99232 SBSQ HOSP IP/OBS MODERATE 35: CPT

## 2022-08-31 RX ORDER — GABAPENTIN 400 MG/1
1 CAPSULE ORAL
Qty: 90 | Refills: 0
Start: 2022-08-31 | End: 2022-09-29

## 2022-08-31 RX ORDER — ALBUTEROL 90 UG/1
1 AEROSOL, METERED ORAL
Qty: 1 | Refills: 0
Start: 2022-08-31

## 2022-08-31 RX ADMIN — DIPHENHYDRAMINE HYDROCHLORIDE AND LIDOCAINE HYDROCHLORIDE AND ALUMINUM HYDROXIDE AND MAGNESIUM HYDRO 10 MILLILITER(S): KIT at 17:28

## 2022-08-31 RX ADMIN — OXYCODONE AND ACETAMINOPHEN 1 TABLET(S): 5; 325 TABLET ORAL at 21:01

## 2022-08-31 RX ADMIN — GABAPENTIN 300 MILLIGRAM(S): 400 CAPSULE ORAL at 21:01

## 2022-08-31 RX ADMIN — GABAPENTIN 300 MILLIGRAM(S): 400 CAPSULE ORAL at 05:33

## 2022-08-31 RX ADMIN — Medication 6: at 12:46

## 2022-08-31 RX ADMIN — OXYCODONE AND ACETAMINOPHEN 1 TABLET(S): 5; 325 TABLET ORAL at 21:31

## 2022-08-31 RX ADMIN — OXYCODONE AND ACETAMINOPHEN 1 TABLET(S): 5; 325 TABLET ORAL at 11:48

## 2022-08-31 RX ADMIN — INSULIN GLARGINE 10 UNIT(S): 100 INJECTION, SOLUTION SUBCUTANEOUS at 21:01

## 2022-08-31 RX ADMIN — OXYCODONE AND ACETAMINOPHEN 1 TABLET(S): 5; 325 TABLET ORAL at 11:01

## 2022-08-31 RX ADMIN — DIPHENHYDRAMINE HYDROCHLORIDE AND LIDOCAINE HYDROCHLORIDE AND ALUMINUM HYDROXIDE AND MAGNESIUM HYDRO 10 MILLILITER(S): KIT at 11:01

## 2022-08-31 RX ADMIN — GABAPENTIN 300 MILLIGRAM(S): 400 CAPSULE ORAL at 12:47

## 2022-08-31 RX ADMIN — DIPHENHYDRAMINE HYDROCHLORIDE AND LIDOCAINE HYDROCHLORIDE AND ALUMINUM HYDROXIDE AND MAGNESIUM HYDRO 10 MILLILITER(S): KIT at 23:14

## 2022-08-31 RX ADMIN — DIPHENHYDRAMINE HYDROCHLORIDE AND LIDOCAINE HYDROCHLORIDE AND ALUMINUM HYDROXIDE AND MAGNESIUM HYDRO 10 MILLILITER(S): KIT at 05:35

## 2022-08-31 RX ADMIN — Medication 1 MILLIGRAM(S): at 11:01

## 2022-08-31 NOTE — PROGRESS NOTE ADULT - SUBJECTIVE AND OBJECTIVE BOX
CC: Oral Pain  · Subjective and Objective:     Pt is an 86 yo female with a pmh/o COPD, RA on MTX, CAD, DMII on insulin, depression, CHFrEF, HTN, PVD, PAD, s/p left fem-pop bypass 2021, left thigh infection s/p debridement and muscle flap, who presents to the ED from home due to oral pain and swelling associated with difficulty swallowing and drooling. Pt states that her pain is located under her tongue and in her gums and is associated with lip and throat swelling. Per daughter, pt began to have these symptoms two days ago and has had two days of clindamycin and one day of dexamethasone and was brought to ED per pt request due to pain. Pt states her pain and swelling has improved from two days ago however she states she still feels discomfort and is only able to tolerate fluids at this time. Pain is constant, no alleviating factors other than 4mg IV morphine in ED, aggravated by palpation, not quantifiable on scale, unable to describe character of pain and states that it is non radiating. Pt also endorsing right ear pain. Pt states that her right ear is a "wax builder" and had it irrigated by her daughter with success and that one day prior, she on her own, used a tool and pulled out a "big black scab" from inside ear. Pt states her ear is only tender when pulled during exam. Pt daughter also endorsing that right leg wounds have become purulent and is concerned for infection.   ED course: Pt received to ED c/o drooling and oral/airway swelling in setting of mouth and ear discomfort. 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 Pt found to have WBC <4, anemia, lactic acidosis to 3.5, ORLY. Pt given  clindamycin and vancomycin and IV morphine. Imaging reviewed with ENT at transfer center and per ED MD, transfer not recommended due to benign clinical exam. ENT Dr. Nata tolliver called by ED MD and does not have privileges here but states that symptoms could be due to dehydration. Pt noted to have started lasix on 8/9/22 per [facility] program and daughter reports poor po intake. Pt given 1L IVF. Ortho called and evaluated wounds to right foot as pt follows with Dr. Devries and XR performed showing "no gas" per ED MD. Pt also noted to have HgB 7.2 and transfused 1U PRBC, baseline 7-8 per HIE review. Extensive discussion had with ED due to concerns for malignant otitis media and ludwigs angina as on admission exam pt found to have posterior auricular lymphadenopathy and tenderness to palpation and swelling and pain on floor of mouth with difficulty swallowing and recommendation made for transfer to [facility] for ENT services however per ED MD family declining transfer, although attempt was made.     Hospital course:  Pt admitted for sublingual, submandibular, and oral pain and swelling concerning for Jaime's angina and right otitis media concerning for otomastoiditis.  Pt completed course of meropenem and vancomycin which was stopped early due to possible adverse reaction leading to pancytopenia.  Pt was transfused 4units PRBC total and 2units of platelets total during hospital course with improvement of counts. Counts slowly improving with out need for further transfusions.  SYmptoms of oral lesions with bleeding also resolving.  Pain improving, overall pt doing better.  Overall she is weak due to prolonged hospitalization.  Daughter updated on plan of care and likely need for MICHELLE upon discharge.   In terms of her right chronic ankle wounds, wound care was applied daily. No further intervention indicated.        8/31: Sitting in chair, alert, feeling better, pt declines MICHELLE and wishes to go home.        REVIEW OF SYSTEMS: All other review of systems is negative unless indicated above.    Vital Signs Last 24 Hrs  T(C): 36.9 (31 Aug 2022 07:33), Max: 36.9 (31 Aug 2022 07:33)  T(F): 98.4 (31 Aug 2022 07:33), Max: 98.4 (31 Aug 2022 07:33)  HR: 81 (31 Aug 2022 07:33) (79 - 88)  BP: 105/29 (31 Aug 2022 07:33) (105/29 - 107/52)  BP(mean): --  RR: 18 (31 Aug 2022 07:33) (18 - 18)  SpO2: 93% (31 Aug 2022 07:33) (93% - 95%)    Parameters below as of 31 Aug 2022 07:33  Patient On (Oxygen Delivery Method): room air        PHYSICAL EXAM:  Constitutional: NAD, awake and alert, frail, elderly appearing  HEENT: PERR, EOMI, Normal Hearing, multiple lip and oral ulcerations, no active bleeding, petechae noted hard palate - IMPROVING  Neck: Soft and supple  Respiratory: Breath sounds are clear bilaterally, No wheezing, rales or rhonchi  Cardiovascular: S1 and S2, regular rate and rhythm, no Murmurs, gallops or rubs  Gastrointestinal: Bowel Sounds present, soft, nontender, nondistended, no guarding, no rebound  Extremities: No peripheral edema, right foot wrapped in bandage, dry  Neurological: A/O x 3, no focal deficits in my limited exam    med/labs: Reviewed and interpreted     Assessment/Plan: 86 yo female with pmh/o DMII on insulin, h/o cellulitis and PVD with RLE wounds, who presents to ED in setting of poor oral intake due to sublingual/gum/lip/throat pain and swelling, partially alleviated by clindamycin and dexamethasone as outpatient and associated with drooling, who also presents with right ear pain and "wax build up" and development of purulence to Right foot/lower extremity wounds, who is admitted due to:    #Sublingual, submandibular, and oral pain and swelling concerning for Jaime's angina / Right otitis media concerning for otomastoiditis: RESOLVING  -Lactic acidosis: RESOLVED  -s/p IV Meropenem and Vancomycin stopped due to thrombocytopenia   -S/P Dexamethasone   -blood cultures: no growth  -pain management, feeling better, less discomfort.  -supportive care, pain management.    -Tolerating oral intake.    #Anemia, acute on chronic due to infection: STABLE  -S/p 4U PRBCs, H+H stable.  -No signs of bleeding  -Baseline Hgb between 7-8,     #Acute Thrombocytopenia / leukopenia: Likely drug induced also possibly due to recent COVID infection: RESOLVING  -Possibly due to IV vancomycin, now off antibiotics   -s/p 2 units platelets   -platelets 11k-->77k --> 52 --> 38 --> 42 --> 57K  -hematology following, started on folic acid    #Acute Renal failure-resolved    #Chronic right lower extremity/ankle diabetic wounds:  - NWB RLE in soft dressings.    - No acute orthopaedic surgical intervention indicated at this time.     #Protein calorie malnutrition  Nutrition consult appreciated     #Multinodular thyroid  -Outpatient follow up    #DMII, insulin dependent, with hyperglycemia  -a1c 8.3  -c/w lantus  -RAISS       #CAD/HTN/CHF/PVD  -Hold Lasix  -hold aspirin (last dose 8/26) and eliquis in setting of thrombocytopenia  -hold metoprolol and entresto due to borderline low BP  -cardiac meds need re-evaluation as out patient     d/c to home.  pt declines MICHELLE.

## 2022-08-31 NOTE — CONSULT NOTE ADULT - SUBJECTIVE AND OBJECTIVE BOX
Date of Consult: 8/31/22   CC:   HPI:   87y year old Female seen at bedside with a chief complaint of painful thickened, elongated and dystrophic toenails digits 1-5 bilaterally and preventative foot examination. Patient is medically managed  by Medicine/Hospitalists. Patient reports he is experiencing pain while standing, walking and in shoe gear. Patient denies any history of trauma to both feet. Patient has no other pedal complaints at this time.      Review of systems: Negative unless otherwise stated above    PMH: Diabetes mellitus type I    Hypertension    Anemia    Arthritis, rheumatoid    Depression    Type 2 diabetes mellitus    PVD (peripheral vascular disease) with claudication    CAD (coronary artery disease)    Rheumatoid arthritis    CHF (congestive heart failure)    PAD (peripheral artery disease)    DVT, lower extremity      PSH:fracture ankle right    S/P cataract surgery    Fractured hip, left, open type I or II, initial encounter    Hip fracture requiring operative repair    S/P femoral-popliteal bypass surgery        Allergies:cephalosporins (Other)  penicillins (Urticaria; Pruritus)      MEDICATIONS  (STANDING):  dextrose 5%. 1000 milliLiter(s) (100 mL/Hr) IV Continuous <Continuous>  dextrose 5%. 1000 milliLiter(s) (50 mL/Hr) IV Continuous <Continuous>  dextrose 50% Injectable 25 Gram(s) IV Push once  dextrose 50% Injectable 12.5 Gram(s) IV Push once  dextrose 50% Injectable 25 Gram(s) IV Push once  FIRST- Mouthwash  BLM 10 milliLiter(s) Swish and Spit every 6 hours  folic acid 1 milliGRAM(s) Oral daily  gabapentin 300 milliGRAM(s) Oral three times a day  glucagon  Injectable 1 milliGRAM(s) IntraMuscular once  insulin glargine Injectable (LANTUS) 10 Unit(s) SubCutaneous at bedtime  insulin lispro (ADMELOG) corrective regimen sliding scale   SubCutaneous three times a day before meals    MEDICATIONS  (PRN):  ALBUTerol    90 MICROgram(s) HFA Inhaler 1 Puff(s) Inhalation every 6 hours PRN Shortness of Breath  dextrose Oral Gel 15 Gram(s) Oral once PRN Blood Glucose LESS THAN 70 milliGRAM(s)/deciliter  ondansetron Injectable 4 milliGRAM(s) IV Push every 8 hours PRN Nausea and/or Vomiting  oxycodone    5 mG/acetaminophen 325 mG 1 Tablet(s) Oral every 4 hours PRN Severe Pain (7 - 10)      Labs:                        8.1    2.79  )-----------( 57       ( 30 Aug 2022 08:22 )             25.3       WBC Trend  2.79<L> Date (08-30 @ 08:22)  2.52<L> Date (08-29 @ 09:40)  2.07<L> Date (08-28 @ 07:45)  2.09<L> Date (08-27 @ 07:52)  1.58<L> Date (08-26 @ 08:08)  2.02<L> Date (08-25 @ 07:32)  2.04<L> Date (08-24 @ 07:34)  2.93<L> Date (08-23 @ 10:40)  4.28 Date (08-22 @ 08:51)  2.60<L> Date (08-21 @ 08:43)  3.35<L> Date (08-20 @ 09:22)  3.53<L> Date (08-19 @ 15:05)      Chem  08-30    138  |  107  |  18  ----------------------------<  76  3.8   |  25  |  0.86    Ca    8.0<L>      30 Aug 2022 08:22        Vitals  T(F): 98.4 (08-31-22 @ 07:33), Max: 98.4 (08-31-22 @ 07:33)  HR: 81 (08-31-22 @ 07:33) (79 - 88)  BP: 105/29 (08-31-22 @ 07:33) (105/29 - 107/52)  RR: 18 (08-31-22 @ 07:33) (18 - 18)  SpO2: 93% (08-31-22 @ 07:33) (93% - 95%)  Wt(kg): --    Physical Examination:  Constitutional: Alert, oriented x3, in NAD.  Focused LE exam:  Vascular: Tempreature gradient is warm to warm b/l. Palpable pulses, Dorsalis Pedis and Posterior Tibial pulses -/4 b/l.  Capillary re-fill time less than 3 seconds digits 1-5 bilateral.  Derm:  No open lesions or inter-digital macerations, and no clinical signs of acute infection noted bilaterally.   Toenails 1-5 Right and Left feet thickened, elongated, discolored, and dystrophic with subungual debris. There is tenderness upon palpation of all fungal and ingrowing nails 1-5 bilaterally.   Neuro: Intact protective sensation to the level of the digits 1-5 bilaterally.  MSK: Manual muscle strength testing  5/5 all major muscle groups foot/ankle bilaterally. ROM of all foot/ankle joints is WNL bilaterally. No structural abnormality, bilaterally

## 2022-08-31 NOTE — DISCHARGE NOTE NURSING/CASE MANAGEMENT/SOCIAL WORK - NSDCPEFALRISK_GEN_ALL_CORE
For information on Fall & Injury Prevention, visit: [email or web address] OR  [email or web address] OR  https://www.cdc.gov/steadi/patient.html

## 2022-08-31 NOTE — PROGRESS NOTE ADULT - SUBJECTIVE AND OBJECTIVE BOX
Pt was seen and examined at bedside. Pt now being seen by wound care, denies f/c/n/v/cp/sob. Pt had no acute orthopaedic complaints at this time.    LABS:                        8.1    2.79  )-----------( 57       ( 30 Aug 2022 08:22 )             25.3     08-30    138  |  107  |  18  ----------------------------<  76  3.8   |  25  |  0.86    Ca    8.0<L>      30 Aug 2022 08:22            VITAL SIGNS:  T(C): 36.5 (08-30-22 @ 23:34), Max: 36.9 (08-30-22 @ 09:09)  HR: 79 (08-30-22 @ 23:34) (79 - 88)  BP: 107/52 (08-30-22 @ 23:34) (106/48 - 127/38)  RR: 18 (08-30-22 @ 23:34) (17 - 18)  SpO2: 95% (08-30-22 @ 23:34) (93% - 95%)      General: NAD    RLE:   R foot dressings with some strikethrough  Motor: +EHL/FHL unable to plantarflex or dorsiflex ankle which pt states has been chronic for 1.5 years  SILT: Hernandez/Sa/SPN/DPN  2+ DP/PT  Compartments soft and compressible  No pain on dorsiflexion    A/P:  Patient is a 87y Female with healing chronic ankle wounds of the Right foot.  - Wound care following, recs appreciated  - Pain control as needed.   - BCx: NGF  - NWB RLE in soft dressings. changed on 8/31    - DVT Ppx: Per Primary AC Team.   - No acute orthopaedic surgical intervention indicated at this time.   - Will discuss with Dr. Ku and will advise if any changes made.

## 2022-08-31 NOTE — CONSULT NOTE ADULT - ASSESSMENT
Assessment: 87y year old Female seen by podiaty team for the evaluation of;   1. Bilateral hypertrohic onychomycosis to toenails 1-5   2. Pain from elongated nails, thickened and dystrophic nails, with difficulty to ambulate      Plan:  - Patient was seen and examined by podiatry team  - Discussed the case with Dr. Leti Hills  - Discussed all the potential treatment plans with the patient  - Educated Pt about the proper foot hygiene, to prevent the recurrence of the fungal foot infection when cleared.  - Educated Pt about importance of daily foot examinations and proper shoe gear.  - Educated Pt about the importance to maintain tight glycemic control, to avoid the development of any further complications.   - Pt demonstrated verbal understanding  - Aseptic mechanical debridement and curretage of all fungal toe nails 1-5 bilateral, using sterile nail nippers,  - Pt tolrated the procedure well, without any complication.  - Thank you for the courtsey of this consult, podiatry will sign off at this time.  - Please re-consult as needed.

## 2022-08-31 NOTE — CONSULT NOTE ADULT - REASON FOR ADMISSION
Acute Renal Failure, Jaime's angina, Otitis media/mastoiditis, Cellulitis with possible OM

## 2022-08-31 NOTE — DISCHARGE NOTE NURSING/CASE MANAGEMENT/SOCIAL WORK - NSDCVIVACCINE_GEN_ALL_CORE_FT
COVID-19 vaccine, vector-nr, rS-Ad26, PF, 0.5 mL (Josselyn); 30-Mar-2021 14:39; Marina Quick (RN); [employer]; [id number] (Exp. Date: 25-May-2021); IntraMuscular; Deltoid Left.; 0.5 milliLiter(s);   influenza, injectable, quadrivalent, preservative free; 21-Nov-2020 15:48; Anay Maher (RN); [employer]; [id number] (Exp. Date: 30-Jun-2021); IntraMuscular; Deltoid Left.; 0.5 milliLiter(s); VIS (VIS Published: 15-Aug-2019, VIS Presented: 21-Nov-2020);   influenza, injectable, quadrivalent, preservative free; 21-Oct-2021 13:30; Nikia Cerda (ANISH); [employer]; [id number] (Exp. Date: 30-Jun-2022); IntraMuscular; Deltoid Left.; 0.5 milliLiter(s); VIS (VIS Published: 06-Aug-2021, VIS Presented: 21-Oct-2021);

## 2022-08-31 NOTE — DISCHARGE NOTE NURSING/CASE MANAGEMENT/SOCIAL WORK - PATIENT PORTAL LINK FT
You can access the FollowMyHealth Patient Portal offered by [facility] by registering at the following website: [email or web address]. By joining [facility]’s FollowMyHealth portal, you will also be able to view your health information using other applications (apps) compatible with our system.

## 2022-09-01 VITALS
SYSTOLIC BLOOD PRESSURE: 143 MMHG | DIASTOLIC BLOOD PRESSURE: 40 MMHG | OXYGEN SATURATION: 92 % | HEART RATE: 77 BPM | TEMPERATURE: 98 F

## 2022-09-01 PROCEDURE — 99239 HOSP IP/OBS DSCHRG MGMT >30: CPT

## 2022-09-01 RX ADMIN — GABAPENTIN 300 MILLIGRAM(S): 400 CAPSULE ORAL at 05:33

## 2022-09-01 RX ADMIN — Medication 1 MILLIGRAM(S): at 09:18

## 2022-09-01 RX ADMIN — DIPHENHYDRAMINE HYDROCHLORIDE AND LIDOCAINE HYDROCHLORIDE AND ALUMINUM HYDROXIDE AND MAGNESIUM HYDRO 10 MILLILITER(S): KIT at 05:34

## 2022-09-01 NOTE — PROGRESS NOTE ADULT - NUTRITIONAL ASSESSMENT
This patient has been assessed with a concern for Malnutrition and has been determined to have a diagnosis/diagnoses of Severe protein-calorie malnutrition.    This patient is being managed with:   Diet Easy to Chew-  Entered: Aug 20 2022 10:30AM    
This patient has been assessed with a concern for Malnutrition and has been determined to have a diagnosis/diagnoses of Severe protein-calorie malnutrition.    This patient is being managed with:   Diet Clear Liquid-  Entered: Aug 19 2022 11:46PM    The following pending diet order is being considered for treatment of Severe protein-calorie malnutrition:  Diet Easy to Chew-  Entered: Aug 20 2022 10:30AM  
This patient has been assessed with a concern for Malnutrition and has been determined to have a diagnosis/diagnoses of Severe protein-calorie malnutrition.    This patient is being managed with:   Diet Easy to Chew-  Entered: Aug 20 2022 10:30AM    

## 2022-09-01 NOTE — PROGRESS NOTE ADULT - REASON FOR ADMISSION
Acute Renal Failure, Jaime's angina, Otitis media/mastoiditis, Cellulitis with possible OM

## 2022-09-01 NOTE — PROGRESS NOTE ADULT - PROVIDER SPECIALTY LIST ADULT
Heme/Onc
Hospitalist
Infectious Disease
Orthopedics
Podiatry
Hospitalist
Infectious Disease
Orthopedics
Hospitalist
Hospitalist
Infectious Disease
Infectious Disease
Orthopedics
Orthopedics
Heme/Onc

## 2022-09-01 NOTE — PROGRESS NOTE ADULT - ASSESSMENT
Assessment: 87y year old Female seen by podiaty team for the evaluation of;   1. Bilateral hypertrohic onychomycosis to toenails 1-5   2. Pain from elongated nails, thickened and dystrophic nails, with difficulty to ambulate      Plan:  - Patient was seen and examined by podiatry team  - Discussed the case with Dr. Leti Hills  - Discussed all the potential treatment plans with the patient  - Educated Pt about the proper foot hygiene, to prevent the recurrence of the fungal foot infection when cleared.  - Educated Pt about importance of daily foot examinations and proper shoe gear.  - Educated Pt about the importance to maintain tight glycemic control, to avoid the development of any further complications.   - Pt demonstrated verbal understanding  - Aseptic mechanical debridement and curettage of all fungal toe nails 1-5 bilateral, using sterile nail nippers on 8/31/22  - Pt tolerated the procedure well, without any complication.  - Thank you for the courtesy of this consult, podiatry will sign off at this time.  - Please re-consult as needed

## 2022-09-01 NOTE — PROGRESS NOTE ADULT - SUBJECTIVE AND OBJECTIVE BOX
Date of Service: 9/01/22   HPI:   87y year old Female seen at bedside with a chief complaint of painful thickened, elongated and dystrophic toenails digits 1-5 bilaterally and preventative foot examination. Patient is medically managed  by Medicine/Hospitalists. Patient reports he is experiencing pain while standing, walking and in shoe gear. Patient denies any history of trauma to both feet. Patient has no other pedal complaints at this time.      9/01/22: Patient seen at bedside by Podiatry team with attending present. Patient resting comfortably at bedside. Denies foot pain today. No other pedal complaints at this time.     Review of systems: Negative unless otherwise stated above    PMH: Diabetes mellitus type I    Hypertension    Anemia    Arthritis, rheumatoid    Depression    Type 2 diabetes mellitus    PVD (peripheral vascular disease) with claudication    CAD (coronary artery disease)    Rheumatoid arthritis    CHF (congestive heart failure)    PAD (peripheral artery disease)    DVT, lower extremity      PSH:fracture ankle right    S/P cataract surgery    Fractured hip, left, open type I or II, initial encounter    Hip fracture requiring operative repair    S/P femoral-popliteal bypass surgery        Allergies:cephalosporins (Other)  penicillins (Urticaria; Pruritus)    MEDICATIONS  (STANDING):  dextrose 5%. 1000 milliLiter(s) (100 mL/Hr) IV Continuous <Continuous>  dextrose 5%. 1000 milliLiter(s) (50 mL/Hr) IV Continuous <Continuous>  dextrose 50% Injectable 25 Gram(s) IV Push once  dextrose 50% Injectable 12.5 Gram(s) IV Push once  dextrose 50% Injectable 25 Gram(s) IV Push once  FIRST- Mouthwash  BLM 10 milliLiter(s) Swish and Spit every 6 hours  folic acid 1 milliGRAM(s) Oral daily  gabapentin 300 milliGRAM(s) Oral three times a day  glucagon  Injectable 1 milliGRAM(s) IntraMuscular once  insulin glargine Injectable (LANTUS) 10 Unit(s) SubCutaneous at bedtime  insulin lispro (ADMELOG) corrective regimen sliding scale   SubCutaneous three times a day before meals    MEDICATIONS  (PRN):  ALBUTerol    90 MICROgram(s) HFA Inhaler 1 Puff(s) Inhalation every 6 hours PRN Shortness of Breath  dextrose Oral Gel 15 Gram(s) Oral once PRN Blood Glucose LESS THAN 70 milliGRAM(s)/deciliter  ondansetron Injectable 4 milliGRAM(s) IV Push every 8 hours PRN Nausea and/or Vomiting  oxycodone    5 mG/acetaminophen 325 mG 1 Tablet(s) Oral every 4 hours PRN Severe Pain (7 - 10)        Labs:                        8.1    2.79  )-----------( 57       ( 30 Aug 2022 08:22 )             25.3       WBC Trend  2.79<L> Date (08-30 @ 08:22)  2.52<L> Date (08-29 @ 09:40)  2.07<L> Date (08-28 @ 07:45)  2.09<L> Date (08-27 @ 07:52)  1.58<L> Date (08-26 @ 08:08)  2.02<L> Date (08-25 @ 07:32)  2.04<L> Date (08-24 @ 07:34)  2.93<L> Date (08-23 @ 10:40)  4.28 Date (08-22 @ 08:51)  2.60<L> Date (08-21 @ 08:43)  3.35<L> Date (08-20 @ 09:22)  3.53<L> Date (08-19 @ 15:05)      Chem  08-30    138  |  107  |  18  ----------------------------<  76  3.8   |  25  |  0.86    Ca    8.0<L>      30 Aug 2022 08:22        Vital Signs Last 24 Hrs  T(C): 36.4 (01 Sep 2022 08:14), Max: 36.8 (31 Aug 2022 23:24)  T(F): 97.6 (01 Sep 2022 08:14), Max: 98.3 (31 Aug 2022 23:24)  HR: 77 (01 Sep 2022 08:14) (77 - 89)  BP: 143/40 (01 Sep 2022 08:14) (104/47 - 143/40)  BP(mean): --  RR: 18 (31 Aug 2022 23:24) (18 - 18)  SpO2: 92% (01 Sep 2022 08:14) (92% - 99%)    Parameters below as of 01 Sep 2022 08:14  Patient On (Oxygen Delivery Method): room air        Physical Examination:  Constitutional: Alert, oriented x3, in NAD.  Focused LE exam:  Vascular: Tempreature gradient is warm to warm b/l. Palpable pulses, Dorsalis Pedis and Posterior Tibial pulses +2/4 b/l.  Capillary re-fill time less than 3 seconds digits 1-5 bilateral.  Derm:  No open lesions or inter-digital macerations, and no clinical signs of acute infection noted bilaterally.   Toenails 1-5 Right and Left feet thickened, discolored, and dystrophic with subungual debris. There is tenderness upon palpation of all fungal and ingrowing nails 1-5 bilaterally.   Neuro: Intact protective sensation to the level of the digits 1-5 bilaterally.  MSK: Manual muscle strength testing  5/5 all major muscle groups foot/ankle bilaterally. ROM of all foot/ankle joints is WNL bilaterally. No structural abnormality, bilaterally

## 2022-09-04 NOTE — DIETITIAN INITIAL EVALUATION ADULT. - REASON INDICATOR FOR ASSESSMENT
Initial Assessment - Pressure Ulcer
Alert and oriented to person, place, time/situation. normal mood and affect. no apparent risk to self or others.

## 2022-09-06 DIAGNOSIS — Z66 DO NOT RESUSCITATE: ICD-10-CM

## 2022-09-06 DIAGNOSIS — E86.0 DEHYDRATION: ICD-10-CM

## 2022-09-06 DIAGNOSIS — Z88.8 ALLERGY STATUS TO OTHER DRUGS, MEDICAMENTS AND BIOLOGICAL SUBSTANCES STATUS: ICD-10-CM

## 2022-09-06 DIAGNOSIS — T36.8X5A ADVERSE EFFECT OF OTHER SYSTEMIC ANTIBIOTICS, INITIAL ENCOUNTER: ICD-10-CM

## 2022-09-06 DIAGNOSIS — D61.811 OTHER DRUG-INDUCED PANCYTOPENIA: ICD-10-CM

## 2022-09-06 DIAGNOSIS — E43 UNSPECIFIED SEVERE PROTEIN-CALORIE MALNUTRITION: ICD-10-CM

## 2022-09-06 DIAGNOSIS — D63.8 ANEMIA IN OTHER CHRONIC DISEASES CLASSIFIED ELSEWHERE: ICD-10-CM

## 2022-09-06 DIAGNOSIS — J44.9 CHRONIC OBSTRUCTIVE PULMONARY DISEASE, UNSPECIFIED: ICD-10-CM

## 2022-09-06 DIAGNOSIS — L97.519 NON-PRESSURE CHRONIC ULCER OF OTHER PART OF RIGHT FOOT WITH UNSPECIFIED SEVERITY: ICD-10-CM

## 2022-09-06 DIAGNOSIS — K12.2 CELLULITIS AND ABSCESS OF MOUTH: ICD-10-CM

## 2022-09-06 DIAGNOSIS — E11.621 TYPE 2 DIABETES MELLITUS WITH FOOT ULCER: ICD-10-CM

## 2022-09-06 DIAGNOSIS — Z79.4 LONG TERM (CURRENT) USE OF INSULIN: ICD-10-CM

## 2022-09-06 DIAGNOSIS — E87.2 ACIDOSIS: ICD-10-CM

## 2022-09-06 DIAGNOSIS — E04.2 NONTOXIC MULTINODULAR GOITER: ICD-10-CM

## 2022-09-06 DIAGNOSIS — I13.0 HYPERTENSIVE HEART AND CHRONIC KIDNEY DISEASE WITH HEART FAILURE AND STAGE 1 THROUGH STAGE 4 CHRONIC KIDNEY DISEASE, OR UNSPECIFIED CHRONIC KIDNEY DISEASE: ICD-10-CM
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
## 2022-09-06 DIAGNOSIS — M06.9 RHEUMATOID ARTHRITIS, UNSPECIFIED: ICD-10-CM

## 2022-09-06 DIAGNOSIS — N17.9 ACUTE KIDNEY FAILURE, UNSPECIFIED: ICD-10-CM

## 2022-09-06 DIAGNOSIS — I25.10 ATHEROSCLEROTIC HEART DISEASE OF NATIVE CORONARY ARTERY WITHOUT ANGINA PECTORIS: ICD-10-CM

## 2022-09-06 DIAGNOSIS — N18.30 CHRONIC KIDNEY DISEASE, STAGE 3 UNSPECIFIED: ICD-10-CM

## 2022-09-06 DIAGNOSIS — H70.891 OTHER MASTOIDITIS AND RELATED CONDITIONS, RIGHT EAR: ICD-10-CM

## 2022-09-06 DIAGNOSIS — E11.65 TYPE 2 DIABETES MELLITUS WITH HYPERGLYCEMIA: ICD-10-CM

## 2022-09-06 DIAGNOSIS — Z95.820 PERIPHERAL VASCULAR ANGIOPLASTY STATUS WITH IMPLANTS AND GRAFTS: ICD-10-CM

## 2022-09-06 DIAGNOSIS — Z88.0 ALLERGY STATUS TO PENICILLIN: ICD-10-CM

## 2022-09-06 DIAGNOSIS — B35.1 TINEA UNGUIUM: ICD-10-CM

## 2022-09-06 DIAGNOSIS — H70.91 UNSPECIFIED MASTOIDITIS, RIGHT EAR: ICD-10-CM

## 2022-09-06 DIAGNOSIS — L03.115 CELLULITIS OF RIGHT LOWER LIMB: ICD-10-CM

## 2022-09-06 DIAGNOSIS — I87.2 VENOUS INSUFFICIENCY (CHRONIC) (PERIPHERAL): ICD-10-CM

## 2022-09-06 DIAGNOSIS — E11.51 TYPE 2 DIABETES MELLITUS WITH DIABETIC PERIPHERAL ANGIOPATHY WITHOUT GANGRENE: ICD-10-CM

## 2022-09-06 DIAGNOSIS — U07.1 COVID-19: ICD-10-CM

## 2022-09-06 DIAGNOSIS — I50.22 CHRONIC SYSTOLIC (CONGESTIVE) HEART FAILURE: ICD-10-CM

## 2022-11-18 NOTE — PATIENT PROFILE ADULT - NSPROGENSOURCEINFO_GEN_A_NUR
Pranay presents today with   Chief Complaint   Patient presents with   • Diabetes Mellitus   • Office Visit     HISTORY OF PRESENT ILLNESS:   Pranay Gandhi is a very pleasant 64 year old male for follow up of type 2 diabetes.  Patient was diagnosed with diabetes 8 years ago.  He has complications including cardiovascular disease, chronic kidney disease stage 3 and diabetic foot ulcer (healed).  His last A1c was 6.9% on 11/21/2022; previous was 6.7% in July.  At his last visit in July, Levemir decreased.  Pranay is alone for today's visit.     Pranay is taking his anti diabetic medications as prescribed; rarely missing a dose.  He denies any concerns or side effects.  He monitors his sugar fasting and at bedtime.  He admits to struggling with dietary indiscretion as of late; consuming carbohydrates rich snacks in the late evenings.  His physical activity is not optimal.  He continues to complain of pain to his left knee; limiting activity.  Also dizziness and \"balance\" concerns make activity difficult.  He has not scheduled with ophthalmology.      His significant PMH includes BERENICE w/CPAP, coronary artery disease s/p CABG (2014), hyperlipidemia, obesity, GERD (controlled), gout, carotid endarterectomy 2017, hypertension, chronic kidney disease stage 3 (follows with Nephrology)    Pranay's current diabetic medications prescribed are:  Metformin 1000 mg BID  Trulicity 3 mg under the skin weekly  Levemir 22 units under the skin nightly  Glipizide 5 mg every evening    Compliant/Self-management barriers:  As above.    Date of last appointment with Diabetes Education/Dietitian: 09/30/2020    Previous diabetic medications and/or medical devices:   Jardiance-Decline in kidney function  Glyxambi (empagliflzin-linagliptin)    Injection sites: Abdomen  Rotating: Yes     Eating Patterns:  Consumes two to three meals per day.  Large portions of milk.    Physical Activity:  Limited due to chronic pain and gout. Walking and active  working around the house.     Meter or sensor downloaded for his appointment.  (11/07/2022 to 11/21/2022)  Average Readings/Day: 1.9  Fasting average:  147 (121-190)  Bedtime average:  179 ()    DIABETES HISTORY:   Pranay has type 2 diabetes.  Diagnosed: 2014.  His disease course has been stable.  Previous hypoglycemic symptoms include unknown.  Previous hyperglycemic symptoms include fatigue.   Diabetes complications include Chronic kidney disease stage stage 3, cardiovascular disease, and diabetic foot ulcer.  He does not  have hypoglycemia unawareness.        DIABETIC HEALTH MAINTENANCE:  The 10-year ASCVD risk score (Dorothy RAUSCH, et al., 2019) is: 18.9%    Values used to calculate the score:      Age: 64 years      Sex: Male      Is Non- : No      Diabetic: Yes      Tobacco smoker: No      Systolic Blood Pressure: 110 mmHg      Is BP treated: Yes      HDL Cholesterol: 33 mg/dL      Total Cholesterol: 151 mg/dL  Is patient on an ACE/ARB?:  Yes  Is patient on aspirin therapy?:  Yes  Is patient on statin/fibrate therapy?:  Yes  Date of last ophthalmic exam: 12/05/2020; no retinopathy  Date of last diabetic foot exam: 07/21/2022  See nurses' notes for further health maintenance    DIABETES LABS:  Hemoglobin A1C (%)   Date Value   07/06/2022 6.7 (H)   07/12/2021 7.3 (H)   01/08/2021 7.2 (H)   09/02/2020 10.3 (H)     GLUCOSE, BEDSIDE - POINT OF CARE (mg/dL)   Date Value   06/20/2022 207 (H)   06/20/2022 146 (H)     Hemoglobin A1C, POC (%)   Date Value   11/21/2022 6.9 (A)   03/23/2022 7.2 (A)      Cholesterol (mg/dL)   Date Value   01/07/2022 151   01/08/2021 168     HDL (mg/dL)   Date Value   01/07/2022 33 (L)   01/08/2021 32 (L)     LDL (mg/dL)   Date Value   01/07/2022 56   01/08/2021 75     Cholesterol/ HDL Ratio (no units)   Date Value   01/07/2022 4.6 (H)   01/08/2021 5.3 (H)     Triglycerides (mg/dL)   Date Value   01/07/2022 308 (H)   01/08/2021 307 (H)     MICROALBUMIN/CREATININE  (mg/g)   Date Value   01/02/2020 7.8     Microalbumin/ Creatinine Ratio (mg/g)   Date Value   01/12/2022 9.0     Lab Results   Component Value Date    SODIUM 143 07/26/2022    POTASSIUM 4.8 07/26/2022    CHLORIDE 108 07/26/2022    CO2 28 07/26/2022    GLUCOSE 157 (H) 07/26/2022    BUN 18 07/26/2022    CREATININE 1.37 (H) 07/26/2022    GFRA 65 01/02/2020    GFRNA 56 01/02/2020    AST 24 07/26/2022    GPT 51 07/26/2022    ALKPT 73 07/26/2022    ALBUMIN 4.0 07/26/2022    RBC 4.49 (L) 08/08/2022    HGB 12.5 (L) 08/08/2022    HCT 39.6 08/08/2022     TSH (mcUnits/mL)   Date Value   10/18/2019 3.490       SIGNIFICANT ACTIVE PROBLEMS:   Patient Active Problem List   Diagnosis   • Gout   • Hypercholesterolemia   • DJD (degenerative joint disease)   • Dizziness   • Abnormal finding on cardiovascular stress test   • Meniere's disease of both ears   • Chronic renal failure, stage 3 (moderate) (CMS/[facility])   • S/P arthroscopy of left knee   • Asymptomatic bilateral carotid artery stenosis   • Atherosclerosis of native coronary artery of native heart   • Hip pain   • Chronic left shoulder pain   • Type 2 diabetes mellitus with stage 3 chronic kidney disease, without long-term current use of insulin (CMS/[facility])   • Sciatica   • Bilateral carotid artery stenosis   • Pain management   • Diabetic ulcer of left great toe (CMS/[facility])   • Hammer toe of left foot   • Calculus of kidney   • Osteoarthritis of hip   • Unilateral primary osteoarthritis, left knee   • Essential hypertension   • Severe obesity (BMI 35.0-39.9) with comorbidity (CMS/[facility])   • Dyslipidemia due to type 2 diabetes mellitus (CMS/[facility])   • Low vitamin D level   • Hearing difficulty of both ears   • Status post total left knee replacement   • Severe sepsis (CMS/[facility])   • Sensorineural hearing loss (SNHL) of left ear with restricted hearing of right ear   • Impairment of auditory discrimination of both ears       MEDICATIONS:   Current Outpatient Medications   Medication Sig  Dispense Refill   • Dulaglutide 3 MG/0.5ML Solution Pen-injector Inject 3 mg into the skin every 7 days. 2 mL 5   • insulin detemir (Levemir FlexTouch) 100 UNIT/ML pen-injector Inject 25 units under the skin nightly. Prime 2 units before each dose. E11.65 15 mL 5   • isosorbide mononitrate (IMDUR) 60 MG 24 hr tablet Take 1 tablet by mouth daily. 180 tablet 0   • tamsulosin (Flomax) 0.4 MG Cap Take 1 capsule by mouth daily after a meal. 30 capsule 2   • blood glucose (FREESTYLE LITE) test strip Test blood sugar 2 times daily. Diagnosis: E11.65. Meter: Freestyle 180 each 3   • metoPROLOL succinate (TOPROL-XL) 100 MG 24 hr tablet Take 1 tablet by mouth nightly. 90 tablet 2   • diclofenac (Voltaren) 1 % gel Apply 4 g topically 4 times daily as needed (Left knee pain). 300 g 1   • DULoxetine (CYMBALTA) 60 MG capsule Take 1 capsule by mouth daily. 90 capsule 1   • nystatin-triamcinolone (MYCOLOG II) 274890-3.1 UNIT/GM-% cream Apply thin layer two times daily to affected area for 2 weeks at a time if needed. Do not use for longer than 2-3 weeks as this may cause thinning of the skin. 30 g 0   • clopidogrel (PLAVIX) 75 MG tablet Take 1 tablet by mouth daily. 90 tablet 3   • fenofibrate 160 MG tablet Take 1 tablet by mouth daily. 90 tablet 1   • allopurinol (ZYLOPRIM) 300 MG tablet Take 1 tablet by mouth daily. 90 tablet 1   • metformin (GLUCOPHAGE) 1000 MG tablet Take 1 tablet by mouth in the morning and 1 tablet in the evening. Take with meals. For diabetes. 180 tablet 3   • glipiZIDE (GLUCOTROL) 5 MG 24 hr tablet Take 1 tablet by mouth daily. 90 tablet 3   • Insulin Pen Needle (BD Pen Needle Micro U/F) 32G X 6 MM Misc Use to inject insulin 1 times daily. Remove needle cover(s) to expose needle before injecting. 50 each 11   • ferrous sulfate 325 (65 FE) MG tablet Take 1 tablet by mouth daily.     • docusate sodium-sennosides (SENOKOT S) 50-8.6 MG per tablet Take 1 tablet by mouth daily.     • acetaminophen (TYLENOL) 500  MG tablet Take 1 to 2 tablets by mouth every 8 hours as needed for Pain. (Patient taking differently: Take 500-1,000 mg by mouth every 8 hours as needed for Pain. Not taking while on Norco) 90 tablet 1   • lisinopril (ZESTRIL) 5 MG tablet Take 1 tablet by mouth daily. 90 tablet 2   • Lancets (freestyle) Misc Test blood sugar two times daily (before meals). 100 each 11   • atorvastatin (LIPITOR) 80 MG tablet Take 1 tablet by mouth daily. For cholesterol. 90 tablet 3   • Cholecalciferol (Vitamin D) 125 MCG (5000 UT) Cap Take 5,000 Units by mouth daily. 30 capsule    • ammonium lactate (Lac-Hydrin) 12 % cream Apply topically once daily to dry skin on feet 385 g 5   • blood glucose meter Test blood sugar 2 times daily as directed. Diagnosis: E11.9. Meter: per insurance 1 kit 0   • aspirin 81 MG chewable tablet Chew 1 tablet by mouth daily. 30 tablet 12   • glucosamine-chondroitin 250-200 MG tablet Take 1 tablet by mouth 2 times daily. 60 tablet 0   • nitroGLYcerin (NITROSTAT) 0.4 MG sublingual tablet Place 1 tablet under the tongue every 5 minutes as needed for Chest pain. Up to 3 doses. 30 tablet 0   • vitamin - therapeutic multivitamins w/minerals (CENTRUM SILVER,THERA-M) TABS Take 1 tablet by mouth daily.       No current facility-administered medications for this visit.       PAST MEDICAL HISTORY:  Past Medical History:   Diagnosis Date   • Arthritis    • CAP (community acquired pneumonia) 12/04/2018   • Chronic left shoulder pain    • Chronic renal failure, stage 3 (moderate) (CMS/[facility]) 01/05/2017   • Coronary artery disease 01/15/2014    Coronary Catheterization    • Diabetes mellitus (CMS/[facility])    • Diabetic ulcer of left great toe (CMS/[facility]) 02/21/2019   • Dyslipidemia    • Elevated PSA    • Essential (primary) hypertension    • Gastroesophageal reflux disease    • Gout    • H/O repair of rotator cuff    • Hearing loss in right ear 1962   • History of GI bleed    • Meniere's disease     Left   • Myocardial  infarction (CMS/[facility])    • Obesity (BMI 30.0-34.9)    • Other and unspecified hyperlipidemia    • S/P CABG x 3 2014   • Sepsis (CMS/[facility])    • Sepsis (CMS/[facility]) 2018   • Shingles    • Sleep apnea     cpap        FAMILY HISTORY:  Family History   Problem Relation Age of Onset   • Heart disease Mother    • Diabetes Mother    • Hypertension Mother    • Diabetes Sister    • Diabetes Sister    • Diabetes Sister    • Muscular dystrophy Brother         ALS?   • Diabetes Brother    • Leukemia Brother    • Cancer, Pancreatic Brother    • Cancer Brother 60        lukemia       SOCIAL HISTORY:  Social History     Tobacco Use   • Smoking status: Former Smoker     Packs/day: 0.00     Years: 20.00     Pack years: 0.00     Types: Cigarettes     Quit date: 1984     Years since quittin.9   • Smokeless tobacco: Never Used   Vaping Use   • Vaping Use: never used   Substance Use Topics   • Alcohol use: Not Currently     Alcohol/week: 4.0 standard drinks     Types: 4 Standard drinks or equivalent per week     Comment: occasionally   • Drug use: No     Comment: NONE         ALLERGIES:  ALLERGIES:   Allergen Reactions   • Aleve Other (See Comments)     Gi bleed;  Contraindicated secondary to anticoagulation   • Naproxen Sodium Other (See Comments)     Contraindicated secondary to anticoagulation       REVIEW OF SYSTEMS:                GENERAL/CONSTITUTIONAL GASTROINTESTINAL   [x] YES   [] NO  Excessive fatigue [] YES   [x] NO  Abdominal pain   [x] YES   [] NO  Excessive weakness [x] YES   [] NO  Indigestion-heartburn   [] YES   [x] NO  Unexplained weight loss [] YES   [x] NO  Nausea   [] YES   [x] NO  Unexplained weight gain [] YES   [x] NO  Vomiting   [] YES   [x] NO  Change in appetite [] YES   [x] NO  Diarrhea   [] YES   [x] NO  Fevers [] YES   [x] NO  Constipation   [] YES   [x] NO  Chills [] YES   [x] NO  Black or blood stools   [] YES   [x] NO  Cold intolerance    [] YES   [x] NO  Heat intolerance GENITOURINARY    [] YES   [x] NO  Excessive sweating [] YES   [x] NO  Frequent urination   [] YES   [x] NO  Excessive thirst [] YES   [x] NO  Excessive urination   [] YES   [x] NO  Loss of sexual interest [] YES   [x] NO  Difficulty-painful urination    [] YES   [x] NO  Blood in the urine   NEUROLOGIC [] YES   [x] NO  Menstrual problems   [] YES   [x] NO  Frequent headaches    [] YES   [x] NO  Severe headaches MUSCULOSKELETAL   [x] YES   [] NO  Lightheadedness [] YES   [x] NO  Swollen joints   [x] YES   [] NO  Dizziness [x] YES   [] NO  Stiff or painful joints   [] YES   [x] NO  Loss of consciousness [] YES   [x] NO  Neck pain   [] YES   [x] NO  Numbness-tingling hands [] YES   [x] NO  Back pain   [] YES   [x] NO  Numbness-tingling feet [] YES   [x] NO  Muscle pain   [] YES   [x] NO  Tremors    [] YES   [x] NO  Seizures SKIN    [] YES   [x] NO  Rash   HEENT [x] YES   [] NO  Excessive dryness   [] YES   [x] NO  Blurry vision [] YES   [x] NO  Abnormal hair growth   [] YES   [x] NO  Double vision [] YES   [x] NO  Abnormal hair loss   [] YES   [x] NO  Visual disturbance [] YES   [x] NO  Ulcer   [] YES   [x] NO  Eye pain [] YES   [x] NO  New growths or lumps   [x] YES   [] NO  Hoarse/voice changes [] YES   [x] NO  Easy bruising or bleeding   [] YES   [x] NO  Difficult swallowing    [] YES   [x] NO  Neck swelling PSYCHIATRIC    [] YES   [x] NO  Difficulty concentrating   RESPIRATORY [] YES   [x] NO  Changes in mood   [] YES   [x] NO  Frequent cough [] YES   [x] NO  Restlessness   [x] YES   [] NO  Snoring [] YES   [x] NO  Changes in sleep pattern   [] YES   [x] NO  Wheezing [] YES   [x] NO  Suicidal thoughts   [] YES   [x] NO  Unusual SOB [] YES   [x] NO  Homicidal thoughts       CARDIOVASCULAR    [] YES   [x] NO  Chest pain or discomfort    [] YES   [x] NO  Palpitation/irregular beat    [] YES   [x] NO  Leg swelling      On the review of systems checklist today a few concerns were listed.  Nothing acute.  Keeps in regular contact with  PCP.    PHYSICAL EXAMINATION:   Visit Vitals  /58   Pulse 73   Ht 5' 10\" (1.778 m)   Wt 115.7 kg (255 lb)   SpO2 97%   BMI 36.59 kg/m²      General:  Well-nourished appearing male, in no obvious or acute distress.   Psychiatric:  Alert and oriented x3.  Pleasant and appropriate, normal insight.    ASSESSMENT AND PLAN:   Type 2 diabetes mellitus with hyperglycemia, with long-term current use of insulin (CMS/[facility])  (primary encounter diagnosis)  Comment: Stable; near goal  Plan: POCT BLOOD SUGAR HAND HELD DEVICE, POCT         GLYCOHEMOGLOBIN ANALYZER, VENIPUNCTURE FINGER         HEEL EAR STICK, Dulaglutide 3 MG/0.5ML Solution        Pen-injector, insulin detemir (Levemir         FlexTouch) 100 UNIT/ML pen-injector        A1c today stable at 6.9%.  Most sugars above target range.  Reviewed with Pranay the importance of consuming a low carbohydrate diet and limiting evening snacks.  Encouraged him to increase his physical activity as tolerated.  Will continue current dosing of Trulicity, metformin and glipizide.  Levemir increased to 25 units nightly.  Encouraged Pranay to schedule with ophthalmology as he is overdue.  Instructed to test blood sugar two times a day.  He is instructed to update me if having sugar readings less than 80 via telephone or [facility]. He will follow up with me in 6 months.       Long term (current) use of oral hypoglycemic drugs  Comment: Stable; near goal  Plan: As above.      Type 2 diabetes mellitus with stage 3a chronic kidney disease, with long-term current use of insulin (CMS/[facility])  Comment: Stable  Plan: Optimize glycemic control.  Continue lisinopril.       Hypercholesterolemia  Comment: Controlled  Plan: Continue current regimen including atorvastatin; managed by primary care provider.       HTN (hypertension), benign  Comment: Controlled  Plan: Continue current regimen including lisinopril; managed by primary care provider.        It was a pleasure to meet with Pranay  today.  His last A1c was 6.9% reflecting good control.  Will target an A1c of less than 7% at his next check.       Return in about 6 months (around 5/21/2023) for Diabetes follow up.    I have answered all of Pranay's questions and he is comfortable with the plan of care as outlined. Pranay verbalizes understanding of the follow-up plan including office visits and labs.  He agrees to call the provider if having side effects to medications or if having blood sugars less than 70.  He was provided with an After Visit Summary.      Provider spent 28 minutes total time on today's encounter including preparation and reviewing chart, counseling patient on treatment plan, ordering necessary laboratory tests and medications, and documentation.                     patient

## 2022-11-28 ENCOUNTER — NON-APPOINTMENT (OUTPATIENT)
Age: 87
End: 2022-11-28

## 2022-12-22 NOTE — PATIENT PROFILE ADULT - NSTRANSFERBELONGINGSRESP_GEN_A_NUR
Jarett Go RN calling from patient home reporting O2 sat @ 82% while on 5L. Refusing to go to ED or use EMS. Working on helping getting sats up higher, shortened cord, encouraged to use nebulizer, spouse reports patient will not use; Jarett Go RN will fill med sorter to help regulate consistency. Providing positive reinforcement. Spouse & patient using sharp tones and disagreeing on routine & needs. Stayed on phone to help [place] RN reinforce instructions. [place] RN able to provide instructions and reinforce need for consistent management. yes

## 2023-01-30 NOTE — PATIENT PROFILE ADULT - NSPROGENPREVTRANSF_GEN_A_NUR
Hello! Today we saw you for:  -DMV paperwork filled out  -Discussion of mammogram  -Discussion of diabetes    Follow up with Dr. Leonid Meyer in 6 months (call [phone]) to make an appt in 6 months.    Thank you!   no

## 2023-02-01 NOTE — BRIEF OPERATIVE NOTE - ANTIBIOTIC PROTOCOL
Patient arrived to cath lab accompanied by RN, monitored, o2 nc  alert and oriented to planned procedure  C/O rib pain with cough  Sinus Tommy on monitor   Pulse Ox 98% on 8l Followed protocol

## 2023-04-18 NOTE — DISCHARGE NOTE NURSING/CASE MANAGEMENT/SOCIAL WORK - NSDCPELOVENOX_GEN_ALL_CORE
Enoxaparin/Lovenox - Potential for adverse drug reactions and interactions/Enoxaparin/Lovenox - Follow up monitoring/Enoxaparin/Lovenox - Compliance/Enoxaparin/Lovenox - Dietary Advice Ivermectin Counseling:  Patient instructed to take medication on an empty stomach with a full glass of water.  Patient informed of potential adverse effects including but not limited to nausea, diarrhea, dizziness, itching, and swelling of the extremities or lymph nodes.  The patient verbalized understanding of the proper use and possible adverse effects of ivermectin.  All of the patient's questions and concerns were addressed.

## 2023-05-30 NOTE — CONSULT NOTE ADULT - SUBJECTIVE AND OBJECTIVE BOX
Chief Complaint:     86 y.o. female with COPD, arthritis, CAD, DMII, depression, HTN, PVD, PAD, s/p left fem pop bypass on 21, +lower ext DVT on eliquis, +chronic left heel ulcer, RA, anemia, presents to the ED with worsening dyspnea, wheezing that started tonight.  Pt received 2 Duoneb tx at home, with only slight improvement, so EMS called .  Pt hypoxic to 87% on room air, was placed on 100%NRBM.  In the ED, pt was placed on 2 LPM via NC, received a dose of Lasix 20 mg IV x 1.    CT Chest reports small bilateral pleural effusions and pulmonary edema.  Emphysema, w/ additional honeycombing/fibrosis.  Mildly asymmetrical 1.5 cm nodular breast tissue on the left. Correlation with follow-up mammography/ultrasound is recommended.  2.9 cm slightly hyperdense right renal lesion, most likely hemorrhagic/proteinaceous cyst. Trop noted to be 0.17.  BMP >29107 Pt states she has occ cough, with greenish phlegm, denies chest pain, fever, abd pain, diarrhea.  She still has chronic left heel ulcer, cleans it Betadine.Pt states she had cardiac cath in 2021 preop, showed non occlusive CAD.  LAD: Diffuse irregularity.  There is moderate disease in distal LAD   LCx: Diffuse irregularity. Large vessel   RCA: Diffuse irregularity.  Small non-dominant. she underwent recent fem pop bypass. and underwent a rehab course under the care of dr callaway     HPI:    PMH:   Diabetes mellitus type I    Hypertension    Anemia    Arthritis, rheumatoid    Depression    Type 2 diabetes mellitus    PVD (peripheral vascular disease) with claudication    CAD (coronary artery disease)    Rheumatoid arthritis      PSH:   fracture ankle right    S/P cataract surgery    Fractured hip, left, open type I or II, initial encounter    Hip fracture requiring operative repair    lef fem po bypass 3/24/21       Family History:  FAMILY HISTORY:  FH: colon cancer  father    FH: heart attack  father    Family history of atherosclerosis  mother        Social History:  Smoking:  Alcohol:  Drugs:    Allergies:  cephalosporins (Other)  penicillins (Urticaria; Pruritus)      Medications:  acetaminophen   Tablet .. 650 milliGRAM(s) Oral every 6 hours PRN  ALBUTerol    90 MICROgram(s) HFA Inhaler 1 Puff(s) Inhalation every 6 hours PRN  albuterol/ipratropium for Nebulization 3 milliLiter(s) Nebulizer every 4 hours PRN  amLODIPine   Tablet 10 milliGRAM(s) Oral daily  aspirin  chewable 81 milliGRAM(s) Oral daily  dextrose 40% Gel 15 Gram(s) Oral once  dextrose 5%. 1000 milliLiter(s) IV Continuous <Continuous>  dextrose 5%. 1000 milliLiter(s) IV Continuous <Continuous>  dextrose 50% Injectable 25 Gram(s) IV Push once  dextrose 50% Injectable 12.5 Gram(s) IV Push once  dextrose 50% Injectable 25 Gram(s) IV Push once  folic acid 1 milliGRAM(s) Oral daily  furosemide   Injectable 20 milliGRAM(s) IV Push daily  gabapentin 300 milliGRAM(s) Oral three times a day  glucagon  Injectable 1 milliGRAM(s) IntraMuscular once  insulin glargine Injectable (LANTUS) 20 Unit(s) SubCutaneous at bedtime  insulin lispro (ADMELOG) corrective regimen sliding scale   SubCutaneous three times a day before meals  lisinopril 10 milliGRAM(s) Oral daily  meropenem  IVPB 1000 milliGRAM(s) IV Intermittent every 12 hours  metoprolol tartrate 25 milliGRAM(s) Oral two times a day  PARoxetine 20 milliGRAM(s) Oral daily  tiotropium 18 MICROgram(s) Capsule 1 Capsule(s) Inhalation daily  vancomycin  IVPB 1000 milliGRAM(s) IV Intermittent <User Schedule>      REVIEW OF SYSTEMS:  CONSTITUTIONAL: No fever, weight loss, or fatigue  EYES: No eye pain, visual disturbances, or discharge  ENMT:  No difficulty hearing, tinnitus, vertigo; No sinus or throat pain  NECK: No pain or stiffness  BREASTS: No pain, masses, or nipple discharge  RESPIRATORY: No cough, wheezing, chills or hemoptysis; No shortness of breath  CARDIOVASCULAR: No chest pain, palpitations, dizziness, or leg swelling  GASTROINTESTINAL: No abdominal or epigastric pain. No nausea, vomiting, or hematemesis; No diarrhea or constipation. No melena or hematochezia.  GENITOURINARY: No dysuria, frequency, hematuria, or incontinence  NEUROLOGICAL: No headaches, memory loss, loss of strength, numbness, or tremors  SKIN: No itching, burning, rashes, or lesions   LYMPH NODES: No enlarged glands  ENDOCRINE: No heat or cold intolerance; No hair loss  MUSCULOSKELETAL: No joint pain or swelling; No muscle, back, or extremity pain  PSYCHIATRIC: No depression, anxiety, mood swings, or difficulty sleeping  HEME/LYMPH: No easy bruising, or bleeding gums  ALLERY AND IMMUNOLOGIC: No hives or eczema    Physical Exam:  T(C): 36.8 (21 @ 09:43), Max: 39.6 (21 @ 04:00)  HR: 72 (21 @ 09:43) (72 - 109)  BP: 120/60 (21 @ 09:43) (119/53 - 151/54)  RR: 20 (21 @ 09:43) (15 - 20)  SpO2: 100% (21 @ 09:43) (88% - 100%)  Wt(kg): --    GENERAL: NAD, eldelry woman appears stated age  HEAD:  Atraumatic, Normocephalic  EYES: EOMI, conjunctiva and sclera clear  ENT: Moist mucous membranes,  NECK: Supple, No JVD, no bruits  CHEST/LUNG: Clear to percussion bilaterally; No rales, rhonchi, wheezing, or rubs  HEART: Regular rate and rhythm; No murmurs, rubs, or gallops PMI non displaced.  ABDOMEN: Soft, Nontender, Nondistended; Bowel sounds present  EXTREMITIES:  2+ Peripheral Pulses, No clubbing, cyanosis, or edema  SKIN: No rashes or lesions  NERVOUS SYSTEM:  Cranial Nerves II-XII intact     Cardiovascular Diagnostic Testing:  ECG:    < from: 12 Lead ECG (21 @ 22:20) >  Diagnosis Line Normal sinus rhythm  Possible Left atrial enlargement  T wave abnormality, consider lateral ischemia  anteroseptal pattern  Prolonged QT  Abnormal ECG  When compared with ECG of 2020 18:09,  ST now depressed in Lateral leads  T wave inversion now evident in Anterolateral leads  QT has lengthened    Confirmed by ALYSE SHIN MD () on 2021 9:56:16 AM    < end of copied text >      ECHO:    < from: TTE Echo Complete w/o Contrast w/ Doppler (21 @ 08:16) >  Summary:   1. Left ventricular ejection fraction, by visual estimation, is 35 to 40%.   2. Moderately to severely decreased global left ventricular systolic function.   3. Multiple left ventricular regional wall motion abnormalities exist. See wall motion findings.   4. Mildly increased LV wall thickness.   5. Normal left ventricular internal cavity size.   6. Spectral Doppler shows impaired relaxation pattern of left ventricular myocardial filling (Grade I diastolic dysfunction).   7. There is mild concentric left ventricular hypertrophy.   8. Mild mitral annular calcification.   9. Mild mitral valve regurgitation.  10. Thickening and calcification of the anterior and posterior mitral valve leaflets.  11. Mild tricuspid regurgitation.  12. Mild aortic regurgitation.  13. Sclerotic aortic valve with normal opening.  14. Estimated pulmonary artery systolic pressure is 36.6 mmHg assuming a right atrial pressure of 10 mmHg, which is consistent with borderline pulmonary hypertension.    [id number] Alyse Shin , Electronically signed on 2021 at 10:19:28 AM    *** Final ***      ALYSE SHIN   This document has been electronically signed. May  4 2021  8:16AM    < end of copied text >        echo 21 EF 55-60      cath 21 moderate lad dz non obstructive dz  Labs:    hb 7.7                         8.8    13.67 )-----------( 169      ( 04 May 2021 05:20 )             27.4     05-04    136  |  105  |  42<H>  ----------------------------<  231<H>  4.0   |  19<L>  |  1.22    Ca    8.7      04 May 2021 05:20  Mg     1.7     05-04    TPro  6.7  /  Alb  2.3<L>  /  TBili  0.5  /  DBili  x   /  AST  11  /  ALT  8<L>  /  AlkPhos  91  05-03      CARDIAC MARKERS ( 04 May 2021 08:20 )  .413 ng/mL / x     / x     / x     / x      CARDIAC MARKERS ( 04 May 2021 01:50 )  .354 ng/mL / x     / x     / x     / x      CARDIAC MARKERS ( 03 May 2021 22:10 )  .172 ng/mL / x     / x     / x     / x          Serum Pro-Brain Natriuretic Peptide: 55372 pg/mL ( @ 22:10)      Imagin/29/21 pet perfusion revels a severe anterior wall defect mostly with large area of per infarct ischemia    impression  type II MI with a previously pet scan which demonstrating anterior infarct and carlitos-infarct ischemia the coronary angiogram demonstrates moderate Lad disease and would consider a type II MI most likely here given a distinct fall in EF in the setting of an elevated biomarker would consider treatment with a beta blocker statin and asa+/- Plavix as tolerated    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 I explained that SPF 30 sunscreens block approximately 97 percent of the sun's harmful rays. Sunscreens should be applied at least 15 minutes prior to expected sun exposure and then every 2 hours after that as long as sun exposure continues. If swimming or exercising sunscreen should be reapplied every 45 minutes to an hour after getting wet or sweating. One ounce, or the equivalent of a shot glass full of sunscreen, is adequate to protect the skin not covered by a bathing suit. I also recommended a lip balm with a sunscreen as well. Sun protective clothing can be used in lieu of sunscreen but must be worn the entire time you are exposed to the sun's rays. Products Recommended: ELTA md clear

## 2023-05-31 NOTE — PATIENT PROFILE ADULT - NSPROPASSIVESMOKEEXPOSURE_GEN_A_NUR
What Is The Reason For Today's Visit?: History of Non-Melanoma Skin Cancer How Many Skin Cancers Have You Had?: more than one When Was Your Last Cancer Diagnosed?: 2018? Yes

## 2023-07-20 NOTE — H&P ADULT - ASSESSMENT
84 yo F with a PMH Dm, HTN, PVD, non-obstructive CAD, depression, anemia, and a chronic LLE ulcer who presents with LLE pain and swelling. 84 yo F with a PMH DM2 (on insulin), HTN, PVD, non-obstructive CAD, RA, depression, anemia, and a chronic LLE ulcer who presents with LLE pain and swelling. 86 yo F with a PMH DM2 (on insulin), HTN, PVD, non-obstructive CAD, RA, depression, anemia, and a chronic LLE ulcer who presents with LLE pain and swelling.    1) LLE ______________    2) Type 2 diabetes mellitus with long-term current use of insulin  - C/w Lantus 25 units QHS  - Check FS with low dose QAC + HS  - Most recent     3) Depression  - C/w Paxil 20 mg QD    4) RA  - C/w 15 mg QD on Wednesdays  - C/w FA 1 mg QD for supportive therapy    5) HTN  - C/w ACEi equivalent dose of Benazepril 20 mg QD and Amlodipine 5 mg QD    6) Prophylactic measure  - DVT PPX: IMPROVE score of 1, will give Lovenox 40 mg SQ QD  - Diet: consistent carb/DASH  - Dispo: pending improvement in sxs, PT eval, vascular eval 84 yo F with a PMH DM2 (on insulin), HTN, PVD, non-obstructive CAD, RA, depression, anemia, and a chronic LLE ulcer who presents with LLE pain and swelling.    1/2) Redness and swelling of (left) lower leg/Peripheral vascular disease  - Acute on chronic LLE swelling and redness with clear drainage in the setting of known PVD  - Symptoms started after fall and RIGHT hip fracture in 2020, but likely had underlying PVD, worsened the last few days  - No obvious warmth on physical exam, but may have been warm prior, cannot r/o cellulitis given leukocytosis  - Check arterial and venous dopplers to further evaluate circulation and r/o DVT  - C/w Vancomycin, add Aztreonam renally dosed  - Vascular and ID consults  - Pain control PRN  - PT eval    3) Type 2 diabetes mellitus, with long-term current use of insulin  - C/w home Lantus 25 units QHS  - Check FS with low dose QAC + HS  - Most recent A1C 7.2    4) Acute Kidney Injury  - Admission creatinine 1.21, baseline 0.88  - Likely prerenal due to dehydration  - Renally dose meds as appropriate  - S/p IVFs    5) Depression  - C/w Paxil 20 mg QD    6) RA  - C/w 15 mg QD on Wednesdays  - C/w FA 1 mg QD for supportive therapy    7) HTN  - C/w ACEi equivalent dose of Benazepril 20 mg QD and Amlodipine 5 mg QD    8) CAD  - Non-obstructive  - Had been on aspirin but was changed to Lovenox after fall, has not restarted  - Will eventually need to restart, likely after surgery    9) Prophylactic measure  - DVT PPX: IMPROVE score of 1, will give Lovenox 40 mg SQ QD  - Diet: consistent carb/DASH  - Dispo: pending improvement in sxs, PT eval, vascular eval No

## 2023-07-27 NOTE — ED ADULT NURSE NOTE - BRAND OF FIRST COVID-19 BOOSTER
No care due was identified.  [facility] Embedded Care Due Messages. Reference number: [id number].   7/27/2023 9:27:40 AM CDT   [employer]

## 2024-01-22 NOTE — DIETITIAN NUTRITION RISK NOTIFICATION - PHYSICAL ASSESSMENT CALF
Airway    Date/Time: 1/22/2024 2:45 PM    Performed by: Aries Duarte M.D.  Authorized by: Aries Duarte M.D.    Location:  OR  Urgency:  Elective  Indications for Airway Management:  Anesthesia      Spontaneous Ventilation: absent    Sedation Level:  Deep  Preoxygenated: Yes    Patient Position:  Sniffing  Final Airway Type:  Endotracheal airway  Final Endotracheal Airway:  ETT - double lumen left with ONE LUNG VENTILATION  Cuffed: Yes    Technique Used for Successful ETT Placement:  Direct laryngoscopy    Insertion Site:  Oral  Blade Type:  Diego  Laryngoscope Blade/Videolaryngoscope Blade Size:  3  ETT Double Lumen (fr):  37  Measured from:  Teeth  ETT to Teeth (cm):  0  Placement Verified by: auscultation and capnometry    Cormack-Lehane Classification:  Grade I - full view of glottis  Number of Attempts at Approach:  1   FOB confirmation         mild

## 2024-03-06 NOTE — DIETITIAN INITIAL EVALUATION ADULT. - CALCULATED TO (CAL/KG)
----- Message from Bradley Witt sent at 3/5/2024 12:41 PM CST -----  Name of Who is Calling: LISA CRAWLEY [[id number]]            What is the request in detail: Patient is requesting call back to get pain meds being she fell and in pain  Back part of arm and back in pain              Can the clinic reply by [facility]: no              What Number to Call Back if not in [facility]: [phone]          [phone]

## 2024-03-24 NOTE — ANESTHESIA FOLLOW-UP NOTE - NSRECOMMEND_GEN_ALL_CORE
Follow up with primary medical doctor.  Follow up with cardiologist for further evaluation and optimization of hypertension medications.  
None

## 2024-04-28 NOTE — ED STATDOCS - CROS ED ROS STATEMENT
Continue to use Imodium for diarrhea symptoms. Max four tables/60ml in 24hr.  
all other ROS negative except as per HPI

## 2024-05-03 NOTE — DIETITIAN INITIAL EVALUATION ADULT. - WEIGHT IN KG
- Trial of cetirizine (zyrtec) for allergies  - Refill of montelukast (singulair)  - Continue using advair as needed  - Lung cancer screening cat scan  - Follow up in 6 months  
69

## 2024-08-01 NOTE — PROGRESS NOTE ADULT - ASSESSMENT
* Infected graft POD#4 with MSSA bacteremia on cefazolin   - to return to OR     * New acute on Chronic systolic HF in the settings of oliguric ORLY/PAT - now resolved, ORLY in polyuric phase now   - monitor I&Os and renal fx, off bicarb drip now    *H/o depression / HTN / RA - c/w home meds and f/u outpatient for further management    * Right LE DVT Feb 2021 with superficial left basilic vein non-occlusive clot off Eliquis   - to resume AC after sx if ok with vascular   - doppler leg negative - resume AC when OK with Vascular    * DMII - HISS and resumed lantus at low dose - will titrate up as needed    * Incidental finding of 2.5 cm right renal lesion amd 1.5 cm left breat - daughter MD aware, outpt follow up    Daughter MD updated [phone] cell       CC:  Productive cough.    HPI: Pleasant 68 years of age female patient came to Urgent Care for evaluation of moderate cough for last 2 days.  Has no shortness of breath or fever.  This is the 2nd time she had cough in March.  She has tested herself for COVID and result was negative.  Does not want to be tested for influenza or RSV.  She was treated with doxycycline after urgent care visit on 07/11/2024 and at that time had negative chest x-ray.  Patient would like to be on antibiotic since she considers her significantly immunosuppressed.  Patient has history of pulmonary hypertension.  She has had pneumonia.  Patient is tobacco nonsmoker.    ROS: No diarrhea.  Cough.  I have reviewed the allergy list and the vital signs.    PE:  Stable, alert and oriented to time and place patient in no acute distress. Lungs have good air entry and are clear. Heart is regular. There is no heart murmur.  Oral mucosa is moist with viral appearing mild uvula mostly pharyngeal erythema.  Some nasal sinus congestion.  Conjunctivae are clear.  There is no enlargement of lymph nodes at the neck. There are no meningeal signs. Voice is not hoarse. Skin is dry and warm. Gait is stable.    A/P:  URI.  Cough.  As patient requested, I have prescribed doxycycline orally. Side effects and expectations reviewed. Probiotics advised.  Advised to consider OTC remedies for the symptoms as advised by Pharmacist.  Continue supportive measures otherwise.  Please see discharge instructions AVS for details.  I advised to follow-up with primary care provider next week.  All questions were answered. The patient indicated understanding of the diagnosis and agreed with the plan of care.

## 2024-09-04 NOTE — DISCHARGE NOTE NURSING/CASE MANAGEMENT/SOCIAL WORK - NSDCPEELIQUISFU_GEN_ALL_CORE
Pt has a bruise to left calf   Go for blood tests as directed. Your doctor will do lab tests at regular visits to monitor the effects of this medicine. Please follow up with your doctor and keep your health care provider appointments.

## 2024-09-10 NOTE — DISCHARGE NOTE NURSING/CASE MANAGEMENT/SOCIAL WORK - NSDCPELOVENOXCOMP_GEN_ALL_CORE
Pt has order for continuous IV fluids that contain potassium. Pt K is 4.3. Order clarified with Lety Ramirez NP and it is ok to infuse these fluids.    Enoxaparin/Lovenox is used to treat and prevent blood clots. Use a different body area each time you give yourself a shot. Keep track of where you give each shot to make sure you rotate body areas. Use a new needle and syringe each time you inject your medicine. Never skip a dose of Enoxaparin/Lovenox. You must use this medicine on a fixed schedule.  NEVER TAKE A DOUBLE DOSE. Call your doctor or pharmacist if you miss a dose. Take Enoxaparin/Lovenox at the same time each morning and/or evening.

## 2024-09-19 NOTE — PATIENT PROFILE ADULT - HARM RISK FACTORS
Medicare Preventive Visit Patient Instructions  Thank you for completing your Welcome to Medicare Visit or Medicare Annual Wellness Visit today. Your next wellness visit will be due in one year (9/20/2025).  The screening/preventive services that you may require over the next 5-10 years are detailed below. Some tests may not apply to you based off risk factors and/or age. Screening tests ordered at today's visit but not completed yet may show as past due. Also, please note that scanned in results may not display below.  Preventive Screenings:  Service Recommendations Previous Testing/Comments   Colorectal Cancer Screening  * Colonoscopy    * Fecal Occult Blood Test (FOBT)/Fecal Immunochemical Test (FIT)  * Fecal DNA/Cologuard Test  * Flexible Sigmoidoscopy Age: 45-75 years old   Colonoscopy: every 10 years (may be performed more frequently if at higher risk)  OR  FOBT/FIT: every 1 year  OR  Cologuard: every 3 years  OR  Sigmoidoscopy: every 5 years  Screening may be recommended earlier than age 45 if at higher risk for colorectal cancer. Also, an individualized decision between you and your healthcare provider will decide whether screening between the ages of 76-85 would be appropriate. Colonoscopy: 12/16/2020  FOBT/FIT: Not on file  Cologuard: Not on file  Sigmoidoscopy: Not on file          Breast Cancer Screening Age: 40+ years old  Frequency: every 1-2 years  Not required if history of left and right mastectomy Mammogram: 06/19/2018        Cervical Cancer Screening Between the ages of 21-29, pap smear recommended once every 3 years.   Between the ages of 30-65, can perform pap smear with HPV co-testing every 5 years.   Recommendations may differ for women with a history of total hysterectomy, cervical cancer, or abnormal pap smears in past. Pap Smear: 06/13/2018        Hepatitis C Screening Once for adults born between 1945 and 1965  More frequently in patients at high risk for Hepatitis C Hep C Antibody:  06/22/2018        Diabetes Screening 1-2 times per year if you're at risk for diabetes or have pre-diabetes Fasting glucose: 81 mg/dL (6/10/2024)  A1C: No results in last 5 years (No results in last 5 years)      Cholesterol Screening Once every 5 years if you don't have a lipid disorder. May order more often based on risk factors. Lipid panel: 06/09/2023          Other Preventive Screenings Covered by Medicare:  Abdominal Aortic Aneurysm (AAA) Screening: covered once if your at risk. You're considered to be at risk if you have a family history of AAA.  Lung Cancer Screening: covers low dose CT scan once per year if you meet all of the following conditions: (1) Age 55-77; (2) No signs or symptoms of lung cancer; (3) Current smoker or have quit smoking within the last 15 years; (4) You have a tobacco smoking history of at least 20 pack years (packs per day multiplied by number of years you smoked); (5) You get a written order from a healthcare provider.  Glaucoma Screening: covered annually if you're considered high risk: (1) You have diabetes OR (2) Family history of glaucoma OR (3)  aged 50 and older OR (4)  American aged 65 and older  Osteoporosis Screening: covered every 2 years if you meet one of the following conditions: (1) You're estrogen deficient and at risk for osteoporosis based off medical history and other findings; (2) Have a vertebral abnormality; (3) On glucocorticoid therapy for more than 3 months; (4) Have primary hyperparathyroidism; (5) On osteoporosis medications and need to assess response to drug therapy.   Last bone density test (DXA Scan): 05/12/2020.  HIV Screening: covered annually if you're between the age of 15-65. Also covered annually if you are younger than 15 and older than 65 with risk factors for HIV infection. For pregnant patients, it is covered up to 3 times per pregnancy.    Immunizations:  Immunization Recommendations   Influenza Vaccine Annual  influenza vaccination during flu season is recommended for all persons aged >= 6 months who do not have contraindications   Pneumococcal Vaccine   * Pneumococcal conjugate vaccine = PCV13 (Prevnar 13), PCV15 (Vaxneuvance), PCV20 (Prevnar 20)  * Pneumococcal polysaccharide vaccine = PPSV23 (Pneumovax) Adults 19-63 yo with certain risk factors or if 65+ yo  If never received any pneumonia vaccine: recommend Prevnar 20 (PCV20)  Give PCV20 if previously received 1 dose of PCV13 or PPSV23   Hepatitis B Vaccine 3 dose series if at intermediate or high risk (ex: diabetes, end stage renal disease, liver disease)   Respiratory syncytial virus (RSV) Vaccine - COVERED BY MEDICARE PART D  * RSVPreF3 (Arexvy) CDC recommends that adults 60 years of age and older may receive a single dose of RSV vaccine using shared clinical decision-making (SCDM)   Tetanus (Td) Vaccine - COST NOT COVERED BY MEDICARE PART B Following completion of primary series, a booster dose should be given every 10 years to maintain immunity against tetanus. Td may also be given as tetanus wound prophylaxis.   Tdap Vaccine - COST NOT COVERED BY MEDICARE PART B Recommended at least once for all adults. For pregnant patients, recommended with each pregnancy.   Shingles Vaccine (Shingrix) - COST NOT COVERED BY MEDICARE PART B  2 shot series recommended in those 19 years and older who have or will have weakened immune systems or those 50 years and older     Health Maintenance Due:      Topic Date Due   • Breast Cancer Screening: Mammogram  06/19/2019   • Lung Cancer Screening  05/12/2021   • DXA SCAN  05/12/2023   • Colorectal Cancer Screening  12/16/2025   • Hepatitis C Screening  Completed     Immunizations Due:      Topic Date Due   • Influenza Vaccine (1) 09/01/2024     Advance Directives   What are advance directives?  Advance directives are legal documents that state your wishes and plans for medical care. These plans are made ahead of time in case you lose  your ability to make decisions for yourself. Advance directives can apply to any medical decision, such as the treatments you want, and if you want to donate organs.   What are the types of advance directives?  There are many types of advance directives, and each state has rules about how to use them. You may choose a combination of any of the following:  Living will:  This is a written record of the treatment you want. You can also choose which treatments you do not want, which to limit, and which to stop at a certain time. This includes surgery, medicine, IV fluid, and tube feedings.   Durable power of  for healthcare (DPAHC):  This is a written record that states who you want to make healthcare choices for you when you are unable to make them for yourself. This person, called a proxy, is usually a family member or a friend. You may choose more than 1 proxy.  Do not resuscitate (DNR) order:  A DNR order is used in case your heart stops beating or you stop breathing. It is a request not to have certain forms of treatment, such as CPR. A DNR order may be included in other types of advance directives.  Medical directive:  This covers the care that you want if you are in a coma, near death, or unable to make decisions for yourself. You can list the treatments you want for each condition. Treatment may include pain medicine, surgery, blood transfusions, dialysis, IV or tube feedings, and a ventilator (breathing machine).  Values history:  This document has questions about your views, beliefs, and how you feel and think about life. This information can help others choose the care that you would choose.  Why are advance directives important?  An advance directive helps you control your care. Although spoken wishes may be used, it is better to have your wishes written down. Spoken wishes can be misunderstood, or not followed. Treatments may be given even if you do not want them. An advance directive may make it  easier for your family to make difficult choices about your care.   Urinary Incontinence   Urinary incontinence (UI)  is when you lose control of your bladder. UI develops because your bladder cannot store or empty urine properly. The 3 most common types of UI are stress incontinence, urge incontinence, or both.  Medicines:   May be given to help strengthen your bladder control. Report any side effects of medication to your healthcare provider.  Do pelvic muscle exercises often:  Your pelvic muscles help you stop urinating. Squeeze these muscles tight for 5 seconds, then relax for 5 seconds. Gradually work up to squeezing for 10 seconds. Do 3 sets of 15 repetitions a day, or as directed. This will help strengthen your pelvic muscles and improve bladder control.  Train your bladder:  Go to the bathroom at set times, such as every 2 hours, even if you do not feel the urge to go. You can also try to hold your urine when you feel the urge to go. For example, hold your urine for 5 minutes when you feel the urge to go. As that becomes easier, hold your urine for 10 minutes.   Self-care:   Keep a UI record.  Write down how often you leak urine and how much you leak. Make a note of what you were doing when you leaked urine.  Drink liquids as directed. You may need to limit the amount of liquid you drink to help control your urine leakage. Do not drink any liquid right before you go to bed. Limit or do not have drinks that contain caffeine or alcohol.   Prevent constipation.  Eat a variety of high-fiber foods. Good examples are high-fiber cereals, beans, vegetables, and whole-grain breads. Walking is the best way to trigger your intestines to have a bowel movement.  Exercise regularly and maintain a healthy weight.  Weight loss and exercise will decrease pressure on your bladder and help you control your leakage.   Use a catheter as directed  to help empty your bladder. A catheter is a tiny, plastic tube that is put into  your bladder to drain your urine.   Go to behavior therapy as directed.  Behavior therapy may be used to help you learn to control your urge to urinate.    Cigarette Smoking and Your Health   Risks to your health if you smoke:  Nicotine and other chemicals found in tobacco damage every cell in your body. Even if you are a light smoker, you have an increased risk for cancer, heart disease, and lung disease. If you are pregnant or have diabetes, smoking increases your risk for complications.   Benefits to your health if you stop smoking:   You decrease respiratory symptoms such as coughing, wheezing, and shortness of breath.   You reduce your risk for cancers of the lung, mouth, throat, kidney, bladder, pancreas, stomach, and cervix. If you already have cancer, you increase the benefits of chemotherapy. You also reduce your risk for cancer returning or a second cancer from developing.   You reduce your risk for heart disease, blood clots, heart attack, and stroke.   You reduce your risk for lung infections, and diseases such as pneumonia, asthma, chronic bronchitis, and emphysema.  Your circulation improves. More oxygen can be delivered to your body. If you have diabetes, you lower your risk for complications, such as kidney, artery, and eye diseases. You also lower your risk for nerve damage. Nerve damage can lead to amputations, poor vision, and blindness.  You improve your body's ability to heal and to fight infections.  For more information and support to stop smoking:   [employer]  Phone: 3- 124 - 441-7967  Web Address: www.[employer]  Weight Management   Why it is important to manage your weight:  Being overweight increases your risk of health conditions such as heart disease, high blood pressure, type 2 diabetes, and certain types of cancer. It can also increase your risk for osteoarthritis, sleep apnea, and other respiratory problems. Aim for a slow, steady weight loss. Even a small amount of weight loss  can lower your risk of health problems.  How to lose weight safely:  A safe and healthy way to lose weight is to eat fewer calories and get regular exercise. You can lose up about 1 pound a week by decreasing the number of calories you eat by 500 calories each day.   Healthy meal plan for weight management:  A healthy meal plan includes a variety of foods, contains fewer calories, and helps you stay healthy. A healthy meal plan includes the following:  Eat whole-grain foods more often.  A healthy meal plan should contain fiber. Fiber is the part of grains, fruits, and vegetables that is not broken down by your body. Whole-grain foods are healthy and provide extra fiber in your diet. Some examples of whole-grain foods are whole-wheat breads and pastas, oatmeal, brown rice, and bulgur.  Eat a variety of vegetables every day.  Include dark, leafy greens such as spinach, kale, lima greens, and mustard greens. Eat yellow and orange vegetables such as carrots, sweet potatoes, and winter squash.   Eat a variety of fruits every day.  Choose fresh or canned fruit (canned in its own juice or light syrup) instead of juice. Fruit juice has very little or no fiber.  Eat low-fat dairy foods.  Drink fat-free (skim) milk or 1% milk. Eat fat-free yogurt and low-fat cottage cheese. Try low-fat cheeses such as mozzarella and other reduced-fat cheeses.  Choose meat and other protein foods that are low in fat.  Choose beans or other legumes such as split peas or lentils. Choose fish, skinless poultry (chicken or turkey), or lean cuts of red meat (beef or pork). Before you cook meat or poultry, cut off any visible fat.   Use less fat and oil.  Try baking foods instead of frying them. Add less fat, such as margarine, sour cream, regular salad dressing and mayonnaise to foods. Eat fewer high-fat foods. Some examples of high-fat foods include french fries, doughnuts, ice cream, and cakes.  Eat fewer sweets.  Limit foods and drinks that  are high in sugar. This includes candy, cookies, regular soda, and sweetened drinks.  Exercise:  Exercise at least 30 minutes per day on most days of the week. Some examples of exercise include walking, biking, dancing, and swimming. You can also fit in more physical activity by taking the stairs instead of the elevator or parking farther away from stores. Ask your healthcare provider about the best exercise plan for you.      © Copyright [employer] 2018 Information is for End User's use only and may not be sold, redistributed or otherwise used for commercial purposes. All illustrations and images included in CareNotes® are the copyrighted property of [employer] Inc[employer] or [employer]       no

## 2025-02-05 NOTE — PROGRESS NOTE ADULT - SUBJECTIVE AND OBJECTIVE BOX
afebrile, VSS    complains of intermittent R heel area (in location of supf, non infected appearing ulcer) and posterior calf pain (excoriations) but no throbbing ischemic foot pain.      Her foot is adequately perfused appearing.      She has a palpable R femoral pulse and ? 1-1+ L DP pulse    A/P  86 year old with PAD but no acute limb threatening findings.  Given her significant other medical problems, I would prefer conservative (i.e. non operative) management (or even interventional) for now.  Will consider CTA of her abdominopelvic and runoff.     MEDICATIONS  (STANDING):  aspirin  chewable 81 milliGRAM(s) Oral daily  atorvastatin 40 milliGRAM(s) Oral at bedtime  cefepime  Injectable. 1000 milliGRAM(s) IV Push every 12 hours  dextrose 40% Gel 15 Gram(s) Oral once  dextrose 5%. 1000 milliLiter(s) (50 mL/Hr) IV Continuous <Continuous>  dextrose 50% Injectable 25 Gram(s) IV Push once  folic acid 1 milliGRAM(s) Oral at bedtime  gabapentin 300 milliGRAM(s) Oral three times a day  glucagon  Injectable 1 milliGRAM(s) IntraMuscular once  influenza   Vaccine 0.5 milliLiter(s) IntraMuscular once  insulin glargine Injectable (LANTUS) 5 Unit(s) SubCutaneous at bedtime  insulin lispro (ADMELOG) corrective regimen sliding scale   SubCutaneous three times a day before meals  metoprolol tartrate 25 milliGRAM(s) Oral two times a day  PARoxetine 20 milliGRAM(s) Oral daily  sacubitril 24 mG/valsartan 26 mG 1 Tablet(s) Oral two times a day  tiotropium 18 MICROgram(s) Capsule 1 Capsule(s) Inhalation daily    MEDICATIONS  (PRN):  ALBUTerol    90 MICROgram(s) HFA Inhaler 2 Puff(s) Inhalation every 6 hours PRN Shortness of Breath  aluminum hydroxide/magnesium hydroxide/simethicone Suspension 30 milliLiter(s) Oral every 4 hours PRN Dyspepsia  melatonin 3 milliGRAM(s) Oral at bedtime PRN Insomnia  ondansetron Injectable 4 milliGRAM(s) IV Push every 8 hours PRN Nausea and/or Vomiting  oxycodone    5 mG/acetaminophen 325 mG 1 Tablet(s) Oral every 6 hours PRN Moderate Pain (4 - 6)      Allergies    cephalosporins (Other)  penicillins (Urticaria; Pruritus)    Intolerances        Flatus: [ ] YES [ ] NO             Bowel Movement: [ ] YES [ ] NO  Pain (0-10):            Pain Control Adequate: [ ] YES [ ] NO  Nausea: [ ] YES [ ] NO            Vomiting: [ ] YES [ ] NO  Diarrhea: [ ] YES [ ] NO         Constipation: [ ] YES [ ] NO     Chest Pain: [ ] YES [ ] NO    SOB:  [ ] YES [ ] NO    Vital Signs Last 24 Hrs  T(C): 36.5 (19 Oct 2021 21:22), Max: 36.7 (19 Oct 2021 13:00)  T(F): 97.7 (19 Oct 2021 21:22), Max: 98 (19 Oct 2021 13:00)  HR: 83 (19 Oct 2021 21:) (64 - 83)  BP: 155/48 (19 Oct 2021 21:22) (120/86 - 155/48)  BP(mean): 78 (19 Oct 2021 18:00) (69 - 94)  RR: 14 (19 Oct 2021 21:22) (10 - 18)  SpO2: 98% (19 Oct 2021 21:22) (98% - 99%)    I&O's Summary    19 Oct 2021 07:01  -  20 Oct 2021 07:00  --------------------------------------------------------  IN: 0 mL / OUT: 200 mL / NET: -200 mL        Physical Exam:  General: NAD, resting comfortably  Pulmonary: normal resp effort, CTA-B  Cardiovascular: NSR  Abdominal: soft, NT/ND  Extremities: WWP, normal strength  Neuro: A/O x 3, CNs II-XII grossly intact, normal motor/sensation, no focal deficits  Pulses:   Right:                                                                          Left:  FEM [ ]2+ [ ]1+ [ ]doppler                                             FEM [ ]2+ [ ]1+ [ ]doppler    POP [ ]2+ [ ]1+ [ ]doppler                                             POP [ ]2+ [ ]1+ [ ]doppler    DP [ ]2+ [ ]1+ [ ]doppler                                                DP [ ]2+ [ ]1+ [ ]doppler  PT[ ]2+ [ ]1+ [ ]doppler                                                  PT [ ]2+ [ ]1+ [ ]doppler    LABS:                        8.9    16.60 )-----------( 258      ( 19 Oct 2021 14:33 )             28.5     10    136  |  108  |  37<H>  ----------------------------<  251<H>  5.3   |  25  |  1.12    Ca    8.9      19 Oct 2021 14:33    TPro  7.3  /  Alb  2.6<L>  /  TBili  0.2  /  DBili  x   /  AST  9<L>  /  ALT  11<L>  /  AlkPhos  97  10-19    PT/INR - ( 19 Oct 2021 14:33 )   PT: 12.4 sec;   INR: 1.06 ratio         PTT - ( 19 Oct 2021 14:33 )  PTT:28.1 sec  Urinalysis Basic - ( 19 Oct 2021 22:25 )    Color: Yellow / Appearance: Clear / S.020 / pH: x  Gluc: x / Ketone: Negative  / Bili: Negative / Urobili: Negative mg/dL   Blood: x / Protein: 30 mg/dL / Nitrite: Negative   Leuk Esterase: Trace / RBC: 0-2 /HPF / WBC 0-2   Sq Epi: x / Non Sq Epi: Occasional / Bacteria: Occasional      LIVER FUNCTIONS - ( 19 Oct 2021 14:33 )  Alb: 2.6 g/dL / Pro: 7.3 gm/dL / ALK PHOS: 97 U/L / ALT: 11 U/L / AST: 9 U/L / GGT: x           CAPILLARY BLOOD GLUCOSE      POCT Blood Glucose.: 299 mg/dL (19 Oct 2021 22:19)  POCT Blood Glucose.: 299 mg/dL (19 Oct 2021 18:28)      RADIOLOGY & ADDITIONAL TESTS:   74Y F with PMHx of ulcerative colitis who presents to the ED with diarrhea and upper abdominal pain radiating to the chest x 2 days. Pt found to have ST elevations in V2-5, elevated Trops and TTE concerning for takotsubo cardiomyopathy. She was admitted to CCU for further management. 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 Course further c/b multiple episodes of diarrhea. Stool was found to be C-diff negative however, GI PCR was found to be indeterminate for Norovirus. GI consulted for possible UC flare vs. norovirus iso worsening diarrhea. Pt now stepped down to cardiac tele for further management. pending CCTA and CTA abdomen and pelvis.
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
## 2025-06-23 NOTE — ED ADULT NURSE NOTE - CAS DISCH TRANSFER METHOD
Detail Level: Detailed Render Post-Care Instructions In Note?: no Medical Necessity Information: It is in your best interest to select a reason for this procedure from the list below. All of these items fulfill various CMS LCD requirements except the new and changing color options. Show Topical Anesthesia Variable?: Yes Post-Care Instructions: I reviewed with the patient in detail post-care instructions. Patient is to wear sunprotection, and avoid picking at any of the treated lesions. Pt may apply Vaseline to crusted or scabbing areas. Number Of Freeze-Thaw Cycles: 1 freeze-thaw cycle Spray Paint Text: The liquid nitrogen was applied to the skin utilizing a spray paint frosting technique. Medical Necessity Clause: This procedure was medically necessary because the lesions that were treated were: Consent: The patient's verbal consent was obtained including but not limited to risks of crusting, scabbing, blistering, scarring, darker or lighter pigmentary change, recurrence, incomplete removal and infection. Private car